# Patient Record
Sex: MALE | Race: WHITE | NOT HISPANIC OR LATINO | Employment: UNEMPLOYED | URBAN - METROPOLITAN AREA
[De-identification: names, ages, dates, MRNs, and addresses within clinical notes are randomized per-mention and may not be internally consistent; named-entity substitution may affect disease eponyms.]

---

## 2023-12-25 ENCOUNTER — HOSPITAL ENCOUNTER (INPATIENT)
Facility: HOSPITAL | Age: 63
LOS: 9 days | Discharge: HOME | DRG: 377 | End: 2024-01-03
Attending: EMERGENCY MEDICINE | Admitting: INTERNAL MEDICINE
Payer: MEDICAID

## 2023-12-25 ENCOUNTER — APPOINTMENT (OUTPATIENT)
Dept: CT IMAGING | Facility: HOSPITAL | Age: 63
DRG: 377 | End: 2023-12-25
Payer: MEDICAID

## 2023-12-25 DIAGNOSIS — D50.0 IRON DEFICIENCY ANEMIA DUE TO CHRONIC BLOOD LOSS: ICD-10-CM

## 2023-12-25 DIAGNOSIS — D64.9 SEVERE ANEMIA: ICD-10-CM

## 2023-12-25 DIAGNOSIS — K70.30 ALCOHOLIC CIRRHOSIS OF LIVER WITHOUT ASCITES: Primary | ICD-10-CM

## 2023-12-25 DIAGNOSIS — C15.9 SQUAMOUS CELL CARCINOMA OF ESOPHAGUS: ICD-10-CM

## 2023-12-25 DIAGNOSIS — R10.32 LLQ ABDOMINAL PAIN: ICD-10-CM

## 2023-12-25 DIAGNOSIS — K81.9 CHOLECYSTITIS: ICD-10-CM

## 2023-12-25 DIAGNOSIS — K26.9 DUODENAL ULCER: ICD-10-CM

## 2023-12-25 PROBLEM — F41.9 ANXIETY DISORDER, UNSPECIFIED: Status: ACTIVE | Noted: 2023-12-25

## 2023-12-25 PROBLEM — F17.210 NICOTINE DEPENDENCE, CIGARETTES, UNCOMPLICATED: Status: ACTIVE | Noted: 2021-05-09

## 2023-12-25 PROBLEM — E78.5 HYPERLIPIDEMIA, UNSPECIFIED: Status: ACTIVE | Noted: 2023-12-25

## 2023-12-25 PROBLEM — R74.01 ELEVATION OF LEVEL OF TRANSAMINASE AND LACTIC ACID DEHYDROGENASE (LDH): Status: ACTIVE | Noted: 2020-05-22

## 2023-12-25 PROBLEM — R74.02 ELEVATION OF LEVEL OF TRANSAMINASE AND LACTIC ACID DEHYDROGENASE (LDH): Status: ACTIVE | Noted: 2020-05-22

## 2023-12-25 PROBLEM — K92.1 MELENA: Status: ACTIVE | Noted: 2021-05-09

## 2023-12-25 PROBLEM — K25.0 ACUTE GASTRIC ULCER WITH HEMORRHAGE: Status: ACTIVE | Noted: 2021-05-09

## 2023-12-25 PROBLEM — K21.9 GASTRO-ESOPHAGEAL REFLUX DISEASE WITHOUT ESOPHAGITIS: Status: ACTIVE | Noted: 2021-05-09

## 2023-12-25 PROBLEM — N32.3 DIVERTICULUM OF BLADDER: Status: ACTIVE | Noted: 2020-05-22

## 2023-12-25 PROBLEM — R73.9 HYPERGLYCEMIA, UNSPECIFIED: Status: ACTIVE | Noted: 2023-12-25

## 2023-12-25 PROBLEM — Z72.0 TOBACCO USE: Status: ACTIVE | Noted: 2020-08-19

## 2023-12-25 PROBLEM — F10.10 ALCOHOL ABUSE, UNCOMPLICATED: Status: ACTIVE | Noted: 2021-05-09

## 2023-12-25 LAB
ABO GROUP BLD: NORMAL
ABO GROUP BLD: NORMAL
ALBUMIN SERPL-MCNC: 3.4 G/DL (ref 3.5–5.2)
ALBUMIN/GLOB SERPL: 1.1 G/DL
ALP SERPL-CCNC: 93 U/L (ref 39–117)
ALT SERPL W P-5'-P-CCNC: 9 U/L (ref 1–41)
AMPHET+METHAMPHET UR QL: NEGATIVE
AMPHETAMINES UR QL: NEGATIVE
ANION GAP SERPL CALCULATED.3IONS-SCNC: 9 MMOL/L (ref 5–15)
APTT PPP: 32.1 SECONDS (ref 60–90)
AST SERPL-CCNC: 15 U/L (ref 1–40)
B-OH-BUTYR SERPL-SCNC: 0.07 MMOL/L (ref 0.02–0.27)
BARBITURATES UR QL SCN: NEGATIVE
BASOPHILS # BLD MANUAL: 0.05 10*3/MM3 (ref 0–0.2)
BASOPHILS NFR BLD MANUAL: 1 % (ref 0–1.5)
BENZODIAZ UR QL SCN: NEGATIVE
BILIRUB SERPL-MCNC: <0.2 MG/DL (ref 0–1.2)
BILIRUB UR QL STRIP: NEGATIVE
BLD GP AB SCN SERPL QL: NEGATIVE
BUN SERPL-MCNC: 10 MG/DL (ref 8–23)
BUN/CREAT SERPL: 16.9 (ref 7–25)
BUPRENORPHINE SERPL-MCNC: NEGATIVE NG/ML
CALCIUM SPEC-SCNC: 8.9 MG/DL (ref 8.6–10.5)
CANNABINOIDS SERPL QL: NEGATIVE
CHLORIDE SERPL-SCNC: 111 MMOL/L (ref 98–107)
CK SERPL-CCNC: 17 U/L (ref 20–200)
CLARITY UR: CLEAR
CO2 SERPL-SCNC: 23 MMOL/L (ref 22–29)
COCAINE UR QL: NEGATIVE
COLOR UR: YELLOW
CREAT SERPL-MCNC: 0.59 MG/DL (ref 0.76–1.27)
D-LACTATE SERPL-SCNC: 1.2 MMOL/L (ref 0.5–2)
DEPRECATED RDW RBC AUTO: 87.4 FL (ref 37–54)
DEVELOPER EXPIRATION DATE: ABNORMAL
DEVELOPER LOT NUMBER: ABNORMAL
EGFRCR SERPLBLD CKD-EPI 2021: 108.3 ML/MIN/1.73
EOSINOPHIL # BLD MANUAL: 0.14 10*3/MM3 (ref 0–0.4)
EOSINOPHIL NFR BLD MANUAL: 3 % (ref 0.3–6.2)
ERYTHROCYTE [DISTWIDTH] IN BLOOD BY AUTOMATED COUNT: 24.4 % (ref 12.3–15.4)
ETHANOL BLD-MCNC: <10 MG/DL (ref 0–10)
EXPIRATION DATE: ABNORMAL
FECAL OCCULT BLOOD SCREEN, POC: POSITIVE
FENTANYL UR-MCNC: NEGATIVE NG/ML
FERRITIN SERPL-MCNC: 39.61 NG/ML (ref 30–400)
GLOBULIN UR ELPH-MCNC: 3 GM/DL
GLUCOSE SERPL-MCNC: 104 MG/DL (ref 65–99)
GLUCOSE UR STRIP-MCNC: NEGATIVE MG/DL
HCT VFR BLD AUTO: 19 % (ref 37.5–51)
HGB BLD-MCNC: 5.6 G/DL (ref 13–17.7)
HGB UR QL STRIP.AUTO: NEGATIVE
HOLD SPECIMEN: NORMAL
INR PPP: 1.05 (ref 0.89–1.12)
IRON 24H UR-MRATE: 12 MCG/DL (ref 59–158)
IRON SATN MFR SERPL: 2 % (ref 20–50)
KETONES UR QL STRIP: NEGATIVE
LEUKOCYTE ESTERASE UR QL STRIP.AUTO: NEGATIVE
LIPASE SERPL-CCNC: 75 U/L (ref 13–60)
LYMPHOCYTES # BLD MANUAL: 1.23 10*3/MM3 (ref 0.7–3.1)
LYMPHOCYTES NFR BLD MANUAL: 6 % (ref 5–12)
Lab: ABNORMAL
MAGNESIUM SERPL-MCNC: 1.6 MG/DL (ref 1.6–2.4)
MCH RBC QN AUTO: 28.9 PG (ref 26.6–33)
MCHC RBC AUTO-ENTMCNC: 29.5 G/DL (ref 31.5–35.7)
MCV RBC AUTO: 97.9 FL (ref 79–97)
METHADONE UR QL SCN: NEGATIVE
MONOCYTES # BLD: 0.27 10*3/MM3 (ref 0.1–0.9)
MYOGLOBIN SERPL-MCNC: 21 NG/ML (ref 28–72)
NEGATIVE CONTROL: NEGATIVE
NEUTROPHILS # BLD AUTO: 2.87 10*3/MM3 (ref 1.7–7)
NEUTROPHILS NFR BLD MANUAL: 63 % (ref 42.7–76)
NITRITE UR QL STRIP: NEGATIVE
OPIATES UR QL: NEGATIVE
OVALOCYTES BLD QL SMEAR: ABNORMAL
OXYCODONE UR QL SCN: NEGATIVE
PCP UR QL SCN: NEGATIVE
PH UR STRIP.AUTO: 5.5 [PH] (ref 5–8)
PLAT MORPH BLD: NORMAL
PLATELET # BLD AUTO: 222 10*3/MM3 (ref 140–450)
PMV BLD AUTO: 8.9 FL (ref 6–12)
POSITIVE CONTROL: POSITIVE
POTASSIUM SERPL-SCNC: 4.1 MMOL/L (ref 3.5–5.2)
PROCALCITONIN SERPL-MCNC: 0.19 NG/ML (ref 0–0.25)
PROT SERPL-MCNC: 6.4 G/DL (ref 6–8.5)
PROT UR QL STRIP: NEGATIVE
PROTHROMBIN TIME: 13.8 SECONDS (ref 12.2–14.5)
RBC # BLD AUTO: 1.94 10*6/MM3 (ref 4.14–5.8)
RH BLD: POSITIVE
RH BLD: POSITIVE
SODIUM SERPL-SCNC: 143 MMOL/L (ref 136–145)
SP GR UR STRIP: 1.02 (ref 1–1.03)
STOMATOCYTES BLD QL SMEAR: ABNORMAL
T&S EXPIRATION DATE: NORMAL
T4 FREE SERPL-MCNC: 0.85 NG/DL (ref 0.93–1.7)
TARGETS BLD QL SMEAR: ABNORMAL
TIBC SERPL-MCNC: 532 MCG/DL (ref 298–536)
TRANSFERRIN SERPL-MCNC: 357 MG/DL (ref 200–360)
TRICYCLICS UR QL SCN: NEGATIVE
TROPONIN T SERPL HS-MCNC: 17 NG/L
TSH SERPL DL<=0.05 MIU/L-ACNC: 1.07 UIU/ML (ref 0.27–4.2)
UROBILINOGEN UR QL STRIP: NORMAL
VARIANT LYMPHS NFR BLD MANUAL: 21 % (ref 19.6–45.3)
VARIANT LYMPHS NFR BLD MANUAL: 6 % (ref 0–5)
WBC MORPH BLD: NORMAL
WBC NRBC COR # BLD AUTO: 4.56 10*3/MM3 (ref 3.4–10.8)
WHOLE BLOOD HOLD COAG: NORMAL
WHOLE BLOOD HOLD SPECIMEN: NORMAL

## 2023-12-25 PROCEDURE — 85610 PROTHROMBIN TIME: CPT | Performed by: EMERGENCY MEDICINE

## 2023-12-25 PROCEDURE — 84145 PROCALCITONIN (PCT): CPT | Performed by: EMERGENCY MEDICINE

## 2023-12-25 PROCEDURE — 83690 ASSAY OF LIPASE: CPT

## 2023-12-25 PROCEDURE — 83036 HEMOGLOBIN GLYCOSYLATED A1C: CPT | Performed by: INTERNAL MEDICINE

## 2023-12-25 PROCEDURE — 83540 ASSAY OF IRON: CPT | Performed by: EMERGENCY MEDICINE

## 2023-12-25 PROCEDURE — 25010000002 CEFTRIAXONE PER 250 MG: Performed by: STUDENT IN AN ORGANIZED HEALTH CARE EDUCATION/TRAINING PROGRAM

## 2023-12-25 PROCEDURE — 82077 ASSAY SPEC XCP UR&BREATH IA: CPT | Performed by: EMERGENCY MEDICINE

## 2023-12-25 PROCEDURE — 80050 GENERAL HEALTH PANEL: CPT

## 2023-12-25 PROCEDURE — 86850 RBC ANTIBODY SCREEN: CPT | Performed by: EMERGENCY MEDICINE

## 2023-12-25 PROCEDURE — 82550 ASSAY OF CK (CPK): CPT | Performed by: EMERGENCY MEDICINE

## 2023-12-25 PROCEDURE — 85007 BL SMEAR W/DIFF WBC COUNT: CPT

## 2023-12-25 PROCEDURE — 93005 ELECTROCARDIOGRAM TRACING: CPT

## 2023-12-25 PROCEDURE — 84439 ASSAY OF FREE THYROXINE: CPT | Performed by: EMERGENCY MEDICINE

## 2023-12-25 PROCEDURE — 80307 DRUG TEST PRSMV CHEM ANLYZR: CPT | Performed by: EMERGENCY MEDICINE

## 2023-12-25 PROCEDURE — 86900 BLOOD TYPING SEROLOGIC ABO: CPT | Performed by: EMERGENCY MEDICINE

## 2023-12-25 PROCEDURE — 82270 OCCULT BLOOD FECES: CPT | Performed by: EMERGENCY MEDICINE

## 2023-12-25 PROCEDURE — 84466 ASSAY OF TRANSFERRIN: CPT | Performed by: EMERGENCY MEDICINE

## 2023-12-25 PROCEDURE — 74177 CT ABD & PELVIS W/CONTRAST: CPT

## 2023-12-25 PROCEDURE — 25010000002 OCTREOTIDE PER 25 MCG: Performed by: STUDENT IN AN ORGANIZED HEALTH CARE EDUCATION/TRAINING PROGRAM

## 2023-12-25 PROCEDURE — 86900 BLOOD TYPING SEROLOGIC ABO: CPT

## 2023-12-25 PROCEDURE — 86901 BLOOD TYPING SEROLOGIC RH(D): CPT

## 2023-12-25 PROCEDURE — 25510000001 IOPAMIDOL 61 % SOLUTION: Performed by: EMERGENCY MEDICINE

## 2023-12-25 PROCEDURE — 82010 KETONE BODYS QUAN: CPT | Performed by: EMERGENCY MEDICINE

## 2023-12-25 PROCEDURE — 86901 BLOOD TYPING SEROLOGIC RH(D): CPT | Performed by: EMERGENCY MEDICINE

## 2023-12-25 PROCEDURE — 83735 ASSAY OF MAGNESIUM: CPT | Performed by: EMERGENCY MEDICINE

## 2023-12-25 PROCEDURE — 86923 COMPATIBILITY TEST ELECTRIC: CPT

## 2023-12-25 PROCEDURE — 81003 URINALYSIS AUTO W/O SCOPE: CPT

## 2023-12-25 PROCEDURE — 99285 EMERGENCY DEPT VISIT HI MDM: CPT

## 2023-12-25 PROCEDURE — 82728 ASSAY OF FERRITIN: CPT | Performed by: EMERGENCY MEDICINE

## 2023-12-25 PROCEDURE — 25010000002 MORPHINE PER 10 MG: Performed by: EMERGENCY MEDICINE

## 2023-12-25 PROCEDURE — 84484 ASSAY OF TROPONIN QUANT: CPT

## 2023-12-25 PROCEDURE — 83874 ASSAY OF MYOGLOBIN: CPT | Performed by: EMERGENCY MEDICINE

## 2023-12-25 PROCEDURE — 83605 ASSAY OF LACTIC ACID: CPT | Performed by: EMERGENCY MEDICINE

## 2023-12-25 PROCEDURE — 85730 THROMBOPLASTIN TIME PARTIAL: CPT | Performed by: EMERGENCY MEDICINE

## 2023-12-25 RX ORDER — CARVEDILOL 3.12 MG/1
3.12 TABLET ORAL 2 TIMES DAILY WITH MEALS
COMMUNITY

## 2023-12-25 RX ORDER — PANTOPRAZOLE SODIUM 40 MG/10ML
40 INJECTION, POWDER, LYOPHILIZED, FOR SOLUTION INTRAVENOUS EVERY 12 HOURS SCHEDULED
Status: DISCONTINUED | OUTPATIENT
Start: 2023-12-25 | End: 2024-01-02

## 2023-12-25 RX ORDER — GABAPENTIN 300 MG/1
300 CAPSULE ORAL 3 TIMES DAILY
Status: ON HOLD | COMMUNITY
End: 2024-01-03 | Stop reason: SDUPTHER

## 2023-12-25 RX ORDER — LACTULOSE 10 G/15ML
20 SOLUTION ORAL 2 TIMES DAILY PRN
COMMUNITY

## 2023-12-25 RX ORDER — FINASTERIDE 5 MG/1
5 TABLET, FILM COATED ORAL DAILY
COMMUNITY

## 2023-12-25 RX ORDER — SPIRONOLACTONE 25 MG/1
25 TABLET ORAL DAILY
COMMUNITY
End: 2024-01-03 | Stop reason: HOSPADM

## 2023-12-25 RX ORDER — PANTOPRAZOLE SODIUM 40 MG/1
40 TABLET, DELAYED RELEASE ORAL 2 TIMES DAILY
COMMUNITY

## 2023-12-25 RX ORDER — FOLIC ACID 1 MG/1
1 TABLET ORAL DAILY
COMMUNITY

## 2023-12-25 RX ORDER — DIPHENHYDRAMINE HCL 25 MG
25 CAPSULE ORAL NIGHTLY
COMMUNITY
End: 2024-01-03 | Stop reason: HOSPADM

## 2023-12-25 RX ORDER — SODIUM CHLORIDE 0.9 % (FLUSH) 0.9 %
10 SYRINGE (ML) INJECTION AS NEEDED
Status: DISCONTINUED | OUTPATIENT
Start: 2023-12-25 | End: 2024-01-03 | Stop reason: HOSPADM

## 2023-12-25 RX ORDER — CIPROFLOXACIN 500 MG/1
500 TABLET, FILM COATED ORAL 2 TIMES DAILY
COMMUNITY
End: 2024-01-03 | Stop reason: HOSPADM

## 2023-12-25 RX ORDER — MORPHINE SULFATE 4 MG/ML
4 INJECTION, SOLUTION INTRAMUSCULAR; INTRAVENOUS ONCE
Status: COMPLETED | OUTPATIENT
Start: 2023-12-25 | End: 2023-12-25

## 2023-12-25 RX ORDER — LANOLIN ALCOHOL/MO/W.PET/CERES
1000 CREAM (GRAM) TOPICAL DAILY
COMMUNITY

## 2023-12-25 RX ORDER — SODIUM CHLORIDE, SODIUM LACTATE, POTASSIUM CHLORIDE, CALCIUM CHLORIDE 600; 310; 30; 20 MG/100ML; MG/100ML; MG/100ML; MG/100ML
75 INJECTION, SOLUTION INTRAVENOUS CONTINUOUS
Status: DISCONTINUED | OUTPATIENT
Start: 2023-12-26 | End: 2023-12-27

## 2023-12-25 RX ORDER — FAMOTIDINE 20 MG/1
20 TABLET, FILM COATED ORAL 2 TIMES DAILY
COMMUNITY
End: 2024-01-03 | Stop reason: HOSPADM

## 2023-12-25 RX ORDER — SUCRALFATE 1 G/1
1 TABLET ORAL 3 TIMES DAILY
COMMUNITY
End: 2024-01-03 | Stop reason: HOSPADM

## 2023-12-25 RX ORDER — CARVEDILOL 6.25 MG/1
6.25 TABLET ORAL 2 TIMES DAILY WITH MEALS
COMMUNITY
End: 2024-01-03 | Stop reason: HOSPADM

## 2023-12-25 RX ORDER — ERGOCALCIFEROL 1.25 MG/1
50000 CAPSULE ORAL WEEKLY
COMMUNITY

## 2023-12-25 RX ORDER — ESCITALOPRAM OXALATE 10 MG/1
10 TABLET ORAL NIGHTLY
COMMUNITY

## 2023-12-25 RX ORDER — ACETAMINOPHEN 500 MG
500 TABLET ORAL EVERY 6 HOURS PRN
Status: ON HOLD | COMMUNITY
End: 2023-12-29

## 2023-12-25 RX ORDER — TAMSULOSIN HYDROCHLORIDE 0.4 MG/1
1 CAPSULE ORAL DAILY
COMMUNITY

## 2023-12-25 RX ADMIN — PANTOPRAZOLE SODIUM 40 MG: 40 INJECTION, POWDER, LYOPHILIZED, FOR SOLUTION INTRAVENOUS at 22:30

## 2023-12-25 RX ADMIN — OCTREOTIDE ACETATE 50 MCG/HR: 500 INJECTION, SOLUTION INTRAVENOUS; SUBCUTANEOUS at 23:39

## 2023-12-25 RX ADMIN — IOPAMIDOL 85 ML: 612 INJECTION, SOLUTION INTRAVENOUS at 20:43

## 2023-12-25 RX ADMIN — SODIUM CHLORIDE 1000 MG: 900 INJECTION INTRAVENOUS at 22:30

## 2023-12-25 RX ADMIN — MORPHINE SULFATE 4 MG: 4 INJECTION, SOLUTION INTRAMUSCULAR; INTRAVENOUS at 22:30

## 2023-12-25 NOTE — Clinical Note
Level of Care: Telemetry [5]   Diagnosis: Anemia [560987]   Admitting Physician: NASIM GARCIA [[id number]]   Attending Physician: NASIM GARCIA [[id number]]

## 2023-12-26 ENCOUNTER — APPOINTMENT (OUTPATIENT)
Dept: GENERAL RADIOLOGY | Facility: HOSPITAL | Age: 63
DRG: 377 | End: 2023-12-26
Payer: MEDICAID

## 2023-12-26 ENCOUNTER — ANESTHESIA EVENT (OUTPATIENT)
Dept: GASTROENTEROLOGY | Facility: HOSPITAL | Age: 63
End: 2023-12-26
Payer: MEDICAID

## 2023-12-26 ENCOUNTER — ANESTHESIA (OUTPATIENT)
Dept: GASTROENTEROLOGY | Facility: HOSPITAL | Age: 63
End: 2023-12-26
Payer: MEDICAID

## 2023-12-26 LAB
ANION GAP SERPL CALCULATED.3IONS-SCNC: 9 MMOL/L (ref 5–15)
BUN SERPL-MCNC: 7 MG/DL (ref 8–23)
BUN/CREAT SERPL: 12.5 (ref 7–25)
CALCIUM SPEC-SCNC: 9 MG/DL (ref 8.6–10.5)
CHLORIDE SERPL-SCNC: 109 MMOL/L (ref 98–107)
CO2 SERPL-SCNC: 22 MMOL/L (ref 22–29)
CREAT SERPL-MCNC: 0.56 MG/DL (ref 0.76–1.27)
DEPRECATED RDW RBC AUTO: 69.6 FL (ref 37–54)
EGFRCR SERPLBLD CKD-EPI 2021: 110.1 ML/MIN/1.73
ERYTHROCYTE [DISTWIDTH] IN BLOOD BY AUTOMATED COUNT: 21.3 % (ref 12.3–15.4)
GLUCOSE SERPL-MCNC: 125 MG/DL (ref 65–99)
HCT VFR BLD AUTO: 23.1 % (ref 37.5–51)
HCT VFR BLD AUTO: 23.6 % (ref 37.5–51)
HCT VFR BLD AUTO: 24.4 % (ref 37.5–51)
HGB BLD-MCNC: 7.2 G/DL (ref 13–17.7)
HGB BLD-MCNC: 7.4 G/DL (ref 13–17.7)
HGB BLD-MCNC: 7.9 G/DL (ref 13–17.7)
MAGNESIUM SERPL-MCNC: 1.4 MG/DL (ref 1.6–2.4)
MCH RBC QN AUTO: 29.3 PG (ref 26.6–33)
MCHC RBC AUTO-ENTMCNC: 31.2 G/DL (ref 31.5–35.7)
MCV RBC AUTO: 93.9 FL (ref 79–97)
PLATELET # BLD AUTO: 196 10*3/MM3 (ref 140–450)
PMV BLD AUTO: 9.4 FL (ref 6–12)
POTASSIUM SERPL-SCNC: 3.9 MMOL/L (ref 3.5–5.2)
RBC # BLD AUTO: 2.46 10*6/MM3 (ref 4.14–5.8)
SODIUM SERPL-SCNC: 140 MMOL/L (ref 136–145)
WBC NRBC COR # BLD AUTO: 6.51 10*3/MM3 (ref 3.4–10.8)

## 2023-12-26 PROCEDURE — 88305 TISSUE EXAM BY PATHOLOGIST: CPT | Performed by: INTERNAL MEDICINE

## 2023-12-26 PROCEDURE — 25010000002 ONDANSETRON PER 1 MG: Performed by: NURSE PRACTITIONER

## 2023-12-26 PROCEDURE — 25010000002 OCTREOTIDE PER 25 MCG: Performed by: STUDENT IN AN ORGANIZED HEALTH CARE EDUCATION/TRAINING PROGRAM

## 2023-12-26 PROCEDURE — P9016 RBC LEUKOCYTES REDUCED: HCPCS

## 2023-12-26 PROCEDURE — 25010000002 NA FERRIC GLUC CPLX PER 12.5 MG: Performed by: INTERNAL MEDICINE

## 2023-12-26 PROCEDURE — 25010000002 CEFTRIAXONE PER 250 MG: Performed by: STUDENT IN AN ORGANIZED HEALTH CARE EDUCATION/TRAINING PROGRAM

## 2023-12-26 PROCEDURE — G0378 HOSPITAL OBSERVATION PER HR: HCPCS

## 2023-12-26 PROCEDURE — 85014 HEMATOCRIT: CPT | Performed by: STUDENT IN AN ORGANIZED HEALTH CARE EDUCATION/TRAINING PROGRAM

## 2023-12-26 PROCEDURE — 25810000003 LACTATED RINGERS PER 1000 ML: Performed by: STUDENT IN AN ORGANIZED HEALTH CARE EDUCATION/TRAINING PROGRAM

## 2023-12-26 PROCEDURE — 25010000002 METRONIDAZOLE 500 MG/100ML SOLUTION: Performed by: INTERNAL MEDICINE

## 2023-12-26 PROCEDURE — 97535 SELF CARE MNGMENT TRAINING: CPT

## 2023-12-26 PROCEDURE — 80048 BASIC METABOLIC PNL TOTAL CA: CPT | Performed by: STUDENT IN AN ORGANIZED HEALTH CARE EDUCATION/TRAINING PROGRAM

## 2023-12-26 PROCEDURE — 36430 TRANSFUSION BLD/BLD COMPNT: CPT

## 2023-12-26 PROCEDURE — 25010000002 FUROSEMIDE PER 20 MG: Performed by: NURSE PRACTITIONER

## 2023-12-26 PROCEDURE — 0DB98ZX EXCISION OF DUODENUM, VIA NATURAL OR ARTIFICIAL OPENING ENDOSCOPIC, DIAGNOSTIC: ICD-10-PCS | Performed by: INTERNAL MEDICINE

## 2023-12-26 PROCEDURE — 25010000002 METRONIDAZOLE 500 MG/100ML SOLUTION: Performed by: STUDENT IN AN ORGANIZED HEALTH CARE EDUCATION/TRAINING PROGRAM

## 2023-12-26 PROCEDURE — 25010000002 THIAMINE PER 100 MG: Performed by: INTERNAL MEDICINE

## 2023-12-26 PROCEDURE — 88342 IMHCHEM/IMCYTCHM 1ST ANTB: CPT | Performed by: INTERNAL MEDICINE

## 2023-12-26 PROCEDURE — 85014 HEMATOCRIT: CPT | Performed by: NURSE PRACTITIONER

## 2023-12-26 PROCEDURE — 88341 IMHCHEM/IMCYTCHM EA ADD ANTB: CPT | Performed by: INTERNAL MEDICINE

## 2023-12-26 PROCEDURE — 86900 BLOOD TYPING SEROLOGIC ABO: CPT

## 2023-12-26 PROCEDURE — 97166 OT EVAL MOD COMPLEX 45 MIN: CPT

## 2023-12-26 PROCEDURE — 25810000003 SODIUM CHLORIDE 0.9 % SOLUTION: Performed by: NURSE ANESTHETIST, CERTIFIED REGISTERED

## 2023-12-26 PROCEDURE — 25010000002 MAGNESIUM SULFATE 2 GM/50ML SOLUTION: Performed by: INTERNAL MEDICINE

## 2023-12-26 PROCEDURE — 71045 X-RAY EXAM CHEST 1 VIEW: CPT

## 2023-12-26 PROCEDURE — 83735 ASSAY OF MAGNESIUM: CPT | Performed by: STUDENT IN AN ORGANIZED HEALTH CARE EDUCATION/TRAINING PROGRAM

## 2023-12-26 PROCEDURE — 97162 PT EVAL MOD COMPLEX 30 MIN: CPT

## 2023-12-26 PROCEDURE — 99204 OFFICE O/P NEW MOD 45 MIN: CPT | Performed by: INTERNAL MEDICINE

## 2023-12-26 PROCEDURE — 85018 HEMOGLOBIN: CPT | Performed by: NURSE PRACTITIONER

## 2023-12-26 PROCEDURE — 85018 HEMOGLOBIN: CPT | Performed by: STUDENT IN AN ORGANIZED HEALTH CARE EDUCATION/TRAINING PROGRAM

## 2023-12-26 PROCEDURE — 85027 COMPLETE CBC AUTOMATED: CPT | Performed by: STUDENT IN AN ORGANIZED HEALTH CARE EDUCATION/TRAINING PROGRAM

## 2023-12-26 PROCEDURE — 25010000002 CEFTRIAXONE PER 250 MG: Performed by: INTERNAL MEDICINE

## 2023-12-26 PROCEDURE — 74018 RADEX ABDOMEN 1 VIEW: CPT

## 2023-12-26 PROCEDURE — 88313 SPECIAL STAINS GROUP 2: CPT | Performed by: INTERNAL MEDICINE

## 2023-12-26 PROCEDURE — 0DB58ZX EXCISION OF ESOPHAGUS, VIA NATURAL OR ARTIFICIAL OPENING ENDOSCOPIC, DIAGNOSTIC: ICD-10-PCS | Performed by: INTERNAL MEDICINE

## 2023-12-26 PROCEDURE — 0202U NFCT DS 22 TRGT SARS-COV-2: CPT | Performed by: NURSE PRACTITIONER

## 2023-12-26 PROCEDURE — 25010000002 PROPOFOL 10 MG/ML EMULSION: Performed by: NURSE ANESTHETIST, CERTIFIED REGISTERED

## 2023-12-26 PROCEDURE — 43239 EGD BIOPSY SINGLE/MULTIPLE: CPT | Performed by: INTERNAL MEDICINE

## 2023-12-26 PROCEDURE — 0DB68ZX EXCISION OF STOMACH, VIA NATURAL OR ARTIFICIAL OPENING ENDOSCOPIC, DIAGNOSTIC: ICD-10-PCS | Performed by: INTERNAL MEDICINE

## 2023-12-26 RX ORDER — CHOLECALCIFEROL (VITAMIN D3) 125 MCG
5 CAPSULE ORAL NIGHTLY PRN
Status: DISCONTINUED | OUTPATIENT
Start: 2023-12-26 | End: 2024-01-03 | Stop reason: HOSPADM

## 2023-12-26 RX ORDER — SODIUM CHLORIDE 0.9 % (FLUSH) 0.9 %
10 SYRINGE (ML) INJECTION AS NEEDED
Status: DISCONTINUED | OUTPATIENT
Start: 2023-12-26 | End: 2024-01-03 | Stop reason: HOSPADM

## 2023-12-26 RX ORDER — TAMSULOSIN HYDROCHLORIDE 0.4 MG/1
0.4 CAPSULE ORAL DAILY
Status: DISCONTINUED | OUTPATIENT
Start: 2023-12-26 | End: 2024-01-03 | Stop reason: HOSPADM

## 2023-12-26 RX ORDER — BISACODYL 5 MG/1
5 TABLET, DELAYED RELEASE ORAL DAILY PRN
Status: DISCONTINUED | OUTPATIENT
Start: 2023-12-26 | End: 2024-01-03 | Stop reason: HOSPADM

## 2023-12-26 RX ORDER — METRONIDAZOLE 500 MG/100ML
500 INJECTION, SOLUTION INTRAVENOUS EVERY 8 HOURS SCHEDULED
Qty: 1500 ML | Refills: 0 | Status: DISCONTINUED | OUTPATIENT
Start: 2023-12-26 | End: 2023-12-27

## 2023-12-26 RX ORDER — NALOXONE HCL 0.4 MG/ML
0.4 VIAL (ML) INJECTION
Status: DISCONTINUED | OUTPATIENT
Start: 2023-12-26 | End: 2024-01-03 | Stop reason: HOSPADM

## 2023-12-26 RX ORDER — ONDANSETRON 2 MG/ML
4 INJECTION INTRAMUSCULAR; INTRAVENOUS ONCE AS NEEDED
Status: DISCONTINUED | OUTPATIENT
Start: 2023-12-26 | End: 2023-12-26 | Stop reason: HOSPADM

## 2023-12-26 RX ORDER — SODIUM CHLORIDE 9 MG/ML
40 INJECTION, SOLUTION INTRAVENOUS AS NEEDED
Status: DISCONTINUED | OUTPATIENT
Start: 2023-12-26 | End: 2024-01-03 | Stop reason: HOSPADM

## 2023-12-26 RX ORDER — FUROSEMIDE 10 MG/ML
20 INJECTION INTRAMUSCULAR; INTRAVENOUS ONCE
Status: COMPLETED | OUTPATIENT
Start: 2023-12-26 | End: 2023-12-26

## 2023-12-26 RX ORDER — SODIUM CHLORIDE 0.9 % (FLUSH) 0.9 %
10 SYRINGE (ML) INJECTION EVERY 12 HOURS SCHEDULED
Status: DISCONTINUED | OUTPATIENT
Start: 2023-12-26 | End: 2024-01-03 | Stop reason: HOSPADM

## 2023-12-26 RX ORDER — BISACODYL 10 MG
10 SUPPOSITORY, RECTAL RECTAL DAILY PRN
Status: DISCONTINUED | OUTPATIENT
Start: 2023-12-26 | End: 2024-01-03 | Stop reason: HOSPADM

## 2023-12-26 RX ORDER — HYDROCODONE BITARTRATE AND ACETAMINOPHEN 5; 325 MG/1; MG/1
1 TABLET ORAL EVERY 4 HOURS PRN
Status: DISCONTINUED | OUTPATIENT
Start: 2023-12-26 | End: 2023-12-30

## 2023-12-26 RX ORDER — SPIRONOLACTONE 25 MG/1
25 TABLET ORAL DAILY
Status: DISCONTINUED | OUTPATIENT
Start: 2023-12-26 | End: 2024-01-03 | Stop reason: HOSPADM

## 2023-12-26 RX ORDER — IPRATROPIUM BROMIDE AND ALBUTEROL SULFATE 2.5; .5 MG/3ML; MG/3ML
3 SOLUTION RESPIRATORY (INHALATION) ONCE AS NEEDED
Status: DISCONTINUED | OUTPATIENT
Start: 2023-12-26 | End: 2023-12-26 | Stop reason: HOSPADM

## 2023-12-26 RX ORDER — SODIUM CHLORIDE 9 MG/ML
50 INJECTION, SOLUTION INTRAVENOUS CONTINUOUS
Status: DISCONTINUED | OUTPATIENT
Start: 2023-12-26 | End: 2023-12-27

## 2023-12-26 RX ORDER — AMOXICILLIN 250 MG
2 CAPSULE ORAL 2 TIMES DAILY
Status: DISCONTINUED | OUTPATIENT
Start: 2023-12-26 | End: 2024-01-03 | Stop reason: HOSPADM

## 2023-12-26 RX ORDER — CARVEDILOL 3.12 MG/1
3.12 TABLET ORAL 2 TIMES DAILY WITH MEALS
Status: DISCONTINUED | OUTPATIENT
Start: 2023-12-26 | End: 2024-01-03 | Stop reason: HOSPADM

## 2023-12-26 RX ORDER — ONDANSETRON 2 MG/ML
4 INJECTION INTRAMUSCULAR; INTRAVENOUS ONCE AS NEEDED
Status: DISCONTINUED | OUTPATIENT
Start: 2023-12-26 | End: 2023-12-26 | Stop reason: SDUPTHER

## 2023-12-26 RX ORDER — PROPOFOL 10 MG/ML
VIAL (ML) INTRAVENOUS AS NEEDED
Status: DISCONTINUED | OUTPATIENT
Start: 2023-12-26 | End: 2023-12-26 | Stop reason: SURG

## 2023-12-26 RX ORDER — THIAMINE HYDROCHLORIDE 100 MG/ML
200 INJECTION, SOLUTION INTRAMUSCULAR; INTRAVENOUS DAILY
Status: COMPLETED | OUTPATIENT
Start: 2023-12-26 | End: 2023-12-30

## 2023-12-26 RX ORDER — MAGNESIUM SULFATE HEPTAHYDRATE 40 MG/ML
2 INJECTION, SOLUTION INTRAVENOUS
Status: COMPLETED | OUTPATIENT
Start: 2023-12-26 | End: 2023-12-26

## 2023-12-26 RX ORDER — ACETAMINOPHEN 500 MG
500 TABLET ORAL EVERY 6 HOURS PRN
Status: DISCONTINUED | OUTPATIENT
Start: 2023-12-26 | End: 2024-01-03 | Stop reason: HOSPADM

## 2023-12-26 RX ORDER — FOLIC ACID 1 MG/1
1 TABLET ORAL DAILY
Status: DISCONTINUED | OUTPATIENT
Start: 2023-12-26 | End: 2024-01-03 | Stop reason: HOSPADM

## 2023-12-26 RX ORDER — SODIUM CHLORIDE 9 MG/ML
INJECTION, SOLUTION INTRAVENOUS CONTINUOUS PRN
Status: DISCONTINUED | OUTPATIENT
Start: 2023-12-26 | End: 2023-12-26 | Stop reason: SURG

## 2023-12-26 RX ORDER — LANOLIN ALCOHOL/MO/W.PET/CERES
1000 CREAM (GRAM) TOPICAL DAILY
Status: DISCONTINUED | OUTPATIENT
Start: 2023-12-26 | End: 2024-01-03 | Stop reason: HOSPADM

## 2023-12-26 RX ORDER — HYDROMORPHONE HYDROCHLORIDE 1 MG/ML
0.5 INJECTION, SOLUTION INTRAMUSCULAR; INTRAVENOUS; SUBCUTANEOUS
Status: DISPENSED | OUTPATIENT
Start: 2023-12-26 | End: 2024-01-02

## 2023-12-26 RX ORDER — ONDANSETRON 2 MG/ML
4 INJECTION INTRAMUSCULAR; INTRAVENOUS EVERY 6 HOURS PRN
Status: DISCONTINUED | OUTPATIENT
Start: 2023-12-26 | End: 2024-01-03 | Stop reason: HOSPADM

## 2023-12-26 RX ORDER — LACTULOSE 10 G/15ML
20 SOLUTION ORAL 2 TIMES DAILY PRN
Status: DISCONTINUED | OUTPATIENT
Start: 2023-12-26 | End: 2024-01-03 | Stop reason: HOSPADM

## 2023-12-26 RX ORDER — IPRATROPIUM BROMIDE AND ALBUTEROL SULFATE 2.5; .5 MG/3ML; MG/3ML
3 SOLUTION RESPIRATORY (INHALATION) ONCE AS NEEDED
Status: DISCONTINUED | OUTPATIENT
Start: 2023-12-26 | End: 2023-12-26 | Stop reason: SDUPTHER

## 2023-12-26 RX ORDER — POLYETHYLENE GLYCOL 3350 17 G/17G
17 POWDER, FOR SOLUTION ORAL DAILY PRN
Status: DISCONTINUED | OUTPATIENT
Start: 2023-12-26 | End: 2024-01-03 | Stop reason: HOSPADM

## 2023-12-26 RX ADMIN — SODIUM CHLORIDE 1000 MG: 900 INJECTION INTRAVENOUS at 01:08

## 2023-12-26 RX ADMIN — ONDANSETRON 4 MG: 2 INJECTION INTRAMUSCULAR; INTRAVENOUS at 20:58

## 2023-12-26 RX ADMIN — PROPOFOL 100 MG: 10 INJECTION, EMULSION INTRAVENOUS at 12:30

## 2023-12-26 RX ADMIN — METRONIDAZOLE 500 MG: 500 INJECTION, SOLUTION INTRAVENOUS at 08:37

## 2023-12-26 RX ADMIN — SODIUM CHLORIDE 125 MG: 9 INJECTION, SOLUTION INTRAVENOUS at 15:01

## 2023-12-26 RX ADMIN — CEFTRIAXONE 2000 MG: 2 INJECTION, POWDER, FOR SOLUTION INTRAMUSCULAR; INTRAVENOUS at 20:58

## 2023-12-26 RX ADMIN — HYDROCODONE BITARTRATE AND ACETAMINOPHEN 1 TABLET: 5; 325 TABLET ORAL at 13:49

## 2023-12-26 RX ADMIN — PROPOFOL 50 MG: 10 INJECTION, EMULSION INTRAVENOUS at 12:36

## 2023-12-26 RX ADMIN — MAGNESIUM SULFATE IN WATER FOR 2 G: 40 INJECTION INTRAVENOUS at 15:47

## 2023-12-26 RX ADMIN — Medication 10 ML: at 01:10

## 2023-12-26 RX ADMIN — CYANOCOBALAMIN TAB 1000 MCG 1000 MCG: 1000 TAB at 08:25

## 2023-12-26 RX ADMIN — METRONIDAZOLE 500 MG: 500 INJECTION, SOLUTION INTRAVENOUS at 15:05

## 2023-12-26 RX ADMIN — Medication 5 MG: at 02:06

## 2023-12-26 RX ADMIN — Medication 10 ML: at 21:05

## 2023-12-26 RX ADMIN — PROPOFOL 30 MG: 10 INJECTION, EMULSION INTRAVENOUS at 12:40

## 2023-12-26 RX ADMIN — PANTOPRAZOLE SODIUM 40 MG: 40 INJECTION, POWDER, LYOPHILIZED, FOR SOLUTION INTRAVENOUS at 21:05

## 2023-12-26 RX ADMIN — METRONIDAZOLE 500 MG: 500 INJECTION, SOLUTION INTRAVENOUS at 01:55

## 2023-12-26 RX ADMIN — PROPOFOL 30 MG: 10 INJECTION, EMULSION INTRAVENOUS at 12:42

## 2023-12-26 RX ADMIN — LIDOCAINE HYDROCHLORIDE 150 MG: 20 INJECTION INTRAVENOUS at 12:30

## 2023-12-26 RX ADMIN — MAGNESIUM SULFATE IN WATER FOR 2 G: 40 INJECTION INTRAVENOUS at 13:54

## 2023-12-26 RX ADMIN — PROPOFOL 20 MG: 10 INJECTION, EMULSION INTRAVENOUS at 12:33

## 2023-12-26 RX ADMIN — SENNOSIDES AND DOCUSATE SODIUM 2 TABLET: 8.6; 5 TABLET ORAL at 21:05

## 2023-12-26 RX ADMIN — SODIUM CHLORIDE, POTASSIUM CHLORIDE, SODIUM LACTATE AND CALCIUM CHLORIDE 75 ML/HR: 600; 310; 30; 20 INJECTION, SOLUTION INTRAVENOUS at 00:17

## 2023-12-26 RX ADMIN — THIAMINE HYDROCHLORIDE 200 MG: 100 INJECTION, SOLUTION INTRAMUSCULAR; INTRAVENOUS at 15:05

## 2023-12-26 RX ADMIN — HYDROCODONE BITARTRATE AND ACETAMINOPHEN 1 TABLET: 5; 325 TABLET ORAL at 04:42

## 2023-12-26 RX ADMIN — OCTREOTIDE ACETATE 50 MCG/HR: 500 INJECTION, SOLUTION INTRAVENOUS; SUBCUTANEOUS at 08:37

## 2023-12-26 RX ADMIN — FOLIC ACID 1 MG: 1 TABLET ORAL at 08:25

## 2023-12-26 RX ADMIN — SODIUM CHLORIDE, POTASSIUM CHLORIDE, SODIUM LACTATE AND CALCIUM CHLORIDE 75 ML/HR: 600; 310; 30; 20 INJECTION, SOLUTION INTRAVENOUS at 00:10

## 2023-12-26 RX ADMIN — FUROSEMIDE 20 MG: 20 INJECTION, SOLUTION INTRAMUSCULAR; INTRAVENOUS at 20:58

## 2023-12-26 RX ADMIN — MAGNESIUM SULFATE IN WATER FOR 2 G: 40 INJECTION INTRAVENOUS at 17:29

## 2023-12-26 RX ADMIN — PANTOPRAZOLE SODIUM 40 MG: 40 INJECTION, POWDER, LYOPHILIZED, FOR SOLUTION INTRAVENOUS at 08:25

## 2023-12-26 RX ADMIN — ACETAMINOPHEN 500 MG: 500 TABLET ORAL at 21:10

## 2023-12-26 RX ADMIN — SODIUM CHLORIDE: 9 INJECTION, SOLUTION INTRAVENOUS at 12:22

## 2023-12-26 RX ADMIN — Medication 10 ML: at 08:24

## 2023-12-26 NOTE — CONSULTS
"[facility] [place] [facility] Consult    Referring Provider: No ref. provider found    PCP: Provider, No Known    Reason for Consultation: Anemia    Chief complaint: Abdominal pain    History of present illness:    Val Nassar is a 63 y.o. male who recently moved from [place] reports recent diagnosis of cirrhosis, alcohol use, laryngeal cancer, peptic ulcer disease who presents with left lower back pain.  GI consulted for anemia and questionable dark stools.  Patient denies any dark stools or encounter this morning.  He reports lower back pain but denies any abdominal pain in any location.  He denies any emesis.  He does report some intermittent nausea.  He reports his last drink of alcohol was 6 to 7 weeks ago.  He reports he has had multiple EGDs in the past and thinks he had a colonoscopy but a year ago.  He reports on his last EGD they noted some bleeding that they treated with \"diet and medicine.\"  He reports taking Tylenol typically for pain although occasional take ibuprofen.  Iron studies no ferritin of 39 and TIBC of 532.  Hemoglobin is 5 6 with an MCV of 97.9.  Platelets are normal at 222.  INR 1.05.  LFTs and T. bili normal.  CT abdomen and pelvis with contrast notable for mild nodularity of the liver surface, normal-appearing pancreas, diffuse wall thickening of the gallbladder with enhancement and surrounding pericholecystic fluid concerning for cholecystitis, nondilated common bile duct, normal-appearing spleen, diverticulosis without evidence of diverticulitis, large bladder diverticulum.    Allergies:  Patient has no known allergies.    Scheduled Meds:  [Held by provider] carvedilol, 3.125 mg, Oral, BID With Meals  cefTRIAXone, 2,000 mg, Intravenous, Q24H  folic acid, 1 mg, Oral, Daily  metroNIDAZOLE, 500 mg, Intravenous, Q8H  pantoprazole, 40 mg, Intravenous, Q12H  pharmacy consult - [facility], , Does not apply, Daily  senna-docusate sodium, 2 tablet, Oral, BID  sodium chloride, 10 mL, Intravenous, " Q12H  [Held by provider] spironolactone, 25 mg, Oral, Daily  [Held by provider] tamsulosin, 0.4 mg, Oral, Daily  vitamin B-12, 1,000 mcg, Oral, Daily         Infusions:  lactated ringers, 75 mL/hr, Last Rate: 75 mL/hr (12/26/23 0017)  octreotide (SandoSTATIN) infusion, 50 mcg/hr, Last Rate: 50 mcg/hr (12/25/23 7167)        PRN Meds:    acetaminophen    senna-docusate sodium **AND** polyethylene glycol **AND** bisacodyl **AND** bisacodyl    Calcium Replacement - Follow Nurse / BPA Driven Protocol    HYDROcodone-acetaminophen    HYDROmorphone **AND** naloxone    lactulose    Magnesium Standard Dose Replacement - Follow Nurse / BPA Driven Protocol    melatonin    Phosphorus Replacement - Follow Nurse / BPA Driven Protocol    Potassium Replacement - Follow Nurse / BPA Driven Protocol    sodium chloride    sodium chloride    sodium chloride    Home Meds:  Medications Prior to Admission   Medication Sig Dispense Refill Last Dose    acetaminophen (TYLENOL) 500 MG tablet Take 1 tablet by mouth Every 6 (Six) Hours As Needed for Mild Pain.       carvedilol (COREG) 3.125 MG tablet Take 1 tablet by mouth 2 (Two) Times a Day With Meals.       carvedilol (COREG) 6.25 MG tablet Take 1 tablet by mouth 2 (Two) Times a Day With Meals.       ciprofloxacin (CIPRO) 500 MG tablet Take 1 tablet by mouth 2 (Two) Times a Day.       diphenhydrAMINE (BENADRYL) 25 mg capsule Take 1 capsule by mouth Every Night.       escitalopram (LEXAPRO) 10 MG tablet Take 1 tablet by mouth Every Night.       famotidine (PEPCID) 20 MG tablet Take 1 tablet by mouth 2 (Two) Times a Day.       finasteride (PROSCAR) 5 MG tablet Take 1 tablet by mouth Daily.       folic acid (FOLVITE) 1 MG tablet Take 1 tablet by mouth Daily.       gabapentin (NEURONTIN) 300 MG capsule Take 1 capsule by mouth 3 (Three) Times a Day.       lactulose (CHRONULAC) 10 GM/15ML solution Take 30 mL by mouth 2 (Two) Times a Day As Needed.       pantoprazole (PROTONIX) 40 MG EC tablet Take 1  "tablet by mouth 2 (Two) Times a Day.       spironolactone (ALDACTONE) 25 MG tablet Take 1 tablet by mouth Daily.       sucralfate (CARAFATE) 1 g tablet Take 1 tablet by mouth 3 (Three) Times a Day.       tamsulosin (FLOMAX) 0.4 MG capsule 24 hr capsule Take 1 capsule by mouth Daily.       vitamin B-12 (CYANOCOBALAMIN) 1000 MCG tablet Take 1 tablet by mouth Daily.       vitamin D (ERGOCALCIFEROL) 1.25 MG (24376 UT) capsule capsule Take 1 capsule by mouth 1 (One) Time Per Week.          ROS: Review of Systems   Constitutional:  Negative for chills and fever.   Respiratory:  Negative for cough and shortness of breath.    Gastrointestinal:  Positive for nausea. Negative for abdominal distention, abdominal pain and vomiting.   Musculoskeletal:  Positive for back pain.   Skin:  Negative for color change and rash.   All other systems reviewed and are negative.      PAST MED HX:  History reviewed. No pertinent past medical history.    PAST SURG HX:  History reviewed. No pertinent surgical history.    FAM HX:  History reviewed. No pertinent family history.    SOC HX:  Social History     Socioeconomic History    Marital status: Single   Tobacco Use    Smoking status: Every Day     Packs/day: 1.00     Years: 15.00     Additional pack years: 0.00     Total pack years: 15.00     Types: Cigarettes   Vaping Use    Vaping Use: Some days    Substances: Flavoring    Devices: Disposable   Substance and Sexual Activity    Alcohol use: Not Currently    Drug use: Never    Sexual activity: Defer       PHYSICAL EXAM  /71 (BP Location: Right arm, Patient Position: Lying)   Pulse 67   Temp 97.9 °F (36.6 °C) (Axillary)   Resp 20   Ht 175.3 cm (69\")   Wt 66.2 kg (146 lb)   SpO2 98%   BMI 21.56 kg/m²   Wt Readings from Last 3 Encounters:   12/26/23 66.2 kg (146 lb)   ,body mass index is 21.56 kg/m².  Physical Exam   General: Patient awake, alert and cooperative   Eyes: Normal lids and lashes, no scleral icterus   Neck: Supple, " normal ROM   Skin: Warm and dry, not jaundiced   Cardiovascular: Regular rate, well-perfused extremities   Pulm: Equal expansion bilaterally, no increased WOB   Abdomen: Soft, nontender, nondistended;    Extremities: No rash or edema              Neuro: A&O, No obvious sign of focal deficit   Psychiatric: Normal mood and behavior; memory intact    Results Review:   I reviewed the patient's new clinical results.    Lab Results   Component Value Date    WBC 4.56 12/25/2023    HGB 5.6 (C) 12/25/2023    HCT 19.0 (C) 12/25/2023    MCV 97.9 (H) 12/25/2023     12/25/2023       Lab Results   Component Value Date    INR 1.05 12/25/2023       Lab Results   Component Value Date    GLUCOSE 104 (H) 12/25/2023    BUN 10 12/25/2023    CREATININE 0.59 (L) 12/25/2023    BCR 16.9 12/25/2023     12/25/2023    K 4.1 12/25/2023    CO2 23.0 12/25/2023    CALCIUM 8.9 12/25/2023    ALBUMIN 3.4 (L) 12/25/2023    ALKPHOS 93 12/25/2023    BILITOT <0.2 12/25/2023    ALT 9 12/25/2023    AST 15 12/25/2023       ASSESSMENTS/PLANS  Impression:  Abdominal pain  Abnormal imaging the gallbladder  Cirrhotic appearing liver on imaging  History of peptic ulcer disease  Anemia  Reported recent diagnosis of laryngeal cancer    Plan:  -Hospital medicine started patient on IV PPI, octreotide as well as antibiotics and consulted general surgery  -Receiving his second unit of blood this morning, hemoglobin following transfusion 7.2  -No records available for review as he has received care recently in [place]  -There is mention of dark stools on admitting HPI, however patient denies any dark stools or bright red blood per rectum and he has had no emesis.  -N.p.o.  -Plan for EGD to further evaluate today    I discussed the patient's findings and my recommendations with patient    Wesly Mckeon MD  12/26/23  08:09 EST

## 2023-12-26 NOTE — ED NOTES
Cesario Neal    Nursing Report ED to Floor:  Mental status: a&oX4  Ambulatory status: up with 2   Oxygen Therapy:  RA  Cardiac Rhythm: NSR  Admitted from: ED  Safety Concerns:  Fall  Social Issues: Cancer pt   ED Room #:  11    ED Nurse Phone Extension - [phone] or may call [phone].      HPI:   Chief Complaint   Patient presents with    Abdominal Pain       Past Medical History:  No past medical history on file.     Past Surgical History:  No past surgical history on file.     Admitting Doctor:   Wesly Turner MD    Consulting Provider(s):  Consults       No orders found from 11/26/2023 to 12/26/2023.             Admitting Diagnosis:   The primary encounter diagnosis was Severe anemia. Diagnoses of Cholecystitis and LLQ abdominal pain were also pertinent to this visit.    Most Recent Vitals:   Vitals:    12/25/23 2100 12/25/23 2130 12/25/23 2200 12/25/23 2230   BP:  153/76 158/75 164/76   BP Location:       Patient Position:       Pulse: 75 69 73 79   Resp:       Temp:       TempSrc:       SpO2:  97% 99% 99%   Weight:       Height:           Active LDAs/IV Access:   Lines, Drains & Airways       Active LDAs       Name Placement date Placement time Site Days    Peripheral IV 12/25/23 1925 Left Antecubital 12/25/23 1925  Antecubital  less than 1                    Labs (abnormal labs have a star):   Labs Reviewed   COMPREHENSIVE METABOLIC PANEL - Abnormal; Notable for the following components:       Result Value    Glucose 104 (*)     Creatinine 0.59 (*)     Chloride 111 (*)     Albumin 3.4 (*)     All other components within normal limits    Narrative:     GFR Normal >60  Chronic Kidney Disease <60  Kidney Failure <15     LIPASE - Abnormal; Notable for the following components:    Lipase 75 (*)     All other components within normal limits   CBC WITH AUTO DIFFERENTIAL - Abnormal; Notable for the following components:    RBC 1.94 (*)     Hemoglobin 5.6 (*)     Hematocrit 19.0 (*)     MCV 97.9 (*)     MCHC 29.5  (*)     RDW 24.4 (*)     RDW-SD 87.4 (*)     All other components within normal limits    Narrative:     The previously reported component NRBC is no longer being reported. Previous result was 0.0 /100 WBC (Reference Range: 0.0-0.2 /100 WBC) on 12/25/2023 at 1943 EST.   T4, FREE - Abnormal; Notable for the following components:    Free T4 0.85 (*)     All other components within normal limits    Narrative:     Results may be falsely increased if patient taking Biotin.     CK - Abnormal; Notable for the following components:    Creatine Kinase 17 (*)     All other components within normal limits   MYOGLOBIN, SERUM - Abnormal; Notable for the following components:    Myoglobin 21.0 (*)     All other components within normal limits    Narrative:     Results may be falsely decreased if patient taking Biotin.     APTT - Abnormal; Notable for the following components:    PTT 32.1 (*)     All other components within normal limits    Narrative:     PTT = The equivalent PTT values for the therapeutic range of heparin levels at 0.3 to 0.5 U/ml are 60 to 70 seconds.   IRON PROFILE - Abnormal; Notable for the following components:    Iron 12 (*)     Iron Saturation (TSAT) 2 (*)     All other components within normal limits   MANUAL DIFFERENTIAL - Abnormal; Notable for the following components:    Atypical Lymphocyte % 6.0 (*)     All other components within normal limits   POCT OCCULT BLOOD STOOL - Abnormal; Notable for the following components:    Fecal Occult Blood Positive (*)     All other components within normal limits   SINGLE HSTROPONIN T - Normal    Narrative:     High Sensitive Troponin T Reference Range:  <14.0 ng/L- Negative Female for AMI  <22.0 ng/L- Negative Male for AMI  >=14 - Abnormal Female indicating possible myocardial injury.  >=22 - Abnormal Male indicating possible myocardial injury.   Clinicians would have to utilize clinical acumen, EKG, Troponin, and serial changes to determine if it is an Acute  Myocardial Infarction or myocardial injury due to an underlying chronic condition.        URINALYSIS W/ MICROSCOPIC IF INDICATED (NO CULTURE) - Normal    Narrative:     Urine microscopic not indicated.   MAGNESIUM - Normal   TSH - Normal   ETHANOL - Normal    Narrative:     Elevated lactic acid concentration and lactate dehydrogenase(LD) activity may falsely elevate enzymatically determined ethanol levels. Not for legal purposes.    URINE DRUG SCREEN - Normal    Narrative:     Cutoff For Drugs Screened:    Amphetamines               500 ng/ml  Barbiturates               200 ng/ml  Benzodiazepines            150 ng/ml  Cocaine                    150 ng/ml  Methadone                  200 ng/ml  Opiates                    100 ng/ml  Phencyclidine               25 ng/ml  THC                         50 ng/ml  Methamphetamine            500 ng/ml  Tricyclic Antidepressants  300 ng/ml  Oxycodone                  100 ng/ml  Buprenorphine               10 ng/ml    The normal value for all drugs tested is negative. This report includes unconfirmed screening results, with the cutoff values listed, to be used for medical treatment purposes only.  Unconfirmed results must not be used for non-medical purposes such as employment or legal testing.  Clinical consideration should be applied to any drug of abuse test, particularly when unconfirmed results are used.     LACTIC ACID, PLASMA - Normal   PROCALCITONIN - Normal    Narrative:     As a Marker for Sepsis (Non-Neonates):    1. <0.5 ng/mL represents a low risk of severe sepsis and/or septic shock.  2. >2 ng/mL represents a high risk of severe sepsis and/or septic shock.    As a Marker for Lower Respiratory Tract Infections that require antibiotic therapy:    PCT on Admission    Antibiotic Therapy       6-12 Hrs later    >0.5                Strongly Recommended  >0.25 - <0.5        Recommended   0.1 - 0.25          Discouraged              Remeasure/reassess PCT  <0.1              "   Strongly Discouraged     Remeasure/reassess PCT    As 28 day mortality risk marker: \"Change in Procalcitonin Result\" (>80% or <=80%) if Day 0 (or Day 1) and Day 4 values are available. Refer to http://www.[facility]-pct-calculator.com    Change in PCT <=80%  A decrease of PCT levels below or equal to 80% defines a positive change in PCT test result representing a higher risk for 28-day all-cause mortality of patients diagnosed with severe sepsis for septic shock.    Change in PCT >80%  A decrease of PCT levels of more than 80% defines a negative change in PCT result representing a lower risk for 28-day all-cause mortality of patients diagnosed with severe sepsis or septic shock.      PROTIME-INR - Normal   FERRITIN - Normal    Narrative:     Results may be falsely decreased if patient taking Biotin.     FENTANYL, URINE - Normal    Narrative:     Negative Threshold:      Fentanyl 5 ng/mL     The normal value for the drug tested is negative. This report includes final unconfirmed screening results to be used for medical treatment purposes only. Unconfirmed results must not be used for non-medical purposes such as employment or legal testing. Clinical consideration should be applied to any drug of abuse test, particularly when unconfirmed results are used.          BETA HYDROXYBUTYRATE QUANTITATIVE - Normal    Narrative:     In the assessment of possible diabetic ketoacidosis, the test should be interpreted along with other clinical and laboratory findings.  A level greater than 1 mmol/L should require further evaluation and levels of more than 3 mmol/L require immediate medical review.   RAINBOW DRAW    Narrative:     The following orders were created for panel order [place] Draw.  Procedure                               Abnormality         Status                     ---------                               -----------         ------                     Green Top (Gel)[[id number]]                                  Final " result               Lavender Top[[id number]]                                     Final result               Gold Top - SST[[id number]]                                   Final result               Gray Top[[id number]]                                         In process                 Light Blue Top[[id number]]                                   Final result                 Please view results for these tests on the individual orders.   TYPE AND SCREEN   [facility] 2ND SPECIMEN VERIFICATION   PREPARE RBC   CBC AND DIFFERENTIAL    Narrative:     The following orders were created for panel order CBC & Differential.  Procedure                               Abnormality         Status                     ---------                               -----------         ------                     CBC Auto Differential[[id number]]        Abnormal            Final result               Scan Slide[[id number]]                                                                    Please view results for these tests on the individual orders.   GREEN TOP   LAVENDER TOP   GOLD TOP - SST   LIGHT BLUE TOP   KETONE BODIES SERUM    Narrative:     The following orders were created for panel order Ketone Bodies, Serum (Not performed at [place]).  Procedure                               Abnormality         Status                     ---------                               -----------         ------                     Beta Hydroxybutyrate Eric...[[id number]]  Normal              Final result                 Please view results for these tests on the individual orders.   GRAY TOP       Meds Given in ED:   Medications   sodium chloride 0.9 % flush 10 mL (has no administration in time range)   cefTRIAXone (ROCEPHIN) 1,000 mg in sodium chloride 0.9 % 100 mL IVPB (1,000 mg Intravenous New Bag 12/25/23 2230)   octreotide (sandoSTATIN) 500 mcg in sodium chloride 0.9 % 100 mL (5 mcg/mL) infusion (has no administration in time range)   pantoprazole (PROTONIX) injection  40 mg (40 mg Intravenous Given 12/25/23 2230)   iopamidol (ISOVUE-300) 61 % injection 85 mL (85 mL Intravenous Given 12/25/23 2043)   Morphine sulfate (PF) injection 4 mg (4 mg Intravenous Given 12/25/23 2230)     octreotide (SandoSTATIN) infusion, 50 mcg/hr

## 2023-12-26 NOTE — ANESTHESIA POSTPROCEDURE EVALUATION
Patient: Val Nassar    Procedure Summary       Date: 12/26/23 Room / Location: [facility] ENDOSCOPY 2 /  [facility]    Anesthesia Start: 1222 Anesthesia Stop:     Procedure: ESOPHAGOGASTRODUODENOSCOPY Diagnosis:       Iron deficiency anemia due to chronic blood loss      (Iron deficiency anemia due to chronic blood loss [D50.0])    Surgeons: Wesly Mckeon MD Provider: Arnold Hankins MD    Anesthesia Type: general ASA Status: 3            Anesthesia Type: general    Vitals  No vitals data found for the desired time range.          Post Anesthesia Care and Evaluation    Patient location during evaluation: PACU  Patient participation: complete - patient participated  Level of consciousness: awake and alert  Pain management: adequate    Airway patency: patent  Anesthetic complications: No anesthetic complications  PONV Status: none  Cardiovascular status: hemodynamically stable and acceptable  Respiratory status: nonlabored ventilation, acceptable and nasal cannula  Hydration status: acceptable

## 2023-12-26 NOTE — SIGNIFICANT NOTE
12/26/23 0848   SLP Deferred Reason   SLP Deferred Reason Routine  (Consult received for dysphagia eval & chart reviewed. Spoke w/ RN. Pt currently NPO for GI & Surgery. Will f/u for eval when appropriate.)

## 2023-12-26 NOTE — PLAN OF CARE
Goal Outcome Evaluation:     Pt c/o back pain today and relieved w norco prn.   Hgb stable up to 7.4 after 2 units PRBC.     EGD today revealed mass.   Pt monique full  liq diet today.   Mag 1.4 and replaced per protocol.    Lg bm today w no blood noted.  Voiding per urinal.   Pt has hx urinary retention.

## 2023-12-26 NOTE — ED PROVIDER NOTES
Subjective   History of Present Illness    Pt presents with LLQ abd pain that began this morning.  It has radiated around to the lower back.  Associated with increased urinary frequency but not dysuria or hematuria. No fever, vomiting or diarrhea.  Reports his stool has been white.  He denies any black/bloody stool.    He reports a PMH chronic back pain, GERD/PUD, cirrhosis, throat cancer.  He says the cirrhosis was dx about 3 months ago and he quit alcohol at that time after a history of heavy use.  He says the throat cancer was just dx about 2 weeks ago.  He doesn't know much about it, says it was the impetus for moving from [place] to [place] but he doesn't have care established here.    History provided by:  Patient      Review of Systems   Constitutional:  Negative for fever.   Respiratory:  Negative for shortness of breath.    Cardiovascular:  Negative for chest pain.   Gastrointestinal:  Positive for abdominal pain. Negative for blood in stool, diarrhea and vomiting.   Genitourinary:  Positive for frequency. Negative for difficulty urinating, dysuria and hematuria.   Musculoskeletal:  Positive for back pain.   All other systems reviewed and are negative.      No past medical history on file.    No Known Allergies    No past surgical history on file.    No family history on file.    Social History     Socioeconomic History    Marital status: Single           Objective   Physical Exam  Vitals and nursing note reviewed.   Constitutional:       General: He is not in acute distress.     Appearance: Normal appearance. He is not ill-appearing.   HENT:      Head: Normocephalic and atraumatic.      Mouth/Throat:      Mouth: Mucous membranes are moist.   Eyes:      General: No scleral icterus.        Right eye: No discharge.         Left eye: No discharge.      Conjunctiva/sclera: Conjunctivae normal.   Cardiovascular:      Rate and Rhythm: Normal rate and regular rhythm.      Heart sounds: No murmur heard.  Pulmonary:       Effort: Pulmonary effort is normal. No respiratory distress.      Breath sounds: Normal breath sounds. No wheezing.   Abdominal:      General: Bowel sounds are normal. There is no distension.      Palpations: Abdomen is soft.      Tenderness: There is no abdominal tenderness. There is no guarding or rebound.   Musculoskeletal:         General: No swelling. Normal range of motion.      Cervical back: Normal range of motion and neck supple.   Skin:     General: Skin is warm and dry.      Coloration: Skin is pale.      Findings: No rash.   Neurological:      General: No focal deficit present.      Mental Status: He is alert and oriented to person, place, and time. Mental status is at baseline.   Psychiatric:         Mood and Affect: Mood normal.         Behavior: Behavior normal.         Thought Content: Thought content normal.         Procedures           ED Course    There are not records immediately available. I reviewed a summary from [facility] in [place] that lists gastric ulcer, duodenal ulcer, alcohol abuse, GERD, hypo-osmolality/hyponatremia, lumbar radiculopathy among other problems but the most recent records and labs are from 2021.    Labs are notable for a severe normocytic anemia.  No priors for comparison.  Otherwise essentially bland labs.  FOBT positive but again he hasn't noticed any kati blood or melena.  CT scan shows what does appear to be an acute cholecystitis.  He does not have RUQ pain, fever, vomiting or tenderness in the abdomen so the significance of this CT finding is unclear.    He will need blood.  It is unclear what is going on overall with him.  I suspect someone will need to obtain records from [facility] in [place] to try to sort some of it out.  9p on Nicole no records are available.    Patient stable on serial rechecks.  Discussed exam findings, test results so far and concerns in detail at the bedside.  Discussed need for admission for further evaluation and treatment.  I discussed  the patient's presentation, test results and need for admission with the hospitalist.                                           Medical Decision Making  Problems Addressed:  Cholecystitis: undiagnosed new problem with uncertain prognosis  LLQ abdominal pain: complicated acute illness or injury  Severe anemia: complicated acute illness or injury with systemic symptoms that poses a threat to life or bodily functions    Amount and/or Complexity of Data Reviewed  External Data Reviewed: notes.  Labs: ordered. Decision-making details documented in ED Course.  Radiology: ordered and independent interpretation performed. Decision-making details documented in ED Course.     Details: CT read by me: gallbladder inflammation  Discussion of management or test interpretation with external provider(s): hospitalist    Risk  Prescription drug management.  Decision regarding hospitalization.        Final diagnoses:   Severe anemia   Cholecystitis   LLQ abdominal pain       ED Disposition  ED Disposition       ED Disposition   Decision to Admit    Condition   --    Comment   Level of Care: Telemetry [5]   Diagnosis: Anemia [876486]   Admitting Physician: NASIM GARCIA [[id number]]   Attending Physician: NASIM GARCIA [[id number]]   Certification: I Certify That Inpatient Hospital Services Are Medically Necessary For Greater Than 2 Midnights                 No follow-up provider specified.       Medication List      No changes were made to your prescriptions during this visit.            Mehul Ferreira MD  12/26/23 0057

## 2023-12-26 NOTE — PLAN OF CARE
Goal Outcome Evaluation:  Plan of Care Reviewed With: patient        Progress: no change  Outcome Evaluation: New admit. Pt A/Ox4. Resps even and non-labored. SR on tele. Denies CP or SOA. 1st unit of PRBC transfused. 2nd unit ongoing. No transfusion reaction noted.  VSS. Kept NPO as ordered. Lortab given for back pain and verbalized relief. Discussed POC and verbalized understanding

## 2023-12-26 NOTE — PROGRESS NOTES
[facility] Medicine Services  PROGRESS NOTE    Patient Name: Val Nassar  : 1960  MRN: [id number]    Date of Admission: 2023  Primary Care Physician: Provider, No Known    Subjective   Subjective     CC:  Abdominal pain    HPI:  Abdominal pain intermittent.  No fevers.  Has not noted blood in stool today.  Says he stopped drinking 6 weeks ago.        Objective   Objective     Vital Signs:   Temp:  [97.3 °F (36.3 °C)-98.8 °F (37.1 °C)] 98 °F (36.7 °C)  Heart Rate:  [63-90] 73  Resp:  [18-22] 20  BP: (135-164)/() 158/100  Flow (L/min):  [2] 2     Physical Exam:  Appears fatigued but non toxic, in bed  MM dry  RRR  Lungs grossly clear bilaterally  Abd slightly distended but soft, non tender to light palpation  No edema  Alert, speech clear  Flat affect    Results Reviewed:  LAB RESULTS:      Lab 2348 23   WBC 6.51 4.56   HEMOGLOBIN 7.2* 5.6*   HEMATOCRIT 23.1* 19.0*   PLATELETS 196 222   NEUTROS ABS  --  2.87   EOS ABS  --  0.14   MCV 93.9 97.9*   PROCALCITONIN  --  0.19   LACTATE  --  1.2   PROTIME  --  13.8   APTT  --  32.1*         Lab 23   SODIUM 140 143   POTASSIUM 3.9 4.1   CHLORIDE 109* 111*   CO2 22.0 23.0   ANION GAP 9.0 9.0   BUN 7* 10   CREATININE 0.56* 0.59*   EGFR 110.1 108.3   GLUCOSE 125* 104*   CALCIUM 9.0 8.9   MAGNESIUM 1.4* 1.6   TSH  --  1.070         Lab 23   TOTAL PROTEIN 6.4   ALBUMIN 3.4*   GLOBULIN 3.0   ALT (SGPT) 9   AST (SGOT) 15   BILIRUBIN <0.2   ALK PHOS 93   LIPASE 75*         Lab 23   HSTROP T 17   PROTIME 13.8   INR 1.05             Lab 23   IRON  --   --   --  12*   IRON SATURATION (TSAT)  --   --   --  2*   TIBC  --   --   --  532   TRANSFERRIN  --   --   --  357   FERRITIN  --   --   --  39.61   ABO TYPING A   < > A  --    RH TYPING Positive   < > Positive  --    ANTIBODY SCREEN  --   --  Negative  --      < > = values in this interval not displayed.         Brief Urine Lab Results  (Last result in the past 365 days)        Color   Clarity   Blood   Leuk Est   Nitrite   Protein   CREAT   Urine HCG        12/25/23 1932 Yellow   Clear   Negative   Negative   Negative   Negative                   Microbiology Results Abnormal       None            CT Abdomen Pelvis With Contrast    Result Date: 12/25/2023  CT ABDOMEN PELVIS W CONTRAST Date of Exam: 12/25/2023 8:36 PM EST Indication: LLQ abd pain, anemia.  cirrhosis, possible unspecified cancer dx (data deficient). Comparison: None available. Technique: Axial CT images were obtained of the abdomen and pelvis following the uneventful intravenous administration of 85 mL Isovue-300. Reconstructed coronal and sagittal images were also obtained. Automated exposure control and iterative construction methods were used. Findings: The lung bases are clear. There is mild nodularity liver surface which may indicate early cirrhosis. The bilateral adrenal glands, bilateral kidneys, pancreas and spleen are normal. There is diffuse wall thickening of the gallbladder with abnormal increased enhancement and surrounding pericholecystic fluid. The findings are concerning for acute cholecystitis. The common bile duct is nondilated. The abdominal and pelvic portion of the GI tract are normal aside from diverticulosis without diverticulitis. There is a large bladder diverticulum projecting posterior and left lateral from the posterior margin of the bladder wall. There are prostate calcifications. The osseous structures are within normal limits for a patient of this age.     Impression: Impression: Abnormal appearance of the gallbladder with wall thickening, abnormal gallbladder wall enhancement and pericholecystic fluid consistent with acute cholecystitis. Electronically Signed: Marvel Vines MD  12/25/2023 8:59 PM EST  Workstation ID: [id number]         Current medications:  Scheduled  Meds:[Held by provider] carvedilol, 3.125 mg, Oral, BID With Meals  cefTRIAXone, 2,000 mg, Intravenous, Q24H  ferric gluconate, 125 mg, Intravenous, Daily  folic acid, 1 mg, Oral, Daily  magnesium sulfate, 2 g, Intravenous, Q2H  metroNIDAZOLE, 500 mg, Intravenous, Q8H  pantoprazole, 40 mg, Intravenous, Q12H  pharmacy consult - [place], , Does not apply, Daily  senna-docusate sodium, 2 tablet, Oral, BID  sodium chloride, 10 mL, Intravenous, Q12H  [Held by provider] spironolactone, 25 mg, Oral, Daily  [Held by provider] tamsulosin, 0.4 mg, Oral, Daily  vitamin B-12, 1,000 mcg, Oral, Daily      Continuous Infusions:lactated ringers, 75 mL/hr, Last Rate: 75 mL/hr (12/26/23 0017)  sodium chloride, 50 mL/hr      PRN Meds:.  acetaminophen    senna-docusate sodium **AND** polyethylene glycol **AND** bisacodyl **AND** bisacodyl    Calcium Replacement - Follow Nurse / BPA Driven Protocol    HYDROcodone-acetaminophen    HYDROmorphone **AND** naloxone    lactulose    Magnesium Standard Dose Replacement - Follow Nurse / BPA Driven Protocol    melatonin    Phosphorus Replacement - Follow Nurse / BPA Driven Protocol    Potassium Replacement - Follow Nurse / BPA Driven Protocol    sodium chloride    sodium chloride    sodium chloride    Assessment & Plan   Assessment & Plan     Active Hospital Problems    Diagnosis  POA    **Anemia [D64.9]  Yes      Resolved Hospital Problems   No resolved problems to display.        Brief Hospital Course to date:  Val Nassar is a 63 y.o. male with history of PUD, cirrhosis, newly diagnosed laryngeal cancer and prior alcohol abuse presents with abdominal pain, melena and anemia    Anemia, iron deficient  - s/p 2 units PRBCs  - IV iron x 3 days  - EGD showed esophagitis and one non-bleeding cratered duodenal ulcer (along with esophageal mass) and congestive gastropathy.  No varices noted.   - PPI BID  - advise no NSAIDs    Laryngeal cancer  Esophageal mass  - await OSH records  - patient says  he had not been referred for treatment while in [place], will need Oncology referral here    Abdominal pain  - abnormal appearance of GB on CT imaging  - general surgery follows  - rocephin/flagyl    Cirrhosis  Alcohol abuse  - patient says he has been abstinent for the past 6 weeks  - thiamine      Expected Discharge Location and Transportation:   Expected Discharge   Expected Discharge Date: 12/29/2023; Expected Discharge Time:      DVT prophylaxis:  Mechanical DVT prophylaxis orders are present.     AM-PAC 6 Clicks Score (PT): 22 (12/26/23 0013)    CODE STATUS:   Code Status and Medical Interventions:   Ordered at: 12/25/23 4278     Code Status (Patient has no pulse and is not breathing):    CPR (Attempt to Resuscitate)     Medical Interventions (Patient has pulse or is breathing):    Full Support       Garret Gibson MD  12/26/23

## 2023-12-26 NOTE — PROGRESS NOTES
Malnutrition Severity Assessment    Patient Name:  Val Nassar  YOB: 1960  MRN: [id number]  Admit Date:  12/25/2023    Patient meets criteria for : Severe Malnutrition    Comments:  Based on patient recent wt loss and poor PO intake, patient meets criteria for severe malnutrition r/t acute illness/injury.  Nutrition Focused Physical Exam completed to determine fat and muscle wasting.  MD please attest and include in pt diagnosis as you deem appropriate.        Malnutrition Severity Assessment  Malnutrition Type: Acute Disease or Injury - Related Malnutrition  Malnutrition Type (last 8 hours)       Malnutrition Severity Assessment       Row Name 12/26/23 1531       Malnutrition Severity Assessment    Malnutrition Type Acute Disease or Injury - Related Malnutrition      Row Name 12/26/23 1531       Insufficient Energy Intake     Insufficient Energy Intake Findings Severe    Insufficient Energy Intake  < or equal to 50% of est. energy requirement for > or equal to 5d)      Row Name 12/26/23 1531       Unintentional Weight Loss     Unintentional Weight Loss Findings Severe    Unintentional Weight Loss  Weight loss of 7.5% in three months      Row Name 12/26/23 1531       Muscle Loss    Loss of Muscle Mass Findings Moderate    [place] Region Moderate - slight depression    Clavicle Bone Region Moderate - some protrusion in females, visible in males    Acromion Bone Region Moderate - acromion may slightly protrude    Scapular Bone Region Moderate - mild depression, bones may show slightly    Dorsal Hand Region Moderate - slight depression    Patellar Region Moderate - patella more prominent, less muscle definition around patella    Posterior Calf Region Moderate - some roundness, slight firmness      Row Name 12/26/23 1531       Fat Loss    Subcutaneous Fat Loss Findings Moderate    Orbital Region  Moderate -  somewhat hollowness, slightly dark circles    Upper Arm Region Moderate - some fat tissue,  not ample    Thoracic & Lumbar Region Moderate - ribs visible with mild depressions, iliac crest somewhat prominent      Row Name 12/26/23 1531       Criteria Met (Must meet criteria for severity in at least 2 of these categories: M Wasting, Fat Loss, Fluid, Secondary Signs, Wt. Status, Intake)    Patient meets criteria for  Severe Malnutrition                    Electronically signed by:  Fern Peña RD  12/26/23 15:32 EST

## 2023-12-26 NOTE — PLAN OF CARE
Goal Outcome Evaluation:  Plan of Care Reviewed With: patient           Outcome Evaluation: Pt. presents below baseline function w/generalized weakness, balance deficits, acute pain and decreased functional endurance affecting his ability to safely participate in functional mobility. He performed bed mobility, transfers and ambulated 8' w/single touch cane and hand held assist, min assist. Activity limited by fatigue. Pt. would benefit from IPPT to address stated deficits.      Anticipated Discharge Disposition (PT): skilled nursing facility

## 2023-12-26 NOTE — THERAPY EVALUATION
Patient Name: Val Nassar  : 1960    MRN: [id number]                              Today's Date: 2023       Admit Date: 2023    Visit Dx:     ICD-10-CM ICD-9-CM   1. Iron deficiency anemia due to chronic blood loss  D50.0 280.0   2. Severe anemia  D64.9 285.9   3. Cholecystitis  K81.9 575.10   4. LLQ abdominal pain  R10.32 789.04     Patient Active Problem List   Diagnosis    Anemia    Acute gastric ulcer with hemorrhage    Alcohol abuse, uncomplicated    Anxiety disorder, unspecified    Diverticulum of bladder    Duodenal ulcer, unspecified as acute or chronic, without hemorrhage or perforation    Elevation of level of transaminase and lactic acid dehydrogenase (LDH)    Gastro-esophageal reflux disease without esophagitis    Hyperglycemia, unspecified    Hyperlipidemia, unspecified    Melena    Nicotine dependence, cigarettes, uncomplicated    Tobacco use     History reviewed. No pertinent past medical history.  History reviewed. No pertinent surgical history.   General Information       Row Name 23 1518          OT Time and Intention    Document Type evaluation  -     Mode of Treatment occupational therapy  -       Row Name 23 1518          General Information    Patient Profile Reviewed yes  -     Prior Level of Function independent:;bed mobility;transfer;all household mobility;community mobility;ADL's  Pt uses SPC, also has a RW & shower chair. Pt reports he just moved from [place]  -     Existing Precautions/Restrictions fall;oxygen therapy device and L/min;other (see comments)  [facility]     Barriers to Rehab medically complex;previous functional deficit  -       Row Name 23 1518          Living Environment    People in Home sibling(s)  sister  -       Row Name 23 1518          Home Main Entrance    Number of Stairs, Main Entrance one  -       Row Name 23 1518          Stairs Within Home, Primary    Number of Stairs, Within Home, Primary none   -       Row Name 12/26/23 1518          Cognition    Orientation Status (Cognition) oriented x 3  -       Row Name 12/26/23 1518          Safety Issues, Functional Mobility    Safety Issues Affecting Function (Mobility) awareness of need for assistance;insight into deficits/self-awareness;safety precaution awareness;safety precautions follow-through/compliance;sequencing abilities  -     Impairments Affecting Function (Mobility) balance;endurance/activity tolerance;pain;strength;shortness of breath  -               User Key  (r) = Recorded By, (t) = Taken By, (c) = Cosigned By      Initials Name Provider Type     Sandy Alarcon OT Occupational Therapist                     Mobility/ADL's       Row Name 12/26/23 1520          Bed Mobility    Bed Mobility supine-sit  -     Supine-Sit [place] (Bed Mobility) minimum assist (75% patient effort);verbal cues;nonverbal cues (demo/gesture)  -     Bed Mobility, Safety Issues decreased use of arms for pushing/pulling;decreased use of legs for bridging/pushing;impaired trunk control for bed mobility  -     Assistive Device (Bed Mobility) bed rails;head of bed elevated  -     Comment, (Bed Mobility) Pt instructed to perform log-rolling technique d/t chronic back/sciatica pain at baseline  -       Row Name 12/26/23 1520          Transfers    Transfers sit-stand transfer;stand-sit transfer  -       Row Name 12/26/23 1520          Sit-Stand Transfer    Sit-Stand [place] (Transfers) minimum assist (75% patient effort);verbal cues  -     Assistive Device (Sit-Stand Transfers) cane, straight  -       Row Name 12/26/23 1520          Stand-Sit Transfer    Stand-Sit [place] (Transfers) minimum assist (75% patient effort);verbal cues  -     Assistive Device (Stand-Sit Transfers) cane, straight  -       Row Name 12/26/23 1520          Functional Mobility    Functional Mobility- Comment Deferred to PT  -       Row Name 12/26/23 1520           Activities of Daily Living    BADL Assessment/Intervention lower body dressing  -       Row Name 12/26/23 1520          Lower Body Dressing Assessment/Training    [place] Level (Lower Body Dressing) don;shoes/slippers;minimum assist (75% patient effort);verbal cues  slip-on shoes  -     Assistive Devices (Lower Body Dressing) reacher;sock-aid;long-handled shoe horn  -     Position (Lower Body Dressing) edge of bed sitting  -     Comment, (Lower Body Dressing) Pt provided w/ and educated on AE for increased independence and safety w/ LB ADLs. Pt demo'd inability to reach feet d/t pain  -               User Key  (r) = Recorded By, (t) = Taken By, (c) = Cosigned By      Initials Name Provider Type     Sandy Alarcon OT Occupational Therapist                   Obj/Interventions       Row Name 12/26/23 1522          Sensory Assessment (Somatosensory)    Sensory Assessment (Somatosensory) UE sensation intact  -[facility] 12/26/23 1522          Vision Assessment/Intervention    Visual Impairment/Limitations WFL  -       Row Name 12/26/23 1522          Range of Motion Comprehensive    General Range of Motion bilateral upper extremity ROM WFL  -[facility] Name 12/26/23 1522          Strength Comprehensive (MMT)    General Manual Muscle Testing (MMT) Assessment upper extremity strength deficits identified  -     Comment, General Manual Muscle Testing (MMT) Assessment BUE grossly 4/5  -[facility] Name 12/26/23 1522          Balance    Balance Assessment sitting static balance;sitting dynamic balance;sit to stand dynamic balance;standing static balance  -     Static Sitting Balance standby assist  -     Dynamic Sitting Balance contact guard  -     Position, Sitting Balance unsupported;sitting edge of bed  -     Sit to Stand Dynamic Balance minimal assist;verbal cues  -     Static Standing Balance minimal assist;verbal cues  -     Position/Device Used, Standing Balance  supported;cane, straight  -     Balance Interventions sitting;sit to stand;occupation based/functional task  -               User Key  (r) = Recorded By, (t) = Taken By, (c) = Cosigned By      Initials Name Provider Type    Sandy Rodriguez OT Occupational Therapist                   Goals/Plan       Row Name 12/26/23 1524          Transfer Goal 1 (OT)    Activity/Assistive Device (Transfer Goal 1, OT) bed-to-chair/chair-to-bed;toilet;cane, straight  -     [place] Level/Cues Needed (Transfer Goal 1, OT) standby assist  -     Time Frame (Transfer Goal 1, OT) long term goal (LTG);10 days  -     Progress/Outcome (Transfer Goal 1, OT) goal ongoing  -       Row Name 12/26/23 1524          Dressing Goal 1 (OT)    Activity/Device (Dressing Goal 1, OT) lower body dressing;reacher;sock-aid;long-handled shoe horn  don/doff socks w/ AAD  -     [place]/Cues Needed (Dressing Goal 1, OT) set-up required;verbal cues required  -     Time Frame (Dressing Goal 1, OT) long term goal (LTG);10 days  -     Progress/Outcome (Dressing Goal 1, OT) goal ongoing  -       Row Name 12/26/23 1524          Toileting Goal 1 (OT)    Activity/Device (Toileting Goal 1, OT) adjust/manage clothing;perform perineal hygiene;commode;grab bar/safety frame  -     [place] Level/Cues Needed (Toileting Goal 1, OT) standby assist  -     Time Frame (Toileting Goal 1, OT) long term goal (LTG);10 days  -     Progress/Outcome (Toileting Goal 1, OT) goal ongoing  -       Row Name 12/26/23 1524          Therapy Assessment/Plan (OT)    Planned Therapy Interventions (OT) activity tolerance training;adaptive equipment training;BADL retraining;functional balance retraining;IADL retraining;occupation/activity based interventions;patient/caregiver education/training;ROM/therapeutic exercise;strengthening exercise;transfer/mobility retraining  -               User Key  (r) = Recorded By, (t) = Taken By, (c) = Cosigned By       Initials Name Provider Type     Sandy Alarcon, OT Occupational Therapist                   Clinical Impression       Row Name 12/26/23 1523          Pain Assessment    Pain Intervention(s) Repositioned;Ambulation/increased activity  -     Additional Documentation Pain Scale: FACES Pre/Post-Treatment (Group)  -       Row Name 12/26/23 1523          Pain Scale: FACES Pre/Post-Treatment    Pain: FACES Scale, Pretreatment 4-->hurts little more  -     Posttreatment Pain Rating 4-->hurts little more  -     Pain Location generalized  -     Pain Location - back  -       Row Name 12/26/23 1523          Plan of Care Review    Plan of Care Reviewed With patient  -     Outcome Evaluation Pt presents w/ increased pain, generalized weakness, decreased functional endurance, and balance deficits limiting his ADL independence. Pt would benefit from continued skilled IPOT services to address current functional deficits. Rec IRF at d/c.  -       Row Name 12/26/23 1523          Therapy Assessment/Plan (OT)    Rehab Potential (OT) good, to achieve stated therapy goals  -     Criteria for Skilled Therapeutic Interventions Met (OT) yes;skilled treatment is necessary  -     Therapy Frequency (OT) daily  -[facility] Name 12/26/23 1523          Therapy Plan Review/Discharge Plan (OT)    Anticipated Discharge Disposition (OT) inpatient rehabilitation facility  -[facility] Name 12/26/23 1523          Vital Signs    O2 Delivery Pre Treatment nasal cannula  -     O2 Delivery Intra Treatment nasal cannula  -     O2 Delivery Post Treatment nasal cannula  -     Pre Patient Position Supine  -     Intra Patient Position Standing  -     Post Patient Position Sitting  -[facility] Name 12/26/23 1523          Positioning and Restraints    Pre-Treatment Position in bed  -     Post Treatment Position bed  -     In Bed sitting EOB;with PT  -               User Key  (r) = Recorded By, (t) = Taken By, (c)  = Cosigned By      Initials Name Provider Type    Sandy Rodriguez OT Occupational Therapist                   Outcome Measures       Row Name 12/26/23 1530          How much help from another is currently needed...    Putting on and taking off regular lower body clothing? 2  -MC     Bathing (including washing, rinsing, and drying) 2  -MC     Toileting (which includes using toilet bed pan or urinal) 2  -MC     Putting on and taking off regular upper body clothing 3  -MC     Taking care of personal grooming (such as brushing teeth) 3  -MC     Eating meals 3  -     AM-PAC 6 Clicks Score (OT) 15  -       Row Name 12/26/23 1530          Functional Assessment    Outcome Measure Options AM-PAC 6 Clicks Daily Activity (OT)  -               User Key  (r) = Recorded By, (t) = Taken By, (c) = Cosigned By      Initials Name Provider Type    Sandy Rodriguez OT Occupational Therapist                    Occupational Therapy Education       Title: PT OT SLP Therapies (In Progress)       Topic: Occupational Therapy (In Progress)       Point: ADL training (In Progress)       Description:   Instruct learner(s) on proper safety adaptation and remediation techniques during self care or transfers.   Instruct in proper use of assistive devices.                  Learning Progress Summary             Patient Acceptance, E, NR by  at 12/26/2023 1531                         Point: Home exercise program (Not Started)       Description:   Instruct learner(s) on appropriate technique for monitoring, assisting and/or progressing therapeutic exercises/activities.                  Learner Progress:  Not documented in this visit.              Point: Precautions (In Progress)       Description:   Instruct learner(s) on prescribed precautions during self-care and functional transfers.                  Learning Progress Summary             Patient Acceptance, E, NR by  at 12/26/2023 1531                         Point: Body  mechanics (In Progress)       Description:   Instruct learner(s) on proper positioning and spine alignment during self-care, functional mobility activities and/or exercises.                  Learning Progress Summary             Patient Acceptance, E, NR by  at 12/26/2023 1531                                         User Key       Initials Effective Dates Name Provider Type Discipline     10/14/22 -  Sandy Alarcon OT Occupational Therapist OT                  OT Recommendation and Plan  Planned Therapy Interventions (OT): activity tolerance training, adaptive equipment training, BADL retraining, functional balance retraining, IADL retraining, occupation/activity based interventions, patient/caregiver education/training, ROM/therapeutic exercise, strengthening exercise, transfer/mobility retraining  Therapy Frequency (OT): daily  Plan of Care Review  Plan of Care Reviewed With: patient  Outcome Evaluation: Pt presents w/ increased pain, generalized weakness, decreased functional endurance, and balance deficits limiting his ADL independence. Pt would benefit from continued skilled IPOT services to address current functional deficits. Rec IRF at d/c.     Time Calculation:   Evaluation Complexity (OT)  Review Occupational Profile/Medical/Therapy History Complexity: expanded/moderate complexity  Assessment, Occupational Performance/Identification of Deficit Complexity: 3-5 performance deficits  Clinical Decision Making Complexity (OT): detailed assessment/moderate complexity  Overall Complexity of Evaluation (OT): moderate complexity     Time Calculation- OT       Row Name 12/26/23 1531             Time Calculation- OT    OT Start Time 1341  -      OT Received On 12/26/23  -      OT Goal Re-Cert Due Date 01/05/24  -         Timed Charges    96077 - OT Therapeutic Activity Minutes 3  -      92676 - OT Self Care/Mgmt Minutes 9  -         Untimed Charges    OT Eval/Re-eval Minutes 31  -         Total  Minutes    Timed Charges Total Minutes 12  -MC      Untimed Charges Total Minutes 31  -MC       Total Minutes 43  -MC                User Key  (r) = Recorded By, (t) = Taken By, (c) = Cosigned By      Initials Name Provider Type    Sandy Rodriguez OT Occupational Therapist                  Therapy Charges for Today       Code Description Service Date Service Provider Modifiers Qty    65104479459 HC OT SELF CARE/MGMT/TRAIN EA 15 MIN 12/26/2023 Sandy Alarcon OT GO 1    [id number]  OT EVAL MOD COMPLEXITY 3 12/26/2023 Sandy Alarcon OT GO 1                 Sandy Alarcon OT  12/26/2023

## 2023-12-26 NOTE — PROGRESS NOTES
Clinical Nutrition   Nutrition Support Assessment  Reason for Visit: Identified at risk by screening criteria, MST score 2+      Patient Name: Val Nassar  YOB: 1960  MRN: [id number]  Date of Encounter: 12/26/23 15:20 EST  Admission date: 12/25/2023    Comments:  Based on patient recent wt loss and poor PO intake, patient meets criteria for severe malnutrition r/t acute illness/injury.     Boost plus TID with meals     Continue with thiamine, folic acid and MVI.    Soft diet per patient request once diet advanced (edentulous)      Nutrition Assessment   Admission Diagnosis:  Anemia [D64.9]    Problem List:    Anemia    PMH:   He  has no past medical history on file.    PSH:  He  has no past surgical history on file.    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 Biopsied.  - LA Grade B reflux esophagitis with no bleeding.  - Congestive gastropathy.  - Erythematous mucosa in the antrum. Biopsied.  - Non-bleeding duodenal ulcer with a clean ulcer base (Donny Class III).  - Acquired duodenal stenosis. Biopsied.    Reported/Observed/Food/Nutrition Related History:   12/25  Sitting in bed at time of visit, very pleasant and able to answer questions.  Chetan typically gets his care in [place], however he has now moved to [place] to live with his sister and will be getting his [facility] care here.  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 Reports he was waiting to move to [place] to get his treatment started.  A couple of hospital admissions in the past couple of months, 10 days most recent.  Reports significant weight loss during the past couple of months due to ulcer pain and GI issues.  He is has missing teeth and can only eat a soft diet. Open to Boost during admission.  History of ETOH abuse, reports stopped drinking  "about 3 months ago after he was diagnosed with cirrhosis.     Anthropometrics     Flowsheet Rows      Flowsheet Row First Filed Value   Admission Height 175.3 cm (69\") Documented at 12/25/2023 1933   Admission Weight 62.1 kg (137 lb) Documented at 12/25/2023 1933     Height: Height: 175.3 cm (69\")  Last Filed Weight: Weight: 66.7 kg (147 lb) (12/26/23 1140)  Method: Weight Method: Stated  BMI: BMI (Calculated): 21.7  BMI classification: Normal: 18.5-24.9kg/m2  IBW:  154lb    UBW:  170lb ~ 2-3 months ago per patient. No weight in EMR; first [facility] admission,  Weight change:   Reported 23lb weight loss x ~ 3 months (13.6%)     Nutrition Focused Physical Exam     Date:     12/26    Patient meets criteria for malnutrition diagnosis, see MSA note.    Current Nutrition Prescription   PO: Diet: Liquid Diets; Full Liquid; Fluid Consistency: Thin (IDDSI 0)  Oral Nutrition Supplement:   Intake: Insufficient data      Nutrition Diagnosis   Date:  12/26            Updated:    Problem Malnutrition acuate/severe    Etiology Altered GI function    Signs/Symptoms Ulcers/ esophageal mass/ recent dx of cirrhosis/ history of ETOH abuse leading to poor intake and weight (reported per patient)    Status:       Goal:   General: Nutrition support treatment  PO: Establish PO  EN/PN: N/A    Nutrition Intervention      Follow treatment progress, Care plan reviewed, Interview for preferences, Supplement provided    Based on patient recent wt loss and poor PO intake, patient meets criteria for severe malnutrition r/t acute illness/injury.     Boost plus TID with meals     Continue with thiamine, folic acid and MVI.    Soft diet per patient request once diet advanced (edentulous)    .      Monitoring/Evaluation:   Per protocol, I&O, PO intake, Supplement intake, Pertinent labs      Fern Peña RD  Time Spent: 30min    "

## 2023-12-26 NOTE — ANESTHESIA PREPROCEDURE EVALUATION
Anesthesia Evaluation                  Airway   Mallampati: I  TM distance: >3 FB  Neck ROM: full  No difficulty expected  Dental      Pulmonary    (+) a smoker,  Cardiovascular     ECG reviewed    (+) hypertension      Neuro/Psych  (+) psychiatric history Depression and Anxiety  GI/Hepatic/Renal/Endo    (+) GERD, PUD, GI bleeding     Musculoskeletal     Abdominal    Substance History   (+) alcohol use     OB/GYN          Other                      Anesthesia Plan    ASA 3     general     intravenous induction     Anesthetic plan, risks, benefits, and alternatives have been provided, discussed and informed consent has been obtained with: patient.    Plan discussed with CRNA.    CODE STATUS:    Code Status (Patient has no pulse and is not breathing): CPR (Attempt to Resuscitate)  Medical Interventions (Patient has pulse or is breathing): Full Support

## 2023-12-26 NOTE — PLAN OF CARE
Goal Outcome Evaluation:  Plan of Care Reviewed With: patient           Outcome Evaluation: Pt presents w/ increased pain, generalized weakness, decreased functional endurance, and balance deficits limiting his ADL independence. Pt would benefit from continued skilled IPOT services to address current functional deficits. Rec IRF at d/c.      Anticipated Discharge Disposition (OT): inpatient rehabilitation facility

## 2023-12-26 NOTE — ANESTHESIA POSTPROCEDURE EVALUATION
Patient: Val Nassar    Procedure Summary       Date: 12/26/23 Room / Location:  [facility]    Anesthesia Start: 1222 Anesthesia Stop: 1258    Procedure: ESOPHAGOGASTRODUODENOSCOPY Diagnosis:       Iron deficiency anemia due to chronic blood loss      (Iron deficiency anemia due to chronic blood loss [D50.0])    Surgeons: Wesly Mckeon MD Provider: Arnold Hankins MD    Anesthesia Type: general ASA Status: 3            Anesthesia Type: general    Vitals  Vitals Value Taken Time   BP     Temp     Pulse 74 12/26/23 1257   Resp     SpO2 94 % 12/26/23 1257   Vitals shown include unfiled device data.        Post Anesthesia Care and Evaluation    Patient location during evaluation: PACU  Patient participation: complete - patient participated  Level of consciousness: awake and alert  Pain management: adequate    Airway patency: patent  Anesthetic complications: No anesthetic complications  PONV Status: none  Cardiovascular status: hemodynamically stable and acceptable  Respiratory status: nonlabored ventilation, acceptable and nasal cannula  Hydration status: acceptable

## 2023-12-26 NOTE — CASE MANAGEMENT/SOCIAL WORK
Discharge Planning Assessment  [facility]     Patient Name: Val Nassar  MRN: [id number]  Today's Date: 12/26/2023    Admit Date: 12/25/2023    Plan: IDP   Discharge Needs Assessment       Row Name 12/26/23 1153       Living Environment    People in Home sibling(s)    Current Living Arrangements home    Potentially Unsafe Housing Conditions none    Primary Care Provided by self       Transition Planning    Patient/Family Anticipates Transition to home with family    Patient/Family Anticipated Services at Transition        Discharge Needs Assessment    Equipment Currently Used at Home bath bench;walker, standard;cane, straight    Equipment Needed After Discharge none                   Discharge Plan       Row Name 12/26/23 1154       Plan    Plan IDP    Patient/Family in Agreement with Plan yes    Plan Comments Spoke with patient at bedside. Patient lives with sister in [facility]. He recently moved from [place] due to his health declining. He uses a cane and walker for mobility. He is not current with home health services. PCP is Dr. Marcela Horne in [place]. He doesn't have once established here yet. Insurance is [place] Medicaid. Patient discharge plan is ongoing, awaiting PT and OT recommendation. He is agreeable to rehab or home health services. Will likely need transportation help.CM will follow.    Final Discharge Disposition Code 01 - home or self-care                  Continued Care and Services - Admitted Since 12/25/2023    Coordination has not been started for this encounter.       Expected Discharge Date and Time       Expected Discharge Date Expected Discharge Time    Dec 29, 2023            Demographic Summary       Row Name 12/26/23 1153       General Information    Preferred Language English                   Functional Status       Row Name 12/26/23 1153       Functional Status    Usual Activity Tolerance moderate    Current Activity Tolerance fair       Functional Status, IADL     Medications independent    Meal Preparation assistive equipment    Housekeeping assistive equipment    Laundry assistive equipment    Shopping assistive equipment                   Psychosocial    No documentation.                  Abuse/Neglect    No documentation.                  Legal    No documentation.                  Substance Abuse    No documentation.                  Patient Forms    No documentation.                     Radha Kothari RN

## 2023-12-26 NOTE — THERAPY EVALUATION
Patient Name: Val Nassar  : 1960    MRN: [id number]                              Today's Date: 2023       Admit Date: 2023    Visit Dx:     ICD-10-CM ICD-9-CM   1. Iron deficiency anemia due to chronic blood loss  D50.0 280.0   2. Severe anemia  D64.9 285.9   3. Cholecystitis  K81.9 575.10   4. LLQ abdominal pain  R10.32 789.04     Patient Active Problem List   Diagnosis    Anemia    Acute gastric ulcer with hemorrhage    Alcohol abuse, uncomplicated    Anxiety disorder, unspecified    Diverticulum of bladder    Duodenal ulcer, unspecified as acute or chronic, without hemorrhage or perforation    Elevation of level of transaminase and lactic acid dehydrogenase (LDH)    Gastro-esophageal reflux disease without esophagitis    Hyperglycemia, unspecified    Hyperlipidemia, unspecified    Melena    Nicotine dependence, cigarettes, uncomplicated    Tobacco use     History reviewed. No pertinent past medical history.  History reviewed. No pertinent surgical history.   General Information       Row Name 23 1600          Physical Therapy Time and Intention    Document Type evaluation  -     Mode of Treatment physical therapy  -       Row Name 23 1600          General Information    Patient Profile Reviewed yes  -SS     Prior Level of Function independent:;all household mobility;gait;transfer;bed mobility;ADL's  limited community ambulator, use of large based single touch cane, declines any acute falls  -     Existing Precautions/Restrictions fall;oxygen therapy device and L/min  -     Barriers to Rehab medically complex;previous functional deficit  -       Row Name 23 1600          Living Environment    People in Home sibling(s)  -       Row Name 23 1600          Home Main Entrance    Number of Stairs, Main Entrance one  -SS     Stair Railings, Main Entrance none  -SS       Row Name 23 1600          Stairs Within Home, Primary    Number of Stairs, Within  Home, Primary none  -       Row Name 12/26/23 1600          Cognition    Orientation Status (Cognition) oriented x 3  -       Row Name 12/26/23 1600          Safety Issues, Functional Mobility    Safety Issues Affecting Function (Mobility) awareness of need for assistance;insight into deficits/self-awareness;judgment;positioning of assistive device;problem-solving;safety precaution awareness;safety precautions follow-through/compliance;sequencing abilities  -     Impairments Affecting Function (Mobility) balance;endurance/activity tolerance;pain;strength;shortness of breath;postural/trunk control;range of motion (ROM)  -               User Key  (r) = Recorded By, (t) = Taken By, (c) = Cosigned By      Initials Name Provider Type     Ninfa Rowe PT Physical Therapist                   Mobility       Row Name 12/26/23 1601          Bed Mobility    Bed Mobility scooting/bridging;sit-supine  -     Scooting/Bridging McKin[place] (Bed Mobility) verbal cues;standby assist  -     Sit-Supine [place] (Bed Mobility) minimum assist (75% patient effort);verbal cues  -     Assistive Device (Bed Mobility) bed rails;head of bed elevated  -     Comment, (Bed Mobility) VC for log roll technique  -       Row Name 12/26/23 1601          Sit-Stand Transfer    Sit-Stand [place] (Transfers) minimum assist (75% patient effort);verbal cues  -     Assistive Device (Sit-Stand Transfers) cane, straight  -     Comment, (Sit-Stand Transfer) VC for hand placement, appropriate alignment, lowering with eccentric control  -       Row Name 12/26/23 1601          Gait/Stairs (Locomotion)    [place] Level (Gait) minimum assist (75% patient effort);verbal cues  -     Assistive Device (Gait) cane, straight;other (see comments)  +HHA  -     Distance in Feet (Gait) 8  -SS     Deviations/Abnormal Patterns (Gait) bilateral deviations;mandy decreased;gait speed decreased;stride length decreased;weight  shifting decreased  -SS     Bilateral Gait Deviations forward flexed posture;heel strike decreased  -SS     Comment, (Gait/Stairs) Pt. ambulated with a guarded gait pattern at a decreased speed. VC for safety recommendations, sequencing. Activity limited by fatigue.  -               User Key  (r) = Recorded By, (t) = Taken By, (c) = Cosigned By      Initials Name Provider Type     Ninfa Rowe, APRIL Physical Therapist                   Obj/Interventions       Row Name 12/26/23 1603          Range of Motion Comprehensive    General Range of Motion bilateral lower extremity ROM WFL  -SS     Comment, General Range of Motion LLE mildly limited secondary to sciatica/low back pain  -SS       Row Name 12/26/23 1603          Strength Comprehensive (MMT)    Comment, General Manual Muscle Testing (MMT) Assessment BLE gross 4-/5  -SS       Row Name 12/26/23 1603          Balance    Balance Assessment sitting static balance;sit to stand dynamic balance;sitting dynamic balance;standing static balance;standing dynamic balance  -     Static Sitting Balance standby assist  -     Dynamic Sitting Balance contact guard  -SS     Position, Sitting Balance unsupported;sitting edge of bed  -     Sit to Stand Dynamic Balance minimal assist  -SS     Static Standing Balance minimal assist  -SS     Dynamic Standing Balance minimal assist  -SS     Position/Device Used, Standing Balance supported;cane, straight  -SS     Balance Interventions sitting;standing;sit to stand;supported;dynamic;static  -       Row Name 12/26/23 1603          Sensory Assessment (Somatosensory)    Sensory Assessment (Somatosensory) LE sensation intact  -               User Key  (r) = Recorded By, (t) = Taken By, (c) = Cosigned By      Initials Name Provider Type     Ninfa Rowe PT Physical Therapist                   Goals/Plan       Row Name 12/26/23 1607          Bed Mobility Goal 1 (PT)    Activity/Assistive Device (Bed Mobility Goal 1, PT) bed  mobility activities, all  -SS     Howard Level/Cues Needed (Bed Mobility Goal 1, PT) modified independence  -SS     Time Frame (Bed Mobility Goal 1, PT) long term goal (LTG);10 days  -SS     Strategies/Barriers (Bed Mobility Goal 1, PT) log roll technique  -       Row Name 12/26/23 1607          Transfer Goal 1 (PT)    Activity/Assistive Device (Transfer Goal 1, PT) sit-to-stand/stand-to-sit;bed-to-chair/chair-to-bed  -SS     Howard Level/Cues Needed (Transfer Goal 1, PT) modified independence  -SS     Time Frame (Transfer Goal 1, PT) long term goal (LTG);10 days  -SS       Row Name 12/26/23 1607          Gait Training Goal 1 (PT)    Activity/Assistive Device (Gait Training Goal 1, PT) gait (walking locomotion);assistive device use;walker, rolling  -SS     Howard Level (Gait Training Goal 1, PT) modified independence  -SS     Distance (Gait Training Goal 1, PT) 150  -SS     Time Frame (Gait Training Goal 1, PT) long term goal (LTG);10 days  -SS       Row Name 12/26/23 1607          Therapy Assessment/Plan (PT)    Planned Therapy Interventions (PT) balance training;bed mobility training;gait training;home exercise program;lumbar stabilization;neuromuscular re-education;patient/family education;postural re-education;ROM (range of motion);strengthening;stretching;transfer training  -               User Key  (r) = Recorded By, (t) = Taken By, (c) = Cosigned By      Initials Name Provider Type     Ninfa Rowe, PT Physical Therapist                   Clinical Impression       Row Name 12/26/23 160          Pain    Pain Intervention(s) Repositioned;Ambulation/increased activity;Elevated  -     Additional Documentation Pain Scale: FACES Pre/Post-Treatment (Group)  -       Row Name 12/26/23 160          Pain Scale: FACES Pre/Post-Treatment    Pain: FACES Scale, Pretreatment 4-->hurts little more  -SS     Posttreatment Pain Rating 4-->hurts little more  -SS     Pain Location - Side/Orientation  Bilateral  -SS     Pain Location lower  -     Pain Location - back;extremity  LLE  -SS       Row Name 12/26/23 1608          Plan of Care Review    Plan of Care Reviewed With patient  -     Outcome Evaluation Pt. presents below baseline function w/generalized weakness, balance deficits, acute pain and decreased functional endurance affecting his ability to safely participate in functional mobility. He performed bed mobility, transfers and ambulated 8' w/single touch cane and hand held assist, min assist. Activity limited by fatigue. Pt. would benefit from IPPT to address stated deficits.  -       Row Name 12/26/23 1604          Therapy Assessment/Plan (PT)    Rehab Potential (PT) good, to achieve stated therapy goals  -     Criteria for Skilled Interventions Met (PT) yes;meets criteria;skilled treatment is necessary  -     Therapy Frequency (PT) daily  -       Row Name 12/26/23 1604          Vital Signs    Pre Systolic BP Rehab 158  -SS     Pre Treatment Diastolic   -SS     Post Systolic BP Rehab 168  -SS     Post Treatment Diastolic BP 91  -SS     Pretreatment Heart Rate (beats/min) 72  -SS     Posttreatment Heart Rate (beats/min) 70  -SS     Pre SpO2 (%) 94  -SS     O2 Delivery Pre Treatment nasal cannula  -SS     Post SpO2 (%) 94  -SS     O2 Delivery Post Treatment nasal cannula  -SS     Pre Patient Position Supine  -SS     Post Patient Position Supine  -       Row Name 12/26/23 1601          Positioning and Restraints    Pre-Treatment Position in bed  -SS     Post Treatment Position bed  -SS     In Bed notified nsg;fowlers;call light within reach;encouraged to call for assist;exit alarm on;legs elevated;SCD pump applied  -               User Key  (r) = Recorded By, (t) = Taken By, (c) = Cosigned By      Initials Name Provider Type     Ninfa Rowe, PT Physical Therapist                   Outcome Measures       Row Name 12/26/23 1601          How much help from another person do you  currently need...    Turning from your back to your side while in flat bed without using bedrails? 3  -SS     Moving from lying on back to sitting on the side of a flat bed without bedrails? 3  -SS     Moving to and from a bed to a chair (including a wheelchair)? 3  -SS     Standing up from a chair using your arms (e.g., wheelchair, bedside chair)? 3  -SS     Climbing 3-5 steps with a railing? 2  -SS     To walk in hospital room? 3  -SS     AM-PAC 6 Clicks Score (PT) 17  -SS     Highest Level of Mobility Goal 5 --> Static standing  -       Row Name 12/26/23 1608 12/26/23 1530       Functional Assessment    Outcome Measure Options AM-PAC 6 Clicks Basic Mobility (PT)  - AM-PAC 6 Clicks Daily Activity (OT)  -              User Key  (r) = Recorded By, (t) = Taken By, (c) = Cosigned By      Initials Name Provider Type    Sandy Rodriguez, OT Occupational Therapist    Ninfa Foss, PT Physical Therapist                                 Physical Therapy Education       Title: PT OT SLP Therapies (In Progress)       Topic: Physical Therapy (In Progress)       Point: Mobility training (Done)       Learning Progress Summary             Patient DOMINGUEZ Sandhu VU, DU,NR by  at 12/26/2023 1608    Comment: Educated pt. safety/technique w/bed mobility (log roll), transfers, ambulation, PT POC                         Point: Home exercise program (Not Started)       Learner Progress:  Not documented in this visit.              Point: Body mechanics (Done)       Learning Progress Summary             Patient DOMINGUEZ Sandhu VU, DU,NR by  at 12/26/2023 1608    Comment: Educated pt. safety/technique w/bed mobility (log roll), transfers, ambulation, PT POC                         Point: Precautions (Done)       Learning Progress Summary             Patient DOMINGUEZ Sandhu, KATELYN,ROBBI,NR by  at 12/26/2023 1608    Comment: Educated pt. safety/technique w/bed mobility (log roll), transfers, ambulation, PT POC                                          User Key       Initials Effective Dates Name Provider Type Discipline     06/01/21 -  Ninfa Rowe PT Physical Therapist PT                  PT Recommendation and Plan  Planned Therapy Interventions (PT): balance training, bed mobility training, gait training, home exercise program, lumbar stabilization, neuromuscular re-education, patient/family education, postural re-education, ROM (range of motion), strengthening, stretching, transfer training  Plan of Care Reviewed With: patient  Outcome Evaluation: Pt. presents below baseline function w/generalized weakness, balance deficits, acute pain and decreased functional endurance affecting his ability to safely participate in functional mobility. He performed bed mobility, transfers and ambulated 8' w/single touch cane and hand held assist, min assist. Activity limited by fatigue. Pt. would benefit from IPPT to address stated deficits.     Time Calculation:   PT Evaluation Complexity  History, PT Evaluation Complexity: 3 or more personal factors and/or comorbidities  Examination of Body Systems (PT Eval Complexity): total of 4 or more elements  Clinical Presentation (PT Evaluation Complexity): evolving  Clinical Decision Making (PT Evaluation Complexity): moderate complexity  Overall Complexity (PT Evaluation Complexity): moderate complexity     PT Charges       Row Name 12/26/23 1609             Time Calculation    Start Time 1357  -SS      PT Received On 12/26/23  -SS      PT Goal Re-Cert Due Date 01/05/24  -SS         Untimed Charges    PT Eval/Re-eval Minutes 50  -SS         Total Minutes    Untimed Charges Total Minutes 50  -SS       Total Minutes 50  -SS                User Key  (r) = Recorded By, (t) = Taken By, (c) = Cosigned By      Initials Name Provider Type     Ninfa Rowe PT Physical Therapist                  Therapy Charges for Today       Code Description Service Date Service Provider Modifiers Qty    20424151332 HC PT EVAL MOD COMPLEXITY  4 12/26/2023 Ninfa Rowe, PT GP 1            PT G-Codes  Outcome Measure Options: AM-PAC 6 Clicks Basic Mobility (PT)  AM-PAC 6 Clicks Score (PT): 17  AM-PAC 6 Clicks Score (OT): 15  PT Discharge Summary  Anticipated Discharge Disposition (PT): skilled nursing facility    Ninfa Rowe, APRIL  12/26/2023

## 2023-12-26 NOTE — H&P
[facility]  HISTORY AND PHYSICAL    Patient Name: Cesario Neal  : 1960  MRN: [id number]  Primary Care Physician: Provider, No Known  Date of admission: 2023      Subjective   Subjective     Chief Complaint:  Abdominal pain    HPI:  Cesario Neal is a 63 y.o. male with past medical history of reported peptic ulcer disease, cirrhosis, laryngeal cancer.  He just recently moved from [place] and does not have any records with him, he was previously receiving care at [facility].  He states he was diagnosed with cirrhosis of the liver 3 months ago and was previously using alcohol heavily but stopped at that time.  He did have an upper endoscopy states around that time which showed stomach ulcers.  He states that he has had a problem with bleeding ulcers before.  He has never been told he had bleeding veins in his esophagus.  He was also recently recently diagnosed with laryngeal cancer but has not yet established care for this.  This is the primary reason he moved back home to [place].  He reports that over the past 2 days he has had worsening lower abdominal pain.  More severe on the right side.  Does not radiate.  Appetite very diminished, nauseous but not vomiting.  He has also had several dark appearing stools during this time.  He has a bag of medicines that he brought with him from [place].      Personal History     No past medical history on file.          No past surgical history on file.    Family History: family history is not on file.     Social History:    Social History     Social History Narrative    Not on file       Medications:  Available home medication information reviewed.  (Not in a hospital admission)      No Known Allergies    Objective   Objective     Vital Signs:   Temp:  [98.8 °F (37.1 °C)] 98.8 °F (37.1 °C)  Heart Rate:  [64-79] 73  Resp:  [18] 18  BP: (142-164)/(62-76) 143/62       Physical Exam   Awake  alert and oriented x 3  Resting comfortably in bed  No scleral icterus  Mucous membranes are moist  Heart regular rate and rhythm, grade 2 systolic murmur noted right upper sternal border radiates to mitral area  Lungs are clear to auscultation bilaterally  Abdomen is tender in the right upper quadrant, without rebound, guarding or rigidity  There is no lower extremity edema  Capillary refills less than 2 seconds    Result Review:  I have personally reviewed the results from the time of this admission to 12/26/2023 00:00 EST and agree with these findings:  [x]  Laboratory list / accordion  []  Microbiology  [x]  Radiology  [x]  EKG/Telemetry   []  Cardiology/Vascular   []  Pathology  []  Old records  []  Other:  Most notable findings include: See assessment and plan        LAB RESULTS:      Lab 12/25/23 1925   WBC 4.56   HEMOGLOBIN 5.6*   HEMATOCRIT 19.0*   PLATELETS 222   NEUTROS ABS 2.87   EOS ABS 0.14   MCV 97.9*   PROCALCITONIN 0.19   LACTATE 1.2   PROTIME 13.8   INR 1.05   APTT 32.1*         Lab 12/25/23 1925   SODIUM 143   POTASSIUM 4.1   CHLORIDE 111*   CO2 23.0   ANION GAP 9.0   BUN 10   CREATININE 0.59*   EGFR 108.3   GLUCOSE 104*   CALCIUM 8.9   MAGNESIUM 1.6   TSH 1.070         Lab 12/25/23 1925   TOTAL PROTEIN 6.4   ALBUMIN 3.4*   GLOBULIN 3.0   ALT (SGPT) 9   AST (SGOT) 15   BILIRUBIN <0.2   ALK PHOS 93   LIPASE 75*         Lab 12/25/23 1925   HSTROP T 17             Lab 12/25/23 2059 12/25/23 2023 12/25/23 2023 12/25/23 1925   IRON  --   --   --  12*   IRON SATURATION (TSAT)  --   --   --  2*   TIBC  --   --   --  532   TRANSFERRIN  --   --   --  357   FERRITIN  --   --   --  39.61   ABO TYPING A   < > A  --    RH TYPING Positive   < > Positive  --    ANTIBODY SCREEN  --   --  Negative  --     < > = values in this interval not displayed.         UA          12/25/2023    19:32   Urinalysis   Specific Gravity, UA 1.022    Ketones, UA Negative    Blood, UA Negative    Leukocytes, UA Negative     Nitrite, UA Negative        Microbiology Results (last 10 days)       ** No results found for the last 240 hours. **            CT Abdomen Pelvis With Contrast    Result Date: 12/25/2023  CT ABDOMEN PELVIS W CONTRAST Date of Exam: 12/25/2023 8:36 PM EST Indication: LLQ abd pain, anemia.  cirrhosis, possible unspecified cancer dx (data deficient). Comparison: None available. Technique: Axial CT images were obtained of the abdomen and pelvis following the uneventful intravenous administration of 85 mL Isovue-300. Reconstructed coronal and sagittal images were also obtained. Automated exposure control and iterative construction methods were used. Findings: The lung bases are clear. There is mild nodularity liver surface which may indicate early cirrhosis. The bilateral adrenal glands, bilateral kidneys, pancreas and spleen are normal. There is diffuse wall thickening of the gallbladder with abnormal increased enhancement and surrounding pericholecystic fluid. The findings are concerning for acute cholecystitis. The common bile duct is nondilated. The abdominal and pelvic portion of the GI tract are normal aside from diverticulosis without diverticulitis. There is a large bladder diverticulum projecting posterior and left lateral from the posterior margin of the bladder wall. There are prostate calcifications. The osseous structures are within normal limits for a patient of this age.     Impression: Impression: Abnormal appearance of the gallbladder with wall thickening, abnormal gallbladder wall enhancement and pericholecystic fluid consistent with acute cholecystitis. Electronically Signed: Marvel Vines MD  12/25/2023 8:59 PM EST  Workstation ID: [id number]         Assessment & Plan   Assessment & Plan     Active Hospital Problems    Diagnosis  POA    **Anemia [D64.9]  Yes     Acute anemia  History of hepatic cirrhosis  History of peptic ulcer disease  -Anemia 5.6 on admission, currently hemodynamically stable,  getting 2 units of blood.  Without records it is impossible to know what the findings were in the prior endoscopy.  -Given history of cirrhosis we will start PPI, octreotide, ceftriaxone  -Continue home lactulose  -Trend H&H, GI consult in the morning    Acute cholecystitis  -CT of the abdomen shows abnormal gallbladder with wall thickening and pericholecystic fluid consistent with acute cholecystitis.  Abdomen is currently soft, tender in right upper quadrant.  -Add Flagyl to ceftriaxone, pain control, maintenance fluids, n.p.o.  -Consulting general surgery in the morning however he will need to be stable from a GI bleed standpoint prior to intervention    Laryngeal cancer  -Was recently diagnosed in [place], will need to obtain records from that facility  -He has not yet established with oncology and will need to establish with them here, consider consult prior to discharge    Hypertension  -Hold antihypertensives in the setting of acute bleed      DVT prophylaxis: SCDs      CODE STATUS:    Code Status and Medical Interventions:   Ordered at: 12/25/23 6287     Code Status (Patient has no pulse and is not breathing):    CPR (Attempt to Resuscitate)     Medical Interventions (Patient has pulse or is breathing):    Full Support       Expected Discharge pending clinical course (click hyperlink to enter date then refresh the note)  Expected discharge date/ time has not been documented.     Wesly Turner MD  12/26/23

## 2023-12-27 ENCOUNTER — APPOINTMENT (OUTPATIENT)
Dept: NUCLEAR MEDICINE | Facility: HOSPITAL | Age: 63
DRG: 377 | End: 2023-12-27
Payer: MEDICAID

## 2023-12-27 PROBLEM — E43 SEVERE MALNUTRITION: Status: ACTIVE | Noted: 2023-12-27

## 2023-12-27 LAB
ALBUMIN SERPL-MCNC: 3.3 G/DL (ref 3.5–5.2)
ALBUMIN/GLOB SERPL: 1.1 G/DL
ALP SERPL-CCNC: 90 U/L (ref 39–117)
ALT SERPL W P-5'-P-CCNC: 8 U/L (ref 1–41)
ANION GAP SERPL CALCULATED.3IONS-SCNC: 11 MMOL/L (ref 5–15)
ANISOCYTOSIS BLD QL: NORMAL
AST SERPL-CCNC: 16 U/L (ref 1–40)
B PARAPERT DNA SPEC QL NAA+PROBE: NOT DETECTED
B PERT DNA SPEC QL NAA+PROBE: NOT DETECTED
BASOPHILS # BLD AUTO: 0.06 10*3/MM3 (ref 0–0.2)
BASOPHILS NFR BLD AUTO: 0.4 % (ref 0–1.5)
BH BB BLOOD EXPIRATION DATE: NORMAL
BH BB BLOOD EXPIRATION DATE: NORMAL
BH BB BLOOD TYPE BARCODE: 6200
BH BB BLOOD TYPE BARCODE: NORMAL
BH BB DISPENSE STATUS: NORMAL
BH BB DISPENSE STATUS: NORMAL
BH BB PRODUCT CODE: NORMAL
BH BB PRODUCT CODE: NORMAL
BH BB UNIT NUMBER: NORMAL
BH BB UNIT NUMBER: NORMAL
BILIRUB SERPL-MCNC: 0.3 MG/DL (ref 0–1.2)
BUN SERPL-MCNC: 4 MG/DL (ref 8–23)
BUN/CREAT SERPL: 7.5 (ref 7–25)
C PNEUM DNA NPH QL NAA+NON-PROBE: NOT DETECTED
CALCIUM SPEC-SCNC: 8.6 MG/DL (ref 8.6–10.5)
CHLORIDE SERPL-SCNC: 102 MMOL/L (ref 98–107)
CO2 SERPL-SCNC: 24 MMOL/L (ref 22–29)
CREAT SERPL-MCNC: 0.53 MG/DL (ref 0.76–1.27)
CROSSMATCH INTERPRETATION: NORMAL
CROSSMATCH INTERPRETATION: NORMAL
D-LACTATE SERPL-SCNC: 0.9 MMOL/L (ref 0.5–2)
DEPRECATED RDW RBC AUTO: 69.9 FL (ref 37–54)
EGFRCR SERPLBLD CKD-EPI 2021: 111.9 ML/MIN/1.73
EOSINOPHIL # BLD AUTO: 0.03 10*3/MM3 (ref 0–0.4)
EOSINOPHIL NFR BLD AUTO: 0.2 % (ref 0.3–6.2)
ERYTHROCYTE [DISTWIDTH] IN BLOOD BY AUTOMATED COUNT: 21.7 % (ref 12.3–15.4)
EST. AVERAGE GLUCOSE BLD GHB EST-MCNC: 85 MG/DL
FLUAV SUBTYP SPEC NAA+PROBE: NOT DETECTED
FLUBV RNA ISLT QL NAA+PROBE: NOT DETECTED
GLOBULIN UR ELPH-MCNC: 3 GM/DL
GLUCOSE SERPL-MCNC: 110 MG/DL (ref 65–99)
HADV DNA SPEC NAA+PROBE: NOT DETECTED
HBA1C MFR BLD: 4.6 % (ref 4.8–5.6)
HCOV 229E RNA SPEC QL NAA+PROBE: NOT DETECTED
HCOV HKU1 RNA SPEC QL NAA+PROBE: NOT DETECTED
HCOV NL63 RNA SPEC QL NAA+PROBE: NOT DETECTED
HCOV OC43 RNA SPEC QL NAA+PROBE: NOT DETECTED
HCT VFR BLD AUTO: 22.9 % (ref 37.5–51)
HCT VFR BLD AUTO: 23 % (ref 37.5–51)
HCT VFR BLD AUTO: 23.8 % (ref 37.5–51)
HCT VFR BLD AUTO: 24.2 % (ref 37.5–51)
HGB BLD-MCNC: 7.3 G/DL (ref 13–17.7)
HGB BLD-MCNC: 7.4 G/DL (ref 13–17.7)
HGB BLD-MCNC: 7.5 G/DL (ref 13–17.7)
HGB BLD-MCNC: 7.6 G/DL (ref 13–17.7)
HMPV RNA NPH QL NAA+NON-PROBE: NOT DETECTED
HPIV1 RNA ISLT QL NAA+PROBE: NOT DETECTED
HPIV2 RNA SPEC QL NAA+PROBE: NOT DETECTED
HPIV3 RNA NPH QL NAA+PROBE: NOT DETECTED
HPIV4 P GENE NPH QL NAA+PROBE: NOT DETECTED
IMM GRANULOCYTES # BLD AUTO: 0.11 10*3/MM3 (ref 0–0.05)
IMM GRANULOCYTES NFR BLD AUTO: 0.7 % (ref 0–0.5)
LYMPHOCYTES # BLD AUTO: 0.63 10*3/MM3 (ref 0.7–3.1)
LYMPHOCYTES NFR BLD AUTO: 4.1 % (ref 19.6–45.3)
M PNEUMO IGG SER IA-ACNC: NOT DETECTED
MAGNESIUM SERPL-MCNC: 1.7 MG/DL (ref 1.6–2.4)
MCH RBC QN AUTO: 28.7 PG (ref 26.6–33)
MCHC RBC AUTO-ENTMCNC: 31.5 G/DL (ref 31.5–35.7)
MCV RBC AUTO: 91.2 FL (ref 79–97)
MONOCYTES # BLD AUTO: 2.94 10*3/MM3 (ref 0.1–0.9)
MONOCYTES NFR BLD AUTO: 19.1 % (ref 5–12)
MRSA DNA SPEC QL NAA+PROBE: NEGATIVE
NEUTROPHILS NFR BLD AUTO: 11.6 10*3/MM3 (ref 1.7–7)
NEUTROPHILS NFR BLD AUTO: 75.5 % (ref 42.7–76)
NRBC BLD AUTO-RTO: 0.2 /100 WBC (ref 0–0.2)
NT-PROBNP SERPL-MCNC: 3092 PG/ML (ref 0–900)
PLAT MORPH BLD: NORMAL
PLATELET # BLD AUTO: 190 10*3/MM3 (ref 140–450)
PMV BLD AUTO: 9.4 FL (ref 6–12)
POTASSIUM SERPL-SCNC: 3.2 MMOL/L (ref 3.5–5.2)
POTASSIUM SERPL-SCNC: 3.6 MMOL/L (ref 3.5–5.2)
PROCALCITONIN SERPL-MCNC: 0.4 NG/ML (ref 0–0.25)
PROT SERPL-MCNC: 6.3 G/DL (ref 6–8.5)
RBC # BLD AUTO: 2.61 10*6/MM3 (ref 4.14–5.8)
RHINOVIRUS RNA SPEC NAA+PROBE: NOT DETECTED
RSV RNA NPH QL NAA+NON-PROBE: NOT DETECTED
SARS-COV-2 RNA NPH QL NAA+NON-PROBE: NOT DETECTED
SODIUM SERPL-SCNC: 137 MMOL/L (ref 136–145)
SPHEROCYTES BLD QL SMEAR: NORMAL
UNIT  ABO: NORMAL
UNIT  ABO: NORMAL
UNIT  RH: NORMAL
UNIT  RH: NORMAL
WBC MORPH BLD: NORMAL
WBC NRBC COR # BLD AUTO: 15.37 10*3/MM3 (ref 3.4–10.8)

## 2023-12-27 PROCEDURE — 25010000002 ONDANSETRON PER 1 MG: Performed by: NURSE PRACTITIONER

## 2023-12-27 PROCEDURE — 83735 ASSAY OF MAGNESIUM: CPT | Performed by: STUDENT IN AN ORGANIZED HEALTH CARE EDUCATION/TRAINING PROGRAM

## 2023-12-27 PROCEDURE — 85014 HEMATOCRIT: CPT | Performed by: STUDENT IN AN ORGANIZED HEALTH CARE EDUCATION/TRAINING PROGRAM

## 2023-12-27 PROCEDURE — 25010000002 VANCOMYCIN 10 G RECONSTITUTED SOLUTION

## 2023-12-27 PROCEDURE — A9537 TC99M MEBROFENIN: HCPCS | Performed by: INTERNAL MEDICINE

## 2023-12-27 PROCEDURE — 85018 HEMOGLOBIN: CPT | Performed by: INTERNAL MEDICINE

## 2023-12-27 PROCEDURE — 25010000002 THIAMINE PER 100 MG: Performed by: INTERNAL MEDICINE

## 2023-12-27 PROCEDURE — 25810000003 SODIUM CHLORIDE 0.9 % SOLUTION

## 2023-12-27 PROCEDURE — 0 TECHNETIUM TC 99M MEBROFENIN KIT: Performed by: INTERNAL MEDICINE

## 2023-12-27 PROCEDURE — 87641 MR-STAPH DNA AMP PROBE: CPT

## 2023-12-27 PROCEDURE — 25010000002 METRONIDAZOLE 500 MG/100ML SOLUTION: Performed by: STUDENT IN AN ORGANIZED HEALTH CARE EDUCATION/TRAINING PROGRAM

## 2023-12-27 PROCEDURE — 87040 BLOOD CULTURE FOR BACTERIA: CPT | Performed by: NURSE PRACTITIONER

## 2023-12-27 PROCEDURE — 85007 BL SMEAR W/DIFF WBC COUNT: CPT | Performed by: INTERNAL MEDICINE

## 2023-12-27 PROCEDURE — 83605 ASSAY OF LACTIC ACID: CPT | Performed by: NURSE PRACTITIONER

## 2023-12-27 PROCEDURE — 85014 HEMATOCRIT: CPT | Performed by: INTERNAL MEDICINE

## 2023-12-27 PROCEDURE — 84132 ASSAY OF SERUM POTASSIUM: CPT | Performed by: INTERNAL MEDICINE

## 2023-12-27 PROCEDURE — 25010000002 NA FERRIC GLUC CPLX PER 12.5 MG: Performed by: INTERNAL MEDICINE

## 2023-12-27 PROCEDURE — 99232 SBSQ HOSP IP/OBS MODERATE 35: CPT | Performed by: PHYSICIAN ASSISTANT

## 2023-12-27 PROCEDURE — 25010000002 POTASSIUM CHLORIDE 10 MEQ/100ML SOLUTION: Performed by: INTERNAL MEDICINE

## 2023-12-27 PROCEDURE — 84145 PROCALCITONIN (PCT): CPT | Performed by: NURSE PRACTITIONER

## 2023-12-27 PROCEDURE — 85018 HEMOGLOBIN: CPT | Performed by: STUDENT IN AN ORGANIZED HEALTH CARE EDUCATION/TRAINING PROGRAM

## 2023-12-27 PROCEDURE — 78226 HEPATOBILIARY SYSTEM IMAGING: CPT

## 2023-12-27 PROCEDURE — 83880 ASSAY OF NATRIURETIC PEPTIDE: CPT | Performed by: INTERNAL MEDICINE

## 2023-12-27 PROCEDURE — 25010000002 PIPERACILLIN SOD-TAZOBACTAM PER 1 G: Performed by: NURSE PRACTITIONER

## 2023-12-27 PROCEDURE — 80053 COMPREHEN METABOLIC PANEL: CPT | Performed by: SURGERY

## 2023-12-27 PROCEDURE — 25010000002 METRONIDAZOLE 500 MG/100ML SOLUTION: Performed by: INTERNAL MEDICINE

## 2023-12-27 PROCEDURE — 85025 COMPLETE CBC W/AUTO DIFF WBC: CPT | Performed by: INTERNAL MEDICINE

## 2023-12-27 PROCEDURE — 25010000002 VANCOMYCIN PER 500 MG

## 2023-12-27 RX ORDER — VANCOMYCIN HYDROCHLORIDE 1 G/200ML
15 INJECTION, SOLUTION INTRAVENOUS EVERY 12 HOURS
Status: DISCONTINUED | OUTPATIENT
Start: 2023-12-27 | End: 2023-12-28

## 2023-12-27 RX ORDER — KIT FOR THE PREPARATION OF TECHNETIUM TC 99M MEBROFENIN 45 MG/10ML
1 INJECTION, POWDER, LYOPHILIZED, FOR SOLUTION INTRAVENOUS
Status: COMPLETED | OUTPATIENT
Start: 2023-12-27 | End: 2023-12-27

## 2023-12-27 RX ORDER — POTASSIUM CHLORIDE 20 MEQ/1
40 TABLET, EXTENDED RELEASE ORAL EVERY 4 HOURS
Status: COMPLETED | OUTPATIENT
Start: 2023-12-27 | End: 2023-12-27

## 2023-12-27 RX ORDER — LIDOCAINE 4 G/G
1 PATCH TOPICAL
Status: DISCONTINUED | OUTPATIENT
Start: 2023-12-27 | End: 2024-01-03 | Stop reason: HOSPADM

## 2023-12-27 RX ORDER — POTASSIUM CHLORIDE 7.45 MG/ML
10 INJECTION INTRAVENOUS
Status: COMPLETED | OUTPATIENT
Start: 2023-12-27 | End: 2023-12-27

## 2023-12-27 RX ADMIN — ACETAMINOPHEN 500 MG: 500 TABLET ORAL at 16:51

## 2023-12-27 RX ADMIN — MEBROFENIN 1 DOSE: 45 INJECTION, POWDER, LYOPHILIZED, FOR SOLUTION INTRAVENOUS at 13:12

## 2023-12-27 RX ADMIN — SODIUM CHLORIDE 125 MG: 9 INJECTION, SOLUTION INTRAVENOUS at 09:46

## 2023-12-27 RX ADMIN — VANCOMYCIN HYDROCHLORIDE 1000 MG: 1 INJECTION, SOLUTION INTRAVENOUS at 20:33

## 2023-12-27 RX ADMIN — PIPERACILLIN SODIUM AND TAZOBACTAM SODIUM 3.38 G: 3; .375 INJECTION, SOLUTION INTRAVENOUS at 22:19

## 2023-12-27 RX ADMIN — THIAMINE HYDROCHLORIDE 200 MG: 100 INJECTION, SOLUTION INTRAMUSCULAR; INTRAVENOUS at 08:08

## 2023-12-27 RX ADMIN — ONDANSETRON 4 MG: 2 INJECTION INTRAMUSCULAR; INTRAVENOUS at 04:10

## 2023-12-27 RX ADMIN — Medication 10 ML: at 20:33

## 2023-12-27 RX ADMIN — PIPERACILLIN SODIUM AND TAZOBACTAM SODIUM 3.38 G: 3; .375 INJECTION, SOLUTION INTRAVENOUS at 06:37

## 2023-12-27 RX ADMIN — METRONIDAZOLE 500 MG: 500 INJECTION, SOLUTION INTRAVENOUS at 00:40

## 2023-12-27 RX ADMIN — PANTOPRAZOLE SODIUM 40 MG: 40 INJECTION, POWDER, LYOPHILIZED, FOR SOLUTION INTRAVENOUS at 08:08

## 2023-12-27 RX ADMIN — POTASSIUM CHLORIDE 10 MEQ: 7.46 INJECTION, SOLUTION INTRAVENOUS at 04:10

## 2023-12-27 RX ADMIN — PANTOPRAZOLE SODIUM 40 MG: 40 INJECTION, POWDER, LYOPHILIZED, FOR SOLUTION INTRAVENOUS at 20:33

## 2023-12-27 RX ADMIN — Medication 5 MG: at 20:33

## 2023-12-27 RX ADMIN — POTASSIUM CHLORIDE 40 MEQ: 1500 TABLET, EXTENDED RELEASE ORAL at 20:33

## 2023-12-27 RX ADMIN — VANCOMYCIN HYDROCHLORIDE 1250 MG: 10 INJECTION, POWDER, LYOPHILIZED, FOR SOLUTION INTRAVENOUS at 08:08

## 2023-12-27 RX ADMIN — LIDOCAINE 1 PATCH: 4 PATCH TOPICAL at 05:24

## 2023-12-27 RX ADMIN — POTASSIUM CHLORIDE 10 MEQ: 7.46 INJECTION, SOLUTION INTRAVENOUS at 08:08

## 2023-12-27 RX ADMIN — POTASSIUM CHLORIDE 10 MEQ: 7.46 INJECTION, SOLUTION INTRAVENOUS at 06:37

## 2023-12-27 RX ADMIN — PIPERACILLIN SODIUM AND TAZOBACTAM SODIUM 3.38 G: 3; .375 INJECTION, SOLUTION INTRAVENOUS at 15:02

## 2023-12-27 RX ADMIN — POTASSIUM CHLORIDE 10 MEQ: 7.46 INJECTION, SOLUTION INTRAVENOUS at 05:24

## 2023-12-27 RX ADMIN — POTASSIUM CHLORIDE 40 MEQ: 1500 TABLET, EXTENDED RELEASE ORAL at 18:02

## 2023-12-27 RX ADMIN — ACETAMINOPHEN 500 MG: 500 TABLET ORAL at 23:56

## 2023-12-27 RX ADMIN — SENNOSIDES AND DOCUSATE SODIUM 2 TABLET: 8.6; 5 TABLET ORAL at 20:33

## 2023-12-27 NOTE — PROGRESS NOTES
"GI Daily Progress Note  Subjective:    Chief Complaint:  Follow up abdominal pain     Complains of persistent abdominal pain, right upper quadrant.   Hungry and wants something to eat after his HIDA scan.       Objective:    /69 (BP Location: Right arm, Patient Position: Lying)   Pulse 75   Temp 99.3 °F (37.4 °C) (Oral)   Resp 18   Ht 175.3 cm (69\")   Wt 66.7 kg (147 lb)   SpO2 92%   BMI 21.71 kg/m²     Physical Exam  Constitutional:       General: He is not in acute distress.  Cardiovascular:      Rate and Rhythm: Normal rate and regular rhythm.   Pulmonary:      Effort: Pulmonary effort is normal. No respiratory distress.   Abdominal:      General: Bowel sounds are normal.      Palpations: Abdomen is soft.      Tenderness: There is abdominal tenderness. There is no guarding.   Neurological:      Mental Status: He is alert and oriented to person, place, and time.       Lab  Lab Results   Component Value Date    WBC 15.37 (H) 12/27/2023    HGB 7.3 (L) 12/27/2023    HGB 7.5 (L) 12/27/2023    HGB 7.4 (L) 12/27/2023    MCV 91.2 12/27/2023     12/27/2023    INR 1.05 12/25/2023     Lab Results   Component Value Date    GLUCOSE 110 (H) 12/27/2023    BUN 4 (L) 12/27/2023    CREATININE 0.53 (L) 12/27/2023    BCR 7.5 12/27/2023     12/27/2023    K 3.2 (L) 12/27/2023    CO2 24.0 12/27/2023    CALCIUM 8.6 12/27/2023    ALBUMIN 3.3 (L) 12/27/2023    ALKPHOS 90 12/27/2023    BILITOT 0.3 12/27/2023    ALT 8 12/27/2023    AST 16 12/27/2023     Assessment:     Esophageal mass, biopsied.  Pathology pending.     Duodenal ulcer, clean based  Duodenal stenosis, acquired.   Cirrhotic appearing liver on imaging   Iron deficiency anemia   Abnormal imaging of the gallbladder   Leukocytosis, new.        Plan:    >> Await pathology results  >> BID PPI    FERNANDA Banda  12/27/23  12:35 EST    "

## 2023-12-27 NOTE — PROGRESS NOTES
[facility] Medicine Services  PROGRESS NOTE    Patient Name: Val Nassar  : 1960  MRN: [id number]    Date of Admission: 2023  Primary Care Physician: Provider, No Known    Subjective   Subjective     CC:  Abdominal pain    HPI:  Abdomen sore.  Nv x 2 this am.  Denies coffee ground emesis      Objective   Objective     Vital Signs:   Temp:  [98.2 °F (36.8 °C)-100.9 °F (38.3 °C)] 99.3 °F (37.4 °C)  Heart Rate:  [71-95] 75  Resp:  [18-22] 18  BP: (124-168)/(59-91) 136/69  Flow (L/min):  [2-5] 2     Physical Exam:  Appears fatigued, in bed  RRR  Grossly clear breath sounds bilaterally  Abdomen soft, mild generalized tenderness, negative jennings's  Trace edema  Awake, speech clear  Slightly flat affect    Results Reviewed:  LAB RESULTS:      Lab 23  0657 23  0149 23  0010 23  2116 23  1559 23  0848 23  1925   WBC  --  15.37*  --   --   --  6.51 4.56   HEMOGLOBIN 7.3* 7.5* 7.4* 7.9* 7.4* 7.2* 5.6*   HEMATOCRIT 23.0* 23.8* 22.9* 24.4* 23.6* 23.1* 19.0*   PLATELETS  --  190  --   --   --  196 222   NEUTROS ABS  --  11.60*  --   --   --   --  2.87   IMMATURE GRANS (ABS)  --  0.11*  --   --   --   --   --    LYMPHS ABS  --  0.63*  --   --   --   --   --    MONOS ABS  --  2.94*  --   --   --   --   --    EOS ABS  --  0.03  --   --   --   --  0.14   MCV  --  91.2  --   --   --  93.9 97.9*   PROCALCITONIN  --  0.40*  --   --   --   --  0.19   LACTATE  --  0.9  --   --   --   --  1.2   PROTIME  --   --   --   --   --   --  13.8   APTT  --   --   --   --   --   --  32.1*         Lab 23  0149 23  0848 23  1925   SODIUM 137 140 143   POTASSIUM 3.2* 3.9 4.1   CHLORIDE 102 109* 111*   CO2 24.0 22.0 23.0   ANION GAP 11.0 9.0 9.0   BUN 4* 7* 10   CREATININE 0.53* 0.56* 0.59*   EGFR 111.9 110.1 108.3   GLUCOSE 110* 125* 104*   CALCIUM 8.6 9.0 8.9   MAGNESIUM 1.7 1.4* 1.6   HEMOGLOBIN A1C  --   --  4.6*   TSH  --   --  1.070          Lab 12/27/23 0149 12/25/23 1925   TOTAL PROTEIN 6.3 6.4   ALBUMIN 3.3* 3.4*   GLOBULIN 3.0 3.0   ALT (SGPT) 8 9   AST (SGOT) 16 15   BILIRUBIN 0.3 <0.2   ALK PHOS 90 93   LIPASE  --  75*         Lab 12/27/23 0149 12/25/23 1925   PROBNP 3,092.0*  --    HSTROP T  --  17   PROTIME  --  13.8   INR  --  1.05             Lab 12/25/23 2059 12/25/23 2023 12/25/23 2023 12/25/23 1925   IRON  --   --   --  12*   IRON SATURATION (TSAT)  --   --   --  2*   TIBC  --   --   --  532   TRANSFERRIN  --   --   --  357   FERRITIN  --   --   --  39.61   ABO TYPING A   < > A  --    RH TYPING Positive   < > Positive  --    ANTIBODY SCREEN  --   --  Negative  --     < > = values in this interval not displayed.         Brief Urine Lab Results  (Last result in the past 365 days)        Color   Clarity   Blood   Leuk Est   Nitrite   Protein   CREAT   Urine HCG        12/25/23 1932 Yellow   Clear   Negative   Negative   Negative   Negative                   Microbiology Results Abnormal       Procedure Component Value - Date/Time    MRSA Screen, PCR (Inpatient) - Swab, Nares [[id number]]  (Normal) Collected: 12/27/23 0950    Lab Status: Final result Specimen: Swab from Nares Updated: 12/27/23 1130     MRSA PCR Negative    Narrative:      The negative predictive value of this diagnostic test is high and should only be used to consider de-escalating anti-MRSA therapy. A positive result may indicate colonization with MRSA and must be correlated clinically.  MRSA Negative    Respiratory Panel PCR w/COVID-19(SARS-CoV-2) ABDIAZIZ/TAVARES/HANH/PAD/COR/OTIS In-House, NP Swab in UTM/VTM, 2 HR TAT - Swab, Nasopharynx [[id number]]  (Normal) Collected: 12/26/23 2329    Lab Status: Final result Specimen: Swab from Nasopharynx Updated: 12/27/23 0104     ADENOVIRUS, PCR Not Detected     Coronavirus 229E Not Detected     Coronavirus HKU1 Not Detected     Coronavirus NL63 Not Detected     Coronavirus OC43 Not Detected     COVID19 Not Detected     Human  Metapneumovirus Not Detected     Human Rhinovirus/Enterovirus Not Detected     Influenza A PCR Not Detected     Influenza B PCR Not Detected     Parainfluenza Virus 1 Not Detected     Parainfluenza Virus 2 Not Detected     Parainfluenza Virus 3 Not Detected     Parainfluenza Virus 4 Not Detected     RSV, PCR Not Detected     Bordetella pertussis pcr Not Detected     Bordetella parapertussis PCR Not Detected     Chlamydophila pneumoniae PCR Not Detected     Mycoplasma pneumo by PCR Not Detected    Narrative:      In the setting of a positive respiratory panel with a viral infection PLUS a negative procalcitonin without other underlying concern for bacterial infection, consider observing off antibiotics or discontinuation of antibiotics and continue supportive care. If the respiratory panel is positive for atypical bacterial infection (Bordetella pertussis, Chlamydophila pneumoniae, or Mycoplasma pneumoniae), consider antibiotic de-escalation to target atypical bacterial infection.            XR Abdomen KUB    Result Date: 12/26/2023  XR ABDOMEN KUB Date of Exam: 12/26/2023 8:46 PM EST Indication: abd distention vomiting Comparison: CT abdomen pelvis 12/25/2023 Findings: Gas-filled large and small bowel in a nonobstructive pattern, similar to yesterday's CT abdomen pelvis.     Impression: Impression: Gas-filled large and small bowel in a nonobstructive pattern, similar to yesterday's CT abdomen pelvis. Electronically Signed: Yoni Guadalupe MD  12/26/2023 9:24 PM EST  Workstation ID: [id number]    XR Chest 1 View    Result Date: 12/26/2023  XR CHEST 1 VW Date of Exam: 12/26/2023 8:38 PM EST Indication: increased oxygen demand Comparison: None available. Findings: Patient rotated slightly to the left. Heart size top normal. No pneumothorax. Mild interstitial thickening which may relate to a component of interstitial edema. No pneumothorax. No focal consolidation. Asymmetric density at the left upper lung likely related to  superimposition of the anterior left first rib. Central pulmonary vascular congestion.     Impression: Impression: Central pulmonary vascular congestion and mild interstitial thickening which may relate to mild interstitial edema. Electronically Signed: Richard Pinto MD  12/26/2023 9:22 PM EST  Workstation ID: [id number]    CT Abdomen Pelvis With Contrast    Result Date: 12/25/2023  CT ABDOMEN PELVIS W CONTRAST Date of Exam: 12/25/2023 8:36 PM EST Indication: LLQ abd pain, anemia.  cirrhosis, possible unspecified cancer dx (data deficient). Comparison: None available. Technique: Axial CT images were obtained of the abdomen and pelvis following the uneventful intravenous administration of 85 mL Isovue-300. Reconstructed coronal and sagittal images were also obtained. Automated exposure control and iterative construction methods were used. Findings: The lung bases are clear. There is mild nodularity liver surface which may indicate early cirrhosis. The bilateral adrenal glands, bilateral kidneys, pancreas and spleen are normal. There is diffuse wall thickening of the gallbladder with abnormal increased enhancement and surrounding pericholecystic fluid. The findings are concerning for acute cholecystitis. The common bile duct is nondilated. The abdominal and pelvic portion of the GI tract are normal aside from diverticulosis without diverticulitis. There is a large bladder diverticulum projecting posterior and left lateral from the posterior margin of the bladder wall. There are prostate calcifications. The osseous structures are within normal limits for a patient of this age.     Impression: Impression: Abnormal appearance of the gallbladder with wall thickening, abnormal gallbladder wall enhancement and pericholecystic fluid consistent with acute cholecystitis. Electronically Signed: Marvel Vines MD  12/25/2023 8:59 PM EST  Workstation ID: [id number]         Current medications:  Scheduled Meds:[Held by provider]  carvedilol, 3.125 mg, Oral, BID With Meals  ferric gluconate, 125 mg, Intravenous, Daily  folic acid, 1 mg, Oral, Daily  Lidocaine, 1 patch, Transdermal, Q24H  pantoprazole, 40 mg, Intravenous, Q12H  pharmacy consult - [place], , Does not apply, Daily  piperacillin-tazobactam, 3.375 g, Intravenous, Q8H  senna-docusate sodium, 2 tablet, Oral, BID  sodium chloride, 10 mL, Intravenous, Q12H  [Held by provider] spironolactone, 25 mg, Oral, Daily  [Held by provider] tamsulosin, 0.4 mg, Oral, Daily  thiamine (B-1) IV, 200 mg, Intravenous, Daily  vancomycin, 15 mg/kg, Intravenous, Q12H  vitamin B-12, 1,000 mcg, Oral, Daily      Continuous Infusions:Pharmacy to dose vancomycin,       PRN Meds:.  acetaminophen    senna-docusate sodium **AND** polyethylene glycol **AND** bisacodyl **AND** bisacodyl    Calcium Replacement - Follow Nurse / BPA Driven Protocol    HYDROcodone-acetaminophen    HYDROmorphone **AND** naloxone    lactulose    Magnesium Standard Dose Replacement - Follow Nurse / BPA Driven Protocol    melatonin    ondansetron    Pharmacy to dose vancomycin    Phosphorus Replacement - Follow Nurse / BPA Driven Protocol    Potassium Replacement - Follow Nurse / BPA Driven Protocol    sodium chloride    sodium chloride    sodium chloride    Assessment & Plan   Assessment & Plan     Active Hospital Problems    Diagnosis  POA    **Anemia [D64.9]  Yes    Severe malnutrition [E43]  Yes      Resolved Hospital Problems   No resolved problems to display.        Brief Hospital Course to date:  Val Nassar is a 63 y.o. male with history of PUD, cirrhosis, newly diagnosed laryngeal cancer and prior alcohol abuse presents with abdominal pain, melena and anemia    Anemia, iron deficient  - s/p 2 units PRBCs  - IV iron x 3 days  - EGD showed esophagitis and one non-bleeding cratered duodenal ulcer (along with esophageal mass) and congestive gastropathy.  No varices noted.   - PPI BID  - advise no NSAIDs    Esophageal mass  -  await OSH records ([facility])  - patient says he had not been referred for treatment while in [place], will need Oncology referral here, discussed with Dr Ga.    Abdominal pain  - abnormal appearance of GB on CT imaging  - HIDA today  - general surgery follows  - fever overnight, antibiotics broadened to zosyn/vanc -- will likely DC vanc if blood cultures no growth at 24 hours.    Cirrhosis  Alcohol abuse  - patient says he has been abstinent for the past 6 weeks  - thiamine      Expected Discharge Location and Transportation:   Expected Discharge   Expected Discharge Date: 12/29/2023; Expected Discharge Time:      DVT prophylaxis:  Mechanical DVT prophylaxis orders are present.     AM-PAC 6 Clicks Score (PT): 17 (12/26/23 2030)    CODE STATUS:   Code Status and Medical Interventions:   Ordered at: 12/25/23 5390     Code Status (Patient has no pulse and is not breathing):    CPR (Attempt to Resuscitate)     Medical Interventions (Patient has pulse or is breathing):    Full Support       Garret Gibson MD  12/27/23

## 2023-12-27 NOTE — PLAN OF CARE
Goal Outcome Evaluation:  Plan of Care Reviewed With: patient        Progress: declining  Outcome Evaluation: Received pt with SOB, tachypneic and febrile.+N/V;Titrated O2 upto 5L/NC. Blader scanned and I&O cath done. Paged and notified NUVIA Sharma, with orders noted and caried out. Tylenol given, cooling measures rendered. Pt verbalized relief. Kept NPO p MN for HIDA Scan in AM. Approx 4AM, pt c/o of severe abdominal pain with N//V. PRN med given. Paged and spoke to Dr. Turner. Lidocaine patch applied to pt's abdomen.  Cont on ABX therapy without adverse reaction noted. Able to weaned back to 2LNC Monitored closely.Cont current POC

## 2023-12-27 NOTE — PAYOR COMM NOTE
"Yannick Nassar (63 y.o. Male)     [id number]     Mayra Hernandez, SUE  Utilization Review  [phone]  [phone]    OBS to INPT      Inpatient Admission  Once     Completed     Level of Care: Telemetry  Diagnosis: Anemia [882901]  Admitting Physician: GUANAKITO GIBSON [[id number]]  Attending Physician: GUANAKITO GIBSON [[id number]]  Certification: I Certify That Inpatient Hospital Services Are Medically Necessary For Greater Than 2 Midnights    12/27/23 0926           Date of Birth   1960    Social Security Number       Address   [address] [place] [address]    Home Phone   [phone]    MRN   [id number]       Mandaeism   Nazarene    Marital Status   Single                            Admission Date   12/25/23    Admission Type   Emergency    Admitting Provider   Guanakito Gibson MD    Attending Provider   Guanakito Gibson MD    Department, Room/Bed   [address], [address]/1       Discharge Date       Discharge Disposition       Discharge Destination                                 Attending Provider: Guanakito Gibson MD    Allergies: No Known Allergies    Isolation: None   Infection: None   Code Status: CPR    Ht: 175.3 cm (69\")   Wt: 66.7 kg (147 lb)    Admission Cmt: None   Principal Problem: Anemia [D64.9]                   Active Insurance as of 12/25/2023       Primary Coverage       Payor Plan Insurance Group Employer/Plan Group    ANTHEM MEDICAID ANTHEM MEDICAID [id number]       Payor Plan Address Payor Plan Phone Number Payor Plan Fax Number Effective Dates    [address] [address] [phone]  12/19/2023 - None Entered    [facility] [id number]         Subscriber Name Subscriber Birth Date Member ID       YANNICK NASSAR 1960 [id number]                     Emergency Contacts        (Rel.) Home Phone Work Phone Mobile Phone    YANI TAM (Sister) [phone] -- [phone]                 History & Physical        Wesly Turner MD at " 23 2348              [facility] Medicine Services  HISTORY AND PHYSICAL    Patient Name: Cesario Neal  : 1960  MRN: [id number]  Primary Care Physician: Provider, No Known  Date of admission: 2023      Subjective  Subjective     Chief Complaint:  Abdominal pain    HPI:  Cesario Neal is a 63 y.o. male with past medical history of reported peptic ulcer disease, cirrhosis, laryngeal cancer.  He just recently moved from [place] and does not have any records with him, he was previously receiving care at [facility].  He states he was diagnosed with cirrhosis of the liver 3 months ago and was previously using alcohol heavily but stopped at that time.  He did have an upper endoscopy states around that time which showed stomach ulcers.  He states that he has had a problem with bleeding ulcers before.  He has never been told he had bleeding veins in his esophagus.  He was also recently recently diagnosed with laryngeal cancer but has not yet established care for this.  This is the primary reason he moved back home to [place].  He reports that over the past 2 days he has had worsening lower abdominal pain.  More severe on the right side.  Does not radiate.  Appetite very diminished, nauseous but not vomiting.  He has also had several dark appearing stools during this time.  He has a bag of medicines that he brought with him from [place].      Personal History     No past medical history on file.          No past surgical history on file.    Family History: family history is not on file.     Social History:    Social History     Social History Narrative    Not on file       Medications:  Available home medication information reviewed.  (Not in a hospital admission)      No Known Allergies    Objective  Objective     Vital Signs:   Temp:  [98.8 °F (37.1 °C)] 98.8 °F (37.1 °C)  Heart Rate:  [64-79] 73  Resp:  [18] 18  BP: (142-164)/(62-76) 143/62        Physical Exam   Awake alert and oriented x 3  Resting comfortably in bed  No scleral icterus  Mucous membranes are moist  Heart regular rate and rhythm, grade 2 systolic murmur noted right upper sternal border radiates to mitral area  Lungs are clear to auscultation bilaterally  Abdomen is tender in the right upper quadrant, without rebound, guarding or rigidity  There is no lower extremity edema  Capillary refills less than 2 seconds    Result Review:  I have personally reviewed the results from the time of this admission to 12/26/2023 00:00 EST and agree with these findings:  [x]  Laboratory list / accordion  []  Microbiology  [x]  Radiology  [x]  EKG/Telemetry   []  Cardiology/Vascular   []  Pathology  []  Old records  []  Other:  Most notable findings include: See assessment and plan        LAB RESULTS:      Lab 12/25/23 1925   WBC 4.56   HEMOGLOBIN 5.6*   HEMATOCRIT 19.0*   PLATELETS 222   NEUTROS ABS 2.87   EOS ABS 0.14   MCV 97.9*   PROCALCITONIN 0.19   LACTATE 1.2   PROTIME 13.8   INR 1.05   APTT 32.1*         Lab 12/25/23 1925   SODIUM 143   POTASSIUM 4.1   CHLORIDE 111*   CO2 23.0   ANION GAP 9.0   BUN 10   CREATININE 0.59*   EGFR 108.3   GLUCOSE 104*   CALCIUM 8.9   MAGNESIUM 1.6   TSH 1.070         Lab 12/25/23 1925   TOTAL PROTEIN 6.4   ALBUMIN 3.4*   GLOBULIN 3.0   ALT (SGPT) 9   AST (SGOT) 15   BILIRUBIN <0.2   ALK PHOS 93   LIPASE 75*         Lab 12/25/23 1925   HSTROP T 17             Lab 12/25/23 2059 12/25/23 2023 12/25/23 2023 12/25/23 1925   IRON  --   --   --  12*   IRON SATURATION (TSAT)  --   --   --  2*   TIBC  --   --   --  532   TRANSFERRIN  --   --   --  357   FERRITIN  --   --   --  39.61   ABO TYPING A   < > A  --    RH TYPING Positive   < > Positive  --    ANTIBODY SCREEN  --   --  Negative  --     < > = values in this interval not displayed.         UA          12/25/2023    19:32   Urinalysis   Specific Gravity, UA 1.022    Ketones, UA Negative    Blood, UA Negative     Leukocytes, UA Negative    Nitrite, UA Negative        Microbiology Results (last 10 days)       ** No results found for the last 240 hours. **            CT Abdomen Pelvis With Contrast    Result Date: 12/25/2023  CT ABDOMEN PELVIS W CONTRAST Date of Exam: 12/25/2023 8:36 PM EST Indication: LLQ abd pain, anemia.  cirrhosis, possible unspecified cancer dx (data deficient). Comparison: None available. Technique: Axial CT images were obtained of the abdomen and pelvis following the uneventful intravenous administration of 85 mL Isovue-300. Reconstructed coronal and sagittal images were also obtained. Automated exposure control and iterative construction methods were used. Findings: The lung bases are clear. There is mild nodularity liver surface which may indicate early cirrhosis. The bilateral adrenal glands, bilateral kidneys, pancreas and spleen are normal. There is diffuse wall thickening of the gallbladder with abnormal increased enhancement and surrounding pericholecystic fluid. The findings are concerning for acute cholecystitis. The common bile duct is nondilated. The abdominal and pelvic portion of the GI tract are normal aside from diverticulosis without diverticulitis. There is a large bladder diverticulum projecting posterior and left lateral from the posterior margin of the bladder wall. There are prostate calcifications. The osseous structures are within normal limits for a patient of this age.     Impression: Impression: Abnormal appearance of the gallbladder with wall thickening, abnormal gallbladder wall enhancement and pericholecystic fluid consistent with acute cholecystitis. Electronically Signed: Marvel Vines MD  12/25/2023 8:59 PM EST  Workstation ID: [id number]         Assessment & Plan  Assessment & Plan     Active Hospital Problems    Diagnosis  POA    **Anemia [D64.9]  Yes     Acute anemia  History of hepatic cirrhosis  History of peptic ulcer disease  -Anemia 5.6 on admission, currently  hemodynamically stable, getting 2 units of blood.  Without records it is impossible to know what the findings were in the prior endoscopy.  -Given history of cirrhosis we will start PPI, octreotide, ceftriaxone  -Continue home lactulose  -Trend H&H, GI consult in the morning    Acute cholecystitis  -CT of the abdomen shows abnormal gallbladder with wall thickening and pericholecystic fluid consistent with acute cholecystitis.  Abdomen is currently soft, tender in right upper quadrant.  -Add Flagyl to ceftriaxone, pain control, maintenance fluids, n.p.o.  -Consulting general surgery in the morning however he will need to be stable from a GI bleed standpoint prior to intervention    Laryngeal cancer  -Was recently diagnosed in [place], will need to obtain records from that facility  -He has not yet established with oncology and will need to establish with them here, consider consult prior to discharge    Hypertension  -Hold antihypertensives in the setting of acute bleed      DVT prophylaxis: SCDs      CODE STATUS:    Code Status and Medical Interventions:   Ordered at: 12/25/23 7769     Code Status (Patient has no pulse and is not breathing):    CPR (Attempt to Resuscitate)     Medical Interventions (Patient has pulse or is breathing):    Full Support       Expected Discharge pending clinical course (click hyperlink to enter date then refresh the note)  Expected discharge date/ time has not been documented.     Wesly Turner MD  12/26/23      Electronically signed by Wesly Turner MD at 12/26/23 0001          Emergency Department Notes        Tashi Menon RN at 12/25/23 2239           Cesario Neal    Nursing Report ED to Floor:  Mental status: a&oX4  Ambulatory status: up with 2   Oxygen Therapy:  RA  Cardiac Rhythm: NSR  Admitted from: ED  Safety Concerns:  Fall  Social Issues: Cancer pt   ED Room #:  11    ED Nurse Phone Extension - [phone] or may call [phone].      HPI:   Chief Complaint   Patient  presents with    Abdominal Pain       Past Medical History:  No past medical history on file.     Past Surgical History:  No past surgical history on file.     Admitting Doctor:   Wesly Turner MD    Consulting Provider(s):  Consults       No orders found from 11/26/2023 to 12/26/2023.             Admitting Diagnosis:   The primary encounter diagnosis was Severe anemia. Diagnoses of Cholecystitis and LLQ abdominal pain were also pertinent to this visit.    Most Recent Vitals:   Vitals:    12/25/23 2100 12/25/23 2130 12/25/23 2200 12/25/23 2230   BP:  153/76 158/75 164/76   BP Location:       Patient Position:       Pulse: 75 69 73 79   Resp:       Temp:       TempSrc:       SpO2:  97% 99% 99%   Weight:       Height:           Active LDAs/IV Access:   Lines, Drains & Airways       Active LDAs       Name Placement date Placement time Site Days    Peripheral IV 12/25/23 1925 Left Antecubital 12/25/23 1925  Antecubital  less than 1                    Labs (abnormal labs have a star):   Labs Reviewed   COMPREHENSIVE METABOLIC PANEL - Abnormal; Notable for the following components:       Result Value    Glucose 104 (*)     Creatinine 0.59 (*)     Chloride 111 (*)     Albumin 3.4 (*)     All other components within normal limits    Narrative:     GFR Normal >60  Chronic Kidney Disease <60  Kidney Failure <15     LIPASE - Abnormal; Notable for the following components:    Lipase 75 (*)     All other components within normal limits   CBC WITH AUTO DIFFERENTIAL - Abnormal; Notable for the following components:    RBC 1.94 (*)     Hemoglobin 5.6 (*)     Hematocrit 19.0 (*)     MCV 97.9 (*)     MCHC 29.5 (*)     RDW 24.4 (*)     RDW-SD 87.4 (*)     All other components within normal limits    Narrative:     The previously reported component NRBC is no longer being reported. Previous result was 0.0 /100 WBC (Reference Range: 0.0-0.2 /100 WBC) on 12/25/2023 at 1943 EST.   T4, FREE - Abnormal; Notable for the following  components:    Free T4 0.85 (*)     All other components within normal limits    Narrative:     Results may be falsely increased if patient taking Biotin.     CK - Abnormal; Notable for the following components:    Creatine Kinase 17 (*)     All other components within normal limits   MYOGLOBIN, SERUM - Abnormal; Notable for the following components:    Myoglobin 21.0 (*)     All other components within normal limits    Narrative:     Results may be falsely decreased if patient taking Biotin.     APTT - Abnormal; Notable for the following components:    PTT 32.1 (*)     All other components within normal limits    Narrative:     PTT = The equivalent PTT values for the therapeutic range of heparin levels at 0.3 to 0.5 U/ml are 60 to 70 seconds.   IRON PROFILE - Abnormal; Notable for the following components:    Iron 12 (*)     Iron Saturation (TSAT) 2 (*)     All other components within normal limits   MANUAL DIFFERENTIAL - Abnormal; Notable for the following components:    Atypical Lymphocyte % 6.0 (*)     All other components within normal limits   POCT OCCULT BLOOD STOOL - Abnormal; Notable for the following components:    Fecal Occult Blood Positive (*)     All other components within normal limits   SINGLE HSTROPONIN T - Normal    Narrative:     High Sensitive Troponin T Reference Range:  <14.0 ng/L- Negative Female for AMI  <22.0 ng/L- Negative Male for AMI  >=14 - Abnormal Female indicating possible myocardial injury.  >=22 - Abnormal Male indicating possible myocardial injury.   Clinicians would have to utilize clinical acumen, EKG, Troponin, and serial changes to determine if it is an Acute Myocardial Infarction or myocardial injury due to an underlying chronic condition.        URINALYSIS W/ MICROSCOPIC IF INDICATED (NO CULTURE) - Normal    Narrative:     Urine microscopic not indicated.   MAGNESIUM - Normal   TSH - Normal   ETHANOL - Normal    Narrative:     Elevated lactic acid concentration and lactate  "dehydrogenase(LD) activity may falsely elevate enzymatically determined ethanol levels. Not for legal purposes.    URINE DRUG SCREEN - Normal    Narrative:     Cutoff For Drugs Screened:    Amphetamines               500 ng/ml  Barbiturates               200 ng/ml  Benzodiazepines            150 ng/ml  Cocaine                    150 ng/ml  Methadone                  200 ng/ml  Opiates                    100 ng/ml  Phencyclidine               25 ng/ml  THC                         50 ng/ml  Methamphetamine            500 ng/ml  Tricyclic Antidepressants  300 ng/ml  Oxycodone                  100 ng/ml  Buprenorphine               10 ng/ml    The normal value for all drugs tested is negative. This report includes unconfirmed screening results, with the cutoff values listed, to be used for medical treatment purposes only.  Unconfirmed results must not be used for non-medical purposes such as employment or legal testing.  Clinical consideration should be applied to any drug of abuse test, particularly when unconfirmed results are used.     LACTIC ACID, PLASMA - Normal   PROCALCITONIN - Normal    Narrative:     As a Marker for Sepsis (Non-Neonates):    1. <0.5 ng/mL represents a low risk of severe sepsis and/or septic shock.  2. >2 ng/mL represents a high risk of severe sepsis and/or septic shock.    As a Marker for Lower Respiratory Tract Infections that require antibiotic therapy:    PCT on Admission    Antibiotic Therapy       6-12 Hrs later    >0.5                Strongly Recommended  >0.25 - <0.5        Recommended   0.1 - 0.25          Discouraged              Remeasure/reassess PCT  <0.1                Strongly Discouraged     Remeasure/reassess PCT    As 28 day mortality risk marker: \"Change in Procalcitonin Result\" (>80% or <=80%) if Day 0 (or Day 1) and Day 4 values are available. Refer to http://www.bras-pct-calculator.com    Change in PCT <=80%  A decrease of PCT levels below or equal to 80% defines a " positive change in PCT test result representing a higher risk for 28-day all-cause mortality of patients diagnosed with severe sepsis for septic shock.    Change in PCT >80%  A decrease of PCT levels of more than 80% defines a negative change in PCT result representing a lower risk for 28-day all-cause mortality of patients diagnosed with severe sepsis or septic shock.      PROTIME-INR - Normal   FERRITIN - Normal    Narrative:     Results may be falsely decreased if patient taking Biotin.     FENTANYL, URINE - Normal    Narrative:     Negative Threshold:      Fentanyl 5 ng/mL     The normal value for the drug tested is negative. This report includes final unconfirmed screening results to be used for medical treatment purposes only. Unconfirmed results must not be used for non-medical purposes such as employment or legal testing. Clinical consideration should be applied to any drug of abuse test, particularly when unconfirmed results are used.          BETA HYDROXYBUTYRATE QUANTITATIVE - Normal    Narrative:     In the assessment of possible diabetic ketoacidosis, the test should be interpreted along with other clinical and laboratory findings.  A level greater than 1 mmol/L should require further evaluation and levels of more than 3 mmol/L require immediate medical review.   RAINBOW DRAW    Narrative:     The following orders were created for panel order [place] Draw.  Procedure                               Abnormality         Status                     ---------                               -----------         ------                     Green Top (Gel)[[id number]]                                  Final result               Lavender Top[[id number]]                                     Final result               Gold Top - SST[[id number]]                                   Final result               Gray Top[[id number]]                                         In process                 Light Blue Top[[id number]]                                    Final result                 Please view results for these tests on the individual orders.   TYPE AND SCREEN   ABORH 2ND SPECIMEN VERIFICATION   PREPARE RBC   CBC AND DIFFERENTIAL    Narrative:     The following orders were created for panel order CBC & Differential.  Procedure                               Abnormality         Status                     ---------                               -----------         ------                     CBC Auto Differential[[id number]]        Abnormal            Final result               Scan Slide[[id number]]                                                                    Please view results for these tests on the individual orders.   GREEN TOP   LAVENDER TOP   GOLD TOP - SST   LIGHT BLUE TOP   KETONE BODIES SERUM    Narrative:     The following orders were created for panel order Ketone Bodies, Serum (Not performed at [facility]).  Procedure                               Abnormality         Status                     ---------                               -----------         ------                     Beta Hydroxybutyrate Eric...[[id number]]  Normal              Final result                 Please view results for these tests on the individual orders.   GRAY TOP       Meds Given in ED:   Medications   sodium chloride 0.9 % flush 10 mL (has no administration in time range)   cefTRIAXone (ROCEPHIN) 1,000 mg in sodium chloride 0.9 % 100 mL IVPB (1,000 mg Intravenous New Bag 12/25/23 2230)   octreotide (sandoSTATIN) 500 mcg in sodium chloride 0.9 % 100 mL (5 mcg/mL) infusion (has no administration in time range)   pantoprazole (PROTONIX) injection 40 mg (40 mg Intravenous Given 12/25/23 2230)   iopamidol (ISOVUE-300) 61 % injection 85 mL (85 mL Intravenous Given 12/25/23 2043)   Morphine sulfate (PF) injection 4 mg (4 mg Intravenous Given 12/25/23 2230)     octreotide (SandoSTATIN) infusion, 50 mcg/hr            Electronically signed by Tashi Menon RN at  12/25/23 2235       Mehul Ferreira MD at 12/1960          Subjective   History of Present Illness    Pt presents with LLQ abd pain that began this morning.  It has radiated around to the lower back.  Associated with increased urinary frequency but not dysuria or hematuria. No fever, vomiting or diarrhea.  Reports his stool has been white.  He denies any black/bloody stool.    He reports a PMH chronic back pain, GERD/PUD, cirrhosis, throat cancer.  He says the cirrhosis was dx about 3 months ago and he quit alcohol at that time after a history of heavy use.  He says the throat cancer was just dx about 2 weeks ago.  He doesn't know much about it, says it was the impetus for moving from [place] to [place] but he doesn't have care established here.    History provided by:  Patient      Review of Systems   Constitutional:  Negative for fever.   Respiratory:  Negative for shortness of breath.    Cardiovascular:  Negative for chest pain.   Gastrointestinal:  Positive for abdominal pain. Negative for blood in stool, diarrhea and vomiting.   Genitourinary:  Positive for frequency. Negative for difficulty urinating, dysuria and hematuria.   Musculoskeletal:  Positive for back pain.   All other systems reviewed and are negative.      No past medical history on file.    No Known Allergies    No past surgical history on file.    No family history on file.    Social History     Socioeconomic History    Marital status: Single           Objective   Physical Exam  Vitals and nursing note reviewed.   Constitutional:       General: He is not in acute distress.     Appearance: Normal appearance. He is not ill-appearing.   HENT:      Head: Normocephalic and atraumatic.      Mouth/Throat:      Mouth: Mucous membranes are moist.   Eyes:      General: No scleral icterus.        Right eye: No discharge.         Left eye: No discharge.      Conjunctiva/sclera: Conjunctivae normal.   Cardiovascular:      Rate and Rhythm: Normal rate and  regular rhythm.      Heart sounds: No murmur heard.  Pulmonary:      Effort: Pulmonary effort is normal. No respiratory distress.      Breath sounds: Normal breath sounds. No wheezing.   Abdominal:      General: Bowel sounds are normal. There is no distension.      Palpations: Abdomen is soft.      Tenderness: There is no abdominal tenderness. There is no guarding or rebound.   Musculoskeletal:         General: No swelling. Normal range of motion.      Cervical back: Normal range of motion and neck supple.   Skin:     General: Skin is warm and dry.      Coloration: Skin is pale.      Findings: No rash.   Neurological:      General: No focal deficit present.      Mental Status: He is alert and oriented to person, place, and time. Mental status is at baseline.   Psychiatric:         Mood and Affect: Mood normal.         Behavior: Behavior normal.         Thought Content: Thought content normal.         Procedures          ED Course    There are not records immediately available. I reviewed a summary from [facility] in [place] that lists gastric ulcer, duodenal ulcer, alcohol abuse, GERD, hypo-osmolality/hyponatremia, lumbar radiculopathy among other problems but the most recent records and labs are from 2021.    Labs are notable for a severe normocytic anemia.  No priors for comparison.  Otherwise essentially bland labs.  FOBT positive but again he hasn't noticed any kati blood or melena.  CT scan shows what does appear to be an acute cholecystitis.  He does not have RUQ pain, fever, vomiting or tenderness in the abdomen so the significance of this CT finding is unclear.    He will need blood.  It is unclear what is going on overall with him.  I suspect someone will need to obtain records from [facility] in [place] to try to sort some of it out.  9p on Nicole no records are available.    Patient stable on serial rechecks.  Discussed exam findings, test results so far and concerns in detail at the bedside.  Discussed  need for admission for further evaluation and treatment.  I discussed the patient's presentation, test results and need for admission with the hospitalist.                                           Medical Decision Making  Problems Addressed:  Cholecystitis: undiagnosed new problem with uncertain prognosis  LLQ abdominal pain: complicated acute illness or injury  Severe anemia: complicated acute illness or injury with systemic symptoms that poses a threat to life or bodily functions    Amount and/or Complexity of Data Reviewed  External Data Reviewed: notes.  Labs: ordered. Decision-making details documented in ED Course.  Radiology: ordered and independent interpretation performed. Decision-making details documented in ED Course.     Details: CT read by me: gallbladder inflammation  Discussion of management or test interpretation with external provider(s): hospitalist    Risk  Prescription drug management.  Decision regarding hospitalization.        Final diagnoses:   Severe anemia   Cholecystitis   LLQ abdominal pain       ED Disposition  ED Disposition       ED Disposition   Decision to Admit    Condition   --    Comment   Level of Care: Telemetry [5]   Diagnosis: Anemia [740024]   Admitting Physician: NASIM GARCIA [[id number]]   Attending Physician: NASIM GARCIA [[id number]]   Certification: I Certify That Inpatient Hospital Services Are Medically Necessary For Greater Than 2 Midnights                 No follow-up provider specified.       Medication List      No changes were made to your prescriptions during this visit.            Mehul Ferreira MD  12/26/23 0057      Electronically signed by Mehul Ferreira MD at 12/26/23 0057       Vital Signs (last day)       Date/Time Temp Temp src Pulse Resp BP Patient Position SpO2    12/27/23 1058 99.3 (37.4) Oral 75 18 136/69 Lying 92    12/27/23 0723 99.6 (37.6) Oral 75 20 135/69 Lying 96    12/27/23 0500 -- -- -- -- -- -- 96    12/27/23 0405 -- -- -- --  -- -- 97    12/27/23 0354 99.8 (37.7) Oral 77 20 134/73 Lying 94    12/27/23 0100 -- -- 71 -- -- -- 95    12/27/23 0016 99.7 (37.6) Oral 75 22 124/59 Lying 96    12/27/23 0000 -- -- 74 -- -- -- 95    12/26/23 2215 99.9 (37.7) Oral 84 -- -- -- 93 12/26/23 2121 -- -- -- -- -- -- 95 12/26/23 2120 -- -- -- -- -- -- 94 12/26/23 2119 -- -- -- -- -- -- 94 12/26/23 2118 -- -- -- -- -- -- 93 12/26/23 2117 -- -- 86 -- -- -- 95 12/26/23 2116 -- -- -- -- -- -- 93 12/26/23 2115 -- -- -- -- -- -- 94 12/26/23 2113 -- -- -- -- -- -- 95 12/26/23 2112 -- -- -- -- -- -- 95 12/26/23 2036 100.2 (37.9) Oral 86 -- -- -- 91 12/26/23 2035 -- -- -- -- -- -- 89 12/26/23 2034 -- -- -- -- -- -- 89 12/26/23 2033 -- -- -- -- -- -- 90 12/26/23 2031 -- -- -- -- -- -- 91 12/26/23 2017 -- -- -- -- -- -- 91 12/26/23 2016 -- -- -- -- -- -- 90 12/26/23 2014 -- -- -- -- -- -- 89 12/26/23 2013 -- -- -- -- -- -- 89 12/26/23 2012 -- -- -- -- -- -- 88 12/26/23 2011 -- -- -- -- -- -- 88    12/1959 -- -- -- -- -- -- 93    12/26/23 1958 -- -- -- -- -- -- 89    12/26/23 1956 -- -- -- -- -- -- 90    12/26/23 1955 -- -- -- -- -- -- 89    12/26/23 1954 -- -- -- -- -- -- 89    12/26/23 1953 -- -- -- -- -- -- 85    12/26/23 1952 -- -- -- -- -- -- 84    12/26/23 1859 100.9 (38.3) Oral 95 20 145/79 Lying 89    12/26/23 1612 -- -- 84 -- -- -- 93    12/26/23 1500 -- -- -- -- -- -- 95    12/26/23 1457 98.2 (36.8) Oral -- 20 148/76 Sitting --    12/26/23 1430 -- -- 73 -- 168/91 -- 92    12/26/23 1335 98 (36.7) -- 73 20 158/100 -- 92    12/26/23 1315 -- -- 71 -- 150/73 -- 92    12/26/23 1310 -- -- 71 -- 157/67 -- 95    12/26/23 1305 -- -- 71 -- 141/69 -- 95    12/26/23 1300 -- -- 83 -- 147/69 -- 95    12/26/23 1257 97.5 (36.4) Tympanic 63 22 135/81 Lying 96    12/26/23 1140 97.6 (36.4) Temporal 68 22 160/81 Lying 92    12/26/23 1130 -- -- 70 -- -- -- --    12/26/23 1111 -- -- 72 18 148/70 -- 92    12/26/23  1030 98.3 (36.8) Oral -- -- -- -- --    12/26/23 1028 97.3 (36.3) Axillary -- 22 157/82 Lying --    12/26/23 0923 -- -- 68 -- 155/83 -- 94    12/26/23 0903 98 (36.7) -- 69 20 148/70 -- 93    12/26/23 0840 -- -- 66 -- -- -- 94    12/26/23 0832 98 (36.7) -- 65 22 150/80 -- 95    12/26/23 0737 -- -- 67 -- -- -- 98    12/26/23 0709 97.9 (36.6) Axillary -- 20 154/71 Lying --    12/26/23 0654 98 (36.7) Oral 68 18 150/70 Lying 96    12/26/23 0639 98.4 (36.9) -- 67 18 142/69 -- 97    12/26/23 0545 97.8 (36.6) Oral 69 18 150/80 -- 96    12/26/23 0510 98.4 (36.9) Oral 75 18 155/75 Lying 98    12/26/23 0445 97.7 (36.5) Oral 71 18 153/80 -- 97    12/26/23 0345 -- -- -- -- -- -- 92    12/26/23 0342 -- -- -- -- -- -- 90    12/26/23 0341 -- -- -- -- -- -- 89    12/26/23 0340 -- -- -- -- -- -- 88    12/26/23 0339 -- -- -- -- -- -- 90    12/26/23 0338 -- -- -- -- -- -- 89    12/26/23 0300 -- -- 73 -- 138/72 -- 90    12/26/23 0230 -- -- 75 -- 136/62 -- 92    12/26/23 0215 -- -- 75 -- 151/76 -- 93    12/26/23 0200 97.9 (36.6) Oral 90 20 150/67 -- 95    12/26/23 0145 97.7 (36.5) -- 74 18 159/76 -- 96    12/26/23 0100 97.6 (36.4) Oral 70 18 147/67 Lying 93    12/26/23 0006 -- -- -- -- 152/72 Lying --    12/26/23 0005 97.9 (36.6) Oral 69 20 154/67 Lying 94          Oxygen Therapy (last day)       Date/Time SpO2 Device (Oxygen Therapy) Flow (L/min) Oxygen Concentration (%) ETCO2 (mmHg)    12/27/23 1058 92 -- -- -- --    12/27/23 0723 96 -- -- -- --    12/27/23 0639 -- nasal cannula 2 -- --    12/27/23 0500 96 nasal cannula 2 -- --    12/27/23 0405 97 nasal cannula 3 -- --    12/27/23 0354 94 nasal cannula -- -- --    12/27/23 0205 -- nasal cannula 3 -- --    12/27/23 0100 95 nasal cannula 3 -- --    12/27/23 0016 96 nasal cannula 3 -- --    12/27/23 0000 95 -- -- -- --    12/26/23 2215 93 nasal cannula 4 -- --    12/26/23 2121 95 -- -- -- --    12/26/23 2120 94 -- -- -- --    12/26/23 2119 94 -- -- -- --    12/26/23 2118 93 -- -- -- --     12/26/23 2117 95 -- 4 -- --    12/26/23 2116 93 -- -- -- --    12/26/23 2115 94 -- -- -- --    12/26/23 2113 95 -- -- -- --    12/26/23 2112 95 -- -- -- --    12/26/23 2036 91 -- 5 -- --    12/26/23 2035 89 -- -- -- --    12/26/23 2034 89 -- -- -- --    12/26/23 2033 90 -- -- -- --    12/26/23 2031 91 -- -- -- --    12/26/23 2030 -- nasal cannula 4 -- --    12/26/23 2017 91 -- -- -- --    12/26/23 2016 90 -- 4 -- --    12/26/23 2014 89 -- -- -- --    12/26/23 2013 89 -- -- -- --    12/26/23 2012 88 -- -- -- --    12/26/23 2011 88 -- -- -- --    12/1959 93 -- 3 -- --    12/26/23 1958 89 -- -- -- --    12/26/23 1956 90 -- -- -- --    12/26/23 1955 89 -- -- -- --    12/26/23 1954 89 -- -- -- --    12/26/23 1953 85 -- -- -- --    12/26/23 1952 84 -- -- -- --    12/26/23 1859 89 -- -- -- --    12/26/23 1800 -- nasal cannula 2 -- --    12/26/23 1612 93 nasal cannula 2 -- --    12/26/23 1500 95 -- -- -- --    12/26/23 1430 92 -- -- -- --    12/26/23 1335 92 -- -- -- --    12/26/23 1315 92 -- -- -- --    12/26/23 1310 95 -- -- -- --    12/26/23 1305 95 -- -- -- --    12/26/23 1300 95 -- -- -- --    12/26/23 1257 96 nasal cannula 2 -- --    12/26/23 1140 92 nasal cannula 2 -- --    12/26/23 1111 92 room air -- -- --    12/26/23 0923 94 room air -- -- --    12/26/23 0903 93 -- -- -- --    12/26/23 0840 94 room air -- -- --    12/26/23 0832 95 room air -- -- --    12/26/23 0737 98 nasal cannula 2 -- --    12/26/23 0654 96 -- -- -- --    12/26/23 0639 97 -- -- -- --    12/26/23 0620 -- nasal cannula 2 -- --    12/26/23 0545 96 -- -- -- --    12/26/23 0510 98 -- -- -- --    12/26/23 0445 97 -- -- -- --    12/26/23 0442 -- nasal cannula 2 -- --    12/26/23 0345 92 nasal cannula 2 -- --    12/26/23 0342 90 -- -- -- --    12/26/23 0341 89 -- -- -- --    12/26/23 0340 88 -- -- -- --    12/26/23 0339 90 -- -- -- --    12/26/23 0338 89 -- -- -- --    12/26/23 0300 90 -- -- -- --    12/26/23 0230 92 -- -- -- --    12/26/23 0215  93 -- -- -- --    12/26/23 0210 -- room air -- -- --    12/26/23 0200 95 -- -- -- --    12/26/23 0145 96 -- -- -- --    12/26/23 0100 93 -- -- -- --    12/26/23 0030 -- room air -- -- --    12/26/23 0005 94 room air -- -- --          Lines, Drains & Airways       Active LDAs       Name Placement date Placement time Site Days    Peripheral IV 12/25/23 1925 Left Antecubital 12/25/23 1925  Antecubital  1    Peripheral IV 12/26/23 0015 Left;Posterior Hand 12/26/23  0015  Hand  1    Peripheral IV 12/26/23 0108 Anterior;Right Forearm 12/26/23 0108  Forearm  1                  COWS (last 2 days)       None          Lab Results (last 48 hours)       Procedure Component Value Units Date/Time    MRSA Screen, PCR (Inpatient) - Swab, Nares [[id number]]  (Normal) Collected: 12/27/23 0950    Specimen: Swab from Nares Updated: 12/27/23 1130     MRSA PCR Negative    Narrative:      The negative predictive value of this diagnostic test is high and should only be used to consider de-escalating anti-MRSA therapy. A positive result may indicate colonization with MRSA and must be correlated clinically.  MRSA Negative    Hemoglobin A1C With EMG [[id number]]  (Abnormal) Collected: 12/25/23 1925    Specimen: Blood Updated: 12/27/23 1012     Hemoglobin A1C 4.6 %      Comment:          Prediabetes: 5.7 - 6.4           Diabetes: >6.4           Glycemic control for adults with diabetes: <7.0        Mean Bld Glu Estim. 85 mg/dL     Narrative:      Performed at:  [address] [place] [address]  [id number]  : Florian Gomez PhD, Phone:  [phone]    proBNP [[id number]]  (Abnormal) Collected: 12/27/23 0149    Specimen: Blood Updated: 12/27/23 0820     proBNP 3,092.0 pg/mL     Narrative:      This assay is used as an aid in the diagnosis of individuals suspected of having heart failure. It can be used as an aid in the diagnosis of acute decompensated heart failure (ADHF) in patients presenting with signs and  symptoms of ADHF to the emergency department (ED). In addition, NT-proBNP of <300 pg/mL indicates ADHF is not likely.    Age Range Result Interpretation  NT-proBNP Concentration (pg/mL:      <50             Positive            >450                   Gray                 300-450                    Negative             <300    50-75           Positive            >900                  Gray                300-900                  Negative            <300      >75             Positive            >1800                  Gray                300-1800                  Negative            <300    Hemoglobin & Hematocrit, Blood [[id number]]  (Abnormal) Collected: 12/27/23 0657    Specimen: Blood Updated: 12/27/23 0733     Hemoglobin 7.3 g/dL      Hematocrit 23.0 %     Blood Culture - Blood, Arm, Right [[id number]] Collected: 12/27/23 0149    Specimen: Blood from Arm, Right Updated: 12/27/23 0513    Blood Culture - Blood, Hand, Right [[id number]] Collected: 12/27/23 0149    Specimen: Blood from Hand, Right Updated: 12/27/23 0513    CBC & Differential [[id number]]  (Abnormal) Collected: 12/27/23 0149    Specimen: Blood Updated: 12/27/23 0450    Narrative:      The following orders were created for panel order CBC & Differential.  Procedure                               Abnormality         Status                     ---------                               -----------         ------                     CBC Auto Differential[[id number]]        Abnormal            Final result               Scan Slide[[id number]]                                       Final result                 Please view results for these tests on the individual orders.    CBC Auto Differential [[id number]]  (Abnormal) Collected: 12/27/23 0149    Specimen: Blood Updated: 12/27/23 0450     WBC 15.37 10*3/mm3      RBC 2.61 10*6/mm3      Hemoglobin 7.5 g/dL      Hematocrit 23.8 %      MCV 91.2 fL      MCH 28.7 pg      MCHC 31.5 g/dL      RDW 21.7 %      RDW-SD 69.9 fl       "MPV 9.4 fL      Platelets 190 10*3/mm3      Neutrophil % 75.5 %      Lymphocyte % 4.1 %      Monocyte % 19.1 %      Eosinophil % 0.2 %      Basophil % 0.4 %      Immature Grans % 0.7 %      Neutrophils, Absolute 11.60 10*3/mm3      Lymphocytes, Absolute 0.63 10*3/mm3      Monocytes, Absolute 2.94 10*3/mm3      Eosinophils, Absolute 0.03 10*3/mm3      Basophils, Absolute 0.06 10*3/mm3      Immature Grans, Absolute 0.11 10*3/mm3      nRBC 0.2 /100 WBC     Narrative:      Appended report. These results have been appended to a previously verified report.    Scan Slide [[id number]] Collected: 12/27/23 0149    Specimen: Blood Updated: 12/27/23 0450     Anisocytosis Mod/2+     Spherocytes Slight/1+     WBC Morphology Normal     Platelet Morphology Normal    Procalcitonin [[id number]]  (Abnormal) Collected: 12/27/23 0149    Specimen: Blood Updated: 12/27/23 0351     Procalcitonin 0.40 ng/mL     Narrative:      As a Marker for Sepsis (Non-Neonates):    1. <0.5 ng/mL represents a low risk of severe sepsis and/or septic shock.  2. >2 ng/mL represents a high risk of severe sepsis and/or septic shock.    As a Marker for Lower Respiratory Tract Infections that require antibiotic therapy:    PCT on Admission    Antibiotic Therapy       6-12 Hrs later    >0.5                Strongly Recommended  >0.25 - <0.5        Recommended   0.1 - 0.25          Discouraged              Remeasure/reassess PCT  <0.1                Strongly Discouraged     Remeasure/reassess PCT    As 28 day mortality risk marker: \"Change in Procalcitonin Result\" (>80% or <=80%) if Day 0 (or Day 1) and Day 4 values are available. Refer to http://www.[facility]-pct-calculator.com    Change in PCT <=80%  A decrease of PCT levels below or equal to 80% defines a positive change in PCT test result representing a higher risk for 28-day all-cause mortality of patients diagnosed with severe sepsis for septic shock.    Change in PCT >80%  A decrease of PCT levels of more than " 80% defines a negative change in PCT result representing a lower risk for 28-day all-cause mortality of patients diagnosed with severe sepsis or septic shock.       Magnesium [[id number]]  (Normal) Collected: 12/27/23 0149    Specimen: Blood Updated: 12/27/23 0346     Magnesium 1.7 mg/dL     Comprehensive Metabolic Panel [[id number]]  (Abnormal) Collected: 12/27/23 0149    Specimen: Blood Updated: 12/27/23 0346     Glucose 110 mg/dL      BUN 4 mg/dL      Creatinine 0.53 mg/dL      Sodium 137 mmol/L      Potassium 3.2 mmol/L      Chloride 102 mmol/L      CO2 24.0 mmol/L      Calcium 8.6 mg/dL      Total Protein 6.3 g/dL      Albumin 3.3 g/dL      ALT (SGPT) 8 U/L      AST (SGOT) 16 U/L      Alkaline Phosphatase 90 U/L      Total Bilirubin 0.3 mg/dL      Globulin 3.0 gm/dL      Comment: Calculated Result        A/G Ratio 1.1 g/dL      BUN/Creatinine Ratio 7.5     Anion Gap 11.0 mmol/L      eGFR 111.9 mL/min/1.73     Narrative:      GFR Normal >60  Chronic Kidney Disease <60  Kidney Failure <15      Lactic Acid, Plasma [[id number]]  (Normal) Collected: 12/27/23 0149    Specimen: Blood Updated: 12/27/23 0346     Lactate 0.9 mmol/L      Comment: Falsely depressed results may occur on samples drawn from patients receiving N-Acetylcysteine (NAC) or Metamizole.       Respiratory Panel PCR w/COVID-19(SARS-CoV-2) ABDIAZIZ/TAVARES/HANH/PAD/COR/OTIS In-House, NP Swab in UTM/VTM, 2 HR TAT - Swab, Nasopharynx [[id number]]  (Normal) Collected: 12/26/23 2329    Specimen: Swab from Nasopharynx Updated: 12/27/23 0104     ADENOVIRUS, PCR Not Detected     Coronavirus 229E Not Detected     Coronavirus HKU1 Not Detected     Coronavirus NL63 Not Detected     Coronavirus OC43 Not Detected     COVID19 Not Detected     Human Metapneumovirus Not Detected     Human Rhinovirus/Enterovirus Not Detected     Influenza A PCR Not Detected     Influenza B PCR Not Detected     Parainfluenza Virus 1 Not Detected     Parainfluenza Virus 2 Not Detected      Parainfluenza Virus 3 Not Detected     Parainfluenza Virus 4 Not Detected     RSV, PCR Not Detected     Bordetella pertussis pcr Not Detected     Bordetella parapertussis PCR Not Detected     Chlamydophila pneumoniae PCR Not Detected     Mycoplasma pneumo by PCR Not Detected    Narrative:      In the setting of a positive respiratory panel with a viral infection PLUS a negative procalcitonin without other underlying concern for bacterial infection, consider observing off antibiotics or discontinuation of antibiotics and continue supportive care. If the respiratory panel is positive for atypical bacterial infection (Bordetella pertussis, Chlamydophila pneumoniae, or Mycoplasma pneumoniae), consider antibiotic de-escalation to target atypical bacterial infection.    Hemoglobin & Hematocrit, Blood [[id number]]  (Abnormal) Collected: 12/27/23 0010    Specimen: Blood Updated: 12/27/23 0052     Hemoglobin 7.4 g/dL      Hematocrit 22.9 %     Hemoglobin & Hematocrit, Blood [[id number]]  (Abnormal) Collected: 12/26/23 2116    Specimen: Blood Updated: 12/26/23 2139     Hemoglobin 7.9 g/dL      Hematocrit 24.4 %     Hemoglobin & Hematocrit, Blood [[id number]]  (Abnormal) Collected: 12/26/23 1559    Specimen: Blood Updated: 12/26/23 1656     Hemoglobin 7.4 g/dL      Hematocrit 23.6 %     Tissue Pathology Exam [[id number]] Collected: 12/26/23 1239    Specimen: Tissue from Small Intestine, Duodenum; Tissue from Small Intestine, Duodenum; Tissue from Stomach; Tissue from Esophagus Updated: 12/26/23 1432    CBC (No Diff) [[id number]]  (Abnormal) Collected: 12/26/23 0848    Specimen: Blood Updated: 12/26/23 1007     WBC 6.51 10*3/mm3      RBC 2.46 10*6/mm3      Hemoglobin 7.2 g/dL      Hematocrit 23.1 %      MCV 93.9 fL      MCH 29.3 pg      MCHC 31.2 g/dL      RDW 21.3 %      RDW-SD 69.6 fl      MPV 9.4 fL      Platelets 196 10*3/mm3     Basic Metabolic Panel [[id number]]  (Abnormal) Collected: 12/26/23 0848    Specimen: Blood Updated:  12/26/23 0941     Glucose 125 mg/dL      BUN 7 mg/dL      Creatinine 0.56 mg/dL      Sodium 140 mmol/L      Potassium 3.9 mmol/L      Chloride 109 mmol/L      CO2 22.0 mmol/L      Calcium 9.0 mg/dL      BUN/Creatinine Ratio 12.5     Anion Gap 9.0 mmol/L      eGFR 110.1 mL/min/1.73     Narrative:      GFR Normal >60  Chronic Kidney Disease <60  Kidney Failure <15      Magnesium [[id number]]  (Abnormal) Collected: 12/26/23 0848    Specimen: Blood Updated: 12/26/23 0941     Magnesium 1.4 mg/dL     [place] Draw [[id number]] Collected: 12/25/23 1925    Specimen: Blood Updated: 12/25/23 2331    Narrative:      The following orders were created for panel order [place] Draw.  Procedure                               Abnormality         Status                     ---------                               -----------         ------                     Green Top (Gel)[[id number]]                                  Final result               Lavender Top[[id number]]                                     Final result               Gold Top - SST[[id number]]                                   Final result               Gray Top[[id number]]                                         Final result               Light Blue Top[[id number]]                                   Final result                 Please view results for these tests on the individual orders.    Laws Top [[id number]] Collected: 12/25/23 1925    Specimen: Blood Updated: 12/25/23 2331     Extra Tube Hold for add-ons.     Comment: Auto resulted.       POCT Occult Blood, Stool [[id number]]  (Abnormal) Resulted: 12/25/23 2110    Specimen: Stool from Per Rectum Updated: 12/25/23 2112     Fecal Occult Blood Positive     Lot Number [id number]     Expiration Date 10/26     DEVELOPER LOT NUMBER [id number]     DEVELOPER EXPIRATION DATE 20/27     Positive Control Positive     Negative Control Negative    T4, Free [[id number]]  (Abnormal) Collected: 12/25/23 1925    Specimen: Blood Updated: 12/25/23 2034  "    Free T4 0.85 ng/dL     Narrative:      Results may be falsely increased if patient taking Biotin.      Procalcitonin [[id number]]  (Normal) Collected: 12/25/23 1925    Specimen: Blood Updated: 12/25/23 2033     Procalcitonin 0.19 ng/mL     Narrative:      As a Marker for Sepsis (Non-Neonates):    1. <0.5 ng/mL represents a low risk of severe sepsis and/or septic shock.  2. >2 ng/mL represents a high risk of severe sepsis and/or septic shock.    As a Marker for Lower Respiratory Tract Infections that require antibiotic therapy:    PCT on Admission    Antibiotic Therapy       6-12 Hrs later    >0.5                Strongly Recommended  >0.25 - <0.5        Recommended   0.1 - 0.25          Discouraged              Remeasure/reassess PCT  <0.1                Strongly Discouraged     Remeasure/reassess PCT    As 28 day mortality risk marker: \"Change in Procalcitonin Result\" (>80% or <=80%) if Day 0 (or Day 1) and Day 4 values are available. Refer to http://www.[facility] [facility]-pct-calculator.com    Change in PCT <=80%  A decrease of PCT levels below or equal to 80% defines a positive change in PCT test result representing a higher risk for 28-day all-cause mortality of patients diagnosed with severe sepsis for septic shock.    Change in PCT >80%  A decrease of PCT levels of more than 80% defines a negative change in PCT result representing a lower risk for 28-day all-cause mortality of patients diagnosed with severe sepsis or septic shock.       Ferritin [[id number]]  (Normal) Collected: 12/25/23 1925    Specimen: Blood Updated: 12/25/23 2033     Ferritin 39.61 ng/mL     Narrative:      Results may be falsely decreased if patient taking Biotin.      TSH [[id number]]  (Normal) Collected: 12/25/23 1925    Specimen: Blood Updated: 12/25/23 2033     TSH 1.070 uIU/mL     Green Top (Gel) [[id number]] Collected: 12/25/23 1925    Specimen: Blood Updated: 12/25/23 2030     Extra Tube Hold for add-ons.     Comment: Auto resulted.       Gold " Top - SST [[id number]] Collected: 12/25/23 1925    Specimen: Blood Updated: 12/25/23 2030     Extra Tube Hold for add-ons.     Comment: Auto resulted.       Light Blue Top [[id number]] Collected: 12/25/23 1925    Specimen: Blood Updated: 12/25/23 2030     Extra Tube Hold for add-ons.     Comment: Auto resulted       Lavender Top [[id number]] Collected: 12/25/23 1925    Specimen: Blood Updated: 12/25/23 2030     Extra Tube hold for add-on     Comment: Auto resulted       Magnesium [[id number]]  (Normal) Collected: 12/25/23 1925    Specimen: Blood Updated: 12/25/23 2030     Magnesium 1.6 mg/dL     Ketone Bodies, Serum (Not performed at [facility]) [[id number]]  (Normal) Collected: 12/25/23 1925    Specimen: Blood Updated: 12/25/23 2030    Narrative:      The following orders were created for panel order Ketone Bodies, Serum (Not performed at [facility]).  Procedure                               Abnormality         Status                     ---------                               -----------         ------                     Beta Hydroxybutyrate Eric...[[id number]]  Normal              Final result                 Please view results for these tests on the individual orders.    CK [[id number]]  (Abnormal) Collected: 12/25/23 1925    Specimen: Blood Updated: 12/25/23 2030     Creatine Kinase 17 U/L     Ethanol [[id number]]  (Normal) Collected: 12/25/23 1925    Specimen: Blood Updated: 12/25/23 2030     Ethanol <10 mg/dL     Narrative:      Elevated lactic acid concentration and lactate dehydrogenase(LD) activity may falsely elevate enzymatically determined ethanol levels. Not for legal purposes.     Iron Profile [[id number]]  (Abnormal) Collected: 12/25/23 1925    Specimen: Blood Updated: 12/25/23 2030     Iron 12 mcg/dL      Iron Saturation (TSAT) 2 %      Transferrin 357 mg/dL      TIBC 532 mcg/dL     Beta Hydroxybutyrate Quantitative [[id number]]  (Normal) Collected: 12/25/23 1925    Specimen: Blood Updated: 12/25/23 2030      Beta-Hydroxybutyrate Quant 0.072 mmol/L     Narrative:      In the assessment of possible diabetic ketoacidosis, the test should be interpreted along with other clinical and laboratory findings.  A level greater than 1 mmol/L should require further evaluation and levels of more than 3 mmol/L require immediate medical review.    Myoglobin, Serum [[id number]]  (Abnormal) Collected: 12/25/23 1925    Specimen: Blood Updated: 12/25/23 2026     Myoglobin 21.0 ng/mL     Narrative:      Results may be falsely decreased if patient taking Biotin.      Fentanyl, Urine - Urine, Clean Catch [[id number]]  (Normal) Collected: 12/25/23 1932    Specimen: Urine, Clean Catch Updated: 12/25/23 2025     Fentanyl, Urine Negative    Narrative:      Negative Threshold:      Fentanyl 5 ng/mL     The normal value for the drug tested is negative. This report includes final unconfirmed screening results to be used for medical treatment purposes only. Unconfirmed results must not be used for non-medical purposes such as employment or legal testing. Clinical consideration should be applied to any drug of abuse test, particularly when unconfirmed results are used.           Lactic Acid, Plasma [[id number]]  (Normal) Collected: 12/25/23 1925    Specimen: Blood Updated: 12/25/23 2023     Lactate 1.2 mmol/L      Comment: Falsely depressed results may occur on samples drawn from patients receiving N-Acetylcysteine (NAC) or Metamizole.       Protime-INR [[id number]]  (Normal) Collected: 12/25/23 1925    Specimen: Blood Updated: 12/25/23 2019     Protime 13.8 Seconds      INR 1.05    aPTT [[id number]]  (Abnormal) Collected: 12/25/23 1925    Specimen: Blood Updated: 12/25/23 2019     PTT 32.1 seconds     Narrative:      PTT = The equivalent PTT values for the therapeutic range of heparin levels at 0.3 to 0.5 U/ml are 60 to 70 seconds.    Manual Differential [[id number]]  (Abnormal) Collected: 12/25/23 1925    Specimen: Blood Updated: 12/25/23 2018      Neutrophil % 63.0 %      Lymphocyte % 21.0 %      Monocyte % 6.0 %      Eosinophil % 3.0 %      Basophil % 1.0 %      Atypical Lymphocyte % 6.0 %      Neutrophils Absolute 2.87 10*3/mm3      Lymphocytes Absolute 1.23 10*3/mm3      Monocytes Absolute 0.27 10*3/mm3      Eosinophils Absolute 0.14 10*3/mm3      Basophils Absolute 0.05 10*3/mm3      Ovalocytes Slight/1+     Target Cells Slight/1+     Stomatocytes Slight/1+     WBC Morphology Normal     Platelet Morphology Normal    CBC & Differential [[id number]]  (Abnormal) Collected: 12/25/23 1925    Specimen: Blood Updated: 12/25/23 2018    Narrative:      The following orders were created for panel order CBC & Differential.  Procedure                               Abnormality         Status                     ---------                               -----------         ------                     CBC Auto Differential[[id number]]        Abnormal            Final result               Scan Slide[[id number]]                                                                    Please view results for these tests on the individual orders.    CBC Auto Differential [[id number]]  (Abnormal) Collected: 12/25/23 1925    Specimen: Blood Updated: 12/25/23 2018     WBC 4.56 10*3/mm3      RBC 1.94 10*6/mm3      Hemoglobin 5.6 g/dL      Hematocrit 19.0 %      MCV 97.9 fL      MCH 28.9 pg      MCHC 29.5 g/dL      RDW 24.4 %      RDW-SD 87.4 fl      MPV 8.9 fL      Platelets 222 10*3/mm3     Narrative:      The previously reported component NRBC is no longer being reported. Previous result was 0.0 /100 WBC (Reference Range: 0.0-0.2 /100 WBC) on 12/25/2023 at 1943 EST.    Urine Drug Screen - Urine, Clean Catch [[id number]]  (Normal) Collected: 12/25/23 1932    Specimen: Urine, Clean Catch Updated: 12/25/23 2017     THC, Screen, Urine Negative     Phencyclidine (PCP), Urine Negative     Cocaine Screen, Urine Negative     Methamphetamine, Ur Negative     Opiate Screen Negative     Amphetamine  Screen, Urine Negative     Benzodiazepine Screen, Urine Negative     Tricyclic Antidepressants Screen Negative     Methadone Screen, Urine Negative     Barbiturates Screen, Urine Negative     Oxycodone Screen, Urine Negative     Buprenorphine, Screen, Urine Negative    Narrative:      Cutoff For Drugs Screened:    Amphetamines               500 ng/ml  Barbiturates               200 ng/ml  Benzodiazepines            150 ng/ml  Cocaine                    150 ng/ml  Methadone                  200 ng/ml  Opiates                    100 ng/ml  Phencyclidine               25 ng/ml  THC                         50 ng/ml  Methamphetamine            500 ng/ml  Tricyclic Antidepressants  300 ng/ml  Oxycodone                  100 ng/ml  Buprenorphine               10 ng/ml    The normal value for all drugs tested is negative. This report includes unconfirmed screening results, with the cutoff values listed, to be used for medical treatment purposes only.  Unconfirmed results must not be used for non-medical purposes such as employment or legal testing.  Clinical consideration should be applied to any drug of abuse test, particularly when unconfirmed results are used.      Comprehensive Metabolic Panel [[id number]]  (Abnormal) Collected: 12/25/23 1925    Specimen: Blood Updated: 12/25/23 2002     Glucose 104 mg/dL      BUN 10 mg/dL      Creatinine 0.59 mg/dL      Sodium 143 mmol/L      Potassium 4.1 mmol/L      Chloride 111 mmol/L      CO2 23.0 mmol/L      Calcium 8.9 mg/dL      Total Protein 6.4 g/dL      Albumin 3.4 g/dL      ALT (SGPT) 9 U/L      AST (SGOT) 15 U/L      Alkaline Phosphatase 93 U/L      Total Bilirubin <0.2 mg/dL      Globulin 3.0 gm/dL      Comment: Calculated Result        A/G Ratio 1.1 g/dL      BUN/Creatinine Ratio 16.9     Anion Gap 9.0 mmol/L      eGFR 108.3 mL/min/1.73     Narrative:      GFR Normal >60  Chronic Kidney Disease <60  Kidney Failure <15      Lipase [[id number]]  (Abnormal) Collected: 12/25/23  1925    Specimen: Blood Updated: 12/25/23 2002     Lipase 75 U/L     Single High Sensitivity Troponin T [[id number]]  (Normal) Collected: 12/25/23 1925    Specimen: Blood Updated: 12/25/23 2000     HS Troponin T 17 ng/L     Narrative:      High Sensitive Troponin T Reference Range:  <14.0 ng/L- Negative Female for AMI  <22.0 ng/L- Negative Male for AMI  >=14 - Abnormal Female indicating possible myocardial injury.  >=22 - Abnormal Male indicating possible myocardial injury.   Clinicians would have to utilize clinical acumen, EKG, Troponin, and serial changes to determine if it is an Acute Myocardial Infarction or myocardial injury due to an underlying chronic condition.         Urinalysis With Microscopic If Indicated (No Culture) - Urine, Clean Catch [[id number]]  (Normal) Collected: 12/25/23 1932    Specimen: Urine, Clean Catch Updated: 12/25/23 1948     Color, UA Yellow     Appearance, UA Clear     pH, UA 5.5     Specific Gravity, UA 1.022     Glucose, UA Negative     Ketones, UA Negative     Bilirubin, UA Negative     Blood, UA Negative     Protein, UA Negative     Leuk Esterase, UA Negative     Nitrite, UA Negative     Urobilinogen, UA 0.2 E.U./dL    Narrative:      Urine microscopic not indicated.          Imaging Results (Last 48 Hours)       Procedure Component Value Units Date/Time    XR Abdomen KUB [[id number]] Collected: 12/26/23 2123     Updated: 12/26/23 2127    Narrative:      XR ABDOMEN KUB    Date of Exam: 12/26/2023 8:46 PM EST    Indication: abd distention vomiting    Comparison: CT abdomen pelvis 12/25/2023    Findings:  Gas-filled large and small bowel in a nonobstructive pattern, similar to yesterday's CT abdomen pelvis.      Impression:      Impression:  Gas-filled large and small bowel in a nonobstructive pattern, similar to yesterday's CT abdomen pelvis.        Electronically Signed: Yoni Guadalupe MD    12/26/2023 9:24 PM EST    Workstation ID: [id number]    XR Chest 1 View [[id number]] Collected:  12/26/23 2119     Updated: 12/26/23 2125    Narrative:      XR CHEST 1 VW    Date of Exam: 12/26/2023 8:38 PM EST    Indication: increased oxygen demand    Comparison: None available.    Findings:  Patient rotated slightly to the left. Heart size top normal. No pneumothorax. Mild interstitial thickening which may relate to a component of interstitial edema. No pneumothorax. No focal consolidation. Asymmetric density at the left upper lung likely   related to superimposition of the anterior left first rib. Central pulmonary vascular congestion.      Impression:      Impression:  Central pulmonary vascular congestion and mild interstitial thickening which may relate to mild interstitial edema.        Electronically Signed: Richard Pinto MD    12/26/2023 9:22 PM EST    Workstation ID: [id number]    CT Abdomen Pelvis With Contrast [[id number]] Collected: 12/25/23 2057     Updated: 12/25/23 2102    Narrative:      CT ABDOMEN PELVIS W CONTRAST    Date of Exam: 12/25/2023 8:36 PM EST    Indication: LLQ abd pain, anemia.  cirrhosis, possible unspecified cancer dx (data deficient).    Comparison: None available.    Technique: Axial CT images were obtained of the abdomen and pelvis following the uneventful intravenous administration of 85 mL Isovue-300. Reconstructed coronal and sagittal images were also obtained. Automated exposure control and iterative   construction methods were used.      Findings:  The lung bases are clear.    There is mild nodularity liver surface which may indicate early cirrhosis. The bilateral adrenal glands, bilateral kidneys, pancreas and spleen are normal.    There is diffuse wall thickening of the gallbladder with abnormal increased enhancement and surrounding pericholecystic fluid. The findings are concerning for acute cholecystitis. The common bile duct is nondilated.    The abdominal and pelvic portion of the GI tract are normal aside from diverticulosis without diverticulitis. There is a large  "bladder diverticulum projecting posterior and left lateral from the posterior margin of the bladder wall. There are prostate   calcifications. The osseous structures are within normal limits for a patient of this age.      Impression:      Impression:  Abnormal appearance of the gallbladder with wall thickening, abnormal gallbladder wall enhancement and pericholecystic fluid consistent with acute cholecystitis.        Electronically Signed: Marvel Vines MD    12/25/2023 8:59 PM EST    Workstation ID: [id number]          ECG/EMG Results (last 48 hours)       Procedure Component Value Units Date/Time    ECG 12 Lead ED Triage Standing Order; Abdominal Pain (Upper) [[id number]] Collected: 12/25/23 1942     Updated: 12/26/23 0712     QT Interval 406 ms      QTC Interval 406 ms     Narrative:      Test Reason : ED Triage Standing Order~  Blood Pressure :   */*   mmHG  Vent. Rate :  60 BPM     Atrial Rate :  60 BPM     P-R Int : 154 ms          QRS Dur :  88 ms      QT Int : 406 ms       P-R-T Axes :  64  21  59 degrees     QTc Int : 406 ms    Normal sinus rhythm  Normal ECG  No previous ECGs available    Referred By: EDMD           Confirmed By:     SCANNED - TELEMETRY   [[id number]] Resulted: 12/25/23     Updated: 12/26/23 2034             Physician Progress Notes (last 48 hours)        Pauly Webster PA at 12/27/23 1235          GI Daily Progress Note  Subjective:    Chief Complaint:  Follow up abdominal pain     Complains of persistent abdominal pain, right upper quadrant.   Hungry and wants something to eat after his HIDA scan.       Objective:    /69 (BP Location: Right arm, Patient Position: Lying)   Pulse 75   Temp 99.3 °F (37.4 °C) (Oral)   Resp 18   Ht 175.3 cm (69\")   Wt 66.7 kg (147 lb)   SpO2 92%   BMI 21.71 kg/m²     Physical Exam  Constitutional:       General: He is not in acute distress.  Cardiovascular:      Rate and Rhythm: Normal rate and regular rhythm.   Pulmonary:      Effort: Pulmonary " "effort is normal. No respiratory distress.   Abdominal:      General: Bowel sounds are normal.      Palpations: Abdomen is soft.      Tenderness: There is abdominal tenderness. There is no guarding.   Neurological:      Mental Status: He is alert and oriented to person, place, and time.       Lab  Lab Results   Component Value Date    WBC 15.37 (H) 12/27/2023    HGB 7.3 (L) 12/27/2023    HGB 7.5 (L) 12/27/2023    HGB 7.4 (L) 12/27/2023    MCV 91.2 12/27/2023     12/27/2023    INR 1.05 12/25/2023     Lab Results   Component Value Date    GLUCOSE 110 (H) 12/27/2023    BUN 4 (L) 12/27/2023    CREATININE 0.53 (L) 12/27/2023    BCR 7.5 12/27/2023     12/27/2023    K 3.2 (L) 12/27/2023    CO2 24.0 12/27/2023    CALCIUM 8.6 12/27/2023    ALBUMIN 3.3 (L) 12/27/2023    ALKPHOS 90 12/27/2023    BILITOT 0.3 12/27/2023    ALT 8 12/27/2023    AST 16 12/27/2023     Assessment:     Esophageal mass, biopsied.  Pathology pending.     Duodenal ulcer, clean based  Duodenal stenosis, acquired.   Cirrhotic appearing liver on imaging   Iron deficiency anemia   Abnormal imaging of the gallbladder   Leukocytosis, new.        Plan:    >> Await pathology results  >> BID PPI    FERNANDA Banda  12/27/23  12:35 EST      Electronically signed by Pauly Webster PA at 12/27/23 1242       Iván Mora MD at 12/27/23 0955          General Surgery Daily Progress Note    Subjective:  Complains of abdominal discomfort.    Objective:  /69 (BP Location: Right arm, Patient Position: Lying)   Pulse 75   Temp 99.6 °F (37.6 °C) (Oral)   Resp 20   Ht 175.3 cm (69\")   Wt 66.7 kg (147 lb)   SpO2 96%   BMI 21.71 kg/m²     General Appearance: Sleeping, easily aroused.  Eyes: Anicteric  Neck: Trachea midline   Cardiovascular:  RRR without murmur nor rub  Lungs:  Bilateral respirations unlabored   Abdomen:  Soft, no generalized peritonitis, suprapubic tenderness  Extremities:  No cyanosis or edema   Skin:  No obvious rashes "   Neurologic: awake and conversant       Imaging Results (Last 24 Hours)       Procedure Component Value Units Date/Time    XR Abdomen KUB [[id number]] Collected: 12/26/23 2123     Updated: 12/26/23 2127    Narrative:      XR ABDOMEN KUB    Date of Exam: 12/26/2023 8:46 PM EST    Indication: abd distention vomiting    Comparison: CT abdomen pelvis 12/25/2023    Findings:  Gas-filled large and small bowel in a nonobstructive pattern, similar to yesterday's CT abdomen pelvis.      Impression:      Impression:  Gas-filled large and small bowel in a nonobstructive pattern, similar to yesterday's CT abdomen pelvis.        Electronically Signed: Yoni Guadalupe MD    12/26/2023 9:24 PM EST    Workstation ID: [id number]    XR Chest 1 View [[id number]] Collected: 12/26/23 2119     Updated: 12/26/23 2125    Narrative:      XR CHEST 1 VW    Date of Exam: 12/26/2023 8:38 PM EST    Indication: increased oxygen demand    Comparison: None available.    Findings:  Patient rotated slightly to the left. Heart size top normal. No pneumothorax. Mild interstitial thickening which may relate to a component of interstitial edema. No pneumothorax. No focal consolidation. Asymmetric density at the left upper lung likely   related to superimposition of the anterior left first rib. Central pulmonary vascular congestion.      Impression:      Impression:  Central pulmonary vascular congestion and mild interstitial thickening which may relate to mild interstitial edema.        Electronically Signed: Richard Pinto MD    12/26/2023 9:22 PM EST    Workstation ID: [id number]            CBC:  Results from last 7 days   Lab Units 12/27/23  0657 12/27/23  0149   WBC 10*3/mm3  --  15.37*   HEMOGLOBIN g/dL 7.3* 7.5*   HEMATOCRIT % 23.0* 23.8*   PLATELETS 10*3/mm3  --  190       CMP:  Results from last 7 days   Lab Units 12/27/23  0149   SODIUM mmol/L 137   POTASSIUM mmol/L 3.2*   CHLORIDE mmol/L 102   CO2 mmol/L 24.0   BUN mg/dL 4*   CREATININE mg/dL 0.53*    CALCIUM mg/dL 8.6   BILIRUBIN mg/dL 0.3   ALK PHOS U/L 90   ALT (SGPT) U/L 8   AST (SGOT) U/L 16   GLUCOSE mg/dL 110*         Assessment:  Gastrointestinal bleeding w/ anemia  Cholecystitis  Upper esophageal mass, malignant appearing  Peptic ulcer disease, history and current healing ulcer  Duodenal stricture  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  Hemoglobin appears stable at 7.3.  Continue broad-spectrum antibiotic coverage.  Keep NPO.  Dr. Alvarenga to follow in my absence.    Iván Mora MD - 2023, 09:26 EST           Electronically signed by Iván Mora MD at 23 09       Garret Gibson MD at 23 1401              [facility] Medicine Services  PROGRESS NOTE    Patient Name: Val Nassar  : 1960  MRN: [id number]    Date of Admission: 2023  Primary Care Physician: Provider, No Known    Subjective   Subjective     CC:  Abdominal pain    HPI:  Abdominal pain intermittent.  No fevers.  Has not noted blood in stool today.  Says he stopped drinking 6 weeks ago.        Objective   Objective     Vital Signs:   Temp:  [97.3 °F (36.3 °C)-98.8 °F (37.1 °C)] 98 °F (36.7 °C)  Heart Rate:  [63-90] 73  Resp:  [18-22] 20  BP: (135-164)/() 158/100  Flow (L/min):  [2] 2     Physical Exam:  Appears fatigued but non toxic, in bed  MM dry  RRR  Lungs grossly clear bilaterally  Abd slightly distended but soft, non tender to light palpation  No edema  Alert, speech clear  Flat affect    Results Reviewed:  LAB RESULTS:      Lab 23  0848 23   WBC 6.51 4.56   HEMOGLOBIN 7.2* 5.6*   HEMATOCRIT 23.1* 19.0*   PLATELETS 196 222   NEUTROS ABS  --  2.87   EOS ABS  --  0.14   MCV 93.9 97.9*   PROCALCITONIN  --  0.19   LACTATE  --  1.2   PROTIME  --  13.8   APTT  --  32.1*         Lab 23  0848 23   SODIUM 140 143   POTASSIUM 3.9 4.1   CHLORIDE 109* 111*   CO2 22.0 23.0   ANION  GAP 9.0 9.0   BUN 7* 10   CREATININE 0.56* 0.59*   EGFR 110.1 108.3   GLUCOSE 125* 104*   CALCIUM 9.0 8.9   MAGNESIUM 1.4* 1.6   TSH  --  1.070         Lab 12/25/23 1925   TOTAL PROTEIN 6.4   ALBUMIN 3.4*   GLOBULIN 3.0   ALT (SGPT) 9   AST (SGOT) 15   BILIRUBIN <0.2   ALK PHOS 93   LIPASE 75*         Lab 12/25/23 1925   HSTROP T 17   PROTIME 13.8   INR 1.05             Lab 12/25/23 2059 12/25/23 2023 12/25/23 2023 12/25/23 1925   IRON  --   --   --  12*   IRON SATURATION (TSAT)  --   --   --  2*   TIBC  --   --   --  532   TRANSFERRIN  --   --   --  357   FERRITIN  --   --   --  39.61   ABO TYPING A   < > A  --    RH TYPING Positive   < > Positive  --    ANTIBODY SCREEN  --   --  Negative  --     < > = values in this interval not displayed.         Brief Urine Lab Results  (Last result in the past 365 days)        Color   Clarity   Blood   Leuk Est   Nitrite   Protein   CREAT   Urine HCG        12/25/23 1932 Yellow   Clear   Negative   Negative   Negative   Negative                   Microbiology Results Abnormal       None            CT Abdomen Pelvis With Contrast    Result Date: 12/25/2023  CT ABDOMEN PELVIS W CONTRAST Date of Exam: 12/25/2023 8:36 PM EST Indication: LLQ abd pain, anemia.  cirrhosis, possible unspecified cancer dx (data deficient). Comparison: None available. Technique: Axial CT images were obtained of the abdomen and pelvis following the uneventful intravenous administration of 85 mL Isovue-300. Reconstructed coronal and sagittal images were also obtained. Automated exposure control and iterative construction methods were used. Findings: The lung bases are clear. There is mild nodularity liver surface which may indicate early cirrhosis. The bilateral adrenal glands, bilateral kidneys, pancreas and spleen are normal. There is diffuse wall thickening of the gallbladder with abnormal increased enhancement and surrounding pericholecystic fluid. The findings are concerning for acute  cholecystitis. The common bile duct is nondilated. The abdominal and pelvic portion of the GI tract are normal aside from diverticulosis without diverticulitis. There is a large bladder diverticulum projecting posterior and left lateral from the posterior margin of the bladder wall. There are prostate calcifications. The osseous structures are within normal limits for a patient of this age.     Impression: Impression: Abnormal appearance of the gallbladder with wall thickening, abnormal gallbladder wall enhancement and pericholecystic fluid consistent with acute cholecystitis. Electronically Signed: Marvel Vines MD  12/25/2023 8:59 PM EST  Workstation ID: [id number]         Current medications:  Scheduled Meds:[Held by provider] carvedilol, 3.125 mg, Oral, BID With Meals  cefTRIAXone, 2,000 mg, Intravenous, Q24H  ferric gluconate, 125 mg, Intravenous, Daily  folic acid, 1 mg, Oral, Daily  magnesium sulfate, 2 g, Intravenous, Q2H  metroNIDAZOLE, 500 mg, Intravenous, Q8H  pantoprazole, 40 mg, Intravenous, Q12H  pharmacy consult - MTM, , Does not apply, Daily  senna-docusate sodium, 2 tablet, Oral, BID  sodium chloride, 10 mL, Intravenous, Q12H  [Held by provider] spironolactone, 25 mg, Oral, Daily  [Held by provider] tamsulosin, 0.4 mg, Oral, Daily  vitamin B-12, 1,000 mcg, Oral, Daily      Continuous Infusions:lactated ringers, 75 mL/hr, Last Rate: 75 mL/hr (12/26/23 0017)  sodium chloride, 50 mL/hr      PRN Meds:.  acetaminophen    senna-docusate sodium **AND** polyethylene glycol **AND** bisacodyl **AND** bisacodyl    Calcium Replacement - Follow Nurse / BPA Driven Protocol    HYDROcodone-acetaminophen    HYDROmorphone **AND** naloxone    lactulose    Magnesium Standard Dose Replacement - Follow Nurse / BPA Driven Protocol    melatonin    Phosphorus Replacement - Follow Nurse / BPA Driven Protocol    Potassium Replacement - Follow Nurse / BPA Driven Protocol    sodium chloride    sodium chloride    sodium  chloride    Assessment & Plan   Assessment & Plan     Active Hospital Problems    Diagnosis  POA    **Anemia [D64.9]  Yes      Resolved Hospital Problems   No resolved problems to display.        Brief Hospital Course to date:  Val Nassar is a 63 y.o. male with history of PUD, cirrhosis, newly diagnosed laryngeal cancer and prior alcohol abuse presents with abdominal pain, melena and anemia    Anemia, iron deficient  - s/p 2 units PRBCs  - IV iron x 3 days  - EGD showed esophagitis and one non-bleeding cratered duodenal ulcer (along with esophageal mass) and congestive gastropathy.  No varices noted.   - PPI BID  - advise no NSAIDs    Laryngeal cancer  Esophageal mass  - await OSH records  - patient says he had not been referred for treatment while in [place], will need Oncology referral here    Abdominal pain  - abnormal appearance of GB on CT imaging  - general surgery follows  - rocephin/flagyl    Cirrhosis  Alcohol abuse  - patient says he has been abstinent for the past 6 weeks  - thiamine      Expected Discharge Location and Transportation:   Expected Discharge   Expected Discharge Date: 12/29/2023; Expected Discharge Time:      DVT prophylaxis:  Mechanical DVT prophylaxis orders are present.     AM-PAC 6 Clicks Score (PT): 22 (12/26/23 0013)    CODE STATUS:   Code Status and Medical Interventions:   Ordered at: 12/25/23 3356     Code Status (Patient has no pulse and is not breathing):    CPR (Attempt to Resuscitate)     Medical Interventions (Patient has pulse or is breathing):    Full Support       Garret Gibson MD  12/26/23        Electronically signed by Garret Gibson MD at 12/26/23 2407       Iván Mora MD at 12/26/23 1143          General surgery    Labs and images reviewed.  Patient currently in endoscopy for his GI bleed.    Electronically signed by Iván Mora MD at 12/26/23 114          Consult Notes (last 48 hours)        Iván Mora MD at  12/26/23 1947        Consult Orders    1. Inpatient General Surgery Consult [[id number]] ordered by Wesly Turner MD at 12/25/23 8967                 General Surgery Consultation Note    Date of Service: 12/26/2023  Val Nassar  [id number]  1960      Referring Provider: Garret Gibson MD    Location of Consult: Inpatient     Reason for Consultation: Cholecystitis       History of Present Illness:  I am seeing, Val Nassar, in consultation for Garret Gibson MD regarding cholecystitis.  63-year-old gentleman with past history significant for cirrhosis of the liver secondary to alcohol & peptic ulcer disease, presented to the emergency room feeling poorly with some abdominal discomfort..  Over the last 2 days he has had worsening fatigue, suprapubic abdominal pain, nausea though denies vomiting, and dark stools.  He has had a decrease in appetite and some associated weight loss.  He recently moved back to [place] from [facility] secondary to a new diagnosis of cancer.  He underwent upper endoscopy today with multiple findings.  I discussed this directly with Dr. Mckeon, gastroenterology.      Problems Addressed this Visit          Hematology and Neoplasia    * (Principal) Anemia - Primary    Relevant Medications    folic acid (FOLVITE) 1 MG tablet    vitamin B-12 (CYANOCOBALAMIN) 1000 MCG tablet    vitamin B-12 (CYANOCOBALAMIN) tablet 1,000 mcg    folic acid (FOLVITE) tablet 1 mg    ferric gluconate (FERRLECIT)125 MG in sodium chloride 0.9 % 100 mL IVPB    Other Relevant Orders    Case Request (Completed)    Tissue Pathology Exam     Other Visit Diagnoses       Severe anemia        Relevant Medications    folic acid (FOLVITE) 1 MG tablet    vitamin B-12 (CYANOCOBALAMIN) 1000 MCG tablet    vitamin B-12 (CYANOCOBALAMIN) tablet 1,000 mcg    folic acid (FOLVITE) tablet 1 mg    ferric gluconate (FERRLECIT)125 MG in sodium chloride 0.9 % 100 mL IVPB    Cholecystitis        LLQ abdominal  pain              Diagnoses         Codes Comments    Iron deficiency anemia due to chronic blood loss    -  Primary ICD-10-CM: D50.0  ICD-9-CM: 280.0     Severe anemia     ICD-10-CM: D64.9  ICD-9-CM: 285.9     Cholecystitis     ICD-10-CM: K81.9  ICD-9-CM: 575.10     LLQ abdominal pain     ICD-10-CM: R10.32  ICD-9-CM: 789.04             History reviewed. No pertinent past medical history.    No past surgical history on file.    No Known Allergies    No current facility-administered medications on file prior to encounter.     Current Outpatient Medications on File Prior to Encounter   Medication Sig Dispense Refill    acetaminophen (TYLENOL) 500 MG tablet Take 1 tablet by mouth Every 6 (Six) Hours As Needed for Mild Pain.      carvedilol (COREG) 3.125 MG tablet Take 1 tablet by mouth 2 (Two) Times a Day With Meals.      carvedilol (COREG) 6.25 MG tablet Take 1 tablet by mouth 2 (Two) Times a Day With Meals.      ciprofloxacin (CIPRO) 500 MG tablet Take 1 tablet by mouth 2 (Two) Times a Day.      diphenhydrAMINE (BENADRYL) 25 mg capsule Take 1 capsule by mouth Every Night.      escitalopram (LEXAPRO) 10 MG tablet Take 1 tablet by mouth Every Night.      famotidine (PEPCID) 20 MG tablet Take 1 tablet by mouth 2 (Two) Times a Day.      finasteride (PROSCAR) 5 MG tablet Take 1 tablet by mouth Daily.      folic acid (FOLVITE) 1 MG tablet Take 1 tablet by mouth Daily.      gabapentin (NEURONTIN) 300 MG capsule Take 1 capsule by mouth 3 (Three) Times a Day.      lactulose (CHRONULAC) 10 GM/15ML solution Take 30 mL by mouth 2 (Two) Times a Day As Needed.      pantoprazole (PROTONIX) 40 MG EC tablet Take 1 tablet by mouth 2 (Two) Times a Day.      spironolactone (ALDACTONE) 25 MG tablet Take 1 tablet by mouth Daily.      sucralfate (CARAFATE) 1 g tablet Take 1 tablet by mouth 3 (Three) Times a Day.      tamsulosin (FLOMAX) 0.4 MG capsule 24 hr capsule Take 1 capsule by mouth Daily.      vitamin B-12 (CYANOCOBALAMIN) 1000 MCG  tablet Take 1 tablet by mouth Daily.      vitamin D (ERGOCALCIFEROL) 1.25 MG (51237 UT) capsule capsule Take 1 capsule by mouth 1 (One) Time Per Week.           Current Facility-Administered Medications:     acetaminophen (TYLENOL) tablet 500 mg, 500 mg, Oral, Q6H PRN, Wesly Turner MD    sennosides-docusate (PERICOLACE) 8.6-50 MG per tablet 2 tablet, 2 tablet, Oral, BID **AND** polyethylene glycol (MIRALAX) packet 17 g, 17 g, Oral, Daily PRN **AND** bisacodyl (DULCOLAX) EC tablet 5 mg, 5 mg, Oral, Daily PRN **AND** bisacodyl (DULCOLAX) suppository 10 mg, 10 mg, Rectal, Daily PRN, Wesly Turner MD    Calcium Replacement - Follow Nurse / BPA Driven Protocol, , Does not apply, PRN, Wesly Turner MD    [Held by provider] carvedilol (COREG) tablet 3.125 mg, 3.125 mg, Oral, BID With Meals, Wesly Turner MD    cefTRIAXone (ROCEPHIN) 2,000 mg in sodium chloride 0.9 % 100 mL IVPB, 2,000 mg, Intravenous, Q24H, Wesly Turner MD    ferric gluconate (FERRLECIT)125 MG in sodium chloride 0.9 % 100 mL IVPB, 125 mg, Intravenous, Daily, Garret Gibson MD, Last Rate: 100 mL/hr at 12/26/23 1501, 125 mg at 12/26/23 1501    folic acid (FOLVITE) tablet 1 mg, 1 mg, Oral, Daily, Wesly Turner MD, 1 mg at 12/26/23 0825    HYDROcodone-acetaminophen (NORCO) 5-325 MG per tablet 1 tablet, 1 tablet, Oral, Q4H PRN, Wesly Turner MD, 1 tablet at 12/26/23 1349    HYDROmorphone (DILAUDID) injection 0.5 mg, 0.5 mg, Intravenous, Q2H PRN **AND** naloxone (NARCAN) injection 0.4 mg, 0.4 mg, Intravenous, Q5 Min PRN, Wesly Turner MD    lactated ringers infusion, 75 mL/hr, Intravenous, Continuous, Wesly Turner MD, Last Rate: 75 mL/hr at 12/26/23 0017, 75 mL/hr at 12/26/23 0017    lactulose (CHRONULAC) 10 GM/15ML solution 20 g, 20 g, Oral, BID PRNYue John L, MD    Magnesium Standard Dose Replacement - Follow Nurse / BPA Driven Protocol, , Does not apply, Yue MIRANDA John L, MD    melatonin tablet 5 mg, 5 mg,  Oral, Nightly PRN, Ching Hutton PA-C, 5 mg at 12/26/23 0206    metroNIDAZOLE (FLAGYL) IVPB 500 mg, 500 mg, Intravenous, Q8H, Wesly Turner MD, Last Rate: 100 mL/hr at 12/26/23 1505, 500 mg at 12/26/23 1505    pantoprazole (PROTONIX) injection 40 mg, 40 mg, Intravenous, Q12H, Wesly Turner MD, 40 mg at 12/26/23 0825    Pharmacy Consult - medication reconciliation request, , Does not apply, Daily, Wesly Turner MD    Phosphorus Replacement - Follow Nurse / BPA Driven Protocol, , Does not apply, PRN, Wesly Turner MD    Potassium Replacement - Follow Nurse / BPA Driven Protocol, , Does not apply, RUBEN, Wesly Turner MD    sodium chloride 0.9 % flush 10 mL, 10 mL, Intravenous, PRN, Emergency, Triage Protocol, MD    sodium chloride 0.9 % flush 10 mL, 10 mL, Intravenous, Q12H, Wesly Turner MD, 10 mL at 12/26/23 0824    sodium chloride 0.9 % flush 10 mL, 10 mL, Intravenous, PRN, Wesly Turner MD    sodium chloride 0.9 % infusion 40 mL, 40 mL, Intravenous, PRN, Wesly Turner MD    sodium chloride 0.9 % infusion, 50 mL/hr, Intravenous, Continuous, Arnold Hankins MD    [Held by provider] spironolactone (ALDACTONE) tablet 25 mg, 25 mg, Oral, Daily, Wesly Turner MD    [Held by provider] tamsulosin (FLOMAX) 24 hr capsule 0.4 mg, 0.4 mg, Oral, Daily, Wesly Turner MD    thiamine (B-1) injection 200 mg, 200 mg, Intravenous, Daily, Garret Gibson MD, 200 mg at 12/26/23 1505    vitamin B-12 (CYANOCOBALAMIN) tablet 1,000 mcg, 1,000 mcg, Oral, Daily, Wesly Turner MD, 1,000 mcg at 12/26/23 0825    History reviewed. No pertinent family history.  Social History     Socioeconomic History    Marital status: Single   Tobacco Use    Smoking status: Every Day     Packs/day: 1.00     Years: 15.00     Additional pack years: 0.00     Total pack years: 15.00     Types: Cigarettes   Vaping Use    Vaping Use: Some days    Substances: Flavoring    Devices: Disposable   Substance and  "Sexual Activity    Alcohol use: Not Currently    Drug use: Never    Sexual activity: Defer       Review of Systems:  Review of Systems   Constitutional:  Positive for appetite change and fatigue.   HENT:  Negative for dental problem, facial swelling and postnasal drip.    Eyes:  Negative for photophobia and visual disturbance.   Respiratory:  Negative for chest tightness, shortness of breath and wheezing.    Cardiovascular:  Negative for chest pain and leg swelling.   Gastrointestinal:  Positive for abdominal pain, blood in stool and nausea. Negative for vomiting.   Endocrine: Positive for polyphagia. Negative for polyuria.   Genitourinary:  Negative for dysuria and urgency.   Musculoskeletal:  Positive for back pain. Negative for neck pain.   Skin:  Negative for pallor and rash.   Allergic/Immunologic: Negative for immunocompromised state.   Neurological:  Negative for dizziness, tremors and numbness.   Hematological:  Negative for adenopathy.   Psychiatric/Behavioral:  Negative for behavioral problems, hallucinations and self-injury.      Otherwise the 12 point review of systems is negative.    /79 (BP Location: Right arm, Patient Position: Lying)   Pulse 95   Temp (!) 100.9 °F (38.3 °C) (Oral)   Resp 20   Ht 175.3 cm (69\")   Wt 66.7 kg (147 lb)   SpO2 (!) 89%   BMI 21.71 kg/m²   Body mass index is 21.71 kg/m².    General: Resting comfortably, easily awakened  HEENT: PER, no icterus, normal sclerae  Cardiac: regular rhythm,  no audible rubs  Pulmonary: bilateral breath sounds, nonlabored  Abdominal: Protuberant, soft, tender in the suprapubic and right upper quadrant, no generalized peritonitis  Neurologic: awake, alert, no obvious focal deficits  Extremities: warm, no edema  Skin: no obvious rashes nor worrisome lesions seen     CBC  Results from last 7 days   Lab Units 12/26/23  1559 12/26/23  0848   WBC 10*3/mm3  --  6.51   HEMOGLOBIN g/dL 7.4* 7.2*   HEMATOCRIT % 23.6* 23.1*   PLATELETS 10*3/mm3  " --  196       CMP  Results from last 7 days   Lab Units 12/26/23  0848 12/25/23  1925   SODIUM mmol/L 140 143   POTASSIUM mmol/L 3.9 4.1   CHLORIDE mmol/L 109* 111*   CO2 mmol/L 22.0 23.0   BUN mg/dL 7* 10   CREATININE mg/dL 0.56* 0.59*   CALCIUM mg/dL 9.0 8.9   BILIRUBIN mg/dL  --  <0.2   ALK PHOS U/L  --  93   ALT (SGPT) U/L  --  9   AST (SGOT) U/L  --  15   GLUCOSE mg/dL 125* 104*       Radiology  Imaging Results (Last 72 Hours)       Procedure Component Value Units Date/Time    CT Abdomen Pelvis With Contrast [[id number]] Collected: 12/25/23 2057     Updated: 12/25/23 2102    Narrative:      CT ABDOMEN PELVIS W CONTRAST    Date of Exam: 12/25/2023 8:36 PM EST    Indication: LLQ abd pain, anemia.  cirrhosis, possible unspecified cancer dx (data deficient).    Comparison: None available.    Technique: Axial CT images were obtained of the abdomen and pelvis following the uneventful intravenous administration of 85 mL Isovue-300. Reconstructed coronal and sagittal images were also obtained. Automated exposure control and iterative   construction methods were used.      Findings:  The lung bases are clear.    There is mild nodularity liver surface which may indicate early cirrhosis. The bilateral adrenal glands, bilateral kidneys, pancreas and spleen are normal.    There is diffuse wall thickening of the gallbladder with abnormal increased enhancement and surrounding pericholecystic fluid. The findings are concerning for acute cholecystitis. The common bile duct is nondilated.    The abdominal and pelvic portion of the GI tract are normal aside from diverticulosis without diverticulitis. There is a large bladder diverticulum projecting posterior and left lateral from the posterior margin of the bladder wall. There are prostate   calcifications. The osseous structures are within normal limits for a patient of this age.      Impression:      Impression:  Abnormal appearance of the gallbladder with wall thickening,  abnormal gallbladder wall enhancement and pericholecystic fluid consistent with acute cholecystitis.        Electronically Signed: Marvel Vines MD    12/25/2023 8:59 PM EST    Workstation ID: [id number]              Assessment:  Gastrointestinal bleeding w/ anemia  Cholecystitis  Upper esophageal mass, malignant appearing  Peptic ulcer disease, history and current healing ulcer  Duodenal stricture  Cirrhosis secondary to alcohol abuse  Tobacco abuse    Plan:  Recent diagnosis of cirrhosis and esophageal malignancy.  Unclear histology of the esophageal mass.  Upper endoscopy also demonstrated peptic ulcer disease and duodenal stricturing.  Regarding his gallbladder his symptoms are somewhat atypical. I have discussed this gentleman's constellation of findings with Dr. Mckeon, gastroenterology.  At this point we need to stabilize him from a GI bleeding standpoint.  I reviewed his labs and imaging.  I am going to obtain a HIDA scan to evaluate the patency of the cystic duct, no cholecystokinin needed.  N.p.o. after midnight.  He is on Rocephin IV.  Volume resuscitation.    Iván Mora MD  12/26/23  19:47 EST       Electronically signed by Iván Mora MD at 12/26/23 2000       Wesly Mckeon MD at 12/26/23 0809        Consult Orders    1. Inpatient Gastroenterology Consult [[id number]] ordered by Wesly Turner MD at 12/25/23 9557                 [facility] [place] Gastroenterology Consult    Referring Provider: No ref. provider found    PCP: Provider, No Known    Reason for Consultation: Anemia    Chief complaint: Abdominal pain    History of present illness:    Val Nassar is a 63 y.o. male who recently moved from [place] reports recent diagnosis of cirrhosis, alcohol use, laryngeal cancer, peptic ulcer disease who presents with left lower back pain.  GI consulted for anemia and questionable dark stools.  Patient denies any dark stools or encounter this morning.  He reports lower back pain but denies  "any abdominal pain in any location.  He denies any emesis.  He does report some intermittent nausea.  He reports his last drink of alcohol was 6 to 7 weeks ago.  He reports he has had multiple EGDs in the past and thinks he had a colonoscopy but a year ago.  He reports on his last EGD they noted some bleeding that they treated with \"diet and medicine.\"  He reports taking Tylenol typically for pain although occasional take ibuprofen.  Iron studies no ferritin of 39 and TIBC of 532.  Hemoglobin is 5 6 with an MCV of 97.9.  Platelets are normal at 222.  INR 1.05.  LFTs and T. bili normal.  CT abdomen and pelvis with contrast notable for mild nodularity of the liver surface, normal-appearing pancreas, diffuse wall thickening of the gallbladder with enhancement and surrounding pericholecystic fluid concerning for cholecystitis, nondilated common bile duct, normal-appearing spleen, diverticulosis without evidence of diverticulitis, large bladder diverticulum.    Allergies:  Patient has no known allergies.    Scheduled Meds:  [Held by provider] carvedilol, 3.125 mg, Oral, BID With Meals  cefTRIAXone, 2,000 mg, Intravenous, Q24H  folic acid, 1 mg, Oral, Daily  metroNIDAZOLE, 500 mg, Intravenous, Q8H  pantoprazole, 40 mg, Intravenous, Q12H  pharmacy consult - MTM, , Does not apply, Daily  senna-docusate sodium, 2 tablet, Oral, BID  sodium chloride, 10 mL, Intravenous, Q12H  [Held by provider] spironolactone, 25 mg, Oral, Daily  [Held by provider] tamsulosin, 0.4 mg, Oral, Daily  vitamin B-12, 1,000 mcg, Oral, Daily         Infusions:  lactated ringers, 75 mL/hr, Last Rate: 75 mL/hr (12/26/23 0017)  octreotide (SandoSTATIN) infusion, 50 mcg/hr, Last Rate: 50 mcg/hr (12/25/23 1267)        PRN Meds:    acetaminophen    senna-docusate sodium **AND** polyethylene glycol **AND** bisacodyl **AND** bisacodyl    Calcium Replacement - Follow Nurse / BPA Driven Protocol    HYDROcodone-acetaminophen    HYDROmorphone **AND** naloxone    " lactulose    Magnesium Standard Dose Replacement - Follow Nurse / BPA Driven Protocol    melatonin    Phosphorus Replacement - Follow Nurse / BPA Driven Protocol    Potassium Replacement - Follow Nurse / BPA Driven Protocol    sodium chloride    sodium chloride    sodium chloride    Home Meds:  Medications Prior to Admission   Medication Sig Dispense Refill Last Dose    acetaminophen (TYLENOL) 500 MG tablet Take 1 tablet by mouth Every 6 (Six) Hours As Needed for Mild Pain.       carvedilol (COREG) 3.125 MG tablet Take 1 tablet by mouth 2 (Two) Times a Day With Meals.       carvedilol (COREG) 6.25 MG tablet Take 1 tablet by mouth 2 (Two) Times a Day With Meals.       ciprofloxacin (CIPRO) 500 MG tablet Take 1 tablet by mouth 2 (Two) Times a Day.       diphenhydrAMINE (BENADRYL) 25 mg capsule Take 1 capsule by mouth Every Night.       escitalopram (LEXAPRO) 10 MG tablet Take 1 tablet by mouth Every Night.       famotidine (PEPCID) 20 MG tablet Take 1 tablet by mouth 2 (Two) Times a Day.       finasteride (PROSCAR) 5 MG tablet Take 1 tablet by mouth Daily.       folic acid (FOLVITE) 1 MG tablet Take 1 tablet by mouth Daily.       gabapentin (NEURONTIN) 300 MG capsule Take 1 capsule by mouth 3 (Three) Times a Day.       lactulose (CHRONULAC) 10 GM/15ML solution Take 30 mL by mouth 2 (Two) Times a Day As Needed.       pantoprazole (PROTONIX) 40 MG EC tablet Take 1 tablet by mouth 2 (Two) Times a Day.       spironolactone (ALDACTONE) 25 MG tablet Take 1 tablet by mouth Daily.       sucralfate (CARAFATE) 1 g tablet Take 1 tablet by mouth 3 (Three) Times a Day.       tamsulosin (FLOMAX) 0.4 MG capsule 24 hr capsule Take 1 capsule by mouth Daily.       vitamin B-12 (CYANOCOBALAMIN) 1000 MCG tablet Take 1 tablet by mouth Daily.       vitamin D (ERGOCALCIFEROL) 1.25 MG (03163 UT) capsule capsule Take 1 capsule by mouth 1 (One) Time Per Week.          ROS: Review of Systems   Constitutional:  Negative for chills and fever.  "  Respiratory:  Negative for cough and shortness of breath.    Gastrointestinal:  Positive for nausea. Negative for abdominal distention, abdominal pain and vomiting.   Musculoskeletal:  Positive for back pain.   Skin:  Negative for color change and rash.   All other systems reviewed and are negative.      PAST MED HX:  History reviewed. No pertinent past medical history.    PAST SURG HX:  History reviewed. No pertinent surgical history.    FAM HX:  History reviewed. No pertinent family history.    SOC HX:  Social History     Socioeconomic History    Marital status: Single   Tobacco Use    Smoking status: Every Day     Packs/day: 1.00     Years: 15.00     Additional pack years: 0.00     Total pack years: 15.00     Types: Cigarettes   Vaping Use    Vaping Use: Some days    Substances: Flavoring    Devices: Disposable   Substance and Sexual Activity    Alcohol use: Not Currently    Drug use: Never    Sexual activity: Defer       PHYSICAL EXAM  /71 (BP Location: Right arm, Patient Position: Lying)   Pulse 67   Temp 97.9 °F (36.6 °C) (Axillary)   Resp 20   Ht 175.3 cm (69\")   Wt 66.2 kg (146 lb)   SpO2 98%   BMI 21.56 kg/m²   Wt Readings from Last 3 Encounters:   12/26/23 66.2 kg (146 lb)   ,body mass index is 21.56 kg/m².  Physical Exam   General: Patient awake, alert and cooperative   Eyes: Normal lids and lashes, no scleral icterus   Neck: Supple, normal ROM   Skin: Warm and dry, not jaundiced   Cardiovascular: Regular rate, well-perfused extremities   Pulm: Equal expansion bilaterally, no increased WOB   Abdomen: Soft, nontender, nondistended;    Extremities: No rash or edema              Neuro: A&O, No obvious sign of focal deficit   Psychiatric: Normal mood and behavior; memory intact    Results Review:   I reviewed the patient's new clinical results.    Lab Results   Component Value Date    WBC 4.56 12/25/2023    HGB 5.6 (C) 12/25/2023    HCT 19.0 (C) 12/25/2023    MCV 97.9 (H) 12/25/2023     " 12/25/2023       Lab Results   Component Value Date    INR 1.05 12/25/2023       Lab Results   Component Value Date    GLUCOSE 104 (H) 12/25/2023    BUN 10 12/25/2023    CREATININE 0.59 (L) 12/25/2023    BCR 16.9 12/25/2023     12/25/2023    K 4.1 12/25/2023    CO2 23.0 12/25/2023    CALCIUM 8.9 12/25/2023    ALBUMIN 3.4 (L) 12/25/2023    ALKPHOS 93 12/25/2023    BILITOT <0.2 12/25/2023    ALT 9 12/25/2023    AST 15 12/25/2023       ASSESSMENTS/PLANS  Impression:  Abdominal pain  Abnormal imaging the gallbladder  Cirrhotic appearing liver on imaging  History of peptic ulcer disease  Anemia  Reported recent diagnosis of laryngeal cancer    Plan:  -Hospital medicine started patient on IV PPI, octreotide as well as antibiotics and consulted general surgery  -Receiving his second unit of blood this morning, hemoglobin following transfusion 7.2  -No records available for review as he has received care recently in [place]  -There is mention of dark stools on admitting HPI, however patient denies any dark stools or bright red blood per rectum and he has had no emesis.  -N.p.o.  -Plan for EGD to further evaluate today    I discussed the patient's findings and my recommendations with patient    Wesly Mckeon MD  12/26/23  08:09 EST       Electronically signed by Wesly Mckeon MD at 12/26/23 6105

## 2023-12-27 NOTE — PLAN OF CARE
Goal Outcome Evaluation:   VSS throughout shift. NPO most of the day for HIDA scan. Clear liquid diet afterwards and tolerated well. Remains on 2L NC with no resp distress. Will continue POC.

## 2023-12-27 NOTE — SIGNIFICANT NOTE
12/27/23 1614   SLP Deferred Reason   SLP Deferred Reason Patient unavailable for evaluation  (Pt NPO for HIDA scan this date, d/w hospitalist and would like to keep NPO this date. Will defer and f/u tomorrow)

## 2023-12-27 NOTE — SIGNIFICANT NOTE
Notified earlier in the shift of increased oxygen demand, pt on 5 liters NC; temp 100.2; c/o shortness of breath and abdominal pain/distention. Chest xray, H/H and lasix 20 mg IV ordered. Pt s/p 2 units PRBC's; IVF held. Pt vomited x1 - KUB ordered and zofran. Following lasix, respiratory status improved. Continued w/ low grade fever. Chest xray w/ vascular congestion and likely interstitial edema; KUB w/ non obstructive gas pattern. Added respiratory PCR, was negative. No c/o dysuria, UA 12/25/23 normal. Added blood cultures, lactic acid, procal. Lactic normal 0.9; procal elevated 0.40; WBC went up to 15.37 from 6.51. Pt currently on rocephin and flagyl. Will broaden abx coverage to IV zosyn and IV vancomycin pending culture results.

## 2023-12-27 NOTE — CONSULTS
General Surgery Consultation Note    Date of Service: 12/26/2023  Val Nassar  [id number]  1960      Referring Provider: Garret Gibson MD    Location of Consult: Inpatient     Reason for Consultation: Cholecystitis       History of Present Illness:  I am seeing, Val Nassar, in consultation for Garret Gibson MD regarding cholecystitis.  63-year-old gentleman with past history significant for cirrhosis of the liver secondary to alcohol & peptic ulcer disease, presented to the emergency room feeling poorly with some abdominal discomfort..  Over the last 2 days he has had worsening fatigue, suprapubic abdominal pain, nausea though denies vomiting, and dark stools.  He has had a decrease in appetite and some associated weight loss.  He recently moved back to [place] from [facility] secondary to a new diagnosis of cancer.  He underwent upper endoscopy today with multiple findings.  I discussed this directly with Dr. Mckeon, gastroenterology.      Problems Addressed this Visit          Hematology and Neoplasia    * (Principal) Anemia - Primary    Relevant Medications    folic acid (FOLVITE) 1 MG tablet    vitamin B-12 (CYANOCOBALAMIN) 1000 MCG tablet    vitamin B-12 (CYANOCOBALAMIN) tablet 1,000 mcg    folic acid (FOLVITE) tablet 1 mg    ferric gluconate (FERRLECIT)125 MG in sodium chloride 0.9 % 100 mL IVPB    Other Relevant Orders    Case Request (Completed)    Tissue Pathology Exam     Other Visit Diagnoses       Severe anemia        Relevant Medications    folic acid (FOLVITE) 1 MG tablet    vitamin B-12 (CYANOCOBALAMIN) 1000 MCG tablet    vitamin B-12 (CYANOCOBALAMIN) tablet 1,000 mcg    folic acid (FOLVITE) tablet 1 mg    ferric gluconate (FERRLECIT)125 MG in sodium chloride 0.9 % 100 mL IVPB    Cholecystitis        LLQ abdominal pain              Diagnoses         Codes Comments    Iron deficiency anemia due to chronic blood loss    -  Primary ICD-10-CM: D50.0  ICD-9-CM: 280.0      Severe anemia     ICD-10-CM: D64.9  ICD-9-CM: 285.9     Cholecystitis     ICD-10-CM: K81.9  ICD-9-CM: 575.10     LLQ abdominal pain     ICD-10-CM: R10.32  ICD-9-CM: 789.04             History reviewed. No pertinent past medical history.    No past surgical history on file.    No Known Allergies    No current facility-administered medications on file prior to encounter.     Current Outpatient Medications on File Prior to Encounter   Medication Sig Dispense Refill    acetaminophen (TYLENOL) 500 MG tablet Take 1 tablet by mouth Every 6 (Six) Hours As Needed for Mild Pain.      carvedilol (COREG) 3.125 MG tablet Take 1 tablet by mouth 2 (Two) Times a Day With Meals.      carvedilol (COREG) 6.25 MG tablet Take 1 tablet by mouth 2 (Two) Times a Day With Meals.      ciprofloxacin (CIPRO) 500 MG tablet Take 1 tablet by mouth 2 (Two) Times a Day.      diphenhydrAMINE (BENADRYL) 25 mg capsule Take 1 capsule by mouth Every Night.      escitalopram (LEXAPRO) 10 MG tablet Take 1 tablet by mouth Every Night.      famotidine (PEPCID) 20 MG tablet Take 1 tablet by mouth 2 (Two) Times a Day.      finasteride (PROSCAR) 5 MG tablet Take 1 tablet by mouth Daily.      folic acid (FOLVITE) 1 MG tablet Take 1 tablet by mouth Daily.      gabapentin (NEURONTIN) 300 MG capsule Take 1 capsule by mouth 3 (Three) Times a Day.      lactulose (CHRONULAC) 10 GM/15ML solution Take 30 mL by mouth 2 (Two) Times a Day As Needed.      pantoprazole (PROTONIX) 40 MG EC tablet Take 1 tablet by mouth 2 (Two) Times a Day.      spironolactone (ALDACTONE) 25 MG tablet Take 1 tablet by mouth Daily.      sucralfate (CARAFATE) 1 g tablet Take 1 tablet by mouth 3 (Three) Times a Day.      tamsulosin (FLOMAX) 0.4 MG capsule 24 hr capsule Take 1 capsule by mouth Daily.      vitamin B-12 (CYANOCOBALAMIN) 1000 MCG tablet Take 1 tablet by mouth Daily.      vitamin D (ERGOCALCIFEROL) 1.25 MG (27971 UT) capsule capsule Take 1 capsule by mouth 1 (One) Time Per Week.            Current Facility-Administered Medications:     acetaminophen (TYLENOL) tablet 500 mg, 500 mg, Oral, Q6H PRN, Wesly Turner MD    sennosides-docusate (PERICOLACE) 8.6-50 MG per tablet 2 tablet, 2 tablet, Oral, BID **AND** polyethylene glycol (MIRALAX) packet 17 g, 17 g, Oral, Daily PRN **AND** bisacodyl (DULCOLAX) EC tablet 5 mg, 5 mg, Oral, Daily PRN **AND** bisacodyl (DULCOLAX) suppository 10 mg, 10 mg, Rectal, Daily PRN, Wesly Turner MD    Calcium Replacement - Follow Nurse / BPA Driven Protocol, , Does not apply, PRN, Wesly Turner MD    [Held by provider] carvedilol (COREG) tablet 3.125 mg, 3.125 mg, Oral, BID With Meals, Wesly Turner MD    cefTRIAXone (ROCEPHIN) 2,000 mg in sodium chloride 0.9 % 100 mL IVPB, 2,000 mg, Intravenous, Q24H, Wesly Turner MD    ferric gluconate (FERRLECIT)125 MG in sodium chloride 0.9 % 100 mL IVPB, 125 mg, Intravenous, Daily, Garret Gibson MD, Last Rate: 100 mL/hr at 12/26/23 1501, 125 mg at 12/26/23 1501    folic acid (FOLVITE) tablet 1 mg, 1 mg, Oral, Daily, Wesly Turner MD, 1 mg at 12/26/23 0825    HYDROcodone-acetaminophen (NORCO) 5-325 MG per tablet 1 tablet, 1 tablet, Oral, Q4H PRN, Wesly Turner MD, 1 tablet at 12/26/23 1349    HYDROmorphone (DILAUDID) injection 0.5 mg, 0.5 mg, Intravenous, Q2H PRN **AND** naloxone (NARCAN) injection 0.4 mg, 0.4 mg, Intravenous, Q5 Min PRN, Wesly Turner MD    lactated ringers infusion, 75 mL/hr, Intravenous, Continuous, Wesly Turner MD, Last Rate: 75 mL/hr at 12/26/23 0017, 75 mL/hr at 12/26/23 0017    lactulose (CHRONULAC) 10 GM/15ML solution 20 g, 20 g, Oral, BID PRN, Wesly Turner MD    Magnesium Standard Dose Replacement - Follow Nurse / BPA Driven Protocol, , Does not apply, PRN, Wesly Turner MD    melatonin tablet 5 mg, 5 mg, Oral, Nightly PRN, Ching Hutton PA-C, 5 mg at 12/26/23 0206    metroNIDAZOLE (FLAGYL) IVPB 500 mg, 500 mg, Intravenous, Q8H, Wesly Turner  MD MAREK, Last Rate: 100 mL/hr at 12/26/23 1505, 500 mg at 12/26/23 1505    pantoprazole (PROTONIX) injection 40 mg, 40 mg, Intravenous, Q12H, Wesly Turner MD, 40 mg at 12/26/23 0825    Pharmacy Consult - medication reconciliation request, , Does not apply, Daily, Wesly Turner MD    Phosphorus Replacement - Follow Nurse / BPA Driven Protocol, , Does not apply, PRN, Wesly Turner MD    Potassium Replacement - Follow Nurse / BPA Driven Protocol, , Does not apply, PRNYue John L, MD    sodium chloride 0.9 % flush 10 mL, 10 mL, Intravenous, PRN, Emergency, Triage Protocol, MD    sodium chloride 0.9 % flush 10 mL, 10 mL, Intravenous, Q12H, Wesly Turner MD, 10 mL at 12/26/23 0824    sodium chloride 0.9 % flush 10 mL, 10 mL, Intravenous, PRN, Wesly Turner MD    sodium chloride 0.9 % infusion 40 mL, 40 mL, Intravenous, PRN, Wesly Turner MD    sodium chloride 0.9 % infusion, 50 mL/hr, Intravenous, Continuous, [place] Hankins MD    [Held by provider] spironolactone (ALDACTONE) tablet 25 mg, 25 mg, Oral, Daily, Wesly Turner MD    [Held by provider] tamsulosin (FLOMAX) 24 hr capsule 0.4 mg, 0.4 mg, Oral, Daily, Wesly Turner MD    thiamine (B-1) injection 200 mg, 200 mg, Intravenous, Daily, Garret Gibson MD, 200 mg at 12/26/23 1505    vitamin B-12 (CYANOCOBALAMIN) tablet 1,000 mcg, 1,000 mcg, Oral, Daily, Wesly Turner MD, 1,000 mcg at 12/26/23 0825    History reviewed. No pertinent family history.  Social History     Socioeconomic History    Marital status: Single   Tobacco Use    Smoking status: Every Day     Packs/day: 1.00     Years: 15.00     Additional pack years: 0.00     Total pack years: 15.00     Types: Cigarettes   Vaping Use    Vaping Use: Some days    Substances: Flavoring    Devices: Disposable   Substance and Sexual Activity    Alcohol use: Not Currently    Drug use: Never    Sexual activity: Defer       Review of Systems:  Review of Systems   Constitutional:   "Positive for appetite change and fatigue.   HENT:  Negative for dental problem, facial swelling and postnasal drip.    Eyes:  Negative for photophobia and visual disturbance.   Respiratory:  Negative for chest tightness, shortness of breath and wheezing.    Cardiovascular:  Negative for chest pain and leg swelling.   Gastrointestinal:  Positive for abdominal pain, blood in stool and nausea. Negative for vomiting.   Endocrine: Positive for polyphagia. Negative for polyuria.   Genitourinary:  Negative for dysuria and urgency.   Musculoskeletal:  Positive for back pain. Negative for neck pain.   Skin:  Negative for pallor and rash.   Allergic/Immunologic: Negative for immunocompromised state.   Neurological:  Negative for dizziness, tremors and numbness.   Hematological:  Negative for adenopathy.   Psychiatric/Behavioral:  Negative for behavioral problems, hallucinations and self-injury.      Otherwise the 12 point review of systems is negative.    /79 (BP Location: Right arm, Patient Position: Lying)   Pulse 95   Temp (!) 100.9 °F (38.3 °C) (Oral)   Resp 20   Ht 175.3 cm (69\")   Wt 66.7 kg (147 lb)   SpO2 (!) 89%   BMI 21.71 kg/m²   Body mass index is 21.71 kg/m².    General: Resting comfortably, easily awakened  HEENT: PER, no icterus, normal sclerae  Cardiac: regular rhythm,  no audible rubs  Pulmonary: bilateral breath sounds, nonlabored  Abdominal: Protuberant, soft, tender in the suprapubic and right upper quadrant, no generalized peritonitis  Neurologic: awake, alert, no obvious focal deficits  Extremities: warm, no edema  Skin: no obvious rashes nor worrisome lesions seen     CBC  Results from last 7 days   Lab Units 12/26/23  1559 12/26/23  0848   WBC 10*3/mm3  --  6.51   HEMOGLOBIN g/dL 7.4* 7.2*   HEMATOCRIT % 23.6* 23.1*   PLATELETS 10*3/mm3  --  196       CMP  Results from last 7 days   Lab Units 12/26/23  0848 12/25/23  1925   SODIUM mmol/L 140 143   POTASSIUM mmol/L 3.9 4.1   CHLORIDE mmol/L " 109* 111*   CO2 mmol/L 22.0 23.0   BUN mg/dL 7* 10   CREATININE mg/dL 0.56* 0.59*   CALCIUM mg/dL 9.0 8.9   BILIRUBIN mg/dL  --  <0.2   ALK PHOS U/L  --  93   ALT (SGPT) U/L  --  9   AST (SGOT) U/L  --  15   GLUCOSE mg/dL 125* 104*       Radiology  Imaging Results (Last 72 Hours)       Procedure Component Value Units Date/Time    CT Abdomen Pelvis With Contrast [[id number]] Collected: 12/25/23 2057     Updated: 12/25/23 2102    Narrative:      CT ABDOMEN PELVIS W CONTRAST    Date of Exam: 12/25/2023 8:36 PM EST    Indication: LLQ abd pain, anemia.  cirrhosis, possible unspecified cancer dx (data deficient).    Comparison: None available.    Technique: Axial CT images were obtained of the abdomen and pelvis following the uneventful intravenous administration of 85 mL Isovue-300. Reconstructed coronal and sagittal images were also obtained. Automated exposure control and iterative   construction methods were used.      Findings:  The lung bases are clear.    There is mild nodularity liver surface which may indicate early cirrhosis. The bilateral adrenal glands, bilateral kidneys, pancreas and spleen are normal.    There is diffuse wall thickening of the gallbladder with abnormal increased enhancement and surrounding pericholecystic fluid. The findings are concerning for acute cholecystitis. The common bile duct is nondilated.    The abdominal and pelvic portion of the GI tract are normal aside from diverticulosis without diverticulitis. There is a large bladder diverticulum projecting posterior and left lateral from the posterior margin of the bladder wall. There are prostate   calcifications. The osseous structures are within normal limits for a patient of this age.      Impression:      Impression:  Abnormal appearance of the gallbladder with wall thickening, abnormal gallbladder wall enhancement and pericholecystic fluid consistent with acute cholecystitis.        Electronically Signed: Marvel Vines MD    12/25/2023  8:59 PM EST    Workstation ID: [id number]              Assessment:  Gastrointestinal bleeding w/ anemia  Cholecystitis  Upper esophageal mass, malignant appearing  Peptic ulcer disease, history and current healing ulcer  Duodenal stricture  Cirrhosis secondary to alcohol abuse  Tobacco abuse    Plan:  Recent diagnosis of cirrhosis and esophageal malignancy.  Unclear histology of the esophageal mass.  Upper endoscopy also demonstrated peptic ulcer disease and duodenal stricturing.  Regarding his gallbladder his symptoms are somewhat atypical. I have discussed this gentleman's constellation of findings with Dr. Mckeon, gastroenterology.  At this point we need to stabilize him from a GI bleeding standpoint.  I reviewed his labs and imaging.  I am going to obtain a HIDA scan to evaluate the patency of the cystic duct, no cholecystokinin needed.  N.p.o. after midnight.  He is on Rocephin IV.  Volume resuscitation.    Iván Mora MD  12/26/23  19:47 EST

## 2023-12-27 NOTE — PROGRESS NOTES
"General Surgery Daily Progress Note    Subjective:  Complains of abdominal discomfort.    Objective:  /69 (BP Location: Right arm, Patient Position: Lying)   Pulse 75   Temp 99.6 °F (37.6 °C) (Oral)   Resp 20   Ht 175.3 cm (69\")   Wt 66.7 kg (147 lb)   SpO2 96%   BMI 21.71 kg/m²     General Appearance: Sleeping, easily aroused.  Eyes: Anicteric  Neck: Trachea midline   Cardiovascular:  RRR without murmur nor rub  Lungs:  Bilateral respirations unlabored   Abdomen:  Soft, no generalized peritonitis, suprapubic tenderness  Extremities:  No cyanosis or edema   Skin:  No obvious rashes   Neurologic: awake and conversant       Imaging Results (Last 24 Hours)       Procedure Component Value Units Date/Time    XR Abdomen KUB [[id number]] Collected: 12/26/23 2123     Updated: 12/26/23 2127    Narrative:      XR ABDOMEN KUB    Date of Exam: 12/26/2023 8:46 PM EST    Indication: abd distention vomiting    Comparison: CT abdomen pelvis 12/25/2023    Findings:  Gas-filled large and small bowel in a nonobstructive pattern, similar to yesterday's CT abdomen pelvis.      Impression:      Impression:  Gas-filled large and small bowel in a nonobstructive pattern, similar to yesterday's CT abdomen pelvis.        Electronically Signed: Yoni Guadalupe MD    12/26/2023 9:24 PM EST    Workstation ID: [id number]    XR Chest 1 View [[id number]] Collected: 12/26/23 2119     Updated: 12/26/23 2125    Narrative:      XR CHEST 1 VW    Date of Exam: 12/26/2023 8:38 PM EST    Indication: increased oxygen demand    Comparison: None available.    Findings:  Patient rotated slightly to the left. Heart size top normal. No pneumothorax. Mild interstitial thickening which may relate to a component of interstitial edema. No pneumothorax. No focal consolidation. Asymmetric density at the left upper lung likely   related to superimposition of the anterior left first rib. Central pulmonary vascular congestion.      Impression:      " Impression:  Central pulmonary vascular congestion and mild interstitial thickening which may relate to mild interstitial edema.        Electronically Signed: Richard Pinto MD    12/26/2023 9:22 PM EST    Workstation ID: [id number]            CBC:  Results from last 7 days   Lab Units 12/27/23  0657 12/27/23  0149   WBC 10*3/mm3  --  15.37*   HEMOGLOBIN g/dL 7.3* 7.5*   HEMATOCRIT % 23.0* 23.8*   PLATELETS 10*3/mm3  --  190       CMP:  Results from last 7 days   Lab Units 12/27/23  0149   SODIUM mmol/L 137   POTASSIUM mmol/L 3.2*   CHLORIDE mmol/L 102   CO2 mmol/L 24.0   BUN mg/dL 4*   CREATININE mg/dL 0.53*   CALCIUM mg/dL 8.6   BILIRUBIN mg/dL 0.3   ALK PHOS U/L 90   ALT (SGPT) U/L 8   AST (SGOT) U/L 16   GLUCOSE mg/dL 110*         Assessment:  Gastrointestinal bleeding w/ anemia  Cholecystitis  Upper esophageal mass, malignant appearing  Peptic ulcer disease, history and current healing ulcer  Duodenal stricture  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  Hemoglobin appears stable at 7.3.  Continue broad-spectrum antibiotic coverage.  Keep NPO.  Dr. Alvarenga to follow in my absence.    Iván Mora MD - 12/27/2023, 09:26 EST

## 2023-12-27 NOTE — PROGRESS NOTES
Pharmacy Consult-Vancomycin Dosing  Val Nassar is a  63 y.o. male receiving vancomycin therapy.     Indication: PNA  Consulting Provider: hospitalist   ID Consult:     Goal AUC: 400 - 600 mg/L*hr    Current Antimicrobial Therapy  Anti-Infectives (From admission, onward)      Ordered     Dose/Rate Route Frequency Start Stop    12/27/23 0621  vancomycin (VANCOCIN) 1000 mg/200 mL dextrose 5% IVPB        Ordering Provider: Camilo Fernandes, Janie    15 mg/kg × 66.7 kg  over 60 Minutes Intravenous Every 12 Hours 12/27/23 2100 01/01/24 2059    12/27/23 0605  piperacillin-tazobactam (ZOSYN) 3.375 g in iso-osmotic dextrose 50 ml (premix)        Ordering Provider: Jaymie Nash APRN    3.375 g  over 4 Hours Intravenous Every 8 Hours 12/27/23 1400 01/01/24 1359    12/27/23 0605  piperacillin-tazobactam (ZOSYN) 3.375 g in iso-osmotic dextrose 50 ml (premix)        Ordering Provider: Jaymie Nash APRN    3.375 g  over 30 Minutes Intravenous Once 12/27/23 0700      12/27/23 0607  vancomycin 1250 mg/250 mL 0.9% NS IVPB ([facility])        Ordering Provider: Camilo Fernandes PharmD    20 mg/kg × 66.7 kg  over 75 Minutes Intravenous Once 12/27/23 0700      12/27/23 0605  Pharmacy to dose vancomycin        Ordering Provider: Jaymie Nash APRN     Does not apply Continuous PRN 12/27/23 0605 01/01/24 0604    12/26/23 0043  cefTRIAXone (ROCEPHIN) 1,000 mg in sodium chloride 0.9 % 100 mL IVPB        Ordering Provider: Wesly Turner MD    1,000 mg  200 mL/hr over 30 Minutes Intravenous Once 12/26/23 0130 12/26/23 0138            Allergies  Allergies as of 12/25/2023    (No Known Allergies)       Labs    Results from last 7 days   Lab Units 12/27/23  0149 12/26/23  0848 12/25/23  1925   BUN mg/dL 4* 7* 10   CREATININE mg/dL 0.53* 0.56* 0.59*       Results from last 7 days   Lab Units 12/27/23  0149 12/26/23  0848 12/25/23  1925   WBC 10*3/mm3 15.37* 6.51 4.56       Evaluation of Dosing     Last Dose Received in the  "ED/Outside Facility: none  Is Patient on Dialysis or Renal Replacement: no    Ht - 175.3 cm (69\")  Wt - 66.7 kg (147 lb)    Estimated Creatinine Clearance: 132.8 mL/min (A) (by C-G formula based on SCr of 0.53 mg/dL (L)).    Intake & Output (last 3 days)         12/24 0701 12/25 0700 12/25 0701 12/26 0700 12/26 0701 12/27 0700    P.O.   547    Blood  350 180    IV Piggyback  100 200    Total Intake(mL/kg)  450 (6.8) 927 (13.9)    Urine (mL/kg/hr)  700 2500 (1.6)    Emesis/NG output   0    Stool   0    Total Output  700 2500    Net  -250 -1573           Stool Unmeasured Occurrence   1 x    Emesis Unmeasured Occurrence   1 x            Microbiology and Radiology  Microbiology Results (last 10 days)       Procedure Component Value - Date/Time    Respiratory Panel PCR w/COVID-19(SARS-CoV-2) ABDIAZIZ/TAVARES/HANH/PAD/COR/OTIS In-House, NP Swab in UTM/VTM, 2 HR TAT - Swab, Nasopharynx [[id number]]  (Normal) Collected: 12/26/23 2329    Lab Status: Final result Specimen: Swab from Nasopharynx Updated: 12/27/23 0104     ADENOVIRUS, PCR Not Detected     Coronavirus 229E Not Detected     Coronavirus HKU1 Not Detected     Coronavirus NL63 Not Detected     Coronavirus OC43 Not Detected     COVID19 Not Detected     Human Metapneumovirus Not Detected     Human Rhinovirus/Enterovirus Not Detected     Influenza A PCR Not Detected     Influenza B PCR Not Detected     Parainfluenza Virus 1 Not Detected     Parainfluenza Virus 2 Not Detected     Parainfluenza Virus 3 Not Detected     Parainfluenza Virus 4 Not Detected     RSV, PCR Not Detected     Bordetella pertussis pcr Not Detected     Bordetella parapertussis PCR Not Detected     Chlamydophila pneumoniae PCR Not Detected     Mycoplasma pneumo by PCR Not Detected    Narrative:      In the setting of a positive respiratory panel with a viral infection PLUS a negative procalcitonin without other underlying concern for bacterial infection, consider observing off antibiotics or discontinuation " of antibiotics and continue supportive care. If the respiratory panel is positive for atypical bacterial infection (Bordetella pertussis, Chlamydophila pneumoniae, or Mycoplasma pneumoniae), consider antibiotic de-escalation to target atypical bacterial infection.            Reported Vancomycin Levels                         Insight[facility] AUC Calculation:    Current AUC:   0 mg/L*hr    Predicted Steady State AUC :   407 mg/L*hr    Assessment/Plan: The patient will be started on a bolus of 20mg/kg for a dose of 1250mg . Will initiate maintenance dose at 1000 mg IV every 12 hours.  Plan for trough as patient approaches steady state, prior to the 5th dose.  Due to infection severity, will target an AUC of 400-600.      Pharmacy will continue to follow the patient’s culture results and clinical progress daily.    Camilo Fernandes, ClaudyD

## 2023-12-28 ENCOUNTER — APPOINTMENT (OUTPATIENT)
Dept: GENERAL RADIOLOGY | Facility: HOSPITAL | Age: 63
DRG: 377 | End: 2023-12-28
Payer: MEDICAID

## 2023-12-28 DIAGNOSIS — C15.3 MALIGNANT NEOPLASM OF UPPER THIRD OF ESOPHAGUS: Primary | ICD-10-CM

## 2023-12-28 LAB
ALBUMIN SERPL-MCNC: 3.5 G/DL (ref 3.5–5.2)
ALBUMIN/GLOB SERPL: 1.3 G/DL
ALP SERPL-CCNC: 113 U/L (ref 39–117)
ALT SERPL W P-5'-P-CCNC: 7 U/L (ref 1–41)
ANION GAP SERPL CALCULATED.3IONS-SCNC: 9 MMOL/L (ref 5–15)
ANISOCYTOSIS BLD QL: NORMAL
AST SERPL-CCNC: 14 U/L (ref 1–40)
BASOPHILS # BLD AUTO: 0.03 10*3/MM3 (ref 0–0.2)
BASOPHILS NFR BLD AUTO: 0.3 % (ref 0–1.5)
BILIRUB SERPL-MCNC: 0.8 MG/DL (ref 0–1.2)
BUN SERPL-MCNC: 7 MG/DL (ref 8–23)
BUN/CREAT SERPL: 14.6 (ref 7–25)
CALCIUM SPEC-SCNC: 9 MG/DL (ref 8.6–10.5)
CHLORIDE SERPL-SCNC: 102 MMOL/L (ref 98–107)
CO2 SERPL-SCNC: 25 MMOL/L (ref 22–29)
CREAT SERPL-MCNC: 0.48 MG/DL (ref 0.76–1.27)
DEPRECATED RDW RBC AUTO: 65.2 FL (ref 37–54)
EGFRCR SERPLBLD CKD-EPI 2021: 115.3 ML/MIN/1.73
EOSINOPHIL # BLD AUTO: 0.09 10*3/MM3 (ref 0–0.4)
EOSINOPHIL NFR BLD AUTO: 0.9 % (ref 0.3–6.2)
ERYTHROCYTE [DISTWIDTH] IN BLOOD BY AUTOMATED COUNT: 20.2 % (ref 12.3–15.4)
GLOBULIN UR ELPH-MCNC: 2.6 GM/DL
GLUCOSE SERPL-MCNC: 87 MG/DL (ref 65–99)
HAV IGM SERPL QL IA: NORMAL
HBV CORE IGM SERPL QL IA: NORMAL
HBV SURFACE AB SER RIA-ACNC: ABNORMAL
HBV SURFACE AG SERPL QL IA: NORMAL
HCT VFR BLD AUTO: 21.6 % (ref 37.5–51)
HCT VFR BLD AUTO: 26.1 % (ref 37.5–51)
HCT VFR BLD AUTO: 28.5 % (ref 37.5–51)
HCV AB SER DONR QL: NORMAL
HGB BLD-MCNC: 6.9 G/DL (ref 13–17.7)
HGB BLD-MCNC: 8.3 G/DL (ref 13–17.7)
HGB BLD-MCNC: 9.1 G/DL (ref 13–17.7)
IMM GRANULOCYTES # BLD AUTO: 0.03 10*3/MM3 (ref 0–0.05)
IMM GRANULOCYTES NFR BLD AUTO: 0.3 % (ref 0–0.5)
LYMPHOCYTES # BLD AUTO: 0.85 10*3/MM3 (ref 0.7–3.1)
LYMPHOCYTES NFR BLD AUTO: 8.7 % (ref 19.6–45.3)
MAGNESIUM SERPL-MCNC: 1.7 MG/DL (ref 1.6–2.4)
MCH RBC QN AUTO: 29.1 PG (ref 26.6–33)
MCHC RBC AUTO-ENTMCNC: 31.8 G/DL (ref 31.5–35.7)
MCV RBC AUTO: 91.6 FL (ref 79–97)
MONOCYTES # BLD AUTO: 2.53 10*3/MM3 (ref 0.1–0.9)
MONOCYTES NFR BLD AUTO: 25.9 % (ref 5–12)
NEUTROPHILS NFR BLD AUTO: 6.24 10*3/MM3 (ref 1.7–7)
NEUTROPHILS NFR BLD AUTO: 63.9 % (ref 42.7–76)
NRBC BLD AUTO-RTO: 0 /100 WBC (ref 0–0.2)
PLAT MORPH BLD: NORMAL
PLATELET # BLD AUTO: 148 10*3/MM3 (ref 140–450)
PMV BLD AUTO: 10.3 FL (ref 6–12)
POTASSIUM SERPL-SCNC: 4.7 MMOL/L (ref 3.5–5.2)
POTASSIUM SERPL-SCNC: 4.7 MMOL/L (ref 3.5–5.2)
PROCALCITONIN SERPL-MCNC: 1.03 NG/ML (ref 0–0.25)
PROT SERPL-MCNC: 6.1 G/DL (ref 6–8.5)
RBC # BLD AUTO: 2.85 10*6/MM3 (ref 4.14–5.8)
SODIUM SERPL-SCNC: 136 MMOL/L (ref 136–145)
WBC MORPH BLD: NORMAL
WBC NRBC COR # BLD AUTO: 9.77 10*3/MM3 (ref 3.4–10.8)

## 2023-12-28 PROCEDURE — 25010000002 PIPERACILLIN SOD-TAZOBACTAM PER 1 G: Performed by: NURSE PRACTITIONER

## 2023-12-28 PROCEDURE — 84132 ASSAY OF SERUM POTASSIUM: CPT | Performed by: INTERNAL MEDICINE

## 2023-12-28 PROCEDURE — 86706 HEP B SURFACE ANTIBODY: CPT | Performed by: INTERNAL MEDICINE

## 2023-12-28 PROCEDURE — 83735 ASSAY OF MAGNESIUM: CPT | Performed by: INTERNAL MEDICINE

## 2023-12-28 PROCEDURE — 85014 HEMATOCRIT: CPT | Performed by: INTERNAL MEDICINE

## 2023-12-28 PROCEDURE — 86900 BLOOD TYPING SEROLOGIC ABO: CPT

## 2023-12-28 PROCEDURE — 99222 1ST HOSP IP/OBS MODERATE 55: CPT | Performed by: INTERNAL MEDICINE

## 2023-12-28 PROCEDURE — 85018 HEMOGLOBIN: CPT | Performed by: INTERNAL MEDICINE

## 2023-12-28 PROCEDURE — 25010000002 THIAMINE PER 100 MG: Performed by: INTERNAL MEDICINE

## 2023-12-28 PROCEDURE — P9016 RBC LEUKOCYTES REDUCED: HCPCS

## 2023-12-28 PROCEDURE — 92611 MOTION FLUOROSCOPY/SWALLOW: CPT

## 2023-12-28 PROCEDURE — 86708 HEPATITIS A ANTIBODY: CPT | Performed by: INTERNAL MEDICINE

## 2023-12-28 PROCEDURE — 85025 COMPLETE CBC W/AUTO DIFF WBC: CPT | Performed by: INTERNAL MEDICINE

## 2023-12-28 PROCEDURE — 80074 ACUTE HEPATITIS PANEL: CPT | Performed by: INTERNAL MEDICINE

## 2023-12-28 PROCEDURE — 74230 X-RAY XM SWLNG FUNCJ C+: CPT

## 2023-12-28 PROCEDURE — 74240 X-RAY XM UPR GI TRC 1CNTRST: CPT

## 2023-12-28 PROCEDURE — 84145 PROCALCITONIN (PCT): CPT | Performed by: INTERNAL MEDICINE

## 2023-12-28 PROCEDURE — 85007 BL SMEAR W/DIFF WBC COUNT: CPT | Performed by: INTERNAL MEDICINE

## 2023-12-28 PROCEDURE — 25010000002 NA FERRIC GLUC CPLX PER 12.5 MG: Performed by: INTERNAL MEDICINE

## 2023-12-28 PROCEDURE — 92610 EVALUATE SWALLOWING FUNCTION: CPT

## 2023-12-28 PROCEDURE — 80053 COMPREHEN METABOLIC PANEL: CPT | Performed by: INTERNAL MEDICINE

## 2023-12-28 PROCEDURE — 36430 TRANSFUSION BLD/BLD COMPNT: CPT

## 2023-12-28 RX ORDER — DOXYCYCLINE 100 MG/1
100 CAPSULE ORAL EVERY 12 HOURS SCHEDULED
Status: COMPLETED | OUTPATIENT
Start: 2023-12-28 | End: 2024-01-01

## 2023-12-28 RX ADMIN — PIPERACILLIN SODIUM AND TAZOBACTAM SODIUM 3.38 G: 3; .375 INJECTION, SOLUTION INTRAVENOUS at 15:12

## 2023-12-28 RX ADMIN — DOXYCYCLINE 100 MG: 100 CAPSULE ORAL at 20:35

## 2023-12-28 RX ADMIN — Medication 10 ML: at 20:37

## 2023-12-28 RX ADMIN — PIPERACILLIN SODIUM AND TAZOBACTAM SODIUM 3.38 G: 3; .375 INJECTION, SOLUTION INTRAVENOUS at 05:34

## 2023-12-28 RX ADMIN — BARIUM SULFATE 20 ML: 400 PASTE ORAL at 16:15

## 2023-12-28 RX ADMIN — PANTOPRAZOLE SODIUM 40 MG: 40 INJECTION, POWDER, LYOPHILIZED, FOR SOLUTION INTRAVENOUS at 20:35

## 2023-12-28 RX ADMIN — ACETAMINOPHEN 500 MG: 500 TABLET ORAL at 08:17

## 2023-12-28 RX ADMIN — PANTOPRAZOLE SODIUM 40 MG: 40 INJECTION, POWDER, LYOPHILIZED, FOR SOLUTION INTRAVENOUS at 08:16

## 2023-12-28 RX ADMIN — THIAMINE HYDROCHLORIDE 200 MG: 100 INJECTION, SOLUTION INTRAMUSCULAR; INTRAVENOUS at 08:17

## 2023-12-28 RX ADMIN — Medication 5 MG: at 21:31

## 2023-12-28 RX ADMIN — CYANOCOBALAMIN TAB 1000 MCG 1000 MCG: 1000 TAB at 08:17

## 2023-12-28 RX ADMIN — LIDOCAINE 1 PATCH: 4 PATCH TOPICAL at 08:18

## 2023-12-28 RX ADMIN — ACETAMINOPHEN 500 MG: 500 TABLET ORAL at 20:35

## 2023-12-28 RX ADMIN — FOLIC ACID 1 MG: 1 TABLET ORAL at 08:17

## 2023-12-28 RX ADMIN — SODIUM CHLORIDE 125 MG: 9 INJECTION, SOLUTION INTRAVENOUS at 08:14

## 2023-12-28 RX ADMIN — SENNOSIDES AND DOCUSATE SODIUM 2 TABLET: 8.6; 5 TABLET ORAL at 08:16

## 2023-12-28 RX ADMIN — PIPERACILLIN SODIUM AND TAZOBACTAM SODIUM 3.38 G: 3; .375 INJECTION, SOLUTION INTRAVENOUS at 20:36

## 2023-12-28 RX ADMIN — BARIUM SULFATE 100 ML: 0.81 POWDER, FOR SUSPENSION ORAL at 16:15

## 2023-12-28 NOTE — PLAN OF CARE
Goal Outcome Evaluation:  Plan of Care Reviewed With: patient        Progress: no change  Outcome Evaluation: Cont on 2LNC. SR on tele. Denies CP or SOA. No N/V. Tylenol given for headace and verbalized relief. H&H Q8hrs. Hgb=6.9. Notified NUVIA Sharma with orders and carried out. 1 unit PRBC given. No transfusion reaction noted. VSS. Cont POC

## 2023-12-28 NOTE — PROGRESS NOTES
"Patient Name:  Val Nassar  YOB: 1960  [id number]    Surgery Progress Note    Date of visit: 12/28/2023    Subjective   Subjective: Tolerating PO, having BM's.         Objective     Objective:     /67 (BP Location: Left arm, Patient Position: Lying)   Pulse 56   Temp 98.2 °F (36.8 °C) (Oral)   Resp 19   Ht 175.3 cm (69\")   Wt 66.7 kg (147 lb)   SpO2 92%   BMI 21.71 kg/m²     Intake/Output Summary (Last 24 hours) at 12/28/2023 0724  Last data filed at 12/28/2023 0550  Gross per 24 hour   Intake 350 ml   Output 1425 ml   Net -1075 ml       CV:  Rhythm  regular and rate regular   L:  Clear  to auscultation bilaterally   Abd:  Bowel sounds positive , soft, nontender  Ext:  No cyanosis, clubbing, edema    Recent labs that are back at this time have been reviewed. HIDA shows visualization of the GB, ruling out cholecystitis           Assessment/ Plan:    Problem List Items Addressed This Visit          Hematology and Neoplasia    * (Principal) Anemia - Primary    Relevant Medications    folic acid (FOLVITE) 1 MG tablet    vitamin B-12 (CYANOCOBALAMIN) 1000 MCG tablet    vitamin B-12 (CYANOCOBALAMIN) tablet 1,000 mcg    folic acid (FOLVITE) tablet 1 mg    ferric gluconate (FERRLECIT)125 MG in sodium chloride 0.9 % 100 mL IVPB    Other Relevant Orders    Case Request (Completed)    Tissue Pathology Exam     Other Visit Diagnoses       Severe anemia        Relevant Medications    folic acid (FOLVITE) 1 MG tablet    vitamin B-12 (CYANOCOBALAMIN) 1000 MCG tablet    vitamin B-12 (CYANOCOBALAMIN) tablet 1,000 mcg    folic acid (FOLVITE) tablet 1 mg    ferric gluconate (FERRLECIT)125 MG in sodium chloride 0.9 % 100 mL IVPB    Cholecystitis    - He does NOT have cholecystitis, as evidence by a normal HIDA. OK to advance diet as able. He has multiple medical issues including possible laryngeal/esophageal CA which need to be addressed at a high volume center. Will follow, but no plans for " operation at this time.      LLQ abdominal pain                 Active Hospital Problems    Diagnosis  POA    **Anemia [D64.9]  Yes    Severe malnutrition [E43]  Yes      Resolved Hospital Problems   No resolved problems to display.              Carlos Enrique Alvarenga MD  12/28/2023  07:24 EST

## 2023-12-28 NOTE — THERAPY EVALUATION
Acute Care - Speech Language Pathology   Swallow Initial Evaluation Psychiatric  Clinical Swallow Evaluation      Patient Name: Val Nassar  : 1960  MRN: [id number]  Today's Date: 2023               Admit Date: 2023    Visit Dx:     ICD-10-CM ICD-9-CM   1. Iron deficiency anemia due to chronic blood loss  D50.0 280.0   2. Severe anemia  D64.9 285.9   3. Cholecystitis  K81.9 575.10   4. LLQ abdominal pain  R10.32 789.04     Patient Active Problem List   Diagnosis    Anemia    Acute gastric ulcer with hemorrhage    Alcohol abuse, uncomplicated    Anxiety disorder, unspecified    Diverticulum of bladder    Duodenal ulcer, unspecified as acute or chronic, without hemorrhage or perforation    Elevation of level of transaminase and lactic acid dehydrogenase (LDH)    Gastro-esophageal reflux disease without esophagitis    Hyperglycemia, unspecified    Hyperlipidemia, unspecified    Melena    Nicotine dependence, cigarettes, uncomplicated    Tobacco use    Severe malnutrition     History reviewed. No pertinent past medical history.  Past Surgical History:   Procedure Laterality Date    ENDOSCOPY N/A 2023    Procedure: ESOPHAGOGASTRODUODENOSCOPY;  Surgeon: Wesly Mckeon MD;  Location: [facility] ENDOSCOPY;  Service: Gastroenterology;  Laterality: N/A;       SLP Recommendation and Plan  SLP Swallowing Diagnosis: suspected pharyngeal dysphagia, suspected esophageal dysphagia (23)  SLP Diet Recommendation: other (see comments) (continue current MD-ordered diet until MBS) (23)  Recommended Precautions and Strategies: general aspiration precautions (23)  SLP Rec. for Method of Medication Administration: as tolerated (23)     Monitor for Signs of Aspiration: notify SLP if any concerns (23)  Recommended Diagnostics: VFSS (MBS), with esophageal screen (23)  Swallow Criteria for Skilled Therapeutic Interventions Met: demonstrates skilled  criteria (12/28/23 0940)  Anticipated Discharge Disposition (SLP): home with OP services (12/28/23 0940)  Rehab Potential/Prognosis, Swallowing: good, to achieve stated therapy goals (12/28/23 0940)     Predicted Duration Therapy Intervention (Days): until discharge (12/28/23 0940)  Oral Care Recommendations: Oral Care BID/PRN, Toothbrush (12/28/23 0940)                                      Oral Care Recommendations: Oral Care BID/PRN, Toothbrush (12/28/23 0940)    Plan of Care Reviewed With: patient      SWALLOW EVALUATION (last 72 hours)       SLP Adult Swallow Evaluation       Row Name 12/28/23 0940                   Rehab Evaluation    Document Type evaluation  -MP        Subjective Information no complaints  -MP        Patient Observations alert;cooperative  -MP        Patient/Family/Caregiver Comments/Observations No family present  -MP        Patient Effort good  -MP           General Information    Patient Profile Reviewed yes  -MP        Pertinent History Of Current Problem Adm 12/25 w/ acute anemia. ? recent dx laryngeal ca but not yet established w/ oncology as recent moved to [place] from [place]. s/p EGD 12/26 w/ esophageal mass. Hx hepatic cirrhosis, peptic ulcer dz.  -MP        Current Method of Nutrition regular textures;thin liquids  -MP        Precautions/Limitations, Vision WFL  -MP        Precautions/Limitations, Hearing WFL  -MP        Prior Level of Function-Communication WFL  -MP        Prior Level of Function-Swallowing no diet consistency restrictions  -MP        Plans/Goals Discussed with patient;agreed upon  -MP        Barriers to Rehab none identified  -MP        Patient's Goals for Discharge patient did not state  -MP           Pain    Additional Documentation Pain Scale: FACES Pre/Post-Treatment (Group)  -MP           Pain Scale: FACES Pre/Post-Treatment    Pain: FACES Scale, Pretreatment 0-->no hurt  -MP        Posttreatment Pain Rating 0-->no hurt  -MP           Oral Motor Structure and  Function    Dentition Assessment missing teeth;edentulous, dentures not available  -MP        Secretion Management WNL/WFL  -MP        Mucosal Quality moist, healthy  -MP           Oral Musculature and Cranial Nerve Assessment    Oral Motor General Assessment WFL  -MP           General Eating/Swallowing Observations    Respiratory Support Currently in Use nasal cannula  -MP        Eating/Swallowing Skills self-fed  -MP        Positioning During Eating upright in bed  -MP        Utensils Used spoon;cup;straw  -MP        Consistencies Trialed thin liquids;pureed;regular textures  -MP           Clinical Swallow Eval    Pharyngeal Phase suspected pharyngeal impairment  -MP        Esophageal Phase suspected esophageal impairment  -        Clinical Swallow Evaluation Summary Pt c/o material sticking in throat. Cough noted w/ thin liquid wash following solid. Pt agreeable to MBS + limited upper GI to further assess swallow function, in setting of known esophageal mass.  -MP           Pharyngeal Phase Concerns    Pharyngeal Phase Concerns cough  -MP        Cough thin;other (see comments)  liquid wash following solid  -MP           Esophageal Phase Concerns    Esophageal Phase Concerns sensation of material sticking  -MP        Sensation of Material Sticking all consistencies  -MP           SLP Evaluation Clinical Impression    SLP Swallowing Diagnosis suspected pharyngeal dysphagia;suspected esophageal dysphagia  -MP        Functional Impact risk of aspiration/pneumonia  -        Rehab Potential/Prognosis, Swallowing good, to achieve stated therapy goals  -        Swallow Criteria for Skilled Therapeutic Interventions Met demonstrates skilled criteria  -MP           Recommendations    Predicted Duration Therapy Intervention (Days) until discharge  -MP        SLP Diet Recommendation other (see comments)  continue current MD-ordered diet until [facility]  -MP        Recommended Diagnostics VFSS ([facility]);with esophageal screen   -MP        Recommended Precautions and Strategies general aspiration precautions  -MP        Oral Care Recommendations Oral Care BID/PRN;Toothbrush  -MP        SLP Rec. for Method of Medication Administration as tolerated  -MP        Monitor for Signs of Aspiration notify SLP if any concerns  -MP        Anticipated Discharge Disposition (SLP) home with OP services  -MP                  User Key  (r) = Recorded By, (t) = Taken By, (c) = Cosigned By      Initials Name Effective Dates     Ivan Verde MS CCC-SLP 12/28/21 -                     EDUCATION  The patient has been educated in the following areas:   Dysphagia (Swallowing Impairment).              Time Calculation:    Time Calculation- SLP       Row Name 12/28/23 1024             Time Calculation- SLP    SLP Start Time 0940  -MP      SLP Received On 12/28/23  -MP         Untimed Charges    83787-FL Eval Oral Pharyng Swallow Minutes 40  -MP         Total Minutes    Untimed Charges Total Minutes 40  -MP       Total Minutes 40  -MP                User Key  (r) = Recorded By, (t) = Taken By, (c) = Cosigned By      Initials Name Provider Type    Ivan Jules MS CCC-SLP Speech and Language Pathologist                    Therapy Charges for Today       Code Description Service Date Service Provider Modifiers Qty    67299035201 HC ST EVAL ORAL PHARYNG SWALLOW 3 12/28/2023 Ivan Verde MS CCC-SLP GN 1                 Ivan Baker MS CCC-ESPERANZA  12/28/2023

## 2023-12-28 NOTE — MBS/VFSS/FEES
Acute Care - Speech Language Pathology   Swallow Initial Evaluation [facility]  Modified Barium Swallow Study (MBS)      Patient Name: Val Nassar  : 1960  MRN: [id number]  Today's Date: 2023               Admit Date: 2023    Visit Dx:     ICD-10-CM ICD-9-CM   1. Iron deficiency anemia due to chronic blood loss  D50.0 280.0   2. Severe anemia  D64.9 285.9   3. Cholecystitis  K81.9 575.10   4. LLQ abdominal pain  R10.32 789.04     Patient Active Problem List   Diagnosis    Anemia    Acute gastric ulcer with hemorrhage    Alcohol abuse, uncomplicated    Anxiety disorder, unspecified    Diverticulum of bladder    Duodenal ulcer, unspecified as acute or chronic, without hemorrhage or perforation    Elevation of level of transaminase and lactic acid dehydrogenase (LDH)    Gastro-esophageal reflux disease without esophagitis    Hyperglycemia, unspecified    Hyperlipidemia, unspecified    Melena    Nicotine dependence, cigarettes, uncomplicated    Tobacco use    Severe malnutrition     History reviewed. No pertinent past medical history.  Past Surgical History:   Procedure Laterality Date    ENDOSCOPY N/A 2023    Procedure: ESOPHAGOGASTRODUODENOSCOPY;  Surgeon: Wesly Mckeon MD;  Location: [facility] ENDOSCOPY;  Service: Gastroenterology;  Laterality: N/A;       SLP Recommendation and Plan  SLP Swallowing Diagnosis: functional oral phase, functional pharyngeal phase (23 1550)  SLP Diet Recommendation: regular textures, thin liquids, other (see comments) (if pt c/o any difficulty or discomfort w/ solids, may need to modify to soft/chopped or add extra sauces/gravies to trays) (23 1550)  Recommended Precautions and Strategies: general aspiration precautions (23 1550)  SLP Rec. for Method of Medication Administration: as tolerated (23 1550)     Monitor for Signs of Aspiration: notify SLP if any concerns (23 1550)  Recommended Diagnostics: VFSS (MBS), with  esophageal screen (12/28/23 0940)  Swallow Criteria for Skilled Therapeutic Interventions Met: no problems identified which require skilled intervention (12/28/23 1550)  Anticipated Discharge Disposition (SLP): No further SLP services warranted (12/28/23 1550)  Rehab Potential/Prognosis, Swallowing: good, to achieve stated therapy goals (12/28/23 0940)  Therapy Frequency (Swallow): evaluation only (12/28/23 1550)  Predicted Duration Therapy Intervention (Days): until discharge (12/28/23 0940)  Oral Care Recommendations: Oral Care BID/PRN, Toothbrush (12/28/23 1550)                                      Oral Care Recommendations: Oral Care BID/PRN, Toothbrush (12/28/23 1550)    Plan of Care Reviewed With: patient      SWALLOW EVALUATION (last 72 hours)       SLP Adult Swallow Evaluation       Row Name 12/28/23 1550 12/28/23 0940                Rehab Evaluation    Document Type evaluation  -MP evaluation  -MP       Subjective Information no complaints  -MP no complaints  -MP       Patient Observations alert;cooperative  -MP alert;cooperative  -MP       Patient/Family/Caregiver Comments/Observations No family present  -MP No family present  -MP       Patient Effort good  -MP good  -MP          General Information    Patient Profile Reviewed yes  -MP yes  -MP       Pertinent History Of Current Problem See AM eval  -MP Adm 12/25 w/ acute anemia. ? recent dx laryngeal ca but not yet established w/ oncology as recent moved to [place] from [place]. s/p EGD 12/26 w/ esophageal mass. Hx hepatic cirrhosis, peptic ulcer dz.  -MP       Current Method of Nutrition regular textures;thin liquids  -MP regular textures;thin liquids  -MP       Precautions/Limitations, Vision -- WFL  -MP       Precautions/Limitations, Hearing -- WFL  -MP       Prior Level of Function-Communication -- WFL  -MP       Prior Level of Function-Swallowing -- no diet consistency restrictions  -MP       Plans/Goals Discussed with -- patient;agreed upon  -MP        Barriers to Rehab -- none identified  -MP       Patient's Goals for Discharge -- patient did not state  -MP          Pain    Additional Documentation Pain Scale: FACES Pre/Post-Treatment (Group)  -MP Pain Scale: FACES Pre/Post-Treatment (Group)  -MP          Pain Scale: FACES Pre/Post-Treatment    Pain: FACES Scale, Pretreatment 0-->no hurt  -MP 0-->no hurt  -MP       Posttreatment Pain Rating 0-->no hurt  -MP 0-->no hurt  -MP          Oral Motor Structure and Function    Dentition Assessment -- missing teeth;edentulous, dentures not available  -       Secretion Management -- WNL/WFL  -MP       Mucosal Quality -- moist, healthy  -MP          Oral Musculature and Cranial Nerve Assessment    Oral Motor General Assessment -- WFL  -MP          General Eating/Swallowing Observations    Respiratory Support Currently in Use -- nasal cannula  -       Eating/Swallowing Skills -- self-fed  -MP       Positioning During Eating -- upright in bed  -MP       Utensils Used -- spoon;cup;straw  -MP       Consistencies Trialed -- thin liquids;pureed;regular textures  -MP          Clinical Swallow Eval    Pharyngeal Phase -- suspected pharyngeal impairment  -MP       Esophageal Phase -- suspected esophageal impairment  -MP       Clinical Swallow Evaluation Summary -- Pt c/o material sticking in throat. Cough noted w/ thin liquid wash following solid. Pt agreeable to MBS + limited upper GI to further assess swallow function, in setting of known esophageal mass.  -MP          Pharyngeal Phase Concerns    Pharyngeal Phase Concerns -- cough  -MP       Cough -- thin;other (see comments)  liquid wash following solid  -MP          Esophageal Phase Concerns    Esophageal Phase Concerns -- sensation of material sticking  -MP       Sensation of Material Sticking -- all consistencies  -MP          MBS/VFSS    Utensils Used spoon;cup;straw  -MP --       Consistencies Trialed thin liquids;pudding thick;regular textures  -MP --          MBS/VFSS  Interpretation    Oral Prep Phase WFL  -MP --       Oral Transit Phase WFL  -MP --       Oral Residue WFL  -MP --       VFSS Summary Grossly functional oropharyngeal swallow. No penetration/aspiration across consistencies. Noted prominent cricopharygeus muscle that intermittently contributed to retention & retrograde flow to the pyriforms, but all material cleared pharynx by end of study. Per Radiology PA, limited upper GI unremarkable. Safe for regular diet, thin liquids, standard aspiration prctns.  -MP --          Initiation of Pharyngeal Swallow    Initiation of Pharyngeal Swallow bolus in valleculae  -MP --       Pharyngeal Phase functional pharyngeal phase of swallowing  -MP --          Esophageal Phase    Esophageal Phase see radiology report for further details  -MP --          SLP Evaluation Clinical Impression    SLP Swallowing Diagnosis functional oral phase;functional pharyngeal phase  -MP suspected pharyngeal dysphagia;suspected esophageal dysphagia  -MP       Functional Impact -- risk of aspiration/pneumonia  -MP       Rehab Potential/Prognosis, Swallowing -- good, to achieve stated therapy goals  -MP       Swallow Criteria for Skilled Therapeutic Interventions Met no problems identified which require skilled intervention  -MP demonstrates skilled criteria  -          Recommendations    Therapy Frequency (Swallow) evaluation only  - --       Predicted Duration Therapy Intervention (Days) -- until discharge  -MP       SLP Diet Recommendation regular textures;thin liquids;other (see comments)  if pt c/o any difficulty or discomfort w/ solids, may need to modify to soft/chopped or add extra sauces/gravies to trays  -MP other (see comments)  continue current MD-ordered diet until [facility]  -MP       Recommended Diagnostics -- VFSS ([facility]);with esophageal screen  -MP       Recommended Precautions and Strategies general aspiration precautions  -MP general aspiration precautions  -       Oral Care  Recommendations Oral Care BID/PRN;Toothbrush  -MP Oral Care BID/PRN;Toothbrush  -MP       SLP Rec. for Method of Medication Administration as tolerated  -MP as tolerated  -MP       Monitor for Signs of Aspiration notify SLP if any concerns  -MP notify SLP if any concerns  -MP       Anticipated Discharge Disposition (SLP) No further SLP services warranted  -MP home with OP services  -MP                 User Key  (r) = Recorded By, (t) = Taken By, (c) = Cosigned By      Initials Name Effective Dates    Ivan Jules MS CCC-SLP 12/28/21 -                     EDUCATION  The patient has been educated in the following areas:   Dysphagia (Swallowing Impairment).              Time Calculation:    Time Calculation- SLP       Row Name 12/28/23 1623 12/28/23 1024          Time Calculation- SLP    SLP Start Time 1550  -MP 0940  -MP     SLP Received On 12/28/23  -MP 12/28/23  -MP        Untimed Charges    04706-AI Eval Oral Pharyng Swallow Minutes -- 40  -MP     08268-AO Motion Fluoro Eval Swallow Minutes 40  -MP --        Total Minutes    Untimed Charges Total Minutes 40  -MP 40  -MP      Total Minutes 40  -MP 40  -MP               User Key  (r) = Recorded By, (t) = Taken By, (c) = Cosigned By      Initials Name Provider Type    Ivan Jules MS CCC-SLP Speech and Language Pathologist                    Therapy Charges for Today       Code Description Service Date Service Provider Modifiers Qty    27834998942 HC ST EVAL ORAL PHARYNG SWALLOW 3 12/28/2023 Ivan Verde MS CCC-SLP GN 1    [id number] HC ST MOTION FLUORO EVAL SWALLOW 3 12/28/2023 Ivan Verde MS CCC-SLP GN 1                 MS EFREN Reyes  12/28/2023

## 2023-12-28 NOTE — PLAN OF CARE
Goal Outcome Evaluation:   VSS throughout shift. Bed side swallow completed. Barium swallow done. Remains on 2L NC. Remains on IV antibiotics. Plan is to go to rehab when medically stable. Will continue POC.

## 2023-12-28 NOTE — PLAN OF CARE
Goal Outcome Evaluation:  Plan of Care Reviewed With: patient         SLP evaluation completed. Will proceed w/ MBS + ltd upper GI today. Please see note for further details and recommendations.          Anticipated Discharge Disposition (SLP): home with OP services

## 2023-12-29 ENCOUNTER — APPOINTMENT (OUTPATIENT)
Dept: GENERAL RADIOLOGY | Facility: HOSPITAL | Age: 63
DRG: 377 | End: 2023-12-29
Payer: MEDICAID

## 2023-12-29 ENCOUNTER — APPOINTMENT (OUTPATIENT)
Dept: CT IMAGING | Facility: HOSPITAL | Age: 63
DRG: 377 | End: 2023-12-29
Payer: MEDICAID

## 2023-12-29 DIAGNOSIS — C15.3 MALIGNANT NEOPLASM OF UPPER THIRD OF ESOPHAGUS: Primary | ICD-10-CM

## 2023-12-29 LAB
ALBUMIN SERPL-MCNC: 3.2 G/DL (ref 3.5–5.2)
ALBUMIN/GLOB SERPL: 0.9 G/DL
ALP SERPL-CCNC: 112 U/L (ref 39–117)
ALT SERPL W P-5'-P-CCNC: 8 U/L (ref 1–41)
ANION GAP SERPL CALCULATED.3IONS-SCNC: 10 MMOL/L (ref 5–15)
ANISOCYTOSIS BLD QL: NORMAL
AST SERPL-CCNC: 15 U/L (ref 1–40)
BASOPHILS # BLD AUTO: 0.03 10*3/MM3 (ref 0–0.2)
BASOPHILS NFR BLD AUTO: 0.5 % (ref 0–1.5)
BH BB BLOOD EXPIRATION DATE: NORMAL
BH BB BLOOD TYPE BARCODE: 6200
BH BB DISPENSE STATUS: NORMAL
BH BB PRODUCT CODE: NORMAL
BH BB UNIT NUMBER: NORMAL
BILIRUB SERPL-MCNC: 0.3 MG/DL (ref 0–1.2)
BUN SERPL-MCNC: 9 MG/DL (ref 8–23)
BUN/CREAT SERPL: 13.6 (ref 7–25)
CALCIUM SPEC-SCNC: 9.2 MG/DL (ref 8.6–10.5)
CHLORIDE SERPL-SCNC: 101 MMOL/L (ref 98–107)
CO2 SERPL-SCNC: 23 MMOL/L (ref 22–29)
CREAT SERPL-MCNC: 0.66 MG/DL (ref 0.76–1.27)
CROSSMATCH INTERPRETATION: NORMAL
CYTO UR: NORMAL
DEPRECATED RDW RBC AUTO: 66.1 FL (ref 37–54)
EGFRCR SERPLBLD CKD-EPI 2021: 104.7 ML/MIN/1.73
EOSINOPHIL # BLD AUTO: 0.2 10*3/MM3 (ref 0–0.4)
EOSINOPHIL NFR BLD AUTO: 3.4 % (ref 0.3–6.2)
ERYTHROCYTE [DISTWIDTH] IN BLOOD BY AUTOMATED COUNT: 20.5 % (ref 12.3–15.4)
FOLATE SERPL-MCNC: >20 NG/ML (ref 4.78–24.2)
GLOBULIN UR ELPH-MCNC: 3.4 GM/DL
GLUCOSE SERPL-MCNC: 113 MG/DL (ref 65–99)
HAV AB SER QL IA: POSITIVE
HCT VFR BLD AUTO: 29.3 % (ref 37.5–51)
HGB BLD-MCNC: 9.1 G/DL (ref 13–17.7)
IMM GRANULOCYTES # BLD AUTO: 0.03 10*3/MM3 (ref 0–0.05)
IMM GRANULOCYTES NFR BLD AUTO: 0.5 % (ref 0–0.5)
LAB AP CASE REPORT: NORMAL
LAB AP CLINICAL INFORMATION: NORMAL
LAB AP DIAGNOSIS COMMENT: NORMAL
LAB AP SPECIAL STAINS: NORMAL
LYMPHOCYTES # BLD AUTO: 0.89 10*3/MM3 (ref 0.7–3.1)
LYMPHOCYTES NFR BLD AUTO: 15 % (ref 19.6–45.3)
MCH RBC QN AUTO: 28.5 PG (ref 26.6–33)
MCHC RBC AUTO-ENTMCNC: 31.1 G/DL (ref 31.5–35.7)
MCV RBC AUTO: 91.8 FL (ref 79–97)
MONOCYTES # BLD AUTO: 1.47 10*3/MM3 (ref 0.1–0.9)
MONOCYTES NFR BLD AUTO: 24.8 % (ref 5–12)
NEUTROPHILS NFR BLD AUTO: 3.3 10*3/MM3 (ref 1.7–7)
NEUTROPHILS NFR BLD AUTO: 55.8 % (ref 42.7–76)
NRBC BLD AUTO-RTO: 0.3 /100 WBC (ref 0–0.2)
PATH REPORT.FINAL DX SPEC: NORMAL
PATH REPORT.GROSS SPEC: NORMAL
PLAT MORPH BLD: NORMAL
PLATELET # BLD AUTO: 147 10*3/MM3 (ref 140–450)
PMV BLD AUTO: 9.7 FL (ref 6–12)
POTASSIUM SERPL-SCNC: 4.2 MMOL/L (ref 3.5–5.2)
PROCALCITONIN SERPL-MCNC: 0.71 NG/ML (ref 0–0.25)
PROT SERPL-MCNC: 6.6 G/DL (ref 6–8.5)
RBC # BLD AUTO: 3.19 10*6/MM3 (ref 4.14–5.8)
SODIUM SERPL-SCNC: 134 MMOL/L (ref 136–145)
UNIT  ABO: NORMAL
UNIT  RH: NORMAL
VIT B12 BLD-MCNC: 808 PG/ML (ref 211–946)
WBC MORPH BLD: NORMAL
WBC NRBC COR # BLD AUTO: 5.92 10*3/MM3 (ref 3.4–10.8)

## 2023-12-29 PROCEDURE — 25510000001 IOPAMIDOL 61 % SOLUTION: Performed by: INTERNAL MEDICINE

## 2023-12-29 PROCEDURE — 25010000002 HYDROMORPHONE PER 4 MG: Performed by: INTERNAL MEDICINE

## 2023-12-29 PROCEDURE — 97535 SELF CARE MNGMENT TRAINING: CPT

## 2023-12-29 PROCEDURE — 99232 SBSQ HOSP IP/OBS MODERATE 35: CPT | Performed by: INTERNAL MEDICINE

## 2023-12-29 PROCEDURE — 80053 COMPREHEN METABOLIC PANEL: CPT | Performed by: INTERNAL MEDICINE

## 2023-12-29 PROCEDURE — 82607 VITAMIN B-12: CPT | Performed by: INTERNAL MEDICINE

## 2023-12-29 PROCEDURE — 97116 GAIT TRAINING THERAPY: CPT

## 2023-12-29 PROCEDURE — 85025 COMPLETE CBC W/AUTO DIFF WBC: CPT | Performed by: INTERNAL MEDICINE

## 2023-12-29 PROCEDURE — 82746 ASSAY OF FOLIC ACID SERUM: CPT | Performed by: INTERNAL MEDICINE

## 2023-12-29 PROCEDURE — 25010000002 PIPERACILLIN SOD-TAZOBACTAM PER 1 G: Performed by: NURSE PRACTITIONER

## 2023-12-29 PROCEDURE — 99232 SBSQ HOSP IP/OBS MODERATE 35: CPT | Performed by: PHYSICIAN ASSISTANT

## 2023-12-29 PROCEDURE — 84145 PROCALCITONIN (PCT): CPT | Performed by: INTERNAL MEDICINE

## 2023-12-29 PROCEDURE — 71260 CT THORAX DX C+: CPT

## 2023-12-29 PROCEDURE — 97530 THERAPEUTIC ACTIVITIES: CPT

## 2023-12-29 PROCEDURE — 85007 BL SMEAR W/DIFF WBC COUNT: CPT | Performed by: INTERNAL MEDICINE

## 2023-12-29 PROCEDURE — 71045 X-RAY EXAM CHEST 1 VIEW: CPT

## 2023-12-29 PROCEDURE — 25010000002 THIAMINE PER 100 MG: Performed by: INTERNAL MEDICINE

## 2023-12-29 RX ORDER — SIMETHICONE 80 MG
80 TABLET,CHEWABLE ORAL 4 TIMES DAILY PRN
Status: DISCONTINUED | OUTPATIENT
Start: 2023-12-29 | End: 2024-01-03 | Stop reason: HOSPADM

## 2023-12-29 RX ADMIN — Medication 10 ML: at 22:33

## 2023-12-29 RX ADMIN — LIDOCAINE 1 PATCH: 4 PATCH TOPICAL at 11:44

## 2023-12-29 RX ADMIN — PIPERACILLIN SODIUM AND TAZOBACTAM SODIUM 3.38 G: 3; .375 INJECTION, SOLUTION INTRAVENOUS at 14:54

## 2023-12-29 RX ADMIN — ACETAMINOPHEN 500 MG: 500 TABLET ORAL at 09:25

## 2023-12-29 RX ADMIN — PANTOPRAZOLE SODIUM 40 MG: 40 INJECTION, POWDER, LYOPHILIZED, FOR SOLUTION INTRAVENOUS at 20:26

## 2023-12-29 RX ADMIN — DOXYCYCLINE 100 MG: 100 CAPSULE ORAL at 09:42

## 2023-12-29 RX ADMIN — IOPAMIDOL 85 ML: 612 INJECTION, SOLUTION INTRAVENOUS at 15:18

## 2023-12-29 RX ADMIN — THIAMINE HYDROCHLORIDE 200 MG: 100 INJECTION, SOLUTION INTRAMUSCULAR; INTRAVENOUS at 09:42

## 2023-12-29 RX ADMIN — PIPERACILLIN SODIUM AND TAZOBACTAM SODIUM 3.38 G: 3; .375 INJECTION, SOLUTION INTRAVENOUS at 05:08

## 2023-12-29 RX ADMIN — HYDROMORPHONE HYDROCHLORIDE 0.5 MG: 1 INJECTION, SOLUTION INTRAMUSCULAR; INTRAVENOUS; SUBCUTANEOUS at 22:32

## 2023-12-29 RX ADMIN — Medication 10 ML: at 11:46

## 2023-12-29 RX ADMIN — DOXYCYCLINE 100 MG: 100 CAPSULE ORAL at 20:26

## 2023-12-29 RX ADMIN — PANTOPRAZOLE SODIUM 40 MG: 40 INJECTION, POWDER, LYOPHILIZED, FOR SOLUTION INTRAVENOUS at 09:42

## 2023-12-29 RX ADMIN — FOLIC ACID 1 MG: 1 TABLET ORAL at 09:42

## 2023-12-29 RX ADMIN — HYDROMORPHONE HYDROCHLORIDE 0.5 MG: 1 INJECTION, SOLUTION INTRAMUSCULAR; INTRAVENOUS; SUBCUTANEOUS at 20:31

## 2023-12-29 RX ADMIN — SIMETHICONE 80 MG: 80 TABLET, CHEWABLE ORAL at 11:45

## 2023-12-29 RX ADMIN — Medication 5 MG: at 21:45

## 2023-12-29 RX ADMIN — PIPERACILLIN SODIUM AND TAZOBACTAM SODIUM 3.38 G: 3; .375 INJECTION, SOLUTION INTRAVENOUS at 21:45

## 2023-12-29 RX ADMIN — CYANOCOBALAMIN TAB 1000 MCG 1000 MCG: 1000 TAB at 09:42

## 2023-12-29 NOTE — PLAN OF CARE
Goal Outcome Evaluation:  Plan of Care Reviewed With: patient        Progress: improving  Outcome Evaluation: Pt able to increase gait distance this date however remains limited by weakness, pain, and decreased activity monique. Pt continues to be below baseline level of function for mobility and IPPT services remain warranted.      Anticipated Discharge Disposition (PT): inpatient rehabilitation facility

## 2023-12-29 NOTE — PLAN OF CARE
Goal Outcome Evaluation:   VSS throughout shift. On RA with no resp distress. Plan is to go to rehab when medically stable and then start chemo afterwards. Will continue POC.

## 2023-12-29 NOTE — PROGRESS NOTES
[facility] Medicine Services  PROGRESS NOTE    Patient Name: Val Nassar  : 1960  MRN: [id number]    Date of Admission: 2023  Primary Care Physician: Provider, No Known    Subjective   Subjective     CC:  Abdominal pain    HPI: new cough today, productive with yellow/green sputum.  Abdomen still sore.  No nausea. No BM today.  Objective   Objective     Vital Signs:   Temp:  [97.2 °F (36.2 °C)-99.9 °F (37.7 °C)] 97.6 °F (36.4 °C)  Heart Rate:  [56-92] 92  Resp:  [18-20] 19  BP: (119-142)/(51-81) 122/61  Flow (L/min):  [2] 2     Physical Exam:  Non toxic, in bed  MM moist  RRR  Diminished breath sounds bilaterally  Abd soft, mild generalized tenderness  No edema  Normal affect  Alert, speech clear      Results Reviewed:  LAB RESULTS:      Lab 23  1812 23  1144 23  1143 23  0013 23  1458 23  0657 23  0149 23  1559 23  0848 23  1925   WBC  --  9.77  --   --   --   --  15.37*  --  6.51 4.56   HEMOGLOBIN 9.1* 8.3*  --  6.9* 7.6* 7.3* 7.5*   < > 7.2* 5.6*   HEMATOCRIT 28.5* 26.1*  --  21.6* 24.2* 23.0* 23.8*   < > 23.1* 19.0*   PLATELETS  --  148  --   --   --   --  190  --  196 222   NEUTROS ABS  --  6.24  --   --   --   --  11.60*  --   --  2.87   IMMATURE GRANS (ABS)  --  0.03  --   --   --   --  0.11*  --   --   --    LYMPHS ABS  --  0.85  --   --   --   --  0.63*  --   --   --    MONOS ABS  --  2.53*  --   --   --   --  2.94*  --   --   --    EOS ABS  --  0.09  --   --   --   --  0.03  --   --  0.14   MCV  --  91.6  --   --   --   --  91.2  --  93.9 97.9*   PROCALCITONIN  --   --  1.03*  --   --   --  0.40*  --   --  0.19   LACTATE  --   --   --   --   --   --  0.9  --   --  1.2   PROTIME  --   --   --   --   --   --   --   --   --  13.8   APTT  --   --   --   --   --   --   --   --   --  32.1*    < > = values in this interval not displayed.         Lab 23  1143 23  1458 23  0149  12/26/23  0848 12/25/23 1925   SODIUM 136  --  137 140 143   POTASSIUM 4.7  4.7 3.6 3.2* 3.9 4.1   CHLORIDE 102  --  102 109* 111*   CO2 25.0  --  24.0 22.0 23.0   ANION GAP 9.0  --  11.0 9.0 9.0   BUN 7*  --  4* 7* 10   CREATININE 0.48*  --  0.53* 0.56* 0.59*   EGFR 115.3  --  111.9 110.1 108.3   GLUCOSE 87  --  110* 125* 104*   CALCIUM 9.0  --  8.6 9.0 8.9   MAGNESIUM 1.7  --  1.7 1.4* 1.6   HEMOGLOBIN A1C  --   --   --   --  4.6*   TSH  --   --   --   --  1.070         Lab 12/28/23  1143 12/27/23 0149 12/25/23 1925   TOTAL PROTEIN 6.1 6.3 6.4   ALBUMIN 3.5 3.3* 3.4*   GLOBULIN 2.6 3.0 3.0   ALT (SGPT) 7 8 9   AST (SGOT) 14 16 15   BILIRUBIN 0.8 0.3 <0.2   ALK PHOS 113 90 93   LIPASE  --   --  75*         Lab 12/27/23 0149 12/25/23 1925   PROBNP 3,092.0*  --    HSTROP T  --  17   PROTIME  --  13.8   INR  --  1.05             Lab 12/25/23 2059 12/25/23 2023 12/25/23 2023 12/25/23 1925   IRON  --   --   --  12*   IRON SATURATION (TSAT)  --   --   --  2*   TIBC  --   --   --  532   TRANSFERRIN  --   --   --  357   FERRITIN  --   --   --  39.61   ABO TYPING A   < > A  --    RH TYPING Positive   < > Positive  --    ANTIBODY SCREEN  --   --  Negative  --     < > = values in this interval not displayed.         Brief Urine Lab Results  (Last result in the past 365 days)        Color   Clarity   Blood   Leuk Est   Nitrite   Protein   CREAT   Urine HCG        12/25/23 1932 Yellow   Clear   Negative   Negative   Negative   Negative                   Microbiology Results Abnormal       Procedure Component Value - Date/Time    Blood Culture - Blood, Arm, Right [[id number]]  (Normal) Collected: 12/27/23 0149    Lab Status: Preliminary result Specimen: Blood from Arm, Right Updated: 12/28/23 0515     Blood Culture No growth at 24 hours    Blood Culture - Blood, Hand, Right [[id number]]  (Normal) Collected: 12/27/23 0149    Lab Status: Preliminary result Specimen: Blood from Hand, Right Updated: 12/28/23 0515     Blood  Culture No growth at 24 hours    MRSA Screen, PCR (Inpatient) - Swab, Nares [[id number]]  (Normal) Collected: 12/27/23 0950    Lab Status: Final result Specimen: Swab from Nares Updated: 12/27/23 1130     MRSA PCR Negative    Narrative:      The negative predictive value of this diagnostic test is high and should only be used to consider de-escalating anti-MRSA therapy. A positive result may indicate colonization with MRSA and must be correlated clinically.  MRSA Negative    Respiratory Panel PCR w/COVID-19(SARS-CoV-2) ABDIAZIZ/TAVARES/HANH/PAD/COR/OTIS In-House, NP Swab in UTM/VTM, 2 HR TAT - Swab, Nasopharynx [[id number]]  (Normal) Collected: 12/26/23 2329    Lab Status: Final result Specimen: Swab from Nasopharynx Updated: 12/27/23 0104     ADENOVIRUS, PCR Not Detected     Coronavirus 229E Not Detected     Coronavirus HKU1 Not Detected     Coronavirus NL63 Not Detected     Coronavirus OC43 Not Detected     COVID19 Not Detected     Human Metapneumovirus Not Detected     Human Rhinovirus/Enterovirus Not Detected     Influenza A PCR Not Detected     Influenza B PCR Not Detected     Parainfluenza Virus 1 Not Detected     Parainfluenza Virus 2 Not Detected     Parainfluenza Virus 3 Not Detected     Parainfluenza Virus 4 Not Detected     RSV, PCR Not Detected     Bordetella pertussis pcr Not Detected     Bordetella parapertussis PCR Not Detected     Chlamydophila pneumoniae PCR Not Detected     Mycoplasma pneumo by PCR Not Detected    Narrative:      In the setting of a positive respiratory panel with a viral infection PLUS a negative procalcitonin without other underlying concern for bacterial infection, consider observing off antibiotics or discontinuation of antibiotics and continue supportive care. If the respiratory panel is positive for atypical bacterial infection (Bordetella pertussis, Chlamydophila pneumoniae, or Mycoplasma pneumoniae), consider antibiotic de-escalation to target atypical bacterial infection.            FL  Video Swallow With Speech Single Contrast    Result Date: 12/28/2023  FL VIDEO SWALLOW W SPEECH SINGLE-CONTRAST, FL LIMITED UGI FOR MBS REFLUX SINGLE-CONTRAST Date of Exam: 12/28/2023 4:10 PM EST Indication: dysphagia Comparison: None available. Technique:   The speech pathologist administered food and/or liquid mixed with barium to the patient with cine/video imaging.  Imaging assistance was provided to the speech pathologist and an image was saved. Fluoroscopic Time: 48 seconds Number of Images: 6 associated fluoroscopic loops were saved Findings: No aspiration was seen during fluoroscopic guided modified barium swallow series. A limited view of the esophagus with the patient in the seated lateral position demonstrated no signs of a high-grade focal esophageal stricture, or significant gastroesophageal reflux.     Impression: Impression: Fluoroscopy provided for modified barium swallow. No aspiration was seen during swallowing evaluation. Limited upper GI series appeared grossly normal. Please see speech therapy report for full details and recommendations. Electronically Signed: Ibrahima Jett MD  12/28/2023 5:31 PM EST  Workstation ID: [id number]    FL Limited Ugi For Mbs Reflux Single-Contrast    Result Date: 12/28/2023  FL VIDEO SWALLOW W SPEECH SINGLE-CONTRAST, FL LIMITED UGI FOR MBS REFLUX SINGLE-CONTRAST Date of Exam: 12/28/2023 4:10 PM EST Indication: dysphagia Comparison: None available. Technique:   The speech pathologist administered food and/or liquid mixed with barium to the patient with cine/video imaging.  Imaging assistance was provided to the speech pathologist and an image was saved. Fluoroscopic Time: 48 seconds Number of Images: 6 associated fluoroscopic loops were saved Findings: No aspiration was seen during fluoroscopic guided modified barium swallow series. A limited view of the esophagus with the patient in the seated lateral position demonstrated no signs of a high-grade focal esophageal  stricture, or significant gastroesophageal reflux.     Impression: Impression: Fluoroscopy provided for modified barium swallow. No aspiration was seen during swallowing evaluation. Limited upper GI series appeared grossly normal. Please see speech therapy report for full details and recommendations. Electronically Signed: Ibrahima Jett MD  12/28/2023 5:31 PM EST  Workstation ID: [id number]    NM HIDA SCAN WITHOUT PHARMACOLOGICAL INTERVENTION    Result Date: 12/27/2023  DATE OF EXAM: 12/27/2023 1:12 PM EST PROCEDURE: NM HIDA SCAN WITHOUT PHARMACOLOGICAL INTERVENTION INDICATIONS: Abdominal pain, upper, chronic, assess gallbladder motility Evaluate the patency of the biliary tree.  No cholecystokinin needed. COMPARISON: No comparisons available. TECHNIQUE: The patient received approximately 5 mCi of technetium 99m Choletec intravenously and images were obtained of the abdomen in the anterior projection through 60 minutes. FINDINGS: Normal uptake is noted within the hepatocytes with clearance of the blood pool by 5 minutes. Uptake is subsequently noted in the gallbladder by 10 minutes with uptake additionally noted into the common duct and small bowel.     Impression: 1. Visualization of the gallbladder. No evidence of acute cholecystitis or biliary obstruction. Electronically Signed: Desmond Aviles MD  12/27/2023 2:26 PM EST  Workstation ID: [id number]    XR Abdomen KUB    Result Date: 12/26/2023  XR ABDOMEN KUB Date of Exam: 12/26/2023 8:46 PM EST Indication: abd distention vomiting Comparison: CT abdomen pelvis 12/25/2023 Findings: Gas-filled large and small bowel in a nonobstructive pattern, similar to yesterday's CT abdomen pelvis.     Impression: Impression: Gas-filled large and small bowel in a nonobstructive pattern, similar to yesterday's CT abdomen pelvis. Electronically Signed: Yoni Guadalupe MD  12/26/2023 9:24 PM EST  Workstation ID: [id number]    XR Chest 1 View    Result Date: 12/26/2023  XR CHEST 1 VW Date of  Exam: 12/26/2023 8:38 PM EST Indication: increased oxygen demand Comparison: None available. Findings: Patient rotated slightly to the left. Heart size top normal. No pneumothorax. Mild interstitial thickening which may relate to a component of interstitial edema. No pneumothorax. No focal consolidation. Asymmetric density at the left upper lung likely related to superimposition of the anterior left first rib. Central pulmonary vascular congestion.     Impression: Impression: Central pulmonary vascular congestion and mild interstitial thickening which may relate to mild interstitial edema. Electronically Signed: Richard Pinto MD  12/26/2023 9:22 PM EST  Workstation ID: [id number]         Current medications:  Scheduled Meds:[Held by provider] carvedilol, 3.125 mg, Oral, BID With Meals  doxycycline, 100 mg, Oral, Q12H  folic acid, 1 mg, Oral, Daily  Lidocaine, 1 patch, Transdermal, Q24H  pantoprazole, 40 mg, Intravenous, Q12H  pharmacy consult - [place], , Does not apply, Daily  piperacillin-tazobactam, 3.375 g, Intravenous, Q8H  senna-docusate sodium, 2 tablet, Oral, BID  sodium chloride, 10 mL, Intravenous, Q12H  [Held by provider] spironolactone, 25 mg, Oral, Daily  [Held by provider] tamsulosin, 0.4 mg, Oral, Daily  thiamine (B-1) IV, 200 mg, Intravenous, Daily  vitamin B-12, 1,000 mcg, Oral, Daily      Continuous Infusions:     PRN Meds:.  acetaminophen    senna-docusate sodium **AND** polyethylene glycol **AND** bisacodyl **AND** bisacodyl    Calcium Replacement - Follow Nurse / BPA Driven Protocol    HYDROcodone-acetaminophen    HYDROmorphone **AND** naloxone    lactulose    Magnesium Standard Dose Replacement - Follow Nurse / BPA Driven Protocol    melatonin    ondansetron    Phosphorus Replacement - Follow Nurse / BPA Driven Protocol    Potassium Replacement - Follow Nurse / BPA Driven Protocol    sodium chloride    sodium chloride    sodium chloride    Assessment & Plan   Assessment & Plan     Active Hospital  Problems    Diagnosis  POA    **Anemia [D64.9]  Yes    Severe malnutrition [E43]  Yes      Resolved Hospital Problems   No resolved problems to display.        Brief Hospital Course to date:  Val Nassar is a 63 y.o. male with history of PUD, cirrhosis, newly diagnosed laryngeal cancer and prior alcohol abuse presents with abdominal pain, melena and anemia    Fever  - Tmax 99.9 / 24h  - leukocytosis resolved  - HIDA negative,   - new productive cough, procalcitonin elevated  - negative respiratory PCR panel, UA bland  - suspect bronchitis - continue zosyn for now, add doxycycline, repeat CXR am    Anemia, iron deficient  - hemoglobin 6.9 this morning  - iron sat 2 %  - s/p 3 units PRBCs (including one unit today)  - s/p IV iron x 3 days  - EGD showed esophagitis and one non-bleeding cratered duodenal ulcer (along with esophageal mass) and congestive gastropathy.  No varices noted.   - will need to ensure colonoscopy up to date  - PPI BID  - advised no NSAIDs    Esophageal mass  - await OSH records ([facility])  - Pathology pending from EGD this admit  - patient says he had not been referred for treatment while in [place], will need Oncology referral here, discussed with Dr Ga.    Abdominal pain  - abnormal appearance of GB on CT imaging  - HIDA normal  - general surgery states this does not represent cholecystitis    Cirrhosis  Alcohol abuse  - patient says he has been abstinent for the past 6 weeks  - thiamine    Tobacco abuse  -  cessation      Expected Discharge Location and Transportation:   Expected Discharge   Expected Discharge Date: 12/29/2023; Expected Discharge Time:      DVT prophylaxis:  Mechanical DVT prophylaxis orders are present.     AM-PAC 6 Clicks Score (PT): 17 (12/27/23 2033)    CODE STATUS:   Code Status and Medical Interventions:   Ordered at: 12/25/23 6669     Code Status (Patient has no pulse and is not breathing):    CPR (Attempt to Resuscitate)     Medical  Interventions (Patient has pulse or is breathing):    Full Support       Garret Gibson MD  12/28/23

## 2023-12-29 NOTE — PROGRESS NOTES
"Patient Name:  Val Nassar  YOB: 1960  [id number]    Surgery Progress Note    Date of visit: 12/29/2023    Subjective   Subjective: Feels better. Tolerating diet, had a couple of BM's.         Objective     Objective:     /79 (BP Location: Right arm, Patient Position: Lying)   Pulse 93   Temp 97.3 °F (36.3 °C) (Oral)   Resp 18   Ht 175.3 cm (69\")   Wt 66.7 kg (147 lb)   SpO2 98%   BMI 21.71 kg/m²     Intake/Output Summary (Last 24 hours) at 12/29/2023 0755  Last data filed at 12/29/2023 0352  Gross per 24 hour   Intake --   Output 1450 ml   Net -1450 ml       CV:  Rhythm  regular and rate regular   L:  Clear  to auscultation bilaterally   Abd:  Bowel sounds positive , soft, mildly tender to deep palpation.  Ext:  No cyanosis, clubbing, edema    Recent labs that are back at this time have been reviewed. Pathology pending.           Assessment/ Plan:    Problem List Items Addressed This Visit          Hematology and Neoplasia    * (Principal) Anemia - Primary- HGB has been stable since transfusion. Awaiting final pathology for his laryngeal/esophageal mass. From my standpoint, can follow up as an outpatient with Dr. Mora in 2 weeks.    Relevant Medications    folic acid (FOLVITE) 1 MG tablet    vitamin B-12 (CYANOCOBALAMIN) 1000 MCG tablet    vitamin B-12 (CYANOCOBALAMIN) tablet 1,000 mcg    folic acid (FOLVITE) tablet 1 mg    ferric gluconate (FERRLECIT)125 MG in sodium chloride 0.9 % 100 mL IVPB (Completed)    Other Relevant Orders    Case Request (Completed)    Tissue Pathology Exam     Other Visit Diagnoses       Severe anemia        Relevant Medications    folic acid (FOLVITE) 1 MG tablet    vitamin B-12 (CYANOCOBALAMIN) 1000 MCG tablet    vitamin B-12 (CYANOCOBALAMIN) tablet 1,000 mcg    folic acid (FOLVITE) tablet 1 mg    ferric gluconate (FERRLECIT)125 MG in sodium chloride 0.9 % 100 mL IVPB (Completed)    Cholecystitis        LLQ abdominal pain                 Active " Hospital Problems    Diagnosis  POA    **Anemia [D64.9]  Yes    Severe malnutrition [E43]  Yes      Resolved Hospital Problems   No resolved problems to display.              Carlos Enrique Alvarenga MD  12/29/2023  07:59 EST

## 2023-12-29 NOTE — CASE MANAGEMENT/SOCIAL WORK
Continued Stay Note  [facility]     Patient Name: Val Nassar  MRN: [id number]  Today's Date: 12/29/2023    Admit Date: 12/25/2023    Plan: SNF   Discharge Plan       Row Name 12/29/23 1513       Plan    Plan SNF    Patient/Family in Agreement with Plan yes    Plan Comments Spoke Patient at bedside. Mr. Nassar wants to go to rehab. SNF map given. He wants to go to rehab close to [facility] which is [place]. Would like referrals to [facility]. CM will follow for any discharge needs.    Final Discharge Disposition Code 03 - skilled nursing facility (SNF)                   Discharge Codes    No documentation.                 Expected Discharge Date and Time       Expected Discharge Date Expected Discharge Time    Jan 1, 2024               Radha Kothari RN

## 2023-12-29 NOTE — THERAPY TREATMENT NOTE
Patient Name: Val Nassar  : 1960    MRN: [id number]                              Today's Date: 2023       Admit Date: 2023    Visit Dx:     ICD-10-CM ICD-9-CM   1. Alcoholic cirrhosis of liver without ascites  K70.30 571.2   2. Severe anemia  D64.9 285.9   3. Cholecystitis  K81.9 575.10   4. LLQ abdominal pain  R10.32 789.04   5. Iron deficiency anemia due to chronic blood loss  D50.0 280.0   6. Duodenal ulcer  K26.9 532.90     Patient Active Problem List   Diagnosis    Anemia    Acute gastric ulcer with hemorrhage    Alcohol abuse, uncomplicated    Anxiety disorder, unspecified    Diverticulum of bladder    Duodenal ulcer, unspecified as acute or chronic, without hemorrhage or perforation    Elevation of level of transaminase and lactic acid dehydrogenase (LDH)    Gastro-esophageal reflux disease without esophagitis    Hyperglycemia, unspecified    Hyperlipidemia, unspecified    Melena    Nicotine dependence, cigarettes, uncomplicated    Tobacco use    Severe malnutrition     History reviewed. No pertinent past medical history.  Past Surgical History:   Procedure Laterality Date    ENDOSCOPY N/A 2023    Procedure: ESOPHAGOGASTRODUODENOSCOPY;  Surgeon: Wesly Mckeon MD;  [facility];  Service: Gastroenterology;  Laterality: N/A;      General Information       Row Name 23 1501          OT Time and Intention    Document Type therapy note (daily note)  -KF     Mode of Treatment occupational therapy  -KF       Row Name 23 1501          General Information    Patient Profile Reviewed yes  -KF     Existing Precautions/Restrictions fall;oxygen therapy device and L/min  -KF     Barriers to Rehab medically complex;previous functional deficit  -KF       Row Name 23 1501          Cognition    Orientation Status (Cognition) oriented x 3  -KF       Row Name 23 1501          Safety Issues, Functional Mobility    Safety Issues Affecting Function (Mobility)  awareness of need for assistance;insight into deficits/self-awareness;judgment;positioning of assistive device;problem-solving;safety precaution awareness;safety precautions follow-through/compliance;sequencing abilities  -     Impairments Affecting Function (Mobility) balance;endurance/activity tolerance;pain;strength;shortness of breath;postural/trunk control;range of motion (ROM)  -               User Key  (r) = Recorded By, (t) = Taken By, (c) = Cosigned By      Initials Name Provider Type    KF Chioma Nava OT Occupational Therapist                     Mobility/ADL's       Row Name 12/29/23 1501          Bed Mobility    Bed Mobility supine-sit  -     Supine-Sit [place] (Bed Mobility) minimum assist (75% patient effort);1 person assist;verbal cues;nonverbal cues (demo/gesture)  -     Bed Mobility, Safety Issues decreased use of arms for pushing/pulling;decreased use of legs for bridging/pushing;impaired trunk control for bed mobility  -     Assistive Device (Bed Mobility) bed rails;head of bed elevated  -     Comment, (Bed Mobility) Increased time and effort. Verbal/tactile cues for log roll technique to reduce back pain.  -       Row Name 12/29/23 1501          Transfers    Transfers sit-stand transfer;stand-sit transfer;toilet transfer  -     Comment, (Transfers) STS x1 from the EOB with Fatoumata and use of SPC. STS x1 from the commode and additionally x1 from the EOB using a RWx with CGA. Verbal cues for hand placement.  -       Row Name 12/29/23 1501          Sit-Stand Transfer    Sit-Stand [place] (Transfers) minimum assist (75% patient effort);contact guard;1 person assist;verbal cues  -     Assistive Device (Sit-Stand Transfers) cane, straight;walker, front-wheeled  -       Row Name 12/29/23 1501          Stand-Sit Transfer    Stand-Sit [place] (Transfers) contact guard;1 person assist;verbal cues  -     Assistive Device (Stand-Sit Transfers) cane, straight;walker,  front-wheeled  -       Row Name 12/29/23 1501          Toilet Transfer    Type (Toilet Transfer) sit-stand;stand-sit  -KF     [place] Level (Toilet Transfer) contact guard;1 person assist;verbal cues  -KF     Assistive Device (Toilet Transfer) walker, front-wheeled;grab bars/safety frame;commode  -       Row Name 12/29/23 1501          Functional Mobility    Functional Mobility- Ind. Level contact guard assist;1 person;verbal cues required  -KF     Functional Mobility- Device walker, front-wheeled  -KF     Functional Mobility- Comment Pt ambulated to the bathroom with CGA and use of a SPC, however pt observed reaching for furniture with off hand. While pt seated at commode, SPC switched for RWx. Pt with improved steadiness using RWx.  -       Row Name 12/29/23 1501          Activities of Daily Living    BADL Assessment/Intervention lower body dressing;grooming;toileting  -       Row Name 12/29/23 1501          Lower Body Dressing Assessment/Training    [place] Level (Lower Body Dressing) don;pants/bottoms;minimum assist (75% patient effort);shoes/slippers;set up  -KF     Position (Lower Body Dressing) edge of bed sitting;supported standing  -KF     Comment, (Lower Body Dressing) Pt with complaints of dizziness following donning of underwear and standing to completely don. Pt's vitals taken, stable and noted below. The pt reported quick resolution of sypmtoms with a seated rest break.  -       Row Name 12/29/23 1501          Grooming Assessment/Training    [place] Level (Grooming) oral care regimen;wash face, hands;contact guard assist  -KF     Position (Grooming) sink side  -       Row Name 12/29/23 1501          Toileting Assessment/Training    [place] Level (Toileting) adjust/manage clothing;perform perineal hygiene;contact guard assist  -KF     Assistive Devices (Toileting) commode;grab bar/safety frame  -KF     Position (Toileting) unsupported sitting;supported standing  -KF                User Key  (r) = Recorded By, (t) = Taken By, (c) = Cosigned By      Initials Name Provider Type    KF Chioma Nava OT Occupational Therapist                   Obj/Interventions       Row Name 12/29/23 1504          Balance    Balance Assessment sitting static balance;sitting dynamic balance;sit to stand dynamic balance;standing static balance;standing dynamic balance  -KF     Static Sitting Balance supervision  -KF     Dynamic Sitting Balance standby assist  -KF     Position, Sitting Balance unsupported;sitting edge of bed;other (see comments)  commode  -KF     Sit to Stand Dynamic Balance contact guard;minimal assist  -KF     Static Standing Balance contact guard  -KF     Dynamic Standing Balance contact guard;minimal assist  -KF     Position/Device Used, Standing Balance supported;cane, straight;walker, front-wheeled  -KF     Balance Interventions sitting;standing;sit to stand;supported;static;dynamic;occupation based/functional task  -KF     Comment, Balance No overt LOB  -KF               User Key  (r) = Recorded By, (t) = Taken By, (c) = Cosigned By      Initials Name Provider Type    KF Chioma Nava OT Occupational Therapist                   Goals/Plan    No documentation.                  Clinical Impression       Row Name 12/29/23 1505          Pain Assessment    Pretreatment Pain Rating 7/10  -KF     Posttreatment Pain Rating 7/10  -KF     Pain Location generalized  -KF     Pain Location - back  -KF     Pain Intervention(s) Repositioned;Ambulation/increased activity  -KF       Row Name 12/29/23 1506          Plan of Care Review    Plan of Care Reviewed With patient  -KF     Progress improving  -KF     Outcome Evaluation Pt with good participation and progress toward goals during OT session. The pt performed in room ambulation to the bathroom with Fatoumata using SPC, returning to the EOB using a RWx with CGA. The pt completed toileting and hand hygiene with CGA, though did require Fatoumata to  complete LBD. The pt continues to present below his functional baseline with acute pain, generalized weakness, decreased activity tolerance, and decreased safety awareness. The pt will benefit from continued IP OT services to address deficits listed. If deemed medically appropriate, recommend a d/c to IRF for best functional outcome.  -KF       Row Name 12/29/23 1507          Therapy Assessment/Plan (OT)    Rehab Potential (OT) good, to achieve stated therapy goals  -KF     Criteria for Skilled Therapeutic Interventions Met (OT) yes;skilled treatment is necessary  -KF     Therapy Frequency (OT) daily  -KF       Row Name 12/29/23 1503          Therapy Plan Review/Discharge Plan (OT)    Anticipated Discharge Disposition (OT) inpatient rehabilitation facility  -KF       Row Name 12/29/23 1501          Vital Signs    Intra Systolic BP Rehab 133  -KF     Intra Treatment Diastolic BP 61  -KF     Intratreatment Heart Rate (beats/min) 112  -KF     Intra SpO2 (%) 97  -KF     O2 Delivery Intra Treatment nasal cannula  -KF     Pre Patient Position Supine  -KF     Intra Patient Position Standing  -KF     Post Patient Position Sitting  -KF       Row Name 12/29/23 1502          Positioning and Restraints    Pre-Treatment Position in bed  -KF     Post Treatment Position bed  -KF     In Bed sitting EOB;with PT  -KF               User Key  (r) = Recorded By, (t) = Taken By, (c) = Cosigned By      Initials Name Provider Type    Chioma Zuniga, ANUSHA Occupational Therapist                   Outcome Measures       Row Name 12/29/23 4535          How much help from another is currently needed...    Putting on and taking off regular lower body clothing? 3  -KF     Bathing (including washing, rinsing, and drying) 3  -KF     Toileting (which includes using toilet bed pan or urinal) 3  -KF     Putting on and taking off regular upper body clothing 3  -KF     Taking care of personal grooming (such as brushing teeth) 3  -KF     Eating meals 4   -     AM-PAC 6 Clicks Score (OT) 19  -       Row Name 12/29/23 1509          Functional Assessment    Outcome Measure Options AM-PAC 6 Clicks Daily Activity (OT)  -               User Key  (r) = Recorded By, (t) = Taken By, (c) = Cosigned By      Initials Name Provider Type     Chioma Nava OT Occupational Therapist                    Occupational Therapy Education       Title: PT OT SLP Therapies (In Progress)       Topic: Occupational Therapy (In Progress)       Point: ADL training (Done)       Description:   Instruct learner(s) on proper safety adaptation and remediation techniques during self care or transfers.   Instruct in proper use of assistive devices.                  Learning Progress Summary             Patient Acceptance, E,TB, VU,DU by  at 12/29/2023 1353    Acceptance, E, NR by  at 12/26/2023 1531                         Point: Home exercise program (Not Started)       Description:   Instruct learner(s) on appropriate technique for monitoring, assisting and/or progressing therapeutic exercises/activities.                  Learner Progress:  Not documented in this visit.              Point: Precautions (Done)       Description:   Instruct learner(s) on prescribed precautions during self-care and functional transfers.                  Learning Progress Summary             Patient Acceptance, E,TB, VU,DU by  at 12/29/2023 1353    Acceptance, E, NR by  at 12/26/2023 1531                         Point: Body mechanics (Done)       Description:   Instruct learner(s) on proper positioning and spine alignment during self-care, functional mobility activities and/or exercises.                  Learning Progress Summary             Patient Acceptance, E,TB, VU,DU by  at 12/29/2023 1353    Acceptance, E, NR by  at 12/26/2023 1531                                         User Key       Initials Effective Dates Name Provider Type [facility] 10/14/22 -  Sandy Alarcon OT  Occupational Therapist OT    [facility] 08/09/23 -  Chioma Nava OT Occupational Therapist OT                  OT Recommendation and Plan  Therapy Frequency (OT): daily  Plan of Care Review  Plan of Care Reviewed With: patient  Progress: improving  Outcome Evaluation: Pt with good participation and progress toward goals during OT session. The pt performed in room ambulation to the bathroom with Fatoumata using SPC, returning to the EOB using a RWx with CGA. The pt completed toileting and hand hygiene with CGA, though did require Fatoumata to complete LBD. The pt continues to present below his functional baseline with acute pain, generalized weakness, decreased activity tolerance, and decreased safety awareness. The pt will benefit from continued IP OT services to address deficits listed. If deemed medically appropriate, recommend a d/c to IRF for best functional outcome.     Time Calculation:         Time Calculation- OT       Row Name 12/29/23 1509             Time Calculation- OT    OT Start Time 1353  -KF      OT Received On 12/29/23  -KF      OT Goal Re-Cert Due Date 01/05/23  -KF         Timed Charges    41301 - OT Therapeutic Activity Minutes 5  -KF      60405 - OT Self Care/Mgmt Minutes 18  -KF         Total Minutes    Timed Charges Total Minutes 23  -KF       Total Minutes 23  -KF                User Key  (r) = Recorded By, (t) = Taken By, (c) = Cosigned By      Initials Name Provider Type     Chioma Nava OT Occupational Therapist                  Therapy Charges for Today       Code Description Service Date Service Provider Modifiers Qty    52943316374 HC OT THERAPEUTIC ACT EA 15 MIN 12/29/2023 Chioma Nava OT GO 1    82566021561 HC OT SELF CARE/MGMT/TRAIN EA 15 MIN 12/29/2023 Chioma Nava OT GO 1                 Chioma Nava OT  12/29/2023

## 2023-12-29 NOTE — THERAPY TREATMENT NOTE
Patient Name: Val Nassar  : 1960    MRN: [id number]                              Today's Date: 2023       Admit Date: 2023    Visit Dx:     ICD-10-CM ICD-9-CM   1. Alcoholic cirrhosis of liver without ascites  K70.30 571.2   2. Severe anemia  D64.9 285.9   3. Cholecystitis  K81.9 575.10   4. LLQ abdominal pain  R10.32 789.04   5. Iron deficiency anemia due to chronic blood loss  D50.0 280.0   6. Duodenal ulcer  K26.9 532.90     Patient Active Problem List   Diagnosis    Anemia    Acute gastric ulcer with hemorrhage    Alcohol abuse, uncomplicated    Anxiety disorder, unspecified    Diverticulum of bladder    Duodenal ulcer, unspecified as acute or chronic, without hemorrhage or perforation    Elevation of level of transaminase and lactic acid dehydrogenase (LDH)    Gastro-esophageal reflux disease without esophagitis    Hyperglycemia, unspecified    Hyperlipidemia, unspecified    Melena    Nicotine dependence, cigarettes, uncomplicated    Tobacco use    Severe malnutrition     History reviewed. No pertinent past medical history.  Past Surgical History:   Procedure Laterality Date    ENDOSCOPY N/A 2023    Procedure: ESOPHAGOGASTRODUODENOSCOPY;  Surgeon: Wesly Mckeon MD;  Location: [facility] ENDOSCOPY;  Service: Gastroenterology;  Laterality: N/A;      General Information       Row Name 23 1357          Physical Therapy Time and Intention    Document Type therapy note (daily note)  -SD     Mode of Treatment physical therapy  -SD       Row Name 23 1357          General Information    Existing Precautions/Restrictions fall;oxygen therapy device and L/min  -SD       Row Name 23 1357          Cognition    Orientation Status (Cognition) oriented x 3  -SD       Row Name 23 1357          Safety Issues, Functional Mobility    Safety Issues Affecting Function (Mobility) insight into deficits/self-awareness;sequencing abilities;safety precaution awareness  -SD      Impairments Affecting Function (Mobility) balance;endurance/activity tolerance;pain;strength;shortness of breath;postural/trunk control;range of motion (ROM)  -SD               User Key  (r) = Recorded By, (t) = Taken By, (c) = Cosigned By      Initials Name Provider Type    Sakina Regan PT Physical Therapist                   Mobility       Row Name 12/29/23 1515          Bed Mobility    Comment, (Bed Mobility) seated EOB with OT  -SD       Row Name 12/29/23 1515          Transfers    Comment, (Transfers) Pt needs cues for safe hand placement  -SD       Row Name 12/29/23 1515          Sit-Stand Transfer    Sit-Stand [place] (Transfers) minimum assist (75% patient effort);contact guard;1 person assist;verbal cues  -SD     Assistive Device (Sit-Stand Transfers) walker, front-wheeled  -SD       Row Name 12/29/23 1515          Gait/Stairs (Locomotion)    [place] Level (Gait) contact guard;1 person assist  -SD     Assistive Device (Gait) walker, front-wheeled  -SD     Distance in Feet (Gait) 90  -SD     Deviations/Abnormal Patterns (Gait) bilateral deviations;mandy decreased;gait speed decreased;stride length decreased;weight shifting decreased;base of support, narrow  -SD     Bilateral Gait Deviations forward flexed posture;heel strike decreased  -SD     Comment, (Gait/Stairs) Pt amb with guarded gait pattern and very slow speed. Pt had no LOB, however distance limited by back pain and fatigue.  -SD               User Key  (r) = Recorded By, (t) = Taken By, (c) = Cosigned By      Initials Name Provider Type    Sakina Regan PT Physical Therapist                   Obj/Interventions    No documentation.                  Goals/Plan    No documentation.                  Clinical Impression       Row Name 12/29/23 1517          Pain    Pretreatment Pain Rating 7/10  -SD     Posttreatment Pain Rating 5/10  -SD     Pain Location - back  -SD     Pain Intervention(s) Ambulation/increased  activity;Environmental changes;Repositioned  -SD       Row Name 12/29/23 1517          Plan of Care Review    Plan of Care Reviewed With patient  -SD     Progress improving  -SD     Outcome Evaluation Pt able to increase gait distance this date however remains limited by weakness, pain, and decreased activity monique. Pt continues to be below baseline level of function for mobility and IPPT services remain warranted.  -SD       Row Name 12/29/23 1517          Positioning and Restraints    Pre-Treatment Position in bed  -SD     Post Treatment Position chair  -SD     In Chair notified nsg;reclined;call light within reach;encouraged to call for assist;exit alarm on;waffle cushion;heels elevated  -SD               User Key  (r) = Recorded By, (t) = Taken By, (c) = Cosigned By      Initials Name Provider Type    Sakina Regan PT Physical Therapist                   Outcome Measures       Row Name 12/29/23 1519          How much help from another person do you currently need...    Turning from your back to your side while in flat bed without using bedrails? 4  -SD     Moving from lying on back to sitting on the side of a flat bed without bedrails? 3  -SD     Moving to and from a bed to a chair (including a wheelchair)? 3  -SD     Standing up from a chair using your arms (e.g., wheelchair, bedside chair)? 3  -SD     Climbing 3-5 steps with a railing? 3  -SD     To walk in hospital room? 3  -SD     AM-PAC 6 Clicks Score (PT) 19  -SD     Highest Level of Mobility Goal 6 --> Walk 10 steps or more  -SD       Row Name 12/29/23 1519 12/29/23 1509       Functional Assessment    Outcome Measure Options AM-PAC 6 Clicks Basic Mobility (PT)  -SD AM-PAC 6 Clicks Daily Activity (OT)  -KF              User Key  (r) = Recorded By, (t) = Taken By, (c) = Cosigned By      Initials Name Provider Type    Sakina Regan PT Physical Therapist    Chioma Zuniga OT Occupational Therapist                                  Physical Therapy Education       Title: PT OT SLP Therapies (In Progress)       Topic: Physical Therapy (Done)       Point: Mobility training (Done)       Learning Progress Summary             Patient Acceptance, E, VU by SD at 12/29/2023 1519    Eager, E, VU,DU,NR by  at 12/26/2023 1608    Comment: Educated pt. safety/technique w/bed mobility (log roll), transfers, ambulation, PT POC                         Point: Home exercise program (Done)       Learning Progress Summary             Patient Acceptance, E, VU by SD at 12/29/2023 1519                         Point: Body mechanics (Done)       Learning Progress Summary             Patient Acceptance, E, VU by SD at 12/29/2023 1519    Eager, E, VU,DU,NR by  at 12/26/2023 1608    Comment: Educated pt. safety/technique w/bed mobility (log roll), transfers, ambulation, PT POC                         Point: Precautions (Done)       Learning Progress Summary             Patient Acceptance, E, VU by SD at 12/29/2023 1519    Eager, E, VU,DU,NR by  at 12/26/2023 1608    Comment: Educated pt. safety/technique w/bed mobility (log roll), transfers, ambulation, PT POC                                         User Key       Initials Effective Dates Name Provider Type Discipline    SD 03/13/23 -  Sakina Gibson, PT Physical Therapist PT     06/01/21 -  Ninfa Rowe PT Physical Therapist PT                  PT Recommendation and Plan     Plan of Care Reviewed With: patient  Progress: improving  Outcome Evaluation: Pt able to increase gait distance this date however remains limited by weakness, pain, and decreased activity monique. Pt continues to be below baseline level of function for mobility and IPPT services remain warranted.     Time Calculation:         PT Charges       Row Name 12/29/23 1519             Time Calculation    Start Time 1357  -SD      PT Received On 12/29/23  -SD      PT Goal Re-Cert Due Date 01/05/24  -SD         Time Calculation- PT    Total  Timed Code Minutes- PT 23 minute(s)  -SD         Timed Charges    85323 - Gait Training Minutes  23  -SD         Total Minutes    Timed Charges Total Minutes 23  -SD       Total Minutes 23  -SD                User Key  (r) = Recorded By, (t) = Taken By, (c) = Cosigned By      Initials Name Provider Type    Sakina Regan, PT Physical Therapist                  Therapy Charges for Today       Code Description Service Date Service Provider Modifiers Qty    26681796292 HC GAIT TRAINING EA 15 MIN 12/29/2023 Sakina Gibson, PT GP 2            PT G-Codes  Outcome Measure Options: AM-PAC 6 Clicks Basic Mobility (PT)  AM-PAC 6 Clicks Score (PT): 19  AM-PAC 6 Clicks Score (OT): 19  PT Discharge Summary  Anticipated Discharge Disposition (PT): inpatient rehabilitation facility    Sakina Gibson, APRIL  12/29/2023

## 2023-12-29 NOTE — CONSULTS
HEMATOLOGY/ONCOLOGY INPATIENT CONSULTATION      REFERRING PHYSICIAN: Garret Gibson MD    PRIMARY CARE PROVIDER: Provider, No Known    REASON FOR CONSULTATION: Newly diagnosed esophageal carcinoma    HISTORY OF PRESENT ILLNESS: 63-year-old male with a longstanding history of cirrhosis and recent diagnosis of esophageal cancer who is admitted with abdominal pain and found to have cholecystitis.    The patient recently relocated from [place], [place], where he was followed at [facility] by Dr. Raya Horne, to live with his sister in [facility] due to a recent diagnosis of esophageal cancer.  He reports undergoing an endoscopy with biopsy at [facility] confirming esophageal cancer approximately 3 months ago.  He has some physical limitations secondary to chronic sciatic pain and cirrhosis and wanted to relocate to be closer to family prior to pursuing any cancer directed therapy.  He does not recall meeting with medical oncologist or other specialists prior to his relocation.    He then developed abdominal pain prompting ER evaluation and is currently being treated for possible cholecystitis (not confirmed on HIDA scan) and upper GI bleed secondary to peptic ulcer disease with a hemoglobin of 6.9.    EGD performed 12/26/2023 showed an ulcerated mass in the upper third of the esophagus which was nonobstructing and partially circumferential spanning 2 cm in length.  Biopsy results are pending.    Additional findings include gastritis, portal hypertension gastropathy and nonbleeding duodenal ulcer.  Duodenal stenosis.  He reports that he has been tolerating regular diet with no obstructive symptoms prior to presentation.    He continues to feel weak but is clinically improving.  He tells me he is planning inpatient rehab on discharge.  His hemoglobin continues to improve and is 9.1 today.  CT of the abdomen pelvis in addition to the gallbladder findings showed no metastatic disease.  A chest x-ray this admission  showed mild interstitial edema.      No Known Allergies    History reviewed. No pertinent past medical history.      Current Facility-Administered Medications:     acetaminophen (TYLENOL) tablet 500 mg, 500 mg, Oral, Q6H PRN, Wesly Mckeon MD, 500 mg at 12/28/23 2035    sennosides-docusate (PERICOLACE) 8.6-50 MG per tablet 2 tablet, 2 tablet, Oral, BID, 2 tablet at 12/28/23 0816 **AND** polyethylene glycol (MIRALAX) packet 17 g, 17 g, Oral, Daily PRN **AND** bisacodyl (DULCOLAX) EC tablet 5 mg, 5 mg, Oral, Daily PRN **AND** bisacodyl (DULCOLAX) suppository 10 mg, 10 mg, Rectal, Daily PRN, Wesly Mckeon MD    Calcium Replacement - Follow Nurse / BPA Driven Protocol, , Does not apply, PRN, Wesly Mckeon MD    [Held by provider] carvedilol (COREG) tablet 3.125 mg, 3.125 mg, Oral, BID With Meals, Wesly Turner MD    doxycycline (MONODOX) capsule 100 mg, 100 mg, Oral, Q12H, Garret Gibson MD, 100 mg at 12/28/23 2035    folic acid (FOLVITE) tablet 1 mg, 1 mg, Oral, Daily, Wesly Mckeon MD, 1 mg at 12/28/23 0817    HYDROcodone-acetaminophen (NORCO) 5-325 MG per tablet 1 tablet, 1 tablet, Oral, Q4H PRN, Wesly Mckeon MD, 1 tablet at 12/26/23 1349    HYDROmorphone (DILAUDID) injection 0.5 mg, 0.5 mg, Intravenous, Q2H PRN **AND** naloxone (NARCAN) injection 0.4 mg, 0.4 mg, Intravenous, Q5 Min PRN, Wesly Mckeon MD    lactulose (CHRONULAC) 10 GM/15ML solution 20 g, 20 g, Oral, BID PRN, Wesly Mckeon MD    Lidocaine 4 % 1 patch, 1 patch, Transdermal, Q24H, Wesly Turner MD, 1 patch at 12/28/23 0818    Magnesium Standard Dose Replacement - Follow Nurse / BPA Driven Protocol, , Does not apply, PRN, Wesly Mckeon MD    melatonin tablet 5 mg, 5 mg, Oral, Nightly PRN, Wesly Mckeon MD, 5 mg at 12/27/23 2033    ondansetron (ZOFRAN) injection 4 mg, 4 mg, Intravenous, Q6H PRN, Jaymie Nash, APRN, 4 mg at 12/27/23 0410    pantoprazole (PROTONIX) injection 40 mg, 40 mg, Intravenous, Q12H, Wesly Mckeon MD, 40 mg at  12/28/23 2035    Pharmacy Consult - medication reconciliation request, , Does not apply, Daily, Wesly Mckeon MD    Phosphorus Replacement - Follow Nurse / BPA Driven Protocol, , Does not apply, Mike MIRANDA John A, MD    piperacillin-tazobactam (ZOSYN) 3.375 g in iso-osmotic dextrose 50 ml (premix), 3.375 g, Intravenous, Q8H, Jaymie Nash APRN, 3.375 g at 12/28/23 2036    Potassium Replacement - Follow Nurse / BPA Driven Protocol, , Does not apply, Mike MIRANDA John A, MD    sodium chloride 0.9 % flush 10 mL, 10 mL, Intravenous, PRN, Wesly Mckeon MD    sodium chloride 0.9 % flush 10 mL, 10 mL, Intravenous, Q12H, Wesly Mckeon MD, 10 mL at 12/28/23 2037    sodium chloride 0.9 % flush 10 mL, 10 mL, Intravenous, PRN, Wesly Mckeon MD    sodium chloride 0.9 % infusion 40 mL, 40 mL, Intravenous, PRN, Wesly Mckeon MD    [Held by provider] spironolactone (ALDACTONE) tablet 25 mg, 25 mg, Oral, Daily, Wesly Turner MD    [Held by provider] tamsulosin (FLOMAX) 24 hr capsule 0.4 mg, 0.4 mg, Oral, Daily, Wesly Turner MD    thiamine (B-1) injection 200 mg, 200 mg, Intravenous, Daily, Garret Gibson MD, 200 mg at 12/28/23 0817    vitamin B-12 (CYANOCOBALAMIN) tablet 1,000 mcg, 1,000 mcg, Oral, Daily, Wesly Mckeon MD, 1,000 mcg at 12/28/23 0817    Past Surgical History:   Procedure Laterality Date    ENDOSCOPY N/A 12/26/2023    Procedure: ESOPHAGOGASTRODUODENOSCOPY;  Surgeon: Wesly Mckeon MD;  Location: [facility] ENDOSCOPY;  Service: Gastroenterology;  Laterality: N/A;       Social History     Socioeconomic History    Marital status: Single   Tobacco Use    Smoking status: Every Day     Packs/day: 1.00     Years: 15.00     Additional pack years: 0.00     Total pack years: 15.00     Types: Cigarettes   Vaping Use    Vaping Use: Some days    Substances: Flavoring    Devices: Disposable   Substance and Sexual Activity    Alcohol use: Not Currently    Drug use: Never    Sexual activity: Defer       History  reviewed. No pertinent family history.    Oncology/Hematology History    No history exists.           Objective     Vitals:    12/28/23 1123 12/28/23 1457 12/28/23 1906 12/28/23 1933   BP: 129/56 122/61  137/67   BP Location: Left arm Left arm  Left arm   Patient Position: Lying Lying  Lying   Pulse: 92  70 62   Resp: 20 19 19   Temp: 98 °F (36.7 °C) 97.6 °F (36.4 °C)  98.8 °F (37.1 °C)   TempSrc: Oral Oral  Oral   SpO2:   97% 98%   Weight:       Height:                       Temp:  [97.2 °F (36.2 °C)-99.9 °F (37.7 °C)] 98.8 °F (37.1 °C)     Performance Status: 2    Physical Exam    General: well appearing male in no acute distress  HEENT: sclerae anicteric, oropharynx clear  Lymphatics: no cervical, supraclavicular, or axillary adenopathy  Cardiovascular: regular rate and rhythm, no murmurs  Lungs: clear to auscultation bilaterally  Abdomen: soft, nontender, nondistended.  No palpable organomegaly  Extremities: no lower extremity edema  Skin: no rashes, lesions, bruising, or petechiae      LABS:    Lab Results   Component Value Date    HGB 9.1 (L) 12/28/2023    HCT 28.5 (L) 12/28/2023    MCV 91.6 12/28/2023     12/28/2023    WBC 9.77 12/28/2023    NEUTROABS 6.24 12/28/2023    LYMPHSABS 0.85 12/28/2023    MONOSABS 2.53 (H) 12/28/2023    EOSABS 0.09 12/28/2023    BASOSABS 0.03 12/28/2023     Lab Results   Component Value Date    GLUCOSE 87 12/28/2023    BUN 7 (L) 12/28/2023    CREATININE 0.48 (L) 12/28/2023     12/28/2023    K 4.7 12/28/2023    K 4.7 12/28/2023     12/28/2023    CO2 25.0 12/28/2023    CALCIUM 9.0 12/28/2023    PROTEINTOT 6.1 12/28/2023    ALBUMIN 3.5 12/28/2023    BILITOT 0.8 12/28/2023    ALKPHOS 113 12/28/2023    AST 14 12/28/2023    ALT 7 12/28/2023         IMAGING    FL Video Swallow With Speech Single Contrast    Result Date: 12/28/2023  FL VIDEO SWALLOW W SPEECH SINGLE-CONTRAST, FL LIMITED UGI FOR MBS REFLUX SINGLE-CONTRAST Date of Exam: 12/28/2023 4:10 PM EST Indication:  dysphagia Comparison: None available. Technique:   The speech pathologist administered food and/or liquid mixed with barium to the patient with cine/video imaging.  Imaging assistance was provided to the speech pathologist and an image was saved. Fluoroscopic Time: 48 seconds Number of Images: 6 associated fluoroscopic loops were saved Findings: No aspiration was seen during fluoroscopic guided modified barium swallow series. A limited view of the esophagus with the patient in the seated lateral position demonstrated no signs of a high-grade focal esophageal stricture, or significant gastroesophageal reflux.     Impression: Fluoroscopy provided for modified barium swallow. No aspiration was seen during swallowing evaluation. Limited upper GI series appeared grossly normal. Please see speech therapy report for full details and recommendations. Electronically Signed: Ibrahima Jett MD  12/28/2023 5:31 PM EST  Workstation ID: [id number]    FL Limited Ugi For Mbs Reflux Single-Contrast    Result Date: 12/28/2023  FL VIDEO SWALLOW W SPEECH SINGLE-CONTRAST, FL LIMITED UGI FOR MBS REFLUX SINGLE-CONTRAST Date of Exam: 12/28/2023 4:10 PM EST Indication: dysphagia Comparison: None available. Technique:   The speech pathologist administered food and/or liquid mixed with barium to the patient with cine/video imaging.  Imaging assistance was provided to the speech pathologist and an image was saved. Fluoroscopic Time: 48 seconds Number of Images: 6 associated fluoroscopic loops were saved Findings: No aspiration was seen during fluoroscopic guided modified barium swallow series. A limited view of the esophagus with the patient in the seated lateral position demonstrated no signs of a high-grade focal esophageal stricture, or significant gastroesophageal reflux.     Impression: Fluoroscopy provided for modified barium swallow. No aspiration was seen during swallowing evaluation. Limited upper GI series appeared grossly normal. Please  see speech therapy report for full details and recommendations. Electronically Signed: Ibrahima Jett MD  12/28/2023 5:31 PM EST  Workstation ID: [id number]    NM HIDA SCAN WITHOUT PHARMACOLOGICAL INTERVENTION    Result Date: 12/27/2023  DATE OF EXAM: 12/27/2023 1:12 PM EST PROCEDURE: NM HIDA SCAN WITHOUT PHARMACOLOGICAL INTERVENTION INDICATIONS: Abdominal pain, upper, chronic, assess gallbladder motility Evaluate the patency of the biliary tree.  No cholecystokinin needed. COMPARISON: No comparisons available. TECHNIQUE: The patient received approximately 5 mCi of technetium 99m Choletec intravenously and images were obtained of the abdomen in the anterior projection through 60 minutes. FINDINGS: Normal uptake is noted within the hepatocytes with clearance of the blood pool by 5 minutes. Uptake is subsequently noted in the gallbladder by 10 minutes with uptake additionally noted into the common duct and small bowel.     1. Visualization of the gallbladder. No evidence of acute cholecystitis or biliary obstruction. Electronically Signed: Desmond Aviles MD  12/27/2023 2:26 PM EST  Workstation ID: [id number]    XR Abdomen KUB    Result Date: 12/26/2023  XR ABDOMEN KUB Date of Exam: 12/26/2023 8:46 PM EST Indication: abd distention vomiting Comparison: CT abdomen pelvis 12/25/2023 Findings: Gas-filled large and small bowel in a nonobstructive pattern, similar to yesterday's CT abdomen pelvis.     Impression: Gas-filled large and small bowel in a nonobstructive pattern, similar to yesterday's CT abdomen pelvis. Electronically Signed: Yoni Guadalupe MD  12/26/2023 9:24 PM EST  Workstation ID: [id number]    XR Chest 1 View    Result Date: 12/26/2023  XR CHEST 1 VW Date of Exam: 12/26/2023 8:38 PM EST Indication: increased oxygen demand Comparison: None available. Findings: Patient rotated slightly to the left. Heart size top normal. No pneumothorax. Mild interstitial thickening which may relate to a component of interstitial  edema. No pneumothorax. No focal consolidation. Asymmetric density at the left upper lung likely related to superimposition of the anterior left first rib. Central pulmonary vascular congestion.     Impression: Central pulmonary vascular congestion and mild interstitial thickening which may relate to mild interstitial edema. Electronically Signed: Richard Pinto MD  12/26/2023 9:22 PM EST  Workstation ID: [id number]    CT Abdomen Pelvis With Contrast    Result Date: 12/25/2023  CT ABDOMEN PELVIS W CONTRAST Date of Exam: 12/25/2023 8:36 PM EST Indication: LLQ abd pain, anemia.  cirrhosis, possible unspecified cancer dx (data deficient). Comparison: None available. Technique: Axial CT images were obtained of the abdomen and pelvis following the uneventful intravenous administration of 85 mL Isovue-300. Reconstructed coronal and sagittal images were also obtained. Automated exposure control and iterative construction methods were used. Findings: The lung bases are clear. There is mild nodularity liver surface which may indicate early cirrhosis. The bilateral adrenal glands, bilateral kidneys, pancreas and spleen are normal. There is diffuse wall thickening of the gallbladder with abnormal increased enhancement and surrounding pericholecystic fluid. The findings are concerning for acute cholecystitis. The common bile duct is nondilated. The abdominal and pelvic portion of the GI tract are normal aside from diverticulosis without diverticulitis. There is a large bladder diverticulum projecting posterior and left lateral from the posterior margin of the bladder wall. There are prostate calcifications. The osseous structures are within normal limits for a patient of this age.     Impression: Abnormal appearance of the gallbladder with wall thickening, abnormal gallbladder wall enhancement and pericholecystic fluid consistent with acute cholecystitis. Electronically Signed: Marvel Vines MD  12/25/2023 8:59 PM EST  Workstation  ID: [id number]      ASSESSMENT/PLAN:  Malignant neoplasm of the upper third of the esophagus  Acute GI blood loss anemia  Cirrhosis  Debility/weakness  -I reviewed the patient's hospital records, limited available outside records, endoscopy reports, surgery notes, and imaging reports.  -The patient has a new diagnosis of esophageal cancer with biopsies pending.  There is also notable finding of duodenal stricture pending biopsy results.  At this point he has been incompletely staged.  He is debilitated due to his acute infectious issues and recent GI bleeding.  Clinically he is improving.  He is anticipating rehab on hospital discharge.  -We discussed the presumptive diagnosis and rationale for further staging evaluation to determine treatment options.  He is currently debilitated and will need to functionally improve prior to therapy as well.  -Await results from pending pathology  -Will obtain CT of the chest to screen for obvious adenopathy or metastatic disease.  If negative would benefit from EUS (inpatient if can be coordinated during his stay, or can be referred for this as an outpatient) and outpatient PET after rehab. Based on those results will also consider additional consultation with thoracic surgery/radiation oncology as indicated.  -Will request records from outside facility.  He confirms that his outside diagnosis was an esophageal cancer and not laryngeal cancer as initially reported in the notes.        Katerina Ga MD    12/28/2023

## 2023-12-29 NOTE — PLAN OF CARE
Goal Outcome Evaluation:  Plan of Care Reviewed With: patient        Progress: improving  Outcome Evaluation: Remians on 2LNC. Melatonin given and slept most of the night. No acute distress. Cont on ABX therapy without adverse reaction noted. VSS. Cont current POC

## 2023-12-29 NOTE — PROGRESS NOTES
[facility] Medicine Services  PROGRESS NOTE    Patient Name: Val Nassar  : 1960  MRN: [id number]    Date of Admission: 2023  Primary Care Physician: Provider, No Known    Subjective   Subjective     CC:  Esophageal mass    HPI:  Patient is doing well this morning. Having some bloating and gas but otherwise no complaints.       Objective   Objective     Vital Signs:   Temp:  [97.3 °F (36.3 °C)-98.8 °F (37.1 °C)] 97.3 °F (36.3 °C)  Heart Rate:  [50-93] 93  Resp:  [18-20] 18  BP: (119-166)/(55-79) 166/79  Flow (L/min):  [2] 2     Physical Exam:  Constitutional: No acute distress, awake, alert  Respiratory: Clear to auscultation bilaterally, respiratory effort normal   Cardiovascular: RRR, no murmurs, rubs, or gallops  Gastrointestinal: Positive bowel sounds, soft, nontender, nondistended  Musculoskeletal: No bilateral ankle edema  Psychiatric: Appropriate affect, cooperative  Neurologic: Oriented x 3, no focal deficits        Results Reviewed:  LAB RESULTS:      Lab 23  0441 23  1812 23  1144 23  1143 23  0013 23  1458 23  0657 23  0149 23  1559 23  0848 23  1925   WBC 5.92  --  9.77  --   --   --   --  15.37*  --  6.51 4.56   HEMOGLOBIN 9.1* 9.1* 8.3*  --  6.9* 7.6*   < > 7.5*   < > 7.2* 5.6*   HEMATOCRIT 29.3* 28.5* 26.1*  --  21.6* 24.2*   < > 23.8*   < > 23.1* 19.0*   PLATELETS 147  --  148  --   --   --   --  190  --  196 222   NEUTROS ABS 3.30  --  6.24  --   --   --   --  11.60*  --   --  2.87   IMMATURE GRANS (ABS) 0.03  --  0.03  --   --   --   --  0.11*  --   --   --    LYMPHS ABS 0.89  --  0.85  --   --   --   --  0.63*  --   --   --    MONOS ABS 1.47*  --  2.53*  --   --   --   --  2.94*  --   --   --    EOS ABS 0.20  --  0.09  --   --   --   --  0.03  --   --  0.14   MCV 91.8  --  91.6  --   --   --   --  91.2  --  93.9 97.9*   PROCALCITONIN 0.71*  --   --  1.03*  --   --   --  0.40*  --   --   0.19   LACTATE  --   --   --   --   --   --   --  0.9  --   --  1.2   PROTIME  --   --   --   --   --   --   --   --   --   --  13.8   APTT  --   --   --   --   --   --   --   --   --   --  32.1*    < > = values in this interval not displayed.         Lab 12/29/23  0441 12/28/23  1143 12/27/23  1458 12/27/23  0149 12/26/23  0848 12/25/23 1925   SODIUM 134* 136  --  137 140 143   POTASSIUM 4.2 4.7  4.7 3.6 3.2* 3.9 4.1   CHLORIDE 101 102  --  102 109* 111*   CO2 23.0 25.0  --  24.0 22.0 23.0   ANION GAP 10.0 9.0  --  11.0 9.0 9.0   BUN 9 7*  --  4* 7* 10   CREATININE 0.66* 0.48*  --  0.53* 0.56* 0.59*   EGFR 104.7 115.3  --  111.9 110.1 108.3   GLUCOSE 113* 87  --  110* 125* 104*   CALCIUM 9.2 9.0  --  8.6 9.0 8.9   MAGNESIUM  --  1.7  --  1.7 1.4* 1.6   HEMOGLOBIN A1C  --   --   --   --   --  4.6*   TSH  --   --   --   --   --  1.070         Lab 12/29/23  0441 12/28/23  1143 12/27/23 0149 12/25/23 1925   TOTAL PROTEIN 6.6 6.1 6.3 6.4   ALBUMIN 3.2* 3.5 3.3* 3.4*   GLOBULIN 3.4 2.6 3.0 3.0   ALT (SGPT) 8 7 8 9   AST (SGOT) 15 14 16 15   BILIRUBIN 0.3 0.8 0.3 <0.2   ALK PHOS 112 113 90 93   LIPASE  --   --   --  75*         Lab 12/27/23 0149 12/25/23 1925   PROBNP 3,092.0*  --    HSTROP T  --  17   PROTIME  --  13.8   INR  --  1.05             Lab 12/25/23 2059 12/25/23 2023 12/25/23 2023 12/25/23 1925   IRON  --   --   --  12*   IRON SATURATION (TSAT)  --   --   --  2*   TIBC  --   --   --  532   TRANSFERRIN  --   --   --  357   FERRITIN  --   --   --  39.61   ABO TYPING A   < > A  --    RH TYPING Positive   < > Positive  --    ANTIBODY SCREEN  --   --  Negative  --     < > = values in this interval not displayed.         Brief Urine Lab Results  (Last result in the past 365 days)        Color   Clarity   Blood   Leuk Est   Nitrite   Protein   CREAT   Urine HCG        12/25/23 1932 Yellow   Clear   Negative   Negative   Negative   Negative                   Microbiology Results Abnormal       Procedure  Component Value - Date/Time    Blood Culture - Blood, Arm, Right [[id number]]  (Normal) Collected: 12/27/23 0149    Lab Status: Preliminary result Specimen: Blood from Arm, Right Updated: 12/29/23 0515     Blood Culture No growth at 2 days    Blood Culture - Blood, Hand, Right [[id number]]  (Normal) Collected: 12/27/23 0149    Lab Status: Preliminary result Specimen: Blood from Hand, Right Updated: 12/29/23 0515     Blood Culture No growth at 2 days    MRSA Screen, PCR (Inpatient) - Swab, Nares [[id number]]  (Normal) Collected: 12/27/23 0950    Lab Status: Final result Specimen: Swab from Nares Updated: 12/27/23 1130     MRSA PCR Negative    Narrative:      The negative predictive value of this diagnostic test is high and should only be used to consider de-escalating anti-MRSA therapy. A positive result may indicate colonization with MRSA and must be correlated clinically.  MRSA Negative    Respiratory Panel PCR w/COVID-19(SARS-CoV-2) ABDIAZIZ/TAVARES/HANH/PAD/COR/OTIS In-House, NP Swab in UTM/VTM, 2 HR TAT - Swab, Nasopharynx [[id number]]  (Normal) Collected: 12/26/23 2329    Lab Status: Final result Specimen: Swab from Nasopharynx Updated: 12/27/23 0104     ADENOVIRUS, PCR Not Detected     Coronavirus 229E Not Detected     Coronavirus HKU1 Not Detected     Coronavirus NL63 Not Detected     Coronavirus OC43 Not Detected     COVID19 Not Detected     Human Metapneumovirus Not Detected     Human Rhinovirus/Enterovirus Not Detected     Influenza A PCR Not Detected     Influenza B PCR Not Detected     Parainfluenza Virus 1 Not Detected     Parainfluenza Virus 2 Not Detected     Parainfluenza Virus 3 Not Detected     Parainfluenza Virus 4 Not Detected     RSV, PCR Not Detected     Bordetella pertussis pcr Not Detected     Bordetella parapertussis PCR Not Detected     Chlamydophila pneumoniae PCR Not Detected     Mycoplasma pneumo by PCR Not Detected    Narrative:      In the setting of a positive respiratory panel with a viral  infection PLUS a negative procalcitonin without other underlying concern for bacterial infection, consider observing off antibiotics or discontinuation of antibiotics and continue supportive care. If the respiratory panel is positive for atypical bacterial infection (Bordetella pertussis, Chlamydophila pneumoniae, or Mycoplasma pneumoniae), consider antibiotic de-escalation to target atypical bacterial infection.            XR Chest 1 View    Result Date: 12/29/2023  XR CHEST 1 VW Date of Exam: 12/29/2023 12:47 AM EST Indication: productive cough, leukocytosis Comparison: December 26, 2023 Findings: Heart size appears unchanged. There is atherosclerosis of the aortic arch. No definite pneumothorax or effusion. There is prominence of pulmonary vasculature. There is mild prominence of interstitial markings, mildly decreased compared to the prior exam.  No significant new focal infiltrate.     Impression: Impression: Mild prominence of interstitial markings, mildly decreased compared to the prior exam. Findings could be related to mild edema or atypical infection. Electronically Signed: Carlos Enrique King  12/29/2023 7:39 AM EST  Workstation ID: [id number]    FL Video Swallow With Speech Single Contrast    Result Date: 12/28/2023  FL VIDEO SWALLOW W SPEECH SINGLE-CONTRAST, FL LIMITED UGI FOR MBS REFLUX SINGLE-CONTRAST Date of Exam: 12/28/2023 4:10 PM EST Indication: dysphagia Comparison: None available. Technique:   The speech pathologist administered food and/or liquid mixed with barium to the patient with cine/video imaging.  Imaging assistance was provided to the speech pathologist and an image was saved. Fluoroscopic Time: 48 seconds Number of Images: 6 associated fluoroscopic loops were saved Findings: No aspiration was seen during fluoroscopic guided modified barium swallow series. A limited view of the esophagus with the patient in the seated lateral position demonstrated no signs of a high-grade focal esophageal  stricture, or significant gastroesophageal reflux.     Impression: Impression: Fluoroscopy provided for modified barium swallow. No aspiration was seen during swallowing evaluation. Limited upper GI series appeared grossly normal. Please see speech therapy report for full details and recommendations. Electronically Signed: Ibrahima Jett MD  12/28/2023 5:31 PM EST  Workstation ID: [id number]    FL Limited Ugi For Mbs Reflux Single-Contrast    Result Date: 12/28/2023  FL VIDEO SWALLOW W SPEECH SINGLE-CONTRAST, FL LIMITED UGI FOR MBS REFLUX SINGLE-CONTRAST Date of Exam: 12/28/2023 4:10 PM EST Indication: dysphagia Comparison: None available. Technique:   The speech pathologist administered food and/or liquid mixed with barium to the patient with cine/video imaging.  Imaging assistance was provided to the speech pathologist and an image was saved. Fluoroscopic Time: 48 seconds Number of Images: 6 associated fluoroscopic loops were saved Findings: No aspiration was seen during fluoroscopic guided modified barium swallow series. A limited view of the esophagus with the patient in the seated lateral position demonstrated no signs of a high-grade focal esophageal stricture, or significant gastroesophageal reflux.     Impression: Impression: Fluoroscopy provided for modified barium swallow. No aspiration was seen during swallowing evaluation. Limited upper GI series appeared grossly normal. Please see speech therapy report for full details and recommendations. Electronically Signed: Ibrahima Jett MD  12/28/2023 5:31 PM EST  Workstation ID: [id number]    NM HIDA SCAN WITHOUT PHARMACOLOGICAL INTERVENTION    Result Date: 12/27/2023  DATE OF EXAM: 12/27/2023 1:12 PM EST PROCEDURE: NM HIDA SCAN WITHOUT PHARMACOLOGICAL INTERVENTION INDICATIONS: Abdominal pain, upper, chronic, assess gallbladder motility Evaluate the patency of the biliary tree.  No cholecystokinin needed. COMPARISON: No comparisons available. TECHNIQUE: The patient received  approximately 5 mCi of technetium 99m Choletec intravenously and images were obtained of the abdomen in the anterior projection through 60 minutes. FINDINGS: Normal uptake is noted within the hepatocytes with clearance of the blood pool by 5 minutes. Uptake is subsequently noted in the gallbladder by 10 minutes with uptake additionally noted into the common duct and small bowel.     Impression: 1. Visualization of the gallbladder. No evidence of acute cholecystitis or biliary obstruction. Electronically Signed: Desmond Aviles MD  12/27/2023 2:26 PM EST  Workstation ID: [id number]         Current medications:  Scheduled Meds:[Held by provider] carvedilol, 3.125 mg, Oral, BID With Meals  doxycycline, 100 mg, Oral, Q12H  folic acid, 1 mg, Oral, Daily  Lidocaine, 1 patch, Transdermal, Q24H  pantoprazole, 40 mg, Intravenous, Q12H  pharmacy consult - MTM, , Does not apply, Daily  piperacillin-tazobactam, 3.375 g, Intravenous, Q8H  senna-docusate sodium, 2 tablet, Oral, BID  sodium chloride, 10 mL, Intravenous, Q12H  [Held by provider] spironolactone, 25 mg, Oral, Daily  [Held by provider] tamsulosin, 0.4 mg, Oral, Daily  thiamine (B-1) IV, 200 mg, Intravenous, Daily  vitamin B-12, 1,000 mcg, Oral, Daily      Continuous Infusions:   PRN Meds:.  acetaminophen    senna-docusate sodium **AND** polyethylene glycol **AND** bisacodyl **AND** bisacodyl    Calcium Replacement - Follow Nurse / BPA Driven Protocol    HYDROcodone-acetaminophen    HYDROmorphone **AND** naloxone    lactulose    Magnesium Standard Dose Replacement - Follow Nurse / BPA Driven Protocol    melatonin    ondansetron    Phosphorus Replacement - Follow Nurse / BPA Driven Protocol    Potassium Replacement - Follow Nurse / BPA Driven Protocol    sodium chloride    sodium chloride    sodium chloride    Assessment & Plan   Assessment & Plan     Active Hospital Problems    Diagnosis  POA    **Anemia [D64.9]  Yes    Severe malnutrition [E43]  Yes      Resolved  Hospital Problems   No resolved problems to display.        Brief Hospital Course to date:  Val Nassar is a 63 y.o. male with history of PUD, cirrhosis, newly diagnosed laryngeal cancer and prior alcohol abuse presented with abdominal pain, melena and anemia.     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  - Oncology following. Will need outpatient PET scan for further staging and follow-up with medical oncology, radiation and thoracic surgery pending those results. May also require EUS.   - PET arranged tentatively in 3 weeks due to inpatient rehab stay.     Atypical pneumonia  Fever, resolved  - HIDA negative  - new productive cough, procalcitonin elevated  - negative respiratory PCR panel, UA bland  - Reviewed CXR, mild prominence of interstitial markings compared to prior, possibly atypical infection.   - Will dc zosyn and continue doxycycline. Completed 2 days of zosyn.   - Currently on day 2 of zosyn.      Duodenal ulcer  Duodenal stenosis   Anemia, iron deficient  - Hemoglobin with appropriate response to transfusion yesterday and remains stable this morning.   - s/p 3 units PRBCs   - s/p IV iron x 3 days  - EGD showed esophagitis and one non-bleeding cratered duodenal ulcer (along with esophageal mass) and congestive gastropathy.  No varices noted.   - will need to ensure colonoscopy up to date  - Continue PPI BID  - no NSAIDs     Abdominal pain  - abnormal appearance of GB on CT imaging  - HIDA normal  - general surgery states this does not represent cholecystitis  - General surgery followed, can follow-up in 2 weeks with Dr. Aguilera      Cirrhosis  Alcohol abuse  - patient says he has been abstinent for the past 6 weeks  - thiamine     Tobacco abuse  -  cessation       Expected Discharge Location and Transportation: rehab  Expected Discharge   Expected Discharge Date: 12/29/2023; Expected Discharge Time:      DVT  prophylaxis:  Mechanical DVT prophylaxis orders are present.     AM-PAC 6 Clicks Score (PT): 17 (12/28/23 2020)    CODE STATUS:   Code Status and Medical Interventions:   Ordered at: 12/25/23 0638     Code Status (Patient has no pulse and is not breathing):    CPR (Attempt to Resuscitate)     Medical Interventions (Patient has pulse or is breathing):    Full Support     This patient's problems and plans were partially entered by my partner and updated as appropriate by me 12/29/23. Today is my first day evaluating this patient's active medical problems. I Personally reviewed chart and adjusted note to reflect daily changes in management/clinical condition. Copied text in this note has been reviewed and is accurate as of  12/29/23      Pauly Mallory MD  12/29/23

## 2023-12-29 NOTE — PROGRESS NOTES
"HEMATOLOGY/ONCOLOGY PROGRESS NOTE    S: Doing well.  Tolerating diet advancement.  He has questions regarding rehabilitation options.  He is hoping to get stronger before returning home to live with his sister.  He has questions regarding  cancer directed therapy options.    Past medical history, social history and family history was reviewed and unchanged from prior visit.    Medications:  The current medication list was reviewed in the EMR    ALLERGIES:  No Known Allergies      Physical Exam    VITAL SIGNS:  /64 (BP Location: Right arm, Patient Position: Lying)   Pulse 67   Temp 97.2 °F (36.2 °C) (Oral)   Resp 18   Ht 175.3 cm (69\")   Wt 66.7 kg (147 lb)   SpO2 98%   BMI 21.71 kg/m²   Temp:  [97.2 °F (36.2 °C)-98.8 °F (37.1 °C)] 97.2 °F (36.2 °C)      Performance Status: 2                Physical Exam    General: well appearing, in no acute distress  HEENT: sclerae anicteric, oropharynx clear, neck is supple  Lymphatics: no cervical, supraclavicular, or axillary adenopathy  Cardiovascular: regular rate and rhythm, no murmurs, rubs or gallops  Lungs: clear to auscultation bilaterally  Abdomen: soft, nontender, nondistended.  No palpable organomegaly  Extremities: no lower extremity edema  Skin: no rashes, lesions, bruising, or petechiae  Psych: Mood is stable        RECENT LABS:    Lab Results   Component Value Date    HGB 9.1 (L) 12/29/2023    HCT 29.3 (L) 12/29/2023    MCV 91.8 12/29/2023     12/29/2023    WBC 5.92 12/29/2023    NEUTROABS 3.30 12/29/2023    LYMPHSABS 0.89 12/29/2023    MONOSABS 1.47 (H) 12/29/2023    EOSABS 0.20 12/29/2023    BASOSABS 0.03 12/29/2023       Lab Results   Component Value Date    GLUCOSE 113 (H) 12/29/2023    BUN 9 12/29/2023    CREATININE 0.66 (L) 12/29/2023     (L) 12/29/2023    K 4.2 12/29/2023     12/29/2023    CO2 23.0 12/29/2023    CALCIUM 9.2 12/29/2023    PROTEINTOT 6.6 12/29/2023    ALBUMIN 3.2 (L) 12/29/2023    BILITOT 0.3 12/29/2023    " ALKPHOS 112 12/29/2023    AST 15 12/29/2023    ALT 8 12/29/2023     Esophageal mass biopsy at 20 cm confirmed invasive squamous cell carcinoma.    Assessment/Plan  Malignant neoplasm of the upper third of the esophagus  Acute GI blood loss anemia  Cirrhosis  Debility/weakness  -I reviewed the patient's hospital records, limited available outside records, endoscopy reports, surgery notes, and imaging reports.  I have requested his outside records.  -The patient has a new diagnosis of esophageal cancer with biopsy reviewed today with the patient and Dr. Mckeon and confirming esophageal squamous cell [place]. At this point he has been incompletely staged.  A chest CT is pending.  He has no obvious intra-abdominal metastatic disease.  He is debilitated due to his acute infectious issues and recent GI bleeding.  Clinically he is improving.  He is anticipating rehab on hospital discharge.   -We discussed the diagnosis and rationale for further staging evaluation to determine treatment options.  He is currently debilitated and will need to functionally improve prior to therapy as well.  -Will obtain CT of the chest to screen for obvious adenopathy or metastatic disease.  This is pending.    -Plan for outpatient PET and follow-up with medical oncology, radiation oncology, and thoracic surgery pending those results.  May require EUS for completion of staging pending those results as well.  I am going to tentatively arrange a PET/CT in 3 weeks given his planned inpatient rehab stay and will follow-up with myself, radiation oncology, and thoracic surgery afterwards to determine potential treatment plan.  Given high esophageal location, we discussed potential role of definitive chemoradiation if no metastatic disease and pending thoracic surgery input.      Katerina Ga MD  [facility]    12/29/2023

## 2023-12-29 NOTE — PLAN OF CARE
Goal Outcome Evaluation:  Plan of Care Reviewed With: patient        Progress: improving  Outcome Evaluation: Pt with good participation and progress toward goals during OT session. The pt performed in room ambulation to the bathroom with Fatoumata using SPC, returning to the EOB using a RWx with CGA. The pt completed toileting and hand hygiene with CGA, though did require Fatoumata to complete LBD. The pt continues to present below his functional baseline with acute pain, generalized weakness, decreased activity tolerance, and decreased safety awareness. The pt will benefit from continued IP OT services to address deficits listed. If deemed medically appropriate, recommend a d/c to IRF for best functional outcome.      Anticipated Discharge Disposition (OT): inpatient rehabilitation facility

## 2023-12-29 NOTE — PROGRESS NOTES
"GI Daily Progress Note  Subjective:    Chief Complaint:  Follow up esophageal mass     Denies any complaints.  Contemplating options for physical rehab     Objective:    /64 (BP Location: Right arm, Patient Position: Lying)   Pulse 67   Temp 97.2 °F (36.2 °C) (Oral)   Resp 18   Ht 175.3 cm (69\")   Wt 66.7 kg (147 lb)   SpO2 98%   BMI 21.71 kg/m²     Physical Exam  Constitutional:       General: He is not in acute distress.  Cardiovascular:      Rate and Rhythm: Normal rate and regular rhythm.   Pulmonary:      Effort: Pulmonary effort is normal. No respiratory distress.   Abdominal:      General: Bowel sounds are normal. There is no distension.      Palpations: Abdomen is soft.      Tenderness: There is no abdominal tenderness.   Neurological:      Mental Status: He is alert and oriented to person, place, and time.   Psychiatric:         Behavior: Behavior normal.         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     Duodenal ulcer, clean based  Duodenal stenosis, acquired.   Liver cirrhosis without ascites, appears compensated.   MELD-Na is 7.   Child Sweeney Class A (6 points)  Iron deficiency anemia     Plan:    Discussed pathology results with patient.   Oncology is now following.        >> Liver disease appears compensated at this time.   He voices he has maintained sobriety from all alcohol for 2 months.  He " is immune to hepatitis A and hepatitis B.    Continue high protein diet with emphasis on a protein bedtime snack.     >> BID PPI   >> Avoid all NSAIDs    Management of esophageal cancer per oncology.  Appreciate Dr. Ga.     Patient to follow up outpatient with [facility] in 6-8 weeks.       Please call for any concerns.      FERNANDA Banda  12/29/23  12:41 EST

## 2023-12-30 PROCEDURE — 25010000002 PIPERACILLIN SOD-TAZOBACTAM PER 1 G: Performed by: NURSE PRACTITIONER

## 2023-12-30 PROCEDURE — 25010000002 THIAMINE PER 100 MG: Performed by: INTERNAL MEDICINE

## 2023-12-30 PROCEDURE — 25010000002 HYDROMORPHONE PER 4 MG: Performed by: INTERNAL MEDICINE

## 2023-12-30 RX ORDER — OXYCODONE HYDROCHLORIDE 5 MG/1
5 TABLET ORAL EVERY 4 HOURS PRN
Status: DISCONTINUED | OUTPATIENT
Start: 2023-12-30 | End: 2024-01-03 | Stop reason: HOSPADM

## 2023-12-30 RX ORDER — ACETAMINOPHEN 500 MG
1000 TABLET ORAL 3 TIMES DAILY
Status: DISPENSED | OUTPATIENT
Start: 2023-12-30 | End: 2024-01-01

## 2023-12-30 RX ADMIN — Medication 10 ML: at 21:00

## 2023-12-30 RX ADMIN — PIPERACILLIN SODIUM AND TAZOBACTAM SODIUM 3.38 G: 3; .375 INJECTION, SOLUTION INTRAVENOUS at 15:08

## 2023-12-30 RX ADMIN — OXYCODONE HYDROCHLORIDE 5 MG: 5 TABLET ORAL at 19:30

## 2023-12-30 RX ADMIN — CYANOCOBALAMIN TAB 1000 MCG 1000 MCG: 1000 TAB at 08:43

## 2023-12-30 RX ADMIN — THIAMINE HYDROCHLORIDE 200 MG: 100 INJECTION, SOLUTION INTRAMUSCULAR; INTRAVENOUS at 08:42

## 2023-12-30 RX ADMIN — ACETAMINOPHEN 1000 MG: 500 TABLET ORAL at 08:43

## 2023-12-30 RX ADMIN — OXYCODONE HYDROCHLORIDE 5 MG: 5 TABLET ORAL at 08:47

## 2023-12-30 RX ADMIN — Medication 5 MG: at 21:21

## 2023-12-30 RX ADMIN — FOLIC ACID 1 MG: 1 TABLET ORAL at 08:43

## 2023-12-30 RX ADMIN — ACETAMINOPHEN 1000 MG: 500 TABLET ORAL at 19:33

## 2023-12-30 RX ADMIN — HYDROMORPHONE HYDROCHLORIDE 0.5 MG: 1 INJECTION, SOLUTION INTRAMUSCULAR; INTRAVENOUS; SUBCUTANEOUS at 05:03

## 2023-12-30 RX ADMIN — PANTOPRAZOLE SODIUM 40 MG: 40 INJECTION, POWDER, LYOPHILIZED, FOR SOLUTION INTRAVENOUS at 08:50

## 2023-12-30 RX ADMIN — PIPERACILLIN SODIUM AND TAZOBACTAM SODIUM 3.38 G: 3; .375 INJECTION, SOLUTION INTRAVENOUS at 05:35

## 2023-12-30 RX ADMIN — DOXYCYCLINE 100 MG: 100 CAPSULE ORAL at 08:43

## 2023-12-30 RX ADMIN — PANTOPRAZOLE SODIUM 40 MG: 40 INJECTION, POWDER, LYOPHILIZED, FOR SOLUTION INTRAVENOUS at 21:21

## 2023-12-30 RX ADMIN — HYDROMORPHONE HYDROCHLORIDE 0.5 MG: 1 INJECTION, SOLUTION INTRAMUSCULAR; INTRAVENOUS; SUBCUTANEOUS at 02:11

## 2023-12-30 RX ADMIN — OXYCODONE HYDROCHLORIDE 5 MG: 5 TABLET ORAL at 15:08

## 2023-12-30 RX ADMIN — DOXYCYCLINE 100 MG: 100 CAPSULE ORAL at 21:21

## 2023-12-30 RX ADMIN — LIDOCAINE 1 PATCH: 4 PATCH TOPICAL at 08:44

## 2023-12-30 RX ADMIN — Medication 10 ML: at 08:44

## 2023-12-30 RX ADMIN — ACETAMINOPHEN 1000 MG: 500 TABLET ORAL at 15:08

## 2023-12-30 NOTE — PROGRESS NOTES
"Patient Name:  Val Nassar  YOB: 1960  [id number]    Surgery Progress Note    Date of visit: 12/30/2023    Subjective   Subjective: Feels OK, tolertaing PO, had 2 BM's yesterday.         Objective     Objective:     /57 (BP Location: Right arm, Patient Position: Lying)   Pulse 60   Temp 98 °F (36.7 °C) (Oral)   Resp 16   Ht 175.3 cm (69\")   Wt 66.7 kg (147 lb)   SpO2 97%   BMI 21.71 kg/m²     Intake/Output Summary (Last 24 hours) at 12/30/2023 0711  Last data filed at 12/30/2023 0400  Gross per 24 hour   Intake 1290 ml   Output 1300 ml   Net -10 ml       CV:  Rhythm  regular and rate regular   L:  Clear  to auscultation bilaterally   Abd:  Bowel sounds positive , soft, mildly tender in RUQ  Ext:  No cyanosis, clubbing, edema    Recent labs that are back at this time have been reviewed.            Assessment/ Plan:    Problem List Items Addressed This Visit          Hematology and Neoplasia    * (Principal) Anemia- Likely related to his known esophageal CA. He will need outpatient referral to [facility], as no surgeons here treat esophageal CA. Continue current management. HGB has been stable since transfusion.      Relevant Medications    folic acid (FOLVITE) 1 MG tablet    vitamin B-12 (CYANOCOBALAMIN) 1000 MCG tablet    vitamin B-12 (CYANOCOBALAMIN) tablet 1,000 mcg    folic acid (FOLVITE) tablet 1 mg    Other Relevant Orders    Case Request (Completed)    Tissue Pathology Exam (Completed)     Other Visit Diagnoses       Alcoholic cirrhosis of liver without ascites    -  Primary    Relevant Orders    Ambulatory Referral to Gastroenterology    Severe anemia        Relevant Medications    folic acid (FOLVITE) 1 MG tablet    vitamin B-12 (CYANOCOBALAMIN) 1000 MCG tablet    vitamin B-12 (CYANOCOBALAMIN) tablet 1,000 mcg    folic acid (FOLVITE) tablet 1 mg    ferric gluconate (FERRLECIT)125 MG in sodium chloride 0.9 % 100 mL IVPB (Completed)    Cholecystitis        LLQ abdominal pain     "    Duodenal ulcer        Relevant Medications    sucralfate (CARAFATE) 1 g tablet    pantoprazole (PROTONIX) 40 MG EC tablet    famotidine (PEPCID) 20 MG tablet    pantoprazole (PROTONIX) injection 40 mg    Other Relevant Orders    Ambulatory Referral to Gastroenterology             Active Hospital Problems    Diagnosis  POA    **Anemia [D64.9]  Yes    Severe malnutrition [E43]  Yes      Resolved Hospital Problems   No resolved problems to display.              Carlos Enrique Alvarenga MD  12/30/2023  07:11 EST

## 2023-12-30 NOTE — PLAN OF CARE
Goal Outcome Evaluation:pt called out frequently for dilaudid with pain rated at 7 or 8. Vss,remained on room air throughout the night. Continue with plan of care

## 2023-12-30 NOTE — PROGRESS NOTES
[facility] Medicine Services  PROGRESS NOTE    Patient Name: Val Nassar  : 1960  MRN: [id number]    Date of Admission: 2023  Primary Care Physician: Provider, No Known    Subjective   Subjective     CC:   Esophageal cancer    HPI:  No complaints this morning. Did request pain medications overnight. Pain controlled this morning.      Objective   Objective     Vital Signs:   Temp:  [97.2 °F (36.2 °C)-98.4 °F (36.9 °C)] 98.4 °F (36.9 °C)  Heart Rate:  [] 50  Resp:  [16-18] 18  BP: (107-145)/(53-64) 121/62     Physical Exam:  Constitutional: No acute distress, awake, alert, sitting in bedside chair  Respiratory: Clear to auscultation bilaterally, respiratory effort normal on room air  Cardiovascular: RRR  Gastrointestinal: Positive bowel sounds, soft, nontender, nondistended  Musculoskeletal: No bilateral ankle edema  Psychiatric: Appropriate affect, cooperative  Neurologic: Oriented x 3, no focal deficits        Results Reviewed:  LAB RESULTS:      Lab 23  0441 23  1812 23  1144 23  1143 23  0013 23  1458 23  0657 23  0149 23  1559 23  0848 23  1925   WBC 5.92  --  9.77  --   --   --   --  15.37*  --  6.51 4.56   HEMOGLOBIN 9.1* 9.1* 8.3*  --  6.9* 7.6*   < > 7.5*   < > 7.2* 5.6*   HEMATOCRIT 29.3* 28.5* 26.1*  --  21.6* 24.2*   < > 23.8*   < > 23.1* 19.0*   PLATELETS 147  --  148  --   --   --   --  190  --  196 222   NEUTROS ABS 3.30  --  6.24  --   --   --   --  11.60*  --   --  2.87   IMMATURE GRANS (ABS) 0.03  --  0.03  --   --   --   --  0.11*  --   --   --    LYMPHS ABS 0.89  --  0.85  --   --   --   --  0.63*  --   --   --    MONOS ABS 1.47*  --  2.53*  --   --   --   --  2.94*  --   --   --    EOS ABS 0.20  --  0.09  --   --   --   --  0.03  --   --  0.14   MCV 91.8  --  91.6  --   --   --   --  91.2  --  93.9 97.9*   PROCALCITONIN 0.71*  --   --  1.03*  --   --   --  0.40*  --   --  0.19    LACTATE  --   --   --   --   --   --   --  0.9  --   --  1.2   PROTIME  --   --   --   --   --   --   --   --   --   --  13.8   APTT  --   --   --   --   --   --   --   --   --   --  32.1*    < > = values in this interval not displayed.         Lab 12/29/23  0441 12/28/23  1143 12/27/23  1458 12/27/23  0149 12/26/23  0848 12/25/23 1925   SODIUM 134* 136  --  137 140 143   POTASSIUM 4.2 4.7  4.7 3.6 3.2* 3.9 4.1   CHLORIDE 101 102  --  102 109* 111*   CO2 23.0 25.0  --  24.0 22.0 23.0   ANION GAP 10.0 9.0  --  11.0 9.0 9.0   BUN 9 7*  --  4* 7* 10   CREATININE 0.66* 0.48*  --  0.53* 0.56* 0.59*   EGFR 104.7 115.3  --  111.9 110.1 108.3   GLUCOSE 113* 87  --  110* 125* 104*   CALCIUM 9.2 9.0  --  8.6 9.0 8.9   MAGNESIUM  --  1.7  --  1.7 1.4* 1.6   HEMOGLOBIN A1C  --   --   --   --   --  4.6*   TSH  --   --   --   --   --  1.070         Lab 12/29/23  0441 12/28/23  1143 12/27/23  0149 12/25/23 1925   TOTAL PROTEIN 6.6 6.1 6.3 6.4   ALBUMIN 3.2* 3.5 3.3* 3.4*   GLOBULIN 3.4 2.6 3.0 3.0   ALT (SGPT) 8 7 8 9   AST (SGOT) 15 14 16 15   BILIRUBIN 0.3 0.8 0.3 <0.2   ALK PHOS 112 113 90 93   LIPASE  --   --   --  75*         Lab 12/27/23  0149 12/25/23 1925   PROBNP 3,092.0*  --    HSTROP T  --  17   PROTIME  --  13.8   INR  --  1.05             Lab 12/29/23  0441 12/25/23 2059 12/25/23 2023 12/25/23 2023 12/25/23 1925   IRON  --   --   --   --  12*   IRON SATURATION (TSAT)  --   --   --   --  2*   TIBC  --   --   --   --  532   TRANSFERRIN  --   --   --   --  357   FERRITIN  --   --   --   --  39.61   FOLATE >20.00  --   --   --   --    VITAMIN B 12 808  --   --   --   --    ABO TYPING  --  A   < > A  --    RH TYPING  --  Positive   < > Positive  --    ANTIBODY SCREEN  --   --   --  Negative  --     < > = values in this interval not displayed.         Brief Urine Lab Results  (Last result in the past 365 days)        Color   Clarity   Blood   Leuk Est   Nitrite   Protein   CREAT   Urine HCG        12/25/23 1932  Yellow   Clear   Negative   Negative   Negative   Negative                   Microbiology Results Abnormal       Procedure Component Value - Date/Time    Blood Culture - Blood, Arm, Right [[id number]]  (Normal) Collected: 12/27/23 0149    Lab Status: Preliminary result Specimen: Blood from Arm, Right Updated: 12/30/23 0515     Blood Culture No growth at 3 days    Blood Culture - Blood, Hand, Right [[id number]]  (Normal) Collected: 12/27/23 0149    Lab Status: Preliminary result Specimen: Blood from Hand, Right Updated: 12/30/23 0515     Blood Culture No growth at 3 days    MRSA Screen, PCR (Inpatient) - Swab, Nares [[id number]]  (Normal) Collected: 12/27/23 0950    Lab Status: Final result Specimen: Swab from Nares Updated: 12/27/23 1130     MRSA PCR Negative    Narrative:      The negative predictive value of this diagnostic test is high and should only be used to consider de-escalating anti-MRSA therapy. A positive result may indicate colonization with MRSA and must be correlated clinically.  MRSA Negative    Respiratory Panel PCR w/COVID-19(SARS-CoV-2) ABDIAZIZ/TAVARES/HANH/PAD/COR/OTIS In-House, NP Swab in UTM/VTM, 2 HR TAT - Swab, Nasopharynx [[id number]]  (Normal) Collected: 12/26/23 2329    Lab Status: Final result Specimen: Swab from Nasopharynx Updated: 12/27/23 0104     ADENOVIRUS, PCR Not Detected     Coronavirus 229E Not Detected     Coronavirus HKU1 Not Detected     Coronavirus NL63 Not Detected     Coronavirus OC43 Not Detected     COVID19 Not Detected     Human Metapneumovirus Not Detected     Human Rhinovirus/Enterovirus Not Detected     Influenza A PCR Not Detected     Influenza B PCR Not Detected     Parainfluenza Virus 1 Not Detected     Parainfluenza Virus 2 Not Detected     Parainfluenza Virus 3 Not Detected     Parainfluenza Virus 4 Not Detected     RSV, PCR Not Detected     Bordetella pertussis pcr Not Detected     Bordetella parapertussis PCR Not Detected     Chlamydophila pneumoniae PCR Not Detected      Mycoplasma pneumo by PCR Not Detected    Narrative:      In the setting of a positive respiratory panel with a viral infection PLUS a negative procalcitonin without other underlying concern for bacterial infection, consider observing off antibiotics or discontinuation of antibiotics and continue supportive care. If the respiratory panel is positive for atypical bacterial infection (Bordetella pertussis, Chlamydophila pneumoniae, or Mycoplasma pneumoniae), consider antibiotic de-escalation to target atypical bacterial infection.            CT Chest With Contrast Diagnostic    Result Date: 12/29/2023  CT CHEST W CONTRAST DIAGNOSTIC Date of Exam: 12/29/2023 3:10 PM EST Indication: esophageal cancer. Comparison: None available. Technique: Axial CT images were obtained of the chest after the uneventful intravenous administration of 85 mL Isovue-300.  Reconstructed coronal and sagittal images were also obtained. Automated exposure control and iterative construction methods were used. Findings: There are no enlarged mediastinal nodes. There are coronary calcifications. There are no noncalcified hilar masses. There are calcified right hilar nodes. There is moderate wall thickening of the distal esophagus at the GE junction. There are minimal pleural effusions. There are small scattered blebs of the upper lobes. There is scar in the lung apices. There are no noncalcified pulmonary nodules or masses. There is mild atelectasis in the right lung base     Impression: Impression: Wall thickening of the distal esophagus presumably represents site of known neoplasm. Minimal pleural effusions. Mild right basilar atelectasis. No findings suspicious for metastatic disease in the chest. Electronically Signed: Estrellita Boyd MD  12/29/2023 3:43 PM EST  Workstation ID: [id number]    XR Chest 1 View    Result Date: 12/29/2023  XR CHEST 1 VW Date of Exam: 12/29/2023 12:47 AM EST Indication: productive cough, leukocytosis Comparison:  December 26, 2023 Findings: Heart size appears unchanged. There is atherosclerosis of the aortic arch. No definite pneumothorax or effusion. There is prominence of pulmonary vasculature. There is mild prominence of interstitial markings, mildly decreased compared to the prior exam.  No significant new focal infiltrate.     Impression: Impression: Mild prominence of interstitial markings, mildly decreased compared to the prior exam. Findings could be related to mild edema or atypical infection. Electronically Signed: Carlos Enrique King  12/29/2023 7:39 AM EST  Workstation ID: [id number]    FL Video Swallow With Speech Single Contrast    Result Date: 12/28/2023  FL VIDEO SWALLOW W SPEECH SINGLE-CONTRAST, FL LIMITED UGI FOR MBS REFLUX SINGLE-CONTRAST Date of Exam: 12/28/2023 4:10 PM EST Indication: dysphagia Comparison: None available. Technique:   The speech pathologist administered food and/or liquid mixed with barium to the patient with cine/video imaging.  Imaging assistance was provided to the speech pathologist and an image was saved. Fluoroscopic Time: 48 seconds Number of Images: 6 associated fluoroscopic loops were saved Findings: No aspiration was seen during fluoroscopic guided modified barium swallow series. A limited view of the esophagus with the patient in the seated lateral position demonstrated no signs of a high-grade focal esophageal stricture, or significant gastroesophageal reflux.     Impression: Impression: Fluoroscopy provided for modified barium swallow. No aspiration was seen during swallowing evaluation. Limited upper GI series appeared grossly normal. Please see speech therapy report for full details and recommendations. Electronically Signed: Ibrahima Jett MD  12/28/2023 5:31 PM EST  Workstation ID: [id number]    FL Limited Ugi For Mbs Reflux Single-Contrast    Result Date: 12/28/2023  FL VIDEO SWALLOW W SPEECH SINGLE-CONTRAST, FL LIMITED UGI FOR MBS REFLUX SINGLE-CONTRAST Date of Exam: 12/28/2023 4:10  PM EST Indication: dysphagia Comparison: None available. Technique:   The speech pathologist administered food and/or liquid mixed with barium to the patient with cine/video imaging.  Imaging assistance was provided to the speech pathologist and an image was saved. Fluoroscopic Time: 48 seconds Number of Images: 6 associated fluoroscopic loops were saved Findings: No aspiration was seen during fluoroscopic guided modified barium swallow series. A limited view of the esophagus with the patient in the seated lateral position demonstrated no signs of a high-grade focal esophageal stricture, or significant gastroesophageal reflux.     Impression: Impression: Fluoroscopy provided for modified barium swallow. No aspiration was seen during swallowing evaluation. Limited upper GI series appeared grossly normal. Please see speech therapy report for full details and recommendations. Electronically Signed: Ibrahima Jett MD  12/28/2023 5:31 PM EST  Workstation ID: [id number]         Current medications:  Scheduled Meds:acetaminophen, 1,000 mg, Oral, TID  [Held by provider] carvedilol, 3.125 mg, Oral, BID With Meals  doxycycline, 100 mg, Oral, Q12H  folic acid, 1 mg, Oral, Daily  Lidocaine, 1 patch, Transdermal, Q24H  pantoprazole, 40 mg, Intravenous, Q12H  pharmacy consult - MTM, , Does not apply, Daily  piperacillin-tazobactam, 3.375 g, Intravenous, Q8H  senna-docusate sodium, 2 tablet, Oral, BID  sodium chloride, 10 mL, Intravenous, Q12H  [Held by provider] spironolactone, 25 mg, Oral, Daily  [Held by provider] tamsulosin, 0.4 mg, Oral, Daily  thiamine (B-1) IV, 200 mg, Intravenous, Daily  vitamin B-12, 1,000 mcg, Oral, Daily      Continuous Infusions:   PRN Meds:.  acetaminophen    senna-docusate sodium **AND** polyethylene glycol **AND** bisacodyl **AND** bisacodyl    Calcium Replacement - Follow Nurse / BPA Driven Protocol    HYDROmorphone **AND** naloxone    lactulose    Magnesium Standard Dose Replacement - Follow Nurse / BPA  Driven Protocol    melatonin    ondansetron    oxyCODONE    Phosphorus Replacement - Follow Nurse / BPA Driven Protocol    Potassium Replacement - Follow Nurse / BPA Driven Protocol    simethicone    sodium chloride    sodium chloride    sodium chloride    Assessment & Plan   Assessment & Plan     Active Hospital Problems    Diagnosis  POA    **Anemia [D64.9]  Yes    Severe malnutrition [E43]  Yes      Resolved Hospital Problems   No resolved problems to display.        Brief Hospital Course to date:  Val Nassar is a 63 y.o. male with history of PUD, cirrhosis, newly diagnosed laryngeal cancer and prior alcohol abuse presented with abdominal pain, melena and anemia.     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 Will need outpatient PET scan for further staging and follow-up with medical oncology, radiation and thoracic surgery pending those results. May also require EUS.   - PET arranged tentatively in 3 weeks due to inpatient rehab stay.      Atypical pneumonia  Fever, resolved  - HIDA negative  - new productive cough, procalcitonin elevated  - negative respiratory PCR panel, UA bland  - Reviewed CXR, mild prominence of interstitial markings compared to prior, possibly atypical infection.   - Initally started on zosyn and doxycycline on presentation. Zosyn now discontinued after 2 days. Continue doxycycline for 5 day course for atypical pneumonia.      Duodenal ulcer  Duodenal stenosis   Anemia, iron deficient  - Hemoglobin with appropriate response to transfusion yesterday and remains stable this morning.   - s/p 3 units PRBCs   - s/p IV iron x 3 days  - EGD showed esophagitis and one non-bleeding cratered duodenal ulcer (along with esophageal mass) and congestive gastropathy.  No varices noted.   - will need to ensure colonoscopy up to date  - Continue PPI BID  - no  NSAIDs  - Follow-up with [facility] on 6-8weeks.     Abdominal pain  - abnormal appearance of GB on CT imaging  - HIDA normal  - general surgery states this does not represent cholecystitis  - General surgery followed, can follow-up in 2 weeks with Dr. Aguilera      Cirrhosis  Alcohol abuse  - patient says he has been abstinent for the past 6 weeks  - thiamine     Tobacco abuse  -  cessation        Expected Discharge Location and Transportation: rehab  Expected Discharge   Expected Discharge Date: 1/1/2024; Expected Discharge Time:      DVT prophylaxis:  Mechanical DVT prophylaxis orders are present.     AM-PAC 6 Clicks Score (PT): 19 (12/29/23 7248)    CODE STATUS:   Code Status and Medical Interventions:   Ordered at: 12/25/23 9931     Code Status (Patient has no pulse and is not breathing):    CPR (Attempt to Resuscitate)     Medical Interventions (Patient has pulse or is breathing):    Full Support     I have prepared this progress note with copied portions of the prior day's progress note of my own authorship to preserve accuracy and maintain consistency of documentation. I have reviewed these portions and edited them for correctness. I verify that the above documentation accurately and truly represents the evaluation and management performed on today's date.       Pauly Mallory MD  12/30/23

## 2023-12-31 LAB
QT INTERVAL: 406 MS
QTC INTERVAL: 406 MS

## 2023-12-31 PROCEDURE — 25010000002 PIPERACILLIN SOD-TAZOBACTAM PER 1 G: Performed by: NURSE PRACTITIONER

## 2023-12-31 RX ADMIN — CYANOCOBALAMIN TAB 1000 MCG 1000 MCG: 1000 TAB at 10:12

## 2023-12-31 RX ADMIN — Medication 10 ML: at 10:18

## 2023-12-31 RX ADMIN — PANTOPRAZOLE SODIUM 40 MG: 40 INJECTION, POWDER, LYOPHILIZED, FOR SOLUTION INTRAVENOUS at 19:30

## 2023-12-31 RX ADMIN — PIPERACILLIN SODIUM AND TAZOBACTAM SODIUM 3.38 G: 3; .375 INJECTION, SOLUTION INTRAVENOUS at 05:13

## 2023-12-31 RX ADMIN — Medication 10 ML: at 21:59

## 2023-12-31 RX ADMIN — PANTOPRAZOLE SODIUM 40 MG: 40 INJECTION, POWDER, LYOPHILIZED, FOR SOLUTION INTRAVENOUS at 10:11

## 2023-12-31 RX ADMIN — PIPERACILLIN SODIUM AND TAZOBACTAM SODIUM 3.38 G: 3; .375 INJECTION, SOLUTION INTRAVENOUS at 14:12

## 2023-12-31 RX ADMIN — OXYCODONE HYDROCHLORIDE 5 MG: 5 TABLET ORAL at 02:50

## 2023-12-31 RX ADMIN — ACETAMINOPHEN 1000 MG: 500 TABLET ORAL at 16:02

## 2023-12-31 RX ADMIN — DOXYCYCLINE 100 MG: 100 CAPSULE ORAL at 19:30

## 2023-12-31 RX ADMIN — FOLIC ACID 1 MG: 1 TABLET ORAL at 10:12

## 2023-12-31 RX ADMIN — DOXYCYCLINE 100 MG: 100 CAPSULE ORAL at 19:38

## 2023-12-31 RX ADMIN — OXYCODONE HYDROCHLORIDE 5 MG: 5 TABLET ORAL at 14:21

## 2023-12-31 RX ADMIN — ACETAMINOPHEN 1000 MG: 500 TABLET ORAL at 10:09

## 2023-12-31 RX ADMIN — OXYCODONE HYDROCHLORIDE 5 MG: 5 TABLET ORAL at 10:30

## 2023-12-31 RX ADMIN — OXYCODONE HYDROCHLORIDE 5 MG: 5 TABLET ORAL at 19:38

## 2023-12-31 RX ADMIN — DOXYCYCLINE 100 MG: 100 CAPSULE ORAL at 10:11

## 2023-12-31 RX ADMIN — PIPERACILLIN SODIUM AND TAZOBACTAM SODIUM 3.38 G: 3; .375 INJECTION, SOLUTION INTRAVENOUS at 22:00

## 2023-12-31 RX ADMIN — PANTOPRAZOLE SODIUM 40 MG: 40 INJECTION, POWDER, LYOPHILIZED, FOR SOLUTION INTRAVENOUS at 19:38

## 2023-12-31 RX ADMIN — ACETAMINOPHEN 500 MG: 500 TABLET ORAL at 05:18

## 2023-12-31 RX ADMIN — LIDOCAINE 1 PATCH: 4 PATCH TOPICAL at 14:12

## 2023-12-31 NOTE — PROGRESS NOTES
[facility] Medicine Services  PROGRESS NOTE    Patient Name: Val Nassar  : 1960  MRN: [id number]    Date of Admission: 2023  Primary Care Physician: Provider, No Known    Subjective   Subjective     CC:  Esophageal cancer    HPI:  Patient is doing well this morning. Has no complaints.       Objective   Objective     Vital Signs:   Temp:  [97 °F (36.1 °C)-98.4 °F (36.9 °C)] 97.9 °F (36.6 °C)  Heart Rate:  [53-55] 53  Resp:  [18] 18  BP: (116-153)/(57-71) 133/66     Physical Exam:  Constitutional: No acute distress, awake, alert, sitting in chair  Respiratory: Clear to auscultation bilaterally, respiratory effort normal   Cardiovascular: RRR, no murmurs, rubs, or gallops  Gastrointestinal: Positive bowel sounds, soft, nontender, nondistended  Musculoskeletal: No bilateral ankle edema  Psychiatric: Appropriate affect, cooperative  Neurologic: Oriented x 3, no focal deficits      Results Reviewed:  LAB RESULTS:      Lab 23  0441 23  1812 23  1144 23  1143 23  0013 23  1458 23  0657 23  0149 23  1559 23  0848 23  1925   WBC 5.92  --  9.77  --   --   --   --  15.37*  --  6.51 4.56   HEMOGLOBIN 9.1* 9.1* 8.3*  --  6.9* 7.6*   < > 7.5*   < > 7.2* 5.6*   HEMATOCRIT 29.3* 28.5* 26.1*  --  21.6* 24.2*   < > 23.8*   < > 23.1* 19.0*   PLATELETS 147  --  148  --   --   --   --  190  --  196 222   NEUTROS ABS 3.30  --  6.24  --   --   --   --  11.60*  --   --  2.87   IMMATURE GRANS (ABS) 0.03  --  0.03  --   --   --   --  0.11*  --   --   --    LYMPHS ABS 0.89  --  0.85  --   --   --   --  0.63*  --   --   --    MONOS ABS 1.47*  --  2.53*  --   --   --   --  2.94*  --   --   --    EOS ABS 0.20  --  0.09  --   --   --   --  0.03  --   --  0.14   MCV 91.8  --  91.6  --   --   --   --  91.2  --  93.9 97.9*   PROCALCITONIN 0.71*  --   --  1.03*  --   --   --  0.40*  --   --  0.19   LACTATE  --   --   --   --   --   --    --  0.9  --   --  1.2   PROTIME  --   --   --   --   --   --   --   --   --   --  13.8   APTT  --   --   --   --   --   --   --   --   --   --  32.1*    < > = values in this interval not displayed.         Lab 12/29/23 0441 12/28/23  1143 12/27/23  1458 12/27/23  0149 12/26/23  0848 12/25/23 1925   SODIUM 134* 136  --  137 140 143   POTASSIUM 4.2 4.7  4.7 3.6 3.2* 3.9 4.1   CHLORIDE 101 102  --  102 109* 111*   CO2 23.0 25.0  --  24.0 22.0 23.0   ANION GAP 10.0 9.0  --  11.0 9.0 9.0   BUN 9 7*  --  4* 7* 10   CREATININE 0.66* 0.48*  --  0.53* 0.56* 0.59*   EGFR 104.7 115.3  --  111.9 110.1 108.3   GLUCOSE 113* 87  --  110* 125* 104*   CALCIUM 9.2 9.0  --  8.6 9.0 8.9   MAGNESIUM  --  1.7  --  1.7 1.4* 1.6   HEMOGLOBIN A1C  --   --   --   --   --  4.6*   TSH  --   --   --   --   --  1.070         Lab 12/29/23 0441 12/28/23  1143 12/27/23 0149 12/25/23 1925   TOTAL PROTEIN 6.6 6.1 6.3 6.4   ALBUMIN 3.2* 3.5 3.3* 3.4*   GLOBULIN 3.4 2.6 3.0 3.0   ALT (SGPT) 8 7 8 9   AST (SGOT) 15 14 16 15   BILIRUBIN 0.3 0.8 0.3 <0.2   ALK PHOS 112 113 90 93   LIPASE  --   --   --  75*         Lab 12/27/23 0149 12/25/23 1925   PROBNP 3,092.0*  --    HSTROP T  --  17   PROTIME  --  13.8   INR  --  1.05             Lab 12/29/23  0441 12/25/23 2059 12/25/23 2023 12/25/23 2023 12/25/23 1925   IRON  --   --   --   --  12*   IRON SATURATION (TSAT)  --   --   --   --  2*   TIBC  --   --   --   --  532   TRANSFERRIN  --   --   --   --  357   FERRITIN  --   --   --   --  39.61   FOLATE >20.00  --   --   --   --    VITAMIN B 12 808  --   --   --   --    ABO TYPING  --  A   < > A  --    RH TYPING  --  Positive   < > Positive  --    ANTIBODY SCREEN  --   --   --  Negative  --     < > = values in this interval not displayed.         Brief Urine Lab Results  (Last result in the past 365 days)        Color   Clarity   Blood   Leuk Est   Nitrite   Protein   CREAT   Urine HCG        12/25/23 1932 Yellow   Clear   Negative   Negative    Negative   Negative                   Microbiology Results Abnormal       Procedure Component Value - Date/Time    Blood Culture - Blood, Arm, Right [[id number]]  (Normal) Collected: 12/27/23 0149    Lab Status: Preliminary result Specimen: Blood from Arm, Right Updated: 12/31/23 0515     Blood Culture No growth at 4 days    Blood Culture - Blood, Hand, Right [[id number]]  (Normal) Collected: 12/27/23 0149    Lab Status: Preliminary result Specimen: Blood from Hand, Right Updated: 12/31/23 0515     Blood Culture No growth at 4 days    MRSA Screen, PCR (Inpatient) - Swab, Nares [[id number]]  (Normal) Collected: 12/27/23 0950    Lab Status: Final result Specimen: Swab from Nares Updated: 12/27/23 1130     MRSA PCR Negative    Narrative:      The negative predictive value of this diagnostic test is high and should only be used to consider de-escalating anti-MRSA therapy. A positive result may indicate colonization with MRSA and must be correlated clinically.  MRSA Negative    Respiratory Panel PCR w/COVID-19(SARS-CoV-2) ABDIAZIZ/TAVARES/HANH/PAD/COR/OTIS In-House, NP Swab in UTM/VTM, 2 HR TAT - Swab, Nasopharynx [[id number]]  (Normal) Collected: 12/26/23 2329    Lab Status: Final result Specimen: Swab from Nasopharynx Updated: 12/27/23 0104     ADENOVIRUS, PCR Not Detected     Coronavirus 229E Not Detected     Coronavirus HKU1 Not Detected     Coronavirus NL63 Not Detected     Coronavirus OC43 Not Detected     COVID19 Not Detected     Human Metapneumovirus Not Detected     Human Rhinovirus/Enterovirus Not Detected     Influenza A PCR Not Detected     Influenza B PCR Not Detected     Parainfluenza Virus 1 Not Detected     Parainfluenza Virus 2 Not Detected     Parainfluenza Virus 3 Not Detected     Parainfluenza Virus 4 Not Detected     RSV, PCR Not Detected     Bordetella pertussis pcr Not Detected     Bordetella parapertussis PCR Not Detected     Chlamydophila pneumoniae PCR Not Detected     Mycoplasma pneumo by PCR Not Detected     Narrative:      In the setting of a positive respiratory panel with a viral infection PLUS a negative procalcitonin without other underlying concern for bacterial infection, consider observing off antibiotics or discontinuation of antibiotics and continue supportive care. If the respiratory panel is positive for atypical bacterial infection (Bordetella pertussis, Chlamydophila pneumoniae, or Mycoplasma pneumoniae), consider antibiotic de-escalation to target atypical bacterial infection.            CT Chest With Contrast Diagnostic    Result Date: 12/29/2023  CT CHEST W CONTRAST DIAGNOSTIC Date of Exam: 12/29/2023 3:10 PM EST Indication: esophageal cancer. Comparison: None available. Technique: Axial CT images were obtained of the chest after the uneventful intravenous administration of 85 mL Isovue-300.  Reconstructed coronal and sagittal images were also obtained. Automated exposure control and iterative construction methods were used. Findings: There are no enlarged mediastinal nodes. There are coronary calcifications. There are no noncalcified hilar masses. There are calcified right hilar nodes. There is moderate wall thickening of the distal esophagus at the GE junction. There are minimal pleural effusions. There are small scattered blebs of the upper lobes. There is scar in the lung apices. There are no noncalcified pulmonary nodules or masses. There is mild atelectasis in the right lung base     Impression: Impression: Wall thickening of the distal esophagus presumably represents site of known neoplasm. Minimal pleural effusions. Mild right basilar atelectasis. No findings suspicious for metastatic disease in the chest. Electronically Signed: Estrellita Boyd MD  12/29/2023 3:43 PM EST  Workstation ID: [id number]         Current medications:  Scheduled Meds:acetaminophen, 1,000 mg, Oral, TID  [Held by provider] carvedilol, 3.125 mg, Oral, BID With Meals  doxycycline, 100 mg, Oral, Q12H  folic acid, 1 mg, Oral,  Daily  Lidocaine, 1 patch, Transdermal, Q24H  pantoprazole, 40 mg, Intravenous, Q12H  pharmacy consult - MTM, , Does not apply, Daily  piperacillin-tazobactam, 3.375 g, Intravenous, Q8H  senna-docusate sodium, 2 tablet, Oral, BID  sodium chloride, 10 mL, Intravenous, Q12H  [Held by provider] spironolactone, 25 mg, Oral, Daily  [Held by provider] tamsulosin, 0.4 mg, Oral, Daily  vitamin B-12, 1,000 mcg, Oral, Daily      Continuous Infusions:   PRN Meds:.  acetaminophen    senna-docusate sodium **AND** polyethylene glycol **AND** bisacodyl **AND** bisacodyl    Calcium Replacement - Follow Nurse / BPA Driven Protocol    HYDROmorphone **AND** naloxone    lactulose    Magnesium Standard Dose Replacement - Follow Nurse / BPA Driven Protocol    melatonin    ondansetron    oxyCODONE    Phosphorus Replacement - Follow Nurse / BPA Driven Protocol    Potassium Replacement - Follow Nurse / BPA Driven Protocol    simethicone    sodium chloride    sodium chloride    sodium chloride    Assessment & Plan   Assessment & Plan     Active Hospital Problems    Diagnosis  POA    **Anemia [D64.9]  Yes    Severe malnutrition [E43]  Yes      Resolved Hospital Problems   No resolved problems to display.        Brief Hospital Course to date:  Val Nassar is a 63 y.o. male with history of PUD, cirrhosis, newly diagnosed laryngeal cancer and prior alcohol abuse presented with abdominal pain, melena and anemia.     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 Will need outpatient PET scan for further staging and follow-up with medical oncology, radiation and thoracic surgery pending those results. May also require EUS.   - PET arranged tentatively in 3 weeks due to inpatient rehab stay.      Atypical pneumonia  Fever, resolved  - new productive cough, procalcitonin elevated  - negative respiratory PCR panel, UA bland  - CXR with mild prominence of  interstitial markings compared to prior, possibly atypical infection.   - Initally started on zosyn and doxycycline on presentation. Zosyn now discontinued after 2 days. Continue doxycycline for 5 day course for atypical pneumonia.      Duodenal ulcer  Duodenal stenosis   Anemia, iron deficient  - s/p 3 units PRBCs   - s/p IV iron x 3 days  - EGD showed esophagitis and one non-bleeding cratered duodenal ulcer (along with esophageal mass) and congestive gastropathy.  No varices noted.   - Continue PPI BID  - no NSAIDs  - Follow-up with [facility] on 6-8weeks.     Abdominal pain  - abnormal appearance of GB on CT imaging  - HIDA normal  - general surgery states this does not represent cholecystitis  - General surgery followed, can follow-up in 2 weeks with Dr. Aguilera      Cirrhosis  Alcohol abuse  - patient says he has been abstinent for the past 6 weeks  - thiamine     Tobacco abuse  -  cessation        Expected Discharge Location and Transportation: rehab  Expected Discharge   Expected Discharge Date: 1/1/2024; Expected Discharge Time:      DVT prophylaxis:  Mechanical DVT prophylaxis orders are present.     AM-PAC 6 Clicks Score (PT): 19 (12/29/23 5579)    CODE STATUS:   Code Status and Medical Interventions:   Ordered at: 12/25/23 5461     Code Status (Patient has no pulse and is not breathing):    CPR (Attempt to Resuscitate)     Medical Interventions (Patient has pulse or is breathing):    Full Support     This patient's problems and plans were partially entered by my partner and updated as appropriate by me 12/31/23. Today is my first day evaluating this patient's active medical problems. I Personally reviewed chart and adjusted note to reflect daily changes in management/clinical condition. Copied text in this note has been reviewed and is accurate as of  12/31/23      Pauly Mallory MD  12/31/23

## 2023-12-31 NOTE — PLAN OF CARE
Goal Outcome Evaluation:            Pt has sat up in chair, ambulated independently in room, but usually with rn/pct present in room.  Pt received abx as ordered.  Pt no on tele per md, received scheduled tylenol as ordered and prn oxycodone q 4 as ordered.  Pt has had loose stools today at least x 2.  Pt up in chair most of day.  Can reach 2500 on IS.

## 2023-12-31 NOTE — PROGRESS NOTES
"Patient Name:  Val Nassar  YOB: 1960  [id number]    Surgery Progress Note    Date of visit: 12/31/2023    Subjective   Subjective: Feeling much better. Tolerating PO, bowels working. Minimal pain. HGB stable.         Objective     Objective:     /66 (BP Location: Right arm, Patient Position: Lying)   Pulse 53   Temp 97.9 °F (36.6 °C) (Oral)   Resp 18   Ht 175.3 cm (69\")   Wt 66.7 kg (147 lb)   SpO2 98%   BMI 21.71 kg/m²     Intake/Output Summary (Last 24 hours) at 12/31/2023 0923  Last data filed at 12/31/2023 0200  Gross per 24 hour   Intake --   Output 1800 ml   Net -1800 ml       CV:  Rhythm  regular and rate regular   L:  Clear  to auscultation bilaterally   Abd:  Bowel sounds positive , soft, nontender  Ext:  No cyanosis, clubbing, edema    Recent labs that are back at this time have been reviewed. HGB stable.           Assessment/ Plan:    Problem List Items Addressed This Visit          Hematology and Neoplasia    * (Principal) Anemia- Stable. OK to go to rehab once arranged. RTC in 2 weeks with Dr. Mora, but will need referral to  for esophageal CA.      Relevant Medications    folic acid (FOLVITE) 1 MG tablet    vitamin B-12 (CYANOCOBALAMIN) 1000 MCG tablet    vitamin B-12 (CYANOCOBALAMIN) tablet 1,000 mcg    folic acid (FOLVITE) tablet 1 mg    Other Relevant Orders    Case Request (Completed)    Tissue Pathology Exam (Completed)     Other Visit Diagnoses       Alcoholic cirrhosis of liver without ascites    -  Primary    Relevant Orders    Ambulatory Referral to Gastroenterology    Severe anemia        Relevant Medications    folic acid (FOLVITE) 1 MG tablet    vitamin B-12 (CYANOCOBALAMIN) 1000 MCG tablet    vitamin B-12 (CYANOCOBALAMIN) tablet 1,000 mcg    folic acid (FOLVITE) tablet 1 mg    ferric gluconate (FERRLECIT)125 MG in sodium chloride 0.9 % 100 mL IVPB (Completed)    Cholecystitis        LLQ abdominal pain        Duodenal ulcer        Relevant " Medications    sucralfate (CARAFATE) 1 g tablet    pantoprazole (PROTONIX) 40 MG EC tablet    famotidine (PEPCID) 20 MG tablet    pantoprazole (PROTONIX) injection 40 mg    Other Relevant Orders    Ambulatory Referral to Gastroenterology             Active Hospital Problems    Diagnosis  POA    **Anemia [D64.9]  Yes    Severe malnutrition [E43]  Yes      Resolved Hospital Problems   No resolved problems to display.              Carlos Enrique Alvarenga MD  12/31/2023  09:23 EST

## 2024-01-01 LAB
BACTERIA SPEC AEROBE CULT: NORMAL
BACTERIA SPEC AEROBE CULT: NORMAL

## 2024-01-01 PROCEDURE — 25010000002 PIPERACILLIN SOD-TAZOBACTAM PER 1 G: Performed by: NURSE PRACTITIONER

## 2024-01-01 RX ADMIN — Medication 10 ML: at 20:52

## 2024-01-01 RX ADMIN — OXYCODONE HYDROCHLORIDE 5 MG: 5 TABLET ORAL at 08:06

## 2024-01-01 RX ADMIN — PANTOPRAZOLE SODIUM 40 MG: 40 INJECTION, POWDER, LYOPHILIZED, FOR SOLUTION INTRAVENOUS at 20:51

## 2024-01-01 RX ADMIN — OXYCODONE HYDROCHLORIDE 5 MG: 5 TABLET ORAL at 20:56

## 2024-01-01 RX ADMIN — DOXYCYCLINE 100 MG: 100 CAPSULE ORAL at 08:07

## 2024-01-01 RX ADMIN — FOLIC ACID 1 MG: 1 TABLET ORAL at 08:07

## 2024-01-01 RX ADMIN — Medication 5 MG: at 20:56

## 2024-01-01 RX ADMIN — Medication 10 ML: at 08:05

## 2024-01-01 RX ADMIN — PIPERACILLIN SODIUM AND TAZOBACTAM SODIUM 3.38 G: 3; .375 INJECTION, SOLUTION INTRAVENOUS at 05:54

## 2024-01-01 RX ADMIN — PANTOPRAZOLE SODIUM 40 MG: 40 INJECTION, POWDER, LYOPHILIZED, FOR SOLUTION INTRAVENOUS at 08:05

## 2024-01-01 RX ADMIN — OXYCODONE HYDROCHLORIDE 5 MG: 5 TABLET ORAL at 15:47

## 2024-01-01 RX ADMIN — DOXYCYCLINE 100 MG: 100 CAPSULE ORAL at 20:51

## 2024-01-01 RX ADMIN — CYANOCOBALAMIN TAB 1000 MCG 1000 MCG: 1000 TAB at 08:07

## 2024-01-01 RX ADMIN — OXYCODONE HYDROCHLORIDE 5 MG: 5 TABLET ORAL at 03:40

## 2024-01-01 NOTE — PROGRESS NOTES
"Patient Name:  Val Nassar  YOB: 1960  [id number]    Surgery Progress Note    Date of visit: 1/1/2024    Subjective   Subjective: Feels fine, no complaints.         Objective     Objective:     /67 (BP Location: Right arm, Patient Position: Lying)   Pulse 77   Temp 98 °F (36.7 °C) (Oral)   Resp 17   Ht 175.3 cm (69\")   Wt 66.7 kg (147 lb)   SpO2 98%   BMI 21.71 kg/m²     Intake/Output Summary (Last 24 hours) at 1/1/2024 0951  Last data filed at 1/1/2024 0809  Gross per 24 hour   Intake --   Output 2675 ml   Net -2675 ml       CV:  Rhythm  regular and rate regular   L:  Clear  to auscultation bilaterally   Abd:  Bowel sounds positive , soft, nontender  Ext:  No cyanosis, clubbing, edema    Recent labs that are back at this time have been reviewed.            Assessment/ Plan:    Problem List Items Addressed This Visit          Hematology and Neoplasia    * (Principal) Anemia- Stable after transfusion. Awaiting placement.    Relevant Medications    folic acid (FOLVITE) 1 MG tablet    vitamin B-12 (CYANOCOBALAMIN) 1000 MCG tablet    vitamin B-12 (CYANOCOBALAMIN) tablet 1,000 mcg    folic acid (FOLVITE) tablet 1 mg    Other Relevant Orders    Case Request (Completed)    Tissue Pathology Exam (Completed)     Other Visit Diagnoses       Alcoholic cirrhosis of liver without ascites    -  Primary    Relevant Orders    Ambulatory Referral to Gastroenterology    Severe anemia        Relevant Medications    folic acid (FOLVITE) 1 MG tablet    vitamin B-12 (CYANOCOBALAMIN) 1000 MCG tablet    vitamin B-12 (CYANOCOBALAMIN) tablet 1,000 mcg    folic acid (FOLVITE) tablet 1 mg    ferric gluconate (FERRLECIT)125 MG in sodium chloride 0.9 % 100 mL IVPB (Completed)    Cholecystitis        LLQ abdominal pain        Duodenal ulcer        Relevant Medications    sucralfate (CARAFATE) 1 g tablet    pantoprazole (PROTONIX) 40 MG EC tablet    famotidine (PEPCID) 20 MG tablet    pantoprazole (PROTONIX) " injection 40 mg    Other Relevant Orders    Ambulatory Referral to Gastroenterology             Active Hospital Problems    Diagnosis  POA    **Anemia [D64.9]  Yes    Severe malnutrition [E43]  Yes      Resolved Hospital Problems   No resolved problems to display.              Carlos Enrique Alvarenga MD  1/1/2024  09:51 EST

## 2024-01-01 NOTE — PLAN OF CARE
Goal Outcome Evaluation:  No changes today.  VSS on RA,.    Non tele.  Hgb stable at 9.1.    Referrals made for rehab upon dc.

## 2024-01-01 NOTE — PROGRESS NOTES
[facility] Medicine Services  PROGRESS NOTE    Patient Name: Val Nassar  : 1960  MRN: [id number]    Date of Admission: 2023  Primary Care Physician: Provider, No Known    Subjective   Subjective     CC:  Esophageal mass    HPI:  No complaints this morning other then pain. Requesting pain medication.       Objective   Objective     Vital Signs:   Temp:  [97.2 °F (36.2 °C)-98.6 °F (37 °C)] 98.2 °F (36.8 °C)  Heart Rate:  [76-77] 77  Resp:  [16-18] 18  BP: (112-131)/(59-67) 120/64     Physical Exam:  Constitutional: No acute distress, awake, alert, sitting in bedside chair  Respiratory: Clear to auscultation bilaterally, respiratory effort normal   Cardiovascular: RRR, no murmurs, rubs, or gallops  Gastrointestinal: Positive bowel sounds, soft, nontender, nondistended  Musculoskeletal: No bilateral ankle edema  Psychiatric: Appropriate affect, cooperative  Neurologic: Oriented x 3, no focal deficits  Skin: No rashes      Results Reviewed:  LAB RESULTS:      Lab 23  0441 23  1812 23  1144 23  1143 23  0013 23  1458 23  0657 23  0149 23  1559 23  0848 23  1925   WBC 5.92  --  9.77  --   --   --   --  15.37*  --  6.51 4.56   HEMOGLOBIN 9.1* 9.1* 8.3*  --  6.9* 7.6*   < > 7.5*   < > 7.2* 5.6*   HEMATOCRIT 29.3* 28.5* 26.1*  --  21.6* 24.2*   < > 23.8*   < > 23.1* 19.0*   PLATELETS 147  --  148  --   --   --   --  190  --  196 222   NEUTROS ABS 3.30  --  6.24  --   --   --   --  11.60*  --   --  2.87   IMMATURE GRANS (ABS) 0.03  --  0.03  --   --   --   --  0.11*  --   --   --    LYMPHS ABS 0.89  --  0.85  --   --   --   --  0.63*  --   --   --    MONOS ABS 1.47*  --  2.53*  --   --   --   --  2.94*  --   --   --    EOS ABS 0.20  --  0.09  --   --   --   --  0.03  --   --  0.14   MCV 91.8  --  91.6  --   --   --   --  91.2  --  93.9 97.9*   PROCALCITONIN 0.71*  --   --  1.03*  --   --   --  0.40*  --   --   0.19   LACTATE  --   --   --   --   --   --   --  0.9  --   --  1.2   PROTIME  --   --   --   --   --   --   --   --   --   --  13.8   APTT  --   --   --   --   --   --   --   --   --   --  32.1*    < > = values in this interval not displayed.         Lab 12/29/23  0441 12/28/23  1143 12/27/23  1458 12/27/23  0149 12/26/23  0848 12/25/23 1925   SODIUM 134* 136  --  137 140 143   POTASSIUM 4.2 4.7  4.7 3.6 3.2* 3.9 4.1   CHLORIDE 101 102  --  102 109* 111*   CO2 23.0 25.0  --  24.0 22.0 23.0   ANION GAP 10.0 9.0  --  11.0 9.0 9.0   BUN 9 7*  --  4* 7* 10   CREATININE 0.66* 0.48*  --  0.53* 0.56* 0.59*   EGFR 104.7 115.3  --  111.9 110.1 108.3   GLUCOSE 113* 87  --  110* 125* 104*   CALCIUM 9.2 9.0  --  8.6 9.0 8.9   MAGNESIUM  --  1.7  --  1.7 1.4* 1.6   HEMOGLOBIN A1C  --   --   --   --   --  4.6*   TSH  --   --   --   --   --  1.070         Lab 12/29/23  0441 12/28/23  1143 12/27/23 0149 12/25/23 1925   TOTAL PROTEIN 6.6 6.1 6.3 6.4   ALBUMIN 3.2* 3.5 3.3* 3.4*   GLOBULIN 3.4 2.6 3.0 3.0   ALT (SGPT) 8 7 8 9   AST (SGOT) 15 14 16 15   BILIRUBIN 0.3 0.8 0.3 <0.2   ALK PHOS 112 113 90 93   LIPASE  --   --   --  75*         Lab 12/27/23 0149 12/25/23 1925   PROBNP 3,092.0*  --    HSTROP T  --  17   PROTIME  --  13.8   INR  --  1.05             Lab 12/29/23  0441 12/25/23 2059 12/25/23 2023 12/25/23 2023 12/25/23 1925   IRON  --   --   --   --  12*   IRON SATURATION (TSAT)  --   --   --   --  2*   TIBC  --   --   --   --  532   TRANSFERRIN  --   --   --   --  357   FERRITIN  --   --   --   --  39.61   FOLATE >20.00  --   --   --   --    VITAMIN B 12 808  --   --   --   --    ABO TYPING  --  A   < > A  --    RH TYPING  --  Positive   < > Positive  --    ANTIBODY SCREEN  --   --   --  Negative  --     < > = values in this interval not displayed.         Brief Urine Lab Results  (Last result in the past 365 days)        Color   Clarity   Blood   Leuk Est   Nitrite   Protein   CREAT   Urine HCG        12/25/23  1932 Yellow   Clear   Negative   Negative   Negative   Negative                   Microbiology Results Abnormal       Procedure Component Value - Date/Time    Blood Culture - Blood, Arm, Right [[id number]]  (Normal) Collected: 12/27/23 0149    Lab Status: Final result Specimen: Blood from Arm, Right Updated: 01/01/24 0515     Blood Culture No growth at 5 days    Blood Culture - Blood, Hand, Right [[id number]]  (Normal) Collected: 12/27/23 0149    Lab Status: Final result Specimen: Blood from Hand, Right Updated: 01/01/24 0515     Blood Culture No growth at 5 days    MRSA Screen, PCR (Inpatient) - Swab, Nares [[id number]]  (Normal) Collected: 12/27/23 0950    Lab Status: Final result Specimen: Swab from Nares Updated: 12/27/23 1130     MRSA PCR Negative    Narrative:      The negative predictive value of this diagnostic test is high and should only be used to consider de-escalating anti-MRSA therapy. A positive result may indicate colonization with MRSA and must be correlated clinically.  MRSA Negative    Respiratory Panel PCR w/COVID-19(SARS-CoV-2) ABDIAZIZ/TAVARES/HANH/PAD/COR/OTIS In-House, NP Swab in UTM/VTM, 2 HR TAT - Swab, Nasopharynx [[id number]]  (Normal) Collected: 12/26/23 2329    Lab Status: Final result Specimen: Swab from Nasopharynx Updated: 12/27/23 0104     ADENOVIRUS, PCR Not Detected     Coronavirus 229E Not Detected     Coronavirus HKU1 Not Detected     Coronavirus NL63 Not Detected     Coronavirus OC43 Not Detected     COVID19 Not Detected     Human Metapneumovirus Not Detected     Human Rhinovirus/Enterovirus Not Detected     Influenza A PCR Not Detected     Influenza B PCR Not Detected     Parainfluenza Virus 1 Not Detected     Parainfluenza Virus 2 Not Detected     Parainfluenza Virus 3 Not Detected     Parainfluenza Virus 4 Not Detected     RSV, PCR Not Detected     Bordetella pertussis pcr Not Detected     Bordetella parapertussis PCR Not Detected     Chlamydophila pneumoniae PCR Not Detected      Mycoplasma pneumo by PCR Not Detected    Narrative:      In the setting of a positive respiratory panel with a viral infection PLUS a negative procalcitonin without other underlying concern for bacterial infection, consider observing off antibiotics or discontinuation of antibiotics and continue supportive care. If the respiratory panel is positive for atypical bacterial infection (Bordetella pertussis, Chlamydophila pneumoniae, or Mycoplasma pneumoniae), consider antibiotic de-escalation to target atypical bacterial infection.            No radiology results from the last 24 hrs        Current medications:  Scheduled Meds:[Held by provider] carvedilol, 3.125 mg, Oral, BID With Meals  doxycycline, 100 mg, Oral, Q12H  folic acid, 1 mg, Oral, Daily  Lidocaine, 1 patch, Transdermal, Q24H  pantoprazole, 40 mg, Intravenous, Q12H  pharmacy consult - MTM, , Does not apply, Daily  piperacillin-tazobactam, 3.375 g, Intravenous, Q8H  senna-docusate sodium, 2 tablet, Oral, BID  sodium chloride, 10 mL, Intravenous, Q12H  [Held by provider] spironolactone, 25 mg, Oral, Daily  [Held by provider] tamsulosin, 0.4 mg, Oral, Daily  vitamin B-12, 1,000 mcg, Oral, Daily      Continuous Infusions:   PRN Meds:.  acetaminophen    senna-docusate sodium **AND** polyethylene glycol **AND** bisacodyl **AND** bisacodyl    Calcium Replacement - Follow Nurse / BPA Driven Protocol    HYDROmorphone **AND** naloxone    lactulose    Magnesium Standard Dose Replacement - Follow Nurse / BPA Driven Protocol    melatonin    ondansetron    oxyCODONE    Phosphorus Replacement - Follow Nurse / BPA Driven Protocol    Potassium Replacement - Follow Nurse / BPA Driven Protocol    simethicone    sodium chloride    sodium chloride    sodium chloride    Assessment & Plan   Assessment & Plan     Active Hospital Problems    Diagnosis  POA    **Anemia [D64.9]  Yes    Severe malnutrition [E43]  Yes      Resolved Hospital Problems   No resolved problems to display.         Brief Hospital Course to date:  Val Nassar is a 63 y.o. male with history of PUD, cirrhosis, newly diagnosed laryngeal cancer and prior alcohol abuse presented with abdominal pain, melena and anemia.     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 Will need outpatient PET scan for further staging and follow-up with medical oncology, radiation and thoracic surgery pending those results. May also require EUS.   - PET arranged tentatively in 3 weeks due to inpatient rehab stay.      Atypical pneumonia  Fever, resolved  - new productive cough, procalcitonin elevated  - negative respiratory PCR panel, UA bland  - CXR with mild prominence of interstitial markings compared to prior, possibly atypical infection.   - Initally started on zosyn and doxycycline on presentation. Zosyn now discontinued after 2 days. Continue doxycycline for 5 day course for atypical pneumonia.      Duodenal ulcer  Duodenal stenosis   Anemia, iron deficient  - s/p 3 units PRBCs   - s/p IV iron x 3 days  - EGD showed esophagitis and one non-bleeding cratered duodenal ulcer (along with esophageal mass) and congestive gastropathy.  No varices noted.   - Continue PPI BID  - no NSAIDs  - Follow-up with [facility] on 6-8weeks.     Abdominal pain  - abnormal appearance of GB on CT imaging  - HIDA normal  - general surgery states this does not represent cholecystitis  - General surgery followed, can follow-up in 2 weeks with Dr. Aguilera      Cirrhosis  Alcohol abuse  - patient says he has been abstinent for the past 6 weeks  - thiamine     Tobacco abuse  -  cessation        Expected Discharge Location and Transportation: rehab  Expected Discharge   Expected Discharge Date: 1/2/2024; Expected Discharge Time:      DVT prophylaxis:  Mechanical DVT prophylaxis orders are present.     AM-PAC 6 Clicks Score (PT): 19 (12/29/23 2681)    CODE STATUS:    Code Status and Medical Interventions:   Ordered at: 12/25/23 9629     Code Status (Patient has no pulse and is not breathing):    CPR (Attempt to Resuscitate)     Medical Interventions (Patient has pulse or is breathing):    Full Support     This patient's problems and plans were partially entered by my partner and updated as appropriate by me 01/01/24. Today is my first day evaluating this patient's active medical problems. I Personally reviewed chart and adjusted note to reflect daily changes in management/clinical condition. Copied text in this note has been reviewed and is accurate as of  01/01/24      Pauly Mallory MD  01/01/24

## 2024-01-01 NOTE — PLAN OF CARE
Goal Outcome Evaluation:pt slept well,vss,remained on room air.continue with poc

## 2024-01-02 PROCEDURE — 97110 THERAPEUTIC EXERCISES: CPT

## 2024-01-02 PROCEDURE — 97535 SELF CARE MNGMENT TRAINING: CPT

## 2024-01-02 PROCEDURE — 97116 GAIT TRAINING THERAPY: CPT

## 2024-01-02 RX ORDER — SUCRALFATE 1 G/1
1 TABLET ORAL
Status: DISCONTINUED | OUTPATIENT
Start: 2024-01-02 | End: 2024-01-03 | Stop reason: HOSPADM

## 2024-01-02 RX ORDER — PANTOPRAZOLE SODIUM 40 MG/1
40 TABLET, DELAYED RELEASE ORAL 2 TIMES DAILY
Status: DISCONTINUED | OUTPATIENT
Start: 2024-01-02 | End: 2024-01-03 | Stop reason: HOSPADM

## 2024-01-02 RX ADMIN — FOLIC ACID 1 MG: 1 TABLET ORAL at 08:08

## 2024-01-02 RX ADMIN — SUCRALFATE 1 G: 1 TABLET ORAL at 16:30

## 2024-01-02 RX ADMIN — SUCRALFATE 1 G: 1 TABLET ORAL at 12:25

## 2024-01-02 RX ADMIN — Medication 5 MG: at 20:08

## 2024-01-02 RX ADMIN — SUCRALFATE 1 G: 1 TABLET ORAL at 08:09

## 2024-01-02 RX ADMIN — SUCRALFATE 1 G: 1 TABLET ORAL at 20:12

## 2024-01-02 RX ADMIN — PANTOPRAZOLE SODIUM 40 MG: 40 TABLET, DELAYED RELEASE ORAL at 08:07

## 2024-01-02 RX ADMIN — Medication 10 ML: at 08:08

## 2024-01-02 RX ADMIN — PANTOPRAZOLE SODIUM 40 MG: 40 TABLET, DELAYED RELEASE ORAL at 20:08

## 2024-01-02 RX ADMIN — OXYCODONE HYDROCHLORIDE 5 MG: 5 TABLET ORAL at 20:08

## 2024-01-02 RX ADMIN — Medication 10 ML: at 20:09

## 2024-01-02 RX ADMIN — OXYCODONE HYDROCHLORIDE 5 MG: 5 TABLET ORAL at 13:29

## 2024-01-02 RX ADMIN — CYANOCOBALAMIN TAB 1000 MCG 1000 MCG: 1000 TAB at 08:08

## 2024-01-02 NOTE — PLAN OF CARE
Goal Outcome Evaluation:  Plan of Care Reviewed With: patient        Progress: improving  Outcome Evaluation: Pt with good participation and progress toward goals during OT session today. The pt performed >household distance ambulation using a RWx with CGA, near SBA with cueing for RWx management prior to sitting. The pt completed grooming ADLs sink side with set up and SBA. The pt remains below his functional baseline with generalized weakness, decreased activity tolerance, and mild balance deficits. The pt will benefit from continued IP OT services to increase the pt's safety and independence during ADLs and functional mobility. Continue to recommend IRF at discharge for best outcome.      Anticipated Discharge Disposition (OT): inpatient rehabilitation facility

## 2024-01-02 NOTE — PAYOR COMM NOTE
"Yannick Nassar (63 y.o. Male)     [id number]   Mayra Hernandez, SUE  Utilization Review  [phone]  [phone]        Date of Birth   1960    Social Security Number       Address   [address] [id number]    Home Phone   [phone]    MRN   [id number]       Orthodox   Nazarene    Marital Status   Single                            Admission Date   12/25/23    Admission Type   Emergency    Admitting Provider   Pauly Mallory MD    Attending Provider   Pauly Mallory MD    Department, Room/Bed   [facility] 6B, N634/1       Discharge Date       Discharge Disposition       Discharge Destination                                 Attending Provider: Pauly Mallory MD    Allergies: No Known Allergies    Isolation: None   Infection: None   Code Status: CPR    Ht: 175.3 cm (69\")   Wt: 66.7 kg (147 lb)    Admission Cmt: None   Principal Problem: Anemia [D64.9]                   Active Insurance as of 12/25/2023       Primary Coverage       Payor Plan Insurance Group Employer/Plan Group    ANTH MEDICAID [facility] MEDICAID [id number]       Payor Plan Address Payor Plan Phone Number Payor Plan Fax Number Effective Dates    [address] [address] [phone]  12/19/2023 - None Entered    [facility] [address]         Subscriber Name Subscriber Birth Date Member ID       YANNICK NASSAR 1960 [id number]                     Emergency Contacts        (Rel.) Home Phone Work Phone Mobile Phone    YANI TAM (Sister) [phone] -- [phone]              Vital Signs (last day)       Date/Time Temp Temp src Pulse Resp BP Patient Position SpO2    01/02/24 1410 98.3 (36.8) -- 88 16 131/69 -- 99    01/02/24 1103 96.4 (35.8) Axillary -- 18 118/57 Lying --    01/02/24 0805 96.4 (35.8) Axillary -- 18 121/86 Sitting --    01/02/24 0713 -- -- 77 -- -- -- 95    01/02/24 0351 97.3 (36.3) Oral 68 18 124/65 Lying 92    01/01/24 2349 98.2 (36.8) Oral " 67 18 114/55 Lying 94    01/01/24 1949 98.6 (37) Oral 57 18 127/57 Lying 97    01/01/24 1550 -- -- 64 -- -- -- 98    01/01/24 1543 98.5 (36.9) Oral 60 18 138/73 Lying 96    01/01/24 1138 98.2 (36.8) Oral -- 18 120/64 Lying 97    01/01/24 0820 98 (36.7) Oral -- 17 126/67 Lying --    01/01/24 0719 -- -- 77 -- -- -- 98    01/01/24 0252 97.9 (36.6) Oral -- 16 122/66 Lying --          Oxygen Therapy (last day)       Date/Time SpO2 Device (Oxygen Therapy) Flow (L/min) Oxygen Concentration (%) ETCO2 (mmHg)    01/02/24 1410 99 room air -- -- --    01/02/24 0713 95 room air -- -- --    01/02/24 0600 -- room air -- -- --    01/02/24 0400 -- room air -- -- --    01/02/24 0351 92 -- -- -- --    01/02/24 0200 -- room air -- -- --    01/02/24 0000 -- room air -- -- --    01/01/24 2349 94 -- -- -- --    01/01/24 2200 -- room air -- -- --    01/01/24 2000 -- room air -- -- --    01/01/24 1949 97 -- -- -- --    01/01/24 1800 -- room air -- -- --    01/01/24 1550 98 room air -- -- --    01/01/24 1543 96 room air -- -- --    01/01/24 1138 97 -- -- -- --    01/01/24 0719 98 room air -- -- --    01/01/24 0600 -- room air -- -- --          Lines, Drains & Airways       Active LDAs       Name Placement date Placement time Site Days    Peripheral IV 12/30/23 0515 Anterior;Right Forearm 12/30/23  0515  Forearm  3                  Current Facility-Administered Medications   Medication Dose Route Frequency Provider Last Rate Last Admin    acetaminophen (TYLENOL) tablet 500 mg  500 mg Oral Q6H PRN Wesly Mckeon MD   500 mg at 12/31/23 0518    sennosides-docusate (PERICOLACE) 8.6-50 MG per tablet 2 tablet  2 tablet Oral BID Wesly Mckeon MD   2 tablet at 12/28/23 0816    And    polyethylene glycol (MIRALAX) packet 17 g  17 g Oral Daily PRN Wesyl Mckeon MD        And    bisacodyl (DULCOLAX) EC tablet 5 mg  5 mg Oral Daily PRN Wesly Mckeon MD        And    bisacodyl (DULCOLAX) suppository 10 mg  10 mg Rectal Daily PRN Wesly Mckeon MD         Calcium Replacement - Follow Nurse / BPA Driven Protocol   Does not apply PRN Wesly Mckeon MD        [Held by provider] carvedilol (COREG) tablet 3.125 mg  3.125 mg Oral BID With Meals Wesly Turner MD        folic acid (FOLVITE) tablet 1 mg  1 mg Oral Daily Wesly Mckeon MD   1 mg at 01/02/24 0808    lactulose (CHRONULAC) 10 GM/15ML solution 20 g  20 g Oral BID PRN Wesly Mckeon MD        Lidocaine 4 % 1 patch  1 patch Transdermal Q24H Wesly Turner MD   1 patch at 12/31/23 1412    Magnesium Standard Dose Replacement - Follow Nurse / BPA Driven Protocol   Does not apply PRN Wesly Mckeon MD        melatonin tablet 5 mg  5 mg Oral Nightly PRN Wesly Mckeon MD   5 mg at 01/01/24 2056    naloxone (NARCAN) injection 0.4 mg  0.4 mg Intravenous Q5 Min PRN Wesly Mckeon MD        ondansetron (ZOFRAN) injection 4 mg  4 mg Intravenous Q6H PRN Jaymie Nash APRN   4 mg at 12/27/23 0410    oxyCODONE (ROXICODONE) immediate release tablet 5 mg  5 mg Oral Q4H PRN Pauly Mallory MD   5 mg at 01/02/24 1329    pantoprazole (PROTONIX) EC tablet 40 mg  40 mg Oral BID Pauly Webster PA   40 mg at 01/02/24 0807    Phosphorus Replacement - Follow Nurse / BPA Driven Protocol   Does not apply PRWesly Goldman MD        Potassium Replacement - Follow Nurse / BPA Driven Protocol   Does not apply PRWesly Goldman MD        simethicone (MYLICON) chewable tablet 80 mg  80 mg Oral 4x Daily PRN Pauly Mallory MD   80 mg at 12/29/23 1145    sodium chloride 0.9 % flush 10 mL  10 mL Intravenous PRN Wesly Mckeon MD        sodium chloride 0.9 % flush 10 mL  10 mL Intravenous Q12H Wesly Mckeon MD   10 mL at 01/02/24 0808    sodium chloride 0.9 % flush 10 mL  10 mL Intravenous PRN Wesly Mckeon MD        sodium chloride 0.9 % infusion 40 mL  40 mL Intravenous PRN Wesly Mckeon MD        [Held by provider] spironolactone (ALDACTONE) tablet 25 mg  25 mg Oral Daily Wesly Turner MD        sucralfate (CARAFATE)  "tablet 1 g  1 g Oral 4x Daily AC & at Bedtime Carlos Enrique Alvarenga MD   1 g at 01/02/24 1225    [Held by provider] tamsulosin (FLOMAX) 24 hr capsule 0.4 mg  0.4 mg Oral Daily Wesly Turner MD        vitamin B-12 (CYANOCOBALAMIN) tablet 1,000 mcg  1,000 mcg Oral Daily Wesly Mckeon MD   1,000 mcg at 01/02/24 0808     Lab Results (last 24 hours)       ** No results found for the last 24 hours. **          Imaging Results (Last 24 Hours)       ** No results found for the last 24 hours. **          ECG/EMG Results (last 24 hours)       ** No results found for the last 24 hours. **             Physician Progress Notes (last 48 hours)        Carlos Enrique Alvarenga MD at 01/02/24 0718          Patient Name:  Val Nassar  YOB: 1960  [id number]    Surgery Progress Note    Date of visit: 1/2/2024    Subjective   Subjective: Complains of occasional RUQ pain, but otherwise stable.        Objective     Objective:     /65 (BP Location: Right arm, Patient Position: Lying)   Pulse 77   Temp 97.3 °F (36.3 °C) (Oral)   Resp 18   Ht 175.3 cm (69\")   Wt 66.7 kg (147 lb)   SpO2 95%   BMI 21.71 kg/m²     Intake/Output Summary (Last 24 hours) at 1/2/2024 0718  Last data filed at 1/1/2024 2349  Gross per 24 hour   Intake --   Output 900 ml   Net -900 ml       CV:  Rhythm  regular and rate regular   L:  Clear  to auscultation bilaterally   Abd:  Bowel sounds positive , soft, minimally tender, except to deep palpation RUQ  Ext:  No cyanosis, clubbing, edema    Recent labs that are back at this time have been reviewed. Pathology confirms esophageal SCCA          Assessment/ Plan:    Problem List Items Addressed This Visit          Hematology and Neoplasia    * (Principal) Anemia- Stable. Will need outpatient follow up at [facility] for esophageal CA. As for his RUQ pain, may be related more to his known ulcer than his GB, given negative HIDA. Continue PPI and carafate. Hopefully ready for rehab/ discharge. RTC " with Dr. Mora in 2 weeks.      Relevant Medications    folic acid (FOLVITE) 1 MG tablet    vitamin B-12 (CYANOCOBALAMIN) 1000 MCG tablet    vitamin B-12 (CYANOCOBALAMIN) tablet 1,000 mcg    folic acid (FOLVITE) tablet 1 mg    Other Relevant Orders    Case Request (Completed)    Tissue Pathology Exam (Completed)     Other Visit Diagnoses       Alcoholic cirrhosis of liver without ascites    -  Primary    Relevant Orders    Ambulatory Referral to Gastroenterology    Severe anemia        Relevant Medications    folic acid (FOLVITE) 1 MG tablet    vitamin B-12 (CYANOCOBALAMIN) 1000 MCG tablet    vitamin B-12 (CYANOCOBALAMIN) tablet 1,000 mcg    folic acid (FOLVITE) tablet 1 mg    ferric gluconate (FERRLECIT)125 MG in sodium chloride 0.9 % 100 mL IVPB (Completed)    Cholecystitis        LLQ abdominal pain        Duodenal ulcer        Relevant Medications    sucralfate (CARAFATE) 1 g tablet    pantoprazole (PROTONIX) 40 MG EC tablet    famotidine (PEPCID) 20 MG tablet    pantoprazole (PROTONIX) injection 40 mg    Other Relevant Orders    Ambulatory Referral to Gastroenterology             Active Hospital Problems    Diagnosis  POA    **Anemia [D64.9]  Yes    Severe malnutrition [E43]  Yes      Resolved Hospital Problems   No resolved problems to display.              Carlos Enrique Alvarenga MD  2024  07:18 EST        Electronically signed by Carlos Enrique Alvarenga MD at 24 0721       Pauly Mallory MD at 24 [facility]7              [facility] Medicine Services  PROGRESS NOTE    Patient Name: Val Nassar  : 1960  MRN: [id number]    Date of Admission: 2023  Primary Care Physician: Provider, No Known    Subjective   Subjective     CC:  Esophageal mass    HPI:  No complaints this morning other then pain. Requesting pain medication.       Objective   Objective     Vital Signs:   Temp:  [97.2 °F (36.2 °C)-98.6 °F (37 °C)] 98.2 °F (36.8 °C)  Heart Rate:  [76-77] 77  Resp:   [16-18] 18  BP: (112-131)/(59-67) 120/64     Physical Exam:  Constitutional: No acute distress, awake, alert, sitting in bedside chair  Respiratory: Clear to auscultation bilaterally, respiratory effort normal   Cardiovascular: RRR, no murmurs, rubs, or gallops  Gastrointestinal: Positive bowel sounds, soft, nontender, nondistended  Musculoskeletal: No bilateral ankle edema  Psychiatric: Appropriate affect, cooperative  Neurologic: Oriented x 3, no focal deficits  Skin: No rashes      Results Reviewed:  LAB RESULTS:      Lab 12/29/23  0441 12/28/23  1812 12/28/23  1144 12/28/23  1143 12/28/23  0013 12/27/23  1458 12/27/23  0657 12/27/23  0149 12/26/23  1559 12/26/23  0848 12/25/23  1925   WBC 5.92  --  9.77  --   --   --   --  15.37*  --  6.51 4.56   HEMOGLOBIN 9.1* 9.1* 8.3*  --  6.9* 7.6*   < > 7.5*   < > 7.2* 5.6*   HEMATOCRIT 29.3* 28.5* 26.1*  --  21.6* 24.2*   < > 23.8*   < > 23.1* 19.0*   PLATELETS 147  --  148  --   --   --   --  190  --  196 222   NEUTROS ABS 3.30  --  6.24  --   --   --   --  11.60*  --   --  2.87   IMMATURE GRANS (ABS) 0.03  --  0.03  --   --   --   --  0.11*  --   --   --    LYMPHS ABS 0.89  --  0.85  --   --   --   --  0.63*  --   --   --    MONOS ABS 1.47*  --  2.53*  --   --   --   --  2.94*  --   --   --    EOS ABS 0.20  --  0.09  --   --   --   --  0.03  --   --  0.14   MCV 91.8  --  91.6  --   --   --   --  91.2  --  93.9 97.9*   PROCALCITONIN 0.71*  --   --  1.03*  --   --   --  0.40*  --   --  0.19   LACTATE  --   --   --   --   --   --   --  0.9  --   --  1.2   PROTIME  --   --   --   --   --   --   --   --   --   --  13.8   APTT  --   --   --   --   --   --   --   --   --   --  32.1*    < > = values in this interval not displayed.         Lab 12/29/23  0441 12/28/23  1143 12/27/23  1458 12/27/23  0149 12/26/23  0848 12/25/23  1925   SODIUM 134* 136  --  137 140 143   POTASSIUM 4.2 4.7  4.7 3.6 3.2* 3.9 4.1   CHLORIDE 101 102  --  102 109* 111*   CO2 23.0 25.0  --  24.0 22.0  23.0   ANION GAP 10.0 9.0  --  11.0 9.0 9.0   BUN 9 7*  --  4* 7* 10   CREATININE 0.66* 0.48*  --  0.53* 0.56* 0.59*   EGFR 104.7 115.3  --  111.9 110.1 108.3   GLUCOSE 113* 87  --  110* 125* 104*   CALCIUM 9.2 9.0  --  8.6 9.0 8.9   MAGNESIUM  --  1.7  --  1.7 1.4* 1.6   HEMOGLOBIN A1C  --   --   --   --   --  4.6*   TSH  --   --   --   --   --  1.070         Lab 12/29/23  0441 12/28/23  1143 12/27/23  0149 12/25/23  1925   TOTAL PROTEIN 6.6 6.1 6.3 6.4   ALBUMIN 3.2* 3.5 3.3* 3.4*   GLOBULIN 3.4 2.6 3.0 3.0   ALT (SGPT) 8 7 8 9   AST (SGOT) 15 14 16 15   BILIRUBIN 0.3 0.8 0.3 <0.2   ALK PHOS 112 113 90 93   LIPASE  --   --   --  75*         Lab 12/27/23 0149 12/25/23 1925   PROBNP 3,092.0*  --    HSTROP T  --  17   PROTIME  --  13.8   INR  --  1.05             Lab 12/29/23 0441 12/25/23 2059 12/25/23 2023 12/25/23 2023 12/25/23 1925   IRON  --   --   --   --  12*   IRON SATURATION (TSAT)  --   --   --   --  2*   TIBC  --   --   --   --  532   TRANSFERRIN  --   --   --   --  357   FERRITIN  --   --   --   --  39.61   FOLATE >20.00  --   --   --   --    VITAMIN B 12 808  --   --   --   --    ABO TYPING  --  A   < > A  --    RH TYPING  --  Positive   < > Positive  --    ANTIBODY SCREEN  --   --   --  Negative  --     < > = values in this interval not displayed.         Brief Urine Lab Results  (Last result in the past 365 days)        Color   Clarity   Blood   Leuk Est   Nitrite   Protein   CREAT   Urine HCG        12/25/23 1932 Yellow   Clear   Negative   Negative   Negative   Negative                   Microbiology Results Abnormal       Procedure Component Value - Date/Time    Blood Culture - Blood, Arm, Right [[id number]]  (Normal) Collected: 12/27/23 0149    Lab Status: Final result Specimen: Blood from Arm, Right Updated: 01/01/24 0515     Blood Culture No growth at 5 days    Blood Culture - Blood, Hand, Right [[id number]]  (Normal) Collected: 12/27/23 0149    Lab Status: Final result Specimen: Blood from  Hand, Right Updated: 01/01/24 0515     Blood Culture No growth at 5 days    MRSA Screen, PCR (Inpatient) - Swab, Nares [[id number]]  (Normal) Collected: 12/27/23 0950    Lab Status: Final result Specimen: Swab from Nares Updated: 12/27/23 1130     MRSA PCR Negative    Narrative:      The negative predictive value of this diagnostic test is high and should only be used to consider de-escalating anti-MRSA therapy. A positive result may indicate colonization with MRSA and must be correlated clinically.  MRSA Negative    Respiratory Panel PCR w/COVID-19(SARS-CoV-2) ABDIAZIZ/TAVARES/HANH/PAD/COR/OTIS In-House, NP Swab in UTM/VTM, 2 HR TAT - Swab, Nasopharynx [[id number]]  (Normal) Collected: 12/26/23 2329    Lab Status: Final result Specimen: Swab from Nasopharynx Updated: 12/27/23 0104     ADENOVIRUS, PCR Not Detected     Coronavirus 229E Not Detected     Coronavirus HKU1 Not Detected     Coronavirus NL63 Not Detected     Coronavirus OC43 Not Detected     COVID19 Not Detected     Human Metapneumovirus Not Detected     Human Rhinovirus/Enterovirus Not Detected     Influenza A PCR Not Detected     Influenza B PCR Not Detected     Parainfluenza Virus 1 Not Detected     Parainfluenza Virus 2 Not Detected     Parainfluenza Virus 3 Not Detected     Parainfluenza Virus 4 Not Detected     RSV, PCR Not Detected     Bordetella pertussis pcr Not Detected     Bordetella parapertussis PCR Not Detected     Chlamydophila pneumoniae PCR Not Detected     Mycoplasma pneumo by PCR Not Detected    Narrative:      In the setting of a positive respiratory panel with a viral infection PLUS a negative procalcitonin without other underlying concern for bacterial infection, consider observing off antibiotics or discontinuation of antibiotics and continue supportive care. If the respiratory panel is positive for atypical bacterial infection (Bordetella pertussis, Chlamydophila pneumoniae, or Mycoplasma pneumoniae), consider antibiotic de-escalation to target  atypical bacterial infection.            No radiology results from the last 24 hrs        Current medications:  Scheduled Meds:[Held by provider] carvedilol, 3.125 mg, Oral, BID With Meals  doxycycline, 100 mg, Oral, Q12H  folic acid, 1 mg, Oral, Daily  Lidocaine, 1 patch, Transdermal, Q24H  pantoprazole, 40 mg, Intravenous, Q12H  pharmacy consult - MTM, , Does not apply, Daily  piperacillin-tazobactam, 3.375 g, Intravenous, Q8H  senna-docusate sodium, 2 tablet, Oral, BID  sodium chloride, 10 mL, Intravenous, Q12H  [Held by provider] spironolactone, 25 mg, Oral, Daily  [Held by provider] tamsulosin, 0.4 mg, Oral, Daily  vitamin B-12, 1,000 mcg, Oral, Daily      Continuous Infusions:   PRN Meds:.  acetaminophen    senna-docusate sodium **AND** polyethylene glycol **AND** bisacodyl **AND** bisacodyl    Calcium Replacement - Follow Nurse / BPA Driven Protocol    HYDROmorphone **AND** naloxone    lactulose    Magnesium Standard Dose Replacement - Follow Nurse / BPA Driven Protocol    melatonin    ondansetron    oxyCODONE    Phosphorus Replacement - Follow Nurse / BPA Driven Protocol    Potassium Replacement - Follow Nurse / BPA Driven Protocol    simethicone    sodium chloride    sodium chloride    sodium chloride    Assessment & Plan   Assessment & Plan     Active Hospital Problems    Diagnosis  POA    **Anemia [D64.9]  Yes    Severe malnutrition [E43]  Yes      Resolved Hospital Problems   No resolved problems to display.        Brief Hospital Course to date:  Val Nassar is a 63 y.o. male with history of PUD, cirrhosis, newly diagnosed laryngeal cancer and prior alcohol abuse presented with abdominal pain, melena and anemia.     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 Will need outpatient PET scan for further staging and follow-up with medical oncology, radiation and thoracic surgery pending those results. May also  require EUS.   - PET arranged tentatively in 3 weeks due to inpatient rehab stay.      Atypical pneumonia  Fever, resolved  - new productive cough, procalcitonin elevated  - negative respiratory PCR panel, UA bland  - CXR with mild prominence of interstitial markings compared to prior, possibly atypical infection.   - Initally started on zosyn and doxycycline on presentation. Zosyn now discontinued after 2 days. Continue doxycycline for 5 day course for atypical pneumonia.      Duodenal ulcer  Duodenal stenosis   Anemia, iron deficient  - s/p 3 units PRBCs   - s/p IV iron x 3 days  - EGD showed esophagitis and one non-bleeding cratered duodenal ulcer (along with esophageal mass) and congestive gastropathy.  No varices noted.   - Continue PPI BID  - no NSAIDs  - Follow-up with [facility] on 6-8weeks.     Abdominal pain  - abnormal appearance of GB on CT imaging  - HIDA normal  - general surgery states this does not represent cholecystitis  - General surgery followed, can follow-up in 2 weeks with Dr. Aguilera      Cirrhosis  Alcohol abuse  - patient says he has been abstinent for the past 6 weeks  - thiamine     Tobacco abuse  -  cessation        Expected Discharge Location and Transportation: rehab  Expected Discharge   Expected Discharge Date: 1/2/2024; Expected Discharge Time:      DVT prophylaxis:  Mechanical DVT prophylaxis orders are present.     AM-PAC 6 Clicks Score (PT): 19 (12/29/23 5593)    CODE STATUS:   Code Status and Medical Interventions:   Ordered at: 12/25/23 4190     Code Status (Patient has no pulse and is not breathing):    CPR (Attempt to Resuscitate)     Medical Interventions (Patient has pulse or is breathing):    Full Support     This patient's problems and plans were partially entered by my partner and updated as appropriate by me 01/01/24. Today is my first day evaluating this patient's active medical problems. I Personally reviewed chart and adjusted note to reflect daily changes in  "management/clinical condition. Copied text in this note has been reviewed and is accurate as of  01/01/24      Pauly Mallory MD  01/01/24        Electronically signed by Pauly Mallory MD at 01/01/24 1228       Carlos Enrique Alvarenga MD at 01/01/24 0951          Patient Name:  Val Nassar  YOB: 1960  [id number]    Surgery Progress Note    Date of visit: 1/1/2024    Subjective   Subjective: Feels fine, no complaints.        Objective     Objective:     /67 (BP Location: Right arm, Patient Position: Lying)   Pulse 77   Temp 98 °F (36.7 °C) (Oral)   Resp 17   Ht 175.3 cm (69\")   Wt 66.7 kg (147 lb)   SpO2 98%   BMI 21.71 kg/m²     Intake/Output Summary (Last 24 hours) at 1/1/2024 0951  Last data filed at 1/1/2024 0809  Gross per 24 hour   Intake --   Output 2675 ml   Net -2675 ml       CV:  Rhythm  regular and rate regular   L:  Clear  to auscultation bilaterally   Abd:  Bowel sounds positive , soft, nontender  Ext:  No cyanosis, clubbing, edema    Recent labs that are back at this time have been reviewed.           Assessment/ Plan:    Problem List Items Addressed This Visit          Hematology and Neoplasia    * (Principal) Anemia- Stable after transfusion. Awaiting placement.    Relevant Medications    folic acid (FOLVITE) 1 MG tablet    vitamin B-12 (CYANOCOBALAMIN) 1000 MCG tablet    vitamin B-12 (CYANOCOBALAMIN) tablet 1,000 mcg    folic acid (FOLVITE) tablet 1 mg    Other Relevant Orders    Case Request (Completed)    Tissue Pathology Exam (Completed)     Other Visit Diagnoses       Alcoholic cirrhosis of liver without ascites    -  Primary    Relevant Orders    Ambulatory Referral to Gastroenterology    Severe anemia        Relevant Medications    folic acid (FOLVITE) 1 MG tablet    vitamin B-12 (CYANOCOBALAMIN) 1000 MCG tablet    vitamin B-12 (CYANOCOBALAMIN) tablet 1,000 mcg    folic acid (FOLVITE) tablet 1 mg    ferric gluconate (FERRLECIT)125 MG in sodium chloride " 0.9 % 100 mL IVPB (Completed)    Cholecystitis        LLQ abdominal pain        Duodenal ulcer        Relevant Medications    sucralfate (CARAFATE) 1 g tablet    pantoprazole (PROTONIX) 40 MG EC tablet    famotidine (PEPCID) 20 MG tablet    pantoprazole (PROTONIX) injection 40 mg    Other Relevant Orders    Ambulatory Referral to Gastroenterology             Active Hospital Problems    Diagnosis  POA    **Anemia [D64.9]  Yes    Severe malnutrition [E43]  Yes      Resolved Hospital Problems   No resolved problems to display.              Carlos Enrique Alvarenga MD  1/1/2024  09:51 EST        Electronically signed by Carlos Enrique Alvarenga MD at 01/01/24 0951       Consult Notes (last 48 hours)  Notes from 12/31/23 1418 through 01/02/24 1418   No notes of this type exist for this encounter.

## 2024-01-02 NOTE — CASE MANAGEMENT/SOCIAL WORK
Continued Stay Note  [facility]     Patient Name: Val Nassar  MRN: [id number]  Today's Date: 1/2/2024    Admit Date: 12/25/2023    Plan: SNF   Discharge Plan       Row Name 01/02/24 1127       Plan    Plan SNF    Patient/Family in Agreement with Plan yes    Plan Comments Spoke with patient at bedside. We discussed going to [place] or [place]. He is agreeable to go to SNF. [facility] has a bed offer for Mr. Nassar. Discharge plan is [facility] tomorrow. CM will work on setting up transport. CM will follow.  Updated transport tomorrow at 3:30 pm with caliber wheelchair.    Final Discharge Disposition Code 03 - skilled nursing facility (SNF)                   Discharge Codes    No documentation.                 Expected Discharge Date and Time       Expected Discharge Date Expected Discharge Time    Jan 2, 2024               Radha Kothari RN

## 2024-01-02 NOTE — PROGRESS NOTES
"Patient Name:  Val Nassar  YOB: 1960  [id number]    Surgery Progress Note    Date of visit: 1/2/2024    Subjective   Subjective: Complains of occasional RUQ pain, but otherwise stable.         Objective     Objective:     /65 (BP Location: Right arm, Patient Position: Lying)   Pulse 77   Temp 97.3 °F (36.3 °C) (Oral)   Resp 18   Ht 175.3 cm (69\")   Wt 66.7 kg (147 lb)   SpO2 95%   BMI 21.71 kg/m²     Intake/Output Summary (Last 24 hours) at 1/2/2024 0718  Last data filed at 1/1/2024 2349  Gross per 24 hour   Intake --   Output 900 ml   Net -900 ml       CV:  Rhythm  regular and rate regular   L:  Clear  to auscultation bilaterally   Abd:  Bowel sounds positive , soft, minimally tender, except to deep palpation RUQ  Ext:  No cyanosis, clubbing, edema    Recent labs that are back at this time have been reviewed. Pathology confirms esophageal SCCA           Assessment/ Plan:    Problem List Items Addressed This Visit          Hematology and Neoplasia    * (Principal) Anemia- Stable. Will need outpatient follow up at [facility] for esophageal CA. As for his RUQ pain, may be related more to his known ulcer than his GB, given negative HIDA. Continue PPI and carafate. Hopefully ready for rehab/ discharge. RTC with Dr. Mora in 2 weeks.      Relevant Medications    folic acid (FOLVITE) 1 MG tablet    vitamin B-12 (CYANOCOBALAMIN) 1000 MCG tablet    vitamin B-12 (CYANOCOBALAMIN) tablet 1,000 mcg    folic acid (FOLVITE) tablet 1 mg    Other Relevant Orders    Case Request (Completed)    Tissue Pathology Exam (Completed)     Other Visit Diagnoses       Alcoholic cirrhosis of liver without ascites    -  Primary    Relevant Orders    Ambulatory Referral to Gastroenterology    Severe anemia        Relevant Medications    folic acid (FOLVITE) 1 MG tablet    vitamin B-12 (CYANOCOBALAMIN) 1000 MCG tablet    vitamin B-12 (CYANOCOBALAMIN) tablet 1,000 mcg    folic acid (FOLVITE) tablet 1 mg    ferric " gluconate (FERRLECIT)125 MG in sodium chloride 0.9 % 100 mL IVPB (Completed)    Cholecystitis        LLQ abdominal pain        Duodenal ulcer        Relevant Medications    sucralfate (CARAFATE) 1 g tablet    pantoprazole (PROTONIX) 40 MG EC tablet    famotidine (PEPCID) 20 MG tablet    pantoprazole (PROTONIX) injection 40 mg    Other Relevant Orders    Ambulatory Referral to Gastroenterology             Active Hospital Problems    Diagnosis  POA    **Anemia [D64.9]  Yes    Severe malnutrition [E43]  Yes      Resolved Hospital Problems   No resolved problems to display.              Carlos Enrique Alvarenga MD  1/2/2024  07:18 EST

## 2024-01-02 NOTE — THERAPY TREATMENT NOTE
Patient Name: aVl Nassar  : 1960    MRN: [id number]                              Today's Date: 2024       Admit Date: 2023    Visit Dx:     ICD-10-CM ICD-9-CM   1. Alcoholic cirrhosis of liver without ascites  K70.30 571.2   2. Severe anemia  D64.9 285.9   3. Cholecystitis  K81.9 575.10   4. LLQ abdominal pain  R10.32 789.04   5. Iron deficiency anemia due to chronic blood loss  D50.0 280.0   6. Duodenal ulcer  K26.9 532.90     Patient Active Problem List   Diagnosis    Anemia    Acute gastric ulcer with hemorrhage    Alcohol abuse, uncomplicated    Anxiety disorder, unspecified    Diverticulum of bladder    Duodenal ulcer, unspecified as acute or chronic, without hemorrhage or perforation    Elevation of level of transaminase and lactic acid dehydrogenase (LDH)    Gastro-esophageal reflux disease without esophagitis    Hyperglycemia, unspecified    Hyperlipidemia, unspecified    Melena    Nicotine dependence, cigarettes, uncomplicated    Tobacco use    Severe malnutrition     History reviewed. No pertinent past medical history.  Past Surgical History:   Procedure Laterality Date    ENDOSCOPY N/A 2023    Procedure: ESOPHAGOGASTRODUODENOSCOPY;  Surgeon: Wesly Mckeon MD;  Location: [facility] ENDOSCOPY;  Service: Gastroenterology;  Laterality: N/A;      General Information       Row Name 24 0855          Physical Therapy Time and Intention    Document Type therapy note (daily note)  -NS     Mode of Treatment physical therapy  -NS       Row Name 24 0855          General Information    Patient Profile Reviewed yes  -NS     Existing Precautions/Restrictions fall  -NS       Row Name 24 0855          Cognition    Orientation Status (Cognition) oriented x 3  -NS       Row Name 24 0855          Safety Issues, Functional Mobility    Safety Issues Affecting Function (Mobility) safety precaution awareness;safety precautions follow-through/compliance;sequencing abilities   -NS     Impairments Affecting Function (Mobility) balance;endurance/activity tolerance;strength;pain;motor planning;range of motion (ROM)  -NS               User Key  (r) = Recorded By, (t) = Taken By, (c) = Cosigned By      Initials Name Provider Type    Nava Ramires PT Physical Therapist                   Mobility       Row Name 01/02/24 0855          Bed Mobility    Bed Mobility supine-sit  -NS     Supine-Sit [place] (Bed Mobility) standby assist  -NS     Assistive Device (Bed Mobility) bed rails;head of bed elevated  -NS       Row Name 01/02/24 0855          Transfers    Comment, (Transfers) Cues for hand placement  -NS       Row Name 01/02/24 0855          Sit-Stand Transfer    Sit-Stand [place] (Transfers) contact guard;verbal cues  -NS     Assistive Device (Sit-Stand Transfers) walker, front-wheeled  -NS       Row Name 01/02/24 0855          Gait/Stairs (Locomotion)    [place] Level (Gait) contact guard  -NS     Assistive Device (Gait) walker, front-wheeled  -NS     Distance in Feet (Gait) 30+50  -NS     Deviations/Abnormal Patterns (Gait) mandy decreased;gait speed decreased;stride length decreased;antalgic  -NS     Bilateral Gait Deviations forward flexed posture;heel strike decreased  -NS     Comment, (Gait/Stairs) Patient ambulatedat slow pace, requiring a standing rest break following 30ft due to L sciatic pain. Slightly decreased weight shifting noted onto LLE due pain.  -NS               User Key  (r) = Recorded By, (t) = Taken By, (c) = Cosigned By      Initials Name Provider Type    Nava Ramires PT Physical Therapist                   Obj/Interventions       Row Name 01/02/24 0855          Motor Skills    Therapeutic Exercise hip;knee;ankle  -NS       Row Name 01/02/24 0855          Hip (Therapeutic Exercise)    Hip (Therapeutic Exercise) strengthening exercise  -NS     Hip Strengthening (Therapeutic Exercise) bilateral;external rotation;internal rotation;marching while  seated;marching while standing;10 repetitions  -NS       Row Name 01/02/24 0855          Knee (Therapeutic Exercise)    Knee (Therapeutic Exercise) strengthening exercise  -NS     Knee Strengthening (Therapeutic Exercise) bilateral;LAQ (long arc quad);SLR (straight leg raise);10 repetitions  -NS       Row Name 01/02/24 0855          Ankle (Therapeutic Exercise)    Ankle (Therapeutic Exercise) AROM (active range of motion);strengthening exercise  -NS     Ankle AROM (Therapeutic Exercise) bilateral;dorsiflexion;plantarflexion;10 repetitions  -NS     Ankle Strengthening (Therapeutic Exercise) bilateral;plantarflexion;5 repetitions  -NS       Row Name 01/02/24 0855          Balance    Balance Assessment sitting static balance;sitting dynamic balance;standing static balance;standing dynamic balance  -NS     Static Sitting Balance standby assist  -NS     Dynamic Sitting Balance standby assist  -NS     Position, Sitting Balance unsupported;sitting edge of bed  -NS     Static Standing Balance contact guard  -NS     Dynamic Standing Balance contact guard  -NS     Position/Device Used, Standing Balance supported;walker, front-wheeled  -NS               User Key  (r) = Recorded By, (t) = Taken By, (c) = Cosigned By      Initials Name Provider Type    Nava Ramires, PT Physical Therapist                   Goals/Plan    No documentation.                  Clinical Impression       Row Name 01/02/24 0855          Pain    Pretreatment Pain Rating 8/10  -NS     Posttreatment Pain Rating 8/10  -NS     Pain Location - Side/Orientation Left  -NS     Pain Location lower  -NS     Pain Location - extremity  -NS     Pre/Posttreatment Pain Comment RN premedicated prior to session  -NS       Row Name 01/02/24 0855          Plan of Care Review    Plan of Care Reviewed With patient  -NS     Progress improving  -NS     Outcome Evaluation Patient continues to participate well with PT, ambulating with RW and CGA. He is limited by weakness,  pain, and mild balance deficits. Continue per PT POC. Recommend IRF at discharge to support return to baseline independence.  -NS       Row Name 01/02/24 0855          Vital Signs    Pre Patient Position Supine  -NS     Intra Patient Position Standing  -NS     Post Patient Position Sitting  -NS       Row Name 01/02/24 0855          Positioning and Restraints    Pre-Treatment Position in bed  -NS     Post Treatment Position chair  -NS     In Chair notified nsg;reclined;call light within reach;encouraged to call for assist;legs elevated;waffle cushion  RN deferred alarm  -NS               User Key  (r) = Recorded By, (t) = Taken By, (c) = Cosigned By      Initials Name Provider Type    Nava Ramires PT Physical Therapist                   Outcome Measures       Row Name 01/02/24 0855          How much help from another person do you currently need...    Turning from your back to your side while in flat bed without using bedrails? 4  -NS     Moving from lying on back to sitting on the side of a flat bed without bedrails? 3  -NS     Moving to and from a bed to a chair (including a wheelchair)? 3  -NS     Standing up from a chair using your arms (e.g., wheelchair, bedside chair)? 3  -NS     Climbing 3-5 steps with a railing? 3  -NS     To walk in hospital room? 3  -NS     AM-PAC 6 Clicks Score (PT) 19  -NS     Highest Level of Mobility Goal 6 --> Walk 10 steps or more  -NS       Row Name 01/02/24 0855          Functional Assessment    Outcome Measure Options AM-PAC 6 Clicks Basic Mobility (PT)  -NS               User Key  (r) = Recorded By, (t) = Taken By, (c) = Cosigned By      Initials Name Provider Type    Nava Ramires, APRIL Physical Therapist                                 Physical Therapy Education       Title: PT OT SLP Therapies (In Progress)       Topic: Physical Therapy (Done)       Point: Mobility training (Done)       Learning Progress Summary             Patient Acceptance, E, VU by NS at 1/2/2024 0903     Comment: safety with mobility    Acceptance, E, VU by SD at 12/29/2023 1519    Eager, E, VU,DU,NR by SS at 12/26/2023 1608    Comment: Educated pt. safety/technique w/bed mobility (log roll), transfers, ambulation, PT POC                         Point: Home exercise program (Done)       Learning Progress Summary             Patient Acceptance, E, VU by NS at 1/2/2024 0954    Comment: safety with mobility    Acceptance, E, VU by SD at 12/29/2023 1519                         Point: Body mechanics (Done)       Learning Progress Summary             Patient Acceptance, E, VU by NS at 1/2/2024 0954    Comment: safety with mobility    Acceptance, E, VU by SD at 12/29/2023 1519    Eager, E, VU,DU,NR by  at 12/26/2023 1608    Comment: Educated pt. safety/technique w/bed mobility (log roll), transfers, ambulation, PT POC                         Point: Precautions (Done)       Learning Progress Summary             Patient Acceptance, E, VU by NS at 1/2/2024 0954    Comment: safety with mobility    Acceptance, E, VU by SD at 12/29/2023 1519    Eager, E, VU,DU,NR by SS at 12/26/2023 1608    Comment: Educated pt. safety/technique w/bed mobility (log roll), transfers, ambulation, PT POC                                         User Key       Initials Effective Dates Name Provider Type Discipline    SD 03/13/23 -  Sakina Gibson, PT Physical Therapist PT    NS 06/16/21 -  Nava Elizabeth, PT Physical Therapist PT     06/01/21 -  Ninfa Rowe, PT Physical Therapist PT                  PT Recommendation and Plan     Plan of Care Reviewed With: patient  Progress: improving  Outcome Evaluation: Patient continues to participate well with PT, ambulating with RW and CGA. He is limited by weakness, pain, and mild balance deficits. Continue per PT POC. Recommend IRF at discharge to support return to baseline independence.     Time Calculation:         PT Charges       Row Name 01/02/24 0855             Time Calculation    Start  Time 0855  -NS      PT Received On 01/02/24  -NS      PT Goal Re-Cert Due Date 01/05/24  -NS         Timed Charges    56649 - PT Therapeutic Exercise Minutes 18  -NS      49152 - Gait Training Minutes  12  -NS         Total Minutes    Timed Charges Total Minutes 30  -NS       Total Minutes 30  -NS                User Key  (r) = Recorded By, (t) = Taken By, (c) = Cosigned By      Initials Name Provider Type    NS Nvaa Elizabeth, APRIL Physical Therapist                  Therapy Charges for Today       Code Description Service Date Service Provider Modifiers Qty    40632492302 HC PT THER PROC EA 15 MIN 1/2/2024 Nava Elizabeth, PT GP 1    68070524121 HC GAIT TRAINING EA 15 MIN 1/2/2024 Nava Elizabeth, PT GP 1            PT G-Codes  Outcome Measure Options: AM-PAC 6 Clicks Basic Mobility (PT)  AM-PAC 6 Clicks Score (PT): 19  AM-PAC 6 Clicks Score (OT): 19  PT Discharge Summary  Anticipated Discharge Disposition (PT): inpatient rehabilitation facility    Nava Elizabeth PT  1/2/2024

## 2024-01-02 NOTE — PLAN OF CARE
Goal Outcome Evaluation:  Plan of Care Reviewed With: patient        Progress: improving  Outcome Evaluation: Patient continues to participate well with PT, ambulating with RW and CGA. He is limited by weakness, pain, and mild balance deficits. Continue per PT POC. Recommend IRF at discharge to support return to baseline independence.      Anticipated Discharge Disposition (PT): inpatient rehabilitation facility

## 2024-01-02 NOTE — PLAN OF CARE
Goal Outcome Evaluation:     No changes today.   Pt up in halls with therapy.  VSS on RA.  No tele.   Pt will dc to [facility] tomorrow..

## 2024-01-02 NOTE — PLAN OF CARE
Goal Outcome Evaluation:  Plan of Care Reviewed With: patient        Progress: improving  Outcome Evaluation: Patient resting in bed with no signs and symptoms of distress noted. VSS. RA. Pain meds given per patient's request. No acute changes and needs met all throughout the shift.

## 2024-01-02 NOTE — PROGRESS NOTES
[facility]  PROGRESS NOTE    Patient Name: Val Nassar  : 1960  MRN: [id number]    Date of Admission: 2023  Primary Care Physician: Provider, No Known    Subjective   Subjective     CC:  Esophageal mass    HPI:  No acute events overnight      Objective   Objective     Vital Signs:   Temp:  [96.4 °F (35.8 °C)-98.6 °F (37 °C)] 98.3 °F (36.8 °C)  Heart Rate:  [57-88] 88  Resp:  [16-18] 16  BP: (114-131)/(55-86) 131/69     Physical Exam:  Constitutional: sleeping  Respiratory: Clear to auscultation bilaterally, respiratory effort normal on room air  Cardiovascular: RRR  Gastrointestinal: Positive bowel sounds, soft, nontender, nondistended  Musculoskeletal: No bilateral ankle edema  Skin: No rashes      Results Reviewed:  LAB RESULTS:      Lab 23  0441 23  1812 23  1144 23  1143 23  0013 23  1458 23  0657 23  0149   WBC 5.92  --  9.77  --   --   --   --  15.37*   HEMOGLOBIN 9.1* 9.1* 8.3*  --  6.9* 7.6*   < > 7.5*   HEMATOCRIT 29.3* 28.5* 26.1*  --  21.6* 24.2*   < > 23.8*   PLATELETS 147  --  148  --   --   --   --  190   NEUTROS ABS 3.30  --  6.24  --   --   --   --  11.60*   IMMATURE GRANS (ABS) 0.03  --  0.03  --   --   --   --  0.11*   LYMPHS ABS 0.89  --  0.85  --   --   --   --  0.63*   MONOS ABS 1.47*  --  2.53*  --   --   --   --  2.94*   EOS ABS 0.20  --  0.09  --   --   --   --  0.03   MCV 91.8  --  91.6  --   --   --   --  91.2   PROCALCITONIN 0.71*  --   --  1.03*  --   --   --  0.40*   LACTATE  --   --   --   --   --   --   --  0.9    < > = values in this interval not displayed.         Lab 23  0441 23  1143 23  1458 23  0149   SODIUM 134* 136  --  137   POTASSIUM 4.2 4.7  4.7 3.6 3.2*   CHLORIDE 101 102  --  102   CO2 23.0 25.0  --  24.0   ANION GAP 10.0 9.0  --  11.0   BUN 9 7*  --  4*   CREATININE 0.66* 0.48*  --  0.53*   EGFR 104.7 115.3  --  111.9   GLUCOSE 113*  87  --  110*   CALCIUM 9.2 9.0  --  8.6   MAGNESIUM  --  1.7  --  1.7         Lab 12/29/23  0441 12/28/23  1143 12/27/23 0149   TOTAL PROTEIN 6.6 6.1 6.3   ALBUMIN 3.2* 3.5 3.3*   GLOBULIN 3.4 2.6 3.0   ALT (SGPT) 8 7 8   AST (SGOT) 15 14 16   BILIRUBIN 0.3 0.8 0.3   ALK PHOS 112 113 90         Lab 12/27/23 0149   PROBNP 3,092.0*             Lab 12/29/23 0441   FOLATE >20.00   VITAMIN B 12 808         Brief Urine Lab Results  (Last result in the past 365 days)        Color   Clarity   Blood   Leuk Est   Nitrite   Protein   CREAT   Urine HCG        12/25/23 1932 Yellow   Clear   Negative   Negative   Negative   Negative                   Microbiology Results Abnormal       Procedure Component Value - Date/Time    Blood Culture - Blood, Arm, Right [[id number]]  (Normal) Collected: 12/27/23 0149    Lab Status: Final result Specimen: Blood from Arm, Right Updated: 01/01/24 0515     Blood Culture No growth at 5 days    Blood Culture - Blood, Hand, Right [[id number]]  (Normal) Collected: 12/27/23 0149    Lab Status: Final result Specimen: Blood from Hand, Right Updated: 01/01/24 0515     Blood Culture No growth at 5 days    MRSA Screen, PCR (Inpatient) - Swab, Nares [[id number]]  (Normal) Collected: 12/27/23 0950    Lab Status: Final result Specimen: Swab from Nares Updated: 12/27/23 1130     MRSA PCR Negative    Narrative:      The negative predictive value of this diagnostic test is high and should only be used to consider de-escalating anti-MRSA therapy. A positive result may indicate colonization with MRSA and must be correlated clinically.  MRSA Negative    Respiratory Panel PCR w/COVID-19(SARS-CoV-2) ABDIAZIZ/TAVARES/HANH/PAD/COR/OTIS In-House, NP Swab in UTM/VTM, 2 HR TAT - Swab, Nasopharynx [[id number]]  (Normal) Collected: 12/26/23 5549    Lab Status: Final result Specimen: Swab from Nasopharynx Updated: 12/27/23 0104     ADENOVIRUS, PCR Not Detected     Coronavirus 229E Not Detected     Coronavirus HKU1 Not Detected      Coronavirus NL63 Not Detected     Coronavirus OC43 Not Detected     COVID19 Not Detected     Human Metapneumovirus Not Detected     Human Rhinovirus/Enterovirus Not Detected     Influenza A PCR Not Detected     Influenza B PCR Not Detected     Parainfluenza Virus 1 Not Detected     Parainfluenza Virus 2 Not Detected     Parainfluenza Virus 3 Not Detected     Parainfluenza Virus 4 Not Detected     RSV, PCR Not Detected     Bordetella pertussis pcr Not Detected     Bordetella parapertussis PCR Not Detected     Chlamydophila pneumoniae PCR Not Detected     Mycoplasma pneumo by PCR Not Detected    Narrative:      In the setting of a positive respiratory panel with a viral infection PLUS a negative procalcitonin without other underlying concern for bacterial infection, consider observing off antibiotics or discontinuation of antibiotics and continue supportive care. If the respiratory panel is positive for atypical bacterial infection (Bordetella pertussis, Chlamydophila pneumoniae, or Mycoplasma pneumoniae), consider antibiotic de-escalation to target atypical bacterial infection.            No radiology results from the last 24 hrs        Current medications:  Scheduled Meds:[Held by provider] carvedilol, 3.125 mg, Oral, BID With Meals  folic acid, 1 mg, Oral, Daily  Lidocaine, 1 patch, Transdermal, Q24H  pantoprazole, 40 mg, Oral, BID  senna-docusate sodium, 2 tablet, Oral, BID  sodium chloride, 10 mL, Intravenous, Q12H  [Held by provider] spironolactone, 25 mg, Oral, Daily  sucralfate, 1 g, Oral, 4x Daily AC & at Bedtime  [Held by provider] tamsulosin, 0.4 mg, Oral, Daily  vitamin B-12, 1,000 mcg, Oral, Daily      Continuous Infusions:   PRN Meds:.  acetaminophen    senna-docusate sodium **AND** polyethylene glycol **AND** bisacodyl **AND** bisacodyl    Calcium Replacement - Follow Nurse / BPA Driven Protocol    lactulose    Magnesium Standard Dose Replacement - Follow Nurse / BPA Driven Protocol    melatonin     [] HYDROmorphone **AND** naloxone    ondansetron    oxyCODONE    Phosphorus Replacement - Follow Nurse / BPA Driven Protocol    Potassium Replacement - Follow Nurse / BPA Driven Protocol    simethicone    sodium chloride    sodium chloride    sodium chloride    Assessment & Plan   Assessment & Plan     Active Hospital Problems    Diagnosis  POA    **Anemia [D64.9]  Yes    Severe malnutrition [E43]  Yes      Resolved Hospital Problems   No resolved problems to display.        Brief Hospital Course to date:  Val Nassar is a 63 y.o. male with history of PUD, cirrhosis, newly diagnosed laryngeal cancer and prior alcohol abuse presented with abdominal pain, melena and anemia.     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 Will need outpatient PET scan for further staging and follow-up with medical oncology, radiation and thoracic surgery pending those results. May also require EUS.   - PET arranged tentatively in 3 weeks due to inpatient rehab stay.      Atypical pneumonia  Fever, resolved  - new productive cough, procalcitonin elevated  - negative respiratory PCR panel, UA bland  - CXR with mild prominence of interstitial markings compared to prior, possibly atypical infection.   - Initally started on zosyn and doxycycline on presentation. Zosyn now discontinued after 2 days. Continue doxycycline for 5 day course for atypical pneumonia.      Duodenal ulcer  Duodenal stenosis   Anemia, iron deficient  - s/p 3 units PRBCs   - s/p IV iron x 3 days  - EGD showed esophagitis and one non-bleeding cratered duodenal ulcer (along with esophageal mass) and congestive gastropathy.  No varices noted.   - Continue PPI BID  - no NSAIDs  - Follow[place]up with [facility] [place].     Abdominal pain  - abnormal appearance of GB on CT imaging  - HIDA normal  - general surgery states this does not represent cholecystitis  - General  surgery followed, can follow-up in 2 weeks with Dr. Aguilera      Cirrhosis  Alcohol abuse  - patient says he has been abstinent for the past 6 weeks  - thiamine     Tobacco abuse  -  cessation        Expected Discharge Location and Transportation: rehab    Expected Discharge   Expected Discharge Date: 1/2/2024; Expected Discharge Time:      DVT prophylaxis:  Mechanical DVT prophylaxis orders are present.     AM-PAC 6 Clicks Score (PT): 19 (01/02/24 0865)    CODE STATUS:   Code Status and Medical Interventions:   Ordered at: 12/25/23 5007     Code Status (Patient has no pulse and is not breathing):    CPR (Attempt to Resuscitate)     Medical Interventions (Patient has pulse or is breathing):    Full Support     I have prepared this progress note with copied portions of the prior day's progress note of my own authorship to preserve accuracy and maintain consistency of documentation. I have reviewed these portions and edited them for correctness. I verify that the above documentation accurately and truly represents the evaluation and management performed on today's date.       Pauly Mallory MD  01/02/24

## 2024-01-03 ENCOUNTER — READMISSION MANAGEMENT (OUTPATIENT)
Dept: CALL CENTER | Facility: HOSPITAL | Age: 64
End: 2024-01-03
Payer: MEDICAID

## 2024-01-03 VITALS
RESPIRATION RATE: 18 BRPM | SYSTOLIC BLOOD PRESSURE: 143 MMHG | OXYGEN SATURATION: 94 % | TEMPERATURE: 97.8 F | HEIGHT: 69 IN | BODY MASS INDEX: 21.77 KG/M2 | WEIGHT: 147 LBS | HEART RATE: 80 BPM | DIASTOLIC BLOOD PRESSURE: 84 MMHG

## 2024-01-03 PROBLEM — D50.9 IRON DEFICIENCY ANEMIA: Status: ACTIVE | Noted: 2024-01-03

## 2024-01-03 PROBLEM — K74.60 CIRRHOSIS OF LIVER: Status: ACTIVE | Noted: 2024-01-03

## 2024-01-03 PROBLEM — K31.5 DUODENAL STENOSIS: Status: ACTIVE | Noted: 2024-01-03

## 2024-01-03 PROBLEM — D62 ACUTE BLOOD LOSS ANEMIA: Status: ACTIVE | Noted: 2024-01-03

## 2024-01-03 PROBLEM — K92.2 GI BLEED: Status: RESOLVED | Noted: 2024-01-03 | Resolved: 2024-01-03

## 2024-01-03 PROBLEM — K92.2 GI BLEED: Status: ACTIVE | Noted: 2024-01-03

## 2024-01-03 PROBLEM — C15.9 SQUAMOUS CELL CARCINOMA OF ESOPHAGUS: Status: ACTIVE | Noted: 2024-01-03

## 2024-01-03 PROBLEM — J18.9 ATYPICAL PNEUMONIA: Status: ACTIVE | Noted: 2024-01-03

## 2024-01-03 PROBLEM — D64.9 ANEMIA: Status: RESOLVED | Noted: 2023-12-25 | Resolved: 2024-01-03

## 2024-01-03 PROBLEM — J18.9 ATYPICAL PNEUMONIA: Status: RESOLVED | Noted: 2024-01-03 | Resolved: 2024-01-03

## 2024-01-03 PROBLEM — K26.9 DUODENAL ULCER: Status: ACTIVE | Noted: 2024-01-03

## 2024-01-03 RX ORDER — POLYETHYLENE GLYCOL 3350 17 G/17G
17 POWDER, FOR SOLUTION ORAL DAILY PRN
Start: 2024-01-03

## 2024-01-03 RX ORDER — LIDOCAINE 4 G/G
1 PATCH TOPICAL
Start: 2024-01-03

## 2024-01-03 RX ORDER — OXYCODONE HYDROCHLORIDE AND ACETAMINOPHEN 5; 325 MG/1; MG/1
1 TABLET ORAL EVERY 6 HOURS PRN
Qty: 8 TABLET | Refills: 0 | Status: SHIPPED | OUTPATIENT
Start: 2024-01-03 | End: 2024-01-06

## 2024-01-03 RX ORDER — GABAPENTIN 300 MG/1
300 CAPSULE ORAL 3 TIMES DAILY
Qty: 9 CAPSULE | Refills: 0 | Status: SHIPPED | OUTPATIENT
Start: 2024-01-03

## 2024-01-03 RX ADMIN — CARVEDILOL 3.12 MG: 3.12 TABLET, FILM COATED ORAL at 09:35

## 2024-01-03 RX ADMIN — SENNOSIDES AND DOCUSATE SODIUM 2 TABLET: 8.6; 5 TABLET ORAL at 09:35

## 2024-01-03 RX ADMIN — FOLIC ACID 1 MG: 1 TABLET ORAL at 09:35

## 2024-01-03 RX ADMIN — CYANOCOBALAMIN TAB 1000 MCG 1000 MCG: 1000 TAB at 09:34

## 2024-01-03 RX ADMIN — SUCRALFATE 1 G: 1 TABLET ORAL at 13:19

## 2024-01-03 RX ADMIN — LIDOCAINE 1 PATCH: 4 PATCH TOPICAL at 09:35

## 2024-01-03 RX ADMIN — SUCRALFATE 1 G: 1 TABLET ORAL at 06:30

## 2024-01-03 RX ADMIN — PANTOPRAZOLE SODIUM 40 MG: 40 TABLET, DELAYED RELEASE ORAL at 09:35

## 2024-01-03 RX ADMIN — Medication 10 ML: at 09:36

## 2024-01-03 NOTE — DISCHARGE SUMMARY
[facility] Medicine Services  DISCHARGE SUMMARY    Patient Name: Val Nassar  : 1960  MRN: [id number]    Date of Admission: 2023  7:02 PM  Date of Discharge:  1/3/24  Primary Care Physician: Provider, No Known    Consults       Date and Time Order Name Status Description    2023 10:47 AM Inpatient Hematology & Oncology Consult Completed     2023 11:47 PM Inpatient General Surgery Consult Completed     2023 11:47 PM Inpatient Gastroenterology Consult Completed             Hospital Course     Presenting Problem: pneumonia    Active Hospital Problems    Diagnosis  POA    Duodenal ulcer [K26.9]  Yes    Iron deficiency anemia [D50.9]  Yes    Cirrhosis of liver [K74.60]  Yes    Acute blood loss anemia [D62]  Yes    Squamous cell carcinoma of esophagus [C15.9]  Yes    Duodenal stenosis [K31.5]  Yes    Severe malnutrition [E43]  Yes    Alcohol abuse, uncomplicated [F10.10]  Yes      Resolved Hospital Problems    Diagnosis Date Resolved POA    **Anemia [D64.9] 2024 Yes    Atypical pneumonia [J18.9] 2024 Yes    GI bleed [K92.2] 2024 Yes          Hospital Course:  Val Nassar is a 63 y.o. male with history of PUD, cirrhosis, newly diagnosed laryngeal cancer and prior alcohol abuse presented with abdominal pain, melena and anemia.     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 EDG was performed with biopsies on  which were positive for invasive squamous cell carcinoma of the esophagus. Oncology consulted during admission. Will need outpatient PET scan for further staging and follow-up with medical oncology, radiation and thoracic surgery pending those results. May also require EUS. PET arranged tentatively in 3 weeks due to inpatient rehab stay. An appointment with Dr. Ga has been arranged on 23 and an appointment with Dr. Aguila with radiation  oncology has been arranged for 1/19/23.      Atypical pneumonia  Fever, resolved  New productive cough, procalcitonin elevated on presentation. CXR with mild prominence of interstitial markings compared to prior, possibly atypical infection. Initally started on zosyn and doxycycline on presentation. Zosyn discontinued after 2 days and transitioned to oral doxycycline. He has completed 5 days of oral doxycycline and symptoms have resolved. No further fevers.      Duodenal ulcer  Duodenal stenosis   Anemia, iron deficient  Received 3 units PRBCs during admission. GI was consulted and EGD showed esophagitis and one non-bleeding cratered duodenal ulcer (along with esophageal mass) and congestive gastropathy. No varicies were noted. He also received 3 dose of IV iron.  He was started on a PPI and will continue Protonix 40mg BID and follow-up with GI in 6-8 weeks. He needs to avoid NSAIDs.      Abdominal pain  - abnormal appearance of GB on CT imaging. HIDA normal.  General surgery was consulted and didn't feel imaging results represented cholecystitis. Patient to follow-up in 2 weeks with Dr. Aguilera.      Cirrhosis  Alcohol abuse  Compensated at this time. Follow-up with GI in 6-8 weeks. He had low normal blood pressures during admission.  His spironalactone was held. This can be resumed in the future if the patient's blood pressures trend up.     Discharge Follow Up Recommendations for outpatient labs/diagnostics:  Follow-up with Dr. Ga with oncology as scheduled on 1/18.   Follow up with Dr. Aguila with radiation oncology as scheduled on 1/29  PET as scheduled per Dr. Ga in the next 3 weeks  Follow-up with GI in 6-8 weeks.   Dr. Aguilera in 2 weeks.    Day of Discharge     HPI:   Patient is doing well. Ready to go to rehab. Has no complaints other then back pain. Lidocaine patches have helped    Vital Signs:   Temp:  [96.3 °F (35.7 °C)-98.3 °F (36.8 °C)] 97.8 °F (36.6 °C)  Heart Rate:  [80-89] 80  Resp:   [16-18] 18  BP: (106-143)/(55-84) 143/84      Physical Exam:  Constitutional: No acute distress, awake, alert  Respiratory: Clear to auscultation bilaterally, respiratory effort normal on room air  Cardiovascular: RRR, no murmurs, rubs, or gallops  Gastrointestinal: Positive bowel sounds, soft, nontender, nondistended  Musculoskeletal: No bilateral ankle edema  Psychiatric: Appropriate affect, cooperative  Neurologic: no focal deficits        Pertinent  and/or Most Recent Results     LAB RESULTS:      Lab 12/29/23  0441 12/28/23  1812 12/28/23  1144 12/28/23  1143 12/28/23  0013 12/27/23  1458   WBC 5.92  --  9.77  --   --   --    HEMOGLOBIN 9.1* 9.1* 8.3*  --  6.9* 7.6*   HEMATOCRIT 29.3* 28.5* 26.1*  --  21.6* 24.2*   PLATELETS 147  --  148  --   --   --    NEUTROS ABS 3.30  --  6.24  --   --   --    IMMATURE GRANS (ABS) 0.03  --  0.03  --   --   --    LYMPHS ABS 0.89  --  0.85  --   --   --    MONOS ABS 1.47*  --  2.53*  --   --   --    EOS ABS 0.20  --  0.09  --   --   --    MCV 91.8  --  91.6  --   --   --    PROCALCITONIN 0.71*  --   --  1.03*  --   --          Lab 12/29/23  0441 12/28/23  1143 12/27/23  1458   SODIUM 134* 136  --    POTASSIUM 4.2 4.7  4.7 3.6   CHLORIDE 101 102  --    CO2 23.0 25.0  --    ANION GAP 10.0 9.0  --    BUN 9 7*  --    CREATININE 0.66* 0.48*  --    EGFR 104.7 115.3  --    GLUCOSE 113* 87  --    CALCIUM 9.2 9.0  --    MAGNESIUM  --  1.7  --          Lab 12/29/23  0441 12/28/23  1143   TOTAL PROTEIN 6.6 6.1   ALBUMIN 3.2* 3.5   GLOBULIN 3.4 2.6   ALT (SGPT) 8 7   AST (SGOT) 15 14   BILIRUBIN 0.3 0.8   ALK PHOS 112 113                 Lab 12/29/23  0441   FOLATE >20.00   VITAMIN B 12 808         Brief Urine Lab Results  (Last result in the past 365 days)        Color   Clarity   Blood   Leuk Est   Nitrite   Protein   CREAT   Urine HCG        12/25/23 1932 Yellow   Clear   Negative   Negative   Negative   Negative                 Microbiology Results (last 10 days)       Procedure  Component Value - Date/Time    MRSA Screen, PCR (Inpatient) - Swab, Nares [[id number]]  (Normal) Collected: 12/27/23 0950    Lab Status: Final result Specimen: Swab from Nares Updated: 12/27/23 1130     MRSA PCR Negative    Narrative:      The negative predictive value of this diagnostic test is high and should only be used to consider de-escalating anti-MRSA therapy. A positive result may indicate colonization with MRSA and must be correlated clinically.  MRSA Negative    Blood Culture - Blood, Arm, Right [[id number]]  (Normal) Collected: 12/27/23 0149    Lab Status: Final result Specimen: Blood from Arm, Right Updated: 01/01/24 0515     Blood Culture No growth at 5 days    Blood Culture - Blood, Hand, Right [[id number]]  (Normal) Collected: 12/27/23 0149    Lab Status: Final result Specimen: Blood from Hand, Right Updated: 01/01/24 0515     Blood Culture No growth at 5 days    Respiratory Panel PCR w/COVID-19(SARS-CoV-2) A[facility]ZIZ/[facility]/HANH/PAD/COR/OTIS In-House, NP Swab in UTM/VTM, 2 HR TAT - Swab, Nasopharynx [[id number]]  (Normal) Collected: 12/26/23 2329    Lab Status: Final result Specimen: Swab from Nasopharynx Updated: 12/27/23 0104     ADENOVIRUS, PCR Not Detected     Coronavirus 229E Not Detected     Coronavirus HKU1 Not Detected     Coronavirus NL63 Not Detected     Coronavirus OC43 Not Detected     COVID19 Not Detected     Human Metapneumovirus Not Detected     Human Rhinovirus/Enterovirus Not Detected     Influenza A PCR Not Detected     Influenza B PCR Not Detected     Parainfluenza Virus 1 Not Detected     Parainfluenza Virus 2 Not Detected     Parainfluenza Virus 3 Not Detected     Parainfluenza Virus 4 Not Detected     RSV, PCR Not Detected     Bordetella pertussis pcr Not Detected     Bordetella parapertussis PCR Not Detected     Chlamydophila pneumoniae PCR Not Detected     Mycoplasma pneumo by PCR Not Detected    Narrative:      In the setting of a positive respiratory panel with a viral infection PLUS a  negative procalcitonin without other underlying concern for bacterial infection, consider observing off antibiotics or discontinuation of antibiotics and continue supportive care. If the respiratory panel is positive for atypical bacterial infection (Bordetella pertussis, Chlamydophila pneumoniae, or Mycoplasma pneumoniae), consider antibiotic de-escalation to target atypical bacterial infection.            CT Chest With Contrast Diagnostic    Result Date: 12/29/2023  CT CHEST W CONTRAST DIAGNOSTIC Date of Exam: 12/29/2023 3:10 PM EST Indication: esophageal cancer. Comparison: None available. Technique: Axial CT images were obtained of the chest after the uneventful intravenous administration of 85 mL Isovue-300.  Reconstructed coronal and sagittal images were also obtained. Automated exposure control and iterative construction methods were used. Findings: There are no enlarged mediastinal nodes. There are coronary calcifications. There are no noncalcified hilar masses. There are calcified right hilar nodes. There is moderate wall thickening of the distal esophagus at the GE junction. There are minimal pleural effusions. There are small scattered blebs of the upper lobes. There is scar in the lung apices. There are no noncalcified pulmonary nodules or masses. There is mild atelectasis in the right lung base     Impression: Wall thickening of the distal esophagus presumably represents site of known neoplasm. Minimal pleural effusions. Mild right basilar atelectasis. No findings suspicious for metastatic disease in the chest. Electronically Signed: Estrellita Boyd MD  12/29/2023 3:43 PM EST  Workstation ID: [id number]    XR Chest 1 View    Result Date: 12/29/2023  XR CHEST 1 VW Date of Exam: 12/29/2023 12:47 AM EST Indication: productive cough, leukocytosis Comparison: December 26, 2023 Findings: Heart size appears unchanged. There is atherosclerosis of the aortic arch. No definite pneumothorax or effusion. There is  prominence of pulmonary vasculature. There is mild prominence of interstitial markings, mildly decreased compared to the prior exam.  No significant new focal infiltrate.     Impression: Mild prominence of interstitial markings, mildly decreased compared to the prior exam. Findings could be related to mild edema or atypical infection. Electronically Signed: Carlos Enrique King  12/29/2023 7:39 AM EST  Workstation ID: [id number]    FL Video Swallow With Speech Single Contrast    Result Date: 12/28/2023  FL VIDEO SWALLOW W SPEECH SINGLE-CONTRAST, FL LIMITED UGI FOR MBS REFLUX SINGLE-CONTRAST Date of Exam: 12/28/2023 4:10 PM EST Indication: dysphagia Comparison: None available. Technique:   The speech pathologist administered food and/or liquid mixed with barium to the patient with cine/video imaging.  Imaging assistance was provided to the speech pathologist and an image was saved. Fluoroscopic Time: 48 seconds Number of Images: 6 associated fluoroscopic loops were saved Findings: No aspiration was seen during fluoroscopic guided modified barium swallow series. A limited view of the esophagus with the patient in the seated lateral position demonstrated no signs of a high-grade focal esophageal stricture, or significant gastroesophageal reflux.     Impression: Fluoroscopy provided for modified barium swallow. No aspiration was seen during swallowing evaluation. Limited upper GI series appeared grossly normal. Please see speech therapy report for full details and recommendations. Electronically Signed: Ibrahima Jett MD  12/28/2023 5:31 PM EST  Workstation ID: [id number]    FL Limited Ugi For Mbs Reflux Single-Contrast    Result Date: 12/28/2023  FL VIDEO SWALLOW W SPEECH SINGLE-CONTRAST, FL LIMITED UGI FOR MBS REFLUX SINGLE-CONTRAST Date of Exam: 12/28/2023 4:10 PM EST Indication: dysphagia Comparison: None available. Technique:   The speech pathologist administered food and/or liquid mixed with barium to the patient with cine/video  imaging.  Imaging assistance was provided to the speech pathologist and an image was saved. Fluoroscopic Time: 48 seconds Number of Images: 6 associated fluoroscopic loops were saved Findings: No aspiration was seen during fluoroscopic guided modified barium swallow series. A limited view of the esophagus with the patient in the seated lateral position demonstrated no signs of a high-grade focal esophageal stricture, or significant gastroesophageal reflux.     Impression: Fluoroscopy provided for modified barium swallow. No aspiration was seen during swallowing evaluation. Limited upper GI series appeared grossly normal. Please see speech therapy report for full details and recommendations. Electronically Signed: Ibrahima Jett MD  12/28/2023 5:31 PM EST  Workstation ID: [id number]    NM HIDA SCAN WITHOUT PHARMACOLOGICAL INTERVENTION    Result Date: 12/27/2023  DATE OF EXAM: 12/27/2023 1:12 PM EST PROCEDURE: NM HIDA SCAN WITHOUT PHARMACOLOGICAL INTERVENTION INDICATIONS: Abdominal pain, upper, chronic, assess gallbladder motility Evaluate the patency of the biliary tree.  No cholecystokinin needed. COMPARISON: No comparisons available. TECHNIQUE: The patient received approximately 5 mCi of technetium 99m Choletec intravenously and images were obtained of the abdomen in the anterior projection through 60 minutes. FINDINGS: Normal uptake is noted within the hepatocytes with clearance of the blood pool by 5 minutes. Uptake is subsequently noted in the gallbladder by 10 minutes with uptake additionally noted into the common duct and small bowel.     1. Visualization of the gallbladder. No evidence of acute cholecystitis or biliary obstruction. Electronically Signed: Desmond Aviles MD  12/27/2023 2:26 PM EST  Workstation ID: [id number]    XR Abdomen KUB    Result Date: 12/26/2023  XR ABDOMEN KUB Date of Exam: 12/26/2023 8:46 PM EST Indication: abd distention vomiting Comparison: CT abdomen pelvis 12/25/2023 Findings: Gas-filled  large and small bowel in a nonobstructive pattern, similar to yesterday's CT abdomen pelvis.     Impression: Gas-filled large and small bowel in a nonobstructive pattern, similar to yesterday's CT abdomen pelvis. Electronically Signed: Yoni Guadalupe MD  12/26/2023 9:24 PM EST  Workstation ID: [id number]    XR Chest 1 View    Result Date: 12/26/2023  XR CHEST 1 VW Date of Exam: 12/26/2023 8:38 PM EST Indication: increased oxygen demand Comparison: None available. Findings: Patient rotated slightly to the left. Heart size top normal. No pneumothorax. Mild interstitial thickening which may relate to a component of interstitial edema. No pneumothorax. No focal consolidation. Asymmetric density at the left upper lung likely related to superimposition of the anterior left first rib. Central pulmonary vascular congestion.     Impression: Central pulmonary vascular congestion and mild interstitial thickening which may relate to mild interstitial edema. Electronically Signed: Richard Pinto MD  12/26/2023 9:22 PM EST  Workstation ID: [id number]    CT Abdomen Pelvis With Contrast    Result Date: 12/25/2023  CT ABDOMEN PELVIS W CONTRAST Date of Exam: 12/25/2023 8:36 PM EST Indication: LLQ abd pain, anemia.  cirrhosis, possible unspecified cancer dx (data deficient). Comparison: None available. Technique: Axial CT images were obtained of the abdomen and pelvis following the uneventful intravenous administration of 85 mL Isovue-300. Reconstructed coronal and sagittal images were also obtained. Automated exposure control and iterative construction methods were used. Findings: The lung bases are clear. There is mild nodularity liver surface which may indicate early cirrhosis. The bilateral adrenal glands, bilateral kidneys, pancreas and spleen are normal. There is diffuse wall thickening of the gallbladder with abnormal increased enhancement and surrounding pericholecystic fluid. The findings are concerning for acute cholecystitis. The  common bile duct is nondilated. The abdominal and pelvic portion of the GI tract are normal aside from diverticulosis without diverticulitis. There is a large bladder diverticulum projecting posterior and left lateral from the posterior margin of the bladder wall. There are prostate calcifications. The osseous structures are within normal limits for a patient of this age.     Impression: Abnormal appearance of the gallbladder with wall thickening, abnormal gallbladder wall enhancement and pericholecystic fluid consistent with acute cholecystitis. Electronically Signed: Marvel Vines MD  12/25/2023 8:59 PM EST  Workstation ID: [id number]                 Plan for Follow-up of Pending Labs/Results:     Discharge Details        Discharge Medications        New Medications        Instructions Start Date   Lidocaine 4 %   1 patch, Transdermal, Every 24 Hours Scheduled, Remove & Discard patch within 12 hours or as directed by MD      oxyCODONE-acetaminophen 5-325 MG per tablet  Commonly known as: Percocet   1 tablet, Oral, Every 6 Hours PRN      polyethylene glycol 17 g packet  Commonly known as: MIRALAX   17 g, Oral, Daily PRN             Changes to Medications        Instructions Start Date   carvedilol 3.125 MG tablet  Commonly known as: COREG  What changed: Another medication with the same name was removed. Continue taking this medication, and follow the directions you see here.   3.125 mg, Oral, 2 Times Daily With Meals             Continue These Medications        Instructions Start Date   escitalopram 10 MG tablet  Commonly known as: LEXAPRO   10 mg, Oral, Nightly      finasteride 5 MG tablet  Commonly known as: PROSCAR   5 mg, Oral, Daily      folic acid 1 MG tablet  Commonly known as: FOLVITE   1 mg, Oral, Daily      gabapentin 300 MG capsule  Commonly known as: NEURONTIN   300 mg, Oral, 3 Times Daily      lactulose 10 GM/15ML solution  Commonly known as: CHRONULAC   20 g, Oral, 2 Times Daily PRN      pantoprazole 40  MG EC tablet  Commonly known as: PROTONIX   40 mg, Oral, 2 Times Daily      tamsulosin 0.4 MG capsule 24 hr capsule  Commonly known as: FLOMAX   1 capsule, Oral, Daily      vitamin B-12 1000 MCG tablet  Commonly known as: CYANOCOBALAMIN   1,000 mcg, Oral, Daily      vitamin D 1.25 MG (24923 UT) capsule capsule  Commonly known as: ERGOCALCIFEROL   50,000 Units, Oral, Weekly             Stop These Medications      ciprofloxacin 500 MG tablet  Commonly known as: CIPRO     diphenhydrAMINE 25 mg capsule  Commonly known as: BENADRYL     famotidine 20 MG tablet  Commonly known as: PEPCID     spironolactone 25 MG tablet  Commonly known as: ALDACTONE     sucralfate 1 g tablet  Commonly known as: CARAFATE              No Known Allergies      Discharge Disposition:  Home or Self Care    Diet:  Hospital:  Diet Order   Procedures    Diet: Cardiac Diets; Healthy Heart (2-3 Na+); Texture: Regular Texture (IDDSI 7); Fluid Consistency: Thin (IDDSI 0)       Diet Instructions       Diet: Regular/House Diet, Cardiac Diets; Healthy Heart (2-3 Na+); Regular Texture (IDDSI 7); Thin (IDDSI 0)      Discharge Diet:  Regular/House Diet  Cardiac Diets       Cardiac Diet: Healthy Heart (2-3 Na+)    Texture: Regular Texture (IDDSI 7)    Fluid Consistency: Thin (IDDSI 0)             Activity:  Activity Instructions       Activity as Tolerated              Restrictions or Other Recommendations:         CODE STATUS:    Code Status and Medical Interventions:   Ordered at: 12/25/23 0357     Code Status (Patient has no pulse and is not breathing):    CPR (Attempt to Resuscitate)     Medical Interventions (Patient has pulse or is breathing):    Full Support       Future Appointments   Date Time Provider Department Center   1/16/2024  2:15 PM [facility] PET ADMIN RM1  [facility]   1/16/2024  3:15 PM [facility] PETCT 1  [facility]   1/18/2024 11:00 AM Katerina Ga MD [facility]   1/19/2024 11:00 AM Jeremy Aguila MD [facility]  [facility] None   2/12/2024 11:00 AM Montana Bhatti APRN [facility]       Additional Instructions for the Follow-ups that You Need to Schedule       Discharge Follow-up with Specialty: Dr. Mora; 2 Weeks   As directed      Specialty: Dr. Mora   Follow Up: 2 Weeks        Discharge Follow-up with Specified Provider: GI; 6 Weeks   As directed      To: GI   Follow Up: 6 Weeks                      Pauly Mallory MD  01/03/24      Time Spent on Discharge:  I spent  40  minutes on this discharge activity which included: face-to-face encounter with the patient, reviewing the data in the system, coordination of the care with the nursing staff as well as consultants, documentation, and entering orders.

## 2024-01-03 NOTE — PLAN OF CARE
Goal Outcome Evaluation:  Plan of Care Reviewed With: patient        Progress: improving  Outcome Evaluation: Patient resting in bed with no signs and symptoms of distress noted. VSS. Respirations even and unlabored. RA. Pain meds given per patient's request. No acute changes and needs met all throughout the shift.

## 2024-01-03 NOTE — DISCHARGE INSTR - APPOINTMENTS
***Appointment: Dr Mora on Thursday, Jan. 18th@ 10:00AM.  [phone]  [address] [place] [address]

## 2024-01-03 NOTE — OUTREACH NOTE
Prep Survey      Flowsheet Row Responses   [facility] facility patient discharged from? [facility]   Is LACE score < 7 ? No   Eligibility Not Eligible   What are the reasons patient is not eligible? [facility]   Does the patient have one of the following disease processes/diagnoses(primary or secondary)? Other   Prep survey completed? Yes            Valencia WAGNER - Registered Nurse

## 2024-01-03 NOTE — PAYOR COMM NOTE
"Yannick Nassar (63 y.o. Male)     [id number]     Mayra Hernandez RN  Utilization Review  [phone]  [phone]        Date of Birth   1960    Social Security Number       Address   [address] [place], [place] [address]    Home Phone   [phone]    MRN   [id number]       Jainism   Nazarene    Marital Status   Single                            Admission Date   23    Admission Type   Emergency    Admitting Provider   Pauly Mallory MD    Attending Provider   Pauly Mallory MD    Department, Room/Bed   [facility] 6B, N634/1       Discharge Date       Discharge Disposition   Home or Self Care    Discharge Destination                                 Attending Provider: Pauly Mallory MD    Allergies: No Known Allergies    Isolation: None   Infection: None   Code Status: CPR    Ht: 175.3 cm (69\")   Wt: 66.7 kg (147 lb)    Admission Cmt: None   Principal Problem: Anemia [D64.9]                   Active Insurance as of 2023       Primary Coverage       Payor Plan Insurance Group Employer/Plan Group    ANTHEM MEDICAID Count includes the [facility] MEDICAID [id number]       Payor Plan Address Payor Plan Phone Number Payor Plan Fax Number Effective Dates    [address] [address] [phone]  2023 - None Entered    [facility] [id number]         Subscriber Name Subscriber Birth Date Member ID       YANNICK NASSAR 1960 [id number]                     Emergency Contacts        (Rel.) Home Phone Work Phone Mobile Phone    YANI TAM (Sister) [phone] -- [phone]                 Discharge Summary        Pauly Mallory MD at 24 37              [facility] Medicine Services  DISCHARGE SUMMARY    Patient Name: Yannick Nassar  : 1960  MRN: [id number]    Date of Admission: 2023  7:02 PM  Date of Discharge:  1/3/24  Primary Care Physician: Provider, No Known    Consults       Date and Time Order " Name Status Description    12/27/2023 10:47 AM Inpatient Hematology & Oncology Consult Completed     12/25/2023 11:47 PM Inpatient General Surgery Consult Completed     12/25/2023 11:47 PM Inpatient Gastroenterology Consult Completed             Hospital Course     Presenting Problem: pneumonia    Active Hospital Problems    Diagnosis  POA    Duodenal ulcer [K26.9]  Yes    Iron deficiency anemia [D50.9]  Yes    Cirrhosis of liver [K74.60]  Yes    Acute blood loss anemia [D62]  Yes    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 male with history of PUD, cirrhosis, newly diagnosed laryngeal cancer and prior alcohol abuse presented with abdominal pain, melena and anemia.     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 EDG was performed with biopsies on 12/26 which were positive for invasive squamous cell carcinoma of the esophagus. Oncology consulted during admission. Will need outpatient PET scan for further staging and follow-up with medical oncology, radiation and thoracic surgery pending those results. May also require EUS. PET arranged tentatively in 3 weeks due to inpatient rehab stay. An appointment with Dr. Ga has been arranged on 1/18/23 and an appointment with Dr. Aguila with radiation oncology has been arranged for 1/19/23.      Atypical pneumonia  Fever, resolved  New productive cough, procalcitonin elevated on presentation. CXR with mild prominence of interstitial markings compared to prior, possibly atypical infection. Initally started on zosyn and doxycycline on presentation.  Zosyn discontinued after 2 days and transitioned to oral doxycycline. He has completed 5 days of oral doxycycline and symptoms have resolved. No further fevers.      Duodenal ulcer  Duodenal stenosis   Anemia, iron deficient  Received 3 units PRBCs during admission. GI was consulted and EGD showed esophagitis and one non-bleeding cratered duodenal ulcer (along with esophageal mass) and congestive gastropathy. No varicies were noted. He also received 3 dose of IV iron.  He was started on a PPI and will continue Protonix 40mg BID and follow-up with GI in 6-8 weeks. He needs to avoid NSAIDs.      Abdominal pain  - abnormal appearance of GB on CT imaging. HIDA normal.  General surgery was consulted and didn't feel imaging results represented cholecystitis. Patient to follow-up in 2 weeks with Dr. Aguilera.      Cirrhosis  Alcohol abuse  Compensated at this time. Follow-up with GI in 6-8 weeks. He had low normal blood pressures during admission.  His spironalactone was held. This can be resumed in the future if the patient's blood pressures trend up.     Discharge Follow Up Recommendations for outpatient labs/diagnostics:  Follow-up with Dr. Ga with oncology as scheduled on 1/18.   Follow up with Dr. Aguila with radiation oncology as scheduled on 1/29  PET as scheduled per Dr. Ga in the next 3 weeks  Follow-up with GI in 6-8 weeks.   Dr. Aguilera in 2 weeks.    Day of Discharge     HPI:   Patient is doing well. Ready to go to rehab. Has no complaints other then back pain. Lidocaine patches have helped    Vital Signs:   Temp:  [96.3 °F (35.7 °C)-98.3 °F (36.8 °C)] 97.8 °F (36.6 °C)  Heart Rate:  [80-89] 80  Resp:  [16-18] 18  BP: (106-143)/(55-84) 143/84      Physical Exam:  Constitutional: No acute distress, awake, alert  Respiratory: Clear to auscultation bilaterally, respiratory effort normal on room air  Cardiovascular: RRR, no murmurs, rubs, or gallops  Gastrointestinal: Positive bowel sounds, soft,  nontender, nondistended  Musculoskeletal: No bilateral ankle edema  Psychiatric: Appropriate affect, cooperative  Neurologic: no focal deficits        Pertinent  and/or Most Recent Results     LAB RESULTS:      Lab 12/29/23  0441 12/28/23  1812 12/28/23  1144 12/28/23  1143 12/28/23  0013 12/27/23  1458   WBC 5.92  --  9.77  --   --   --    HEMOGLOBIN 9.1* 9.1* 8.3*  --  6.9* 7.6*   HEMATOCRIT 29.3* 28.5* 26.1*  --  21.6* 24.2*   PLATELETS 147  --  148  --   --   --    NEUTROS ABS 3.30  --  6.24  --   --   --    IMMATURE GRANS (ABS) 0.03  --  0.03  --   --   --    LYMPHS ABS 0.89  --  0.85  --   --   --    MONOS ABS 1.47*  --  2.53*  --   --   --    EOS ABS 0.20  --  0.09  --   --   --    MCV 91.8  --  91.6  --   --   --    PROCALCITONIN 0.71*  --   --  1.03*  --   --          Lab 12/29/23  0441 12/28/23  1143 12/27/23  1458   SODIUM 134* 136  --    POTASSIUM 4.2 4.7  4.7 3.6   CHLORIDE 101 102  --    CO2 23.0 25.0  --    ANION GAP 10.0 9.0  --    BUN 9 7*  --    CREATININE 0.66* 0.48*  --    EGFR 104.7 115.3  --    GLUCOSE 113* 87  --    CALCIUM 9.2 9.0  --    MAGNESIUM  --  1.7  --          Lab 12/29/23  0441 12/28/23  1143   TOTAL PROTEIN 6.6 6.1   ALBUMIN 3.2* 3.5   GLOBULIN 3.4 2.6   ALT (SGPT) 8 7   AST (SGOT) 15 14   BILIRUBIN 0.3 0.8   ALK PHOS 112 113                 Lab 12/29/23 0441   FOLATE >20.00   VITAMIN B 12 808         Brief Urine Lab Results  (Last result in the past 365 days)        Color   Clarity   Blood   Leuk Est   Nitrite   Protein   CREAT   Urine HCG        12/25/23 1932 Yellow   Clear   Negative   Negative   Negative   Negative                 Microbiology Results (last 10 days)       Procedure Component Value - Date/Time    MRSA Screen, PCR (Inpatient) - Swab, Nares [[id number]]  (Normal) Collected: 12/27/23 0950    Lab Status: Final result Specimen: Swab from Nares Updated: 12/27/23 1130     MRSA PCR Negative    Narrative:      The negative predictive value of this diagnostic test is  high and should only be used to consider de-escalating anti-MRSA therapy. A positive result may indicate colonization with MRSA and must be correlated clinically.  MRSA Negative    Blood Culture - Blood, Arm, Right [[id number]]  (Normal) Collected: 12/27/23 0149    Lab Status: Final result Specimen: Blood from Arm, Right Updated: 01/01/24 0515     Blood Culture No growth at 5 days    Blood Culture - Blood, Hand, Right [[id number]]  (Normal) Collected: 12/27/23 0149    Lab Status: Final result Specimen: Blood from Hand, Right Updated: 01/01/24 0515     Blood Culture No growth at 5 days    Respiratory Panel PCR w/COVID-19(SARS-CoV-2) ABD[facility]ZIZ/T[facility]ES/HANH/PAD/COR/OTIS In-House, NP Swab in UTM/VTM, 2 HR TAT - Swab, Nasopharynx [[id number]]  (Normal) Collected: 12/26/23 2329    Lab Status: Final result Specimen: Swab from Nasopharynx Updated: 12/27/23 0104     ADENOVIRUS, PCR Not Detected     Coronavirus 229E Not Detected     Coronavirus HKU1 Not Detected     Coronavirus NL63 Not Detected     Coronavirus OC43 Not Detected     COVID19 Not Detected     Human Metapneumovirus Not Detected     Human Rhinovirus/Enterovirus Not Detected     Influenza A PCR Not Detected     Influenza B PCR Not Detected     Parainfluenza Virus 1 Not Detected     Parainfluenza Virus 2 Not Detected     Parainfluenza Virus 3 Not Detected     Parainfluenza Virus 4 Not Detected     RSV, PCR Not Detected     Bordetella pertussis pcr Not Detected     Bordetella parapertussis PCR Not Detected     Chlamydophila pneumoniae PCR Not Detected     Mycoplasma pneumo by PCR Not Detected    Narrative:      In the setting of a positive respiratory panel with a viral infection PLUS a negative procalcitonin without other underlying concern for bacterial infection, consider observing off antibiotics or discontinuation of antibiotics and continue supportive care. If the respiratory panel is positive for atypical bacterial infection (Bordetella pertussis, Chlamydophila pneumoniae,  or Mycoplasma pneumoniae), consider antibiotic de-escalation to target atypical bacterial infection.            CT Chest With Contrast Diagnostic    Result Date: 12/29/2023  CT CHEST W CONTRAST DIAGNOSTIC Date of Exam: 12/29/2023 3:10 PM EST Indication: esophageal cancer. Comparison: None available. Technique: Axial CT images were obtained of the chest after the uneventful intravenous administration of 85 mL Isovue-300.  Reconstructed coronal and sagittal images were also obtained. Automated exposure control and iterative construction methods were used. Findings: There are no enlarged mediastinal nodes. There are coronary calcifications. There are no noncalcified hilar masses. There are calcified right hilar nodes. There is moderate wall thickening of the distal esophagus at the GE junction. There are minimal pleural effusions. There are small scattered blebs of the upper lobes. There is scar in the lung apices. There are no noncalcified pulmonary nodules or masses. There is mild atelectasis in the right lung base     Impression: Wall thickening of the distal esophagus presumably represents site of known neoplasm. Minimal pleural effusions. Mild right basilar atelectasis. No findings suspicious for metastatic disease in the chest. Electronically Signed: Estrellita Boyd MD  12/29/2023 3:43 PM EST  Workstation ID: [id number]    XR Chest 1 View    Result Date: 12/29/2023  XR CHEST 1 VW Date of Exam: 12/29/2023 12:47 AM EST Indication: productive cough, leukocytosis Comparison: December 26, 2023 Findings: Heart size appears unchanged. There is atherosclerosis of the aortic arch. No definite pneumothorax or effusion. There is prominence of pulmonary vasculature. There is mild prominence of interstitial markings, mildly decreased compared to the prior exam.  No significant new focal infiltrate.     Impression: Mild prominence of interstitial markings, mildly decreased compared to the prior exam. Findings could be related to  mild edema or atypical infection. Electronically Signed: Carlos Enrique King  12/29/2023 7:39 AM EST  Workstation ID: [id number]    FL Video Swallow With Speech Single Contrast    Result Date: 12/28/2023  FL VIDEO SWALLOW W SPEECH SINGLE-CONTRAST, FL LIMITED UGI FOR MBS REFLUX SINGLE-CONTRAST Date of Exam: 12/28/2023 4:10 PM EST Indication: dysphagia Comparison: None available. Technique:   The speech pathologist administered food and/or liquid mixed with barium to the patient with cine/video imaging.  Imaging assistance was provided to the speech pathologist and an image was saved. Fluoroscopic Time: 48 seconds Number of Images: 6 associated fluoroscopic loops were saved Findings: No aspiration was seen during fluoroscopic guided modified barium swallow series. A limited view of the esophagus with the patient in the seated lateral position demonstrated no signs of a high-grade focal esophageal stricture, or significant gastroesophageal reflux.     Impression: Fluoroscopy provided for modified barium swallow. No aspiration was seen during swallowing evaluation. Limited upper GI series appeared grossly normal. Please see speech therapy report for full details and recommendations. Electronically Signed: Ibrahima Jett MD  12/28/2023 5:31 PM EST  Workstation ID: [id number]    FL Limited Ugi For Mbs Reflux Single-Contrast    Result Date: 12/28/2023  FL VIDEO SWALLOW W SPEECH SINGLE-CONTRAST, FL LIMITED UGI FOR MBS REFLUX SINGLE-CONTRAST Date of Exam: 12/28/2023 4:10 PM EST Indication: dysphagia Comparison: None available. Technique:   The speech pathologist administered food and/or liquid mixed with barium to the patient with cine/video imaging.  Imaging assistance was provided to the speech pathologist and an image was saved. Fluoroscopic Time: 48 seconds Number of Images: 6 associated fluoroscopic loops were saved Findings: No aspiration was seen during fluoroscopic guided modified barium swallow series. A limited view of the  esophagus with the patient in the seated lateral position demonstrated no signs of a high-grade focal esophageal stricture, or significant gastroesophageal reflux.     Impression: Fluoroscopy provided for modified barium swallow. No aspiration was seen during swallowing evaluation. Limited upper GI series appeared grossly normal. Please see speech therapy report for full details and recommendations. Electronically Signed: Ibrahima Jett MD  12/28/2023 5:31 PM EST  Workstation ID: [id number]    NM HIDA SCAN WITHOUT PHARMACOLOGICAL INTERVENTION    Result Date: 12/27/2023  DATE OF EXAM: 12/27/2023 1:12 PM EST PROCEDURE: NM HIDA SCAN WITHOUT PHARMACOLOGICAL INTERVENTION INDICATIONS: Abdominal pain, upper, chronic, assess gallbladder motility Evaluate the patency of the biliary tree.  No cholecystokinin needed. COMPARISON: No comparisons available. TECHNIQUE: The patient received approximately 5 mCi of technetium 99m Choletec intravenously and images were obtained of the abdomen in the anterior projection through 60 minutes. FINDINGS: Normal uptake is noted within the hepatocytes with clearance of the blood pool by 5 minutes. Uptake is subsequently noted in the gallbladder by 10 minutes with uptake additionally noted into the common duct and small bowel.     1. Visualization of the gallbladder. No evidence of acute cholecystitis or biliary obstruction. Electronically Signed: Desmond Aviles MD  12/27/2023 2:26 PM EST  Workstation ID: [id number]    XR Abdomen KUB    Result Date: 12/26/2023  XR ABDOMEN KUB Date of Exam: 12/26/2023 8:46 PM EST Indication: abd distention vomiting Comparison: CT abdomen pelvis 12/25/2023 Findings: Gas-filled large and small bowel in a nonobstructive pattern, similar to yesterday's CT abdomen pelvis.     Impression: Gas-filled large and small bowel in a nonobstructive pattern, similar to yesterday's CT abdomen pelvis. Electronically Signed: Yoni Guadalupe MD  12/26/2023 9:24 PM EST  Workstation ID:  [id number]    XR Chest 1 View    Result Date: 12/26/2023  XR CHEST 1 VW Date of Exam: 12/26/2023 8:38 PM EST Indication: increased oxygen demand Comparison: None available. Findings: Patient rotated slightly to the left. Heart size top normal. No pneumothorax. Mild interstitial thickening which may relate to a component of interstitial edema. No pneumothorax. No focal consolidation. Asymmetric density at the left upper lung likely related to superimposition of the anterior left first rib. Central pulmonary vascular congestion.     Impression: Central pulmonary vascular congestion and mild interstitial thickening which may relate to mild interstitial edema. Electronically Signed: Richard Pinto MD  12/26/2023 9:22 PM EST  Workstation ID: [id number]    CT Abdomen Pelvis With Contrast    Result Date: 12/25/2023  CT ABDOMEN PELVIS W CONTRAST Date of Exam: 12/25/2023 8:36 PM EST Indication: LLQ abd pain, anemia.  cirrhosis, possible unspecified cancer dx (data deficient). Comparison: None available. Technique: Axial CT images were obtained of the abdomen and pelvis following the uneventful intravenous administration of 85 mL Isovue-300. Reconstructed coronal and sagittal images were also obtained. Automated exposure control and iterative construction methods were used. Findings: The lung bases are clear. There is mild nodularity liver surface which may indicate early cirrhosis. The bilateral adrenal glands, bilateral kidneys, pancreas and spleen are normal. There is diffuse wall thickening of the gallbladder with abnormal increased enhancement and surrounding pericholecystic fluid. The findings are concerning for acute cholecystitis. The common bile duct is nondilated. The abdominal and pelvic portion of the GI tract are normal aside from diverticulosis without diverticulitis. There is a large bladder diverticulum projecting posterior and left lateral from the posterior margin of the bladder wall. There are prostate  calcifications. The osseous structures are within normal limits for a patient of this age.     Impression: Abnormal appearance of the gallbladder with wall thickening, abnormal gallbladder wall enhancement and pericholecystic fluid consistent with acute cholecystitis. Electronically Signed: Marvel Vines MD  12/25/2023 8:59 PM EST  Workstation ID: [id number]                 Plan for Follow-up of Pending Labs/Results:     Discharge Details        Discharge Medications        New Medications        Instructions Start Date   Lidocaine 4 %   1 patch, Transdermal, Every 24 Hours Scheduled, Remove & Discard patch within 12 hours or as directed by MD      oxyCODONE-acetaminophen 5-325 MG per tablet  Commonly known as: Percocet   1 tablet, Oral, Every 6 Hours PRN      polyethylene glycol 17 g packet  Commonly known as: MIRALAX   17 g, Oral, Daily PRN             Changes to Medications        Instructions Start Date   carvedilol 3.125 MG tablet  Commonly known as: COREG  What changed: Another medication with the same name was removed. Continue taking this medication, and follow the directions you see here.   3.125 mg, Oral, 2 Times Daily With Meals             Continue These Medications        Instructions Start Date   escitalopram 10 MG tablet  Commonly known as: LEXAPRO   10 mg, Oral, Nightly      finasteride 5 MG tablet  Commonly known as: PROSCAR   5 mg, Oral, Daily      folic acid 1 MG tablet  Commonly known as: FOLVITE   1 mg, Oral, Daily      gabapentin 300 MG capsule  Commonly known as: NEURONTIN   300 mg, Oral, 3 Times Daily      lactulose 10 GM/15ML solution  Commonly known as: CHRONULAC   20 g, Oral, 2 Times Daily PRN      pantoprazole 40 MG EC tablet  Commonly known as: PROTONIX   40 mg, Oral, 2 Times Daily      tamsulosin 0.4 MG capsule 24 hr capsule  Commonly known as: FLOMAX   1 capsule, Oral, Daily      vitamin B-12 1000 MCG tablet  Commonly known as: CYANOCOBALAMIN   1,000 mcg, Oral, Daily      vitamin D 1.25  MG (88290 UT) capsule capsule  Commonly known as: ERGOCALCIFEROL   50,000 Units, Oral, Weekly             Stop These Medications      ciprofloxacin 500 MG tablet  Commonly known as: CIPRO     diphenhydrAMINE 25 mg capsule  Commonly known as: BENADRYL     famotidine 20 MG tablet  Commonly known as: PEPCID     spironolactone 25 MG tablet  Commonly known as: ALDACTONE     sucralfate 1 g tablet  Commonly known as: CARAFATE              No Known Allergies      Discharge Disposition:  Home or Self Care    Diet:  Hospital:  Diet Order   Procedures    Diet: Cardiac Diets; Healthy Heart (2-3 Na+); Texture: Regular Texture (IDDSI 7); Fluid Consistency: Thin (IDDSI 0)       Diet Instructions       Diet: Regular/House Diet, Cardiac Diets; Healthy Heart (2-3 Na+); Regular Texture (IDDSI 7); Thin (IDDSI 0)      Discharge Diet:  Regular/House Diet  Cardiac Diets       Cardiac Diet: Healthy Heart (2-3 Na+)    Texture: Regular Texture (IDDSI 7)    Fluid Consistency: Thin (IDDSI 0)             Activity:  Activity Instructions       Activity as Tolerated              Restrictions or Other Recommendations:         CODE STATUS:    Code Status and Medical Interventions:   Ordered at: 12/25/23 9820     Code Status (Patient has no pulse and is not breathing):    CPR (Attempt to Resuscitate)     Medical Interventions (Patient has pulse or is breathing):    Full Support       Future Appointments   Date Time Provider Department Center   1/16/2024  2:15 PM [facility] PET ADMIN RM1  [facility] PETCT [facility]   1/16/2024  3:15 PM [facility] PETCT 1  [facility] PETCT [facility]   1/18/2024 11:00 AM Katerina Ga MD [facility] ONC [facility]   1/19/2024 11:00 AM Jeremy Aguila MD NEE RAON [facility] None   2/12/2024 11:00 AM Montana Bhatti APRN [facility] GE 1780 [facility]       Additional Instructions for the Follow-ups that You Need to Schedule       Discharge Follow-up with Specialty: Dr. Mora; 2 Weeks   As directed      Specialty: Dr. Mora   Follow Up: 2 Weeks         Discharge Follow-up with Specified Provider: GI; 6 Weeks   As directed      To: GI   Follow Up: 6 Weeks                      Pauly Mallory MD  01/03/24      Time Spent on Discharge:  I spent  40  minutes on this discharge activity which included: face-to-face encounter with the patient, reviewing the data in the system, coordination of the care with the nursing staff as well as consultants, documentation, and entering orders.            Electronically signed by Pauly Mallory MD at 01/03/24 9707

## 2024-01-03 NOTE — CASE MANAGEMENT/SOCIAL WORK
Case Management Discharge Note      Final Note: Patient discharge plan is [facility] today. Nurse to call report to [phone]. CM will fax the discharge summary to [phone]. CM change the Pharmacy to [facility]. [facility] van to transport to [facility]. Please have patient at the Maternity entrance, [address] build, at 3:20 pm. There is no other discharge needs identified at this time         Selected Continued Care - Admitted Since 12/25/2023       Destination Coordination complete.      Service Provider Selected Services Address Phone Fax Patient Preferred    [facility] [address] [facility] , LTAC, located within [facility] [address] [phone] --              Durable Medical Equipment    No services have been selected for the patient.                Dialysis/Infusion    No services have been selected for the patient.                Home Medical Care    No services have been selected for the patient.                Therapy    No services have been selected for the patient.                Community Resources    No services have been selected for the patient.                Community & DME    No services have been selected for the patient.                         Final Discharge Disposition Code: 03 - skilled nursing facility (SNF)

## 2024-01-03 NOTE — PROGRESS NOTES
"Patient Name:  Val Nassar  YOB: 1960  [id number]    Surgery Progress Note    Date of visit: 1/3/2024    Subjective   Subjective: Feels well. Planning to go to rehab this afternoon.         Objective     Objective:     /58 (BP Location: Left arm, Patient Position: Lying)   Pulse 85   Temp 97.4 °F (36.3 °C) (Axillary)   Resp 18   Ht 175.3 cm (69\")   Wt 66.7 kg (147 lb)   SpO2 94%   BMI 21.71 kg/m²   No intake or output data in the 24 hours ending 01/03/24 0726    CV:  Rhythm  regular and rate regular   L:  Clear  to auscultation bilaterally   Abd:  Bowel sounds positive , soft, nontender  Ext:  No cyanosis, clubbing, edema    Recent labs that are back at this time have been reviewed.            Assessment/ Plan:    Problem List Items Addressed This Visit          Hematology and Neoplasia    * (Principal) Anemia- Stable. To rehab today. RTC in 2 weeks with Dr. Mora.      Relevant Medications    folic acid (FOLVITE) 1 MG tablet    vitamin B-12 (CYANOCOBALAMIN) 1000 MCG tablet    vitamin B-12 (CYANOCOBALAMIN) tablet 1,000 mcg    folic acid (FOLVITE) tablet 1 mg    Other Relevant Orders    Case Request (Completed)    Tissue Pathology Exam (Completed)     Other Visit Diagnoses       Alcoholic cirrhosis of liver without ascites    -  Primary    Relevant Orders    Ambulatory Referral to Gastroenterology    Severe anemia        Relevant Medications    folic acid (FOLVITE) 1 MG tablet    vitamin B-12 (CYANOCOBALAMIN) 1000 MCG tablet    vitamin B-12 (CYANOCOBALAMIN) tablet 1,000 mcg    folic acid (FOLVITE) tablet 1 mg    ferric gluconate (FERRLECIT)125 MG in sodium chloride 0.9 % 100 mL IVPB (Completed)    Cholecystitis        LLQ abdominal pain        Duodenal ulcer        Relevant Medications    sucralfate (CARAFATE) 1 g tablet    pantoprazole (PROTONIX) 40 MG EC tablet    famotidine (PEPCID) 20 MG tablet    sucralfate (CARAFATE) tablet 1 g    pantoprazole (PROTONIX) EC tablet 40 mg    " Other Relevant Orders    Ambulatory Referral to Gastroenterology             Active Hospital Problems    Diagnosis  POA    **Anemia [D64.9]  Yes    Severe malnutrition [E43]  Yes      Resolved Hospital Problems   No resolved problems to display.              Carlos Enrique Alvarenga MD  1/3/2024  07:26 EST

## 2024-01-11 LAB
CYTO UR: NORMAL
LAB AP CASE REPORT: NORMAL
LAB AP CLINICAL INFORMATION: NORMAL
LAB AP DIAGNOSIS COMMENT: NORMAL
LAB AP OUTSIDE REPORT, ADDENDUM: NORMAL
LAB AP SPECIAL STAINS: NORMAL
PATH REPORT.FINAL DX SPEC: NORMAL
PATH REPORT.GROSS SPEC: NORMAL

## 2024-01-18 ENCOUNTER — TELEPHONE (OUTPATIENT)
Dept: RADIATION ONCOLOGY | Facility: HOSPITAL | Age: 64
End: 2024-01-18
Payer: MEDICAID

## 2024-01-18 NOTE — TELEPHONE ENCOUNTER
Phone number listed for patient is not in service, called the number listed for sister no answer and unable to leave VM, pt did not have pet scan on 1/17,

## 2024-01-19 ENCOUNTER — HOSPITAL ENCOUNTER (OUTPATIENT)
Dept: RADIATION ONCOLOGY | Facility: HOSPITAL | Age: 64
Setting detail: RADIATION/ONCOLOGY SERIES
Discharge: HOME | End: 2024-01-19
Payer: MEDICAID

## 2024-02-04 ENCOUNTER — HOSPITAL ENCOUNTER (OUTPATIENT)
Facility: HOSPITAL | Age: 64
Discharge: HOME | End: 2024-02-05
Attending: EMERGENCY MEDICINE | Admitting: INTERNAL MEDICINE
Payer: MEDICAID

## 2024-02-04 ENCOUNTER — APPOINTMENT (OUTPATIENT)
Dept: CT IMAGING | Facility: HOSPITAL | Age: 64
End: 2024-02-04
Payer: MEDICAID

## 2024-02-04 DIAGNOSIS — R42 DIZZINESS: Primary | ICD-10-CM

## 2024-02-04 DIAGNOSIS — R53.1 GENERALIZED WEAKNESS: ICD-10-CM

## 2024-02-04 LAB
ALBUMIN SERPL-MCNC: 3.6 G/DL (ref 3.5–5.2)
ALBUMIN/GLOB SERPL: 0.9 G/DL
ALP SERPL-CCNC: 154 U/L (ref 39–117)
ALT SERPL W P-5'-P-CCNC: 16 U/L (ref 1–41)
ANION GAP SERPL CALCULATED.3IONS-SCNC: 9 MMOL/L (ref 5–15)
AST SERPL-CCNC: 26 U/L (ref 1–40)
BASOPHILS # BLD AUTO: 0.04 10*3/MM3 (ref 0–0.2)
BASOPHILS NFR BLD AUTO: 0.4 % (ref 0–1.5)
BILIRUB SERPL-MCNC: 0.2 MG/DL (ref 0–1.2)
BILIRUB UR QL STRIP: NEGATIVE
BUN SERPL-MCNC: 11 MG/DL (ref 8–23)
BUN/CREAT SERPL: 16.2 (ref 7–25)
CALCIUM SPEC-SCNC: 10.1 MG/DL (ref 8.6–10.5)
CHLORIDE SERPL-SCNC: 99 MMOL/L (ref 98–107)
CLARITY UR: CLEAR
CO2 SERPL-SCNC: 25 MMOL/L (ref 22–29)
COLOR UR: YELLOW
CREAT SERPL-MCNC: 0.68 MG/DL (ref 0.76–1.27)
DEPRECATED RDW RBC AUTO: 55.6 FL (ref 37–54)
EGFRCR SERPLBLD CKD-EPI 2021: 103.8 ML/MIN/1.73
EOSINOPHIL # BLD AUTO: 0.22 10*3/MM3 (ref 0–0.4)
EOSINOPHIL NFR BLD AUTO: 2.1 % (ref 0.3–6.2)
ERYTHROCYTE [DISTWIDTH] IN BLOOD BY AUTOMATED COUNT: 17.3 % (ref 12.3–15.4)
GLOBULIN UR ELPH-MCNC: 4.1 GM/DL
GLUCOSE SERPL-MCNC: 119 MG/DL (ref 65–99)
GLUCOSE UR STRIP-MCNC: NEGATIVE MG/DL
HCT VFR BLD AUTO: 31.3 % (ref 37.5–51)
HGB BLD-MCNC: 9.7 G/DL (ref 13–17.7)
HGB UR QL STRIP.AUTO: NEGATIVE
IMM GRANULOCYTES # BLD AUTO: 0.05 10*3/MM3 (ref 0–0.05)
IMM GRANULOCYTES NFR BLD AUTO: 0.5 % (ref 0–0.5)
INR PPP: 1.02 (ref 0.89–1.12)
KETONES UR QL STRIP: NEGATIVE
LEUKOCYTE ESTERASE UR QL STRIP.AUTO: NEGATIVE
LIPASE SERPL-CCNC: 35 U/L (ref 13–60)
LYMPHOCYTES # BLD AUTO: 1.27 10*3/MM3 (ref 0.7–3.1)
LYMPHOCYTES NFR BLD AUTO: 12.3 % (ref 19.6–45.3)
MAGNESIUM SERPL-MCNC: 1.7 MG/DL (ref 1.6–2.4)
MCH RBC QN AUTO: 27.6 PG (ref 26.6–33)
MCHC RBC AUTO-ENTMCNC: 31 G/DL (ref 31.5–35.7)
MCV RBC AUTO: 88.9 FL (ref 79–97)
MONOCYTES # BLD AUTO: 1.57 10*3/MM3 (ref 0.1–0.9)
MONOCYTES NFR BLD AUTO: 15.2 % (ref 5–12)
NEUTROPHILS NFR BLD AUTO: 69.5 % (ref 42.7–76)
NEUTROPHILS NFR BLD AUTO: 7.17 10*3/MM3 (ref 1.7–7)
NITRITE UR QL STRIP: NEGATIVE
NRBC BLD AUTO-RTO: 0 /100 WBC (ref 0–0.2)
PH UR STRIP.AUTO: 7 [PH] (ref 5–8)
PLATELET # BLD AUTO: 360 10*3/MM3 (ref 140–450)
PMV BLD AUTO: 8.6 FL (ref 6–12)
POTASSIUM SERPL-SCNC: 3.7 MMOL/L (ref 3.5–5.2)
PROT SERPL-MCNC: 7.7 G/DL (ref 6–8.5)
PROT UR QL STRIP: NEGATIVE
PROTHROMBIN TIME: 13.5 SECONDS (ref 12.2–14.5)
RBC # BLD AUTO: 3.52 10*6/MM3 (ref 4.14–5.8)
SODIUM SERPL-SCNC: 133 MMOL/L (ref 136–145)
SP GR UR STRIP: 1.01 (ref 1–1.03)
TROPONIN T SERPL HS-MCNC: 12 NG/L
UROBILINOGEN UR QL STRIP: NORMAL
WBC NRBC COR # BLD AUTO: 10.32 10*3/MM3 (ref 3.4–10.8)

## 2024-02-04 PROCEDURE — 80053 COMPREHEN METABOLIC PANEL: CPT | Performed by: EMERGENCY MEDICINE

## 2024-02-04 PROCEDURE — 25510000001 IOPAMIDOL 61 % SOLUTION: Performed by: EMERGENCY MEDICINE

## 2024-02-04 PROCEDURE — 74177 CT ABD & PELVIS W/CONTRAST: CPT

## 2024-02-04 PROCEDURE — 85025 COMPLETE CBC W/AUTO DIFF WBC: CPT | Performed by: EMERGENCY MEDICINE

## 2024-02-04 PROCEDURE — 96375 TX/PRO/DX INJ NEW DRUG ADDON: CPT

## 2024-02-04 PROCEDURE — 25010000002 MORPHINE PER 10 MG: Performed by: EMERGENCY MEDICINE

## 2024-02-04 PROCEDURE — 93005 ELECTROCARDIOGRAM TRACING: CPT | Performed by: EMERGENCY MEDICINE

## 2024-02-04 PROCEDURE — 84484 ASSAY OF TROPONIN QUANT: CPT | Performed by: EMERGENCY MEDICINE

## 2024-02-04 PROCEDURE — 81003 URINALYSIS AUTO W/O SCOPE: CPT | Performed by: EMERGENCY MEDICINE

## 2024-02-04 PROCEDURE — 83690 ASSAY OF LIPASE: CPT | Performed by: EMERGENCY MEDICINE

## 2024-02-04 PROCEDURE — 96374 THER/PROPH/DIAG INJ IV PUSH: CPT

## 2024-02-04 PROCEDURE — 70450 CT HEAD/BRAIN W/O DYE: CPT

## 2024-02-04 PROCEDURE — 83735 ASSAY OF MAGNESIUM: CPT | Performed by: EMERGENCY MEDICINE

## 2024-02-04 PROCEDURE — 99285 EMERGENCY DEPT VISIT HI MDM: CPT

## 2024-02-04 PROCEDURE — 25010000002 ONDANSETRON PER 1 MG: Performed by: EMERGENCY MEDICINE

## 2024-02-04 PROCEDURE — 85610 PROTHROMBIN TIME: CPT | Performed by: PHYSICIAN ASSISTANT

## 2024-02-04 RX ORDER — MECLIZINE HYDROCHLORIDE 25 MG/1
25 TABLET ORAL ONCE
Status: COMPLETED | OUTPATIENT
Start: 2024-02-04 | End: 2024-02-04

## 2024-02-04 RX ORDER — ONDANSETRON 2 MG/ML
4 INJECTION INTRAMUSCULAR; INTRAVENOUS ONCE
Status: COMPLETED | OUTPATIENT
Start: 2024-02-04 | End: 2024-02-04

## 2024-02-04 RX ORDER — MORPHINE SULFATE 4 MG/ML
4 INJECTION, SOLUTION INTRAMUSCULAR; INTRAVENOUS ONCE
Status: COMPLETED | OUTPATIENT
Start: 2024-02-04 | End: 2024-02-04

## 2024-02-04 RX ADMIN — IOPAMIDOL 85 ML: 612 INJECTION, SOLUTION INTRAVENOUS at 20:09

## 2024-02-04 RX ADMIN — ONDANSETRON 4 MG: 2 INJECTION INTRAMUSCULAR; INTRAVENOUS at 19:17

## 2024-02-04 RX ADMIN — MECLIZINE HYDROCHLORIDE 25 MG: 25 TABLET ORAL at 22:10

## 2024-02-04 RX ADMIN — MORPHINE SULFATE 4 MG: 4 INJECTION, SOLUTION INTRAMUSCULAR; INTRAVENOUS at 19:17

## 2024-02-05 VITALS
BODY MASS INDEX: 21.89 KG/M2 | DIASTOLIC BLOOD PRESSURE: 76 MMHG | HEART RATE: 69 BPM | HEIGHT: 69 IN | RESPIRATION RATE: 18 BRPM | SYSTOLIC BLOOD PRESSURE: 153 MMHG | TEMPERATURE: 98 F | OXYGEN SATURATION: 99 % | WEIGHT: 147.8 LBS

## 2024-02-05 PROBLEM — R42 DIZZINESS: Status: RESOLVED | Noted: 2024-02-05 | Resolved: 2024-02-05

## 2024-02-05 PROBLEM — I95.1 ORTHOSTATIC HYPOTENSION: Status: ACTIVE | Noted: 2024-02-05

## 2024-02-05 PROBLEM — R42 DIZZINESS: Status: ACTIVE | Noted: 2024-02-05

## 2024-02-05 PROBLEM — R53.1 GENERALIZED WEAKNESS: Status: ACTIVE | Noted: 2024-02-05

## 2024-02-05 LAB
AMMONIA BLD-SCNC: 29 UMOL/L (ref 16–60)
ANION GAP SERPL CALCULATED.3IONS-SCNC: 12 MMOL/L (ref 5–15)
BASOPHILS # BLD AUTO: 0.03 10*3/MM3 (ref 0–0.2)
BASOPHILS NFR BLD AUTO: 0.4 % (ref 0–1.5)
BUN SERPL-MCNC: 9 MG/DL (ref 8–23)
BUN/CREAT SERPL: 19.6 (ref 7–25)
CALCIUM SPEC-SCNC: 9.7 MG/DL (ref 8.6–10.5)
CHLORIDE SERPL-SCNC: 105 MMOL/L (ref 98–107)
CO2 SERPL-SCNC: 24 MMOL/L (ref 22–29)
CREAT SERPL-MCNC: 0.46 MG/DL (ref 0.76–1.27)
DEPRECATED RDW RBC AUTO: 52.4 FL (ref 37–54)
EGFRCR SERPLBLD CKD-EPI 2021: 116.8 ML/MIN/1.73
EOSINOPHIL # BLD AUTO: 0.24 10*3/MM3 (ref 0–0.4)
EOSINOPHIL NFR BLD AUTO: 3.1 % (ref 0.3–6.2)
ERYTHROCYTE [DISTWIDTH] IN BLOOD BY AUTOMATED COUNT: 16.9 % (ref 12.3–15.4)
GLUCOSE SERPL-MCNC: 91 MG/DL (ref 65–99)
HCT VFR BLD AUTO: 27.8 % (ref 37.5–51)
HGB BLD-MCNC: 8.8 G/DL (ref 13–17.7)
IMM GRANULOCYTES # BLD AUTO: 0.02 10*3/MM3 (ref 0–0.05)
IMM GRANULOCYTES NFR BLD AUTO: 0.3 % (ref 0–0.5)
LYMPHOCYTES # BLD AUTO: 1.43 10*3/MM3 (ref 0.7–3.1)
LYMPHOCYTES NFR BLD AUTO: 18.7 % (ref 19.6–45.3)
MCH RBC QN AUTO: 27.2 PG (ref 26.6–33)
MCHC RBC AUTO-ENTMCNC: 31.7 G/DL (ref 31.5–35.7)
MCV RBC AUTO: 85.8 FL (ref 79–97)
MONOCYTES # BLD AUTO: 1.3 10*3/MM3 (ref 0.1–0.9)
MONOCYTES NFR BLD AUTO: 17 % (ref 5–12)
NEUTROPHILS NFR BLD AUTO: 4.63 10*3/MM3 (ref 1.7–7)
NEUTROPHILS NFR BLD AUTO: 60.5 % (ref 42.7–76)
NRBC BLD AUTO-RTO: 0 /100 WBC (ref 0–0.2)
PLATELET # BLD AUTO: 336 10*3/MM3 (ref 140–450)
PMV BLD AUTO: 8.7 FL (ref 6–12)
POTASSIUM SERPL-SCNC: 3.9 MMOL/L (ref 3.5–5.2)
QT INTERVAL: 384 MS
QTC INTERVAL: 405 MS
RBC # BLD AUTO: 3.24 10*6/MM3 (ref 4.14–5.8)
SODIUM SERPL-SCNC: 141 MMOL/L (ref 136–145)
WBC NRBC COR # BLD AUTO: 7.65 10*3/MM3 (ref 3.4–10.8)

## 2024-02-05 PROCEDURE — 97161 PT EVAL LOW COMPLEX 20 MIN: CPT

## 2024-02-05 PROCEDURE — 96361 HYDRATE IV INFUSION ADD-ON: CPT

## 2024-02-05 PROCEDURE — 97535 SELF CARE MNGMENT TRAINING: CPT

## 2024-02-05 PROCEDURE — G0378 HOSPITAL OBSERVATION PER HR: HCPCS

## 2024-02-05 PROCEDURE — 80048 BASIC METABOLIC PNL TOTAL CA: CPT | Performed by: NURSE PRACTITIONER

## 2024-02-05 PROCEDURE — 25810000003 SODIUM CHLORIDE 0.9 % SOLUTION: Performed by: PHYSICIAN ASSISTANT

## 2024-02-05 PROCEDURE — 82140 ASSAY OF AMMONIA: CPT | Performed by: NURSE PRACTITIONER

## 2024-02-05 PROCEDURE — 97165 OT EVAL LOW COMPLEX 30 MIN: CPT

## 2024-02-05 PROCEDURE — 99236 HOSP IP/OBS SAME DATE HI 85: CPT | Performed by: INTERNAL MEDICINE

## 2024-02-05 PROCEDURE — 85025 COMPLETE CBC W/AUTO DIFF WBC: CPT | Performed by: NURSE PRACTITIONER

## 2024-02-05 PROCEDURE — 25010000002 ALBUMIN HUMAN 25% PER 50 ML: Performed by: NURSE PRACTITIONER

## 2024-02-05 PROCEDURE — P9047 ALBUMIN (HUMAN), 25%, 50ML: HCPCS | Performed by: NURSE PRACTITIONER

## 2024-02-05 RX ORDER — POLYETHYLENE GLYCOL 3350 17 G/17G
17 POWDER, FOR SOLUTION ORAL DAILY PRN
Status: DISCONTINUED | OUTPATIENT
Start: 2024-02-05 | End: 2024-02-05 | Stop reason: HOSPADM

## 2024-02-05 RX ORDER — ESCITALOPRAM OXALATE 10 MG/1
10 TABLET ORAL NIGHTLY
Status: DISCONTINUED | OUTPATIENT
Start: 2024-02-05 | End: 2024-02-05 | Stop reason: HOSPADM

## 2024-02-05 RX ORDER — OXYCODONE HYDROCHLORIDE AND ACETAMINOPHEN 5; 325 MG/1; MG/1
1 TABLET ORAL EVERY 6 HOURS PRN
COMMUNITY
Start: 2024-01-24 | End: 2024-02-05 | Stop reason: HOSPADM

## 2024-02-05 RX ORDER — ALBUMIN (HUMAN) 12.5 G/50ML
12.5 SOLUTION INTRAVENOUS ONCE
Status: COMPLETED | OUTPATIENT
Start: 2024-02-05 | End: 2024-02-05

## 2024-02-05 RX ORDER — SODIUM CHLORIDE 0.9 % (FLUSH) 0.9 %
10 SYRINGE (ML) INJECTION AS NEEDED
Status: DISCONTINUED | OUTPATIENT
Start: 2024-02-05 | End: 2024-02-05 | Stop reason: HOSPADM

## 2024-02-05 RX ORDER — LANOLIN ALCOHOL/MO/W.PET/CERES
1000 CREAM (GRAM) TOPICAL DAILY
Status: DISCONTINUED | OUTPATIENT
Start: 2024-02-05 | End: 2024-02-05 | Stop reason: HOSPADM

## 2024-02-05 RX ORDER — OXYCODONE HYDROCHLORIDE AND ACETAMINOPHEN 5; 325 MG/1; MG/1
1 TABLET ORAL EVERY 6 HOURS PRN
Qty: 12 TABLET | Refills: 0 | Status: SHIPPED | OUTPATIENT
Start: 2024-02-05 | End: 2024-02-08

## 2024-02-05 RX ORDER — SODIUM CHLORIDE 0.9 % (FLUSH) 0.9 %
10 SYRINGE (ML) INJECTION EVERY 12 HOURS SCHEDULED
Status: DISCONTINUED | OUTPATIENT
Start: 2024-02-05 | End: 2024-02-05 | Stop reason: HOSPADM

## 2024-02-05 RX ORDER — GABAPENTIN 300 MG/1
300 CAPSULE ORAL EVERY 8 HOURS SCHEDULED
Status: DISCONTINUED | OUTPATIENT
Start: 2024-02-05 | End: 2024-02-05 | Stop reason: HOSPADM

## 2024-02-05 RX ORDER — OXYCODONE HYDROCHLORIDE AND ACETAMINOPHEN 5; 325 MG/1; MG/1
1 TABLET ORAL EVERY 8 HOURS PRN
Status: DISCONTINUED | OUTPATIENT
Start: 2024-02-05 | End: 2024-02-05 | Stop reason: HOSPADM

## 2024-02-05 RX ORDER — CHOLECALCIFEROL (VITAMIN D3) 1250 MCG
50000 CAPSULE ORAL
Status: ON HOLD | COMMUNITY
Start: 2024-01-06

## 2024-02-05 RX ORDER — BISACODYL 10 MG
10 SUPPOSITORY, RECTAL RECTAL DAILY PRN
Status: DISCONTINUED | OUTPATIENT
Start: 2024-02-05 | End: 2024-02-05 | Stop reason: HOSPADM

## 2024-02-05 RX ORDER — FOLIC ACID 1 MG/1
1 TABLET ORAL DAILY
Status: DISCONTINUED | OUTPATIENT
Start: 2024-02-05 | End: 2024-02-05 | Stop reason: HOSPADM

## 2024-02-05 RX ORDER — FINASTERIDE 5 MG/1
5 TABLET, FILM COATED ORAL DAILY
Status: DISCONTINUED | OUTPATIENT
Start: 2024-02-05 | End: 2024-02-05 | Stop reason: HOSPADM

## 2024-02-05 RX ORDER — BISACODYL 5 MG/1
5 TABLET, DELAYED RELEASE ORAL DAILY PRN
Status: DISCONTINUED | OUTPATIENT
Start: 2024-02-05 | End: 2024-02-05 | Stop reason: HOSPADM

## 2024-02-05 RX ORDER — IPRATROPIUM BROMIDE AND ALBUTEROL SULFATE 2.5; .5 MG/3ML; MG/3ML
3 SOLUTION RESPIRATORY (INHALATION) EVERY 4 HOURS PRN
Status: DISCONTINUED | OUTPATIENT
Start: 2024-02-05 | End: 2024-02-05 | Stop reason: HOSPADM

## 2024-02-05 RX ORDER — PANTOPRAZOLE SODIUM 40 MG/1
40 TABLET, DELAYED RELEASE ORAL 2 TIMES DAILY
Status: DISCONTINUED | OUTPATIENT
Start: 2024-02-05 | End: 2024-02-05 | Stop reason: HOSPADM

## 2024-02-05 RX ORDER — NICOTINE 21 MG/24HR
1 PATCH, TRANSDERMAL 24 HOURS TRANSDERMAL
Status: DISCONTINUED | OUTPATIENT
Start: 2024-02-05 | End: 2024-02-05 | Stop reason: HOSPADM

## 2024-02-05 RX ORDER — CARVEDILOL 3.12 MG/1
3.12 TABLET ORAL 2 TIMES DAILY WITH MEALS
Status: DISCONTINUED | OUTPATIENT
Start: 2024-02-05 | End: 2024-02-05 | Stop reason: HOSPADM

## 2024-02-05 RX ORDER — TAMSULOSIN HYDROCHLORIDE 0.4 MG/1
0.4 CAPSULE ORAL DAILY
Status: DISCONTINUED | OUTPATIENT
Start: 2024-02-05 | End: 2024-02-05 | Stop reason: HOSPADM

## 2024-02-05 RX ORDER — NALTREXONE 380 MG
380 KIT INTRAMUSCULAR
Status: ON HOLD | COMMUNITY
Start: 2024-01-30

## 2024-02-05 RX ORDER — SODIUM CHLORIDE 9 MG/ML
40 INJECTION, SOLUTION INTRAVENOUS AS NEEDED
Status: DISCONTINUED | OUTPATIENT
Start: 2024-02-05 | End: 2024-02-05 | Stop reason: HOSPADM

## 2024-02-05 RX ORDER — AMOXICILLIN 250 MG
2 CAPSULE ORAL 2 TIMES DAILY
Status: DISCONTINUED | OUTPATIENT
Start: 2024-02-05 | End: 2024-02-05 | Stop reason: HOSPADM

## 2024-02-05 RX ADMIN — ALBUMIN (HUMAN) 12.5 G: 0.25 INJECTION, SOLUTION INTRAVENOUS at 03:02

## 2024-02-05 RX ADMIN — FOLIC ACID 1 MG: 1 TABLET ORAL at 09:21

## 2024-02-05 RX ADMIN — GABAPENTIN 300 MG: 300 CAPSULE ORAL at 03:30

## 2024-02-05 RX ADMIN — SODIUM CHLORIDE 1000 ML: 9 INJECTION, SOLUTION INTRAVENOUS at 00:16

## 2024-02-05 RX ADMIN — CARVEDILOL 3.12 MG: 3.12 TABLET, FILM COATED ORAL at 09:22

## 2024-02-05 RX ADMIN — Medication 10 ML: at 09:22

## 2024-02-05 RX ADMIN — FINASTERIDE 5 MG: 5 TABLET, FILM COATED ORAL at 09:21

## 2024-02-05 RX ADMIN — Medication 1000 MCG: at 09:21

## 2024-02-05 RX ADMIN — TAMSULOSIN HYDROCHLORIDE 0.4 MG: 0.4 CAPSULE ORAL at 09:21

## 2024-02-05 RX ADMIN — Medication 1 PATCH: at 09:22

## 2024-02-05 RX ADMIN — PANTOPRAZOLE SODIUM 40 MG: 40 TABLET, DELAYED RELEASE ORAL at 09:21

## 2024-02-05 NOTE — PLAN OF CARE
Goal Outcome Evaluation:  Plan of Care Reviewed With: patient        Progress: no change  Outcome Evaluation: Patient presents with weakness, decreased endurance, and mild balance impairments affecting independence with mobility. BP stable throughout position changes, with mild dizziness reported with standing but pt denied during ambulation. Skilled IP PT services warranted to support return to baseline. Recommend home with assist and HHPT at d/c.      Anticipated Discharge Disposition (PT): home with assist, home with home health

## 2024-02-05 NOTE — ED PROVIDER NOTES
Subjective  History of Present Illness:    Chief Complaint: Weakness, left-sided abdominal pain  History of Present Illness: 63-year-old male presents with weakness and left-sided lower abdominal pain, significant past medical history for alcohol cirrhosis of the liver, squamous cell carcinoma of esophagus, duodenal stenosis, malnutrition, iron deficiency anemia, and duodenal ulcers.  He states that he has felt weakness and fatigue, and slept longer than normal last night into today.  He also states he developed a left-sided lower abdominal pain today, that is different from his sciatica.  Onset: Gradual onset  Duration: Symptoms developed over the last 1 to 2 days  Exacerbating / Alleviating factors: Pain in his left side worse to touch with movement  Associated symptoms: Weakness fatigue      Nurses Notes reviewed and agree, including vitals, allergies, social history and prior medical history.     REVIEW OF SYSTEMS: All systems reviewed and not pertinent unless noted.    Review of Systems   Constitutional:  Positive for fatigue.   Gastrointestinal:  Positive for abdominal pain.   Neurological:  Positive for weakness.   All other systems reviewed and are negative.      Past Medical History:   Diagnosis Date    Anemia     Cancer     Cirrhosis     Duodenal ulcer     Gastric ulcer     GERD (gastroesophageal reflux disease)     History of alcohol abuse     HLD (hyperlipidemia)     Mood disorder        Allergies:    Patient has no known allergies.      Past Surgical History:   Procedure Laterality Date    ENDOSCOPY N/A 12/26/2023    Procedure: ESOPHAGOGASTRODUODENOSCOPY;  Surgeon: Wesly Mckeon MD;  Location: [facility] ENDOSCOPY;  Service: Gastroenterology;  Laterality: N/A;         Social History     Socioeconomic History    Marital status: Single   Tobacco Use    Smoking status: Every Day     Packs/day: 1.00     Years: 15.00     Additional pack years: 0.00     Total pack years: 15.00     Types: Cigarettes   Vaping Use  "   Vaping Use: Some days    Substances: Flavoring    Devices: Disposable   Substance and Sexual Activity    Alcohol use: Not Currently     Comment: sober for ~ 3 months    Drug use: Never    Sexual activity: Defer         Family History   Problem Relation Age of Onset    Cancer Mother     Heart attack Father        Objective  Physical Exam:  /80 (BP Location: Right arm, Patient Position: Lying)   Pulse 58   Temp 98.3 °F (36.8 °C) (Oral)   Resp 16   Ht 175.3 cm (69\")   Wt 68 kg (150 lb)   SpO2 97%   BMI 22.15 kg/m²      Physical Exam  Vitals and nursing note reviewed.   Constitutional:       Appearance: He is well-developed.   HENT:      Head: Normocephalic and atraumatic.      Mouth/Throat:      Mouth: Mucous membranes are moist.   Eyes:      Extraocular Movements: Extraocular movements intact.   Cardiovascular:      Rate and Rhythm: Normal rate and regular rhythm.   Pulmonary:      Effort: Pulmonary effort is normal.      Breath sounds: Normal breath sounds.   Abdominal:      Palpations: Abdomen is soft.      Tenderness: There is abdominal tenderness.   Musculoskeletal:         General: Normal range of motion.      Cervical back: Normal range of motion.   Skin:     General: Skin is warm and dry.   Neurological:      Mental Status: He is alert and oriented to person, place, and time.   Psychiatric:         Behavior: Behavior normal.         Thought Content: Thought content normal.         Judgment: Judgment normal.           Procedures    ED Course:    ED Course as of 02/05/24 0247   Sun Feb 04, 2024 1941 Review of previous  non ED visits, prior labs, prior imaging, available notes from prior evaluations or visits with specialists, medication list, allergies, past medical history, past surgical history     [CS]   1941 I Personally reviewed all laboratory studies performed in the emergency department  [CS]   2053 CT interpretation by me: No acute bowel obstruction or free air [CS]   2103 Concerned " about dizziness, he states he has dizziness when he stands.  He is really concerned about this. [CS]   2357 discussed the case with  recommended given a liter of fluid and rechecking. [CS]   Mon Feb 05, 2024 0138 On reevaluation, patient remains symptomatic [CS]   0202 CT head interpretation by me: No acute bleed or mass seen [CS]      ED Course User Index  [CS] Kirby Salinas Jr., ZRAIA       Lab Results (last 24 hours)       Procedure Component Value Units Date/Time    CBC Auto Differential [[id number]]  (Abnormal) Collected: 02/04/24 1915    Specimen: Blood Updated: 02/04/24 1926     WBC 10.32 10*3/mm3      RBC 3.52 10*6/mm3      Hemoglobin 9.7 g/dL      Hematocrit 31.3 %      MCV 88.9 fL      MCH 27.6 pg      MCHC 31.0 g/dL      RDW 17.3 %      RDW-SD 55.6 fl      MPV 8.6 fL      Platelets 360 10*3/mm3      Neutrophil % 69.5 %      Lymphocyte % 12.3 %      Monocyte % 15.2 %      Eosinophil % 2.1 %      Basophil % 0.4 %      Immature Grans % 0.5 %      Neutrophils, Absolute 7.17 10*3/mm3      Lymphocytes, Absolute 1.27 10*3/mm3      Monocytes, Absolute 1.57 10*3/mm3      Eosinophils, Absolute 0.22 10*3/mm3      Basophils, Absolute 0.04 10*3/mm3      Immature Grans, Absolute 0.05 10*3/mm3      nRBC 0.0 /100 WBC     Comprehensive Metabolic Panel [[id number]]  (Abnormal) Collected: 02/04/24 1915    Specimen: Blood Updated: 02/04/24 1949     Glucose 119 mg/dL      BUN 11 mg/dL      Creatinine 0.68 mg/dL      Sodium 133 mmol/L      Potassium 3.7 mmol/L      Comment: Slight hemolysis detected by analyzer. Result may be falsely elevated.        Chloride 99 mmol/L      CO2 25.0 mmol/L      Calcium 10.1 mg/dL      Total Protein 7.7 g/dL      Albumin 3.6 g/dL      ALT (SGPT) 16 U/L      AST (SGOT) 26 U/L      Alkaline Phosphatase 154 U/L      Total Bilirubin 0.2 mg/dL      Globulin 4.1 gm/dL      Comment: Calculated Result        A/G Ratio 0.9 g/dL      BUN/Creatinine Ratio 16.2     Anion Gap 9.0 mmol/L       eGFR 103.8 mL/min/1.73     Narrative:      GFR Normal >60  Chronic Kidney Disease <60  Kidney Failure <15      Single High Sensitivity Troponin T [[id number]]  (Normal) Collected: 02/04/24 1915    Specimen: Blood Updated: 02/04/24 1949     HS Troponin T 12 ng/L     Narrative:      High Sensitive Troponin T Reference Range:  <14.0 ng/L- Negative Female for AMI  <22.0 ng/L- Negative Male for AMI  >=14 - Abnormal Female indicating possible myocardial injury.  >=22 - Abnormal Male indicating possible myocardial injury.   Clinicians would have to utilize clinical acumen, EKG, Troponin, and serial changes to determine if it is an Acute Myocardial Infarction or myocardial injury due to an underlying chronic condition.         Magnesium [[id number]]  (Normal) Collected: 02/04/24 1915    Specimen: Blood Updated: 02/04/24 1949     Magnesium 1.7 mg/dL     Lipase [[id number]]  (Normal) Collected: 02/04/24 1915    Specimen: Blood Updated: 02/04/24 1949     Lipase 35 U/L     Protime-INR [[id number]]  (Normal) Collected: 02/04/24 1915    Specimen: Blood Updated: 02/04/24 1950     Protime 13.5 Seconds      INR 1.02    Urinalysis With Culture If Indicated - Urine, Clean Catch [[id number]]  (Normal) Collected: 02/04/24 1924    Specimen: Urine, Clean Catch Updated: 02/04/24 1946     Color, UA Yellow     Appearance, UA Clear     pH, UA 7.0     Specific Gravity, UA 1.013     Glucose, UA Negative     Ketones, UA Negative     Bilirubin, UA Negative     Blood, UA Negative     Protein, UA Negative     Leuk Esterase, UA Negative     Nitrite, UA Negative     Urobilinogen, UA 0.2 E.U./dL    Narrative:      In absence of clinical symptoms, the presence of pyuria, bacteria, and/or nitrites on the urinalysis result does not correlate with infection.  Urine microscopic not indicated.             CT Head Without Contrast    Result Date: 2/4/2024  CT HEAD WO CONTRAST Date of Exam: 2/4/2024 9:05 PM EST Indication: dizziness. Comparison: None available.  Technique: Axial CT images were obtained of the head without contrast administration.  Automated exposure control and iterative construction methods were used. Findings: There is mild diffuse generalized atrophy. There are low-attenuation areas in the periventricular white matter consistent with chronic microvascular ischemic change. There is no mass, mass effect or midline shift. There are no abnormal extra-axial fluid collections or areas of acute hemorrhage. There are bilateral maxillary sinus air-fluid levels. There is bilateral ethmoid sinus disease. There are bilateral mastoid effusions.     Impression: Impression: Mild atrophy and chronic microvascular ischemia. No acute intracranial process. Acute bilateral maxillary sinusitis. Bilateral mastoid effusions. Electronically Signed: Marvel Vines MD  2/4/2024 9:49 PM EST  Workstation ID: [id number]    CT Abdomen Pelvis With Contrast    Result Date: 2/4/2024  CT ABDOMEN PELVIS W CONTRAST Date of Exam: 2/4/2024 8:06 PM EST Indication: llq pain. Comparison: CT abdomen pelvis dated December 25, 2023 Technique: Axial CT images were obtained of the abdomen and pelvis following the uneventful intravenous administration of 85 mL Isovue-300. Reconstructed coronal and sagittal images were also obtained. Automated exposure control and iterative construction methods were used. Findings: The lung bases are clear. There is mild nodularity of the liver surface. There are stones layering in the gallbladder. The bilateral kidneys, bilateral adrenal glands, pancreas and spleen are normal. There is a large amount of stool throughout the colon with stool to the cecum suggestive of constipation. There is no large or small bowel inflammatory change. There is calcific atherosclerosis of the abdominal aorta and branch vascular structures. There is degenerative disease of the lumbar spine and hips. There is a left posterior urinary bladder diverticulum. The urinary bladder is significantly  distended. Bladder outlet obstruction would be in the differential.     Impression: Impression: No acute abdominal or pelvic abnormality. Electronically Signed: Marvel Vines MD  2/4/2024 8:44 PM EST  Workstation ID: [id number]        Medical Decision Making  Patient Presentation 63-year-old male presented with dizziness, symptomatic with movement, weakness and fatigue, initial assessment, he seems stable, in no acute distress    DDX dizziness, vertigo, dehydration, acute kidney insufficiency, electrolyte abnormality, UTI    Data Review/ Non ED Records /Analysis/Ordering unique tests.Review of previous  non ED visits, prior labs, prior imaging, available notes from prior evaluations or visits with specialists, medication list, allergies, past medical history, past surgical history        Independent Review Studies.I Personally reviewed all laboratory studies performed in the emergency department     Intervention/Re-evaluation IV fluids, on reassessment patient remains symptomatic    Independent Clinician discussed with the on-call hospitalist  accepted for admission    Risk Stratification tools/clinical decision rules patient presented with dizziness, known history of esophageal cancer, and liver cirrhosis, significant concern for risk for dehydration, electrolyte abnormalities, infection,, his assessment was stable, but he continued to be symptomatic with dizziness, despite receiving IV fluids, based on the patient's pre-existing conditions, and concern for fall at home, he was admitted for further care    Shared Decision Making discussed this plan with the patient he agreed    Disposition patient stable for admission     Problems Addressed:  Dizziness: complicated acute illness or injury    Amount and/or Complexity of Data Reviewed  External Data Reviewed: labs, radiology, ECG and notes.  Labs: ordered. Decision-making details documented in ED Course.  Radiology: ordered and independent interpretation  performed. Decision-making details documented in ED Course.  ECG/medicine tests: ordered. Decision-making details documented in ED Course.    Risk  Prescription drug management.  Decision regarding hospitalization.          Final diagnoses:   Dizziness

## 2024-02-05 NOTE — NURSING NOTE
PT A/O x 4 on RA. He was d/c to the main entrance as his transportation was picking him up there. IV removed, telebox disinfected and placed in med room. Discharge instructions/teaching done and PT is aware of follow up appointments.

## 2024-02-05 NOTE — ED NOTES
Val Nassar    Nursing Report ED to Floor:  Mental status: A/O X 4  Ambulatory status: WITH ASSIST X 1 AND CANE  Oxygen Therapy:  ROOM AIR  Cardiac Rhythm: NORMAL SINUS  Admitted from: ED  Safety Concerns:  FALL RISK  Social Issues: NONE  ED Room #:  04    ED Nurse Phone Extension - [phone] or may call [phone].      HPI:   Chief Complaint   Patient presents with    Weakness - Generalized       Past Medical History:  Past Medical History:   Diagnosis Date    Anemia     Cancer     Cirrhosis     Duodenal ulcer     Gastric ulcer     GERD (gastroesophageal reflux disease)     History of alcohol abuse     HLD (hyperlipidemia)     Mood disorder         Past Surgical History:  Past Surgical History:   Procedure Laterality Date    ENDOSCOPY N/A 12/26/2023    Procedure: ESOPHAGOGASTRODUODENOSCOPY;  Surgeon: Wesly Mckeon MD;  Location: [facility] ENDOSCOPY;  Service: Gastroenterology;  Laterality: N/A;        Admitting Doctor:   No admitting provider for patient encounter.    Consulting Provider(s):  Consults       No orders found for last 30 day(s).             Admitting Diagnosis:   The encounter diagnosis was Dizziness.    Most Recent Vitals:   Vitals:    02/04/24 2300 02/04/24 2301 02/04/24 2302 02/05/24 0100   BP: 108/60 113/61 94/71 121/64   BP Location:       Patient Position:       Pulse: 65 59 71 65   Resp:       Temp:       TempSrc:       SpO2: 95% 95% 93% 95%   Weight:       Height:           Active LDAs/IV Access:   Lines, Drains & Airways       Active LDAs       Name Placement date Placement time Site Days    Peripheral IV 02/04/24 1849 Posterior;Right Hand 02/04/24 1849  Hand  less than 1    Peripheral IV 02/04/24 1915 Anterior;Right Forearm 02/04/24 1915  Forearm  less than 1                    Labs (abnormal labs have a star):   Labs Reviewed   CBC WITH AUTO DIFFERENTIAL - Abnormal; Notable for the following components:       Result Value    RBC 3.52 (*)     Hemoglobin 9.7 (*)     Hematocrit 31.3 (*)      MCHC 31.0 (*)     RDW 17.3 (*)     RDW-SD 55.6 (*)     Lymphocyte % 12.3 (*)     Monocyte % 15.2 (*)     Neutrophils, Absolute 7.17 (*)     Monocytes, Absolute 1.57 (*)     All other components within normal limits   COMPREHENSIVE METABOLIC PANEL - Abnormal; Notable for the following components:    Glucose 119 (*)     Creatinine 0.68 (*)     Sodium 133 (*)     Alkaline Phosphatase 154 (*)     All other components within normal limits    Narrative:     GFR Normal >60  Chronic Kidney Disease <60  Kidney Failure <15     SINGLE HSTROPONIN T - Normal    Narrative:     High Sensitive Troponin T Reference Range:  <14.0 ng/L- Negative Female for AMI  <22.0 ng/L- Negative Male for AMI  >=14 - Abnormal Female indicating possible myocardial injury.  >=22 - Abnormal Male indicating possible myocardial injury.   Clinicians would have to utilize clinical acumen, EKG, Troponin, and serial changes to determine if it is an Acute Myocardial Infarction or myocardial injury due to an underlying chronic condition.        MAGNESIUM - Normal   URINALYSIS W/ CULTURE IF INDICATED - Normal    Narrative:     In absence of clinical symptoms, the presence of pyuria, bacteria, and/or nitrites on the urinalysis result does not correlate with infection.  Urine microscopic not indicated.   LIPASE - Normal   PROTIME-INR - Normal       Meds Given in ED:   Medications   ondansetron (ZOFRAN) injection 4 mg (4 mg Intravenous Given 2/4/24 1917)   Morphine sulfate (PF) injection 4 mg (4 mg Intravenous Given 2/4/24 1917)   iopamidol (ISOVUE-300) 61 % injection 100 mL (85 mL Intravenous Given 2/4/24 2009)   meclizine (ANTIVERT) tablet 25 mg (25 mg Oral Given 2/4/24 2210)   sodium chloride 0.9 % bolus 1,000 mL (0 mL Intravenous Stopped 2/5/24 0158)     No current facility-administered medications for this encounter.

## 2024-02-05 NOTE — PLAN OF CARE
Goal Outcome Evaluation:  Plan of Care Reviewed With: patient        Progress: no change  Outcome Evaluation: OT evaluation completed. The pt presents near his functional baseline, however is limited by decreased activity tolerance and generalized weakness. The pt performed household mobility using a SPC with CGA. Pt completed LBD, grooming, and feeding ADLs with set up/near independence. The pt had no reports of dizziness during evaluation. Pt will benefit from continued IP OT services to increase the pt's safety and independence during ADLs and functional mobility. Recommend a d/c home with assist when medically ready.      Anticipated Discharge Disposition (OT): home with assist

## 2024-02-05 NOTE — H&P
[facility] Medicine Services  HISTORY AND PHYSICAL    Patient Name: Val Nassar  : 1960  MRN: [id number]  Primary Care Physician: Provider, No Known  Date of admission: 2024    Subjective   Subjective     Chief Complaint:  Dizziness, generalized weakness     HPI:  Val Nassar is a 63 y.o. 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   Pt admitted to [facility] 23-1/3/2024. Pt initially presented w/ abdominal pain, melena and anemia. CT chest without findings suspicious for metastatic disease. 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 Oncology consulted during admission. Outpatient PET arranged tentatively in 3 weeks time due to inpatient rehab stay. An appointment with Dr. Ga arranged for 23 and an appointment with Dr. Aguila with radiation oncology arranged for 23. Pt received 3 units PRBC and 3 doses of IV iron during his admission and was also seen in consult by GI. Pt to f/u w/ GI in 6-8 wks. Additionally, gallbladder appeared abnormal on CT imagine but HIDA was normal. General Surgery consulted and didn't feel imaging represented acute cholecystitis. Pt tp c/u w/ Dr. Aguilera in 2 weeks. Pt also treated for atypical pneumonia (Zosyn x 2 days, Doxycyline x 5 days).   Pt reports that he was d/c to [facility] for rehabilitation and d/c back to his home on . Pt has seen his PCP twice since d/c, but has not had any other f/u. Pt reports that  morning he felt dizzy and lightheaded w/ standing or any type of movement. He describes that he fell into the wall twice  morning  "because he felt \"unsteady and disoriented\". His symptoms continued throughout the day prompting his ED visit. Pt also c/o generalized weakness and pain on his left side/mid back (pain w/ movement only). Pt denies syncope or head trauma.   Pt currently receiving Vivitrol injections (PCP) for alcohol abuse. Reports he has not consumed alcohol in ~ 3 months.   Pt evaluated in the ED. CT Head without acute findings. CT abd/pel without acute findings. BP lying in bed stable @ 121/64, BP standing 94/71. Pt admitted to the hospital medicine service for further evaluation.     Review of Systems   Constitutional:  Positive for chills and fatigue. Negative for diaphoresis, fever and unexpected weight change.   HENT: Negative.  Negative for congestion, postnasal drip, rhinorrhea, sinus pressure, sinus pain, sneezing, sore throat and trouble swallowing.    Eyes: Negative.  Negative for visual disturbance.   Respiratory: Negative.  Negative for cough, chest tightness, shortness of breath and wheezing.    Cardiovascular: Negative.  Negative for chest pain, palpitations and leg swelling.   Gastrointestinal:  Negative for abdominal distention, blood in stool, constipation, diarrhea, nausea and vomiting.        Left sided abd/back pain.    Endocrine: Negative.    Genitourinary: Negative.  Negative for decreased urine volume, difficulty urinating, dysuria, flank pain, frequency, hematuria and urgency.   Musculoskeletal:  Negative for arthralgias, myalgias, neck pain and neck stiffness.        Generalized weakness.    Skin: Negative.  Negative for wound.   Neurological:  Positive for dizziness and light-headedness. Negative for tremors, seizures, syncope, facial asymmetry, speech difficulty, weakness, numbness and headaches.   Hematological: Negative.  Does not bruise/bleed easily.   Psychiatric/Behavioral: Negative.  Negative for confusion.    All other systems reviewed and are negative.     Personal History     Past Medical History: "   Diagnosis Date    Anemia     Cancer     Cirrhosis     Duodenal ulcer     Gastric ulcer     GERD (gastroesophageal reflux disease)     History of alcohol abuse     HLD (hyperlipidemia)     Mood disorder        Oncology Problem List:  Squamous cell carcinoma of esophagus (01/03/2024; Status: Active)    Oncology/Hematology History     Past Surgical History:   Procedure Laterality Date    ENDOSCOPY N/A 12/26/2023    Procedure: ESOPHAGOGASTRODUODENOSCOPY;  Surgeon: Wesly Mckeon MD;  Location: [facility] ENDOSCOPY;  Service: Gastroenterology;  Laterality: N/A;     Family History: family history includes Cancer in his mother; Heart attack in his father.     Social History:  reports that he has been smoking cigarettes. He has a 15.00 pack-year smoking history. He does not have any smokeless tobacco history on file. He reports that he does not currently use alcohol. He reports that he does not use drugs.  Social History     Social History Narrative    Not on file     Medications:  Lidocaine, Naltrexone, Vitamin D3, carvedilol, escitalopram, finasteride, folic acid, gabapentin, lactulose, oxyCODONE-acetaminophen, pantoprazole, polyethylene glycol, tamsulosin, vitamin B-12, and vitamin D    No Known Allergies    Objective   Objective     Vital Signs:   Temp:  [98.3 °F (36.8 °C)-98.7 °F (37.1 °C)] 98.3 °F (36.8 °C)  Heart Rate:  [58-75] 58  Resp:  [16-18] 16  BP: ()/(59-80) 156/80    Physical Exam     Constitutional: Awake, alert; chronically ill appearing   Eyes: PERRLA, sclerae anicteric, no conjunctival injection  HENT: NCAT, mucous membranes moist  Neck: Supple, no thyromegaly, no lymphadenopathy, trachea midline  Respiratory: Clear to auscultation bilaterally, nonlabored respirations   Cardiovascular: RRR, no murmurs, rubs, or gallops, no peripheral edema   Gastrointestinal: Positive bowel sounds, soft, nontender, non-distended; left side/mid back- TTP   Musculoskeletal: Normal ROM bilaterally   Psychiatric:  Appropriate affect, cooperative  Neurologic: Oriented x 3, strength symmetric in all extremities, Cranial Nerves grossly intact to confrontation, speech clear  Skin: No rashes, lesions or wounds     Result Review:  I have personally reviewed the results from the time of this admission to 2/5/2024 02:52 EST and agree with these findings:  [x]  Laboratory list / accordion  []  Microbiology  [x]  Radiology  [x]  EKG/Telemetry   []  Cardiology/Vascular   []  Pathology  [x]  Old records    LAB RESULTS:      Lab 02/04/24 1915   WBC 10.32   HEMOGLOBIN 9.7*   HEMATOCRIT 31.3*   PLATELETS 360   NEUTROS ABS 7.17*   IMMATURE GRANS (ABS) 0.05   LYMPHS ABS 1.27   MONOS ABS 1.57*   EOS ABS 0.22   MCV 88.9   PROTIME 13.5         Lab 02/04/24 1915   SODIUM 133*   POTASSIUM 3.7   CHLORIDE 99   CO2 25.0   ANION GAP 9.0   BUN 11   CREATININE 0.68*   EGFR 103.8   GLUCOSE 119*   CALCIUM 10.1   MAGNESIUM 1.7         Lab 02/04/24 1915   TOTAL PROTEIN 7.7   ALBUMIN 3.6   GLOBULIN 4.1   ALT (SGPT) 16   AST (SGOT) 26   BILIRUBIN 0.2   ALK PHOS 154*   LIPASE 35         Lab 02/04/24 1915   HSTROP T 12   PROTIME 13.5   INR 1.02                 Brief Urine Lab Results  (Last result in the past 365 days)        Color   Clarity   Blood   Leuk Est   Nitrite   Protein   CREAT   Urine HCG        02/04/24 1924 Yellow   Clear   Negative   Negative   Negative   Negative                 Microbiology Results (last 10 days)       ** No results found for the last 240 hours. **          CT Head Without Contrast    Result Date: 2/4/2024  CT HEAD WO CONTRAST Date of Exam: 2/4/2024 9:05 PM EST Indication: dizziness. Comparison: None available. Technique: Axial CT images were obtained of the head without contrast administration.  Automated exposure control and iterative construction methods were used. Findings: There is mild diffuse generalized atrophy. There are low-attenuation areas in the periventricular white matter consistent with chronic microvascular  ischemic change. There is no mass, mass effect or midline shift. There are no abnormal extra-axial fluid collections or areas of acute hemorrhage. There are bilateral maxillary sinus air-fluid levels. There is bilateral ethmoid sinus disease. There are bilateral mastoid effusions.     Impression: Impression: Mild atrophy and chronic microvascular ischemia. No acute intracranial process. Acute bilateral maxillary sinusitis. Bilateral mastoid effusions. Electronically Signed: Marvel Vines MD  2/4/2024 9:49 PM EST  Workstation ID: [id number]    CT Abdomen Pelvis With Contrast    Result Date: 2/4/2024  CT ABDOMEN PELVIS W CONTRAST Date of Exam: 2/4/2024 8:06 PM EST Indication: llq pain. Comparison: CT abdomen pelvis dated December 25, 2023 Technique: Axial CT images were obtained of the abdomen and pelvis following the uneventful intravenous administration of 85 mL Isovue-300. Reconstructed coronal and sagittal images were also obtained. Automated exposure control and iterative construction methods were used. Findings: The lung bases are clear. There is mild nodularity of the liver surface. There are stones layering in the gallbladder. The bilateral kidneys, bilateral adrenal glands, pancreas and spleen are normal. There is a large amount of stool throughout the colon with stool to the cecum suggestive of constipation. There is no large or small bowel inflammatory change. There is calcific atherosclerosis of the abdominal aorta and branch vascular structures. There is degenerative disease of the lumbar spine and hips. There is a left posterior urinary bladder diverticulum. The urinary bladder is significantly distended. Bladder outlet obstruction would be in the differential.     Impression: Impression: No acute abdominal or pelvic abnormality. Electronically Signed: Marvel Vines MD  2/4/2024 8:44 PM EST  Workstation ID: [id number]       Assessment & Plan   Assessment & Plan       Dizziness    Anxiety disorder,  unspecified    Gastro-esophageal reflux disease without esophagitis    Hyperlipidemia, unspecified    Tobacco use    Iron deficiency anemia    Cirrhosis of liver    Squamous cell carcinoma of esophagus    Generalized weakness    Orthostatic hypotension    Val Nassar is a 63 y.o. 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     **Orthostatic hypotension   **Dizziness, lightheadedness  **Generalized weakness  -standing BP in ED 94.71; lying /64  -CT Head unremarkable   -CT abd/pel unremarkable   -UA negative   -labs stable   -s/p 1 liter NS bolus given in ED  -Albumin 12.5g now   -continue to check orthostatic Bps   -pt on Coreg (3.125 BID)- continued w/ hold parameters; consider Midodrine if orthostatic hypotension persist   -fall precautions   -pt/ot and case mgt consult     **Cirrhosis, compensated   **Hx of alcohol abuse   -labs stable   -receiving Vivitrol injections (PCP) outpt; reports sobriety x 3 months  -symptom mgt    **Invasive squamous cell carcinoma of the esophagus   -s/p EGD 12/26: biopsies + 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 PET arranged tentatively for 3 wks post d/c. Scheduled appointments w/ Dr. Ga on 1/18 and Dr. Aguila on 1/19  -pt reports that he has only had f/u w/ his PCP 2/2 being in rehab (reports being d/c on 1/18)  -will need to assist pt w/ rescheduling above appointments prior to d/c    **Hx recent duodenal ulcer, duodenal stenosis, iron deficiency anemia   -EGD 12/26 also showed esophagitis and one non-bleeding cratered duodenal ulcer (along with esophageal mass) and congestive gastropathy. No varicies were  noted.   -evaluated by GI during recent admit; pt received 3 doses IV iron and 3 units PRBC; started on PPI  -pt instructed to f/u w/ GI in 6-8 weeks after d/c   -d/c H/H 9.1/29.3; current H/H 9.7/31.3  -monitor  -continue PPI    **BPH  -continue Flomax, Proscar     **Anxiety   -continue Lexapro     **Tobacco use   -cessation education  -nicotine patch     DVT prophylaxis:  Mechanical     CODE STATUS:    Code Status (Patient has no pulse and is not breathing): CPR (Attempt to Resuscitate)  Medical Interventions (Patient has pulse or is breathing): Full Support    Expected Discharge  Expected Discharge Date: 2/7/2024; Expected Discharge Time:     Signature: Electronically signed by NUVIA Cruz, 02/05/24, 2:52 AM EST.    Time spent: 55 minutes

## 2024-02-05 NOTE — THERAPY EVALUATION
Patient Name: Val Nassar  : 1960    MRN: [id number]                              Today's Date: 2024       Admit Date: 2024    Visit Dx:     ICD-10-CM ICD-9-CM   1. Dizziness  R42 780.4   2. Generalized weakness  R53.1 780.79     Patient Active Problem List   Diagnosis    Acute gastric ulcer with hemorrhage    Alcohol abuse, uncomplicated    Anxiety disorder, unspecified    Diverticulum of bladder    Duodenal ulcer, unspecified as acute or chronic, without hemorrhage or perforation    Elevation of level of transaminase and lactic acid dehydrogenase (LDH)    Gastro-esophageal reflux disease without esophagitis    Hyperglycemia, unspecified    Hyperlipidemia, unspecified    Melena    Nicotine dependence, cigarettes, uncomplicated    Tobacco use    Severe malnutrition    Duodenal ulcer    Iron deficiency anemia    Cirrhosis of liver    Acute blood loss anemia    Squamous cell carcinoma of esophagus    Duodenal stenosis    Generalized weakness    Orthostatic hypotension     Past Medical History:   Diagnosis Date    Anemia     Cancer     Cirrhosis     Duodenal ulcer     Gastric ulcer     GERD (gastroesophageal reflux disease)     History of alcohol abuse     HLD (hyperlipidemia)     Mood disorder      Past Surgical History:   Procedure Laterality Date    ENDOSCOPY N/A 2023    Procedure: ESOPHAGOGASTRODUODENOSCOPY;  Surgeon: Wesly Mckeon MD;  Location: [facility] ENDOSCOPY;  Service: Gastroenterology;  Laterality: N/A;      General Information       Row Name 24 1133          OT Time and Intention    Document Type evaluation  -KF     Mode of Treatment occupational therapy  -KF       Row Name 24 1133          General Information    Patient Profile Reviewed yes  -KF     Prior Level of Function independent:;all household mobility;community mobility;bed mobility;ADL's  [place] with SPC in household distances; [place] with rollator in community  -KF     Existing Precautions/Restrictions fall   -KF     Barriers to Rehab medically complex  -       Row Name 02/05/24 1133          Living Environment    People in Home sibling(s)  -       Row Name 02/05/24 1133          Home Main Entrance    Number of Stairs, Main Entrance one  -KF     Stair Railings, Main Entrance none  -KF       Row Name 02/05/24 1133          Stairs Within Home, Primary    Number of Stairs, Within Home, Primary none  -KF     Stairs Comment, Within Home, Primary Tub shower, standard height commode  -       Row Name 02/05/24 1133          Cognition    Orientation Status (Cognition) oriented x 4  -       Row Name 02/05/24 1133          Safety Issues, Functional Mobility    Safety Issues Affecting Function (Mobility) safety precaution awareness;safety precautions follow-through/compliance;sequencing abilities  -     Impairments Affecting Function (Mobility) balance;endurance/activity tolerance  -               User Key  (r) = Recorded By, (t) = Taken By, (c) = Cosigned By      Initials Name Provider Type    KF Chioma Nava OT Occupational Therapist                     Mobility/ADL's       Row Name 02/05/24 1135          Bed Mobility    Bed Mobility supine-sit  -KF     Supine-Sit [place] (Bed Mobility) standby assist  -     Assistive Device (Bed Mobility) bed rails;head of bed elevated  -     Comment, (Bed Mobility) Increased time and effort needed to complete  -       Row Name 02/05/24 1135          Transfers    Transfers bed-chair transfer;sit-stand transfer;stand-sit transfer  -     Comment, (Transfers) Pt with no reports of dizziness throughout session. Orthostatic vitals taken and recorded below.  -       Row Name 02/05/24 1135          Bed-Chair Transfer    Bed-Chair [place] (Transfers) contact guard  -     Assistive Device (Bed-Chair Transfers) cane, straight  -       Row Name 02/05/24 1135          Sit-Stand Transfer    Sit-Stand [place] (Transfers) contact guard  -KF     Assistive Device  (Sit-Stand Transfers) cane, straight  -KF     Comment, (Sit-Stand Transfer) STS x2 from the EOB and x2 from the chair  -KF       [facility] Name 02/05/24 1135          Stand-Sit Transfer    Stand-Sit [place] (Transfers) contact guard  -KF     Assistive Device (Stand-Sit Transfers) cane, straight  -KF       Row Name 02/05/24 1135          Functional Mobility    Functional Mobility- Ind. Level contact guard assist  -KF     Functional Mobility- Device cane, straight  -KF     Functional Mobility-Distance (Feet) --  household distance  -       Row Name 02/05/24 1135          Activities of Daily Living    BADL Assessment/Intervention lower body dressing;grooming;feeding  -[facility] Name 02/05/24 1135          Lower Body Dressing Assessment/Training    [place] Level (Lower Body Dressing) doff;don;socks;standby assist  -KF     Position (Lower Body Dressing) supported sitting  -KF       [facility] Name 02/05/24 1135          Grooming Assessment/Training    [place] Level (Grooming) wash face, hands;set up  -KF     Position (Grooming) supported sitting  -KF       Row Name 02/05/24 1135          Self-Feeding Assessment/Training    [place] Level (Feeding) finger foods;independent  -KF     Position (Self-Feeding) supported sitting  -KF               User Key  (r) = Recorded By, (t) = Taken By, (c) = Cosigned By      Initials Name Provider Type    Chioma Zuniga OT Occupational Therapist                   Obj/Interventions       Row Name 02/05/24 1137          Sensory Assessment (Somatosensory)    Sensory Assessment (Somatosensory) UE sensation intact  -[facility] Name 02/05/24 1137          Vision Assessment/Intervention    Visual Impairment/Limitations WFL  -KF       [facility] Name 02/05/24 1137          Range of Motion Comprehensive    General Range of Motion bilateral upper extremity ROM WFL  -KF       [facility] Name 02/05/24 1137          Strength Comprehensive (MMT)    General Manual Muscle Testing (MMT) Assessment  upper extremity strength deficits identified  -KF     Comment, General Manual Muscle Testing (MMT) Assessment BUE grossly 4+/5  -KF       Row Name 02/05/24 1137          Balance    Balance Assessment sitting static balance;sitting dynamic balance;sit to stand dynamic balance;standing static balance;standing dynamic balance  -KF     Static Sitting Balance supervision  -KF     Dynamic Sitting Balance standby assist  -KF     Position, Sitting Balance unsupported;sitting edge of bed  -KF     Sit to Stand Dynamic Balance contact guard  -KF     Static Standing Balance standby assist  -KF     Dynamic Standing Balance contact guard  -KF     Position/Device Used, Standing Balance supported;cane, straight  -KF     Balance Interventions sitting;standing;sit to stand;supported;static;dynamic  -KF               User Key  (r) = Recorded By, (t) = Taken By, (c) = Cosigned By      Initials Name Provider Type    KF Chioma Nava, OT Occupational Therapist                   Goals/Plan       Row Name 02/05/24 1140          Transfer Goal 1 (OT)    Activity/Assistive Device (Transfer Goal 1, OT) commode;bed-to-chair/chair-to-bed  -KF     [place] Level/Cues Needed (Transfer Goal 1, OT) modified independence  -KF     Time Frame (Transfer Goal 1, OT) long term goal (LTG);10 days  -KF     Progress/Outcome (Transfer Goal 1, OT) goal ongoing  -KF       Row Name 02/05/24 1140          Dressing Goal 1 (OT)    Activity/Device (Dressing Goal 1, OT) upper body dressing;lower body dressing  -KF     [place]/Cues Needed (Dressing Goal 1, OT) modified independence  -KF     Time Frame (Dressing Goal 1, OT) long term goal (LTG);10 days  -KF     Progress/Outcome (Dressing Goal 1, OT) goal ongoing  -KF       Row Name 02/05/24 1140          Grooming Goal 1 (OT)    Activity/Device (Grooming Goal 1, OT) grooming skills, all  -KF     [place] (Grooming Goal 1, OT) modified independence  -KF     Time Frame (Grooming Goal 1, OT) long term goal  (LTG);10 days  -     Progress/Outcome (Grooming Goal 1, OT) goal ongoing  -       Row Name 02/05/24 1140          Therapy Assessment/Plan (OT)    Planned Therapy Interventions (OT) activity tolerance training;adaptive equipment training;BADL retraining;functional balance retraining;IADL retraining;occupation/activity based interventions;patient/caregiver education/training;ROM/therapeutic exercise;strengthening exercise;transfer/mobility retraining  -KF               User Key  (r) = Recorded By, (t) = Taken By, (c) = Cosigned By      Initials Name Provider Type    KF Chioma Nava OT Occupational Therapist                   Clinical Impression       Row Name 02/05/24 1137          Pain Assessment    Pretreatment Pain Rating 10/10  -KF     Posttreatment Pain Rating 10/10  -KF     Pain Location - Side/Orientation Left  -KF     Pain Location lower  -KF     Pain Location - extremity  -KF     Pain Intervention(s) Repositioned;Ambulation/increased activity  -       Row Name 02/05/24 1137          Plan of Care Review    Plan of Care Reviewed With patient  -     Progress no change  -KF     Outcome Evaluation OT evaluation completed. The pt presents near his functional baseline, however is limited by decreased activity tolerance and generalized weakness. The pt performed household mobility using a SPC with CGA. Pt completed LBD, grooming, and feeding ADLs with set up/near independence. The pt had no reports of dizziness during evaluation. Pt will benefit from continued IP OT services to increase the pt's safety and independence during ADLs and functional mobility. Recommend a d/c home with assist when medically ready.  -       Row Name 02/05/24 1137          Therapy Assessment/Plan (OT)    Rehab Potential (OT) good, to achieve stated therapy goals  -     Criteria for Skilled Therapeutic Interventions Met (OT) yes;skilled treatment is necessary  -KF     Therapy Frequency (OT) daily  -       Row Name 02/05/24  1137          Therapy Plan Review/Discharge Plan (OT)    Anticipated Discharge Disposition (OT) home with assist  -KF       Row Name 02/05/24 1137          Vital Signs    Pre Systolic BP Rehab 144  Supine  -KF     Pre Treatment Diastolic BP 78  -KF     Intra Systolic BP Rehab 149  EOB  -KF     Intra Treatment Diastolic BP 78  -KF     Post Systolic BP Rehab 153  Standing  -KF     Post Treatment Diastolic BP 76  -KF     Pretreatment Heart Rate (beats/min) 60  -KF     Pre SpO2 (%) 92  -KF     O2 Delivery Pre Treatment room air  -KF     Pre Patient Position Supine  -KF     Intra Patient Position Standing  -KF     Post Patient Position Sitting  -KF       Row Name 02/05/24 1137          Positioning and Restraints    Pre-Treatment Position in bed  -KF     Post Treatment Position chair  -KF     In Chair notified nsg;reclined;call light within reach;encouraged to call for assist;exit alarm on;waffle cushion;legs elevated  -KF               User Key  (r) = Recorded By, (t) = Taken By, (c) = Cosigned By      Initials Name Provider Type    KF Chioma Nava, ANUSHA Occupational Therapist                   Outcome Measures       Row Name 02/05/24 1140          How much help from another is currently needed...    Putting on and taking off regular lower body clothing? 3  -KF     Bathing (including washing, rinsing, and drying) 3  -KF     Toileting (which includes using toilet bed pan or urinal) 3  -KF     Putting on and taking off regular upper body clothing 4  -KF     Taking care of personal grooming (such as brushing teeth) 3  -KF     Eating meals 4  -KF     AM-PAC 6 Clicks Score (OT) 20  -KF       Row Name 02/05/24 0800          How much help from another person do you currently need...    Turning from your back to your side while in flat bed without using bedrails? 4  -BW     Moving from lying on back to sitting on the side of a flat bed without bedrails? 4  -BW     Moving to and from a bed to a chair (including a wheelchair)? 3   -BW     Standing up from a chair using your arms (e.g., wheelchair, bedside chair)? 4  -BW     Climbing 3-5 steps with a railing? 3  -BW     To walk in hospital room? 4  -BW     AM-PAC 6 Clicks Score (PT) 22  -BW     Highest Level of Mobility Goal 7 --> Walk 25 feet or more  -BW       Row Name 02/05/24 1140          Functional Assessment    Outcome Measure Options AM-PAC 6 Clicks Daily Activity (OT)  -KF               User Key  (r) = Recorded By, (t) = Taken By, (c) = Cosigned By      Initials Name Provider Type    Surinder Manning, RN Registered Nurse    Chioma Zuniga OT Occupational Therapist                    Occupational Therapy Education       Title: PT OT SLP Therapies (In Progress)       Topic: Occupational Therapy (In Progress)       Point: ADL training (Done)       Description:   Instruct learner(s) on proper safety adaptation and remediation techniques during self care or transfers.   Instruct in proper use of assistive devices.                  Learning Progress Summary             Patient Acceptance, E,TB, VU,DU by KF at 2/5/2024 1013                         Point: Home exercise program (Not Started)       Description:   Instruct learner(s) on appropriate technique for monitoring, assisting and/or progressing therapeutic exercises/activities.                  Learner Progress:  Not documented in this visit.              Point: Precautions (Done)       Description:   Instruct learner(s) on prescribed precautions during self-care and functional transfers.                  Learning Progress Summary             Patient Acceptance, E,TB, VU,DU by KF at 2/5/2024 1013                         Point: Body mechanics (Done)       Description:   Instruct learner(s) on proper positioning and spine alignment during self-care, functional mobility activities and/or exercises.                  Learning Progress Summary             Patient Acceptance, E,TB, VU,DU by KF at 2/5/2024 1013                                          User Key       Initials Effective Dates Name Provider Type Discipline     08/09/23 -  Chioma Nava, ANUSHA Occupational Therapist OT                  OT Recommendation and Plan  Planned Therapy Interventions (OT): activity tolerance training, adaptive equipment training, BADL retraining, functional balance retraining, IADL retraining, occupation/activity based interventions, patient/caregiver education/training, ROM/therapeutic exercise, strengthening exercise, transfer/mobility retraining  Therapy Frequency (OT): daily  Plan of Care Review  Plan of Care Reviewed With: patient  Progress: no change  Outcome Evaluation: OT evaluation completed. The pt presents near his functional baseline, however is limited by decreased activity tolerance and generalized weakness. The pt performed household mobility using a SPC with CGA. Pt completed LBD, grooming, and feeding ADLs with set up/near independence. The pt had no reports of dizziness during evaluation. Pt will benefit from continued IP OT services to increase the pt's safety and independence during ADLs and functional mobility. Recommend a d/c home with assist when medically ready.     Time Calculation:   Evaluation Complexity (OT)  Review Occupational Profile/Medical/Therapy History Complexity: brief/low complexity  Assessment, Occupational Performance/Identification of Deficit Complexity: 1-3 performance deficits  Clinical Decision Making Complexity (OT): problem focused assessment/low complexity  Overall Complexity of Evaluation (OT): low complexity     Time Calculation- OT       Row Name 02/05/24 1141             Time Calculation- OT    OT Start Time 1013  -KF      OT Received On 02/05/24  -KF      OT Goal Re-Cert Due Date 02/15/24  -KF         Timed Charges    08334 - OT Therapeutic Activity Minutes 3  -KF      33253 - OT Self Care/Mgmt Minutes 5  -KF         Untimed Charges    OT Eval/Re-eval Minutes 46  -KF         Total Minutes    Timed Charges  Total Minutes 8  -KF      Untimed Charges Total Minutes 46  -KF       Total Minutes 54  -KF                User Key  (r) = Recorded By, (t) = Taken By, (c) = Cosigned By      Initials Name Provider Type    KF Chioma Nava OT Occupational Therapist                  Therapy Charges for Today       Code Description Service Date Service Provider Modifiers Qty    [id number]  OT EVAL LOW COMPLEXITY 4 2/5/2024 Chioma Nava OT GO 1    [id number]  OT SELF CARE/MGMT/TRAIN EA 15 MIN 2/5/2024 Chioma Nava OT GO 1                 Chioma Nava OT  2/5/2024

## 2024-02-05 NOTE — THERAPY EVALUATION
Patient Name: Val Nassar  : 1960    MRN: [id number]                              Today's Date: 2024       Admit Date: 2024    Visit Dx:     ICD-10-CM ICD-9-CM   1. Dizziness  R42 780.4   2. Generalized weakness  R53.1 780.79     Patient Active Problem List   Diagnosis    Acute gastric ulcer with hemorrhage    Alcohol abuse, uncomplicated    Anxiety disorder, unspecified    Diverticulum of bladder    Duodenal ulcer, unspecified as acute or chronic, without hemorrhage or perforation    Elevation of level of transaminase and lactic acid dehydrogenase (LDH)    Gastro-esophageal reflux disease without esophagitis    Hyperglycemia, unspecified    Hyperlipidemia, unspecified    Melena    Nicotine dependence, cigarettes, uncomplicated    Tobacco use    Severe malnutrition    Duodenal ulcer    Iron deficiency anemia    Cirrhosis of liver    Acute blood loss anemia    Squamous cell carcinoma of esophagus    Duodenal stenosis    Generalized weakness    Orthostatic hypotension     Past Medical History:   Diagnosis Date    Anemia     Cancer     Cirrhosis     Duodenal ulcer     Gastric ulcer     GERD (gastroesophageal reflux disease)     History of alcohol abuse     HLD (hyperlipidemia)     Mood disorder      Past Surgical History:   Procedure Laterality Date    ENDOSCOPY N/A 2023    Procedure: ESOPHAGOGASTRODUODENOSCOPY;  Surgeon: Wesly Mckeon MD;  Location: [facility] ENDOSCOPY;  Service: Gastroenterology;  Laterality: N/A;      General Information       Row Name 24 1015          Physical Therapy Time and Intention    Document Type evaluation  -NS     Mode of Treatment physical therapy  -NS       Row Name 24 1015          General Information    Patient Profile Reviewed yes  -NS     Prior Level of Function independent:;all household mobility;gait;transfer;bed mobility;ADL's;community mobility  pt uses a SPC for home mobility and rollator for community mobility  -NS     Existing  Precautions/Restrictions fall  hx of orthostatic hypotension  -NS     Barriers to Rehab medically complex  -NS       [facility] Name 02/05/24 1015          Living Environment    People in Home sibling(s)  -NS       [facility] Name 02/05/24 1015          Home Main Entrance    Number of Stairs, Main Entrance one  -NS     Stair Railings, Main Entrance none  -NS       [facility] Name 02/05/24 1015          Stairs Within Home, Primary    Number of Stairs, Within Home, Primary none  -NS       Row Name 02/05/24 1015          Cognition    Orientation Status (Cognition) oriented x 4  -NS       [facility] Name 02/05/24 1015          Safety Issues, Functional Mobility    Safety Issues Affecting Function (Mobility) safety precaution awareness;safety precautions follow-through/compliance  -NS     Impairments Affecting Function (Mobility) balance;coordination;endurance/activity tolerance;strength;pain;sensation/sensory awareness  -NS               User Key  (r) = Recorded By, (t) = Taken By, (c) = Cosigned By      Initials Name Provider Type    Nava Ramires PT Physical Therapist                   Mobility       [facility] Name 02/05/24 1150          Bed Mobility    Bed Mobility supine-sit  -NS     Supine-Sit [place] (Bed Mobility) standby assist  -NS     Assistive Device (Bed Mobility) bed rails  -NS       Row Name 02/05/24 1150          Transfers    Comment, (Transfers) Pt reported very mild dizziness upon standing that improved with time. BP stable.  -NS       [facility] Name 02/05/24 1150          Sit-Stand Transfer    Sit-Stand [place] (Transfers) contact guard  -NS     Assistive Device (Sit-Stand Transfers) cane, straight  -NS       [facility] Name 02/05/24 1150          Gait/Stairs (Locomotion)    [place] Level (Gait) contact guard  -NS     Assistive Device (Gait) cane, straight  -NS     Distance in Feet (Gait) 30+30  -NS     Deviations/Abnormal Patterns (Gait) mandy decreased;gait speed decreased;stride length decreased;base of support, narrow  -NS      Bilateral Gait Deviations heel strike decreased;forward flexed posture  -NS     Comment, (Gait/Stairs) Patient ambulated at slow pace, demonstrating narrow [place] and flexed posture. Seated rest break taken between bouts of gait, no reports of dizziness or light headedness. Distance limited by weakness.  -NS               User Key  (r) = Recorded By, (t) = Taken By, (c) = Cosigned By      Initials Name Provider Type    NS Nava Elizabeth PT Physical Therapist                   Obj/Interventions       [facility] Name 02/05/24 1015          Range of Motion Comprehensive    General Range of Motion bilateral lower extremity ROM WFL  -NS       Row Name 02/05/24 1015          Strength Comprehensive (MMT)    General Manual Muscle Testing (MMT) Assessment lower extremity strength deficits identified  -NS     Comment, General Manual Muscle Testing (MMT) Assessment LLE: grossly 4/5 limited by pain, RLE: 4+/5  -NS       Row Name 02/05/24 1015          Balance    Balance Assessment sitting static balance;sitting dynamic balance;standing static balance;standing dynamic balance  -NS     Static Sitting Balance standby assist  -NS     Dynamic Sitting Balance standby assist  -NS     Position, Sitting Balance unsupported;sitting in chair  -NS     Static Standing Balance contact guard  -NS     Dynamic Standing Balance contact guard  -NS     Position/Device Used, Standing Balance supported;cane, straight  -NS       Row Name 02/05/24 1015          Sensory Assessment (Somatosensory)    Sensory Assessment (Somatosensory) left LE  -NS     Left LE Sensory Assessment general sensation;impaired  pt reports intermittent tingling LLE due to sciatica  -NS               User Key  (r) = Recorded By, (t) = Taken By, (c) = Cosigned By      Initials Name Provider Type    Nava Ramires PT Physical Therapist                   Goals/Plan       Row Name 02/05/24 1015          Bed Mobility Goal 1 (PT)    Activity/Assistive Device (Bed Mobility Goal 1, PT)  supine to sit  -NS     [place] Level/Cues Needed (Bed Mobility Goal 1, PT) independent  -NS     Time Frame (Bed Mobility Goal 1, PT) long term goal (LTG);2 weeks  -NS       Row Name 02/05/24 1015          Transfer Goal 1 (PT)    Activity/Assistive Device (Transfer Goal 1, PT) sit-to-stand/stand-to-sit;bed-to-chair/chair-to-bed  -NS     [place] Level/Cues Needed (Transfer Goal 1, PT) modified independence  -NS     Time Frame (Transfer Goal 1, PT) long term goal (LTG);2 weeks  -NS       Row Name 02/05/24 1015          Gait Training Goal 1 (PT)    Activity/Assistive Device (Gait Training Goal 1, PT) gait (walking locomotion);assistive device use  -NS     [place] Level (Gait Training Goal 1, PT) modified independence  -NS     Distance (Gait Training Goal 1, PT) 150  -NS     Time Frame (Gait Training Goal 1, PT) long term goal (LTG);2 weeks  -NS       Row Name 02/05/24 1015          Stairs Goal 1 (PT)    Activity/Assistive Device (Stairs Goal 1, PT) ascending stairs;descending stairs  -NS     [place] Level/Cues Needed (Stairs Goal 1, PT) standby assist  -NS     Number of Stairs (Stairs Goal 1, PT) 1  -NS     Time Frame (Stairs Goal 1, PT) long term goal (LTG);2 weeks  -NS       Row Name 02/05/24 1015          Therapy Assessment/Plan (PT)    Planned Therapy Interventions (PT) balance training;bed mobility training;gait training;home exercise program;neuromuscular re-education;stair training;strengthening;patient/family education;transfer training;stretching  -NS               User Key  (r) = Recorded By, (t) = Taken By, (c) = Cosigned By      Initials Name Provider Type    Nava Ramires, PT Physical Therapist                   Clinical Impression       Row Name 02/05/24 1015          Pain    Pretreatment Pain Rating 10/10  -NS     Posttreatment Pain Rating 10/10  -NS     Pain Location - Side/Orientation Left  -NS     Pain Location lower  -NS     Pain Location - extremity  -NS     Pre/Posttreatment  Pain Comment RN notified and managing  -NS     Pain Intervention(s) Repositioned;Ambulation/increased activity  -NS       Row Name 02/05/24 1015          Plan of Care Review    Plan of Care Reviewed With patient  -NS     Progress no change  -NS     Outcome Evaluation Patient presents with weakness, decreased endurance, and mild balance impairments affecting independence with mobility. BP stable throughout position changes, with mild dizziness reported with standing but pt denied during ambulation. Skilled IP PT services warranted to support return to baseline. Recommend home with assist and HHPT at d/c.  -NS       Row Name 02/05/24 1015          Therapy Assessment/Plan (PT)    Patient/Family Therapy Goals Statement (PT) return to PLOF  -NS     Rehab Potential (PT) good, to achieve stated therapy goals  -NS     Criteria for Skilled Interventions Met (PT) yes;meets criteria;skilled treatment is necessary  -NS     Therapy Frequency (PT) daily  -NS       Row Name 02/05/24 1015          Vital Signs    Pre Systolic BP Rehab 144  supine  -NS     Pre Treatment Diastolic BP 78  -NS     Intra Systolic BP Rehab 149  sitting  -NS     Intra Treatment Diastolic BP 78  -NS     Post Systolic BP Rehab 153  standing  -NS     Post Treatment Diastolic BP 76  -NS     Pretreatment Heart Rate (beats/min) 61  -NS     Posttreatment Heart Rate (beats/min) 74  -NS     Pre SpO2 (%) 100  -NS     O2 Delivery Pre Treatment room air  -NS     Post SpO2 (%) 97  -NS     O2 Delivery Post Treatment room air  -NS     Pre Patient Position Supine  -NS     Intra Patient Position Standing  -NS     Post Patient Position Sitting  -NS       Row Name 02/05/24 1015          Positioning and Restraints    Pre-Treatment Position in bed  -NS     Post Treatment Position chair  -NS     In Chair notified nsg;reclined;call light within reach;encouraged to call for assist;exit alarm on;waffle cushion;legs elevated;heels elevated  -NS               User Key  (r) =  Recorded By, (t) = Taken By, (c) = Cosigned By      Initials Name Provider Type    Nava Ramires, PT Physical Therapist                   Outcome Measures       Row Name 02/05/24 1015 02/05/24 0800       How much help from another person do you currently need...    Turning from your back to your side while in flat bed without using bedrails? 4  -NS 4  -BW    Moving from lying on back to sitting on the side of a flat bed without bedrails? 3  -NS 4  -BW    Moving to and from a bed to a chair (including a wheelchair)? 3  -NS 3  -BW    Standing up from a chair using your arms (e.g., wheelchair, bedside chair)? 3  -NS 4  -BW    Climbing 3-5 steps with a railing? 3  -NS 3  -BW    To walk in hospital room? 3  -NS 4  -BW    AM-PAC 6 Clicks Score (PT) 19  -NS 22  -BW    Highest Level of Mobility Goal 6 --> Walk 10 steps or more  -NS 7 --> Walk 25 feet or more  -BW      Row Name 02/05/24 1140 02/05/24 1015       Functional Assessment    Outcome Measure Options AM-PAC 6 Clicks Daily Activity (OT)  -KF AM-PAC 6 Clicks Basic Mobility (PT)  -NS              User Key  (r) = Recorded By, (t) = Taken By, (c) = Cosigned By      Initials Name Provider Type    Nava Rmaires, APRIL Physical Therapist    Surinder Manning, RN Registered Nurse    KF Chioma Nava, OT Occupational Therapist                                 Physical Therapy Education       Title: PT OT SLP Therapies (In Progress)       Topic: Physical Therapy (In Progress)       Point: Mobility training (Done)       Learning Progress Summary             Patient Acceptance, E, VU by NS at 2/5/2024 1201                         Point: Home exercise program (Not Started)       Learner Progress:  Not documented in this visit.              Point: Body mechanics (Done)       Learning Progress Summary             Patient Acceptance, E, VU by NS at 2/5/2024 1201                         Point: Precautions (Done)       Learning Progress Summary             Patient Acceptance,  E, VU by NS at 2/5/2024 1201                                         User Key       Initials Effective Dates Name Provider Type Discipline    NS 06/16/21 -  Nava Elizabeth, PT Physical Therapist PT                  PT Recommendation and Plan  Planned Therapy Interventions (PT): balance training, bed mobility training, gait training, home exercise program, neuromuscular re-education, stair training, strengthening, patient/family education, transfer training, stretching  Plan of Care Reviewed With: patient  Progress: no change  Outcome Evaluation: Patient presents with weakness, decreased endurance, and mild balance impairments affecting independence with mobility. BP stable throughout position changes, with mild dizziness reported with standing but pt denied during ambulation. Skilled IP PT services warranted to support return to baseline. Recommend home with assist and HHPT at d/c.     Time Calculation:   PT Evaluation Complexity  History, PT Evaluation Complexity: 3 or more personal factors and/or comorbidities  Examination of Body Systems (PT Eval Complexity): total of 4 or more elements  Clinical Presentation (PT Evaluation Complexity): stable  Clinical Decision Making (PT Evaluation Complexity): low complexity  Overall Complexity (PT Evaluation Complexity): low complexity     PT Charges       Row Name 02/05/24 1015             Time Calculation    Start Time 1015  -NS      PT Received On 02/05/24  -NS      PT Goal Re-Cert Due Date 02/15/24  -NS         Untimed Charges    PT Eval/Re-eval Minutes 48  -NS         Total Minutes    Untimed Charges Total Minutes 48  -NS       Total Minutes 48  -NS                User Key  (r) = Recorded By, (t) = Taken By, (c) = Cosigned By      Initials Name Provider Type    Nava Ramires, PT Physical Therapist                  Therapy Charges for Today       Code Description Service Date Service Provider Modifiers Qty    [id number]  PT EVAL LOW COMPLEXITY 4 2/5/2024 Glenn  Nava, PT GP 1            PT G-Codes  Outcome Measure Options: AM-PAC 6 Clicks Daily Activity (OT)  AM-PAC 6 Clicks Score (PT): 19  AM-PAC 6 Clicks Score (OT): 20  PT Discharge Summary  Anticipated Discharge Disposition (PT): home with assist, home with home health    Nava Elizabeth, PT  2/5/2024

## 2024-02-05 NOTE — DISCHARGE SUMMARY
[facility] Medicine Services  SAME DAY ADMISSION & DISCHARGE    Patient Name: Val Nassar  : 1960  MRN: [id number]    Date of Admission and Discharge: 2024    Primary Care Physician: Provider, No Known  Consults       No orders found for last 30 day(s).            Subjective   Presentation and Brief Hospital Summary     Chief Complaint:  Dizziness [R42]    Active Hospital Problems    Diagnosis  POA    Generalized weakness [R53.1]  Yes    Orthostatic hypotension [I95.1]  Yes    Iron deficiency anemia [D50.9]  Yes    Cirrhosis of liver [K74.60]  Yes    Squamous cell carcinoma of esophagus [C15.9]  Yes    Anxiety disorder, unspecified [F41.9]  Yes    Hyperlipidemia, unspecified [E78.5]  Yes    Gastro-esophageal reflux disease without esophagitis [K21.9]  Yes    Tobacco use [Z72.0]  Yes      Resolved Hospital Problems    Diagnosis Date Resolved POA    **Dizziness [R42] 2024 Yes         HPI and Brief Hospital Course:  Val Nassar is a 63 y.o. 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      **Orthostatic hypotension   -Patient's lab/imaging evaluation unremarkable. Suspect hypotension in setting of dehydration and coreg use. He rec'd IVF and albumin w/ resolution of his orthostasis. D/W nursing tech and she was able to get him up without difficulty. Will stop coreg as he has no evidence of varices on EGD and no other indication for it.     He was unable to make his f/u appointments with Dr. Aguila and Dr. Ga due to his being in rehab. Will ask for f/u appointments with them in 1-2 weeks.    Review of Systems   Gen- No fevers, chills  CV- No chest pain, palpitations  Resp- No cough, dyspnea  GI- No N/V/D, abd pain    All other systems reviewed and are negative.    Personal History     Past  Medical History:   Diagnosis Date    Anemia     Cancer     Cirrhosis     Duodenal ulcer     Gastric ulcer     GERD (gastroesophageal reflux disease)     History of alcohol abuse     HLD (hyperlipidemia)     Mood disorder        Past Surgical History:   Procedure Laterality Date    ENDOSCOPY N/A 12/26/2023    Procedure: ESOPHAGOGASTRODUODENOSCOPY;  Surgeon: Wesly Mckeon MD;  Location: [facility] ENDOSCOPY;  Service: Gastroenterology;  Laterality: N/A;       Family History: family history includes Cancer in his mother; Heart attack in his father.     Social History:  reports that he has been smoking cigarettes. He has a 15.00 pack-year smoking history. He does not have any smokeless tobacco history on file. He reports that he does not currently use alcohol. He reports that he does not use drugs.    Allergies:  No Known Allergies    Objective   Objective Findings     Vital Signs:   Temp:  [97.9 °F (36.6 °C)-98.7 °F (37.1 °C)] 97.9 °F (36.6 °C)  Heart Rate:  [58-86] 68  Resp:  [16-20] 20  BP: ()/(59-80) 125/73        Physical Exam (if not performed and documented at time of presentation on same date):   Constitutional: No acute distress, awake, alert  HENT: NCAT, mucous membranes moist  Respiratory: Clear to auscultation bilaterally, respiratory effort normal   Cardiovascular: RRR, no murmurs, rubs, or gallops  Gastrointestinal: Positive bowel sounds, soft, nontender, nondistended  Musculoskeletal: No bilateral ankle edema  Psychiatric: Appropriate affect, cooperative  Neurologic: Oriented x 3, strength symmetric in all extremities, Cranial Nerves grossly intact to confrontation, speech clear  Skin: No rashes     Results Reviewed:  I have personally reviewed current lab, radiology, and data and agree.    Results from last 7 days   Lab Units 02/05/24 0427 02/04/24  1915   WBC 10*3/mm3 7.65 10.32   HEMOGLOBIN g/dL 8.8* 9.7*   HEMATOCRIT % 27.8* 31.3*   PLATELETS 10*3/mm3 336 360   INR   --  1.02     Results from last  "7 days   Lab Units 02/05/24  0427 02/04/24  1915   SODIUM mmol/L 141 133*   POTASSIUM mmol/L 3.9 3.7   CHLORIDE mmol/L 105 99   CO2 mmol/L 24.0 25.0   BUN mg/dL 9 11   CREATININE mg/dL 0.46* 0.68*   GLUCOSE mg/dL 91 119*   CALCIUM mg/dL 9.7 10.1   ALK PHOS U/L  --  154*   ALT (SGPT) U/L  --  16   AST (SGOT) U/L  --  26     No results found for: \"PHART\", \"PCO2\"  Imaging Results (Last 24 Hours)       Procedure Component Value Units Date/Time    CT Head Without Contrast [[id number]] Collected: 02/04/24 2147     Updated: 02/04/24 2152    Narrative:      CT HEAD WO CONTRAST    Date of Exam: 2/4/2024 9:05 PM EST    Indication: dizziness.    Comparison: None available.    Technique: Axial CT images were obtained of the head without contrast administration.  Automated exposure control and iterative construction methods were used.    Findings:  There is mild diffuse generalized atrophy. There are low-attenuation areas in the periventricular white matter consistent with chronic microvascular ischemic change. There is no mass, mass effect or midline shift. There are no abnormal extra-axial fluid   collections or areas of acute hemorrhage. There are bilateral maxillary sinus air-fluid levels. There is bilateral ethmoid sinus disease. There are bilateral mastoid effusions.       Impression:      Impression:  Mild atrophy and chronic microvascular ischemia. No acute intracranial process. Acute bilateral maxillary sinusitis. Bilateral mastoid effusions.      Electronically Signed: Marvel Vines MD    2/4/2024 9:49 PM EST    Workstation ID: [id number]    CT Abdomen Pelvis With Contrast [[id number]] Collected: 02/04/24 2039     Updated: 02/04/24 2047    Narrative:      CT ABDOMEN PELVIS W CONTRAST    Date of Exam: 2/4/2024 8:06 PM EST    Indication: llq pain.    Comparison: CT abdomen pelvis dated December 25, 2023    Technique: Axial CT images were obtained of the abdomen and pelvis following the uneventful intravenous administration of " 85 mL Isovue-300. Reconstructed coronal and sagittal images were also obtained. Automated exposure control and iterative   construction methods were used.    Findings:  The lung bases are clear. There is mild nodularity of the liver surface. There are stones layering in the gallbladder. The bilateral kidneys, bilateral adrenal glands, pancreas and spleen are normal.    There is a large amount of stool throughout the colon with stool to the cecum suggestive of constipation. There is no large or small bowel inflammatory change.    There is calcific atherosclerosis of the abdominal aorta and branch vascular structures. There is degenerative disease of the lumbar spine and hips. There is a left posterior urinary bladder diverticulum. The urinary bladder is significantly distended.   Bladder outlet obstruction would be in the differential.      Impression:      Impression:  No acute abdominal or pelvic abnormality.        Electronically Signed: Marvel Vines MD    2/4/2024 8:44 PM EST    Workstation ID: [id number]                Discharge Details        Discharge Medications        Continue These Medications        Instructions Start Date   escitalopram 10 MG tablet  Commonly known as: LEXAPRO   10 mg, Oral, Nightly      finasteride 5 MG tablet  Commonly known as: PROSCAR   5 mg, Oral, Daily      folic acid 1 MG tablet  Commonly known as: FOLVITE   1 mg, Oral, Daily      gabapentin 300 MG capsule  Commonly known as: NEURONTIN   300 mg, Oral, 3 Times Daily      lactulose 10 GM/15ML solution  Commonly known as: CHRONULAC   20 g, Oral, 2 Times Daily PRN      Lidocaine 4 %   1 patch, Transdermal, Every 24 Hours Scheduled, Remove & Discard patch within 12 hours or as directed by MD      oxyCODONE-acetaminophen 5-325 MG per tablet  Commonly known as: PERCOCET   1 tablet, Oral, Every 6 Hours PRN      pantoprazole 40 MG EC tablet  Commonly known as: PROTONIX   40 mg, Oral, 2 Times Daily      polyethylene glycol 17 g  packet  Commonly known as: MIRALAX   17 g, Oral, Daily PRN      tamsulosin 0.4 MG capsule 24 hr capsule  Commonly known as: FLOMAX   1 capsule, Oral, Daily      vitamin B-12 1000 MCG tablet  Commonly known as: CYANOCOBALAMIN   1,000 mcg, Oral, Daily      vitamin D 1.25 MG (92561 UT) capsule capsule  Commonly known as: ERGOCALCIFEROL   50,000 Units, Oral, Weekly      Vitamin D3 1.25 MG (88035 UT) capsule   50,000 Units, Oral, Every 7 Days      Vivitrol 380 MG reconstituted suspension IM suspension  Generic drug: Naltrexone   380 mg, Intramuscular, Every 28 Days, Appointment to take 2/13             Stop These Medications      carvedilol 3.125 MG tablet  Commonly known as: COREG                Discharge Disposition:  Home or Self Care    Discharge Diet:      Discharge Activity:      CODE STATUS:   Code Status and Medical Interventions:   Ordered at: 02/05/24 0238     Code Status (Patient has no pulse and is not breathing):    CPR (Attempt to Resuscitate)     Medical Interventions (Patient has pulse or is breathing):    Full Support         No future appointments.    Additional Instructions for the Follow-ups that You Need to Schedule       Discharge Follow-up with PCP   As directed       Currently Documented PCP:    Provider, No Known    PCP Phone Number:    None     Follow Up Details: 1 week hospital follow up        Discharge Follow-up with Specified Provider: Dr. Katerina Ga; 2 Weeks   As directed      To: Dr. Katerina Ga   Follow Up: 2 Weeks        Discharge Follow-up with Specified Provider: Dr. Aguila; 2 Weeks   As directed      To: Dr. Aguila   Follow Up: 2 Weeks                      Time Spent on Discharge: I spent  90  minutes on this discharge activity which included: face-to-face encounter with the patient, reviewing the data in the system, coordination of the care with the nursing staff as well as consultants, documentation, and entering orders.      Josefina Cottrell II, DO  02/05/24

## 2024-02-06 ENCOUNTER — READMISSION MANAGEMENT (OUTPATIENT)
Dept: CALL CENTER | Facility: HOSPITAL | Age: 64
End: 2024-02-06
Payer: MEDICAID

## 2024-02-06 NOTE — OUTREACH NOTE
Prep Survey      Flowsheet Row Responses   [facility] facility patient discharged from? [place]   Is LACE score < 7 ? No   Eligibility Readm Mgmt   Discharge diagnosis Dizziness   Does the patient have one of the following disease processes/diagnoses(primary or secondary)? Other   Does the patient have Home health ordered? No   Is there a DME ordered? No   Medication alerts for this patient see avs   Prep survey completed? Yes            Vanda SCOTT - Registered Nurse

## 2024-02-08 ENCOUNTER — READMISSION MANAGEMENT (OUTPATIENT)
Dept: CALL CENTER | Facility: HOSPITAL | Age: 64
End: 2024-02-08
Payer: MEDICAID

## 2024-02-08 NOTE — OUTREACH NOTE
Medical Week 1 Survey      Flowsheet Row Responses   [facility] patient discharged from? [place]   Does the patient have one of the following disease processes/diagnoses(primary or secondary)? Other   Week 1 attempt successful? No   Unsuccessful attempts Attempt 1            Meri CAMPOS - Registered Nurse

## 2024-02-13 ENCOUNTER — READMISSION MANAGEMENT (OUTPATIENT)
Dept: CALL CENTER | Facility: HOSPITAL | Age: 64
End: 2024-02-13
Payer: MEDICAID

## 2024-02-18 ENCOUNTER — READMISSION MANAGEMENT (OUTPATIENT)
Dept: CALL CENTER | Facility: HOSPITAL | Age: 64
End: 2024-02-18
Payer: MEDICAID

## 2024-02-18 ENCOUNTER — HOSPITAL ENCOUNTER (OUTPATIENT)
Facility: HOSPITAL | Age: 64
Setting detail: OBSERVATION
Discharge: NURSING, REHAB OR LONG-TERM CARE | End: 2024-02-23
Attending: EMERGENCY MEDICINE | Admitting: INTERNAL MEDICINE
Payer: MEDICAID

## 2024-02-18 ENCOUNTER — APPOINTMENT (OUTPATIENT)
Dept: GENERAL RADIOLOGY | Facility: HOSPITAL | Age: 64
End: 2024-02-18
Payer: MEDICAID

## 2024-02-18 ENCOUNTER — APPOINTMENT (OUTPATIENT)
Dept: MRI IMAGING | Facility: HOSPITAL | Age: 64
End: 2024-02-18
Payer: MEDICAID

## 2024-02-18 DIAGNOSIS — C15.9 SQUAMOUS CELL CARCINOMA OF ESOPHAGUS: ICD-10-CM

## 2024-02-18 DIAGNOSIS — R53.1 GENERAL WEAKNESS: ICD-10-CM

## 2024-02-18 DIAGNOSIS — R29.6 MULTIPLE FALLS: ICD-10-CM

## 2024-02-18 DIAGNOSIS — R26.2 UNABLE TO AMBULATE: ICD-10-CM

## 2024-02-18 DIAGNOSIS — N39.0 URINARY TRACT INFECTION WITHOUT HEMATURIA, SITE UNSPECIFIED: Primary | ICD-10-CM

## 2024-02-18 LAB
ALBUMIN SERPL-MCNC: 3.8 G/DL (ref 3.5–5.2)
ALBUMIN/GLOB SERPL: 1 G/DL
ALP SERPL-CCNC: 162 U/L (ref 39–117)
ALT SERPL W P-5'-P-CCNC: 25 U/L (ref 1–41)
ANION GAP SERPL CALCULATED.3IONS-SCNC: 8 MMOL/L (ref 5–15)
AST SERPL-CCNC: 32 U/L (ref 1–40)
BACTERIA UR QL AUTO: ABNORMAL /HPF
BASOPHILS # BLD MANUAL: 0 10*3/MM3 (ref 0–0.2)
BASOPHILS NFR BLD MANUAL: 0 % (ref 0–1.5)
BILIRUB SERPL-MCNC: 0.2 MG/DL (ref 0–1.2)
BILIRUB UR QL STRIP: NEGATIVE
BUN SERPL-MCNC: 13 MG/DL (ref 8–23)
BUN/CREAT SERPL: 20.6 (ref 7–25)
CALCIUM SPEC-SCNC: 9.8 MG/DL (ref 8.6–10.5)
CHLORIDE SERPL-SCNC: 101 MMOL/L (ref 98–107)
CLARITY UR: CLEAR
CO2 SERPL-SCNC: 27 MMOL/L (ref 22–29)
COLOR UR: YELLOW
CREAT SERPL-MCNC: 0.63 MG/DL (ref 0.76–1.27)
D-LACTATE SERPL-SCNC: 1.1 MMOL/L (ref 0.5–2)
DEPRECATED RDW RBC AUTO: 50 FL (ref 37–54)
EGFRCR SERPLBLD CKD-EPI 2021: 106.2 ML/MIN/1.73
EOSINOPHIL # BLD MANUAL: 0 10*3/MM3 (ref 0–0.4)
EOSINOPHIL NFR BLD MANUAL: 0 % (ref 0.3–6.2)
ERYTHROCYTE [DISTWIDTH] IN BLOOD BY AUTOMATED COUNT: 16.3 % (ref 12.3–15.4)
GLOBULIN UR ELPH-MCNC: 3.8 GM/DL
GLUCOSE SERPL-MCNC: 103 MG/DL (ref 65–99)
GLUCOSE UR STRIP-MCNC: NEGATIVE MG/DL
HCT VFR BLD AUTO: 30.1 % (ref 37.5–51)
HGB BLD-MCNC: 9.2 G/DL (ref 13–17.7)
HGB UR QL STRIP.AUTO: NEGATIVE
HOLD SPECIMEN: NORMAL
HYALINE CASTS UR QL AUTO: ABNORMAL /LPF
KETONES UR QL STRIP: NEGATIVE
LEUKOCYTE ESTERASE UR QL STRIP.AUTO: ABNORMAL
LYMPHOCYTES # BLD MANUAL: 1.53 10*3/MM3 (ref 0.7–3.1)
LYMPHOCYTES NFR BLD MANUAL: 14 % (ref 5–12)
MAGNESIUM SERPL-MCNC: 1.6 MG/DL (ref 1.6–2.4)
MCH RBC QN AUTO: 26 PG (ref 26.6–33)
MCHC RBC AUTO-ENTMCNC: 30.6 G/DL (ref 31.5–35.7)
MCV RBC AUTO: 85 FL (ref 79–97)
MONOCYTES # BLD: 0.89 10*3/MM3 (ref 0.1–0.9)
NEUTROPHILS # BLD AUTO: 3.96 10*3/MM3 (ref 1.7–7)
NEUTROPHILS NFR BLD MANUAL: 62 % (ref 42.7–76)
NITRITE UR QL STRIP: NEGATIVE
NRBC SPEC MANUAL: 0 /100 WBC (ref 0–0.2)
PH UR STRIP.AUTO: 6.5 [PH] (ref 5–8)
PLAT MORPH BLD: NORMAL
PLATELET # BLD AUTO: 262 10*3/MM3 (ref 140–450)
PMV BLD AUTO: 9.5 FL (ref 6–12)
POTASSIUM SERPL-SCNC: 4.1 MMOL/L (ref 3.5–5.2)
PROCALCITONIN SERPL-MCNC: 0.1 NG/ML (ref 0–0.25)
PROT SERPL-MCNC: 7.6 G/DL (ref 6–8.5)
PROT UR QL STRIP: NEGATIVE
RBC # BLD AUTO: 3.54 10*6/MM3 (ref 4.14–5.8)
RBC # UR STRIP: ABNORMAL /HPF
RBC MORPH BLD: NORMAL
REF LAB TEST METHOD: ABNORMAL
SODIUM SERPL-SCNC: 136 MMOL/L (ref 136–145)
SP GR UR STRIP: 1.01 (ref 1–1.03)
SQUAMOUS #/AREA URNS HPF: ABNORMAL /HPF
TROPONIN T SERPL HS-MCNC: 11 NG/L
UROBILINOGEN UR QL STRIP: ABNORMAL
VARIANT LYMPHS NFR BLD MANUAL: 15 % (ref 19.6–45.3)
VARIANT LYMPHS NFR BLD MANUAL: 9 % (ref 0–5)
WBC # UR STRIP: ABNORMAL /HPF
WBC MORPH BLD: NORMAL
WBC NRBC COR # BLD AUTO: 6.39 10*3/MM3 (ref 3.4–10.8)
WHOLE BLOOD HOLD COAG: NORMAL
WHOLE BLOOD HOLD SPECIMEN: NORMAL

## 2024-02-18 PROCEDURE — 85007 BL SMEAR W/DIFF WBC COUNT: CPT | Performed by: EMERGENCY MEDICINE

## 2024-02-18 PROCEDURE — 84484 ASSAY OF TROPONIN QUANT: CPT | Performed by: EMERGENCY MEDICINE

## 2024-02-18 PROCEDURE — 83735 ASSAY OF MAGNESIUM: CPT | Performed by: EMERGENCY MEDICINE

## 2024-02-18 PROCEDURE — G0378 HOSPITAL OBSERVATION PER HR: HCPCS

## 2024-02-18 PROCEDURE — 71045 X-RAY EXAM CHEST 1 VIEW: CPT

## 2024-02-18 PROCEDURE — 87186 SC STD MICRODIL/AGAR DIL: CPT | Performed by: NURSE PRACTITIONER

## 2024-02-18 PROCEDURE — 25010000002 DIAZEPAM PER 5 MG: Performed by: EMERGENCY MEDICINE

## 2024-02-18 PROCEDURE — 99222 1ST HOSP IP/OBS MODERATE 55: CPT | Performed by: NURSE PRACTITIONER

## 2024-02-18 PROCEDURE — 93005 ELECTROCARDIOGRAM TRACING: CPT | Performed by: NURSE PRACTITIONER

## 2024-02-18 PROCEDURE — 96372 THER/PROPH/DIAG INJ SC/IM: CPT

## 2024-02-18 PROCEDURE — 83605 ASSAY OF LACTIC ACID: CPT | Performed by: NURSE PRACTITIONER

## 2024-02-18 PROCEDURE — 84145 PROCALCITONIN (PCT): CPT | Performed by: NURSE PRACTITIONER

## 2024-02-18 PROCEDURE — 81001 URINALYSIS AUTO W/SCOPE: CPT | Performed by: EMERGENCY MEDICINE

## 2024-02-18 PROCEDURE — 25010000002 CEFTRIAXONE PER 250 MG: Performed by: NURSE PRACTITIONER

## 2024-02-18 PROCEDURE — 93005 ELECTROCARDIOGRAM TRACING: CPT | Performed by: EMERGENCY MEDICINE

## 2024-02-18 PROCEDURE — 93010 ELECTROCARDIOGRAM REPORT: CPT | Performed by: INTERNAL MEDICINE

## 2024-02-18 PROCEDURE — 87086 URINE CULTURE/COLONY COUNT: CPT | Performed by: NURSE PRACTITIONER

## 2024-02-18 PROCEDURE — 85025 COMPLETE CBC W/AUTO DIFF WBC: CPT | Performed by: EMERGENCY MEDICINE

## 2024-02-18 PROCEDURE — 87077 CULTURE AEROBIC IDENTIFY: CPT | Performed by: NURSE PRACTITIONER

## 2024-02-18 PROCEDURE — 70551 MRI BRAIN STEM W/O DYE: CPT

## 2024-02-18 PROCEDURE — 25010000002 ENOXAPARIN PER 10 MG: Performed by: NURSE PRACTITIONER

## 2024-02-18 PROCEDURE — 36415 COLL VENOUS BLD VENIPUNCTURE: CPT

## 2024-02-18 PROCEDURE — 80053 COMPREHEN METABOLIC PANEL: CPT | Performed by: EMERGENCY MEDICINE

## 2024-02-18 PROCEDURE — 99285 EMERGENCY DEPT VISIT HI MDM: CPT

## 2024-02-18 RX ORDER — SODIUM CHLORIDE 0.9 % (FLUSH) 0.9 %
10 SYRINGE (ML) INJECTION AS NEEDED
Status: DISCONTINUED | OUTPATIENT
Start: 2024-02-18 | End: 2024-02-23 | Stop reason: HOSPADM

## 2024-02-18 RX ORDER — GABAPENTIN 300 MG/1
300 CAPSULE ORAL 3 TIMES DAILY
Status: DISCONTINUED | OUTPATIENT
Start: 2024-02-18 | End: 2024-02-23 | Stop reason: HOSPADM

## 2024-02-18 RX ORDER — DIAZEPAM 5 MG/ML
5 INJECTION, SOLUTION INTRAMUSCULAR; INTRAVENOUS ONCE
Status: COMPLETED | OUTPATIENT
Start: 2024-02-18 | End: 2024-02-18

## 2024-02-18 RX ORDER — BISACODYL 10 MG
10 SUPPOSITORY, RECTAL RECTAL DAILY PRN
Status: DISCONTINUED | OUTPATIENT
Start: 2024-02-18 | End: 2024-02-23 | Stop reason: HOSPADM

## 2024-02-18 RX ORDER — TAMSULOSIN HYDROCHLORIDE 0.4 MG/1
0.4 CAPSULE ORAL DAILY
Status: DISCONTINUED | OUTPATIENT
Start: 2024-02-19 | End: 2024-02-23 | Stop reason: HOSPADM

## 2024-02-18 RX ORDER — BISACODYL 5 MG/1
5 TABLET, DELAYED RELEASE ORAL DAILY PRN
Status: DISCONTINUED | OUTPATIENT
Start: 2024-02-18 | End: 2024-02-23 | Stop reason: HOSPADM

## 2024-02-18 RX ORDER — LANOLIN ALCOHOL/MO/W.PET/CERES
1000 CREAM (GRAM) TOPICAL DAILY
Status: DISCONTINUED | OUTPATIENT
Start: 2024-02-19 | End: 2024-02-23 | Stop reason: HOSPADM

## 2024-02-18 RX ORDER — FOLIC ACID 1 MG/1
1 TABLET ORAL DAILY
Status: DISCONTINUED | OUTPATIENT
Start: 2024-02-19 | End: 2024-02-23 | Stop reason: HOSPADM

## 2024-02-18 RX ORDER — SODIUM CHLORIDE 0.9 % (FLUSH) 0.9 %
10 SYRINGE (ML) INJECTION EVERY 12 HOURS SCHEDULED
Status: DISCONTINUED | OUTPATIENT
Start: 2024-02-18 | End: 2024-02-23 | Stop reason: HOSPADM

## 2024-02-18 RX ORDER — ACETAMINOPHEN 160 MG/5ML
650 SOLUTION ORAL EVERY 4 HOURS PRN
Status: DISCONTINUED | OUTPATIENT
Start: 2024-02-18 | End: 2024-02-23 | Stop reason: HOSPADM

## 2024-02-18 RX ORDER — ESCITALOPRAM OXALATE 10 MG/1
10 TABLET ORAL NIGHTLY
Status: DISCONTINUED | OUTPATIENT
Start: 2024-02-18 | End: 2024-02-23 | Stop reason: HOSPADM

## 2024-02-18 RX ORDER — ACETAMINOPHEN 325 MG/1
650 TABLET ORAL EVERY 4 HOURS PRN
Status: DISCONTINUED | OUTPATIENT
Start: 2024-02-18 | End: 2024-02-23 | Stop reason: HOSPADM

## 2024-02-18 RX ORDER — POLYETHYLENE GLYCOL 3350 17 G/17G
17 POWDER, FOR SOLUTION ORAL DAILY PRN
Status: DISCONTINUED | OUTPATIENT
Start: 2024-02-18 | End: 2024-02-23 | Stop reason: HOSPADM

## 2024-02-18 RX ORDER — ENOXAPARIN SODIUM 100 MG/ML
40 INJECTION SUBCUTANEOUS DAILY
Status: DISCONTINUED | OUTPATIENT
Start: 2024-02-18 | End: 2024-02-23 | Stop reason: HOSPADM

## 2024-02-18 RX ORDER — SODIUM CHLORIDE 9 MG/ML
75 INJECTION, SOLUTION INTRAVENOUS CONTINUOUS
Status: ACTIVE | OUTPATIENT
Start: 2024-02-18 | End: 2024-02-19

## 2024-02-18 RX ORDER — AMOXICILLIN 250 MG
2 CAPSULE ORAL 2 TIMES DAILY PRN
Status: DISCONTINUED | OUTPATIENT
Start: 2024-02-18 | End: 2024-02-23 | Stop reason: HOSPADM

## 2024-02-18 RX ORDER — FINASTERIDE 5 MG/1
5 TABLET, FILM COATED ORAL DAILY
Status: DISCONTINUED | OUTPATIENT
Start: 2024-02-19 | End: 2024-02-23 | Stop reason: HOSPADM

## 2024-02-18 RX ORDER — PANTOPRAZOLE SODIUM 40 MG/1
40 TABLET, DELAYED RELEASE ORAL 2 TIMES DAILY
Status: DISCONTINUED | OUTPATIENT
Start: 2024-02-18 | End: 2024-02-23 | Stop reason: HOSPADM

## 2024-02-18 RX ORDER — ACETAMINOPHEN 650 MG/1
650 SUPPOSITORY RECTAL EVERY 4 HOURS PRN
Status: DISCONTINUED | OUTPATIENT
Start: 2024-02-18 | End: 2024-02-23 | Stop reason: HOSPADM

## 2024-02-18 RX ORDER — DIAZEPAM 5 MG/ML
5 INJECTION, SOLUTION INTRAMUSCULAR; INTRAVENOUS ONCE
Status: DISCONTINUED | OUTPATIENT
Start: 2024-02-18 | End: 2024-02-18

## 2024-02-18 RX ORDER — SODIUM CHLORIDE 9 MG/ML
40 INJECTION, SOLUTION INTRAVENOUS AS NEEDED
Status: DISCONTINUED | OUTPATIENT
Start: 2024-02-18 | End: 2024-02-23 | Stop reason: HOSPADM

## 2024-02-18 RX ADMIN — GABAPENTIN 300 MG: 300 CAPSULE ORAL at 22:48

## 2024-02-18 RX ADMIN — ACETAMINOPHEN 650 MG: 325 TABLET ORAL at 22:48

## 2024-02-18 RX ADMIN — ENOXAPARIN SODIUM 40 MG: 100 INJECTION SUBCUTANEOUS at 22:49

## 2024-02-18 RX ADMIN — ESCITALOPRAM OXALATE 10 MG: 10 TABLET ORAL at 22:48

## 2024-02-18 RX ADMIN — PANTOPRAZOLE SODIUM 40 MG: 40 TABLET, DELAYED RELEASE ORAL at 22:49

## 2024-02-18 RX ADMIN — DIAZEPAM 5 MG: 10 INJECTION, SOLUTION INTRAMUSCULAR; INTRAVENOUS at 17:11

## 2024-02-18 RX ADMIN — Medication 10 ML: at 22:49

## 2024-02-18 RX ADMIN — SODIUM CHLORIDE 1000 MG: 900 INJECTION INTRAVENOUS at 22:43

## 2024-02-18 NOTE — Clinical Note
Level of Care: Telemetry [5]   Diagnosis: UTI (urinary tract infection) [132488]   Admitting Physician: HIMA TURNER [[id number]]

## 2024-02-18 NOTE — ED PROVIDER NOTES
[place]    EMERGENCY DEPARTMENT ENCOUNTER      Pt Name: Val Nassar  MRN: [id number]  YOB: 1960  Date of evaluation: 2/18/2024  Provider: Chris Montenegro DO    CHIEF COMPLAINT       Chief Complaint   Patient presents with    Dizziness       HPI  Stated Reason for Visit: PT comes in with dizziness, lightheadedness, and bilateral arm pain. HX of esophageal cancer. Doesn't take any blood thinners. History Obtained From: EMS       HISTORY OF PRESENT ILLNESS  (Location/Symptom, Timing/Onset, Context/Setting, Quality, Duration, Modifying Factors, Severity.)   Val Nassar is a 63 y.o. male who presents to the emergency department for evaluation of a few different issues.  He notes intermittent dizziness lightheadedness which been present for the last few weeks, has had some intermittent tingling in his bilateral upper extremities over the last couple days as well.  Has a history of chronic liver disease, alcoholic cirrhosis, acid reflux and esophageal cancer.  The patient states he has not had any recent illness, no fevers or chills.  Is not on any blood thinning medications.  He has not drank alcohol in over 100 days, not smoked for the last 10 days.  He has been compliant with his remaining medications.  History of anemia requiring transfusions couple years ago.  Denies any other acute systemic complaints at this time.      Nursing notes were reviewed.      PAST MEDICAL HISTORY     Past Medical History:   Diagnosis Date    Anemia     Cancer     Cirrhosis     Duodenal ulcer     Gastric ulcer     GERD (gastroesophageal reflux disease)     History of alcohol abuse     HLD (hyperlipidemia)     Mood disorder          SURGICAL HISTORY       Past Surgical History:   Procedure Laterality Date    ENDOSCOPY N/A 12/26/2023    Procedure: ESOPHAGOGASTRODUODENOSCOPY;  Surgeon: Wesly Mckeon MD;  Location: [facility] ENDOSCOPY;  Service: Gastroenterology;  Laterality: N/A;         CURRENT  MEDICATIONS       Current Facility-Administered Medications:     acetaminophen (TYLENOL) tablet 650 mg, 650 mg, Oral, Q4H PRN, 650 mg at 02/18/24 2248 **OR** acetaminophen (TYLENOL) 160 MG/5ML oral solution 650 mg, 650 mg, Oral, Q4H PRN **OR** acetaminophen (TYLENOL) suppository 650 mg, 650 mg, Rectal, Q4H PRN, Pinky Matias APRN    sennosides-docusate (PERICOLACE) 8.6-50 MG per tablet 2 tablet, 2 tablet, Oral, BID PRN **AND** polyethylene glycol (MIRALAX) packet 17 g, 17 g, Oral, Daily PRN **AND** bisacodyl (DULCOLAX) EC tablet 5 mg, 5 mg, Oral, Daily PRN **AND** bisacodyl (DULCOLAX) suppository 10 mg, 10 mg, Rectal, Daily PRN, Pinky Matias APRN    Calcium Replacement - Follow Nurse / BPA Driven Protocol, , Does not apply, PRN, Pinky Matias APRN    cefTRIAXone (ROCEPHIN) 1,000 mg in sodium chloride 0.9 % 100 mL IVPB, 1,000 mg, Intravenous, Q24H, Pinky Matias APRN, Last Rate: 200 mL/hr at 02/18/24 2243, 1,000 mg at 02/18/24 2243    Enoxaparin Sodium (LOVENOX) syringe 40 mg, 40 mg, Subcutaneous, Daily, Pinky Matias APRN, 40 mg at 02/18/24 2249    escitalopram (LEXAPRO) tablet 10 mg, 10 mg, Oral, Nightly, Pinky Matias APRN, 10 mg at 02/18/24 2248    finasteride (PROSCAR) tablet 5 mg, 5 mg, Oral, Daily, Pinky Matias APRN    folic acid (FOLVITE) tablet 1 mg, 1 mg, Oral, Daily, Pinky Matias APRN    gabapentin (NEURONTIN) capsule 300 mg, 300 mg, Oral, TID, Pinky Matias APRN, 300 mg at 02/18/24 2248    Magnesium Standard Dose Replacement - Follow Nurse / BPA Driven Protocol, , Does not apply, PRN, Shakira Matiasy R, NUVIA    pantoprazole (PROTONIX) EC tablet 40 mg, 40 mg, Oral, BID, Pinky Matias APRN, 40 mg at 02/18/24 2249    Phosphorus Replacement - Follow Nurse / BPA Driven Protocol, , Does not apply, Florencio MIRANDA Shelly R, NUVIA    Potassium Replacement - Follow Nurse / BPA Driven Protocol, , Does not apply, PRFlorencio MORENO Shelly R, NUVIA    sodium chloride 0.9 % flush 10 mL,  10 mL, Intravenous, PRN, Chris Montenegro, DO    sodium chloride 0.9 % flush 10 mL, 10 mL, Intravenous, Q12H, Pinky Matias APRN, 10 mL at 02/18/24 2249    sodium chloride 0.9 % flush 10 mL, 10 mL, Intravenous, PRN, Pinky Matias APRN    sodium chloride 0.9 % infusion 40 mL, 40 mL, Intravenous, PRN, Pinky Matias APRN    sodium chloride 0.9 % infusion, 75 mL/hr, Intravenous, Continuous, Pinky Matias APRN    tamsulosin (FLOMAX) 24 hr capsule 0.4 mg, 0.4 mg, Oral, Daily, Pinky Matias APRN    vitamin B-12 (CYANOCOBALAMIN) tablet 1,000 mcg, 1,000 mcg, Oral, Daily, Pinky Matias APRN    ALLERGIES     Patient has no known allergies.    FAMILY HISTORY       Family History   Problem Relation Age of Onset    Cancer Mother     Heart attack Father           SOCIAL HISTORY       Social History     Socioeconomic History    Marital status: Single   Tobacco Use    Smoking status: Every Day     Packs/day: 1.00     Years: 15.00     Additional pack years: 0.00     Total pack years: 15.00     Types: Cigarettes    Smokeless tobacco: Never   Vaping Use    Vaping Use: Some days    Substances: Flavoring    Devices: Disposable   Substance and Sexual Activity    Alcohol use: Not Currently     Comment: sober for ~ 3 months    Drug use: Yes     Types: Hydrocodone    Sexual activity: Defer         PHYSICAL EXAM    (up to 7 for level 4, 8 or more for level 5)     Vitals:    02/18/24 1801 02/18/24 1830 02/18/24 1901 02/18/24 1930   BP: 164/73 145/73 150/71 163/72   BP Location:    Left arm   Patient Position:    Lying   Pulse: 52 51 51 50   Resp:    18   Temp:    98 °F (36.7 °C)   TempSrc:    Oral   SpO2: 98% 98% 97% 97%   Weight:       Height:           Physical Exam  General : Patient is awake, alert, oriented, in no acute distress, nontoxic appearing  HEENT: Pupils are equally round, EOMI, conjunctivae clear  Neck: Neck is supple, full range of motion, trachea midline  Cardiac: Heart regular rate, rhythm, no  murmurs, rubs, or gallops  Lungs: Lungs are clear to auscultation, there is no wheezing, rhonchi, or rales. There is no use of accessory muscles  Chest wall: There is no tenderness to palpation over the chest wall or over ribs  Abdomen: Abdomen is soft, nontender, nondistended.  No peritoneal signs on exam.  There are no firm or pulsatile masses, no rebound rigidity or guarding  Musculoskeletal: No peripheral edema, 5 out of 5 strength in all 4 extremities.  No focal muscle deficits are appreciated  Neuro: Motor intact, sensory intact, level of consciousness is normal, no focal neurological deficit on examination, 5-5 strength bilateral upper and lower extremities.  Subjective paresthesia tingling to the bilateral hands.  Dermatology: Skin is warm and dry  Psych: Mentation is grossly normal, cognition is grossly normal. Affect is appropriate      DIAGNOSTIC RESULTS     EKG:  All EKGs are interpreted by the Emergency Department Physician who either signs or Co-signs this chart in the absence of a cardiologist.    ECG 12 Lead Bradycardia   Preliminary Result   Test Reason : Bradycardia   Blood Pressure :   */*   mmHG   Vent. Rate :  53 BPM     Atrial Rate :  53 BPM      P-R Int : 170 ms          QRS Dur : 100 ms       QT Int : 462 ms       P-R-T Axes :  63  -5  52 degrees      QTc Int : 433 ms      Sinus bradycardia with sinus arrhythmia   Otherwise normal ECG   When compared with ECG of 18-FEB-2024 16:12, (Unconfirmed)   No significant change was found      Referred By:            Confirmed By:       ECG 12 Lead ED Triage Standing Order; Weak / Dizzy / AMS   Preliminary Result   Test Reason : ED Triage Standing Order~   Blood Pressure :   */*   mmHG   Vent. Rate :  56 BPM     Atrial Rate :  56 BPM      P-R Int : 158 ms          QRS Dur :  86 ms       QT Int : 424 ms       P-R-T Axes :  57  -5  50 degrees      QTc Int : 409 ms      Sinus bradycardia   Minimal voltage criteria for LVH, may be normal variant    Borderline ECG   When compared with ECG of 04-FEB-2024 19:17,   No significant change was found      Referred By: EDMD           Confirmed By:           RADIOLOGY:   2/4/24    CT Head Without Contrast [[id number]] (Order [id number])  Order  Status: Final result     Patient Location    Patient Class Location   Emergency [facility] EMERGENCY DEPT, [address]     [phone]     Study Notes     Licha Bueno on 2/4/2024  9:11 PM EST   Dizziness, patient just had CT contrast about an hour ago.     Appointment Information    PACS Images     Radiology Images  Study Result    Narrative & Impression   CT HEAD WO CONTRAST     Date of Exam: 2/4/2024 9:05 PM EST     Indication: dizziness.     Comparison: None available.     Technique: Axial CT images were obtained of the head without contrast administration.  Automated exposure control and iterative construction methods were used.     Findings:  There is mild diffuse generalized atrophy. There are low-attenuation areas in the periventricular white matter consistent with chronic microvascular ischemic change. There is no mass, mass effect or midline shift. There are no abnormal extra-axial fluid   collections or areas of acute hemorrhage. There are bilateral maxillary sinus air-fluid levels. There is bilateral ethmoid sinus disease. There are bilateral mastoid effusions.      IMPRESSION:  Impression:  Mild atrophy and chronic microvascular ischemia. No acute intracranial process. Acute bilateral maxillary sinusitis. Bilateral mastoid effusions.        Electronically Signed: Marvel Vines MD    2/4/2024 9:49 PM EST    Workstation ID: [id number]     [x] Radiologist's Report Reviewed:  MRI Brain Without Contrast   Final Result   Impression:   No acute intracranial abnormality. No suspicious mass on this noncontrast MRI.      Paranasal sinusitis with gas/fluid level in the right maxillary sinus.            Electronically Signed: Tashi Alfaro MD     2/18/2024 5:56 PM EST     Workstation ID:  [id number]      XR Chest 1 View   Final Result   Impression:   No acute cardiopulmonary findings.         Electronically Signed: Nikita Garrison MD     2/18/2024 4:13 PM EST     Workstation ID: [id number]          I ordered and independently reviewed the above noted radiographic studies.      I viewed images of chest x-ray which showed no acute cardiopulmonary process per my independent interpretation.    See radiologist's dictation for official interpretation.      ED BEDSIDE ULTRASOUND:   Performed by ED Physician - none    LABS:    I have reviewed and interpreted all of the currently available lab results from this visit (if applicable):  Results for orders placed or performed during the hospital encounter of 02/18/24   Comprehensive Metabolic Panel    Specimen: Blood   Result Value Ref Range    Glucose 103 (H) 65 - 99 mg/dL    BUN 13 8 - 23 mg/dL    Creatinine 0.63 (L) 0.76 - 1.27 mg/dL    Sodium 136 136 - 145 mmol/L    Potassium 4.1 3.5 - 5.2 mmol/L    Chloride 101 98 - 107 mmol/L    CO2 27.0 22.0 - 29.0 mmol/L    Calcium 9.8 8.6 - 10.5 mg/dL    Total Protein 7.6 6.0 - 8.5 g/dL    Albumin 3.8 3.5 - 5.2 g/dL    ALT (SGPT) 25 1 - 41 U/L    AST (SGOT) 32 1 - 40 U/L    Alkaline Phosphatase 162 (H) 39 - 117 U/L    Total Bilirubin 0.2 0.0 - 1.2 mg/dL    Globulin 3.8 gm/dL    A/G Ratio 1.0 g/dL    BUN/Creatinine Ratio 20.6 7.0 - 25.0    Anion Gap 8.0 5.0 - 15.0 mmol/L    eGFR 106.2 >60.0 mL/min/1.73   Single High Sensitivity Troponin T    Specimen: Blood   Result Value Ref Range    HS Troponin T 11 <22 ng/L   Magnesium    Specimen: Blood   Result Value Ref Range    Magnesium 1.6 1.6 - 2.4 mg/dL   Urinalysis With Microscopic If Indicated (No Culture) - Urine, Clean Catch    Specimen: Urine, Clean Catch   Result Value Ref Range    Color, UA Yellow Yellow, Straw    Appearance, UA Clear Clear    pH, UA 6.5 5.0 - 8.0    Specific Gravity, UA 1.015 1.001 - 1.030    Glucose, UA Negative Negative    Ketones, UA Negative Negative     Bilirubin, UA Negative Negative    Blood, UA Negative Negative    Protein, UA Negative Negative    Leuk Esterase, UA Small (1+) (A) Negative    Nitrite, UA Negative Negative    Urobilinogen, UA 0.2 E.U./dL 0.2 - 1.0 E.U./dL   CBC Auto Differential    Specimen: Blood   Result Value Ref Range    WBC 6.39 3.40 - 10.80 10*3/mm3    RBC 3.54 (L) 4.14 - 5.80 10*6/mm3    Hemoglobin 9.2 (L) 13.0 - 17.7 g/dL    Hematocrit 30.1 (L) 37.5 - 51.0 %    MCV 85.0 79.0 - 97.0 fL    MCH 26.0 (L) 26.6 - 33.0 pg    MCHC 30.6 (L) 31.5 - 35.7 g/dL    RDW 16.3 (H) 12.3 - 15.4 %    RDW-SD 50.0 37.0 - 54.0 fl    MPV 9.5 6.0 - 12.0 fL    Platelets 262 140 - 450 10*3/mm3   Manual Differential    Specimen: Blood   Result Value Ref Range    Neutrophil % 62.0 42.7 - 76.0 %    Lymphocyte % 15.0 (L) 19.6 - 45.3 %    Monocyte % 14.0 (H) 5.0 - 12.0 %    Eosinophil % 0.0 (L) 0.3 - 6.2 %    Basophil % 0.0 0.0 - 1.5 %    Atypical Lymphocyte % 9.0 (H) 0.0 - 5.0 %    Neutrophils Absolute 3.96 1.70 - 7.00 10*3/mm3    Lymphocytes Absolute 1.53 0.70 - 3.10 10*3/mm3    Monocytes Absolute 0.89 0.10 - 0.90 10*3/mm3    Eosinophils Absolute 0.00 0.00 - 0.40 10*3/mm3    Basophils Absolute 0.00 0.00 - 0.20 10*3/mm3    nRBC 0.0 0.0 - 0.2 /100 WBC    RBC Morphology Normal Normal    WBC Morphology Normal Normal    Platelet Morphology Normal Normal   Urinalysis, Microscopic Only - Urine, Clean Catch    Specimen: Urine, Clean Catch   Result Value Ref Range    RBC, UA 0-2 None Seen, 0-2 /HPF    WBC, UA 21-50 (A) None Seen, 0-2 /HPF    Bacteria, UA 3+ (A) None Seen, Trace /HPF    Squamous Epithelial Cells, UA 3-6 (A) None Seen, 0-2 /HPF    Hyaline Casts, UA 13-20 0 - 6 /LPF    Methodology Manual Light Microscopy    Lactic Acid, Plasma    Specimen: Blood   Result Value Ref Range    Lactate 1.1 0.5 - 2.0 mmol/L   Procalcitonin    Specimen: Blood   Result Value Ref Range    Procalcitonin 0.10 0.00 - 0.25 ng/mL   ECG 12 Lead ED Triage Standing Order; Weak / Dizzy / AMS    Result Value Ref Range    QT Interval 424 ms    QTC Interval 409 ms   ECG 12 Lead Bradycardia   Result Value Ref Range    QT Interval 462 ms    QTC Interval 433 ms   Green Top (Gel)   Result Value Ref Range    Extra Tube Hold for add-ons.    Lavender Top   Result Value Ref Range    Extra Tube hold for add-on    Gold Top - SST   Result Value Ref Range    Extra Tube Hold for add-ons.    Gray Top   Result Value Ref Range    Extra Tube Hold for add-ons.    Light Blue Top   Result Value Ref Range    Extra Tube Hold for add-ons.         If labs were ordered, I independently reviewed the results and considered them in treating the patient.      EMERGENCY DEPARTMENT COURSE and DIFFERENTIAL DIAGNOSIS/MDM:   Vitals:  AS OF 00:08 EST    BP - 163/72  HR - 50  TEMP - 98 °F (36.7 °C) (Oral)  O2 SATS - 97%      Orders placed during this visit:  Orders Placed This Encounter   Procedures    Urine Culture - Urine,    XR Chest 1 View    MRI Brain Without Contrast    [place] Draw    Comprehensive Metabolic Panel    Single High Sensitivity Troponin T    Magnesium    Urinalysis With Microscopic If Indicated (No Culture) - Urine, Clean Catch    CBC Auto Differential    Manual Differential    Urinalysis, Microscopic Only - Urine, Clean Catch    Lactic Acid, Plasma    Procalcitonin    Basic Metabolic Panel    CBC Auto Differential    Magnesium    Diet: Cardiac Diets; Healthy Heart (2-3 Na+); Texture: Regular Texture (IDDSI 7); Fluid Consistency: Thin (IDDSI 0)    Undress & Gown    Vital Signs    Orthostatic Blood Pressure    Vital Signs    Intake & Output    Weigh Patient    Oral Care    Orthostatic Blood Pressure    Telemetry - Maintain IV Access    Telemetry - Place Orders & Notify Provider of Results When Patient Experiences Acute Chest Pain, Dysrhythmia or Respiratory Distress    May Be Off Telemetry for Tests    Pulse Oximetry, Continuous    Up With Assistance    Strict Intake & Output    Code Status and Medical Interventions:     Inpatient Consult to Advance Care Planning    Inpatient Case Management  Consult    Oxygen Therapy- Nasal Cannula; Titrate 1-6 LPM Per SpO2; 90 - 95%    POC Glucose Once    ECG 12 Lead ED Triage Standing Order; Weak / Dizzy / AMS    ECG 12 Lead Bradycardia    Insert Peripheral IV    Insert Peripheral IV    Initiate Observation Status    ED Bed Request    Fall Precautions    CBC & Differential    Green Top (Gel)    Lavender Top    Gold Top - SST    Gray Top    Light Blue Top       All labs have been independently reviewed by me.  All radiology studies have been reviewed by me and the radiologist dictating the report.  All EKG's have been independently viewed and interpreted by me.      Discussion below represents my analysis of pertinent findings related to patient's condition, differential diagnosis, treatment plan and final disposition.    Differential diagnosis:  The differential diagnosis associated with the patient's presentation includes: Dizziness, electrolyte abnormalities, TIA, CVA, dehydration, liver failure, anemia    Additional sources  Discussed/ obtained information from independent historians:   [] Spouse  [] Parent  [] Family member  [] Friend  [] EMS   [] Other:    External (non-ED) record review:   [] Inpatient record:   [] Office record:   [] Outpatient record:   [] Prior Outpatient labs:   [] Prior Outpatient radiology:   [] Primary Care record:   [] Outside ED record:   [] Other:     Patient's care impacted by:   [] Diabetes  [] Hypertension  [] CHF  [] Hyperlipidemia  [] Coronary Artery Disease   [] COPD   [] Cancer   [] Tobacco Abuse   [x] Substance Abuse    [] Other:     Care significantly affected by Social Determinants of Health (housing and economic circumstances, unemployment)    [] Yes     [x] No   If yes, Patient's care significantly limited by Social Determinants of Health including:   [] Inadequate housing   [] Low income   [] Alcoholism and drug addiction in family   []  Problems related to primary support group   [] Unemployment   [] Problems related to employment   [] Other Social Determinants of Health:       MEDICATIONS ADMINISTERED IN ED:  Medications   sodium chloride 0.9 % flush 10 mL (has no administration in time range)   escitalopram (LEXAPRO) tablet 10 mg (10 mg Oral Given 2/18/24 2248)   finasteride (PROSCAR) tablet 5 mg (has no administration in time range)   folic acid (FOLVITE) tablet 1 mg (has no administration in time range)   gabapentin (NEURONTIN) capsule 300 mg (300 mg Oral Given 2/18/24 2248)   pantoprazole (PROTONIX) EC tablet 40 mg (40 mg Oral Given 2/18/24 2249)   tamsulosin (FLOMAX) 24 hr capsule 0.4 mg (has no administration in time range)   vitamin B-12 (CYANOCOBALAMIN) tablet 1,000 mcg (has no administration in time range)   sodium chloride 0.9 % flush 10 mL (10 mL Intravenous Given 2/18/24 2249)   sodium chloride 0.9 % flush 10 mL (has no administration in time range)   sodium chloride 0.9 % infusion 40 mL (has no administration in time range)   Enoxaparin Sodium (LOVENOX) syringe 40 mg (40 mg Subcutaneous Given 2/18/24 2249)   sodium chloride 0.9 % infusion (75 mL/hr Intravenous Not Given 2/18/24 2251)   Potassium Replacement - Follow Nurse / BPA Driven Protocol (has no administration in time range)   Magnesium Standard Dose Replacement - Follow Nurse / BPA Driven Protocol (has no administration in time range)   Phosphorus Replacement - Follow Nurse / BPA Driven Protocol (has no administration in time range)   Calcium Replacement - Follow Nurse / BPA Driven Protocol (has no administration in time range)   acetaminophen (TYLENOL) tablet 650 mg (650 mg Oral Given 2/18/24 2248)     Or   acetaminophen (TYLENOL) 160 MG/5ML oral solution 650 mg ( Oral Not Given:  See Alt 2/18/24 2248)     Or   acetaminophen (TYLENOL) suppository 650 mg ( Rectal Not Given:  See Alt 2/18/24 2248)   sennosides-docusate (PERICOLACE) 8.6-50 MG per tablet 2 tablet (has no  administration in time range)     And   polyethylene glycol (MIRALAX) packet 17 g (has no administration in time range)     And   bisacodyl (DULCOLAX) EC tablet 5 mg (has no administration in time range)     And   bisacodyl (DULCOLAX) suppository 10 mg (has no administration in time range)   cefTRIAXone (ROCEPHIN) 1,000 mg in sodium chloride 0.9 % 100 mL IVPB (1,000 mg Intravenous New Bag 2/18/24 2707)   diazePAM (VALIUM) injection 5 mg (5 mg Intramuscular Given 2/18/24 1711)              63-year-old male with intermittent dizziness over the last few weeks.  He has not had any unilateral weakness, has had bilateral tingling and numbness of his hands, not have any focal neurological deficit on my examination.  Does have history of liver cirrhosis, esophageal cancer, also history of anemia requiring blood transfusion.  Nontoxic on my examination, vital signs are stable.  IV, labs and imaging obtained.  CT scan from similar assessments couple weeks prior was unremarkable with some age-related microvascular changes.  Patient does have underlying urinary tract infection, white count H&H are stable as well as her kidney function.  Unfortunately patient has continued to decompensate with multiple falls, weakness, unable to ambulate from home, does not feel able to take care of themselves at home in his current state.  Will plan for admission for current therapies, continue treatment and assessment.  Case discussed with hospitalist, tasia Alarcon, for admission.    PROCEDURES:  Procedures    CRITICAL CARE TIME    Total Critical Care time was 0 minutes, excluding separately reportable procedures.   There was a high probability of clinically significant/life threatening deterioration in the patient's condition which required my urgent intervention.      FINAL IMPRESSION      1. Urinary tract infection without hematuria, site unspecified    2. General weakness    3. Multiple falls    4. Unable to ambulate          DISPOSITION/PLAN      ED Disposition       ED Disposition   Decision to Admit    Condition   --    Comment   Level of Care: Telemetry [5]   Diagnosis: UTI (urinary tract infection) [501629]   Admitting Physician: HIMA TURNER [[id number]]                 PATIENT REFERRED TO:  No follow-up provider specified.    DISCHARGE MEDICATIONS:     Medication List        ASK your doctor about these medications      escitalopram 10 MG tablet  Commonly known as: LEXAPRO     finasteride 5 MG tablet  Commonly known as: PROSCAR     folic acid 1 MG tablet  Commonly known as: FOLVITE     gabapentin 300 MG capsule  Commonly known as: NEURONTIN  Take 1 capsule by mouth 3 (Three) Times a Day.     lactulose 10 GM/15ML solution  Commonly known as: CHRONULAC     Lidocaine 4 %  Place 1 patch on the skin as directed by provider Daily. Remove & Discard patch within 12 hours or as directed by MD     pantoprazole 40 MG EC tablet  Commonly known as: PROTONIX     polyethylene glycol 17 g packet  Commonly known as: MIRALAX  Take 17 g by mouth Daily As Needed (Use if senna-docusate is ineffective).     tamsulosin 0.4 MG capsule 24 hr capsule  Commonly known as: FLOMAX     vitamin B-12 1000 MCG tablet  Commonly known as: CYANOCOBALAMIN     vitamin D 1.25 MG (57526 UT) capsule capsule  Commonly known as: ERGOCALCIFEROL     Vitamin D3 1.25 MG (31639 UT) capsule     Vivitrol 380 MG reconstituted suspension IM suspension  Generic drug: Naltrexone                  Comment: Please note this report has been produced using speech recognition software.      Chris Montenegro DO  Attending Emergency Physician         Chris Montenegro DO  02/19/24 0009

## 2024-02-18 NOTE — LETTER
EMS Transport Request  For use at [facility], [place], [place], [place], and [place] only   Patient Name: Val Nassar : 1960   Weight:66.7 kg (147 lb) Pick-up Location: [facility] BLS/ALS: BLS/ALS: BLS   Insurance: [employer] Auth End Date:    Pre-Cert #: D/C Summary complete:    Destination: Other [facility]   Contact Precautions: None   Equipment (O2, Fluids, etc.): None   Arrive By Date/Time: Friday, 24, afternoon, pending insurance auth Stretcher/WC: Stretcher   CM Requesting: Karen Pollard RN Ext: [phone]   Notes/Medical Necessity:      ______________________________________________________________________    *Only 2 patient bags OR 1 carry-on size bag are permitted.  Wheelchairs and walkers CANNOT transported with the patient. Acknowledge: Yes

## 2024-02-19 ENCOUNTER — HOSPITAL ENCOUNTER (OUTPATIENT)
Dept: RADIATION ONCOLOGY | Facility: HOSPITAL | Age: 64
Setting detail: RADIATION/ONCOLOGY SERIES
Discharge: HOME | End: 2024-02-19
Payer: MEDICAID

## 2024-02-19 LAB
ANION GAP SERPL CALCULATED.3IONS-SCNC: 7 MMOL/L (ref 5–15)
BASOPHILS # BLD MANUAL: 0.06 10*3/MM3 (ref 0–0.2)
BASOPHILS NFR BLD MANUAL: 1 % (ref 0–1.5)
BUN SERPL-MCNC: 12 MG/DL (ref 8–23)
BUN/CREAT SERPL: 21.4 (ref 7–25)
CALCIUM SPEC-SCNC: 9.5 MG/DL (ref 8.6–10.5)
CHLORIDE SERPL-SCNC: 104 MMOL/L (ref 98–107)
CO2 SERPL-SCNC: 27 MMOL/L (ref 22–29)
CREAT SERPL-MCNC: 0.56 MG/DL (ref 0.76–1.27)
DEPRECATED RDW RBC AUTO: 47.8 FL (ref 37–54)
EGFRCR SERPLBLD CKD-EPI 2021: 110.1 ML/MIN/1.73
EOSINOPHIL # BLD MANUAL: 0.28 10*3/MM3 (ref 0–0.4)
EOSINOPHIL NFR BLD MANUAL: 5 % (ref 0.3–6.2)
ERYTHROCYTE [DISTWIDTH] IN BLOOD BY AUTOMATED COUNT: 16.1 % (ref 12.3–15.4)
GLUCOSE SERPL-MCNC: 103 MG/DL (ref 65–99)
HCT VFR BLD AUTO: 27.7 % (ref 37.5–51)
HGB BLD-MCNC: 8.3 G/DL (ref 13–17.7)
LYMPHOCYTES # BLD MANUAL: 1.46 10*3/MM3 (ref 0.7–3.1)
LYMPHOCYTES NFR BLD MANUAL: 18 % (ref 5–12)
MAGNESIUM SERPL-MCNC: 1.4 MG/DL (ref 1.6–2.4)
MCH RBC QN AUTO: 24.8 PG (ref 26.6–33)
MCHC RBC AUTO-ENTMCNC: 30 G/DL (ref 31.5–35.7)
MCV RBC AUTO: 82.7 FL (ref 79–97)
MONOCYTES # BLD: 1.01 10*3/MM3 (ref 0.1–0.9)
NEUTROPHILS # BLD AUTO: 2.81 10*3/MM3 (ref 1.7–7)
NEUTROPHILS NFR BLD MANUAL: 50 % (ref 42.7–76)
PLAT MORPH BLD: NORMAL
PLATELET # BLD AUTO: 229 10*3/MM3 (ref 140–450)
PMV BLD AUTO: 8.7 FL (ref 6–12)
POTASSIUM SERPL-SCNC: 3.9 MMOL/L (ref 3.5–5.2)
QT INTERVAL: 424 MS
QT INTERVAL: 462 MS
QTC INTERVAL: 409 MS
QTC INTERVAL: 433 MS
RBC # BLD AUTO: 3.35 10*6/MM3 (ref 4.14–5.8)
RBC MORPH BLD: NORMAL
SCAN SLIDE: NORMAL
SODIUM SERPL-SCNC: 138 MMOL/L (ref 136–145)
VARIANT LYMPHS NFR BLD MANUAL: 26 % (ref 19.6–45.3)
WBC MORPH BLD: NORMAL
WBC NRBC COR # BLD AUTO: 5.61 10*3/MM3 (ref 3.4–10.8)

## 2024-02-19 PROCEDURE — 96367 TX/PROPH/DG ADDL SEQ IV INF: CPT

## 2024-02-19 PROCEDURE — 85007 BL SMEAR W/DIFF WBC COUNT: CPT | Performed by: NURSE PRACTITIONER

## 2024-02-19 PROCEDURE — 99232 SBSQ HOSP IP/OBS MODERATE 35: CPT | Performed by: HOSPITALIST

## 2024-02-19 PROCEDURE — 85025 COMPLETE CBC W/AUTO DIFF WBC: CPT | Performed by: NURSE PRACTITIONER

## 2024-02-19 PROCEDURE — 80048 BASIC METABOLIC PNL TOTAL CA: CPT | Performed by: NURSE PRACTITIONER

## 2024-02-19 PROCEDURE — 25010000002 CEFTRIAXONE PER 250 MG: Performed by: NURSE PRACTITIONER

## 2024-02-19 PROCEDURE — G0378 HOSPITAL OBSERVATION PER HR: HCPCS

## 2024-02-19 PROCEDURE — 83735 ASSAY OF MAGNESIUM: CPT | Performed by: NURSE PRACTITIONER

## 2024-02-19 PROCEDURE — 25810000003 SODIUM CHLORIDE 0.9 % SOLUTION: Performed by: NURSE PRACTITIONER

## 2024-02-19 PROCEDURE — 96366 THER/PROPH/DIAG IV INF ADDON: CPT

## 2024-02-19 PROCEDURE — 96365 THER/PROPH/DIAG IV INF INIT: CPT

## 2024-02-19 PROCEDURE — 25010000002 MAGNESIUM SULFATE 2 GM/50ML SOLUTION: Performed by: HOSPITALIST

## 2024-02-19 RX ORDER — MAGNESIUM SULFATE HEPTAHYDRATE 40 MG/ML
2 INJECTION, SOLUTION INTRAVENOUS
Status: COMPLETED | OUTPATIENT
Start: 2024-02-19 | End: 2024-02-19

## 2024-02-19 RX ORDER — MAGNESIUM SULFATE HEPTAHYDRATE 40 MG/ML
2 INJECTION, SOLUTION INTRAVENOUS
Status: CANCELLED | OUTPATIENT
Start: 2024-02-19 | End: 2024-02-19

## 2024-02-19 RX ORDER — OXYCODONE HYDROCHLORIDE AND ACETAMINOPHEN 5; 325 MG/1; MG/1
1 TABLET ORAL EVERY 6 HOURS PRN
Status: DISCONTINUED | OUTPATIENT
Start: 2024-02-19 | End: 2024-02-20

## 2024-02-19 RX ORDER — OXYCODONE HYDROCHLORIDE AND ACETAMINOPHEN 5; 325 MG/1; MG/1
1 TABLET ORAL EVERY 12 HOURS PRN
COMMUNITY
End: 2024-02-23 | Stop reason: HOSPADM

## 2024-02-19 RX ADMIN — SODIUM CHLORIDE 75 ML/HR: 9 INJECTION, SOLUTION INTRAVENOUS at 00:57

## 2024-02-19 RX ADMIN — PANTOPRAZOLE SODIUM 40 MG: 40 TABLET, DELAYED RELEASE ORAL at 20:49

## 2024-02-19 RX ADMIN — PANTOPRAZOLE SODIUM 40 MG: 40 TABLET, DELAYED RELEASE ORAL at 09:21

## 2024-02-19 RX ADMIN — MAGNESIUM SULFATE HEPTAHYDRATE 2 G: 2 INJECTION, SOLUTION INTRAVENOUS at 09:20

## 2024-02-19 RX ADMIN — MAGNESIUM SULFATE HEPTAHYDRATE 2 G: 2 INJECTION, SOLUTION INTRAVENOUS at 07:53

## 2024-02-19 RX ADMIN — GABAPENTIN 300 MG: 300 CAPSULE ORAL at 16:03

## 2024-02-19 RX ADMIN — ESCITALOPRAM OXALATE 10 MG: 10 TABLET ORAL at 20:49

## 2024-02-19 RX ADMIN — SODIUM CHLORIDE 1000 MG: 900 INJECTION INTRAVENOUS at 20:48

## 2024-02-19 RX ADMIN — TAMSULOSIN HYDROCHLORIDE 0.4 MG: 0.4 CAPSULE ORAL at 09:21

## 2024-02-19 RX ADMIN — OXYCODONE HYDROCHLORIDE AND ACETAMINOPHEN 1 TABLET: 5; 325 TABLET ORAL at 16:03

## 2024-02-19 RX ADMIN — Medication 1000 MCG: at 09:21

## 2024-02-19 RX ADMIN — FOLIC ACID 1 MG: 1 TABLET ORAL at 09:21

## 2024-02-19 RX ADMIN — Medication 10 ML: at 20:48

## 2024-02-19 RX ADMIN — GABAPENTIN 300 MG: 300 CAPSULE ORAL at 09:21

## 2024-02-19 RX ADMIN — FINASTERIDE 5 MG: 5 TABLET, FILM COATED ORAL at 09:21

## 2024-02-19 RX ADMIN — OXYCODONE HYDROCHLORIDE AND ACETAMINOPHEN 1 TABLET: 5; 325 TABLET ORAL at 10:21

## 2024-02-19 RX ADMIN — ACETAMINOPHEN 650 MG: 325 TABLET ORAL at 05:59

## 2024-02-19 RX ADMIN — Medication 10 ML: at 09:22

## 2024-02-19 RX ADMIN — GABAPENTIN 300 MG: 300 CAPSULE ORAL at 20:49

## 2024-02-19 RX ADMIN — MAGNESIUM SULFATE HEPTAHYDRATE 2 G: 2 INJECTION, SOLUTION INTRAVENOUS at 12:22

## 2024-02-19 NOTE — PROGRESS NOTES
[facility] Medicine Services  PROGRESS NOTE    Patient Name: Val Nassar  : 1960  MRN: [id number]    Date of Admission: 2024  Primary Care Physician: Provider, No Known    Subjective   Subjective     CC:  F/U weakness, dizziness    HPI:  Seen this morning. Feeling better overall but his main complaint is weakness and dizziness when he stands up despite normal orthostatic vitals documented. He says he cannot go home like this and that his PCP was in the process of getting him into [facility] for long term care.       Objective   Objective     Vital Signs:   Temp:  [97.9 °F (36.6 °C)-98.3 °F (36.8 °C)] 97.9 °F (36.6 °C)  Heart Rate:  [49-64] 64  Resp:  [17-20] 17  BP: (115-167)/(62-79) 130/64     Physical Exam:  Gen-no acute distress  HENT-NCAT, mucous membranes moist  CV-RRR, S1 S2 normal, no m/r/g  Resp-CTAB, no wheezes or rales  Abd-soft, NT, ND, +BS  Ext-no edema  Neuro-A&Ox3, no focal deficits  Skin-no rashes  Psych-appropriate mood      Results Reviewed:  LAB RESULTS:      Lab 24  0433 24  1625   WBC 5.61 6.39   HEMOGLOBIN 8.3* 9.2*   HEMATOCRIT 27.7* 30.1*   PLATELETS 229 262   NEUTROS ABS 2.81 3.96   EOS ABS 0.28 0.00   MCV 82.7 85.0   PROCALCITONIN  --  0.10   LACTATE  --  1.1         Lab 24  0433 24  1625   SODIUM 138 136   POTASSIUM 3.9 4.1   CHLORIDE 104 101   CO2 27.0 27.0   ANION GAP 7.0 8.0   BUN 12 13   CREATININE 0.56* 0.63*   EGFR 110.1 106.2   GLUCOSE 103* 103*   CALCIUM 9.5 9.8   MAGNESIUM 1.4* 1.6         Lab 24  1625   TOTAL PROTEIN 7.6   ALBUMIN 3.8   GLOBULIN 3.8   ALT (SGPT) 25   AST (SGOT) 32   BILIRUBIN 0.2   ALK PHOS 162*         Lab 24  1625   HSTROP T 11                 Brief Urine Lab Results  (Last result in the past 365 days)        Color   Clarity   Blood   Leuk Est   Nitrite   Protein   CREAT   Urine HCG        24 1657 Yellow   Clear   Negative   Small (1+)   Negative   Negative                    Microbiology Results Abnormal       Procedure Component Value - Date/Time    Urine Culture - Urine, Urine, Clean Catch [[id number]]  (Normal) Collected: 02/18/24 1657    Lab Status: Preliminary result Specimen: Urine, Clean Catch Updated: 02/19/24 1050     Urine Culture Culture in progress            MRI Brain Without Contrast    Result Date: 2/18/2024  MRI BRAIN WO CONTRAST Date of Exam: 2/18/2024 5:05 PM EST Indication: Dizziness, off balance, present x 2 weeks.  Comparison: 2/4/2024 CT Technique:  Routine multiplanar/multisequence sequence images of the brain were obtained without contrast administration. Findings: Diffusion imaging shows no evidence of acute infarct. There are scattered subcortical and periventricular T2 hyperintensities which are nonspecific but likely sequela of mild to moderate small vessel ischemic disease. No evidence of intracranial hemorrhage. There is normal ventricular volume. The basal cisterns are patent. There is no evidence of extra-axial fluid collection. There is normal flow voids within the intracranial vessels. No evidence of fracture or suspicious bony lesion. Paranasal sinusitis with gas/fluid level in the right maxillary sinus. Trace bilateral mastoid air cell effusions. Visualized orbits are unremarkable.     Impression: Impression: No acute intracranial abnormality. No suspicious mass on this noncontrast MRI. Paranasal sinusitis with gas/fluid level in the right maxillary sinus. Electronically Signed: Tashi Alfaro MD  2/18/2024 5:56 PM EST  Workstation ID: [id number]    XR Chest 1 View    Result Date: 2/18/2024  XR CHEST 1 VW Date of Exam: 2/18/2024 3:56 PM EST Indication: Weak/Dizzy/AMS triage protocol Comparison: Chest x-ray 12/29/2023 Findings: Normal cardiomediastinal silhouette. Mild upper lobe emphysema. No focal consolidation. No pleural effusion or pneumothorax. No acute osseous findings.     Impression: Impression: No acute cardiopulmonary findings.  Electronically Signed: Nikita Garrison MD  2/18/2024 4:13 PM EST  Workstation ID: [id number]         Current medications:  Scheduled Meds:cefTRIAXone, 1,000 mg, Intravenous, Q24H  enoxaparin, 40 mg, Subcutaneous, Daily  escitalopram, 10 mg, Oral, Nightly  finasteride, 5 mg, Oral, Daily  folic acid, 1 mg, Oral, Daily  gabapentin, 300 mg, Oral, TID  magnesium sulfate, 2 g, Intravenous, Q2H  pantoprazole, 40 mg, Oral, BID  sodium chloride, 10 mL, Intravenous, Q12H  tamsulosin, 0.4 mg, Oral, Daily  vitamin B-12, 1,000 mcg, Oral, Daily      Continuous Infusions:   PRN Meds:.  acetaminophen **OR** acetaminophen **OR** acetaminophen    senna-docusate sodium **AND** polyethylene glycol **AND** bisacodyl **AND** bisacodyl    Calcium Replacement - Follow Nurse / BPA Driven Protocol    Magnesium Standard Dose Replacement - Follow Nurse / BPA Driven Protocol    oxyCODONE-acetaminophen    Phosphorus Replacement - Follow Nurse / BPA Driven Protocol    Potassium Replacement - Follow Nurse / BPA Driven Protocol    sodium chloride    sodium chloride    sodium chloride    Assessment & Plan   Assessment & Plan     Active Hospital Problems    Diagnosis  POA    **UTI (urinary tract infection) [N39.0]  Yes    Generalized weakness [R53.1]  Yes    Cirrhosis of liver [K74.60]  Yes    Iron deficiency anemia [D50.9]  Yes    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        Brief Hospital Course to date:  Val Nassar is a 63 y.o. male  with PMH significant for PAD, cirrhosis, alcohol abuse, recently diagnosed squamous cell carcinoma of the esophagus, anemia, and HLD, who presents to the ED with complaints of weakness, bilateral arm tingling, and dizziness.    Two recent  admissions to [facility]:  *12/25/23-1/3/24: Admitted with GI bleed and atypical pneumonia. 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 No varices noted. Was discharged to [facility] with outpatient Oncology and Radiation Oncology follow ups.  *2/4/24-2/4/24: Same day admit and discharge for dizziness and weakness, found to have orthostatic hypotension which improved with IV fluids and albumin. Coreg discontinued at that time as no evidence of varices on previous EGD and no other indication to be on it. He was discharged home and his Oncology and Rad/Onc appointments were rescheduled as he had been in rehab during that time.     This patient's problems and plans were partially entered by my partner and updated as appropriate by me 02/19/24. Copied text in this note has been reviewed and is accurate as of today's date.      UTI, POA  --UA with 21-50 WBC, 3+ bacteria  --Continue IV Rocephin  --F/U urine culture     Dizziness/lightheadedness  Generalized weakness  --MRI brain negative  --Fall precautions  --Previously with orthostatic hypotension during recent admission, Coreg had been discontinued at that time; orthostatic vitals are negative here  --Gentle IV fluids  --PT/OT evaluation, patient is interested in long term care placement     Iron deficiency anemia  Recent GI bleed secondary to PUD + esophageal mass  --Continue BID Protonix  --Was to follow up with GI 6-8 weeks after his admission end of December 2023/early January 2024 -- will need to be rescheduled (missed appointment on 2/12/24)  --H&H stable, monitor    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 Ga) and Radiation Oncology (Dr. Aguila)  --Will place courtesy consults here so they are aware of admission and to see if indicated while in hospital; he needs outpatient PET scan at some  point, cannot be done while inpatient; likely will need outpatient follow up appointments rescheduled for the near future     Anxiety  --Continue Lexapro    Chronic pain  --PCP recently prescribed Percocet, will continue here     BPH  --Continue Flomax, Proscar     Cirrhosis  History of alcohol abuse  --Sober > 100 days  --Vivitrol per PCP - IM injection every 28 days  --Continue folic acid     Tobacco abuse  --Reports recent cessation        Expected Discharge Location and Transportation: SNF/LTC  Expected Discharge patient reports not being able to take care of himself at home, his PCP had been looking into LTC at [facility]; case management aware and will look into this  Expected Discharge Date: 2/20/2024; Expected Discharge Time:      DVT prophylaxis:  Medical DVT prophylaxis orders are present.         AM-PAC 6 Clicks Score (PT): 16 (02/18/24 2058)    CODE STATUS:   Code Status and Medical Interventions:   Ordered at: 02/18/24 2129     Code Status (Patient has no pulse and is not breathing):    CPR (Attempt to Resuscitate)     Medical Interventions (Patient has pulse or is breathing):    Full Support       Paulina Curtis MD  02/19/24

## 2024-02-19 NOTE — CASE MANAGEMENT/SOCIAL WORK
Discharge Planning Assessment  [facility]     Patient Name: Val Nassar  MRN: [id number]  Today's Date: 2/19/2024    Admit Date: 2/18/2024    Plan: [facility], pending acceptance                   Discharge Plan       Row Name 02/19/24 1212       Plan    Plan [facility], pending acceptance    Patient/Family in Agreement with Plan yes    Plan Comments Met with Mr. Nassar, at the bedside, for discharge planning.    Mr. Nassar lives with his sister, Douglas, in [place].    He states that was a recent skilled to Medicaid intermediate care bed at [facility].  He was dicharged to home when he completed rehab, however, he states that his sister is unable to care for him, and he would like to pursue the long term care bed at [facility] again.  Referral given to Monet with [facility].  Monet will review Mr. Nassar and let CM know if they can accept.  PCP is Wesly Minor.  Insurance is [place] [employer] with no interruption in coverage.  No ACP documents.    CM will follow up.    Final Discharge Disposition Code 03 - skilled nursing facility (SNF)                  Continued Care and Services - Admitted Since 2/18/2024       Destination       Service Provider Request Status Selected Services Address Phone Fax Patient Preferred    [facility] Pending - No Request Sent N/A [address] [facility] [address] [phone] --                  Selected Continued Care - Prior Encounters Includes continued care and service providers with selected services from prior encounters from 11/20/2023 to 2/19/2024      Discharged on 1/3/2024 Admission date: 12/25/2023 - Discharge disposition: Skilled Nursing Facility (DC - External)      Destination       Service Provider Selected Services Address Phone Fax Patient Preferred    [facility] [address] [facility] [address] [phone] --                          Expected Discharge  Date and Time       Expected Discharge Date Expected Discharge Time    Feb 21, 2024               Karen Pollard, RN

## 2024-02-19 NOTE — H&P
[facility] Medicine Services  HISTORY AND PHYSICAL    Patient Name: Val Nassar  : 1960  MRN: [id number]  Primary Care Physician: Provider, No Known  Date of admission: 2024    Subjective   Subjective     Chief Complaint:  Weakness, bilateral arm tingling, dizziness     HPI:  Val Nassar is a 63 y.o. male with PMH significant for PAD, cirrhosis, alcohol abuse, squamous cell carcinoma of the esophagus, anemia, HLD, who presents to the ED with complaint of weakness, bilateral arm tingling, and dizziness.  He recently had admission 2024, for dizziness and lightheadedness. He was found to be orthostatic and had his medications adjusted and IV fluids. He states that he was doing ok since discharge until today he sat up to get out of bed and became dizzy and lightheaded. He reports that he had to sit back down. He also notes bilateral tingling in his hands for the past two days.   He denies headache, chest pain, fever.  He does admit to having generalized weakness.  He reports no alcohol use in over 100 days and has not has smoked for the past 10 days.  Upon arrival to the ED, labs are stable.  UA is concerning for UTI.  MRI brain is negative for acute findings.  CXR is negative.  He will be admitted to hospital medicine for further evaluation.    Review of Systems   Constitutional:  Positive for activity change, appetite change and fatigue. Negative for chills, diaphoresis, fever and unexpected weight change.   HENT: Negative.  Negative for congestion, sore throat and trouble swallowing.    Eyes: Negative.  Negative for visual disturbance.   Respiratory: Negative.  Negative for cough, chest tightness, shortness of breath and wheezing.    Cardiovascular: Negative.  Negative for chest pain, palpitations and leg swelling.   Gastrointestinal:  Positive for blood in stool. Negative for abdominal distention, abdominal pain, constipation, diarrhea, nausea and  vomiting.   Endocrine: Negative.  Negative for polydipsia and polyphagia.   Genitourinary: Negative.  Negative for difficulty urinating, dysuria, frequency and urgency.   Musculoskeletal:  Positive for gait problem. Negative for arthralgias, back pain, myalgias and neck pain.   Skin: Negative.  Negative for color change, pallor, rash and wound.   Allergic/Immunologic: Negative.  Negative for immunocompromised state.   Neurological:  Positive for dizziness, weakness, light-headedness and numbness. Negative for seizures, syncope, facial asymmetry, speech difficulty and headaches.   Hematological: Negative.  Does not bruise/bleed easily.   Psychiatric/Behavioral: Negative.  Negative for confusion. The patient is not nervous/anxious.           Personal History     Past Medical History:   Diagnosis Date    Anemia     Cancer     Cirrhosis     Duodenal ulcer     Gastric ulcer     GERD (gastroesophageal reflux disease)     History of alcohol abuse     HLD (hyperlipidemia)     Mood disorder          Oncology Problem List:  Squamous cell carcinoma of esophagus (01/03/2024; Status: Active)    Oncology/Hematology History       Past Surgical History:   Procedure Laterality Date    ENDOSCOPY N/A 12/26/2023    Procedure: ESOPHAGOGASTRODUODENOSCOPY;  Surgeon: Wesly Mckeon MD;  Location: [facility] ENDOSCOPY;  Service: Gastroenterology;  Laterality: N/A;       Family History:  family history includes Cancer in his mother; Heart attack in his father.     Social History:  reports that he has been smoking cigarettes. He has a 15.00 pack-year smoking history. He has never used smokeless tobacco. He reports that he does not currently use alcohol. He reports current drug use. Drug: Hydrocodone.  Social History     Social History Narrative    Not on file       Medications:  Lidocaine, Naltrexone, Vitamin D3, escitalopram, finasteride, folic acid, gabapentin, lactulose, pantoprazole, polyethylene glycol, tamsulosin, vitamin B-12, and vitamin  D    No Known Allergies    Objective   Objective     Vital Signs:   Temp:  [98 °F (36.7 °C)-98.3 °F (36.8 °C)] 98 °F (36.7 °C)  Heart Rate:  [50-57] 50  Resp:  [18-20] 18  BP: (143-164)/(71-74) 163/72    Physical Exam   Constitutional: Awake, alert  Eyes: PERRLA, sclerae anicteric, no conjunctival injection  HENT: NCAT, mucous membranes moist  Neck: Supple, no thyromegaly, no lymphadenopathy, trachea midline  Respiratory: Clear to auscultation bilaterally, nonlabored respirations   Cardiovascular: RRR, no murmurs, rubs, or gallops, palpable pedal pulses bilaterally  Gastrointestinal: Positive bowel sounds, soft, nontender, nondistended  Musculoskeletal: No bilateral ankle edema, no clubbing or cyanosis to extremities  Psychiatric: Appropriate affect, cooperative  Neurologic: Oriented x 3, strength symmetric in all extremities, Cranial Nerves grossly intact to confrontation, speech clear  Skin: No rashes      Result Review:  I have personally reviewed the results from the time of this admission to 2/18/2024 21:29 EST and agree with these findings:  [x]  Laboratory list / accordion  [x]  Microbiology  [x]  Radiology  [x]  EKG/Telemetry   []  Cardiology/Vascular   []  Pathology  [x]  Old records  []  Other:  Most notable findings include:     LAB RESULTS:      Lab 02/18/24  1625   WBC 6.39   HEMOGLOBIN 9.2*   HEMATOCRIT 30.1*   PLATELETS 262   NEUTROS ABS 3.96   EOS ABS 0.00   MCV 85.0   PROCALCITONIN 0.10   LACTATE 1.1         Lab 02/18/24  1625   SODIUM 136   POTASSIUM 4.1   CHLORIDE 101   CO2 27.0   ANION GAP 8.0   BUN 13   CREATININE 0.63*   EGFR 106.2   GLUCOSE 103*   CALCIUM 9.8   MAGNESIUM 1.6         Lab 02/18/24  1625   TOTAL PROTEIN 7.6   ALBUMIN 3.8   GLOBULIN 3.8   ALT (SGPT) 25   AST (SGOT) 32   BILIRUBIN 0.2   ALK PHOS 162*         Lab 02/18/24  1625   HSTROP T 11                 Brief Urine Lab Results  (Last result in the past 365 days)        Color   Clarity   Blood   Leuk Est   Nitrite   Protein    CREAT   Urine HCG        02/18/24 1657 Yellow   Clear   Negative   Small (1+)   Negative   Negative                 Microbiology Results (last 10 days)       ** No results found for the last 240 hours. **            MRI Brain Without Contrast    Result Date: 2/18/2024  MRI BRAIN WO CONTRAST Date of Exam: 2/18/2024 5:05 PM EST Indication: Dizziness, off balance, present x 2 weeks.  Comparison: 2/4/2024 CT Technique:  Routine multiplanar/multisequence sequence images of the brain were obtained without contrast administration. Findings: Diffusion imaging shows no evidence of acute infarct. There are scattered subcortical and periventricular T2 hyperintensities which are nonspecific but likely sequela of mild to moderate small vessel ischemic disease. No evidence of intracranial hemorrhage. There is normal ventricular volume. The basal cisterns are patent. There is no evidence of extra-axial fluid collection. There is normal flow voids within the intracranial vessels. No evidence of fracture or suspicious bony lesion. Paranasal sinusitis with gas/fluid level in the right maxillary sinus. Trace bilateral mastoid air cell effusions. Visualized orbits are unremarkable.     Impression: Impression: No acute intracranial abnormality. No suspicious mass on this noncontrast MRI. Paranasal sinusitis with gas/fluid level in the right maxillary sinus. Electronically Signed: Tashi Alfaro MD  2/18/2024 5:56 PM EST  Workstation ID: [id number]    XR Chest 1 View    Result Date: 2/18/2024  XR CHEST 1 VW Date of Exam: 2/18/2024 3:56 PM EST Indication: Weak/Dizzy/AMS triage protocol Comparison: Chest x-ray 12/29/2023 Findings: Normal cardiomediastinal silhouette. Mild upper lobe emphysema. No focal consolidation. No pleural effusion or pneumothorax. No acute osseous findings.     Impression: Impression: No acute cardiopulmonary findings. Electronically Signed: Nikita Garrison MD  2/18/2024 4:13 PM EST  Workstation ID: [id number]          Assessment & Plan   Assessment & Plan       UTI (urinary tract infection)    Anxiety disorder, unspecified    Gastro-esophageal reflux disease without esophagitis    Hyperlipidemia, unspecified    Tobacco use    Iron deficiency anemia    Cirrhosis of liver    Squamous cell carcinoma of esophagus    Generalized weakness    63 y.o. male with PMH significant for PAD, cirrhosis, alcohol abuse, squamous cell carcinoma of the esophagus, anemia, HLD, who presents to the ED with complaint of weakness, bilateral arm tingling, and dizziness.    UTI  - Rocephin  - Urine culture pending  - CBC, BMP in the a.m.  - Gentle IV fluid    Dizziness/lightheadedness  - MRI negative  - Fall precautions  - Previously with known orthostatic hypotension  - Orthostatics every shift  - Gentle IV fluid overnight  - Vitamin replacement    Iron deficiency anemia  - Stable  - CBC in the a.m.    Anxiety  - Continue Lexapro    BPH  - Continue Flomax, Proscar    Peptic ulcer disease  - Continue PPI    Cirrhosis  History of alcohol abuse  - Sober > 100 days  - Vivitrol per PCP  - Continue folic acid    Tobacco abuse  - Reports recent cessation      DVT prophylaxis: Lovenox    CODE STATUS:    Code Status (Patient has no pulse and is not breathing): CPR (Attempt to Resuscitate)  Medical Interventions (Patient has pulse or is breathing): Full Support      Expected Discharge  Expected Discharge Date: 2/20/2024; Expected Discharge Time:     Signature: Electronically signed by NUVIA Duval, 02/18/24, 9:29 PM EST.  Total time spent: 64 minutes  Time spent includes time reviewing chart, face-to-face time, counseling patient/family/caregiver, ordering medications/tests/procedures, communicating with other health care professionals, documenting clinical information in the electronic health record, and coordination of care.

## 2024-02-19 NOTE — PLAN OF CARE
"  Pt arrived on unit from ED A/O, on RA, Complaints of lower back and leg pain. placed on telemetry monitoring, orthostatic b/p completed. There was an increase in b/p while sitting. Pt placed on IV fluids and ABX. PT order placed because pt states an inability to walk.  During the night HR dropped to 47. MD notified, EKG ordered, resulted sinus alan and arrhythmia. b/p remained stable.     Pt's pain was not relieved by PRN medication but was still able to sleep.  Left pt I bed al lowest position with bed alarm on and call light within reach.    /62 (BP Location: Left arm, Patient Position: Lying)   Pulse 63   Temp 98.1 °F (36.7 °C) (Oral)   Resp 17   Ht 175.3 cm (69\")   Wt 66.7 kg (147 lb)   SpO2 97%   BMI 21.71 kg/m²       Problem: Adult Inpatient Plan of Care  Goal: Plan of Care Review  Outcome: Ongoing, Progressing  Flowsheets (Taken 2/19/2024 0622)  Progress: no change  Plan of Care Reviewed With: patient  Outcome Evaluation: Pt arrived on unit from ED A/O, on RA, Complaints of lower back and leg pain. placed on telemetry monitoring, orthostatic b/p completed.  There was an increase in b/p while sitting.  Pt placed on IV fluids and ABX.  During the night HR dropped to 47.  MD notified, EKG ordered, resulted sinus alan and arrhythmia. b/p remained stable.  Goal: Patient-Specific Goal (Individualized)  Outcome: Ongoing, Progressing  Goal: Absence of Hospital-Acquired Illness or Injury  Outcome: Ongoing, Progressing  Intervention: Identify and Manage Fall Risk  Recent Flowsheet Documentation  Taken 2/19/2024 0000 by Terra Cook, RN  Safety Promotion/Fall Prevention:   activity supervised   assistive device/personal items within reach   safety round/check completed  Taken 2/18/2024 2243 by Terra Cook RN  Safety Promotion/Fall Prevention:   activity supervised   assistive device/personal items within reach   safety round/check completed  Taken 2/18/2024 2058 by Terra Cook, " RN  Safety Promotion/Fall Prevention:   activity supervised   assistive device/personal items within reach   safety round/check completed  Intervention: Prevent Skin Injury  Recent Flowsheet Documentation  Taken 2/19/2024 0000 by Terra Cook RN  Body Position: position changed independently  Taken 2/18/2024 2243 by Terra Coko RN  Body Position: position changed independently  Taken 2/18/2024 2058 by Terra Cook RN  Body Position: position changed independently  Intervention: Prevent and Manage VTE (Venous Thromboembolism) Risk  Recent Flowsheet Documentation  Taken 2/18/2024 2243 by Terra Cook RN  Activity Management: activity encouraged  Taken 2/18/2024 2058 by Terra Cook RN  Activity Management:   sitting, edge of bed   standing at bedside  Intervention: Prevent Infection  Recent Flowsheet Documentation  Taken 2/19/2024 0000 by Terra Cook RN  Infection Prevention:   environmental surveillance performed   rest/sleep promoted  Taken 2/18/2024 2243 by Terra Cook RN  Infection Prevention:   environmental surveillance performed   rest/sleep promoted  Taken 2/18/2024 2058 by Terra Cook RN  Infection Prevention:   environmental surveillance performed   rest/sleep promoted  Goal: Optimal Comfort and Wellbeing  Outcome: Ongoing, Progressing  Intervention: Monitor Pain and Promote Comfort  Recent Flowsheet Documentation  Taken 2/19/2024 0559 by Terra Cook RN  Pain Management Interventions: see MAR  Taken 2/18/2024 2243 by Terra Cook RN  Pain Management Interventions: see MAR  Taken 2/18/2024 2058 by Terra Cook RN  Pain Management Interventions: position adjusted  Intervention: Provide Person-Centered Care  Recent Flowsheet Documentation  Taken 2/19/2024 0000 by Terra Cook RN  Trust Relationship/Rapport:   care explained   choices provided   empathic listening provided   questions answered   thoughts/feelings acknowledged  Taken  2/18/2024 2243 by Terra Cook RN  Trust Relationship/Rapport:   care explained   choices provided   empathic listening provided   questions answered   thoughts/feelings acknowledged  Goal: Readiness for Transition of Care  Outcome: Ongoing, Progressing  Intervention: Mutually Develop Transition Plan  Recent Flowsheet Documentation  Taken 2/18/2024 2113 by Terra Cook RN  Transportation Anticipated: (unknown) other (see comments)  Patient/Family Anticipates Transition to: (pt does not want to return home.) other (see comments)  Taken 2/18/2024 2058 by Terra Cook RN  Equipment Currently Used at Home: none  Transportation Anticipated: other (see comments)  Transportation Concerns: no car  Patient/Family Anticipated Services at Transition:   Patient/Family Anticipates Transition to: (needs assistance. does not want to return home with sister) other (see comments)   Goal Outcome Evaluation:  Plan of Care Reviewed With: patient        Progress: no change  Outcome Evaluation: Pt arrived on unit from ED A/O, on RA, Complaints of lower back and leg pain. placed on telemetry monitoring, orthostatic b/p completed.  There was an increase in b/p while sitting.  Pt placed on IV fluids and ABX.  During the night HR dropped to 47.  MD notified, EKG ordered, resulted sinus alan and arrhythmia. b/p remained stable.

## 2024-02-19 NOTE — ED NOTES
[facility]    Nursing Report ED to Floor:  Mental status: A/Ox4  Ambulatory status: Assist x1   Oxygen Therapy:  RA  Cardiac Rhythm: NSR to sinus alan   Admitted from: ED - Home   Safety Concerns:  Fall risk   Social Issues: None  ED Room #:  33    ED Nurse Phone Extension - [phone] or may call [phone].      HPI:   Chief Complaint   Patient presents with    Dizziness       Past Medical History:  Past Medical History:   Diagnosis Date    Anemia     Cancer     Cirrhosis     Duodenal ulcer     Gastric ulcer     GERD (gastroesophageal reflux disease)     History of alcohol abuse     HLD (hyperlipidemia)     Mood disorder         Past Surgical History:  Past Surgical History:   Procedure Laterality Date    ENDOSCOPY N/A 12/26/2023    Procedure: ESOPHAGOGASTRODUODENOSCOPY;  Surgeon: Wesly Mckeon MD;  Location: [facility] ENDOSCOPY;  Service: Gastroenterology;  Laterality: N/A;        Admitting Doctor:   Mayi Maynard MD    Consulting Provider(s):  Consults       No orders found from 1/20/2024 to 2/19/2024.             Admitting Diagnosis:   The primary encounter diagnosis was Urinary tract infection without hematuria, site unspecified. Diagnoses of General weakness, Multiple falls, and Unable to ambulate were also pertinent to this visit.    Most Recent Vitals:   Vitals:    02/18/24 1557 02/18/24 1801 02/18/24 1830 02/18/24 1901   BP: 143/74 164/73 145/73 150/71   BP Location:       Patient Position:       Pulse: 56 52 51 51   Resp:       Temp:       TempSrc:       SpO2: 100% 98% 98% 97%   Weight:       Height:           Active LDAs/IV Access:   Lines, Drains & Airways       Active LDAs       None                    Labs (abnormal labs have a star):   Labs Reviewed   COMPREHENSIVE METABOLIC PANEL - Abnormal; Notable for the following components:       Result Value    Glucose 103 (*)     Creatinine 0.63 (*)     Alkaline Phosphatase 162 (*)     All other components within normal limits    Narrative:     GFR Normal  >60  Chronic Kidney Disease <60  Kidney Failure <15     URINALYSIS W/ MICROSCOPIC IF INDICATED (NO CULTURE) - Abnormal; Notable for the following components:    Leuk Esterase, UA Small (1+) (*)     All other components within normal limits   CBC WITH AUTO DIFFERENTIAL - Abnormal; Notable for the following components:    RBC 3.54 (*)     Hemoglobin 9.2 (*)     Hematocrit 30.1 (*)     MCH 26.0 (*)     MCHC 30.6 (*)     RDW 16.3 (*)     All other components within normal limits    Narrative:     The previously reported component NRBC is no longer being reported. Previous result was 0.0 /100 WBC (Reference Range: 0.0-0.2 /100 WBC) on 2/18/2024 at 1639 EST.   MANUAL DIFFERENTIAL - Abnormal; Notable for the following components:    Lymphocyte % 15.0 (*)     Monocyte % 14.0 (*)     Eosinophil % 0.0 (*)     Atypical Lymphocyte % 9.0 (*)     All other components within normal limits   URINALYSIS, MICROSCOPIC ONLY - Abnormal; Notable for the following components:    WBC, UA 21-50 (*)     Bacteria, UA 3+ (*)     Squamous Epithelial Cells, UA 3-6 (*)     All other components within normal limits   SINGLE HSTROPONIN T - Normal    Narrative:     High Sensitive Troponin T Reference Range:  <14.0 ng/L- Negative Female for AMI  <22.0 ng/L- Negative Male for AMI  >=14 - Abnormal Female indicating possible myocardial injury.  >=22 - Abnormal Male indicating possible myocardial injury.   Clinicians would have to utilize clinical acumen, EKG, Troponin, and serial changes to determine if it is an Acute Myocardial Infarction or myocardial injury due to an underlying chronic condition.        MAGNESIUM - Normal   RAINBOW DRAW    Narrative:     The following orders were created for panel order [place] Draw.  Procedure                               Abnormality         Status                     ---------                               -----------         ------                     Green Top (Gel)[[id number]]                                   Final result               Lavender Top[[id number]]                                     Final result               Gold Top - SST[[id number]]                                   Final result               Gray Top[[id number]]                                         In process                 Light Blue Top[[id number]]                                   Final result                 Please view results for these tests on the individual orders.   POCT GLUCOSE FINGERSTICK   CBC AND DIFFERENTIAL    Narrative:     The following orders were created for panel order CBC & Differential.  Procedure                               Abnormality         Status                     ---------                               -----------         ------                     CBC Auto Differential[[id number]]        Abnormal            Final result               Scan Slide[[id number]]                                                                    Please view results for these tests on the individual orders.   GREEN TOP   LAVENDER TOP   GOLD TOP - SST   LIGHT BLUE TOP   GRAY TOP       Meds Given in ED:   Medications   sodium chloride 0.9 % flush 10 mL (has no administration in time range)   diazePAM (VALIUM) injection 5 mg (5 mg Intramuscular Given 2/18/24 9050)

## 2024-02-20 ENCOUNTER — APPOINTMENT (OUTPATIENT)
Dept: CT IMAGING | Facility: HOSPITAL | Age: 64
End: 2024-02-20
Payer: MEDICAID

## 2024-02-20 LAB
ANION GAP SERPL CALCULATED.3IONS-SCNC: 10 MMOL/L (ref 5–15)
BUN SERPL-MCNC: 12 MG/DL (ref 8–23)
BUN/CREAT SERPL: 21.1 (ref 7–25)
CALCIUM SPEC-SCNC: 9.9 MG/DL (ref 8.6–10.5)
CHLORIDE SERPL-SCNC: 105 MMOL/L (ref 98–107)
CO2 SERPL-SCNC: 26 MMOL/L (ref 22–29)
CREAT SERPL-MCNC: 0.57 MG/DL (ref 0.76–1.27)
DEPRECATED RDW RBC AUTO: 47.3 FL (ref 37–54)
EGFRCR SERPLBLD CKD-EPI 2021: 109.5 ML/MIN/1.73
ERYTHROCYTE [DISTWIDTH] IN BLOOD BY AUTOMATED COUNT: 16.3 % (ref 12.3–15.4)
GLUCOSE SERPL-MCNC: 94 MG/DL (ref 65–99)
HCT VFR BLD AUTO: 28.2 % (ref 37.5–51)
HGB BLD-MCNC: 8.6 G/DL (ref 13–17.7)
MAGNESIUM SERPL-MCNC: 1.7 MG/DL (ref 1.6–2.4)
MCH RBC QN AUTO: 24.8 PG (ref 26.6–33)
MCHC RBC AUTO-ENTMCNC: 30.5 G/DL (ref 31.5–35.7)
MCV RBC AUTO: 81.3 FL (ref 79–97)
PLATELET # BLD AUTO: 227 10*3/MM3 (ref 140–450)
PMV BLD AUTO: 9.1 FL (ref 6–12)
POTASSIUM SERPL-SCNC: 4.2 MMOL/L (ref 3.5–5.2)
RBC # BLD AUTO: 3.47 10*6/MM3 (ref 4.14–5.8)
SODIUM SERPL-SCNC: 141 MMOL/L (ref 136–145)
WBC NRBC COR # BLD AUTO: 6.55 10*3/MM3 (ref 3.4–10.8)

## 2024-02-20 PROCEDURE — 25010000002 CEFTRIAXONE PER 250 MG: Performed by: NURSE PRACTITIONER

## 2024-02-20 PROCEDURE — 99214 OFFICE O/P EST MOD 30 MIN: CPT | Performed by: INTERNAL MEDICINE

## 2024-02-20 PROCEDURE — 85027 COMPLETE CBC AUTOMATED: CPT | Performed by: HOSPITALIST

## 2024-02-20 PROCEDURE — 71260 CT THORAX DX C+: CPT

## 2024-02-20 PROCEDURE — 80048 BASIC METABOLIC PNL TOTAL CA: CPT | Performed by: HOSPITALIST

## 2024-02-20 PROCEDURE — 25010000002 ENOXAPARIN PER 10 MG: Performed by: NURSE PRACTITIONER

## 2024-02-20 PROCEDURE — 97535 SELF CARE MNGMENT TRAINING: CPT

## 2024-02-20 PROCEDURE — 97166 OT EVAL MOD COMPLEX 45 MIN: CPT

## 2024-02-20 PROCEDURE — 99232 SBSQ HOSP IP/OBS MODERATE 35: CPT | Performed by: HOSPITALIST

## 2024-02-20 PROCEDURE — 25510000001 IOPAMIDOL 61 % SOLUTION: Performed by: HOSPITALIST

## 2024-02-20 PROCEDURE — G0378 HOSPITAL OBSERVATION PER HR: HCPCS

## 2024-02-20 PROCEDURE — 83735 ASSAY OF MAGNESIUM: CPT | Performed by: HOSPITALIST

## 2024-02-20 PROCEDURE — 96372 THER/PROPH/DIAG INJ SC/IM: CPT

## 2024-02-20 PROCEDURE — 96366 THER/PROPH/DIAG IV INF ADDON: CPT

## 2024-02-20 RX ORDER — OXYCODONE HYDROCHLORIDE 5 MG/1
5 TABLET ORAL EVERY 6 HOURS PRN
Status: DISCONTINUED | OUTPATIENT
Start: 2024-02-20 | End: 2024-02-23 | Stop reason: HOSPADM

## 2024-02-20 RX ADMIN — SODIUM CHLORIDE 1000 MG: 900 INJECTION INTRAVENOUS at 20:32

## 2024-02-20 RX ADMIN — GABAPENTIN 300 MG: 300 CAPSULE ORAL at 08:27

## 2024-02-20 RX ADMIN — Medication 10 ML: at 20:34

## 2024-02-20 RX ADMIN — IOPAMIDOL 75 ML: 612 INJECTION, SOLUTION INTRAVENOUS at 16:47

## 2024-02-20 RX ADMIN — Medication 1000 MCG: at 08:27

## 2024-02-20 RX ADMIN — ACETAMINOPHEN 650 MG: 325 TABLET ORAL at 16:31

## 2024-02-20 RX ADMIN — GABAPENTIN 300 MG: 300 CAPSULE ORAL at 20:34

## 2024-02-20 RX ADMIN — OXYCODONE HYDROCHLORIDE 5 MG: 5 TABLET ORAL at 23:07

## 2024-02-20 RX ADMIN — PANTOPRAZOLE SODIUM 40 MG: 40 TABLET, DELAYED RELEASE ORAL at 08:27

## 2024-02-20 RX ADMIN — PANTOPRAZOLE SODIUM 40 MG: 40 TABLET, DELAYED RELEASE ORAL at 20:34

## 2024-02-20 RX ADMIN — GABAPENTIN 300 MG: 300 CAPSULE ORAL at 16:31

## 2024-02-20 RX ADMIN — ESCITALOPRAM OXALATE 10 MG: 10 TABLET ORAL at 20:34

## 2024-02-20 RX ADMIN — FINASTERIDE 5 MG: 5 TABLET, FILM COATED ORAL at 08:27

## 2024-02-20 RX ADMIN — TAMSULOSIN HYDROCHLORIDE 0.4 MG: 0.4 CAPSULE ORAL at 08:27

## 2024-02-20 RX ADMIN — OXYCODONE HYDROCHLORIDE AND ACETAMINOPHEN 1 TABLET: 5; 325 TABLET ORAL at 08:27

## 2024-02-20 RX ADMIN — Medication 10 ML: at 08:27

## 2024-02-20 RX ADMIN — ENOXAPARIN SODIUM 40 MG: 100 INJECTION SUBCUTANEOUS at 08:27

## 2024-02-20 RX ADMIN — OXYCODONE HYDROCHLORIDE AND ACETAMINOPHEN 1 TABLET: 5; 325 TABLET ORAL at 02:39

## 2024-02-20 RX ADMIN — OXYCODONE HYDROCHLORIDE 5 MG: 5 TABLET ORAL at 16:31

## 2024-02-20 RX ADMIN — FOLIC ACID 1 MG: 1 TABLET ORAL at 08:27

## 2024-02-20 NOTE — PLAN OF CARE
Problem: Adult Inpatient Plan of Care  Goal: Plan of Care Review  Recent Flowsheet Documentation  Taken 2/20/2024 0943 by Ivis Myers OT  Progress: (IE) --  Plan of Care Reviewed With: patient  Outcome Evaluation: OT IE completed. Pt is A/Ox4 and motivated to work with therapy. Up in room/bathroom with RW support and CGAx1. c/o dizziness with activity, orthostatic 104/58 down from 129/70. BUE AROM and sensation are WFL for self-care. Pt able to complete sponge bath, UB/LB dressing and grooming with Set-up/SBA. OT will follow IP to address generalized weakness and functional endurance deficits. Recommend SNF at d/c for best outcome.

## 2024-02-20 NOTE — CONSULTS
HEMATOLOGY/ONCOLOGY INPATIENT CONSULTATION      REFERRING PHYSICIAN: Paulina TANNER MD    PRIMARY CARE PROVIDER: Wesly Minor MD    REASON FOR CONSULTATION: Esophageal squamous cell carcinoma      HISTORY OF PRESENT ILLNESS:   63-year-old male with a longstanding history of cirrhosis and recent diagnosis of esophageal cancer diagnosed in December 2023 who was readmitted for UTI and confusion.     The patient recently relocated from [place], [place], where he was followed at [facility] by Dr. Raya Horne, to live with his sister in [facility] due to a recent diagnosis of esophageal cancer.  He reports undergoing an endoscopy with biopsy at [facility] confirming esophageal cancer approximately 3 months ago.  He has some physical limitations secondary to chronic sciatic pain and cirrhosis and wanted to relocate to be closer to family prior to pursuing any cancer directed therapy.  He does not recall meeting with medical oncologist or other specialists prior to his relocation.     He then developed abdominal pain prompting ER evaluation and was treated for possible cholecystitis (not confirmed on HIDA scan) and upper GI bleed secondary to peptic ulcer disease with a hemoglobin of 6.9.     EGD performed 12/26/2023 showed an ulcerated mass in the upper third of the esophagus which was nonobstructing and partially circumferential spanning 2 cm in length.  Biopsy results showed Invasive squamous cell carcinoma. Additional findings include gastritis, portal hypertension gastropathy and nonbleeding duodenal ulcer.  Duodenal stenosis.    Plan at that time was for outpatient PET and follow-up with medical oncology, radiation oncology, and thoracic surgery pending those results.  May require EUS for completion of staging pending those results as well. He was referred to cardiac surgery as well as for a PET scan.  He did not complete his PET and then canceled his radiation oncology follow-up as well as medical  oncology appointments.  He represented to the ER with weakness, arm tingling, and dizziness.  He was found to have a UTI.  He underwent MRI brain which was negative.    He reports that he has been doing progressively poorly since he was discharged from a skilled nursing facility.  He is staying with his sister who works full-time.  He reports he is spending most of his day in a chair.  He ambulates with a walker or cane.  He is able to ambulate to the restroom.  His sister prepares meals for him and he is able to walk to the kitchen to repeat these.  However he is feeling progressively more unsafe at home.  He has dizzy episodes and is fearful that he will fall and not be able to contact anyone.  He does not have a working phone.  He reports that he and his sister have discussed things and he is planning to go to a long-term care facility indefinitely.    He is partially edentulous and eats soft foods at baseline, but reports no difficulty with swallowing.  He does have continuous epigastric pain that he reports is heartburn-like.    No Known Allergies    Past Medical History:   Diagnosis Date    Anemia     Cancer     Cirrhosis     Duodenal ulcer     Gastric ulcer     GERD (gastroesophageal reflux disease)     History of alcohol abuse     HLD (hyperlipidemia)     Mood disorder          Current Facility-Administered Medications:     acetaminophen (TYLENOL) tablet 650 mg, 650 mg, Oral, Q4H PRN, 650 mg at 02/19/24 0559 **OR** acetaminophen (TYLENOL) 160 MG/5ML oral solution 650 mg, 650 mg, Oral, Q4H PRN **OR** acetaminophen (TYLENOL) suppository 650 mg, 650 mg, Rectal, Q4H PRN, Pinky Matias R, APRN    sennosides-docusate (PERICOLACE) 8.6-50 MG per tablet 2 tablet, 2 tablet, Oral, BID PRN **AND** polyethylene glycol (MIRALAX) packet 17 g, 17 g, Oral, Daily PRN **AND** bisacodyl (DULCOLAX) EC tablet 5 mg, 5 mg, Oral, Daily PRN **AND** bisacodyl (DULCOLAX) suppository 10 mg, 10 mg, Rectal, Daily PRN, Pinky Matias,  NUVIA    Calcium Replacement - Follow Nurse / BPA Driven Protocol, , Does not apply, PRN, Pinky Matias APRN    cefTRIAXone (ROCEPHIN) 1,000 mg in sodium chloride 0.9 % 100 mL IVPB, 1,000 mg, Intravenous, Q24H, Pinky Matias APRN, Stopped at 02/19/24 2130    Enoxaparin Sodium (LOVENOX) syringe 40 mg, 40 mg, Subcutaneous, Daily, Pinky Matias APRN, 40 mg at 02/18/24 2249    escitalopram (LEXAPRO) tablet 10 mg, 10 mg, Oral, Nightly, Pinky Matias APRN, 10 mg at 02/19/24 2049    finasteride (PROSCAR) tablet 5 mg, 5 mg, Oral, Daily, Pinky Matias APRN, 5 mg at 02/19/24 0921    folic acid (FOLVITE) tablet 1 mg, 1 mg, Oral, Daily, Pinky Matias APRN, 1 mg at 02/19/24 0921    gabapentin (NEURONTIN) capsule 300 mg, 300 mg, Oral, TID, Pinky Matias APRN, 300 mg at 02/19/24 2049    Magnesium Standard Dose Replacement - Follow Nurse / BPA Driven Protocol, , Does not apply, PRN, Pinky Matias APRN    oxyCODONE-acetaminophen (PERCOCET) 5-325 MG per tablet 1 tablet, 1 tablet, Oral, Q6H PRN, Paulina Curtis MD, 1 tablet at 02/20/24 0239    pantoprazole (PROTONIX) EC tablet 40 mg, 40 mg, Oral, BID, Pinky Matias APRN, 40 mg at 02/19/24 2049    Phosphorus Replacement - Follow Nurse / BPA Driven Protocol, , Does not apply, PRN, Pinky Matias APRN    Potassium Replacement - Follow Nurse / BPA Driven Protocol, , Does not apply, PRN, Pinky Matias APRN    sodium chloride 0.9 % flush 10 mL, 10 mL, Intravenous, PRN, Chris Montenegro, DO    sodium chloride 0.9 % flush 10 mL, 10 mL, Intravenous, Q12H, Pinky Matias APRN, 10 mL at 02/19/24 2048    sodium chloride 0.9 % flush 10 mL, 10 mL, Intravenous, PRN, Pinky Matias, APRN    sodium chloride 0.9 % infusion 40 mL, 40 mL, Intravenous, PRN, Pinky Matias, NUVIA    tamsulosin (FLOMAX) 24 hr capsule 0.4 mg, 0.4 mg, Oral, Daily, Pinky Matias APRN, 0.4 mg at 02/19/24 0921    vitamin B-12 (CYANOCOBALAMIN) tablet 1,000 mcg, 1,000 mcg,  Oral, Daily, Pinky Matias, NUVIA, 1,000 mcg at 02/19/24 0921    Past Surgical History:   Procedure Laterality Date    ENDOSCOPY N/A 12/26/2023    Procedure: ESOPHAGOGASTRODUODENOSCOPY;  Surgeon: Wesly Mckeon MD;  Location: [facility] ENDOSCOPY;  Service: Gastroenterology;  Laterality: N/A;       Social History     Socioeconomic History    Marital status: Single   Tobacco Use    Smoking status: Every Day     Packs/day: 1.00     Years: 15.00     Additional pack years: 0.00     Total pack years: 15.00     Types: Cigarettes    Smokeless tobacco: Never   Vaping Use    Vaping Use: Some days    Substances: Flavoring    Devices: Disposable   Substance and Sexual Activity    Alcohol use: Not Currently     Comment: sober for ~ 3 months    Drug use: Yes     Types: Hydrocodone    Sexual activity: Defer       Family History   Problem Relation Age of Onset    Cancer Mother     Heart attack Father        Oncology/Hematology History    No history exists.           Objective     Vitals:    02/19/24 2005 02/19/24 2355 02/20/24 0310 02/20/24 0733   BP: 111/57 124/59 125/66 139/66   BP Location: Right arm Right arm Right arm Right arm   Patient Position: Lying Lying Lying Lying   Pulse: 65 64 63 73   Resp: 16 16 15 16   Temp: 98.4 °F (36.9 °C) 98.3 °F (36.8 °C) 98.1 °F (36.7 °C) 98 °F (36.7 °C)   TempSrc: Oral Oral Oral Oral   SpO2: 96%  96% 97%   Weight:       Height:                       Temp:  [97.9 °F (36.6 °C)-98.4 °F (36.9 °C)] 98 °F (36.7 °C)     Performance Status: 2-3    Physical Exam    General: well appearing male in no acute distress  HEENT: sclerae anicteric, oropharynx clear partially edentulous.  Lymphatics: no cervical, supraclavicular, or axillary adenopathy  Cardiovascular: regular rate and rhythm, no murmurs  Lungs: clear to auscultation bilaterally  Abdomen: soft, nontender, nondistended.  No palpable organomegaly  Extremities: no lower extremity edema  Skin: no rashes, lesions, bruising, or  petechiae      LABS:    Lab Results   Component Value Date    HGB 8.6 (L) 02/20/2024    HCT 28.2 (L) 02/20/2024    MCV 81.3 02/20/2024     02/20/2024    WBC 6.55 02/20/2024    NEUTROABS 2.81 02/19/2024    LYMPHSABS 1.43 02/05/2024    MONOSABS 1.30 (H) 02/05/2024    EOSABS 0.28 02/19/2024    BASOSABS 0.06 02/19/2024     Lab Results   Component Value Date    GLUCOSE 94 02/20/2024    BUN 12 02/20/2024    CREATININE 0.57 (L) 02/20/2024     02/20/2024    K 4.2 02/20/2024     02/20/2024    CO2 26.0 02/20/2024    CALCIUM 9.9 02/20/2024    PROTEINTOT 7.6 02/18/2024    ALBUMIN 3.8 02/18/2024    BILITOT 0.2 02/18/2024    ALKPHOS 162 (H) 02/18/2024    AST 32 02/18/2024    ALT 25 02/18/2024         IMAGING    MRI Brain Without Contrast    Result Date: 2/18/2024  MRI BRAIN WO CONTRAST Date of Exam: 2/18/2024 5:05 PM EST Indication: Dizziness, off balance, present x 2 weeks.  Comparison: 2/4/2024 CT Technique:  Routine multiplanar/multisequence sequence images of the brain were obtained without contrast administration. Findings: Diffusion imaging shows no evidence of acute infarct. There are scattered subcortical and periventricular T2 hyperintensities which are nonspecific but likely sequela of mild to moderate small vessel ischemic disease. No evidence of intracranial hemorrhage. There is normal ventricular volume. The basal cisterns are patent. There is no evidence of extra-axial fluid collection. There is normal flow voids within the intracranial vessels. No evidence of fracture or suspicious bony lesion. Paranasal sinusitis with gas/fluid level in the right maxillary sinus. Trace bilateral mastoid air cell effusions. Visualized orbits are unremarkable.     Impression: No acute intracranial abnormality. No suspicious mass on this noncontrast MRI. Paranasal sinusitis with gas/fluid level in the right maxillary sinus. Electronically Signed: Tashi Alfaro MD  2/18/2024 5:56 PM EST  Workstation ID:  [id number]    XR Chest 1 View    Result Date: 2/18/2024  XR CHEST 1 VW Date of Exam: 2/18/2024 3:56 PM EST Indication: Weak/Dizzy/AMS triage protocol Comparison: Chest x-ray 12/29/2023 Findings: Normal cardiomediastinal silhouette. Mild upper lobe emphysema. No focal consolidation. No pleural effusion or pneumothorax. No acute osseous findings.     Impression: No acute cardiopulmonary findings. Electronically Signed: Nikita Garrison MD  2/18/2024 4:13 PM EST  Workstation ID: [id number]    CT Head Without Contrast    Result Date: 2/4/2024  CT HEAD WO CONTRAST Date of Exam: 2/4/2024 9:05 PM EST Indication: dizziness. Comparison: None available. Technique: Axial CT images were obtained of the head without contrast administration.  Automated exposure control and iterative construction methods were used. Findings: There is mild diffuse generalized atrophy. There are low-attenuation areas in the periventricular white matter consistent with chronic microvascular ischemic change. There is no mass, mass effect or midline shift. There are no abnormal extra-axial fluid collections or areas of acute hemorrhage. There are bilateral maxillary sinus air-fluid levels. There is bilateral ethmoid sinus disease. There are bilateral mastoid effusions.     Impression: Mild atrophy and chronic microvascular ischemia. No acute intracranial process. Acute bilateral maxillary sinusitis. Bilateral mastoid effusions. Electronically Signed: Marvel Vines MD  2/4/2024 9:49 PM EST  Workstation ID: [id number]    CT Abdomen Pelvis With Contrast    Result Date: 2/4/2024  CT ABDOMEN PELVIS W CONTRAST Date of Exam: 2/4/2024 8:06 PM EST Indication: llq pain. Comparison: CT abdomen pelvis dated December 25, 2023 Technique: Axial CT images were obtained of the abdomen and pelvis following the uneventful intravenous administration of 85 mL Isovue-300. Reconstructed coronal and sagittal images were also obtained. Automated exposure control and iterative  construction methods were used. Findings: The lung bases are clear. There is mild nodularity of the liver surface. There are stones layering in the gallbladder. The bilateral kidneys, bilateral adrenal glands, pancreas and spleen are normal. There is a large amount of stool throughout the colon with stool to the cecum suggestive of constipation. There is no large or small bowel inflammatory change. There is calcific atherosclerosis of the abdominal aorta and branch vascular structures. There is degenerative disease of the lumbar spine and hips. There is a left posterior urinary bladder diverticulum. The urinary bladder is significantly distended. Bladder outlet obstruction would be in the differential.     Impression: No acute abdominal or pelvic abnormality. Electronically Signed: Marvel Vines MD  2/4/2024 8:44 PM EST  Workstation ID: [id number]      ASSESSMENT/PLAN:  This is a complex case of a patient with an apparently localized esophageal cancer who has delayed workup and now therapy decision for at least 5 months.  Interval notes reviewed.  Recent abdominal CT and current hospital notes reviewed.  He is becoming progressively more debilitated and his sister is having a hard time caring for him at home.  We discussed restaging CT of the chest.  There is no obvious metastatic disease on his abdominal CT.  The patient reports that he has not made a decision with respect to whether to proceed with cancer directed therapy.  However, placing a long-term care facility may limit his access to care in his performance status is currently marginal for systemic therapy.  I would recommend repeat chest CT to look for obvious cancer progression.  Recommend consultation with radiation oncology and palliative care to continue to address goals of care.  We discussed anticipated side effects of chemoradiation which would likely cause worsening weakness in addition to other treatment related side effects in the short-term.   Conversely we discussed prognosis of untreated localized esophageal squamous cell carcinoma.    I will continue to follow along and the patient has my card and was asked to contact me if he has additional questions.    Katerina Ga MD    2/20/2024

## 2024-02-20 NOTE — PLAN OF CARE
Goal Outcome Evaluation:  Plan of Care Reviewed With: patient        Progress: no change            Pt is alert and oriented X 4. VSS. RA. Pain controlled with prn percocet. Voiding spontaneously.

## 2024-02-20 NOTE — PROGRESS NOTES
[facility] Medicine Services  PROGRESS NOTE    Patient Name: Val Nassar  : 1960  MRN: [id number]    Date of Admission: 2024  Primary Care Physician: Wesly Minor MD    Subjective   Subjective     CC:  F/U weakness, dizziness    HPI:  Seen this morning. Still got a little dizzy with standing this morning.       Objective   Objective     Vital Signs:   Temp:  [98 °F (36.7 °C)-98.4 °F (36.9 °C)] 98 °F (36.7 °C)  Heart Rate:  [57-73] 57  Resp:  [15-18] 18  BP: (111-139)/(57-71) 125/62     Physical Exam:  Gen-no acute distress  HENT-NCAT, mucous membranes moist  CV-RRR, S1 S2 normal, no m/r/g  Resp-CTAB, no wheezes or rales  Abd-soft, NT, ND, +BS  Ext-no edema  Neuro-A&Ox3, no focal deficits  Skin-no rashes  Psych-appropriate mood  No change from 24      Results Reviewed:  LAB RESULTS:      Lab 24  0515 24  0433 24  1625   WBC 6.55 5.61 6.39   HEMOGLOBIN 8.6* 8.3* 9.2*   HEMATOCRIT 28.2* 27.7* 30.1*   PLATELETS 227 229 262   NEUTROS ABS  --  2.81 3.96   EOS ABS  --  0.28 0.00   MCV 81.3 82.7 85.0   PROCALCITONIN  --   --  0.10   LACTATE  --   --  1.1         Lab 24  0515 24  0433 24  1625   SODIUM 141 138 136   POTASSIUM 4.2 3.9 4.1   CHLORIDE 105 104 101   CO2 26.0 27.0 27.0   ANION GAP 10.0 7.0 8.0   BUN 12 12 13   CREATININE 0.57* 0.56* 0.63*   EGFR 109.5 110.1 106.2   GLUCOSE 94 103* 103*   CALCIUM 9.9 9.5 9.8   MAGNESIUM 1.7 1.4* 1.6         Lab 24  1625   TOTAL PROTEIN 7.6   ALBUMIN 3.8   GLOBULIN 3.8   ALT (SGPT) 25   AST (SGOT) 32   BILIRUBIN 0.2   ALK PHOS 162*         Lab 24  1625   HSTROP T 11                 Brief Urine Lab Results  (Last result in the past 365 days)        Color   Clarity   Blood   Leuk Est   Nitrite   Protein   CREAT   Urine HCG        24 1657 Yellow   Clear   Negative   Small (1+)   Negative   Negative                   Microbiology Results Abnormal       None            MRI  Brain Without Contrast    Result Date: 2/18/2024  MRI BRAIN WO CONTRAST Date of Exam: 2/18/2024 5:05 PM EST Indication: Dizziness, off balance, present x 2 weeks.  Comparison: 2/4/2024 CT Technique:  Routine multiplanar/multisequence sequence images of the brain were obtained without contrast administration. Findings: Diffusion imaging shows no evidence of acute infarct. There are scattered subcortical and periventricular T2 hyperintensities which are nonspecific but likely sequela of mild to moderate small vessel ischemic disease. No evidence of intracranial hemorrhage. There is normal ventricular volume. The basal cisterns are patent. There is no evidence of extra-axial fluid collection. There is normal flow voids within the intracranial vessels. No evidence of fracture or suspicious bony lesion. Paranasal sinusitis with gas/fluid level in the right maxillary sinus. Trace bilateral mastoid air cell effusions. Visualized orbits are unremarkable.     Impression: Impression: No acute intracranial abnormality. No suspicious mass on this noncontrast MRI. Paranasal sinusitis with gas/fluid level in the right maxillary sinus. Electronically Signed: Tashi Alfaro MD  2/18/2024 5:56 PM EST  Workstation ID: [id number]    XR Chest 1 View    Result Date: 2/18/2024  XR CHEST 1 VW Date of Exam: 2/18/2024 3:56 PM EST Indication: Weak/Dizzy/AMS triage protocol Comparison: Chest x-ray 12/29/2023 Findings: Normal cardiomediastinal silhouette. Mild upper lobe emphysema. No focal consolidation. No pleural effusion or pneumothorax. No acute osseous findings.     Impression: Impression: No acute cardiopulmonary findings. Electronically Signed: Nikita Garrison MD  2/18/2024 4:13 PM EST  Workstation ID: [id number]         Current medications:  Scheduled Meds:cefTRIAXone, 1,000 mg, Intravenous, Q24H  enoxaparin, 40 mg, Subcutaneous, Daily  escitalopram, 10 mg, Oral, Nightly  finasteride, 5 mg, Oral, Daily  folic acid, 1 mg, Oral,  Daily  gabapentin, 300 mg, Oral, TID  pantoprazole, 40 mg, Oral, BID  sodium chloride, 10 mL, Intravenous, Q12H  tamsulosin, 0.4 mg, Oral, Daily  vitamin B-12, 1,000 mcg, Oral, Daily      Continuous Infusions:   PRN Meds:.  acetaminophen **OR** acetaminophen **OR** acetaminophen    senna-docusate sodium **AND** polyethylene glycol **AND** bisacodyl **AND** bisacodyl    Calcium Replacement - Follow Nurse / BPA Driven Protocol    Magnesium Standard Dose Replacement - Follow Nurse / BPA Driven Protocol    oxyCODONE-acetaminophen    Phosphorus Replacement - Follow Nurse / BPA Driven Protocol    Potassium Replacement - Follow Nurse / BPA Driven Protocol    sodium chloride    sodium chloride    sodium chloride    Assessment & Plan   Assessment & Plan     Active Hospital Problems    Diagnosis  POA    **UTI (urinary tract infection) [N39.0]  Yes    Generalized weakness [R53.1]  Yes    Cirrhosis of liver [K74.60]  Yes    Iron deficiency anemia [D50.9]  Yes    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        Brief Hospital Course to date:  Val Nassar is a 63 y.o. male  with PMH significant for PAD, cirrhosis, alcohol abuse, recently diagnosed squamous cell carcinoma of the esophagus, anemia, and HLD, who presents to the ED with complaints of weakness, bilateral arm tingling, and dizziness.    Two recent admissions to [facility]:  *12/25/23-1/3/24: Admitted with GI bleed and atypical pneumonia. 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 No varices noted. Was discharged to [facility] with outpatient Oncology and Radiation Oncology follow ups.  *2/4/24-2/4/24: Same  day admit and discharge for dizziness and weakness, found to have orthostatic hypotension which improved with IV fluids and albumin. Coreg discontinued at that time as no evidence of varices on previous EGD and no other indication to be on it. He was discharged home and his Oncology and Rad/Onc appointments were rescheduled as he had been in rehab during that time.     This patient's problems and plans were partially entered by my partner and updated as appropriate by me 02/20/24. Copied text in this note has been reviewed and is accurate as of today's date.      UTI, POA  --UA with 21-50 WBC, 3+ bacteria  --Continue IV Rocephin  --F/U urine culture - growing gram positive cocci so far, likely will need to adjust ATBx     Dizziness/lightheadedness  Generalized weakness  --MRI brain negative  --Fall precautions  --Previously with orthostatic hypotension during recent admission, Coreg had been discontinued at that time; orthostatic vitals are negative here  --s/p gentle IV fluids  --PT/OT evaluation, patient is interested in long term care placement     Iron deficiency anemia  Recent GI bleed secondary to PUD + esophageal mass  --Continue BID Protonix  --Was to follow up with GI 6-8 weeks after his admission end of December 2023/early January 2024 -- will need to be rescheduled if still within goals of care (missed appointment on 2/12/24)  --H&H stable, monitor    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 Ga) and Radiation Oncology (Dr. Aguial)  --Consulted Oncology and Rad/Onc here  --Dr. Ga has ordered CT chest for restaging, however due to poor performance status he is likely not going to be able to tolerate systemic chemotherapy - patient at this point is not interested in chemotherapy, plans to talk with Rad/Onc as well to see if he has any palliative options there  --Palliative Care consulted     Anxiety  --Continue Lexapro    Chronic  pain  --PCP recently prescribed Percocet, will continue here     BPH  --Continue Flomax, Proscar     Cirrhosis  History of alcohol abuse  --Sober > 100 days  --Vivitrol per PCP - has been on IM injection every 28 days  --Continue folic acid     Tobacco abuse  --Reports recent cessation        Expected Discharge Location and Transportation: SNF/LTC  Expected Discharge plan is LTC at [facility] if accepted, Palliative can follow there and he may be able to transition to hospice when ready  Expected Discharge Date: 2/22/2024; Expected Discharge Time:      DVT prophylaxis:  Medical DVT prophylaxis orders are present.         AM-PAC 6 Clicks Score (PT): 20 (02/19/24 2052)    CODE STATUS:   Code Status and Medical Interventions:   Ordered at: 02/18/24 2129     Code Status (Patient has no pulse and is not breathing):    CPR (Attempt to Resuscitate)     Medical Interventions (Patient has pulse or is breathing):    Full Support       Paulina Curtis MD  02/20/24

## 2024-02-20 NOTE — CONSULTS
visited with patient at length. Mr. Nassar shared his decision to forgo aggressive treatment for his cancer. He reports being at peace with this decision. Having watched his mother die of cancer, he feels that the treatment would destroy his quality of life.     He is a [employer], although not active in a [employer], but reports praying daily. Mr. Nassar shared his view of the afterlife. Writer affirmed his choices, encouraged him to be present in the time he has left, and continue to grow in his leon which is a comfort to him.

## 2024-02-20 NOTE — THERAPY EVALUATION
Patient Name: Val Nassar  : 1960    MRN: [id number]                              Today's Date: 2024       Admit Date: 2024    Visit Dx:     ICD-10-CM ICD-9-CM   1. Urinary tract infection without hematuria, site unspecified  N39.0 599.0   2. General weakness  R53.1 780.79   3. Multiple falls  R29.6 V15.88   4. Unable to ambulate  R26.2 719.7     Patient Active Problem List   Diagnosis    Acute gastric ulcer with hemorrhage    Alcohol abuse, uncomplicated    Anxiety disorder, unspecified    Diverticulum of bladder    Duodenal ulcer, unspecified as acute or chronic, without hemorrhage or perforation    Elevation of level of transaminase and lactic acid dehydrogenase (LDH)    Gastro-esophageal reflux disease without esophagitis    Hyperglycemia, unspecified    Hyperlipidemia, unspecified    Melena    Nicotine dependence, cigarettes, uncomplicated    Tobacco use    Severe malnutrition    Duodenal ulcer    Iron deficiency anemia    Cirrhosis of liver    Acute blood loss anemia    Squamous cell carcinoma of esophagus    Duodenal stenosis    Generalized weakness    Orthostatic hypotension    UTI (urinary tract infection)     Past Medical History:   Diagnosis Date    Anemia     Cancer     Cirrhosis     Duodenal ulcer     Gastric ulcer     GERD (gastroesophageal reflux disease)     History of alcohol abuse     HLD (hyperlipidemia)     Mood disorder      Past Surgical History:   Procedure Laterality Date    ENDOSCOPY N/A 2023    Procedure: ESOPHAGOGASTRODUODENOSCOPY;  Surgeon: Wesly Mckeon MD;  Location: [facility] ENDOSCOPY;  Service: Gastroenterology;  Laterality: N/A;      General Information       Row Name 24 0937          OT Time and Intention    Document Type evaluation;therapy note (daily note)  -TB     Mode of Treatment occupational therapy;individual therapy  -TB       Row Name 24 0937          General Information    Patient Profile Reviewed yes  -TB     Prior Level of  Function independent:;all household mobility;transfer;bed mobility;ADL's  Pt living with his sister following a rehab stay  -TB     Existing Precautions/Restrictions fall;orthostatic hypotension  -TB     Barriers to Rehab medically complex  -TB       Row Name 02/20/24 0937          Occupational Profile    Reason for Services/Referral (Occupational Profile) Occupational decline  -TB       Row Name 02/20/24 0937          Living Environment    People in Home sibling(s)  -TB       Row Name 02/20/24 0937          Cognition    Orientation Status (Cognition) oriented x 4  -TB       Row Name 02/20/24 0937          Safety Issues, Functional Mobility    Safety Issues Affecting Function (Mobility) insight into deficits/self-awareness;awareness of need for assistance;safety precaution awareness;safety precautions follow-through/compliance  -TB     Impairments Affecting Function (Mobility) balance;endurance/activity tolerance;strength  -TB     Comment, Safety Issues/Impairments (Mobility) Pt up in room/bathroom with RW support and CGAx1  -TB               User Key  (r) = Recorded By, (t) = Taken By, (c) = Cosigned By      Initials Name Provider Type    TB Ivis Myers OT Occupational Therapist                     Mobility/ADL's       Row Name 02/20/24 0938          Bed Mobility    Bed Mobility supine-sit;scooting/bridging  -TB     Scooting/Bridging [place] (Bed Mobility) standby assist;verbal cues  -TB     Supine-Sit [place] (Bed Mobility) standby assist;verbal cues  -TB     Assistive Device (Bed Mobility) head of bed elevated;bed rails  -TB     Comment, (Bed Mobility) Pt transitions to EOB with time  -TB       Row Name 02/20/24 0938          Transfers    Transfers sit-stand transfer;stand-sit transfer;bed-chair transfer  -TB     Comment, (Transfers) Education and cues for hand placement and safety  -TB       Row Name 02/20/24 0938          Bed-Chair Transfer    Bed-Chair [place] (Transfers) contact  guard;1 person assist;verbal cues  -TB     Assistive Device (Bed-Chair Transfers) walker, front-wheeled  -TB       Row Name 02/20/24 0938          Sit-Stand Transfer    Sit-Stand [place] (Transfers) contact guard;1 person assist;verbal cues  -TB     Assistive Device (Sit-Stand Transfers) walker, front-wheeled  -TB       Row Name 02/20/24 0938          Stand-Sit Transfer    Stand-Sit [place] (Transfers) contact guard;1 person assist;verbal cues  -TB     Assistive Device (Stand-Sit Transfers) walker, front-wheeled  -TB       Row Name 02/20/24 0938          Functional Mobility    Functional Mobility- Ind. Level contact guard assist;1 person;verbal cues required  -TB     Functional Mobility- Device walker, front-wheeled  -TB     Functional Mobility-Distance (Feet) 40  -TB     Functional Mobility- Safety Issues balance decreased during turns;sequencing ability decreased  -     Functional Mobility- Comment Pt up in room/bathroom with RW support and assist x1  -TB     Patient was able to Ambulate yes  -       Row Name 02/20/24 0938          Activities of Daily Living    BADL Assessment/Intervention bathing;upper body dressing;lower body dressing;grooming;feeding;toileting  -TB       Row Name 02/20/24 0938          Bathing Assessment/Intervention    [place] Level (Bathing) lower body;upper body;bathing skills;set up;standby assist  -TB     Position (Bathing) unsupported sitting;supported standing  -TB     Comment, (Bathing) Pt completed full sink side sponge bath with Set-up/SBA and time  -       Row Name 02/20/24 0938          Upper Body Dressing Assessment/Training    [place] Level (Upper Body Dressing) doff;don;pajama/robe;set up  -TB     Position (Upper Body Dressing) unsupported sitting  -TB       Row Name 02/20/24 0938          Lower Body Dressing Assessment/Training    [place] Level (Lower Body Dressing) doff;don;socks;pants/bottoms;set up;standby assist  -TB     Position (Lower Body  Dressing) unsupported sitting;supported standing  -TB     Comment, (Lower Body Dressing) Pt completes LB dressing with time  -       Row Name 02/20/24 0938          Grooming Assessment/Training    [place] Level (Grooming) set up;wash face, hands;hair care, combing/brushing  -TB     Position (Grooming) sink side  -TB       Row Name 02/20/24 0938          Self-Feeding Assessment/Training    [place] Level (Feeding) independent;scoop food and bring to mouth;liquids to mouth  -     Position (Self-Feeding) sitting up in bed  -TB       Row Name 02/20/24 0938          Toileting Assessment/Training    [place] Level (Toileting) independent  -TB     Assistive Devices (Toileting) urinal  -               User Key  (r) = Recorded By, (t) = Taken By, (c) = Cosigned By      Initials Name Provider Type    TB Ivis Myers OT Occupational Therapist                   Obj/Interventions       Row Name 02/20/24 0942          Sensory Assessment (Somatosensory)    Sensory Assessment (Somatosensory) UE sensation intact  -[facility] Name 02/20/24 0942          Vision Assessment/Intervention    Visual Impairment/Limitations corrective lenses full-time  -       Row Name 02/20/24 0942          Range of Motion Comprehensive    General Range of Motion bilateral upper extremity ROM WFL  -TB     Comment, General Range of Motion BUE AROM intact for self-care  -       Row Name 02/20/24 0942          Strength Comprehensive (MMT)    Comment, General Manual Muscle Testing (MMT) Assessment Generalized weakness without focal deficits  -       Row Name 02/20/24 0942          Balance    Balance Assessment sitting dynamic balance;sit to stand dynamic balance;standing dynamic balance  -TB     Dynamic Sitting Balance supervision  -TB     Position, Sitting Balance unsupported;sitting in chair;sitting edge of bed  BSC  -TB     Sit to Stand Dynamic Balance contact guard;1-person assist;verbal cues  -TB     Dynamic Standing  Balance contact guard;1-person assist;verbal cues  -TB     Position/Device Used, Standing Balance supported;walker, front-wheeled  -TB     Balance Interventions sitting;standing;sit to stand;supported;dynamic;dynamic reaching;occupation based/functional task;UE activity with balance activity  -TB     Comment, Balance No LOB  -TB               User Key  (r) = Recorded By, (t) = Taken By, (c) = Cosigned By      Initials Name Provider Type    TB Ivis Myers OT Occupational Therapist                   Goals/Plan       Row Name 02/20/24 0950          Transfer Goal 1 (OT)    Activity/Assistive Device (Transfer Goal 1, OT) toilet  -TB     [place] Level/Cues Needed (Transfer Goal 1, OT) standby assist  -TB     Time Frame (Transfer Goal 1, OT) by discharge  -TB     Progress/Outcome (Transfer Goal 1, OT) goal ongoing  -TB       Row Name 02/20/24 0950          Toileting Goal 1 (OT)    Activity/Device (Toileting Goal 1, OT) toileting skills, all  -TB     [place] Level/Cues Needed (Toileting Goal 1, OT) supervision required  -TB     Time Frame (Toileting Goal 1, OT) by discharge  -TB     Progress/Outcome (Toileting Goal 1, OT) goal ongoing  -TB       Row Name 02/20/24 0950          Strength Goal 1 (OT)    Strength Goal 1 (OT) Pt demonstrates independence with daily BUE HEP to support self-care and mobility: 3x5 reps STS with SBA. BUE Yellow Resistance Band HEP x10 reps  -TB     Time Frame (Strength Goal 1, OT) by discharge  -TB     Progress/Outcome (Strength Goal 1, OT) goal ongoing  -TB               User Key  (r) = Recorded By, (t) = Taken By, (c) = Cosigned By      Initials Name Provider Type    TB Ivis Myers OT Occupational Therapist                   Clinical Impression       Row Name 02/20/24 0943          Pain Assessment    Pain Intervention(s) Ambulation/increased activity;Repositioned;Elevated  -TB     Additional Documentation Pain Scale: FACES Pre/Post-Treatment (Group)  -TB       Row  Name 02/20/24 0943          Pain Scale: FACES Pre/Post-Treatment    Pain: FACES Scale, Pretreatment 2-->hurts little bit  -TB     Posttreatment Pain Rating 2-->hurts little bit  -TB     Pain Location lower  -TB     Pain Location - back  -TB     Pre/Posttreatment Pain Comment Pt tolerates dynamic EOB/OOB activity well  -TB       Row Name 02/20/24 0943          Plan of Care Review    Plan of Care Reviewed With patient  -TB     Progress --  IE  -TB     Outcome Evaluation OT IE completed. Pt is A/Ox4 and motivated to work with therapy. Up in room/bathroom with RW support and CGAx1. c/o dizziness with activity, orthostatic 104/58 down from 129/70. BUE AROM and sensation are WFL for self-care. Pt able to complete sponge bath, UB/LB dressing and grooming with Set-up/SBA. OT will follow IP to address generalized weakness and functional endurance deficits. Recommend SNF at d/c for best outcome.  -TB       Row Name 02/20/24 0943          Therapy Assessment/Plan (OT)    Rehab Potential (OT) good, to achieve stated therapy goals  -TB     Criteria for Skilled Therapeutic Interventions Met (OT) yes;meets criteria;skilled treatment is necessary  -TB     Therapy Frequency (OT) daily  -TB       Row Name 02/20/24 0943          Therapy Plan Review/Discharge Plan (OT)    Anticipated Discharge Disposition (OT) skilled nursing facility  -TB       Row Name 02/20/24 0943          Vital Signs    Pre Systolic BP Rehab 129  RN cleared OT  -TB     Pre Treatment Diastolic BP 70  -TB     Intra Systolic BP Rehab 104  -TB     Intra Treatment Diastolic BP 58  -TB     Post Systolic BP Rehab 122  -TB     Post Treatment Diastolic BP 65  -TB     Pre SpO2 (%) 95  -TB     O2 Delivery Pre Treatment room air  -TB     Pre Patient Position Supine  -TB     Intra Patient Position Standing  -TB     Post Patient Position Sitting  -TB       Row Name 02/20/24 0943          Positioning and Restraints    Pre-Treatment Position in bed  -TB     Post Treatment Position  chair  -TB     In Chair notified nsg;reclined;call light within reach;encouraged to call for assist;exit alarm on;waffle cushion;legs elevated  -TB               User Key  (r) = Recorded By, (t) = Taken By, (c) = Cosigned By      Initials Name Provider Type    Ivis Mccarthy OT Occupational Therapist                   Outcome Measures       Row Name 02/20/24 0952          How much help from another is currently needed...    Putting on and taking off regular lower body clothing? 3  -TB     Bathing (including washing, rinsing, and drying) 3  -TB     Toileting (which includes using toilet bed pan or urinal) 3  -TB     Putting on and taking off regular upper body clothing 3  -TB     Taking care of personal grooming (such as brushing teeth) 3  -TB     Eating meals 4  -TB     AM-PAC 6 Clicks Score (OT) 19  -TB       Row Name 02/20/24 0952          Functional Assessment    Outcome Measure Options AM-PAC 6 Clicks Daily Activity (OT)  -TB               User Key  (r) = Recorded By, (t) = Taken By, (c) = Cosigned By      Initials Name Provider Type    Ivis Mccarthy OT Occupational Therapist                    Occupational Therapy Education       Title: PT OT SLP Therapies (In Progress)       Topic: Occupational Therapy (In Progress)       Point: ADL training (Done)       Description:   Instruct learner(s) on proper safety adaptation and remediation techniques during self care or transfers.   Instruct in proper use of assistive devices.                  Learning Progress Summary             Patient Acceptance, E,D,TB, VU,DU,NR by TB at 2/20/2024 0953                         Point: Home exercise program (Not Started)       Description:   Instruct learner(s) on appropriate technique for monitoring, assisting and/or progressing therapeutic exercises/activities.                  Learner Progress:  Not documented in this visit.              Point: Precautions (Not Started)       Description:   Instruct  learner(s) on prescribed precautions during self-care and functional transfers.                  Learner Progress:  Not documented in this visit.              Point: Body mechanics (Not Started)       Description:   Instruct learner(s) on proper positioning and spine alignment during self-care, functional mobility activities and/or exercises.                  Learner Progress:  Not documented in this visit.                              User Key       Initials Effective Dates Name Provider Type Discipline     07/11/23 -  Ivis Myers OT Occupational Therapist OT                  OT Recommendation and Plan  Therapy Frequency (OT): daily  Plan of Care Review  Plan of Care Reviewed With: patient  Progress:  (IE)  Outcome Evaluation: OT IE completed. Pt is A/Ox4 and motivated to work with therapy. Up in room/bathroom with RW support and CGAx1. c/o dizziness with activity, orthostatic 104/58 down from 129/70. BUE AROM and sensation are WFL for self-care. Pt able to complete sponge bath, UB/LB dressing and grooming with Set-up/SBA. OT will follow IP to address generalized weakness and functional endurance deficits. Recommend SNF at d/c for best outcome.     Time Calculation:   Evaluation Complexity (OT)  Review Occupational Profile/Medical/Therapy History Complexity: expanded/moderate complexity  Assessment, Occupational Performance/Identification of Deficit Complexity: 3-5 performance deficits  Clinical Decision Making Complexity (OT): detailed assessment/moderate complexity  Overall Complexity of Evaluation (OT): moderate complexity     Time Calculation- OT       Row Name 02/20/24 0826             Time Calculation- OT    OT Start Time 0826  -TB      OT Received On 02/20/24  -TB      OT Goal Re-Cert Due Date 03/01/24  -TB         Timed Charges    24662 - OT Self Care/Mgmt Minutes 30  -TB         Untimed Charges    OT Eval/Re-eval Minutes 45  -TB         Total Minutes    Timed Charges Total Minutes 30  -TB       Untimed Charges Total Minutes 45  -TB       Total Minutes 75  -TB                User Key  (r) = Recorded By, (t) = Taken By, (c) = Cosigned By      Initials Name Provider Type    TB Ivis Myers, OT Occupational Therapist                  Therapy Charges for Today       Code Description Service Date Service Provider Modifiers Qty    [id number]  OT SELF CARE/MGMT/TRAIN EA 15 MIN 2/20/2024 Ivis Myers OT GO 2    [id number]  OT EVAL MOD COMPLEXITY 3 2/20/2024 Ivis Myers OT GO 1                 Ivis Myers OT  2/20/2024

## 2024-02-20 NOTE — PLAN OF CARE
Goal Outcome Evaluation:  Plan of Care Reviewed With: patient        Progress: no change  Outcome Evaluation: Palliative consult for GOC/ACP. ACP doc on file, designates nephmargo Nassar as primary HCS, sister Douglas Patterson as secondary. Pt currently resides with sister Douglas in [place], but wishes to return to [facility] for LTC and hospice care, EOL care. Awaiting Rad/Onc input for possible palliation, but pt states he does not wish to pursue further cancer treatment following hospital discharge. Meds adjusted by Dr. JOSE G Regalado for pain, dyspnea. Palliative following for continued support in ongoing GOC/POC discussion.    1300 Palliative IDT meeting:  MD, APRN, RN, SW,   After hours, weekends and holidays, contact Palliative Provider by calling [phone]     Problem: Palliative Care  Goal: Enhanced Quality of Life  Outcome: Ongoing, Progressing  Intervention: Promote Advance Care Planning  Flowsheets (Taken 2/20/2024 1426)  Life Transition/Adjustment:   palliative care discussed   palliative care initiated  Intervention: Maximize Comfort  Flowsheets (Taken 2/20/2024 1426)  Pain Management Interventions: pain management plan reviewed with patient/caregiver  Intervention: Optimize Function  Flowsheets (Taken 2/20/2024 1426)  Fatigue Management: paced activity encouraged  Sleep/Rest Enhancement: consistent schedule promoted  Intervention: Optimize Psychosocial Wellbeing  Flowsheets (Taken 2/20/2024 1426)  Supportive Measures:   active listening utilized   decision-making supported   positive reinforcement provided   relaxation techniques promoted   self-care encouraged   self-reflection promoted   self-responsibility promoted   verbalization of feelings encouraged  Spiritual Activities Assistance: (Spiritual Care consult) other (see comments)  Family/Support System Care: (Palliative information and meal discount card provided) support provided

## 2024-02-21 LAB — BACTERIA SPEC AEROBE CULT: ABNORMAL

## 2024-02-21 PROCEDURE — G0378 HOSPITAL OBSERVATION PER HR: HCPCS

## 2024-02-21 PROCEDURE — 97535 SELF CARE MNGMENT TRAINING: CPT

## 2024-02-21 PROCEDURE — 99232 SBSQ HOSP IP/OBS MODERATE 35: CPT | Performed by: HOSPITALIST

## 2024-02-21 PROCEDURE — 96372 THER/PROPH/DIAG INJ SC/IM: CPT

## 2024-02-21 PROCEDURE — 25010000002 ENOXAPARIN PER 10 MG: Performed by: NURSE PRACTITIONER

## 2024-02-21 PROCEDURE — 99214 OFFICE O/P EST MOD 30 MIN: CPT | Performed by: INTERNAL MEDICINE

## 2024-02-21 RX ORDER — LIDOCAINE 4 G/G
2 PATCH TOPICAL
Status: DISCONTINUED | OUTPATIENT
Start: 2024-02-21 | End: 2024-02-23 | Stop reason: HOSPADM

## 2024-02-21 RX ORDER — NITROFURANTOIN 25; 75 MG/1; MG/1
100 CAPSULE ORAL EVERY 12 HOURS SCHEDULED
Status: DISCONTINUED | OUTPATIENT
Start: 2024-02-21 | End: 2024-02-23 | Stop reason: HOSPADM

## 2024-02-21 RX ADMIN — GABAPENTIN 300 MG: 300 CAPSULE ORAL at 08:25

## 2024-02-21 RX ADMIN — NITROFURANTOIN MONOHYDRATE/MACROCRYSTALS 100 MG: 75; 25 CAPSULE ORAL at 12:04

## 2024-02-21 RX ADMIN — FINASTERIDE 5 MG: 5 TABLET, FILM COATED ORAL at 08:25

## 2024-02-21 RX ADMIN — ACETAMINOPHEN 650 MG: 325 TABLET ORAL at 15:39

## 2024-02-21 RX ADMIN — ESCITALOPRAM OXALATE 10 MG: 10 TABLET ORAL at 20:25

## 2024-02-21 RX ADMIN — Medication 1000 MCG: at 08:25

## 2024-02-21 RX ADMIN — FOLIC ACID 1 MG: 1 TABLET ORAL at 08:25

## 2024-02-21 RX ADMIN — Medication 10 ML: at 08:25

## 2024-02-21 RX ADMIN — ENOXAPARIN SODIUM 40 MG: 100 INJECTION SUBCUTANEOUS at 08:26

## 2024-02-21 RX ADMIN — ACETAMINOPHEN 650 MG: 325 TABLET ORAL at 08:25

## 2024-02-21 RX ADMIN — TAMSULOSIN HYDROCHLORIDE 0.4 MG: 0.4 CAPSULE ORAL at 08:25

## 2024-02-21 RX ADMIN — PANTOPRAZOLE SODIUM 40 MG: 40 TABLET, DELAYED RELEASE ORAL at 20:25

## 2024-02-21 RX ADMIN — Medication 10 ML: at 20:25

## 2024-02-21 RX ADMIN — ACETAMINOPHEN 650 MG: 325 TABLET ORAL at 03:35

## 2024-02-21 RX ADMIN — OXYCODONE HYDROCHLORIDE 5 MG: 5 TABLET ORAL at 08:25

## 2024-02-21 RX ADMIN — OXYCODONE HYDROCHLORIDE 5 MG: 5 TABLET ORAL at 15:39

## 2024-02-21 RX ADMIN — LIDOCAINE 2 PATCH: 4 PATCH TOPICAL at 20:57

## 2024-02-21 RX ADMIN — PANTOPRAZOLE SODIUM 40 MG: 40 TABLET, DELAYED RELEASE ORAL at 08:25

## 2024-02-21 RX ADMIN — NITROFURANTOIN MONOHYDRATE/MACROCRYSTALS 100 MG: 75; 25 CAPSULE ORAL at 20:25

## 2024-02-21 RX ADMIN — GABAPENTIN 300 MG: 300 CAPSULE ORAL at 20:25

## 2024-02-21 RX ADMIN — OXYCODONE HYDROCHLORIDE 5 MG: 5 TABLET ORAL at 22:45

## 2024-02-21 RX ADMIN — GABAPENTIN 300 MG: 300 CAPSULE ORAL at 15:39

## 2024-02-21 NOTE — CASE MANAGEMENT/SOCIAL WORK
Continued Stay Note  [facility]     Patient Name: Val Nassar  MRN: [id number]  Today's Date: 2/21/2024    Admit Date: 2/18/2024    Plan: [facility], pending acceptance   Discharge Plan       Row Name 02/21/24 1514       Plan    Plan [facility], pending acceptance    Patient/Family in Agreement with Plan yes    Plan Comments CM spoke with patient at bedside regarding DC planning. Patient states his sister is no longer able to provide care for him and he is looking for LTC placement at [facility]. Palliative following. Per MDR, patient not interested in chemotherapy, plans to talk with Dr Aguila to see if he could pursue palliative radiation therapy while he is in the hospital awaiting long term care placement. ANA LAURA spoke with jay Healyison at [facility] and they are not able to provide patient with radiation therapy @ [facility]. [facility] is still reviewing for bed availability at the facility. CM following.    Final Discharge Disposition Code 03 - skilled nursing facility (SNF)                   Discharge Codes    No documentation.                 Expected Discharge Date and Time       Expected Discharge Date Expected Discharge Time    Feb 23, 2024               Anum Powell RN

## 2024-02-21 NOTE — PROGRESS NOTES
"Palliative Care Daily Progress Note     Referring: Katerina Ga    C/C: none    S: Medical record reviewed. Events noted. Thinks he might be interested in tx if he could live somewhere else with help.  Not seen by rad onc.     ROS:   Some R shoulder pain but a little better than yesterday. Denied SOA, N/V/D.      O: Code Status:   Code Status and Medical Interventions:   Ordered at: 02/18/24 2129     Code Status (Patient has no pulse and is not breathing):    CPR (Attempt to Resuscitate)     Medical Interventions (Patient has pulse or is breathing):    Full Support      Advanced Directives: Advance Directive Status: Patient has advance directive, copy in chart   Goals of Care: Ongoing.   Palliative Performance Scale Score: 50%     /63 (BP Location: Right arm, Patient Position: Lying)   Pulse 70   Temp 98.4 °F (36.9 °C) (Oral)   Resp 16   Ht 175.3 cm (69\")   Wt 66.7 kg (147 lb)   SpO2 97%   BMI 21.71 kg/m²     Intake/Output Summary (Last 24 hours) at 2/21/2024 1111  Last data filed at 2/21/2024 0823  Gross per 24 hour   Intake 975 ml   Output 2205 ml   Net -1230 ml       PE:  General Appearance:    Alert, cooperative, NAD   HEENT:    NC/AT, EOMI, anicteric, MMM, face relaxed   Neck:   supple, trachea midline, no JVD   Lungs:     CTA bilaterally, diminished in bases; respirations regular, even and unlabored; RR on exam    Heart:    RRR, normal S1 and S2, no M/R/G   Abdomen:     Normal bowel sounds, soft, nontender, nondistended   G/U:   Deferred   MSK/Extremities:   Wasting, no edema   Pulses:   Pulses palpable and equal bilaterally   Skin:   Warm, dry   Neurologic:   A/Ox3, cooperative, BAUER   Psych:   Calm, appropriate     Meds: Reviewed and changes not noted    Labs:   Results from last 7 days   Lab Units 02/20/24  0515   WBC 10*3/mm3 6.55   HEMOGLOBIN g/dL 8.6*   HEMATOCRIT % 28.2*   PLATELETS 10*3/mm3 227     Results from last 7 days   Lab Units 02/20/24  0515   SODIUM mmol/L 141   POTASSIUM mmol/L " 4.2   CHLORIDE mmol/L 105   CO2 mmol/L 26.0   BUN mg/dL 12   CREATININE mg/dL 0.57*   GLUCOSE mg/dL 94   CALCIUM mg/dL 9.9     Results from last 7 days   Lab Units 02/20/24  0515 02/19/24  0433 02/18/24  1625   SODIUM mmol/L 141   < > 136   POTASSIUM mmol/L 4.2   < > 4.1   CHLORIDE mmol/L 105   < > 101   CO2 mmol/L 26.0   < > 27.0   BUN mg/dL 12   < > 13   CREATININE mg/dL 0.57*   < > 0.63*   CALCIUM mg/dL 9.9   < > 9.8   BILIRUBIN mg/dL  --   --  0.2   ALK PHOS U/L  --   --  162*   ALT (SGPT) U/L  --   --  25   AST (SGOT) U/L  --   --  32   GLUCOSE mg/dL 94   < > 103*    < > = values in this interval not displayed.     Imaging Results (Last 72 Hours)       Procedure Component Value Units Date/Time    CT Chest With Contrast Diagnostic [[id number]] Collected: 02/20/24 1904     Updated: 02/20/24 1921    Narrative:      CT CHEST W CONTRAST DIAGNOSTIC    Date of Exam: 2/20/2024 4:40 PM EST    Indication: esophageal cancer.    Comparison: 12/29/2023 chest CT scan    Technique: Axial CT images were obtained of the chest after the uneventful intravenous administration of 75 mL Isovue-300.  Reconstructed coronal and sagittal images were also obtained. Automated exposure control and iterative construction methods were   used.      Findings:  Prior study report noted wall thickening of the distal esophagus presumably patient's known neoplasm. Office notes indicate ulcerated mass of the upper third of the esophagus, approximately 2 cm in length. This may be the area of fullness and   low-attenuation seen on today's scan image #17 series 2 and adjacent images. Remainder the esophagus appears grossly normal. No evidence of a frankly invasive mass is identified and there are only shotty normal-sized mediastinal lymph nodes.     There is trace pericardial fluid and no pleural effusion. Airways appear normally patent. Thoracic aorta and pulmonary arteries appear grossly normal for standard contrast enhanced scan. Lungs show stable  mild biapical scarring. There is a calcified   right hilar granuloma. No active pulmonary parenchymal disease is seen.    Included images of the upper abdomen show enlarged left liver lobe and nodular liver capsule, consistent with cirrhosis. Spleen does not appear to be significantly enlarged. A small gallstone is seen in the contracted but otherwise normal-appearing   gallbladder. Included portion of the pancreas, adrenal glands, and upper renal poles appear within normal limits.     The final image of the scan, #115 series 2 shows a small calcification at or near the distal common bile duct. This was not present on the 2/4/2024 scan, and is suggestive of a nonobstructing 1-2 mm distal common duct stone. There are scattered, at least   mildly enlarged latosha hepatis lymph nodes, which appear stable from 2/4/2024. Bony structures appear to be intact.      Impression:      Impression:    1. Focal thickened appearance of the upper thoracic esophagus, potentially patient's known tumor. Remainder the esophagus appears normal.    2. No evidence of malignancy/metastasis in the chest elsewhere.    3. Enlarged latosha hepatis lymph nodes noted.    4. Small gallstones again noted. Today's study appears to show a nonobstructing 1 to 2 mm distal common duct stone as well, new from 2/4/2024 abdominal CT scan.    5. Liver morphology consistent with cirrhosis, but no additional features of cirrhosis are appreciated.      Electronically Signed: Ibrahima Jett MD    2/20/2024 7:17 PM EST    Workstation ID: [id number]    MRI Brain Without Contrast [[id number]] Collected: 02/18/24 1750     Updated: 02/18/24 1800    Narrative:      MRI BRAIN WO CONTRAST    Date of Exam: 2/18/2024 5:05 PM EST    Indication: Dizziness, off balance, present x 2 weeks.     Comparison: 2/4/2024 CT    Technique:  Routine multiplanar/multisequence sequence images of the brain were obtained without contrast administration.      Findings:  Diffusion imaging shows no  evidence of acute infarct. There are scattered subcortical and periventricular T2 hyperintensities which are nonspecific but likely sequela of mild to moderate small vessel ischemic disease. No evidence of intracranial   hemorrhage.    There is normal ventricular volume. The basal cisterns are patent. There is no evidence of extra-axial fluid collection. There is normal flow voids within the intracranial vessels.    No evidence of fracture or suspicious bony lesion. Paranasal sinusitis with gas/fluid level in the right maxillary sinus. Trace bilateral mastoid air cell effusions. Visualized orbits are unremarkable.      Impression:      Impression:  No acute intracranial abnormality. No suspicious mass on this noncontrast MRI.    Paranasal sinusitis with gas/fluid level in the right maxillary sinus.        Electronically Signed: Tashi Alfaro MD    2/18/2024 5:56 PM EST    Workstation ID: [id number]    XR Chest 1 View [[id number]] Collected: 02/18/24 1612     Updated: 02/18/24 1616    Narrative:      XR CHEST 1 VW    Date of Exam: 2/18/2024 3:56 PM EST    Indication: Weak/Dizzy/AMS triage protocol    Comparison: Chest x-ray 12/29/2023    Findings:  Normal cardiomediastinal silhouette. Mild upper lobe emphysema. No focal consolidation. No pleural effusion or pneumothorax. No acute osseous findings.      Impression:      Impression:  No acute cardiopulmonary findings.      Electronically Signed: Nikita Garrison MD    2/18/2024 4:13 PM EST    Workstation ID: [id number]                  Diagnostics: Reviewed    A:     UTI (urinary tract infection)    Anxiety disorder, unspecified    Gastro-esophageal reflux disease without esophagitis    Hyperlipidemia, unspecified    Tobacco use    Iron deficiency anemia    Cirrhosis of liver    Squamous cell carcinoma of esophagus    Generalized weakness       Impression:  Invasive squamous cell carcinoma esophagus  UTI  R rotator cuff tendonitis  Cirrhosis - d/t EtOH  Anemia  Portal htn      Symptoms:  Debility    P:   Continue to monitor for needs.  No symptoms currently. Palliative Care Team will continue to follow patient.     Olga Regalado MD  2/21/2024

## 2024-02-21 NOTE — PROGRESS NOTES
[facility] Medicine Services  PROGRESS NOTE    Patient Name: Val Nassar  : 1960  MRN: [id number]    Date of Admission: 2024  Primary Care Physician: Wesly Minor MD    Subjective   Subjective     CC:  F/U weakness, dizziness    HPI:  Seen this morning. No major complaints. He is wanting to talk to Dr. Aguila again about radiation.       Objective   Objective     Vital Signs:   Temp:  [98 °F (36.7 °C)-98.4 °F (36.9 °C)] 98.4 °F (36.9 °C)  Heart Rate:  [61-79] 70  Resp:  [16-17] 16  BP: (104-119)/(47-70) 119/63     Physical Exam:  Gen-no acute distress  HENT-NCAT, mucous membranes moist  CV-RRR, S1 S2 normal, no m/r/g  Resp-CTAB, no wheezes or rales  Abd-soft, NT, ND, +BS  Ext-no edema  Neuro-A&Ox3, no focal deficits  Skin-no rashes  Psych-appropriate mood  No change from 24      Results Reviewed:  LAB RESULTS:      Lab 24  0515 24  0433 24  1625   WBC 6.55 5.61 6.39   HEMOGLOBIN 8.6* 8.3* 9.2*   HEMATOCRIT 28.2* 27.7* 30.1*   PLATELETS 227 229 262   NEUTROS ABS  --  2.81 3.96   EOS ABS  --  0.28 0.00   MCV 81.3 82.7 85.0   PROCALCITONIN  --   --  0.10   LACTATE  --   --  1.1         Lab 24  0515 24  0433 24  1625   SODIUM 141 138 136   POTASSIUM 4.2 3.9 4.1   CHLORIDE 105 104 101   CO2 26.0 27.0 27.0   ANION GAP 10.0 7.0 8.0   BUN 12 12 13   CREATININE 0.57* 0.56* 0.63*   EGFR 109.5 110.1 106.2   GLUCOSE 94 103* 103*   CALCIUM 9.9 9.5 9.8   MAGNESIUM 1.7 1.4* 1.6         Lab 24  1625   TOTAL PROTEIN 7.6   ALBUMIN 3.8   GLOBULIN 3.8   ALT (SGPT) 25   AST (SGOT) 32   BILIRUBIN 0.2   ALK PHOS 162*         Lab 24  1625   HSTROP T 11                 Brief Urine Lab Results  (Last result in the past 365 days)        Color   Clarity   Blood   Leuk Est   Nitrite   Protein   CREAT   Urine HCG        24 1657 Yellow   Clear   Negative   Small (1+)   Negative   Negative                   Microbiology Results Abnormal        None            CT Chest With Contrast Diagnostic    Result Date: 2/20/2024  CT CHEST W CONTRAST DIAGNOSTIC Date of Exam: 2/20/2024 4:40 PM EST Indication: esophageal cancer. Comparison: 12/29/2023 chest CT scan Technique: Axial CT images were obtained of the chest after the uneventful intravenous administration of 75 mL Isovue-300.  Reconstructed coronal and sagittal images were also obtained. Automated exposure control and iterative construction methods were used. Findings: Prior study report noted wall thickening of the distal esophagus presumably patient's known neoplasm. Office notes indicate ulcerated mass of the upper third of the esophagus, approximately 2 cm in length. This may be the area of fullness and low-attenuation seen on today's scan image #17 series 2 and adjacent images. Remainder the esophagus appears grossly normal. No evidence of a frankly invasive mass is identified and there are only shotty normal-sized mediastinal lymph nodes. There is trace pericardial fluid and no pleural effusion. Airways appear normally patent. Thoracic aorta and pulmonary arteries appear grossly normal for standard contrast enhanced scan. Lungs show stable mild biapical scarring. There is a calcified right hilar granuloma. No active pulmonary parenchymal disease is seen. Included images of the upper abdomen show enlarged left liver lobe and nodular liver capsule, consistent with cirrhosis. Spleen does not appear to be significantly enlarged. A small gallstone is seen in the contracted but otherwise normal-appearing gallbladder. Included portion of the pancreas, adrenal glands, and upper renal poles appear within normal limits. The final image of the scan, #115 series 2 shows a small calcification at or near the distal common bile duct. This was not present on the 2/4/2024 scan, and is suggestive of a nonobstructing 1-2 mm distal common duct stone. There are scattered, at least  mildly enlarged latosha hepatis lymph  nodes, which appear stable from 2/4/2024. Bony structures appear to be intact.     Impression: Impression: 1. Focal thickened appearance of the upper thoracic esophagus, potentially patient's known tumor. Remainder the esophagus appears normal. 2. No evidence of malignancy/metastasis in the chest elsewhere. 3. Enlarged latosah hepatis lymph nodes noted. 4. Small gallstones again noted. Today's study appears to show a nonobstructing 1 to 2 mm distal common duct stone as well, new from 2/4/2024 abdominal CT scan. 5. Liver morphology consistent with cirrhosis, but no additional features of cirrhosis are appreciated. Electronically Signed: Ibrahima Jett MD  2/20/2024 7:17 PM EST  Workstation ID: [id number]         Current medications:  Scheduled Meds:enoxaparin, 40 mg, Subcutaneous, Daily  escitalopram, 10 mg, Oral, Nightly  finasteride, 5 mg, Oral, Daily  folic acid, 1 mg, Oral, Daily  gabapentin, 300 mg, Oral, TID  nitrofurantoin (macrocrystal-monohydrate), 100 mg, Oral, Q12H  pantoprazole, 40 mg, Oral, BID  sodium chloride, 10 mL, Intravenous, Q12H  tamsulosin, 0.4 mg, Oral, Daily  vitamin B-12, 1,000 mcg, Oral, Daily      Continuous Infusions:   PRN Meds:.  acetaminophen **OR** acetaminophen **OR** acetaminophen    senna-docusate sodium **AND** polyethylene glycol **AND** bisacodyl **AND** bisacodyl    Calcium Replacement - Follow Nurse / BPA Driven Protocol    Magnesium Standard Dose Replacement - Follow Nurse / BPA Driven Protocol    oxyCODONE    Phosphorus Replacement - Follow Nurse / BPA Driven Protocol    Potassium Replacement - Follow Nurse / BPA Driven Protocol    sodium chloride    sodium chloride    sodium chloride    Assessment & Plan   Assessment & Plan     Active Hospital Problems    Diagnosis  POA    **UTI (urinary tract infection) [N39.0]  Yes    Generalized weakness [R53.1]  Yes    Cirrhosis of liver [K74.60]  Yes    Iron deficiency anemia [D50.9]  Yes    Squamous cell carcinoma of esophagus [C15.9]  Yes     Anxiety disorder, unspecified [F41.9]  Yes    Hyperlipidemia, unspecified [E78.5]  Yes    Gastro-esophageal reflux disease without esophagitis [K21.9]  Yes    Tobacco use [Z72.0]  Yes      Resolved Hospital Problems   No resolved problems to display.        Brief Hospital Course to date:  Val Nassar is a 63 y.o. male  with PMH significant for PAD, cirrhosis, alcohol abuse, recently diagnosed squamous cell carcinoma of the esophagus, anemia, and HLD, who presents to the ED with complaints of weakness, bilateral arm tingling, and dizziness.    Two recent admissions to [facility]:  *12/25/23-1/3/24: Admitted with GI bleed and atypical pneumonia. 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 No varices noted. Was discharged to [facility] with outpatient Oncology and Radiation Oncology follow ups.  *2/4/24-2/4/24: Same day admit and discharge for dizziness and weakness, found to have orthostatic hypotension which improved with IV fluids and albumin. Coreg discontinued at that time as no evidence of varices on previous EGD and no other indication to be on it. He was discharged home and his Oncology and Rad/Onc appointments were rescheduled as he had been in rehab during that time.     This patient's problems and plans were partially entered by my partner and updated as appropriate by me 02/21/24. Copied text in this note has been reviewed and is accurate as of today's date.      Enterococcus UTI, POA  --UA with 21-50 WBC, 3+ bacteria  --Urine culture with Enterococcus faecalis  --Stop IV Rocephin and switch to Macrobid x 3 days to complete course     Dizziness/lightheadedness  Generalized weakness  --MRI brain negative  --Fall precautions  --Previously with orthostatic hypotension during recent admission, Coreg had been discontinued at that time; orthostatic vitals are negative here  --s/p gentle IV fluids  --PT/OT  evaluation, patient interested in long term care placement     Iron deficiency anemia  Recent GI bleed secondary to PUD + esophageal mass  --Continue BID Protonix  --Was to follow up with GI 6-8 weeks after his admission end of December 2023/early January 2024 -- will need to be rescheduled if still within goals of care (missed appointment on 2/12/24)  --H&H stable, monitor    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 Ga) and Radiation Oncology (Dr. Aguila)  --Consulted Oncology and Rad/Onc here  --Dr. Ga ordered CT chest for restaging which appears stable, however due to poor performance status he is likely not going to be able to tolerate systemic chemotherapy - patient at this point is not interested in chemotherapy, plans to talk with Rad/Onc as well to see if he has any palliative options there, could pursue some palliative radiation therapy while he is in the hospital awaiting long term care placement  --Palliative Care following, plan is LTC placement (Palliative can follow there) with likely eventual transition to hospice     Anxiety  --Continue Lexapro    Chronic pain  --PCP recently prescribed Percocet, will continue here  --Palliative also helping manage     BPH  --Continue Flomax, Proscar     Cirrhosis  History of alcohol abuse  --Sober > 100 days  --Vivitrol per PCP - has been on IM injection every 28 days  --Continue folic acid     Tobacco abuse  --Reports recent cessation        Expected Discharge Location and Transportation: SNF/LTC  Expected Discharge plan is LTC at [facility] if accepted, Palliative can follow there and he may be able to transition to hospice when ready  Expected Discharge Date: 2/23/2024; Expected Discharge Time:      DVT prophylaxis:  Medical DVT prophylaxis orders are present.         AM-PAC 6 Clicks Score (PT): 20 (02/20/24 2030)    CODE STATUS:   Code Status and Medical Interventions:   Ordered  at: 02/18/24 2129     Code Status (Patient has no pulse and is not breathing):    CPR (Attempt to Resuscitate)     Medical Interventions (Patient has pulse or is breathing):    Full Support       Paulina Curtis MD  02/21/24

## 2024-02-21 NOTE — PLAN OF CARE
Problem: Adult Inpatient Plan of Care  Goal: Plan of Care Review  Recent Flowsheet Documentation  Taken 2/21/2024 1524 by Ivis Myers OT  Progress: no change  Plan of Care Reviewed With: patient  Outcome Evaluation: Pt is A/Ox4 and participates in therapy with encouragement. Limited by acute R shoulder pain this session. RN notified. Min A supine to sit EOB. Pt up in room/bathroom with RW support and transfering with CGAx1. Independent for toileting hygiene. Set-up/SBA for sink side grooming. OT will continue to follow IP. Plan is SNF/LTC at d/c.

## 2024-02-21 NOTE — THERAPY TREATMENT NOTE
Patient Name: Val Nassar  : 1960    MRN: [id number]                              Today's Date: 2024       Admit Date: 2024    Visit Dx:     ICD-10-CM ICD-9-CM   1. Urinary tract infection without hematuria, site unspecified  N39.0 599.0   2. General weakness  R53.1 780.79   3. Multiple falls  R29.6 V15.88   4. Unable to ambulate  R26.2 719.7     Patient Active Problem List   Diagnosis    Acute gastric ulcer with hemorrhage    Alcohol abuse, uncomplicated    Anxiety disorder, unspecified    Diverticulum of bladder    Duodenal ulcer, unspecified as acute or chronic, without hemorrhage or perforation    Elevation of level of transaminase and lactic acid dehydrogenase (LDH)    Gastro-esophageal reflux disease without esophagitis    Hyperglycemia, unspecified    Hyperlipidemia, unspecified    Melena    Nicotine dependence, cigarettes, uncomplicated    Tobacco use    Severe malnutrition    Duodenal ulcer    Iron deficiency anemia    Cirrhosis of liver    Acute blood loss anemia    Squamous cell carcinoma of esophagus    Duodenal stenosis    Generalized weakness    Orthostatic hypotension    UTI (urinary tract infection)     Past Medical History:   Diagnosis Date    Anemia     Cancer     Cirrhosis     Duodenal ulcer     Gastric ulcer     GERD (gastroesophageal reflux disease)     History of alcohol abuse     HLD (hyperlipidemia)     Mood disorder      Past Surgical History:   Procedure Laterality Date    ENDOSCOPY N/A 2023    Procedure: ESOPHAGOGASTRODUODENOSCOPY;  Surgeon: Wesly Mckeon MD;  Location: [facility] ENDOSCOPY;  Service: Gastroenterology;  Laterality: N/A;      General Information       Row Name 24 1520          OT Time and Intention    Document Type therapy note (daily note)  -TB     Mode of Treatment occupational therapy;individual therapy  -TB       Row Name 24 152          General Information    Patient Profile Reviewed yes  -TB     Existing  Precautions/Restrictions fall;orthostatic hypotension  -TB     Barriers to Rehab medically complex  -TB       Row Name 02/21/24 1520          Cognition    Orientation Status (Cognition) oriented x 4  -TB       Row Name 02/21/24 1520          Safety Issues, Functional Mobility    Safety Issues Affecting Function (Mobility) insight into deficits/self-awareness;awareness of need for assistance;safety precaution awareness;safety precautions follow-through/compliance  -TB     Impairments Affecting Function (Mobility) balance;endurance/activity tolerance;strength;pain;range of motion (ROM)  -TB               User Key  (r) = Recorded By, (t) = Taken By, (c) = Cosigned By      Initials Name Provider Type    TB Ivis Myers, OT Occupational Therapist                     Mobility/ADL's       Row Name 02/21/24 1520          Bed Mobility    Bed Mobility supine-sit;scooting/bridging  -TB     Scooting/Bridging [place] (Bed Mobility) standby assist;verbal cues  -TB     Supine-Sit [place] (Bed Mobility) minimum assist (75% patient effort);verbal cues  -TB     Bed Mobility, Safety Issues decreased use of arms for pushing/pulling  -TB     Assistive Device (Bed Mobility) bed rails  -TB     Comment, (Bed Mobility) Assist to raise trunk from flat bed surface. Limited by acute R shoulder pain.  -TB       Row Name 02/21/24 1520          Transfers    Transfers sit-stand transfer;stand-sit transfer;toilet transfer;bed-chair transfer  -TB     Comment, (Transfers) Cues for sequencing and safety  -TB       Row Name 02/21/24 1520          Bed-Chair Transfer    Bed-Chair [place] (Transfers) contact guard;1 person assist;verbal cues  -TB     Assistive Device (Bed-Chair Transfers) walker, front-wheeled  -TB       Row Name 02/21/24 1520          Sit-Stand Transfer    Sit-Stand [place] (Transfers) contact guard;1 person assist;verbal cues  -TB     Assistive Device (Sit-Stand Transfers) walker, front-wheeled  -TB        Row Name 02/21/24 1520          Stand-Sit Transfer    Stand-Sit [place] (Transfers) contact guard;1 person assist;verbal cues  -TB     Assistive Device (Stand-Sit Transfers) walker, front-wheeled  -TB       Row Name 02/21/24 1520          Toilet Transfer    Type (Toilet Transfer) sit-stand;stand-sit  -TB     [place] Level (Toilet Transfer) contact guard;1 person assist;verbal cues  -TB     Assistive Device (Toilet Transfer) raised toilet seat;grab bars/safety frame;walker, front-wheeled  -TB       Row Name 02/21/24 1520          Functional Mobility    Functional Mobility- Ind. Level contact guard assist;1 person;verbal cues required  -TB     Functional Mobility- Device walker, front-wheeled  -TB     Functional Mobility-Distance (Feet) 40  -TB     Functional Mobility- Safety Issues balance decreased during turns;sequencing ability decreased  -     Functional Mobility- Comment Pt up in room/bathroom with RW support and assist x1. Good effort.  -TB     Patient was able to Ambulate yes  -       Row Name 02/21/24 1520          Activities of Daily Living    BADL Assessment/Intervention grooming;toileting;feeding  -       Row Name 02/21/24 1520          Grooming Assessment/Training    [place] Level (Grooming) set up;hair care, combing/brushing;wash face, hands;standby assist  -TB     Position (Grooming) sink side;supported standing  -       Row Name 02/21/24 1520          Self-Feeding Assessment/Training    [place] Level (Feeding) independent;liquids to mouth  -TB     Position (Self-Feeding) supported sitting  -       Row Name 02/21/24 1520          Toileting Assessment/Training    [place] Level (Toileting) contact guard assist;adjust/manage clothing;independent;perform perineal hygiene  -TB     Position (Toileting) unsupported sitting;supported standing  -TB               User Key  (r) = Recorded By, (t) = Taken By, (c) = Cosigned By      Initials Name Provider Type    Ivis Mccarthy  ANUSHA Page Occupational Therapist                   Obj/Interventions       Row Name 02/21/24 1524          Balance    Balance Assessment sitting dynamic balance;sit to stand dynamic balance;standing dynamic balance  -TB     Dynamic Sitting Balance supervision  -TB     Position, Sitting Balance unsupported;sitting in chair;sitting edge of bed  commode  -TB     Sit to Stand Dynamic Balance contact guard;1-person assist;verbal cues  -TB     Dynamic Standing Balance contact guard;1-person assist;verbal cues  -TB     Position/Device Used, Standing Balance supported;walker, front-wheeled  -TB     Balance Interventions sitting;standing;sit to stand;supported;dynamic;dynamic reaching;occupation based/functional task;UE activity with balance activity  -TB               User Key  (r) = Recorded By, (t) = Taken By, (c) = Cosigned By      Initials Name Provider Type    TB Ivis Myers OT Occupational Therapist                   Goals/Plan    No documentation.                  Clinical Impression       Row Name 02/21/24 1524          Pain Assessment    Pain Intervention(s) Ambulation/increased activity;Repositioned  -TB     Additional Documentation Pain Scale: FACES Pre/Post-Treatment (Group)  -TB       Row Name 02/21/24 1524          Pain Scale: FACES Pre/Post-Treatment    Pain: FACES Scale, Pretreatment 4-->hurts little more  -TB     Posttreatment Pain Rating 6-->hurts even more  -TB     Pain Location - Side/Orientation Right  -TB     Pain Location generalized  -TB     Pain Location - shoulder  -TB     Pre/Posttreatment Pain Comment RN notified. Pt's pain limits participation.  -TB       Row Name 02/21/24 1524          Plan of Care Review    Plan of Care Reviewed With patient  -TB     Progress no change  -TB     Outcome Evaluation Pt is A/Ox4 and participates in therapy with encouragement. Limited by acute R shoulder pain this session. RN notified. Min A supine to sit EOB. Pt up in room/bathroom with RW support and  transfering with CGAx1. Independent for toileting hygiene. Set-up/SBA for sink side grooming. OT will continue to follow IP. Plan is SNF/LTC at d/c.  -TB       Row Name 02/21/24 1524          Therapy Plan Review/Discharge Plan (OT)    Anticipated Discharge Disposition (OT) skilled nursing facility  -TB       Row Name 02/21/24 1524          Vital Signs    Pre Systolic BP Rehab --  RN cleared OT  -TB     O2 Delivery Pre Treatment room air  -TB     Pre Patient Position Supine  -TB     Intra Patient Position Standing  -TB     Post Patient Position Sitting  -TB       Row Name 02/21/24 1524          Positioning and Restraints    Pre-Treatment Position in bed  -TB     Post Treatment Position chair  -TB     In Chair notified nsg;reclined;call light within reach;encouraged to call for assist;exit alarm on;waffle cushion;legs elevated  -TB               User Key  (r) = Recorded By, (t) = Taken By, (c) = Cosigned By      Initials Name Provider Type     Ivis Myers, OT Occupational Therapist                   Outcome Measures    No documentation.                   Occupational Therapy Education       Title: PT OT SLP Therapies (In Progress)       Topic: Occupational Therapy (In Progress)       Point: ADL training (Done)       Description:   Instruct learner(s) on proper safety adaptation and remediation techniques during self care or transfers.   Instruct in proper use of assistive devices.                  Learning Progress Summary             Patient Acceptance, E,D, VU,NR by TB at 2/21/2024 1529    Acceptance, E,D,TB, VU,DU,NR by TB at 2/20/2024 0953                         Point: Home exercise program (Not Started)       Description:   Instruct learner(s) on appropriate technique for monitoring, assisting and/or progressing therapeutic exercises/activities.                  Learner Progress:  Not documented in this visit.              Point: Precautions (Not Started)       Description:   Instruct learner(s) on  prescribed precautions during self-care and functional transfers.                  Learner Progress:  Not documented in this visit.              Point: Body mechanics (Not Started)       Description:   Instruct learner(s) on proper positioning and spine alignment during self-care, functional mobility activities and/or exercises.                  Learner Progress:  Not documented in this visit.                              User Key       Initials Effective Dates Name Provider Type Discipline     07/11/23 -  Ivis Myers OT Occupational Therapist OT                  OT Recommendation and Plan  Therapy Frequency (OT): daily  Plan of Care Review  Plan of Care Reviewed With: patient  Progress: no change  Outcome Evaluation: Pt is A/Ox4 and participates in therapy with encouragement. Limited by acute R shoulder pain this session. RN notified. Min A supine to sit EOB. Pt up in room/bathroom with RW support and transfering with CGAx1. Independent for toileting hygiene. Set-up/SBA for sink side grooming. OT will continue to follow IP. Plan is SNF/LTC at d/c.     Time Calculation:   Evaluation Complexity (OT)  Review Occupational Profile/Medical/Therapy History Complexity: expanded/moderate complexity  Assessment, Occupational Performance/Identification of Deficit Complexity: 3-5 performance deficits  Clinical Decision Making Complexity (OT): detailed assessment/moderate complexity  Overall Complexity of Evaluation (OT): moderate complexity     Time Calculation- OT       Row Name 02/21/24 1442             Time Calculation- OT    OT Start Time 1442  -TB      OT Received On 02/21/24  -TB      OT Goal Re-Cert Due Date 03/01/24  -TB         Timed Charges    77940 - OT Self Care/Mgmt Minutes 28  -TB         Total Minutes    Timed Charges Total Minutes 28  -TB       Total Minutes 28  -TB                User Key  (r) = Recorded By, (t) = Taken By, (c) = Cosigned By      Initials Name Provider Type    TB Ivis Myers  Kaylee, OT Occupational Therapist                  Therapy Charges for Today       Code Description Service Date Service Provider Modifiers Qty    94375716317 HC OT SELF CARE/MGMT/TRAIN EA 15 MIN 2/20/2024 Ivis Myers, OT GO 2    [id number]  OT EVAL MOD COMPLEXITY 3 2/20/2024 Ivis Myers OT GO 1    [id number] HC OT SELF CARE/MGMT/TRAIN EA 15 MIN 2/21/2024 Ivis Myers OT GO 2                 Ivis Myers OT  2/21/2024

## 2024-02-22 LAB — GLUCOSE BLDC GLUCOMTR-MCNC: 159 MG/DL (ref 70–130)

## 2024-02-22 PROCEDURE — 97162 PT EVAL MOD COMPLEX 30 MIN: CPT

## 2024-02-22 PROCEDURE — 25010000002 ENOXAPARIN PER 10 MG: Performed by: NURSE PRACTITIONER

## 2024-02-22 PROCEDURE — 82948 REAGENT STRIP/BLOOD GLUCOSE: CPT

## 2024-02-22 PROCEDURE — 99232 SBSQ HOSP IP/OBS MODERATE 35: CPT | Performed by: HOSPITALIST

## 2024-02-22 PROCEDURE — G0378 HOSPITAL OBSERVATION PER HR: HCPCS

## 2024-02-22 PROCEDURE — 96372 THER/PROPH/DIAG INJ SC/IM: CPT

## 2024-02-22 RX ADMIN — FOLIC ACID 1 MG: 1 TABLET ORAL at 09:00

## 2024-02-22 RX ADMIN — LIDOCAINE 2 PATCH: 4 PATCH TOPICAL at 20:04

## 2024-02-22 RX ADMIN — ENOXAPARIN SODIUM 40 MG: 100 INJECTION SUBCUTANEOUS at 09:00

## 2024-02-22 RX ADMIN — FINASTERIDE 5 MG: 5 TABLET, FILM COATED ORAL at 09:00

## 2024-02-22 RX ADMIN — OXYCODONE HYDROCHLORIDE 5 MG: 5 TABLET ORAL at 17:14

## 2024-02-22 RX ADMIN — OXYCODONE HYDROCHLORIDE 5 MG: 5 TABLET ORAL at 11:24

## 2024-02-22 RX ADMIN — NITROFURANTOIN MONOHYDRATE/MACROCRYSTALS 100 MG: 75; 25 CAPSULE ORAL at 20:04

## 2024-02-22 RX ADMIN — NITROFURANTOIN MONOHYDRATE/MACROCRYSTALS 100 MG: 75; 25 CAPSULE ORAL at 09:00

## 2024-02-22 RX ADMIN — OXYCODONE HYDROCHLORIDE 5 MG: 5 TABLET ORAL at 23:26

## 2024-02-22 RX ADMIN — Medication 1000 MCG: at 09:00

## 2024-02-22 RX ADMIN — PANTOPRAZOLE SODIUM 40 MG: 40 TABLET, DELAYED RELEASE ORAL at 08:59

## 2024-02-22 RX ADMIN — TAMSULOSIN HYDROCHLORIDE 0.4 MG: 0.4 CAPSULE ORAL at 09:00

## 2024-02-22 RX ADMIN — ESCITALOPRAM OXALATE 10 MG: 10 TABLET ORAL at 20:04

## 2024-02-22 RX ADMIN — ACETAMINOPHEN 650 MG: 325 TABLET ORAL at 15:06

## 2024-02-22 RX ADMIN — Medication 10 ML: at 09:01

## 2024-02-22 RX ADMIN — OXYCODONE HYDROCHLORIDE 5 MG: 5 TABLET ORAL at 04:48

## 2024-02-22 RX ADMIN — Medication 10 ML: at 20:07

## 2024-02-22 RX ADMIN — PANTOPRAZOLE SODIUM 40 MG: 40 TABLET, DELAYED RELEASE ORAL at 20:04

## 2024-02-22 RX ADMIN — GABAPENTIN 300 MG: 300 CAPSULE ORAL at 20:04

## 2024-02-22 RX ADMIN — ACETAMINOPHEN 650 MG: 325 TABLET ORAL at 20:17

## 2024-02-22 RX ADMIN — GABAPENTIN 300 MG: 300 CAPSULE ORAL at 15:06

## 2024-02-22 RX ADMIN — GABAPENTIN 300 MG: 300 CAPSULE ORAL at 09:00

## 2024-02-22 NOTE — PLAN OF CARE
Goal Outcome Evaluation:  Plan of Care Reviewed With: patient        Progress: no change  Outcome Evaluation: Saw pt on 2/22 at 0950.  Pt. was sitting up in bedside chair endorsing 9/10 left hip pain radiating down the leg as well as right shoulder pain.  He stated current pain regimen is ineffective.  Messaged Dr. JOSE G Regalado to see if she would be willing to review pain regimen.  He demonstrated slight increased wob with conversation.  He stated he has profound claustrophobia and cannot tolerate any doors being closed.  Someone closed his room door overnight and he became very panicked.  He stated he even requires bathroom door to remain cracked open or he can't tolerate it.  Pt. stated he is to begin radiation treatments with Dr. Aguila this hospital admission.  He wants to go to LTC at discharge.  Palliative care to follow along for support, POC and ongoing GOC.       1300 Palliative IDT meeting: SUMAN Bolden RN, CHPN; VIVIENNE Gilliam, APRN ; Dr. JOSE G Regalado; ESTEFANY Simmons MDiv, [facility]; CHACE Jimenez MDiv, [facility]; BRE Mansfield RN; CHACE Hercules RN, CHPN

## 2024-02-22 NOTE — PROGRESS NOTES
"HEMATOLOGY/ONCOLOGY PROGRESS NOTE    S: Lying in bed comfortably.  Denies any specific complaints tonight.        Past medical history, social history and family history was reviewed and unchanged from prior visit.    Review of Systems:    Review of Systems         Medications:  The current medication list was reviewed in the EMR    ALLERGIES:  No Known Allergies      Physical Exam    VITAL SIGNS:  /65 (BP Location: Right arm, Patient Position: Lying)   Pulse 80   Temp 98.1 °F (36.7 °C) (Oral)   Resp 17   Ht 175.3 cm (69\")   Wt 66.7 kg (147 lb)   SpO2 93%   BMI 21.71 kg/m²   Temp:  [98.1 °F (36.7 °C)-98.4 °F (36.9 °C)] 98.1 °F (36.7 °C)      Performance Status:                Physical Exam    Constitutional:  Well developed, Well nourished, No acute distress.    HENT:  Normocephalic, Atraumatic.  Eyes: Conjunctivae normal.  Sclerae anicteric  Musculoskeletal: No peripheral edema.  Skin:  Warm, Dry, No erythema, No rash.   Psych: normal affect  Neuro: A and O x 3         RECENT LABS:    Lab Results   Component Value Date    HGB 8.6 (L) 02/20/2024    HCT 28.2 (L) 02/20/2024    MCV 81.3 02/20/2024     02/20/2024    WBC 6.55 02/20/2024    NEUTROABS 2.81 02/19/2024    LYMPHSABS 1.43 02/05/2024    MONOSABS 1.30 (H) 02/05/2024    EOSABS 0.28 02/19/2024    BASOSABS 0.06 02/19/2024       Lab Results   Component Value Date    GLUCOSE 94 02/20/2024    BUN 12 02/20/2024    CREATININE 0.57 (L) 02/20/2024     02/20/2024    K 4.2 02/20/2024     02/20/2024    CO2 26.0 02/20/2024    CALCIUM 9.9 02/20/2024    PROTEINTOT 7.6 02/18/2024    ALBUMIN 3.8 02/18/2024    BILITOT 0.2 02/18/2024    ALKPHOS 162 (H) 02/18/2024    AST 32 02/18/2024    ALT 25 02/18/2024         Assessment/Plan  This is a complex case of a patient with an apparently localized esophageal cancer who has delayed workup and now therapy decision for at least 5 months.   -CT of the chest reviewed which shows known esophageal mass as well " "as indeterminate adenopathy.  -Recommendation would be for outpatient PET to evaluate the extent of disease.  If he has localized disease, treatment with surgery, chemotherapy with radiation, or a combination thereof may be curative.  However, he is becoming progressively more debilitated and his sister is having a hard time caring for him at home.  However, placing a long-term care facility may limit his access to care in his performance status is currently marginal for systemic therapy.  He reports he is also having discussion with radiation oncology.  -At this point the patient is not interested in pursuing any cancer directed therapy.  He states he wants to go to a nursing facility and \"live out his time.\"  He does understand that the cancer will progress and ultimately become life-threatening without treatment.  -Would recommend ongoing discussions with palliative care and hospitalist team regarding appropriate location for disposition.  -Oncology service available if patient has additional questions.    Katerina Ga MD  [facility]ematology [facility] On[facility]    2/21/2024    "

## 2024-02-22 NOTE — CASE MANAGEMENT/SOCIAL WORK
Case Management Discharge Note      Final Note:     Mr. Nassar has an intermediate care bed at [facility] tomorrow, Friday, 2/23/24, if medically ready.  Confirmed bed with [facility] at [facility].  The facility is accepting the patient as Medicaid intermediate care, so no prior auth required.      Updated Mr. Nassar at the bedside and he is agreeable to the DC plan.      [facility] ambulance scheduled for tomorrow, Friday, morning at 9:45am.  No PCS form is needed as electronically submitted.      Facility pharmacy provider is Esther Uribe, and is noted in EPIC for e-scribing.      Call report to [facility] at ph [phone].    CM will fax the DC summary to fax [phone].    Thank you.         Selected Continued Care - Admitted Since 2/18/2024       Destination Coordination complete.      Service Provider Selected Services Address Phone Fax Patient Preferred    [facility] [address], [facility] [address] [phone] --                      Transportation Services  Ambulance: [facility]    Final Discharge Disposition Code: 03 - skilled nursing facility (SNF)

## 2024-02-22 NOTE — PLAN OF CARE
Problem: Adult Inpatient Plan of Care  Goal: Plan of Care Review  Outcome: Ongoing, Progressing  Goal: Patient-Specific Goal (Individualized)  Outcome: Ongoing, Progressing  Goal: Absence of Hospital-Acquired Illness or Injury  Outcome: Ongoing, Progressing  Intervention: Identify and Manage Fall Risk  Recent Flowsheet Documentation  Taken 2/22/2024 0448 by Marga Silva RN  Safety Promotion/Fall Prevention:   activity supervised   assistive device/personal items within reach   clutter free environment maintained   fall prevention program maintained   nonskid shoes/slippers when out of bed   room organization consistent   safety round/check completed  Taken 2/22/2024 0000 by Marga Silva RN  Safety Promotion/Fall Prevention:   activity supervised   assistive device/personal items within reach   clutter free environment maintained   fall prevention program maintained   nonskid shoes/slippers when out of bed   room organization consistent   safety round/check completed  Taken 2/21/2024 2015 by Marga Silva RN  Safety Promotion/Fall Prevention:   activity supervised   assistive device/personal items within reach   clutter free environment maintained   fall prevention program maintained   nonskid shoes/slippers when out of bed   safety round/check completed   room organization consistent  Intervention: Prevent Skin Injury  Recent Flowsheet Documentation  Taken 2/22/2024 0448 by Marga Silva RN  Body Position:   position changed independently   neutral head position   neutral body alignment   side-lying  Skin Protection:   adhesive use limited   tubing/devices free from skin contact   transparent dressing maintained   incontinence pads utilized  Taken 2/22/2024 0000 by Marga Silva RN  Body Position:   position changed independently   neutral body alignment   neutral head position   side-lying  Skin Protection:   adhesive use limited   incontinence pads utilized   tubing/devices free  from skin contact   transparent dressing maintained  Taken 2/21/2024 2015 by Marga Silva RN  Body Position:   position changed independently   tilted  Skin Protection:   adhesive use limited   incontinence pads utilized   transparent dressing maintained   tubing/devices free from skin contact  Intervention: Prevent and Manage VTE (Venous Thromboembolism) Risk  Recent Flowsheet Documentation  Taken 2/22/2024 0448 by Marga Silva RN  VTE Prevention/Management: sequential compression devices off  Taken 2/22/2024 0000 by Marga Silva RN  VTE Prevention/Management:   bilateral   sequential compression devices off  Taken 2/21/2024 2015 by Marga Silva RN  VTE Prevention/Management:   bilateral   sequential compression devices off  Intervention: Prevent Infection  Recent Flowsheet Documentation  Taken 2/22/2024 0448 by Marga Silva RN  Infection Prevention:   hand hygiene promoted   rest/sleep promoted   single patient room provided  Taken 2/22/2024 0000 by Marga Silva RN  Infection Prevention:   hand hygiene promoted   rest/sleep promoted   single patient room provided  Taken 2/21/2024 2015 by Marga Silva RN  Infection Prevention:   hand hygiene promoted   rest/sleep promoted   single patient room provided  Goal: Optimal Comfort and Wellbeing  Outcome: Ongoing, Progressing  Intervention: Monitor Pain and Promote Comfort  Recent Flowsheet Documentation  Taken 2/22/2024 0448 by Marga Silva RN  Pain Management Interventions:   see MAR   position adjusted  Taken 2/21/2024 2015 by Marga Silva RN  Pain Management Interventions: (md paged for lidocaine patches per pt request) position adjusted  Intervention: Provide Person-Centered Care  Recent Flowsheet Documentation  Taken 2/21/2024 2015 by Marga Silva RN  Trust Relationship/Rapport:   care explained   choices provided   thoughts/feelings acknowledged   reassurance provided   questions encouraged    questions answered   empathic listening provided  Goal: Readiness for Transition of Care  Outcome: Ongoing, Progressing   Goal Outcome Evaluation:

## 2024-02-22 NOTE — PROGRESS NOTES
[facility] Medicine Services  PROGRESS NOTE    Patient Name: Val Nassar  : 1960  MRN: [id number]    Date of Admission: 2024  Primary Care Physician: Wesly Minor MD    Subjective   Subjective     CC:  F/U weakness, dizziness    HPI:  Seen this morning. No complaints other than right shoulder pain.       Objective   Objective     Vital Signs:   Temp:  [98.1 °F (36.7 °C)-98.5 °F (36.9 °C)] 98.1 °F (36.7 °C)  Heart Rate:  [61-80] 69  Resp:  [16-17] 17  BP: (112-123)/(61-70) 119/62     Physical Exam:  Gen-no acute distress  HENT-NCAT, mucous membranes moist  CV-RRR, S1 S2 normal, no m/r/g  Resp-CTAB, no wheezes or rales  Abd-soft, NT, ND, +BS  Ext-no edema  Neuro-A&Ox3, no focal deficits  Skin-no rashes  Psych-appropriate mood  No change from 24      Results Reviewed:  LAB RESULTS:      Lab 24  0515 24  0433 24  1625   WBC 6.55 5.61 6.39   HEMOGLOBIN 8.6* 8.3* 9.2*   HEMATOCRIT 28.2* 27.7* 30.1*   PLATELETS 227 229 262   NEUTROS ABS  --  2.81 3.96   EOS ABS  --  0.28 0.00   MCV 81.3 82.7 85.0   PROCALCITONIN  --   --  0.10   LACTATE  --   --  1.1         Lab 24  0515 24  0433 24  1625   SODIUM 141 138 136   POTASSIUM 4.2 3.9 4.1   CHLORIDE 105 104 101   CO2 26.0 27.0 27.0   ANION GAP 10.0 7.0 8.0   BUN 12 12 13   CREATININE 0.57* 0.56* 0.63*   EGFR 109.5 110.1 106.2   GLUCOSE 94 103* 103*   CALCIUM 9.9 9.5 9.8   MAGNESIUM 1.7 1.4* 1.6         Lab 24  1625   TOTAL PROTEIN 7.6   ALBUMIN 3.8   GLOBULIN 3.8   ALT (SGPT) 25   AST (SGOT) 32   BILIRUBIN 0.2   ALK PHOS 162*         Lab 24  1625   HSTROP T 11                 Brief Urine Lab Results  (Last result in the past 365 days)        Color   Clarity   Blood   Leuk Est   Nitrite   Protein   CREAT   Urine HCG        24 1657 Yellow   Clear   Negative   Small (1+)   Negative   Negative                   Microbiology Results Abnormal       None            CT Chest  With Contrast Diagnostic    Result Date: 2/20/2024  CT CHEST W CONTRAST DIAGNOSTIC Date of Exam: 2/20/2024 4:40 PM EST Indication: esophageal cancer. Comparison: 12/29/2023 chest CT scan Technique: Axial CT images were obtained of the chest after the uneventful intravenous administration of 75 mL Isovue-300.  Reconstructed coronal and sagittal images were also obtained. Automated exposure control and iterative construction methods were used. Findings: Prior study report noted wall thickening of the distal esophagus presumably patient's known neoplasm. Office notes indicate ulcerated mass of the upper third of the esophagus, approximately 2 cm in length. This may be the area of fullness and low-attenuation seen on today's scan image #17 series 2 and adjacent images. Remainder the esophagus appears grossly normal. No evidence of a frankly invasive mass is identified and there are only shotty normal-sized mediastinal lymph nodes. There is trace pericardial fluid and no pleural effusion. Airways appear normally patent. Thoracic aorta and pulmonary arteries appear grossly normal for standard contrast enhanced scan. Lungs show stable mild biapical scarring. There is a calcified right hilar granuloma. No active pulmonary parenchymal disease is seen. Included images of the upper abdomen show enlarged left liver lobe and nodular liver capsule, consistent with cirrhosis. Spleen does not appear to be significantly enlarged. A small gallstone is seen in the contracted but otherwise normal-appearing gallbladder. Included portion of the pancreas, adrenal glands, and upper renal poles appear within normal limits. The final image of the scan, #115 series 2 shows a small calcification at or near the distal common bile duct. This was not present on the 2/4/2024 scan, and is suggestive of a nonobstructing 1-2 mm distal common duct stone. There are scattered, at least  mildly enlarged latosha hepatis lymph nodes, which appear stable from  2/4/2024. Bony structures appear to be intact.     Impression: Impression: 1. Focal thickened appearance of the upper thoracic esophagus, potentially patient's known tumor. Remainder the esophagus appears normal. 2. No evidence of malignancy/metastasis in the chest elsewhere. 3. Enlarged latosha hepatis lymph nodes noted. 4. Small gallstones again noted. Today's study appears to show a nonobstructing 1 to 2 mm distal common duct stone as well, new from 2/4/2024 abdominal CT scan. 5. Liver morphology consistent with cirrhosis, but no additional features of cirrhosis are appreciated. Electronically Signed: Ibrahima Jett MD  2/20/2024 7:17 PM EST  Workstation ID: [id number]         Current medications:  Scheduled Meds:enoxaparin, 40 mg, Subcutaneous, Daily  escitalopram, 10 mg, Oral, Nightly  finasteride, 5 mg, Oral, Daily  folic acid, 1 mg, Oral, Daily  gabapentin, 300 mg, Oral, TID  Lidocaine, 2 patch, Transdermal, Q24H  nitrofurantoin (macrocrystal-monohydrate), 100 mg, Oral, Q12H  pantoprazole, 40 mg, Oral, BID  sodium chloride, 10 mL, Intravenous, Q12H  tamsulosin, 0.4 mg, Oral, Daily  vitamin B-12, 1,000 mcg, Oral, Daily      Continuous Infusions:   PRN Meds:.  acetaminophen **OR** acetaminophen **OR** acetaminophen    senna-docusate sodium **AND** polyethylene glycol **AND** bisacodyl **AND** bisacodyl    Calcium Replacement - Follow Nurse / BPA Driven Protocol    Magnesium Standard Dose Replacement - Follow Nurse / BPA Driven Protocol    oxyCODONE    Phosphorus Replacement - Follow Nurse / BPA Driven Protocol    Potassium Replacement - Follow Nurse / BPA Driven Protocol    sodium chloride    sodium chloride    sodium chloride    Assessment & Plan   Assessment & Plan     Active Hospital Problems    Diagnosis  POA    **UTI (urinary tract infection) [N39.0]  Yes    Generalized weakness [R53.1]  Yes    Cirrhosis of liver [K74.60]  Yes    Iron deficiency anemia [D50.9]  Yes    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        Brief Hospital Course to date:  Val Nassar is a 63 y.o. male  with PMH significant for PAD, cirrhosis, alcohol abuse, recently diagnosed squamous cell carcinoma of the esophagus, anemia, and HLD, who presents to the ED with complaints of weakness, bilateral arm tingling, and dizziness. Workup revealed a UTI.    Two recent admissions to [facility]:  *12/25/23-1/3/24: Admitted with GI bleed and atypical pneumonia. 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 No varices noted. Was discharged to [facility] with outpatient Oncology and Radiation Oncology follow ups.  *2/4/24-2/4/24: Same day admit and discharge for dizziness and weakness, found to have orthostatic hypotension which improved with IV fluids and albumin. Coreg discontinued at that time as no evidence of varices on previous EGD and no other indication to be on it. He was discharged home and his Oncology and Rad/Onc appointments were rescheduled as he had been in rehab during that time.     This patient's problems and plans were partially entered by my partner and updated as appropriate by me 02/22/24. Copied text in this note has been reviewed and is accurate as of today's date.      Enterococcus UTI, POA  --UA with 21-50 WBC, 3+ bacteria  --Urine culture with Enterococcus faecalis  --Stopped IV Rocephin and switched to Macrobid x 3 days to complete course     Dizziness/lightheadedness  Generalized weakness  --MRI brain negative  --Fall precautions  --Previously with orthostatic hypotension during recent admission, Coreg had been discontinued at that time; orthostatic vitals are negative  here  --s/p gentle IV fluids  --PT/OT evaluation, patient interested in long term care placement     Iron deficiency anemia  Recent GI bleed secondary to PUD + esophageal mass  --Continue BID Protonix  --Was to follow up with GI 6-8 weeks after his admission end of December 2023/early January 2024 -- will need to be rescheduled if still within goals of care (missed appointment on 2/12/24)  --H&H stable, monitor    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 Ga) and Radiation Oncology (Dr. Aguila)  --Consulted Oncology and Rad/Onc here  --Dr. Ga ordered CT chest for restaging which appears stable, however due to poor performance status he is not going to be able to tolerate systemic chemotherapy - patient at this point is not interested in chemotherapy, has also talked to Rad/Onc however per Dr. Aguila, he cannot do any palliative radiation therapy without first obtaining a PET scan which can only be done on outpatient basis, and he would also require a prolonged course of radiation (~5 weeks) which would not be feasible in a SNF setting  --Palliative Care following, plan is LTC placement (Palliative can follow there) with likely eventual transition to hospice     Anxiety  --Continue Lexapro    Chronic pain  Right shoulder pain, suspect rotator cuff arthropathy  --PCP recently prescribed Percocet  --Added Lidoderm patches  --Palliative also helping manage - have switched to oxycodone     BPH  --Continue Flomax, Proscar     Cirrhosis  History of alcohol abuse  --Sober > 100 days  --Vivitrol per PCP - has been on IM injection every 28 days  --Continue folic acid     Tobacco abuse  --Reports recent cessation        Expected Discharge Location and Transportation: SNF/LTC  Expected Discharge plan is LTC at [facility] if accepted, Palliative can follow there and he may be able to transition to hospice when ready  Expected Discharge Date:  2/23/2024; Expected Discharge Time:      DVT prophylaxis:  Medical DVT prophylaxis orders are present.         AM-PAC 6 Clicks Score (PT): 18 (02/22/24 6274)    CODE STATUS:   Code Status and Medical Interventions:   Ordered at: 02/18/24 2121     Code Status (Patient has no pulse and is not breathing):    CPR (Attempt to Resuscitate)     Medical Interventions (Patient has pulse or is breathing):    Full Support       Paulina Curtis MD  02/22/24

## 2024-02-22 NOTE — PROGRESS NOTES
FOLLOW UP NOTE    PATIENT:                                                      Val Nassar  MEDICAL RECORD #:                        [id number]  :                                                          1960  DIAGNOSIS:     Esophageal cancer        BRIEF HISTORY:    Have continued to discuss with [facility] medicine and risk-management.  The patient still reports very minimal symptoms and is eating solid food without issues.  The patient reports a little bit of a hanging up when he swallows some foods in his upper neck.  The patient has pain in several other sites of his body but no problem with his neck presently.    MEDICATIONS:   Current Facility-Administered Medications:     acetaminophen (TYLENOL) tablet 650 mg, 650 mg, Oral, Q4H PRN, 650 mg at 24 1506 **OR** acetaminophen (TYLENOL) 160 MG/5ML oral solution 650 mg, 650 mg, Oral, Q4H PRN **OR** acetaminophen (TYLENOL) suppository 650 mg, 650 mg, Rectal, Q4H PRN, Pinky Matias APRN    sennosides-docusate (PERICOLACE) 8.6-50 MG per tablet 2 tablet, 2 tablet, Oral, BID PRN **AND** polyethylene glycol (MIRALAX) packet 17 g, 17 g, Oral, Daily PRN **AND** bisacodyl (DULCOLAX) EC tablet 5 mg, 5 mg, Oral, Daily PRN **AND** bisacodyl (DULCOLAX) suppository 10 mg, 10 mg, Rectal, Daily PRN, Pinky Matias APRN    Calcium Replacement - Follow Nurse / BPA Driven Protocol, , Does not apply, PRN, Pinky Matias APRJOSH    Enoxaparin Sodium (LOVENOX) syringe 40 mg, 40 mg, Subcutaneous, Daily, Pinky Matias APRN, 40 mg at 24 0900    escitalopram (LEXAPRO) tablet 10 mg, 10 mg, Oral, Nightly, Pinky Matias APRN, 10 mg at 24    finasteride (PROSCAR) tablet 5 mg, 5 mg, Oral, Daily, Pinky Matias APRN, 5 mg at 24 0900    folic acid (FOLVITE) tablet 1 mg, 1 mg, Oral, Daily, Pinky Matias APRN, 1 mg at 24 0900    gabapentin (NEURONTIN) capsule 300 mg, 300 mg, Oral, TID, Pinky Matias, APRN, 300  mg at 02/22/24 1506    Lidocaine 4 % 2 patch, 2 patch, Transdermal, Q24H, Pinky Matias APRN, 2 patch at 02/21/24 2057    Magnesium Standard Dose Replacement - Follow Nurse / BPA Driven Protocol, , Does not apply, PRN, Pinky Matias, APRN    nitrofurantoin (macrocrystal-monohydrate) (MACROBID) capsule 100 mg, 100 mg, Oral, Q12H, Paulina Curtis MD, 100 mg at 02/22/24 0900    oxyCODONE (ROXICODONE) immediate release tablet 5 mg, 5 mg, Oral, Q6H PRN, Olga Regalado MD, 5 mg at 02/22/24 1714    pantoprazole (PROTONIX) EC tablet 40 mg, 40 mg, Oral, BID, Pinky Matias APRN, 40 mg at 02/22/24 0859    Phosphorus Replacement - Follow Nurse / BPA Driven Protocol, , Does not apply, PRN, Pinky Matias APRN    Potassium Replacement - Follow Nurse / BPA Driven Protocol, , Does not apply, PRN, Pinky Matias APRN    sodium chloride 0.9 % flush 10 mL, 10 mL, Intravenous, PRN, Chris Montenegro,     sodium chloride 0.9 % flush 10 mL, 10 mL, Intravenous, Q12H, Pinky Matias APRN, 10 mL at 02/22/24 0901    sodium chloride 0.9 % flush 10 mL, 10 mL, Intravenous, PRN, Pinky Matias APRN    sodium chloride 0.9 % infusion 40 mL, 40 mL, Intravenous, PRN, Pinky Matias, APRN    tamsulosin (FLOMAX) 24 hr capsule 0.4 mg, 0.4 mg, Oral, Daily, Pinky Matias APRN, 0.4 mg at 02/22/24 0900    vitamin B-12 (CYANOCOBALAMIN) tablet 1,000 mcg, 1,000 mcg, Oral, Daily, Pinky Matias APRN, 1,000 mcg at 02/22/24 0900      Review of Systems:   Review of Systems - Oncology  A full 14 point review of systems was performed and was negative except as noted in the HPI.      Physical Exam:   Physical Exam  Constitutional:       General: He is not in acute distress.     Appearance: He is well-developed.      Comments: Appears tired   HENT:      Head: Normocephalic and atraumatic.   Eyes:      Conjunctiva/sclera: Conjunctivae normal.      Pupils: Pupils are equal, round, and reactive to light.   Neck:       Trachea: No tracheal deviation.   Pulmonary:      Effort: Pulmonary effort is normal. No respiratory distress.      Breath sounds: No wheezing.   Abdominal:      General: There is no distension.      Palpations: Abdomen is soft.   Musculoskeletal:         General: Normal range of motion.      Cervical back: Normal range of motion.   Skin:     General: Skin is warm and dry.   Neurological:      Mental Status: He is alert.      Cranial Nerves: No cranial nerve deficit.      Coordination: Coordination normal.   Psychiatric:         Behavior: Behavior normal.         Judgment: Judgment normal.           VITAL SIGNS:   Vitals:    02/22/24 0321 02/22/24 0719 02/22/24 1057 02/22/24 1536   BP: 123/65 112/70 119/62 109/63   BP Location: Right arm Right arm Right arm Right arm   Patient Position: Lying Lying Lying Lying   Pulse: 64 75 69 60   Resp: 16 17 17 16   Temp: 98.1 °F (36.7 °C) 98.5 °F (36.9 °C) 98.1 °F (36.7 °C) 97.6 °F (36.4 °C)   TempSrc: Oral Oral Oral Oral   SpO2: 94% 97% 98% 97%   Weight:       Height:                     KPS %:  70    The following portions of the patient's history were reviewed and updated as appropriate: allergies, current medications, past family history, past medical history, past social history, past surgical history and problem list.  CT Chest With Contrast Diagnostic    Result Date: 2/20/2024  CT CHEST W CONTRAST DIAGNOSTIC Date of Exam: 2/20/2024 4:40 PM EST Indication: esophageal cancer. Comparison: 12/29/2023 chest CT scan Technique: Axial CT images were obtained of the chest after the uneventful intravenous administration of 75 mL Isovue-300.  Reconstructed coronal and sagittal images were also obtained. Automated exposure control and iterative construction methods were used. Findings: Prior study report noted wall thickening of the distal esophagus presumably patient's known neoplasm. Office notes indicate ulcerated mass of the upper third of the esophagus, approximately 2 cm in  length. This may be the area of fullness and low-attenuation seen on today's scan image #17 series 2 and adjacent images. Remainder the esophagus appears grossly normal. No evidence of a frankly invasive mass is identified and there are only shotty normal-sized mediastinal lymph nodes. There is trace pericardial fluid and no pleural effusion. Airways appear normally patent. Thoracic aorta and pulmonary arteries appear grossly normal for standard contrast enhanced scan. Lungs show stable mild biapical scarring. There is a calcified right hilar granuloma. No active pulmonary parenchymal disease is seen. Included images of the upper abdomen show enlarged left liver lobe and nodular liver capsule, consistent with cirrhosis. Spleen does not appear to be significantly enlarged. A small gallstone is seen in the contracted but otherwise normal-appearing gallbladder. Included portion of the pancreas, adrenal glands, and upper renal poles appear within normal limits. The final image of the scan, #115 series 2 shows a small calcification at or near the distal common bile duct. This was not present on the 2/4/2024 scan, and is suggestive of a nonobstructing 1-2 mm distal common duct stone. There are scattered, at least  mildly enlarged latosha hepatis lymph nodes, which appear stable from 2/4/2024. Bony structures appear to be intact.     Impression: 1. Focal thickened appearance of the upper thoracic esophagus, potentially patient's known tumor. Remainder the esophagus appears normal. 2. No evidence of malignancy/metastasis in the chest elsewhere. 3. Enlarged latosha hepatis lymph nodes noted. 4. Small gallstones again noted. Today's study appears to show a nonobstructing 1 to 2 mm distal common duct stone as well, new from 2/4/2024 abdominal CT scan. 5. Liver morphology consistent with cirrhosis, but no additional features of cirrhosis are appreciated. Electronically Signed: Ibrahiam Jett MD  2/20/2024 7:17 PM EST  Workstation ID:  [id number]    MRI Brain Without Contrast    Result Date: 2/18/2024  MRI BRAIN WO CONTRAST Date of Exam: 2/18/2024 5:05 PM EST Indication: Dizziness, off balance, present x 2 weeks.  Comparison: 2/4/2024 CT Technique:  Routine multiplanar/multisequence sequence images of the brain were obtained without contrast administration. Findings: Diffusion imaging shows no evidence of acute infarct. There are scattered subcortical and periventricular T2 hyperintensities which are nonspecific but likely sequela of mild to moderate small vessel ischemic disease. No evidence of intracranial hemorrhage. There is normal ventricular volume. The basal cisterns are patent. There is no evidence of extra-axial fluid collection. There is normal flow voids within the intracranial vessels. No evidence of fracture or suspicious bony lesion. Paranasal sinusitis with gas/fluid level in the right maxillary sinus. Trace bilateral mastoid air cell effusions. Visualized orbits are unremarkable.     Impression: No acute intracranial abnormality. No suspicious mass on this noncontrast MRI. Paranasal sinusitis with gas/fluid level in the right maxillary sinus. Electronically Signed: Tashi Alfaro MD  2/18/2024 5:56 PM EST  Workstation ID: [id number]    XR Chest 1 View    Result Date: 2/18/2024  XR CHEST 1 VW Date of Exam: 2/18/2024 3:56 PM EST Indication: Weak/Dizzy/AMS triage protocol Comparison: Chest x-ray 12/29/2023 Findings: Normal cardiomediastinal silhouette. Mild upper lobe emphysema. No focal consolidation. No pleural effusion or pneumothorax. No acute osseous findings.     Impression: No acute cardiopulmonary findings. Electronically Signed: Nikita Garrison MD  2/18/2024 4:13 PM EST  Workstation ID: [id number]    CT Head Without Contrast    Result Date: 2/4/2024  CT HEAD WO CONTRAST Date of Exam: 2/4/2024 9:05 PM EST Indication: dizziness. Comparison: None available. Technique: Axial CT images were obtained of the head without contrast  administration.  Automated exposure control and iterative construction methods were used. Findings: There is mild diffuse generalized atrophy. There are low-attenuation areas in the periventricular white matter consistent with chronic microvascular ischemic change. There is no mass, mass effect or midline shift. There are no abnormal extra-axial fluid collections or areas of acute hemorrhage. There are bilateral maxillary sinus air-fluid levels. There is bilateral ethmoid sinus disease. There are bilateral mastoid effusions.     Impression: Mild atrophy and chronic microvascular ischemia. No acute intracranial process. Acute bilateral maxillary sinusitis. Bilateral mastoid effusions. Electronically Signed: Marvel Vines MD  2/4/2024 9:49 PM EST  Workstation ID: [id number]    CT Abdomen Pelvis With Contrast    Result Date: 2/4/2024  CT ABDOMEN PELVIS W CONTRAST Date of Exam: 2/4/2024 8:06 PM EST Indication: llq pain. Comparison: CT abdomen pelvis dated December 25, 2023 Technique: Axial CT images were obtained of the abdomen and pelvis following the uneventful intravenous administration of 85 mL Isovue-300. Reconstructed coronal and sagittal images were also obtained. Automated exposure control and iterative construction methods were used. Findings: The lung bases are clear. There is mild nodularity of the liver surface. There are stones layering in the gallbladder. The bilateral kidneys, bilateral adrenal glands, pancreas and spleen are normal. There is a large amount of stool throughout the colon with stool to the cecum suggestive of constipation. There is no large or small bowel inflammatory change. There is calcific atherosclerosis of the abdominal aorta and branch vascular structures. There is degenerative disease of the lumbar spine and hips. There is a left posterior urinary bladder diverticulum. The urinary bladder is significantly distended. Bladder outlet obstruction would be in the differential.      Impression: No acute abdominal or pelvic abnormality. Electronically Signed: Marvel Vines MD  2/4/2024 8:44 PM EST  Workstation ID: [id number]             IMPRESSION:    The patient is a very unfortunate 63-year-old gentleman with several comorbidities that affect his performance status and the patient's ability to care for himself in the real world.  The patient does have a sister but he reports he is not welcome to stay with her going forwards and is interested in placement in a long-term care facility.     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  However, the patient's problems largely lie in his social situation and his ability to complete his staging workup to allow for proper tumor targeting and determination of dose based on his stage.  If the patient's disease does appear to be limited he may technically have a curable and certainly treatable disease either way.    We discussed the progression of esophageal cancer today at the patient's request.  We discussed how radiation could potentially help him.     The patient though is concerned about his ability to find placement in a long-term facility as most of these types of establishments do not allow for daily radiation treatments during stay, which the patient is likely to need to have his esophageal cancer treated.  It is difficult for me to know whether or not what dose of radiation the patient would potentially be the best candidate for.  If he does have metastasis or extensive spread of disease he would certainly not be a candidate for curative intent treatments and a short course of radiation would be appropriate.  If the patient has localized disease he may be candidate for curative intent therapy which would be daily radiation treatments for approximately 5 to 6 weeks.     I have  discussed the case with  and case management to help us determine what we need to do next.  I have also discussed the case with Dr. Curtis.      Greater than 1 hour was spent preparing for and coordinating this visit. >50% of the time was spent in direct face to face conversation with the patient teaching, answering question, and providing explanations regarding the patient's case.  The decision to treat the patient with radiation is a complex one and carries the risk of long-term side effects and complications.  The patient's malignancy represents a complicated life threatening condition that requires complex multidisciplinary management for treatment and followup.      RECOMMENDATIONS:      Esophageal cancer  -CT2 N0 M0  -Upper location 20 cm from the incisors  -None circumferential  -Needs to complete staging/workup              -Would only be a candidate for palliative radiotherapy if he has metastasis  -Targeting very difficult based on CT only workup  -Potentially curable  -Generally requires 5 to 6 weeks course of radiation daily to a dose of 50-54 Laws  -Patient not presently admitted due to symptoms from this  -Would need to return daily for treatments              -May not be compatible with present discharge plans  -Recommend PET for staging              -Does have some concerning potential adenopathy in the upper abdomen as well as thickening at the lower esophagus  -Patient interested in avoiding symptoms from untreated cancer if possible     Social distress  -No long-term options imminently exist that allow him to get daily radiation treatments  -Will likely need rehab  -Discussed with social work and   -Discussed with hospitalist           Jeremy Aguila MD    Errors in dictation may reflect use of voice recognition software and not all errors in transcription may have been detected prior to signing.

## 2024-02-22 NOTE — THERAPY EVALUATION
Patient Name: Val Nassar  : 1960    MRN: [id number]                              Today's Date: 2024       Admit Date: 2024    Visit Dx:     ICD-10-CM ICD-9-CM   1. Urinary tract infection without hematuria, site unspecified  N39.0 599.0   2. General weakness  R53.1 780.79   3. Multiple falls  R29.6 V15.88   4. Unable to ambulate  R26.2 719.7     Patient Active Problem List   Diagnosis    Acute gastric ulcer with hemorrhage    Alcohol abuse, uncomplicated    Anxiety disorder, unspecified    Diverticulum of bladder    Duodenal ulcer, unspecified as acute or chronic, without hemorrhage or perforation    Elevation of level of transaminase and lactic acid dehydrogenase (LDH)    Gastro-esophageal reflux disease without esophagitis    Hyperglycemia, unspecified    Hyperlipidemia, unspecified    Melena    Nicotine dependence, cigarettes, uncomplicated    Tobacco use    Severe malnutrition    Duodenal ulcer    Iron deficiency anemia    Cirrhosis of liver    Acute blood loss anemia    Squamous cell carcinoma of esophagus    Duodenal stenosis    Generalized weakness    Orthostatic hypotension    UTI (urinary tract infection)     Past Medical History:   Diagnosis Date    Anemia     Cancer     Cirrhosis     Duodenal ulcer     Gastric ulcer     GERD (gastroesophageal reflux disease)     History of alcohol abuse     HLD (hyperlipidemia)     Mood disorder      Past Surgical History:   Procedure Laterality Date    ENDOSCOPY N/A 2023    Procedure: ESOPHAGOGASTRODUODENOSCOPY;  Surgeon: Wesly Mckeon MD;  Location: [facility] ENDOSCOPY;  Service: Gastroenterology;  Laterality: N/A;      General Information       Row Name 24 0856          Physical Therapy Time and Intention    Document Type evaluation  -AB     Mode of Treatment physical therapy  -AB       Row Name 24 0856          General Information    Patient Profile Reviewed yes  -AB     Prior Level of Function independent:;all household  mobility;community mobility;gait;transfer;bed mobility;ADL's  Pt living with his sister following a rehab stay. Using cane for household distances and rollator for community ambulation.  -AB     Existing Precautions/Restrictions fall;orthostatic hypotension  from previous admission  -AB     Barriers to Rehab medically complex  -AB       Row Name 02/22/24 0856          Living Environment    People in Home sibling(s)  -AB       Row Name 02/22/24 0856          Home Main Entrance    Number of Stairs, Main Entrance one  -AB     Stair Railings, Main Entrance none  -AB       Row Name 02/22/24 0856          Stairs Within Home, Primary    Number of Stairs, Within Home, Primary none  -AB       Row Name 02/22/24 0856          Cognition    Orientation Status (Cognition) oriented x 4  -AB       Row Name 02/22/24 0856          Safety Issues, Functional Mobility    Safety Issues Affecting Function (Mobility) awareness of need for assistance;insight into deficits/self-awareness;safety precaution awareness;safety precautions follow-through/compliance;sequencing abilities  -AB     Impairments Affecting Function (Mobility) balance;endurance/activity tolerance;strength;pain;range of motion (ROM)  -AB               User Key  (r) = Recorded By, (t) = Taken By, (c) = Cosigned By      Initials Name Provider Type    AB Sammie Allred, PT Physical Therapist                   Mobility       Row Name 02/22/24 0903          Bed Mobility    Comment, (Bed Mobility) received in bathroom and left UIC  -AB       Row Name 02/22/24 0903          Transfers    Comment, (Transfers) Cues for hand placement and sequencing.  -AB       Row Name 02/22/24 0903          Sit-Stand Transfer    Sit-Stand [place] (Transfers) contact guard;1 person assist;verbal cues  -AB     Assistive Device (Sit-Stand Transfers) walker, front-wheeled  -AB     Comment, (Sit-Stand Transfer) Cues for safe hand placement.  -AB       Row Name 02/22/24 0903          Gait/Stairs  (Locomotion)    [place] Level (Gait) contact guard;1 person assist;verbal cues  -AB     Assistive Device (Gait) walker, front-wheeled  -AB     Distance in Feet (Gait) 100  -AB     Deviations/Abnormal Patterns (Gait) bilateral deviations;mandy decreased;gait speed decreased;stride length decreased  -AB     Bilateral Gait Deviations forward flexed posture  -AB     [place] Level (Stairs) unable to assess  -AB     Comment, (Gait/Stairs) Pt ambulated with step through gait pattern and slowed mandy. Cues for relaxing shoulders and staying closer to walker. No overt LOB. Further activity limited by fatigue.  -AB               User Key  (r) = Recorded By, (t) = Taken By, (c) = Cosigned By      Initials Name Provider Type    AB Sammie Allred, PT Physical Therapist                   Obj/Interventions       Row Name 02/22/24 0905          Range of Motion Comprehensive    General Range of Motion bilateral lower extremity ROM WNL  -AB       Row Name 02/22/24 0905          Strength Comprehensive (MMT)    General Manual Muscle Testing (MMT) Assessment lower extremity strength deficits identified  -AB     Comment, General Manual Muscle Testing (MMT) Assessment BLE grossly 4/5  -AB       Row Name 02/22/24 0905          Balance    Balance Assessment sitting static balance;sitting dynamic balance;standing dynamic balance;standing static balance  -AB     Static Sitting Balance supervision  -AB     Dynamic Sitting Balance supervision  -AB     Position, Sitting Balance unsupported;sitting in chair  -AB     Static Standing Balance contact guard  -AB     Dynamic Standing Balance contact guard;1-person assist;verbal cues  -AB     Position/Device Used, Standing Balance supported;walker, front-wheeled  -AB     Balance Interventions sitting;standing;supported;sit to stand;dynamic;static;occupation based/functional task  -AB     Comment, Balance No overt LOB.  -AB       Row Name 02/22/24 0905          Sensory Assessment  (Somatosensory)    Sensory Assessment (Somatosensory) LE sensation intact  -AB               User Key  (r) = Recorded By, (t) = Taken By, (c) = Cosigned By      Initials Name Provider Type    AB Sammie Allred PT Physical Therapist                   Goals/Plan       Row Name 02/22/24 0946          Bed Mobility Goal 1 (PT)    Activity/Assistive Device (Bed Mobility Goal 1, PT) supine to sit;sit to supine  -AB     [place] Level/Cues Needed (Bed Mobility Goal 1, PT) independent  -AB     Time Frame (Bed Mobility Goal 1, PT) long term goal (LTG);10 days  -AB       Row Name 02/22/24 0946          Transfer Goal 1 (PT)    Activity/Assistive Device (Transfer Goal 1, PT) sit-to-stand/stand-to-sit;bed-to-chair/chair-to-bed;walker, rolling  -AB     [place] Level/Cues Needed (Transfer Goal 1, PT) independent  -AB     Time Frame (Transfer Goal 1, PT) long term goal (LTG);10 days  -AB       Row Name 02/22/24 0946          Gait Training Goal 1 (PT)    Activity/Assistive Device (Gait Training Goal 1, PT) gait (walking locomotion);assistive device use;walker, rolling  -AB     [place] Level (Gait Training Goal 1, PT) modified independence  -AB     Distance (Gait Training Goal 1, PT) 350  -AB     Time Frame (Gait Training Goal 1, PT) long term goal (LTG);10 days  -AB       Row Name 02/22/24 0946          Therapy Assessment/Plan (PT)    Planned Therapy Interventions (PT) balance training;bed mobility training;gait training;home exercise program;patient/family education;postural re-education;transfer training;stretching;strengthening;ROM (range of motion)  -AB               User Key  (r) = Recorded By, (t) = Taken By, (c) = Cosigned By      Initials Name Provider Type    AB Sammie Allred PT Physical Therapist                   Clinical Impression       Row Name 02/22/24 0905          Pain    Pretreatment Pain Rating 0/10 - no pain  -AB     Posttreatment Pain Rating 0/10 - no pain  -AB       Row Name 02/22/24 0905           Plan of Care Review    Plan of Care Reviewed With patient  -AB     Progress no change  -AB     Outcome Evaluation PT initial eval completed. Pt presents below his functional baseline with decreased endurance and generalized weakness. Pt ambulated with CGA and RW. No overt LOB. Further IPPT is warrented. Pt could return home with 24/7 assist but reports his sister can no longer provide care, therefore PT rec d/c to SNF for best functional outcomes.  -AB       Row Name 02/22/24 0905          Therapy Assessment/Plan (PT)    Rehab Potential (PT) good, to achieve stated therapy goals  -AB     Criteria for Skilled Interventions Met (PT) yes;meets criteria;skilled treatment is necessary  -AB     Therapy Frequency (PT) daily  -AB       Row Name 02/22/24 0905          Vital Signs    Pre Systolic BP Rehab 112  -AB     Pre Treatment Diastolic BP 70  -AB     Posttreatment Heart Rate (beats/min) 89  -AB     O2 Delivery Pre Treatment room air  -AB     O2 Delivery Intra Treatment room air  -AB     Post SpO2 (%) 97  -AB     O2 Delivery Post Treatment room air  -AB     Pre Patient Position Standing  -AB     Intra Patient Position Standing  -AB     Post Patient Position Sitting  -AB       Row Name 02/22/24 0905          Positioning and Restraints    Pre-Treatment Position bathroom  -AB     Post Treatment Position chair  -AB     In Chair reclined;notified nsg;call light within reach;sitting;encouraged to call for assist;exit alarm on;legs elevated;waffle cushion  -AB               User Key  (r) = Recorded By, (t) = Taken By, (c) = Cosigned By      Initials Name Provider Type    AB Sammie Allred, PT Physical Therapist                   Outcome Measures       Row Name 02/22/24 0946          How much help from another person do you currently need...    Turning from your back to your side while in flat bed without using bedrails? 3  -AB     Moving from lying on back to sitting on the side of a flat bed without bedrails? 3  -AB      Moving to and from a bed to a chair (including a wheelchair)? 3  -AB     Standing up from a chair using your arms (e.g., wheelchair, bedside chair)? 3  -AB     Climbing 3-5 steps with a railing? 3  -AB     To walk in hospital room? 3  -AB     AM-PAC 6 Clicks Score (PT) 18  -AB     Highest Level of Mobility Goal 6 --> Walk 10 steps or more  -AB       Row Name 02/22/24 0946          Functional Assessment    Outcome Measure Options AM-PAC 6 Clicks Basic Mobility (PT)  -AB               User Key  (r) = Recorded By, (t) = Taken By, (c) = Cosigned By      Initials Name Provider Type    AB Sammie Allred, PT Physical Therapist                                 Physical Therapy Education       Title: PT OT SLP Therapies (In Progress)       Topic: Physical Therapy (In Progress)       Point: Mobility training (Done)       Learning Progress Summary             Patient Acceptance, E,D, VU,NR by AB at 2/22/2024 0946                         Point: Home exercise program (Not Started)       Learner Progress:  Not documented in this visit.              Point: Body mechanics (Done)       Learning Progress Summary             Patient Acceptance, E,D, VU,NR by AB at 2/22/2024 0946                         Point: Precautions (Done)       Learning Progress Summary             Patient Acceptance, E,D, VU,NR by AB at 2/22/2024 0946                                         User Key       Initials Effective Dates Name Provider Type Discipline    AB 09/22/22 -  Sammie Allred PT Physical Therapist PT                  PT Recommendation and Plan  Planned Therapy Interventions (PT): balance training, bed mobility training, gait training, home exercise program, patient/family education, postural re-education, transfer training, stretching, strengthening, ROM (range of motion)  Plan of Care Reviewed With: patient  Progress: no change  Outcome Evaluation: PT initial eval completed. Pt presents below his functional baseline with decreased endurance  and generalized weakness. Pt ambulated with CGA and RW. No overt LOB. Further IPPT is warrented. Pt could return home with 24/7 assist but reports his sister can no longer provide care, therefore PT rec d/c to SNF for best functional outcomes.     Time Calculation:   PT Evaluation Complexity  History, PT Evaluation Complexity: 3 or more personal factors and/or comorbidities  Examination of Body Systems (PT Eval Complexity): total of 3 or more elements  Clinical Presentation (PT Evaluation Complexity): evolving  Clinical Decision Making (PT Evaluation Complexity): moderate complexity  Overall Complexity (PT Evaluation Complexity): moderate complexity     PT Charges       Row Name 02/22/24 0947             Time Calculation    Start Time 0829  -AB      PT Received On 02/22/24  -AB      PT Goal Re-Cert Due Date 03/03/24  -AB         Untimed Charges    PT Eval/Re-eval Minutes 48  -AB         Total Minutes    Untimed Charges Total Minutes 48  -AB       Total Minutes 48  -AB                User Key  (r) = Recorded By, (t) = Taken By, (c) = Cosigned By      Initials Name Provider Type    AB Sammie Allred, PT Physical Therapist                  Therapy Charges for Today       Code Description Service Date Service Provider Modifiers Qty    [id number]  PT EVAL MOD COMPLEXITY 4 2/22/2024 Sammie Allred, PT GP 1            PT G-Codes  Outcome Measure Options: AM-PAC 6 Clicks Basic Mobility (PT)  AM-PAC 6 Clicks Score (PT): 18  AM-PAC 6 Clicks Score (OT): 19  PT Discharge Summary  Anticipated Discharge Disposition (PT): skilled nursing facility, [facility] (long-term care hospital) (SNF vs. LTC)    Sammie Allred PT  2/22/2024

## 2024-02-22 NOTE — PLAN OF CARE
Goal Outcome Evaluation:  Plan of Care Reviewed With: patient        Progress: no change  Outcome Evaluation: PT initial eval completed. Pt presents below his functional baseline with decreased endurance and generalized weakness. Pt ambulated with CGA and RW. No overt LOB. Further IPPT is warrented. Pt could return home with 24/7 assist but reports his sister can no longer provide care, therefore PT rec d/c to SNF for best functional outcomes.      Anticipated Discharge Disposition (PT): skilled nursing facility, LTCH (long-term care hospital) (SNF vs. LTC)

## 2024-02-22 NOTE — CONSULTS
CONSULTATION NOTE    NAME:      Val Nassar  :                                                          1960  DATE OF CONSULTATION:                       2024   REQUESTING PHYSICIAN:                   No ref. provider found  REASON FOR CONSULTATION:             1. Urinary tract infection without hematuria, site unspecified    2. General weakness    3. Multiple falls    4. Unable to ambulate            BRIEF HISTORY:  Val Nassar  is a very pleasant 63 y.o. male   with multiple medical comorbidities including cirrhosis multiple falls, UTIs, general debilitation and known esophageal cancer presently undergoing workup.  The patient reports that he has very little issue with his swallowing right now with no pain in his neck.  The patient reports pain in the left side of his body in his hip.  The patient reports that he uses a walker to get back and forth to the bathroom.  He reports he has fallen a few times.  He has been staying with his sister in [place] but his sister does not feel comfortable taking care of him and he is interested in seeking long-term placement at a facility.    The patient denies any new lumps or masses in his neck or supraclavicular fossa.  No Known Allergies    Social History     Tobacco Use    Smoking status: Every Day     Packs/day: 1.00     Years: 15.00     Additional pack years: 0.00     Total pack years: 15.00     Types: Cigarettes    Smokeless tobacco: Never   Vaping Use    Vaping Use: Some days    Substances: Flavoring    Devices: Disposable   Substance Use Topics    Alcohol use: Not Currently     Comment: sober for ~ 3 months    Drug use: Yes     Types: Hydrocodone       Past Medical History:   Diagnosis Date    Anemia     Cancer     Cirrhosis     Duodenal ulcer     Gastric ulcer     GERD (gastroesophageal reflux disease)     History of alcohol abuse     HLD (hyperlipidemia)     Mood disorder        family history includes Cancer in his mother; Heart  attack in his father.     Past Surgical History:   Procedure Laterality Date    ENDOSCOPY N/A 12/26/2023    Procedure: ESOPHAGOGASTRODUODENOSCOPY;  Surgeon: Wesly Mckeon MD;  Location: [facility] ENDOSCOPY;  Service: Gastroenterology;  Laterality: N/A;        Review of Systems - Oncology      A full 14 point review of systems was performed and was negative except as noted in the HPI.    Objective   VITAL SIGNS:   Vitals:    02/22/24 0321 02/22/24 0719 02/22/24 1057 02/22/24 1536   BP: 123/65 112/70 119/62 109/63   BP Location: Right arm Right arm Right arm Right arm   Patient Position: Lying Lying Lying Lying   Pulse: 64 75 69 60   Resp: 16 17 17 16   Temp: 98.1 °F (36.7 °C) 98.5 °F (36.9 °C) 98.1 °F (36.7 °C) 97.6 °F (36.4 °C)   TempSrc: Oral Oral Oral Oral   SpO2: 94% 97% 98% 97%   Weight:       Height:                      KSP %:  70    Physical Exam  Constitutional:       General: He is not in acute distress.     Appearance: He is well-developed.      Comments: Appears tired and generally debilitated.   HENT:      Head: Normocephalic and atraumatic.   Eyes:      Conjunctiva/sclera: Conjunctivae normal.      Pupils: Pupils are equal, round, and reactive to light.   Neck:      Trachea: No tracheal deviation.   Pulmonary:      Effort: Pulmonary effort is normal. No respiratory distress.      Breath sounds: No wheezing.   Abdominal:      General: There is no distension.      Palpations: Abdomen is soft.   Musculoskeletal:         General: Normal range of motion.      Cervical back: Normal range of motion.   Skin:     General: Skin is warm and dry.   Neurological:      Mental Status: He is alert.      Cranial Nerves: No cranial nerve deficit.      Coordination: Coordination normal.   Psychiatric:         Behavior: Behavior normal.         Judgment: Judgment normal.      Comments: Decisional  Thought content normal              The following portions of the patient's history were reviewed and updated as appropriate:  allergies, current medications, past family history, past medical history, past social history, past surgical history, and problem list.  CT Chest With Contrast Diagnostic    Result Date: 2/20/2024  CT CHEST W CONTRAST DIAGNOSTIC Date of Exam: 2/20/2024 4:40 PM EST Indication: esophageal cancer. Comparison: 12/29/2023 chest CT scan Technique: Axial CT images were obtained of the chest after the uneventful intravenous administration of 75 mL Isovue-300.  Reconstructed coronal and sagittal images were also obtained. Automated exposure control and iterative construction methods were used. Findings: Prior study report noted wall thickening of the distal esophagus presumably patient's known neoplasm. Office notes indicate ulcerated mass of the upper third of the esophagus, approximately 2 cm in length. This may be the area of fullness and low-attenuation seen on today's scan image #17 series 2 and adjacent images. Remainder the esophagus appears grossly normal. No evidence of a frankly invasive mass is identified and there are only shotty normal-sized mediastinal lymph nodes. There is trace pericardial fluid and no pleural effusion. Airways appear normally patent. Thoracic aorta and pulmonary arteries appear grossly normal for standard contrast enhanced scan. Lungs show stable mild biapical scarring. There is a calcified right hilar granuloma. No active pulmonary parenchymal disease is seen. Included images of the upper abdomen show enlarged left liver lobe and nodular liver capsule, consistent with cirrhosis. Spleen does not appear to be significantly enlarged. A small gallstone is seen in the contracted but otherwise normal-appearing gallbladder. Included portion of the pancreas, adrenal glands, and upper renal poles appear within normal limits. The final image of the scan, #115 series 2 shows a small calcification at or near the distal common bile duct. This was not present on the 2/4/2024 scan, and is suggestive of a  nonobstructing 1-2 mm distal common duct stone. There are scattered, at least  mildly enlarged latosha hepatis lymph nodes, which appear stable from 2/4/2024. Bony structures appear to be intact.     Impression: 1. Focal thickened appearance of the upper thoracic esophagus, potentially patient's known tumor. Remainder the esophagus appears normal. 2. No evidence of malignancy/metastasis in the chest elsewhere. 3. Enlarged latosha hepatis lymph nodes noted. 4. Small gallstones again noted. Today's study appears to show a nonobstructing 1 to 2 mm distal common duct stone as well, new from 2/4/2024 abdominal CT scan. 5. Liver morphology consistent with cirrhosis, but no additional features of cirrhosis are appreciated. Electronically Signed: Ibrahima Jett MD  2/20/2024 7:17 PM EST  Workstation ID: [id number]    MRI Brain Without Contrast    Result Date: 2/18/2024  MRI BRAIN WO CONTRAST Date of Exam: 2/18/2024 5:05 PM EST Indication: Dizziness, off balance, present x 2 weeks.  Comparison: 2/4/2024 CT Technique:  Routine multiplanar/multisequence sequence images of the brain were obtained without contrast administration. Findings: Diffusion imaging shows no evidence of acute infarct. There are scattered subcortical and periventricular T2 hyperintensities which are nonspecific but likely sequela of mild to moderate small vessel ischemic disease. No evidence of intracranial hemorrhage. There is normal ventricular volume. The basal cisterns are patent. There is no evidence of extra-axial fluid collection. There is normal flow voids within the intracranial vessels. No evidence of fracture or suspicious bony lesion. Paranasal sinusitis with gas/fluid level in the right maxillary sinus. Trace bilateral mastoid air cell effusions. Visualized orbits are unremarkable.     Impression: No acute intracranial abnormality. No suspicious mass on this noncontrast MRI. Paranasal sinusitis with gas/fluid level in the right maxillary sinus.  Electronically Signed: Tashi Alfaro MD  2/18/2024 5:56 PM EST  Workstation ID: [id number]    XR Chest 1 View    Result Date: 2/18/2024  XR CHEST 1 VW Date of Exam: 2/18/2024 3:56 PM EST Indication: Weak/Dizzy/AMS triage protocol Comparison: Chest x-ray 12/29/2023 Findings: Normal cardiomediastinal silhouette. Mild upper lobe emphysema. No focal consolidation. No pleural effusion or pneumothorax. No acute osseous findings.     Impression: No acute cardiopulmonary findings. Electronically Signed: Nikita Garrison MD  2/18/2024 4:13 PM EST  Workstation ID: [id number]    CT Head Without Contrast    Result Date: 2/4/2024  CT HEAD WO CONTRAST Date of Exam: 2/4/2024 9:05 PM EST Indication: dizziness. Comparison: None available. Technique: Axial CT images were obtained of the head without contrast administration.  Automated exposure control and iterative construction methods were used. Findings: There is mild diffuse generalized atrophy. There are low-attenuation areas in the periventricular white matter consistent with chronic microvascular ischemic change. There is no mass, mass effect or midline shift. There are no abnormal extra-axial fluid collections or areas of acute hemorrhage. There are bilateral maxillary sinus air-fluid levels. There is bilateral ethmoid sinus disease. There are bilateral mastoid effusions.     Impression: Mild atrophy and chronic microvascular ischemia. No acute intracranial process. Acute bilateral maxillary sinusitis. Bilateral mastoid effusions. Electronically Signed: Marvel Vines MD  2/4/2024 9:49 PM EST  Workstation ID: [id number]    CT Abdomen Pelvis With Contrast    Result Date: 2/4/2024  CT ABDOMEN PELVIS W CONTRAST Date of Exam: 2/4/2024 8:06 PM EST Indication: llq pain. Comparison: CT abdomen pelvis dated December 25, 2023 Technique: Axial CT images were obtained of the abdomen and pelvis following the uneventful intravenous administration of 85 mL Isovue-300. Reconstructed coronal and  sagittal images were also obtained. Automated exposure control and iterative construction methods were used. Findings: The lung bases are clear. There is mild nodularity of the liver surface. There are stones layering in the gallbladder. The bilateral kidneys, bilateral adrenal glands, pancreas and spleen are normal. There is a large amount of stool throughout the colon with stool to the cecum suggestive of constipation. There is no large or small bowel inflammatory change. There is calcific atherosclerosis of the abdominal aorta and branch vascular structures. There is degenerative disease of the lumbar spine and hips. There is a left posterior urinary bladder diverticulum. The urinary bladder is significantly distended. Bladder outlet obstruction would be in the differential.     Impression: No acute abdominal or pelvic abnormality. Electronically Signed: Marvel Vines MD  2/4/2024 8:44 PM EST  Workstation ID: [id number]     WBC   Date Value Ref Range Status   02/20/2024 6.55 3.40 - 10.80 10*3/mm3 Final     RBC   Date Value Ref Range Status   02/20/2024 3.47 (L) 4.14 - 5.80 10*6/mm3 Final     Hemoglobin   Date Value Ref Range Status   02/20/2024 8.6 (L) 13.0 - 17.7 g/dL Final     Hematocrit   Date Value Ref Range Status   02/20/2024 28.2 (L) 37.5 - 51.0 % Final     MCV   Date Value Ref Range Status   02/20/2024 81.3 79.0 - 97.0 fL Final     MCH   Date Value Ref Range Status   02/20/2024 24.8 (L) 26.6 - 33.0 pg Final     MCHC   Date Value Ref Range Status   02/20/2024 30.5 (L) 31.5 - 35.7 g/dL Final     RDW   Date Value Ref Range Status   02/20/2024 16.3 (H) 12.3 - 15.4 % Final     RDW-SD   Date Value Ref Range Status   02/20/2024 47.3 37.0 - 54.0 fl Final     MPV   Date Value Ref Range Status   02/20/2024 9.1 6.0 - 12.0 fL Final     Platelets   Date Value Ref Range Status   02/20/2024 227 140 - 450 10*3/mm3 Final     Lab Results   Component Value Date    GLUCOSE 94 02/20/2024    BUN 12 02/20/2024    CREATININE 0.57  (L) 02/20/2024    EGFR 109.5 02/20/2024    BCR 21.1 02/20/2024    K 4.2 02/20/2024    CO2 26.0 02/20/2024    CALCIUM 9.9 02/20/2024    ALBUMIN 3.8 02/18/2024    BILITOT 0.2 02/18/2024    AST 32 02/18/2024    ALT 25 02/18/2024         Assessment      IMPRESSION:    The patient is a very unfortunate 63-year-old gentleman with several comorbidities that affect his performance status and the patient's ability to care for himself in the real world.  The patient does have a sister but he reports he is not welcome to stay with her going forwards and is interested in placement in a long-term care facility.  The patient has been trying to undergo workup for his esophageal cancer for couple months now but has been unable to complete his staging workup with a PET scan.  On previous scans the patient's disease was not visible on scans although the patient does have some thickening along the lower portion of his esophagus as well as lymph nodes in the latosha hepatic region, retroperitoneum, celiac axis, and right gastric regions that are potentially concerning but not clearly malignant.  This is unusual as the patient's esophageal cancer was found to be in upper lesion 20 cm from the incisors on his last EGD and biopsy with Dr. Mckeon.    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  The patient's problems largely lying in his social situation and his ability to complete his staging workup to allow for proper tumor targeting and determination of dose based on his stage.  The patient's disease is limited he may technically have a curable and certainly treatable disease.  I discussed with the patient's today what he would expect from untreated esophageal cancer.  We discussed that the lesion is likely to grow to impede his swallowing which would impair his ability to eat and  drink.  He inquired as to the mechanism of expiration for people with untreated esophageal cancer and we discussed with him that likely would be dehydration if he did not develop overwhelming systemic burden of disease or blood clots.  The patient reports that he was not at all interested in a feeding tube and may very well want to try to pursue treatments if it would potentially help him to avoid some of the symptoms from untreated esophageal cancer.  The patient though is concerned about his ability to find placement in a long-term facility as most of these types of establishments do not allow for daily radiation treatments during stay, which the patient is likely to need to have his esophageal cancer treated.  It is difficult for me to know whether or not what dose of radiation the patient would potentially be the best candidate for.  If he does have metastasis or extensive spread of disease he would certainly not be a candidate for curative intent treatments and a short course of radiation would be appropriate.  If the patient has localized disease he may be candidate for curative intent therapy which would be daily radiation treatments for approximately 5 to 6 weeks.    I have discussed the case with  and case management to help us determine what we need to do next.  I have also discussed the case with Dr. Curtis.  I discussed the case with Dr. Ga.    Greater than 2 hours were spent preparing for and coordinating this visit. >50% of the time was spent in direct face to face conversation with the patient teaching, answering question, and providing explanations regarding the patient's case.  The decision to treat the patient with radiation is a complex one and carries the risk of long-term side effects and complications.  The patient's malignancy represents a complicated life threatening condition that requires complex multidisciplinary management for treatment and followup.      RECOMMENDATIONS:       Esophageal cancer  -CT2 N0 M0  -Upper location 20 cm from the incisors  -None circumferential  -Needs to complete staging/workup   -Would only be a candidate for palliative radiotherapy if he has metastasis  -Targeting very difficult based on CT only workup  -Potentially curable  -Generally requires 5 to 6 weeks course of radiation daily to a dose of 50-54 Laws  -Patient not presently admitted due to symptoms from this  -Would need to return daily for treatments   -May not be compatible with present discharge plans  -Recommend PET for staging   -Does have some concerning potential adenopathy in the upper abdomen as well as thickening at the lower esophagus  -Patient interested in avoiding symptoms from untreated cancer if possible    Social distress  -No long-term options imminently exist that allow him to get daily radiation treatments  -Will likely need rehab  -Discussed with social work and   -Discussed with hospitalist    No follow-ups on file.           Jeremy Aguila MD      Errors in dictation may reflect use of voice recognition software and not all errors in transcription may have been detected prior to signing.

## 2024-02-23 VITALS
TEMPERATURE: 97.7 F | DIASTOLIC BLOOD PRESSURE: 67 MMHG | SYSTOLIC BLOOD PRESSURE: 124 MMHG | OXYGEN SATURATION: 96 % | HEART RATE: 55 BPM | RESPIRATION RATE: 18 BRPM | WEIGHT: 147 LBS | BODY MASS INDEX: 21.77 KG/M2 | HEIGHT: 69 IN

## 2024-02-23 PROCEDURE — G0378 HOSPITAL OBSERVATION PER HR: HCPCS

## 2024-02-23 PROCEDURE — 99239 HOSP IP/OBS DSCHRG MGMT >30: CPT | Performed by: NURSE PRACTITIONER

## 2024-02-23 PROCEDURE — 96372 THER/PROPH/DIAG INJ SC/IM: CPT

## 2024-02-23 PROCEDURE — 25010000002 ENOXAPARIN PER 10 MG: Performed by: NURSE PRACTITIONER

## 2024-02-23 RX ORDER — NITROFURANTOIN 25; 75 MG/1; MG/1
100 CAPSULE ORAL EVERY 12 HOURS SCHEDULED
Start: 2024-02-23 | End: 2024-02-24

## 2024-02-23 RX ORDER — OXYCODONE HYDROCHLORIDE 5 MG/1
5 TABLET ORAL EVERY 6 HOURS PRN
Qty: 12 TABLET | Refills: 0 | Status: SHIPPED | OUTPATIENT
Start: 2024-02-23

## 2024-02-23 RX ORDER — GABAPENTIN 300 MG/1
300 CAPSULE ORAL 3 TIMES DAILY
Qty: 9 CAPSULE | Refills: 0 | Status: SHIPPED | OUTPATIENT
Start: 2024-02-23 | End: 2024-02-26

## 2024-02-23 RX ORDER — ACETAMINOPHEN 325 MG/1
650 TABLET ORAL EVERY 4 HOURS PRN
Start: 2024-02-23

## 2024-02-23 RX ADMIN — FOLIC ACID 1 MG: 1 TABLET ORAL at 08:19

## 2024-02-23 RX ADMIN — Medication 10 ML: at 08:21

## 2024-02-23 RX ADMIN — Medication 1000 MCG: at 08:20

## 2024-02-23 RX ADMIN — ACETAMINOPHEN 650 MG: 325 TABLET ORAL at 03:30

## 2024-02-23 RX ADMIN — TAMSULOSIN HYDROCHLORIDE 0.4 MG: 0.4 CAPSULE ORAL at 08:20

## 2024-02-23 RX ADMIN — OXYCODONE HYDROCHLORIDE 5 MG: 5 TABLET ORAL at 08:20

## 2024-02-23 RX ADMIN — GABAPENTIN 300 MG: 300 CAPSULE ORAL at 08:20

## 2024-02-23 RX ADMIN — FINASTERIDE 5 MG: 5 TABLET, FILM COATED ORAL at 08:19

## 2024-02-23 RX ADMIN — NITROFURANTOIN MONOHYDRATE/MACROCRYSTALS 100 MG: 75; 25 CAPSULE ORAL at 08:29

## 2024-02-23 RX ADMIN — ENOXAPARIN SODIUM 40 MG: 100 INJECTION SUBCUTANEOUS at 08:19

## 2024-02-23 RX ADMIN — PANTOPRAZOLE SODIUM 40 MG: 40 TABLET, DELAYED RELEASE ORAL at 08:20

## 2024-02-23 NOTE — PROGRESS NOTES
"Palliative Care Daily Progress Note     Referring: Ivelisselaura Ga    C/C: none    S: Medical record reviewed. Events noted.  Has again placed any treatment for cancer on hold.  Seen resting in bed.  No pain c/o.  Reports plans for discharge this AM.  Will see how things go at facility and consider treatment if he gets stronger.  Does not think he needs palliative at this time.    ROS:   Denied pain, SOA, N/V    O: Code Status:   Code Status and Medical Interventions:   Ordered at: 02/18/24 2129     Code Status (Patient has no pulse and is not breathing):    CPR (Attempt to Resuscitate)     Medical Interventions (Patient has pulse or is breathing):    Full Support      Advanced Directives: Advance Directive Status: Patient has advance directive, copy in chart   Goals of Care: Ongoing.   Palliative Performance Scale Score: 50%     /67 (BP Location: Right arm, Patient Position: Lying)   Pulse 55   Temp 97.7 °F (36.5 °C) (Oral)   Resp 18   Ht 175.3 cm (69\")   Wt 66.7 kg (147 lb)   SpO2 96%   BMI 21.71 kg/m²     Intake/Output Summary (Last 24 hours) at 2/23/2024 1015  Last data filed at 2/23/2024 0859  Gross per 24 hour   Intake 522 ml   Output 2700 ml   Net -2178 ml       PE:  General Appearance:    Alert, cooperative, NAD, smiling   HEENT:    EOMI, anicteric, MMM, face relaxed   Neck:   supple, trachea midline, no JVD   Lungs:     CTA bilaterally, diminished in bases; respirations regular, even and unlabored    Heart:    RRR, normal S1 and S2, no M/R/G   Abdomen:     Normal bowel sounds, soft, nontender, nondistended   G/U:   Deferred   MSK/Extremities:   Wasting, no edema   Pulses:   Pulses palpable and equal bilaterally   Skin:   Warm, dry   Neurologic:   A/Ox3, cooperative, BAUER   Psych:   Calm, appropriate     Meds: Reviewed and changes noted    Labs:   Results from last 7 days   Lab Units 02/20/24  0515   WBC 10*3/mm3 6.55   HEMOGLOBIN g/dL 8.6*   HEMATOCRIT % 28.2*   PLATELETS 10*3/mm3 227     Results " from last 7 days   Lab Units 02/20/24  0515   SODIUM mmol/L 141   POTASSIUM mmol/L 4.2   CHLORIDE mmol/L 105   CO2 mmol/L 26.0   BUN mg/dL 12   CREATININE mg/dL 0.57*   GLUCOSE mg/dL 94   CALCIUM mg/dL 9.9     Results from last 7 days   Lab Units 02/20/24  0515 02/19/24  0433 02/18/24  1625   SODIUM mmol/L 141   < > 136   POTASSIUM mmol/L 4.2   < > 4.1   CHLORIDE mmol/L 105   < > 101   CO2 mmol/L 26.0   < > 27.0   BUN mg/dL 12   < > 13   CREATININE mg/dL 0.57*   < > 0.63*   CALCIUM mg/dL 9.9   < > 9.8   BILIRUBIN mg/dL  --   --  0.2   ALK PHOS U/L  --   --  162*   ALT (SGPT) U/L  --   --  25   AST (SGOT) U/L  --   --  32   GLUCOSE mg/dL 94   < > 103*    < > = values in this interval not displayed.     Imaging Results (Last 72 Hours)       Procedure Component Value Units Date/Time    CT Chest With Contrast Diagnostic [[id number]] Collected: 02/20/24 1904     Updated: 02/20/24 1921    Narrative:      CT CHEST W CONTRAST DIAGNOSTIC    Date of Exam: 2/20/2024 4:40 PM EST    Indication: esophageal cancer.    Comparison: 12/29/2023 chest CT scan    Technique: Axial CT images were obtained of the chest after the uneventful intravenous administration of 75 mL Isovue-300.  Reconstructed coronal and sagittal images were also obtained. Automated exposure control and iterative construction methods were   used.      Findings:  Prior study report noted wall thickening of the distal esophagus presumably patient's known neoplasm. Office notes indicate ulcerated mass of the upper third of the esophagus, approximately 2 cm in length. This may be the area of fullness and   low-attenuation seen on today's scan image #17 series 2 and adjacent images. Remainder the esophagus appears grossly normal. No evidence of a frankly invasive mass is identified and there are only shotty normal-sized mediastinal lymph nodes.     There is trace pericardial fluid and no pleural effusion. Airways appear normally patent. Thoracic aorta and pulmonary  arteries appear grossly normal for standard contrast enhanced scan. Lungs show stable mild biapical scarring. There is a calcified   right hilar granuloma. No active pulmonary parenchymal disease is seen.    Included images of the upper abdomen show enlarged left liver lobe and nodular liver capsule, consistent with cirrhosis. Spleen does not appear to be significantly enlarged. A small gallstone is seen in the contracted but otherwise normal-appearing   gallbladder. Included portion of the pancreas, adrenal glands, and upper renal poles appear within normal limits.     The final image of the scan, #115 series 2 shows a small calcification at or near the distal common bile duct. This was not present on the 2/4/2024 scan, and is suggestive of a nonobstructing 1-2 mm distal common duct stone. There are scattered, at least   mildly enlarged latosha hepatis lymph nodes, which appear stable from 2/4/2024. Bony structures appear to be intact.      Impression:      Impression:    1. Focal thickened appearance of the upper thoracic esophagus, potentially patient's known tumor. Remainder the esophagus appears normal.    2. No evidence of malignancy/metastasis in the chest elsewhere.    3. Enlarged latosha hepatis lymph nodes noted.    4. Small gallstones again noted. Today's study appears to show a nonobstructing 1 to 2 mm distal common duct stone as well, new from 2/4/2024 abdominal CT scan.    5. Liver morphology consistent with cirrhosis, but no additional features of cirrhosis are appreciated.      Electronically Signed: Ibrahima Jett MD    2/20/2024 7:17 PM EST    Workstation ID: [id number]                  Diagnostics: Reviewed    A:   UTI (urinary tract infection)    Anxiety disorder, unspecified    Gastro-esophageal reflux disease without esophagitis    Hyperlipidemia, unspecified    Tobacco use    Iron deficiency anemia    Cirrhosis of liver    Squamous cell carcinoma of esophagus    Generalized weakness        Impression:  Invasive squamous cell carcinoma esophagus  UTI  R rotator cuff tendonitis  Cirrhosis - d/t EtOH  Anemia  Portal htn     Symptoms:  Debility    P:   Planned for discharge this AM.  Could be referred to palliative or hospice from facility if pt changes his mind.  Palliative Care Team will continue to follow patient.     Olga Regalado MD  2/23/2024

## 2024-02-23 NOTE — DISCHARGE PLACEMENT REQUEST
"Yannick Nassar (63 y.o. Male)       Date of Birth   1960    Social Security Number       Address   [address] [place] [address]    Home Phone   [phone]    MRN   [id number]       Latter day   Nazarene    Marital Status   Single                            Admission Date   24    Admission Type   Emergency    Admitting Provider   Nikolas Garza MD    Attending Provider   Nikolas Garza MD    Department, Room/Bed   [facility] 3G, S365/1       Discharge Date       Discharge Disposition   Skilled Nursing Facility (DC - External)    Discharge Destination                                 Attending Provider: Nikolas Garza MD    Allergies: No Known Allergies    Isolation: None   Infection: None   Code Status: CPR    Ht: 175.3 cm (69\")   Wt: 66.7 kg (147 lb)    Admission Cmt: None   Principal Problem: UTI (urinary tract infection) [N39.0]                   Active Insurance as of 2024       Primary Coverage       Payor Plan Insurance Group Employer/Plan Group    ANTH MEDICAID [facility] MEDICAID [id number]       Payor Plan Address Payor Plan Phone Number Payor Plan Fax Number Effective Dates    [address] [address] [phone]  2023 - None Entered    [facility] [id number]         Subscriber Name Subscriber Birth Date Member ID       YANNICK NASSAR 1960 [id number]                     Emergency Contacts        (Rel.) Home Phone Work Phone Mobile Phone    YONATAN,LEE (Sister) [phone] -- [phone]    Nergo Nassar (nephew) (Relative) -- [phone]                 Discharge Summary        Liliam Ray, APRN at 24 0721              [facility] Medicine Services  DISCHARGE SUMMARY    Patient Name: Yannick Nassar  : 1960  MRN: [id number]    Date of Admission: 2024  3:50 PM  Date of Discharge:  2024  Primary Care Physician: Wesly Minor, " MD    Consults       Date and Time Order Name Status Description    2/20/2024  8:09 AM Inpatient Palliative Care MD Consult Completed     2/19/2024 12:22 PM Inpatient Radiation Oncology Consult Completed     2/19/2024 12:22 PM Inpatient Hematology & Oncology Consult Completed             Hospital Course     Presenting Problem: f/u weakness, dizziness     Active Hospital Problems    Diagnosis  POA    **UTI (urinary tract infection) [N39.0]  Yes    Generalized weakness [R53.1]  Yes    Cirrhosis of liver [K74.60]  Yes    Iron deficiency anemia [D50.9]  Yes    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          Hospital Course:  Val Nassar is a 63 y.o. male  with PMH significant for PAD, cirrhosis, alcohol abuse, recently diagnosed squamous cell carcinoma of the esophagus, anemia, and HLD, who presents to the ED with complaints of weakness, bilateral arm tingling, and dizziness. Workup revealed a UTI.     Two recent admissions to [facility]:  *12/25/23-1/3/24: Admitted with GI bleed and atypical pneumonia. 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 No varices noted. Was discharged to [facility] with outpatient Oncology and Radiation Oncology follow ups.  *2/4/24-2/4/24: Same day admit and discharge for dizziness and weakness, found to have orthostatic hypotension which improved with IV fluids and albumin. Coreg discontinued at that time as no evidence of varices on previous EGD and no other indication to be on it. He was discharged home and his Oncology and Rad/Onc appointments were rescheduled as he had been in rehab during that time.       Enterococcus  UTI, POA  --UA with 21-50 WBC, 3+ bacteria  --Urine culture with Enterococcus faecalis  --Stopped IV Rocephin and switched to Macrobid x 3 days to complete course     Dizziness/lightheadedness  Generalized weakness  --MRI brain negative  --Fall precautions  --Previously with orthostatic hypotension during recent admission, Coreg had been discontinued at that time; orthostatic vitals are negative here  --s/p gentle IV fluids  --PT/OT evaluation, patient interested in long term care placement     Iron deficiency anemia  Recent GI bleed secondary to PUD + esophageal mass  --Continue BID Protonix  --Was to follow up with GI 6-8 weeks after his admission end of December 2023/early January 2024 -- will need to be rescheduled if still within goals of care (missed appointment on 2/12/24)  --H&H stable, monitor     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 Ga) and Radiation Oncology (Dr. Aguila)  --Consulted Oncology and Rad/Onc here  --Dr. Ga ordered CT chest for restaging which appears stable, however due to poor performance status he is not going to be able to tolerate systemic chemotherapy - patient at this point is not interested in chemotherapy, has also talked to Rad/Onc however per Dr. Aguila, he cannot do any palliative radiation therapy without first obtaining a PET scan which can only be done on outpatient basis, and he would also require a prolonged course of radiation (~5 weeks) which would not be feasible in a SNF setting  --Palliative Care following, plan is LTC placement (Palliative can follow there) with likely eventual transition to hospice     Anxiety  --Continue Lexapro     Chronic pain  Right shoulder pain, suspect rotator cuff arthropathy  --PCP recently prescribed Percocet  --Added Lidoderm patches  --Palliative also helping manage - have switched to oxycodone     BPH  --Continue Flomax, Proscar     Cirrhosis  History of  alcohol abuse  --Sober > 100 days  --Vivitrol per PCP - has been on IM injection every 28 days, will hold while needing narcotics for pain and defer to facility provider on restarting if needed   --Continue folic acid     Tobacco abuse  --Reports recent cessation    Patient has remained clinically stable and will be discharged to facility today.       Discharge Follow Up Recommendations for outpatient labs/diagnostics:   Follow up with pcp after dc from facility if that is plan or follow up with facility provider within 2-3 days if going LTC  Follow up with Dr. Aguila radiation/oncologist if in GOC  Follow up with Palliative care to help with GOC   Follow up with Dr Ga oncologist if in GOC  Will Need PET scan outpatient if in GOC     Day of Discharge     HPI:   Patient is sitting up in bed in NAD eating breakfast. Still having some sciatic pain. Plan for transfer today and he is agreeable     Review of Systems  Gen- No fevers, chills  CV- No chest pain, palpitations  Resp- No cough, dyspnea  GI- No N/V/D, abd pain      Vital Signs:   Temp:  [96.6 °F (35.9 °C)-98.1 °F (36.7 °C)] 97.7 °F (36.5 °C)  Heart Rate:  [55-71] 55  Resp:  [16-18] 18  BP: (109-144)/(61-74) 124/67      Physical Exam:  Constitutional: No acute distress, awake, alert  HENT: NCAT, mucous membranes moist  Respiratory: Clear to auscultation bilaterally, respiratory effort normal room air 99%  Cardiovascular: RRR, no murmurs, rubs, or gallops  Gastrointestinal: Positive bowel sounds, soft, nontender, nondistended  Musculoskeletal: No bilateral ankle edema  Psychiatric: Appropriate affect, cooperative  Neurologic: Oriented x 3, strength symmetric in all extremities, Cranial Nerves grossly intact to confrontation, speech clear  Skin: No rashes      Pertinent  and/or Most Recent Results     LAB RESULTS:      Lab 02/20/24  0515 02/19/24  0433 02/18/24  1625   WBC 6.55 5.61 6.39   HEMOGLOBIN 8.6* 8.3* 9.2*   HEMATOCRIT 28.2* 27.7* 30.1*   PLATELETS  227 229 262   NEUTROS ABS  --  2.81 3.96   EOS ABS  --  0.28 0.00   MCV 81.3 82.7 85.0   PROCALCITONIN  --   --  0.10   LACTATE  --   --  1.1         Lab 02/20/24  0515 02/19/24  0433 02/18/24  1625   SODIUM 141 138 136   POTASSIUM 4.2 3.9 4.1   CHLORIDE 105 104 101   CO2 26.0 27.0 27.0   ANION GAP 10.0 7.0 8.0   BUN 12 12 13   CREATININE 0.57* 0.56* 0.63*   EGFR 109.5 110.1 106.2   GLUCOSE 94 103* 103*   CALCIUM 9.9 9.5 9.8   MAGNESIUM 1.7 1.4* 1.6         Lab 02/18/24  1625   TOTAL PROTEIN 7.6   ALBUMIN 3.8   GLOBULIN 3.8   ALT (SGPT) 25   AST (SGOT) 32   BILIRUBIN 0.2   ALK PHOS 162*         Lab 02/18/24  1625   HSTROP T 11                 Brief Urine Lab Results  (Last result in the past 365 days)        Color   Clarity   Blood   Leuk Est   Nitrite   Protein   CREAT   Urine HCG        02/18/24 1657 Yellow   Clear   Negative   Small (1+)   Negative   Negative                 Microbiology Results (last 10 days)       Procedure Component Value - Date/Time    Urine Culture - Urine, Urine, Clean Catch [[id number]]  (Abnormal)  (Susceptibility) Collected: 02/18/24 1657    Lab Status: Final result Specimen: Urine, Clean Catch Updated: 02/21/24 0914     Urine Culture >100,000 CFU/mL Enterococcus faecalis    Narrative:      Colonization of the urinary tract without infection is common. Treatment is discouraged unless the patient is symptomatic, pregnant, or undergoing an invasive urologic procedure.    Susceptibility        Enterococcus faecalis      VIANEY      Ampicillin Susceptible      Levofloxacin Susceptible      Nitrofurantoin Susceptible      Vancomycin Susceptible                                   CT Chest With Contrast Diagnostic    Result Date: 2/20/2024  CT CHEST W CONTRAST DIAGNOSTIC Date of Exam: 2/20/2024 4:40 PM EST Indication: esophageal cancer. Comparison: 12/29/2023 chest CT scan Technique: Axial CT images were obtained of the chest after the uneventful intravenous administration of 75 mL Isovue-300.   Reconstructed coronal and sagittal images were also obtained. Automated exposure control and iterative construction methods were used. Findings: Prior study report noted wall thickening of the distal esophagus presumably patient's known neoplasm. Office notes indicate ulcerated mass of the upper third of the esophagus, approximately 2 cm in length. This may be the area of fullness and low-attenuation seen on today's scan image #17 series 2 and adjacent images. Remainder the esophagus appears grossly normal. No evidence of a frankly invasive mass is identified and there are only shotty normal-sized mediastinal lymph nodes. There is trace pericardial fluid and no pleural effusion. Airways appear normally patent. Thoracic aorta and pulmonary arteries appear grossly normal for standard contrast enhanced scan. Lungs show stable mild biapical scarring. There is a calcified right hilar granuloma. No active pulmonary parenchymal disease is seen. Included images of the upper abdomen show enlarged left liver lobe and nodular liver capsule, consistent with cirrhosis. Spleen does not appear to be significantly enlarged. A small gallstone is seen in the contracted but otherwise normal-appearing gallbladder. Included portion of the pancreas, adrenal glands, and upper renal poles appear within normal limits. The final image of the scan, #115 series 2 shows a small calcification at or near the distal common bile duct. This was not present on the 2/4/2024 scan, and is suggestive of a nonobstructing 1-2 mm distal common duct stone. There are scattered, at least  mildly enlarged latosha hepatis lymph nodes, which appear stable from 2/4/2024. Bony structures appear to be intact.     Impression: 1. Focal thickened appearance of the upper thoracic esophagus, potentially patient's known tumor. Remainder the esophagus appears normal. 2. No evidence of malignancy/metastasis in the chest elsewhere. 3. Enlarged latosha hepatis lymph nodes noted.  4. Small gallstones again noted. Today's study appears to show a nonobstructing 1 to 2 mm distal common duct stone as well, new from 2/4/2024 abdominal CT scan. 5. Liver morphology consistent with cirrhosis, but no additional features of cirrhosis are appreciated. Electronically Signed: Ibrahima Jett MD  2/20/2024 7:17 PM EST  Workstation ID: [id number]    MRI Brain Without Contrast    Result Date: 2/18/2024  MRI BRAIN WO CONTRAST Date of Exam: 2/18/2024 5:05 PM EST Indication: Dizziness, off balance, present x 2 weeks.  Comparison: 2/4/2024 CT Technique:  Routine multiplanar/multisequence sequence images of the brain were obtained without contrast administration. Findings: Diffusion imaging shows no evidence of acute infarct. There are scattered subcortical and periventricular T2 hyperintensities which are nonspecific but likely sequela of mild to moderate small vessel ischemic disease. No evidence of intracranial hemorrhage. There is normal ventricular volume. The basal cisterns are patent. There is no evidence of extra-axial fluid collection. There is normal flow voids within the intracranial vessels. No evidence of fracture or suspicious bony lesion. Paranasal sinusitis with gas/fluid level in the right maxillary sinus. Trace bilateral mastoid air cell effusions. Visualized orbits are unremarkable.     Impression: No acute intracranial abnormality. No suspicious mass on this noncontrast MRI. Paranasal sinusitis with gas/fluid level in the right maxillary sinus. Electronically Signed: Tashi Alfaro MD  2/18/2024 5:56 PM EST  Workstation ID: [id number]    XR Chest 1 View    Result Date: 2/18/2024  XR CHEST 1 VW Date of Exam: 2/18/2024 3:56 PM EST Indication: Weak/Dizzy/AMS triage protocol Comparison: Chest x-ray 12/29/2023 Findings: Normal cardiomediastinal silhouette. Mild upper lobe emphysema. No focal consolidation. No pleural effusion or pneumothorax. No acute osseous findings.     Impression: No acute cardiopulmonary  findings. Electronically Signed: Nikita Garrison MD  2/18/2024 4:13 PM EST  Workstation ID: [id number]                 Plan for Follow-up of Pending Labs/Results:     Discharge Details        Discharge Medications        New Medications        Instructions Start Date   acetaminophen 325 MG tablet  Commonly known as: TYLENOL   650 mg, Oral, Every 4 Hours PRN      nitrofurantoin (macrocrystal-monohydrate) 100 MG capsule  Commonly known as: MACROBID   100 mg, Oral, Every 12 Hours Scheduled      oxyCODONE 5 MG immediate release tablet  Commonly known as: ROXICODONE   5 mg, Oral, Every 6 Hours PRN             Continue These Medications        Instructions Start Date   escitalopram 10 MG tablet  Commonly known as: LEXAPRO   10 mg, Oral, Nightly      finasteride 5 MG tablet  Commonly known as: PROSCAR   5 mg, Oral, Daily      folic acid 1 MG tablet  Commonly known as: FOLVITE   1 mg, Oral, Daily      gabapentin 300 MG capsule  Commonly known as: NEURONTIN   300 mg, Oral, 3 Times Daily      lactulose 10 GM/15ML solution  Commonly known as: CHRONULAC   20 g, Oral, 2 Times Daily PRN      Lidocaine 4 %   1 patch, Transdermal, Every 24 Hours Scheduled, Remove & Discard patch within 12 hours or as directed by MD      pantoprazole 40 MG EC tablet  Commonly known as: PROTONIX   40 mg, Oral, 2 Times Daily      polyethylene glycol 17 g packet  Commonly known as: MIRALAX   17 g, Oral, Daily PRN      tamsulosin 0.4 MG capsule 24 hr capsule  Commonly known as: FLOMAX   1 capsule, Oral, Daily      vitamin B-12 1000 MCG tablet  Commonly known as: CYANOCOBALAMIN   1,000 mcg, Oral, Daily      Vitamin D3 1.25 MG (03651 UT) capsule   50,000 Units, Oral, Every 7 Days             Stop These Medications      oxyCODONE-acetaminophen 5-325 MG per tablet  Commonly known as: PERCOCET     vitamin D 1.25 MG (54189 UT) capsule capsule  Commonly known as: ERGOCALCIFEROL     Vivitrol 380 MG reconstituted suspension IM suspension  Generic drug:  Naltrexone              No Known Allergies      Discharge Disposition:  Skilled Nursing Facility (DC - External)    Diet:  Hospital:  Diet Order   Procedures    Diet: Cardiac Diets; Healthy Heart (2-3 Na+); Texture: Regular Texture (IDDSI 7); Fluid Consistency: Thin (IDDSI 0)       Diet Instructions       Diet: Cardiac Diets; Healthy Heart (2-3 Na+); Regular Texture (IDDSI 7); Thin (IDDSI 0)      Discharge Diet: Cardiac Diets    Cardiac Diet: Healthy Heart (2-3 Na+)    Texture: Regular Texture (IDDSI 7)    Fluid Consistency: Thin (IDDSI 0)             Activity:  Activity Instructions       Activity as Tolerated      Measure Blood Pressure              Restrictions or Other Recommendations:         CODE STATUS:    Code Status and Medical Interventions:   Ordered at: 02/18/24 2129     Code Status (Patient has no pulse and is not breathing):    CPR (Attempt to Resuscitate)     Medical Interventions (Patient has pulse or is breathing):    Full Support       Future Appointments   Date Time Provider Department Center   2/23/2024  9:45 AM MED 8  [facility]       Additional Instructions for the Follow-ups that You Need to Schedule       Discharge Follow-up with PCP   As directed       Currently Documented PCP:    Wesly Minor MD    PCP Phone Number:    [phone]     Follow Up Details: Follow up with pcp after dc from facility if that is plan or follow up with facility provider within 2-3 days if going LTC        Discharge Follow-up with Specialty: Will Need PET scan outpatient if in GOC   As directed      Specialty: Will Need PET scan outpatient if in GOC        Discharge Follow-up with Specified Provider: Follow up with Dr Ga oncologist if in GOC   As directed      To: Follow up with Dr Ga oncologist if in GOC        Discharge Follow-up with Specified Provider: Follow up with Dr. Aguila radiation/oncologist if in GOC   As directed      To: Follow up with Dr. Aguila radiation/oncologist if in GOC         Discharge Follow-up with Specified Provider: Follow up with Palliative care to help with GOC   As directed      To: Follow up with Palliative care to help with GOC                      NUVIA Mcgee  02/23/24      Time Spent on Discharge:  I spent  45  minutes on this discharge activity which included: face-to-face encounter with the patient, reviewing the data in the system, coordination of the care with the nursing staff as well as consultants, documentation, and entering orders.            Electronically signed by Liliam Ray, NUVIA at 02/23/24 0812

## 2024-02-23 NOTE — PLAN OF CARE
Goal Outcome Evaluation:           Progress: no change          Pt is A & O x4. VSS on room air. PRN tylenol given at 2017 and 0330. PRN oxy given at 2326. Pt has been able to sleep off and on throughout the shift.

## 2024-02-23 NOTE — CASE MANAGEMENT/SOCIAL WORK
"Continued Stay Note  [facility]     Patient Name: Val Nassar  MRN: [id number]  Today's Date: 2/23/2024    Admit Date: 2/18/2024    Plan: Patient visit   Discharge Plan       Row Name 02/23/24 1006       Plan    Plan Patient visit    Plan Comments  Manager and CM Director visited pt prior to his discharge today.  Pt was very pleasant and ready for discharge to a nursing facility.  He seems to rely strongly on his leon as a source of support.  Pt verbalized his understanding that when he goes to [facility] today he is unable to receive radiation therapy.  He said he is at peace with this and has \"talked to God\" about his discharge options.  Pt confirmed that he is unable to return to his sister's home at discharge in order to receive outpatient therapy.  He also said he is at peace with this decision as she works for 9 hours a day and cannot care for him.  Pt stated he isn't safe to live alone.  Pt stated if he is able to regain strength in order to return to his sister's home and get radiation that is good, but he isn't relying on that.  Pt stated that he is very please with the care he has received during this hospital stay and that all discharge options have been considered.  Pt is alert and oriented to his social situation.  He has made the decision to discharge from the hospital to go to [facility] this morning.  Pt has no further needs at this time.      Row Name 02/23/24 0822       Plan    Final Discharge Disposition Code 04 - intermediate care facility                   Discharge Codes    No documentation.                 Expected Discharge Date and Time       Expected Discharge Date Expected Discharge Time    Feb 23, 2024               ELLE Whitlock    "

## 2024-02-23 NOTE — DISCHARGE SUMMARY
[facility] Medicine Services  DISCHARGE SUMMARY    Patient Name: Val Nassar  : 1960  MRN: [id number]    Date of Admission: 2024  3:50 PM  Date of Discharge:  2024  Primary Care Physician: Wesly Minor MD    Consults       Date and Time Order Name Status Description    2024  8:09 AM Inpatient Palliative Care MD Consult Completed     2024 12:22 PM Inpatient Radiation Oncology Consult Completed     2024 12:22 PM Inpatient Hematology & Oncology Consult Completed             Hospital Course     Presenting Problem: f/u weakness, dizziness     Active Hospital Problems    Diagnosis  POA    **UTI (urinary tract infection) [N39.0]  Yes    Generalized weakness [R53.1]  Yes    Cirrhosis of liver [K74.60]  Yes    Iron deficiency anemia [D50.9]  Yes    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          Hospital Course:  Val Nassar is a 63 y.o. male  with PMH significant for PAD, cirrhosis, alcohol abuse, recently diagnosed squamous cell carcinoma of the esophagus, anemia, and HLD, who presents to the ED with complaints of weakness, bilateral arm tingling, and dizziness. Workup revealed a UTI.     Two recent admissions to [facility]:  *23-1/3/24: Admitted with GI bleed and atypical pneumonia. 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 No varices noted. Was discharged to [facility] with outpatient Oncology and Radiation Oncology follow ups.  *24-24: Same day admit and discharge for dizziness and weakness, found to have orthostatic hypotension which improved with IV fluids and  albumin. Coreg discontinued at that time as no evidence of varices on previous EGD and no other indication to be on it. He was discharged home and his Oncology and Rad/Onc appointments were rescheduled as he had been in rehab during that time.       Enterococcus UTI, POA  --UA with 21-50 WBC, 3+ bacteria  --Urine culture with Enterococcus faecalis  --Stopped IV Rocephin and switched to Macrobid x 3 days to complete course     Dizziness/lightheadedness  Generalized weakness  --MRI brain negative  --Fall precautions  --Previously with orthostatic hypotension during recent admission, Coreg had been discontinued at that time; orthostatic vitals are negative here  --s/p gentle IV fluids  --PT/OT evaluation, patient interested in long term care placement     Iron deficiency anemia  Recent GI bleed secondary to PUD + esophageal mass  --Continue BID Protonix  --Was to follow up with GI 6-8 weeks after his admission end of December 2023/early January 2024 -- will need to be rescheduled if still within goals of care (missed appointment on 2/12/24)  --H&H stable, monitor     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 Ga) and Radiation Oncology (Dr. Aguila)  --Consulted Oncology and Rad/Onc here  --Dr. Ga ordered CT chest for restaging which appears stable, however due to poor performance status he is not going to be able to tolerate systemic chemotherapy - patient at this point is not interested in chemotherapy, has also talked to Rad/Onc however per Dr. Aguila, he cannot do any palliative radiation therapy without first obtaining a PET scan which can only be done on outpatient basis, and he would also require a prolonged course of radiation (~5 weeks) which would not be feasible in a SNF setting  --Palliative Care following, plan is LTC placement (Palliative can follow there) with likely eventual transition to hospice     Anxiety  --Continue  Lexapro     Chronic pain  Right shoulder pain, suspect rotator cuff arthropathy  --PCP recently prescribed Percocet  --Added Lidoderm patches  --Palliative also helping manage - have switched to oxycodone     BPH  --Continue Flomax, Proscar     Cirrhosis  History of alcohol abuse  --Sober > 100 days  --Vivitrol per PCP - has been on IM injection every 28 days, will hold while needing narcotics for pain and defer to facility provider on restarting if needed   --Continue folic acid     Tobacco abuse  --Reports recent cessation    Patient has remained clinically stable and will be discharged to facility today.       Discharge Follow Up Recommendations for outpatient labs/diagnostics:   Follow up with pcp after dc from facility if that is plan or follow up with facility provider within 2-3 days if going LTC  Follow up with Dr. Aguila radiation/oncologist if in [facility]  Follow up with Palliative care to help with GOC   Follow up with Dr Ga oncologist if in [facility]OC  Will Need PET scan outpatient if in [facility]OC     Day of Discharge     HPI:   Patient is sitting up in bed in NAD eating breakfast. Still having some sciatic pain. Plan for transfer today and he is agreeable     Review of Systems  Gen- No fevers, chills  CV- No chest pain, palpitations  Resp- No cough, dyspnea  GI- No N/V/D, abd pain      Vital Signs:   Temp:  [96.6 °F (35.9 °C)-98.1 °F (36.7 °C)] 97.7 °F (36.5 °C)  Heart Rate:  [55-71] 55  Resp:  [16-18] 18  BP: (109-144)/(61-74) 124/67      Physical Exam:  Constitutional: No acute distress, awake, alert  HENT: NCAT, mucous membranes moist  Respiratory: Clear to auscultation bilaterally, respiratory effort normal room air 99%  Cardiovascular: RRR, no murmurs, rubs, or gallops  Gastrointestinal: Positive bowel sounds, soft, nontender, nondistended  Musculoskeletal: No bilateral ankle edema  Psychiatric: Appropriate affect, cooperative  Neurologic: Oriented x 3, strength symmetric in all extremities, Cranial Nerves  grossly intact to confrontation, speech clear  Skin: No rashes      Pertinent  and/or Most Recent Results     LAB RESULTS:      Lab 02/20/24  0515 02/19/24  0433 02/18/24  1625   WBC 6.55 5.61 6.39   HEMOGLOBIN 8.6* 8.3* 9.2*   HEMATOCRIT 28.2* 27.7* 30.1*   PLATELETS 227 229 262   NEUTROS ABS  --  2.81 3.96   EOS ABS  --  0.28 0.00   MCV 81.3 82.7 85.0   PROCALCITONIN  --   --  0.10   LACTATE  --   --  1.1         Lab 02/20/24  0515 02/19/24  0433 02/18/24  1625   SODIUM 141 138 136   POTASSIUM 4.2 3.9 4.1   CHLORIDE 105 104 101   CO2 26.0 27.0 27.0   ANION GAP 10.0 7.0 8.0   BUN 12 12 13   CREATININE 0.57* 0.56* 0.63*   EGFR 109.5 110.1 106.2   GLUCOSE 94 103* 103*   CALCIUM 9.9 9.5 9.8   MAGNESIUM 1.7 1.4* 1.6         Lab 02/18/24  1625   TOTAL PROTEIN 7.6   ALBUMIN 3.8   GLOBULIN 3.8   ALT (SGPT) 25   AST (SGOT) 32   BILIRUBIN 0.2   ALK PHOS 162*         Lab 02/18/24  1625   HSTROP T 11                 Brief Urine Lab Results  (Last result in the past 365 days)        Color   Clarity   Blood   Leuk Est   Nitrite   Protein   CREAT   Urine HCG        02/18/24 1657 Yellow   Clear   Negative   Small (1+)   Negative   Negative                 Microbiology Results (last 10 days)       Procedure Component Value - Date/Time    Urine Culture - Urine, Urine, Clean Catch [[id number]]  (Abnormal)  (Susceptibility) Collected: 02/18/24 1657    Lab Status: Final result Specimen: Urine, Clean Catch Updated: 02/21/24 0914     Urine Culture >100,000 CFU/mL Enterococcus faecalis    Narrative:      Colonization of the urinary tract without infection is common. Treatment is discouraged unless the patient is symptomatic, pregnant, or undergoing an invasive urologic procedure.    Susceptibility        Enterococcus faecalis      VIANEY      Ampicillin Susceptible      Levofloxacin Susceptible      Nitrofurantoin Susceptible      Vancomycin Susceptible                                   CT Chest With Contrast Diagnostic    Result Date:  2/20/2024  CT CHEST W CONTRAST DIAGNOSTIC Date of Exam: 2/20/2024 4:40 PM EST Indication: esophageal cancer. Comparison: 12/29/2023 chest CT scan Technique: Axial CT images were obtained of the chest after the uneventful intravenous administration of 75 mL Isovue-300.  Reconstructed coronal and sagittal images were also obtained. Automated exposure control and iterative construction methods were used. Findings: Prior study report noted wall thickening of the distal esophagus presumably patient's known neoplasm. Office notes indicate ulcerated mass of the upper third of the esophagus, approximately 2 cm in length. This may be the area of fullness and low-attenuation seen on today's scan image #17 series 2 and adjacent images. Remainder the esophagus appears grossly normal. No evidence of a frankly invasive mass is identified and there are only shotty normal-sized mediastinal lymph nodes. There is trace pericardial fluid and no pleural effusion. Airways appear normally patent. Thoracic aorta and pulmonary arteries appear grossly normal for standard contrast enhanced scan. Lungs show stable mild biapical scarring. There is a calcified right hilar granuloma. No active pulmonary parenchymal disease is seen. Included images of the upper abdomen show enlarged left liver lobe and nodular liver capsule, consistent with cirrhosis. Spleen does not appear to be significantly enlarged. A small gallstone is seen in the contracted but otherwise normal-appearing gallbladder. Included portion of the pancreas, adrenal glands, and upper renal poles appear within normal limits. The final image of the scan, #115 series 2 shows a small calcification at or near the distal common bile duct. This was not present on the 2/4/2024 scan, and is suggestive of a nonobstructing 1-2 mm distal common duct stone. There are scattered, at least  mildly enlarged latosha hepatis lymph nodes, which appear stable from 2/4/2024. Bony structures appear to be  intact.     Impression: 1. Focal thickened appearance of the upper thoracic esophagus, potentially patient's known tumor. Remainder the esophagus appears normal. 2. No evidence of malignancy/metastasis in the chest elsewhere. 3. Enlarged latosha hepatis lymph nodes noted. 4. Small gallstones again noted. Today's study appears to show a nonobstructing 1 to 2 mm distal common duct stone as well, new from 2/4/2024 abdominal CT scan. 5. Liver morphology consistent with cirrhosis, but no additional features of cirrhosis are appreciated. Electronically Signed: Ibrahima Jett MD  2/20/2024 7:17 PM EST  Workstation ID: [id number]    MRI Brain Without Contrast    Result Date: 2/18/2024  MRI BRAIN WO CONTRAST Date of Exam: 2/18/2024 5:05 PM EST Indication: Dizziness, off balance, present x 2 weeks.  Comparison: 2/4/2024 CT Technique:  Routine multiplanar/multisequence sequence images of the brain were obtained without contrast administration. Findings: Diffusion imaging shows no evidence of acute infarct. There are scattered subcortical and periventricular T2 hyperintensities which are nonspecific but likely sequela of mild to moderate small vessel ischemic disease. No evidence of intracranial hemorrhage. There is normal ventricular volume. The basal cisterns are patent. There is no evidence of extra-axial fluid collection. There is normal flow voids within the intracranial vessels. No evidence of fracture or suspicious bony lesion. Paranasal sinusitis with gas/fluid level in the right maxillary sinus. Trace bilateral mastoid air cell effusions. Visualized orbits are unremarkable.     Impression: No acute intracranial abnormality. No suspicious mass on this noncontrast MRI. Paranasal sinusitis with gas/fluid level in the right maxillary sinus. Electronically Signed: Tashi Alfaro MD  2/18/2024 5:56 PM EST  Workstation ID: [id number]    XR Chest 1 View    Result Date: 2/18/2024  XR CHEST 1 VW Date of Exam: 2/18/2024 3:56 PM EST  Indication: Weak/Dizzy/AMS triage protocol Comparison: Chest x-ray 12/29/2023 Findings: Normal cardiomediastinal silhouette. Mild upper lobe emphysema. No focal consolidation. No pleural effusion or pneumothorax. No acute osseous findings.     Impression: No acute cardiopulmonary findings. Electronically Signed: Nikita Garrison MD  2/18/2024 4:13 PM EST  Workstation ID: [id number]                 Plan for Follow-up of Pending Labs/Results:     Discharge Details        Discharge Medications        New Medications        Instructions Start Date   acetaminophen 325 MG tablet  Commonly known as: TYLENOL   650 mg, Oral, Every 4 Hours PRN      nitrofurantoin (macrocrystal-monohydrate) 100 MG capsule  Commonly known as: MACROBID   100 mg, Oral, Every 12 Hours Scheduled      oxyCODONE 5 MG immediate release tablet  Commonly known as: ROXICODONE   5 mg, Oral, Every 6 Hours PRN             Continue These Medications        Instructions Start Date   escitalopram 10 MG tablet  Commonly known as: LEXAPRO   10 mg, Oral, Nightly      finasteride 5 MG tablet  Commonly known as: PROSCAR   5 mg, Oral, Daily      folic acid 1 MG tablet  Commonly known as: FOLVITE   1 mg, Oral, Daily      gabapentin 300 MG capsule  Commonly known as: NEURONTIN   300 mg, Oral, 3 Times Daily      lactulose 10 GM/15ML solution  Commonly known as: CHRONULAC   20 g, Oral, 2 Times Daily PRN      Lidocaine 4 %   1 patch, Transdermal, Every 24 Hours Scheduled, Remove & Discard patch within 12 hours or as directed by MD      pantoprazole 40 MG EC tablet  Commonly known as: PROTONIX   40 mg, Oral, 2 Times Daily      polyethylene glycol 17 g packet  Commonly known as: MIRALAX   17 g, Oral, Daily PRN      tamsulosin 0.4 MG capsule 24 hr capsule  Commonly known as: FLOMAX   1 capsule, Oral, Daily      vitamin B-12 1000 MCG tablet  Commonly known as: CYANOCOBALAMIN   1,000 mcg, Oral, Daily      Vitamin D3 1.25 MG (94479 UT) capsule   50,000 Units, Oral, Every 7 Days              Stop These Medications      oxyCODONE-acetaminophen 5-325 MG per tablet  Commonly known as: PERCOCET     vitamin D 1.25 MG (00798 UT) capsule capsule  Commonly known as: ERGOCALCIFEROL     Vivitrol 380 MG reconstituted suspension IM suspension  Generic drug: Naltrexone              No Known Allergies      Discharge Disposition:  Skilled Nursing Facility (DC - External)    Diet:  Hospital:  Diet Order   Procedures    Diet: Cardiac Diets; Healthy Heart (2-3 Na+); Texture: Regular Texture (IDDSI 7); Fluid Consistency: Thin (IDDSI 0)       Diet Instructions       Diet: Cardiac Diets; Healthy Heart (2-3 Na+); Regular Texture (IDDSI 7); Thin (IDDSI 0)      Discharge Diet: Cardiac Diets    Cardiac Diet: Healthy Heart (2-3 Na+)    Texture: Regular Texture (IDDSI 7)    Fluid Consistency: Thin (IDDSI 0)             Activity:  Activity Instructions       Activity as Tolerated      Measure Blood Pressure              Restrictions or Other Recommendations:         CODE STATUS:    Code Status and Medical Interventions:   Ordered at: 02/18/24 2129     Code Status (Patient has no pulse and is not breathing):    CPR (Attempt to Resuscitate)     Medical Interventions (Patient has pulse or is breathing):    Full Support       Future Appointments   Date Time Provider Department Center   2/23/2024  9:45 AM MED [facility]       Additional Instructions for the Follow-ups that You Need to Schedule       Discharge Follow-up with PCP   As directed       Currently Documented PCP:    Wesly Minor MD    PCP Phone Number:    [phone]     Follow Up Details: Follow up with pcp after dc from facility if that is plan or follow up with facility provider within 2-3 days if going LTC        Discharge Follow-up with Specialty: Will Need PET scan outpatient if in GOC   As directed      Specialty: Will Need PET scan outpatient if in GOC        Discharge Follow-up with Specified Provider: Follow up with Dr Ga oncologist if in  GOC   As directed      To: Follow up with Dr Ga oncologist if in GOC        Discharge Follow-up with Specified Provider: Follow up with Dr. Aguila radiation/oncologist if in GOC   As directed      To: Follow up with Dr. Aguila radiation/oncologist if in GOC        Discharge Follow-up with Specified Provider: Follow up with Palliative care to help with GOC   As directed      To: Follow up with Palliative care to help with GOC                      Liliam Ray, APRN  02/23/24      Time Spent on Discharge:  I spent  45  minutes on this discharge activity which included: face-to-face encounter with the patient, reviewing the data in the system, coordination of the care with the nursing staff as well as consultants, documentation, and entering orders.

## 2024-02-23 NOTE — PLAN OF CARE
Goal Outcome Evaluation:              Outcome Evaluation: Patient d/c [facility]. Report called to Lala. PRN PO pain medication administered

## 2024-02-26 DIAGNOSIS — C15.9 SQUAMOUS CELL CARCINOMA OF ESOPHAGUS: Primary | ICD-10-CM

## 2024-02-28 ENCOUNTER — TELEPHONE (OUTPATIENT)
Dept: RADIATION ONCOLOGY | Facility: HOSPITAL | Age: 64
End: 2024-02-28
Payer: MEDICAID

## 2024-02-28 NOTE — TELEPHONE ENCOUNTER
[phone] was given all appointments and instructions. Will also mail a new patient packet.

## 2024-03-06 ENCOUNTER — HOSPITAL ENCOUNTER (OUTPATIENT)
Dept: PET IMAGING | Facility: HOSPITAL | Age: 64
Discharge: HOME | End: 2024-03-06
Payer: MEDICAID

## 2024-03-06 ENCOUNTER — TELEPHONE (OUTPATIENT)
Dept: RADIATION ONCOLOGY | Facility: HOSPITAL | Age: 64
End: 2024-03-06
Payer: MEDICAID

## 2024-03-06 DIAGNOSIS — C15.9 SQUAMOUS CELL CARCINOMA OF ESOPHAGUS: ICD-10-CM

## 2024-03-06 LAB — GLUCOSE BLDC GLUCOMTR-MCNC: 99 MG/DL (ref 70–130)

## 2024-03-06 PROCEDURE — 82948 REAGENT STRIP/BLOOD GLUCOSE: CPT

## 2024-03-06 PROCEDURE — 0 FLUDEOXYGLUCOSE F18 SOLUTION: Performed by: RADIOLOGY

## 2024-03-06 PROCEDURE — 78815 PET IMAGE W/CT SKULL-THIGH: CPT

## 2024-03-06 PROCEDURE — A9552 F18 FDG: HCPCS | Performed by: RADIOLOGY

## 2024-03-06 RX ADMIN — FLUDEOXYGLUCOSE F 18 1 DOSE: 200 INJECTION, SOLUTION INTRAVENOUS at 14:25

## 2024-03-06 NOTE — TELEPHONE ENCOUNTER
Tere from [facility] called to state patients sister called to report that PET scan had not been approved by insurance, verified with Carolyn Daniel that scan is approved.   Pt to keep scheduled appointments.

## 2024-03-13 ENCOUNTER — OFFICE VISIT (OUTPATIENT)
Dept: ONCOLOGY | Facility: CLINIC | Age: 64
End: 2024-03-13
Payer: MEDICAID

## 2024-03-13 VITALS
SYSTOLIC BLOOD PRESSURE: 146 MMHG | HEART RATE: 74 BPM | BODY MASS INDEX: 23.99 KG/M2 | HEIGHT: 69 IN | TEMPERATURE: 97.8 F | OXYGEN SATURATION: 98 % | DIASTOLIC BLOOD PRESSURE: 71 MMHG | WEIGHT: 162 LBS

## 2024-03-13 DIAGNOSIS — C15.3 MALIGNANT NEOPLASM OF UPPER THIRD OF ESOPHAGUS: Primary | ICD-10-CM

## 2024-03-13 NOTE — PROGRESS NOTES
[facility]  Office number [phone]    Fax number [phone]     Follow up     Date: 2024     Patient Name: Val Nassar Jr.  MRN: [id number]  : 1960      Chief Complaint: esophageal cancer    Cancer Staging:  Cancer Staging   Stage II (cT2, cN1, cM0)      History of Present Illness: Val Nassar Jr. is a pleasant 63 y.o. male who presents today for evaluation of upper third esophageal squamous cell carcinoma. He has a longstanding history of cirrhosis     He originally was diagnosed with a localized esophageal squamous cell carcinoma of the upper third of the esophagus in the 2023.  At the time he was living in [place].  He underwent an endoscopy confirming cancer diagnosis at [facility] in [facility].  He then relocated to [facility] to live with his sister because of his cancer diagnosis.      He presented to [facility] in 2023 with an upper GI bleed secondary to peptic ulcer disease and possible cholecystitis.  His presenting hemoglobin was 6.9.     EGD performed 2023 showed an ulcerated mass in the upper third of the esophagus which was nonobstructing and partially circumferential spanning 2 cm in length.  Biopsy results are pending.    Additional findings include gastritis, portal hypertension gastropathy and nonbleeding duodenal ulcer.  Duodenal stenosis.    He was discharged to a rehab facility and ultimately discharged home to live with his sister.  He was doing poorly and was unable to care for himself.  He did not keep his appointments with medical oncology, thoracic surgery, or radiation oncology and did not complete staging PET/CT.  He then represented to the hospital in late 2024 with a UTI and confusion.  During that admission he was assessed by palliative care, radiation oncology, and medical oncology.  He elected to be discharged to a skilled nursing facility for  long-term care as he does not want to be a burden on his sister and did not feel that he could care for himself at home.  Prior to discharge there were conversations between case management and social work at the hospital and his facility and the patient was under the impression that he could not receive radiation treatment while living at the skilled facility.  However he did elect to complete PET CT scan and returns for results.    PET/CT performed 3/6/2024 shows thickening of the esophagus with an uptake of 16 by SUV.  There was notable hypermetabolic activity in the left lateral shoulder subcutaneous tissue which corresponds to an area where he received a prior injection at his PCP office for alcohol use which he reports has been raised and pruritic since the injection 6 weeks prior.  He had mild paratracheal adenopathy as well as an right paratracheal lymph node in the right supraclavicular region  He reports that he has been tolerating regular diet with no obstructive symptoms prior to presentation.     He continues to feel weak but is clinically improving.  He tells me he is planning inpatient rehab on discharge.  His hemoglobin continues to improve and is 9.1 today.  CT of the abdomen pelvis in addition to the gallbladder findings showed no metastatic disease.  A chest x-ray this admission showed mild interstitial edema.    Eating soft diet. Has gained 10 lb. however he notes he is now experiencing more pain from the neck to the mid esophagus. Some difficulty swallowing, for example a cut up grape this week  Doing PT twice per week on bike 25 min twice per week. Pain in shoulder, with limited ROM on right right > left   Sciatica and low back pain worse. Taking oxycodone 7.5 q6 hours PRN, takes an average of BID, helps transiently, left foot swelling.   Walking with walker  Night sweats.    Past Medical History:   Past Medical History:   Diagnosis Date    Anemia     Cancer     Cirrhosis     Duodenal ulcer      Gastric ulcer     GERD (gastroesophageal reflux disease)     History of alcohol abuse     HLD (hyperlipidemia)     Mood disorder        Past Surgical History:   Past Surgical History:   Procedure Laterality Date    ENDOSCOPY N/A 12/26/2023    Procedure: ESOPHAGOGASTRODUODENOSCOPY;  Surgeon: Wesly Mckeon MD;  Location: [facility] ENDOSCOPY;  Service: Gastroenterology;  Laterality: N/A;       Family History:   Family History   Problem Relation Age of Onset    Cancer Mother     Heart attack Father        Social History:   Social History     Socioeconomic History    Marital status: Single   Tobacco Use    Smoking status: Every Day     Current packs/day: 1.00     Average packs/day: 1 pack/day for 15.0 years (15.0 ttl pk-yrs)     Types: Cigarettes    Smokeless tobacco: Never   Vaping Use    Vaping status: Some Days    Substances: Flavoring    Devices: Disposable   Substance and Sexual Activity    Alcohol use: Not Currently     Comment: sober for ~ 3 months    Drug use: Yes     Types: Hydrocodone    Sexual activity: Defer       Medications:     Current Outpatient Medications:     acetaminophen (TYLENOL) 325 MG tablet, Take 2 tablets by mouth Every 4 (Four) Hours As Needed for Mild Pain., Disp: , Rfl:     Cholecalciferol (Vitamin D3) 1.25 MG (52649 UT) capsule, Take 1 capsule by mouth Every 7 (Seven) Days., Disp: , Rfl:     escitalopram (LEXAPRO) 10 MG tablet, Take 1 tablet by mouth Every Night., Disp: , Rfl:     finasteride (PROSCAR) 5 MG tablet, Take 1 tablet by mouth Daily., Disp: , Rfl:     folic acid (FOLVITE) 1 MG tablet, Take 1 tablet by mouth Daily., Disp: , Rfl:     gabapentin (NEURONTIN) 300 MG capsule, Take 1 capsule by mouth 3 (Three) Times a Day for 3 days., Disp: 9 capsule, Rfl: 0    lactulose (CHRONULAC) 10 GM/15ML solution, Take 30 mL by mouth 2 (Two) Times a Day As Needed., Disp: , Rfl:     Lidocaine 4 %, Place 1 patch on the skin as directed by provider Daily. Remove & Discard patch within 12 hours or as  "directed by MD, Disp: , Rfl:     oxyCODONE (ROXICODONE) 5 MG immediate release tablet, Take 1 tablet by mouth Every 6 (Six) Hours As Needed for Moderate Pain., Disp: 12 tablet, Rfl: 0    pantoprazole (PROTONIX) 40 MG EC tablet, Take 1 tablet by mouth 2 (Two) Times a Day., Disp: , Rfl:     polyethylene glycol (MIRALAX) 17 g packet, Take 17 g by mouth Daily As Needed (Use if senna-docusate is ineffective)., Disp: , Rfl:     tamsulosin (FLOMAX) 0.4 MG capsule 24 hr capsule, Take 1 capsule by mouth Daily., Disp: , Rfl:     vitamin B-12 (CYANOCOBALAMIN) 1000 MCG tablet, Take 1 tablet by mouth Daily., Disp: , Rfl:     Allergies:   No Known Allergies    Objective     Vital Signs:   Vitals:    03/13/24 1426   BP: 146/71   Pulse: 74   Temp: 97.8 °F (36.6 °C)   TempSrc: Temporal   SpO2: 98%   Weight: 73.5 kg (162 lb)   Height: 175.3 cm (69.02\")   PainSc: 0-No pain    Body mass index is 23.91 kg/m².   Pain Score    03/13/24 1426   PainSc: 0-No pain       ECOG Performance Status: 2    Physical Exam:   General: No acute distress. Well appearing   HEENT: Normocephalic, atraumatic. Sclera anicteric.   Neck: supple, no adenopathy.   Cardiovascular: regular rate and rhythm. No murmurs.   Respiratory: Normal rate. Clear to auscultation bilaterally  Abdomen: Soft, nontender, non distended with normoactive bowel sounds  Lymph: no cervical, supraclavicular or axillary adenopathy  Neuro: Alert and oriented x 3. No focal deficits.   Ext: Symmetric, no swelling.   Psych: Euthymic      Laboratory/Imaging Reviewed:   Hospital Outpatient Visit on 03/06/2024   Component Date Value Ref Range Status    Glucose 03/06/2024 99  70 - 130 mg/dL Final       NM PET/CT Skull Base to Mid Thigh    Result Date: 3/7/2024  Narrative: NM PET/CT SKULL BASE TO MID THIGH Date of Exam: 3/6/2024 2:24 PM EST Indication: esophogeal ca. Comparison: Abdomen pelvis CT scan 2/4/2024. No prior PET scan Technique: 11.8 mCi of F-18 FDG was administered intravenously. PET " imaging was obtained from skull base to mid-thigh approximately 60 minutes after radiotracer injection. A low dose non contrast CT was obtained for attenuation correction and anatomic localization. Fused PET-CT and 3D MIP reconstructions were utilized for image interpretation.  Fasting blood glucose level: 99 mg/dl. Reference uptake values: Mediastinum: 2.5 SUVmax Liver: 3.0 SUVmax Normalization method: Body Weight Findings: History indicates known esophageal cancer undergoing work-up, no treatment to date. Some left-sided neck pain and dysphagia. Today's 3D images shows a focus of increased uptake in the central upper chest, apparently regional to the esophagus, and 2 small foci of uptake in the right lower neck or superior stomach. There is focal activity in the superficial soft tissues lateral to the left shoulder. There is normal and normal variant uptake elsewhere. Multiplanar images show no significant asymmetry of uptake in the brain. No hypermetabolic activity is seen in the neck down to the right supraclavicular fat, where a small lymph node has maximal SUV of 6.6, concerning for metastatic involvement. A couple of small adjacent right upper paratracheal lymph nodes have maximal SUV  of 3.3.  In the proximal thoracic there is focal thickening of the esophagus and strongly hypermetabolic activity, maximal SUV 15.9 apparently corresponding to the patient's known neoplasm. Hypermetabolic activity in the lateral left shoulder subcutaneous tissues measures 8.0. CT scan appearance suggests potential subcutaneous hematoma. Please correlate with any history of trauma to this area. No hypermetabolic mediastinal, hilar, chest wall or pulmonary parenchymal mass is seen elsewhere. Below the diaphragm, no hypermetabolic liver, adrenal, or other solid organ disease is seen. No hypermetabolic adenopathy is appreciated. There is expected GI and  tract activity. Focal activity in the adjacent posterior spinous processes  "of L3, L4 and  L5 measures approximately 4.1 - 4.2. With reference to the previous abdominal CT scan, sagittal images shows associated DJD, with the posterior spinous processes appear hypertrophied and in contact, or so-called \"Baastrup disease\". No pathologic marrow space uptake is seen elsewhere. Incidental note is made of a large, approximately 6 cm left bladder diverticulum.    Impression: Impression: 1. Intensely hypermetabolic activity in the proximal thoracic esophagus, apparently corresponding to patient's known neoplasm. 2. 3 small normal sized but hypermetabolic nodes respectively in the right supraclavicular fat, and right superior mediastinum. 3. Hypermetabolic activity in the lateral subcutaneous tissues of the left shoulder, which appears to correspond to subcutaneous bruising. Please correlate with any history of trauma to this area. 4. DJD-related uptake in the hypertrophied and degenerated posterior spinous processes of L3, L4 and L5.. Electronically Signed: Ibrahima Jett MD  3/7/2024 4:27 PM EST  Workstation ID: [id number]    CT Chest With Contrast Diagnostic    Result Date: 2/20/2024  Narrative: CT CHEST W CONTRAST DIAGNOSTIC Date of Exam: 2/20/2024 4:40 PM EST Indication: esophageal cancer. Comparison: 12/29/2023 chest CT scan Technique: Axial CT images were obtained of the chest after the uneventful intravenous administration of 75 mL Isovue-300.  Reconstructed coronal and sagittal images were also obtained. Automated exposure control and iterative construction methods were used. Findings: Prior study report noted wall thickening of the distal esophagus presumably patient's known neoplasm. Office notes indicate ulcerated mass of the upper third of the esophagus, approximately 2 cm in length. This may be the area of fullness and low-attenuation seen on today's scan image #17 series 2 and adjacent images. Remainder the esophagus appears grossly normal. No evidence of a frankly invasive mass is " identified and there are only shotty normal-sized mediastinal lymph nodes. There is trace pericardial fluid and no pleural effusion. Airways appear normally patent. Thoracic aorta and pulmonary arteries appear grossly normal for standard contrast enhanced scan. Lungs show stable mild biapical scarring. There is a calcified right hilar granuloma. No active pulmonary parenchymal disease is seen. Included images of the upper abdomen show enlarged left liver lobe and nodular liver capsule, consistent with cirrhosis. Spleen does not appear to be significantly enlarged. A small gallstone is seen in the contracted but otherwise normal-appearing gallbladder. Included portion of the pancreas, adrenal glands, and upper renal poles appear within normal limits. The final image of the scan, #115 series 2 shows a small calcification at or near the distal common bile duct. This was not present on the 2/4/2024 scan, and is suggestive of a nonobstructing 1-2 mm distal common duct stone. There are scattered, at least  mildly enlarged latosha hepatis lymph nodes, which appear stable from 2/4/2024. Bony structures appear to be intact.     Impression: Impression: 1. Focal thickened appearance of the upper thoracic esophagus, potentially patient's known tumor. Remainder the esophagus appears normal. 2. No evidence of malignancy/metastasis in the chest elsewhere. 3. Enlarged latosha hepatis lymph nodes noted. 4. Small gallstones again noted. Today's study appears to show a nonobstructing 1 to 2 mm distal common duct stone as well, new from 2/4/2024 abdominal CT scan. 5. Liver morphology consistent with cirrhosis, but no additional features of cirrhosis are appreciated. Electronically Signed: Ibrahima Jett MD  2/20/2024 7:17 PM EST  Workstation ID: [id number]    MRI Brain Without Contrast    Result Date: 2/18/2024  Narrative: MRI BRAIN WO CONTRAST Date of Exam: 2/18/2024 5:05 PM EST Indication: Dizziness, off balance, present x 2 weeks.   Comparison: 2/4/2024 CT Technique:  Routine multiplanar/multisequence sequence images of the brain were obtained without contrast administration. Findings: Diffusion imaging shows no evidence of acute infarct. There are scattered subcortical and periventricular T2 hyperintensities which are nonspecific but likely sequela of mild to moderate small vessel ischemic disease. No evidence of intracranial hemorrhage. There is normal ventricular volume. The basal cisterns are patent. There is no evidence of extra-axial fluid collection. There is normal flow voids within the intracranial vessels. No evidence of fracture or suspicious bony lesion. Paranasal sinusitis with gas/fluid level in the right maxillary sinus. Trace bilateral mastoid air cell effusions. Visualized orbits are unremarkable.     Impression: Impression: No acute intracranial abnormality. No suspicious mass on this noncontrast MRI. Paranasal sinusitis with gas/fluid level in the right maxillary sinus. Electronically Signed: Tashi Alfaro MD  2/18/2024 5:56 PM EST  Workstation ID: [id number]    XR Chest 1 View    Result Date: 2/18/2024  Narrative: XR CHEST 1 VW Date of Exam: 2/18/2024 3:56 PM EST Indication: Weak/Dizzy/AMS triage protocol Comparison: Chest x-ray 12/29/2023 Findings: Normal cardiomediastinal silhouette. Mild upper lobe emphysema. No focal consolidation. No pleural effusion or pneumothorax. No acute osseous findings.     Impression: Impression: No acute cardiopulmonary findings. Electronically Signed: Nikita Garrison MD  2/18/2024 4:13 PM EST  Workstation ID: [id number]     Procedures      Narrative & Impression   NM PET/CT SKULL BASE TO MID THIGH     Date of Exam: 3/6/2024 2:24 PM EST     Indication: esophogeal ca.     Comparison: Abdomen pelvis CT scan 2/4/2024. No prior PET scan     Technique: 11.8 mCi of F-18 FDG was administered intravenously. PET imaging was obtained from skull base to mid-thigh approximately 60 minutes after radiotracer  injection. A low dose non contrast CT was obtained for attenuation correction and anatomic   localization. Fused PET-CT and 3D MIP reconstructions were utilized for image interpretation.  Fasting blood glucose level: 99 mg/dl.     Reference uptake values:  Mediastinum: 2.5 SUVmax  Liver: 3.0 SUVmax  Normalization method: Body Weight     Findings:  History indicates known esophageal cancer undergoing work-up, no treatment to date. Some left-sided neck pain and dysphagia.     Today's 3D images shows a focus of increased uptake in the central upper chest, apparently regional to the esophagus, and 2 small foci of uptake in the right lower neck or superior stomach. There is focal activity in the superficial soft tissues lateral   to the left shoulder. There is normal and normal variant uptake elsewhere.     Multiplanar images show no significant asymmetry of uptake in the brain.     No hypermetabolic activity is seen in the neck down to the right supraclavicular fat, where a small lymph node has maximal SUV of 6.6, concerning for metastatic involvement. A couple of small adjacent right upper paratracheal lymph nodes have maximal SUV   of 3.3.      In the proximal thoracic there is focal thickening of the esophagus and strongly hypermetabolic activity, maximal SUV 15.9 apparently corresponding to the patient's known neoplasm. Hypermetabolic activity in the lateral left shoulder subcutaneous   tissues measures 8.0. CT scan appearance suggests potential subcutaneous hematoma. Please correlate with any history of trauma to this area.     No hypermetabolic mediastinal, hilar, chest wall or pulmonary parenchymal mass is seen elsewhere.     Below the diaphragm, no hypermetabolic liver, adrenal, or other solid organ disease is seen. No hypermetabolic adenopathy is appreciated. There is expected GI and  tract activity. Focal activity in the adjacent posterior spinous processes of L3, L4 and   L5 measures approximately 4.1 -  "4.2. With reference to the previous abdominal CT scan, sagittal images shows associated DJD, with the posterior spinous processes appear hypertrophied and in contact, or so-called \"Baastrup disease\". No pathologic marrow   space uptake is seen elsewhere. Incidental note is made of a large, approximately 6 cm left bladder diverticulum.  IMPRESSION:  Impression:     1. Intensely hypermetabolic activity in the proximal thoracic esophagus, apparently corresponding to patient's known neoplasm.     2. 3 small normal sized but hypermetabolic nodes respectively in the right supraclavicular fat, and right superior mediastinum.      3. Hypermetabolic activity in the lateral subcutaneous tissues of the left shoulder, which appears to correspond to subcutaneous bruising. Please correlate with any history of trauma to this area.     4. DJD-related uptake in the hypertrophied and degenerated posterior spinous processes of L3, L4 and L5..       Had a fall on right shoulderbut not the rightgot a injection in the left shoulder  to help him stop drinking at dr. Wesly Albert 6 weeks ago, swelled up and red.     Narrative & Impression   MRI BRAIN WO CONTRAST     Date of Exam: 2/18/2024 5:05 PM EST     Indication: Dizziness, off balance, present x 2 weeks.     Comparison: 2/4/2024 CT     Technique:  Routine multiplanar/multisequence sequence images of the brain were obtained without contrast administration.        Findings:  Diffusion imaging shows no evidence of acute infarct. There are scattered subcortical and periventricular T2 hyperintensities which are nonspecific but likely sequela of mild to moderate small vessel ischemic disease. No evidence of intracranial   hemorrhage.     There is normal ventricular volume. The basal cisterns are patent. There is no evidence of extra-axial fluid collection. There is normal flow voids within the intracranial vessels.     No evidence of fracture or suspicious bony lesion. Paranasal sinusitis " with gas/fluid level in the right maxillary sinus. Trace bilateral mastoid air cell effusions. Visualized orbits are unremarkable.     IMPRESSION:  Impression:  No acute intracranial abnormality. No suspicious mass on this noncontrast MRI.     Paranasal sinusitis with gas/fluid level in the right maxillary sinus.           Assessment / Plan      Assessment/Plan:     1. 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  SUV avid right supraclavicular lymph node  3.  Indeterminate paratracheal adenopathy  -I personally reviewed his PET/CT.  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  -He previously has had discussions with myself and radiation oncology.  Although he is interested in pursuing cancer directed therapy, he tells me that his main priority is to continue to live in his current skilled nursing facility.  He tells me that he wishes to die there.  He feels comfortable and supported.  He is concerned if he pursues cancer directed therapy he would lose his spot at the facility.  However, if he is able to complete cancer directed therapy while remaining at his skilled facility, he would be interested in doing that.    -He is potentially a candidate for chemoradiation, although I will need to discuss this further with radiation oncology to make sure they agree that the supraclavicular lymph node is within his field of treatment.  The patient's impression is that he could not receive radiation while at his facility but that he could potentially receive chemotherapy.    -If he elects against chemoradiation or is not felt to be a candidate by radiation oncology, we could potentially consider chemotherapy/chemoimmunotherapy but this would be palliative.    -We also discussed the anticipated course of untreated esophageal malignancy and likely progressive symptoms.  I have referred him to palliative  care for ongoing symptom management as well as to social work for support.    -My nurse did call and speak with the nursing director and we will try to identify what treatments the patient may be able to receive while in his chosen facility.  Hopefully we can have this information with the time he meets with radiation oncology next week.    -Based on PET/CT result 6 weeks of definitive chemoradiation would likely be optimal therapy.  -Add MMR testing to original biopsy as not previously reported    Follow Up:   1-2 weeks     Katerina Ga MD  Hematology and Oncology     I have spent a total of 40 min on reviewing test results/preparing to see patient, counseling patient, performing medically appropriate exam , placing orders, coordinating care and documenting clinical information in the electronic or other health record

## 2024-03-14 ENCOUNTER — TELEPHONE (OUTPATIENT)
Dept: ONCOLOGY | Facility: CLINIC | Age: 64
End: 2024-03-14
Payer: MEDICAID

## 2024-03-14 DIAGNOSIS — C15.3 MALIGNANT NEOPLASM OF UPPER THIRD OF ESOPHAGUS: Primary | ICD-10-CM

## 2024-03-14 NOTE — TELEPHONE ENCOUNTER
Dr. Ga requested staff to contact [facility] (verbal permission given to RN by patient during office visit yesterday to speak with [facility]) to see if patient can receive chemotherapy treatment while he is a resident there.  Spoke with Yaneth WRIGHT.  (Her cell number: [phone]) She stated it depends on the treatment he gets and associated precautions associated with it.  She also said it depends on the exposure to other staff and residents, the costs associated with it and whom would be responsible for the payments for treatment.  She stated they need to know what the treatment is and what all it entails.  She stated whether patient decides he wants treatment is up to him, it is just if their facility can accommodate him with it.  She stated he shares a bathroom with another resident and there are no private rooms available.  She said patient could use a BSC if needed but it would be up to him if he wanted to/dignity.  Advised her RN will discuss with Dr. Ga and will call her back to advise.  She verbalized understanding.

## 2024-03-14 NOTE — TELEPHONE ENCOUNTER
Spoke with Yaneth RWIGHT.  Advised her per MD the different treatment options that patient could potentially receive.  Discussed with her possibility of radiation daily.  Advised her of precautions including infection risks and chemo risk to others (with bodily fluids).  Advised her that this office cannot answer the question about payment, will be sent to his insurance for authorization.  She verbalized understanding and stated she has concerns for transportation as they provide transportation but not always daily.  She stated she will speak with Mr. Nassar regarding his wishes and contact this office back.  Advised her Mr. Nassar has a follow up with Dr. Ga next week and she can review treatment options with him then.  She verbalized understanding.  Dr. Ga notified.

## 2024-03-15 ENCOUNTER — TELEPHONE (OUTPATIENT)
Dept: ONCOLOGY | Facility: CLINIC | Age: 64
End: 2024-03-15
Payer: MEDICAID

## 2024-03-15 LAB
CYTO UR: NORMAL
LAB AP CASE REPORT: NORMAL
LAB AP CLINICAL INFORMATION: NORMAL
LAB AP DIAGNOSIS COMMENT: NORMAL
LAB AP OUTSIDE REPORT, ADDENDUM: NORMAL
LAB AP SPECIAL STAINS: NORMAL
PATH REPORT.ADDENDUM SPEC: NORMAL
PATH REPORT.FINAL DX SPEC: NORMAL
PATH REPORT.GROSS SPEC: NORMAL

## 2024-03-15 NOTE — TELEPHONE ENCOUNTER
Caller: LILIANA EASON    Relationship: [employer]    Best call back number: [phone]     Who are you requesting to speak with (clinical staff, provider,  specific staff member): JOHANNE    What was the call regarding: CALLING JOHANNE BACK. PLEASE RETURN CALL

## 2024-03-15 NOTE — TELEPHONE ENCOUNTER
Notified Yaneth that patient stated he does want treatment if they can accommodate and that he would like to speak with her further.  Also, that he plans to find out a formal treatment plan with Dr. Ga at his scheduled follow up and will then let Yaneth know the plan to see if they can accommodate it.  She verbalized understanding and stated she will speak with patient.

## 2024-03-15 NOTE — TELEPHONE ENCOUNTER
Notified patient Yaneth stated he had told her he doesn't want treatment.  Patient stated he does want treatment if [facility] can accommodate.  Patient asked RN to contact Yaneth back and let her know he wants to speak with her further regarding treatment.  Asked patient if he is planning on keeping appointment with Dr. Aguila and Dr. Ga.  He stated yes and that he would like to discuss with Dr. Ga further at follow up what treatment is recommended and will let [facility] know at that time what it would entail and decide at that time.  Advised Dr. Ga will be notified when she returns on 3-18-24 what his wishes are.  He verbalized understanding.

## 2024-03-15 NOTE — TELEPHONE ENCOUNTER
Yaneth stated she spoke with patient and he told her he doesn't want treatment.  Advised her RN will call and discuss with patient.  She verbalized understanding.

## 2024-03-18 ENCOUNTER — HOSPITAL ENCOUNTER (OUTPATIENT)
Dept: RADIATION ONCOLOGY | Facility: HOSPITAL | Age: 64
Setting detail: RADIATION/ONCOLOGY SERIES
Discharge: HOME | End: 2024-03-18
Payer: MEDICAID

## 2024-03-18 ENCOUNTER — HOSPITAL ENCOUNTER (EMERGENCY)
Facility: HOSPITAL | Age: 64
Discharge: NURSING, REHAB OR LONG-TERM CARE | End: 2024-03-19
Attending: EMERGENCY MEDICINE | Admitting: EMERGENCY MEDICINE
Payer: MEDICAID

## 2024-03-18 ENCOUNTER — OFFICE VISIT (OUTPATIENT)
Dept: RADIATION ONCOLOGY | Facility: HOSPITAL | Age: 64
End: 2024-03-18
Payer: MEDICAID

## 2024-03-18 VITALS
BODY MASS INDEX: 23.41 KG/M2 | RESPIRATION RATE: 16 BRPM | OXYGEN SATURATION: 98 % | HEIGHT: 70 IN | SYSTOLIC BLOOD PRESSURE: 131 MMHG | HEART RATE: 80 BPM | TEMPERATURE: 98.1 F | WEIGHT: 163.5 LBS | DIASTOLIC BLOOD PRESSURE: 63 MMHG

## 2024-03-18 VITALS
HEIGHT: 69 IN | RESPIRATION RATE: 16 BRPM | OXYGEN SATURATION: 97 % | DIASTOLIC BLOOD PRESSURE: 66 MMHG | HEART RATE: 66 BPM | TEMPERATURE: 97.5 F | WEIGHT: 163 LBS | BODY MASS INDEX: 24.14 KG/M2 | SYSTOLIC BLOOD PRESSURE: 126 MMHG

## 2024-03-18 DIAGNOSIS — S61.212A LACERATION OF RIGHT MIDDLE FINGER WITHOUT FOREIGN BODY WITHOUT DAMAGE TO NAIL, INITIAL ENCOUNTER: Primary | ICD-10-CM

## 2024-03-18 DIAGNOSIS — C15.9 SQUAMOUS CELL CARCINOMA OF ESOPHAGUS: Primary | ICD-10-CM

## 2024-03-18 PROCEDURE — G0463 HOSPITAL OUTPT CLINIC VISIT: HCPCS

## 2024-03-18 PROCEDURE — 99283 EMERGENCY DEPT VISIT LOW MDM: CPT

## 2024-03-18 PROCEDURE — 90715 TDAP VACCINE 7 YRS/> IM: CPT | Performed by: EMERGENCY MEDICINE

## 2024-03-18 PROCEDURE — 90471 IMMUNIZATION ADMIN: CPT | Performed by: EMERGENCY MEDICINE

## 2024-03-18 PROCEDURE — 25010000002 TETANUS-DIPHTH-ACELL PERTUSSIS 5-2.5-18.5 LF-MCG/0.5 SUSPENSION PREFILLED SYRINGE: Performed by: EMERGENCY MEDICINE

## 2024-03-18 RX ORDER — DIAPER,BRIEF,INFANT-TODD,DISP
1 EACH MISCELLANEOUS EVERY 12 HOURS SCHEDULED
Status: COMPLETED | OUTPATIENT
Start: 2024-03-18 | End: 2024-03-18

## 2024-03-18 RX ADMIN — TETANUS TOXOID, REDUCED DIPHTHERIA TOXOID AND ACELLULAR PERTUSSIS VACCINE, ADSORBED 0.5 ML: 5; 2.5; 8; 8; 2.5 SUSPENSION INTRAMUSCULAR at 23:33

## 2024-03-18 RX ADMIN — BACITRACIN 0.9 G: 500 OINTMENT TOPICAL at 23:26

## 2024-03-19 NOTE — DISCHARGE INSTRUCTIONS
Keep the wound clean and dry, wash with warm soapy water 1-2 times a day.  Return to the ER as needed for new or worsening symptoms

## 2024-03-20 ENCOUNTER — HOSPITAL ENCOUNTER (OUTPATIENT)
Dept: RADIATION ONCOLOGY | Facility: HOSPITAL | Age: 64
Discharge: HOME | End: 2024-03-20

## 2024-03-20 PROCEDURE — 77332 RADIATION TREATMENT AID(S): CPT | Performed by: RADIOLOGY

## 2024-03-20 PROCEDURE — 77334 RADIATION TREATMENT AID(S): CPT | Performed by: RADIOLOGY

## 2024-03-20 PROCEDURE — 77470 SPECIAL RADIATION TREATMENT: CPT | Performed by: RADIOLOGY

## 2024-03-21 ENCOUNTER — LAB (OUTPATIENT)
Dept: LAB | Facility: HOSPITAL | Age: 64
End: 2024-03-21
Payer: MEDICAID

## 2024-03-21 ENCOUNTER — DOCUMENTATION (OUTPATIENT)
Dept: OTHER | Facility: HOSPITAL | Age: 64
End: 2024-03-21
Payer: MEDICAID

## 2024-03-21 ENCOUNTER — OFFICE VISIT (OUTPATIENT)
Dept: ONCOLOGY | Facility: CLINIC | Age: 64
End: 2024-03-21
Payer: MEDICAID

## 2024-03-21 ENCOUNTER — TELEPHONE (OUTPATIENT)
Dept: ONCOLOGY | Facility: CLINIC | Age: 64
End: 2024-03-21

## 2024-03-21 VITALS
BODY MASS INDEX: 24.14 KG/M2 | OXYGEN SATURATION: 99 % | DIASTOLIC BLOOD PRESSURE: 64 MMHG | HEART RATE: 82 BPM | WEIGHT: 163 LBS | HEIGHT: 69 IN | TEMPERATURE: 98.6 F | RESPIRATION RATE: 18 BRPM | SYSTOLIC BLOOD PRESSURE: 133 MMHG

## 2024-03-21 DIAGNOSIS — C15.3 MALIGNANT NEOPLASM OF UPPER THIRD OF ESOPHAGUS: ICD-10-CM

## 2024-03-21 DIAGNOSIS — C15.3 MALIGNANT NEOPLASM OF UPPER THIRD OF ESOPHAGUS: Primary | ICD-10-CM

## 2024-03-21 PROBLEM — D50.9 IRON DEFICIENCY ANEMIA: Status: ACTIVE | Noted: 2024-03-21

## 2024-03-21 LAB
ALBUMIN SERPL-MCNC: 3.7 G/DL (ref 3.5–5.2)
ALBUMIN/GLOB SERPL: 1 G/DL
ALP SERPL-CCNC: 158 U/L (ref 39–117)
ALT SERPL W P-5'-P-CCNC: 11 U/L (ref 1–41)
ANION GAP SERPL CALCULATED.3IONS-SCNC: 8 MMOL/L (ref 5–15)
AST SERPL-CCNC: 19 U/L (ref 1–40)
BASOPHILS # BLD AUTO: 0.03 10*3/MM3 (ref 0–0.2)
BASOPHILS NFR BLD AUTO: 0.4 % (ref 0–1.5)
BILIRUB SERPL-MCNC: 0.2 MG/DL (ref 0–1.2)
BUN SERPL-MCNC: 10 MG/DL (ref 8–23)
BUN/CREAT SERPL: 17.5 (ref 7–25)
CALCIUM SPEC-SCNC: 9.9 MG/DL (ref 8.6–10.5)
CHLORIDE SERPL-SCNC: 100 MMOL/L (ref 98–107)
CO2 SERPL-SCNC: 28 MMOL/L (ref 22–29)
CREAT SERPL-MCNC: 0.57 MG/DL (ref 0.76–1.27)
DEPRECATED RDW RBC AUTO: 43.9 FL (ref 37–54)
EGFRCR SERPLBLD CKD-EPI 2021: 109.5 ML/MIN/1.73
EOSINOPHIL # BLD AUTO: 0.34 10*3/MM3 (ref 0–0.4)
EOSINOPHIL NFR BLD AUTO: 5.1 % (ref 0.3–6.2)
ERYTHROCYTE [DISTWIDTH] IN BLOOD BY AUTOMATED COUNT: 16 % (ref 12.3–15.4)
FERRITIN SERPL-MCNC: 11.12 NG/ML (ref 30–400)
FOLATE SERPL-MCNC: >20 NG/ML (ref 4.78–24.2)
GLOBULIN UR ELPH-MCNC: 3.8 GM/DL
GLUCOSE SERPL-MCNC: 89 MG/DL (ref 65–99)
HAPTOGLOB SERPL-MCNC: 96 MG/DL (ref 30–200)
HCT VFR BLD AUTO: 28.1 % (ref 37.5–51)
HGB BLD-MCNC: 8.3 G/DL (ref 13–17.7)
IMM GRANULOCYTES # BLD AUTO: 0.01 10*3/MM3 (ref 0–0.05)
IMM GRANULOCYTES NFR BLD AUTO: 0.1 % (ref 0–0.5)
IRON 24H UR-MRATE: 18 MCG/DL (ref 59–158)
IRON SATN MFR SERPL: 4 % (ref 20–50)
LYMPHOCYTES # BLD AUTO: 0.93 10*3/MM3 (ref 0.7–3.1)
LYMPHOCYTES NFR BLD AUTO: 13.9 % (ref 19.6–45.3)
MCH RBC QN AUTO: 22.4 PG (ref 26.6–33)
MCHC RBC AUTO-ENTMCNC: 29.5 G/DL (ref 31.5–35.7)
MCV RBC AUTO: 75.9 FL (ref 79–97)
MONOCYTES # BLD AUTO: 1.43 10*3/MM3 (ref 0.1–0.9)
MONOCYTES NFR BLD AUTO: 21.3 % (ref 5–12)
NEUTROPHILS NFR BLD AUTO: 3.97 10*3/MM3 (ref 1.7–7)
NEUTROPHILS NFR BLD AUTO: 59.2 % (ref 42.7–76)
PLATELET # BLD AUTO: 263 10*3/MM3 (ref 140–450)
PMV BLD AUTO: 8.6 FL (ref 6–12)
POTASSIUM SERPL-SCNC: 4.3 MMOL/L (ref 3.5–5.2)
PROT SERPL-MCNC: 7.5 G/DL (ref 6–8.5)
RBC # BLD AUTO: 3.7 10*6/MM3 (ref 4.14–5.8)
RETICS # AUTO: 0.07 10*6/MM3 (ref 0.02–0.13)
RETICS/RBC NFR AUTO: 1.74 % (ref 0.7–1.9)
SODIUM SERPL-SCNC: 136 MMOL/L (ref 136–145)
TIBC SERPL-MCNC: 505 MCG/DL (ref 298–536)
TRANSFERRIN SERPL-MCNC: 339 MG/DL (ref 200–360)
VIT B12 BLD-MCNC: 1178 PG/ML (ref 211–946)
WBC NRBC COR # BLD AUTO: 6.71 10*3/MM3 (ref 3.4–10.8)

## 2024-03-21 PROCEDURE — 82746 ASSAY OF FOLIC ACID SERUM: CPT

## 2024-03-21 PROCEDURE — 84165 PROTEIN E-PHORESIS SERUM: CPT

## 2024-03-21 PROCEDURE — 80053 COMPREHEN METABOLIC PANEL: CPT

## 2024-03-21 PROCEDURE — 36415 COLL VENOUS BLD VENIPUNCTURE: CPT

## 2024-03-21 PROCEDURE — 83010 ASSAY OF HAPTOGLOBIN QUANT: CPT

## 2024-03-21 PROCEDURE — 82607 VITAMIN B-12: CPT

## 2024-03-21 PROCEDURE — 85025 COMPLETE CBC W/AUTO DIFF WBC: CPT

## 2024-03-21 PROCEDURE — 83540 ASSAY OF IRON: CPT

## 2024-03-21 PROCEDURE — 85045 AUTOMATED RETICULOCYTE COUNT: CPT

## 2024-03-21 PROCEDURE — 84466 ASSAY OF TRANSFERRIN: CPT

## 2024-03-21 PROCEDURE — 82728 ASSAY OF FERRITIN: CPT

## 2024-03-21 NOTE — PROGRESS NOTES
WALT contacted Yaneth at [facility] to discuss pt's radiation schedule per request of medical team. WALT explained that pt will be receiving 5-6 weeks of daily radiation and his plan will be ready in about 10 business days. Yaneth asked if WALT could email information to which WALT was agreeable and emailed it to [email or web address]. WALT offered to send a [facility] referral to assist with transportation to [place] as pt is able to get in and out of a car independently and used a rollator walker. Yaneth confirmed this would be helpful as biggest barrier is transportation. WALT provided education on lyft rides as well if needed and there are no [facility] drivers available. Yaneth thanked WALT for the information, and was agreeable to reach out should any other needs arise.      Interventions:  Transportation Needs: medical transportation ([facility] referral)

## 2024-03-21 NOTE — PROGRESS NOTES
Plan IV iron as iron remains low. Please let him know. Please schedule one during his chemo education if possible and time remaining weeks with his chemo dates once known. He may take ferrous sulfate daily in interim

## 2024-03-21 NOTE — PROGRESS NOTES
Hematology and Oncology [place]  Office number [phone]    Fax number [phone]     Follow up     Date: 24    Patient Name: Val Nassar Jr.  MRN: [id number]  : 1960    Chief Complaint: esophageal cancer    Cancer Staging:  Cancer Staging   Stage II (cT2, cN1, cM0)    History of Present Illness: Val Nassar Jr. is a pleasant 63 y.o. male who presents today for evaluation of upper third esophageal squamous cell carcinoma. He has a longstanding history of cirrhosis     He originally was diagnosed with a localized esophageal squamous cell carcinoma of the upper third of the esophagus in the 2023.  At the time he was living in [place].  He underwent an endoscopy confirming cancer diagnosis at [facility] in [facility].  He then relocated to [facility] to live with his sister because of his cancer diagnosis.      He presented to [facility] in 2023 with an upper GI bleed secondary to peptic ulcer disease and possible cholecystitis.  His presenting hemoglobin was 6.9.     EGD performed 2023 showed an ulcerated mass in the upper third of the esophagus which was nonobstructing and partially circumferential spanning 2 cm in length.  Biopsy results are pending.    Additional findings include gastritis, portal hypertension gastropathy and nonbleeding duodenal ulcer.  Duodenal stenosis.    He was discharged to a rehab facility and ultimately discharged home to live with his sister.  He was doing poorly and was unable to care for himself.  He did not keep his appointments with medical oncology, thoracic surgery, or radiation oncology and did not complete staging PET/CT.  He then represented to the hospital in late 2024 with a UTI and confusion.  During that admission he was assessed by palliative care, radiation oncology, and medical oncology.  He elected to be discharged to a skilled nursing facility for long-term  care as he does not want to be a burden on his sister and did not feel that he could care for himself at home.  Prior to discharge there were conversations between case management and social work at the hospital and his facility and the patient was under the impression that he could not receive radiation treatment while living at the skilled facility.  However he did elect to complete PET CT scan and returns for results.    PET/CT performed 3/6/2024 shows thickening of the esophagus with an uptake of 16 by SUV.  There was notable hypermetabolic activity in the left lateral shoulder subcutaneous tissue which corresponds to an area where he received a prior injection at his PCP office for alcohol use which he reports has been raised and pruritic since the injection 6 weeks prior.  He had mild paratracheal adenopathy as well as an right paratracheal lymph node in the right supraclavicular region  He reports that he has been tolerating regular diet with no obstructive symptoms prior to presentation.     He continues to feel weak but is clinically improving.  He tells me he is planning inpatient rehab on discharge.  His hemoglobin continues to improve and is 9.1 today.  CT of the abdomen pelvis in addition to the gallbladder findings showed no metastatic disease.  A chest x-ray this admission showed mild interstitial edema.    Treatment history:  Taxol/carbo/radiation: planning 4/2024    Interval history:  Met with palliative care at his facility. Taking oxycodone 5 mg for chest wall pain with partial control. States fentanyl patch pending delivery  Drenching sweats at night. No fever or infectious symptoms  Dry, scratchy throat, chronic  Was in ER after cut his hand with razor  Hemoglobin 8.1 3/6 labs from facility. Prior transfusions in last few months.  Met with radiation oncology and underwent simulation    Past Medical History:   Past Medical History:   Diagnosis Date    Anemia     Cancer     Cirrhosis     Duodenal  ulcer     Gastric ulcer     GERD (gastroesophageal reflux disease)     History of alcohol abuse     HLD (hyperlipidemia)     Mood disorder        Past Surgical History:   Past Surgical History:   Procedure Laterality Date    ENDOSCOPY N/A 12/26/2023    Procedure: ESOPHAGOGASTRODUODENOSCOPY;  Surgeon: Wesly Mckeon MD;  [facility];  Service: Gastroenterology;  Laterality: N/A;       Family History:   Family History   Problem Relation Age of Onset    Cancer Mother     Breast cancer Mother     Heart attack Father        Social History:   Social History     Socioeconomic History    Marital status: Single   Tobacco Use    Smoking status: Every Day     Current packs/day: 1.00     Average packs/day: 1 pack/day for 15.0 years (15.0 ttl pk-yrs)     Types: Cigarettes    Smokeless tobacco: Never   Vaping Use    Vaping status: Some Days    Substances: Flavoring    Devices: Disposable   Substance and Sexual Activity    Alcohol use: Not Currently     Comment: sober for ~ 3 months    Drug use: Yes     Types: Hydrocodone    Sexual activity: Defer       Medications:     Current Outpatient Medications:     acetaminophen (TYLENOL) 325 MG tablet, Take 2 tablets by mouth Every 4 (Four) Hours As Needed for Mild Pain., Disp: , Rfl:     Cholecalciferol (Vitamin D3) 1.25 MG (95132 UT) capsule, Take 1 capsule by mouth Every 7 (Seven) Days., Disp: , Rfl:     escitalopram (LEXAPRO) 10 MG tablet, Take 1 tablet by mouth Every Night., Disp: , Rfl:     finasteride (PROSCAR) 5 MG tablet, Take 1 tablet by mouth Daily., Disp: , Rfl:     folic acid (FOLVITE) 1 MG tablet, Take 1 tablet by mouth Daily., Disp: , Rfl:     gabapentin (NEURONTIN) 300 MG capsule, Take 1 capsule by mouth 3 (Three) Times a Day for 3 days., Disp: 9 capsule, Rfl: 0    lactulose (CHRONULAC) 10 GM/15ML solution, Take 30 mL by mouth 2 (Two) Times a Day As Needed., Disp: , Rfl:     Lidocaine 4 %, Place 1 patch on the skin as directed by provider Daily. Remove &  "Discard patch within 12 hours or as directed by MD, Disp: , Rfl:     oxyCODONE (ROXICODONE) 5 MG immediate release tablet, Take 1 tablet by mouth Every 6 (Six) Hours As Needed for Moderate Pain. (Patient taking differently: Take 1.5 tablets by mouth Every 6 (Six) Hours As Needed for Moderate Pain.), Disp: 12 tablet, Rfl: 0    pantoprazole (PROTONIX) 40 MG EC tablet, Take 1 tablet by mouth 2 (Two) Times a Day. (Patient not taking: Reported on 3/18/2024), Disp: , Rfl:     polyethylene glycol (MIRALAX) 17 g packet, Take 17 g by mouth Daily As Needed (Use if senna-docusate is ineffective)., Disp: , Rfl:     tamsulosin (FLOMAX) 0.4 MG capsule 24 hr capsule, Take 1 capsule by mouth Daily., Disp: , Rfl:     vitamin B-12 (CYANOCOBALAMIN) 1000 MCG tablet, Take 1 tablet by mouth Daily., Disp: , Rfl:     Allergies:   No Known Allergies    Objective     Vital Signs:   Vitals:    03/21/24 0845   BP: 133/64   Pulse: 82   Resp: 18   Temp: 98.6 °F (37 °C)   TempSrc: Temporal   SpO2: 99%   Weight: 73.9 kg (163 lb)   Height: 175.3 cm (69.02\")   PainSc: 0-No pain    Body mass index is 24.06 kg/m².   Pain Score    03/21/24 0845   PainSc: 0-No pain       ECOG Performance Status: 2    Physical Exam:   General: No acute distress. Well appearing   HEENT: Normocephalic, atraumatic. Sclera anicteric.   Neck: supple, no adenopathy.   Cardiovascular: regular rate and rhythm. No murmurs.   Respiratory: Normal rate. Clear to auscultation bilaterally  Abdomen: Soft, nontender, non distended with normoactive bowel sounds  Lymph: no cervical, supraclavicular or axillary adenopathy  Neuro: Alert and oriented x 3. No focal deficits.   Ext: Symmetric, no swelling.     Laboratory/Imaging Reviewed:   No visits with results within 2 Week(s) from this visit.   Latest known visit with results is:   Hospital Outpatient Visit on 03/06/2024   Component Date Value Ref Range Status    Glucose 03/06/2024 99  70 - 130 mg/dL Final       NM PET/CT Skull Base to Mid " Thigh    Result Date: 3/7/2024  Narrative: NM PET/CT SKULL BASE TO MID THIGH Date of Exam: 3/6/2024 2:24 PM EST Indication: esophogeal ca. Comparison: Abdomen pelvis CT scan 2/4/2024. No prior PET scan Technique: 11.8 mCi of F-18 FDG was administered intravenously. PET imaging was obtained from skull base to mid-thigh approximately 60 minutes after radiotracer injection. A low dose non contrast CT was obtained for attenuation correction and anatomic localization. Fused PET-CT and 3D MIP reconstructions were utilized for image interpretation.  Fasting blood glucose level: 99 mg/dl. Reference uptake values: Mediastinum: 2.5 SUVmax Liver: 3.0 SUVmax Normalization method: Body Weight Findings: History indicates known esophageal cancer undergoing work-up, no treatment to date. Some left-sided neck pain and dysphagia. Today's 3D images shows a focus of increased uptake in the central upper chest, apparently regional to the esophagus, and 2 small foci of uptake in the right lower neck or superior stomach. There is focal activity in the superficial soft tissues lateral to the left shoulder. There is normal and normal variant uptake elsewhere. Multiplanar images show no significant asymmetry of uptake in the brain. No hypermetabolic activity is seen in the neck down to the right supraclavicular fat, where a small lymph node has maximal SUV of 6.6, concerning for metastatic involvement. A couple of small adjacent right upper paratracheal lymph nodes have maximal SUV  of 3.3.  In the proximal thoracic there is focal thickening of the esophagus and strongly hypermetabolic activity, maximal SUV 15.9 apparently corresponding to the patient's known neoplasm. Hypermetabolic activity in the lateral left shoulder subcutaneous tissues measures 8.0. CT scan appearance suggests potential subcutaneous hematoma. Please correlate with any history of trauma to this area. No hypermetabolic mediastinal, hilar, chest wall or pulmonary  "parenchymal mass is seen elsewhere. Below the diaphragm, no hypermetabolic liver, adrenal, or other solid organ disease is seen. No hypermetabolic adenopathy is appreciated. There is expected GI and  tract activity. Focal activity in the adjacent posterior spinous processes of L3, L4 and  L5 measures approximately 4.1 - 4.2. With reference to the previous abdominal CT scan, sagittal images shows associated DJD, with the posterior spinous processes appear hypertrophied and in contact, or so-called \"Baastrup disease\". No pathologic marrow space uptake is seen elsewhere. Incidental note is made of a large, approximately 6 cm left bladder diverticulum.    Impression: Impression: 1. Intensely hypermetabolic activity in the proximal thoracic esophagus, apparently corresponding to patient's known neoplasm. 2. 3 small normal sized but hypermetabolic nodes respectively in the right supraclavicular fat, and right superior mediastinum. 3. Hypermetabolic activity in the lateral subcutaneous tissues of the left shoulder, which appears to correspond to subcutaneous bruising. Please correlate with any history of trauma to this area. 4. DJD-related uptake in the hypertrophied and degenerated posterior spinous processes of L3, L4 and L5.. Electronically Signed: Ibrahima Jett MD  3/7/2024 4:27 PM EST  Workstation ID: [id number]    CT Chest With Contrast Diagnostic    Result Date: 2/20/2024  Narrative: CT CHEST W CONTRAST DIAGNOSTIC Date of Exam: 2/20/2024 4:40 PM EST Indication: esophageal cancer. Comparison: 12/29/2023 chest CT scan Technique: Axial CT images were obtained of the chest after the uneventful intravenous administration of 75 mL Isovue-300.  Reconstructed coronal and sagittal images were also obtained. Automated exposure control and iterative construction methods were used. Findings: Prior study report noted wall thickening of the distal esophagus presumably patient's known neoplasm. Office notes indicate ulcerated mass of " the upper third of the esophagus, approximately 2 cm in length. This may be the area of fullness and low-attenuation seen on today's scan image #17 series 2 and adjacent images. Remainder the esophagus appears grossly normal. No evidence of a frankly invasive mass is identified and there are only shotty normal-sized mediastinal lymph nodes. There is trace pericardial fluid and no pleural effusion. Airways appear normally patent. Thoracic aorta and pulmonary arteries appear grossly normal for standard contrast enhanced scan. Lungs show stable mild biapical scarring. There is a calcified right hilar granuloma. No active pulmonary parenchymal disease is seen. Included images of the upper abdomen show enlarged left liver lobe and nodular liver capsule, consistent with cirrhosis. Spleen does not appear to be significantly enlarged. A small gallstone is seen in the contracted but otherwise normal-appearing gallbladder. Included portion of the pancreas, adrenal glands, and upper renal poles appear within normal limits. The final image of the scan, #115 series 2 shows a small calcification at or near the distal common bile duct. This was not present on the 2/4/2024 scan, and is suggestive of a nonobstructing 1-2 mm distal common duct stone. There are scattered, at least  mildly enlarged latosha hepatis lymph nodes, which appear stable from 2/4/2024. Bony structures appear to be intact.     Impression: Impression: 1. Focal thickened appearance of the upper thoracic esophagus, potentially patient's known tumor. Remainder the esophagus appears normal. 2. No evidence of malignancy/metastasis in the chest elsewhere. 3. Enlarged latosha hepatis lymph nodes noted. 4. Small gallstones again noted. Today's study appears to show a nonobstructing 1 to 2 mm distal common duct stone as well, new from 2/4/2024 abdominal CT scan. 5. Liver morphology consistent with cirrhosis, but no additional features of cirrhosis are appreciated.  Electronically Signed: Ibrahima Jett MD  2/20/2024 7:17 PM EST  Workstation ID: [id number]     Procedures      Narrative & Impression   NM PET/CT SKULL BASE TO MID THIGH     Date of Exam: 3/6/2024 2:24 PM EST     Indication: esophogeal ca.     Comparison: Abdomen pelvis CT scan 2/4/2024. No prior PET scan     Technique: 11.8 mCi of F-18 FDG was administered intravenously. PET imaging was obtained from skull base to mid-thigh approximately 60 minutes after radiotracer injection. A low dose non contrast CT was obtained for attenuation correction and anatomic   localization. Fused PET-CT and 3D MIP reconstructions were utilized for image interpretation.  Fasting blood glucose level: 99 mg/dl.     Reference uptake values:  Mediastinum: 2.5 SUVmax  Liver: 3.0 SUVmax  Normalization method: Body Weight     Findings:  History indicates known esophageal cancer undergoing work-up, no treatment to date. Some left-sided neck pain and dysphagia.     Today's 3D images shows a focus of increased uptake in the central upper chest, apparently regional to the esophagus, and 2 small foci of uptake in the right lower neck or superior stomach. There is focal activity in the superficial soft tissues lateral   to the left shoulder. There is normal and normal variant uptake elsewhere.     Multiplanar images show no significant asymmetry of uptake in the brain.     No hypermetabolic activity is seen in the neck down to the right supraclavicular fat, where a small lymph node has maximal SUV of 6.6, concerning for metastatic involvement. A couple of small adjacent right upper paratracheal lymph nodes have maximal SUV   of 3.3.      In the proximal thoracic there is focal thickening of the esophagus and strongly hypermetabolic activity, maximal SUV 15.9 apparently corresponding to the patient's known neoplasm. Hypermetabolic activity in the lateral left shoulder subcutaneous   tissues measures 8.0. CT scan appearance suggests potential subcutaneous  "hematoma. Please correlate with any history of trauma to this area.     No hypermetabolic mediastinal, hilar, chest wall or pulmonary parenchymal mass is seen elsewhere.     Below the diaphragm, no hypermetabolic liver, adrenal, or other solid organ disease is seen. No hypermetabolic adenopathy is appreciated. There is expected GI and  tract activity. Focal activity in the adjacent posterior spinous processes of L3, L4 and   L5 measures approximately 4.1 - 4.2. With reference to the previous abdominal CT scan, sagittal images shows associated DJD, with the posterior spinous processes appear hypertrophied and in contact, or so-called \"Baastrup disease\". No pathologic marrow   space uptake is seen elsewhere. Incidental note is made of a large, approximately 6 cm left bladder diverticulum.  IMPRESSION:  Impression:     1. Intensely hypermetabolic activity in the proximal thoracic esophagus, apparently corresponding to patient's known neoplasm.     2. 3 small normal sized but hypermetabolic nodes respectively in the right supraclavicular fat, and right superior mediastinum.      3. Hypermetabolic activity in the lateral subcutaneous tissues of the left shoulder, which appears to correspond to subcutaneous bruising. Please correlate with any history of trauma to this area.     4. DJD-related uptake in the hypertrophied and degenerated posterior spinous processes of L3, L4 and L5..       Had a fall on right shoulderbut not the rightgot a injection in the left shoulder  to help him stop drinking at dr. Wesly Albert 6 weeks ago, swelled up and red.     Narrative & Impression   MRI BRAIN WO CONTRAST     Date of Exam: 2/18/2024 5:05 PM EST     Indication: Dizziness, off balance, present x 2 weeks.     Comparison: 2/4/2024 CT     Technique:  Routine multiplanar/multisequence sequence images of the brain were obtained without contrast administration.        Findings:  Diffusion imaging shows no evidence of acute infarct. " There are scattered subcortical and periventricular T2 hyperintensities which are nonspecific but likely sequela of mild to moderate small vessel ischemic disease. No evidence of intracranial   hemorrhage.     There is normal ventricular volume. The basal cisterns are patent. There is no evidence of extra-axial fluid collection. There is normal flow voids within the intracranial vessels.     No evidence of fracture or suspicious bony lesion. Paranasal sinusitis with gas/fluid level in the right maxillary sinus. Trace bilateral mastoid air cell effusions. Visualized orbits are unremarkable.     IMPRESSION:  Impression:  No acute intracranial abnormality. No suspicious mass on this noncontrast MRI.     Paranasal sinusitis with gas/fluid level in the right maxillary sinus.           Assessment / Plan      Assessment/Plan:     1.  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  SUV avid right supraclavicular lymph node  3.  Indeterminate paratracheal adenopathy  -I personally reviewed his PET/CT.  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  -He previously has had discussions with myself and radiation oncology.  Although he is interested in pursuing cancer directed therapy, he tells me that his main priority is to continue to live in his current skilled nursing facility.  He tells me that he wishes to die there.  He feels comfortable and supported.  He is concerned if he pursues cancer directed therapy he would lose his spot at the facility.  However, if he is able to complete cancer directed therapy while remaining at his skilled facility, he would be interested in doing that.    -I have spoken with radiation oncology and recounted this discussion with the patient today.  I also reviewed radiation oncology notes.  I recommend proceeding with curative intent chemoradiation with carboplatin and  paclitaxel sensitization.  Treatment plan  is in place and simulation performed yesterday.  I anticipate treatment start in 2 weeks if radiation simulation is ready and pending clearance from his facility.  -Will schedule chemotherapy education. Will plan PIV access for now and reassess need for port.   -My nurse did call and speak with the nursing director and shared a copy of the treatment plan with them.  Mr. Nael has indicated that he would prioritize remaining in his current facility over cancer treatment if he is not able to complete cancer treatment at his facility, but is interested in proceeding with potentially curative intent therapy if this can be facilitated in his current nursing facility.    -Add MMR testing to original biopsy as not previously reported    4.  Cancer related pain  -Fentanyl patch plus oxycodone  -Verified that he will be followed by palliative care while he remains in his facility.    5.  Normocytic anemia  -Labs reviewed and consistent with iron deficiency anemia despite oral iron trial.  -Plan IV iron course given severity of anemia and chemotherapy planned.    Follow Up:   1-2 weeks     Katerina Ga MD  Hematology and Oncology     Time spent on the day of service including time before, during, and after the office visit exceeded 40 minutes including time reviewing records, examining counseling patient's.  Ordering tests, reviewing results, coordinating care, discussion with nursing, placing orders and documentation,

## 2024-03-21 NOTE — TELEPHONE ENCOUNTER
Spoke with Yaneth, director or nursing at [facility].  She stated Palliative Care has seen him there and can continue to see him throughout treatment.  Advised her if not, he can be referred to Palliative at [facility] in this clinic.  She verbalized understanding. Dr. Ga notified.

## 2024-03-22 ENCOUNTER — TELEPHONE (OUTPATIENT)
Dept: ONCOLOGY | Facility: CLINIC | Age: 64
End: 2024-03-22
Payer: MEDICAID

## 2024-03-22 LAB
ALBUMIN SERPL ELPH-MCNC: 3.4 G/DL (ref 2.9–4.4)
ALBUMIN/GLOB SERPL: 0.9 {RATIO} (ref 0.7–1.7)
ALPHA1 GLOB SERPL ELPH-MCNC: 0.4 G/DL (ref 0–0.4)
ALPHA2 GLOB SERPL ELPH-MCNC: 0.7 G/DL (ref 0.4–1)
B-GLOBULIN SERPL ELPH-MCNC: 1.1 G/DL (ref 0.7–1.3)
GAMMA GLOB SERPL ELPH-MCNC: 1.7 G/DL (ref 0.4–1.8)
GLOBULIN SER CALC-MCNC: 3.8 G/DL (ref 2.2–3.9)
LABORATORY COMMENT REPORT: NORMAL
M PROTEIN SERPL ELPH-MCNC: NORMAL G/DL
PROT PATTERN SERPL ELPH-IMP: NORMAL
PROT SERPL-MCNC: 7.2 G/DL (ref 6–8.5)

## 2024-03-22 NOTE — TELEPHONE ENCOUNTER
Spoke with ADRIANA Wolfe.  Advised her per Dr. Ga:  Plan IV iron as iron remains low. Please schedule one infusion during his chemo education if possible and time remaining weeks with his chemo dates once known. He may take ferrous sulfate daily in interim. Advised her of all appointments scheduled including IV Venofer.  She stated she is fine with patient receiving treatment.  She wanted WALT Webber to contact her regarding transportation.  Referral to WALT already in place.  Message sent to Margareth MORENO. She also asked if chemo team can supply her with necessary precautions regarding chemo medications that patient/staff will need to take.  Message sent to chemo pharmacy team with request.  Yaneth verbalized understanding of advise per MD given and agreed. She stated she will make sure he takes ferrous sulfate in the interim per MD recommendation.

## 2024-03-22 NOTE — TELEPHONE ENCOUNTER
Called [facility] to speak to ADRIANA Wolfe with results and recommendations.  No answer.  Staff stated they will send a message to notify her to return call.  Message sent to scheduling to get patient scheduled.  Authorization request already submitted by authorization team.

## 2024-03-22 NOTE — TELEPHONE ENCOUNTER
Notified patient per Dr. Ga: Plan IV iron as iron remains low. Please schedule one infusion during his chemo education if possible and time remaining weeks with his chemo dates once known. He may take ferrous sulfate daily in interim.  Patient verbalized understanding and asked to contact his nurse at [facility] and let them know as well.

## 2024-03-25 PROCEDURE — 77399 UNLISTED PX MED RADJ PHYSICS: CPT | Performed by: RADIOLOGY

## 2024-03-26 ENCOUNTER — DOCUMENTATION (OUTPATIENT)
Dept: OTHER | Facility: HOSPITAL | Age: 64
End: 2024-03-26
Payer: MEDICAID

## 2024-03-26 PROCEDURE — 77338 DESIGN MLC DEVICE FOR IMRT: CPT | Performed by: RADIOLOGY

## 2024-03-26 PROCEDURE — 77300 RADIATION THERAPY DOSE PLAN: CPT | Performed by: RADIOLOGY

## 2024-03-26 PROCEDURE — 77301 RADIOTHERAPY DOSE PLAN IMRT: CPT | Performed by: RADIOLOGY

## 2024-03-26 NOTE — PROGRESS NOTES
WALT attempted to reach Yaneth, at [facility] to discuss transportation concerns. Yaneth explained they spoke with [facility], and main concern is having to give an estimated  time. WALT normalized her concerns, and explained that if drivers are not available SW can facilitate ordering a Lyft to get pt home. Yaneth was agreeable to plan and confirmed ride was scheduled for 4/1. She reported she plans to accompany pt to apts on 3/28 as it is several educational visits. WALT was agreeable to plan. WALT encouraged Yaneth to reach out with any questions or concerns. She was agreeable and thanked WALT.

## 2024-03-26 NOTE — PROGRESS NOTES
WALT contacted Yaneth after speaking with radiation oncology team to assist with transportation. Pt is set to start radiation tomorrow 3/27. Pt does not have transportation set up due to short notice, therefore WALT scheduled lyft ride. WALT relayed information to Yaneth whom is going to assist pt. WALT will meet pt following radiation to get pt back to [facility]. PT is able to get in and out of car independently and uses walker. WALT will leave Lyft waiver with radiation check in for pt to complete.   Yaneth is going to contact [facility] tomorrow 3/27 to schedule rides for remainder of rad tx. WALT will arrange lyfts for any rides they can not provide. Yaneth was agreeable to plan.  WALT will be available for ongoing support and assistance.     Interventions:  Transportation Needs: uber/lyft (scheduled lyft for radiation starting tomorrow 3/27)

## 2024-03-27 ENCOUNTER — HOSPITAL ENCOUNTER (OUTPATIENT)
Dept: RADIATION ONCOLOGY | Facility: HOSPITAL | Age: 64
Discharge: HOME | End: 2024-03-27

## 2024-03-27 DIAGNOSIS — C15.3 MALIGNANT NEOPLASM OF UPPER THIRD OF ESOPHAGUS: Primary | ICD-10-CM

## 2024-03-27 PROCEDURE — 77386: CPT | Performed by: RADIOLOGY

## 2024-03-28 ENCOUNTER — DOCUMENTATION (OUTPATIENT)
Dept: OTHER | Facility: HOSPITAL | Age: 64
End: 2024-03-28
Payer: MEDICAID

## 2024-03-28 ENCOUNTER — HOSPITAL ENCOUNTER (OUTPATIENT)
Dept: ONCOLOGY | Facility: HOSPITAL | Age: 64
Discharge: HOME | End: 2024-03-28
Payer: MEDICAID

## 2024-03-28 ENCOUNTER — SPECIALTY PHARMACY (OUTPATIENT)
Dept: ONCOLOGY | Facility: HOSPITAL | Age: 64
End: 2024-03-28
Payer: MEDICAID

## 2024-03-28 ENCOUNTER — OFFICE VISIT (OUTPATIENT)
Dept: ONCOLOGY | Facility: CLINIC | Age: 64
End: 2024-03-28
Payer: MEDICAID

## 2024-03-28 ENCOUNTER — HOSPITAL ENCOUNTER (OUTPATIENT)
Dept: RADIATION ONCOLOGY | Facility: HOSPITAL | Age: 64
Discharge: HOME | End: 2024-03-28

## 2024-03-28 VITALS
DIASTOLIC BLOOD PRESSURE: 56 MMHG | RESPIRATION RATE: 20 BRPM | HEIGHT: 69 IN | SYSTOLIC BLOOD PRESSURE: 110 MMHG | BODY MASS INDEX: 24.29 KG/M2 | HEART RATE: 81 BPM | WEIGHT: 164 LBS | TEMPERATURE: 98.6 F | OXYGEN SATURATION: 99 %

## 2024-03-28 VITALS
BODY MASS INDEX: 24.51 KG/M2 | RESPIRATION RATE: 16 BRPM | TEMPERATURE: 97.9 F | WEIGHT: 165.5 LBS | HEIGHT: 69 IN | HEART RATE: 66 BPM | SYSTOLIC BLOOD PRESSURE: 135 MMHG | DIASTOLIC BLOOD PRESSURE: 63 MMHG

## 2024-03-28 DIAGNOSIS — C15.3 MALIGNANT NEOPLASM OF UPPER THIRD OF ESOPHAGUS: Primary | ICD-10-CM

## 2024-03-28 DIAGNOSIS — C15.9 SQUAMOUS CELL CARCINOMA OF ESOPHAGUS: Primary | ICD-10-CM

## 2024-03-28 DIAGNOSIS — C15.3 MALIGNANT NEOPLASM OF UPPER THIRD OF ESOPHAGUS: ICD-10-CM

## 2024-03-28 DIAGNOSIS — D62 ACUTE BLOOD LOSS ANEMIA: ICD-10-CM

## 2024-03-28 DIAGNOSIS — D50.0 IRON DEFICIENCY ANEMIA DUE TO CHRONIC BLOOD LOSS: Primary | ICD-10-CM

## 2024-03-28 DIAGNOSIS — D50.0 IRON DEFICIENCY ANEMIA DUE TO CHRONIC BLOOD LOSS: ICD-10-CM

## 2024-03-28 PROCEDURE — 77386: CPT | Performed by: RADIOLOGY

## 2024-03-28 PROCEDURE — 1125F AMNT PAIN NOTED PAIN PRSNT: CPT | Performed by: NURSE PRACTITIONER

## 2024-03-28 PROCEDURE — 96365 THER/PROPH/DIAG IV INF INIT: CPT

## 2024-03-28 PROCEDURE — 25810000003 SODIUM CHLORIDE 0.9 % SOLUTION: Performed by: INTERNAL MEDICINE

## 2024-03-28 PROCEDURE — 99214 OFFICE O/P EST MOD 30 MIN: CPT | Performed by: NURSE PRACTITIONER

## 2024-03-28 PROCEDURE — 25010000002 IRON SUCROSE PER 1 MG: Performed by: INTERNAL MEDICINE

## 2024-03-28 PROCEDURE — 96374 THER/PROPH/DIAG INJ IV PUSH: CPT

## 2024-03-28 RX ORDER — FAMOTIDINE 10 MG/ML
20 INJECTION, SOLUTION INTRAVENOUS AS NEEDED
Status: CANCELLED | OUTPATIENT
Start: 2024-04-01

## 2024-03-28 RX ORDER — FAMOTIDINE 10 MG/ML
20 INJECTION, SOLUTION INTRAVENOUS ONCE
Status: CANCELLED | OUTPATIENT
Start: 2024-04-01

## 2024-03-28 RX ORDER — SODIUM CHLORIDE 9 MG/ML
20 INJECTION, SOLUTION INTRAVENOUS ONCE
Status: CANCELLED | OUTPATIENT
Start: 2024-04-01

## 2024-03-28 RX ORDER — SODIUM CHLORIDE 9 MG/ML
20 INJECTION, SOLUTION INTRAVENOUS ONCE
Status: COMPLETED | OUTPATIENT
Start: 2024-03-28 | End: 2024-03-28

## 2024-03-28 RX ORDER — PALONOSETRON 0.05 MG/ML
0.25 INJECTION, SOLUTION INTRAVENOUS ONCE
Status: CANCELLED | OUTPATIENT
Start: 2024-04-01

## 2024-03-28 RX ORDER — SODIUM CHLORIDE 9 MG/ML
20 INJECTION, SOLUTION INTRAVENOUS ONCE
Status: CANCELLED | OUTPATIENT
Start: 2024-03-28

## 2024-03-28 RX ORDER — ONDANSETRON HYDROCHLORIDE 8 MG/1
8 TABLET, FILM COATED ORAL EVERY 8 HOURS PRN
Qty: 30 TABLET | Refills: 3 | Status: SHIPPED | OUTPATIENT
Start: 2024-03-28 | End: 2024-03-28

## 2024-03-28 RX ORDER — ONDANSETRON HYDROCHLORIDE 8 MG/1
8 TABLET, FILM COATED ORAL EVERY 8 HOURS PRN
Qty: 30 TABLET | Refills: 3 | Status: SHIPPED | OUTPATIENT
Start: 2024-03-28

## 2024-03-28 RX ORDER — DIPHENHYDRAMINE HYDROCHLORIDE 50 MG/ML
50 INJECTION INTRAMUSCULAR; INTRAVENOUS AS NEEDED
Status: CANCELLED | OUTPATIENT
Start: 2024-04-01

## 2024-03-28 RX ADMIN — IRON SUCROSE 200 MG: 20 INJECTION, SOLUTION INTRAVENOUS at 09:54

## 2024-03-28 RX ADMIN — SODIUM CHLORIDE 20 ML/HR: 9 INJECTION, SOLUTION INTRAVENOUS at 09:51

## 2024-03-28 NOTE — PROGRESS NOTES
CHEMOTHERAPY PREPARATION    Val Nassar Jr.  [id number]  1960    Chief Complaint: Treatment preparation and needs assessment    History of present illness:  Val Nassar Jr. is a 63 y.o. year old male who is here today for treatment preparation and needs assessment.  The patient has been diagnosed with esophageal cancer and is scheduled to begin treatment with PACLitaxel / CARBOplatin AUC=2 (Weekly) + XRT.     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     Past Medical History, Past Surgical History, Social History, Family History have been reviewed and are without significant changes except as mentioned.    Review of Systems:    Review of Systems   Constitutional:  Negative for appetite change, fatigue, fever and unexpected weight change.   HENT:  Negative for mouth sores, sore throat and trouble swallowing.    Respiratory:  Negative for cough, shortness of breath and wheezing.    Cardiovascular:  Negative for chest pain, palpitations and leg swelling.   Gastrointestinal:  Negative for abdominal distention, abdominal pain, constipation, diarrhea, nausea and vomiting.   Genitourinary:  Negative for difficulty urinating, dysuria and frequency.   Musculoskeletal:  Negative for arthralgias.        Left neck/upper chest area   Skin:  Negative for pallor, rash and wound.   Neurological:  Negative for dizziness and weakness.   Hematological:  Does not bruise/bleed easily.   Psychiatric/Behavioral:  Positive for sleep disturbance. Negative for confusion. The patient is not nervous/anxious.      Physical  Exam:    Vitals:    03/28/24 1337   BP: 110/56   Pulse: 81   Resp: 20   Temp: 98.6 °F (37 °C)   SpO2: 99%     Vitals:    03/28/24 1337   PainSc:   9        ECOG: (1) Restricted in Physically Strenuous Activity, Ambulatory & Able to Do Work of Light Nature  General: well appearing, in no acute distress  Psych: Mood is stable          NEEDS ASSESSMENTS    Genetics  The patient's new diagnosis and family history have been reviewed for genetic counseling needs. A genetic referral is not recommended.     Psychosocial  The patient has completed a PHQ-9 Depression Screening and the Distress Thermometer (DT) today.   PHQ-9 results show 5-9 (Mild Depression). The patient scored their distress today as 0 on a scale of 0-10 with 0 being no distress and 10 being extreme distress.   Problems marked by the patient as being an issue for them within the last week include physical problems.   Results were reviewed along with psychosocial resources offered by our cancer center.  Our oncology social worker will be flagged for a DT score of 4 or above, and a same day call will be made for a score of 9 or 10.  A mental health referral is offered at this time. The patient is not interested in a referral to [facility].   Copies of patient's questionnaires will be scanned into EMR for details and further reference.    Barriers to care  A barriers form was also completed by the patient today. We discussed services offered by our facility to help him have adequate access to care. The patient was given the name and contact information for our Oncology Social Worker, Margareth Wright.  Based upon barriers assessment today, the patient will not require a follow-up call from the  to further discuss needs.   has already had follow up with patient to help with transportation.  A copy of the barriers form will also be scanned into EMR for details and further reference.     VAD Assessment  The patient and I discussed  "planned intervenous chemotherapy as well as other IV treatments that are often needed throughout the course of treatment. These may include, but are not limited to blood transfusions, antibiotics, and IV hydration. The vasculature does appear to be adequate for multiple peripheral IVs throughout their treatment course.     Advanced Care Planning  The patient and I discussed advanced care planning, \"Conversations that Matter\".   This service was offered, free of charge, for development of advance directives with a certified ACP facilitator.  The patient does have an up-to-date advanced directive. This document is on file with our office. The patient is not interested in an appointment with one of our facilitators to create or update their advanced directives.      Palliative Care  The patient and I discussed palliative care services. Palliative care is not the same as Hospice care. This is specialized medical care for people living with serious illness with the goal of improving quality of life for the patient and their family. [facility] offers our patients outpatient palliative care early along with their treatment to assist in coordination of care, symptom management, pain management, and medical decision making.  Oncology criteria for palliative care referral is not met at this time. The patient is not interested in a palliative care consultation.     Additional Referral needs  none      CHEMOTHERAPY EDUCATION    Chemotherapy education completed and consent obtained per oncology pharmacist.  See their documentation for further details.    Booklets Given: Chemotherapy and You [x]  Nutrition for the Patient with Cancer During Treatment [x]    Sexuality/Fertility Books []     Chemotherapy Regimen:   Treatment Plans       Name Type Plan Dates Plan Provider         Active    OP SUPPORTIVE Iron Sucrose (Venofer) 200 MG ONCOLOGY SUPPORTIVE CARE 1  3/26/2024 - Present Katerina Ga MD     OP LUNG PACLitaxel / CARBOplatin " AUC=2 (Weekly) + XRT  ONCOLOGY TREATMENT  3/13/2024 - Present Katerina Ga MD                    Chemotherapy education comprehension reviewed. Questions answered.    Assessment and Plan:    Diagnoses and all orders for this visit:    1. Squamous cell carcinoma of esophagus (Primary)  -     Discontinue: ondansetron (ZOFRAN) 8 MG tablet; Take 1 tablet by mouth Every 8 (Eight) Hours As Needed for Nausea or Vomiting.  Dispense: 30 tablet; Refill: 3  -     ondansetron (ZOFRAN) 8 MG tablet; Take 1 tablet by mouth Every 8 (Eight) Hours As Needed for Nausea or Vomiting.  Dispense: 30 tablet; Refill: 3      The patient and I have reviewed their cancer diagnosis and scheduled treatment plan. Needs assessment was completed including genetics, psychosocial needs, barriers to care, VAD evaluation, advanced care planning, and palliative care services. Referrals have been ordered as appropriate based upon our evaluation and patient desires.     Chemotherapy teaching was also completed today per pharmacy.  Adequate time was given to answer questions.  Patient and family are aware of their care team members and contact information if they have questions or problems throughout the treatment course. The patient is adequately prepared to begin treatment as scheduled on 4/2/2024.     Reviewed with patient education regarding Zofran prescriptions sent to pharmacy.       Patient will have pretreatment labs drawn on 4/1/2024.    I spent 30 minutes caring for Val on this date of service. This time includes time spent by me in the following activities: preparing for the visit, reviewing tests, counseling and educating the patient/family/caregiver, ordering medications, tests, or procedures, referring and communicating with other health care professionals, documenting information in the medical record, and care coordination.     Mayi Lopes, APRN  03/28/24

## 2024-03-28 NOTE — SIGNIFICANT NOTE
SW met with pt in infusion lobby to provide support. PT had three hour gap between radiation and oncology pharmacy and APRN visit, therefore SW offered  [employer] food vouchers. Secondly, SW explained ride had been arranged for apt on 3/29. Pt expressed appreciation and had no other needs.       03/28/24 1200   Oncology Interventions   Practical Needs Food  (provided  [employer] food vouchers (2))   Transportation Needs uber/lyft  (scheduled lyft for apt 3/29)

## 2024-03-28 NOTE — PROGRESS NOTES
Outpatient Infusion  [address] [place] [address]  [phone]      CHEMOTHERAPY EDUCATION    NAME:  Val Nassar      : 1960           DATE: 24    Medication Education Sheets: (select all that apply)  Carboplatin and Paclitaxel    Other Education Sheets: (select all that apply)  CINV, Diarrhea, and Symptom Tracker Sheet and [facility] Information    Chemotherapy Regimen:   OP LUNG PACLitaxel / CARBOplatin AUC=2 (Weekly) + XRT        TOPICS EDUCATION PROVIDED COMMENTS   ANEMIA:  role of RBC, cause, s/s, ways to manage, role of transfusion [x] Reviewed the role of RBC and the use of transfusions if hemoglobin decreases too much.  Patient to notify us if he experiences shortness of breath, dizziness, or palpitations.  Also let patient know he could feel more tired than usual and to try to stay active, but rest if he needs to.    THROMBOCYTOPENIA:  role of platelet, cause, s/s, ways to prevent bleeding, things to avoid, when to seek help [x] Reviewed the role of platelets in blood clotting and when to call clinic (bloody nose that bleeds for 5 minutes despite pressure, a cut that won't stop bleeding despite pressure, gums that bleed excessively with brushing or flossing). Recommended using an electric razor, soft bristle toothbrush, and blowing the nose gently.    NEUTROPENIA:  role of WBC, cause, infection precautions, s/s of infection, when to call MD [x] Reviewed the role of WBC, good infection prevention practices, and when to call the clinic (temperature 100.4F, sore throat, burning urination, etc)   NUTRITION & APPETITE CHANGES:  importance of maintaining healthy diet & weight, ways to manage to improve intake, dietary consult, exercise regimen, electrolyte and/or blood glucose abnormalities [x] Decreased Appetite: Discussed the risk of decreased appetite. Recommended eating smaller, more frequent meals. Instructed the patient to contact the clinic if losing  weight or having difficulty eating enough to maintain energy level.   DIARRHEA:  causes, s/s of dehydration, ways to manage, dietary changes, when to call MD [x] Chemotherapy : Discussed the risk of diarrhea. Instructed patient on use OTC loperamide with diarrhea onset, but emphasized the importance of calling the clinic if 4-6 episodes in 24 hours not relieved by OTC loperamide.   CONSTIPATION:  causes, ways to manage, dietary changes, when to call MD [x] Provided supplementary handout with instructions for use of docusate and other OTC therapies to manage constipation.  Instructed patient to call us if medications aren't working.    NAUSEA & VOMITING:  cause, use of antiemetics, dietary changes, when to call MD [x] Emetic risk: Moderate  Premeds: Dexamethasone and Palonosetron  Scheduled home meds: none  PRN home meds: Ondansetron  Pharmacy home meds sent to: [facility] will send after needs assessment appointment    Instructed the patient to take a dose of the PRN medication at the first onset of nausea and if it's not working to call us for additional medications.  Also provided non-drug measures to mitigate nausea.   MOUTH SORES:  causes, oral care, ways to manage [x] Mouth sores can be prevented by making a mouth wash mixture of salt, baking soda, and water. The patient was instructed to swish and spit four times daily after meals and before bedtime. Also recommended using a soft bristle toothbrush and avoiding alcohol-containing OTC mouthwashes.    ALOPECIA:  cause, ways to manage, resources [x] Discussed the possibility of hair loss with the patient. Informed patient that he could request a prescription for a wig if desired and most of the cost is usually covered by insurance. Recommended covering the head with a hat and/or protecting the skin on the head with SPF 30 or higher.    NERVOUS SYSTEM CHANGES:  causes, s/s, neuropathies, cognitive changes, ways to manage [x] discussed the adverse effect of  peripheral neuropathy and signs/symptoms associated with this adverse effect. Instructed patient to call if symptoms become more frequent or worsen.    PAIN:  causes, ways to manage [x] Chemo: Discussed muscle and joint aches/pains with chemotherapy, and recommended the use of OTC pain relief with ibuprofen or acetaminophen if needed.   SKIN & NAIL CHANGES:  cause, s/s, ways to manage [x] Chemotherapy: Informed the patient of the possibility for dark or white lines on finger and toenails during treatment, and that nails may become brittle and even fall off in extreme cases. This will likely reverse after treatment concludes.    ORGAN TOXICITIES:  cause, s/s, need for diagnostic tests, labs, when to notify MD [x] Discussed potential effects on organ systems, monitoring, diagnostic tests, labs, and when to notify the clinic. Discussed the signs/symptoms of the following: hepatotoxicity, nephrotoxicity, and skin changes.   INFUSION RELATED REACTIONS or INJECTION-SITE REACTION:  Cause, s/s, anaphylaxis, monitoring, etc. [x] Premeds: Diphenhydramine and Famotidine    Discussed the risk of an infusion reaction and symptoms such as: fever, chills, dizziness, itchiness or rash, flushing, trouble breathing, wheezing, sudden back pain, or feeling faint.  Instructed the patient to notify his nurse if he starts feeling weird at any point during his infusion.    Reviewed how infusion reactions are managed.   SURVIVORSHIP:  distress, distress assessment, secondary malignancies, early/late effects, follow-up, social issues, social support []    MISCELLANEOUS:  drug interactions, administration, labs, etc. [x] Discussed chemotherapy schedule, lab draws, infusion times, and total expected visit time.   DDIs: No significant DDIs  Lab draws: On or before day 1 of each cycle, no sooner than 3 days early.     INFERTILITY & SEXUALITY:    causes, fertility preservation options, sexuality changes, ways to manage, importance of birth  control [x] IV Oncology Therapy: Reviewed safe sex practices and the importance of minimizing exposure to body fluids for 48 hours after each dose of IV oncology therapy., The patient is not sexually active with a woman of childbearing potential.   HOME CARE:  storing of oral chemo, how to manage bodily fluids [x] IV - Counseled on management of soiled linens and proper flush technique.  Discussed how to manage all the side effects at home and advised when to contact the MD office     Medications:  Prior to Admission medications    Medication Sig Start Date End Date Taking? Authorizing Provider   acetaminophen (TYLENOL) 325 MG tablet Take 2 tablets by mouth Every 4 (Four) Hours As Needed for Mild Pain. 2/23/24   Liliam Ray APRN   Cholecalciferol (Vitamin D3) 1.25 MG (76540 UT) capsule Take 1 capsule by mouth Every 7 (Seven) Days. 1/6/24   Ike Garsia MD   escitalopram (LEXAPRO) 10 MG tablet Take 1 tablet by mouth Every Night.    Ike Garsia MD   finasteride (PROSCAR) 5 MG tablet Take 1 tablet by mouth Daily.    Ike Garsia MD   folic acid (FOLVITE) 1 MG tablet Take 1 tablet by mouth Daily.    Ike Garsia MD   gabapentin (NEURONTIN) 300 MG capsule Take 1 capsule by mouth 3 (Three) Times a Day for 3 days. 2/23/24 2/26/24  Liliam Ray APRN   lactulose (CHRONULAC) 10 GM/15ML solution Take 30 mL by mouth 2 (Two) Times a Day As Needed.    Ike Garsia MD   Lidocaine 4 % Place 1 patch on the skin as directed by provider Daily. Remove & Discard patch within 12 hours or as directed by MD 1/3/24   Pauly Mallory MD   oxyCODONE (ROXICODONE) 5 MG immediate release tablet Take 1 tablet by mouth Every 6 (Six) Hours As Needed for Moderate Pain.  Patient taking differently: Take 1.5 tablets by mouth Every 6 (Six) Hours As Needed for Moderate Pain. 2/23/24   Liliam Ray APRN   pantoprazole (PROTONIX) 40 MG EC tablet Take 1 tablet by mouth 2 (Two) Times  a Day.  Patient not taking: Reported on 3/18/2024    ProviderIke MD   polyethylene glycol (MIRALAX) 17 g packet Take 17 g by mouth Daily As Needed (Use if senna-docusate is ineffective). 1/3/24   Pauly Mallory MD   tamsulosin (FLOMAX) 0.4 MG capsule 24 hr capsule Take 1 capsule by mouth Daily.    Ike Garsia MD   vitamin B-12 (CYANOCOBALAMIN) 1000 MCG tablet Take 1 tablet by mouth Daily.    ProviderIke MD       Notes: All questions and concerns were addressed. Provided a personalized treatment calendar to patient (includes treatment and lab schedule). Provided patient with contact information for the pharmacist and clinic while instructing them to call if any questions or concerns arise. Informed consent for treatment was obtained. Patient was receptive to information and expressed understanding.     Karen Nuñez, PharmD, [facility]  Clinical Oncology Pharmacy Specialist  Phone: [phone]      3/28/2024  13:34 EDT

## 2024-03-29 ENCOUNTER — HOSPITAL ENCOUNTER (OUTPATIENT)
Dept: RADIATION ONCOLOGY | Facility: HOSPITAL | Age: 64
Discharge: HOME | End: 2024-03-29

## 2024-03-29 ENCOUNTER — TELEPHONE (OUTPATIENT)
Dept: ONCOLOGY | Facility: CLINIC | Age: 64
End: 2024-03-29
Payer: MEDICAID

## 2024-03-29 PROCEDURE — 77386: CPT | Performed by: RADIOLOGY

## 2024-03-29 PROCEDURE — 77336 RADIATION PHYSICS CONSULT: CPT | Performed by: RADIOLOGY

## 2024-03-29 NOTE — TELEPHONE ENCOUNTER
Called [facility].  Spoke with Tere.  She stated Monica is incorrect.  This script came from their Palliative care and she will contact their palliative care for refill.

## 2024-03-29 NOTE — TELEPHONE ENCOUNTER
----- Message from Genaro Mclain sent at 3/29/2024  3:34 PM EDT -----  Regarding: austin Anderson called from [facility]. They need script for his fentanyl increase and they use [facility] [phone].  He is completely out and they wont send anymore without that script she said.      [phone]

## 2024-04-01 ENCOUNTER — HOSPITAL ENCOUNTER (OUTPATIENT)
Dept: RADIATION ONCOLOGY | Facility: HOSPITAL | Age: 64
Setting detail: RADIATION/ONCOLOGY SERIES
Discharge: HOME | End: 2024-04-01
Payer: MEDICAID

## 2024-04-01 ENCOUNTER — APPOINTMENT (OUTPATIENT)
Dept: RADIATION ONCOLOGY | Facility: HOSPITAL | Age: 64
End: 2024-04-01
Payer: MEDICAID

## 2024-04-01 ENCOUNTER — HOSPITAL ENCOUNTER (OUTPATIENT)
Dept: ONCOLOGY | Facility: HOSPITAL | Age: 64
Discharge: HOME | End: 2024-04-01
Admitting: INTERNAL MEDICINE
Payer: MEDICAID

## 2024-04-01 ENCOUNTER — DOCUMENTATION (OUTPATIENT)
Dept: OTHER | Facility: HOSPITAL | Age: 64
End: 2024-04-01
Payer: MEDICAID

## 2024-04-01 VITALS
HEART RATE: 71 BPM | WEIGHT: 164 LBS | SYSTOLIC BLOOD PRESSURE: 142 MMHG | TEMPERATURE: 97.7 F | RESPIRATION RATE: 16 BRPM | HEIGHT: 69 IN | DIASTOLIC BLOOD PRESSURE: 67 MMHG | BODY MASS INDEX: 24.29 KG/M2

## 2024-04-01 DIAGNOSIS — C15.3 MALIGNANT NEOPLASM OF UPPER THIRD OF ESOPHAGUS: ICD-10-CM

## 2024-04-01 LAB
ALBUMIN SERPL-MCNC: 3.8 G/DL (ref 3.5–5.2)
ALBUMIN/GLOB SERPL: 0.9 G/DL
ALP SERPL-CCNC: 167 U/L (ref 39–117)
ALT SERPL W P-5'-P-CCNC: 13 U/L (ref 1–41)
ANION GAP SERPL CALCULATED.3IONS-SCNC: 8 MMOL/L (ref 5–15)
AST SERPL-CCNC: 23 U/L (ref 1–40)
BASOPHILS # BLD AUTO: 0.03 10*3/MM3 (ref 0–0.2)
BASOPHILS NFR BLD AUTO: 0.5 % (ref 0–1.5)
BILIRUB SERPL-MCNC: 0.3 MG/DL (ref 0–1.2)
BUN SERPL-MCNC: 8 MG/DL (ref 8–23)
BUN/CREAT SERPL: 13.3 (ref 7–25)
CALCIUM SPEC-SCNC: 10 MG/DL (ref 8.6–10.5)
CHLORIDE SERPL-SCNC: 96 MMOL/L (ref 98–107)
CO2 SERPL-SCNC: 28 MMOL/L (ref 22–29)
CREAT SERPL-MCNC: 0.6 MG/DL (ref 0.76–1.27)
DEPRECATED RDW RBC AUTO: 55.5 FL (ref 37–54)
EGFRCR SERPLBLD CKD-EPI 2021: 107.8 ML/MIN/1.73
EOSINOPHIL # BLD AUTO: 0.36 10*3/MM3 (ref 0–0.4)
EOSINOPHIL NFR BLD AUTO: 5.9 % (ref 0.3–6.2)
ERYTHROCYTE [DISTWIDTH] IN BLOOD BY AUTOMATED COUNT: 21.6 % (ref 12.3–15.4)
GLOBULIN UR ELPH-MCNC: 4.2 GM/DL
GLUCOSE SERPL-MCNC: 100 MG/DL (ref 65–99)
HCT VFR BLD AUTO: 30.3 % (ref 37.5–51)
HGB BLD-MCNC: 8.9 G/DL (ref 13–17.7)
IMM GRANULOCYTES # BLD AUTO: 0.01 10*3/MM3 (ref 0–0.05)
IMM GRANULOCYTES NFR BLD AUTO: 0.2 % (ref 0–0.5)
LYMPHOCYTES # BLD AUTO: 0.76 10*3/MM3 (ref 0.7–3.1)
LYMPHOCYTES NFR BLD AUTO: 12.4 % (ref 19.6–45.3)
MCH RBC QN AUTO: 23.4 PG (ref 26.6–33)
MCHC RBC AUTO-ENTMCNC: 29.4 G/DL (ref 31.5–35.7)
MCV RBC AUTO: 79.7 FL (ref 79–97)
MONOCYTES # BLD AUTO: 1.17 10*3/MM3 (ref 0.1–0.9)
MONOCYTES NFR BLD AUTO: 19.1 % (ref 5–12)
NEUTROPHILS NFR BLD AUTO: 3.78 10*3/MM3 (ref 1.7–7)
NEUTROPHILS NFR BLD AUTO: 61.9 % (ref 42.7–76)
PLATELET # BLD AUTO: 263 10*3/MM3 (ref 140–450)
PMV BLD AUTO: 8.9 FL (ref 6–12)
POTASSIUM SERPL-SCNC: 4.2 MMOL/L (ref 3.5–5.2)
PROT SERPL-MCNC: 8 G/DL (ref 6–8.5)
RBC # BLD AUTO: 3.8 10*6/MM3 (ref 4.14–5.8)
SODIUM SERPL-SCNC: 132 MMOL/L (ref 136–145)
WBC NRBC COR # BLD AUTO: 6.11 10*3/MM3 (ref 3.4–10.8)

## 2024-04-01 PROCEDURE — 85025 COMPLETE CBC W/AUTO DIFF WBC: CPT | Performed by: INTERNAL MEDICINE

## 2024-04-01 PROCEDURE — 80053 COMPREHEN METABOLIC PANEL: CPT | Performed by: INTERNAL MEDICINE

## 2024-04-01 PROCEDURE — 36415 COLL VENOUS BLD VENIPUNCTURE: CPT

## 2024-04-01 PROCEDURE — 77386: CPT | Performed by: RADIOLOGY

## 2024-04-01 NOTE — SIGNIFICANT NOTE
WALT arranged lyft home for pt per request. After speaking with Yaneth at [facility], it was brought to our attention that pt can not take private rides such as lyft or road to recovery due to being at a nursing facility. Because of that, pt has been set up with [facility] for all rides. WALT apologized for not knowing this information, but was agreeable to plan. WALT will assist with coordination as needed. Yaneth thanked Walt and was agreeable to reach out ongoing.       04/01/24 0900   Oncology Interventions   Transportation Needs uber/lyft  (arranged lyft ride home for pt)

## 2024-04-02 ENCOUNTER — HOSPITAL ENCOUNTER (OUTPATIENT)
Dept: ONCOLOGY | Facility: HOSPITAL | Age: 64
Discharge: HOME | End: 2024-04-02
Admitting: INTERNAL MEDICINE
Payer: MEDICAID

## 2024-04-02 ENCOUNTER — HOSPITAL ENCOUNTER (OUTPATIENT)
Dept: RADIATION ONCOLOGY | Facility: HOSPITAL | Age: 64
Discharge: HOME | End: 2024-04-02

## 2024-04-02 ENCOUNTER — DOCUMENTATION (OUTPATIENT)
Dept: NUTRITION | Facility: HOSPITAL | Age: 64
End: 2024-04-02
Payer: MEDICAID

## 2024-04-02 ENCOUNTER — OFFICE VISIT (OUTPATIENT)
Dept: ONCOLOGY | Facility: CLINIC | Age: 64
End: 2024-04-02
Payer: MEDICAID

## 2024-04-02 VITALS
SYSTOLIC BLOOD PRESSURE: 139 MMHG | WEIGHT: 169.4 LBS | OXYGEN SATURATION: 100 % | HEIGHT: 69 IN | BODY MASS INDEX: 25.09 KG/M2 | DIASTOLIC BLOOD PRESSURE: 63 MMHG | HEART RATE: 67 BPM | TEMPERATURE: 98 F | RESPIRATION RATE: 17 BRPM

## 2024-04-02 VITALS — HEART RATE: 61 BPM | DIASTOLIC BLOOD PRESSURE: 52 MMHG | SYSTOLIC BLOOD PRESSURE: 120 MMHG

## 2024-04-02 VITALS — WEIGHT: 167.1 LBS | BODY MASS INDEX: 24.68 KG/M2

## 2024-04-02 DIAGNOSIS — D50.0 IRON DEFICIENCY ANEMIA DUE TO CHRONIC BLOOD LOSS: ICD-10-CM

## 2024-04-02 DIAGNOSIS — C15.9 SQUAMOUS CELL CARCINOMA OF ESOPHAGUS: ICD-10-CM

## 2024-04-02 DIAGNOSIS — D62 ACUTE BLOOD LOSS ANEMIA: ICD-10-CM

## 2024-04-02 DIAGNOSIS — C15.3 MALIGNANT NEOPLASM OF UPPER THIRD OF ESOPHAGUS: Primary | ICD-10-CM

## 2024-04-02 PROCEDURE — 25010000002 DEXAMETHASONE SODIUM PHOSPHATE 100 MG/10ML SOLUTION: Performed by: INTERNAL MEDICINE

## 2024-04-02 PROCEDURE — 25810000003 SODIUM CHLORIDE 0.9 % SOLUTION 250 ML FLEX CONT: Performed by: INTERNAL MEDICINE

## 2024-04-02 PROCEDURE — 99214 OFFICE O/P EST MOD 30 MIN: CPT | Performed by: NURSE PRACTITIONER

## 2024-04-02 PROCEDURE — 25010000002 PALONOSETRON PER 25 MCG: Performed by: INTERNAL MEDICINE

## 2024-04-02 PROCEDURE — 25010000002 IRON SUCROSE PER 1 MG: Performed by: NURSE PRACTITIONER

## 2024-04-02 PROCEDURE — 25010000002 PACLITAXEL PER 1 MG: Performed by: INTERNAL MEDICINE

## 2024-04-02 PROCEDURE — 96375 TX/PRO/DX INJ NEW DRUG ADDON: CPT

## 2024-04-02 PROCEDURE — 77386: CPT | Performed by: RADIOLOGY

## 2024-04-02 PROCEDURE — 25810000003 SODIUM CHLORIDE 0.9 % SOLUTION: Performed by: INTERNAL MEDICINE

## 2024-04-02 PROCEDURE — 25010000002 CARBOPLATIN PER 50 MG: Performed by: INTERNAL MEDICINE

## 2024-04-02 PROCEDURE — 25010000002 DIPHENHYDRAMINE PER 50 MG: Performed by: INTERNAL MEDICINE

## 2024-04-02 PROCEDURE — 96413 CHEMO IV INFUSION 1 HR: CPT

## 2024-04-02 PROCEDURE — 96417 CHEMO IV INFUS EACH ADDL SEQ: CPT

## 2024-04-02 RX ORDER — OMEPRAZOLE 40 MG/1
CAPSULE, DELAYED RELEASE ORAL
COMMUNITY
Start: 2024-03-19

## 2024-04-02 RX ORDER — FENTANYL 12.5 UG/1
PATCH TRANSDERMAL
COMMUNITY
Start: 2024-03-19

## 2024-04-02 RX ORDER — SODIUM CHLORIDE 9 MG/ML
20 INJECTION, SOLUTION INTRAVENOUS ONCE
Status: COMPLETED | OUTPATIENT
Start: 2024-04-02 | End: 2024-04-02

## 2024-04-02 RX ORDER — FAMOTIDINE 10 MG/ML
20 INJECTION, SOLUTION INTRAVENOUS ONCE
Status: COMPLETED | OUTPATIENT
Start: 2024-04-02 | End: 2024-04-02

## 2024-04-02 RX ORDER — SODIUM CHLORIDE 9 MG/ML
20 INJECTION, SOLUTION INTRAVENOUS ONCE
Status: CANCELLED | OUTPATIENT
Start: 2024-04-02

## 2024-04-02 RX ORDER — PALONOSETRON 0.05 MG/ML
0.25 INJECTION, SOLUTION INTRAVENOUS ONCE
Status: COMPLETED | OUTPATIENT
Start: 2024-04-02 | End: 2024-04-02

## 2024-04-02 RX ADMIN — DEXAMETHASONE SODIUM PHOSPHATE 12 MG: 10 INJECTION, SOLUTION INTRAMUSCULAR; INTRAVENOUS at 12:04

## 2024-04-02 RX ADMIN — IRON SUCROSE 200 MG: 20 INJECTION, SOLUTION INTRAVENOUS at 11:43

## 2024-04-02 RX ADMIN — CARBOPLATIN 300 MG: 10 INJECTION, SOLUTION INTRAVENOUS at 14:01

## 2024-04-02 RX ADMIN — FAMOTIDINE 20 MG: 10 INJECTION, SOLUTION INTRAVENOUS at 12:05

## 2024-04-02 RX ADMIN — SODIUM CHLORIDE 20 ML/HR: 9 INJECTION, SOLUTION INTRAVENOUS at 11:42

## 2024-04-02 RX ADMIN — SODIUM CHLORIDE 95 MG: 9 INJECTION, SOLUTION INTRAVENOUS at 12:53

## 2024-04-02 RX ADMIN — PALONOSETRON 0.25 MG: 0.25 INJECTION, SOLUTION INTRAVENOUS at 12:04

## 2024-04-02 RX ADMIN — DIPHENHYDRAMINE HYDROCHLORIDE 50 MG: 50 INJECTION INTRAMUSCULAR; INTRAVENOUS at 12:04

## 2024-04-02 NOTE — PROGRESS NOTES
"Outpatient Oncology Nutrition     Reason for Visit:     Oncology Nutrition Screening, Nutritional Assessment, and Patient Education    Patient Name:  Val Nassar Jr.    :  1960    MRN:  [id number]    Date of Encounter: 2024    Nutrition Assessment     Diagnosis:  Esophageal cancer / esophageal mass at 20 cm from the incisors with several hypermetabolic lymph nodes in the right supraclavicular fossa and superior right mediastinum / clinical stage II    Surgery:    Chemotherapy:  OP LUNG PACLitaxel / CARBOplatin AUC=2 (Weekly) + XRT - start date for chemo 4/3/24    Radiation:  Curative intent therapy / 50-54 Gray to the esophagus and involved adenopathy / supraclavicular fossae bilaterally and the esophagus benefit from a lower dose of radiotherapy to dose of around 45 Laws     Patient Active Problem List   Diagnosis    Acute gastric ulcer with hemorrhage    Alcohol abuse, uncomplicated    Anxiety disorder, unspecified    Diverticulum of bladder    Duodenal ulcer, unspecified as acute or chronic, without hemorrhage or perforation    Elevation of level of transaminase and lactic acid dehydrogenase (LDH)    Gastro-esophageal reflux disease without esophagitis    Hyperglycemia, unspecified    Hyperlipidemia, unspecified    Melena    Nicotine dependence, cigarettes, uncomplicated    Tobacco use    Severe malnutrition    Duodenal ulcer    Iron deficiency anemia    Cirrhosis of liver    Acute blood loss anemia    Squamous cell carcinoma of esophagus    Duodenal stenosis    Generalized weakness    Orthostatic hypotension    UTI (urinary tract infection)    Malignant neoplasm of upper third of esophagus    Iron deficiency anemia     Cirrhosis  Food / Nutrition Related History       Hydration Status     Goal:  85 ounces per day    Enteral Feeding   NA    Anthropometric Measurements     Height:    Ht Readings from Last 1 Encounters:   24 175.3 cm (69.02\")       Weight:    Wt Readings from " Last 1 Encounters:   04/02/24 76.8 kg (169 lb 6.4 oz)       BMI:  25 / normal    Weight Change:  26 lbs weight gain past 3 months    Review of Lab Data (Time Frame - 1 month / 2 month)               Component  Ref Range & Units 1 d ago  (4/1/24) 12 d ago  (3/21/24) 12 d ago  (3/21/24) 3 wk ago  (3/6/24) 1 mo ago  (2/22/24) 1 mo ago  (2/20/24) 1 mo ago  (2/19/24)   Glucose  65 - 99 mg/dL 100 High   89 99 R 159 High  R 94 103 High    BUN  8 - 23 mg/dL 8  10   12 12   Creatinine  0.76 - 1.27 mg/dL 0.60 Low   0.57 Low    0.57 Low  0.56 Low    Sodium  136 - 145 mmol/L 132 Low   136   141 138   Potassium  3.5 - 5.2 mmol/L 4.2  4.3   4.2 3.9   Chloride  98 - 107 mmol/L 96 Low   100   105 104   CO2  22.0 - 29.0 mmol/L 28.0  28.0   26.0 27.0   Calcium  8.6 - 10.5 mg/dL 10.0  9.9   9.9 9.5   Total Protein  6.0 - 8.5 g/dL 8.0 7.2 7.5       Albumin  3.5 - 5.2 g/dL 3.8 3.4 R 3.7       ALT (SGPT)  1 - 41 U/L 13  11       AST (SGOT)  1 - 40 U/L 23  19       Alkaline Phosphatase  39 - 117 U/L 167 High   158 High        Total Bilirubin  0.0 - 1.2 mg/dL 0.3  0.2       Globulin  gm/dL 4.2  3.8 CM       Comment: Calculated Result   A/G Ratio  g/dL 0.9 0.9 R 1.0       BUN/Creatinine Ratio  7.0 - 25.0 13.3  17.5   21.1 21.4   Anion Gap  5.0 - 15.0 mmol/L 8.0  8.0   10.0 7.0   eGFR  >60.0 mL/min/1.73 107.8  109.5   109.5 110.1   Resulting Agency  [facility]  [facility]  [facility]  [facility]  [facility]  [facility]                   Medication Review   Reviewed 4/2/24    Nutrition Focused Physical Findings       Nutrition Impact Symptoms   Fatigue  Dysphagia    Physical Activity      Not my normal self, but able to be up and about with fairly normal activities    Current Nutritional Intake     Oral diet:      Oral nutritional supplements:    Intake:    Malnutrition Risk Assessment     Recent weight loss over the past 6 months:  0 = No    How much weight loss:      Eating poorly because of a decreased appetite:  0 = No    Malnutrition  "Screening Score:     MST = 0 or 1 Patient not at risk for malnutrition however with progression of chemoradiation, risk will increase    Nutrition Diagnosis     Problem    Etiology    Signs / Symptoms      Nutrition Intervention   Initial consultation with patient during RAD ONC status checks.  Patient has been living in a NH for the past 3 months.  He states that during this time, he has gained 26 lbs and continued to thrive physically and socially.    He currently is experiencing dysphagia with meats and will only occasionally eat fish or chicken \"if he gums it\".  He focuses on a soft, moist diet; drinks at least 3-4 glasses of milk per day.  He states that he continues to drink 3-4 Mountain Dew per day, along with water and tea.  If he is unable to eat the meal that has been prepared for him, he states that he can always request alternate choices.    Discussed the importance of nutrition with diagnosis with focus on healthy nutrient dense choices, high calorie to prevent additional weight loss, high protein for repletion / preservation of LBM and adequate hydration. Encouraged small frequent meals and snacks with inclusion of protein at each.      Discussed what to expect with progression of treatment and how that would affect his oral intake and indication for modification of consistencies and textures.      Provided samples of Boost Plus and Ensure Complete and encouraged patient to inquire if the NH would be able to supplement his oral intake with these to increase protein intake.    Will follow.        Goal       Monitoring / Evaluation     "

## 2024-04-02 NOTE — PROGRESS NOTES
Hematology and Oncology [place]  Office number [phone]    Fax number [phone]     Follow up     Date: 24    Patient Name: Val Nassar Jr.  MRN: [id number]  : 1960    Chief Complaint: esophageal cancer    Cancer Staging:  Cancer Staging   Stage II (cT2, cN1, cM0)    History of Present Illness: Val Nassar Jr. is a pleasant 63 y.o. male who presents today for evaluation of upper third esophageal squamous cell carcinoma. He has a longstanding history of cirrhosis     He originally was diagnosed with a localized esophageal squamous cell carcinoma of the upper third of the esophagus in the 2023.  At the time he was living in [place].  He underwent an endoscopy confirming cancer diagnosis at [facility] in [facility].  He then relocated to [facility] to live with his sister because of his cancer diagnosis.      He presented to [facility] in 2023 with an upper GI bleed secondary to peptic ulcer disease and possible cholecystitis.  His presenting hemoglobin was 6.9.     EGD performed 2023 showed an ulcerated mass in the upper third of the esophagus which was nonobstructing and partially circumferential spanning 2 cm in length.  Biopsy results are pending.    Additional findings include gastritis, portal hypertension gastropathy and nonbleeding duodenal ulcer.  Duodenal stenosis.    He was discharged to a rehab facility and ultimately discharged home to live with his sister.  He was doing poorly and was unable to care for himself.  He did not keep his appointments with medical oncology, thoracic surgery, or radiation oncology and did not complete staging PET/CT.  He then represented to the hospital in late 2024 with a UTI and confusion.  During that admission he was assessed by palliative care, radiation oncology, and medical oncology.  He elected to be discharged to a skilled nursing facility for long-term  care as he does not want to be a burden on his sister and did not feel that he could care for himself at home.  Prior to discharge there were conversations between case management and social work at the hospital and his facility and the patient was under the impression that he could not receive radiation treatment while living at the skilled facility.  However he did elect to complete PET CT scan and returns for results.    PET/CT performed 3/6/2024 shows thickening of the esophagus with an uptake of 16 by SUV.  There was notable hypermetabolic activity in the left lateral shoulder subcutaneous tissue which corresponds to an area where he received a prior injection at his PCP office for alcohol use which he reports has been raised and pruritic since the injection 6 weeks prior.  He had mild paratracheal adenopathy as well as an right paratracheal lymph node in the right supraclavicular region  He reports that he has been tolerating regular diet with no obstructive symptoms prior to presentation.    Treatment history:  Radiation: initiated 3/27/2024  Taxol/carbo: 4/2/2024 cycle 1    Interval history:  Palliative care following at his facility.  He has fentanyl patch and oxycodone for breakthrough pain.  He doesn't feel like his pain is well controlled at this time.  He states he has not seen palliative in a couple weeks.  He has started radiation and completed 5 treatments thus far.  He is anxious to get started with chemotherapy today.  He had chemotherapy education and needs assessment appointment and feels like all his questions were answered.  He received first dose of IV iron last week and tolerated it well.     Past Medical History:   Past Medical History:   Diagnosis Date    Anemia     Cancer     Cirrhosis     Duodenal ulcer     Gastric ulcer     GERD (gastroesophageal reflux disease)     History of alcohol abuse     HLD (hyperlipidemia)     Mood disorder        Past Surgical History:   Past Surgical History:    Procedure Laterality Date    ENDOSCOPY N/A 12/26/2023    Procedure: ESOPHAGOGASTRODUODENOSCOPY;  Surgeon: Wesly Mckeon MD;  Location: [facility] ENDOSCOPY;  Service: Gastroenterology;  Laterality: N/A;       Family History:   Family History   Problem Relation Age of Onset    Cancer Mother     Breast cancer Mother     Heart attack Father        Social History:   Social History     Socioeconomic History    Marital status: Single   Tobacco Use    Smoking status: Every Day     Current packs/day: 1.00     Average packs/day: 1 pack/day for 15.0 years (15.0 ttl pk-yrs)     Types: Cigarettes    Smokeless tobacco: Never   Vaping Use    Vaping status: Some Days    Substances: Flavoring    Devices: Disposable   Substance and Sexual Activity    Alcohol use: Not Currently     Comment: sober for ~ 3 months    Drug use: Yes     Types: Hydrocodone    Sexual activity: Defer       Medications:     Current Outpatient Medications:     acetaminophen (TYLENOL) 325 MG tablet, Take 2 tablets by mouth Every 4 (Four) Hours As Needed for Mild Pain., Disp: , Rfl:     Cholecalciferol (Vitamin D3) 1.25 MG (53172 UT) capsule, Take 1 capsule by mouth Every 7 (Seven) Days., Disp: , Rfl:     escitalopram (LEXAPRO) 10 MG tablet, Take 1 tablet by mouth Every Night., Disp: , Rfl:     fentaNYL (DURAGESIC) 12 MCG/HR, Place  on the skin as directed by provider., Disp: , Rfl:     finasteride (PROSCAR) 5 MG tablet, Take 1 tablet by mouth Daily., Disp: , Rfl:     folic acid (FOLVITE) 1 MG tablet, Take 1 tablet by mouth Daily., Disp: , Rfl:     lactulose (CHRONULAC) 10 GM/15ML solution, Take 30 mL by mouth 2 (Two) Times a Day As Needed., Disp: , Rfl:     Lidocaine 4 %, Place 1 patch on the skin as directed by provider Daily. Remove & Discard patch within 12 hours or as directed by MD, Disp: , Rfl:     omeprazole (priLOSEC) 40 MG capsule, Take  by mouth., Disp: , Rfl:     ondansetron (ZOFRAN) 8 MG tablet, Take 1 tablet by mouth Every 8 (Eight) Hours As Needed  for Nausea or Vomiting., Disp: 30 tablet, Rfl: 3    oxyCODONE (ROXICODONE) 5 MG immediate release tablet, Take 1 tablet by mouth Every 6 (Six) Hours As Needed for Moderate Pain. (Patient taking differently: Take 1.5 tablets by mouth Every 6 (Six) Hours As Needed for Moderate Pain.), Disp: 12 tablet, Rfl: 0    pantoprazole (PROTONIX) 40 MG EC tablet, Take 1 tablet by mouth 2 (Two) Times a Day., Disp: , Rfl:     polyethylene glycol (MIRALAX) 17 g packet, Take 17 g by mouth Daily As Needed (Use if senna-docusate is ineffective)., Disp: , Rfl:     tamsulosin (FLOMAX) 0.4 MG capsule 24 hr capsule, Take 1 capsule by mouth Daily., Disp: , Rfl:     vitamin B-12 (CYANOCOBALAMIN) 1000 MCG tablet, Take 1 tablet by mouth Daily., Disp: , Rfl:     gabapentin (NEURONTIN) 300 MG capsule, Take 1 capsule by mouth 3 (Three) Times a Day for 3 days., Disp: 9 capsule, Rfl: 0  No current facility-administered medications for this visit.    Facility-Administered Medications Ordered in Other Visits:     CARBOplatin (PARAPLATIN) 300 mg in sodium chloride 0.9 % 130 mL chemo IVPB, 300 mg, Intravenous, Once, Katerina Ga MD    dexAMETHasone (DECADRON) IVPB 12 mg, 12 mg, Intravenous, Once, Katerina Ga MD    diphenhydrAMINE (BENADRYL) IVPB 50 mg, 50 mg, Intravenous, Once, Katerina Ga MD    famotidine (PEPCID) injection 20 mg, 20 mg, Intravenous, Once, Katerina Ga MD    iron sucrose (VENOFER) 200 mg in sodium chloride 0.9 % 100 mL IVPB, 200 mg, Intravenous, Once, Mayi Lopes APRN    PACLitaxel (TAXOL) 95 mg in sodium chloride 0.9 % 265.8 mL chemo IVPB, 50 mg/m2 (Treatment Plan Recorded), Intravenous, Once, Katerina Ga MD    palonosetron (ALOXI) injection 0.25 mg, 0.25 mg, Intravenous, Once, Katerina Ga MD    sodium chloride 0.9 % infusion, 20 mL/hr, Intravenous, Once, Katerina Ga MD    Allergies:   No Known Allergies    Objective     Vital Signs:   Vitals:    04/02/24 1031   BP: 139/63   Pulse: 67   Resp: 17  "  Temp: 98 °F (36.7 °C)   TempSrc: Temporal   SpO2: 100%   Weight: 76.8 kg (169 lb 6.4 oz)   Height: 175.3 cm (69.02\")   PainSc:   9   PainLoc: Shoulder  Comment: Both shoulders    Body mass index is 25 kg/m².   Pain Score    04/02/24 1031   PainSc:   9   PainLoc: Shoulder  Comment: Both shoulders     ECOG Performance Status: 2    Physical Exam:   General: No acute distress. Well appearing   HEENT: Normocephalic, atraumatic. Sclera anicteric.   Neck: supple, no adenopathy.   Cardiovascular: regular rate and rhythm. No murmurs.   Respiratory: Normal rate. Clear to auscultation bilaterally  Abdomen: Soft, nontender, non distended with normoactive bowel sounds  Lymph: no cervical, supraclavicular or axillary adenopathy  Neuro: Alert and oriented x 3. No focal deficits.   Ext: Symmetric, no swelling.     Laboratory/Imaging Reviewed:   Hospital Outpatient Visit on 04/01/2024   Component Date Value Ref Range Status    Glucose 04/01/2024 100 (H)  65 - 99 mg/dL Final    BUN 04/01/2024 8  8 - 23 mg/dL Final    Creatinine 04/01/2024 0.60 (L)  0.76 - 1.27 mg/dL Final    Sodium 04/01/2024 132 (L)  136 - 145 mmol/L Final    Potassium 04/01/2024 4.2  3.5 - 5.2 mmol/L Final    Chloride 04/01/2024 96 (L)  98 - 107 mmol/L Final    CO2 04/01/2024 28.0  22.0 - 29.0 mmol/L Final    Calcium 04/01/2024 10.0  8.6 - 10.5 mg/dL Final    Total Protein 04/01/2024 8.0  6.0 - 8.5 g/dL Final    Albumin 04/01/2024 3.8  3.5 - 5.2 g/dL Final    ALT (SGPT) 04/01/2024 13  1 - 41 U/L Final    AST (SGOT) 04/01/2024 23  1 - 40 U/L Final    Alkaline Phosphatase 04/01/2024 167 (H)  39 - 117 U/L Final    Total Bilirubin 04/01/2024 0.3  0.0 - 1.2 mg/dL Final    Globulin 04/01/2024 4.2  gm/dL Final    Calculated Result    A/G Ratio 04/01/2024 0.9  g/dL Final    BUN/Creatinine Ratio 04/01/2024 13.3  7.0 - 25.0 Final    Anion Gap 04/01/2024 8.0  5.0 - 15.0 mmol/L Final    eGFR 04/01/2024 107.8  >60.0 mL/min/1.73 Final    WBC 04/01/2024 6.11  3.40 - 10.80 " 10*3/mm3 Final    RBC 04/01/2024 3.80 (L)  4.14 - 5.80 10*6/mm3 Final    Hemoglobin 04/01/2024 8.9 (L)  13.0 - 17.7 g/dL Final    Hematocrit 04/01/2024 30.3 (L)  37.5 - 51.0 % Final    MCV 04/01/2024 79.7  79.0 - 97.0 fL Final    MCH 04/01/2024 23.4 (L)  26.6 - 33.0 pg Final    MCHC 04/01/2024 29.4 (L)  31.5 - 35.7 g/dL Final    RDW 04/01/2024 21.6 (H)  12.3 - 15.4 % Final    RDW-SD 04/01/2024 55.5 (H)  37.0 - 54.0 fl Final    MPV 04/01/2024 8.9  6.0 - 12.0 fL Final    Platelets 04/01/2024 263  140 - 450 10*3/mm3 Final    Neutrophil % 04/01/2024 61.9  42.7 - 76.0 % Final    Lymphocyte % 04/01/2024 12.4 (L)  19.6 - 45.3 % Final    Monocyte % 04/01/2024 19.1 (H)  5.0 - 12.0 % Final    Eosinophil % 04/01/2024 5.9  0.3 - 6.2 % Final    Basophil % 04/01/2024 0.5  0.0 - 1.5 % Final    Immature Grans % 04/01/2024 0.2  0.0 - 0.5 % Final    Neutrophils, Absolute 04/01/2024 3.78  1.70 - 7.00 10*3/mm3 Final    Lymphocytes, Absolute 04/01/2024 0.76  0.70 - 3.10 10*3/mm3 Final    Monocytes, Absolute 04/01/2024 1.17 (H)  0.10 - 0.90 10*3/mm3 Final    Eosinophils, Absolute 04/01/2024 0.36  0.00 - 0.40 10*3/mm3 Final    Basophils, Absolute 04/01/2024 0.03  0.00 - 0.20 10*3/mm3 Final    Immature Grans, Absolute 04/01/2024 0.01  0.00 - 0.05 10*3/mm3 Final   Lab on 03/21/2024   Component Date Value Ref Range Status    Vitamin B-12 03/21/2024 1,178 (H)  211 - 946 pg/mL Final    Folate 03/21/2024 >20.00  4.78 - 24.20 ng/mL Final    Glucose 03/21/2024 89  65 - 99 mg/dL Final    BUN 03/21/2024 10  8 - 23 mg/dL Final    Creatinine 03/21/2024 0.57 (L)  0.76 - 1.27 mg/dL Final    Sodium 03/21/2024 136  136 - 145 mmol/L Final    Potassium 03/21/2024 4.3  3.5 - 5.2 mmol/L Final    Chloride 03/21/2024 100  98 - 107 mmol/L Final    CO2 03/21/2024 28.0  22.0 - 29.0 mmol/L Final    Calcium 03/21/2024 9.9  8.6 - 10.5 mg/dL Final    Total Protein 03/21/2024 7.5  6.0 - 8.5 g/dL Final    Albumin 03/21/2024 3.7  3.5 - 5.2 g/dL Final    ALT (SGPT)  03/21/2024 11  1 - 41 U/L Final    AST (SGOT) 03/21/2024 19  1 - 40 U/L Final    Alkaline Phosphatase 03/21/2024 158 (H)  39 - 117 U/L Final    Total Bilirubin 03/21/2024 0.2  0.0 - 1.2 mg/dL Final    Globulin 03/21/2024 3.8  gm/dL Final    Calculated Result    A/G Ratio 03/21/2024 1.0  g/dL Final    BUN/Creatinine Ratio 03/21/2024 17.5  7.0 - 25.0 Final    Anion Gap 03/21/2024 8.0  5.0 - 15.0 mmol/L Final    eGFR 03/21/2024 109.5  >60.0 mL/min/1.73 Final    Ferritin 03/21/2024 11.12 (L)  30.00 - 400.00 ng/mL Final    Iron 03/21/2024 18 (L)  59 - 158 mcg/dL Final    Iron Saturation (TSAT) 03/21/2024 4 (L)  20 - 50 % Final    Transferrin 03/21/2024 339  200 - 360 mg/dL Final    TIBC 03/21/2024 505  298 - 536 mcg/dL Final    Total Protein 03/21/2024 7.2  6.0 - 8.5 g/dL Final    Albumin 03/21/2024 3.4  2.9 - 4.4 g/dL Final    Alpha-1-Globulin 03/21/2024 0.4  0.0 - 0.4 g/dL Final    Alpha-2-Globulin 03/21/2024 0.7  0.4 - 1.0 g/dL Final    Beta Globulin 03/21/2024 1.1  0.7 - 1.3 g/dL Final    Gamma Globulin 03/21/2024 1.7  0.4 - 1.8 g/dL Final    M-Josh 03/21/2024 Not Observed  Not Observed g/dL Final    Globulin 03/21/2024 3.8  2.2 - 3.9 g/dL Final    A/G Ratio 03/21/2024 0.9  0.7 - 1.7 Final    Please note 03/21/2024 Comment   Final    Protein electrophoresis scan will follow via computer, mail, or   delivery.    SPE Interpretation 03/21/2024 Comment   Final    The SPE pattern appears unremarkable. Evidence of monoclonal  protein is not apparent.    Reticulocyte % 03/21/2024 1.74  0.70 - 1.90 % Final    Reticulocyte Absolute 03/21/2024 0.0663  0.0200 - 0.1300 10*6/mm3 Final    Haptoglobin 03/21/2024 96  30 - 200 mg/dL Final    WBC 03/21/2024 6.71  3.40 - 10.80 10*3/mm3 Final    RBC 03/21/2024 3.70 (L)  4.14 - 5.80 10*6/mm3 Final    Hemoglobin 03/21/2024 8.3 (L)  13.0 - 17.7 g/dL Final    Hematocrit 03/21/2024 28.1 (L)  37.5 - 51.0 % Final    MCV 03/21/2024 75.9 (L)  79.0 - 97.0 fL Final    MCH 03/21/2024 22.4  (L)  26.6 - 33.0 pg Final    MCHC 03/21/2024 29.5 (L)  31.5 - 35.7 g/dL Final    RDW 03/21/2024 16.0 (H)  12.3 - 15.4 % Final    RDW-SD 03/21/2024 43.9  37.0 - 54.0 fl Final    MPV 03/21/2024 8.6  6.0 - 12.0 fL Final    Platelets 03/21/2024 263  140 - 450 10*3/mm3 Final    Neutrophil % 03/21/2024 59.2  42.7 - 76.0 % Final    Lymphocyte % 03/21/2024 13.9 (L)  19.6 - 45.3 % Final    Monocyte % 03/21/2024 21.3 (H)  5.0 - 12.0 % Final    Eosinophil % 03/21/2024 5.1  0.3 - 6.2 % Final    Basophil % 03/21/2024 0.4  0.0 - 1.5 % Final    Immature Grans % 03/21/2024 0.1  0.0 - 0.5 % Final    Neutrophils, Absolute 03/21/2024 3.97  1.70 - 7.00 10*3/mm3 Final    Lymphocytes, Absolute 03/21/2024 0.93  0.70 - 3.10 10*3/mm3 Final    Monocytes, Absolute 03/21/2024 1.43 (H)  0.10 - 0.90 10*3/mm3 Final    Eosinophils, Absolute 03/21/2024 0.34  0.00 - 0.40 10*3/mm3 Final    Basophils, Absolute 03/21/2024 0.03  0.00 - 0.20 10*3/mm3 Final    Immature Grans, Absolute 03/21/2024 0.01  0.00 - 0.05 10*3/mm3 Final       NM PET/CT Skull Base to Mid Thigh    Result Date: 3/7/2024  Narrative: NM PET/CT SKULL BASE TO MID THIGH Date of Exam: 3/6/2024 2:24 PM EST Indication: esophogeal ca. Comparison: Abdomen pelvis CT scan 2/4/2024. No prior PET scan Technique: 11.8 mCi of F-18 FDG was administered intravenously. PET imaging was obtained from skull base to mid-thigh approximately 60 minutes after radiotracer injection. A low dose non contrast CT was obtained for attenuation correction and anatomic localization. Fused PET-CT and 3D MIP reconstructions were utilized for image interpretation.  Fasting blood glucose level: 99 mg/dl. Reference uptake values: Mediastinum: 2.5 SUVmax Liver: 3.0 SUVmax Normalization method: Body Weight Findings: History indicates known esophageal cancer undergoing work-up, no treatment to date. Some left-sided neck pain and dysphagia. Today's 3D images shows a focus of increased uptake in the central upper chest,  "apparently regional to the esophagus, and 2 small foci of uptake in the right lower neck or superior stomach. There is focal activity in the superficial soft tissues lateral to the left shoulder. There is normal and normal variant uptake elsewhere. Multiplanar images show no significant asymmetry of uptake in the brain. No hypermetabolic activity is seen in the neck down to the right supraclavicular fat, where a small lymph node has maximal SUV of 6.6, concerning for metastatic involvement. A couple of small adjacent right upper paratracheal lymph nodes have maximal SUV  of 3.3.  In the proximal thoracic there is focal thickening of the esophagus and strongly hypermetabolic activity, maximal SUV 15.9 apparently corresponding to the patient's known neoplasm. Hypermetabolic activity in the lateral left shoulder subcutaneous tissues measures 8.0. CT scan appearance suggests potential subcutaneous hematoma. Please correlate with any history of trauma to this area. No hypermetabolic mediastinal, hilar, chest wall or pulmonary parenchymal mass is seen elsewhere. Below the diaphragm, no hypermetabolic liver, adrenal, or other solid organ disease is seen. No hypermetabolic adenopathy is appreciated. There is expected GI and  tract activity. Focal activity in the adjacent posterior spinous processes of L3, L4 and  L5 measures approximately 4.1 - 4.2. With reference to the previous abdominal CT scan, sagittal images shows associated DJD, with the posterior spinous processes appear hypertrophied and in contact, or so-called \"Baastrup disease\". No pathologic marrow space uptake is seen elsewhere. Incidental note is made of a large, approximately 6 cm left bladder diverticulum.    Impression: Impression: 1. Intensely hypermetabolic activity in the proximal thoracic esophagus, apparently corresponding to patient's known neoplasm. 2. 3 small normal sized but hypermetabolic nodes respectively in the right supraclavicular fat, and " right superior mediastinum. 3. Hypermetabolic activity in the lateral subcutaneous tissues of the left shoulder, which appears to correspond to subcutaneous bruising. Please correlate with any history of trauma to this area. 4. DJD-related uptake in the hypertrophied and degenerated posterior spinous processes of L3, L4 and L5.. Electronically Signed: Ibrahima Jett MD  3/7/2024 4:27 PM EST  Workstation ID: [id number]     Procedures      Narrative & Impression   NM PET/CT SKULL BASE TO MID THIGH     Date of Exam: 3/6/2024 2:24 PM EST     Indication: esophogeal ca.     Comparison: Abdomen pelvis CT scan 2/4/2024. No prior PET scan     Technique: 11.8 mCi of F-18 FDG was administered intravenously. PET imaging was obtained from skull base to mid-thigh approximately 60 minutes after radiotracer injection. A low dose non contrast CT was obtained for attenuation correction and anatomic   localization. Fused PET-CT and 3D MIP reconstructions were utilized for image interpretation.  Fasting blood glucose level: 99 mg/dl.     Reference uptake values:  Mediastinum: 2.5 SUVmax  Liver: 3.0 SUVmax  Normalization method: Body Weight     Findings:  History indicates known esophageal cancer undergoing work-up, no treatment to date. Some left-sided neck pain and dysphagia.     Today's 3D images shows a focus of increased uptake in the central upper chest, apparently regional to the esophagus, and 2 small foci of uptake in the right lower neck or superior stomach. There is focal activity in the superficial soft tissues lateral   to the left shoulder. There is normal and normal variant uptake elsewhere.     Multiplanar images show no significant asymmetry of uptake in the brain.     No hypermetabolic activity is seen in the neck down to the right supraclavicular fat, where a small lymph node has maximal SUV of 6.6, concerning for metastatic involvement. A couple of small adjacent right upper paratracheal lymph nodes have maximal SUV   of  "3.3.      In the proximal thoracic there is focal thickening of the esophagus and strongly hypermetabolic activity, maximal SUV 15.9 apparently corresponding to the patient's known neoplasm. Hypermetabolic activity in the lateral left shoulder subcutaneous   tissues measures 8.0. CT scan appearance suggests potential subcutaneous hematoma. Please correlate with any history of trauma to this area.     No hypermetabolic mediastinal, hilar, chest wall or pulmonary parenchymal mass is seen elsewhere.     Below the diaphragm, no hypermetabolic liver, adrenal, or other solid organ disease is seen. No hypermetabolic adenopathy is appreciated. There is expected GI and  tract activity. Focal activity in the adjacent posterior spinous processes of L3, L4 and   L5 measures approximately 4.1 - 4.2. With reference to the previous abdominal CT scan, sagittal images shows associated DJD, with the posterior spinous processes appear hypertrophied and in contact, or so-called \"Baastrup disease\". No pathologic marrow   space uptake is seen elsewhere. Incidental note is made of a large, approximately 6 cm left bladder diverticulum.  IMPRESSION:  Impression:     1. Intensely hypermetabolic activity in the proximal thoracic esophagus, apparently corresponding to patient's known neoplasm.     2. 3 small normal sized but hypermetabolic nodes respectively in the right supraclavicular fat, and right superior mediastinum.      3. Hypermetabolic activity in the lateral subcutaneous tissues of the left shoulder, which appears to correspond to subcutaneous bruising. Please correlate with any history of trauma to this area.     4. DJD-related uptake in the hypertrophied and degenerated posterior spinous processes of L3, L4 and L5..       Had a fall on right shoulderbut not the rightgot a injection in the left shoulder  to help him stop drinking at dr. Wesly Albert 6 weeks ago, swelled up and red.     Narrative & Impression   MRI BRAIN WO " CONTRAST     Date of Exam: 2/18/2024 5:05 PM EST     Indication: Dizziness, off balance, present x 2 weeks.     Comparison: 2/4/2024 CT     Technique:  Routine multiplanar/multisequence sequence images of the brain were obtained without contrast administration.        Findings:  Diffusion imaging shows no evidence of acute infarct. There are scattered subcortical and periventricular T2 hyperintensities which are nonspecific but likely sequela of mild to moderate small vessel ischemic disease. No evidence of intracranial   hemorrhage.     There is normal ventricular volume. The basal cisterns are patent. There is no evidence of extra-axial fluid collection. There is normal flow voids within the intracranial vessels.     No evidence of fracture or suspicious bony lesion. Paranasal sinusitis with gas/fluid level in the right maxillary sinus. Trace bilateral mastoid air cell effusions. Visualized orbits are unremarkable.     IMPRESSION:  Impression:  No acute intracranial abnormality. No suspicious mass on this noncontrast MRI.     Paranasal sinusitis with gas/fluid level in the right maxillary sinus.           Assessment / Plan      Assessment/Plan:     1.  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  SUV avid right supraclavicular lymph node  3.  Indeterminate paratracheal adenopathy  -This most consistent with a T2 N1 squamous cell carcinoma of the upper esophagus as I would characterize the supraclavicular lymph node as regional adenopathy.  -He previously has had discussions with Dr. Ga and radiation oncology.  Although he is interested in pursuing cancer directed therapy,  his main priority is to continue to live in his current skilled nursing facility.  He states that he wishes to die there.  He feels comfortable and supported.  He is concerned if he pursues cancer directed therapy he would lose his spot at the facility.  However, if he is able to complete cancer directed therapy while remaining at  his skilled facility, he would be interested in doing that.    -Recommend proceeding with curative intent chemoradiation with carboplatin and paclitaxel sensitization.  Radiation started on 3/27/2024 and tolerating it well thus far.    -Nurse did call and speak with the nursing director and shared a copy of the treatment plan with them.  Mr. Neal has indicated that he would prioritize remaining in his current facility over cancer treatment if he is not able to complete cancer treatment at his facility, but is interested in proceeding with potentially curative intent therapy if this can be facilitated in his current nursing facility.    -Add MMR testing to original biopsy as not previously reported    -I reviewed his labs from yesterday that are appropriate for treatment.  We will proceed with carboplatin Alimta cycle 1 today.      4.  Cancer related pain  -Fentanyl patch plus oxycodone  -He will be followed by palliative care while he remains in his facility.  I encouraged him to have his nurse at the facility contact palliative to revisit patient as he feels like his pain is not well managed at this time.      5.  Normocytic anemia  -Labs reviewed and consistent with iron deficiency anemia despite oral iron trial.  -He has completed first dose of IV iron.  He will get second dose today.  Hemoglobin slightly improved at 8.9.  Will continue to monitor.      Total time of patient care on day of service including time prior to, face to face with patient, and following visit spent in reviewing records, lab results, imaging studies, discussion with patient, and documentation/charting was > 30 minutes.     Follow Up:   1 week       NUVIA Fowler  Hematology and Oncology

## 2024-04-03 ENCOUNTER — HOSPITAL ENCOUNTER (OUTPATIENT)
Dept: RADIATION ONCOLOGY | Facility: HOSPITAL | Age: 64
Discharge: HOME | End: 2024-04-03

## 2024-04-03 PROCEDURE — 77386: CPT | Performed by: RADIOLOGY

## 2024-04-04 ENCOUNTER — HOSPITAL ENCOUNTER (OUTPATIENT)
Dept: RADIATION ONCOLOGY | Facility: HOSPITAL | Age: 64
Discharge: HOME | End: 2024-04-04

## 2024-04-04 PROCEDURE — 77336 RADIATION PHYSICS CONSULT: CPT | Performed by: RADIOLOGY

## 2024-04-04 PROCEDURE — 77386: CPT | Performed by: RADIOLOGY

## 2024-04-05 ENCOUNTER — HOSPITAL ENCOUNTER (OUTPATIENT)
Dept: RADIATION ONCOLOGY | Facility: HOSPITAL | Age: 64
Discharge: HOME | End: 2024-04-05

## 2024-04-05 DIAGNOSIS — C15.9 SQUAMOUS CELL CARCINOMA OF ESOPHAGUS: Primary | ICD-10-CM

## 2024-04-05 PROCEDURE — 77386: CPT | Performed by: RADIOLOGY

## 2024-04-08 ENCOUNTER — HOSPITAL ENCOUNTER (OUTPATIENT)
Dept: RADIATION ONCOLOGY | Facility: HOSPITAL | Age: 64
Discharge: HOME | End: 2024-04-08
Payer: MEDICAID

## 2024-04-08 ENCOUNTER — HOSPITAL ENCOUNTER (OUTPATIENT)
Dept: ONCOLOGY | Facility: HOSPITAL | Age: 64
Discharge: HOME | End: 2024-04-08
Admitting: NURSE PRACTITIONER
Payer: MEDICAID

## 2024-04-08 VITALS
HEIGHT: 69 IN | HEART RATE: 72 BPM | TEMPERATURE: 96.1 F | SYSTOLIC BLOOD PRESSURE: 142 MMHG | BODY MASS INDEX: 24.9 KG/M2 | WEIGHT: 168.1 LBS | RESPIRATION RATE: 16 BRPM | DIASTOLIC BLOOD PRESSURE: 65 MMHG

## 2024-04-08 DIAGNOSIS — C15.3 MALIGNANT NEOPLASM OF UPPER THIRD OF ESOPHAGUS: ICD-10-CM

## 2024-04-08 LAB
ALBUMIN SERPL-MCNC: 3.6 G/DL (ref 3.5–5.2)
ALBUMIN/GLOB SERPL: 1 G/DL
ALP SERPL-CCNC: 153 U/L (ref 39–117)
ALT SERPL W P-5'-P-CCNC: 15 U/L (ref 1–41)
ANION GAP SERPL CALCULATED.3IONS-SCNC: 8 MMOL/L (ref 5–15)
AST SERPL-CCNC: 28 U/L (ref 1–40)
BASOPHILS # BLD AUTO: 0.02 10*3/MM3 (ref 0–0.2)
BASOPHILS NFR BLD AUTO: 0.5 % (ref 0–1.5)
BILIRUB SERPL-MCNC: 0.4 MG/DL (ref 0–1.2)
BUN SERPL-MCNC: 8 MG/DL (ref 8–23)
BUN/CREAT SERPL: 12.9 (ref 7–25)
CALCIUM SPEC-SCNC: 9.6 MG/DL (ref 8.6–10.5)
CHLORIDE SERPL-SCNC: 103 MMOL/L (ref 98–107)
CO2 SERPL-SCNC: 28 MMOL/L (ref 22–29)
CREAT SERPL-MCNC: 0.62 MG/DL (ref 0.76–1.27)
DEPRECATED RDW RBC AUTO: 67.7 FL (ref 37–54)
EGFRCR SERPLBLD CKD-EPI 2021: 106.7 ML/MIN/1.73
EOSINOPHIL # BLD AUTO: 0.21 10*3/MM3 (ref 0–0.4)
EOSINOPHIL NFR BLD AUTO: 5 % (ref 0.3–6.2)
ERYTHROCYTE [DISTWIDTH] IN BLOOD BY AUTOMATED COUNT: 24.2 % (ref 12.3–15.4)
GLOBULIN UR ELPH-MCNC: 3.7 GM/DL
GLUCOSE SERPL-MCNC: 100 MG/DL (ref 65–99)
HCT VFR BLD AUTO: 30.3 % (ref 37.5–51)
HGB BLD-MCNC: 9 G/DL (ref 13–17.7)
IMM GRANULOCYTES # BLD AUTO: 0.01 10*3/MM3 (ref 0–0.05)
IMM GRANULOCYTES NFR BLD AUTO: 0.2 % (ref 0–0.5)
LYMPHOCYTES # BLD AUTO: 0.57 10*3/MM3 (ref 0.7–3.1)
LYMPHOCYTES NFR BLD AUTO: 13.6 % (ref 19.6–45.3)
MCH RBC QN AUTO: 24.1 PG (ref 26.6–33)
MCHC RBC AUTO-ENTMCNC: 29.7 G/DL (ref 31.5–35.7)
MCV RBC AUTO: 81.2 FL (ref 79–97)
MONOCYTES # BLD AUTO: 0.44 10*3/MM3 (ref 0.1–0.9)
MONOCYTES NFR BLD AUTO: 10.5 % (ref 5–12)
NEUTROPHILS NFR BLD AUTO: 2.93 10*3/MM3 (ref 1.7–7)
NEUTROPHILS NFR BLD AUTO: 70.2 % (ref 42.7–76)
PLATELET # BLD AUTO: 248 10*3/MM3 (ref 140–450)
PMV BLD AUTO: 9.3 FL (ref 6–12)
POTASSIUM SERPL-SCNC: 5 MMOL/L (ref 3.5–5.2)
PROT SERPL-MCNC: 7.3 G/DL (ref 6–8.5)
RBC # BLD AUTO: 3.73 10*6/MM3 (ref 4.14–5.8)
SODIUM SERPL-SCNC: 139 MMOL/L (ref 136–145)
WBC NRBC COR # BLD AUTO: 4.18 10*3/MM3 (ref 3.4–10.8)

## 2024-04-08 PROCEDURE — 85025 COMPLETE CBC W/AUTO DIFF WBC: CPT | Performed by: NURSE PRACTITIONER

## 2024-04-08 PROCEDURE — 36415 COLL VENOUS BLD VENIPUNCTURE: CPT

## 2024-04-08 PROCEDURE — 80053 COMPREHEN METABOLIC PANEL: CPT | Performed by: NURSE PRACTITIONER

## 2024-04-08 PROCEDURE — 77386: CPT | Performed by: RADIOLOGY

## 2024-04-09 ENCOUNTER — HOSPITAL ENCOUNTER (OUTPATIENT)
Dept: RADIATION ONCOLOGY | Facility: HOSPITAL | Age: 64
Discharge: HOME | End: 2024-04-09

## 2024-04-09 ENCOUNTER — OFFICE VISIT (OUTPATIENT)
Dept: ONCOLOGY | Facility: CLINIC | Age: 64
End: 2024-04-09
Payer: MEDICAID

## 2024-04-09 ENCOUNTER — HOSPITAL ENCOUNTER (OUTPATIENT)
Dept: ONCOLOGY | Facility: HOSPITAL | Age: 64
Discharge: HOME | End: 2024-04-09
Admitting: NURSE PRACTITIONER
Payer: MEDICAID

## 2024-04-09 ENCOUNTER — TELEPHONE (OUTPATIENT)
Dept: ONCOLOGY | Facility: CLINIC | Age: 64
End: 2024-04-09

## 2024-04-09 VITALS
RESPIRATION RATE: 20 BRPM | OXYGEN SATURATION: 100 % | SYSTOLIC BLOOD PRESSURE: 136 MMHG | DIASTOLIC BLOOD PRESSURE: 65 MMHG | WEIGHT: 168 LBS | TEMPERATURE: 98.5 F | BODY MASS INDEX: 24.88 KG/M2 | HEART RATE: 70 BPM | HEIGHT: 69 IN

## 2024-04-09 VITALS — SYSTOLIC BLOOD PRESSURE: 169 MMHG | HEART RATE: 65 BPM | DIASTOLIC BLOOD PRESSURE: 75 MMHG

## 2024-04-09 DIAGNOSIS — D50.0 IRON DEFICIENCY ANEMIA DUE TO CHRONIC BLOOD LOSS: ICD-10-CM

## 2024-04-09 DIAGNOSIS — D62 ACUTE BLOOD LOSS ANEMIA: ICD-10-CM

## 2024-04-09 DIAGNOSIS — C15.3 MALIGNANT NEOPLASM OF UPPER THIRD OF ESOPHAGUS: Primary | ICD-10-CM

## 2024-04-09 DIAGNOSIS — C15.9 SQUAMOUS CELL CARCINOMA OF ESOPHAGUS: ICD-10-CM

## 2024-04-09 DIAGNOSIS — R29.898 UPPER EXTREMITY WEAKNESS: Primary | ICD-10-CM

## 2024-04-09 DIAGNOSIS — C15.3 MALIGNANT NEOPLASM OF UPPER THIRD OF ESOPHAGUS: ICD-10-CM

## 2024-04-09 PROCEDURE — 1160F RVW MEDS BY RX/DR IN RCRD: CPT | Performed by: NURSE PRACTITIONER

## 2024-04-09 PROCEDURE — 25010000002 DEXAMETHASONE SODIUM PHOSPHATE 100 MG/10ML SOLUTION: Performed by: NURSE PRACTITIONER

## 2024-04-09 PROCEDURE — 96375 TX/PRO/DX INJ NEW DRUG ADDON: CPT

## 2024-04-09 PROCEDURE — 25010000002 IRON SUCROSE PER 1 MG: Performed by: NURSE PRACTITIONER

## 2024-04-09 PROCEDURE — 77386: CPT | Performed by: RADIOLOGY

## 2024-04-09 PROCEDURE — 25010000002 DIPHENHYDRAMINE PER 50 MG: Performed by: NURSE PRACTITIONER

## 2024-04-09 PROCEDURE — 25010000002 PACLITAXEL PER 1 MG: Performed by: NURSE PRACTITIONER

## 2024-04-09 PROCEDURE — 1125F AMNT PAIN NOTED PAIN PRSNT: CPT | Performed by: NURSE PRACTITIONER

## 2024-04-09 PROCEDURE — 99214 OFFICE O/P EST MOD 30 MIN: CPT | Performed by: NURSE PRACTITIONER

## 2024-04-09 PROCEDURE — 96417 CHEMO IV INFUS EACH ADDL SEQ: CPT

## 2024-04-09 PROCEDURE — 1159F MED LIST DOCD IN RCRD: CPT | Performed by: NURSE PRACTITIONER

## 2024-04-09 PROCEDURE — 25810000003 SODIUM CHLORIDE 0.9 % SOLUTION 250 ML FLEX CONT: Performed by: NURSE PRACTITIONER

## 2024-04-09 PROCEDURE — 96367 TX/PROPH/DG ADDL SEQ IV INF: CPT

## 2024-04-09 PROCEDURE — 25010000002 PALONOSETRON PER 25 MCG: Performed by: NURSE PRACTITIONER

## 2024-04-09 PROCEDURE — 25810000003 SODIUM CHLORIDE 0.9 % SOLUTION: Performed by: NURSE PRACTITIONER

## 2024-04-09 PROCEDURE — 96413 CHEMO IV INFUSION 1 HR: CPT

## 2024-04-09 PROCEDURE — 25010000002 CARBOPLATIN PER 50 MG: Performed by: NURSE PRACTITIONER

## 2024-04-09 RX ORDER — FAMOTIDINE 10 MG/ML
20 INJECTION, SOLUTION INTRAVENOUS AS NEEDED
Status: CANCELLED | OUTPATIENT
Start: 2024-04-09

## 2024-04-09 RX ORDER — DIPHENHYDRAMINE, LIDOCAINE, NYSTATIN
KIT ORAL
Qty: 240 ML | Refills: 3 | Status: SHIPPED | OUTPATIENT
Start: 2024-04-09

## 2024-04-09 RX ORDER — SODIUM CHLORIDE 9 MG/ML
20 INJECTION, SOLUTION INTRAVENOUS ONCE
Status: COMPLETED | OUTPATIENT
Start: 2024-04-09 | End: 2024-04-09

## 2024-04-09 RX ORDER — PALONOSETRON 0.05 MG/ML
0.25 INJECTION, SOLUTION INTRAVENOUS ONCE
Status: CANCELLED | OUTPATIENT
Start: 2024-04-09

## 2024-04-09 RX ORDER — SODIUM CHLORIDE 9 MG/ML
20 INJECTION, SOLUTION INTRAVENOUS ONCE
Status: CANCELLED | OUTPATIENT
Start: 2024-04-09

## 2024-04-09 RX ORDER — FAMOTIDINE 10 MG/ML
20 INJECTION, SOLUTION INTRAVENOUS ONCE
Status: COMPLETED | OUTPATIENT
Start: 2024-04-09 | End: 2024-04-09

## 2024-04-09 RX ORDER — DIPHENHYDRAMINE HYDROCHLORIDE 50 MG/ML
50 INJECTION INTRAMUSCULAR; INTRAVENOUS AS NEEDED
Status: CANCELLED | OUTPATIENT
Start: 2024-04-09

## 2024-04-09 RX ORDER — FAMOTIDINE 10 MG/ML
20 INJECTION, SOLUTION INTRAVENOUS ONCE
Status: CANCELLED | OUTPATIENT
Start: 2024-04-09

## 2024-04-09 RX ORDER — SODIUM CHLORIDE 9 MG/ML
20 INJECTION, SOLUTION INTRAVENOUS ONCE
Status: DISCONTINUED | OUTPATIENT
Start: 2024-04-09 | End: 2024-04-10 | Stop reason: HOSPADM

## 2024-04-09 RX ORDER — PALONOSETRON 0.05 MG/ML
0.25 INJECTION, SOLUTION INTRAVENOUS ONCE
Status: COMPLETED | OUTPATIENT
Start: 2024-04-09 | End: 2024-04-09

## 2024-04-09 RX ADMIN — SODIUM CHLORIDE 95 MG: 9 INJECTION, SOLUTION INTRAVENOUS at 12:08

## 2024-04-09 RX ADMIN — IRON SUCROSE 200 MG: 20 INJECTION, SOLUTION INTRAVENOUS at 11:10

## 2024-04-09 RX ADMIN — PALONOSETRON HYDROCHLORIDE 0.25 MG: 0.25 INJECTION INTRAVENOUS at 10:47

## 2024-04-09 RX ADMIN — DEXAMETHASONE SODIUM PHOSPHATE 12 MG: 10 INJECTION, SOLUTION INTRAMUSCULAR; INTRAVENOUS at 10:50

## 2024-04-09 RX ADMIN — SODIUM CHLORIDE 20 ML/HR: 9 INJECTION, SOLUTION INTRAVENOUS at 10:44

## 2024-04-09 RX ADMIN — FAMOTIDINE 20 MG: 10 INJECTION, SOLUTION INTRAVENOUS at 10:42

## 2024-04-09 RX ADMIN — CARBOPLATIN 300 MG: 10 INJECTION, SOLUTION INTRAVENOUS at 13:23

## 2024-04-09 RX ADMIN — DIPHENHYDRAMINE HYDROCHLORIDE 25 MG: 50 INJECTION, SOLUTION INTRAMUSCULAR; INTRAVENOUS at 10:52

## 2024-04-09 NOTE — TELEPHONE ENCOUNTER
Spoke with patient's nurse at [facility].  Advised her that prescription needs to be sent for magic mouthwash and asked where it needs to go.  She stated [facility] (phone [phone] and fax # [phone]).  Contacted [facility] and they do compound medications and can get this prescription to patient tonight.  Refill request sent to NUVIA Grimaldo per her request.

## 2024-04-09 NOTE — PROGRESS NOTES
The patient was seen for a status check today.      The patient is receiving his radiation treatments.  He has a completed dose of 1800 of a planned 54 Gray.  The patient was upstairs receiving chemotherapy when he was seen.    The patient reports that he is having some sore swallowing but is adjusting his diet.  Patient denies any pain that requires pain medication at this time but he is having some numbness and tingling in his upper arms.  Is unclear what is the cause of this.  The patient has plans to get an MRI scan with Dr. Ga, so we will try to keep an eye out for this and adapt our plan if we need to.    I recommend the patient proceed with radiation treatments at this time.    Thank you for allowing me to be involved in the care of the patient. If you have any questions or concerns, please feel free to call or contact me at your earliest convenience.     Jeremy Aguila  Radiation Oncology

## 2024-04-09 NOTE — PROGRESS NOTES
Hematology and Oncology [place]  Office number [phone]    Fax number [phone]     Follow up     Date: 24    Patient Name: Val Nassar Jr.  MRN: [id number]  : 1960    Chief Complaint: esophageal cancer    Cancer Staging:  Cancer Staging   Stage II (cT2, cN1, cM0)    History of Present Illness: Val Nassar Jr. is a pleasant 63 y.o. male who presents today for evaluation of upper third esophageal squamous cell carcinoma. He has a longstanding history of cirrhosis     He originally was diagnosed with a localized esophageal squamous cell carcinoma of the upper third of the esophagus in the 2023.  At the time he was living in [place].  He underwent an endoscopy confirming cancer diagnosis at [facility] in [facility].  He then relocated to [facility] to live with his sister because of his cancer diagnosis.      He presented to [facility] in 2023 with an upper GI bleed secondary to peptic ulcer disease and possible cholecystitis.  His presenting hemoglobin was 6.9.     EGD performed 2023 showed an ulcerated mass in the upper third of the esophagus which was nonobstructing and partially circumferential spanning 2 cm in length.  Biopsy results are pending.    Additional findings include gastritis, portal hypertension gastropathy and nonbleeding duodenal ulcer.  Duodenal stenosis.    He was discharged to a rehab facility and ultimately discharged home to live with his sister.  He was doing poorly and was unable to care for himself.  He did not keep his appointments with medical oncology, thoracic surgery, or radiation oncology and did not complete staging PET/CT.  He then represented to the hospital in late 2024 with a UTI and confusion.  During that admission he was assessed by palliative care, radiation oncology, and medical oncology.  He elected to be discharged to a skilled nursing facility for long-term  care as he does not want to be a burden on his sister and did not feel that he could care for himself at home.  Prior to discharge there were conversations between case management and social work at the hospital and his facility and the patient was under the impression that he could not receive radiation treatment while living at the skilled facility.  However he did elect to complete PET CT scan and returns for results.    PET/CT performed 3/6/2024 shows thickening of the esophagus with an uptake of 16 by SUV.  There was notable hypermetabolic activity in the left lateral shoulder subcutaneous tissue which corresponds to an area where he received a prior injection at his PCP office for alcohol use which he reports has been raised and pruritic since the injection 6 weeks prior.  He had mild paratracheal adenopathy as well as an right paratracheal lymph node in the right supraclavicular region  He reports that he has been tolerating regular diet with no obstructive symptoms prior to presentation.    Treatment history:  Radiation: initiated 3/27/2024  Taxol/carbo: C1 4/2/2024; C2 4/9/2024   IV iron infusion: 3/28, 4/2, 4/9    Interval history:  Tolerated first cycle of chemotherapy fairly well.  He had intermittent nausea with an episode of vomiting but mostly controlled with Zofran as needed.  He is eating and drinking fairly well.  He is drinking some water during the day but mostly drinking diet Mt. Dew ~3-4 glasses a day.  He had constipation initially but that has resolved.  He reports sore throat and is using an OTC topical spray without much relief.  He continues on fentanyl patch 25 mcg with oxycodone for breakthrough pain.  He is being followed by palliative care at his facility.  His pain is fairly controlled although he is having some issues with sciatica pain.  He is taking gabapentin.  He reports headaches following radiation treatment but states they does not last long.  He complains of numbness and  tingling of his hands up to his elbows.  He states it is constant and he does have some weakness.  He has some difficulty opening things like jars but denies any issues with fine motor skills.  He reports it has been going on for about 10 days stating it started abruptly.  When asked, he cannot recall if it was occurring prior to starting chemotherapy a week ago.  He is also having some left foot tingling that is not constant but occasional.  He does not have any cervical disease that he is aware of.  He has received 3 treatments of IV iron and tolerating it well.      Past Medical History:   Past Medical History:   Diagnosis Date    Anemia     Cancer     Cirrhosis     Duodenal ulcer     Gastric ulcer     GERD (gastroesophageal reflux disease)     History of alcohol abuse     HLD (hyperlipidemia)     Mood disorder        Past Surgical History:   Past Surgical History:   Procedure Laterality Date    ENDOSCOPY N/A 12/26/2023    Procedure: ESOPHAGOGASTRODUODENOSCOPY;  Surgeon: Wesly Mckeon MD;  Location: [facility] ENDOSCOPY;  Service: Gastroenterology;  Laterality: N/A;       Family History:   Family History   Problem Relation Age of Onset    Cancer Mother     Breast cancer Mother     Heart attack Father        Social History:   Social History     Socioeconomic History    Marital status: Single   Tobacco Use    Smoking status: Every Day     Current packs/day: 1.00     Average packs/day: 1 pack/day for 15.0 years (15.0 ttl pk-yrs)     Types: Cigarettes    Smokeless tobacco: Never   Vaping Use    Vaping status: Some Days    Substances: Flavoring    Devices: Disposable   Substance and Sexual Activity    Alcohol use: Not Currently     Comment: sober for ~ 3 months    Drug use: Yes     Types: Hydrocodone    Sexual activity: Defer       Medications:     Current Outpatient Medications:     acetaminophen (TYLENOL) 325 MG tablet, Take 2 tablets by mouth Every 4 (Four) Hours As Needed for Mild Pain., Disp: , Rfl:      Cholecalciferol (Vitamin D3) 1.25 MG (70841 UT) capsule, Take 1 capsule by mouth Every 7 (Seven) Days., Disp: , Rfl:     escitalopram (LEXAPRO) 10 MG tablet, Take 1 tablet by mouth Every Night., Disp: , Rfl:     fentaNYL (DURAGESIC) 12 MCG/HR, Place  on the skin as directed by provider., Disp: , Rfl:     finasteride (PROSCAR) 5 MG tablet, Take 1 tablet by mouth Daily., Disp: , Rfl:     folic acid (FOLVITE) 1 MG tablet, Take 1 tablet by mouth Daily., Disp: , Rfl:     lactulose (CHRONULAC) 10 GM/15ML solution, Take 30 mL by mouth 2 (Two) Times a Day As Needed., Disp: , Rfl:     Lidocaine 4 %, Place 1 patch on the skin as directed by provider Daily. Remove & Discard patch within 12 hours or as directed by MD, Disp: , Rfl:     omeprazole (priLOSEC) 40 MG capsule, Take  by mouth., Disp: , Rfl:     ondansetron (ZOFRAN) 8 MG tablet, Take 1 tablet by mouth Every 8 (Eight) Hours As Needed for Nausea or Vomiting., Disp: 30 tablet, Rfl: 3    oxyCODONE (ROXICODONE) 5 MG immediate release tablet, Take 1 tablet by mouth Every 6 (Six) Hours As Needed for Moderate Pain. (Patient taking differently: Take 1.5 tablets by mouth Every 6 (Six) Hours As Needed for Moderate Pain.), Disp: 12 tablet, Rfl: 0    pantoprazole (PROTONIX) 40 MG EC tablet, Take 1 tablet by mouth 2 (Two) Times a Day., Disp: , Rfl:     polyethylene glycol (MIRALAX) 17 g packet, Take 17 g by mouth Daily As Needed (Use if senna-docusate is ineffective)., Disp: , Rfl:     tamsulosin (FLOMAX) 0.4 MG capsule 24 hr capsule, Take 1 capsule by mouth Daily., Disp: , Rfl:     vitamin B-12 (CYANOCOBALAMIN) 1000 MCG tablet, Take 1 tablet by mouth Daily., Disp: , Rfl:     gabapentin (NEURONTIN) 300 MG capsule, Take 1 capsule by mouth 3 (Three) Times a Day for 3 days., Disp: 9 capsule, Rfl: 0    Allergies:   No Known Allergies    Objective     Vital Signs:   There were no vitals filed for this visit.   There is no height or weight on file to calculate BMI.   There were no vitals  filed for this visit.    ECOG Performance Status: 2    Physical Exam:   General: No acute distress. Well appearing   HEENT: Normocephalic, atraumatic. Sclera anicteric. Posterior pharynx with mild erythema, no exudate  Neck: supple, no adenopathy.   Cardiovascular: regular rate and rhythm. No murmurs.   Respiratory: Normal rate. Clear to auscultation bilaterally  Abdomen: Soft, nontender, non distended with normoactive bowel sounds  Lymph: no cervical, supraclavicular or axillary adenopathy  Neuro: Alert and oriented x 3. No focal deficits.  Hand grasps equal bilaterally, 4/5   Ext: Symmetric, no swelling.     Laboratory/Imaging Reviewed:   Hospital Outpatient Visit on 04/08/2024   Component Date Value Ref Range Status    Glucose 04/08/2024 100 (H)  65 - 99 mg/dL Final    BUN 04/08/2024 8  8 - 23 mg/dL Final    Creatinine 04/08/2024 0.62 (L)  0.76 - 1.27 mg/dL Final    Sodium 04/08/2024 139  136 - 145 mmol/L Final    Potassium 04/08/2024 5.0  3.5 - 5.2 mmol/L Final    Chloride 04/08/2024 103  98 - 107 mmol/L Final    CO2 04/08/2024 28.0  22.0 - 29.0 mmol/L Final    Calcium 04/08/2024 9.6  8.6 - 10.5 mg/dL Final    Total Protein 04/08/2024 7.3  6.0 - 8.5 g/dL Final    Albumin 04/08/2024 3.6  3.5 - 5.2 g/dL Final    ALT (SGPT) 04/08/2024 15  1 - 41 U/L Final    AST (SGOT) 04/08/2024 28  1 - 40 U/L Final    Alkaline Phosphatase 04/08/2024 153 (H)  39 - 117 U/L Final    Total Bilirubin 04/08/2024 0.4  0.0 - 1.2 mg/dL Final    Globulin 04/08/2024 3.7  gm/dL Final    Calculated Result    A/G Ratio 04/08/2024 1.0  g/dL Final    BUN/Creatinine Ratio 04/08/2024 12.9  7.0 - 25.0 Final    Anion Gap 04/08/2024 8.0  5.0 - 15.0 mmol/L Final    eGFR 04/08/2024 106.7  >60.0 mL/min/1.73 Final    WBC 04/08/2024 4.18  3.40 - 10.80 10*3/mm3 Final    RBC 04/08/2024 3.73 (L)  4.14 - 5.80 10*6/mm3 Final    Hemoglobin 04/08/2024 9.0 (L)  13.0 - 17.7 g/dL Final    Hematocrit 04/08/2024 30.3 (L)  37.5 - 51.0 % Final    MCV 04/08/2024 81.2   79.0 - 97.0 fL Final    MCH 04/08/2024 24.1 (L)  26.6 - 33.0 pg Final    MCHC 04/08/2024 29.7 (L)  31.5 - 35.7 g/dL Final    RDW 04/08/2024 24.2 (H)  12.3 - 15.4 % Final    RDW-SD 04/08/2024 67.7 (H)  37.0 - 54.0 fl Final    MPV 04/08/2024 9.3  6.0 - 12.0 fL Final    Platelets 04/08/2024 248  140 - 450 10*3/mm3 Final    Neutrophil % 04/08/2024 70.2  42.7 - 76.0 % Final    Lymphocyte % 04/08/2024 13.6 (L)  19.6 - 45.3 % Final    Monocyte % 04/08/2024 10.5  5.0 - 12.0 % Final    Eosinophil % 04/08/2024 5.0  0.3 - 6.2 % Final    Basophil % 04/08/2024 0.5  0.0 - 1.5 % Final    Immature Grans % 04/08/2024 0.2  0.0 - 0.5 % Final    Neutrophils, Absolute 04/08/2024 2.93  1.70 - 7.00 10*3/mm3 Final    Lymphocytes, Absolute 04/08/2024 0.57 (L)  0.70 - 3.10 10*3/mm3 Final    Monocytes, Absolute 04/08/2024 0.44  0.10 - 0.90 10*3/mm3 Final    Eosinophils, Absolute 04/08/2024 0.21  0.00 - 0.40 10*3/mm3 Final    Basophils, Absolute 04/08/2024 0.02  0.00 - 0.20 10*3/mm3 Final    Immature Grans, Absolute 04/08/2024 0.01  0.00 - 0.05 10*3/mm3 Final   Hospital Outpatient Visit on 04/01/2024   Component Date Value Ref Range Status    Glucose 04/01/2024 100 (H)  65 - 99 mg/dL Final    BUN 04/01/2024 8  8 - 23 mg/dL Final    Creatinine 04/01/2024 0.60 (L)  0.76 - 1.27 mg/dL Final    Sodium 04/01/2024 132 (L)  136 - 145 mmol/L Final    Potassium 04/01/2024 4.2  3.5 - 5.2 mmol/L Final    Chloride 04/01/2024 96 (L)  98 - 107 mmol/L Final    CO2 04/01/2024 28.0  22.0 - 29.0 mmol/L Final    Calcium 04/01/2024 10.0  8.6 - 10.5 mg/dL Final    Total Protein 04/01/2024 8.0  6.0 - 8.5 g/dL Final    Albumin 04/01/2024 3.8  3.5 - 5.2 g/dL Final    ALT (SGPT) 04/01/2024 13  1 - 41 U/L Final    AST (SGOT) 04/01/2024 23  1 - 40 U/L Final    Alkaline Phosphatase 04/01/2024 167 (H)  39 - 117 U/L Final    Total Bilirubin 04/01/2024 0.3  0.0 - 1.2 mg/dL Final    Globulin 04/01/2024 4.2  gm/dL Final    Calculated Result    A/G Ratio 04/01/2024 0.9  g/dL  Final    BUN/Creatinine Ratio 04/01/2024 13.3  7.0 - 25.0 Final    Anion Gap 04/01/2024 8.0  5.0 - 15.0 mmol/L Final    eGFR 04/01/2024 107.8  >60.0 mL/min/1.73 Final    WBC 04/01/2024 6.11  3.40 - 10.80 10*3/mm3 Final    RBC 04/01/2024 3.80 (L)  4.14 - 5.80 10*6/mm3 Final    Hemoglobin 04/01/2024 8.9 (L)  13.0 - 17.7 g/dL Final    Hematocrit 04/01/2024 30.3 (L)  37.5 - 51.0 % Final    MCV 04/01/2024 79.7  79.0 - 97.0 fL Final    MCH 04/01/2024 23.4 (L)  26.6 - 33.0 pg Final    MCHC 04/01/2024 29.4 (L)  31.5 - 35.7 g/dL Final    RDW 04/01/2024 21.6 (H)  12.3 - 15.4 % Final    RDW-SD 04/01/2024 55.5 (H)  37.0 - 54.0 fl Final    MPV 04/01/2024 8.9  6.0 - 12.0 fL Final    Platelets 04/01/2024 263  140 - 450 10*3/mm3 Final    Neutrophil % 04/01/2024 61.9  42.7 - 76.0 % Final    Lymphocyte % 04/01/2024 12.4 (L)  19.6 - 45.3 % Final    Monocyte % 04/01/2024 19.1 (H)  5.0 - 12.0 % Final    Eosinophil % 04/01/2024 5.9  0.3 - 6.2 % Final    Basophil % 04/01/2024 0.5  0.0 - 1.5 % Final    Immature Grans % 04/01/2024 0.2  0.0 - 0.5 % Final    Neutrophils, Absolute 04/01/2024 3.78  1.70 - 7.00 10*3/mm3 Final    Lymphocytes, Absolute 04/01/2024 0.76  0.70 - 3.10 10*3/mm3 Final    Monocytes, Absolute 04/01/2024 1.17 (H)  0.10 - 0.90 10*3/mm3 Final    Eosinophils, Absolute 04/01/2024 0.36  0.00 - 0.40 10*3/mm3 Final    Basophils, Absolute 04/01/2024 0.03  0.00 - 0.20 10*3/mm3 Final    Immature Grans, Absolute 04/01/2024 0.01  0.00 - 0.05 10*3/mm3 Final       No results found.    Procedures      Narrative & Impression   NM PET/CT SKULL BASE TO MID THIGH     Date of Exam: 3/6/2024 2:24 PM EST     Indication: esophogeal ca.     Comparison: Abdomen pelvis CT scan 2/4/2024. No prior PET scan     Technique: 11.8 mCi of F-18 FDG was administered intravenously. PET imaging was obtained from skull base to mid-thigh approximately 60 minutes after radiotracer injection. A low dose non contrast CT was obtained for attenuation correction and  anatomic   localization. Fused PET-CT and 3D MIP reconstructions were utilized for image interpretation.  Fasting blood glucose level: 99 mg/dl.     Reference uptake values:  Mediastinum: 2.5 SUVmax  Liver: 3.0 SUVmax  Normalization method: Body Weight     Findings:  History indicates known esophageal cancer undergoing work-up, no treatment to date. Some left-sided neck pain and dysphagia.     Today's 3D images shows a focus of increased uptake in the central upper chest, apparently regional to the esophagus, and 2 small foci of uptake in the right lower neck or superior stomach. There is focal activity in the superficial soft tissues lateral   to the left shoulder. There is normal and normal variant uptake elsewhere.     Multiplanar images show no significant asymmetry of uptake in the brain.     No hypermetabolic activity is seen in the neck down to the right supraclavicular fat, where a small lymph node has maximal SUV of 6.6, concerning for metastatic involvement. A couple of small adjacent right upper paratracheal lymph nodes have maximal SUV   of 3.3.      In the proximal thoracic there is focal thickening of the esophagus and strongly hypermetabolic activity, maximal SUV 15.9 apparently corresponding to the patient's known neoplasm. Hypermetabolic activity in the lateral left shoulder subcutaneous   tissues measures 8.0. CT scan appearance suggests potential subcutaneous hematoma. Please correlate with any history of trauma to this area.     No hypermetabolic mediastinal, hilar, chest wall or pulmonary parenchymal mass is seen elsewhere.     Below the diaphragm, no hypermetabolic liver, adrenal, or other solid organ disease is seen. No hypermetabolic adenopathy is appreciated. There is expected GI and  tract activity. Focal activity in the adjacent posterior spinous processes of L3, L4 and   L5 measures approximately 4.1 - 4.2. With reference to the previous abdominal CT scan, sagittal images shows  "associated DJD, with the posterior spinous processes appear hypertrophied and in contact, or so-called \"Baastrup disease\". No pathologic marrow   space uptake is seen elsewhere. Incidental note is made of a large, approximately 6 cm left bladder diverticulum.  IMPRESSION:  Impression:     1. Intensely hypermetabolic activity in the proximal thoracic esophagus, apparently corresponding to patient's known neoplasm.     2. 3 small normal sized but hypermetabolic nodes respectively in the right supraclavicular fat, and right superior mediastinum.      3. Hypermetabolic activity in the lateral subcutaneous tissues of the left shoulder, which appears to correspond to subcutaneous bruising. Please correlate with any history of trauma to this area.     4. DJD-related uptake in the hypertrophied and degenerated posterior spinous processes of L3, L4 and L5..       Had a fall on right shoulderbut not the rightgot a injection in the left shoulder  to help him stop drinking at dr. Wesly Albert 6 weeks ago, swelled up and red.     Narrative & Impression   MRI BRAIN WO CONTRAST     Date of Exam: 2/18/2024 5:05 PM EST     Indication: Dizziness, off balance, present x 2 weeks.     Comparison: 2/4/2024 CT     Technique:  Routine multiplanar/multisequence sequence images of the brain were obtained without contrast administration.        Findings:  Diffusion imaging shows no evidence of acute infarct. There are scattered subcortical and periventricular T2 hyperintensities which are nonspecific but likely sequela of mild to moderate small vessel ischemic disease. No evidence of intracranial   hemorrhage.     There is normal ventricular volume. The basal cisterns are patent. There is no evidence of extra-axial fluid collection. There is normal flow voids within the intracranial vessels.     No evidence of fracture or suspicious bony lesion. Paranasal sinusitis with gas/fluid level in the right maxillary sinus. Trace bilateral mastoid " air cell effusions. Visualized orbits are unremarkable.     IMPRESSION:  Impression:  No acute intracranial abnormality. No suspicious mass on this noncontrast MRI.     Paranasal sinusitis with gas/fluid level in the right maxillary sinus.           Assessment / Plan      Assessment/Plan:     1.  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  SUV avid right supraclavicular lymph node  3.  Indeterminate paratracheal adenopathy  -This most consistent with a T2 N1 squamous cell carcinoma of the upper esophagus as I would characterize the supraclavicular lymph node as regional adenopathy.  -He previously has had discussions with Dr. Ga and radiation oncology.  Although he is interested in pursuing cancer directed therapy,  his main priority is to continue to live in his current skilled nursing facility.  He states that he wishes to die there.  He feels comfortable and supported.  He is concerned if he pursues cancer directed therapy he would lose his spot at the facility.  However, if he is able to complete cancer directed therapy while remaining at his skilled facility, he would be interested in doing that.  -Recommend proceeding with curative intent chemoradiation with carboplatin and paclitaxel sensitization.  Radiation started on 3/27/2024 and tolerating it well thus far.    -Nurse did call and speak with the nursing director and shared a copy of the treatment plan with them.  Mr. Neal has indicated that he would prioritize remaining in his current facility over cancer treatment if he is not able to complete cancer treatment at his facility, but is interested in proceeding with potentially curative intent therapy if this can be facilitated in his current nursing facility.    -Add MMR testing to original biopsy as not previously reported    -I reviewed his labs from yesterday that are appropriate for treatment.  We will proceed with carboplatin Alimta cycle 2 today.  I encouraged him to continue Zofran  as needed for nausea.  Encouraged him to continue drinking water and try to cut back on the Diet Mt. Dew.      4.  Cancer related pain  -Fentanyl patch plus oxycodone  -He is being followed by palliative care while he remains in his facility.    -I have encouraged him to ask for stool softeners and MiriLax as needed for constipation.  I educated him on bowel regimen while on narcotics and chemotherapy.      5.  Normocytic anemia  -Labs reviewed and consistent with iron deficiency anemia despite oral iron trial.  -He has completed two dose of IV iron.  He will get third dose today.  Hemoglobin slightly improved at 9.0.  Will continue to monitor.      6.  Bilateral hand numbness and tingling  -Started abruptly about 10 days ago with associated weakness with difficulty opening jars, no fine motor deficit.   -I discussed with Dr. Ga and will proceed with treatment and get MRI cervical spine to further evaluate.  Based on history and physical exam, it is not felt to be related to treatment although we will monitor closely.      7.  Treatment induced mucositis  -Will send prescription for MMW    Total time of patient care on day of service including time prior to, face to face with patient, and following visit spent in reviewing records, lab results, imaging studies, discussion with patient, and documentation/charting was > 30 minutes.     Follow Up:   1 week       NUVIA Fowler  Hematology and Oncology

## 2024-04-09 NOTE — TELEPHONE ENCOUNTER
----- Message from NUVIA Fowler sent at 4/9/2024  1:14 PM EDT -----  Regarding: Prescription  Can you please send him a prescription for MMW to his facility?    Thanks!

## 2024-04-10 ENCOUNTER — HOSPITAL ENCOUNTER (OUTPATIENT)
Dept: RADIATION ONCOLOGY | Facility: HOSPITAL | Age: 64
Discharge: HOME | End: 2024-04-10

## 2024-04-10 PROCEDURE — 77386: CPT | Performed by: RADIOLOGY

## 2024-04-11 ENCOUNTER — HOSPITAL ENCOUNTER (OUTPATIENT)
Dept: RADIATION ONCOLOGY | Facility: HOSPITAL | Age: 64
Discharge: HOME | End: 2024-04-11

## 2024-04-11 PROCEDURE — 77386: CPT | Performed by: RADIOLOGY

## 2024-04-12 ENCOUNTER — HOSPITAL ENCOUNTER (OUTPATIENT)
Dept: MRI IMAGING | Facility: HOSPITAL | Age: 64
Discharge: HOME | End: 2024-04-12
Payer: MEDICAID

## 2024-04-12 ENCOUNTER — HOSPITAL ENCOUNTER (OUTPATIENT)
Dept: RADIATION ONCOLOGY | Facility: HOSPITAL | Age: 64
Discharge: HOME | End: 2024-04-12

## 2024-04-12 DIAGNOSIS — R29.898 UPPER EXTREMITY WEAKNESS: ICD-10-CM

## 2024-04-12 PROCEDURE — 77336 RADIATION PHYSICS CONSULT: CPT | Performed by: RADIOLOGY

## 2024-04-12 PROCEDURE — A9577 INJ MULTIHANCE: HCPCS | Performed by: NURSE PRACTITIONER

## 2024-04-12 PROCEDURE — 0 GADOBENATE DIMEGLUMINE 529 MG/ML SOLUTION: Performed by: NURSE PRACTITIONER

## 2024-04-12 PROCEDURE — 72156 MRI NECK SPINE W/O & W/DYE: CPT

## 2024-04-12 PROCEDURE — 77386: CPT | Performed by: RADIOLOGY

## 2024-04-12 RX ADMIN — GADOBENATE DIMEGLUMINE 15 ML: 529 INJECTION, SOLUTION INTRAVENOUS at 11:01

## 2024-04-15 ENCOUNTER — HOSPITAL ENCOUNTER (OUTPATIENT)
Dept: ONCOLOGY | Facility: HOSPITAL | Age: 64
Discharge: HOME | End: 2024-04-15
Admitting: NURSE PRACTITIONER
Payer: MEDICAID

## 2024-04-15 ENCOUNTER — HOSPITAL ENCOUNTER (OUTPATIENT)
Dept: RADIATION ONCOLOGY | Facility: HOSPITAL | Age: 64
Discharge: HOME | End: 2024-04-15
Payer: MEDICAID

## 2024-04-15 ENCOUNTER — TELEPHONE (OUTPATIENT)
Dept: ONCOLOGY | Facility: CLINIC | Age: 64
End: 2024-04-15
Payer: MEDICAID

## 2024-04-15 VITALS
WEIGHT: 167 LBS | HEART RATE: 70 BPM | DIASTOLIC BLOOD PRESSURE: 63 MMHG | TEMPERATURE: 98.5 F | RESPIRATION RATE: 18 BRPM | SYSTOLIC BLOOD PRESSURE: 134 MMHG | BODY MASS INDEX: 24.66 KG/M2

## 2024-04-15 DIAGNOSIS — C15.3 MALIGNANT NEOPLASM OF UPPER THIRD OF ESOPHAGUS: Primary | ICD-10-CM

## 2024-04-15 DIAGNOSIS — C15.3 MALIGNANT NEOPLASM OF UPPER THIRD OF ESOPHAGUS: ICD-10-CM

## 2024-04-15 LAB
ALBUMIN SERPL-MCNC: 3.8 G/DL (ref 3.5–5.2)
ALBUMIN/GLOB SERPL: 1 G/DL
ALP SERPL-CCNC: 157 U/L (ref 39–117)
ALT SERPL W P-5'-P-CCNC: 14 U/L (ref 1–41)
ANION GAP SERPL CALCULATED.3IONS-SCNC: 8 MMOL/L (ref 5–15)
AST SERPL-CCNC: 24 U/L (ref 1–40)
BASOPHILS # BLD AUTO: 0.02 10*3/MM3 (ref 0–0.2)
BASOPHILS NFR BLD AUTO: 0.8 % (ref 0–1.5)
BILIRUB SERPL-MCNC: 0.4 MG/DL (ref 0–1.2)
BUN SERPL-MCNC: 11 MG/DL (ref 8–23)
BUN/CREAT SERPL: 17.7 (ref 7–25)
CALCIUM SPEC-SCNC: 9.2 MG/DL (ref 8.6–10.5)
CHLORIDE SERPL-SCNC: 102 MMOL/L (ref 98–107)
CO2 SERPL-SCNC: 28 MMOL/L (ref 22–29)
CREAT SERPL-MCNC: 0.62 MG/DL (ref 0.76–1.27)
DEPRECATED RDW RBC AUTO: 69.5 FL (ref 37–54)
EGFRCR SERPLBLD CKD-EPI 2021: 106.7 ML/MIN/1.73
EOSINOPHIL # BLD AUTO: 0.05 10*3/MM3 (ref 0–0.4)
EOSINOPHIL NFR BLD AUTO: 2 % (ref 0.3–6.2)
ERYTHROCYTE [DISTWIDTH] IN BLOOD BY AUTOMATED COUNT: 25.7 % (ref 12.3–15.4)
GLOBULIN UR ELPH-MCNC: 3.8 GM/DL
GLUCOSE SERPL-MCNC: 130 MG/DL (ref 65–99)
HCT VFR BLD AUTO: 28.9 % (ref 37.5–51)
HGB BLD-MCNC: 9 G/DL (ref 13–17.7)
IMM GRANULOCYTES # BLD AUTO: 0.01 10*3/MM3 (ref 0–0.05)
IMM GRANULOCYTES NFR BLD AUTO: 0.4 % (ref 0–0.5)
LYMPHOCYTES # BLD AUTO: 0.44 10*3/MM3 (ref 0.7–3.1)
LYMPHOCYTES NFR BLD AUTO: 18 % (ref 19.6–45.3)
MCH RBC QN AUTO: 25 PG (ref 26.6–33)
MCHC RBC AUTO-ENTMCNC: 31.1 G/DL (ref 31.5–35.7)
MCV RBC AUTO: 80.3 FL (ref 79–97)
MONOCYTES # BLD AUTO: 0.26 10*3/MM3 (ref 0.1–0.9)
MONOCYTES NFR BLD AUTO: 10.6 % (ref 5–12)
NEUTROPHILS NFR BLD AUTO: 1.67 10*3/MM3 (ref 1.7–7)
NEUTROPHILS NFR BLD AUTO: 68.2 % (ref 42.7–76)
PLATELET # BLD AUTO: 168 10*3/MM3 (ref 140–450)
PMV BLD AUTO: 9.3 FL (ref 6–12)
POTASSIUM SERPL-SCNC: 3.9 MMOL/L (ref 3.5–5.2)
PROT SERPL-MCNC: 7.6 G/DL (ref 6–8.5)
RBC # BLD AUTO: 3.6 10*6/MM3 (ref 4.14–5.8)
SODIUM SERPL-SCNC: 138 MMOL/L (ref 136–145)
WBC NRBC COR # BLD AUTO: 2.45 10*3/MM3 (ref 3.4–10.8)

## 2024-04-15 PROCEDURE — 77386: CPT | Performed by: RADIOLOGY

## 2024-04-15 PROCEDURE — 85025 COMPLETE CBC W/AUTO DIFF WBC: CPT | Performed by: NURSE PRACTITIONER

## 2024-04-15 PROCEDURE — 80053 COMPREHEN METABOLIC PANEL: CPT | Performed by: NURSE PRACTITIONER

## 2024-04-15 PROCEDURE — 36415 COLL VENOUS BLD VENIPUNCTURE: CPT

## 2024-04-15 NOTE — TELEPHONE ENCOUNTER
Received call from infusion that patient was asking when his port would be placed.  I did not see an order in the chart but I asked Dr. Ga and she said that we can place an order for port per IR or [facility]. Delisa Soto, ref. Coordinator is checking to see if IR can schedule it soon. If not, I will change the order to [facility].  Delisa will let me know and let patient know.

## 2024-04-16 ENCOUNTER — TELEPHONE (OUTPATIENT)
Dept: ONCOLOGY | Facility: CLINIC | Age: 64
End: 2024-04-16

## 2024-04-16 ENCOUNTER — OFFICE VISIT (OUTPATIENT)
Dept: ONCOLOGY | Facility: CLINIC | Age: 64
End: 2024-04-16
Payer: MEDICAID

## 2024-04-16 ENCOUNTER — HOSPITAL ENCOUNTER (OUTPATIENT)
Dept: RADIATION ONCOLOGY | Facility: HOSPITAL | Age: 64
Discharge: HOME | End: 2024-04-16

## 2024-04-16 VITALS
WEIGHT: 165 LBS | OXYGEN SATURATION: 99 % | DIASTOLIC BLOOD PRESSURE: 60 MMHG | SYSTOLIC BLOOD PRESSURE: 127 MMHG | BODY MASS INDEX: 24.44 KG/M2 | TEMPERATURE: 98 F | HEART RATE: 61 BPM | HEIGHT: 69 IN | RESPIRATION RATE: 18 BRPM

## 2024-04-16 VITALS — WEIGHT: 165.6 LBS | BODY MASS INDEX: 24.45 KG/M2

## 2024-04-16 DIAGNOSIS — Z51.11 CHEMOTHERAPY MANAGEMENT, ENCOUNTER FOR: ICD-10-CM

## 2024-04-16 DIAGNOSIS — C15.3 MALIGNANT NEOPLASM OF UPPER THIRD OF ESOPHAGUS: Primary | ICD-10-CM

## 2024-04-16 PROCEDURE — 77386: CPT | Performed by: RADIOLOGY

## 2024-04-16 RX ORDER — ECHINACEA PURPUREA EXTRACT 125 MG
1 TABLET ORAL AS NEEDED
Qty: 1 EACH | Refills: 0 | Status: SHIPPED | OUTPATIENT
Start: 2024-04-16

## 2024-04-16 RX ORDER — PREDNISONE 10 MG/1
TABLET ORAL
Qty: 21 TABLET | Refills: 0 | Status: SHIPPED | OUTPATIENT
Start: 2024-04-16

## 2024-04-16 NOTE — TELEPHONE ENCOUNTER
Spoke with Veronica, per verbal release given by patient in the clinic.  Advised her per Dr. Ga that treatment held today due to neuropathy, rescheduled to 4/23/24, advised of times.  Advised her that patient should be receiving MMW 4 times per day and new prescriptions for ocean nasal spray and prednisone. Advised on where prescriptions sent and Veronica stated this is fine.  Advised her that patient is scheduled for port placement, advised of date and time. Advised Veronica that patient's pain is not controlled and that he reports he has not seen palliative care at [facility] in a few weeks.  Advised her per MD that if palliative care there can't adjust his medications, patient can be set up with palliative care here as his pain medications need to be increased due to mucositis from treatment.  She verbalized understanding of all and stated she will contact this office if palliative care needs to be set up at [facility].  Advised SUE Coon in infusion treatment held today.     independent

## 2024-04-16 NOTE — PROGRESS NOTES
Hematology and Oncology [place]  Office number [phone]    Fax number [phone]     Follow up     Date: 24    Patient Name: Val Nassar Jr.  MRN: [id number]  : 1960    Chief Complaint: esophageal cancer    Cancer Staging:  Cancer Staging   Stage II (cT2, cN1, cM0)    History of Present Illness: Val Nassar Jr. is a pleasant 63 y.o. male who presents today for evaluation of upper third esophageal squamous cell carcinoma. He has a longstanding history of cirrhosis     He originally was diagnosed with a localized esophageal squamous cell carcinoma of the upper third of the esophagus in the 2023.  At the time he was living in [place].  He underwent an endoscopy confirming cancer diagnosis at [facility] in [facility].  He then relocated to [facility] to live with his sister because of his cancer diagnosis.      He presented to [facility] in 2023 with an upper GI bleed secondary to peptic ulcer disease and possible cholecystitis.  His presenting hemoglobin was 6.9.     EGD performed 2023 showed an ulcerated mass in the upper third of the esophagus which was nonobstructing and partially circumferential spanning 2 cm in length.  Biopsy results are pending.    Additional findings include gastritis, portal hypertension gastropathy and nonbleeding duodenal ulcer.  Duodenal stenosis.    He was discharged to a rehab facility and ultimately discharged home to live with his sister.  He was doing poorly and was unable to care for himself.  He did not keep his appointments with medical oncology, thoracic surgery, or radiation oncology and did not complete staging PET/CT.  He then represented to the hospital in late 2024 with a UTI and confusion.  During that admission he was assessed by palliative care, radiation oncology, and medical oncology.  He elected to be discharged to a skilled nursing facility for long-term  care as he does not want to be a burden on his sister and did not feel that he could care for himself at home.  Prior to discharge there were conversations between case management and social work at the hospital and his facility and the patient was under the impression that he could not receive radiation treatment while living at the skilled facility.  However he did elect to complete PET CT scan and returns for results.    PET/CT performed 3/6/2024 shows thickening of the esophagus with an uptake of 16 by SUV.  There was notable hypermetabolic activity in the left lateral shoulder subcutaneous tissue which corresponds to an area where he received a prior injection at his PCP office for alcohol use which he reports has been raised and pruritic since the injection 6 weeks prior.  He had mild paratracheal adenopathy as well as an right paratracheal lymph node in the right supraclavicular region  He reports that he has been tolerating regular diet with no obstructive symptoms prior to presentation.     He continues to feel weak but is clinically improving.  He tells me he is planning inpatient rehab on discharge.  His hemoglobin continues to improve and is 9.1 today.  CT of the abdomen pelvis in addition to the gallbladder findings showed no metastatic disease.  A chest x-ray this admission showed mild interstitial edema.    Treatment history:  Taxol/carbo/radiation: C1, 4/2/2024; C2, 4/9/24; C3, held    Interval history:  He presents for follow-up prior to weekly chemotherapy.  He is stressed today and has a couple of hours late for his appointment.  He says the bus from his facility left him this morning.  He reports his pain is not currently well-controlled.  He is using a fentanyl patch, and states that his oxycodone was decreased from 7.5 mg every 6 hours as needed to 5 mg every 6 hours as needed.  He is having worsening low back pain with sciatica which he attributes to laying on the radiation table.  He was  being followed by palliative care at his facility who were titrating his pain regimen but does not believe he is seeing them in a couple of weeks.  He reports pain in the back of his throat particularly with swallowing.  He does have Magic mouthwash but has only been utilizing it once per day and has been swishing and spitting rather than swishing and swallowing.  He notes progressive numbness from his bilateral elbows to his fingers.  He is dropping objects and having trouble with fine motor difficulty.  Example is having trouble getting his straw in his cup.  Initially, he was not sure whether this had initiated prior to chemo, but now says that it has been worsening while on chemotherapy.  Not sleeping well.    MRI cervical spine from 4/12/2024 showed:  1.Active left-sided facet arthropathy at C3/4 and to lesser degree C4/5 with associated surrounding marrow and soft tissue edema.  2.Moderate multifactorial degenerative change of the mid to lower cervical spine otherwise with central canal and neuroforaminal stenosis. Potential exiting nerve contact from C3/4 through C6/7 as detailed above.      Past Medical History:   Past Medical History:   Diagnosis Date    Anemia     Cancer     Cirrhosis     Duodenal ulcer     Gastric ulcer     GERD (gastroesophageal reflux disease)     History of alcohol abuse     HLD (hyperlipidemia)     Mood disorder        Past Surgical History:   Past Surgical History:   Procedure Laterality Date    ENDOSCOPY N/A 12/26/2023    Procedure: ESOPHAGOGASTRODUODENOSCOPY;  Surgeon: Wesly Mckeon MD;  Location: [facility] ENDOSCOPY;  Service: Gastroenterology;  Laterality: N/A;       Family History:   Family History   Problem Relation Age of Onset    Cancer Mother     Breast cancer Mother     Heart attack Father        Social History:   Social History     Socioeconomic History    Marital status: Single   Tobacco Use    Smoking status: Every Day     Current packs/day: 1.00     Average packs/day: 1  pack/day for 15.0 years (15.0 ttl pk-yrs)     Types: Cigarettes    Smokeless tobacco: Never   Vaping Use    Vaping status: Some Days    Substances: Flavoring    Devices: Disposable   Substance and Sexual Activity    Alcohol use: Not Currently     Comment: sober for ~ 3 months    Drug use: Yes     Types: Hydrocodone    Sexual activity: Defer       Medications:     Current Outpatient Medications:     acetaminophen (TYLENOL) 325 MG tablet, Take 2 tablets by mouth Every 4 (Four) Hours As Needed for Mild Pain., Disp: , Rfl:     Cholecalciferol (Vitamin D3) 1.25 MG (70946 UT) capsule, Take 1 capsule by mouth Every 7 (Seven) Days., Disp: , Rfl:     escitalopram (LEXAPRO) 10 MG tablet, Take 1 tablet by mouth Every Night., Disp: , Rfl:     fentaNYL (DURAGESIC) 12 MCG/HR, Place  on the skin as directed by provider., Disp: , Rfl:     finasteride (PROSCAR) 5 MG tablet, Take 1 tablet by mouth Daily., Disp: , Rfl:     folic acid (FOLVITE) 1 MG tablet, Take 1 tablet by mouth Daily., Disp: , Rfl:     gabapentin (NEURONTIN) 300 MG capsule, Take 1 capsule by mouth 3 (Three) Times a Day for 3 days., Disp: 9 capsule, Rfl: 0    lactulose (CHRONULAC) 10 GM/15ML solution, Take 30 mL by mouth 2 (Two) Times a Day As Needed., Disp: , Rfl:     Lidocaine 4 %, Place 1 patch on the skin as directed by provider Daily. Remove & Discard patch within 12 hours or as directed by MD, Disp: , Rfl:     nystatin susp + lidocaine viscous (MAGIC MOUTHWASH) oral suspension, 5-10 ml swish and spit or swallow QID prn, Disp: 240 mL, Rfl: 3    omeprazole (priLOSEC) 40 MG capsule, Take  by mouth., Disp: , Rfl:     ondansetron (ZOFRAN) 8 MG tablet, Take 1 tablet by mouth Every 8 (Eight) Hours As Needed for Nausea or Vomiting., Disp: 30 tablet, Rfl: 3    oxyCODONE (ROXICODONE) 5 MG immediate release tablet, Take 1 tablet by mouth Every 6 (Six) Hours As Needed for Moderate Pain. (Patient taking differently: Take 1.5 tablets by mouth Every 6 (Six) Hours As Needed for  "Moderate Pain.), Disp: 12 tablet, Rfl: 0    pantoprazole (PROTONIX) 40 MG EC tablet, Take 1 tablet by mouth 2 (Two) Times a Day., Disp: , Rfl:     polyethylene glycol (MIRALAX) 17 g packet, Take 17 g by mouth Daily As Needed (Use if senna-docusate is ineffective)., Disp: , Rfl:     tamsulosin (FLOMAX) 0.4 MG capsule 24 hr capsule, Take 1 capsule by mouth Daily., Disp: , Rfl:     vitamin B-12 (CYANOCOBALAMIN) 1000 MCG tablet, Take 1 tablet by mouth Daily., Disp: , Rfl:     Allergies:   No Known Allergies    Objective     Vital Signs:   Vitals:    04/16/24 1053   BP: 127/60   Pulse: 61   Resp: 18   Temp: 98 °F (36.7 °C)   TempSrc: Temporal   SpO2: 99%   Weight: 74.8 kg (165 lb)   Height: 175.3 cm (69.02\")   PainSc:   7    Body mass index is 24.35 kg/m².   Pain Score    04/16/24 1053   PainSc:   7       ECOG Performance Status: 2    Physical Exam:   General: No acute distress. Well appearing   HEENT: Normocephalic, atraumatic. OP clear  Neck: supple, no adenopathy.   Cardiovascular: regular rate and rhythm. No murmurs.   Respiratory: Normal rate. Clear to auscultation bilaterally  Abdomen: Soft, nontender, non distended with normoactive bowel sounds  Lymph: no cervical, supraclavicular or axillary adenopathy  Neuro: Alert and oriented x 3. No focal deficits.   Ext: Symmetric, no swelling.     Laboratory/Imaging Reviewed:   Hospital Outpatient Visit on 04/15/2024   Component Date Value Ref Range Status    Glucose 04/15/2024 130 (H)  65 - 99 mg/dL Final    BUN 04/15/2024 11  8 - 23 mg/dL Final    Creatinine 04/15/2024 0.62 (L)  0.76 - 1.27 mg/dL Final    Sodium 04/15/2024 138  136 - 145 mmol/L Final    Potassium 04/15/2024 3.9  3.5 - 5.2 mmol/L Final    Chloride 04/15/2024 102  98 - 107 mmol/L Final    CO2 04/15/2024 28.0  22.0 - 29.0 mmol/L Final    Calcium 04/15/2024 9.2  8.6 - 10.5 mg/dL Final    Total Protein 04/15/2024 7.6  6.0 - 8.5 g/dL Final    Albumin 04/15/2024 3.8  3.5 - 5.2 g/dL Final    ALT (SGPT) 04/15/2024 " 14  1 - 41 U/L Final    AST (SGOT) 04/15/2024 24  1 - 40 U/L Final    Alkaline Phosphatase 04/15/2024 157 (H)  39 - 117 U/L Final    Total Bilirubin 04/15/2024 0.4  0.0 - 1.2 mg/dL Final    Globulin 04/15/2024 3.8  gm/dL Final    Calculated Result    A/G Ratio 04/15/2024 1.0  g/dL Final    BUN/Creatinine Ratio 04/15/2024 17.7  7.0 - 25.0 Final    Anion Gap 04/15/2024 8.0  5.0 - 15.0 mmol/L Final    eGFR 04/15/2024 106.7  >60.0 mL/min/1.73 Final    WBC 04/15/2024 2.45 (L)  3.40 - 10.80 10*3/mm3 Final    RBC 04/15/2024 3.60 (L)  4.14 - 5.80 10*6/mm3 Final    Hemoglobin 04/15/2024 9.0 (L)  13.0 - 17.7 g/dL Final    Hematocrit 04/15/2024 28.9 (L)  37.5 - 51.0 % Final    MCV 04/15/2024 80.3  79.0 - 97.0 fL Final    MCH 04/15/2024 25.0 (L)  26.6 - 33.0 pg Final    MCHC 04/15/2024 31.1 (L)  31.5 - 35.7 g/dL Final    RDW 04/15/2024 25.7 (H)  12.3 - 15.4 % Final    RDW-SD 04/15/2024 69.5 (H)  37.0 - 54.0 fl Final    MPV 04/15/2024 9.3  6.0 - 12.0 fL Final    Platelets 04/15/2024 168  140 - 450 10*3/mm3 Final    Neutrophil % 04/15/2024 68.2  42.7 - 76.0 % Final    Lymphocyte % 04/15/2024 18.0 (L)  19.6 - 45.3 % Final    Monocyte % 04/15/2024 10.6  5.0 - 12.0 % Final    Eosinophil % 04/15/2024 2.0  0.3 - 6.2 % Final    Basophil % 04/15/2024 0.8  0.0 - 1.5 % Final    Immature Grans % 04/15/2024 0.4  0.0 - 0.5 % Final    Neutrophils, Absolute 04/15/2024 1.67 (L)  1.70 - 7.00 10*3/mm3 Final    Lymphocytes, Absolute 04/15/2024 0.44 (L)  0.70 - 3.10 10*3/mm3 Final    Monocytes, Absolute 04/15/2024 0.26  0.10 - 0.90 10*3/mm3 Final    Eosinophils, Absolute 04/15/2024 0.05  0.00 - 0.40 10*3/mm3 Final    Basophils, Absolute 04/15/2024 0.02  0.00 - 0.20 10*3/mm3 Final    Immature Grans, Absolute 04/15/2024 0.01  0.00 - 0.05 10*3/mm3 Final   Hospital Outpatient Visit on 04/08/2024   Component Date Value Ref Range Status    Glucose 04/08/2024 100 (H)  65 - 99 mg/dL Final    BUN 04/08/2024 8  8 - 23 mg/dL Final    Creatinine 04/08/2024  0.62 (L)  0.76 - 1.27 mg/dL Final    Sodium 04/08/2024 139  136 - 145 mmol/L Final    Potassium 04/08/2024 5.0  3.5 - 5.2 mmol/L Final    Chloride 04/08/2024 103  98 - 107 mmol/L Final    CO2 04/08/2024 28.0  22.0 - 29.0 mmol/L Final    Calcium 04/08/2024 9.6  8.6 - 10.5 mg/dL Final    Total Protein 04/08/2024 7.3  6.0 - 8.5 g/dL Final    Albumin 04/08/2024 3.6  3.5 - 5.2 g/dL Final    ALT (SGPT) 04/08/2024 15  1 - 41 U/L Final    AST (SGOT) 04/08/2024 28  1 - 40 U/L Final    Alkaline Phosphatase 04/08/2024 153 (H)  39 - 117 U/L Final    Total Bilirubin 04/08/2024 0.4  0.0 - 1.2 mg/dL Final    Globulin 04/08/2024 3.7  gm/dL Final    Calculated Result    A/G Ratio 04/08/2024 1.0  g/dL Final    BUN/Creatinine Ratio 04/08/2024 12.9  7.0 - 25.0 Final    Anion Gap 04/08/2024 8.0  5.0 - 15.0 mmol/L Final    eGFR 04/08/2024 106.7  >60.0 mL/min/1.73 Final    WBC 04/08/2024 4.18  3.40 - 10.80 10*3/mm3 Final    RBC 04/08/2024 3.73 (L)  4.14 - 5.80 10*6/mm3 Final    Hemoglobin 04/08/2024 9.0 (L)  13.0 - 17.7 g/dL Final    Hematocrit 04/08/2024 30.3 (L)  37.5 - 51.0 % Final    MCV 04/08/2024 81.2  79.0 - 97.0 fL Final    MCH 04/08/2024 24.1 (L)  26.6 - 33.0 pg Final    MCHC 04/08/2024 29.7 (L)  31.5 - 35.7 g/dL Final    RDW 04/08/2024 24.2 (H)  12.3 - 15.4 % Final    RDW-SD 04/08/2024 67.7 (H)  37.0 - 54.0 fl Final    MPV 04/08/2024 9.3  6.0 - 12.0 fL Final    Platelets 04/08/2024 248  140 - 450 10*3/mm3 Final    Neutrophil % 04/08/2024 70.2  42.7 - 76.0 % Final    Lymphocyte % 04/08/2024 13.6 (L)  19.6 - 45.3 % Final    Monocyte % 04/08/2024 10.5  5.0 - 12.0 % Final    Eosinophil % 04/08/2024 5.0  0.3 - 6.2 % Final    Basophil % 04/08/2024 0.5  0.0 - 1.5 % Final    Immature Grans % 04/08/2024 0.2  0.0 - 0.5 % Final    Neutrophils, Absolute 04/08/2024 2.93  1.70 - 7.00 10*3/mm3 Final    Lymphocytes, Absolute 04/08/2024 0.57 (L)  0.70 - 3.10 10*3/mm3 Final    Monocytes, Absolute 04/08/2024 0.44  0.10 - 0.90 10*3/mm3 Final     Eosinophils, Absolute 04/08/2024 0.21  0.00 - 0.40 10*3/mm3 Final    Basophils, Absolute 04/08/2024 0.02  0.00 - 0.20 10*3/mm3 Final    Immature Grans, Absolute 04/08/2024 0.01  0.00 - 0.05 10*3/mm3 Final       MRI Cervical Spine With & Without Contrast    Result Date: 4/12/2024  Narrative: MRI CERVICAL SPINE W WO CONTRAST Date of Exam: 4/12/2024 10:15 AM EDT Indication: bilateral upper extremity weakness with numbness and tingling.  Comparison: PET/CT 3/6/2024 Technique:  Routine multiplanar/multisequence sequence images of the cervical spine were obtained before and after the uneventful administration of 15 cc Multihance.  Findings: ALIGNMENT: There is 1-2 mm of anterior listhesis of C4 on C5 and retrolisthesis of C5 on C6 and C6 on C7. DISK SPACE: There is moderate lower cervical spondylosis. VERTEBRA: No acute fracture or destructive process. There is a chronic mild superior plate compression fracture of T1 reducing vertebral body height by 10-20%. There is marrow edema within the left C3 and C4 pedicles and facets centered on the C3/4 facet  joint consistent with active arthropathy. CORD: Normal anatomy and signal intensity. CRANIOVETEBRAL JUNCTION: Normal SOFT TISSUES: No mass nor lymphadenopathy. ENHANCEMENT: There is enhancement associated with the marrow edema centered at the left C3/4 facet joint. LEVELS: C2-C3: The posterior disc is unremarkable.  No significant facet arthropathy. No significant neural foraminal or spinal canal stenosis. C3-C4: Broad-based posterior disc osteophyte complex. The AP diameter of the central canal measures 9 mm. Mild uncovertebral perjury and neuroforaminal stenosis on the right. There is advanced active left-sided facet arthropathy with moderate neuroforaminal stenosis and potential exiting nerve contact. C4-C5: Broad-based posterior disc osteophyte complex. AP diameter of the central canal measures 10 mm. There is minimal right and mild to moderate left uncovertebral  operatively. There is moderate active left facet arthropathy. There is moderate left neuroforaminal stenosis with potential exiting nerve contact on the left. C5-C6: Broad-based posterior disc osteophyte complex. AP diameter of the central canal measures 10 mm. There is moderate uncovertebral hypertrophy with mild facet arthropathy with moderate to severe neuroforaminal stenosis and probable bilateral exiting nerve contact. C6-C7: Broad-based posterior disc osteophyte complex. AP diameter of the central canal measures 9 mm. Moderate to advanced right and moderate left uncovertebral operatively and mild facet arthropathy. There is severe neuroforaminal stenosis with exiting nerve contact bilaterally C7-T1: The posterior disc is unremarkable. The uncovertebral joints appear within normal limits. No significant facet arthropathy. No significant neural foraminal or spinal canal stenosis.     Impression: Impression: 1.Active left-sided facet arthropathy at C3/4 and to lesser degree C4/5 with associated surrounding marrow and soft tissue edema. 2.Moderate multifactorial degenerative change of the mid to lower cervical spine otherwise with central canal and neuroforaminal stenosis. Potential exiting nerve contact from C3/4 through C6/7 as detailed above. Electronically Signed: Emanuel Rodriguez MD  4/12/2024 3:06 PM EDT  Workstation ID: [id number]     Procedures      Narrative & Impression   NM PET/CT SKULL BASE TO MID THIGH     Date of Exam: 3/6/2024 2:24 PM EST     Indication: esophogeal ca.     Comparison: Abdomen pelvis CT scan 2/4/2024. No prior PET scan     Technique: 11.8 mCi of F-18 FDG was administered intravenously. PET imaging was obtained from skull base to mid-thigh approximately 60 minutes after radiotracer injection. A low dose non contrast CT was obtained for attenuation correction and anatomic   localization. Fused PET-CT and 3D MIP reconstructions were utilized for image interpretation.  Fasting blood glucose  "level: 99 mg/dl.     Reference uptake values:  Mediastinum: 2.5 SUVmax  Liver: 3.0 SUVmax  Normalization method: Body Weight     Findings:  History indicates known esophageal cancer undergoing work-up, no treatment to date. Some left-sided neck pain and dysphagia.     Today's 3D images shows a focus of increased uptake in the central upper chest, apparently regional to the esophagus, and 2 small foci of uptake in the right lower neck or superior stomach. There is focal activity in the superficial soft tissues lateral   to the left shoulder. There is normal and normal variant uptake elsewhere.     Multiplanar images show no significant asymmetry of uptake in the brain.     No hypermetabolic activity is seen in the neck down to the right supraclavicular fat, where a small lymph node has maximal SUV of 6.6, concerning for metastatic involvement. A couple of small adjacent right upper paratracheal lymph nodes have maximal SUV   of 3.3.      In the proximal thoracic there is focal thickening of the esophagus and strongly hypermetabolic activity, maximal SUV 15.9 apparently corresponding to the patient's known neoplasm. Hypermetabolic activity in the lateral left shoulder subcutaneous   tissues measures 8.0. CT scan appearance suggests potential subcutaneous hematoma. Please correlate with any history of trauma to this area.     No hypermetabolic mediastinal, hilar, chest wall or pulmonary parenchymal mass is seen elsewhere.     Below the diaphragm, no hypermetabolic liver, adrenal, or other solid organ disease is seen. No hypermetabolic adenopathy is appreciated. There is expected GI and  tract activity. Focal activity in the adjacent posterior spinous processes of L3, L4 and   L5 measures approximately 4.1 - 4.2. With reference to the previous abdominal CT scan, sagittal images shows associated DJD, with the posterior spinous processes appear hypertrophied and in contact, or so-called \"Baastrup disease\". No pathologic " marrow   space uptake is seen elsewhere. Incidental note is made of a large, approximately 6 cm left bladder diverticulum.  IMPRESSION:  Impression:     1. Intensely hypermetabolic activity in the proximal thoracic esophagus, apparently corresponding to patient's known neoplasm.     2. 3 small normal sized but hypermetabolic nodes respectively in the right supraclavicular fat, and right superior mediastinum.      3. Hypermetabolic activity in the lateral subcutaneous tissues of the left shoulder, which appears to correspond to subcutaneous bruising. Please correlate with any history of trauma to this area.     4. DJD-related uptake in the hypertrophied and degenerated posterior spinous processes of L3, L4 and L5..       Had a fall on right shoulderbut not the rightgot a injection in the left shoulder  to help him stop drinking at dr. Wesly Albert 6 weeks ago, swelled up and red.     Narrative & Impression   MRI BRAIN WO CONTRAST     Date of Exam: 2/18/2024 5:05 PM EST     Indication: Dizziness, off balance, present x 2 weeks.     Comparison: 2/4/2024 CT     Technique:  Routine multiplanar/multisequence sequence images of the brain were obtained without contrast administration.        Findings:  Diffusion imaging shows no evidence of acute infarct. There are scattered subcortical and periventricular T2 hyperintensities which are nonspecific but likely sequela of mild to moderate small vessel ischemic disease. No evidence of intracranial   hemorrhage.     There is normal ventricular volume. The basal cisterns are patent. There is no evidence of extra-axial fluid collection. There is normal flow voids within the intracranial vessels.     No evidence of fracture or suspicious bony lesion. Paranasal sinusitis with gas/fluid level in the right maxillary sinus. Trace bilateral mastoid air cell effusions. Visualized orbits are unremarkable.     IMPRESSION:  Impression:  No acute intracranial abnormality. No suspicious mass  on this noncontrast MRI.     Paranasal sinusitis with gas/fluid level in the right maxillary sinus.           Assessment / Plan      Assessment/Plan:     1.  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  SUV avid right supraclavicular lymph node  3.  Indeterminate paratracheal adenopathy  4.  BUE weakness  -I personally reviewed his PET/CT.  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  -Hold treatment today given his progressive BUE neuropathy. I am going to start him on a steroid burst. I reviewed his MRI. If symptoms stabilize we will resume with DA taxol. I will reach out to Dr. Aguila, based on cervical location of his tumor this could be contributing to his symptoms.   -MMR intact  -Labs reviewed    4.  Cancer related pain  -Fentanyl patch plus oxycodone. My nurse will reach out to facility with respect to re-evaluation for DA  -Change MMW to swish and swallow    5.  Normocytic anemia  -Labs reviewed and consistent with iron deficiency anemia despite oral iron trial.  -Plan IV iron course given severity of anemia and chemotherapy planned.    6. Mild epistaxis/Rhinorrhea  -Trial of saline mist spray and cool mist humidifier at night.  -Discussed urgent evaluation for any prolonged or severe episodes of epistaxis that do not resolve with pressure    Follow Up:   1 week     Katerina Ga MD  Hematology and Oncology     I have spent a total of 40 min on reviewing test results/preparing to see patient, counseling patient, performing medically appropriate exam and documenting clinical information in the electronic or other health record

## 2024-04-17 ENCOUNTER — HOSPITAL ENCOUNTER (OUTPATIENT)
Dept: RADIATION ONCOLOGY | Facility: HOSPITAL | Age: 64
Discharge: HOME | End: 2024-04-17

## 2024-04-17 PROCEDURE — 77386: CPT | Performed by: RADIOLOGY

## 2024-04-18 ENCOUNTER — HOSPITAL ENCOUNTER (OUTPATIENT)
Dept: RADIATION ONCOLOGY | Facility: HOSPITAL | Age: 64
Discharge: HOME | End: 2024-04-18

## 2024-04-18 PROCEDURE — 77336 RADIATION PHYSICS CONSULT: CPT | Performed by: RADIOLOGY

## 2024-04-18 PROCEDURE — 77386: CPT | Performed by: RADIOLOGY

## 2024-04-19 ENCOUNTER — HOSPITAL ENCOUNTER (OUTPATIENT)
Dept: RADIATION ONCOLOGY | Facility: HOSPITAL | Age: 64
Discharge: HOME | End: 2024-04-19

## 2024-04-19 PROCEDURE — 77386: CPT | Performed by: RADIOLOGY

## 2024-04-22 ENCOUNTER — HOSPITAL ENCOUNTER (OUTPATIENT)
Dept: ONCOLOGY | Facility: HOSPITAL | Age: 64
Discharge: HOME | End: 2024-04-22
Admitting: INTERNAL MEDICINE
Payer: MEDICAID

## 2024-04-22 ENCOUNTER — HOSPITAL ENCOUNTER (OUTPATIENT)
Dept: RADIATION ONCOLOGY | Facility: HOSPITAL | Age: 64
Discharge: HOME | End: 2024-04-22
Payer: MEDICAID

## 2024-04-22 VITALS
RESPIRATION RATE: 16 BRPM | SYSTOLIC BLOOD PRESSURE: 151 MMHG | HEIGHT: 69 IN | DIASTOLIC BLOOD PRESSURE: 68 MMHG | WEIGHT: 167.5 LBS | HEART RATE: 66 BPM | TEMPERATURE: 98.3 F | BODY MASS INDEX: 24.81 KG/M2

## 2024-04-22 DIAGNOSIS — C15.3 MALIGNANT NEOPLASM OF UPPER THIRD OF ESOPHAGUS: Primary | ICD-10-CM

## 2024-04-22 LAB
ALBUMIN SERPL-MCNC: 3.8 G/DL (ref 3.5–5.2)
ALBUMIN/GLOB SERPL: 1.2 G/DL
ALP SERPL-CCNC: 152 U/L (ref 39–117)
ALT SERPL W P-5'-P-CCNC: 16 U/L (ref 1–41)
ANION GAP SERPL CALCULATED.3IONS-SCNC: 8 MMOL/L (ref 5–15)
AST SERPL-CCNC: 24 U/L (ref 1–40)
BASOPHILS # BLD AUTO: 0.04 10*3/MM3 (ref 0–0.2)
BASOPHILS NFR BLD AUTO: 1.6 % (ref 0–1.5)
BILIRUB SERPL-MCNC: 0.3 MG/DL (ref 0–1.2)
BUN SERPL-MCNC: 10 MG/DL (ref 8–23)
BUN/CREAT SERPL: 18.2 (ref 7–25)
CALCIUM SPEC-SCNC: 9.6 MG/DL (ref 8.6–10.5)
CHLORIDE SERPL-SCNC: 104 MMOL/L (ref 98–107)
CO2 SERPL-SCNC: 28 MMOL/L (ref 22–29)
CREAT SERPL-MCNC: 0.55 MG/DL (ref 0.76–1.27)
DEPRECATED RDW RBC AUTO: 79.1 FL (ref 37–54)
EGFRCR SERPLBLD CKD-EPI 2021: 110.7 ML/MIN/1.73
EOSINOPHIL # BLD AUTO: 0.03 10*3/MM3 (ref 0–0.4)
EOSINOPHIL NFR BLD AUTO: 1.2 % (ref 0.3–6.2)
ERYTHROCYTE [DISTWIDTH] IN BLOOD BY AUTOMATED COUNT: 27.3 % (ref 12.3–15.4)
GLOBULIN UR ELPH-MCNC: 3.3 GM/DL
GLUCOSE SERPL-MCNC: 98 MG/DL (ref 65–99)
HCT VFR BLD AUTO: 31.3 % (ref 37.5–51)
HGB BLD-MCNC: 9.3 G/DL (ref 13–17.7)
IMM GRANULOCYTES # BLD AUTO: 0.02 10*3/MM3 (ref 0–0.05)
IMM GRANULOCYTES NFR BLD AUTO: 0.8 % (ref 0–0.5)
LYMPHOCYTES # BLD AUTO: 0.31 10*3/MM3 (ref 0.7–3.1)
LYMPHOCYTES NFR BLD AUTO: 12.3 % (ref 19.6–45.3)
MCH RBC QN AUTO: 25.2 PG (ref 26.6–33)
MCHC RBC AUTO-ENTMCNC: 29.7 G/DL (ref 31.5–35.7)
MCV RBC AUTO: 84.8 FL (ref 79–97)
MONOCYTES # BLD AUTO: 0.94 10*3/MM3 (ref 0.1–0.9)
MONOCYTES NFR BLD AUTO: 37.3 % (ref 5–12)
NEUTROPHILS NFR BLD AUTO: 1.18 10*3/MM3 (ref 1.7–7)
NEUTROPHILS NFR BLD AUTO: 46.8 % (ref 42.7–76)
PLATELET # BLD AUTO: 171 10*3/MM3 (ref 140–450)
PMV BLD AUTO: 8.6 FL (ref 6–12)
POTASSIUM SERPL-SCNC: 4.2 MMOL/L (ref 3.5–5.2)
PROT SERPL-MCNC: 7.1 G/DL (ref 6–8.5)
RBC # BLD AUTO: 3.69 10*6/MM3 (ref 4.14–5.8)
SODIUM SERPL-SCNC: 140 MMOL/L (ref 136–145)
WBC NRBC COR # BLD AUTO: 2.52 10*3/MM3 (ref 3.4–10.8)

## 2024-04-22 PROCEDURE — 80053 COMPREHEN METABOLIC PANEL: CPT | Performed by: INTERNAL MEDICINE

## 2024-04-22 PROCEDURE — 85025 COMPLETE CBC W/AUTO DIFF WBC: CPT | Performed by: INTERNAL MEDICINE

## 2024-04-22 PROCEDURE — 36415 COLL VENOUS BLD VENIPUNCTURE: CPT

## 2024-04-22 PROCEDURE — 77386: CPT | Performed by: RADIOLOGY

## 2024-04-23 ENCOUNTER — HOSPITAL ENCOUNTER (OUTPATIENT)
Dept: RADIATION ONCOLOGY | Facility: HOSPITAL | Age: 64
Discharge: HOME | End: 2024-04-23

## 2024-04-23 ENCOUNTER — HOSPITAL ENCOUNTER (OUTPATIENT)
Dept: ONCOLOGY | Facility: HOSPITAL | Age: 64
Discharge: HOME | End: 2024-04-23
Admitting: INTERNAL MEDICINE
Payer: MEDICAID

## 2024-04-23 ENCOUNTER — TELEPHONE (OUTPATIENT)
Dept: ONCOLOGY | Facility: CLINIC | Age: 64
End: 2024-04-23

## 2024-04-23 ENCOUNTER — OFFICE VISIT (OUTPATIENT)
Dept: ONCOLOGY | Facility: CLINIC | Age: 64
End: 2024-04-23
Payer: MEDICAID

## 2024-04-23 VITALS — WEIGHT: 167.2 LBS | BODY MASS INDEX: 24.68 KG/M2

## 2024-04-23 VITALS
HEIGHT: 69 IN | BODY MASS INDEX: 24.59 KG/M2 | DIASTOLIC BLOOD PRESSURE: 60 MMHG | OXYGEN SATURATION: 98 % | SYSTOLIC BLOOD PRESSURE: 128 MMHG | TEMPERATURE: 98.6 F | WEIGHT: 166 LBS | HEART RATE: 65 BPM

## 2024-04-23 DIAGNOSIS — D62 ACUTE BLOOD LOSS ANEMIA: ICD-10-CM

## 2024-04-23 DIAGNOSIS — D50.0 IRON DEFICIENCY ANEMIA DUE TO CHRONIC BLOOD LOSS: ICD-10-CM

## 2024-04-23 DIAGNOSIS — M54.12 CERVICAL RADICULOPATHY: Primary | ICD-10-CM

## 2024-04-23 DIAGNOSIS — C15.3 MALIGNANT NEOPLASM OF UPPER THIRD OF ESOPHAGUS: Primary | ICD-10-CM

## 2024-04-23 DIAGNOSIS — C15.3 MALIGNANT NEOPLASM OF UPPER THIRD OF ESOPHAGUS: ICD-10-CM

## 2024-04-23 PROCEDURE — 25810000003 SODIUM CHLORIDE 0.9 % SOLUTION: Performed by: INTERNAL MEDICINE

## 2024-04-23 PROCEDURE — 77386: CPT | Performed by: RADIOLOGY

## 2024-04-23 PROCEDURE — 1125F AMNT PAIN NOTED PAIN PRSNT: CPT | Performed by: INTERNAL MEDICINE

## 2024-04-23 PROCEDURE — 25010000002 DEXAMETHASONE SODIUM PHOSPHATE 100 MG/10ML SOLUTION: Performed by: INTERNAL MEDICINE

## 2024-04-23 PROCEDURE — 96413 CHEMO IV INFUSION 1 HR: CPT

## 2024-04-23 PROCEDURE — 96367 TX/PROPH/DG ADDL SEQ IV INF: CPT

## 2024-04-23 PROCEDURE — 25010000002 IRON SUCROSE PER 1 MG: Performed by: INTERNAL MEDICINE

## 2024-04-23 PROCEDURE — 25010000002 CARBOPLATIN PER 50 MG: Performed by: INTERNAL MEDICINE

## 2024-04-23 PROCEDURE — 96375 TX/PRO/DX INJ NEW DRUG ADDON: CPT

## 2024-04-23 PROCEDURE — 25010000002 PALONOSETRON PER 25 MCG: Performed by: INTERNAL MEDICINE

## 2024-04-23 PROCEDURE — 99214 OFFICE O/P EST MOD 30 MIN: CPT | Performed by: INTERNAL MEDICINE

## 2024-04-23 RX ORDER — SODIUM CHLORIDE 9 MG/ML
20 INJECTION, SOLUTION INTRAVENOUS ONCE
Status: CANCELLED | OUTPATIENT
Start: 2024-04-23

## 2024-04-23 RX ORDER — FAMOTIDINE 10 MG/ML
20 INJECTION, SOLUTION INTRAVENOUS ONCE
Status: CANCELLED | OUTPATIENT
Start: 2024-04-23

## 2024-04-23 RX ORDER — SODIUM CHLORIDE 9 MG/ML
20 INJECTION, SOLUTION INTRAVENOUS ONCE
Status: DISCONTINUED | OUTPATIENT
Start: 2024-04-23 | End: 2024-04-24 | Stop reason: HOSPADM

## 2024-04-23 RX ORDER — PALONOSETRON 0.05 MG/ML
0.25 INJECTION, SOLUTION INTRAVENOUS ONCE
Status: COMPLETED | OUTPATIENT
Start: 2024-04-23 | End: 2024-04-23

## 2024-04-23 RX ORDER — SODIUM CHLORIDE 9 MG/ML
20 INJECTION, SOLUTION INTRAVENOUS ONCE
OUTPATIENT
Start: 2024-04-30

## 2024-04-23 RX ORDER — FAMOTIDINE 10 MG/ML
20 INJECTION, SOLUTION INTRAVENOUS AS NEEDED
Status: DISCONTINUED | OUTPATIENT
Start: 2024-04-23 | End: 2024-04-24 | Stop reason: HOSPADM

## 2024-04-23 RX ORDER — DIPHENHYDRAMINE HYDROCHLORIDE 50 MG/ML
50 INJECTION INTRAMUSCULAR; INTRAVENOUS AS NEEDED
Status: DISCONTINUED | OUTPATIENT
Start: 2024-04-23 | End: 2024-04-24 | Stop reason: HOSPADM

## 2024-04-23 RX ORDER — SODIUM CHLORIDE 9 MG/ML
20 INJECTION, SOLUTION INTRAVENOUS ONCE
Status: COMPLETED | OUTPATIENT
Start: 2024-04-23 | End: 2024-04-23

## 2024-04-23 RX ORDER — GABAPENTIN 800 MG/1
800 TABLET ORAL 3 TIMES DAILY
COMMUNITY
Start: 2024-04-22

## 2024-04-23 RX ORDER — FAMOTIDINE 10 MG/ML
20 INJECTION, SOLUTION INTRAVENOUS AS NEEDED
Status: CANCELLED | OUTPATIENT
Start: 2024-04-23

## 2024-04-23 RX ORDER — PALONOSETRON 0.05 MG/ML
0.25 INJECTION, SOLUTION INTRAVENOUS ONCE
Status: CANCELLED | OUTPATIENT
Start: 2024-04-23

## 2024-04-23 RX ORDER — FAMOTIDINE 10 MG/ML
20 INJECTION, SOLUTION INTRAVENOUS ONCE
Status: DISCONTINUED | OUTPATIENT
Start: 2024-04-23 | End: 2024-04-23

## 2024-04-23 RX ORDER — DIPHENHYDRAMINE HYDROCHLORIDE 50 MG/ML
50 INJECTION INTRAMUSCULAR; INTRAVENOUS AS NEEDED
Status: CANCELLED | OUTPATIENT
Start: 2024-04-23

## 2024-04-23 RX ORDER — DOCUSATE SODIUM 100 MG/1
100 CAPSULE, LIQUID FILLED ORAL 2 TIMES DAILY PRN
Qty: 30 CAPSULE | Refills: 1 | Status: SHIPPED | OUTPATIENT
Start: 2024-04-23

## 2024-04-23 RX ADMIN — IRON SUCROSE 200 MG: 20 INJECTION, SOLUTION INTRAVENOUS at 12:43

## 2024-04-23 RX ADMIN — PALONOSETRON HYDROCHLORIDE 0.25 MG: 0.25 INJECTION, SOLUTION INTRAVENOUS at 12:12

## 2024-04-23 RX ADMIN — CARBOPLATIN 230 MG: 10 INJECTION, SOLUTION INTRAVENOUS at 13:08

## 2024-04-23 RX ADMIN — SODIUM CHLORIDE 20 ML/HR: 9 INJECTION, SOLUTION INTRAVENOUS at 12:10

## 2024-04-23 RX ADMIN — DEXAMETHASONE SODIUM PHOSPHATE 12 MG: 10 INJECTION, SOLUTION INTRAMUSCULAR; INTRAVENOUS at 12:12

## 2024-04-23 NOTE — TELEPHONE ENCOUNTER
Notified ADRIANA Martin of upcoming appointments (dates, times and instructions for port), new prescription sent to pharmacy and new referral. She verbalized understanding.

## 2024-04-23 NOTE — TELEPHONE ENCOUNTER
Called Beronica Mueller to speak to ADRIANA Martin, per patient verbal okay.  Called to advise her of upcoming appointments, new prescription and new referral.  She was on another call.  Left message requesting a return call.

## 2024-04-23 NOTE — PROGRESS NOTES
Hematology and Oncology [place]  Office number [phone]    Fax number [phone]     Follow up     Date: 24    Patient Name: Val Nassar Jr.  MRN: [id number]  : 1960    Chief Complaint: esophageal cancer    Cancer Staging:  Cancer Staging   Stage II (cT2, cN1, cM0)    History of Present Illness: Val Nassar Jr. is a pleasant 63 y.o. male who presents today for evaluation of upper third esophageal squamous cell carcinoma. He has a longstanding history of cirrhosis     He originally was diagnosed with a localized esophageal squamous cell carcinoma of the upper third of the esophagus in the 2023.  At the time he was living in [place].  He underwent an endoscopy confirming cancer diagnosis at [facility] in [facility].  He then relocated to [facility] to live with his sister because of his cancer diagnosis.      He presented to [facility] in [facility]23 with an upper GI bleed secondary to peptic ulcer disease and possible cholecystitis.  His presenting hemoglobin was 6.9.     EGD performed 2023 showed an ulcerated mass in the upper third of the esophagus which was nonobstructing and partially circumferential spanning 2 cm in length.  Biopsy results are pending.    Additional findings include gastritis, portal hypertension gastropathy and nonbleeding duodenal ulcer.  Duodenal stenosis.    He was discharged to a rehab facility and ultimately discharged home to live with his sister.  He was doing poorly and was unable to care for himself.  He did not keep his appointments with medical oncology, thoracic surgery, or radiation oncology and did not complete staging PET/CT.  He then represented to the hospital in late 2024 with a UTI and confusion.  During that admission he was assessed by palliative care, radiation oncology, and medical oncology.  He elected to be discharged to a skilled nursing facility for long-term  care as he does not want to be a burden on his sister and did not feel that he could care for himself at home.  Prior to discharge there were conversations between case management and social work at the hospital and his facility and the patient was under the impression that he could not receive radiation treatment while living at the skilled facility.  However he did elect to complete PET CT scan and returns for results.    PET/CT performed 3/6/2024 shows thickening of the esophagus with an uptake of 16 by SUV.  There was notable hypermetabolic activity in the left lateral shoulder subcutaneous tissue which corresponds to an area where he received a prior injection at his PCP office for alcohol use which he reports has been raised and pruritic since the injection 6 weeks prior.  He had mild paratracheal adenopathy as well as an right paratracheal lymph node in the right supraclavicular region  He reports that he has been tolerating regular diet with no obstructive symptoms prior to presentation.     He continues to feel weak but is clinically improving.  He tells me he is planning inpatient rehab on discharge.  His hemoglobin continues to improve and is 9.1 today.  CT of the abdomen pelvis in addition to the gallbladder findings showed no metastatic disease.  A chest x-ray this admission showed mild interstitial edema.  MRI cervical spine from 4/12/2024 showed:  1.Active left-sided facet arthropathy at C3/4 and to lesser degree C4/5 with associated surrounding marrow and soft tissue edema.  2.Moderate multifactorial degenerative change of the mid to lower cervical spine otherwise with central canal and neuroforaminal stenosis. Potential exiting nerve contact from C3/4 through C6/7 as detailed above.    Treatment history:  Taxol/carbo/radiation: C1, 4/2/2024; C2, 4/9/24; C3, held    Interval history:  He is several hours late for his infusion today, states some problems getting bus to his facility and he got his  appts confused.   Neck stiff but not painful  Working out on bike, doing 1800 steps   Feels tired, but not napping consistently  Nausea controlled.   MMW is helping his throat. Eating soft foods well. Drinking boost. Wt stable  Constipation, has to intermittently strain. Not using stool softener  Tingling in tips of toes 2-3 weeks, numbness in hands with dropping items unchanged. No improvement in steroid taper.  No fever or infectious symptoms. Has sciatica.     Past Medical History:   Past Medical History:   Diagnosis Date    Anemia     Cancer     Cirrhosis     Duodenal ulcer     Gastric ulcer     GERD (gastroesophageal reflux disease)     History of alcohol abuse     HLD (hyperlipidemia)     Mood disorder        Past Surgical History:   Past Surgical History:   Procedure Laterality Date    ENDOSCOPY N/A 12/26/2023    Procedure: ESOPHAGOGASTRODUODENOSCOPY;  Surgeon: Wesly Mckeon MD;  Location: [facility] ENDOSCOPY;  Service: Gastroenterology;  Laterality: N/A;       Family History:   Family History   Problem Relation Age of Onset    Cancer Mother     Breast cancer Mother     Heart attack Father        Social History:   Social History     Socioeconomic History    Marital status: Single   Tobacco Use    Smoking status: Every Day     Current packs/day: 1.00     Average packs/day: 1 pack/day for 15.0 years (15.0 ttl pk-yrs)     Types: Cigarettes    Smokeless tobacco: Never   Vaping Use    Vaping status: Some Days    Substances: Flavoring    Devices: Disposable   Substance and Sexual Activity    Alcohol use: Not Currently     Comment: sober for ~ 3 months    Drug use: Yes     Types: Hydrocodone    Sexual activity: Defer       Medications:     Current Outpatient Medications:     gabapentin (NEURONTIN) 800 MG tablet, , Disp: , Rfl:     acetaminophen (TYLENOL) 325 MG tablet, Take 2 tablets by mouth Every 4 (Four) Hours As Needed for Mild Pain., Disp: , Rfl:     Cholecalciferol (Vitamin D3) 1.25 MG (20856 UT) capsule, Take 1  capsule by mouth Every 7 (Seven) Days., Disp: , Rfl:     escitalopram (LEXAPRO) 10 MG tablet, Take 1 tablet by mouth Every Night., Disp: , Rfl:     fentaNYL (DURAGESIC) 12 MCG/HR, Place  on the skin as directed by provider., Disp: , Rfl:     finasteride (PROSCAR) 5 MG tablet, Take 1 tablet by mouth Daily., Disp: , Rfl:     folic acid (FOLVITE) 1 MG tablet, Take 1 tablet by mouth Daily., Disp: , Rfl:     gabapentin (NEURONTIN) 300 MG capsule, Take 1 capsule by mouth 3 (Three) Times a Day for 3 days., Disp: 9 capsule, Rfl: 0    lactulose (CHRONULAC) 10 GM/15ML solution, Take 30 mL by mouth 2 (Two) Times a Day As Needed., Disp: , Rfl:     Lidocaine 4 %, Place 1 patch on the skin as directed by provider Daily. Remove & Discard patch within 12 hours or as directed by MD, Disp: , Rfl:     nystatin susp + lidocaine viscous (MAGIC MOUTHWASH) oral suspension, 5-10 ml swish and spit or swallow QID prn, Disp: 240 mL, Rfl: 3    omeprazole (priLOSEC) 40 MG capsule, Take  by mouth., Disp: , Rfl:     ondansetron (ZOFRAN) 8 MG tablet, Take 1 tablet by mouth Every 8 (Eight) Hours As Needed for Nausea or Vomiting., Disp: 30 tablet, Rfl: 3    oxyCODONE (ROXICODONE) 5 MG immediate release tablet, Take 1 tablet by mouth Every 6 (Six) Hours As Needed for Moderate Pain. (Patient taking differently: Take 1.5 tablets by mouth Every 6 (Six) Hours As Needed for Moderate Pain.), Disp: 12 tablet, Rfl: 0    pantoprazole (PROTONIX) 40 MG EC tablet, Take 1 tablet by mouth 2 (Two) Times a Day., Disp: , Rfl:     polyethylene glycol (MIRALAX) 17 g packet, Take 17 g by mouth Daily As Needed (Use if senna-docusate is ineffective)., Disp: , Rfl:     predniSONE (DELTASONE) 10 MG tablet, Take daily based on following titration schedule: 60 mg x 1 day, then 50 mg x 1, then 40 mg x 1, then 30 mg x 1 day, then 20 mg x 1 day, then 10 mg x 1 day then stop, Disp: 21 tablet, Rfl: 0    sodium chloride (Ocean Nasal Spray) 0.65 % nasal spray, 1 spray into the  "nostril(s) as directed by provider As Needed for Congestion., Disp: 1 each, Rfl: 0    tamsulosin (FLOMAX) 0.4 MG capsule 24 hr capsule, Take 1 capsule by mouth Daily., Disp: , Rfl:     vitamin B-12 (CYANOCOBALAMIN) 1000 MCG tablet, Take 1 tablet by mouth Daily., Disp: , Rfl:     Allergies:   No Known Allergies    Objective     Vital Signs:   Vitals:    04/23/24 1046   BP: 128/60   Pulse: 65   Temp: 98.6 °F (37 °C)   TempSrc: Temporal   SpO2: 98%   Weight: 75.3 kg (166 lb)   Height: 175.3 cm (69.02\")   PainSc:   5    Body mass index is 24.5 kg/m².   Pain Score    04/23/24 1046   PainSc:   5       ECOG Performance Status: 2    Physical Exam:   General: No acute distress. Well appearing   HEENT: Normocephalic, atraumatic. OP clear  Neck: supple, no adenopathy.   Cardiovascular: regular rate and rhythm. No murmurs.   Respiratory: Normal rate. Clear to auscultation bilaterally  Abdomen: Soft, nontender, non distended with normoactive bowel sounds  Lymph: no cervical, supraclavicular or axillary adenopathy  Neuro: Alert and oriented x 3. No focal deficits.   Ext: Symmetric, no swelling.     Laboratory/Imaging Reviewed:   Hospital Outpatient Visit on 04/22/2024   Component Date Value Ref Range Status    Glucose 04/22/2024 98  65 - 99 mg/dL Final    BUN 04/22/2024 10  8 - 23 mg/dL Final    Creatinine 04/22/2024 0.55 (L)  0.76 - 1.27 mg/dL Final    Sodium 04/22/2024 140  136 - 145 mmol/L Final    Potassium 04/22/2024 4.2  3.5 - 5.2 mmol/L Final    Chloride 04/22/2024 104  98 - 107 mmol/L Final    CO2 04/22/2024 28.0  22.0 - 29.0 mmol/L Final    Calcium 04/22/2024 9.6  8.6 - 10.5 mg/dL Final    Total Protein 04/22/2024 7.1  6.0 - 8.5 g/dL Final    Albumin 04/22/2024 3.8  3.5 - 5.2 g/dL Final    ALT (SGPT) 04/22/2024 16  1 - 41 U/L Final    AST (SGOT) 04/22/2024 24  1 - 40 U/L Final    Alkaline Phosphatase 04/22/2024 152 (H)  39 - 117 U/L Final    Total Bilirubin 04/22/2024 0.3  0.0 - 1.2 mg/dL Final    Globulin 04/22/2024 3.3 "  gm/dL Final    Calculated Result    A/G Ratio 04/22/2024 1.2  g/dL Final    BUN/Creatinine Ratio 04/22/2024 18.2  7.0 - 25.0 Final    Anion Gap 04/22/2024 8.0  5.0 - 15.0 mmol/L Final    eGFR 04/22/2024 110.7  >60.0 mL/min/1.73 Final    WBC 04/22/2024 2.52 (L)  3.40 - 10.80 10*3/mm3 Final    RBC 04/22/2024 3.69 (L)  4.14 - 5.80 10*6/mm3 Final    Hemoglobin 04/22/2024 9.3 (L)  13.0 - 17.7 g/dL Final    Hematocrit 04/22/2024 31.3 (L)  37.5 - 51.0 % Final    MCV 04/22/2024 84.8  79.0 - 97.0 fL Final    MCH 04/22/2024 25.2 (L)  26.6 - 33.0 pg Final    MCHC 04/22/2024 29.7 (L)  31.5 - 35.7 g/dL Final    RDW 04/22/2024 27.3 (H)  12.3 - 15.4 % Final    RDW-SD 04/22/2024 79.1 (H)  37.0 - 54.0 fl Final    MPV 04/22/2024 8.6  6.0 - 12.0 fL Final    Platelets 04/22/2024 171  140 - 450 10*3/mm3 Final    Neutrophil % 04/22/2024 46.8  42.7 - 76.0 % Final    Lymphocyte % 04/22/2024 12.3 (L)  19.6 - 45.3 % Final    Monocyte % 04/22/2024 37.3 (H)  5.0 - 12.0 % Final    Eosinophil % 04/22/2024 1.2  0.3 - 6.2 % Final    Basophil % 04/22/2024 1.6 (H)  0.0 - 1.5 % Final    Immature Grans % 04/22/2024 0.8 (H)  0.0 - 0.5 % Final    Neutrophils, Absolute 04/22/2024 1.18 (L)  1.70 - 7.00 10*3/mm3 Final    Lymphocytes, Absolute 04/22/2024 0.31 (L)  0.70 - 3.10 10*3/mm3 Final    Monocytes, Absolute 04/22/2024 0.94 (H)  0.10 - 0.90 10*3/mm3 Final    Eosinophils, Absolute 04/22/2024 0.03  0.00 - 0.40 10*3/mm3 Final    Basophils, Absolute 04/22/2024 0.04  0.00 - 0.20 10*3/mm3 Final    Immature Grans, Absolute 04/22/2024 0.02  0.00 - 0.05 10*3/mm3 Final   Hospital Outpatient Visit on 04/15/2024   Component Date Value Ref Range Status    Glucose 04/15/2024 130 (H)  65 - 99 mg/dL Final    BUN 04/15/2024 11  8 - 23 mg/dL Final    Creatinine 04/15/2024 0.62 (L)  0.76 - 1.27 mg/dL Final    Sodium 04/15/2024 138  136 - 145 mmol/L Final    Potassium 04/15/2024 3.9  3.5 - 5.2 mmol/L Final    Chloride 04/15/2024 102  98 - 107 mmol/L Final    CO2  04/15/2024 28.0  22.0 - 29.0 mmol/L Final    Calcium 04/15/2024 9.2  8.6 - 10.5 mg/dL Final    Total Protein 04/15/2024 7.6  6.0 - 8.5 g/dL Final    Albumin 04/15/2024 3.8  3.5 - 5.2 g/dL Final    ALT (SGPT) 04/15/2024 14  1 - 41 U/L Final    AST (SGOT) 04/15/2024 24  1 - 40 U/L Final    Alkaline Phosphatase 04/15/2024 157 (H)  39 - 117 U/L Final    Total Bilirubin 04/15/2024 0.4  0.0 - 1.2 mg/dL Final    Globulin 04/15/2024 3.8  gm/dL Final    Calculated Result    A/G Ratio 04/15/2024 1.0  g/dL Final    BUN/Creatinine Ratio 04/15/2024 17.7  7.0 - 25.0 Final    Anion Gap 04/15/2024 8.0  5.0 - 15.0 mmol/L Final    eGFR 04/15/2024 106.7  >60.0 mL/min/1.73 Final    WBC 04/15/2024 2.45 (L)  3.40 - 10.80 10*3/mm3 Final    RBC 04/15/2024 3.60 (L)  4.14 - 5.80 10*6/mm3 Final    Hemoglobin 04/15/2024 9.0 (L)  13.0 - 17.7 g/dL Final    Hematocrit 04/15/2024 28.9 (L)  37.5 - 51.0 % Final    MCV 04/15/2024 80.3  79.0 - 97.0 fL Final    MCH 04/15/2024 25.0 (L)  26.6 - 33.0 pg Final    MCHC 04/15/2024 31.1 (L)  31.5 - 35.7 g/dL Final    RDW 04/15/2024 25.7 (H)  12.3 - 15.4 % Final    RDW-SD 04/15/2024 69.5 (H)  37.0 - 54.0 fl Final    MPV 04/15/2024 9.3  6.0 - 12.0 fL Final    Platelets 04/15/2024 168  140 - 450 10*3/mm3 Final    Neutrophil % 04/15/2024 68.2  42.7 - 76.0 % Final    Lymphocyte % 04/15/2024 18.0 (L)  19.6 - 45.3 % Final    Monocyte % 04/15/2024 10.6  5.0 - 12.0 % Final    Eosinophil % 04/15/2024 2.0  0.3 - 6.2 % Final    Basophil % 04/15/2024 0.8  0.0 - 1.5 % Final    Immature Grans % 04/15/2024 0.4  0.0 - 0.5 % Final    Neutrophils, Absolute 04/15/2024 1.67 (L)  1.70 - 7.00 10*3/mm3 Final    Lymphocytes, Absolute 04/15/2024 0.44 (L)  0.70 - 3.10 10*3/mm3 Final    Monocytes, Absolute 04/15/2024 0.26  0.10 - 0.90 10*3/mm3 Final    Eosinophils, Absolute 04/15/2024 0.05  0.00 - 0.40 10*3/mm3 Final    Basophils, Absolute 04/15/2024 0.02  0.00 - 0.20 10*3/mm3 Final    Immature Grans, Absolute 04/15/2024 0.01  0.00 -  0.05 10*3/mm3 Final       MRI Cervical Spine With & Without Contrast    Result Date: 4/12/2024  Narrative: MRI CERVICAL SPINE W WO CONTRAST Date of Exam: 4/12/2024 10:15 AM EDT Indication: bilateral upper extremity weakness with numbness and tingling.  Comparison: PET/CT 3/6/2024 Technique:  Routine multiplanar/multisequence sequence images of the cervical spine were obtained before and after the uneventful administration of 15 cc Multihance.  Findings: ALIGNMENT: There is 1-2 mm of anterior listhesis of C4 on C5 and retrolisthesis of C5 on C6 and C6 on C7. DISK SPACE: There is moderate lower cervical spondylosis. VERTEBRA: No acute fracture or destructive process. There is a chronic mild superior plate compression fracture of T1 reducing vertebral body height by 10-20%. There is marrow edema within the left C3 and C4 pedicles and facets centered on the C3/4 facet  joint consistent with active arthropathy. CORD: Normal anatomy and signal intensity. CRANIOVETEBRAL JUNCTION: Normal SOFT TISSUES: No mass nor lymphadenopathy. ENHANCEMENT: There is enhancement associated with the marrow edema centered at the left C3/4 facet joint. LEVELS: C2-C3: The posterior disc is unremarkable.  No significant facet arthropathy. No significant neural foraminal or spinal canal stenosis. C3-C4: Broad-based posterior disc osteophyte complex. The AP diameter of the central canal measures 9 mm. Mild uncovertebral perjury and neuroforaminal stenosis on the right. There is advanced active left-sided facet arthropathy with moderate neuroforaminal stenosis and potential exiting nerve contact. C4-C5: Broad-based posterior disc osteophyte complex. AP diameter of the central canal measures 10 mm. There is minimal right and mild to moderate left uncovertebral operatively. There is moderate active left facet arthropathy. There is moderate left neuroforaminal stenosis with potential exiting nerve contact on the left. C5-C6: Broad-based posterior  disc osteophyte complex. AP diameter of the central canal measures 10 mm. There is moderate uncovertebral hypertrophy with mild facet arthropathy with moderate to severe neuroforaminal stenosis and probable bilateral exiting nerve contact. C6-C7: Broad-based posterior disc osteophyte complex. AP diameter of the central canal measures 9 mm. Moderate to advanced right and moderate left uncovertebral operatively and mild facet arthropathy. There is severe neuroforaminal stenosis with exiting nerve contact bilaterally C7-T1: The posterior disc is unremarkable. The uncovertebral joints appear within normal limits. No significant facet arthropathy. No significant neural foraminal or spinal canal stenosis.     Impression: Impression: 1.Active left-sided facet arthropathy at C3/4 and to lesser degree C4/5 with associated surrounding marrow and soft tissue edema. 2.Moderate multifactorial degenerative change of the mid to lower cervical spine otherwise with central canal and neuroforaminal stenosis. Potential exiting nerve contact from C3/4 through C6/7 as detailed above. Electronically Signed: Emanuel Rodriguez MD  4/12/2024 3:06 PM EDT  Workstation ID: [id number]     Procedures      Narrative & Impression   NM PET/CT SKULL BASE TO MID THIGH     Date of Exam: 3/6/2024 2:24 PM EST     Indication: esophogeal ca.     Comparison: Abdomen pelvis CT scan 2/4/2024. No prior PET scan     Technique: 11.8 mCi of F-18 FDG was administered intravenously. PET imaging was obtained from skull base to mid-thigh approximately 60 minutes after radiotracer injection. A low dose non contrast CT was obtained for attenuation correction and anatomic   localization. Fused PET-CT and 3D MIP reconstructions were utilized for image interpretation.  Fasting blood glucose level: 99 mg/dl.     Reference uptake values:  Mediastinum: 2.5 SUVmax  Liver: 3.0 SUVmax  Normalization method: Body Weight     Findings:  History indicates known esophageal cancer  "undergoing work-up, no treatment to date. Some left-sided neck pain and dysphagia.     Today's 3D images shows a focus of increased uptake in the central upper chest, apparently regional to the esophagus, and 2 small foci of uptake in the right lower neck or superior stomach. There is focal activity in the superficial soft tissues lateral   to the left shoulder. There is normal and normal variant uptake elsewhere.     Multiplanar images show no significant asymmetry of uptake in the brain.     No hypermetabolic activity is seen in the neck down to the right supraclavicular fat, where a small lymph node has maximal SUV of 6.6, concerning for metastatic involvement. A couple of small adjacent right upper paratracheal lymph nodes have maximal SUV   of 3.3.      In the proximal thoracic there is focal thickening of the esophagus and strongly hypermetabolic activity, maximal SUV 15.9 apparently corresponding to the patient's known neoplasm. Hypermetabolic activity in the lateral left shoulder subcutaneous   tissues measures 8.0. CT scan appearance suggests potential subcutaneous hematoma. Please correlate with any history of trauma to this area.     No hypermetabolic mediastinal, hilar, chest wall or pulmonary parenchymal mass is seen elsewhere.     Below the diaphragm, no hypermetabolic liver, adrenal, or other solid organ disease is seen. No hypermetabolic adenopathy is appreciated. There is expected GI and  tract activity. Focal activity in the adjacent posterior spinous processes of L3, L4 and   L5 measures approximately 4.1 - 4.2. With reference to the previous abdominal CT scan, sagittal images shows associated DJD, with the posterior spinous processes appear hypertrophied and in contact, or so-called \"Baastrup disease\". No pathologic marrow   space uptake is seen elsewhere. Incidental note is made of a large, approximately 6 cm left bladder diverticulum.  IMPRESSION:  Impression:     1. Intensely hypermetabolic " activity in the proximal thoracic esophagus, apparently corresponding to patient's known neoplasm.     2. 3 small normal sized but hypermetabolic nodes respectively in the right supraclavicular fat, and right superior mediastinum.      3. Hypermetabolic activity in the lateral subcutaneous tissues of the left shoulder, which appears to correspond to subcutaneous bruising. Please correlate with any history of trauma to this area.     4. DJD-related uptake in the hypertrophied and degenerated posterior spinous processes of L3, L4 and L5..       Had a fall on right shoulderbut not the rightgot a injection in the left shoulder  to help him stop drinking at dr. Wesly Albert 6 weeks ago, swelled up and red.     Narrative & Impression   MRI BRAIN WO CONTRAST     Date of Exam: 2/18/2024 5:05 PM EST     Indication: Dizziness, off balance, present x 2 weeks.     Comparison: 2/4/2024 CT     Technique:  Routine multiplanar/multisequence sequence images of the brain were obtained without contrast administration.        Findings:  Diffusion imaging shows no evidence of acute infarct. There are scattered subcortical and periventricular T2 hyperintensities which are nonspecific but likely sequela of mild to moderate small vessel ischemic disease. No evidence of intracranial   hemorrhage.     There is normal ventricular volume. The basal cisterns are patent. There is no evidence of extra-axial fluid collection. There is normal flow voids within the intracranial vessels.     No evidence of fracture or suspicious bony lesion. Paranasal sinusitis with gas/fluid level in the right maxillary sinus. Trace bilateral mastoid air cell effusions. Visualized orbits are unremarkable.     IMPRESSION:  Impression:  No acute intracranial abnormality. No suspicious mass on this noncontrast MRI.     Paranasal sinusitis with gas/fluid level in the right maxillary sinus.           Assessment / Plan      Assessment/Plan:     1.  Malignant neoplasm of  upper third of esophagus  2.  SUV avid right supraclavicular lymph node  3.  Indeterminate paratracheal adenopathy  4.  BUE weakness  5.  Leukpenia  -I personally reviewed his PET/CT.  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  -Hold treatment today given his progressive BUE neuropathy. I am going to start him on a steroid burst. I reviewed his MRI. If symptoms stabilize we will resume. Mild neutropenia noted.   -He is having no improvement in his symptoms following steroid and now notes some mild lower extremity symptoms. I am going to hold taxol this week, refer to neurosurgery, Resume carboplatin and will DA to AUC 1.5 given borderline neutropenia.   -MMR intact  -CBC/CMP reviewed notable for above    6.  Cancer related pain  -Fentanyl patch plus oxycodone. My nurse will reach out to facility with respect to re-evaluation for DA  -Change MMW to swish and swallow    5.  Normocytic anemia  -Labs reviewed and consistent with iron deficiency anemia despite oral iron trial.  -He is completing IV iron  -Hemoglobin stable    6. Mild epistaxis/Rhinorrhea  -Trial of saline mist spray and cool mist humidifier at night.  -Discussed urgent evaluation for any prolonged or severe episodes of epistaxis that do not resolve with pressure    Follow Up:   1 week     Katerina Ga MD  Hematology and Oncology

## 2024-04-23 NOTE — TELEPHONE ENCOUNTER
Contacted Beronica Mueller as patient came to radiation appointment today but not appointment with Dr. Ga and infusion appointment. Nursing staff unavailable and DON not available either.

## 2024-04-24 ENCOUNTER — HOSPITAL ENCOUNTER (OUTPATIENT)
Dept: RADIATION ONCOLOGY | Facility: HOSPITAL | Age: 64
Discharge: HOME | End: 2024-04-24

## 2024-04-24 ENCOUNTER — PREP FOR SURGERY (OUTPATIENT)
Dept: OTHER | Facility: HOSPITAL | Age: 64
End: 2024-04-24
Payer: MEDICAID

## 2024-04-24 PROCEDURE — 77386: CPT | Performed by: RADIOLOGY

## 2024-04-24 RX ORDER — SODIUM CHLORIDE 9 MG/ML
40 INJECTION, SOLUTION INTRAVENOUS AS NEEDED
Status: CANCELLED | OUTPATIENT
Start: 2024-04-24

## 2024-04-24 RX ORDER — SODIUM CHLORIDE 0.9 % (FLUSH) 0.9 %
10 SYRINGE (ML) INJECTION AS NEEDED
Status: CANCELLED | OUTPATIENT
Start: 2024-04-24

## 2024-04-24 RX ORDER — SODIUM CHLORIDE 0.9 % (FLUSH) 0.9 %
3 SYRINGE (ML) INJECTION EVERY 12 HOURS SCHEDULED
Status: CANCELLED | OUTPATIENT
Start: 2024-04-24

## 2024-04-25 ENCOUNTER — HOSPITAL ENCOUNTER (OUTPATIENT)
Dept: RADIATION ONCOLOGY | Facility: HOSPITAL | Age: 64
Discharge: HOME | End: 2024-04-25

## 2024-04-25 ENCOUNTER — HOSPITAL ENCOUNTER (OUTPATIENT)
Dept: INTERVENTIONAL RADIOLOGY/VASCULAR | Facility: HOSPITAL | Age: 64
Discharge: HOME | End: 2024-04-25
Payer: MEDICAID

## 2024-04-25 VITALS
HEIGHT: 69 IN | SYSTOLIC BLOOD PRESSURE: 146 MMHG | TEMPERATURE: 97.5 F | BODY MASS INDEX: 24.73 KG/M2 | OXYGEN SATURATION: 97 % | RESPIRATION RATE: 12 BRPM | DIASTOLIC BLOOD PRESSURE: 68 MMHG | WEIGHT: 167 LBS | HEART RATE: 65 BPM

## 2024-04-25 DIAGNOSIS — C15.3 MALIGNANT NEOPLASM OF UPPER THIRD OF ESOPHAGUS: ICD-10-CM

## 2024-04-25 LAB
INR PPP: 0.97 (ref 0.89–1.12)
PROTHROMBIN TIME: 13 SECONDS (ref 12.2–14.5)

## 2024-04-25 PROCEDURE — 85610 PROTHROMBIN TIME: CPT | Performed by: NURSE PRACTITIONER

## 2024-04-25 PROCEDURE — 25010000002 HEPARIN LOCK FLUSH PER 10 UNITS

## 2024-04-25 PROCEDURE — 77001 FLUOROGUIDE FOR VEIN DEVICE: CPT

## 2024-04-25 PROCEDURE — 77336 RADIATION PHYSICS CONSULT: CPT | Performed by: RADIOLOGY

## 2024-04-25 PROCEDURE — 99152 MOD SED SAME PHYS/QHP 5/>YRS: CPT

## 2024-04-25 PROCEDURE — 76937 US GUIDE VASCULAR ACCESS: CPT

## 2024-04-25 PROCEDURE — 25010000002 MIDAZOLAM PER 1 MG: Performed by: RADIOLOGY

## 2024-04-25 PROCEDURE — C1894 INTRO/SHEATH, NON-LASER: HCPCS

## 2024-04-25 PROCEDURE — 25010000002 FENTANYL CITRATE (PF) 50 MCG/ML SOLUTION: Performed by: RADIOLOGY

## 2024-04-25 PROCEDURE — 77386: CPT | Performed by: RADIOLOGY

## 2024-04-25 PROCEDURE — C1788 PORT, INDWELLING, IMP: HCPCS

## 2024-04-25 RX ORDER — MIDAZOLAM HYDROCHLORIDE 1 MG/ML
INJECTION INTRAMUSCULAR; INTRAVENOUS AS NEEDED
Status: COMPLETED | OUTPATIENT
Start: 2024-04-25 | End: 2024-04-25

## 2024-04-25 RX ORDER — SODIUM CHLORIDE 0.9 % (FLUSH) 0.9 %
3 SYRINGE (ML) INJECTION EVERY 12 HOURS SCHEDULED
Status: DISCONTINUED | OUTPATIENT
Start: 2024-04-25 | End: 2024-04-27 | Stop reason: HOSPADM

## 2024-04-25 RX ORDER — LIDOCAINE HYDROCHLORIDE AND EPINEPHRINE 10; 10 MG/ML; UG/ML
INJECTION, SOLUTION INFILTRATION; PERINEURAL
Status: COMPLETED
Start: 2024-04-25 | End: 2024-04-25

## 2024-04-25 RX ORDER — DUTASTERIDE 0.5 MG/1
0.5 CAPSULE, LIQUID FILLED ORAL DAILY
COMMUNITY

## 2024-04-25 RX ORDER — PROMETHAZINE HYDROCHLORIDE 25 MG/1
25 TABLET ORAL EVERY 6 HOURS PRN
COMMUNITY

## 2024-04-25 RX ORDER — LORATADINE 10 MG/1
CAPSULE, LIQUID FILLED ORAL DAILY
COMMUNITY

## 2024-04-25 RX ORDER — FENTANYL 25 UG/1
1 PATCH TRANSDERMAL
COMMUNITY

## 2024-04-25 RX ORDER — FENTANYL CITRATE 50 UG/ML
INJECTION, SOLUTION INTRAMUSCULAR; INTRAVENOUS AS NEEDED
Status: COMPLETED | OUTPATIENT
Start: 2024-04-25 | End: 2024-04-25

## 2024-04-25 RX ORDER — MIDAZOLAM HYDROCHLORIDE 1 MG/ML
INJECTION INTRAMUSCULAR; INTRAVENOUS
Status: DISCONTINUED
Start: 2024-04-25 | End: 2024-04-26 | Stop reason: HOSPADM

## 2024-04-25 RX ORDER — SODIUM CHLORIDE 0.9 % (FLUSH) 0.9 %
10 SYRINGE (ML) INJECTION AS NEEDED
Status: DISCONTINUED | OUTPATIENT
Start: 2024-04-25 | End: 2024-04-27 | Stop reason: HOSPADM

## 2024-04-25 RX ORDER — GUAIFENESIN 600 MG/1
1200 TABLET, EXTENDED RELEASE ORAL EVERY 12 HOURS PRN
COMMUNITY

## 2024-04-25 RX ORDER — HEPARIN SODIUM 200 [USP'U]/100ML
INJECTION, SOLUTION INTRAVENOUS
Status: DISCONTINUED
Start: 2024-04-25 | End: 2024-04-26 | Stop reason: HOSPADM

## 2024-04-25 RX ORDER — HEPARIN SODIUM (PORCINE) LOCK FLUSH IV SOLN 100 UNIT/ML 100 UNIT/ML
SOLUTION INTRAVENOUS
Status: COMPLETED
Start: 2024-04-25 | End: 2024-04-25

## 2024-04-25 RX ORDER — CHOLECALCIFEROL (VITAMIN D3) 125 MCG
5 CAPSULE ORAL NIGHTLY PRN
COMMUNITY

## 2024-04-25 RX ORDER — FERROUS GLUCONATE 324(38)MG
324 TABLET ORAL
COMMUNITY

## 2024-04-25 RX ORDER — BENZONATATE 100 MG/1
100 CAPSULE ORAL EVERY 8 HOURS PRN
COMMUNITY

## 2024-04-25 RX ORDER — FENTANYL CITRATE 50 UG/ML
INJECTION, SOLUTION INTRAMUSCULAR; INTRAVENOUS
Status: DISCONTINUED
Start: 2024-04-25 | End: 2024-04-26 | Stop reason: HOSPADM

## 2024-04-25 RX ORDER — SODIUM CHLORIDE 9 MG/ML
40 INJECTION, SOLUTION INTRAVENOUS AS NEEDED
Status: DISCONTINUED | OUTPATIENT
Start: 2024-04-25 | End: 2024-04-27 | Stop reason: HOSPADM

## 2024-04-25 RX ADMIN — FENTANYL CITRATE 50 MCG: 50 INJECTION, SOLUTION INTRAMUSCULAR; INTRAVENOUS at 14:13

## 2024-04-25 RX ADMIN — LIDOCAINE HYDROCHLORIDE AND EPINEPHRINE 8 ML: 10; 10 INJECTION, SOLUTION INFILTRATION; PERINEURAL at 14:24

## 2024-04-25 RX ADMIN — HEPARIN 500 UNITS: 100 SYRINGE at 14:24

## 2024-04-25 RX ADMIN — FENTANYL CITRATE 25 MCG: 50 INJECTION, SOLUTION INTRAMUSCULAR; INTRAVENOUS at 14:16

## 2024-04-25 RX ADMIN — MIDAZOLAM HYDROCHLORIDE 1 MG: 1 INJECTION, SOLUTION INTRAMUSCULAR; INTRAVENOUS at 14:13

## 2024-04-25 NOTE — NURSING NOTE
Pt discharged from outpatient prep and recovery s/p implanted port placement. Pt tolerated procedure without complications. Dressing to site right chest noted clean dry and intact. Site without evidence of hematoma. Discharge instructions reviewed with patient who verbalized understanding. Written instructions provided as well to be given to snf upon his return. Pt was provided with power port information packet at time of discharge. Pt transported to exit via wheelchair per nurse. Pt being transported by [facility] vehicle to return him to [facility].

## 2024-04-25 NOTE — POST-PROCEDURE NOTE
The following procedure was performed: Port pl    Please see corresponding Radiology report for in detail procedural information. The Radiology report will be dictated shortly, if not done so already. Please see the IR RN note for the information regarding medicines administered if any, gale-procedural vitals and I/O information.

## 2024-04-25 NOTE — DISCHARGE INSTRUCTIONS
Keep dressing clean and dry for 48 hours. After 48 hours, you may removed dressing and leave open to air. Leave the skin adhesive in place as it will come off on it's own. You may shower after 48 hours. Avoid rubbing the area and pat it dry gently.     Avoid lifting anything over 10 pounds or any other strenuous activities such as pulling, pushing or tugging for the next 4-5 days.     Monitor the incision and puncture site for any redness, drainage, or separation of the incision. Contact the provider for any issues with site or if you develop a fever above 101.     Some bruising and soreness is to be expected. You may take over-the-counter medications for any discomfort or utilize an ice pack for 20 minutes at a time for the first 24 hours.      Seek immediate medical attention for any uncontrolled pain in shoulder/chest/neck on procedural side, any difficulty breathing or excessive bruising/bleeding.     You may use the implanted port immediately. If not in use for extended period of time, port must be accessed and flushed once every month.

## 2024-04-25 NOTE — PRE-PROCEDURE NOTE
[facility]   Vascular Interventional Radiology  History & Physicial        Patient Name:Val Nassar Jr.    : 1960    MRN: [id number]    Primary Care Physician: Wesly Minor MD    Referring Physician: Katerina Ga MD     Date of admission: 2024    Subjective     Reason for Consult: Port pl    History of Present Illness     Val Nassar Jr. is a 63 y.o. male referred to IR as noted above.      Active Hospital Problems:  There are no active hospital problems to display for this patient.      Personal History     Past Medical History:   Diagnosis Date    Anemia     Cancer     Cirrhosis     Duodenal ulcer     Gastric ulcer     GERD (gastroesophageal reflux disease)     History of alcohol abuse     HLD (hyperlipidemia)     Mood disorder        Past Surgical History:   Procedure Laterality Date    ENDOSCOPY N/A 2023    Procedure: ESOPHAGOGASTRODUODENOSCOPY;  Surgeon: Wesly Mckeon MD;  Location: [facility] ENDOSCOPY;  Service: Gastroenterology;  Laterality: N/A;       Family History: His family history includes Breast cancer in his mother; Cancer in his mother; Heart attack in his father.     Social History: He  reports that he has been smoking cigarettes. He has a 15 pack-year smoking history. He has never used smokeless tobacco. He reports that he does not currently use alcohol. He reports current drug use. Drug: Hydrocodone.    Home Medications:  Lidocaine, Loratadine, Vitamin D3, acetaminophen, benzonatate, docusate sodium, dutasteride, escitalopram, fentaNYL, ferrous gluconate, finasteride, folic acid, gabapentin, guaiFENesin, lactulose, melatonin, nystatin susp + lidocaine viscous, omeprazole, ondansetron, oxyCODONE, pantoprazole, phenol, polyethylene glycol, predniSONE, promethazine, sodium chloride, tamsulosin, and vitamin B-12    Current Medications:    fentaNYL citrate (PF)    heparin (porcine)    heparin    lidocaine 1% - EPINEPHrine [id number]     "midazolam    sodium chloride    sodium chloride    sodium chloride     Allergies:  No Known Allergies    Review of Systems    IR Procedure pertinent significant findings are mentioned in the PMH and PSH above.    Objective     Visit Vitals  /68 (BP Location: Left arm, Patient Position: Lying)   Pulse 65   Temp 97.5 °F (36.4 °C) (Temporal)   Ht 175.3 cm (69\")   Wt 75.8 kg (167 lb)   SpO2 95%   BMI 24.66 kg/m²        Physical Exam    A&Ox3.   Able to communicate  No Apparent Distress  Average physique   CVS: VS as noted. Chart reviewed. Stable for the procedure.  Respiratory: Non labored breathing. No signs of respiratory compromise.    Result Review      I have personally reviewed the results from the time of this admission to 4/25/2024 13:48 EDT and agree with these findings.  [x]  Laboratory  []  Microbiology  [x]  Radiology  []  EKG/Telemetry   []  Cardiology/Vascular   []  Pathology  []  Old records  []  Other:    Most notable findings include: As noted:    Results from last 7 days   Lab Units 04/25/24  0953 04/22/24  0947   INR  0.97  --    WBC 10*3/mm3  --  2.52*   HEMOGLOBIN g/dL  --  9.3*   HEMATOCRIT %  --  31.3*   PLATELETS 10*3/mm3  --  171       Results from last 7 days   Lab Units 04/22/24  0947   SODIUM mmol/L 140   POTASSIUM mmol/L 4.2   CHLORIDE mmol/L 104   CO2 mmol/L 28.0   BUN mg/dL 10   CREATININE mg/dL 0.55*   EGFR mL/min/1.73 110.7   GLUCOSE mg/dL 98           Lab 04/22/24  0947   TOTAL PROTEIN 7.1   ALBUMIN 3.8   GLOBULIN 3.3   ALT (SGPT) 16   AST (SGOT) 24   BILIRUBIN 0.3   ALK PHOS 152*       Estimated Creatinine Clearance: 145.5 mL/min (A) (by C-G formula based on SCr of 0.55 mg/dL (L)). No results found for: \"CREATININE\"    COVID19   Date Value Ref Range Status   12/26/2023 Not Detected Not Detected - Ref. Range Final        No results found for: \"PREGTESTUR\", \"PREGSERUM\", \"HCG\", \"HCGQUANT\"     ASA SCALE ASSESSMENT (applicable ONLY if sedation planned):   3     MALLAMPATI " CLASSIFICATION (applicable ONLY if sedation planned):   1    Assessment / Plan     Val Nassar Jr. is a 63 y.o. male referred to the IR service with above problem.    Plan:   As above.    Notice: The note was created before the performance of the procedure. It might have been left in the pending status and signed off after the procedure. The time stamp on the note may be misleading.    Silverio Tadeo MD   Vascular Interventional Radiology  04/25/24   1:48 PM EDT

## 2024-04-25 NOTE — NURSING NOTE
Image guided 8 [place] Smartport placed to Right IJ by Dr Tadeo in Interventional Radiology.  Patient tolerated procedure well. Patient given 75 mcg Fentanyl & 1 mg Versed with a sedation time of 20 minutes. Report called to nurse.

## 2024-04-26 ENCOUNTER — TELEPHONE (OUTPATIENT)
Dept: INFUSION THERAPY | Facility: HOSPITAL | Age: 64
End: 2024-04-26
Payer: MEDICAID

## 2024-04-29 ENCOUNTER — HOSPITAL ENCOUNTER (OUTPATIENT)
Dept: RADIATION ONCOLOGY | Facility: HOSPITAL | Age: 64
Discharge: HOME | End: 2024-04-29
Payer: MEDICAID

## 2024-04-29 ENCOUNTER — HOSPITAL ENCOUNTER (OUTPATIENT)
Dept: ONCOLOGY | Facility: HOSPITAL | Age: 64
Discharge: HOME | End: 2024-04-29
Admitting: INTERNAL MEDICINE
Payer: MEDICAID

## 2024-04-29 VITALS
HEART RATE: 77 BPM | RESPIRATION RATE: 16 BRPM | HEIGHT: 69 IN | SYSTOLIC BLOOD PRESSURE: 131 MMHG | BODY MASS INDEX: 25.03 KG/M2 | DIASTOLIC BLOOD PRESSURE: 66 MMHG | TEMPERATURE: 98 F | WEIGHT: 169 LBS

## 2024-04-29 DIAGNOSIS — C15.3 MALIGNANT NEOPLASM OF UPPER THIRD OF ESOPHAGUS: Primary | ICD-10-CM

## 2024-04-29 DIAGNOSIS — C15.9 SQUAMOUS CELL CARCINOMA OF ESOPHAGUS: ICD-10-CM

## 2024-04-29 LAB
ALBUMIN SERPL-MCNC: 3.8 G/DL (ref 3.5–5.2)
ALBUMIN/GLOB SERPL: 1.2 G/DL
ALP SERPL-CCNC: 159 U/L (ref 39–117)
ALT SERPL W P-5'-P-CCNC: 17 U/L (ref 1–41)
ANION GAP SERPL CALCULATED.3IONS-SCNC: 10 MMOL/L (ref 5–15)
AST SERPL-CCNC: 21 U/L (ref 1–40)
BASOPHILS # BLD AUTO: 0.04 10*3/MM3 (ref 0–0.2)
BASOPHILS NFR BLD AUTO: 0.8 % (ref 0–1.5)
BILIRUB SERPL-MCNC: 0.3 MG/DL (ref 0–1.2)
BUN SERPL-MCNC: 8 MG/DL (ref 8–23)
BUN/CREAT SERPL: 14.5 (ref 7–25)
CALCIUM SPEC-SCNC: 9.3 MG/DL (ref 8.6–10.5)
CHLORIDE SERPL-SCNC: 102 MMOL/L (ref 98–107)
CO2 SERPL-SCNC: 26 MMOL/L (ref 22–29)
CREAT SERPL-MCNC: 0.55 MG/DL (ref 0.76–1.27)
EGFRCR SERPLBLD CKD-EPI 2021: 110.7 ML/MIN/1.73
EOSINOPHIL # BLD AUTO: 0.07 10*3/MM3 (ref 0–0.4)
EOSINOPHIL NFR BLD AUTO: 1.5 % (ref 0.3–6.2)
ERYTHROCYTE [DISTWIDTH] IN BLOOD BY AUTOMATED COUNT: ABNORMAL %
GLOBULIN UR ELPH-MCNC: 3.1 GM/DL
GLUCOSE SERPL-MCNC: 115 MG/DL (ref 65–99)
HCT VFR BLD AUTO: 31.8 % (ref 37.5–51)
HGB BLD-MCNC: 9.9 G/DL (ref 13–17.7)
IMM GRANULOCYTES # BLD AUTO: 0.05 10*3/MM3 (ref 0–0.05)
IMM GRANULOCYTES NFR BLD AUTO: 1 % (ref 0–0.5)
LYMPHOCYTES # BLD AUTO: 0.53 10*3/MM3 (ref 0.7–3.1)
LYMPHOCYTES NFR BLD AUTO: 11 % (ref 19.6–45.3)
MCH RBC QN AUTO: 26.8 PG (ref 26.6–33)
MCHC RBC AUTO-ENTMCNC: 31.1 G/DL (ref 31.5–35.7)
MCV RBC AUTO: 85.9 FL (ref 79–97)
MONOCYTES # BLD AUTO: 1.13 10*3/MM3 (ref 0.1–0.9)
MONOCYTES NFR BLD AUTO: 23.5 % (ref 5–12)
NEUTROPHILS NFR BLD AUTO: 2.98 10*3/MM3 (ref 1.7–7)
NEUTROPHILS NFR BLD AUTO: 62.2 % (ref 42.7–76)
PLATELET # BLD AUTO: 134 10*3/MM3 (ref 140–450)
PMV BLD AUTO: 8.7 FL (ref 6–12)
POTASSIUM SERPL-SCNC: 4.1 MMOL/L (ref 3.5–5.2)
PROT SERPL-MCNC: 6.9 G/DL (ref 6–8.5)
RBC # BLD AUTO: 3.7 10*6/MM3 (ref 4.14–5.8)
SODIUM SERPL-SCNC: 138 MMOL/L (ref 136–145)
WBC NRBC COR # BLD AUTO: 4.8 10*3/MM3 (ref 3.4–10.8)

## 2024-04-29 PROCEDURE — 85025 COMPLETE CBC W/AUTO DIFF WBC: CPT | Performed by: INTERNAL MEDICINE

## 2024-04-29 PROCEDURE — 77386: CPT | Performed by: RADIOLOGY

## 2024-04-29 PROCEDURE — 36591 DRAW BLOOD OFF VENOUS DEVICE: CPT

## 2024-04-29 PROCEDURE — 25010000002 HEPARIN LOCK FLUSH PER 10 UNITS: Performed by: INTERNAL MEDICINE

## 2024-04-29 PROCEDURE — 80053 COMPREHEN METABOLIC PANEL: CPT | Performed by: INTERNAL MEDICINE

## 2024-04-29 RX ORDER — SODIUM CHLORIDE 0.9 % (FLUSH) 0.9 %
10 SYRINGE (ML) INJECTION AS NEEDED
Status: CANCELLED | OUTPATIENT
Start: 2024-04-29

## 2024-04-29 RX ORDER — HEPARIN SODIUM (PORCINE) LOCK FLUSH IV SOLN 100 UNIT/ML 100 UNIT/ML
500 SOLUTION INTRAVENOUS AS NEEDED
Status: DISCONTINUED | OUTPATIENT
Start: 2024-04-29 | End: 2024-04-30 | Stop reason: HOSPADM

## 2024-04-29 RX ORDER — HEPARIN SODIUM (PORCINE) LOCK FLUSH IV SOLN 100 UNIT/ML 100 UNIT/ML
500 SOLUTION INTRAVENOUS AS NEEDED
Status: CANCELLED | OUTPATIENT
Start: 2024-04-29

## 2024-04-29 RX ORDER — LIDOCAINE AND PRILOCAINE 25; 25 MG/G; MG/G
1 CREAM TOPICAL AS NEEDED
Qty: 30 G | Refills: 3 | Status: SHIPPED | OUTPATIENT
Start: 2024-04-29

## 2024-04-29 RX ADMIN — HEPARIN 500 UNITS: 100 SYRINGE at 10:25

## 2024-04-30 ENCOUNTER — OFFICE VISIT (OUTPATIENT)
Dept: ONCOLOGY | Facility: CLINIC | Age: 64
End: 2024-04-30
Payer: MEDICAID

## 2024-04-30 ENCOUNTER — HOSPITAL ENCOUNTER (OUTPATIENT)
Dept: ONCOLOGY | Facility: HOSPITAL | Age: 64
Discharge: HOME | End: 2024-04-30
Admitting: INTERNAL MEDICINE
Payer: MEDICAID

## 2024-04-30 ENCOUNTER — HOSPITAL ENCOUNTER (OUTPATIENT)
Dept: RADIATION ONCOLOGY | Facility: HOSPITAL | Age: 64
Discharge: HOME | End: 2024-04-30

## 2024-04-30 ENCOUNTER — DOCUMENTATION (OUTPATIENT)
Dept: NUTRITION | Facility: HOSPITAL | Age: 64
End: 2024-04-30
Payer: MEDICAID

## 2024-04-30 VITALS — BODY MASS INDEX: 24.72 KG/M2 | WEIGHT: 167.4 LBS

## 2024-04-30 VITALS
DIASTOLIC BLOOD PRESSURE: 58 MMHG | HEART RATE: 77 BPM | HEIGHT: 69 IN | TEMPERATURE: 98 F | BODY MASS INDEX: 24.73 KG/M2 | SYSTOLIC BLOOD PRESSURE: 124 MMHG | WEIGHT: 167 LBS | OXYGEN SATURATION: 97 %

## 2024-04-30 VITALS — HEART RATE: 65 BPM | RESPIRATION RATE: 16 BRPM | SYSTOLIC BLOOD PRESSURE: 134 MMHG | DIASTOLIC BLOOD PRESSURE: 58 MMHG

## 2024-04-30 DIAGNOSIS — D50.0 IRON DEFICIENCY ANEMIA DUE TO CHRONIC BLOOD LOSS: Primary | ICD-10-CM

## 2024-04-30 DIAGNOSIS — D62 ACUTE BLOOD LOSS ANEMIA: ICD-10-CM

## 2024-04-30 DIAGNOSIS — C15.9 SQUAMOUS CELL CARCINOMA OF ESOPHAGUS: ICD-10-CM

## 2024-04-30 DIAGNOSIS — C15.3 MALIGNANT NEOPLASM OF UPPER THIRD OF ESOPHAGUS: Primary | ICD-10-CM

## 2024-04-30 DIAGNOSIS — C15.3 MALIGNANT NEOPLASM OF UPPER THIRD OF ESOPHAGUS: ICD-10-CM

## 2024-04-30 PROCEDURE — 25010000002 CARBOPLATIN PER 50 MG: Performed by: INTERNAL MEDICINE

## 2024-04-30 PROCEDURE — 25010000002 DEXAMETHASONE SODIUM PHOSPHATE 100 MG/10ML SOLUTION: Performed by: INTERNAL MEDICINE

## 2024-04-30 PROCEDURE — 25810000003 SODIUM CHLORIDE 0.9 % SOLUTION 250 ML FLEX CONT: Performed by: INTERNAL MEDICINE

## 2024-04-30 PROCEDURE — 25010000002 PALONOSETRON PER 25 MCG: Performed by: INTERNAL MEDICINE

## 2024-04-30 PROCEDURE — 77386: CPT | Performed by: RADIOLOGY

## 2024-04-30 PROCEDURE — 96375 TX/PRO/DX INJ NEW DRUG ADDON: CPT

## 2024-04-30 PROCEDURE — 25010000002 DIPHENHYDRAMINE PER 50 MG: Performed by: INTERNAL MEDICINE

## 2024-04-30 PROCEDURE — 96417 CHEMO IV INFUS EACH ADDL SEQ: CPT

## 2024-04-30 PROCEDURE — 96367 TX/PROPH/DG ADDL SEQ IV INF: CPT

## 2024-04-30 PROCEDURE — 25010000002 HEPARIN LOCK FLUSH PER 10 UNITS: Performed by: INTERNAL MEDICINE

## 2024-04-30 PROCEDURE — 96413 CHEMO IV INFUSION 1 HR: CPT

## 2024-04-30 PROCEDURE — 25010000002 IRON SUCROSE PER 1 MG: Performed by: INTERNAL MEDICINE

## 2024-04-30 PROCEDURE — 25010000002 PACLITAXEL PER 1 MG: Performed by: INTERNAL MEDICINE

## 2024-04-30 PROCEDURE — 1125F AMNT PAIN NOTED PAIN PRSNT: CPT | Performed by: INTERNAL MEDICINE

## 2024-04-30 PROCEDURE — 25810000003 SODIUM CHLORIDE 0.9 % SOLUTION: Performed by: INTERNAL MEDICINE

## 2024-04-30 PROCEDURE — 99214 OFFICE O/P EST MOD 30 MIN: CPT | Performed by: INTERNAL MEDICINE

## 2024-04-30 RX ORDER — SODIUM CHLORIDE 9 MG/ML
20 INJECTION, SOLUTION INTRAVENOUS ONCE
Status: DISCONTINUED | OUTPATIENT
Start: 2024-04-30 | End: 2024-05-01 | Stop reason: HOSPADM

## 2024-04-30 RX ORDER — PALONOSETRON 0.05 MG/ML
0.25 INJECTION, SOLUTION INTRAVENOUS ONCE
Status: CANCELLED | OUTPATIENT
Start: 2024-04-30

## 2024-04-30 RX ORDER — SODIUM CHLORIDE 0.9 % (FLUSH) 0.9 %
10 SYRINGE (ML) INJECTION AS NEEDED
Status: CANCELLED | OUTPATIENT
Start: 2024-04-30

## 2024-04-30 RX ORDER — HEPARIN SODIUM (PORCINE) LOCK FLUSH IV SOLN 100 UNIT/ML 100 UNIT/ML
500 SOLUTION INTRAVENOUS AS NEEDED
Status: CANCELLED | OUTPATIENT
Start: 2024-04-30

## 2024-04-30 RX ORDER — FAMOTIDINE 10 MG/ML
20 INJECTION, SOLUTION INTRAVENOUS AS NEEDED
Status: CANCELLED | OUTPATIENT
Start: 2024-04-30

## 2024-04-30 RX ORDER — FAMOTIDINE 10 MG/ML
20 INJECTION, SOLUTION INTRAVENOUS ONCE
Status: CANCELLED | OUTPATIENT
Start: 2024-04-30

## 2024-04-30 RX ORDER — DIPHENHYDRAMINE HYDROCHLORIDE 50 MG/ML
50 INJECTION INTRAMUSCULAR; INTRAVENOUS AS NEEDED
Status: CANCELLED | OUTPATIENT
Start: 2024-04-30

## 2024-04-30 RX ORDER — DIPHENHYDRAMINE HCL 25 MG
25 CAPSULE ORAL NIGHTLY PRN
Qty: 30 CAPSULE | Refills: 0 | Status: SHIPPED | OUTPATIENT
Start: 2024-04-30

## 2024-04-30 RX ORDER — SODIUM CHLORIDE 9 MG/ML
20 INJECTION, SOLUTION INTRAVENOUS ONCE
Status: CANCELLED | OUTPATIENT
Start: 2024-04-30

## 2024-04-30 RX ORDER — HEPARIN SODIUM (PORCINE) LOCK FLUSH IV SOLN 100 UNIT/ML 100 UNIT/ML
500 SOLUTION INTRAVENOUS AS NEEDED
Status: DISCONTINUED | OUTPATIENT
Start: 2024-04-30 | End: 2024-05-01 | Stop reason: HOSPADM

## 2024-04-30 RX ORDER — SODIUM CHLORIDE 0.9 % (FLUSH) 0.9 %
10 SYRINGE (ML) INJECTION AS NEEDED
Status: DISCONTINUED | OUTPATIENT
Start: 2024-04-30 | End: 2024-05-01 | Stop reason: HOSPADM

## 2024-04-30 RX ORDER — FAMOTIDINE 10 MG/ML
20 INJECTION, SOLUTION INTRAVENOUS ONCE
Status: COMPLETED | OUTPATIENT
Start: 2024-04-30 | End: 2024-04-30

## 2024-04-30 RX ORDER — PALONOSETRON 0.05 MG/ML
0.25 INJECTION, SOLUTION INTRAVENOUS ONCE
Status: COMPLETED | OUTPATIENT
Start: 2024-04-30 | End: 2024-04-30

## 2024-04-30 RX ORDER — SODIUM CHLORIDE 9 MG/ML
20 INJECTION, SOLUTION INTRAVENOUS ONCE
Status: COMPLETED | OUTPATIENT
Start: 2024-04-30 | End: 2024-04-30

## 2024-04-30 RX ADMIN — SODIUM CHLORIDE 20 ML/HR: 9 INJECTION, SOLUTION INTRAVENOUS at 12:24

## 2024-04-30 RX ADMIN — DIPHENHYDRAMINE HYDROCHLORIDE 25 MG: 50 INJECTION INTRAMUSCULAR; INTRAVENOUS at 12:33

## 2024-04-30 RX ADMIN — CARBOPLATIN 230 MG: 10 INJECTION, SOLUTION INTRAVENOUS at 15:16

## 2024-04-30 RX ADMIN — PALONOSETRON HYDROCHLORIDE 0.25 MG: 0.25 INJECTION INTRAVENOUS at 12:28

## 2024-04-30 RX ADMIN — FAMOTIDINE 20 MG: 10 INJECTION, SOLUTION INTRAVENOUS at 12:37

## 2024-04-30 RX ADMIN — DEXAMETHASONE SODIUM PHOSPHATE 12 MG: 10 INJECTION, SOLUTION INTRAMUSCULAR; INTRAVENOUS at 12:31

## 2024-04-30 RX ADMIN — SODIUM CHLORIDE 75 MG: 9 INJECTION, SOLUTION INTRAVENOUS at 14:00

## 2024-04-30 RX ADMIN — Medication 10 ML: at 15:57

## 2024-04-30 RX ADMIN — IRON SUCROSE 200 MG: 20 INJECTION, SOLUTION INTRAVENOUS at 12:58

## 2024-04-30 RX ADMIN — HEPARIN 500 UNITS: 100 SYRINGE at 15:57

## 2024-04-30 NOTE — PROGRESS NOTES
ONC Nutrition    Diagnosis:  Esophageal cancer / esophageal mass at 20 cm from the incisors with several hypermetabolic lymph nodes in the right supraclavicular fossa and superior right mediastinum / clinical stage II     Surgery:     Chemotherapy:  OP LUNG PACLitaxel / CARBOplatin AUC=2 (Weekly) + XRT - start date for chemo 4/3/24     Radiation:  Curative intent therapy / 50-54 Gray to the esophagus and involved adenopathy / supraclavicular fossae bilaterally and the esophagus benefit from a lower dose of radiotherapy to dose of around 45 Laws - 6 remaining treatments    Weight 167.4 / weight stable through treatment    Follow up with patient during RAD ONC status checks.  Patient doing well with progression of treatment.  He describes swallowing difficulty as mild; using MMW, but only swishes with it and spits it out because it adversely affects his digestive tract.  Appetite remains good and per recall of intake, he is eating well with stable weight.  He is modifying his diet for softer consistencies and textures for ease of swallowing.  Encouraged boosting intake of high protein foods.    He does note some difficulty with swallowing water; discussed effects of temperature and trying water at room temperature for easier swallowing.    He supplements 1-2x per day with Boost ONS.    He is constipated and states that he is having increasing difficulty with trying to urinate.  Took a stool softener for constipation 4 days prior; good BM this morning.    Will continue to follow.

## 2024-04-30 NOTE — PROGRESS NOTES
[facility] [place]  Office number [phone]    Fax number [phone]     Follow up     Date: 24    Patient Name: Val Nassar Jr.  MRN: [id number]  : 1960    Chief Complaint: esophageal cancer    Cancer Staging:  Cancer Staging   Stage II (cT2, cN1, cM0)    History of Present Illness: Val Nassar Jr. is a pleasant 63 y.o. male who presents today for evaluation of upper third esophageal squamous cell carcinoma. He has a longstanding history of cirrhosis     He originally was diagnosed with a localized esophageal squamous cell carcinoma of the upper third of the esophagus in the 2023.  At the time he was living in [place].  He underwent an endoscopy confirming cancer diagnosis at [facility] in [facility].  He then relocated to [facility] to live with his sister because of his cancer diagnosis.      He presented to [facility] in 2023 with an upper GI bleed secondary to peptic ulcer disease and possible cholecystitis.  His presenting hemoglobin was 6.9.     EGD performed 2023 showed an ulcerated mass in the upper third of the esophagus which was nonobstructing and partially circumferential spanning 2 cm in length.  Biopsy results are pending.    Additional findings include gastritis, portal hypertension gastropathy and nonbleeding duodenal ulcer.  Duodenal stenosis.    He was discharged to a rehab facility and ultimately discharged home to live with his sister.  He was doing poorly and was unable to care for himself.  He did not keep his appointments with medical oncology, thoracic surgery, or radiation oncology and did not complete staging PET/CT.  He then represented to the hospital in late 2024 with a UTI and confusion.  During that admission he was assessed by palliative care, radiation oncology, and medical oncology.  He elected to be discharged to a skilled nursing facility for long-term  care as he does not want to be a burden on his sister and did not feel that he could care for himself at home.  Prior to discharge there were conversations between case management and social work at the hospital and his facility and the patient was under the impression that he could not receive radiation treatment while living at the skilled facility.  However he did elect to complete PET CT scan and returns for results.    PET/CT performed 3/6/2024 shows thickening of the esophagus with an uptake of 16 by SUV.  There was notable hypermetabolic activity in the left lateral shoulder subcutaneous tissue which corresponds to an area where he received a prior injection at his PCP office for alcohol use which he reports has been raised and pruritic since the injection 6 weeks prior.  He had mild paratracheal adenopathy as well as an right paratracheal lymph node in the right supraclavicular region  He reports that he has been tolerating regular diet with no obstructive symptoms prior to presentation.     He continues to feel weak but is clinically improving.  He tells me he is planning inpatient rehab on discharge.  His hemoglobin continues to improve and is 9.1 today.  CT of the abdomen pelvis in addition to the gallbladder findings showed no metastatic disease.  A chest x-ray this admission showed mild interstitial edema.  MRI cervical spine from 4/12/2024 showed:  1.Active left-sided facet arthropathy at C3/4 and to lesser degree C4/5 with associated surrounding marrow and soft tissue edema.  2.Moderate multifactorial degenerative change of the mid to lower cervical spine otherwise with central canal and neuroforaminal stenosis. Potential exiting nerve contact from C3/4 through C6/7 as detailed above.    Treatment history:  Taxol/carbo/radiation: C1, 4/2/2024; C2, 4/9/24; C3, held, C4, carbo only; C5, carbo and DA taxol    Interval history:  Pain is well controlled. Swallowing still bothersome. Using MMW helps.  Radiation dermitis. No nausea or vomiting.   Hand tingling is about the same. Able to  paperclip for example, but can't feel that he is picking it up. This is no worse since reintroducing carboplatin.  Trouble sleeping. No diarrhea.   Constipation resolved with laxative. Hard to pass He is also having trouble urinating x 1 week during that time but now going. He is on flomax. .   Past Medical History:   Past Medical History:   Diagnosis Date    Anemia     Cancer     Cirrhosis     Duodenal ulcer     Gastric ulcer     GERD (gastroesophageal reflux disease)     History of alcohol abuse     HLD (hyperlipidemia)     Mood disorder        Past Surgical History:   Past Surgical History:   Procedure Laterality Date    ENDOSCOPY N/A 12/26/2023    Procedure: ESOPHAGOGASTRODUODENOSCOPY;  Surgeon: Wesly Mckeon MD;  Location: [facility] ENDOSCOPY;  Service: Gastroenterology;  Laterality: N/A;       Family History:   Family History   Problem Relation Age of Onset    Cancer Mother     Breast cancer Mother     Heart attack Father        Social History:   Social History     Socioeconomic History    Marital status: Single   Tobacco Use    Smoking status: Every Day     Current packs/day: 1.00     Average packs/day: 1 pack/day for 15.0 years (15.0 ttl pk-yrs)     Types: Cigarettes    Smokeless tobacco: Never   Vaping Use    Vaping status: Some Days    Substances: Flavoring    Devices: Disposable   Substance and Sexual Activity    Alcohol use: Not Currently     Comment: sober for ~ 3 months    Drug use: Yes     Types: Hydrocodone    Sexual activity: Defer       Medications:     Current Outpatient Medications:     acetaminophen (TYLENOL) 325 MG tablet, Take 2 tablets by mouth Every 4 (Four) Hours As Needed for Mild Pain., Disp: , Rfl:     benzonatate (TESSALON) 100 MG capsule, Take 1 capsule by mouth Every 8 (Eight) Hours As Needed for Cough., Disp: , Rfl:     Cholecalciferol (Vitamin D3) 1.25 MG (02383 UT) capsule, Take 1 capsule by  mouth Every 7 (Seven) Days., Disp: , Rfl:     docusate sodium (Colace) 100 MG capsule, Take 1 capsule by mouth 2 (Two) Times a Day As Needed for Constipation., Disp: 30 capsule, Rfl: 1    dutasteride (AVODART) 0.5 MG capsule, Take 1 capsule by mouth Daily., Disp: , Rfl:     escitalopram (LEXAPRO) 10 MG tablet, Take 1 tablet by mouth Every Night., Disp: , Rfl:     fentaNYL (DURAGESIC) 12 MCG/HR, Place  on the skin as directed by provider., Disp: , Rfl:     fentaNYL (DURAGESIC) 25 MCG/HR patch, Place 1 patch on the skin as directed by provider Every 72 (Seventy-Two) Hours., Disp: , Rfl:     ferrous gluconate (FERGON) 324 MG tablet, Take 1 tablet by mouth Daily With Breakfast., Disp: , Rfl:     finasteride (PROSCAR) 5 MG tablet, Take 1 tablet by mouth Daily., Disp: , Rfl:     folic acid (FOLVITE) 1 MG tablet, Take 1 tablet by mouth Daily., Disp: , Rfl:     gabapentin (NEURONTIN) 300 MG capsule, Take 1 capsule by mouth 3 (Three) Times a Day for 3 days., Disp: 9 capsule, Rfl: 0    gabapentin (NEURONTIN) 800 MG tablet, 1 tablet 3 (Three) Times a Day., Disp: , Rfl:     guaiFENesin (MUCINEX) 600 MG 12 hr tablet, Take 2 tablets by mouth Every 12 (Twelve) Hours As Needed for Cough., Disp: , Rfl:     lactulose (CHRONULAC) 10 GM/15ML solution, Take 30 mL by mouth 2 (Two) Times a Day As Needed., Disp: , Rfl:     Lidocaine 4 %, Place 1 patch on the skin as directed by provider Daily. Remove & Discard patch within 12 hours or as directed by MD, Disp: , Rfl:     lidocaine-prilocaine (EMLA) 2.5-2.5 % cream, Apply 1 Application topically to the appropriate area as directed As Needed (45-60 minutes prior to port access.  Cover with saran/plastic wrap.)., Disp: 30 g, Rfl: 3    Loratadine 10 MG capsule, Take  by mouth Daily., Disp: , Rfl:     melatonin 5 MG tablet tablet, Take 1 tablet by mouth At Night As Needed., Disp: , Rfl:     nystatin susp + lidocaine viscous (MAGIC MOUTHWASH) oral suspension, 5-10 ml swish and spit or swallow QID  "prn, Disp: 240 mL, Rfl: 3    omeprazole (priLOSEC) 40 MG capsule, Take  by mouth., Disp: , Rfl:     ondansetron (ZOFRAN) 8 MG tablet, Take 1 tablet by mouth Every 8 (Eight) Hours As Needed for Nausea or Vomiting., Disp: 30 tablet, Rfl: 3    oxyCODONE (ROXICODONE) 5 MG immediate release tablet, Take 1 tablet by mouth Every 6 (Six) Hours As Needed for Moderate Pain. (Patient taking differently: Take 1.5 tablets by mouth Every 6 (Six) Hours As Needed for Moderate Pain.), Disp: 12 tablet, Rfl: 0    pantoprazole (PROTONIX) 40 MG EC tablet, Take 1 tablet by mouth 2 (Two) Times a Day., Disp: , Rfl:     phenol (CHLORASEPTIC) 1.4 % liquid liquid, Apply 1 spray to the mouth or throat Every 4 (Four) Hours As Needed., Disp: , Rfl:     polyethylene glycol (MIRALAX) 17 g packet, Take 17 g by mouth Daily As Needed (Use if senna-docusate is ineffective)., Disp: , Rfl:     predniSONE (DELTASONE) 10 MG tablet, Take daily based on following titration schedule: 60 mg x 1 day, then 50 mg x 1, then 40 mg x 1, then 30 mg x 1 day, then 20 mg x 1 day, then 10 mg x 1 day then stop, Disp: 21 tablet, Rfl: 0    promethazine (PHENERGAN) 25 MG tablet, Take 1 tablet by mouth Every 6 (Six) Hours As Needed for Nausea or Vomiting., Disp: , Rfl:     sodium chloride (Ocean Nasal Spray) 0.65 % nasal spray, 1 spray into the nostril(s) as directed by provider As Needed for Congestion., Disp: 1 each, Rfl: 0    tamsulosin (FLOMAX) 0.4 MG capsule 24 hr capsule, Take 1 capsule by mouth Daily., Disp: , Rfl:     vitamin B-12 (CYANOCOBALAMIN) 1000 MCG tablet, Take 1 tablet by mouth Daily., Disp: , Rfl:   No current facility-administered medications for this visit.    Allergies:   No Known Allergies    Objective     Vital Signs:   Vitals:    04/30/24 1019   BP: 124/58   Pulse: 77   Temp: 98 °F (36.7 °C)   TempSrc: Temporal   SpO2: 97%   Weight: 75.8 kg (167 lb)   Height: 175.3 cm (69.02\")   PainSc:   6    Body mass index is 24.65 kg/m².   Pain Score    04/30/24 " 1019   PainSc:   6       ECOG Performance Status: 2    Physical Exam:   General: No acute distress. Well appearing   HEENT: Normocephalic, atraumatic.   Neck: supple, no adenopathy.   Cardiovascular: regular rate and rhythm. No murmurs.   Respiratory: Normal rate. Clear to auscultation bilaterally  Abdomen: Soft, nontender, non distended with normoactive bowel sounds  Lymph: no cervical, supraclavicular or axillary adenopathy  Neuro: Alert and oriented x 3. No focal deficits.   Ext: Symmetric, no swelling.   Skin radiation dermatitis    Laboratory/Imaging Reviewed:   Hospital Outpatient Visit on 04/29/2024   Component Date Value Ref Range Status    Glucose 04/29/2024 115 (H)  65 - 99 mg/dL Final    BUN 04/29/2024 8  8 - 23 mg/dL Final    Creatinine 04/29/2024 0.55 (L)  0.76 - 1.27 mg/dL Final    Sodium 04/29/2024 138  136 - 145 mmol/L Final    Potassium 04/29/2024 4.1  3.5 - 5.2 mmol/L Final    Chloride 04/29/2024 102  98 - 107 mmol/L Final    CO2 04/29/2024 26.0  22.0 - 29.0 mmol/L Final    Calcium 04/29/2024 9.3  8.6 - 10.5 mg/dL Final    Total Protein 04/29/2024 6.9  6.0 - 8.5 g/dL Final    Albumin 04/29/2024 3.8  3.5 - 5.2 g/dL Final    ALT (SGPT) 04/29/2024 17  1 - 41 U/L Final    AST (SGOT) 04/29/2024 21  1 - 40 U/L Final    Alkaline Phosphatase 04/29/2024 159 (H)  39 - 117 U/L Final    Total Bilirubin 04/29/2024 0.3  0.0 - 1.2 mg/dL Final    Globulin 04/29/2024 3.1  gm/dL Final    Calculated Result    A/G Ratio 04/29/2024 1.2  g/dL Final    BUN/Creatinine Ratio 04/29/2024 14.5  7.0 - 25.0 Final    Anion Gap 04/29/2024 10.0  5.0 - 15.0 mmol/L Final    eGFR 04/29/2024 110.7  >60.0 mL/min/1.73 Final    WBC 04/29/2024 4.80  3.40 - 10.80 10*3/mm3 Final    RBC 04/29/2024 3.70 (L)  4.14 - 5.80 10*6/mm3 Final    Hemoglobin 04/29/2024 9.9 (L)  13.0 - 17.7 g/dL Final    Hematocrit 04/29/2024 31.8 (L)  37.5 - 51.0 % Final    MCV 04/29/2024 85.9  79.0 - 97.0 fL Final    MCH 04/29/2024 26.8  26.6 - 33.0 pg Final    MCHC  04/29/2024 31.1 (L)  31.5 - 35.7 g/dL Final    RDW 04/29/2024    Final    Unable to calculate      MPV 04/29/2024 8.7  6.0 - 12.0 fL Final    Platelets 04/29/2024 134 (L)  140 - 450 10*3/mm3 Final    Neutrophil % 04/29/2024 62.2  42.7 - 76.0 % Final    Lymphocyte % 04/29/2024 11.0 (L)  19.6 - 45.3 % Final    Monocyte % 04/29/2024 23.5 (H)  5.0 - 12.0 % Final    Eosinophil % 04/29/2024 1.5  0.3 - 6.2 % Final    Basophil % 04/29/2024 0.8  0.0 - 1.5 % Final    Immature Grans % 04/29/2024 1.0 (H)  0.0 - 0.5 % Final    Neutrophils, Absolute 04/29/2024 2.98  1.70 - 7.00 10*3/mm3 Final    Lymphocytes, Absolute 04/29/2024 0.53 (L)  0.70 - 3.10 10*3/mm3 Final    Monocytes, Absolute 04/29/2024 1.13 (H)  0.10 - 0.90 10*3/mm3 Final    Eosinophils, Absolute 04/29/2024 0.07  0.00 - 0.40 10*3/mm3 Final    Basophils, Absolute 04/29/2024 0.04  0.00 - 0.20 10*3/mm3 Final    Immature Grans, Absolute 04/29/2024 0.05  0.00 - 0.05 10*3/mm3 Final   Hospital Outpatient Visit on 04/25/2024   Component Date Value Ref Range Status    Protime 04/25/2024 13.0  12.2 - 14.5 Seconds Final    INR 04/25/2024 0.97  0.89 - 1.12 Final   Hospital Outpatient Visit on 04/22/2024   Component Date Value Ref Range Status    Glucose 04/22/2024 98  65 - 99 mg/dL Final    BUN 04/22/2024 10  8 - 23 mg/dL Final    Creatinine 04/22/2024 0.55 (L)  0.76 - 1.27 mg/dL Final    Sodium 04/22/2024 140  136 - 145 mmol/L Final    Potassium 04/22/2024 4.2  3.5 - 5.2 mmol/L Final    Chloride 04/22/2024 104  98 - 107 mmol/L Final    CO2 04/22/2024 28.0  22.0 - 29.0 mmol/L Final    Calcium 04/22/2024 9.6  8.6 - 10.5 mg/dL Final    Total Protein 04/22/2024 7.1  6.0 - 8.5 g/dL Final    Albumin 04/22/2024 3.8  3.5 - 5.2 g/dL Final    ALT (SGPT) 04/22/2024 16  1 - 41 U/L Final    AST (SGOT) 04/22/2024 24  1 - 40 U/L Final    Alkaline Phosphatase 04/22/2024 152 (H)  39 - 117 U/L Final    Total Bilirubin 04/22/2024 0.3  0.0 - 1.2 mg/dL Final    Globulin 04/22/2024 3.3  gm/dL Final     Calculated Result    A/G Ratio 04/22/2024 1.2  g/dL Final    BUN/Creatinine Ratio 04/22/2024 18.2  7.0 - 25.0 Final    Anion Gap 04/22/2024 8.0  5.0 - 15.0 mmol/L Final    eGFR 04/22/2024 110.7  >60.0 mL/min/1.73 Final    WBC 04/22/2024 2.52 (L)  3.40 - 10.80 10*3/mm3 Final    RBC 04/22/2024 3.69 (L)  4.14 - 5.80 10*6/mm3 Final    Hemoglobin 04/22/2024 9.3 (L)  13.0 - 17.7 g/dL Final    Hematocrit 04/22/2024 31.3 (L)  37.5 - 51.0 % Final    MCV 04/22/2024 84.8  79.0 - 97.0 fL Final    MCH 04/22/2024 25.2 (L)  26.6 - 33.0 pg Final    MCHC 04/22/2024 29.7 (L)  31.5 - 35.7 g/dL Final    RDW 04/22/2024 27.3 (H)  12.3 - 15.4 % Final    RDW-SD 04/22/2024 79.1 (H)  37.0 - 54.0 fl Final    MPV 04/22/2024 8.6  6.0 - 12.0 fL Final    Platelets 04/22/2024 171  140 - 450 10*3/mm3 Final    Neutrophil % 04/22/2024 46.8  42.7 - 76.0 % Final    Lymphocyte % 04/22/2024 12.3 (L)  19.6 - 45.3 % Final    Monocyte % 04/22/2024 37.3 (H)  5.0 - 12.0 % Final    Eosinophil % 04/22/2024 1.2  0.3 - 6.2 % Final    Basophil % 04/22/2024 1.6 (H)  0.0 - 1.5 % Final    Immature Grans % 04/22/2024 0.8 (H)  0.0 - 0.5 % Final    Neutrophils, Absolute 04/22/2024 1.18 (L)  1.70 - 7.00 10*3/mm3 Final    Lymphocytes, Absolute 04/22/2024 0.31 (L)  0.70 - 3.10 10*3/mm3 Final    Monocytes, Absolute 04/22/2024 0.94 (H)  0.10 - 0.90 10*3/mm3 Final    Eosinophils, Absolute 04/22/2024 0.03  0.00 - 0.40 10*3/mm3 Final    Basophils, Absolute 04/22/2024 0.04  0.00 - 0.20 10*3/mm3 Final    Immature Grans, Absolute 04/22/2024 0.02  0.00 - 0.05 10*3/mm3 Final       IR Port Placement    Result Date: 4/25/2024  Narrative: IR PORT PLACEMENT  History: starting weekly chemo 4/16  : Silverio Tadeo MD.  Modality: Sonography and fluoroscopy  DOSE REDUCTION: The examination was performed according to departmental dose-optimization program which includes automated exposure control, adjustment of the mA and/or kV. Fluoro time: 0.0 Radiation dose: 0.1 mGy air  Rosa Elena.  SEDATION: Moderate sedation was administered. 1 milligram of Versed and 75 micrograms of fentanyl IV was used for moderate sedation. Total intra service time of sedation was 20 minutes. The sedation was administered and the patient's vital signs monitored throughout the procedure and recorded in the patient's medical record by the nurse under my direct supervision.  Anesthesia: Lidocaine 1% with epinephrine, local infiltration Medicines: None      Estimated blood loss:  < 5 cc.        Technique: A thorough discussion of the risks, benefits, and alternatives of the procedure, and if applicable, moderate sedation, was carried out with the patient. They were encouraged to ask any questions. Any questions were answered. They verbalized understanding. A written informed consent was then signed.  A multi-component timeout was performed prior to starting the procedure using the departmental protocol. The procedure room personnel used personal protective equipment. The operators used sterile gloves and if indicated, sterile gowns. The surgical site was prepped with chlorhexidine gluconate  and draped in the maximal applicable sterile fashion. A preliminary ultrasonogram was performed of the neck that revealed a patent and compressible internal jugular vein. Pertinent ultrasound images were stored in the PACS for documentation. Using aseptic precautions, real-time ultrasound guidance, the internal jugular vein was accessed with a micropuncture needle after local anesthetic infiltration. A 018 guidewire was advanced into the central venous system under fluoroscopic guidance. Over the wire a micropuncture sheath was placed. Through the micropuncture sheath, a 035 wire was advanced into the venous system under fluoroscopic guidance. Over the wire a peel-away sheath was placed. After local anesthesia, using sharp and blunt dissection, a reservoir pocket of appropriate size was created in the infra clavicular chest wall.  The port catheter was connected to the port reservoir. The catheter was tunneled to the venotomy site using a  tunneling device. The the reservoir was placed in the reservoir pocket. The catheter was trimmed to an appropriate length. The catheter was advanced into the venous system through the peel-away sheath which was removed. The port aspirated and flushed well and was terminally packed with heparin 100 units per cc, total 500 units. The port reservoir was irrigated with saline. The incision was closed in layers using absorbable suture and Dermabond. The venotomy site was closed using Dermabond. An aseptic dressing was applied using the protocol for Dermabond. The patient was transferred to the recovery area and was discharged from the department in stable condition.  Complications: None immediate.    Findings: Patent and compressible internal jugular vein. Final image shows the latosha catheter to be in good position with the catheter tip at the cavoatrial junction/right atrium, an excellent position for use. There is no immediate complication.      Impression: Impression:    Successful ultrasound and fluoroscopic guided right internal jugular vein route single lumen Port-A-Cath placement as described above. Thank you for the opportunity to assist in the care of your patient. Electronically Signed: Silverio Tadeo MD  4/25/2024 3:57 PM EDT  Workstation ID: [id number]    MRI Cervical Spine With & Without Contrast    Result Date: 4/12/2024  Narrative: MRI CERVICAL SPINE W WO CONTRAST Date of Exam: 4/12/2024 10:15 AM EDT Indication: bilateral upper extremity weakness with numbness and tingling.  Comparison: PET/CT 3/6/2024 Technique:  Routine multiplanar/multisequence sequence images of the cervical spine were obtained before and after the uneventful administration of 15 cc Multihance.  Findings: ALIGNMENT: There is 1-2 mm of anterior listhesis of C4 on C5 and retrolisthesis of C5 on C6 and C6 on C7. DISK SPACE: There  is moderate lower cervical spondylosis. VERTEBRA: No acute fracture or destructive process. There is a chronic mild superior plate compression fracture of T1 reducing vertebral body height by 10-20%. There is marrow edema within the left C3 and C4 pedicles and facets centered on the C3/4 facet  joint consistent with active arthropathy. CORD: Normal anatomy and signal intensity. CRANIOVETEBRAL JUNCTION: Normal SOFT TISSUES: No mass nor lymphadenopathy. ENHANCEMENT: There is enhancement associated with the marrow edema centered at the left C3/4 facet joint. LEVELS: C2-C3: The posterior disc is unremarkable.  No significant facet arthropathy. No significant neural foraminal or spinal canal stenosis. C3-C4: Broad-based posterior disc osteophyte complex. The AP diameter of the central canal measures 9 mm. Mild uncovertebral perjury and neuroforaminal stenosis on the right. There is advanced active left-sided facet arthropathy with moderate neuroforaminal stenosis and potential exiting nerve contact. C4-C5: Broad-based posterior disc osteophyte complex. AP diameter of the central canal measures 10 mm. There is minimal right and mild to moderate left uncovertebral operatively. There is moderate active left facet arthropathy. There is moderate left neuroforaminal stenosis with potential exiting nerve contact on the left. C5-C6: Broad-based posterior disc osteophyte complex. AP diameter of the central canal measures 10 mm. There is moderate uncovertebral hypertrophy with mild facet arthropathy with moderate to severe neuroforaminal stenosis and probable bilateral exiting nerve contact. C6-C7: Broad-based posterior disc osteophyte complex. AP diameter of the central canal measures 9 mm. Moderate to advanced right and moderate left uncovertebral operatively and mild facet arthropathy. There is severe neuroforaminal stenosis with exiting nerve contact bilaterally C7-T1: The posterior disc is unremarkable. The uncovertebral  joints appear within normal limits. No significant facet arthropathy. No significant neural foraminal or spinal canal stenosis.     Impression: Impression: 1.Active left-sided facet arthropathy at C3/4 and to lesser degree C4/5 with associated surrounding marrow and soft tissue edema. 2.Moderate multifactorial degenerative change of the mid to lower cervical spine otherwise with central canal and neuroforaminal stenosis. Potential exiting nerve contact from C3/4 through C6/7 as detailed above. Electronically Signed: Emanuel Rodriguez MD  4/12/2024 3:06 PM EDT  Workstation ID: [id number]     Procedures      Narrative & Impression   NM PET/CT SKULL BASE TO MID THIGH     Date of Exam: 3/6/2024 2:24 PM EST     Indication: esophogeal ca.     Comparison: Abdomen pelvis CT scan 2/4/2024. No prior PET scan     Technique: 11.8 mCi of F-18 FDG was administered intravenously. PET imaging was obtained from skull base to mid-thigh approximately 60 minutes after radiotracer injection. A low dose non contrast CT was obtained for attenuation correction and anatomic   localization. Fused PET-CT and 3D MIP reconstructions were utilized for image interpretation.  Fasting blood glucose level: 99 mg/dl.     Reference uptake values:  Mediastinum: 2.5 SUVmax  Liver: 3.0 SUVmax  Normalization method: Body Weight     Findings:  History indicates known esophageal cancer undergoing work-up, no treatment to date. Some left-sided neck pain and dysphagia.     Today's 3D images shows a focus of increased uptake in the central upper chest, apparently regional to the esophagus, and 2 small foci of uptake in the right lower neck or superior stomach. There is focal activity in the superficial soft tissues lateral   to the left shoulder. There is normal and normal variant uptake elsewhere.     Multiplanar images show no significant asymmetry of uptake in the brain.     No hypermetabolic activity is seen in the neck down to the right supraclavicular fat,  "where a small lymph node has maximal SUV of 6.6, concerning for metastatic involvement. A couple of small adjacent right upper paratracheal lymph nodes have maximal SUV   of 3.3.      In the proximal thoracic there is focal thickening of the esophagus and strongly hypermetabolic activity, maximal SUV 15.9 apparently corresponding to the patient's known neoplasm. Hypermetabolic activity in the lateral left shoulder subcutaneous   tissues measures 8.0. CT scan appearance suggests potential subcutaneous hematoma. Please correlate with any history of trauma to this area.     No hypermetabolic mediastinal, hilar, chest wall or pulmonary parenchymal mass is seen elsewhere.     Below the diaphragm, no hypermetabolic liver, adrenal, or other solid organ disease is seen. No hypermetabolic adenopathy is appreciated. There is expected GI and  tract activity. Focal activity in the adjacent posterior spinous processes of L3, L4 and   L5 measures approximately 4.1 - 4.2. With reference to the previous abdominal CT scan, sagittal images shows associated DJD, with the posterior spinous processes appear hypertrophied and in contact, or so-called \"[place] disease\". No pathologic marrow   space uptake is seen elsewhere. Incidental note is made of a large, approximately 6 cm left bladder diverticulum.  IMPRESSION:  Impression:     1. Intensely hypermetabolic activity in the proximal thoracic esophagus, apparently corresponding to patient's known neoplasm.     2. 3 small normal sized but hypermetabolic nodes respectively in the right supraclavicular fat, and right superior mediastinum.      3. Hypermetabolic activity in the lateral subcutaneous tissues of the left shoulder, which appears to correspond to subcutaneous bruising. Please correlate with any history of trauma to this area.     4. DJD-related uptake in the hypertrophied and degenerated posterior spinous processes of L3, L4 and L5..       Had a fall on right shoulderbut not " the rightgot a injection in the left shoulder  to help him stop drinking at dr. Wesly Albert 6 weeks ago, swelled up and red.     Narrative & Impression   MRI BRAIN WO CONTRAST     Date of Exam: 2/18/2024 5:05 PM EST     Indication: Dizziness, off balance, present x 2 weeks.     Comparison: 2/4/2024 CT     Technique:  Routine multiplanar/multisequence sequence images of the brain were obtained without contrast administration.        Findings:  Diffusion imaging shows no evidence of acute infarct. There are scattered subcortical and periventricular T2 hyperintensities which are nonspecific but likely sequela of mild to moderate small vessel ischemic disease. No evidence of intracranial   hemorrhage.     There is normal ventricular volume. The basal cisterns are patent. There is no evidence of extra-axial fluid collection. There is normal flow voids within the intracranial vessels.     No evidence of fracture or suspicious bony lesion. Paranasal sinusitis with gas/fluid level in the right maxillary sinus. Trace bilateral mastoid air cell effusions. Visualized orbits are unremarkable.     IMPRESSION:  Impression:  No acute intracranial abnormality. No suspicious mass on this noncontrast MRI.     Paranasal sinusitis with gas/fluid level in the right maxillary sinus.           Assessment / Plan      Assessment/Plan:     1.  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  SUV avid right supraclavicular lymph node  3.  Indeterminate paratracheal adenopathy  4.  BUE weakness  5.  Leukpenia  -I personally reviewed his PET/CT.  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  -Hold treatment today given his progressive BUE neuropathy. I am going to start him on a steroid burst. I reviewed his MRI. If symptoms stabilize we will resume. Mild neutropenia noted.   -He is having no improvement in his symptoms  following steroid and now notes some mild lower extremity symptoms. He tolerated retintrodction with carboplatin DA 1.5 for neutropenia. No worsening symptoms. Reintroduce taxol this week with DA 80%, refer to neurosurgery is pending.   -MMR intact  -CBC/CMP reviewed and adequate for treatment    6.  Cancer related pain  -Fentanyl patch plus oxycodone. Fentanyl recently adjusted  -Encouraged him to continue aggressive bowel regimen  -Continue MMW    7.  Normocytic anemia  -Labs reviewed and consistent with iron deficiency anemia despite oral iron trial.  -He is completing IV iron      Follow Up:   1 week     Katerina Ga MD  Hematology and Oncology

## 2024-05-01 ENCOUNTER — HOSPITAL ENCOUNTER (OUTPATIENT)
Dept: RADIATION ONCOLOGY | Facility: HOSPITAL | Age: 64
Setting detail: RADIATION/ONCOLOGY SERIES
End: 2024-05-01
Payer: MEDICAID

## 2024-05-01 ENCOUNTER — HOSPITAL ENCOUNTER (OUTPATIENT)
Dept: RADIATION ONCOLOGY | Facility: HOSPITAL | Age: 64
Setting detail: RADIATION/ONCOLOGY SERIES
Discharge: HOME | End: 2024-05-01
Payer: MEDICAID

## 2024-05-01 DIAGNOSIS — C15.3 MALIGNANT NEOPLASM OF UPPER THIRD OF ESOPHAGUS: Primary | ICD-10-CM

## 2024-05-01 PROCEDURE — 77386: CPT | Performed by: RADIOLOGY

## 2024-05-02 PROCEDURE — 77386: CPT | Performed by: RADIOLOGY

## 2024-05-03 ENCOUNTER — HOSPITAL ENCOUNTER (OUTPATIENT)
Dept: RADIATION ONCOLOGY | Facility: HOSPITAL | Age: 64
Discharge: HOME | End: 2024-05-03

## 2024-05-03 PROCEDURE — 77386: CPT | Performed by: RADIOLOGY

## 2024-05-03 PROCEDURE — 77336 RADIATION PHYSICS CONSULT: CPT | Performed by: RADIOLOGY

## 2024-05-06 ENCOUNTER — LAB (OUTPATIENT)
Dept: LAB | Facility: HOSPITAL | Age: 64
End: 2024-05-06
Payer: MEDICAID

## 2024-05-06 ENCOUNTER — OFFICE VISIT (OUTPATIENT)
Dept: PALLIATIVE CARE | Facility: CLINIC | Age: 64
End: 2024-05-06
Payer: MEDICAID

## 2024-05-06 ENCOUNTER — OFFICE VISIT (OUTPATIENT)
Dept: NEUROSURGERY | Facility: CLINIC | Age: 64
End: 2024-05-06
Payer: MEDICAID

## 2024-05-06 ENCOUNTER — HOSPITAL ENCOUNTER (OUTPATIENT)
Dept: RADIATION ONCOLOGY | Facility: HOSPITAL | Age: 64
Discharge: HOME | End: 2024-05-06
Payer: MEDICAID

## 2024-05-06 ENCOUNTER — HOSPITAL ENCOUNTER (OUTPATIENT)
Dept: ONCOLOGY | Facility: HOSPITAL | Age: 64
Discharge: HOME | End: 2024-05-06
Payer: MEDICAID

## 2024-05-06 VITALS
DIASTOLIC BLOOD PRESSURE: 64 MMHG | TEMPERATURE: 97.8 F | HEART RATE: 68 BPM | BODY MASS INDEX: 24.37 KG/M2 | RESPIRATION RATE: 18 BRPM | OXYGEN SATURATION: 98 % | WEIGHT: 165.1 LBS | SYSTOLIC BLOOD PRESSURE: 107 MMHG

## 2024-05-06 VITALS
RESPIRATION RATE: 16 BRPM | TEMPERATURE: 97.2 F | HEART RATE: 82 BPM | WEIGHT: 165.3 LBS | DIASTOLIC BLOOD PRESSURE: 51 MMHG | BODY MASS INDEX: 24.48 KG/M2 | SYSTOLIC BLOOD PRESSURE: 98 MMHG | HEIGHT: 69 IN

## 2024-05-06 VITALS — HEIGHT: 69 IN | WEIGHT: 165 LBS | TEMPERATURE: 98.2 F | BODY MASS INDEX: 24.44 KG/M2

## 2024-05-06 DIAGNOSIS — Z51.81 THERAPEUTIC DRUG MONITORING: Primary | ICD-10-CM

## 2024-05-06 DIAGNOSIS — F17.210 NICOTINE DEPENDENCE, CIGARETTES, UNCOMPLICATED: ICD-10-CM

## 2024-05-06 DIAGNOSIS — M54.2 NECK PAIN: Primary | ICD-10-CM

## 2024-05-06 DIAGNOSIS — Z92.3 H/O HEAD AND NECK RADIATION: ICD-10-CM

## 2024-05-06 DIAGNOSIS — C15.9 SQUAMOUS CELL CARCINOMA OF ESOPHAGUS: ICD-10-CM

## 2024-05-06 DIAGNOSIS — C15.3 MALIGNANT NEOPLASM OF UPPER THIRD OF ESOPHAGUS: ICD-10-CM

## 2024-05-06 DIAGNOSIS — G89.3 CANCER RELATED PAIN: ICD-10-CM

## 2024-05-06 DIAGNOSIS — C15.9 SQUAMOUS CELL CARCINOMA OF ESOPHAGUS: Primary | ICD-10-CM

## 2024-05-06 LAB
ALBUMIN SERPL-MCNC: 3.7 G/DL (ref 3.5–5.2)
ALBUMIN/GLOB SERPL: 1.1 G/DL
ALP SERPL-CCNC: 141 U/L (ref 39–117)
ALT SERPL W P-5'-P-CCNC: 15 U/L (ref 1–41)
AMPHET+METHAMPHET UR QL: NEGATIVE
AMPHETAMINES UR QL: NEGATIVE
ANION GAP SERPL CALCULATED.3IONS-SCNC: 10 MMOL/L (ref 5–15)
AST SERPL-CCNC: 20 U/L (ref 1–40)
BARBITURATES UR QL SCN: NEGATIVE
BASOPHILS # BLD AUTO: 0.02 10*3/MM3 (ref 0–0.2)
BASOPHILS NFR BLD AUTO: 0.3 % (ref 0–1.5)
BENZODIAZ UR QL SCN: NEGATIVE
BILIRUB SERPL-MCNC: 0.4 MG/DL (ref 0–1.2)
BUN SERPL-MCNC: 17 MG/DL (ref 8–23)
BUN/CREAT SERPL: 27.4 (ref 7–25)
BUPRENORPHINE SERPL-MCNC: NEGATIVE NG/ML
CALCIUM SPEC-SCNC: 9.3 MG/DL (ref 8.6–10.5)
CANNABINOIDS SERPL QL: NEGATIVE
CHLORIDE SERPL-SCNC: 100 MMOL/L (ref 98–107)
CO2 SERPL-SCNC: 26 MMOL/L (ref 22–29)
COCAINE UR QL: NEGATIVE
CREAT SERPL-MCNC: 0.62 MG/DL (ref 0.76–1.27)
DEPRECATED RDW RBC AUTO: ABNORMAL FL
EGFRCR SERPLBLD CKD-EPI 2021: 106.7 ML/MIN/1.73
EOSINOPHIL # BLD AUTO: 0.08 10*3/MM3 (ref 0–0.4)
EOSINOPHIL NFR BLD AUTO: 1.3 % (ref 0.3–6.2)
ERYTHROCYTE [DISTWIDTH] IN BLOOD BY AUTOMATED COUNT: ABNORMAL %
FENTANYL UR-MCNC: POSITIVE NG/ML
GLOBULIN UR ELPH-MCNC: 3.5 GM/DL
GLUCOSE SERPL-MCNC: 123 MG/DL (ref 65–99)
HCT VFR BLD AUTO: 31.7 % (ref 37.5–51)
HGB BLD-MCNC: 9.8 G/DL (ref 13–17.7)
IMM GRANULOCYTES # BLD AUTO: 0.02 10*3/MM3 (ref 0–0.05)
IMM GRANULOCYTES NFR BLD AUTO: 0.3 % (ref 0–0.5)
LYMPHOCYTES # BLD AUTO: 0.42 10*3/MM3 (ref 0.7–3.1)
LYMPHOCYTES NFR BLD AUTO: 7 % (ref 19.6–45.3)
MCH RBC QN AUTO: 26.6 PG (ref 26.6–33)
MCHC RBC AUTO-ENTMCNC: 30.9 G/DL (ref 31.5–35.7)
MCV RBC AUTO: 85.9 FL (ref 79–97)
METHADONE UR QL SCN: NEGATIVE
MONOCYTES # BLD AUTO: 0.34 10*3/MM3 (ref 0.1–0.9)
MONOCYTES NFR BLD AUTO: 5.6 % (ref 5–12)
NEUTROPHILS NFR BLD AUTO: 5.15 10*3/MM3 (ref 1.7–7)
NEUTROPHILS NFR BLD AUTO: 85.5 % (ref 42.7–76)
OPIATES UR QL: NEGATIVE
OXYCODONE UR QL SCN: POSITIVE
PCP UR QL SCN: NEGATIVE
PLATELET # BLD AUTO: 122 10*3/MM3 (ref 140–450)
PMV BLD AUTO: 9.3 FL (ref 6–12)
POTASSIUM SERPL-SCNC: 4 MMOL/L (ref 3.5–5.2)
PROT SERPL-MCNC: 7.2 G/DL (ref 6–8.5)
RBC # BLD AUTO: 3.69 10*6/MM3 (ref 4.14–5.8)
SODIUM SERPL-SCNC: 136 MMOL/L (ref 136–145)
TRICYCLICS UR QL SCN: NEGATIVE
WBC NRBC COR # BLD AUTO: 6.03 10*3/MM3 (ref 3.4–10.8)

## 2024-05-06 PROCEDURE — 1159F MED LIST DOCD IN RCRD: CPT | Performed by: PHYSICIAN ASSISTANT

## 2024-05-06 PROCEDURE — 99204 OFFICE O/P NEW MOD 45 MIN: CPT | Performed by: NEUROLOGICAL SURGERY

## 2024-05-06 PROCEDURE — 36591 DRAW BLOOD OFF VENOUS DEVICE: CPT

## 2024-05-06 PROCEDURE — 1125F AMNT PAIN NOTED PAIN PRSNT: CPT | Performed by: PHYSICIAN ASSISTANT

## 2024-05-06 PROCEDURE — 1160F RVW MEDS BY RX/DR IN RCRD: CPT | Performed by: PHYSICIAN ASSISTANT

## 2024-05-06 PROCEDURE — 80307 DRUG TEST PRSMV CHEM ANLYZR: CPT | Performed by: PHYSICIAN ASSISTANT

## 2024-05-06 PROCEDURE — 80053 COMPREHEN METABOLIC PANEL: CPT | Performed by: INTERNAL MEDICINE

## 2024-05-06 PROCEDURE — 77386: CPT | Performed by: RADIOLOGY

## 2024-05-06 PROCEDURE — 99205 OFFICE O/P NEW HI 60 MIN: CPT | Performed by: PHYSICIAN ASSISTANT

## 2024-05-06 PROCEDURE — 85025 COMPLETE CBC W/AUTO DIFF WBC: CPT | Performed by: INTERNAL MEDICINE

## 2024-05-06 PROCEDURE — 25010000002 HEPARIN LOCK FLUSH PER 10 UNITS: Performed by: INTERNAL MEDICINE

## 2024-05-06 RX ORDER — SODIUM CHLORIDE 0.9 % (FLUSH) 0.9 %
10 SYRINGE (ML) INJECTION AS NEEDED
Status: DISCONTINUED | OUTPATIENT
Start: 2024-05-06 | End: 2024-05-07 | Stop reason: HOSPADM

## 2024-05-06 RX ORDER — SODIUM CHLORIDE 0.9 % (FLUSH) 0.9 %
10 SYRINGE (ML) INJECTION AS NEEDED
Status: CANCELLED | OUTPATIENT
Start: 2024-05-06

## 2024-05-06 RX ORDER — HEPARIN SODIUM (PORCINE) LOCK FLUSH IV SOLN 100 UNIT/ML 100 UNIT/ML
500 SOLUTION INTRAVENOUS AS NEEDED
Status: CANCELLED | OUTPATIENT
Start: 2024-05-06

## 2024-05-06 RX ORDER — HEPARIN SODIUM (PORCINE) LOCK FLUSH IV SOLN 100 UNIT/ML 100 UNIT/ML
500 SOLUTION INTRAVENOUS AS NEEDED
Status: DISCONTINUED | OUTPATIENT
Start: 2024-05-06 | End: 2024-05-07 | Stop reason: HOSPADM

## 2024-05-06 RX ORDER — OXYCODONE AND ACETAMINOPHEN 7.5; 325 MG/1; MG/1
TABLET ORAL
COMMUNITY
Start: 2024-04-23

## 2024-05-06 RX ORDER — MOMETASONE FUROATE 1 MG/G
CREAM TOPICAL
COMMUNITY
Start: 2024-05-01

## 2024-05-06 RX ADMIN — HEPARIN 500 UNITS: 100 SYRINGE at 11:48

## 2024-05-06 RX ADMIN — Medication 10 ML: at 11:48

## 2024-05-07 ENCOUNTER — OFFICE VISIT (OUTPATIENT)
Dept: ONCOLOGY | Facility: CLINIC | Age: 64
End: 2024-05-07
Payer: MEDICAID

## 2024-05-07 ENCOUNTER — HOSPITAL ENCOUNTER (OUTPATIENT)
Dept: RADIATION ONCOLOGY | Facility: HOSPITAL | Age: 64
Discharge: HOME | End: 2024-05-07

## 2024-05-07 ENCOUNTER — DOCUMENTATION (OUTPATIENT)
Dept: RADIATION ONCOLOGY | Facility: HOSPITAL | Age: 64
End: 2024-05-07
Payer: MEDICAID

## 2024-05-07 ENCOUNTER — HOSPITAL ENCOUNTER (OUTPATIENT)
Dept: ONCOLOGY | Facility: HOSPITAL | Age: 64
Discharge: HOME | End: 2024-05-07
Admitting: INTERNAL MEDICINE
Payer: MEDICAID

## 2024-05-07 VITALS — BODY MASS INDEX: 24.82 KG/M2 | WEIGHT: 168.1 LBS

## 2024-05-07 VITALS
HEIGHT: 69 IN | RESPIRATION RATE: 18 BRPM | HEART RATE: 74 BPM | DIASTOLIC BLOOD PRESSURE: 57 MMHG | WEIGHT: 168 LBS | SYSTOLIC BLOOD PRESSURE: 104 MMHG | OXYGEN SATURATION: 98 % | TEMPERATURE: 97.5 F | BODY MASS INDEX: 24.88 KG/M2

## 2024-05-07 VITALS — DIASTOLIC BLOOD PRESSURE: 49 MMHG | HEART RATE: 61 BPM | SYSTOLIC BLOOD PRESSURE: 126 MMHG

## 2024-05-07 DIAGNOSIS — C15.9 SQUAMOUS CELL CARCINOMA OF ESOPHAGUS: ICD-10-CM

## 2024-05-07 DIAGNOSIS — C15.3 MALIGNANT NEOPLASM OF UPPER THIRD OF ESOPHAGUS: Primary | ICD-10-CM

## 2024-05-07 PROCEDURE — 25010000002 PACLITAXEL PER 1 MG: Performed by: INTERNAL MEDICINE

## 2024-05-07 PROCEDURE — 25010000002 DIPHENHYDRAMINE PER 50 MG: Performed by: INTERNAL MEDICINE

## 2024-05-07 PROCEDURE — 96417 CHEMO IV INFUS EACH ADDL SEQ: CPT

## 2024-05-07 PROCEDURE — 99214 OFFICE O/P EST MOD 30 MIN: CPT | Performed by: INTERNAL MEDICINE

## 2024-05-07 PROCEDURE — 25810000003 SODIUM CHLORIDE 0.9 % SOLUTION 250 ML FLEX CONT: Performed by: INTERNAL MEDICINE

## 2024-05-07 PROCEDURE — 96375 TX/PRO/DX INJ NEW DRUG ADDON: CPT

## 2024-05-07 PROCEDURE — 1126F AMNT PAIN NOTED NONE PRSNT: CPT | Performed by: INTERNAL MEDICINE

## 2024-05-07 PROCEDURE — 25010000002 HEPARIN LOCK FLUSH PER 10 UNITS: Performed by: INTERNAL MEDICINE

## 2024-05-07 PROCEDURE — 77386: CPT | Performed by: RADIOLOGY

## 2024-05-07 PROCEDURE — 25010000002 CARBOPLATIN PER 50 MG: Performed by: INTERNAL MEDICINE

## 2024-05-07 PROCEDURE — 25010000002 PALONOSETRON PER 25 MCG: Performed by: INTERNAL MEDICINE

## 2024-05-07 PROCEDURE — 96367 TX/PROPH/DG ADDL SEQ IV INF: CPT

## 2024-05-07 PROCEDURE — 25810000003 SODIUM CHLORIDE 0.9 % SOLUTION: Performed by: INTERNAL MEDICINE

## 2024-05-07 PROCEDURE — 25010000002 DEXAMETHASONE SODIUM PHOSPHATE 100 MG/10ML SOLUTION: Performed by: INTERNAL MEDICINE

## 2024-05-07 PROCEDURE — 96413 CHEMO IV INFUSION 1 HR: CPT

## 2024-05-07 RX ORDER — HEPARIN SODIUM (PORCINE) LOCK FLUSH IV SOLN 100 UNIT/ML 100 UNIT/ML
500 SOLUTION INTRAVENOUS AS NEEDED
Status: DISCONTINUED | OUTPATIENT
Start: 2024-05-07 | End: 2024-05-08 | Stop reason: HOSPADM

## 2024-05-07 RX ORDER — FAMOTIDINE 10 MG/ML
20 INJECTION, SOLUTION INTRAVENOUS ONCE
Status: CANCELLED | OUTPATIENT
Start: 2024-05-07

## 2024-05-07 RX ORDER — SODIUM CHLORIDE 0.9 % (FLUSH) 0.9 %
10 SYRINGE (ML) INJECTION AS NEEDED
OUTPATIENT
Start: 2024-05-07

## 2024-05-07 RX ORDER — PALONOSETRON 0.05 MG/ML
0.25 INJECTION, SOLUTION INTRAVENOUS ONCE
Status: COMPLETED | OUTPATIENT
Start: 2024-05-07 | End: 2024-05-07

## 2024-05-07 RX ORDER — HEPARIN SODIUM (PORCINE) LOCK FLUSH IV SOLN 100 UNIT/ML 100 UNIT/ML
500 SOLUTION INTRAVENOUS AS NEEDED
OUTPATIENT
Start: 2024-05-07

## 2024-05-07 RX ORDER — PALONOSETRON 0.05 MG/ML
0.25 INJECTION, SOLUTION INTRAVENOUS ONCE
Status: CANCELLED | OUTPATIENT
Start: 2024-05-07

## 2024-05-07 RX ORDER — SODIUM CHLORIDE 9 MG/ML
20 INJECTION, SOLUTION INTRAVENOUS ONCE
Status: CANCELLED | OUTPATIENT
Start: 2024-05-07

## 2024-05-07 RX ORDER — FAMOTIDINE 10 MG/ML
20 INJECTION, SOLUTION INTRAVENOUS AS NEEDED
Status: CANCELLED | OUTPATIENT
Start: 2024-05-07

## 2024-05-07 RX ORDER — DIPHENHYDRAMINE HYDROCHLORIDE 50 MG/ML
50 INJECTION INTRAMUSCULAR; INTRAVENOUS AS NEEDED
Status: CANCELLED | OUTPATIENT
Start: 2024-05-07

## 2024-05-07 RX ORDER — FAMOTIDINE 10 MG/ML
20 INJECTION, SOLUTION INTRAVENOUS ONCE
Status: COMPLETED | OUTPATIENT
Start: 2024-05-07 | End: 2024-05-07

## 2024-05-07 RX ORDER — SODIUM CHLORIDE 9 MG/ML
20 INJECTION, SOLUTION INTRAVENOUS ONCE
Status: COMPLETED | OUTPATIENT
Start: 2024-05-07 | End: 2024-05-07

## 2024-05-07 RX ADMIN — SODIUM CHLORIDE 75 MG: 9 INJECTION, SOLUTION INTRAVENOUS at 14:08

## 2024-05-07 RX ADMIN — FAMOTIDINE 20 MG: 10 INJECTION, SOLUTION INTRAVENOUS at 12:59

## 2024-05-07 RX ADMIN — SODIUM CHLORIDE 20 ML/HR: 9 INJECTION, SOLUTION INTRAVENOUS at 13:07

## 2024-05-07 RX ADMIN — PALONOSETRON HYDROCHLORIDE 0.25 MG: 0.25 INJECTION, SOLUTION INTRAVENOUS at 13:01

## 2024-05-07 RX ADMIN — HEPARIN 500 UNITS: 100 SYRINGE at 16:03

## 2024-05-07 RX ADMIN — DEXAMETHASONE SODIUM PHOSPHATE 12 MG: 100 INJECTION INTRAMUSCULAR; INTRAVENOUS at 13:06

## 2024-05-07 RX ADMIN — DIPHENHYDRAMINE HYDROCHLORIDE 25 MG: 50 INJECTION INTRAMUSCULAR; INTRAVENOUS at 13:04

## 2024-05-07 RX ADMIN — CARBOPLATIN 230 MG: 10 INJECTION, SOLUTION INTRAVENOUS at 15:22

## 2024-05-08 ENCOUNTER — HOSPITAL ENCOUNTER (OUTPATIENT)
Dept: RADIATION ONCOLOGY | Facility: HOSPITAL | Age: 64
Setting detail: RADIATION/ONCOLOGY SERIES
Discharge: HOME | End: 2024-05-08
Payer: MEDICAID

## 2024-05-08 PROCEDURE — 77386: CPT | Performed by: RADIOLOGY

## 2024-05-14 ENCOUNTER — TELEPHONE (OUTPATIENT)
Dept: ONCOLOGY | Facility: CLINIC | Age: 64
End: 2024-05-14

## 2024-05-14 ENCOUNTER — DOCUMENTATION (OUTPATIENT)
Dept: OTHER | Facility: HOSPITAL | Age: 64
End: 2024-05-14
Payer: MEDICAID

## 2024-05-14 ENCOUNTER — OFFICE VISIT (OUTPATIENT)
Dept: ONCOLOGY | Facility: CLINIC | Age: 64
End: 2024-05-14
Payer: MEDICAID

## 2024-05-14 VITALS
DIASTOLIC BLOOD PRESSURE: 64 MMHG | SYSTOLIC BLOOD PRESSURE: 125 MMHG | OXYGEN SATURATION: 98 % | HEART RATE: 83 BPM | BODY MASS INDEX: 25.18 KG/M2 | HEIGHT: 69 IN | RESPIRATION RATE: 18 BRPM | TEMPERATURE: 97.7 F | WEIGHT: 170 LBS

## 2024-05-14 DIAGNOSIS — R10.2 PELVIC PAIN: ICD-10-CM

## 2024-05-14 DIAGNOSIS — C15.3 MALIGNANT NEOPLASM OF UPPER THIRD OF ESOPHAGUS: Primary | ICD-10-CM

## 2024-05-14 RX ORDER — GABAPENTIN 250 MG/5ML
800 SOLUTION ORAL 3 TIMES DAILY
Qty: 1000 ML | Refills: 0 | Status: SHIPPED | OUTPATIENT
Start: 2024-05-14

## 2024-05-14 NOTE — PROGRESS NOTES
Hematology and Oncology [place]  Office number [phone]    Fax number [phone]     Follow up     Date: 24    Patient Name: Val Nassar Jr.  MRN: [id number]  : 1960    Chief Complaint: esophageal cancer    Cancer Staging:  Cancer Staging   Stage II (cT2, cN1, cM0)    History of Present Illness: Val Nassar Jr. is a pleasant 63 y.o. male who presents today for evaluation of upper third esophageal squamous cell carcinoma. He has a longstanding history of cirrhosis     He originally was diagnosed with a localized esophageal squamous cell carcinoma of the upper third of the esophagus in the 2023.  At the time he was living in [place].  He underwent an endoscopy confirming cancer diagnosis at [facility] in [facility].  He then relocated to [facility] to live with his sister because of his cancer diagnosis.      He presented to [facility] in [facility]23 with an upper GI bleed secondary to peptic ulcer disease and possible cholecystitis.  His presenting hemoglobin was 6.9.     EGD performed 2023 showed an ulcerated mass in the upper third of the esophagus which was nonobstructing and partially circumferential spanning 2 cm in length.  Biopsy results are pending.    Additional findings include gastritis, portal hypertension gastropathy and nonbleeding duodenal ulcer.  Duodenal stenosis.    He was discharged to a rehab facility and ultimately discharged home to live with his sister.  He was doing poorly and was unable to care for himself.  He did not keep his appointments with medical oncology, thoracic surgery, or radiation oncology and did not complete staging PET/CT.  He then represented to the hospital in late 2024 with a UTI and confusion.  During that admission he was assessed by palliative care, radiation oncology, and medical oncology.  He elected to be discharged to a skilled nursing facility for long-term  care as he does not want to be a burden on his sister and did not feel that he could care for himself at home.  Prior to discharge there were conversations between case management and social work at the hospital and his facility and the patient was under the impression that he could not receive radiation treatment while living at the skilled facility.  However he did elect to complete PET CT scan and returns for results.    PET/CT performed 3/6/2024 shows thickening of the esophagus with an uptake of 16 by SUV.  There was notable hypermetabolic activity in the left lateral shoulder subcutaneous tissue which corresponds to an area where he received a prior injection at his PCP office for alcohol use which he reports has been raised and pruritic since the injection 6 weeks prior.  He had mild paratracheal adenopathy as well as an right paratracheal lymph node in the right supraclavicular region  He reports that he has been tolerating regular diet with no obstructive symptoms prior to presentation.     He continues to feel weak but is clinically improving.  He tells me he is planning inpatient rehab on discharge.  His hemoglobin continues to improve and is 9.1 today.  CT of the abdomen pelvis in addition to the gallbladder findings showed no metastatic disease.  A chest x-ray this admission showed mild interstitial edema.  MRI cervical spine from 4/12/2024 showed:  1.Active left-sided facet arthropathy at C3/4 and to lesser degree C4/5 with associated surrounding marrow and soft tissue edema.  2.Moderate multifactorial degenerative change of the mid to lower cervical spine otherwise with central canal and neuroforaminal stenosis. Potential exiting nerve contact from C3/4 through C6/7 as detailed above.    Treatment history:  Taxol/carbo/radiation: C1, 4/2/2024; C2, 4/9/24; C3, held, C4, carbo only; C5, carbo and DA taxol    Interval history:  He is here for follow-up after completing chemoradiation on 5/8/2024.  He  reports pain and swelling in his left groin since Sunday.  Dysuria and hesitancy x 1 week  No difficulty with bowel movements  Drinking well. Choked on a gabapentin 4x since Thursday. Feels it is getting harder to swallow even water. Pain with swallowing     Past Medical History:   Past Medical History:   Diagnosis Date    Anemia     Cancer     ESOPHAGEAL    Cirrhosis     Duodenal ulcer     Gastric ulcer     GERD (gastroesophageal reflux disease)     History of alcohol abuse     HLD (hyperlipidemia)     Mood disorder        Past Surgical History:   Past Surgical History:   Procedure Laterality Date    ENDOSCOPY N/A 12/26/2023    Procedure: ESOPHAGOGASTRODUODENOSCOPY;  Surgeon: Wesly Mckeon MD;  Location: [facility] ENDOSCOPY;  Service: Gastroenterology;  Laterality: N/A;       Family History:   Family History   Problem Relation Age of Onset    Cancer Mother         LUNG AND BREAST    Breast cancer Mother     Heart attack Father        Social History:   Social History     Socioeconomic History    Marital status: Single   Tobacco Use    Smoking status: Every Day     Current packs/day: 1.00     Average packs/day: 1 pack/day for 15.0 years (15.0 ttl pk-yrs)     Types: Cigarettes     Passive exposure: Current    Smokeless tobacco: Never   Vaping Use    Vaping status: Some Days    Substances: Flavoring    Devices: Disposable   Substance and Sexual Activity    Alcohol use: Not Currently     Comment: sober for ~ 3 months    Drug use: Yes     Types: Hydrocodone    Sexual activity: Defer       Medications:     Current Outpatient Medications:     acetaminophen (TYLENOL) 325 MG tablet, Take 2 tablets by mouth Every 4 (Four) Hours As Needed for Mild Pain., Disp: , Rfl:     benzonatate (TESSALON) 100 MG capsule, Take 1 capsule by mouth Every 8 (Eight) Hours As Needed for Cough., Disp: , Rfl:     Cholecalciferol (Vitamin D3) 1.25 MG (38482 UT) capsule, Take 1 capsule by mouth Every 7 (Seven) Days., Disp: , Rfl:     Diclofenac Sodium  (Voltaren) 1 % gel gel, Apply 4 g topically to the appropriate area as directed 2 (Two) Times a Day., Disp: 50 g, Rfl: 1    diphenhydrAMINE (BENADRYL) 25 mg capsule, Take 1 capsule by mouth At Night As Needed for Itching (insomnia)., Disp: 30 capsule, Rfl: 0    docusate sodium (Colace) 100 MG capsule, Take 1 capsule by mouth 2 (Two) Times a Day As Needed for Constipation., Disp: 30 capsule, Rfl: 1    dutasteride (AVODART) 0.5 MG capsule, Take 1 capsule by mouth Daily., Disp: , Rfl:     escitalopram (LEXAPRO) 10 MG tablet, Take 1 tablet by mouth Every Night., Disp: , Rfl:     fentaNYL (DURAGESIC) 25 MCG/HR patch, Place 1 patch on the skin as directed by provider Every 72 (Seventy-Two) Hours., Disp: , Rfl:     ferrous gluconate (FERGON) 324 MG tablet, Take 1 tablet by mouth Daily With Breakfast., Disp: , Rfl:     finasteride (PROSCAR) 5 MG tablet, Take 1 tablet by mouth Daily., Disp: , Rfl:     folic acid (FOLVITE) 1 MG tablet, Take 1 tablet by mouth Daily., Disp: , Rfl:     gabapentin (NEURONTIN) 300 MG capsule, Take 1 capsule by mouth 3 (Three) Times a Day for 3 days., Disp: 9 capsule, Rfl: 0    gabapentin (NEURONTIN) 800 MG tablet, 1 tablet 3 (Three) Times a Day., Disp: , Rfl:     guaiFENesin (MUCINEX) 600 MG 12 hr tablet, Take 2 tablets by mouth Every 12 (Twelve) Hours As Needed for Cough., Disp: , Rfl:     lactulose (CHRONULAC) 10 GM/15ML solution, Take 30 mL by mouth 2 (Two) Times a Day As Needed., Disp: , Rfl:     Lidocaine 4 %, Place 1 patch on the skin as directed by provider Daily. Remove & Discard patch within 12 hours or as directed by MD, Disp: , Rfl:     lidocaine-prilocaine (EMLA) 2.5-2.5 % cream, Apply 1 Application topically to the appropriate area as directed As Needed (45-60 minutes prior to port access.  Cover with saran/plastic wrap.)., Disp: 30 g, Rfl: 3    Loratadine 10 MG capsule, Take  by mouth Daily., Disp: , Rfl:     melatonin 5 MG tablet tablet, Take 1 tablet by mouth At Night As Needed.,  "Disp: , Rfl:     mometasone (ELOCON) 0.1 % cream, , Disp: , Rfl:     nystatin (MYCOSTATIN) 100,000 unit/mL suspension, , Disp: , Rfl:     nystatin susp + lidocaine viscous (MAGIC MOUTHWASH) oral suspension, 5-10 ml swish and spit or swallow QID prn, Disp: 240 mL, Rfl: 3    omeprazole (priLOSEC) 40 MG capsule, Take  by mouth., Disp: , Rfl:     ondansetron (ZOFRAN) 8 MG tablet, Take 1 tablet by mouth Every 8 (Eight) Hours As Needed for Nausea or Vomiting., Disp: 30 tablet, Rfl: 3    oxyCODONE-acetaminophen (PERCOCET) 7.5-325 MG per tablet, , Disp: , Rfl:     pantoprazole (PROTONIX) 40 MG EC tablet, Take 1 tablet by mouth 2 (Two) Times a Day., Disp: , Rfl:     phenol (CHLORASEPTIC) 1.4 % liquid liquid, Apply 1 spray to the mouth or throat Every 4 (Four) Hours As Needed., Disp: , Rfl:     polyethylene glycol (MIRALAX) 17 g packet, Take 17 g by mouth Daily As Needed (Use if senna-docusate is ineffective)., Disp: , Rfl:     predniSONE (DELTASONE) 10 MG tablet, Take daily based on following titration schedule: 60 mg x 1 day, then 50 mg x 1, then 40 mg x 1, then 30 mg x 1 day, then 20 mg x 1 day, then 10 mg x 1 day then stop, Disp: 21 tablet, Rfl: 0    promethazine (PHENERGAN) 25 MG tablet, Take 1 tablet by mouth Every 6 (Six) Hours As Needed for Nausea or Vomiting., Disp: , Rfl:     sodium chloride (Ocean Nasal Spray) 0.65 % nasal spray, 1 spray into the nostril(s) as directed by provider As Needed for Congestion., Disp: 1 each, Rfl: 0    tamsulosin (FLOMAX) 0.4 MG capsule 24 hr capsule, Take 1 capsule by mouth Daily., Disp: , Rfl:     vitamin B-12 (CYANOCOBALAMIN) 1000 MCG tablet, Take 1 tablet by mouth Daily., Disp: , Rfl:     Allergies:   No Known Allergies    Objective     Vital Signs:   Vitals:    05/14/24 1022   BP: 125/64   Pulse: 83   Resp: 18   Temp: 97.7 °F (36.5 °C)   TempSrc: Temporal   SpO2: 98%   Weight: 77.1 kg (170 lb)   Height: 175.3 cm (69.02\")   PainSc:   7    Body mass index is 25.09 kg/m².   Pain Score "    05/14/24 1022   PainSc:   7       ECOG Performance Status: 2    Physical Exam:   General: No acute distress. Well appearing   HEENT: Normocephalic, atraumatic.   Neck: supple, no adenopathy. Radiation dermatitis  Cardiovascular: regular rate and rhythm. No murmurs.   Respiratory: Normal rate. Clear to auscultation bilaterally  Abdomen: Soft, nontender, non distended with normoactive bowel sounds.   Exam consistent with inguinal hernia which is tender.  Lymph: no cervical, supraclavicular or axillary adenopathy  Neuro: Alert and oriented x 3. No focal deficits.   Ext: Symmetric, no swelling.   Chaperone: Delisa Jarvis  Laboratory/Imaging Reviewed:   Hospital Outpatient Visit on 05/06/2024   Component Date Value Ref Range Status    Glucose 05/06/2024 123 (H)  65 - 99 mg/dL Final    BUN 05/06/2024 17  8 - 23 mg/dL Final    Creatinine 05/06/2024 0.62 (L)  0.76 - 1.27 mg/dL Final    Sodium 05/06/2024 136  136 - 145 mmol/L Final    Potassium 05/06/2024 4.0  3.5 - 5.2 mmol/L Final    Chloride 05/06/2024 100  98 - 107 mmol/L Final    CO2 05/06/2024 26.0  22.0 - 29.0 mmol/L Final    Calcium 05/06/2024 9.3  8.6 - 10.5 mg/dL Final    Total Protein 05/06/2024 7.2  6.0 - 8.5 g/dL Final    Albumin 05/06/2024 3.7  3.5 - 5.2 g/dL Final    ALT (SGPT) 05/06/2024 15  1 - 41 U/L Final    AST (SGOT) 05/06/2024 20  1 - 40 U/L Final    Alkaline Phosphatase 05/06/2024 141 (H)  39 - 117 U/L Final    Total Bilirubin 05/06/2024 0.4  0.0 - 1.2 mg/dL Final    Globulin 05/06/2024 3.5  gm/dL Final    Calculated Result    A/G Ratio 05/06/2024 1.1  g/dL Final    BUN/Creatinine Ratio 05/06/2024 27.4 (H)  7.0 - 25.0 Final    Anion Gap 05/06/2024 10.0  5.0 - 15.0 mmol/L Final    eGFR 05/06/2024 106.7  >60.0 mL/min/1.73 Final    WBC 05/06/2024 6.03  3.40 - 10.80 10*3/mm3 Final    RBC 05/06/2024 3.69 (L)  4.14 - 5.80 10*6/mm3 Final    Hemoglobin 05/06/2024 9.8 (L)  13.0 - 17.7 g/dL Final    Hematocrit 05/06/2024 31.7 (L)  37.5 - 51.0 % Final     MCV 05/06/2024 85.9  79.0 - 97.0 fL Final    MCH 05/06/2024 26.6  26.6 - 33.0 pg Final    MCHC 05/06/2024 30.9 (L)  31.5 - 35.7 g/dL Final    RDW 05/06/2024    Final    Unable to calculate       RDW-SD 05/06/2024    Final    Unable to calculate    MPV 05/06/2024 9.3  6.0 - 12.0 fL Final    Platelets 05/06/2024 122 (L)  140 - 450 10*3/mm3 Final    Neutrophil % 05/06/2024 85.5 (H)  42.7 - 76.0 % Final    Lymphocyte % 05/06/2024 7.0 (L)  19.6 - 45.3 % Final    Monocyte % 05/06/2024 5.6  5.0 - 12.0 % Final    Eosinophil % 05/06/2024 1.3  0.3 - 6.2 % Final    Basophil % 05/06/2024 0.3  0.0 - 1.5 % Final    Immature Grans % 05/06/2024 0.3  0.0 - 0.5 % Final    Neutrophils, Absolute 05/06/2024 5.15  1.70 - 7.00 10*3/mm3 Final    Lymphocytes, Absolute 05/06/2024 0.42 (L)  0.70 - 3.10 10*3/mm3 Final    Monocytes, Absolute 05/06/2024 0.34  0.10 - 0.90 10*3/mm3 Final    Eosinophils, Absolute 05/06/2024 0.08  0.00 - 0.40 10*3/mm3 Final    Basophils, Absolute 05/06/2024 0.02  0.00 - 0.20 10*3/mm3 Final    Immature Grans, Absolute 05/06/2024 0.02  0.00 - 0.05 10*3/mm3 Final   Office Visit on 05/06/2024   Component Date Value Ref Range Status    THC, Screen, Urine 05/06/2024 Negative  Negative Final    Phencyclidine (PCP), Urine 05/06/2024 Negative  Negative Final    Cocaine Screen, Urine 05/06/2024 Negative  Negative Final    Methamphetamine, Ur 05/06/2024 Negative  Negative Final    Opiate Screen 05/06/2024 Negative  Negative Final    Amphetamine Screen, Urine 05/06/2024 Negative  Negative Final    Benzodiazepine Screen, Urine 05/06/2024 Negative  Negative Final    Tricyclic Antidepressants Screen 05/06/2024 Negative  Negative Final    Methadone Screen, Urine 05/06/2024 Negative  Negative Final    Barbiturates Screen, Urine 05/06/2024 Negative  Negative Final    Oxycodone Screen, Urine 05/06/2024 Positive (A)  Negative Final    Buprenorphine, Screen, Urine 05/06/2024 Negative  Negative Final    Fentanyl, Urine 05/06/2024  Positive (A)  Negative Final       IR Port Placement    Result Date: 4/25/2024  Narrative: IR PORT PLACEMENT  History: starting weekly chemo 4/16  : Silverio Tadeo MD.  Modality: Sonography and fluoroscopy  DOSE REDUCTION: The examination was performed according to departmental dose-optimization program which includes automated exposure control, adjustment of the mA and/or kV. Fluoro time: 0.0 Radiation dose: 0.1 mGy air Kerma.  SEDATION: Moderate sedation was administered. 1 milligram of Versed and 75 micrograms of fentanyl IV was used for moderate sedation. Total intra service time of sedation was 20 minutes. The sedation was administered and the patient's vital signs monitored throughout the procedure and recorded in the patient's medical record by the nurse under my direct supervision.  Anesthesia: Lidocaine 1% with epinephrine, local infiltration Medicines: None      Estimated blood loss:  < 5 cc.        Technique: A thorough discussion of the risks, benefits, and alternatives of the procedure, and if applicable, moderate sedation, was carried out with the patient. They were encouraged to ask any questions. Any questions were answered. They verbalized understanding. A written informed consent was then signed.  A multi-component timeout was performed prior to starting the procedure using the departmental protocol. The procedure room personnel used personal protective equipment. The operators used sterile gloves and if indicated, sterile gowns. The surgical site was prepped with chlorhexidine gluconate  and draped in the maximal applicable sterile fashion. A preliminary ultrasonogram was performed of the neck that revealed a patent and compressible internal jugular vein. Pertinent ultrasound images were stored in the PACS for documentation. Using aseptic precautions, real-time ultrasound guidance, the internal jugular vein was accessed with a micropuncture needle after local anesthetic  infiltration. A 018 guidewire was advanced into the central venous system under fluoroscopic guidance. Over the wire a micropuncture sheath was placed. Through the micropuncture sheath, a 035 wire was advanced into the venous system under fluoroscopic guidance. Over the wire a peel-away sheath was placed. After local anesthesia, using sharp and blunt dissection, a reservoir pocket of appropriate size was created in the infra clavicular chest wall. The port catheter was connected to the port reservoir. The catheter was tunneled to the venotomy site using a  tunneling device. The the reservoir was placed in the reservoir pocket. The catheter was trimmed to an appropriate length. The catheter was advanced into the venous system through the peel-away sheath which was removed. The port aspirated and flushed well and was terminally packed with heparin 100 units per cc, total 500 units. The port reservoir was irrigated with saline. The incision was closed in layers using absorbable suture and Dermabond. The venotomy site was closed using Dermabond. An aseptic dressing was applied using the protocol for Dermabond. The patient was transferred to the recovery area and was discharged from the department in stable condition.  Complications: None immediate.    Findings: Patent and compressible internal jugular vein. Final image shows the latosha catheter to be in good position with the catheter tip at the cavoatrial junction/right atrium, an excellent position for use. There is no immediate complication.      Impression: Impression:    Successful ultrasound and fluoroscopic guided right internal jugular vein route single lumen Port-A-Cath placement as described above. Thank you for the opportunity to assist in the care of your patient. Electronically Signed: Silverio Tadeo MD  4/25/2024 3:57 PM EDT  Workstation ID: [id number]     Procedures      Narrative & Impression   NM PET/CT SKULL BASE TO MID THIGH     Date of Exam: 3/6/2024  2:24 PM EST     Indication: esophogeal ca.     Comparison: Abdomen pelvis CT scan 2/4/2024. No prior PET scan     Technique: 11.8 mCi of F-18 FDG was administered intravenously. PET imaging was obtained from skull base to mid-thigh approximately 60 minutes after radiotracer injection. A low dose non contrast CT was obtained for attenuation correction and anatomic   localization. Fused PET-CT and 3D MIP reconstructions were utilized for image interpretation.  Fasting blood glucose level: 99 mg/dl.     Reference uptake values:  Mediastinum: 2.5 SUVmax  Liver: 3.0 SUVmax  Normalization method: Body Weight     Findings:  History indicates known esophageal cancer undergoing work-up, no treatment to date. Some left-sided neck pain and dysphagia.     Today's 3D images shows a focus of increased uptake in the central upper chest, apparently regional to the esophagus, and 2 small foci of uptake in the right lower neck or superior stomach. There is focal activity in the superficial soft tissues lateral   to the left shoulder. There is normal and normal variant uptake elsewhere.     Multiplanar images show no significant asymmetry of uptake in the brain.     No hypermetabolic activity is seen in the neck down to the right supraclavicular fat, where a small lymph node has maximal SUV of 6.6, concerning for metastatic involvement. A couple of small adjacent right upper paratracheal lymph nodes have maximal SUV   of 3.3.      In the proximal thoracic there is focal thickening of the esophagus and strongly hypermetabolic activity, maximal SUV 15.9 apparently corresponding to the patient's known neoplasm. Hypermetabolic activity in the lateral left shoulder subcutaneous   tissues measures 8.0. CT scan appearance suggests potential subcutaneous hematoma. Please correlate with any history of trauma to this area.     No hypermetabolic mediastinal, hilar, chest wall or pulmonary parenchymal mass is seen elsewhere.     Below the  "diaphragm, no hypermetabolic liver, adrenal, or other solid organ disease is seen. No hypermetabolic adenopathy is appreciated. There is expected GI and  tract activity. Focal activity in the adjacent posterior spinous processes of L3, L4 and   L5 measures approximately 4.1 - 4.2. With reference to the previous abdominal CT scan, sagittal images shows associated DJD, with the posterior spinous processes appear hypertrophied and in contact, or so-called \"Baastrup disease\". No pathologic marrow   space uptake is seen elsewhere. Incidental note is made of a large, approximately 6 cm left bladder diverticulum.  IMPRESSION:  Impression:     1. Intensely hypermetabolic activity in the proximal thoracic esophagus, apparently corresponding to patient's known neoplasm.     2. 3 small normal sized but hypermetabolic nodes respectively in the right supraclavicular fat, and right superior mediastinum.      3. Hypermetabolic activity in the lateral subcutaneous tissues of the left shoulder, which appears to correspond to subcutaneous bruising. Please correlate with any history of trauma to this area.     4. DJD-related uptake in the hypertrophied and degenerated posterior spinous processes of L3, L4 and L5..       Had a fall on right shoulderbut not the rightgot a injection in the left shoulder  to help him stop drinking at dr. Wesly Albert 6 weeks ago, swelled up and red.     Narrative & Impression   MRI BRAIN WO CONTRAST     Date of Exam: 2/18/2024 5:05 PM EST     Indication: Dizziness, off balance, present x 2 weeks.     Comparison: 2/4/2024 CT     Technique:  Routine multiplanar/multisequence sequence images of the brain were obtained without contrast administration.        Findings:  Diffusion imaging shows no evidence of acute infarct. There are scattered subcortical and periventricular T2 hyperintensities which are nonspecific but likely sequela of mild to moderate small vessel ischemic disease. No evidence of " intracranial   hemorrhage.     There is normal ventricular volume. The basal cisterns are patent. There is no evidence of extra-axial fluid collection. There is normal flow voids within the intracranial vessels.     No evidence of fracture or suspicious bony lesion. Paranasal sinusitis with gas/fluid level in the right maxillary sinus. Trace bilateral mastoid air cell effusions. Visualized orbits are unremarkable.     IMPRESSION:  Impression:  No acute intracranial abnormality. No suspicious mass on this noncontrast MRI.     Paranasal sinusitis with gas/fluid level in the right maxillary sinus.       Assessment / Plan      Assessment/Plan:     1.  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  SUV avid right supraclavicular lymph node  3.  Indeterminate paratracheal adenopathy  4.  Symptomatic cervical stenosis    -I personally reviewed his PET/CT.  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  -Hold treatment today given his progressive BUE neuropathy. I am going to start him on a steroid burst. I reviewed his MRI. If symptoms stabilize we will resume. Mild neutropenia noted.   -He did require dose reduction of chemotherapy for neuropathy.  He completed therapy on 5/8/2024  -I ordered repeat labs today including a CBC and CMP.  He will follow-up in 1 months to ensure further improvement and side effects.  Anticipate his first baseline scan in early August.  -MMR intact    6.  Cancer related pain  -Fentanyl patch plus oxycodone per palliative care.  Hopefully his needs will lessen as his mucositis resolves.  -I am going to change his gabapentin to liquid until his mucositis resolves.    7.  Dysuria  8.  Pelvic pain  -I ordered a UA with culture and asked him to have it completed today.  -I also ordered abdominal pelvic CT in light of his pain.    Follow Up:   1 mo    Katerina Ga MD  Hematology  and Oncology

## 2024-05-14 NOTE — TELEPHONE ENCOUNTER
Spoke with ADRIANA Martin, per request of patient.  She verified that patient has been taking Neurontin 800 mg PO TID.  Advised her patient should stop the capsule and new prescription for Neurontin liquid, same dose, sent to his pharmacy for him to start as he c/o hard time swallowing capsule.  Also, advised her of appointment date/time/location of CT AP. She verbalized understanding.

## 2024-05-15 ENCOUNTER — TELEPHONE (OUTPATIENT)
Dept: ONCOLOGY | Facility: CLINIC | Age: 64
End: 2024-05-15
Payer: MEDICAID

## 2024-05-15 NOTE — TELEPHONE ENCOUNTER
Contacted patient as RN unable to get through to speak to nursing staff at [facility].  Advised patient per MD that labs (CBC, CMP and UA with culture if needed) are needing to be completed.  He verbalized understanding and gave the call to Jayashree Tatum LPN at [facility].  Advised Jayashree of labs needed and that per jenniffer Caal to complete at [facility] and send results to this office.  She stated she will place orders and fax results back tomorrow, 5-16-24.  She stated they do not need the orders from Dr. Ga faxed to them, they will complete them.  This office fax number provided.

## 2024-05-16 NOTE — TELEPHONE ENCOUNTER
Called [facility] to get lab results they were collecting today.  Unable to get anyone to answer.  Attempted multiple times.

## 2024-05-16 NOTE — TELEPHONE ENCOUNTER
"Patient stated he didn't allow [facility] to draw his labs today because \"the nurse wanted me to go outside to draw them and I told her no.\"  He stated he has spoken with management there and they will draw it tomorrow.    "

## 2024-05-17 NOTE — TELEPHONE ENCOUNTER
Spoke with Veronica WRIGHT, at [facility].  She stated the urine was obtained but not resulted and will do labs today and not through port.  Advised Veronica that they can call on call physician over the weekend with results.  Veronica verbalized understanding.

## 2024-05-17 NOTE — TELEPHONE ENCOUNTER
Called patient to see if he had labs completed today at [facility] as discussed.  No answer received and unable to leave .

## 2024-05-19 ENCOUNTER — HOSPITAL ENCOUNTER (OUTPATIENT)
Facility: HOSPITAL | Age: 64
Discharge: HOME | End: 2024-05-19
Admitting: INTERNAL MEDICINE
Payer: MEDICAID

## 2024-05-19 DIAGNOSIS — C15.3 MALIGNANT NEOPLASM OF UPPER THIRD OF ESOPHAGUS: ICD-10-CM

## 2024-05-19 DIAGNOSIS — R10.2 PELVIC PAIN: ICD-10-CM

## 2024-05-19 PROCEDURE — 25510000001 IOPAMIDOL 61 % SOLUTION: Performed by: INTERNAL MEDICINE

## 2024-05-19 PROCEDURE — 74177 CT ABD & PELVIS W/CONTRAST: CPT

## 2024-05-19 RX ADMIN — IOPAMIDOL 100 ML: 612 INJECTION, SOLUTION INTRAVENOUS at 11:26

## 2024-05-20 NOTE — TELEPHONE ENCOUNTER
Spoke with patient and his nurse, Jayashree, at [facility].  Jayashree stated urine has been collected as she will fax results to this office. Fax number provided.  She stated their facility could not get blood drawn from patient for labs due to access.  Patient set up at this facility for lab draw this week.  The call was sent to scheduling for appointment to be set up so Jayashree can set up transportation for patient.  Jayashree verbalized understanding.

## 2024-05-21 ENCOUNTER — HOSPITAL ENCOUNTER (OUTPATIENT)
Dept: ONCOLOGY | Facility: HOSPITAL | Age: 64
Discharge: HOME | End: 2024-05-21
Payer: MEDICAID

## 2024-05-21 VITALS
SYSTOLIC BLOOD PRESSURE: 122 MMHG | TEMPERATURE: 98.5 F | HEIGHT: 69 IN | WEIGHT: 166 LBS | BODY MASS INDEX: 24.59 KG/M2 | HEART RATE: 72 BPM | RESPIRATION RATE: 18 BRPM | DIASTOLIC BLOOD PRESSURE: 60 MMHG

## 2024-05-21 DIAGNOSIS — C15.9 SQUAMOUS CELL CARCINOMA OF ESOPHAGUS: Primary | ICD-10-CM

## 2024-05-21 DIAGNOSIS — C15.3 MALIGNANT NEOPLASM OF UPPER THIRD OF ESOPHAGUS: ICD-10-CM

## 2024-05-21 LAB
ALBUMIN SERPL-MCNC: 3.5 G/DL (ref 3.5–5.2)
ALBUMIN/GLOB SERPL: 1.1 G/DL
ALP SERPL-CCNC: 142 U/L (ref 39–117)
ALT SERPL W P-5'-P-CCNC: 14 U/L (ref 1–41)
ANION GAP SERPL CALCULATED.3IONS-SCNC: 9 MMOL/L (ref 5–15)
AST SERPL-CCNC: 23 U/L (ref 1–40)
BASOPHILS # BLD AUTO: 0.02 10*3/MM3 (ref 0–0.2)
BASOPHILS NFR BLD AUTO: 0.7 % (ref 0–1.5)
BILIRUB SERPL-MCNC: 0.2 MG/DL (ref 0–1.2)
BUN SERPL-MCNC: 9 MG/DL (ref 8–23)
BUN/CREAT SERPL: 15.5 (ref 7–25)
CALCIUM SPEC-SCNC: 9.1 MG/DL (ref 8.6–10.5)
CHLORIDE SERPL-SCNC: 101 MMOL/L (ref 98–107)
CO2 SERPL-SCNC: 25 MMOL/L (ref 22–29)
CREAT SERPL-MCNC: 0.58 MG/DL (ref 0.76–1.27)
DEPRECATED RDW RBC AUTO: 84.9 FL (ref 37–54)
EGFRCR SERPLBLD CKD-EPI 2021: 108.9 ML/MIN/1.73
EOSINOPHIL # BLD AUTO: 0.06 10*3/MM3 (ref 0–0.4)
EOSINOPHIL NFR BLD AUTO: 2.2 % (ref 0.3–6.2)
ERYTHROCYTE [DISTWIDTH] IN BLOOD BY AUTOMATED COUNT: 26.8 % (ref 12.3–15.4)
GLOBULIN UR ELPH-MCNC: 3.2 GM/DL
GLUCOSE SERPL-MCNC: 88 MG/DL (ref 65–99)
HCT VFR BLD AUTO: 29 % (ref 37.5–51)
HGB BLD-MCNC: 9.1 G/DL (ref 13–17.7)
IMM GRANULOCYTES # BLD AUTO: 0.01 10*3/MM3 (ref 0–0.05)
IMM GRANULOCYTES NFR BLD AUTO: 0.4 % (ref 0–0.5)
LYMPHOCYTES # BLD AUTO: 0.41 10*3/MM3 (ref 0.7–3.1)
LYMPHOCYTES NFR BLD AUTO: 14.9 % (ref 19.6–45.3)
MCH RBC QN AUTO: 28 PG (ref 26.6–33)
MCHC RBC AUTO-ENTMCNC: 31.4 G/DL (ref 31.5–35.7)
MCV RBC AUTO: 89.2 FL (ref 79–97)
MONOCYTES # BLD AUTO: 1.11 10*3/MM3 (ref 0.1–0.9)
MONOCYTES NFR BLD AUTO: 40.4 % (ref 5–12)
NEUTROPHILS NFR BLD AUTO: 1.14 10*3/MM3 (ref 1.7–7)
NEUTROPHILS NFR BLD AUTO: 41.4 % (ref 42.7–76)
PLATELET # BLD AUTO: 160 10*3/MM3 (ref 140–450)
PMV BLD AUTO: 8.3 FL (ref 6–12)
POTASSIUM SERPL-SCNC: 4.4 MMOL/L (ref 3.5–5.2)
PROT SERPL-MCNC: 6.7 G/DL (ref 6–8.5)
RBC # BLD AUTO: 3.25 10*6/MM3 (ref 4.14–5.8)
SODIUM SERPL-SCNC: 135 MMOL/L (ref 136–145)
WBC NRBC COR # BLD AUTO: 2.75 10*3/MM3 (ref 3.4–10.8)

## 2024-05-21 PROCEDURE — 85025 COMPLETE CBC W/AUTO DIFF WBC: CPT | Performed by: INTERNAL MEDICINE

## 2024-05-21 PROCEDURE — 25010000002 HEPARIN LOCK FLUSH PER 10 UNITS: Performed by: INTERNAL MEDICINE

## 2024-05-21 PROCEDURE — 80053 COMPREHEN METABOLIC PANEL: CPT | Performed by: INTERNAL MEDICINE

## 2024-05-21 PROCEDURE — 36591 DRAW BLOOD OFF VENOUS DEVICE: CPT

## 2024-05-21 RX ORDER — SODIUM CHLORIDE 0.9 % (FLUSH) 0.9 %
10 SYRINGE (ML) INJECTION AS NEEDED
OUTPATIENT
Start: 2024-05-21

## 2024-05-21 RX ORDER — HEPARIN SODIUM (PORCINE) LOCK FLUSH IV SOLN 100 UNIT/ML 100 UNIT/ML
500 SOLUTION INTRAVENOUS AS NEEDED
Status: DISCONTINUED | OUTPATIENT
Start: 2024-05-21 | End: 2024-05-22 | Stop reason: HOSPADM

## 2024-05-21 RX ORDER — HEPARIN SODIUM (PORCINE) LOCK FLUSH IV SOLN 100 UNIT/ML 100 UNIT/ML
500 SOLUTION INTRAVENOUS AS NEEDED
OUTPATIENT
Start: 2024-05-21

## 2024-05-21 RX ORDER — SODIUM CHLORIDE 0.9 % (FLUSH) 0.9 %
10 SYRINGE (ML) INJECTION AS NEEDED
Status: DISCONTINUED | OUTPATIENT
Start: 2024-05-21 | End: 2024-05-22 | Stop reason: HOSPADM

## 2024-05-21 RX ADMIN — HEPARIN 500 UNITS: 100 SYRINGE at 09:35

## 2024-05-21 RX ADMIN — Medication 10 ML: at 09:35

## 2024-05-31 ENCOUNTER — TELEPHONE (OUTPATIENT)
Dept: ONCOLOGY | Facility: CLINIC | Age: 64
End: 2024-05-31
Payer: MEDICAID

## 2024-05-31 RX ORDER — NITROFURANTOIN 25; 75 MG/1; MG/1
100 CAPSULE ORAL 2 TIMES DAILY
Qty: 14 CAPSULE | Refills: 0 | Status: SHIPPED | OUTPATIENT
Start: 2024-05-31

## 2024-05-31 NOTE — TELEPHONE ENCOUNTER
Spoke with patient whom placed ERNESTINA Pichardo, whom is caring for patient, on the phone.  He stated patient has not received any antibiotics in the last month.  Advised patient and Marino that an antibiotic will be sent to his pharmacy and patient should take it.  Both verbalized understanding and Marino stated he will let the provider at [facility] know.

## 2024-05-31 NOTE — TELEPHONE ENCOUNTER
Patient stated he completed urine. Asked to speak to his nurse there as this RN cannot get call answered when calling [facility].  Spoke with [facility].  He stated it has not resulted and they will recollect and send this office the results.

## 2024-06-03 ENCOUNTER — OFFICE VISIT (OUTPATIENT)
Dept: RADIATION ONCOLOGY | Facility: HOSPITAL | Age: 64
End: 2024-06-03
Payer: MEDICAID

## 2024-06-03 ENCOUNTER — HOSPITAL ENCOUNTER (OUTPATIENT)
Dept: RADIATION ONCOLOGY | Facility: HOSPITAL | Age: 64
Setting detail: RADIATION/ONCOLOGY SERIES
Discharge: HOME | End: 2024-06-03
Payer: MEDICAID

## 2024-06-03 ENCOUNTER — DOCUMENTATION (OUTPATIENT)
Dept: OTHER | Facility: HOSPITAL | Age: 64
End: 2024-06-03
Payer: MEDICAID

## 2024-06-03 ENCOUNTER — OFFICE VISIT (OUTPATIENT)
Dept: ONCOLOGY | Facility: CLINIC | Age: 64
End: 2024-06-03
Payer: MEDICAID

## 2024-06-03 VITALS
HEART RATE: 68 BPM | OXYGEN SATURATION: 98 % | BODY MASS INDEX: 24.41 KG/M2 | WEIGHT: 165.3 LBS | TEMPERATURE: 98.6 F | DIASTOLIC BLOOD PRESSURE: 57 MMHG | RESPIRATION RATE: 16 BRPM | SYSTOLIC BLOOD PRESSURE: 126 MMHG

## 2024-06-03 VITALS
HEIGHT: 69 IN | RESPIRATION RATE: 20 BRPM | SYSTOLIC BLOOD PRESSURE: 113 MMHG | WEIGHT: 166 LBS | DIASTOLIC BLOOD PRESSURE: 66 MMHG | TEMPERATURE: 98.2 F | BODY MASS INDEX: 24.59 KG/M2 | OXYGEN SATURATION: 98 % | HEART RATE: 78 BPM

## 2024-06-03 DIAGNOSIS — C15.9 SQUAMOUS CELL CARCINOMA OF ESOPHAGUS: Primary | ICD-10-CM

## 2024-06-03 PROCEDURE — 1125F AMNT PAIN NOTED PAIN PRSNT: CPT | Performed by: NURSE PRACTITIONER

## 2024-06-03 PROCEDURE — 1160F RVW MEDS BY RX/DR IN RCRD: CPT | Performed by: NURSE PRACTITIONER

## 2024-06-03 PROCEDURE — 99213 OFFICE O/P EST LOW 20 MIN: CPT | Performed by: NURSE PRACTITIONER

## 2024-06-03 PROCEDURE — 1159F MED LIST DOCD IN RCRD: CPT | Performed by: NURSE PRACTITIONER

## 2024-06-03 RX ORDER — DIPHENHYDRAMINE, LIDOCAINE, NYSTATIN
KIT ORAL
Qty: 240 ML | Refills: 3 | Status: SHIPPED | OUTPATIENT
Start: 2024-06-03

## 2024-06-03 NOTE — PROGRESS NOTES
"ONCOLOGY ACUTE CARE CLINIC VISIT    Val Nassar Jr.  [id number]  1960    Chief Complaint:  port issue    History of present illness:  Val Nassar Jr. is a 63 y.o. year old male who is currently undergoing treatment for esophageal cancer.  He has completed chemotherapy and radiation.  He had follow up in radiation today and expressed concern about port.  I was asked to see him for further evaluation of his port.      He got port placed on 4/25/2024.  He states that for the past week things have been getting caught on the incision on his neck and it's uncomfortable.  He has been wearing a bandaid.  He denies any redness, drainage or pain.  He cleans incision daily with soap and water.  No fever or chills.  He wanted to be seen today to make sure it was not infected or \"coming out\".  He is currently on Macrobid for UTI that he started 2 days ago.  He has follow up with Dr. Ga on 6/18/2024.      Oncology History:    Oncology/Hematology History   Squamous cell carcinoma of esophagus   1/3/2024 Initial Diagnosis    Squamous cell carcinoma of esophagus     3/27/2024 - 5/8/2024 Radiation    Radiation OncologyTreatment Course:  Val Nassar Jr. received 5400 cGy in 30 fractions to esophagus via External Beam Radiation - EBRT.     4/29/2024 -  Chemotherapy    OP CENTRAL VENOUS ACCESS DEVICE Access, Care, and Maintenance (CVAD)     Malignant neoplasm of upper third of esophagus   3/14/2024 Initial Diagnosis    Malignant neoplasm of upper third of esophagus     3/28/2024 -  Chemotherapy    OP SUPPORTIVE Iron Sucrose (Venofer) 200 MG     4/2/2024 -  Chemotherapy    OP LUNG PACLitaxel / CARBOplatin AUC=2 (Weekly) + XRT      4/29/2024 -  Chemotherapy    OP CENTRAL VENOUS ACCESS DEVICE Access, Care, and Maintenance (CVAD)         Past Medical History:   Diagnosis Date    Anemia     Cancer     ESOPHAGEAL    Cirrhosis     Duodenal ulcer     Gastric ulcer     GERD " "(gastroesophageal reflux disease)     History of alcohol abuse     History of radiation therapy 05/08/2024    esophagus    HLD (hyperlipidemia)     Mood disorder        Past Surgical History:   Procedure Laterality Date    ENDOSCOPY N/A 12/26/2023    Procedure: ESOPHAGOGASTRODUODENOSCOPY;  Surgeon: Wesly Mckeon MD;  Location: [facility] ENDOSCOPY;  Service: Gastroenterology;  Laterality: N/A;    VENOUS ACCESS DEVICE (PORT) INSERTION Right 04/25/2024       Family History   Problem Relation Age of Onset    Cancer Mother         LUNG AND BREAST    Breast cancer Mother     Heart attack Father        Past medical history, social history and family history was reviewed.    Medications: The current medication list was reviewed and reconciled.     Allergies:  has No Known Allergies.    Review of Systems:    Review of Systems  Discomfort at port site on right neck and states \"getting caught on things\"    PHYSICAL EXAM:    Vitals:    06/03/24 1053   BP: 113/66   Pulse: 78   Resp: 20   Temp: 98.2 °F (36.8 °C)   SpO2: 98%     Vitals:    06/03/24 1053   PainSc:   6   PainLoc: Throat          ECOG: (1) Restricted in Physically Strenuous Activity, Ambulatory & Able to Do Work of Light Nature  General: well appearing male in no acute distress  Cardiovascular: regular rate and rhythm, no murmurs  Lungs: clear to auscultation bilaterally. No respiratory distress  Abdomen: soft, nontender, nondistended.  No palpable organomegaly  Extremities: no lower extremity edema, cyanosis, or clubbing  Skin: no rashes, lesions, bruising, or petechiae.  Port-A-Cath in place without any redness or drainage, entrance site on neck with suture slightly protruding out of skin, no redness or drainage noted.  Neuro: Alert and oriented x3. Moves all extremities.    Assessment and Plan:    Diagnoses and all orders for this visit:    1. Squamous cell carcinoma of esophagus (Primary)    2. Port issue    Discussion: Patient received chemotherapy and radiation " for esophageal cancer.  His last chemotherapy was on 5/7/2024.  He had port placed on 4/25/2024.  The neck site has a tiny piece of suture protruding out.  I called IR and discussed with nurse who reached out to MD.  They sometimes use sutures which are dissolvable and recommended to clip it.  I attempted to clip it but was unable as it was not long enough.  I instructed him to continue to keep clean and cover with Band-Aid if needed to keep from getting caught on his clothing.  He will monitor for signs and symptoms of infection.  We will re-assess at his follow up on 6/18/2024.  He will notify us in the interim if any issues or concerns.      Total time of patient care on day of service including time prior to, face to face with patient, and following visit spent in reviewing records, discussion with patient, and documentation/charting was > 20 minutes.       Mayi Lopes, APRN    6/3/2024

## 2024-06-03 NOTE — SIGNIFICANT NOTE
SW met with pt per request of radiation medicine to provide meal ticket. SW met with pt and provided  Free Meal coupon. Pt thanked WALT and had no other needs at this time.       06/03/24 0900   Oncology Interventions   Practical Needs Food  (provided  Free meal ticket)

## 2024-06-03 NOTE — PROGRESS NOTES
FOLLOW UP NOTE    PATIENT:                                                      Val Nassar Jr.  MEDICAL RECORD #:                        [id number]  :                                                          1960  COMPLETION DATE:   2024  DIAGNOSIS:     Squamous cell carcinoma of esophagus  - Stage II (cT2, cN1, cM0)      BRIEF HISTORY:  Val Nassar is a very pleasant 63 y.o. male with multiple medical comorbidities including tobacco and alcohol abuse, cirrhosis,  iron deficiency anemia, multiple falls, UTIs, and general debilitation who developed progressive dysphagia to solids.  He was diagnosed with a localized esophageal squamous cell carcinoma of the upper third of the esophagus in the fall  while living in [place].  He then relocated to [place] to be closer to his sister.  EGD performed 2023 at [facility] during an admission for upper GI bleed secondary to peptic ulcer disease showed an ulcerated mass in the upper third of the esophagus which was nonobstructing and partially circumferential spanning 2 cm in length, located 20 cm from the incisors.  Biopsy revealed invasive squamous cell carcinoma.  The patient then had a CT scan at  that showed some thickening of the esophagus but otherwise nothing else.  The patient was readmitted to [facility] and underwent staging on 2024 with a CT scan of the abdomen pelvis and CT scan of the chest on 2024.  The scans showed no evidence of distant disease but concern for some thickening of the esophagus.  MRI of the brain was negative.  The patient was discharged from the hospital to a nursing home with plans for continued workup as an outpatient.      The patient completed staging PET/CT on 3/6/2024 which showed a hypermetabolic upper thoracic esophageal lesion, consistent with his biopsy-proven neoplasm.  The patient also had several hypermetabolic lymph nodes in the right supraclavicular fossa and superior right mediastinum  "concerning for regional spread.  The patient had some uptake in the spine but it was thought to represent benign disease.    The patient saw Dr. Ga who recommended definitive therapy.  He was not considered an operative candidate.    The patient underwent a course of concurrent chemoradiation with carboplatin and Taxol.  The esophagus and involved adenopathy were treated to a cumulative dose of 54 Gray in 30 fractions utilizing IMRT and IGR T technique, completing 5/8/2024.    The patient tolerated treatment fairly well.  He did develop the expected grade 1 esophagitis symptoms, managed with dietary modifications as well as Magic mouthwash and Percocet as needed.  He reports dysphagia and odynophagia are mildly improved since completion.  He continues to eat mostly soft or puréed textured foods.  Appetite is good, he has actually gained a few pounds since beginning treatment.  He drinks 1 Boost daily.  Denies nausea or vomiting.  Endorses some exertional dyspnea and uses Mucinex on occasion for productive cough.  He denies developing infield dermatitis.  He is independent with ADLs but does reside in a skilled nursing facility.      He voices concerns about his Port-A-Cath, including an area just under his collar bone that is \"rough\" and gets caught on clothing.  He notes occasional clear thin drainage from the site but denies redness, streaking, fever, chills, constitutional symptoms.  He is being treated with Macrobid for a UTI.      MEDICATIONS: Medication reconciliation for the patient was reviewed and confirmed in the electronic medical record.    Review of Systems   Constitutional:  Positive for appetite change and fatigue.   HENT:   Positive for sore throat and trouble swallowing.    Respiratory:  Positive for cough and shortness of breath.    Genitourinary:  Positive for dysuria (On Macrobid for UTI).    Musculoskeletal:  Positive for arthralgias, back pain, gait problem (uses walker) and myalgias.      "   Left hip sciatica   Skin:         PAC catheter irritation   Neurological:  Positive for gait problem (uses walker).   Psychiatric/Behavioral:  The patient is nervous/anxious.    All other systems reviewed and are negative.          KPS 80%      Physical Exam  Vitals and nursing note reviewed.   Constitutional:       General: He is not in acute distress.     Appearance: Normal appearance. He is well-developed.   HENT:      Head: Normocephalic and atraumatic.   Eyes:      Conjunctiva/sclera: Conjunctivae normal.      Pupils: Pupils are equal, round, and reactive to light.   Cardiovascular:      Rate and Rhythm: Normal rate and regular rhythm.      Heart sounds: No murmur heard.     No friction rub.   Pulmonary:      Effort: Pulmonary effort is normal. No respiratory distress.      Breath sounds: Normal breath sounds. No wheezing.   Abdominal:      General: Bowel sounds are normal. There is no distension.      Palpations: Abdomen is soft. There is no mass.      Tenderness: There is no abdominal tenderness.   Musculoskeletal:         General: Normal range of motion.      Cervical back: Normal range of motion and neck supple.   Lymphadenopathy:      Cervical: No cervical adenopathy.   Skin:     General: Skin is warm and dry.      Comments: PAC catheter appears to be very superficial under the skin with tiny area of skin erosion at the supraclav, without evidence of drainage, erythema, calor, or streaking surrounding the site.  Covered with Band-Aid.   Neurological:      Mental Status: He is alert and oriented to person, place, and time.   Psychiatric:         Behavior: Behavior normal.         Thought Content: Thought content normal.         Judgment: Judgment normal.         VITAL SIGNS:   Vitals:    06/03/24 0830   BP: 126/57   Pulse: 68   Resp: 16   Temp: 98.6 °F (37 °C)   TempSrc: Temporal   SpO2: 98%   Weight: 75 kg (165 lb 4.8 oz)                 The following portions of the patient's history were reviewed and  updated as appropriate: allergies, current medications, past family history, past medical history, past social history, past surgical history and problem list.         Diagnoses and all orders for this visit:    1. Squamous cell carcinoma of esophagus (Primary)  -     nystatin susp + lidocaine viscous (MAGIC MOUTHWASH) oral suspension; 5-10 ml swish and spit or swallow QID prn  Dispense: 240 mL; Refill: 3         IMPRESSION:  Val Nassar is a 63 y.o. gentleman what appears to be a clinical T2 N1 M0 squamous cell carcinoma of the esophagus that is limited to the upper one third of the esophagus and right supraclavicular fossa at this time point.  He underwent definitive nonoperative management with concurrent chemoradiotherapy, completing 5/8/2024.  The patient tolerated treatment fairly well.  He is recovering from the anticipated acute radiation related side effects.  He is slowly advancing his diet as tolerated.  Dr. Ga is reportedly planning to order restaging PET/CT scan 3 months posttreatment, expected early August 2024, to evaluate response.  We will schedule the patient to return to our clinic at that time for follow-up and imaging review.  Additionally, we discussed the role of surveillance EGD in accordance to NCCN guidelines.      RECOMMENDATIONS:      Squamous cell carcinoma of the esophagus  -cT2 N2 M0  -Biopsy proven  -Upper location 20 cm from the incisors  -Disease limited to upper esophagus and the right supraclavicular fossa/mediastinum without obvious evidence of more distant disease on PET  -Recommend 5 to 6 weeks course of radiation daily to a dose of 50-54 Laws with concurrent chemotherapy  -Would also be candidate for palliative treatments with a quad shot  -Recommend PET scan fusion for planning  -Status post concurrent chemoRT with carbo Taxol   -Status post cumulative dose of 54 Gray/30 fractions utilizing IGR T and IMRT  -Completed 5/8/2024  -Grade 1 fatigue, grade 1 radiation  induced esophagitis subsiding  -Anticipate restaging PET/CT scan ~8/2024 per medical oncology  -Recommend repeat EGD for ongoing surveillance  -Follows with Dr. Ga  -RTC ~2 months after imaging      Social distress  -Lives at [facility]  -Relies on bus for transportation  -Fixed income  -Margareth Wright CSW for help with available resources and assistance      Cerumen impaction  -Recommend Debrox otic gtt as directed on box      Tobacco abuse  -Smokes 10-15 cigarettes/day  -Discussed NRT options  -Patient uninterested in cessation at this time      Port-A-Cath issues  -Clinical findings concerning for PAC catheter eruption through the skin at the supraclav  -Recommend empiric treatment with Keflex and keeping site covered with Band-Aid until the patient is seen by Dr. Ga on 6/18/2024  -Given complex social situation dependence on public transportation, he prefers waiting to be evaluated by med onc later today to see if PAC needs to be removed  -I have contacted NUVIA Grimaldo, who has graciously agreed to evaluate the patient related to this        Return in about 2 months (around 8/7/2024) for Office Visit.    NUVIA Sunshine      I spent a total of 60 minutes on today's visit, with more than 30 minutes in direct face to face communication, and the remainder of the time spent in reviewing the relevant history, records, available imaging, and for documentation.

## 2024-06-05 ENCOUNTER — APPOINTMENT (OUTPATIENT)
Dept: RADIATION ONCOLOGY | Facility: HOSPITAL | Age: 64
End: 2024-06-05
Payer: MEDICAID

## 2024-06-18 ENCOUNTER — OFFICE VISIT (OUTPATIENT)
Dept: ONCOLOGY | Facility: CLINIC | Age: 64
End: 2024-06-18
Payer: MEDICAID

## 2024-06-18 ENCOUNTER — HOSPITAL ENCOUNTER (OUTPATIENT)
Dept: ONCOLOGY | Facility: HOSPITAL | Age: 64
Discharge: HOME | End: 2024-06-18
Payer: MEDICAID

## 2024-06-18 VITALS
SYSTOLIC BLOOD PRESSURE: 127 MMHG | HEIGHT: 69 IN | HEART RATE: 82 BPM | BODY MASS INDEX: 24.29 KG/M2 | WEIGHT: 164 LBS | TEMPERATURE: 99.1 F | DIASTOLIC BLOOD PRESSURE: 59 MMHG | OXYGEN SATURATION: 96 %

## 2024-06-18 DIAGNOSIS — C15.3 MALIGNANT NEOPLASM OF UPPER THIRD OF ESOPHAGUS: Primary | ICD-10-CM

## 2024-06-18 DIAGNOSIS — C15.9 SQUAMOUS CELL CARCINOMA OF ESOPHAGUS: ICD-10-CM

## 2024-06-18 DIAGNOSIS — C15.9 SQUAMOUS CELL CARCINOMA OF ESOPHAGUS: Primary | ICD-10-CM

## 2024-06-18 LAB
ALBUMIN SERPL-MCNC: 3.7 G/DL (ref 3.5–5.2)
ALBUMIN/GLOB SERPL: 1 G/DL
ALP SERPL-CCNC: 171 U/L (ref 39–117)
ALT SERPL W P-5'-P-CCNC: 9 U/L (ref 1–41)
ANION GAP SERPL CALCULATED.3IONS-SCNC: 11 MMOL/L (ref 5–15)
AST SERPL-CCNC: 24 U/L (ref 1–40)
BASOPHILS # BLD AUTO: 0.02 10*3/MM3 (ref 0–0.2)
BASOPHILS NFR BLD AUTO: 0.2 % (ref 0–1.5)
BILIRUB SERPL-MCNC: 0.4 MG/DL (ref 0–1.2)
BUN SERPL-MCNC: 10 MG/DL (ref 8–23)
BUN/CREAT SERPL: 14.7 (ref 7–25)
CALCIUM SPEC-SCNC: 9.6 MG/DL (ref 8.6–10.5)
CHLORIDE SERPL-SCNC: 97 MMOL/L (ref 98–107)
CO2 SERPL-SCNC: 26 MMOL/L (ref 22–29)
CREAT SERPL-MCNC: 0.68 MG/DL (ref 0.76–1.27)
DEPRECATED RDW RBC AUTO: 66.7 FL (ref 37–54)
EGFRCR SERPLBLD CKD-EPI 2021: 103.8 ML/MIN/1.73
EOSINOPHIL # BLD AUTO: 0.56 10*3/MM3 (ref 0–0.4)
EOSINOPHIL NFR BLD AUTO: 6.8 % (ref 0.3–6.2)
ERYTHROCYTE [DISTWIDTH] IN BLOOD BY AUTOMATED COUNT: 19.6 % (ref 12.3–15.4)
GLOBULIN UR ELPH-MCNC: 3.8 GM/DL
GLUCOSE SERPL-MCNC: 127 MG/DL (ref 65–99)
HCT VFR BLD AUTO: 34.4 % (ref 37.5–51)
HGB BLD-MCNC: 10.8 G/DL (ref 13–17.7)
IMM GRANULOCYTES # BLD AUTO: 0.02 10*3/MM3 (ref 0–0.05)
IMM GRANULOCYTES NFR BLD AUTO: 0.2 % (ref 0–0.5)
LYMPHOCYTES # BLD AUTO: 0.75 10*3/MM3 (ref 0.7–3.1)
LYMPHOCYTES NFR BLD AUTO: 9.1 % (ref 19.6–45.3)
MCH RBC QN AUTO: 28.8 PG (ref 26.6–33)
MCHC RBC AUTO-ENTMCNC: 31.4 G/DL (ref 31.5–35.7)
MCV RBC AUTO: 91.7 FL (ref 79–97)
MONOCYTES # BLD AUTO: 1.65 10*3/MM3 (ref 0.1–0.9)
MONOCYTES NFR BLD AUTO: 20.1 % (ref 5–12)
NEUTROPHILS NFR BLD AUTO: 5.21 10*3/MM3 (ref 1.7–7)
NEUTROPHILS NFR BLD AUTO: 63.6 % (ref 42.7–76)
PLATELET # BLD AUTO: 182 10*3/MM3 (ref 140–450)
PMV BLD AUTO: 8.7 FL (ref 6–12)
POTASSIUM SERPL-SCNC: 4 MMOL/L (ref 3.5–5.2)
PROT SERPL-MCNC: 7.5 G/DL (ref 6–8.5)
RBC # BLD AUTO: 3.75 10*6/MM3 (ref 4.14–5.8)
SODIUM SERPL-SCNC: 134 MMOL/L (ref 136–145)
WBC NRBC COR # BLD AUTO: 8.21 10*3/MM3 (ref 3.4–10.8)

## 2024-06-18 PROCEDURE — G0463 HOSPITAL OUTPT CLINIC VISIT: HCPCS

## 2024-06-18 PROCEDURE — 80053 COMPREHEN METABOLIC PANEL: CPT | Performed by: INTERNAL MEDICINE

## 2024-06-18 PROCEDURE — 36591 DRAW BLOOD OFF VENOUS DEVICE: CPT

## 2024-06-18 PROCEDURE — 85025 COMPLETE CBC W/AUTO DIFF WBC: CPT | Performed by: INTERNAL MEDICINE

## 2024-06-18 PROCEDURE — 25010000002 HEPARIN LOCK FLUSH PER 10 UNITS: Performed by: INTERNAL MEDICINE

## 2024-06-18 RX ORDER — HEPARIN SODIUM (PORCINE) LOCK FLUSH IV SOLN 100 UNIT/ML 100 UNIT/ML
500 SOLUTION INTRAVENOUS AS NEEDED
Status: DISCONTINUED | OUTPATIENT
Start: 2024-06-18 | End: 2024-06-19 | Stop reason: HOSPADM

## 2024-06-18 RX ORDER — HEPARIN SODIUM (PORCINE) LOCK FLUSH IV SOLN 100 UNIT/ML 100 UNIT/ML
500 SOLUTION INTRAVENOUS AS NEEDED
OUTPATIENT
Start: 2024-06-18

## 2024-06-18 RX ORDER — SODIUM CHLORIDE 0.9 % (FLUSH) 0.9 %
10 SYRINGE (ML) INJECTION AS NEEDED
OUTPATIENT
Start: 2024-06-18

## 2024-06-18 RX ADMIN — HEPARIN 500 UNITS: 100 SYRINGE at 13:21

## 2024-06-18 NOTE — PROGRESS NOTES
Hematology and Oncology [place]  Office number [phone]    Fax number [phone]     Follow up     Date: 24    Patient Name: Val Nassar Jr.  MRN: [id number]  : 1960    Chief Complaint: esophageal cancer    Cancer Staging:  Cancer Staging   Stage II (cT2, cN1, cM0)    History of Present Illness: Val Nassar Jr. is a pleasant 63 y.o. male who presents today for evaluation of upper third esophageal squamous cell carcinoma. He has a longstanding history of cirrhosis     He originally was diagnosed with a localized esophageal squamous cell carcinoma of the upper third of the esophagus in the 2023.  At the time he was living in [place].  He underwent an endoscopy confirming cancer diagnosis at [facility] in [facility].  He then relocated to [facility] to live with his sister because of his cancer diagnosis.      He presented to [facility] in 2023 with an upper GI bleed secondary to peptic ulcer disease and possible cholecystitis.  His presenting hemoglobin was 6.9.     EGD performed 2023 showed an ulcerated mass in the upper third of the esophagus which was nonobstructing and partially circumferential spanning 2 cm in length.  Biopsy results are pending.    Additional findings include gastritis, portal hypertension gastropathy and nonbleeding duodenal ulcer.  Duodenal stenosis.    He was discharged to a rehab facility and ultimately discharged home to live with his sister.  He was doing poorly and was unable to care for himself.  He did not keep his appointments with medical oncology, thoracic surgery, or radiation oncology and did not complete staging PET/CT.  He then represented to the hospital in late 2024 with a UTI and confusion.  During that admission he was assessed by palliative care, radiation oncology, and medical oncology.  He elected to be discharged to a skilled nursing facility for long-term  care as he does not want to be a burden on his sister and did not feel that he could care for himself at home.  Prior to discharge there were conversations between case management and social work at the hospital and his facility and the patient was under the impression that he could not receive radiation treatment while living at the skilled facility.  However he did elect to complete PET CT scan and returns for results.    PET/CT performed 3/6/2024 shows thickening of the esophagus with an uptake of 16 by SUV.  There was notable hypermetabolic activity in the left lateral shoulder subcutaneous tissue which corresponds to an area where he received a prior injection at his PCP office for alcohol use which he reports has been raised and pruritic since the injection 6 weeks prior.  He had mild paratracheal adenopathy as well as an right paratracheal lymph node in the right supraclavicular region  He reports that he has been tolerating regular diet with no obstructive symptoms prior to presentation.     He continues to feel weak but is clinically improving.  He tells me he is planning inpatient rehab on discharge.  His hemoglobin continues to improve and is 9.1 today.  CT of the abdomen pelvis in addition to the gallbladder findings showed no metastatic disease.  A chest x-ray this admission showed mild interstitial edema.  MRI cervical spine from 4/12/2024 showed:  1.Active left-sided facet arthropathy at C3/4 and to lesser degree C4/5 with associated surrounding marrow and soft tissue edema.  2.Moderate multifactorial degenerative change of the mid to lower cervical spine otherwise with central canal and neuroforaminal stenosis. Potential exiting nerve contact from C3/4 through C6/7 as detailed above.    Treatment history:  Taxol/carbo/radiation: C1, 4/2/2024; C2, 4/9/24; C3, held, C4, carbo only; C5, carbo and DA taxol: completed chemoradiation on 5/8/2024.    Interval history:  Has left sciatica at baseline but for  the last week has hurt on the lateral left hip and severe until walking, worse with activation.   Urinating better reports he was treated with 7 day course of antibiotics.   Swallowing is better, no longer painful    Past Medical History:   Past Medical History:   Diagnosis Date    Anemia     Cancer     ESOPHAGEAL    Cirrhosis     Duodenal ulcer     Gastric ulcer     GERD (gastroesophageal reflux disease)     History of alcohol abuse     History of radiation therapy 05/08/2024    esophagus    HLD (hyperlipidemia)     Mood disorder        Past Surgical History:   Past Surgical History:   Procedure Laterality Date    ENDOSCOPY N/A 12/26/2023    Procedure: ESOPHAGOGASTRODUODENOSCOPY;  Surgeon: Wesly Mckeon MD;  Location: [facility] ENDOSCOPY;  Service: Gastroenterology;  Laterality: N/A;    VENOUS ACCESS DEVICE (PORT) INSERTION Right 04/25/2024       Family History:   Family History   Problem Relation Age of Onset    Cancer Mother         LUNG AND BREAST    Breast cancer Mother     Heart attack Father        Social History:   Social History     Socioeconomic History    Marital status: Single   Tobacco Use    Smoking status: Every Day     Current packs/day: 1.00     Average packs/day: 1 pack/day for 15.0 years (15.0 ttl pk-yrs)     Types: Cigarettes     Passive exposure: Current    Smokeless tobacco: Never   Vaping Use    Vaping status: Some Days    Substances: Flavoring    Devices: Disposable   Substance and Sexual Activity    Alcohol use: Not Currently     Comment: sober for ~ 3 months    Drug use: Yes     Types: Hydrocodone    Sexual activity: Defer       Medications:     Current Outpatient Medications:     acetaminophen (TYLENOL) 325 MG tablet, Take 2 tablets by mouth Every 4 (Four) Hours As Needed for Mild Pain., Disp: , Rfl:     benzonatate (TESSALON) 100 MG capsule, Take 1 capsule by mouth Every 8 (Eight) Hours As Needed for Cough., Disp: , Rfl:     Cholecalciferol (Vitamin D3) 1.25 MG (16315 UT) capsule, Take 1  capsule by mouth Every 7 (Seven) Days., Disp: , Rfl:     Diclofenac Sodium (Voltaren) 1 % gel gel, Apply 4 g topically to the appropriate area as directed 2 (Two) Times a Day., Disp: 50 g, Rfl: 1    diphenhydrAMINE (BENADRYL) 25 mg capsule, Take 1 capsule by mouth At Night As Needed for Itching (insomnia)., Disp: 30 capsule, Rfl: 0    docusate sodium (Colace) 100 MG capsule, Take 1 capsule by mouth 2 (Two) Times a Day As Needed for Constipation., Disp: 30 capsule, Rfl: 1    dutasteride (AVODART) 0.5 MG capsule, Take 1 capsule by mouth Daily., Disp: , Rfl:     escitalopram (LEXAPRO) 10 MG tablet, Take 1 tablet by mouth Every Night., Disp: , Rfl:     fentaNYL (DURAGESIC) 25 MCG/HR patch, Place 1 patch on the skin as directed by provider Every 72 (Seventy-Two) Hours., Disp: , Rfl:     ferrous gluconate (FERGON) 324 MG tablet, Take 1 tablet by mouth Daily With Breakfast., Disp: , Rfl:     finasteride (PROSCAR) 5 MG tablet, Take 1 tablet by mouth Daily., Disp: , Rfl:     folic acid (FOLVITE) 1 MG tablet, Take 1 tablet by mouth Daily., Disp: , Rfl:     gabapentin (NEURONTIN) 50 mg/mL solution, Take 16 mL by mouth 3 (Three) Times a Day., Disp: 1000 mL, Rfl: 0    guaiFENesin (MUCINEX) 600 MG 12 hr tablet, Take 2 tablets by mouth Every 12 (Twelve) Hours As Needed for Cough., Disp: , Rfl:     lactulose (CHRONULAC) 10 GM/15ML solution, Take 30 mL by mouth 2 (Two) Times a Day As Needed., Disp: , Rfl:     Lidocaine 4 %, Place 1 patch on the skin as directed by provider Daily. Remove & Discard patch within 12 hours or as directed by MD, Disp: , Rfl:     lidocaine-prilocaine (EMLA) 2.5-2.5 % cream, Apply 1 Application topically to the appropriate area as directed As Needed (45-60 minutes prior to port access.  Cover with saran/plastic wrap.)., Disp: 30 g, Rfl: 3    Loratadine 10 MG capsule, Take  by mouth Daily., Disp: , Rfl:     melatonin 5 MG tablet tablet, Take 1 tablet by mouth At Night As Needed., Disp: , Rfl:     mometasone  (ELOCON) 0.1 % cream, , Disp: , Rfl:     nitrofurantoin, macrocrystal-monohydrate, (Macrobid) 100 MG capsule, Take 1 capsule by mouth 2 (Two) Times a Day., Disp: 14 capsule, Rfl: 0    nystatin (MYCOSTATIN) 100,000 unit/mL suspension, , Disp: , Rfl:     nystatin susp + lidocaine viscous (MAGIC MOUTHWASH) oral suspension, 5-10 ml swish and spit or swallow QID prn, Disp: 240 mL, Rfl: 3    omeprazole (priLOSEC) 40 MG capsule, Take  by mouth., Disp: , Rfl:     ondansetron (ZOFRAN) 8 MG tablet, Take 1 tablet by mouth Every 8 (Eight) Hours As Needed for Nausea or Vomiting., Disp: 30 tablet, Rfl: 3    oxyCODONE-acetaminophen (PERCOCET) 7.5-325 MG per tablet, , Disp: , Rfl:     pantoprazole (PROTONIX) 40 MG EC tablet, Take 1 tablet by mouth 2 (Two) Times a Day., Disp: , Rfl:     phenol (CHLORASEPTIC) 1.4 % liquid liquid, Apply 1 spray to the mouth or throat Every 4 (Four) Hours As Needed., Disp: , Rfl:     polyethylene glycol (MIRALAX) 17 g packet, Take 17 g by mouth Daily As Needed (Use if senna-docusate is ineffective)., Disp: , Rfl:     predniSONE (DELTASONE) 10 MG tablet, Take daily based on following titration schedule: 60 mg x 1 day, then 50 mg x 1, then 40 mg x 1, then 30 mg x 1 day, then 20 mg x 1 day, then 10 mg x 1 day then stop, Disp: 21 tablet, Rfl: 0    promethazine (PHENERGAN) 25 MG tablet, Take 1 tablet by mouth Every 6 (Six) Hours As Needed for Nausea or Vomiting., Disp: , Rfl:     sodium chloride (Ocean Nasal Spray) 0.65 % nasal spray, 1 spray into the nostril(s) as directed by provider As Needed for Congestion., Disp: 1 each, Rfl: 0    tamsulosin (FLOMAX) 0.4 MG capsule 24 hr capsule, Take 1 capsule by mouth Daily., Disp: , Rfl:     vitamin B-12 (CYANOCOBALAMIN) 1000 MCG tablet, Take 1 tablet by mouth Daily., Disp: , Rfl:     Allergies:   No Known Allergies    Objective     Vital Signs:   Vitals:    06/18/24 1231   BP: 127/59   Pulse: 82   Temp: 99.1 °F (37.3 °C)   TempSrc: Infrared   SpO2: 96%   Weight:  "74.4 kg (164 lb)   Height: 175.3 cm (69.02\")   PainSc:   9    Body mass index is 24.21 kg/m².   Pain Score    06/18/24 1231   PainSc:   9       ECOG Performance Status: 2    Physical Exam:   General: No acute distress. Well appearing   HEENT: Normocephalic, atraumatic.   Neck: supple, no adenopathy.  Cardiovascular: regular rate and rhythm. No murmurs.   Respiratory: Normal rate. Clear to auscultation bilaterally  Abdomen: Soft, nontender, non distended with +BS  Lymph: no cervical, supraclavicular or axillary adenopathy  Neuro: Alert and oriented x 3. No focal deficits.   Ext: Symmetric, no swelling.     Laboratory/Imaging Reviewed:   Hospital Outpatient Visit on 06/18/2024   Component Date Value Ref Range Status    Glucose 06/18/2024 127 (H)  65 - 99 mg/dL Final    BUN 06/18/2024 10  8 - 23 mg/dL Final    Creatinine 06/18/2024 0.68 (L)  0.76 - 1.27 mg/dL Final    Sodium 06/18/2024 134 (L)  136 - 145 mmol/L Final    Potassium 06/18/2024 4.0  3.5 - 5.2 mmol/L Final    Chloride 06/18/2024 97 (L)  98 - 107 mmol/L Final    CO2 06/18/2024 26.0  22.0 - 29.0 mmol/L Final    Calcium 06/18/2024 9.6  8.6 - 10.5 mg/dL Final    Total Protein 06/18/2024 7.5  6.0 - 8.5 g/dL Final    Albumin 06/18/2024 3.7  3.5 - 5.2 g/dL Final    ALT (SGPT) 06/18/2024 9  1 - 41 U/L Final    AST (SGOT) 06/18/2024 24  1 - 40 U/L Final    Alkaline Phosphatase 06/18/2024 171 (H)  39 - 117 U/L Final    Total Bilirubin 06/18/2024 0.4  0.0 - 1.2 mg/dL Final    Globulin 06/18/2024 3.8  gm/dL Final    Calculated Result    A/G Ratio 06/18/2024 1.0  g/dL Final    BUN/Creatinine Ratio 06/18/2024 14.7  7.0 - 25.0 Final    Anion Gap 06/18/2024 11.0  5.0 - 15.0 mmol/L Final    eGFR 06/18/2024 103.8  >60.0 mL/min/1.73 Final    WBC 06/18/2024 8.21  3.40 - 10.80 10*3/mm3 Final    RBC 06/18/2024 3.75 (L)  4.14 - 5.80 10*6/mm3 Final    Hemoglobin 06/18/2024 10.8 (L)  13.0 - 17.7 g/dL Final    Hematocrit 06/18/2024 34.4 (L)  37.5 - 51.0 % Final    MCV 06/18/2024 " 91.7  79.0 - 97.0 fL Final    MCH 06/18/2024 28.8  26.6 - 33.0 pg Final    MCHC 06/18/2024 31.4 (L)  31.5 - 35.7 g/dL Final    RDW 06/18/2024 19.6 (H)  12.3 - 15.4 % Final    RDW-SD 06/18/2024 66.7 (H)  37.0 - 54.0 fl Final    MPV 06/18/2024 8.7  6.0 - 12.0 fL Final    Platelets 06/18/2024 182  140 - 450 10*3/mm3 Final    Neutrophil % 06/18/2024 63.6  42.7 - 76.0 % Final    Lymphocyte % 06/18/2024 9.1 (L)  19.6 - 45.3 % Final    Monocyte % 06/18/2024 20.1 (H)  5.0 - 12.0 % Final    Eosinophil % 06/18/2024 6.8 (H)  0.3 - 6.2 % Final    Basophil % 06/18/2024 0.2  0.0 - 1.5 % Final    Immature Grans % 06/18/2024 0.2  0.0 - 0.5 % Final    Neutrophils, Absolute 06/18/2024 5.21  1.70 - 7.00 10*3/mm3 Final    Lymphocytes, Absolute 06/18/2024 0.75  0.70 - 3.10 10*3/mm3 Final    Monocytes, Absolute 06/18/2024 1.65 (H)  0.10 - 0.90 10*3/mm3 Final    Eosinophils, Absolute 06/18/2024 0.56 (H)  0.00 - 0.40 10*3/mm3 Final    Basophils, Absolute 06/18/2024 0.02  0.00 - 0.20 10*3/mm3 Final    Immature Grans, Absolute 06/18/2024 0.02  0.00 - 0.05 10*3/mm3 Final     No results found.    Procedures    Had a fall on right shoulderbut not the rightgot a injection in the left shoulder  to help him stop drinking at dr. Wesly Albert 6 weeks ago, swelled up and red.     Narrative & Impression   MRI BRAIN WO CONTRAST     Date of Exam: 2/18/2024 5:05 PM EST     Indication: Dizziness, off balance, present x 2 weeks.     Comparison: 2/4/2024 CT     Technique:  Routine multiplanar/multisequence sequence images of the brain were obtained without contrast administration.        Findings:  Diffusion imaging shows no evidence of acute infarct. There are scattered subcortical and periventricular T2 hyperintensities which are nonspecific but likely sequela of mild to moderate small vessel ischemic disease. No evidence of intracranial   hemorrhage.     There is normal ventricular volume. The basal cisterns are patent. There is no evidence of  extra-axial fluid collection. There is normal flow voids within the intracranial vessels.     No evidence of fracture or suspicious bony lesion. Paranasal sinusitis with gas/fluid level in the right maxillary sinus. Trace bilateral mastoid air cell effusions. Visualized orbits are unremarkable.     IMPRESSION:  Impression:  No acute intracranial abnormality. No suspicious mass on this noncontrast MRI.     Paranasal sinusitis with gas/fluid level in the right maxillary sinus.       Assessment / Plan      Assessment/Plan:     1.  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  SUV avid right supraclavicular lymph node  3.  Indeterminate paratracheal adenopathy  4.  Symptomatic cervical stenosis  5. Left hip pain  -I personally reviewed his PET/CT.  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  -Hold treatment today given his progressive BUE neuropathy. I am going to start him on a steroid burst. I reviewed his MRI. If symptoms stabilize we will resume. Mild neutropenia noted.   -He did require dose reduction of chemotherapy for neuropathy.  He completed therapy on 5/8/2024  -I ordered repeat labs today including a CBC and CMP.  He will follow-up in 1 months to ensure further improvement and side effects.  PET CT ordered for early August.  -MMR intact  Orders Placed This Encounter   Procedures    XR Hip With or Without Pelvis 2 - 3 View Left    NM PET/CT Skull Base to Mid Thigh    Comprehensive Metabolic Panel    CBC & Differential     6.  Cancer related pain  -Fentanyl patch plus oxycodone per palliative care.  Hopefully his needs will lessen as his mucositis resolves.  -I am going to change his gabapentin to liquid until his mucositis resolves.    Follow Up:   3 mo    Katerina Ga MD  Hematology and Oncology

## 2024-07-05 ENCOUNTER — HOSPITAL ENCOUNTER (EMERGENCY)
Facility: HOSPITAL | Age: 64
Discharge: HOME | End: 2024-07-06
Attending: EMERGENCY MEDICINE
Payer: MEDICAID

## 2024-07-05 VITALS
RESPIRATION RATE: 18 BRPM | DIASTOLIC BLOOD PRESSURE: 78 MMHG | HEIGHT: 69 IN | OXYGEN SATURATION: 94 % | HEART RATE: 48 BPM | WEIGHT: 164.02 LBS | SYSTOLIC BLOOD PRESSURE: 132 MMHG | BODY MASS INDEX: 24.29 KG/M2 | TEMPERATURE: 98.6 F

## 2024-07-05 DIAGNOSIS — G95.9 CERVICAL MYELOPATHY: ICD-10-CM

## 2024-07-05 DIAGNOSIS — C15.9 MALIGNANT NEOPLASM OF ESOPHAGUS, UNSPECIFIED LOCATION: ICD-10-CM

## 2024-07-05 DIAGNOSIS — M79.2 NEUROPATHIC PAIN, ARM: Primary | ICD-10-CM

## 2024-07-05 PROCEDURE — 99283 EMERGENCY DEPT VISIT LOW MDM: CPT

## 2024-07-05 PROCEDURE — 25010000002 DEXAMETHASONE PER 1 MG: Performed by: EMERGENCY MEDICINE

## 2024-07-05 PROCEDURE — 96375 TX/PRO/DX INJ NEW DRUG ADDON: CPT

## 2024-07-05 PROCEDURE — 25010000002 HYDROMORPHONE 1 MG/ML SOLUTION: Performed by: EMERGENCY MEDICINE

## 2024-07-05 PROCEDURE — 96374 THER/PROPH/DIAG INJ IV PUSH: CPT

## 2024-07-05 RX ORDER — DEXAMETHASONE SODIUM PHOSPHATE 10 MG/ML
10 INJECTION INTRAMUSCULAR; INTRAVENOUS ONCE
Status: COMPLETED | OUTPATIENT
Start: 2024-07-05 | End: 2024-07-05

## 2024-07-05 RX ADMIN — DEXAMETHASONE SODIUM PHOSPHATE 10 MG: 10 INJECTION INTRAMUSCULAR; INTRAVENOUS at 21:42

## 2024-07-05 RX ADMIN — HYDROMORPHONE HYDROCHLORIDE 1 MG: 1 INJECTION, SOLUTION INTRAMUSCULAR; INTRAVENOUS; SUBCUTANEOUS at 21:42

## 2024-07-06 PROCEDURE — 25010000002 HEPARIN LOCK FLUSH PER 10 UNITS: Performed by: EMERGENCY MEDICINE

## 2024-07-06 PROCEDURE — 96375 TX/PRO/DX INJ NEW DRUG ADDON: CPT

## 2024-07-06 RX ORDER — HEPARIN SODIUM (PORCINE) LOCK FLUSH IV SOLN 100 UNIT/ML 100 UNIT/ML
300 SOLUTION INTRAVENOUS ONCE
Status: COMPLETED | OUTPATIENT
Start: 2024-07-06 | End: 2024-07-06

## 2024-07-06 RX ADMIN — HEPARIN 300 UNITS: 100 SYRINGE at 01:02

## 2024-07-06 NOTE — ED PROVIDER NOTES
Subjective   History of Present Illness    Pt presents with bilateral arm pain and numbness.  He says this is an issue that has been going on for several months.  He is on pain meds but today it is not covering his pain.  He denies weakness in the arms/hands, just so numb he feels he can't pick things up.  He has been seen for this and is not sure what dx has been made.    History provided by:  Patient      Review of Systems    Past Medical History:   Diagnosis Date    Anemia     Cancer     ESOPHAGEAL    Cirrhosis     Duodenal ulcer     Gastric ulcer     GERD (gastroesophageal reflux disease)     History of alcohol abuse     History of radiation therapy 05/08/2024    esophagus    HLD (hyperlipidemia)     Mood disorder        No Known Allergies    Past Surgical History:   Procedure Laterality Date    ENDOSCOPY N/A 12/26/2023    Procedure: ESOPHAGOGASTRODUODENOSCOPY;  Surgeon: Wesly Mckeon MD;  Location: [facility] ENDOSCOPY;  Service: Gastroenterology;  Laterality: N/A;    VENOUS ACCESS DEVICE (PORT) INSERTION Right 04/25/2024       Family History   Problem Relation Age of Onset    Cancer Mother         LUNG AND BREAST    Breast cancer Mother     Heart attack Father        Social History     Socioeconomic History    Marital status: Single   Tobacco Use    Smoking status: Every Day     Current packs/day: 1.00     Average packs/day: 1 pack/day for 15.0 years (15.0 ttl pk-yrs)     Types: Cigarettes     Passive exposure: Current    Smokeless tobacco: Never   Vaping Use    Vaping status: Some Days    Substances: Flavoring    Devices: Disposable   Substance and Sexual Activity    Alcohol use: Not Currently     Comment: sober for ~ 3 months    Drug use: Yes     Types: Hydrocodone    Sexual activity: Defer           Objective   Physical Exam  Vitals and nursing note reviewed.   Constitutional:       General: He is not in acute distress.     Appearance: Normal appearance. He is not ill-appearing.   HENT:      Head:  "Normocephalic and atraumatic.   Eyes:      General: No scleral icterus.        Right eye: No discharge.         Left eye: No discharge.      Conjunctiva/sclera: Conjunctivae normal.   Cardiovascular:      Rate and Rhythm: Normal rate.   Pulmonary:      Effort: Pulmonary effort is normal. No respiratory distress.   Musculoskeletal:         General: No swelling or deformity.      Cervical back: Normal range of motion and neck supple.   Skin:     General: Skin is dry.      Findings: No rash.   Neurological:      General: No focal deficit present.      Mental Status: He is alert. Mental status is at baseline.      Comments: Normal bilateral .  Sensation intact but he reports numbness on exam.   Psychiatric:         Mood and Affect: Mood normal.         Behavior: Behavior normal.         Thought Content: Thought content normal.         Procedures           ED Course    I reviewed prior records.  Oncology note 6/18/24 for esophageal cancer in the setting of cirrhosis.  Notes history of UGIB in Dec 2023 with EGD finding cancer.  Notes indicated he did not keep appointments with oncology, surgery or radiation oncology and in Feb 2024 was admitted to SNF due to inability to care for himself.  He was started on chemo on 4/2/24 and completed on 5/8/24 along with radiation therapy.  Note indicates sciatica and degenerative disease of the cervical spine.  Note says palliative care is managing his pain meds and describes fentanyl patches with oxycodone and gabapentin.  A PET on 3/6/24 did not show metastatic disease in the spine.    Oncology note 4/16/24: \"He notes progressive numbness from his bilateral elbows to his fingers. He is dropping objects and having trouble with fine motor difficulty. Example is having trouble getting his straw in his cup. Initially, he was not sure whether this had initiated prior to chemo, but now says that it has been worsening while on chemotherapy. \"    MRI C-spine 4/12/24: " Impression:  1.Active left-sided facet arthropathy at C3/4 and to lesser degree C4/5 with associated surrounding marrow and soft tissue edema.  2.Moderate multifactorial degenerative change of the mid to lower cervical spine otherwise with central canal and neuroforaminal stenosis. Potential exiting nerve contact from C3/4 through C6/7 as detailed above.      Pain meds given with relief, he feels better.  He is on chronic fentanyl patch, oxycodone as well as gabapentin.  Decadron given here.  He will need to talk to Dr Minor about adjusting his chronic pain regimen if his symptoms are not controlled.    It appears from prior records that this is an acute exacerbation of a chronic issue.  There is nothing to suggest stroke or other more serious pathology today.  He has appt with his oncologist next week.    Patient stable on serial rechecks.  Discussed findings, concerns, plan of care, expected course, reasons to return and followup.  Provided the opportunity to ask questions.                                             Medical Decision Making  Problems Addressed:  Cervical myelopathy: complicated acute illness or injury  Malignant neoplasm of esophagus, unspecified location: complicated acute illness or injury  Neuropathic pain, arm: complicated acute illness or injury    Risk  Prescription drug management.        Final diagnoses:   Neuropathic pain, arm   Malignant neoplasm of esophagus, unspecified location   Cervical myelopathy       ED Disposition  ED Disposition       ED Disposition   Discharge    Condition   Stable    Comment   --               Wesly Minor MD  [address] [place] [address]  [phone]    In 1 day           Medication List      No changes were made to your prescriptions during this visit.            Mehul Ferreira MD  07/06/24 2207

## 2024-07-11 ENCOUNTER — HOSPITAL ENCOUNTER (OUTPATIENT)
Facility: HOSPITAL | Age: 64
Discharge: HOME | End: 2024-07-11
Payer: MEDICAID

## 2024-07-11 DIAGNOSIS — C15.9 SQUAMOUS CELL CARCINOMA OF ESOPHAGUS: ICD-10-CM

## 2024-07-11 LAB — GLUCOSE BLDC GLUCOMTR-MCNC: 110 MG/DL (ref 70–130)

## 2024-07-11 PROCEDURE — 78815 PET IMAGE W/CT SKULL-THIGH: CPT

## 2024-07-11 PROCEDURE — 82948 REAGENT STRIP/BLOOD GLUCOSE: CPT

## 2024-07-11 PROCEDURE — 0 FLUDEOXYGLUCOSE F18 SOLUTION: Performed by: INTERNAL MEDICINE

## 2024-07-11 PROCEDURE — A9552 F18 FDG: HCPCS | Performed by: INTERNAL MEDICINE

## 2024-07-11 RX ADMIN — FLUDEOXYGLUCOSE F18 1 DOSE: 300 INJECTION INTRAVENOUS at 11:23

## 2024-07-16 ENCOUNTER — OFFICE VISIT (OUTPATIENT)
Dept: ONCOLOGY | Facility: CLINIC | Age: 64
End: 2024-07-16
Payer: MEDICAID

## 2024-07-16 ENCOUNTER — HOSPITAL ENCOUNTER (OUTPATIENT)
Dept: ONCOLOGY | Facility: HOSPITAL | Age: 64
Discharge: HOME | End: 2024-07-16
Admitting: INTERNAL MEDICINE
Payer: MEDICAID

## 2024-07-16 VITALS
BODY MASS INDEX: 25.33 KG/M2 | DIASTOLIC BLOOD PRESSURE: 58 MMHG | HEART RATE: 70 BPM | OXYGEN SATURATION: 94 % | HEIGHT: 69 IN | WEIGHT: 171 LBS | SYSTOLIC BLOOD PRESSURE: 125 MMHG | TEMPERATURE: 97.3 F

## 2024-07-16 DIAGNOSIS — C15.9 SQUAMOUS CELL CARCINOMA OF ESOPHAGUS: ICD-10-CM

## 2024-07-16 DIAGNOSIS — C15.3 MALIGNANT NEOPLASM OF UPPER THIRD OF ESOPHAGUS: Primary | ICD-10-CM

## 2024-07-16 DIAGNOSIS — M54.42 LOW BACK PAIN WITH LEFT-SIDED SCIATICA, UNSPECIFIED BACK PAIN LATERALITY, UNSPECIFIED CHRONICITY: Primary | ICD-10-CM

## 2024-07-16 DIAGNOSIS — N32.3 ACQUIRED BLADDER DIVERTICULUM: ICD-10-CM

## 2024-07-16 DIAGNOSIS — M54.2 NECK PAIN: ICD-10-CM

## 2024-07-16 DIAGNOSIS — Z87.898 HISTORY OF URINARY RETENTION: ICD-10-CM

## 2024-07-16 LAB
ALBUMIN SERPL-MCNC: 3.7 G/DL (ref 3.5–5.2)
ALBUMIN/GLOB SERPL: 1 G/DL
ALP SERPL-CCNC: 171 U/L (ref 39–117)
ALT SERPL W P-5'-P-CCNC: 16 U/L (ref 1–41)
ANION GAP SERPL CALCULATED.3IONS-SCNC: 6 MMOL/L (ref 5–15)
AST SERPL-CCNC: 37 U/L (ref 1–40)
BASOPHILS # BLD AUTO: 0.03 10*3/MM3 (ref 0–0.2)
BASOPHILS NFR BLD AUTO: 0.4 % (ref 0–1.5)
BILIRUB SERPL-MCNC: 0.3 MG/DL (ref 0–1.2)
BUN SERPL-MCNC: 12 MG/DL (ref 8–23)
BUN/CREAT SERPL: 15.6 (ref 7–25)
CALCIUM SPEC-SCNC: 9.2 MG/DL (ref 8.6–10.5)
CHLORIDE SERPL-SCNC: 104 MMOL/L (ref 98–107)
CO2 SERPL-SCNC: 28 MMOL/L (ref 22–29)
CREAT SERPL-MCNC: 0.77 MG/DL (ref 0.76–1.27)
DEPRECATED RDW RBC AUTO: 55.5 FL (ref 37–54)
EGFRCR SERPLBLD CKD-EPI 2021: 100 ML/MIN/1.73
EOSINOPHIL # BLD AUTO: 0.61 10*3/MM3 (ref 0–0.4)
EOSINOPHIL NFR BLD AUTO: 8.3 % (ref 0.3–6.2)
ERYTHROCYTE [DISTWIDTH] IN BLOOD BY AUTOMATED COUNT: 16.4 % (ref 12.3–15.4)
GLOBULIN UR ELPH-MCNC: 3.7 GM/DL
GLUCOSE SERPL-MCNC: 95 MG/DL (ref 65–99)
HCT VFR BLD AUTO: 35.9 % (ref 37.5–51)
HGB BLD-MCNC: 11.3 G/DL (ref 13–17.7)
IMM GRANULOCYTES # BLD AUTO: 0.01 10*3/MM3 (ref 0–0.05)
IMM GRANULOCYTES NFR BLD AUTO: 0.1 % (ref 0–0.5)
LYMPHOCYTES # BLD AUTO: 0.7 10*3/MM3 (ref 0.7–3.1)
LYMPHOCYTES NFR BLD AUTO: 9.6 % (ref 19.6–45.3)
MCH RBC QN AUTO: 29.2 PG (ref 26.6–33)
MCHC RBC AUTO-ENTMCNC: 31.5 G/DL (ref 31.5–35.7)
MCV RBC AUTO: 92.8 FL (ref 79–97)
MONOCYTES # BLD AUTO: 1.26 10*3/MM3 (ref 0.1–0.9)
MONOCYTES NFR BLD AUTO: 17.2 % (ref 5–12)
NEUTROPHILS NFR BLD AUTO: 4.7 10*3/MM3 (ref 1.7–7)
NEUTROPHILS NFR BLD AUTO: 64.4 % (ref 42.7–76)
PLATELET # BLD AUTO: 148 10*3/MM3 (ref 140–450)
PMV BLD AUTO: 8.6 FL (ref 6–12)
POTASSIUM SERPL-SCNC: 4.5 MMOL/L (ref 3.5–5.2)
PROT SERPL-MCNC: 7.4 G/DL (ref 6–8.5)
RBC # BLD AUTO: 3.87 10*6/MM3 (ref 4.14–5.8)
SODIUM SERPL-SCNC: 138 MMOL/L (ref 136–145)
WBC NRBC COR # BLD AUTO: 7.31 10*3/MM3 (ref 3.4–10.8)

## 2024-07-16 PROCEDURE — 85025 COMPLETE CBC W/AUTO DIFF WBC: CPT | Performed by: INTERNAL MEDICINE

## 2024-07-16 PROCEDURE — 25010000002 HEPARIN LOCK FLUSH PER 10 UNITS: Performed by: INTERNAL MEDICINE

## 2024-07-16 PROCEDURE — 99214 OFFICE O/P EST MOD 30 MIN: CPT | Performed by: INTERNAL MEDICINE

## 2024-07-16 PROCEDURE — 36591 DRAW BLOOD OFF VENOUS DEVICE: CPT

## 2024-07-16 PROCEDURE — 1126F AMNT PAIN NOTED NONE PRSNT: CPT | Performed by: INTERNAL MEDICINE

## 2024-07-16 PROCEDURE — 80053 COMPREHEN METABOLIC PANEL: CPT | Performed by: INTERNAL MEDICINE

## 2024-07-16 RX ORDER — HEPARIN SODIUM (PORCINE) LOCK FLUSH IV SOLN 100 UNIT/ML 100 UNIT/ML
500 SOLUTION INTRAVENOUS AS NEEDED
OUTPATIENT
Start: 2024-07-16

## 2024-07-16 RX ORDER — SODIUM CHLORIDE 0.9 % (FLUSH) 0.9 %
10 SYRINGE (ML) INJECTION AS NEEDED
OUTPATIENT
Start: 2024-07-16

## 2024-07-16 RX ORDER — HEPARIN SODIUM (PORCINE) LOCK FLUSH IV SOLN 100 UNIT/ML 100 UNIT/ML
500 SOLUTION INTRAVENOUS AS NEEDED
Status: DISCONTINUED | OUTPATIENT
Start: 2024-07-16 | End: 2024-07-17 | Stop reason: HOSPADM

## 2024-07-16 RX ADMIN — HEPARIN 500 UNITS: 100 SYRINGE at 13:10

## 2024-07-16 NOTE — PATIENT INSTRUCTIONS
Call to schedule a follow up with neurosurgery  Call if you do not hear from urology about an appointment  Follow up with gastroenterology to discuss timing of upper endoscopy to monitor for cancer recurrence

## 2024-07-16 NOTE — PROGRESS NOTES
Hematology and Oncology [place]  Office number [phone]    Fax number [phone]     Follow up     Date: 24    Patient Name: Val Nassar Jr.  MRN: [id number]  : 1960    Chief Complaint: esophageal cancer    Cancer Staging:  Cancer Staging   Stage II (cT2, cN1, cM0)    History of Present Illness: Val Nassar Jr. is a pleasant 63 y.o. male who presents today for evaluation of upper third esophageal squamous cell carcinoma. He has a longstanding history of cirrhosis     He originally was diagnosed with a localized esophageal squamous cell carcinoma of the upper third of the esophagus in the 2023.  At the time he was living in [place].  He underwent an endoscopy confirming cancer diagnosis at [facility] in [facility].  He then relocated to [facility] to live with his sister because of his cancer diagnosis.      He presented to [facility] in 2023 with an upper GI bleed secondary to peptic ulcer disease and possible cholecystitis.  His presenting hemoglobin was 6.9.     EGD performed 2023 showed an ulcerated mass in the upper third of the esophagus which was nonobstructing and partially circumferential spanning 2 cm in length.  Biopsy results are pending.    Additional findings include gastritis, portal hypertension gastropathy and nonbleeding duodenal ulcer.  Duodenal stenosis.    He was discharged to a rehab facility and ultimately discharged home to live with his sister.  He was doing poorly and was unable to care for himself.  He did not keep his appointments with medical oncology, thoracic surgery, or radiation oncology and did not complete staging PET/CT.  He then represented to the hospital in late 2024 with a UTI and confusion.  During that admission he was assessed by palliative care, radiation oncology, and medical oncology.  He elected to be discharged to a skilled nursing facility for long-term  care as he does not want to be a burden on his sister and did not feel that he could care for himself at home.  Prior to discharge there were conversations between case management and social work at the hospital and his facility and the patient was under the impression that he could not receive radiation treatment while living at the skilled facility.  However he did elect to complete PET CT scan and returns for results.    PET/CT performed 3/6/2024 shows thickening of the esophagus with an uptake of 16 by SUV.  There was notable hypermetabolic activity in the left lateral shoulder subcutaneous tissue which corresponds to an area where he received a prior injection at his PCP office for alcohol use which he reports has been raised and pruritic since the injection 6 weeks prior.  He had mild paratracheal adenopathy as well as an right paratracheal lymph node in the right supraclavicular region  He reports that he has been tolerating regular diet with no obstructive symptoms prior to presentation.     He continues to feel weak but is clinically improving.  He tells me he is planning inpatient rehab on discharge.  His hemoglobin continues to improve and is 9.1 today.  CT of the abdomen pelvis in addition to the gallbladder findings showed no metastatic disease.  A chest x-ray this admission showed mild interstitial edema.  MRI cervical spine from 4/12/2024 showed:  1.Active left-sided facet arthropathy at C3/4 and to lesser degree C4/5 with associated surrounding marrow and soft tissue edema.  2.Moderate multifactorial degenerative change of the mid to lower cervical spine otherwise with central canal and neuroforaminal stenosis. Potential exiting nerve contact from C3/4 through C6/7 as detailed above.    Treatment history:  Taxol/carbo/radiation: C1, 4/2/2024; C2, 4/9/24; C3, held, C4, carbo only; C5, carbo and DA taxol: completed chemoradiation on 5/8/2024.    Interval history:  He is here for follow-up.  He is  ambulating with a wheeled walker.  He endorses continued bilateral hand painful neuropathy as well as left sciatica involving the thigh.  He has persisting neck and low back pain.  He was previously evaluated by neurosurgery, but at that time as he was undergoing chemoradiation no additional follow-ups were scheduled.  He is interested in following up with them to discuss his persistent symptoms or any surgical options to address them.  Denies swallowing difficulties.  He expresses financial concerns.  He lost his Social Security disability paperwork and despite calling the Social Security office multiple times per day has not been able to reach anyone to have new paperwork faxed to him.  He states he has not been able to get transportation to the so security office from this facility because he has not been able to reach anyone to schedule an appointment.  He is having trouble affording things such as denture cream.  This is making it difficult for him to eat properly.  He reports he is picking up cigarette butts and re-rolling them as he cannot afford cigarettes.  He says his anxiety is too bad to stop smoking.    Past Medical History:   Past Medical History:   Diagnosis Date    Anemia     Cancer     ESOPHAGEAL    Cirrhosis     Duodenal ulcer     Gastric ulcer     GERD (gastroesophageal reflux disease)     History of alcohol abuse     History of radiation therapy 05/08/2024    esophagus    HLD (hyperlipidemia)     Mood disorder        Past Surgical History:   Past Surgical History:   Procedure Laterality Date    ENDOSCOPY N/A 12/26/2023    Procedure: ESOPHAGOGASTRODUODENOSCOPY;  Surgeon: Wesly Mckeon MD;  Location: [facility] ENDOSCOPY;  Service: Gastroenterology;  Laterality: N/A;    VENOUS ACCESS DEVICE (PORT) INSERTION Right 04/25/2024       Family History:   Family History   Problem Relation Age of Onset    Cancer Mother         LUNG AND BREAST    Breast cancer Mother     Heart attack Father        Social  History:   Social History     Socioeconomic History    Marital status: Single   Tobacco Use    Smoking status: Every Day     Current packs/day: 1.00     Average packs/day: 1 pack/day for 15.0 years (15.0 ttl pk-yrs)     Types: Cigarettes     Passive exposure: Current    Smokeless tobacco: Never   Vaping Use    Vaping status: Some Days    Substances: Flavoring    Devices: Disposable   Substance and Sexual Activity    Alcohol use: Not Currently     Comment: sober for ~ 3 months    Drug use: Yes     Types: Hydrocodone    Sexual activity: Defer       Medications:     Current Outpatient Medications:     acetaminophen (TYLENOL) 325 MG tablet, Take 2 tablets by mouth Every 4 (Four) Hours As Needed for Mild Pain., Disp: , Rfl:     benzonatate (TESSALON) 100 MG capsule, Take 1 capsule by mouth Every 8 (Eight) Hours As Needed for Cough., Disp: , Rfl:     Cholecalciferol (Vitamin D3) 1.25 MG (24862 UT) capsule, Take 1 capsule by mouth Every 7 (Seven) Days., Disp: , Rfl:     Diclofenac Sodium (Voltaren) 1 % gel gel, Apply 4 g topically to the appropriate area as directed 2 (Two) Times a Day., Disp: 50 g, Rfl: 1    diphenhydrAMINE (BENADRYL) 25 mg capsule, Take 1 capsule by mouth At Night As Needed for Itching (insomnia)., Disp: 30 capsule, Rfl: 0    docusate sodium (Colace) 100 MG capsule, Take 1 capsule by mouth 2 (Two) Times a Day As Needed for Constipation., Disp: 30 capsule, Rfl: 1    dutasteride (AVODART) 0.5 MG capsule, Take 1 capsule by mouth Daily., Disp: , Rfl:     escitalopram (LEXAPRO) 10 MG tablet, Take 1 tablet by mouth Every Night., Disp: , Rfl:     fentaNYL (DURAGESIC) 25 MCG/HR patch, Place 1 patch on the skin as directed by provider Every 72 (Seventy-Two) Hours., Disp: , Rfl:     ferrous gluconate (FERGON) 324 MG tablet, Take 1 tablet by mouth Daily With Breakfast., Disp: , Rfl:     finasteride (PROSCAR) 5 MG tablet, Take 1 tablet by mouth Daily., Disp: , Rfl:     folic acid (FOLVITE) 1 MG tablet, Take 1 tablet  by mouth Daily., Disp: , Rfl:     gabapentin (NEURONTIN) 50 mg/mL solution, Take 16 mL by mouth 3 (Three) Times a Day., Disp: 1000 mL, Rfl: 0    guaiFENesin (MUCINEX) 600 MG 12 hr tablet, Take 2 tablets by mouth Every 12 (Twelve) Hours As Needed for Cough., Disp: , Rfl:     lactulose (CHRONULAC) 10 GM/15ML solution, Take 30 mL by mouth 2 (Two) Times a Day As Needed., Disp: , Rfl:     Lidocaine 4 %, Place 1 patch on the skin as directed by provider Daily. Remove & Discard patch within 12 hours or as directed by MD, Disp: , Rfl:     lidocaine-prilocaine (EMLA) 2.5-2.5 % cream, Apply 1 Application topically to the appropriate area as directed As Needed (45-60 minutes prior to port access.  Cover with saran/plastic wrap.)., Disp: 30 g, Rfl: 3    Loratadine 10 MG capsule, Take  by mouth Daily., Disp: , Rfl:     melatonin 5 MG tablet tablet, Take 1 tablet by mouth At Night As Needed., Disp: , Rfl:     mometasone (ELOCON) 0.1 % cream, , Disp: , Rfl:     nitrofurantoin, macrocrystal-monohydrate, (Macrobid) 100 MG capsule, Take 1 capsule by mouth 2 (Two) Times a Day., Disp: 14 capsule, Rfl: 0    nystatin (MYCOSTATIN) 100,000 unit/mL suspension, , Disp: , Rfl:     nystatin susp + lidocaine viscous (MAGIC MOUTHWASH) oral suspension, 5-10 ml swish and spit or swallow QID prn, Disp: 240 mL, Rfl: 3    omeprazole (priLOSEC) 40 MG capsule, Take  by mouth., Disp: , Rfl:     ondansetron (ZOFRAN) 8 MG tablet, Take 1 tablet by mouth Every 8 (Eight) Hours As Needed for Nausea or Vomiting., Disp: 30 tablet, Rfl: 3    oxyCODONE-acetaminophen (PERCOCET) 7.5-325 MG per tablet, , Disp: , Rfl:     pantoprazole (PROTONIX) 40 MG EC tablet, Take 1 tablet by mouth 2 (Two) Times a Day., Disp: , Rfl:     phenol (CHLORASEPTIC) 1.4 % liquid liquid, Apply 1 spray to the mouth or throat Every 4 (Four) Hours As Needed., Disp: , Rfl:     polyethylene glycol (MIRALAX) 17 g packet, Take 17 g by mouth Daily As Needed (Use if senna-docusate is ineffective).,  "Disp: , Rfl:     predniSONE (DELTASONE) 10 MG tablet, Take daily based on following titration schedule: 60 mg x 1 day, then 50 mg x 1, then 40 mg x 1, then 30 mg x 1 day, then 20 mg x 1 day, then 10 mg x 1 day then stop, Disp: 21 tablet, Rfl: 0    promethazine (PHENERGAN) 25 MG tablet, Take 1 tablet by mouth Every 6 (Six) Hours As Needed for Nausea or Vomiting., Disp: , Rfl:     sodium chloride (Ocean Nasal Spray) 0.65 % nasal spray, 1 spray into the nostril(s) as directed by provider As Needed for Congestion., Disp: 1 each, Rfl: 0    tamsulosin (FLOMAX) 0.4 MG capsule 24 hr capsule, Take 1 capsule by mouth Daily., Disp: , Rfl:     vitamin B-12 (CYANOCOBALAMIN) 1000 MCG tablet, Take 1 tablet by mouth Daily., Disp: , Rfl:     Allergies:   No Known Allergies    Objective     Vital Signs:   Vitals:    07/16/24 1146   BP: 125/58   Pulse: 70   Temp: 97.3 °F (36.3 °C)   TempSrc: Infrared   SpO2: 94%   Weight: 77.6 kg (171 lb)   Height: 175.3 cm (69.02\")   PainSc: 0-No pain    Body mass index is 25.24 kg/m².   Pain Score    07/16/24 1146   PainSc: 0-No pain       ECOG Performance Status: 2    Physical Exam:   General: No acute distress.   HEENT: Normocephalic, atraumatic.   Neck: supple, no adenopathy.  Cardiovascular: regular rate and rhythm. No murmurs.   Respiratory: Normal rate. Clear to auscultation bilaterally  Abdomen: Soft, nontender, non distended with +BS  Lymph: no cervical, supraclavicular or axillary adenopathy  Neuro: Alert and oriented x 3.  Ambulates with a wheeled walker  Ext: Symmetric, no swelling.     Laboratory/Imaging Reviewed:   Hospital Outpatient Visit on 07/11/2024   Component Date Value Ref Range Status    Glucose 07/11/2024 110  70 - 130 mg/dL Final    Serial Number: [id number]:  [id number]     NM PET/CT Skull Base to Mid Thigh    Result Date: 7/15/2024  Narrative: NM PET/CT SKULL BASE TO MID THIGH Date of Exam: 7/11/2024 11:37 AM EDT Indication: esophageal cancer. Comparison: PET/CT March " 6, 2024 Technique: 13.2 mCi of F-18 FDG was administered intravenously. PET imaging was obtained from skull base to mid-thigh approximately 60 minutes after radiotracer injection. A low dose non contrast CT was obtained for attenuation correction and anatomic localization. Fused PET-CT and 3D MIP reconstructions were utilized for image interpretation.  Fasting blood glucose level: 110 mg/dl. Reference uptake values: Mediastinum: 2.72 SUVmax Liver: 3.14 SUVmax Normalization method: Body Weight Findings: Head-neck: There is a focus of metabolic activity in the nasopharynx with an SUV of 6.6. This was not present previously. The significance is uncertain. There is significantly decreased metabolic activity within right supraclavicular lymph node maximum SUV 2.65 previously 6.6. There is a fluid level in the right maxillary sinus which could reflect some sinusitis. CHEST: There is no abnormal metabolic activity within the lungs, mediastinum or hilar areas. Previously noted metabolic activity within the thoracic esophagus is no longer identified. Abdomen/pelvis: There is distention of the bladder with a bladder diverticulum which may relate to more chronic bladder outlet issues and has been noted. No unusual metabolic activity is appreciated within the abdomen or pelvis. MUSCULOSKELETAL: Previously noted metabolic activity involving the soft tissues around the left shoulder area has pretty much resolved. There is metabolic activity in the area of the cervical spine which has been noted and could be degenerative in nature. There is some metabolic activity within the more posterior right paraspinal musculature in the lower cervical spine as well as in the posterior right paraspinal musculature area in the lumbar area around L2-3 that could be physiologic. There is some metabolic activity involving the interspinous ligaments within the lumbar area which has been noted and could be more reflective of  degenerative/inflammatory changes.     Impression: Impression: 1.Interval resolution of previously noted activity in the esophagus and significantly decreased activity in the right supraclavicular lymph node. 2.New nonspecific activity in the nasopharynx that may be physiologic. Endoscopic evaluation could be obtained to reassess 3.Right maxillary sinusitis 4.There remains activity in the cervical spine and lumbar area that could be more degenerative and inflammatory in nature. Suggest CT of the cervical spine to better define the anatomy given the metabolic activity and abnormal findings on previous MRI of  this area. 5.Distended bladder with bladder diverticulum which could reflect more chronic bladder outlet issues. Electronically Signed: Jorge Richmond MD  7/15/2024 9:50 AM EDT  Workstation ID: [id number]     Procedures    Had a fall on right shoulderbut not the rightgot a injection in the left shoulder  to help him stop drinking at dr. Wesly Albert 6 weeks ago, swelled up and red.     Narrative & Impression   MRI BRAIN WO CONTRAST     Date of Exam: 2/18/2024 5:05 PM EST     Indication: Dizziness, off balance, present x 2 weeks.     Comparison: 2/4/2024 CT     Technique:  Routine multiplanar/multisequence sequence images of the brain were obtained without contrast administration.        Findings:  Diffusion imaging shows no evidence of acute infarct. There are scattered subcortical and periventricular T2 hyperintensities which are nonspecific but likely sequela of mild to moderate small vessel ischemic disease. No evidence of intracranial   hemorrhage.     There is normal ventricular volume. The basal cisterns are patent. There is no evidence of extra-axial fluid collection. There is normal flow voids within the intracranial vessels.     No evidence of fracture or suspicious bony lesion. Paranasal sinusitis with gas/fluid level in the right maxillary sinus. Trace bilateral mastoid air cell effusions. Visualized  orbits are unremarkable.     IMPRESSION:  Impression:  No acute intracranial abnormality. No suspicious mass on this noncontrast MRI.     Paranasal sinusitis with gas/fluid level in the right maxillary sinus.       Assessment / Plan      Assessment/Plan:     1.  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  SUV avid right supraclavicular lymph node  3.  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 -MMR intact  -He completed concurrent chemoradiation but required dose reduction and treatment delays for neuropathy.  He completed therapy on 5/8/2024.  Follow-up PET/CT reviewed today and shows shows no evidence of disease.  -Will continue with CT of the neck, chest abdomen pelvis (given cervical location of his tumor and regional adenopathy involving the supraclavicular region) every 3 to 6 months for 2 years and then annually for 5 years as per the NCCN guidelines.  I will also refer him to GI for consideration of EGD surveillance.  He was previously evaluated by Dr. Mckeon.  -CBC and CMP pending today.    4.  Symptomatic cervical stenosis  5.  Low back and hip pain  -I reviewed his prior neurosurgery notes.  He has previously been referred to physical therapy and pain management.      6.  Active smoker  -Encouraged cessation.  Patient is precontemplative.    7.  Financial concerns  I discussed with  who is planning to reach out to the patient as well as a  at his facility.    8. Access  -Port flush with labs today    9.  Bladder distention and diverticuli  He has a history of urinary tract infections and reports obstructive voiding symptoms.  Referral to urology.  CMP pending    Follow Up:   3 mo with labs and scans    Katerina Ga MD  Hematology and Oncology

## 2024-07-16 NOTE — PROGRESS NOTES
Reevaluation NOTE    NAME:      Val Nassar Jr.  :                                                          1960  DATE OF CONSULTATION:                       24  REQUESTING PHYSICIAN:                   Katerina Ga MD  REASON FOR CONSULTATION:            Further evaluation and management of the patient's esophageal cancer for consideration of treatments with chemotherapy and radiation at the request of Dr. Ga.  Squamous cell carcinoma of esophagus  Staging form: Esophagus - Squamous Cell Carcinoma, AJCC 8th Edition  - Clinical: Stage II (cT2, cN1, cM0) - Unsigned           BRIEF HISTORY: Val Nassar  is a very pleasant 63 y.o. male   with multiple medical comorbidities including cirrhosis multiple falls, UTIs, general debilitation and esophageal cancer initially had a EGD on 2023 was found to have esophageal mass at 20 cm from the incisors.  The patient had other biopsies that were negative.  The patient then had a CT scan at  that showed some thickening of the esophagus but otherwise nothing else.  The patient was readmitted to the hospital underwent staging on  with a CT scan of the abdomen pelvis and the CT scan of the chest on 2024.  Patient had an MRI of his brain on .  The scans showed no evidence of distant disease but concern for some thickening of the esophagus.  The patient was discharged from the hospital to a nursing home with plans for continued workup as an outpatient.  Patient PET scan on 3/6/2024.  This showed an upper thoracic esophageal lesion that was hypermetabolic concerning for cancer.  The patient had some uptake in the spine but it was thought to represent benign disease.  The patient also had several hypermetabolic lymph nodes in the right supraclavicular fossa and superior right mediastinum.    The patient saw Dr. Ga who recommended definitive therapy.  The patient reports today to discuss treatments.    The patient  Walk test:     SpO2 room air at rest: 96%  SPO2 room air w/ exertion: 87%  SpO2 on 2 pulse dose w exertion: 95%    Patient walked a total of 1250 ft.           is having more pain and difficulty swallowing.  The patient ports he is interested in curative intent therapy if he can receive it and continue to stay at the nursing home.      BMI:  Body mass index is 23.46 kg/m².      Social History     Substance and Sexual Activity   Alcohol Use Not Currently    Comment: sober for ~ 3 months       No Known Allergies    Social History     Tobacco Use    Smoking status: Every Day     Current packs/day: 1.00     Average packs/day: 1 pack/day for 15.0 years (15.0 ttl pk-yrs)     Types: Cigarettes    Smokeless tobacco: Never   Vaping Use    Vaping status: Some Days    Substances: Flavoring    Devices: Disposable   Substance Use Topics    Alcohol use: Not Currently     Comment: sober for ~ 3 months    Drug use: Yes     Types: Hydrocodone         Past Medical History:   Diagnosis Date    Anemia     Cancer     Cirrhosis     Duodenal ulcer     Gastric ulcer     GERD (gastroesophageal reflux disease)     History of alcohol abuse     HLD (hyperlipidemia)     Mood disorder        family history includes Breast cancer in his mother; Cancer in his mother; Heart attack in his father.     Past Surgical History:   Procedure Laterality Date    ENDOSCOPY N/A 12/26/2023    Procedure: ESOPHAGOGASTRODUODENOSCOPY;  Surgeon: Wesly Mckeon MD;  Location: [facility] ENDOSCOPY;  Service: Gastroenterology;  Laterality: N/A;        Review of Systems   Constitutional:  Positive for fatigue.   HENT:   Positive for sore throat and trouble swallowing.    Respiratory:  Positive for shortness of breath.    Musculoskeletal:         Throat and ear pain   Neurological:  Positive for dizziness, headaches and light-headedness.   Psychiatric/Behavioral:  Positive for sleep disturbance.     A full 14 point review of systems was performed and was negative except as noted in the HPI.         Objective   VITAL SIGNS:   Vitals:    03/18/24 0954   BP: 131/63   Pulse: 80   Resp: 16   Temp: 98.1 °F (36.7 °C)   TempSrc: Temporal  "  SpO2: 98%   Weight: 74.2 kg (163 lb 8 oz)   Height: 177.8 cm (70\")   PainSc:   9        KPS       70%    Physical Exam  Constitutional:       General: He is not in acute distress.     Appearance: He is well-developed.   HENT:      Head: Normocephalic and atraumatic.   Eyes:      Conjunctiva/sclera: Conjunctivae normal.      Pupils: Pupils are equal, round, and reactive to light.   Neck:      Trachea: No tracheal deviation.   Pulmonary:      Effort: Pulmonary effort is normal. No respiratory distress.      Breath sounds: No wheezing.   Abdominal:      General: There is no distension.      Palpations: Abdomen is soft.   Musculoskeletal:         General: Normal range of motion.      Cervical back: Normal range of motion.   Skin:     General: Skin is warm and dry.   Neurological:      Mental Status: He is alert.      Cranial Nerves: No cranial nerve deficit.      Coordination: Coordination normal.   Psychiatric:         Behavior: Behavior normal.         Judgment: Judgment normal.              The following portions of the patient's history were reviewed and updated as appropriate: allergies, current medications, past family history, past medical history, past social history, past surgical history, and problem list.  I have personally requested reviewed and interpreted the patient's images and radiology reports and pathology reports listed below:  NM PET/CT Skull Base to Mid Thigh    Result Date: 3/7/2024  Impression: 1. Intensely hypermetabolic activity in the proximal thoracic esophagus, apparently corresponding to patient's known neoplasm. 2. 3 small normal sized but hypermetabolic nodes respectively in the right supraclavicular fat, and right superior mediastinum. 3. Hypermetabolic activity in the lateral subcutaneous tissues of the left shoulder, which appears to correspond to subcutaneous bruising. Please correlate with any history of trauma to this area. 4. DJD-related uptake in the hypertrophied and " degenerated posterior spinous processes of L3, L4 and L5.. Electronically Signed: Ibrahima Jett MD  3/7/2024 4:27 PM EST  Workstation ID: [id number]    CT Chest With Contrast Diagnostic    Result Date: 2/20/2024  Impression: 1. Focal thickened appearance of the upper thoracic esophagus, potentially patient's known tumor. Remainder the esophagus appears normal. 2. No evidence of malignancy/metastasis in the chest elsewhere. 3. Enlarged latosha hepatis lymph nodes noted. 4. Small gallstones again noted. Today's study appears to show a nonobstructing 1 to 2 mm distal common duct stone as well, new from 2/4/2024 abdominal CT scan. 5. Liver morphology consistent with cirrhosis, but no additional features of cirrhosis are appreciated. Electronically Signed: Ibrahima Jett MD  2/20/2024 7:17 PM EST  Workstation ID: [id number]    MRI Brain Without Contrast    Result Date: 2/18/2024  Impression: No acute intracranial abnormality. No suspicious mass on this noncontrast MRI. Paranasal sinusitis with gas/fluid level in the right maxillary sinus. Electronically Signed: Tashi Alfaro MD  2/18/2024 5:56 PM EST  Workstation ID: [id number]        CT Head Without Contrast    Result Date: 2/4/2024  Impression: Mild atrophy and chronic microvascular ischemia. No acute intracranial process. Acute bilateral maxillary sinusitis. Bilateral mastoid effusions. Electronically Signed: Marvel Vines MD  2/4/2024 9:49 PM EST  Workstation ID: [id number]    CT Abdomen Pelvis With Contrast    Result Date: 2/4/2024  Impression: No acute abdominal or pelvic abnormality. Electronically Signed: Marvel Vines MD  2/4/2024 8:44 PM EST  Workstation ID: [id number]    CT Chest With Contrast Diagnostic    Result Date: 12/29/2023  Impression: Wall thickening of the distal esophagus presumably represents site of known neoplasm. Minimal pleural effusions. Mild right basilar atelectasis. No findings suspicious for metastatic disease in the chest. Electronically Signed: Estrellita  MD Deb  12/29/2023 3:43 PM EST  Workstation ID: [id number]      FL Video Swallow With Speech Single Contrast    Result Date: 12/28/2023  Impression: Fluoroscopy provided for modified barium swallow. No aspiration was seen during swallowing evaluation. Limited upper GI series appeared grossly normal. Please see speech therapy report for full details and recommendations. Electronically Signed: Ibrahima Jett MD  12/28/2023 5:31 PM EST  Workstation ID: [id number]    FL Limited Ugi For Mbs Reflux Single-Contrast    Result Date: 12/28/2023  Impression: Fluoroscopy provided for modified barium swallow. No aspiration was seen during swallowing evaluation. Limited upper GI series appeared grossly normal. Please see speech therapy report for full details and recommendations. Electronically Signed: Ibrahima Jett MD  12/28/2023 5:31 PM EST  Workstation ID: [id number]    NM HIDA SCAN WITHOUT PHARMACOLOGICAL INTERVENTION    Result Date: 12/27/2023  1. Visualization of the gallbladder. No evidence of acute cholecystitis or biliary obstruction. Electronically Signed: Desmond Aviles MD  12/27/2023 2:26 PM EST  Workstation ID: [id number]        CT Abdomen Pelvis With Contrast    Result Date: 12/25/2023  Impression: Abnormal appearance of the gallbladder with wall thickening, abnormal gallbladder wall enhancement and pericholecystic fluid consistent with acute cholecystitis. Electronically Signed: Marvel Vines MD  12/25/2023 8:59 PM EST  Workstation ID: [id number]       I reviewed the patient's pathology.  I reviewed the patient's EGD report.  Signed I discussed the case with Dr. Ga personally.    Assessment      IMPRESSION:     The patient is a very pleasant 63-year-old gentleman with esophageal cancer.  The patient does appear to have advancing disease.  His disease is limited to the esophagus and the right supraclavicular fossa at this time point.  We discussed options for treatment today and the patient is interested in curative  intent therapy.  Is unlikely patient is a candidate for surgery so would recommend a dose of 50-54 Gray to the esophagus and involved adenopathy.  The manger of the patient's supraclavicular fossae bilaterally and the esophagus benefit from a lower dose of radiotherapy to dose of around 45 Gray.  We discussed options for treatment today and the patient is interested in this.  We discussed the anticipated side effects and the patient will work to keep his weight up.    Dr. Ga think the patient can tolerate systemic chemotherapy and we will work with that on board.    The patient's main concern is his social situation.  It is uncertain if the patient can come for this number of treatments.  The patient reports that he is injured and cured based therapy but if it would prevent him from continuing to live at the nursing home then he would for about treatments.  We do have palliative regimen that could be a possibility for him somewhere in the middle if required.  We requested social work to interface with the nursing home to determine what he is a candidate for in terms of the number of treatments he can have.    I spent 50 minutes the patient's case today.  I discussed case personally with Dr. Ga.    RECOMMENDATIONS:      Esophageal cancer  -CT2 N2 M0  -Disease limited to upper esophagus and the right supraclavicular fossa/mediastinum  -Upper location 20 cm from the incisors  -recommend 5 to 6 weeks course of radiation daily to a dose of 50-54 Laws  -Would need to return daily for treatments   -Unclear if it is possible with his status at the nursing home  -Would also be candidate for palliative treatments with a quad shot.  -Recommend IGR T and IMRT  -Recommend CT simulation  -Commend PET scan fusion for planning  -Recommend concurrent chemotherapy  -Follows with Dr. Ga       Social distress  -At [facility]  -They will help us determine what he is a candidate for in terms of number treatments.        Jeremy Aguila MD      Errors in dictation may reflect use of voice recognition software and not all errors in transcription may have been detected prior to signing.

## 2024-07-23 ENCOUNTER — TELEPHONE (OUTPATIENT)
Dept: ONCOLOGY | Facility: CLINIC | Age: 64
End: 2024-07-23
Payer: MEDICAID

## 2024-07-23 NOTE — TELEPHONE ENCOUNTER
Spoke with Yaneth, director at [facility].  She stated they have PT there that they can set up for patient as ordered by Dr. Ga.  Order from Dr. Ga faxed to her at fax number provided by her : [phone].

## 2024-07-23 NOTE — TELEPHONE ENCOUNTER
----- Message from Delisa STEVENS sent at 7/17/2024 10:14 AM EDT -----  HIS INSURANCE DOES NOT COVER HIS PT REFERRAL.

## 2024-07-24 ENCOUNTER — DOCUMENTATION (OUTPATIENT)
Dept: OTHER | Facility: HOSPITAL | Age: 64
End: 2024-07-24
Payer: MEDICAID

## 2024-07-24 NOTE — PROGRESS NOTES
WALT contacted Yaneth, director at [facility] to talk about pt concerns. WALT addressed pt need for denture cream, to which Yaneth explained that the facility has all the resources he needs and they do have denture cream for him. WALT also addressed losing social security paperwork, to which she reported she will talk to social work staff and will assist with taking pt to social security office if needed. WALT explained due to not being in active treatment,  resources are limited. Yaneth verbalized understanding and reported pt is well taken care of. WALT will be available ongoing.

## 2024-08-07 ENCOUNTER — OFFICE VISIT (OUTPATIENT)
Dept: UROLOGY | Facility: CLINIC | Age: 64
End: 2024-08-07
Payer: MEDICAID

## 2024-08-07 VITALS — BODY MASS INDEX: 25.33 KG/M2 | HEIGHT: 69 IN | WEIGHT: 171 LBS

## 2024-08-07 DIAGNOSIS — R33.9 URINARY RETENTION: ICD-10-CM

## 2024-08-07 DIAGNOSIS — N31.9 NEUROGENIC BLADDER: ICD-10-CM

## 2024-08-07 DIAGNOSIS — R82.81 PYURIA: ICD-10-CM

## 2024-08-07 DIAGNOSIS — N32.3 ACQUIRED BLADDER DIVERTICULUM: Primary | ICD-10-CM

## 2024-08-07 LAB
BILIRUB BLD-MCNC: NEGATIVE MG/DL
CLARITY, POC: ABNORMAL
COLOR UR: ABNORMAL
EXPIRATION DATE: ABNORMAL
GLUCOSE UR STRIP-MCNC: NEGATIVE MG/DL
KETONES UR QL: NEGATIVE
LEUKOCYTE EST, POC: ABNORMAL
Lab: ABNORMAL
NITRITE UR-MCNC: NEGATIVE MG/ML
PH UR: 6 [PH] (ref 5–8)
PROT UR STRIP-MCNC: NEGATIVE MG/DL
RBC # UR STRIP: NEGATIVE /UL
SP GR UR: 1.01 (ref 1–1.03)
UROBILINOGEN UR QL: NORMAL

## 2024-08-07 PROCEDURE — 87086 URINE CULTURE/COLONY COUNT: CPT | Performed by: STUDENT IN AN ORGANIZED HEALTH CARE EDUCATION/TRAINING PROGRAM

## 2024-08-07 PROCEDURE — 87077 CULTURE AEROBIC IDENTIFY: CPT | Performed by: STUDENT IN AN ORGANIZED HEALTH CARE EDUCATION/TRAINING PROGRAM

## 2024-08-07 PROCEDURE — 87186 SC STD MICRODIL/AGAR DIL: CPT | Performed by: STUDENT IN AN ORGANIZED HEALTH CARE EDUCATION/TRAINING PROGRAM

## 2024-08-07 RX ORDER — ZOLPIDEM TARTRATE 5 MG/1
TABLET ORAL
COMMUNITY
Start: 2024-07-18

## 2024-08-07 NOTE — PROGRESS NOTES
Office Visit New Urology      Patient Name: Val Nassar Jr.  : 1960   MRN: [id number]     Chief Complaint:    Chief Complaint   Patient presents with    Acquired bladder diverticulum       Referring Provider: Katerina Ga MD    History of Present Illness: Val Nassar Jr. is a 63 y.o. male who presents to Urology today for bladder diverticulum and LUTS.     He is on Flomax and Dutasteride chronically for difficult urination, dribbling stream, delayed emptying, urgency, frequency. He did not fill out IPSS Survey today but continues to describe severe urinary symptoms. He has had near urgency incontinence but denies using pads or diapers.     Patient's recent CT scan on 2024 demonstrates small prostate with a severely distended bladder and a very large inferior bladder wall diverticulum indicative of chronic incomplete emptying.  The patient has a history of former alcoholism.  He has cirrhosis of the liver.  He has esophageal cancer status post radiation.  Follows closely with oncology.     mL     Pyuria noted on UA today.  Relatively asymptomatic.    Subjective      Review of System: Review of Systems   Genitourinary:  Positive for difficulty urinating, dysuria, frequency and urgency.      I have reviewed the ROS documented by my clinical staff, I have updated appropriately and I agree. Иван Garrison MD    Past Medical History:   Past Medical History:   Diagnosis Date    Anemia     Cancer     ESOPHAGEAL    Cirrhosis     Duodenal ulcer     Gastric ulcer     GERD (gastroesophageal reflux disease)     History of alcohol abuse     History of radiation therapy 2024    esophagus    HLD (hyperlipidemia)     Mood disorder        Past Surgical History:   Past Surgical History:   Procedure Laterality Date    ENDOSCOPY N/A 2023    Procedure: ESOPHAGOGASTRODUODENOSCOPY;  Surgeon: Wesly Mckeon MD;  Location: [facility] ENDOSCOPY;  Service:  Gastroenterology;  Laterality: N/A;    VENOUS ACCESS DEVICE (PORT) INSERTION Right 04/25/2024       Family History:   Family History   Problem Relation Age of Onset    Cancer Mother         LUNG AND BREAST    Breast cancer Mother     Heart attack Father        Social History:   Social History     Socioeconomic History    Marital status: Single   Tobacco Use    Smoking status: Every Day     Current packs/day: 1.00     Average packs/day: 1 pack/day for 15.0 years (15.0 ttl pk-yrs)     Types: Cigarettes     Passive exposure: Current    Smokeless tobacco: Never   Vaping Use    Vaping status: Some Days    Substances: Flavoring    Devices: Disposable   Substance and Sexual Activity    Alcohol use: Not Currently     Comment: sober for ~ 3 months    Drug use: Yes     Types: Hydrocodone    Sexual activity: Not Currently     Partners: Female       Medications:     Current Outpatient Medications:     acetaminophen (TYLENOL) 325 MG tablet, Take 2 tablets by mouth Every 4 (Four) Hours As Needed for Mild Pain., Disp: , Rfl:     benzonatate (TESSALON) 100 MG capsule, Take 1 capsule by mouth Every 8 (Eight) Hours As Needed for Cough., Disp: , Rfl:     Cholecalciferol (Vitamin D3) 1.25 MG (78917 UT) capsule, Take 1 capsule by mouth Every 7 (Seven) Days., Disp: , Rfl:     Diclofenac Sodium (Voltaren) 1 % gel gel, Apply 4 g topically to the appropriate area as directed 2 (Two) Times a Day., Disp: 50 g, Rfl: 1    diphenhydrAMINE (BENADRYL) 25 mg capsule, Take 1 capsule by mouth At Night As Needed for Itching (insomnia)., Disp: 30 capsule, Rfl: 0    docusate sodium (Colace) 100 MG capsule, Take 1 capsule by mouth 2 (Two) Times a Day As Needed for Constipation., Disp: 30 capsule, Rfl: 1    dutasteride (AVODART) 0.5 MG capsule, Take 1 capsule by mouth Daily., Disp: , Rfl:     escitalopram (LEXAPRO) 10 MG tablet, Take 1 tablet by mouth Every Night., Disp: , Rfl:     fentaNYL (DURAGESIC) 25 MCG/HR patch, Place 1 patch on the skin as  directed by provider Every 72 (Seventy-Two) Hours., Disp: , Rfl:     ferrous gluconate (FERGON) 324 MG tablet, Take 1 tablet by mouth Daily With Breakfast., Disp: , Rfl:     finasteride (PROSCAR) 5 MG tablet, Take 1 tablet by mouth Daily., Disp: , Rfl:     folic acid (FOLVITE) 1 MG tablet, Take 1 tablet by mouth Daily., Disp: , Rfl:     gabapentin (NEURONTIN) 50 mg/mL solution, Take 16 mL by mouth 3 (Three) Times a Day., Disp: 1000 mL, Rfl: 0    guaiFENesin (MUCINEX) 600 MG 12 hr tablet, Take 2 tablets by mouth Every 12 (Twelve) Hours As Needed for Cough., Disp: , Rfl:     lactulose (CHRONULAC) 10 GM/15ML solution, Take 30 mL by mouth 2 (Two) Times a Day As Needed., Disp: , Rfl:     Lidocaine 4 %, Place 1 patch on the skin as directed by provider Daily. Remove & Discard patch within 12 hours or as directed by MD, Disp: , Rfl:     lidocaine-prilocaine (EMLA) 2.5-2.5 % cream, Apply 1 Application topically to the appropriate area as directed As Needed (45-60 minutes prior to port access.  Cover with saran/plastic wrap.)., Disp: 30 g, Rfl: 3    Loratadine 10 MG capsule, Take  by mouth Daily., Disp: , Rfl:     melatonin 5 MG tablet tablet, Take 1 tablet by mouth At Night As Needed., Disp: , Rfl:     mometasone (ELOCON) 0.1 % cream, , Disp: , Rfl:     nitrofurantoin, macrocrystal-monohydrate, (Macrobid) 100 MG capsule, Take 1 capsule by mouth 2 (Two) Times a Day., Disp: 14 capsule, Rfl: 0    nystatin (MYCOSTATIN) 100,000 unit/mL suspension, , Disp: , Rfl:     nystatin susp + lidocaine viscous (MAGIC MOUTHWASH) oral suspension, 5-10 ml swish and spit or swallow QID prn, Disp: 240 mL, Rfl: 3    omeprazole (priLOSEC) 40 MG capsule, Take  by mouth., Disp: , Rfl:     ondansetron (ZOFRAN) 8 MG tablet, Take 1 tablet by mouth Every 8 (Eight) Hours As Needed for Nausea or Vomiting., Disp: 30 tablet, Rfl: 3    oxyCODONE-acetaminophen (PERCOCET) 7.5-325 MG per tablet, , Disp: , Rfl:     pantoprazole (PROTONIX) 40 MG EC tablet, Take  "1 tablet by mouth 2 (Two) Times a Day., Disp: , Rfl:     phenol (CHLORASEPTIC) 1.4 % liquid liquid, Apply 1 spray to the mouth or throat Every 4 (Four) Hours As Needed., Disp: , Rfl:     polyethylene glycol (MIRALAX) 17 g packet, Take 17 g by mouth Daily As Needed (Use if senna-docusate is ineffective)., Disp: , Rfl:     predniSONE (DELTASONE) 10 MG tablet, Take daily based on following titration schedule: 60 mg x 1 day, then 50 mg x 1, then 40 mg x 1, then 30 mg x 1 day, then 20 mg x 1 day, then 10 mg x 1 day then stop, Disp: 21 tablet, Rfl: 0    promethazine (PHENERGAN) 25 MG tablet, Take 1 tablet by mouth Every 6 (Six) Hours As Needed for Nausea or Vomiting., Disp: , Rfl:     sodium chloride (Ocean Nasal Spray) 0.65 % nasal spray, 1 spray into the nostril(s) as directed by provider As Needed for Congestion., Disp: 1 each, Rfl: 0    tamsulosin (FLOMAX) 0.4 MG capsule 24 hr capsule, Take 1 capsule by mouth Daily., Disp: , Rfl:     vitamin B-12 (CYANOCOBALAMIN) 1000 MCG tablet, Take 1 tablet by mouth Daily., Disp: , Rfl:     zolpidem (AMBIEN) 5 MG tablet, , Disp: , Rfl:     Allergies:   No Known Allergies      Post void residual bladder scan:   438 mL    Objective     Physical Exam:   Vital Signs:   Vitals:    08/07/24 1358   Weight: 77.6 kg (171 lb)   Height: 175.3 cm (69.02\")     Body mass index is 25.24 kg/m².     Physical Exam  Constitutional:       Appearance: Normal appearance.   HENT:      Head: Normocephalic and atraumatic.      Nose: Nose normal.   Eyes:      Extraocular Movements: Extraocular movements intact.      Conjunctiva/sclera: Conjunctivae normal.      Pupils: Pupils are equal, round, and reactive to light.   Musculoskeletal:         General: Normal range of motion.      Cervical back: Normal range of motion and neck supple.   Skin:     General: Skin is warm and dry.      Findings: No lesion or rash.   Neurological:      General: No focal deficit present.      Mental Status: He is alert and " oriented to person, place, and time. Mental status is at baseline.   Psychiatric:         Mood and Affect: Mood normal.         Behavior: Behavior normal.         Labs:   Brief Urine Lab Results  (Last result in the past 365 days)        Color   Clarity   Blood   Leuk Est   Nitrite   Protein   CREAT   Urine HCG        08/07/24 1408 Dark Yellow   Cloudy  [C]   Negative   Large (3+)  [C]   Negative   Negative                    [C] - Corrected Result               Urine Culture          2/18/2024    16:57   Urine Culture   Urine Culture >100,000 CFU/mL Enterococcus faecalis         Lab Results   Component Value Date    GLUCOSE 95 07/16/2024    CALCIUM 9.2 07/16/2024     07/16/2024    K 4.5 07/16/2024    CO2 28.0 07/16/2024     07/16/2024    BUN 12 07/16/2024    CREATININE 0.77 07/16/2024    BCR 15.6 07/16/2024    ANIONGAP 6.0 07/16/2024       Lab Results   Component Value Date    WBC 7.31 07/16/2024    HGB 11.3 (L) 07/16/2024    HCT 35.9 (L) 07/16/2024    MCV 92.8 07/16/2024     07/16/2024       Images:   NM PET/CT Skull Base to Mid Thigh    Result Date: 7/15/2024  Impression: 1.Interval resolution of previously noted activity in the esophagus and significantly decreased activity in the right supraclavicular lymph node. 2.New nonspecific activity in the nasopharynx that may be physiologic. Endoscopic evaluation could be obtained to reassess 3.Right maxillary sinusitis 4.There remains activity in the cervical spine and lumbar area that could be more degenerative and inflammatory in nature. Suggest CT of the cervical spine to better define the anatomy given the metabolic activity and abnormal findings on previous MRI of  this area. 5.Distended bladder with bladder diverticulum which could reflect more chronic bladder outlet issues. Electronically Signed: Jorge Richmond MD  7/15/2024 9:50 AM EDT  Workstation ID: [id number]    CT Abdomen Pelvis With Contrast    Result Date: 5/22/2024  Impression:  1.Persistent urinary bladder wall distention with left posterior lateral diverticulum having mild diffuse mural thickening and minimal surrounding inflammatory change. While this may be related to chronic bladder outlet obstruction, cystitis is also a consideration. Correlate with symptoms and urinalysis. 2.Other stable chronic findings. Electronically Signed: Emanuel Rodriguez MD  5/22/2024 10:28 AM EDT  Workstation ID: [id number]    IR Port Placement    Result Date: 4/25/2024  Impression:    Successful ultrasound and fluoroscopic guided right internal jugular vein route single lumen Port-A-Cath placement as described above. Thank you for the opportunity to assist in the care of your patient. Electronically Signed: Silverio Tadeo MD  4/25/2024 3:57 PM EDT  Workstation ID: [id number]    MRI Cervical Spine With & Without Contrast    Result Date: 4/12/2024  Impression: 1.Active left-sided facet arthropathy at C3/4 and to lesser degree C4/5 with associated surrounding marrow and soft tissue edema. 2.Moderate multifactorial degenerative change of the mid to lower cervical spine otherwise with central canal and neuroforaminal stenosis. Potential exiting nerve contact from C3/4 through C6/7 as detailed above. Electronically Signed: Emanuel Rodriguez MD  4/12/2024 3:06 PM EDT  Workstation ID: [id number]      Measures:   Tobacco:   Val Nassar Jr.  reports that he has been smoking cigarettes. He has a 15 pack-year smoking history. He has been exposed to tobacco smoke. He has never used smokeless tobacco. I have educated him on the risk of diseases from using tobacco products such as cancer, COPD, and heart disease.     I advised him to quit and he is not willing to quit.    I spent 3  minutes counseling the patient.         Assessment / Plan      Assessment/Plan:   Val Nassar Jr. is a 63 y.o. male who presented today for evaluation of urinary retention, incomplete bladder emptying, large bladder  diverticulum.  The patient's prostate is very small and this is very unlikely to be causing obstruction.  The patient very likely has a neurogenic bladder secondary to his medical issues including his former alcoholism.  We discussed there is a chronic nerve injury to the bladder likely and he has very likely lost bladder function.  I would not recommend surgical intervention for this and I recommend he initiate intermittent catheterization to ensure bladder emptying, 3 times a day with a 14 American straight catheter.  He is amenable to a teaching visit Friday with MA.  I will see him back in 3 months.  Will send catheter supplies to his home. He needs PSA screening per AUA guidelines, no obvious PSA results in the system.    Given pyuria on UA, I will send a culture.  I will call him with an antibiotic if necessary.    Diagnoses and all orders for this visit:    1. Acquired bladder diverticulum (Primary)  -     POC Urinalysis Dipstick, Automated  -     PSA Diagnostic; Future    2. Urinary retention  -     PSA Diagnostic; Future    3. Neurogenic bladder  -     PSA Diagnostic; Future    4. Pyuria  -     Urine Culture - Urine, Urine, Clean Catch        - learn CIC, 14 Fr straight cath Friday with MA team  - 3x cath daily  - f/u 3 months    Follow Up:   Return in about 2 days (around 8/9/2024) for MA visit for self catheterization teaching (14 Fr straight cath) .    I spent approximately 30 minutes providing clinical care for this patient; including review of patient's chart and provider documentation, face to face time spent with patient in examination room (obtaining history, performing physical exam, discussing diagnosis and management options), placing orders, and completing patient documentation.     Иван Garrison MD  [facility] [place]

## 2024-08-09 DIAGNOSIS — N30.00 ACUTE CYSTITIS WITHOUT HEMATURIA: Primary | ICD-10-CM

## 2024-08-09 RX ORDER — NITROFURANTOIN 25; 75 MG/1; MG/1
100 CAPSULE ORAL 2 TIMES DAILY
Qty: 14 CAPSULE | Refills: 0 | Status: SHIPPED | OUTPATIENT
Start: 2024-08-09

## 2024-08-09 NOTE — PROGRESS NOTES
Patient has a urinary tract infection.  I am sending him Macrobid x 1 week for urine clearance, we will follow-up on culture.  Thanks, please let him know

## 2024-08-10 LAB — BACTERIA SPEC AEROBE CULT: ABNORMAL

## 2024-08-15 ENCOUNTER — HOSPITAL ENCOUNTER (OUTPATIENT)
Dept: RADIATION ONCOLOGY | Facility: HOSPITAL | Age: 64
Setting detail: RADIATION/ONCOLOGY SERIES
Discharge: HOME | End: 2024-08-15
Payer: MEDICAID

## 2024-08-15 ENCOUNTER — APPOINTMENT (OUTPATIENT)
Dept: RADIATION ONCOLOGY | Facility: HOSPITAL | Age: 64
End: 2024-08-15
Payer: MEDICAID

## 2024-08-16 ENCOUNTER — CLINICAL SUPPORT (OUTPATIENT)
Dept: UROLOGY | Facility: CLINIC | Age: 64
End: 2024-08-16
Payer: MEDICAID

## 2024-08-16 DIAGNOSIS — N32.3 ACQUIRED BLADDER DIVERTICULUM: Primary | ICD-10-CM

## 2024-08-16 LAB
BILIRUB BLD-MCNC: NEGATIVE MG/DL
CLARITY, POC: ABNORMAL
COLOR UR: YELLOW
EXPIRATION DATE: ABNORMAL
GLUCOSE UR STRIP-MCNC: NEGATIVE MG/DL
KETONES UR QL: NEGATIVE
LEUKOCYTE EST, POC: ABNORMAL
Lab: ABNORMAL
NITRITE UR-MCNC: NEGATIVE MG/ML
PH UR: 6.5 [PH] (ref 5–8)
PROT UR STRIP-MCNC: NEGATIVE MG/DL
RBC # UR STRIP: ABNORMAL /UL
SP GR UR: 1.01 (ref 1–1.03)
UROBILINOGEN UR QL: NORMAL

## 2024-08-16 PROCEDURE — 87086 URINE CULTURE/COLONY COUNT: CPT | Performed by: STUDENT IN AN ORGANIZED HEALTH CARE EDUCATION/TRAINING PROGRAM

## 2024-08-16 PROCEDURE — 87077 CULTURE AEROBIC IDENTIFY: CPT | Performed by: STUDENT IN AN ORGANIZED HEALTH CARE EDUCATION/TRAINING PROGRAM

## 2024-08-16 PROCEDURE — 87186 SC STD MICRODIL/AGAR DIL: CPT | Performed by: STUDENT IN AN ORGANIZED HEALTH CARE EDUCATION/TRAINING PROGRAM

## 2024-08-16 NOTE — PROGRESS NOTES
Pt came into clinic today for self catheterization teaching.  I presented the pt with a 14fr straight farida onli catheter. I started with having the pt wash his hands and talked about the importance of good hygiene prior to inserting the catheter. I had the pt show me how to properly open the catheter packaging keeping it as sterile as possible. The pt was able to insert the catheter into the bladder and had good urine flow. The pt voided 1000 mL of yellow milky/couldy urine. UA was ran on pt, results are in the chart. I will send urine off for culture since 3+ leukocytes are present.   I let the pt know that I would be placing a order with [employer] and that a rep from the company would be reaching out to get catheters sent to his nursing home facility. I instructed the pt to continue to drink fluids to stay hydrated. I also let the pt know that if they are unable to get the catheter in, or have a return of straight blood to give us a call. I also let them know that if they suddenly develop flu like symptoms they would need to get to the nearest ER as that is a sign of a serious infection. Pt verbalized understanding.       ===  MA note reviewed    Иван Garrison MD

## 2024-08-19 ENCOUNTER — OFFICE VISIT (OUTPATIENT)
Dept: RADIATION ONCOLOGY | Facility: HOSPITAL | Age: 64
End: 2024-08-19
Payer: MEDICAID

## 2024-08-19 VITALS
BODY MASS INDEX: 33.61 KG/M2 | DIASTOLIC BLOOD PRESSURE: 72 MMHG | RESPIRATION RATE: 18 BRPM | SYSTOLIC BLOOD PRESSURE: 139 MMHG | HEART RATE: 55 BPM | OXYGEN SATURATION: 97 % | WEIGHT: 171.2 LBS | TEMPERATURE: 97.8 F | HEIGHT: 60 IN

## 2024-08-19 DIAGNOSIS — C15.3 MALIGNANT NEOPLASM OF UPPER THIRD OF ESOPHAGUS: Primary | ICD-10-CM

## 2024-08-19 PROCEDURE — G0463 HOSPITAL OUTPT CLINIC VISIT: HCPCS

## 2024-08-19 NOTE — PROGRESS NOTES
"FOLLOW UP NOTE    PATIENT:                                                      Val Nassar Jr.  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  He was diagnosed with a localized esophageal squamous cell carcinoma of the upper third of the esophagus in the fall  while living in [place].  He then relocated to [place] to be closer to his sister.  EGD performed 2023 at [facility] during an admission for upper GI bleed secondary to peptic ulcer disease showed an ulcerated mass in the upper third of the esophagus which was nonobstructing and partially circumferential spanning 2 cm in length, located 20 cm from the incisors.  Biopsy revealed invasive squamous cell carcinoma.  The patient then had a CT scan at  that showed some thickening of the esophagus but otherwise nothing else.  The patient was readmitted to [facility] and underwent staging on 2024 with a CT scan of the abdomen pelvis and CT scan of the chest on 2024.  The scans showed no evidence of distant disease but concern for some thickening of the esophagus.  MRI of the brain was negative.  The patient was discharged from the hospital to a nursing home with plans for continued workup as an outpatient.       The patient completed staging PET/CT on 3/6/2024 which showed a hypermetabolic upper thoracic esophageal lesion, consistent with his biopsy-proven neoplasm.  The patient also had several hypermetabolic lymph nodes in the right supraclavicular fossa and superior right " mediastinum concerning for regional spread.  The patient had some uptake in the spine but it was thought to represent benign disease.     The patient saw Dr. Ga who recommended definitive therapy.  He was not considered an operative candidate.     The patient underwent a course of concurrent chemoradiation with carboplatin and Taxol.  The esophagus and involved adenopathy were treated to a cumulative dose of 54 Gray in 30 fractions utilizing IMRT and IGR T technique, completing 5/8/2024. The patient tolerated treatment fairly well.    He reports since we last saw him he has been doing fairly well.  He has recently gained 2 pounds.  He he says he is able to tolerate normal food however he is waiting on getting a new set of dentures.  Softer foods are more appetizing to him.  He reports he has been active and walks around his care facility, [facility] and he was able to start a garden which he enjoys working in.    Reports all of his comorbidities are at baseline.  Does take narcotics for his usual aches and pains.  He is currently on Macrobid for a UTI.  Dr. Garrison recommends self cathing which she has been doing twice a day.  He denies fevers chills and night sweats.    MEDICATIONS: Medication reconciliation for the patient was reviewed and confirmed in the electronic medical record.    Review of Systems:   Review of Systems   Constitutional:         Reports improved appetite.  Has recently gained 2 pounds.  Tolerating more textures of food.   Respiratory: Negative.     Cardiovascular:         Mild bilateral lower leg swelling.   Gastrointestinal: Negative.    Genitourinary:  Positive for dysuria.         On Macrobid for UTI   Musculoskeletal:  Positive for gait problem.        Left hip/leg/ sciatic pain   Skin:         Reports rash on right foot that they are prescribing him cream for.   Neurological:  Positive for gait problem.        Neuropathy from chemotherapy.  Uses walker to get around, reports  "however he is very mobile.   Psychiatric/Behavioral: Negative.         Physical Exam:   Physical Exam  Constitutional:       Appearance: Normal appearance.   HENT:      Head: Normocephalic.      Nose: Nose normal.      Mouth/Throat:      Mouth: Mucous membranes are moist.   Cardiovascular:      Rate and Rhythm: Bradycardia present.   Pulmonary:      Effort: Pulmonary effort is normal.   Abdominal:      General: Abdomen is flat.   Musculoskeletal:      Right lower le+ Pitting Edema present.      Left lower le+ Pitting Edema present.   Neurological:      General: No focal deficit present.      Mental Status: He is alert and oriented to person, place, and time.   Psychiatric:         Mood and Affect: Mood normal.         Behavior: Behavior normal.         Thought Content: Thought content normal.         VITAL SIGNS:   Vitals:    24 0955   BP: 139/72   Pulse: 55   Resp: 18   Temp: 97.8 °F (36.6 °C)   TempSrc: Skin   SpO2: 97%   Weight: 77.7 kg (171 lb 3.2 oz)   Height: 144.8 cm (57\")   PainSc:   7   PainLoc: Leg  Comment: left                 KPS %:  80    The following portions of the patient's history were reviewed and updated as appropriate: allergies, current medications, past family history, past medical history, past social history, past surgical history and problem list.            IMPRESSION:  Val Nassar is a 63 y.o. gentleman what appears to be a clinical T2 N1 M0 squamous cell carcinoma of the esophagus that is limited to the upper one third of the esophagus and right supraclavicular fossa at this time point.  He underwent definitive nonoperative management with concurrent chemoradiotherapy, completing 2024.  The patient tolerated treatment fairly well.     Patient reports he has done well.  Able to tolerate normal foods no.  Has gained weight.  Remains active walking and gardening.  No new health concerns at this time.    RECOMMENDATIONS:    Squamous cell carcinoma of the " esophagus  -cT2 N2 M0  -Biopsy proven  -Upper location 20 cm from the incisors  -Disease limited to upper esophagus and the right supraclavicular fossa/mediastinum without obvious evidence of more distant disease on PET  -Recommend 5 to 6 weeks course of radiation daily to a dose of 50-54 Laws with concurrent chemotherapy  -Would also be candidate for palliative treatments with a quad shot  -Recommend PET scan fusion for planning  -Status post concurrent chemoRT with carbo Taxol              -Status post cumulative dose of 54 Gray/30 fractions utilizing IGR T and IMRT  -Completed 5/8/2024  -Grade 1 fatigue, grade 1 radiation induced esophagitis subsiding  -Restaging scans every 3 months, next scans scheduled 10/9/2024 and follow-up follow-up with Dr. Ga on 10/16/2024.  -Recommend repeat EGD for ongoing surveillance.  Has upcoming appointment with gastroenterology, Dr. Walter.  -Follows with Dr. Ga  -RTC ~3 months after imaging        Social distress  -Lives at [facility]  -Relies on bus for transportation  -Fixed income  -Margareth Wright W for help with available resources and assistance        Cerumen impaction  -Recommend Debrox otic gtt as directed on box        Tobacco abuse  -Smokes 1 ppd currently  -Smoking does plan on smoking cessation.  He does use Nicorette gum.        Port-A-Cath issues  -Going for port flush today.  -No concerns at this time.    Recurrent UTIs  -Follows with Dr. Garrison.  -Now self cathing twice per day.  -Currently on Macrobid for UTI.      I spent a total of 37 minutes on todays visit, with more than 25 minutes in direct face to face communication, and the remainder of the time spent in reviewing the relevant history, records, available imaging, and for documentation.             NUVIA Gómez    Errors in dictation may reflect use of voice recognition software and not all errors in transcription may have been detected prior to signing.

## 2024-08-19 NOTE — LETTER
"2024       No Recipients    Patient: Val Nassar Jr.   YOB: 1960   Date of Visit: 2024     Dear Jeremy Aguila MD:       Thank you for referring Val Nassar to me for evaluation. Below are the relevant portions of my assessment and plan of care.    If you have questions, please do not hesitate to call me. I look forward to following Val along with you.         Sincerely,        NUVIA Gómez        CC:   No Recipients    Shirlene Kauffman APRN  [id number] [phone]  Sign when Signing Visit  FOLLOW UP NOTE    PATIENT:                                                      Val Nassar Jr.  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  He was diagnosed with a localized esophageal squamous cell carcinoma of the upper third of the esophagus in the fall  while living in [place].  He then relocated to [place] to be closer to his sister.  EGD performed 2023 at [facility] during an admission for upper GI bleed secondary to peptic ulcer disease showed an ulcerated mass in the upper third of the esophagus which was nonobstructing and partially circumferential spanning 2 cm in length, located 20 cm from the incisors.  Biopsy revealed invasive squamous cell carcinoma.  The patient then had a CT scan at  that showed some thickening of the esophagus but otherwise nothing else.  The patient was readmitted to [facility] and underwent staging on 2024 with a CT scan of the abdomen pelvis and " CT scan of the chest on 2/20/2024.  The scans showed no evidence of distant disease but concern for some thickening of the esophagus.  MRI of the brain was negative.  The patient was discharged from the hospital to a nursing home with plans for continued workup as an outpatient.       The patient completed staging PET/CT on 3/6/2024 which showed a hypermetabolic upper thoracic esophageal lesion, consistent with his biopsy-proven neoplasm.  The patient also had several hypermetabolic lymph nodes in the right supraclavicular fossa and superior right mediastinum concerning for regional spread.  The patient had some uptake in the spine but it was thought to represent benign disease.     The patient saw Dr. Ga who recommended definitive therapy.  He was not considered an operative candidate.     The patient underwent a course of concurrent chemoradiation with carboplatin and Taxol.  The esophagus and involved adenopathy were treated to a cumulative dose of 54 Gray in 30 fractions utilizing IMRT and IGR T technique, completing 5/8/2024. The patient tolerated treatment fairly well.    He reports since we last saw him he has been doing fairly well.  He has recently gained 2 pounds.  He he says he is able to tolerate normal food however he is waiting on getting a new set of dentures.  Softer foods are more appetizing to him.  He reports he has been active and walks around his care facility, [facility] and he was able to start a garden which he enjoys working in.    Reports all of his comorbidities are at baseline.  Does take narcotics for his usual aches and pains.  He is currently on Macrobid for a UTI.  Dr. Garrison recommends self cathing which she has been doing twice a day.  He denies fevers chills and night sweats.    MEDICATIONS: Medication reconciliation for the patient was reviewed and confirmed in the electronic medical record.    Review of Systems:   Review of Systems   Constitutional:         Reports  "improved appetite.  Has recently gained 2 pounds.  Tolerating more textures of food.   Respiratory: Negative.     Cardiovascular:         Mild bilateral lower leg swelling.   Gastrointestinal: Negative.    Genitourinary:  Positive for dysuria.         On Macrobid for UTI   Musculoskeletal:  Positive for gait problem.        Left hip/leg/ sciatic pain   Skin:         Reports rash on right foot that they are prescribing him cream for.   Neurological:  Positive for gait problem.        Neuropathy from chemotherapy.  Uses walker to get around, reports however he is very mobile.   Psychiatric/Behavioral: Negative.         Physical Exam:   Physical Exam  Constitutional:       Appearance: Normal appearance.   HENT:      Head: Normocephalic.      Nose: Nose normal.      Mouth/Throat:      Mouth: Mucous membranes are moist.   Cardiovascular:      Rate and Rhythm: Bradycardia present.   Pulmonary:      Effort: Pulmonary effort is normal.   Abdominal:      General: Abdomen is flat.   Musculoskeletal:      Right lower le+ Pitting Edema present.      Left lower le+ Pitting Edema present.   Neurological:      General: No focal deficit present.      Mental Status: He is alert and oriented to person, place, and time.   Psychiatric:         Mood and Affect: Mood normal.         Behavior: Behavior normal.         Thought Content: Thought content normal.         VITAL SIGNS:   Vitals:    24 0955   BP: 139/72   Pulse: 55   Resp: 18   Temp: 97.8 °F (36.6 °C)   TempSrc: Skin   SpO2: 97%   Weight: 77.7 kg (171 lb 3.2 oz)   Height: 144.8 cm (57\")   PainSc:   7   PainLoc: Leg  Comment: left                 KPS %:  80    The following portions of the patient's history were reviewed and updated as appropriate: allergies, current medications, past family history, past medical history, past social history, past surgical history and problem list.            IMPRESSION:  Val Nassar is a 63 y.o. gentleman what appears to be " a clinical T2 N1 M0 squamous cell carcinoma of the esophagus that is limited to the upper one third of the esophagus and right supraclavicular fossa at this time point.  He underwent definitive nonoperative management with concurrent chemoradiotherapy, completing 5/8/2024.  The patient tolerated treatment fairly well.     Patient reports he has done well.  Able to tolerate normal foods no.  Has gained weight.  Remains active walking and gardening.  No new health concerns at this time.    RECOMMENDATIONS:    Squamous cell carcinoma of the esophagus  -cT2 N2 M0  -Biopsy proven  -Upper location 20 cm from the incisors  -Disease limited to upper esophagus and the right supraclavicular fossa/mediastinum without obvious evidence of more distant disease on PET  -Recommend 5 to 6 weeks course of radiation daily to a dose of 50-54 Laws with concurrent chemotherapy  -Would also be candidate for palliative treatments with a quad shot  -Recommend PET scan fusion for planning  -Status post concurrent chemoRT with carbo Taxol              -Status post cumulative dose of 54 Gray/30 fractions utilizing IGR T and IMRT  -Completed 5/8/2024  -Grade 1 fatigue, grade 1 radiation induced esophagitis subsiding  -Restaging scans every 3 months, next scans scheduled 10/9/2024 and follow-up follow-up with Dr. Ga on 10/16/2024.  -Recommend repeat EGD for ongoing surveillance.  Has upcoming appointment with gastroenterology, Dr. Walter.  -Follows with Dr. Ga  -RTC ~3 months after imaging        Social distress  -Lives at [facility]  -Relies on bus for transportation  -Fixed income  -Marg[facility] for help with available resources and assistance        Cerumen impaction  -Recommend Debrox otic gtt as directed on box        Tobacco abuse  -Smokes 1 ppd currently  -Smoking does plan on smoking cessation.  He does use Nicorette gum.        Port-A-Cath issues  -Going for port flush today.  -No concerns at this  time.    Recurrent UTIs  -Follows with Dr. Garrison.  -Now self cathing twice per day.  -Currently on Macrobid for UTI.      I spent a total of 37 minutes on todays visit, with more than 25 minutes in direct face to face communication, and the remainder of the time spent in reviewing the relevant history, records, available imaging, and for documentation.             NUVIA Gómez    Errors in dictation may reflect use of voice recognition software and not all errors in transcription may have been detected prior to signing.

## 2024-08-20 DIAGNOSIS — N30.00 ACUTE CYSTITIS WITHOUT HEMATURIA: Primary | ICD-10-CM

## 2024-08-20 LAB — BACTERIA SPEC AEROBE CULT: ABNORMAL

## 2024-08-20 RX ORDER — AMOXICILLIN AND CLAVULANATE POTASSIUM 875; 125 MG/1; MG/1
1 TABLET, FILM COATED ORAL 2 TIMES DAILY
Qty: 14 TABLET | Refills: 0 | Status: SHIPPED | OUTPATIENT
Start: 2024-08-20 | End: 2024-08-27

## 2024-08-22 ENCOUNTER — OUTSIDE FACILITY SERVICE (OUTPATIENT)
Dept: GASTROENTEROLOGY | Facility: CLINIC | Age: 64
End: 2024-08-22
Payer: MEDICAID

## 2024-08-22 PROCEDURE — 43239 EGD BIOPSY SINGLE/MULTIPLE: CPT | Performed by: INTERNAL MEDICINE

## 2024-08-22 PROCEDURE — 88305 TISSUE EXAM BY PATHOLOGIST: CPT | Performed by: INTERNAL MEDICINE

## 2024-08-23 ENCOUNTER — LAB REQUISITION (OUTPATIENT)
Dept: LAB | Facility: HOSPITAL | Age: 64
End: 2024-08-23
Payer: MEDICAID

## 2024-08-23 DIAGNOSIS — K26.4 CHRONIC OR UNSPECIFIED DUODENAL ULCER WITH HEMORRHAGE: ICD-10-CM

## 2024-08-23 DIAGNOSIS — K31.5 OBSTRUCTION OF DUODENUM: ICD-10-CM

## 2024-08-23 DIAGNOSIS — Z85.01 PERSONAL HISTORY OF MALIGNANT NEOPLASM OF ESOPHAGUS: ICD-10-CM

## 2024-08-23 DIAGNOSIS — K31.89 OTHER DISEASES OF STOMACH AND DUODENUM: ICD-10-CM

## 2024-08-23 DIAGNOSIS — K76.6 PORTAL HYPERTENSION: ICD-10-CM

## 2024-08-23 DIAGNOSIS — C15.9 MALIGNANT NEOPLASM OF ESOPHAGUS, UNSPECIFIED: ICD-10-CM

## 2024-08-26 LAB
CYTO UR: NORMAL
LAB AP CASE REPORT: NORMAL
LAB AP CLINICAL INFORMATION: NORMAL
PATH REPORT.FINAL DX SPEC: NORMAL
PATH REPORT.GROSS SPEC: NORMAL

## 2024-08-27 LAB — BACTERIA SPEC AEROBE CULT: ABNORMAL

## 2024-09-11 ENCOUNTER — TELEPHONE (OUTPATIENT)
Dept: UROLOGY | Facility: CLINIC | Age: 64
End: 2024-09-11
Payer: MEDICAID

## 2024-09-11 NOTE — TELEPHONE ENCOUNTER
Caller: PAMELA     Relationship to patient: [facility] (UNIT COORDINATOR)    Best call back number: [phone]    Patient is needing:ORDERS FOR STRAIGHT CATH 3XDAY POST VOID- PT IS REFUSING TO HAVE THIS PERFORMED. PAMELA HAS ASKED THAT WE INFORMED DR ABEL AND THE ORDER WILL BE CANCELLED.

## 2024-09-13 ENCOUNTER — DOCUMENTATION (OUTPATIENT)
Dept: OTHER | Facility: HOSPITAL | Age: 64
End: 2024-09-13
Payer: MEDICAID

## 2024-09-13 NOTE — PROGRESS NOTES
WALT received phone call from Jaymie at [facility] asking for assisting with getting port flush scheduled for pt. WALT reached out to infusion and was able to get it scheduled for 9/19 at 10:30am. WALT contacted Jaymie and relayed information. WALT provided her with infusion check in number to contact should apt time need to change. Jaymie thanked WALT and had no other needs.

## 2024-09-19 ENCOUNTER — HOSPITAL ENCOUNTER (OUTPATIENT)
Dept: ONCOLOGY | Facility: HOSPITAL | Age: 64
Discharge: HOME | End: 2024-09-19
Payer: MEDICAID

## 2024-09-19 VITALS
DIASTOLIC BLOOD PRESSURE: 56 MMHG | SYSTOLIC BLOOD PRESSURE: 117 MMHG | RESPIRATION RATE: 16 BRPM | BODY MASS INDEX: 33.57 KG/M2 | WEIGHT: 171 LBS | HEIGHT: 60 IN | HEART RATE: 68 BPM | TEMPERATURE: 98.4 F

## 2024-09-19 DIAGNOSIS — C15.3 MALIGNANT NEOPLASM OF UPPER THIRD OF ESOPHAGUS: Primary | ICD-10-CM

## 2024-09-19 DIAGNOSIS — C15.9 SQUAMOUS CELL CARCINOMA OF ESOPHAGUS: ICD-10-CM

## 2024-09-19 PROCEDURE — 25010000002 HEPARIN LOCK FLUSH PER 10 UNITS: Performed by: INTERNAL MEDICINE

## 2024-09-19 PROCEDURE — 96523 IRRIG DRUG DELIVERY DEVICE: CPT

## 2024-09-19 RX ORDER — SODIUM CHLORIDE 0.9 % (FLUSH) 0.9 %
10 SYRINGE (ML) INJECTION AS NEEDED
OUTPATIENT
Start: 2024-09-19

## 2024-09-19 RX ORDER — HEPARIN SODIUM (PORCINE) LOCK FLUSH IV SOLN 100 UNIT/ML 100 UNIT/ML
500 SOLUTION INTRAVENOUS AS NEEDED
OUTPATIENT
Start: 2024-09-19

## 2024-09-19 RX ORDER — HEPARIN SODIUM (PORCINE) LOCK FLUSH IV SOLN 100 UNIT/ML 100 UNIT/ML
500 SOLUTION INTRAVENOUS AS NEEDED
Status: DISCONTINUED | OUTPATIENT
Start: 2024-09-19 | End: 2024-09-20 | Stop reason: HOSPADM

## 2024-09-19 RX ADMIN — HEPARIN 500 UNITS: 100 SYRINGE at 10:35

## 2024-10-09 ENCOUNTER — HOSPITAL ENCOUNTER (OUTPATIENT)
Dept: CT IMAGING | Facility: HOSPITAL | Age: 64
Discharge: HOME | End: 2024-10-09
Admitting: INTERNAL MEDICINE
Payer: MEDICAID

## 2024-10-09 DIAGNOSIS — C15.9 SQUAMOUS CELL CARCINOMA OF ESOPHAGUS: ICD-10-CM

## 2024-10-09 PROCEDURE — 70491 CT SOFT TISSUE NECK W/DYE: CPT

## 2024-10-09 PROCEDURE — 74177 CT ABD & PELVIS W/CONTRAST: CPT

## 2024-10-09 PROCEDURE — 71260 CT THORAX DX C+: CPT

## 2024-10-09 PROCEDURE — 25510000001 IOPAMIDOL 61 % SOLUTION: Performed by: INTERNAL MEDICINE

## 2024-10-09 RX ORDER — IOPAMIDOL 612 MG/ML
100 INJECTION, SOLUTION INTRAVASCULAR
Status: COMPLETED | OUTPATIENT
Start: 2024-10-09 | End: 2024-10-09

## 2024-10-09 RX ADMIN — IOPAMIDOL 90 ML: 612 INJECTION, SOLUTION INTRAVENOUS at 15:03

## 2024-10-16 ENCOUNTER — TELEPHONE (OUTPATIENT)
Dept: ONCOLOGY | Facility: CLINIC | Age: 64
End: 2024-10-16

## 2024-10-16 NOTE — TELEPHONE ENCOUNTER
Caller: KATE    Relationship:  [facility]    Best call back number: [phone]    PATIENT CALLED REQUESTING TO CANCEL SAME DAY APPT.    Did the patient call AFTER the start time of their scheduled appointment?   NO      Any additional information: HUB UNABLE TO WARM TRANSFER.  PLEASE CALL KATE AT [facility] TO GET TODAY'S APPT RESCHEDULED, PT DOES NOT FEEL  WELL AND WANTED TO CANCEL/RESCHEDULE.

## 2024-10-22 ENCOUNTER — TELEPHONE (OUTPATIENT)
Dept: UROLOGY | Facility: CLINIC | Age: 64
End: 2024-10-22
Payer: MEDICAID

## 2024-10-22 NOTE — TELEPHONE ENCOUNTER
Jaymie is the nurse over where he lives and called saying he is straight cathing himself once a day, should he be doing it more than that. Please put in orders and give the nurse a call at [phone]

## 2024-10-23 NOTE — TELEPHONE ENCOUNTER
"Spoke with Mervin at [address] [place] [address] about getting the pt some catheters and supplies.     His response:  \"Because he's in a skilled nursing home, insurance doesn't cover catheters. The facility he is staying at legally has to provide them. I will definitely get him samples to help out but will also talk to the nurse and explain they need to provide the catheters as long as he is there.\"  "

## 2024-10-24 ENCOUNTER — OFFICE VISIT (OUTPATIENT)
Dept: ONCOLOGY | Facility: CLINIC | Age: 64
End: 2024-10-24
Payer: MEDICAID

## 2024-10-24 VITALS
OXYGEN SATURATION: 96 % | SYSTOLIC BLOOD PRESSURE: 124 MMHG | RESPIRATION RATE: 18 BRPM | HEART RATE: 70 BPM | WEIGHT: 176 LBS | TEMPERATURE: 97.3 F | HEIGHT: 69 IN | DIASTOLIC BLOOD PRESSURE: 71 MMHG | BODY MASS INDEX: 26.07 KG/M2

## 2024-10-24 DIAGNOSIS — G89.29 CHRONIC LOW BACK PAIN WITH LEFT-SIDED SCIATICA, UNSPECIFIED BACK PAIN LATERALITY: Primary | ICD-10-CM

## 2024-10-24 DIAGNOSIS — M54.42 CHRONIC LOW BACK PAIN WITH LEFT-SIDED SCIATICA, UNSPECIFIED BACK PAIN LATERALITY: Primary | ICD-10-CM

## 2024-10-24 PROCEDURE — 1125F AMNT PAIN NOTED PAIN PRSNT: CPT | Performed by: INTERNAL MEDICINE

## 2024-10-24 PROCEDURE — 99214 OFFICE O/P EST MOD 30 MIN: CPT | Performed by: INTERNAL MEDICINE

## 2024-10-24 RX ORDER — SENNOSIDES 8.6 MG/1
TABLET, COATED ORAL
COMMUNITY
Start: 2024-10-15

## 2024-10-24 RX ORDER — LIDOCAINE 50 MG/G
PATCH TOPICAL
COMMUNITY
Start: 2024-09-12

## 2024-10-24 RX ORDER — GABAPENTIN 300 MG/1
CAPSULE ORAL
COMMUNITY
Start: 2024-10-10

## 2024-10-24 NOTE — PROGRESS NOTES
Hematology and Oncology [place]  Office number [phone]    Fax number [phone]     Follow up     Date: 10/24/24    Patient Name: Val Nassar Jr.  MRN: [id number]  : 1960    Chief Complaint: esophageal cancer    Cancer Staging:  Cancer Staging   Stage II (cT2, cN1, cM0)    History of Present Illness: Val Nassar Jr. is a pleasant 64 y.o. male who presents today for evaluation of upper third esophageal squamous cell carcinoma. He has a longstanding history of cirrhosis     He originally was diagnosed with a localized esophageal squamous cell carcinoma of the upper third of the esophagus in the 2023.  At the time he was living in [place].  He underwent an endoscopy confirming cancer diagnosis at [facility] in [facility].  He then relocated to [facility] to live with his sister because of his cancer diagnosis.      He presented to [facility] in 2023 with an upper GI bleed secondary to peptic ulcer disease and possible cholecystitis.  His presenting hemoglobin was 6.9.     EGD performed 2023 showed an ulcerated mass in the upper third of the esophagus which was nonobstructing and partially circumferential spanning 2 cm in length.  Biopsy results are pending.    Additional findings include gastritis, portal hypertension gastropathy and nonbleeding duodenal ulcer.  Duodenal stenosis.    He was discharged to a rehab facility and ultimately discharged home to live with his sister.  He was doing poorly and was unable to care for himself.  He did not keep his appointments with medical oncology, thoracic surgery, or radiation oncology and did not complete staging PET/CT.  He then represented to the hospital in late 2024 with a UTI and confusion.  During that admission he was assessed by palliative care, radiation oncology, and medical oncology.  He elected to be discharged to a skilled nursing facility for long-term  care as he does not want to be a burden on his sister and did not feel that he could care for himself at home.  Prior to discharge there were conversations between case management and social work at the hospital and his facility and the patient was under the impression that he could not receive radiation treatment while living at the skilled facility.  However he did elect to complete PET CT scan and returns for results.    PET/CT performed 3/6/2024 shows thickening of the esophagus with an uptake of 16 by SUV.  There was notable hypermetabolic activity in the left lateral shoulder subcutaneous tissue which corresponds to an area where he received a prior injection at his PCP office for alcohol use which he reports has been raised and pruritic since the injection 6 weeks prior.  He had mild paratracheal adenopathy as well as an right paratracheal lymph node in the right supraclavicular region  He reports that he has been tolerating regular diet with no obstructive symptoms prior to presentation.     He continues to feel weak but is clinically improving.  He tells me he is planning inpatient rehab on discharge.  His hemoglobin continues to improve and is 9.1 today.  CT of the abdomen pelvis in addition to the gallbladder findings showed no metastatic disease.  A chest x-ray this admission showed mild interstitial edema.  MRI cervical spine from 4/12/2024 showed:  1.Active left-sided facet arthropathy at C3/4 and to lesser degree C4/5 with associated surrounding marrow and soft tissue edema.  2.Moderate multifactorial degenerative change of the mid to lower cervical spine otherwise with central canal and neuroforaminal stenosis. Potential exiting nerve contact from C3/4 through C6/7 as detailed above.    Treatment history:  Taxol/carbo/radiation: C1, 4/2/2024; C2, 4/9/24; C3, held, C4, carbo only; C5, carbo and DA taxol: completed chemoradiation on 5/8/2024.    Interval history:  He is here for follow-up.  He is  ambulating with a wheeled walker.  Sciatica pain continues to be bothersome. He was referred to pain management by neurosurgery in the Spring but per referral notes RN at his facility declined to schedule at the time. He would like to revisit this as his pain level is not well controlled currently. Continues self-cathing. Endoscopy 8/2024 with no active disease    Past Medical History:   Past Medical History:   Diagnosis Date    Anemia     Cancer     ESOPHAGEAL    Cirrhosis     Duodenal ulcer     Gastric ulcer     GERD (gastroesophageal reflux disease)     History of alcohol abuse     History of radiation therapy 05/08/2024    esophagus    HLD (hyperlipidemia)     Mood disorder        Past Surgical History:   Past Surgical History:   Procedure Laterality Date    ENDOSCOPY N/A 12/26/2023    Procedure: ESOPHAGOGASTRODUODENOSCOPY;  Surgeon: Wesly Mckeon MD;  Location: [facility] ENDOSCOPY;  Service: Gastroenterology;  Laterality: N/A;    VENOUS ACCESS DEVICE (PORT) INSERTION Right 04/25/2024       Family History:   Family History   Problem Relation Age of Onset    Cancer Mother         LUNG AND BREAST    Breast cancer Mother     Heart attack Father        Social History:   Social History     Socioeconomic History    Marital status: Single   Tobacco Use    Smoking status: Every Day     Current packs/day: 1.00     Average packs/day: 1 pack/day for 15.0 years (15.0 ttl pk-yrs)     Types: Cigarettes     Passive exposure: Current    Smokeless tobacco: Never   Vaping Use    Vaping status: Some Days    Substances: Flavoring    Devices: Disposable   Substance and Sexual Activity    Alcohol use: Not Currently     Comment: sober for ~ 3 months    Drug use: Yes     Types: Hydrocodone    Sexual activity: Not Currently     Partners: Female       Medications:     Current Outpatient Medications:     acetaminophen (TYLENOL) 325 MG tablet, Take 2 tablets by mouth Every 4 (Four) Hours As Needed for Mild Pain., Disp: , Rfl:     benzonatate  (TESSALON) 100 MG capsule, Take 1 capsule by mouth Every 8 (Eight) Hours As Needed for Cough., Disp: , Rfl:     Cholecalciferol (Vitamin D3) 1.25 MG (38147 UT) capsule, Take 1 capsule by mouth Every 7 (Seven) Days., Disp: , Rfl:     Diclofenac Sodium (Voltaren) 1 % gel gel, Apply 4 g topically to the appropriate area as directed 2 (Two) Times a Day., Disp: 50 g, Rfl: 1    diphenhydrAMINE (BENADRYL) 25 mg capsule, Take 1 capsule by mouth At Night As Needed for Itching (insomnia)., Disp: 30 capsule, Rfl: 0    docusate sodium (Colace) 100 MG capsule, Take 1 capsule by mouth 2 (Two) Times a Day As Needed for Constipation., Disp: 30 capsule, Rfl: 1    dutasteride (AVODART) 0.5 MG capsule, Take 1 capsule by mouth Daily., Disp: , Rfl:     escitalopram (LEXAPRO) 10 MG tablet, Take 1 tablet by mouth Every Night., Disp: , Rfl:     fentaNYL (DURAGESIC) 25 MCG/HR patch, Place 1 patch on the skin as directed by provider Every 72 (Seventy-Two) Hours., Disp: , Rfl:     ferrous gluconate (FERGON) 324 MG tablet, Take 1 tablet by mouth Daily With Breakfast., Disp: , Rfl:     finasteride (PROSCAR) 5 MG tablet, Take 1 tablet by mouth Daily., Disp: , Rfl:     folic acid (FOLVITE) 1 MG tablet, Take 1 tablet by mouth Daily., Disp: , Rfl:     gabapentin (NEURONTIN) 300 MG capsule, , Disp: , Rfl:     gabapentin (NEURONTIN) 50 mg/mL solution, Take 16 mL by mouth 3 (Three) Times a Day., Disp: 1000 mL, Rfl: 0    guaiFENesin (MUCINEX) 600 MG 12 hr tablet, Take 2 tablets by mouth Every 12 (Twelve) Hours As Needed for Cough., Disp: , Rfl:     lactulose (CHRONULAC) 10 GM/15ML solution, Take 30 mL by mouth 2 (Two) Times a Day As Needed., Disp: , Rfl:     lidocaine (LIDODERM) 5 %, , Disp: , Rfl:     Lidocaine 4 %, Place 1 patch on the skin as directed by provider Daily. Remove & Discard patch within 12 hours or as directed by MD, Disp: , Rfl:     lidocaine-prilocaine (EMLA) 2.5-2.5 % cream, Apply 1 Application topically to the appropriate area as  directed As Needed (45-60 minutes prior to port access.  Cover with saran/plastic wrap.)., Disp: 30 g, Rfl: 3    Loratadine 10 MG capsule, Take  by mouth Daily., Disp: , Rfl:     melatonin 5 MG tablet tablet, Take 1 tablet by mouth At Night As Needed., Disp: , Rfl:     mometasone (ELOCON) 0.1 % cream, , Disp: , Rfl:     nitrofurantoin, macrocrystal-monohydrate, (Macrobid) 100 MG capsule, Take 1 capsule by mouth 2 (Two) Times a Day., Disp: 14 capsule, Rfl: 0    nystatin (MYCOSTATIN) 100,000 unit/mL suspension, , Disp: , Rfl:     nystatin susp + lidocaine viscous (MAGIC MOUTHWASH) oral suspension, 5-10 ml swish and spit or swallow QID prn, Disp: 240 mL, Rfl: 3    omeprazole (priLOSEC) 40 MG capsule, Take  by mouth., Disp: , Rfl:     ondansetron (ZOFRAN) 8 MG tablet, Take 1 tablet by mouth Every 8 (Eight) Hours As Needed for Nausea or Vomiting., Disp: 30 tablet, Rfl: 3    oxyCODONE-acetaminophen (PERCOCET) 7.5-325 MG per tablet, , Disp: , Rfl:     pantoprazole (PROTONIX) 40 MG EC tablet, Take 1 tablet by mouth 2 (Two) Times a Day., Disp: , Rfl:     phenol (CHLORASEPTIC) 1.4 % liquid liquid, Apply 1 spray to the mouth or throat Every 4 (Four) Hours As Needed., Disp: , Rfl:     polyethylene glycol (MIRALAX) 17 g packet, Take 17 g by mouth Daily As Needed (Use if senna-docusate is ineffective)., Disp: , Rfl:     promethazine (PHENERGAN) 25 MG tablet, Take 1 tablet by mouth Every 6 (Six) Hours As Needed for Nausea or Vomiting., Disp: , Rfl:     Senna-Time 8.6 MG tablet, , Disp: , Rfl:     sodium chloride (Ocean Nasal Spray) 0.65 % nasal spray, 1 spray into the nostril(s) as directed by provider As Needed for Congestion., Disp: 1 each, Rfl: 0    tamsulosin (FLOMAX) 0.4 MG capsule 24 hr capsule, Take 1 capsule by mouth Daily., Disp: , Rfl:     vitamin B-12 (CYANOCOBALAMIN) 1000 MCG tablet, Take 1 tablet by mouth Daily., Disp: , Rfl:     zolpidem (AMBIEN) 5 MG tablet, , Disp: , Rfl:     predniSONE (DELTASONE) 10 MG tablet,  "Take daily based on following titration schedule: 60 mg x 1 day, then 50 mg x 1, then 40 mg x 1, then 30 mg x 1 day, then 20 mg x 1 day, then 10 mg x 1 day then stop (Patient not taking: Reported on 10/24/2024), Disp: 21 tablet, Rfl: 0    Allergies:   No Known Allergies    Objective     Vital Signs:   Vitals:    10/24/24 1602   BP: 124/71   Pulse: 70   Resp: 18   Temp: 97.3 °F (36.3 °C)   TempSrc: Infrared   SpO2: 96%   Weight: 79.8 kg (176 lb)   Height: 175.3 cm (69\")   PainSc:   7   PainLoc: Leg    Body mass index is 25.99 kg/m².   Pain Score    10/24/24 1602   PainSc:   7   PainLoc: Leg       ECOG Performance Status: 2    Physical Exam:   General: No acute distress.   HEENT: Normocephalic, atraumatic.   Neck: supple, no adenopathy.  Cardiovascular: regular rate and rhythm. No murmurs.   Respiratory: Normal rate. Clear to auscultation bilaterally  Abdomen: Soft, nontender, non distended with +BS  Lymph: no cervical, supraclavicular or axillary adenopathy  Neuro: Alert and oriented x 3.  Ambulates with a wheeled walker  Ext: Symmetric, no swelling.     Laboratory/Imaging Reviewed:   No visits with results within 2 Week(s) from this visit.   Latest known visit with results is:   Lab Requisition on 08/22/2024   Component Date Value Ref Range Status    Case Report 08/22/2024    Final                    Value:Surgical Pathology Report                         Case: [id number]                                  Authorizing Provider:  Jaymie Walter MD           Collected:           08/22/2024 12:40 PM          Ordering Location:     [facility]   Received:            08/23/2024 09:36 AM                                 LABORATORY                                                                   Pathologist:           Ryan Cuenca MD                                                        Specimen:    Esophagus                                                                                  Clinical " Information 08/22/2024    Final                    Value:This result contains rich text formatting which cannot be displayed here.    Final Diagnosis 08/22/2024    Final                    Value:This result contains rich text formatting which cannot be displayed here.    Gross Description 08/22/2024    Final                    Value:This result contains rich text formatting which cannot be displayed here.    Microscopic Description 08/22/2024    Final                    Value:This result contains rich text formatting which cannot be displayed here.     CT Chest With Contrast Diagnostic    Result Date: 10/10/2024  Narrative: CT SOFT TISSUE NECK W CONTRAST, CT ABDOMEN PELVIS W CONTRAST, CT CHEST W CONTRAST DIAGNOSTIC Date of Exam: 10/9/2024 2:05 PM EDT Indication: high cervical esophageal cnacer. Comparison: None available. Technique: Axial CT images were obtained of the neck, chest abdomen and pelvis after the uneventful intravenous administration of 90 mL Isovue-300.  Reconstructed coronal and sagittal images were also obtained. Automated exposure control and iterative construction methods were used. Findings: CT neck: Limited intracranial evaluation demonstrates no acute findings. The orbits appear normal. The paranasal sinuses demonstrate some mild mucosal thickening and partial fluid opacification within the maxillary sinuses and left frontal sinus. There is no evidence of aerodigestive tract mass or other focal luminal abnormality. Vascular structures remain patent, with some calcific atherosclerotic changes noted at the left ICA origin. The parotid and submandibular glands appear normal. There is no distinct pathologic cervical lymphadenopathy. The thyroid appears homogeneous. The osseous structures demonstrate some multilevel spondylosis, otherwise without evidence of acute fracture or aggressive osseous lesion. CT CHEST: No distinct pathologic axillary adenopathy or other worrisome body wall soft tissue  finding in the chest. Limited evaluation of the thoracic esophagus demonstrates no evidence of luminal mass or other focal abnormality. There is noted pathologic mediastinal lymphadenopathy. Minimally atherosclerotic nonaneurysmal thoracic aorta. Some apical scarring is present, potentially posttreatment related. There is otherwise no evidence of acute infectious or inflammatory process of the lung fields. No distinct suspicious focal pulmonary nodularity is present. Central airways are clear. The thoracic osseous structures demonstrate spondylosis, without evidence of acute fracture or distinct focal suspicious osseous lesion. CT abdomen pelvis: The body wall soft tissues demonstrate no acute or suspicious findings. The osseous structures demonstrate no evidence of acute fracture or distinct focal suspicious osseous lesion. The liver demonstrates questionable caudate hypertrophy and surface contour nodularity, otherwise without suspicious focal lesion. The spleen is homogeneous. The pancreas and bilateral adrenal glands appear normal. Unremarkable gallbladder. There is no worrisome retroperitoneal lymphadenopathy or mesenteric nodularity. Atherosclerotic, nonaneurysmal abdominal aorta. Small and large bowel loops are nondilated. There is no suspicious focal bowel wall thickening. A large posterior urinary bladder diverticulum is noted.     Impression: Impression: No specific evidence of recurrent or metastatic disease in the neck, chest, abdomen and pelvis. No acute findings. Chronic findings are noted as above, including somewhat equivocal cirrhotic morphology of the liver. Correlate with any known liver parenchymal disease. Findings which could be seen with chronic sinusitis in the correct clinical setting. Electronically Signed: Yuri Medina MD  10/10/2024 10:39 AM EDT  Workstation ID: [id number]    CT Soft Tissue Neck With Contrast    Result Date: 10/10/2024  Narrative: CT SOFT TISSUE NECK W CONTRAST, CT  ABDOMEN PELVIS W CONTRAST, CT CHEST W CONTRAST DIAGNOSTIC Date of Exam: 10/9/2024 2:05 PM EDT Indication: high cervical esophageal cnacer. Comparison: None available. Technique: Axial CT images were obtained of the neck, chest abdomen and pelvis after the uneventful intravenous administration of 90 mL Isovue-300.  Reconstructed coronal and sagittal images were also obtained. Automated exposure control and iterative construction methods were used. Findings: CT neck: Limited intracranial evaluation demonstrates no acute findings. The orbits appear normal. The paranasal sinuses demonstrate some mild mucosal thickening and partial fluid opacification within the maxillary sinuses and left frontal sinus. There is no evidence of aerodigestive tract mass or other focal luminal abnormality. Vascular structures remain patent, with some calcific atherosclerotic changes noted at the left ICA origin. The parotid and submandibular glands appear normal. There is no distinct pathologic cervical lymphadenopathy. The thyroid appears homogeneous. The osseous structures demonstrate some multilevel spondylosis, otherwise without evidence of acute fracture or aggressive osseous lesion. CT CHEST: No distinct pathologic axillary adenopathy or other worrisome body wall soft tissue finding in the chest. Limited evaluation of the thoracic esophagus demonstrates no evidence of luminal mass or other focal abnormality. There is noted pathologic mediastinal lymphadenopathy. Minimally atherosclerotic nonaneurysmal thoracic aorta. Some apical scarring is present, potentially posttreatment related. There is otherwise no evidence of acute infectious or inflammatory process of the lung fields. No distinct suspicious focal pulmonary nodularity is present. Central airways are clear. The thoracic osseous structures demonstrate spondylosis, without evidence of acute fracture or distinct focal suspicious osseous lesion. CT abdomen pelvis: The body wall  soft tissues demonstrate no acute or suspicious findings. The osseous structures demonstrate no evidence of acute fracture or distinct focal suspicious osseous lesion. The liver demonstrates questionable caudate hypertrophy and surface contour nodularity, otherwise without suspicious focal lesion. The spleen is homogeneous. The pancreas and bilateral adrenal glands appear normal. Unremarkable gallbladder. There is no worrisome retroperitoneal lymphadenopathy or mesenteric nodularity. Atherosclerotic, nonaneurysmal abdominal aorta. Small and large bowel loops are nondilated. There is no suspicious focal bowel wall thickening. A large posterior urinary bladder diverticulum is noted.     Impression: Impression: No specific evidence of recurrent or metastatic disease in the neck, chest, abdomen and pelvis. No acute findings. Chronic findings are noted as above, including somewhat equivocal cirrhotic morphology of the liver. Correlate with any known liver parenchymal disease. Findings which could be seen with chronic sinusitis in the correct clinical setting. Electronically Signed: Yuri Medina MD  10/10/2024 10:39 AM EDT  Workstation ID: [id number]    CT Abdomen Pelvis With Contrast    Result Date: 10/10/2024  Narrative: CT SOFT TISSUE NECK W CONTRAST, CT ABDOMEN PELVIS W CONTRAST, CT CHEST W CONTRAST DIAGNOSTIC Date of Exam: 10/9/2024 2:05 PM EDT Indication: high cervical esophageal cnacer. Comparison: None available. Technique: Axial CT images were obtained of the neck, chest abdomen and pelvis after the uneventful intravenous administration of 90 mL Isovue-300.  Reconstructed coronal and sagittal images were also obtained. Automated exposure control and iterative construction methods were used. Findings: CT neck: Limited intracranial evaluation demonstrates no acute findings. The orbits appear normal. The paranasal sinuses demonstrate some mild mucosal thickening and partial fluid opacification within the maxillary  sinuses and left frontal sinus. There is no evidence of aerodigestive tract mass or other focal luminal abnormality. Vascular structures remain patent, with some calcific atherosclerotic changes noted at the left ICA origin. The parotid and submandibular glands appear normal. There is no distinct pathologic cervical lymphadenopathy. The thyroid appears homogeneous. The osseous structures demonstrate some multilevel spondylosis, otherwise without evidence of acute fracture or aggressive osseous lesion. CT CHEST: No distinct pathologic axillary adenopathy or other worrisome body wall soft tissue finding in the chest. Limited evaluation of the thoracic esophagus demonstrates no evidence of luminal mass or other focal abnormality. There is noted pathologic mediastinal lymphadenopathy. Minimally atherosclerotic nonaneurysmal thoracic aorta. Some apical scarring is present, potentially posttreatment related. There is otherwise no evidence of acute infectious or inflammatory process of the lung fields. No distinct suspicious focal pulmonary nodularity is present. Central airways are clear. The thoracic osseous structures demonstrate spondylosis, without evidence of acute fracture or distinct focal suspicious osseous lesion. CT abdomen pelvis: The body wall soft tissues demonstrate no acute or suspicious findings. The osseous structures demonstrate no evidence of acute fracture or distinct focal suspicious osseous lesion. The liver demonstrates questionable caudate hypertrophy and surface contour nodularity, otherwise without suspicious focal lesion. The spleen is homogeneous. The pancreas and bilateral adrenal glands appear normal. Unremarkable gallbladder. There is no worrisome retroperitoneal lymphadenopathy or mesenteric nodularity. Atherosclerotic, nonaneurysmal abdominal aorta. Small and large bowel loops are nondilated. There is no suspicious focal bowel wall thickening. A large posterior urinary bladder diverticulum  is noted.     Impression: Impression: No specific evidence of recurrent or metastatic disease in the neck, chest, abdomen and pelvis. No acute findings. Chronic findings are noted as above, including somewhat equivocal cirrhotic morphology of the liver. Correlate with any known liver parenchymal disease. Findings which could be seen with chronic sinusitis in the correct clinical setting. Electronically Signed: Yuri Medina MD  10/10/2024 10:39 AM EDT  Workstation ID: [id number]     Procedures    Had a fall on right shoulderbut not the rightgot a injection in the left shoulder  to help him stop drinking at dr. Wesly Albert 6 weeks ago, swelled up and red.     Narrative & Impression   MRI BRAIN WO CONTRAST     Date of Exam: 2/18/2024 5:05 PM EST     Indication: Dizziness, off balance, present x 2 weeks.     Comparison: 2/4/2024 CT     Technique:  Routine multiplanar/multisequence sequence images of the brain were obtained without contrast administration.        Findings:  Diffusion imaging shows no evidence of acute infarct. There are scattered subcortical and periventricular T2 hyperintensities which are nonspecific but likely sequela of mild to moderate small vessel ischemic disease. No evidence of intracranial   hemorrhage.     There is normal ventricular volume. The basal cisterns are patent. There is no evidence of extra-axial fluid collection. There is normal flow voids within the intracranial vessels.     No evidence of fracture or suspicious bony lesion. Paranasal sinusitis with gas/fluid level in the right maxillary sinus. Trace bilateral mastoid air cell effusions. Visualized orbits are unremarkable.     IMPRESSION:  Impression:  No acute intracranial abnormality. No suspicious mass on this noncontrast MRI.     Paranasal sinusitis with gas/fluid level in the right maxillary sinus.       Assessment / Plan      Assessment/Plan:     1.  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  SUV avid right  supraclavicular lymph node  3.  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 -MMR intact  -He completed concurrent chemoradiation but required dose reduction and treatment delays for neuropathy.  He completed therapy on 5/8/2024.    -Follow-up PET/CT showed EMIL  -Reviewed CT showing EMIL 10/2024  -Port labs pending, will time with next rad onc visit 11/2024  -Will continue with CT of the neck, chest abdomen pelvis (given cervical location of his tumor and regional adenopathy involving the supraclavicular region) every 3 to 6 months for 2 years and then annually for 5 years as per the NCCN guidelines.    -Following with Dr. Walter, EGD and pathology reviewed from 8/2024, continue EGD surveillance every 6 mo.   Orders Placed This Encounter   Procedures    CT Chest With Contrast    CT Abdomen Pelvis With Contrast    CT Cervical Spine With Contrast    Comprehensive Metabolic Panel    Ambulatory Referral to Pain Management    CBC & Differential       4.  Symptomatic cervical stenosis  5.  Low back and hip pain  -I reviewed his prior neurosurgery notes.  He has previously been referred to physical therapy and pain management.   Ordered pain management referral per his request.     6.  Active smoker  -Encouraged cessation.  Patient is precontemplative.    7. Access  -Port flush with labs today    9.  Bladder distention and diverticuli  He has a history of urinary tract infections and reports obstructive voiding symptoms.  Now self cathing    Follow Up:   3 mo with labs and scans    Katerina Ga MD  Hematology and Oncology

## 2024-10-30 ENCOUNTER — TELEPHONE (OUTPATIENT)
Dept: ONCOLOGY | Facility: CLINIC | Age: 64
End: 2024-10-30
Payer: MEDICAID

## 2024-10-30 DIAGNOSIS — M54.42 LOW BACK PAIN WITH LEFT-SIDED SCIATICA, UNSPECIFIED BACK PAIN LATERALITY, UNSPECIFIED CHRONICITY: Primary | ICD-10-CM

## 2024-10-30 NOTE — TELEPHONE ENCOUNTER
Caller: LUIS    Relationship: Other    Best call back number: [phone]    What is the best time to reach you: ANYTIME    Who are you requesting to speak with (clinical staff, provider,  specific staff member): CLINICAL    What was the call regarding: [facility]IS STATED PATIENT CANNOT BE SCHEDULED WITH [facility] UNTIL A LUMBAR MRI IS ORDERED. DR SEGURA CAN ORDER THIS AND THEY WILL WATCH FOR THIS TO BE COMPLETED.     PLEASE ADVISE.

## 2024-10-31 NOTE — TELEPHONE ENCOUNTER
Message sent to scheduling that patient needs port flush with labs on 11/21/24 and then MRI scheduled after 11/21/24.  Also, asked scheduling to contact [facility] to review all appointments so transportation can be set up.

## 2024-10-31 NOTE — TELEPHONE ENCOUNTER
Advised patient that he needs a MRI Lumbar Spine before pain management will schedule him and per Dr. Ga, order placed.  Patient verbalized understanding and agreed.  When scheduling, he asked that scheduling reach out to [facility] so transportation can be provided by them.  Message sent to scheduling.

## 2024-11-14 ENCOUNTER — OFFICE VISIT (OUTPATIENT)
Dept: UROLOGY | Facility: CLINIC | Age: 64
End: 2024-11-14
Payer: MEDICARE

## 2024-11-14 VITALS
BODY MASS INDEX: 26.07 KG/M2 | HEIGHT: 69 IN | OXYGEN SATURATION: 96 % | DIASTOLIC BLOOD PRESSURE: 76 MMHG | SYSTOLIC BLOOD PRESSURE: 161 MMHG | HEART RATE: 78 BPM | WEIGHT: 176 LBS

## 2024-11-14 DIAGNOSIS — N32.3 ACQUIRED BLADDER DIVERTICULUM: Primary | ICD-10-CM

## 2024-11-14 DIAGNOSIS — R33.9 URINARY RETENTION: ICD-10-CM

## 2024-11-14 DIAGNOSIS — N30.00 ACUTE CYSTITIS WITHOUT HEMATURIA: ICD-10-CM

## 2024-11-14 LAB
BILIRUB BLD-MCNC: NEGATIVE MG/DL
CLARITY, POC: ABNORMAL
COLOR UR: YELLOW
EXPIRATION DATE: ABNORMAL
GLUCOSE UR STRIP-MCNC: NEGATIVE MG/DL
KETONES UR QL: NEGATIVE
LEUKOCYTE EST, POC: ABNORMAL
Lab: ABNORMAL
NITRITE UR-MCNC: NEGATIVE MG/ML
PH UR: 6 [PH] (ref 5–8)
PROT UR STRIP-MCNC: NEGATIVE MG/DL
RBC # UR STRIP: ABNORMAL /UL
SP GR UR: 1.01 (ref 1–1.03)
UROBILINOGEN UR QL: NORMAL

## 2024-11-14 PROCEDURE — 87077 CULTURE AEROBIC IDENTIFY: CPT | Performed by: STUDENT IN AN ORGANIZED HEALTH CARE EDUCATION/TRAINING PROGRAM

## 2024-11-14 PROCEDURE — 87086 URINE CULTURE/COLONY COUNT: CPT | Performed by: STUDENT IN AN ORGANIZED HEALTH CARE EDUCATION/TRAINING PROGRAM

## 2024-11-14 PROCEDURE — 87186 SC STD MICRODIL/AGAR DIL: CPT | Performed by: STUDENT IN AN ORGANIZED HEALTH CARE EDUCATION/TRAINING PROGRAM

## 2024-11-14 RX ORDER — NITROFURANTOIN 25; 75 MG/1; MG/1
100 CAPSULE ORAL 2 TIMES DAILY
Qty: 10 CAPSULE | Refills: 0 | Status: SHIPPED | OUTPATIENT
Start: 2024-11-14 | End: 2024-11-18

## 2024-11-14 NOTE — PROGRESS NOTES
Follow Up Office Visit      Patient Name: Val Nassar Jr.  : 1960   MRN: [id number]     Chief Complaint:    Chief Complaint   Patient presents with    Acquired bladder diverticulum       Referring Provider: No ref. provider found    History of Present Illness: Val Nassar Jr. is a 64 y.o. male who presents today for follow up of neurogenic atonic bladder with urinary retention.  We presume this is likely secondary to history of chronic alcohol abuse.  He has previously seen urologist at an outside facility at which point his PVRs are running greater than 400 cc.  He was given Flomax and finasteride.  He continues these medications.  He was seen in August at which point his PVR was 438 cc.  His prostate appears very small on CT.  The patient learn intermittent catheterization and was found to have a UTI at the time.  Once we catheterized his bladder in the office he put out significantly purulent urine.  A urine culture in August showed Enterococcus.  This was treated with Macrobid.  Patient has been initiated on intermittent catheterization and performs 3X daily caths.  He is getting out 500-1000 cc of urine each time he caths.  He is doing great with the catheterization regimen.  He received his catheters from his nursing facility.    Reports his urine is a bit cloudy and smelly today.  It is positive for blood and leukocytes.  I will send a urine culture and treat him with another course of Macrobid.    Subjective      Review of System: Review of Systems   Genitourinary:  Positive for difficulty urinating.      I have reviewed the ROS documented by my clinical staff, I have updated appropriately and I agree. Иван Garrison MD    I have reviewed and the following portions of the patient's history were updated as appropriate: past family history, past medical history, past social history, past surgical history and problem list.    Medications:     Current Outpatient  Medications:     acetaminophen (TYLENOL) 325 MG tablet, Take 2 tablets by mouth Every 4 (Four) Hours As Needed for Mild Pain., Disp: , Rfl:     benzonatate (TESSALON) 100 MG capsule, Take 1 capsule by mouth Every 8 (Eight) Hours As Needed for Cough., Disp: , Rfl:     Cholecalciferol (Vitamin D3) 1.25 MG (97722 UT) capsule, Take 1 capsule by mouth Every 7 (Seven) Days., Disp: , Rfl:     Diclofenac Sodium (Voltaren) 1 % gel gel, Apply 4 g topically to the appropriate area as directed 2 (Two) Times a Day., Disp: 50 g, Rfl: 1    diphenhydrAMINE (BENADRYL) 25 mg capsule, Take 1 capsule by mouth At Night As Needed for Itching (insomnia)., Disp: 30 capsule, Rfl: 0    docusate sodium (Colace) 100 MG capsule, Take 1 capsule by mouth 2 (Two) Times a Day As Needed for Constipation., Disp: 30 capsule, Rfl: 1    dutasteride (AVODART) 0.5 MG capsule, Take 1 capsule by mouth Daily., Disp: , Rfl:     escitalopram (LEXAPRO) 10 MG tablet, Take 1 tablet by mouth Every Night., Disp: , Rfl:     fentaNYL (DURAGESIC) 25 MCG/HR patch, Place 1 patch on the skin as directed by provider Every 72 (Seventy-Two) Hours., Disp: , Rfl:     ferrous gluconate (FERGON) 324 MG tablet, Take 1 tablet by mouth Daily With Breakfast., Disp: , Rfl:     finasteride (PROSCAR) 5 MG tablet, Take 1 tablet by mouth Daily., Disp: , Rfl:     folic acid (FOLVITE) 1 MG tablet, Take 1 tablet by mouth Daily., Disp: , Rfl:     gabapentin (NEURONTIN) 300 MG capsule, , Disp: , Rfl:     gabapentin (NEURONTIN) 50 mg/mL solution, Take 16 mL by mouth 3 (Three) Times a Day., Disp: 1000 mL, Rfl: 0    guaiFENesin (MUCINEX) 600 MG 12 hr tablet, Take 2 tablets by mouth Every 12 (Twelve) Hours As Needed for Cough., Disp: , Rfl:     lactulose (CHRONULAC) 10 GM/15ML solution, Take 30 mL by mouth 2 (Two) Times a Day As Needed., Disp: , Rfl:     lidocaine (LIDODERM) 5 %, , Disp: , Rfl:     Lidocaine 4 %, Place 1 patch on the skin as directed by provider Daily. Remove & Discard patch  within 12 hours or as directed by MD, Disp: , Rfl:     lidocaine-prilocaine (EMLA) 2.5-2.5 % cream, Apply 1 Application topically to the appropriate area as directed As Needed (45-60 minutes prior to port access.  Cover with saran/plastic wrap.)., Disp: 30 g, Rfl: 3    Loratadine 10 MG capsule, Take  by mouth Daily., Disp: , Rfl:     melatonin 5 MG tablet tablet, Take 1 tablet by mouth At Night As Needed., Disp: , Rfl:     mometasone (ELOCON) 0.1 % cream, , Disp: , Rfl:     nystatin (MYCOSTATIN) 100,000 unit/mL suspension, , Disp: , Rfl:     nystatin susp + lidocaine viscous (MAGIC MOUTHWASH) oral suspension, 5-10 ml swish and spit or swallow QID prn, Disp: 240 mL, Rfl: 3    omeprazole (priLOSEC) 40 MG capsule, Take  by mouth., Disp: , Rfl:     ondansetron (ZOFRAN) 8 MG tablet, Take 1 tablet by mouth Every 8 (Eight) Hours As Needed for Nausea or Vomiting., Disp: 30 tablet, Rfl: 3    oxyCODONE-acetaminophen (PERCOCET) 7.5-325 MG per tablet, , Disp: , Rfl:     pantoprazole (PROTONIX) 40 MG EC tablet, Take 1 tablet by mouth 2 (Two) Times a Day., Disp: , Rfl:     phenol (CHLORASEPTIC) 1.4 % liquid liquid, Apply 1 spray to the mouth or throat Every 4 (Four) Hours As Needed., Disp: , Rfl:     polyethylene glycol (MIRALAX) 17 g packet, Take 17 g by mouth Daily As Needed (Use if senna-docusate is ineffective)., Disp: , Rfl:     promethazine (PHENERGAN) 25 MG tablet, Take 1 tablet by mouth Every 6 (Six) Hours As Needed for Nausea or Vomiting., Disp: , Rfl:     Senna-Time 8.6 MG tablet, , Disp: , Rfl:     sodium chloride (Ocean Nasal Spray) 0.65 % nasal spray, 1 spray into the nostril(s) as directed by provider As Needed for Congestion., Disp: 1 each, Rfl: 0    tamsulosin (FLOMAX) 0.4 MG capsule 24 hr capsule, Take 1 capsule by mouth Daily., Disp: , Rfl:     vitamin B-12 (CYANOCOBALAMIN) 1000 MCG tablet, Take 1 tablet by mouth Daily., Disp: , Rfl:     zolpidem (AMBIEN) 5 MG tablet, , Disp: , Rfl:     nitrofurantoin,  "macrocrystal-monohydrate, (Macrobid) 100 MG capsule, Take 1 capsule by mouth 2 (Two) Times a Day., Disp: 10 capsule, Rfl: 0    predniSONE (DELTASONE) 10 MG tablet, Take daily based on following titration schedule: 60 mg x 1 day, then 50 mg x 1, then 40 mg x 1, then 30 mg x 1 day, then 20 mg x 1 day, then 10 mg x 1 day then stop (Patient not taking: Reported on 10/24/2024), Disp: 21 tablet, Rfl: 0    Allergies:   No Known Allergies          Objective     Physical Exam:   Vital Signs:   Vitals:    11/14/24 1134   BP: 161/76   Pulse: 78   SpO2: 96%   Weight: 79.8 kg (176 lb)   Height: 175.3 cm (69\")     Body mass index is 25.99 kg/m².     Physical Exam  Constitutional:       Appearance: Normal appearance.   HENT:      Head: Normocephalic and atraumatic.      Nose: Nose normal.   Eyes:      Extraocular Movements: Extraocular movements intact.      Conjunctiva/sclera: Conjunctivae normal.      Pupils: Pupils are equal, round, and reactive to light.   Musculoskeletal:         General: Normal range of motion.      Cervical back: Normal range of motion and neck supple.   Skin:     General: Skin is warm and dry.      Findings: No lesion or rash.   Neurological:      General: No focal deficit present.      Mental Status: He is alert and oriented to person, place, and time. Mental status is at baseline.   Psychiatric:         Mood and Affect: Mood normal.         Behavior: Behavior normal.         Labs:   Brief Urine Lab Results  (Last result in the past 365 days)        Color   Clarity   Blood   Leuk Est   Nitrite   Protein   CREAT   Urine HCG        11/14/24 1141 Yellow   Cloudy   3+   Large (3+)   Negative   Negative                   Urine Culture          2/18/2024    16:57 8/7/2024    16:00 8/16/2024    15:52   Urine Culture   Urine Culture >100,000 CFU/mL Enterococcus faecalis  >100,000 CFU/mL Enterococcus faecalis  >100,000 CFU/mL Enterococcus faecalis         Lab Results   Component Value Date    GLUCOSE 95 07/16/2024 "    CALCIUM 9.2 07/16/2024     07/16/2024    K 4.5 07/16/2024    CO2 28.0 07/16/2024     07/16/2024    BUN 12 07/16/2024    CREATININE 0.77 07/16/2024    BCR 15.6 07/16/2024    ANIONGAP 6.0 07/16/2024       Lab Results   Component Value Date    WBC 7.31 07/16/2024    HGB 11.3 (L) 07/16/2024    HCT 35.9 (L) 07/16/2024    MCV 92.8 07/16/2024     07/16/2024       Images:   CT Chest With Contrast Diagnostic    Result Date: 10/10/2024  Impression: No specific evidence of recurrent or metastatic disease in the neck, chest, abdomen and pelvis. No acute findings. Chronic findings are noted as above, including somewhat equivocal cirrhotic morphology of the liver. Correlate with any known liver parenchymal disease. Findings which could be seen with chronic sinusitis in the correct clinical setting. Electronically Signed: Yuri Medina MD  10/10/2024 10:39 AM EDT  Workstation ID: [id number]    CT Soft Tissue Neck With Contrast    Result Date: 10/10/2024  Impression: No specific evidence of recurrent or metastatic disease in the neck, chest, abdomen and pelvis. No acute findings. Chronic findings are noted as above, including somewhat equivocal cirrhotic morphology of the liver. Correlate with any known liver parenchymal disease. Findings which could be seen with chronic sinusitis in the correct clinical setting. Electronically Signed: Yuri Medina MD  10/10/2024 10:39 AM EDT  Workstation ID: [id number]    CT Abdomen Pelvis With Contrast    Result Date: 10/10/2024  Impression: No specific evidence of recurrent or metastatic disease in the neck, chest, abdomen and pelvis. No acute findings. Chronic findings are noted as above, including somewhat equivocal cirrhotic morphology of the liver. Correlate with any known liver parenchymal disease. Findings which could be seen with chronic sinusitis in the correct clinical setting. Electronically Signed: Yuri Medina MD  10/10/2024 10:39 AM EDT  Workstation ID:  [id number]      Measures:   Tobacco:   Val Nassar Jr.  reports that he has been smoking cigarettes. He has a 15 pack-year smoking history. He has been exposed to tobacco smoke. He has never used smokeless tobacco. I have educated him on the risk of diseases from using tobacco products such as cancer, COPD, and heart disease.     I advised him to quit and he is not willing to quit.    I spent 3  minutes counseling the patient.           Assessment / Plan      Assessment/Plan:   64 y.o. male who presented today for follow up of urinary retention likely secondary to atonic neurogenic bladder.  We believe this is likely related to chronic alcohol abuse effect.  He will continue catheterizing 3 times daily.  We discussed increasing his catheterization regimen to 4 times daily if he has persistent volumes higher than 700 cc.  I will treat him with Macrobid and send a urine culture today given urine findings.  This could be colonization but he reports some foul-smelling cloudy urine today so we will go ahead and treat.  See me back in 6 months.    Diagnoses and all orders for this visit:    1. Acquired bladder diverticulum (Primary)  -     POC Urinalysis Dipstick, Automated    2. Urinary retention  -     POC Urinalysis Dipstick, Automated    3. Acute cystitis without hematuria  -     Urine Culture - Urine, Urine, Clean Catch  -     nitrofurantoin, macrocrystal-monohydrate, (Macrobid) 100 MG capsule; Take 1 capsule by mouth 2 (Two) Times a Day.  Dispense: 10 capsule; Refill: 0           Follow Up:   Return in about 6 months (around 5/14/2025).    I spent approximately 20 minutes providing clinical care for this patient; including review of patient's chart and provider documentation, face to face time spent with patient in examination room (obtaining history, performing physical exam, discussing diagnosis and management options), placing orders, and completing patient documentation.     Иван Garrison,  MD  [facility]

## 2024-11-15 NOTE — PROGRESS NOTES
Patient has a urinary tract infection.  He was prescribed a 5-day course of Macrobid.  I will await susceptibilities and speciation and let him know if need for antibiotic change.  Thanks

## 2024-11-17 LAB — BACTERIA SPEC AEROBE CULT: ABNORMAL

## 2024-11-18 DIAGNOSIS — N30.00 ACUTE CYSTITIS WITHOUT HEMATURIA: Primary | ICD-10-CM

## 2024-11-18 RX ORDER — SULFAMETHOXAZOLE AND TRIMETHOPRIM 800; 160 MG/1; MG/1
1 TABLET ORAL 2 TIMES DAILY
Qty: 10 TABLET | Refills: 0 | Status: SHIPPED | OUTPATIENT
Start: 2024-11-18

## 2024-11-18 NOTE — PROGRESS NOTES
Please let Jerrod know his urine culture came back with Citrobacter which appears resistant to Macrobid only.  He was prescribed Macrobid, unfortunately not suitable for clearance of the infection.  I sent him a separate 5-day course of Bactrim to take.  Please let him know thank you

## 2024-11-21 ENCOUNTER — HOSPITAL ENCOUNTER (OUTPATIENT)
Dept: RADIATION ONCOLOGY | Facility: HOSPITAL | Age: 64
Setting detail: RADIATION/ONCOLOGY SERIES
Discharge: HOME | End: 2024-11-21
Payer: MEDICARE

## 2024-11-21 ENCOUNTER — TELEPHONE (OUTPATIENT)
Dept: ONCOLOGY | Facility: CLINIC | Age: 64
End: 2024-11-21
Payer: MEDICARE

## 2024-11-21 NOTE — TELEPHONE ENCOUNTER
Called [facility] as patient did not answer.  Advised Latrice that patient was a no show to lab appointment today and MD cannot approve him to do MRI with contrast tomorrow if CrCl is not evaluated first.  Latrice stated she will cancel transportation to the MRI and will let patient know. Dr. Ga notified and per MD, order for MRI cancelled.  Contacted central scheduling and cancelled appointment.  Also, message sent per MD to reschedule labs.

## 2024-11-26 ENCOUNTER — HOSPITAL ENCOUNTER (OUTPATIENT)
Dept: ONCOLOGY | Facility: HOSPITAL | Age: 64
Discharge: HOME | End: 2024-11-26
Admitting: INTERNAL MEDICINE
Payer: MEDICARE

## 2024-11-26 VITALS
TEMPERATURE: 98.7 F | HEIGHT: 69 IN | BODY MASS INDEX: 25.77 KG/M2 | DIASTOLIC BLOOD PRESSURE: 72 MMHG | RESPIRATION RATE: 16 BRPM | WEIGHT: 174 LBS | HEART RATE: 70 BPM | SYSTOLIC BLOOD PRESSURE: 166 MMHG

## 2024-11-26 DIAGNOSIS — C15.3 MALIGNANT NEOPLASM OF UPPER THIRD OF ESOPHAGUS: Primary | ICD-10-CM

## 2024-11-26 DIAGNOSIS — M54.42 CHRONIC LOW BACK PAIN WITH LEFT-SIDED SCIATICA, UNSPECIFIED BACK PAIN LATERALITY: ICD-10-CM

## 2024-11-26 DIAGNOSIS — C15.9 SQUAMOUS CELL CARCINOMA OF ESOPHAGUS: ICD-10-CM

## 2024-11-26 DIAGNOSIS — G89.29 CHRONIC LOW BACK PAIN WITH LEFT-SIDED SCIATICA, UNSPECIFIED BACK PAIN LATERALITY: ICD-10-CM

## 2024-11-26 LAB
ALBUMIN SERPL-MCNC: 4 G/DL (ref 3.5–5.2)
ALBUMIN/GLOB SERPL: 1.2 G/DL
ALP SERPL-CCNC: 145 U/L (ref 39–117)
ALT SERPL W P-5'-P-CCNC: 16 U/L (ref 1–41)
ANION GAP SERPL CALCULATED.3IONS-SCNC: 10 MMOL/L (ref 5–15)
AST SERPL-CCNC: 25 U/L (ref 1–40)
BASOPHILS # BLD AUTO: 0.03 10*3/MM3 (ref 0–0.2)
BASOPHILS NFR BLD AUTO: 0.5 % (ref 0–1.5)
BILIRUB SERPL-MCNC: 0.4 MG/DL (ref 0–1.2)
BUN SERPL-MCNC: 9 MG/DL (ref 8–23)
BUN/CREAT SERPL: 12.7 (ref 7–25)
CALCIUM SPEC-SCNC: 9.8 MG/DL (ref 8.6–10.5)
CHLORIDE SERPL-SCNC: 102 MMOL/L (ref 98–107)
CO2 SERPL-SCNC: 27 MMOL/L (ref 22–29)
CREAT SERPL-MCNC: 0.71 MG/DL (ref 0.76–1.27)
DEPRECATED RDW RBC AUTO: 52.8 FL (ref 37–54)
EGFRCR SERPLBLD CKD-EPI 2021: 101.8 ML/MIN/1.73
EOSINOPHIL # BLD AUTO: 0.33 10*3/MM3 (ref 0–0.4)
EOSINOPHIL NFR BLD AUTO: 6 % (ref 0.3–6.2)
ERYTHROCYTE [DISTWIDTH] IN BLOOD BY AUTOMATED COUNT: 15.2 % (ref 12.3–15.4)
GLOBULIN UR ELPH-MCNC: 3.3 GM/DL
GLUCOSE SERPL-MCNC: 95 MG/DL (ref 65–99)
HCT VFR BLD AUTO: 36 % (ref 37.5–51)
HGB BLD-MCNC: 11.8 G/DL (ref 13–17.7)
IMM GRANULOCYTES # BLD AUTO: 0.01 10*3/MM3 (ref 0–0.05)
IMM GRANULOCYTES NFR BLD AUTO: 0.2 % (ref 0–0.5)
LYMPHOCYTES # BLD AUTO: 0.8 10*3/MM3 (ref 0.7–3.1)
LYMPHOCYTES NFR BLD AUTO: 14.6 % (ref 19.6–45.3)
MCH RBC QN AUTO: 30.6 PG (ref 26.6–33)
MCHC RBC AUTO-ENTMCNC: 32.8 G/DL (ref 31.5–35.7)
MCV RBC AUTO: 93.5 FL (ref 79–97)
MONOCYTES # BLD AUTO: 0.89 10*3/MM3 (ref 0.1–0.9)
MONOCYTES NFR BLD AUTO: 16.2 % (ref 5–12)
NEUTROPHILS NFR BLD AUTO: 3.43 10*3/MM3 (ref 1.7–7)
NEUTROPHILS NFR BLD AUTO: 62.5 % (ref 42.7–76)
PLATELET # BLD AUTO: 145 10*3/MM3 (ref 140–450)
PMV BLD AUTO: 8.2 FL (ref 6–12)
POTASSIUM SERPL-SCNC: 4 MMOL/L (ref 3.5–5.2)
PROT SERPL-MCNC: 7.3 G/DL (ref 6–8.5)
RBC # BLD AUTO: 3.85 10*6/MM3 (ref 4.14–5.8)
SODIUM SERPL-SCNC: 139 MMOL/L (ref 136–145)
WBC NRBC COR # BLD AUTO: 5.49 10*3/MM3 (ref 3.4–10.8)

## 2024-11-26 PROCEDURE — 25010000002 HEPARIN LOCK FLUSH PER 10 UNITS: Performed by: INTERNAL MEDICINE

## 2024-11-26 PROCEDURE — 80053 COMPREHEN METABOLIC PANEL: CPT | Performed by: INTERNAL MEDICINE

## 2024-11-26 PROCEDURE — 85025 COMPLETE CBC W/AUTO DIFF WBC: CPT | Performed by: INTERNAL MEDICINE

## 2024-11-26 PROCEDURE — 36591 DRAW BLOOD OFF VENOUS DEVICE: CPT

## 2024-11-26 RX ORDER — SODIUM CHLORIDE 0.9 % (FLUSH) 0.9 %
10 SYRINGE (ML) INJECTION AS NEEDED
OUTPATIENT
Start: 2024-11-26

## 2024-11-26 RX ORDER — HEPARIN SODIUM (PORCINE) LOCK FLUSH IV SOLN 100 UNIT/ML 100 UNIT/ML
500 SOLUTION INTRAVENOUS AS NEEDED
OUTPATIENT
Start: 2024-11-26

## 2024-11-26 RX ORDER — HEPARIN SODIUM (PORCINE) LOCK FLUSH IV SOLN 100 UNIT/ML 100 UNIT/ML
500 SOLUTION INTRAVENOUS AS NEEDED
Status: DISCONTINUED | OUTPATIENT
Start: 2024-11-26 | End: 2024-11-27 | Stop reason: HOSPADM

## 2024-11-26 RX ADMIN — HEPARIN 500 UNITS: 100 SYRINGE at 13:41

## 2024-11-27 ENCOUNTER — TELEPHONE (OUTPATIENT)
Dept: ONCOLOGY | Facility: CLINIC | Age: 64
End: 2024-11-27
Payer: MEDICARE

## 2024-11-27 DIAGNOSIS — M54.42 LOW BACK PAIN WITH LEFT-SIDED SCIATICA, UNSPECIFIED BACK PAIN LATERALITY, UNSPECIFIED CHRONICITY: Primary | ICD-10-CM

## 2024-11-27 NOTE — TELEPHONE ENCOUNTER
Called patient to advise that Dr. Ga reordered MRI Lumbar spine after labs were completed and reviewed for renal function, labs okay for imaging.  No answer received. Unable to leave  to request return call.

## 2024-12-04 NOTE — TELEPHONE ENCOUNTER
Noted a MRI Lumbar spine on 5/7/21.  Shawna stated it has to be within 3 years.  Advised her order will be placed per Dr. Ga (per MD verbal order) and scheduling will be notified to schedule it.  She stated they will watch for it to be completed and then get him scheduled at their clinic.     Bed/Stretcher in lowest position, wheels locked, appropriate side rails in place/Call bell, personal items and telephone in reach/Instruct patient to call for assistance before getting out of bed/chair/stretcher/Non-slip footwear applied when patient is off stretcher/[place] to call system/Physically safe environment - no spills, clutter or unnecessary equipment/Purposeful proactive rounding/Room/bathroom lighting operational, light cord in reach

## 2024-12-17 ENCOUNTER — HOSPITAL ENCOUNTER (OUTPATIENT)
Dept: MRI IMAGING | Facility: HOSPITAL | Age: 64
Discharge: HOME | End: 2024-12-17
Admitting: INTERNAL MEDICINE
Payer: MEDICARE

## 2024-12-17 DIAGNOSIS — M54.42 LOW BACK PAIN WITH LEFT-SIDED SCIATICA, UNSPECIFIED BACK PAIN LATERALITY, UNSPECIFIED CHRONICITY: ICD-10-CM

## 2024-12-17 PROCEDURE — A9577 INJ MULTIHANCE: HCPCS | Performed by: INTERNAL MEDICINE

## 2024-12-17 PROCEDURE — 25510000002 GADOBENATE DIMEGLUMINE 529 MG/ML SOLUTION: Performed by: INTERNAL MEDICINE

## 2024-12-17 PROCEDURE — 72158 MRI LUMBAR SPINE W/O & W/DYE: CPT

## 2024-12-17 RX ADMIN — GADOBENATE DIMEGLUMINE 15 ML: 529 INJECTION, SOLUTION INTRAVENOUS at 15:19

## 2024-12-18 ENCOUNTER — OFFICE VISIT (OUTPATIENT)
Dept: PAIN MEDICINE | Facility: CLINIC | Age: 64
End: 2024-12-18
Payer: MEDICARE

## 2024-12-18 VITALS — HEIGHT: 69 IN | WEIGHT: 176 LBS | BODY MASS INDEX: 26.07 KG/M2

## 2024-12-18 DIAGNOSIS — M48.062 SPINAL STENOSIS OF LUMBAR REGION WITH NEUROGENIC CLAUDICATION: Primary | ICD-10-CM

## 2024-12-18 RX ORDER — HYDROCORTISONE 25 MG/G
CREAM TOPICAL
COMMUNITY
Start: 2024-12-07

## 2024-12-18 NOTE — PROGRESS NOTES
Referring Physician: Katerina Ga MD  [address] [facility]  [address]  [place] [address]    Primary Physician: Wesly Minor MD    CHIEF COMPLAINT or REASON FOR VISIT: Back Pain (New patient)      Initial history of present illness on 12/18/2024:  Mr. Val Nassar is 64 y.o. male who presents as a new patient referral for evaluation treatment of chronic low back pain with radiation to bilateral lower extremities.  Patient reports a many year history of this issue which has been worsening over time without any specific inciting event or trauma.  He reports bilateral low back pain with radiation to bilateral lower extremities exacerbated with ambulation and prolonged standing and ameliorated with rest.  He does live in a nursing home.  He uses a rolling walker for ambulation.  He denies any history of spinal surgery or intervention.  He does use a fentanyl patch while in the nursing home.  This has improved his neuropathy but he continues to complain of his claudicating back pain.  Patient denies any bowel or bladder dysfunction, lower extremity weakness, new onset saddle anesthesia or unexplained weight loss.  He is treated with palliative care for issues relating to cirrhosis,esophageal squamous cell carcinoma.  He has been evaluated by neurosurgery, Dr. Malhotra, who referred for nonsurgical management.      Interval history:    Interventions:      Objective Pain Scoring:   BRIEF PAIN INVENTORY:  Total score:   Pain Score    12/18/24 1012   PainSc: 10-Worst pain ever   PainLoc: Back      PHQ-2: 3  PHQ-9: 7  Opioid Risk Tool:         Review of Systems:   ROS negative except as otherwise noted     Past Medical History:   Past Medical History:   Diagnosis Date    Anemia     Cancer     ESOPHAGEAL    Cirrhosis     Duodenal ulcer     Gastric ulcer     GERD (gastroesophageal reflux disease)     History of alcohol abuse     History of radiation therapy 05/08/2024    esophagus    HLD (hyperlipidemia)      Mood disorder          Past Surgical History:   Past Surgical History:   Procedure Laterality Date    ENDOSCOPY N/A 12/26/2023    Procedure: ESOPHAGOGASTRODUODENOSCOPY;  Surgeon: Wesly Mckeon MD;  Location: [facility] ENDOSCOPY;  Service: Gastroenterology;  Laterality: N/A;    VENOUS ACCESS DEVICE (PORT) INSERTION Right 04/25/2024         Family History   Family History   Problem Relation Age of Onset    Cancer Mother         LUNG AND BREAST    Breast cancer Mother     Heart attack Father          Social History   Social History     Socioeconomic History    Marital status: Single   Tobacco Use    Smoking status: Every Day     Current packs/day: 1.00     Average packs/day: 1 pack/day for 15.0 years (15.0 ttl pk-yrs)     Types: Cigarettes     Passive exposure: Current    Smokeless tobacco: Never   Vaping Use    Vaping status: Some Days    Substances: Flavoring    Devices: Disposable   Substance and Sexual Activity    Alcohol use: Not Currently     Comment: sober for ~ 3 months    Drug use: Yes     Types: Hydrocodone    Sexual activity: Not Currently     Partners: Female        Medications:     Current Outpatient Medications:     acetaminophen (TYLENOL) 325 MG tablet, Take 2 tablets by mouth Every 4 (Four) Hours As Needed for Mild Pain., Disp: , Rfl:     benzonatate (TESSALON) 100 MG capsule, Take 1 capsule by mouth Every 8 (Eight) Hours As Needed for Cough., Disp: , Rfl:     Cholecalciferol (Vitamin D3) 1.25 MG (65218 UT) capsule, Take 1 capsule by mouth Every 7 (Seven) Days., Disp: , Rfl:     Diclofenac Sodium (Voltaren) 1 % gel gel, Apply 4 g topically to the appropriate area as directed 2 (Two) Times a Day., Disp: 50 g, Rfl: 1    diphenhydrAMINE (BENADRYL) 25 mg capsule, Take 1 capsule by mouth At Night As Needed for Itching (insomnia)., Disp: 30 capsule, Rfl: 0    docusate sodium (Colace) 100 MG capsule, Take 1 capsule by mouth 2 (Two) Times a Day As Needed for Constipation., Disp: 30 capsule, Rfl: 1    dutasteride  (AVODART) 0.5 MG capsule, Take 1 capsule by mouth Daily., Disp: , Rfl:     escitalopram (LEXAPRO) 10 MG tablet, Take 1 tablet by mouth Every Night., Disp: , Rfl:     fentaNYL (DURAGESIC) 25 MCG/HR patch, Place 1 patch on the skin as directed by provider Every 72 (Seventy-Two) Hours., Disp: , Rfl:     ferrous gluconate (FERGON) 324 MG tablet, Take 1 tablet by mouth Daily With Breakfast., Disp: , Rfl:     finasteride (PROSCAR) 5 MG tablet, Take 1 tablet by mouth Daily., Disp: , Rfl:     folic acid (FOLVITE) 1 MG tablet, Take 1 tablet by mouth Daily., Disp: , Rfl:     gabapentin (NEURONTIN) 300 MG capsule, , Disp: , Rfl:     gabapentin (NEURONTIN) 50 mg/mL solution, Take 16 mL by mouth 3 (Three) Times a Day., Disp: 1000 mL, Rfl: 0    guaiFENesin (MUCINEX) 600 MG 12 hr tablet, Take 2 tablets by mouth Every 12 (Twelve) Hours As Needed for Cough., Disp: , Rfl:     hydrocortisone 2.5 % cream, , Disp: , Rfl:     lactulose (CHRONULAC) 10 GM/15ML solution, Take 30 mL by mouth 2 (Two) Times a Day As Needed., Disp: , Rfl:     lidocaine (LIDODERM) 5 %, , Disp: , Rfl:     Lidocaine 4 %, Place 1 patch on the skin as directed by provider Daily. Remove & Discard patch within 12 hours or as directed by MD, Disp: , Rfl:     lidocaine-prilocaine (EMLA) 2.5-2.5 % cream, Apply 1 Application topically to the appropriate area as directed As Needed (45-60 minutes prior to port access.  Cover with saran/plastic wrap.)., Disp: 30 g, Rfl: 3    Loratadine 10 MG capsule, Take  by mouth Daily., Disp: , Rfl:     melatonin 5 MG tablet tablet, Take 1 tablet by mouth At Night As Needed., Disp: , Rfl:     mometasone (ELOCON) 0.1 % cream, , Disp: , Rfl:     nystatin (MYCOSTATIN) 100,000 unit/mL suspension, , Disp: , Rfl:     nystatin susp + lidocaine viscous (MAGIC MOUTHWASH) oral suspension, 5-10 ml swish and spit or swallow QID prn, Disp: 240 mL, Rfl: 3    omeprazole (priLOSEC) 40 MG capsule, Take  by mouth., Disp: , Rfl:     ondansetron (ZOFRAN) 8  "MG tablet, Take 1 tablet by mouth Every 8 (Eight) Hours As Needed for Nausea or Vomiting., Disp: 30 tablet, Rfl: 3    oxyCODONE-acetaminophen (PERCOCET) 7.5-325 MG per tablet, , Disp: , Rfl:     pantoprazole (PROTONIX) 40 MG EC tablet, Take 1 tablet by mouth 2 (Two) Times a Day., Disp: , Rfl:     phenol (CHLORASEPTIC) 1.4 % liquid liquid, Apply 1 spray to the mouth or throat Every 4 (Four) Hours As Needed., Disp: , Rfl:     polyethylene glycol (MIRALAX) 17 g packet, Take 17 g by mouth Daily As Needed (Use if senna-docusate is ineffective)., Disp: , Rfl:     predniSONE (DELTASONE) 10 MG tablet, Take daily based on following titration schedule: 60 mg x 1 day, then 50 mg x 1, then 40 mg x 1, then 30 mg x 1 day, then 20 mg x 1 day, then 10 mg x 1 day then stop, Disp: 21 tablet, Rfl: 0    promethazine (PHENERGAN) 25 MG tablet, Take 1 tablet by mouth Every 6 (Six) Hours As Needed for Nausea or Vomiting., Disp: , Rfl:     Senna-Time 8.6 MG tablet, , Disp: , Rfl:     sodium chloride (Ocean Nasal Spray) 0.65 % nasal spray, 1 spray into the nostril(s) as directed by provider As Needed for Congestion., Disp: 1 each, Rfl: 0    sulfamethoxazole-trimethoprim (Bactrim DS) 800-160 MG per tablet, Take 1 tablet by mouth 2 (Two) Times a Day., Disp: 10 tablet, Rfl: 0    tamsulosin (FLOMAX) 0.4 MG capsule 24 hr capsule, Take 1 capsule by mouth Daily., Disp: , Rfl:     vitamin B-12 (CYANOCOBALAMIN) 1000 MCG tablet, Take 1 tablet by mouth Daily., Disp: , Rfl:     zolpidem (AMBIEN) 5 MG tablet, , Disp: , Rfl:   No current facility-administered medications for this visit.        Physical Exam:     Vitals:    12/18/24 1012   Weight: 79.8 kg (176 lb)   Height: 175.3 cm (69\")   PainSc: 10-Worst pain ever   PainLoc: Back        General: Alert and oriented, No acute distress.   HEENT: Normocephalic, atraumatic.   Cardiovascular: No gross edema  Respiratory: Respirations are non-labored     Thoracic Spine:   Inspection: no gross " abnormality  Paraspinal muscle palpation: nontender  Spinous process palpation: nontender    Lumbar Spine:   No masses or atrophy  Range of motion - Flexion reduced. Extension reduced.    Facet Loading: Positive bilaterally  Facet Palpation - Nontender   Chula finger/Gaenslen's/Ryan's/BK/Thigh thrust -   Straight leg raise/slump test: Negative bilaterally  Multifidus toe-touch test:    Motor Exam:     Strength: Rate on 1-5 scale Right Left    L1/2- hip flexion 5/5  5/5    L3- knee extension 5/5  5/5    L4- ankle dorsiflexion 5/5  5/5    L5- great toe extension 5/5  5/5    S1- ankle plantarflexion 5/5  5/5    Sensory Exam: Full and equal sensation to light touch throughout.       Neurologic: Cranial Nerves II-XII are grossly intact.    Clonus -negative bilaterally    Psychiatric: Cooperative.   Gait: Antalgic flexed forward  Assistive Devices: Rolling walker    Imaging Studies:   No results found for this or any previous visit.        Independent review of radiographic imaging: Available for interpretation is a lumbar MRI dated December 17, 2024 demonstrating grade 1 L4 on L5 anterolisthesis with bilateral neuroforaminal stenosis and moderate canal stenosis; there is facet arthropathy at L4/L5; L5/S1 degenerative disc disease with Modic 2 endplate changes and broad disc bulge causing bilateral neuroforaminal stenosis and canal stenosis.  There is atrophy of the paraspinal musculature and multifidus.    Impression & Plan:       12/18/2024: Val Nassar is a 64 y.o. male with past medical history significant for gastric ulcer, cirrhosis,esophageal squamous cell carcinoma, EtOH abuse, anxiety, GERD, HLD, malnutrition, chronic opioid use who presents to the pain clinic for evaluation and treatment of chronic low back pain radiation bilateral lower extremities.  MRI interpretation as above.  Evaluation consistent with lumbar spinal stenosis with neurogenic claudication.  We discussed epidural steroid  injection to improve pain.  If greater than 50% relief for at least 2-3 months can consider repeat as needed every 3 to 4 months.  I had a discussion with the patient regarding the risks of the procedure including bleeding, infection, damage to surrounding structures.  We discussed the potential adverse effects of corticosteroid injection including flushing of the face, lipodystrophy, skin discoloration, elevated blood glucose, increased blood pressure.  Risks of frequent steroid administration include weight gain, hormonal changes, mood changes, osteoporosis.    1. Spinal stenosis of lumbar region with neurogenic claudication        PLAN:  1. Medication Recommendations: Defer to nursing home care    2. Physical Therapy: Patient has daily access to physical therapy at the nursing home    3. Psychological: defer    4. Complementary and alternative (CAM) Therapies:     5. Labs/Diagnostic studies: None indicated     6. Imaging: MRI independently interpreted and reviewed with patient. Obtain lumbar radiograph with flexion and extension.    7. Interventions: Schedule lumbar interlaminar epidural steroid injection (14455).  Will likely enter at L4/L5    8. Referrals: None indicated     9. Records: Heme-onc, neurosurgery notes reviewed    10. Lifestyle goals:    Follow-up 1 month      [facility]  Robin Quevedo MD          Quality Metrics:

## 2024-12-19 DIAGNOSIS — M48.062 SPINAL STENOSIS OF LUMBAR REGION WITH NEUROGENIC CLAUDICATION: Primary | ICD-10-CM

## 2025-01-02 ENCOUNTER — DOCUMENTATION (OUTPATIENT)
Dept: PAIN MEDICINE | Facility: CLINIC | Age: 65
End: 2025-01-02

## 2025-01-02 ENCOUNTER — OUTSIDE FACILITY SERVICE (OUTPATIENT)
Dept: PAIN MEDICINE | Facility: CLINIC | Age: 65
End: 2025-01-02
Payer: MEDICARE

## 2025-01-02 NOTE — PROGRESS NOTES
[facility]  [address] [place] [address]      PROCEDURE: Fluoroscopically-guided Lumbar Interlaminar Epidural Steroid Injection     PRE-OP DIAGNOSIS: Lumbar spinal stenosis with neurogenic claudication  POST-OP DIAGNOSIS: Same    BLOOD THINNERS (ANTIPLATELETS/ANTICOAGULANTS): Were discussed with the patient and ZAKIYA Guidelines were followed.     CONSENT: Risks, benefits and options were explained to the patient, all questions were answered and written informed consent was obtained.     ANESTHESIA: Moderate sedation was required to maintain comfort, safety, and cooperation during the procedure.  The duration of sedation service was over 10 minutes.  Patient received 1mg IV Versed and 0mcg IV fentanyl.  Independent observation and monitoring was performed by freya ordoñez.  The patient's level of consciousness and physiologic status was continually monitored with pulse oximetry, EKG from 1055 to 1106.  There were no complications or adverse events during sedation.  After the sedation patient was taken to the recovery area.       PROCEDURE NOTE: A pre-procedural time out was performed to confirm the correct patient, procedure, side, and site. Standard ASA monitors were applied and oxygen via nasal cannula was provided. All proceduralists donned sterile gloves with masks and surgical hats. The patient was placed prone with pillow under the abdomen and all pressure points padded. The patient's lumbar spine was prepped in standard fashion using [Chlorhexidine] and draped with sterile towels. The target lumbar interspace was identified using fluoroscopy. The overlying skin and subcutaneous tissue was anesthetized with 1% lidocaine. A 20 gauge 10 cm Tuohy needle was advanced using intermittent AP and lateral fluoroscopy. The ligamentum flavum was engaged. Access to the epidural space was gained using a loss of resistance technique to air. Needle placement was confirmed with 1  ml of Omnipaque 180 mgI/ml contrast using biplanar live fluoroscopic imaging. An epidurogram was noted without evidence of intravascular or intrathecal spread. After negative aspiration, a mixture containing 3 ml of preservative-free normal saline, dexamethasone 10mg steroid, lidocaine 1% - 2 ml local anesthetic for a total volume of 6 ml was injected under direct visualization with fluoroscopy. The Tuohy needle was flushed, removed and a bandage applied.     EBL: None     COMPLICATIONS: None     The patient tolerated the procedure well. Vital signs were stable. Sensory and motor exam was unchanged from baseline. The patient was observed in recovery and was discharged after meeting established criteria.    FOLLOW UP: As scheduled     ADDITIONAL NOTES:     [facility]   Robin Quevedo MD      CODES:  [id number]  [id number]

## 2025-01-16 ENCOUNTER — HOSPITAL ENCOUNTER (OUTPATIENT)
Dept: CT IMAGING | Facility: HOSPITAL | Age: 65
Discharge: HOME | End: 2025-01-16
Admitting: INTERNAL MEDICINE
Payer: MEDICARE

## 2025-01-16 DIAGNOSIS — M54.42 CHRONIC LOW BACK PAIN WITH LEFT-SIDED SCIATICA, UNSPECIFIED BACK PAIN LATERALITY: ICD-10-CM

## 2025-01-16 DIAGNOSIS — G89.29 CHRONIC LOW BACK PAIN WITH LEFT-SIDED SCIATICA, UNSPECIFIED BACK PAIN LATERALITY: ICD-10-CM

## 2025-01-16 PROCEDURE — 25510000001 IOPAMIDOL 61 % SOLUTION: Performed by: INTERNAL MEDICINE

## 2025-01-16 PROCEDURE — 74177 CT ABD & PELVIS W/CONTRAST: CPT

## 2025-01-16 PROCEDURE — 71260 CT THORAX DX C+: CPT

## 2025-01-16 PROCEDURE — 25510000002 DIATRIZOATE MEGLUMINE & SODIUM PER 1 ML: Performed by: INTERNAL MEDICINE

## 2025-01-16 PROCEDURE — 70491 CT SOFT TISSUE NECK W/DYE: CPT

## 2025-01-16 RX ORDER — DIATRIZOATE MEGLUMINE AND DIATRIZOATE SODIUM 660; 100 MG/ML; MG/ML
15 SOLUTION ORAL; RECTAL ONCE
Status: COMPLETED | OUTPATIENT
Start: 2025-01-16 | End: 2025-01-16

## 2025-01-16 RX ORDER — IOPAMIDOL 612 MG/ML
100 INJECTION, SOLUTION INTRAVASCULAR
Status: COMPLETED | OUTPATIENT
Start: 2025-01-16 | End: 2025-01-16

## 2025-01-16 RX ADMIN — IOPAMIDOL 100 ML: 612 INJECTION, SOLUTION INTRAVENOUS at 14:22

## 2025-01-16 RX ADMIN — DIATRIZOATE MEGLUMINE AND DIATRIZOATE SODIUM 15 ML: 660; 100 LIQUID ORAL; RECTAL at 14:23

## 2025-01-21 ENCOUNTER — APPOINTMENT (OUTPATIENT)
Dept: CT IMAGING | Facility: HOSPITAL | Age: 65
End: 2025-01-21
Payer: MEDICARE

## 2025-01-21 ENCOUNTER — HOSPITAL ENCOUNTER (EMERGENCY)
Facility: HOSPITAL | Age: 65
Discharge: NURSING, REHAB OR LONG-TERM CARE | End: 2025-01-22
Attending: EMERGENCY MEDICINE | Admitting: EMERGENCY MEDICINE
Payer: MEDICARE

## 2025-01-21 DIAGNOSIS — M62.830 MUSCLE SPASM OF BACK: Primary | ICD-10-CM

## 2025-01-21 LAB
ALBUMIN SERPL-MCNC: 3.9 G/DL (ref 3.5–5.2)
ALBUMIN/GLOB SERPL: 1.3 G/DL
ALP SERPL-CCNC: 102 U/L (ref 39–117)
ALT SERPL W P-5'-P-CCNC: 19 U/L (ref 1–41)
AMMONIA BLD-SCNC: 59 UMOL/L (ref 16–60)
AMPHET+METHAMPHET UR QL: NEGATIVE
AMPHETAMINES UR QL: NEGATIVE
ANION GAP SERPL CALCULATED.3IONS-SCNC: 7 MMOL/L (ref 5–15)
AST SERPL-CCNC: 23 U/L (ref 1–40)
BARBITURATES UR QL SCN: NEGATIVE
BASOPHILS # BLD AUTO: 0.02 10*3/MM3 (ref 0–0.2)
BASOPHILS NFR BLD AUTO: 0.4 % (ref 0–1.5)
BENZODIAZ UR QL SCN: NEGATIVE
BILIRUB SERPL-MCNC: 0.3 MG/DL (ref 0–1.2)
BUN SERPL-MCNC: 13 MG/DL (ref 8–23)
BUN/CREAT SERPL: 18.6 (ref 7–25)
BUPRENORPHINE SERPL-MCNC: NEGATIVE NG/ML
CALCIUM SPEC-SCNC: 9.3 MG/DL (ref 8.6–10.5)
CANNABINOIDS SERPL QL: NEGATIVE
CHLORIDE SERPL-SCNC: 103 MMOL/L (ref 98–107)
CO2 SERPL-SCNC: 25 MMOL/L (ref 22–29)
COCAINE UR QL: NEGATIVE
CREAT SERPL-MCNC: 0.7 MG/DL (ref 0.76–1.27)
DEPRECATED RDW RBC AUTO: 52.2 FL (ref 37–54)
EGFRCR SERPLBLD CKD-EPI 2021: 102.3 ML/MIN/1.73
EOSINOPHIL # BLD AUTO: 0.43 10*3/MM3 (ref 0–0.4)
EOSINOPHIL NFR BLD AUTO: 7.5 % (ref 0.3–6.2)
ERYTHROCYTE [DISTWIDTH] IN BLOOD BY AUTOMATED COUNT: 15.3 % (ref 12.3–15.4)
ETHANOL BLD-MCNC: <10 MG/DL (ref 0–10)
GLOBULIN UR ELPH-MCNC: 2.9 GM/DL
GLUCOSE SERPL-MCNC: 105 MG/DL (ref 65–99)
HCT VFR BLD AUTO: 35.1 % (ref 37.5–51)
HGB BLD-MCNC: 11.5 G/DL (ref 13–17.7)
IMM GRANULOCYTES # BLD AUTO: 0.02 10*3/MM3 (ref 0–0.05)
IMM GRANULOCYTES NFR BLD AUTO: 0.4 % (ref 0–0.5)
LYMPHOCYTES # BLD AUTO: 0.96 10*3/MM3 (ref 0.7–3.1)
LYMPHOCYTES NFR BLD AUTO: 16.8 % (ref 19.6–45.3)
MCH RBC QN AUTO: 30.8 PG (ref 26.6–33)
MCHC RBC AUTO-ENTMCNC: 32.8 G/DL (ref 31.5–35.7)
MCV RBC AUTO: 94.1 FL (ref 79–97)
METHADONE UR QL SCN: NEGATIVE
MONOCYTES # BLD AUTO: 0.72 10*3/MM3 (ref 0.1–0.9)
MONOCYTES NFR BLD AUTO: 12.6 % (ref 5–12)
NEUTROPHILS NFR BLD AUTO: 3.55 10*3/MM3 (ref 1.7–7)
NEUTROPHILS NFR BLD AUTO: 62.3 % (ref 42.7–76)
NRBC BLD AUTO-RTO: 0 /100 WBC (ref 0–0.2)
OPIATES UR QL: NEGATIVE
OXYCODONE UR QL SCN: POSITIVE
PCP UR QL SCN: NEGATIVE
PLATELET # BLD AUTO: 128 10*3/MM3 (ref 140–450)
PMV BLD AUTO: 9.7 FL (ref 6–12)
POTASSIUM SERPL-SCNC: 4.1 MMOL/L (ref 3.5–5.2)
PROT SERPL-MCNC: 6.8 G/DL (ref 6–8.5)
RBC # BLD AUTO: 3.73 10*6/MM3 (ref 4.14–5.8)
SODIUM SERPL-SCNC: 135 MMOL/L (ref 136–145)
TRICYCLICS UR QL SCN: NEGATIVE
WBC NRBC COR # BLD AUTO: 5.7 10*3/MM3 (ref 3.4–10.8)

## 2025-01-21 PROCEDURE — 74176 CT ABD & PELVIS W/O CONTRAST: CPT

## 2025-01-21 PROCEDURE — 99284 EMERGENCY DEPT VISIT MOD MDM: CPT

## 2025-01-21 PROCEDURE — 82077 ASSAY SPEC XCP UR&BREATH IA: CPT | Performed by: EMERGENCY MEDICINE

## 2025-01-21 PROCEDURE — 82140 ASSAY OF AMMONIA: CPT | Performed by: EMERGENCY MEDICINE

## 2025-01-21 PROCEDURE — 80307 DRUG TEST PRSMV CHEM ANLYZR: CPT | Performed by: EMERGENCY MEDICINE

## 2025-01-21 PROCEDURE — 85025 COMPLETE CBC W/AUTO DIFF WBC: CPT | Performed by: EMERGENCY MEDICINE

## 2025-01-21 PROCEDURE — 80053 COMPREHEN METABOLIC PANEL: CPT | Performed by: EMERGENCY MEDICINE

## 2025-01-21 RX ORDER — MORPHINE SULFATE 2 MG/ML
2 INJECTION, SOLUTION INTRAMUSCULAR; INTRAVENOUS ONCE
Status: COMPLETED | OUTPATIENT
Start: 2025-01-22 | End: 2025-01-22

## 2025-01-22 VITALS
HEART RATE: 50 BPM | BODY MASS INDEX: 25.2 KG/M2 | OXYGEN SATURATION: 96 % | HEIGHT: 70 IN | WEIGHT: 176 LBS | TEMPERATURE: 98 F | SYSTOLIC BLOOD PRESSURE: 123 MMHG | RESPIRATION RATE: 18 BRPM | DIASTOLIC BLOOD PRESSURE: 71 MMHG

## 2025-01-22 LAB — FENTANYL UR-MCNC: POSITIVE NG/ML

## 2025-01-22 PROCEDURE — 25010000002 MORPHINE PER 10 MG: Performed by: EMERGENCY MEDICINE

## 2025-01-22 PROCEDURE — 96374 THER/PROPH/DIAG INJ IV PUSH: CPT

## 2025-01-22 RX ADMIN — MORPHINE SULFATE 2 MG: 2 INJECTION, SOLUTION INTRAMUSCULAR; INTRAVENOUS at 00:29

## 2025-01-22 NOTE — DISCHARGE INSTRUCTIONS
Apply heat to the low back and perform regular stretching.    Take tylenol as needed to help with pain.    Follow-up with primary care physician for recheck in 1 week.

## 2025-01-22 NOTE — ED PROVIDER NOTES
85 Subjective   History of Present Illness  64-year-old male with a history of chronic back pain who presents for evaluation of left lower back pain.  The patient currently resides in a nursing home.  He reports sudden onset of severe left lower back/flank pain roughly an hour and a half prior to arrival.  No radiation into the anterior abdomen.  No dysuria or urinary frequency.  No diarrhea or blood in the stool.  No chest pain or upper back pain.  He denies upper respiratory symptoms or shortness of breath.  No sick contacts.  No trauma or injury to that area.  He admits that his overall baseline activity is limited secondary to being in a nursing facility.  He does In-N-Out cath himself 4 times a day secondary to neurogenic bladder.  No acute changes in that regard.      Review of Systems   Constitutional:  Negative for chills, fatigue and fever.   HENT:  Negative for congestion, ear pain, postnasal drip, sinus pressure and sore throat.    Eyes:  Negative for pain, redness and visual disturbance.   Respiratory:  Negative for cough, chest tightness and shortness of breath.    Cardiovascular:  Negative for chest pain, palpitations and leg swelling.   Gastrointestinal:  Negative for abdominal pain, anal bleeding, blood in stool, diarrhea, nausea and vomiting.   Endocrine: Negative for polydipsia and polyuria.   Genitourinary:  Positive for flank pain. Negative for difficulty urinating, dysuria, frequency and urgency.   Musculoskeletal:  Positive for back pain. Negative for arthralgias and neck pain.   Skin:  Negative for pallor and rash.   Allergic/Immunologic: Negative for environmental allergies and immunocompromised state.   Neurological:  Negative for dizziness, weakness and headaches.   Hematological:  Negative for adenopathy.   Psychiatric/Behavioral:  Negative for confusion, self-injury and suicidal ideas. The patient is not nervous/anxious.    All other systems reviewed and are negative.      Past Medical  History:   Diagnosis Date    Anemia     Cancer     ESOPHAGEAL    Cirrhosis     Duodenal ulcer     Gastric ulcer     GERD (gastroesophageal reflux disease)     History of alcohol abuse     History of radiation therapy 05/08/2024    esophagus    HLD (hyperlipidemia)     Mood disorder        No Known Allergies    Past Surgical History:   Procedure Laterality Date    ENDOSCOPY N/A 12/26/2023    Procedure: ESOPHAGOGASTRODUODENOSCOPY;  Surgeon: Wesly Mckeon MD;  Location: [facility] ENDOSCOPY;  Service: Gastroenterology;  Laterality: N/A;    VENOUS ACCESS DEVICE (PORT) INSERTION Right 04/25/2024       Family History   Problem Relation Age of Onset    Cancer Mother         LUNG AND BREAST    Breast cancer Mother     Heart attack Father        Social History     Socioeconomic History    Marital status: Single   Tobacco Use    Smoking status: Every Day     Current packs/day: 1.00     Average packs/day: 1 pack/day for 15.0 years (15.0 ttl pk-yrs)     Types: Cigarettes     Passive exposure: Current    Smokeless tobacco: Never   Vaping Use    Vaping status: Some Days    Substances: Flavoring    Devices: Disposable   Substance and Sexual Activity    Alcohol use: Not Currently     Comment: sober for ~ 3 months    Drug use: Yes     Types: Hydrocodone    Sexual activity: Not Currently     Partners: Female           Objective   Physical Exam  Vitals and nursing note reviewed.   Constitutional:       General: He is not in acute distress.     Appearance: Normal appearance. He is well-developed. He is not toxic-appearing or diaphoretic.   HENT:      Head: Normocephalic and atraumatic.      Right Ear: External ear normal.      Left Ear: External ear normal.      Nose: Nose normal.   Eyes:      General: Lids are normal.      Pupils: Pupils are equal, round, and reactive to light.   Neck:      Trachea: No tracheal deviation.   Cardiovascular:      Rate and Rhythm: Normal rate and regular rhythm.      Pulses: No decreased pulses.      Heart  sounds: Normal heart sounds. No murmur heard.     No friction rub. No gallop.   Pulmonary:      Effort: Pulmonary effort is normal. No respiratory distress.      Breath sounds: Normal breath sounds. No decreased breath sounds, wheezing, rhonchi or rales.   Abdominal:      General: Bowel sounds are normal.      Palpations: Abdomen is soft.      Tenderness: There is no abdominal tenderness. There is no guarding or rebound.   Musculoskeletal:         General: No deformity. Normal range of motion.      Cervical back: Normal range of motion and neck supple.      Lumbar back: Tenderness present.      Comments: Tenderness to palpation in the left lower back.  No redness, swelling, rash, crepitus, or edema.    Tenderness to percussion in the left flank.   Lymphadenopathy:      Cervical: No cervical adenopathy.   Skin:     General: Skin is warm and dry.      Findings: No rash.   Neurological:      Mental Status: He is alert and oriented to person, place, and time.      Cranial Nerves: No cranial nerve deficit.      Sensory: No sensory deficit.   Psychiatric:         Speech: Speech normal.         Behavior: Behavior normal.         Thought Content: Thought content normal.         Judgment: Judgment normal.         Procedures           ED Course                                                       Medical Decision Making  Differential includes urinary tract infection, muscle strain, kidney stone, acute on chronic back pain, other unspecified etiology.    Labs show normal white count, baseline H&H, normal kidney function with no significant electrolyte abnormalities.  Urine drug screen consistent with known chronic pain use.  Normal ammonia level, which was checked due to the patient's underlying history of cirrhosis.    CT scan Abdo pelvis shows no evidence of renal or ureteral stone or hydronephrosis.  No other acute intra-abdominal process identified.  Patient does have a large bladder diverticulum which is unchanged  relative to previous comparison.    The patient remained well-appearing throughout the ER course even without administration of pain medication and was noted to be resting comfortably on repeat evaluation.    He will be discharged with the advised to apply heat to the left lower back 20 minutes at a time and perform regular stretching.    Problems Addressed:  Muscle spasm of back: complicated acute illness or injury with systemic symptoms    Amount and/or Complexity of Data Reviewed  External Data Reviewed: labs and radiology.  Labs: ordered. Decision-making details documented in ED Course.  Radiology: ordered and independent interpretation performed. Decision-making details documented in ED Course.    Risk  Prescription drug management.        Final diagnoses:   Muscle spasm of back       ED Disposition  ED Disposition       ED Disposition   Discharge    Condition   Stable    Comment   --               Wesly Minor MD  [address] [place] [address]  [phone]    In 1 week           Medication List      No changes were made to your prescriptions during this visit.            Lana Vega MD  01/22/25 [id number]

## 2025-01-23 ENCOUNTER — OFFICE VISIT (OUTPATIENT)
Dept: ONCOLOGY | Facility: CLINIC | Age: 65
End: 2025-01-23
Payer: MEDICARE

## 2025-01-23 ENCOUNTER — HOSPITAL ENCOUNTER (OUTPATIENT)
Dept: ONCOLOGY | Facility: HOSPITAL | Age: 65
Discharge: HOME | End: 2025-01-23
Admitting: STUDENT IN AN ORGANIZED HEALTH CARE EDUCATION/TRAINING PROGRAM
Payer: MEDICARE

## 2025-01-23 VITALS
WEIGHT: 176 LBS | OXYGEN SATURATION: 96 % | TEMPERATURE: 97.5 F | HEART RATE: 68 BPM | BODY MASS INDEX: 26.07 KG/M2 | DIASTOLIC BLOOD PRESSURE: 76 MMHG | RESPIRATION RATE: 16 BRPM | HEIGHT: 69 IN | SYSTOLIC BLOOD PRESSURE: 115 MMHG

## 2025-01-23 DIAGNOSIS — M54.42 CHRONIC LOW BACK PAIN WITH LEFT-SIDED SCIATICA, UNSPECIFIED BACK PAIN LATERALITY: Primary | ICD-10-CM

## 2025-01-23 DIAGNOSIS — M54.42 CHRONIC LOW BACK PAIN WITH LEFT-SIDED SCIATICA, UNSPECIFIED BACK PAIN LATERALITY: ICD-10-CM

## 2025-01-23 DIAGNOSIS — C15.9 SQUAMOUS CELL CARCINOMA OF ESOPHAGUS: ICD-10-CM

## 2025-01-23 DIAGNOSIS — N32.3 ACQUIRED BLADDER DIVERTICULUM: ICD-10-CM

## 2025-01-23 DIAGNOSIS — R33.9 URINARY RETENTION: ICD-10-CM

## 2025-01-23 DIAGNOSIS — G89.29 CHRONIC LOW BACK PAIN WITH LEFT-SIDED SCIATICA, UNSPECIFIED BACK PAIN LATERALITY: Primary | ICD-10-CM

## 2025-01-23 DIAGNOSIS — N31.9 NEUROGENIC BLADDER: ICD-10-CM

## 2025-01-23 DIAGNOSIS — G89.29 CHRONIC LOW BACK PAIN WITH LEFT-SIDED SCIATICA, UNSPECIFIED BACK PAIN LATERALITY: ICD-10-CM

## 2025-01-23 DIAGNOSIS — C15.3 MALIGNANT NEOPLASM OF UPPER THIRD OF ESOPHAGUS: Primary | ICD-10-CM

## 2025-01-23 LAB
ALBUMIN SERPL-MCNC: 4.1 G/DL (ref 3.5–5.2)
ALBUMIN/GLOB SERPL: 1.3 G/DL
ALP SERPL-CCNC: 106 U/L (ref 39–117)
ALT SERPL W P-5'-P-CCNC: 20 U/L (ref 1–41)
ANION GAP SERPL CALCULATED.3IONS-SCNC: 8 MMOL/L (ref 5–15)
AST SERPL-CCNC: 29 U/L (ref 1–40)
BASOPHILS # BLD AUTO: 0.03 10*3/MM3 (ref 0–0.2)
BASOPHILS NFR BLD AUTO: 0.6 % (ref 0–1.5)
BILIRUB SERPL-MCNC: 0.3 MG/DL (ref 0–1.2)
BUN SERPL-MCNC: 11 MG/DL (ref 8–23)
BUN/CREAT SERPL: 13.9 (ref 7–25)
CALCIUM SPEC-SCNC: 9.6 MG/DL (ref 8.6–10.5)
CHLORIDE SERPL-SCNC: 103 MMOL/L (ref 98–107)
CO2 SERPL-SCNC: 28 MMOL/L (ref 22–29)
CREAT SERPL-MCNC: 0.79 MG/DL (ref 0.76–1.27)
DEPRECATED RDW RBC AUTO: 55.8 FL (ref 37–54)
EGFRCR SERPLBLD CKD-EPI 2021: 98.6 ML/MIN/1.73
EOSINOPHIL # BLD AUTO: 0.34 10*3/MM3 (ref 0–0.4)
EOSINOPHIL NFR BLD AUTO: 6.8 % (ref 0.3–6.2)
ERYTHROCYTE [DISTWIDTH] IN BLOOD BY AUTOMATED COUNT: 15.4 % (ref 12.3–15.4)
GLOBULIN UR ELPH-MCNC: 3.1 GM/DL
GLUCOSE SERPL-MCNC: 109 MG/DL (ref 65–99)
HCT VFR BLD AUTO: 36.9 % (ref 37.5–51)
HGB BLD-MCNC: 11.9 G/DL (ref 13–17.7)
IMM GRANULOCYTES # BLD AUTO: 0.01 10*3/MM3 (ref 0–0.05)
IMM GRANULOCYTES NFR BLD AUTO: 0.2 % (ref 0–0.5)
LYMPHOCYTES # BLD AUTO: 0.84 10*3/MM3 (ref 0.7–3.1)
LYMPHOCYTES NFR BLD AUTO: 16.9 % (ref 19.6–45.3)
MCH RBC QN AUTO: 31.3 PG (ref 26.6–33)
MCHC RBC AUTO-ENTMCNC: 32.2 G/DL (ref 31.5–35.7)
MCV RBC AUTO: 97.1 FL (ref 79–97)
MONOCYTES # BLD AUTO: 0.83 10*3/MM3 (ref 0.1–0.9)
MONOCYTES NFR BLD AUTO: 16.7 % (ref 5–12)
NEUTROPHILS NFR BLD AUTO: 2.92 10*3/MM3 (ref 1.7–7)
NEUTROPHILS NFR BLD AUTO: 58.8 % (ref 42.7–76)
PLATELET # BLD AUTO: 129 10*3/MM3 (ref 140–450)
PMV BLD AUTO: 9.2 FL (ref 6–12)
POTASSIUM SERPL-SCNC: 4.6 MMOL/L (ref 3.5–5.2)
PROT SERPL-MCNC: 7.2 G/DL (ref 6–8.5)
PSA SERPL-MCNC: 0.03 NG/ML (ref 0–4)
RBC # BLD AUTO: 3.8 10*6/MM3 (ref 4.14–5.8)
SODIUM SERPL-SCNC: 139 MMOL/L (ref 136–145)
WBC NRBC COR # BLD AUTO: 4.97 10*3/MM3 (ref 3.4–10.8)

## 2025-01-23 PROCEDURE — 36591 DRAW BLOOD OFF VENOUS DEVICE: CPT

## 2025-01-23 PROCEDURE — 80053 COMPREHEN METABOLIC PANEL: CPT | Performed by: INTERNAL MEDICINE

## 2025-01-23 PROCEDURE — 85025 COMPLETE CBC W/AUTO DIFF WBC: CPT | Performed by: INTERNAL MEDICINE

## 2025-01-23 PROCEDURE — 84153 ASSAY OF PSA TOTAL: CPT | Performed by: STUDENT IN AN ORGANIZED HEALTH CARE EDUCATION/TRAINING PROGRAM

## 2025-01-23 PROCEDURE — 25010000002 HEPARIN LOCK FLUSH PER 10 UNITS: Performed by: INTERNAL MEDICINE

## 2025-01-23 RX ORDER — SODIUM CHLORIDE 0.9 % (FLUSH) 0.9 %
10 SYRINGE (ML) INJECTION AS NEEDED
OUTPATIENT
Start: 2025-01-23

## 2025-01-23 RX ORDER — HEPARIN SODIUM (PORCINE) LOCK FLUSH IV SOLN 100 UNIT/ML 100 UNIT/ML
500 SOLUTION INTRAVENOUS AS NEEDED
OUTPATIENT
Start: 2025-01-23

## 2025-01-23 RX ORDER — HEPARIN SODIUM (PORCINE) LOCK FLUSH IV SOLN 100 UNIT/ML 100 UNIT/ML
500 SOLUTION INTRAVENOUS AS NEEDED
Status: DISCONTINUED | OUTPATIENT
Start: 2025-01-23 | End: 2025-01-24 | Stop reason: HOSPADM

## 2025-01-23 RX ADMIN — HEPARIN 500 UNITS: 100 SYRINGE at 15:13

## 2025-01-23 NOTE — PROGRESS NOTES
[facility]  Office number [phone]    Fax number [phone]     Follow up     Date: 25    Patient Name: Vla Nassar Jr.  MRN: [id number]  : 1960    Chief Complaint: esophageal cancer    Cancer Staging:  Cancer Staging   Stage II (cT2, cN1, cM0)    History of Present Illness: Val Nassar Jr. is a pleasant 64 y.o. male who presents today for evaluation of upper third esophageal squamous cell carcinoma. He has a longstanding history of cirrhosis     He originally was diagnosed with a localized esophageal squamous cell carcinoma of the upper third of the esophagus in the 2023.  At the time he was living in [place].  He underwent an endoscopy confirming cancer diagnosis at [facility] in [facility].  He then relocated to [facility] to live with his sister because of his cancer diagnosis.      He presented to [facility] in 2023 with an upper GI bleed secondary to peptic ulcer disease and possible cholecystitis.  His presenting hemoglobin was 6.9.     EGD performed 2023 showed an ulcerated mass in the upper third of the esophagus which was nonobstructing and partially circumferential spanning 2 cm in length.  Biopsy results are pending.    Additional findings include gastritis, portal hypertension gastropathy and nonbleeding duodenal ulcer.  Duodenal stenosis.    He was discharged to a rehab facility and ultimately discharged home to live with his sister.  He was doing poorly and was unable to care for himself.  He did not keep his appointments with medical oncology, thoracic surgery, or radiation oncology and did not complete staging PET/CT.  He then represented to the hospital in late 2024 with a UTI and confusion.  During that admission he was assessed by palliative care, radiation oncology, and medical oncology.  He elected to be discharged to a skilled nursing facility for long-term  care as he does not want to be a burden on his sister and did not feel that he could care for himself at home.  Prior to discharge there were conversations between case management and social work at the hospital and his facility and the patient was under the impression that he could not receive radiation treatment while living at the skilled facility.  However he did elect to complete PET CT scan and returns for results.    PET/CT performed 3/6/2024 shows thickening of the esophagus with an uptake of 16 by SUV.  There was notable hypermetabolic activity in the left lateral shoulder subcutaneous tissue which corresponds to an area where he received a prior injection at his PCP office for alcohol use which he reports has been raised and pruritic since the injection 6 weeks prior.  He had mild paratracheal adenopathy as well as an right paratracheal lymph node in the right supraclavicular region  He reports that he has been tolerating regular diet with no obstructive symptoms prior to presentation.     He continues to feel weak but is clinically improving.  He tells me he is planning inpatient rehab on discharge.  His hemoglobin continues to improve and is 9.1 today.  CT of the abdomen pelvis in addition to the gallbladder findings showed no metastatic disease.  A chest x-ray this admission showed mild interstitial edema.  MRI cervical spine from 4/12/2024 showed:  1.Active left-sided facet arthropathy at C3/4 and to lesser degree C4/5 with associated surrounding marrow and soft tissue edema.  2.Moderate multifactorial degenerative change of the mid to lower cervical spine otherwise with central canal and neuroforaminal stenosis. Potential exiting nerve contact from C3/4 through C6/7 as detailed above.    Treatment history:  Taxol/carbo/radiation: C1, 4/2/2024; C2, 4/9/24; C3, held, C4, carbo only; C5, carbo and DA taxol: completed chemoradiation on 5/8/2024.    Interval history:  He is here for follow-up.  Back pain  is worse. Had a steroid injection 10 day prior with Dr. Quevedo which he does not feel helped. Was seen in ED with severe muscle spasm 2 days ago. UA and CT abdomen unremarkable. Does not feel like his current regimen of fentanyl  patch and oxycodone 7.5 and gabapentin. Has pain management 2/3/25 but is not aware of that appointment.  He is ambulating with a wheeled walker.   Continues self-cathing. Endoscopy 8/2024 with no active disease    Past Medical History:   Past Medical History:   Diagnosis Date    Anemia     Cancer     ESOPHAGEAL    Cirrhosis     Duodenal ulcer     Gastric ulcer     GERD (gastroesophageal reflux disease)     History of alcohol abuse     History of radiation therapy 05/08/2024    esophagus    HLD (hyperlipidemia)     Mood disorder        Past Surgical History:   Past Surgical History:   Procedure Laterality Date    ENDOSCOPY N/A 12/26/2023    Procedure: ESOPHAGOGASTRODUODENOSCOPY;  Surgeon: Wesly Mckeon MD;  Location: [facility] ENDOSCOPY;  Service: Gastroenterology;  Laterality: N/A;    VENOUS ACCESS DEVICE (PORT) INSERTION Right 04/25/2024       Family History:   Family History   Problem Relation Age of Onset    Cancer Mother         LUNG AND BREAST    Breast cancer Mother     Heart attack Father        Social History:   Social History     Socioeconomic History    Marital status: Single   Tobacco Use    Smoking status: Every Day     Current packs/day: 1.00     Average packs/day: 1 pack/day for 15.0 years (15.0 ttl pk-yrs)     Types: Cigarettes     Passive exposure: Current    Smokeless tobacco: Never   Vaping Use    Vaping status: Some Days    Substances: Flavoring    Devices: Disposable   Substance and Sexual Activity    Alcohol use: Not Currently     Comment: sober for ~ 3 months    Drug use: Yes     Types: Hydrocodone    Sexual activity: Not Currently     Partners: Female       Medications:     Current Outpatient Medications:     acetaminophen (TYLENOL) 325 MG tablet, Take 2 tablets by  mouth Every 4 (Four) Hours As Needed for Mild Pain., Disp: , Rfl:     benzonatate (TESSALON) 100 MG capsule, Take 1 capsule by mouth Every 8 (Eight) Hours As Needed for Cough., Disp: , Rfl:     Cholecalciferol (Vitamin D3) 1.25 MG (32726 UT) capsule, Take 1 capsule by mouth Every 7 (Seven) Days., Disp: , Rfl:     Diclofenac Sodium (Voltaren) 1 % gel gel, Apply 4 g topically to the appropriate area as directed 2 (Two) Times a Day., Disp: 50 g, Rfl: 1    diphenhydrAMINE (BENADRYL) 25 mg capsule, Take 1 capsule by mouth At Night As Needed for Itching (insomnia)., Disp: 30 capsule, Rfl: 0    docusate sodium (Colace) 100 MG capsule, Take 1 capsule by mouth 2 (Two) Times a Day As Needed for Constipation., Disp: 30 capsule, Rfl: 1    dutasteride (AVODART) 0.5 MG capsule, Take 1 capsule by mouth Daily., Disp: , Rfl:     escitalopram (LEXAPRO) 10 MG tablet, Take 1 tablet by mouth Every Night., Disp: , Rfl:     fentaNYL (DURAGESIC) 25 MCG/HR patch, Place 1 patch on the skin as directed by provider Every 72 (Seventy-Two) Hours., Disp: , Rfl:     ferrous gluconate (FERGON) 324 MG tablet, Take 1 tablet by mouth Daily With Breakfast., Disp: , Rfl:     finasteride (PROSCAR) 5 MG tablet, Take 1 tablet by mouth Daily., Disp: , Rfl:     folic acid (FOLVITE) 1 MG tablet, Take 1 tablet by mouth Daily., Disp: , Rfl:     gabapentin (NEURONTIN) 300 MG capsule, , Disp: , Rfl:     gabapentin (NEURONTIN) 50 mg/mL solution, Take 16 mL by mouth 3 (Three) Times a Day., Disp: 1000 mL, Rfl: 0    guaiFENesin (MUCINEX) 600 MG 12 hr tablet, Take 2 tablets by mouth Every 12 (Twelve) Hours As Needed for Cough., Disp: , Rfl:     hydrocortisone 2.5 % cream, , Disp: , Rfl:     lactulose (CHRONULAC) 10 GM/15ML solution, Take 30 mL by mouth 2 (Two) Times a Day As Needed., Disp: , Rfl:     lidocaine (LIDODERM) 5 %, , Disp: , Rfl:     Lidocaine 4 %, Place 1 patch on the skin as directed by provider Daily. Remove & Discard patch within 12 hours or as directed  by MD, Disp: , Rfl:     lidocaine-prilocaine (EMLA) 2.5-2.5 % cream, Apply 1 Application topically to the appropriate area as directed As Needed (45-60 minutes prior to port access.  Cover with saran/plastic wrap.)., Disp: 30 g, Rfl: 3    Loratadine 10 MG capsule, Take  by mouth Daily., Disp: , Rfl:     melatonin 5 MG tablet tablet, Take 1 tablet by mouth At Night As Needed., Disp: , Rfl:     mometasone (ELOCON) 0.1 % cream, , Disp: , Rfl:     nystatin (MYCOSTATIN) 100,000 unit/mL suspension, , Disp: , Rfl:     nystatin susp + lidocaine viscous (MAGIC MOUTHWASH) oral suspension, 5-10 ml swish and spit or swallow QID prn, Disp: 240 mL, Rfl: 3    omeprazole (priLOSEC) 40 MG capsule, Take  by mouth., Disp: , Rfl:     ondansetron (ZOFRAN) 8 MG tablet, Take 1 tablet by mouth Every 8 (Eight) Hours As Needed for Nausea or Vomiting., Disp: 30 tablet, Rfl: 3    oxyCODONE-acetaminophen (PERCOCET) 7.5-325 MG per tablet, , Disp: , Rfl:     pantoprazole (PROTONIX) 40 MG EC tablet, Take 1 tablet by mouth 2 (Two) Times a Day., Disp: , Rfl:     phenol (CHLORASEPTIC) 1.4 % liquid liquid, Apply 1 spray to the mouth or throat Every 4 (Four) Hours As Needed., Disp: , Rfl:     polyethylene glycol (MIRALAX) 17 g packet, Take 17 g by mouth Daily As Needed (Use if senna-docusate is ineffective)., Disp: , Rfl:     predniSONE (DELTASONE) 10 MG tablet, Take daily based on following titration schedule: 60 mg x 1 day, then 50 mg x 1, then 40 mg x 1, then 30 mg x 1 day, then 20 mg x 1 day, then 10 mg x 1 day then stop, Disp: 21 tablet, Rfl: 0    promethazine (PHENERGAN) 25 MG tablet, Take 1 tablet by mouth Every 6 (Six) Hours As Needed for Nausea or Vomiting., Disp: , Rfl:     Senna-Time 8.6 MG tablet, , Disp: , Rfl:     sodium chloride (Ocean Nasal Spray) 0.65 % nasal spray, 1 spray into the nostril(s) as directed by provider As Needed for Congestion., Disp: 1 each, Rfl: 0    sulfamethoxazole-trimethoprim (Bactrim DS) 800-160 MG per tablet,  "Take 1 tablet by mouth 2 (Two) Times a Day., Disp: 10 tablet, Rfl: 0    tamsulosin (FLOMAX) 0.4 MG capsule 24 hr capsule, Take 1 capsule by mouth Daily., Disp: , Rfl:     vitamin B-12 (CYANOCOBALAMIN) 1000 MCG tablet, Take 1 tablet by mouth Daily., Disp: , Rfl:     zolpidem (AMBIEN) 5 MG tablet, , Disp: , Rfl:     Allergies:   No Known Allergies    Objective     Vital Signs:   Vitals:    01/23/25 1428   BP: 115/76  Comment: LUE   Pulse: 68   Resp: 16   Temp: 97.5 °F (36.4 °C)   TempSrc: Temporal   SpO2: 96%  Comment: RA   Weight: 79.8 kg (176 lb)   Height: 175.3 cm (69\")   PainSc:   8    Body mass index is 25.99 kg/m².   Pain Score    01/23/25 1428   PainSc:   8       ECOG Performance Status: 2    Physical Exam:   General: No acute distress.   HEENT: Normocephalic, atraumatic.   Neck: supple, no adenopathy.  Cardiovascular: regular rate and rhythm. No murmurs.   Respiratory: Normal rate. Clear to auscultation bilaterally  Abdomen: Soft, nontender, non distended with +BS  Lymph: no cervical, supraclavicular or axillary adenopathy  Neuro: Alert and oriented x 3.  Ambulates with a wheeled walker  Ext: Symmetric, no swelling.     Laboratory/Imaging Reviewed:   Admission on 01/21/2025, Discharged on 01/22/2025   Component Date Value Ref Range Status    Ammonia 01/21/2025 59  16 - 60 umol/L Final    Glucose 01/21/2025 105 (H)  65 - 99 mg/dL Final    BUN 01/21/2025 13  8 - 23 mg/dL Final    Creatinine 01/21/2025 0.70 (L)  0.76 - 1.27 mg/dL Final    Sodium 01/21/2025 135 (L)  136 - 145 mmol/L Final    Potassium 01/21/2025 4.1  3.5 - 5.2 mmol/L Final    Chloride 01/21/2025 103  98 - 107 mmol/L Final    CO2 01/21/2025 25.0  22.0 - 29.0 mmol/L Final    Calcium 01/21/2025 9.3  8.6 - 10.5 mg/dL Final    Total Protein 01/21/2025 6.8  6.0 - 8.5 g/dL Final    Albumin 01/21/2025 3.9  3.5 - 5.2 g/dL Final    ALT (SGPT) 01/21/2025 19  1 - 41 U/L Final    AST (SGOT) 01/21/2025 23  1 - 40 U/L Final    Alkaline Phosphatase 01/21/2025 102 "  39 - 117 U/L Final    Total Bilirubin 01/21/2025 0.3  0.0 - 1.2 mg/dL Final    Globulin 01/21/2025 2.9  gm/dL Final    Calculated Result    A/G Ratio 01/21/2025 1.3  g/dL Final    BUN/Creatinine Ratio 01/21/2025 18.6  7.0 - 25.0 Final    Anion Gap 01/21/2025 7.0  5.0 - 15.0 mmol/L Final    eGFR 01/21/2025 102.3  >60.0 mL/min/1.73 Final    THC, Screen, Urine 01/21/2025 Negative  Negative Final    Phencyclidine (PCP), Urine 01/21/2025 Negative  Negative Final    Cocaine Screen, Urine 01/21/2025 Negative  Negative Final    Methamphetamine, Ur 01/21/2025 Negative  Negative Final    Opiate Screen 01/21/2025 Negative  Negative Final    Amphetamine Screen, Urine 01/21/2025 Negative  Negative Final    Benzodiazepine Screen, Urine 01/21/2025 Negative  Negative Final    Tricyclic Antidepressants Screen 01/21/2025 Negative  Negative Final    Methadone Screen, Urine 01/21/2025 Negative  Negative Final    Barbiturates Screen, Urine 01/21/2025 Negative  Negative Final    Oxycodone Screen, Urine 01/21/2025 Positive (A)  Negative Final    Buprenorphine, Screen, Urine 01/21/2025 Negative  Negative Final    Ethanol 01/21/2025 <10  0 - 10 mg/dL Final    WBC 01/21/2025 5.70  3.40 - 10.80 10*3/mm3 Final    RBC 01/21/2025 3.73 (L)  4.14 - 5.80 10*6/mm3 Final    Hemoglobin 01/21/2025 11.5 (L)  13.0 - 17.7 g/dL Final    Hematocrit 01/21/2025 35.1 (L)  37.5 - 51.0 % Final    MCV 01/21/2025 94.1  79.0 - 97.0 fL Final    MCH 01/21/2025 30.8  26.6 - 33.0 pg Final    MCHC 01/21/2025 32.8  31.5 - 35.7 g/dL Final    RDW 01/21/2025 15.3  12.3 - 15.4 % Final    RDW-SD 01/21/2025 52.2  37.0 - 54.0 fl Final    MPV 01/21/2025 9.7  6.0 - 12.0 fL Final    Platelets 01/21/2025 128 (L)  140 - 450 10*3/mm3 Final    Neutrophil % 01/21/2025 62.3  42.7 - 76.0 % Final    Lymphocyte % 01/21/2025 16.8 (L)  19.6 - 45.3 % Final    Monocyte % 01/21/2025 12.6 (H)  5.0 - 12.0 % Final    Eosinophil % 01/21/2025 7.5 (H)  0.3 - 6.2 % Final    Basophil % 01/21/2025 0.4   0.0 - 1.5 % Final    Immature Grans % 01/21/2025 0.4  0.0 - 0.5 % Final    Neutrophils, Absolute 01/21/2025 3.55  1.70 - 7.00 10*3/mm3 Final    Lymphocytes, Absolute 01/21/2025 0.96  0.70 - 3.10 10*3/mm3 Final    Monocytes, Absolute 01/21/2025 0.72  0.10 - 0.90 10*3/mm3 Final    Eosinophils, Absolute 01/21/2025 0.43 (H)  0.00 - 0.40 10*3/mm3 Final    Basophils, Absolute 01/21/2025 0.02  0.00 - 0.20 10*3/mm3 Final    Immature Grans, Absolute 01/21/2025 0.02  0.00 - 0.05 10*3/mm3 Final    nRBC 01/21/2025 0.0  0.0 - 0.2 /100 WBC Final    Fentanyl, Urine 01/21/2025 Positive (A)  Negative Final     CT Abdomen Pelvis Without Contrast    Result Date: 1/21/2025  Narrative: CT ABDOMEN PELVIS WO CONTRAST Date of Exam: 1/21/2025 10:38 PM EST Indication: left flank pain. Comparison: 1/16/2025 Technique: Axial CT images were obtained of the abdomen and pelvis without the administration of contrast. Reconstructed coronal and sagittal images were also obtained. Automated exposure control and iterative construction methods were used. Findings: Visualized lung bases are clear. Liver margins are nodular suggesting changes of cirrhosis. Liver is otherwise unremarkable. Gallbladder appears normal. Pancreas and spleen are within normal limits. Bilateral adrenal glands appear normal. Kidneys appear normal bilaterally. No evidence of hydronephrosis. No abdominal or retroperitoneal adenopathy. The upper GI tract is within normal limits. Pelvis: There is a large diverticulum arising from the posterior left lateral wall of urinary bladder. This is unchanged from prior exam. Urinary bladder is otherwise unremarkable. There are multiple colonic diverticula. No evidence of diverticulitis. The appendix is normal.     Impression: Impression: 1. No evidence of renal or ureteral stone or hydronephrosis. No acute process seen within the abdomen or pelvis. 2. Colonic diverticulosis. 3. Large bladder diverticulum unchanged from prior study.  Electronically Signed: Jerrod Olsen MD  1/21/2025 10:49 PM EST  Workstation ID: [id number]    CT Chest With Contrast Diagnostic    Result Date: 1/16/2025  Narrative: CT SOFT TISSUE NECK W CONTRAST, CT ABDOMEN PELVIS W CONTRAST, CT CHEST W CONTRAST DIAGNOSTIC Date of Exam: 1/16/2025 1:12 PM EST Indication: esophageal cancer. Comparison: 10/9/2024. Technique: Axial CT images were obtained of the neck, chest, abdomen and pelvis after the uneventful intravenous administration of 100 mL Isovue-300.  Reconstructed coronal and sagittal images were also obtained. Automated exposure control and iterative construction methods were used. Findings: CT neck: Limited intracranial evaluation demonstrates no acute findings. The orbits appear normal. The paranasal sinuses demonstrate some mild mucosal thickening and partial fluid opacification within the maxillary sinuses and ethmoid air cells, similar to prior. There is no evidence of aerodigestive tract mass or other focal luminal abnormality. Vascular structures remain patent, with some calcific atherosclerotic changes noted at the left ICA origin. The parotid and submandibular glands appear normal.  There is no distinct pathologic cervical lymphadenopathy. The thyroid appears homogeneous. The osseous structures demonstrate some multilevel spondylosis, otherwise without evidence of acute fracture or aggressive osseous lesion. CT CHEST: No pathologic axillary adenopathy or other worrisome body wall soft tissue finding in the chest. Limited evaluation of the thoracic esophagus demonstrates no evidence of luminal mass or other focal abnormality. There is no new pathologic mediastinal lymphadenopathy. Minimally atherosclerotic nonaneurysmal thoracic aorta. Some apical scarring is present, potentially posttreatment related. There is otherwise no evidence of acute infectious or inflammatory process of the lung fields. No distinct suspicious focal pulmonary nodularity is present.  Central airways are clear. The thoracic osseous structures demonstrate spondylosis, without evidence of acute fracture or distinct focal suspicious osseous lesion. CT abdomen pelvis: The body wall soft tissues demonstrate no acute or suspicious findings. The osseous structures demonstrate no evidence of acute fracture or distinct focal suspicious osseous lesion. The liver again demonstrates morphologic changes of cirrhosis, without suspicious focal lesion. The spleen is homogeneous. The pancreas and bilateral adrenal glands appear normal. Unremarkable gallbladder. There is no worrisome retroperitoneal lymphadenopathy or mesenteric nodularity. Atherosclerotic, nonaneurysmal abdominal aorta. Small and large bowel loops are nondilated. There is no suspicious focal bowel wall thickening. A large posterior urinary bladder diverticulum is noted.     Impression: Impression: No specific evidence of recurrent or metastatic disease in the neck, chest, abdomen and pelvis. No unexpected or acute findings otherwise. Chronic findings are noted as above, including morphologic changes of cirrhosis. Electronically Signed: Yrui Medina MD  1/16/2025 11:35 PM EST  Workstation ID: [id number]    CT Abdomen Pelvis With Contrast    Result Date: 1/16/2025  Narrative: CT SOFT TISSUE NECK W CONTRAST, CT ABDOMEN PELVIS W CONTRAST, CT CHEST W CONTRAST DIAGNOSTIC Date of Exam: 1/16/2025 1:12 PM EST Indication: esophageal cancer. Comparison: 10/9/2024. Technique: Axial CT images were obtained of the neck, chest, abdomen and pelvis after the uneventful intravenous administration of 100 mL Isovue-300.  Reconstructed coronal and sagittal images were also obtained. Automated exposure control and iterative construction methods were used. Findings: CT neck: Limited intracranial evaluation demonstrates no acute findings. The orbits appear normal. The paranasal sinuses demonstrate some mild mucosal thickening and partial fluid opacification within the  maxillary sinuses and ethmoid air cells, similar to prior. There is no evidence of aerodigestive tract mass or other focal luminal abnormality. Vascular structures remain patent, with some calcific atherosclerotic changes noted at the left ICA origin. The parotid and submandibular glands appear normal.  There is no distinct pathologic cervical lymphadenopathy. The thyroid appears homogeneous. The osseous structures demonstrate some multilevel spondylosis, otherwise without evidence of acute fracture or aggressive osseous lesion. CT CHEST: No pathologic axillary adenopathy or other worrisome body wall soft tissue finding in the chest. Limited evaluation of the thoracic esophagus demonstrates no evidence of luminal mass or other focal abnormality. There is no new pathologic mediastinal lymphadenopathy. Minimally atherosclerotic nonaneurysmal thoracic aorta. Some apical scarring is present, potentially posttreatment related. There is otherwise no evidence of acute infectious or inflammatory process of the lung fields. No distinct suspicious focal pulmonary nodularity is present. Central airways are clear. The thoracic osseous structures demonstrate spondylosis, without evidence of acute fracture or distinct focal suspicious osseous lesion. CT abdomen pelvis: The body wall soft tissues demonstrate no acute or suspicious findings. The osseous structures demonstrate no evidence of acute fracture or distinct focal suspicious osseous lesion. The liver again demonstrates morphologic changes of cirrhosis, without suspicious focal lesion. The spleen is homogeneous. The pancreas and bilateral adrenal glands appear normal. Unremarkable gallbladder. There is no worrisome retroperitoneal lymphadenopathy or mesenteric nodularity. Atherosclerotic, nonaneurysmal abdominal aorta. Small and large bowel loops are nondilated. There is no suspicious focal bowel wall thickening. A large posterior urinary bladder diverticulum is noted.      Impression: Impression: No specific evidence of recurrent or metastatic disease in the neck, chest, abdomen and pelvis. No unexpected or acute findings otherwise. Chronic findings are noted as above, including morphologic changes of cirrhosis. Electronically Signed: Yuri Medina MD  1/16/2025 11:35 PM EST  Workstation ID: [id number]    CT Soft Tissue Neck With Contrast    Result Date: 1/16/2025  Narrative: CT SOFT TISSUE NECK W CONTRAST, CT ABDOMEN PELVIS W CONTRAST, CT CHEST W CONTRAST DIAGNOSTIC Date of Exam: 1/16/2025 1:12 PM EST Indication: esophageal cancer. Comparison: 10/9/2024. Technique: Axial CT images were obtained of the neck, chest, abdomen and pelvis after the uneventful intravenous administration of 100 mL Isovue-300.  Reconstructed coronal and sagittal images were also obtained. Automated exposure control and iterative construction methods were used. Findings: CT neck: Limited intracranial evaluation demonstrates no acute findings. The orbits appear normal. The paranasal sinuses demonstrate some mild mucosal thickening and partial fluid opacification within the maxillary sinuses and ethmoid air cells, similar to prior. There is no evidence of aerodigestive tract mass or other focal luminal abnormality. Vascular structures remain patent, with some calcific atherosclerotic changes noted at the left ICA origin. The parotid and submandibular glands appear normal.  There is no distinct pathologic cervical lymphadenopathy. The thyroid appears homogeneous. The osseous structures demonstrate some multilevel spondylosis, otherwise without evidence of acute fracture or aggressive osseous lesion. CT CHEST: No pathologic axillary adenopathy or other worrisome body wall soft tissue finding in the chest. Limited evaluation of the thoracic esophagus demonstrates no evidence of luminal mass or other focal abnormality. There is no new pathologic mediastinal lymphadenopathy. Minimally atherosclerotic nonaneurysmal  thoracic aorta. Some apical scarring is present, potentially posttreatment related. There is otherwise no evidence of acute infectious or inflammatory process of the lung fields. No distinct suspicious focal pulmonary nodularity is present. Central airways are clear. The thoracic osseous structures demonstrate spondylosis, without evidence of acute fracture or distinct focal suspicious osseous lesion. CT abdomen pelvis: The body wall soft tissues demonstrate no acute or suspicious findings. The osseous structures demonstrate no evidence of acute fracture or distinct focal suspicious osseous lesion. The liver again demonstrates morphologic changes of cirrhosis, without suspicious focal lesion. The spleen is homogeneous. The pancreas and bilateral adrenal glands appear normal. Unremarkable gallbladder. There is no worrisome retroperitoneal lymphadenopathy or mesenteric nodularity. Atherosclerotic, nonaneurysmal abdominal aorta. Small and large bowel loops are nondilated. There is no suspicious focal bowel wall thickening. A large posterior urinary bladder diverticulum is noted.     Impression: Impression: No specific evidence of recurrent or metastatic disease in the neck, chest, abdomen and pelvis. No unexpected or acute findings otherwise. Chronic findings are noted as above, including morphologic changes of cirrhosis. Electronically Signed: Yuri Medina MD  1/16/2025 11:35 PM EST  Workstation ID: [id number]     Procedures    Had a fall on right shoulderbut not the rightgot a injection in the left shoulder  to help him stop drinking at dr. Wesly Albert 6 weeks ago, swelled up and red.     Narrative & Impression   MRI BRAIN WO CONTRAST     Date of Exam: 2/18/2024 5:05 PM EST     Indication: Dizziness, off balance, present x 2 weeks.     Comparison: 2/4/2024 CT     Technique:  Routine multiplanar/multisequence sequence images of the brain were obtained without contrast administration.        Findings:  Diffusion  imaging shows no evidence of acute infarct. There are scattered subcortical and periventricular T2 hyperintensities which are nonspecific but likely sequela of mild to moderate small vessel ischemic disease. No evidence of intracranial   hemorrhage.     There is normal ventricular volume. The basal cisterns are patent. There is no evidence of extra-axial fluid collection. There is normal flow voids within the intracranial vessels.     No evidence of fracture or suspicious bony lesion. Paranasal sinusitis with gas/fluid level in the right maxillary sinus. Trace bilateral mastoid air cell effusions. Visualized orbits are unremarkable.     IMPRESSION:  Impression:  No acute intracranial abnormality. No suspicious mass on this noncontrast MRI.     Paranasal sinusitis with gas/fluid level in the right maxillary sinus.       Assessment / Plan      Assessment/Plan:     1.  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  SUV avid right supraclavicular lymph node  3.  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 -MMR intact  -He completed concurrent chemoradiation but required dose reduction and treatment delays for neuropathy.  He completed therapy on 5/8/2024.    -Follow-up PET/CT showed EMIL  -Reviewed CT showing EMIL 1/2025  -Port labs pending  -Will continue with CT of the neck, chest abdomen pelvis (given cervical location of his tumor and regional adenopathy involving the supraclavicular region) every 3 to 6 months for 2 years and then annually for 5 years as per the NCCN guidelines.    Orders Placed This Encounter   Procedures    CT Soft Tissue Neck With Contrast    CT Chest With Contrast    CT Abdomen Pelvis With Contrast    Comprehensive Metabolic Panel    Ambulatory Referral to Gastroenterology    CBC & Differential      -Following with   [facility], EGD and pathology reviewed from 8/2024, continue EGD surveillance every 6 mo. Referral placed.      4.  Symptomatic cervical stenosis  5.  Low back and hip pain  -Reviewed ED records  -Follow up with pain management    6.  Active smoker  -Encouraged cessation.  Patient is precontemplative.    7. Access  -Port flush with labs today    9.  Bladder distention and diverticuli  He has a history of urinary tract infections and reports obstructive voiding symptoms.   -Self cathing    Follow Up:   4 mo with labs and scans    Katerina Ga MD  Hematology and Oncology

## 2025-02-03 ENCOUNTER — OFFICE VISIT (OUTPATIENT)
Dept: PAIN MEDICINE | Facility: CLINIC | Age: 65
End: 2025-02-03
Payer: MEDICARE

## 2025-02-03 VITALS — WEIGHT: 183 LBS | BODY MASS INDEX: 27.11 KG/M2 | HEIGHT: 69 IN

## 2025-02-03 DIAGNOSIS — M48.062 SPINAL STENOSIS OF LUMBAR REGION WITH NEUROGENIC CLAUDICATION: Primary | ICD-10-CM

## 2025-02-03 NOTE — PROGRESS NOTES
Referring Physician: No referring provider defined for this encounter.    Primary Physician: Wesly Minor MD    CHIEF COMPLAINT or REASON FOR VISIT: Follow-up (Post Lumbar Interlaminar Epidural Steroid Injection ) and Back Pain      Initial history of present illness on 12/18/2024:  Mr. Val Nassar is 64 y.o. male who presents as a new patient referral for evaluation treatment of chronic low back pain with radiation to bilateral lower extremities.  Patient reports a many year history of this issue which has been worsening over time without any specific inciting event or trauma.  He reports bilateral low back pain with radiation to bilateral lower extremities exacerbated with ambulation and prolonged standing and ameliorated with rest.  He does live in a nursing home.  He uses a rolling walker for ambulation.  He denies any history of spinal surgery or intervention.  He does use a fentanyl patch while in the nursing home.  This has improved his neuropathy but he continues to complain of his claudicating back pain.  Patient denies any bowel or bladder dysfunction, lower extremity weakness, new onset saddle anesthesia or unexplained weight loss.  He is treated with palliative care for issues relating to cirrhosis,esophageal squamous cell carcinoma.  He has been evaluated by neurosurgery, Dr. Malhotra, who referred for nonsurgical management.      Interval history:  Patient returns to clinic today after undergoing a lumbar interlaminar epidural steroid injection.  He reports minimal relief from this procedure.  He continues to complain of chronic low back pain with radiation to bilateral lower extremities, left greater than right.  He denies any new injuries or events since his previous appointment.  He is interested in strategies to help alleviate his pain.    Interventions:  1/02/2025: LESI with minimal relief    Objective Pain Scoring:   BRIEF PAIN INVENTORY:  Total score:   Pain Score    02/03/25 1324    PainSc:   9   PainLoc: Back      PHQ-2: 4  PHQ-9: 12  Opioid Risk Tool:         Review of Systems:   ROS negative except as otherwise noted     Past Medical History:   Past Medical History:   Diagnosis Date    Anemia     Cancer     ESOPHAGEAL    Cirrhosis     Duodenal ulcer     Gastric ulcer     GERD (gastroesophageal reflux disease)     History of alcohol abuse     History of radiation therapy 05/08/2024    esophagus    HLD (hyperlipidemia)     Mood disorder          Past Surgical History:   Past Surgical History:   Procedure Laterality Date    ENDOSCOPY N/A 12/26/2023    Procedure: ESOPHAGOGASTRODUODENOSCOPY;  Surgeon: Wesly Mckeon MD;  Location: [facility] ENDOSCOPY;  Service: Gastroenterology;  Laterality: N/A;    VENOUS ACCESS DEVICE (PORT) INSERTION Right 04/25/2024         Family History   Family History   Problem Relation Age of Onset    Cancer Mother         LUNG AND BREAST    Breast cancer Mother     Heart attack Father          Social History   Social History     Socioeconomic History    Marital status: Single   Tobacco Use    Smoking status: Every Day     Current packs/day: 1.00     Average packs/day: 1 pack/day for 15.0 years (15.0 ttl pk-yrs)     Types: Cigarettes     Passive exposure: Current    Smokeless tobacco: Never   Vaping Use    Vaping status: Some Days    Substances: Flavoring    Devices: Disposable   Substance and Sexual Activity    Alcohol use: Not Currently     Comment: sober for ~ 3 months    Drug use: Yes     Types: Hydrocodone    Sexual activity: Not Currently     Partners: Female        Medications:     Current Outpatient Medications:     acetaminophen (TYLENOL) 325 MG tablet, Take 2 tablets by mouth Every 4 (Four) Hours As Needed for Mild Pain., Disp: , Rfl:     benzonatate (TESSALON) 100 MG capsule, Take 1 capsule by mouth Every 8 (Eight) Hours As Needed for Cough., Disp: , Rfl:     Cholecalciferol (Vitamin D3) 1.25 MG (50930 UT) capsule, Take 1 capsule by mouth Every 7 (Seven) Days.,  Disp: , Rfl:     Diclofenac Sodium (Voltaren) 1 % gel gel, Apply 4 g topically to the appropriate area as directed 2 (Two) Times a Day., Disp: 50 g, Rfl: 1    diphenhydrAMINE (BENADRYL) 25 mg capsule, Take 1 capsule by mouth At Night As Needed for Itching (insomnia)., Disp: 30 capsule, Rfl: 0    docusate sodium (Colace) 100 MG capsule, Take 1 capsule by mouth 2 (Two) Times a Day As Needed for Constipation., Disp: 30 capsule, Rfl: 1    dutasteride (AVODART) 0.5 MG capsule, Take 1 capsule by mouth Daily., Disp: , Rfl:     escitalopram (LEXAPRO) 10 MG tablet, Take 1 tablet by mouth Every Night., Disp: , Rfl:     fentaNYL (DURAGESIC) 25 MCG/HR patch, Place 1 patch on the skin as directed by provider Every 72 (Seventy-Two) Hours., Disp: , Rfl:     ferrous gluconate (FERGON) 324 MG tablet, Take 1 tablet by mouth Daily With Breakfast., Disp: , Rfl:     finasteride (PROSCAR) 5 MG tablet, Take 1 tablet by mouth Daily., Disp: , Rfl:     folic acid (FOLVITE) 1 MG tablet, Take 1 tablet by mouth Daily., Disp: , Rfl:     gabapentin (NEURONTIN) 300 MG capsule, , Disp: , Rfl:     gabapentin (NEURONTIN) 50 mg/mL solution, Take 16 mL by mouth 3 (Three) Times a Day., Disp: 1000 mL, Rfl: 0    guaiFENesin (MUCINEX) 600 MG 12 hr tablet, Take 2 tablets by mouth Every 12 (Twelve) Hours As Needed for Cough., Disp: , Rfl:     hydrocortisone 2.5 % cream, , Disp: , Rfl:     lactulose (CHRONULAC) 10 GM/15ML solution, Take 30 mL by mouth 2 (Two) Times a Day As Needed., Disp: , Rfl:     lidocaine (LIDODERM) 5 %, , Disp: , Rfl:     Lidocaine 4 %, Place 1 patch on the skin as directed by provider Daily. Remove & Discard patch within 12 hours or as directed by MD, Disp: , Rfl:     lidocaine-prilocaine (EMLA) 2.5-2.5 % cream, Apply 1 Application topically to the appropriate area as directed As Needed (45-60 minutes prior to port access.  Cover with saran/plastic wrap.)., Disp: 30 g, Rfl: 3    Loratadine 10 MG capsule, Take  by mouth Daily., Disp: ,  Rfl:     melatonin 5 MG tablet tablet, Take 1 tablet by mouth At Night As Needed., Disp: , Rfl:     mometasone (ELOCON) 0.1 % cream, , Disp: , Rfl:     nystatin (MYCOSTATIN) 100,000 unit/mL suspension, , Disp: , Rfl:     nystatin susp + lidocaine viscous (MAGIC MOUTHWASH) oral suspension, 5-10 ml swish and spit or swallow QID prn, Disp: 240 mL, Rfl: 3    omeprazole (priLOSEC) 40 MG capsule, Take  by mouth., Disp: , Rfl:     ondansetron (ZOFRAN) 8 MG tablet, Take 1 tablet by mouth Every 8 (Eight) Hours As Needed for Nausea or Vomiting., Disp: 30 tablet, Rfl: 3    oxyCODONE-acetaminophen (PERCOCET) 7.5-325 MG per tablet, , Disp: , Rfl:     pantoprazole (PROTONIX) 40 MG EC tablet, Take 1 tablet by mouth 2 (Two) Times a Day., Disp: , Rfl:     phenol (CHLORASEPTIC) 1.4 % liquid liquid, Apply 1 spray to the mouth or throat Every 4 (Four) Hours As Needed., Disp: , Rfl:     polyethylene glycol (MIRALAX) 17 g packet, Take 17 g by mouth Daily As Needed (Use if senna-docusate is ineffective)., Disp: , Rfl:     predniSONE (DELTASONE) 10 MG tablet, Take daily based on following titration schedule: 60 mg x 1 day, then 50 mg x 1, then 40 mg x 1, then 30 mg x 1 day, then 20 mg x 1 day, then 10 mg x 1 day then stop, Disp: 21 tablet, Rfl: 0    promethazine (PHENERGAN) 25 MG tablet, Take 1 tablet by mouth Every 6 (Six) Hours As Needed for Nausea or Vomiting., Disp: , Rfl:     Senna-Time 8.6 MG tablet, , Disp: , Rfl:     sodium chloride (Ocean Nasal Spray) 0.65 % nasal spray, 1 spray into the nostril(s) as directed by provider As Needed for Congestion., Disp: 1 each, Rfl: 0    sulfamethoxazole-trimethoprim (Bactrim DS) 800-160 MG per tablet, Take 1 tablet by mouth 2 (Two) Times a Day., Disp: 10 tablet, Rfl: 0    tamsulosin (FLOMAX) 0.4 MG capsule 24 hr capsule, Take 1 capsule by mouth Daily., Disp: , Rfl:     vitamin B-12 (CYANOCOBALAMIN) 1000 MCG tablet, Take 1 tablet by mouth Daily., Disp: , Rfl:     zolpidem (AMBIEN) 5 MG tablet, ,  "Disp: , Rfl:         Physical Exam:     Vitals:    02/03/25 1324   Weight: 83 kg (183 lb)   Height: 175.3 cm (69.02\")   PainSc:   9   PainLoc: Back        General: Alert and oriented, No acute distress.   HEENT: Normocephalic, atraumatic.   Cardiovascular: No gross edema  Respiratory: Respirations are non-labored     Thoracic Spine:   Inspection: no gross abnormality  Paraspinal muscle palpation: nontender  Spinous process palpation: nontender    Lumbar Spine:   No masses or atrophy  Range of motion - Flexion reduced. Extension reduced.    Facet Loading: Positive bilaterally  Facet Palpation - Nontender   [place] finger/Gaenslen's/Ryan's/BK/Thigh thrust -   Straight leg raise/slump test: Negative bilaterally  Multifidus toe-touch test:    Motor Exam:     Strength: Rate on 1-5 scale Right Left    L1/2- hip flexion 5/5  5/5    L3- knee extension 5/5  5/5    L4- ankle dorsiflexion 5/5  5/5    L5- great toe extension 5/5  5/5    S1- ankle plantarflexion 5/5  5/5    Sensory Exam: Full and equal sensation to light touch throughout.       Neurologic: Cranial Nerves II-XII are grossly intact.    Clonus -negative bilaterally    Psychiatric: Cooperative.   Gait: Antalgic flexed forward  Assistive Devices: Rolling walker    Imaging Studies:   No results found for this or any previous visit.        Independent review of radiographic imaging: Available for interpretation is a lumbar MRI dated December 17, 2024 demonstrating grade 1 L4 on L5 anterolisthesis with bilateral neuroforaminal stenosis and moderate canal stenosis; there is facet arthropathy at L4/L5; L5/S1 degenerative disc disease with Modic 2 endplate changes and broad disc bulge causing bilateral neuroforaminal stenosis and canal stenosis.  There is atrophy of the paraspinal musculature and multifidus.    Impression & Plan:       12/18/2024: Val Nassar is a 64 y.o. male with past medical history significant for gastric ulcer, cirrhosis,esophageal " squamous cell carcinoma, EtOH abuse, anxiety, GERD, HLD, malnutrition, chronic opioid use who presents to the pain clinic for evaluation and treatment of chronic low back pain radiation bilateral lower extremities.  MRI interpretation as above.  Evaluation consistent with lumbar spinal stenosis with neurogenic claudication.  We discussed epidural steroid injection to improve pain.  If greater than 50% relief for at least 2-3 months can consider repeat as needed every 3 to 4 months.  I had a discussion with the patient regarding the risks of the procedure including bleeding, infection, damage to surrounding structures.  We discussed the potential adverse effects of corticosteroid injection including flushing of the face, lipodystrophy, skin discoloration, elevated blood glucose, increased blood pressure.  Risks of frequent steroid administration include weight gain, hormonal changes, mood changes, osteoporosis.  2/3/2025: Minimal relief from LESI.  Will proceed with bilateral L4/5 and L5/S1 TFESI.    1. Spinal stenosis of lumbar region with neurogenic claudication          PLAN:  1. Medication Recommendations: Defer to nursing home care    2. Physical Therapy: Patient has daily access to physical therapy at the nursing home    3. Psychological: defer    4. Complementary and alternative (CAM) Therapies:     5. Labs/Diagnostic studies: Last platelet count on 1/23/2025 was 129.    6. Imaging: Pending lumbar radiograph with flexion and extension.    7. Interventions: Bilateral L4/5 and L5/S1 transforaminal epidural steroid injections (89240, [id number]).   We discussed epidural steroid injection to improve pain.  If greater than 50% relief for at least 2 to 3 months can consider repeat as needed every 3 to 4 months.  Had a discussion with the patient regarding the risk of the procedure including bleeding, infection, damage to surrounding structures.  We discussed the potential adverse effects of corticosteroid injection  including flushing of the face, lipodystrophy, skin discoloration, elevated blood glucose, increased blood pressure.  Risks of frequent steroid administration includes weight gain, hormonal changes, mood changes, osteoporosis.     8. Referrals: None indicated     9. Records:     10. Lifestyle goals:    Follow-up 1 month      [facility]  Robin Quevedo MD          Quality Metrics:

## 2025-02-10 ENCOUNTER — OUTSIDE FACILITY SERVICE (OUTPATIENT)
Dept: GASTROENTEROLOGY | Facility: CLINIC | Age: 65
End: 2025-02-10
Payer: MEDICARE

## 2025-02-10 PROCEDURE — 43239 EGD BIOPSY SINGLE/MULTIPLE: CPT | Performed by: INTERNAL MEDICINE

## 2025-02-10 PROCEDURE — 88305 TISSUE EXAM BY PATHOLOGIST: CPT | Performed by: INTERNAL MEDICINE

## 2025-02-11 ENCOUNTER — LAB REQUISITION (OUTPATIENT)
Dept: LAB | Facility: HOSPITAL | Age: 65
DRG: 870 | End: 2025-02-11
Payer: MEDICARE

## 2025-02-11 ENCOUNTER — APPOINTMENT (OUTPATIENT)
Dept: GENERAL RADIOLOGY | Facility: HOSPITAL | Age: 65
DRG: 870 | End: 2025-02-11
Payer: MEDICARE

## 2025-02-11 ENCOUNTER — APPOINTMENT (OUTPATIENT)
Dept: CT IMAGING | Facility: HOSPITAL | Age: 65
DRG: 870 | End: 2025-02-11
Payer: MEDICARE

## 2025-02-11 ENCOUNTER — HOSPITAL ENCOUNTER (INPATIENT)
Facility: HOSPITAL | Age: 65
LOS: 29 days | Discharge: NURSING, REHAB OR LONG-TERM CARE | DRG: 870 | End: 2025-03-12
Attending: STUDENT IN AN ORGANIZED HEALTH CARE EDUCATION/TRAINING PROGRAM | Admitting: INTERNAL MEDICINE
Payer: MEDICARE

## 2025-02-11 ENCOUNTER — APPOINTMENT (OUTPATIENT)
Dept: CARDIOLOGY | Facility: HOSPITAL | Age: 65
DRG: 870 | End: 2025-02-11
Payer: MEDICARE

## 2025-02-11 DIAGNOSIS — N39.0 ACUTE UTI (URINARY TRACT INFECTION): ICD-10-CM

## 2025-02-11 DIAGNOSIS — J98.11 ATELECTASIS: ICD-10-CM

## 2025-02-11 DIAGNOSIS — R13.12 OROPHARYNGEAL DYSPHAGIA: ICD-10-CM

## 2025-02-11 DIAGNOSIS — D62 ACUTE BLOOD LOSS ANEMIA: ICD-10-CM

## 2025-02-11 DIAGNOSIS — K31.5 OBSTRUCTION OF DUODENUM: ICD-10-CM

## 2025-02-11 DIAGNOSIS — I95.9 HYPOTENSION, UNSPECIFIED HYPOTENSION TYPE: ICD-10-CM

## 2025-02-11 DIAGNOSIS — K31.89 OTHER DISEASES OF STOMACH AND DUODENUM: ICD-10-CM

## 2025-02-11 DIAGNOSIS — K70.30 ALCOHOLIC CIRRHOSIS OF LIVER WITHOUT ASCITES: ICD-10-CM

## 2025-02-11 DIAGNOSIS — E43 SEVERE MALNUTRITION: ICD-10-CM

## 2025-02-11 DIAGNOSIS — C15.9 MALIGNANT NEOPLASM OF ESOPHAGUS, UNSPECIFIED: ICD-10-CM

## 2025-02-11 DIAGNOSIS — R65.21 SEPTIC SHOCK: Primary | ICD-10-CM

## 2025-02-11 DIAGNOSIS — K92.1 MELENA: ICD-10-CM

## 2025-02-11 DIAGNOSIS — A41.9 SEPTIC SHOCK: Primary | ICD-10-CM

## 2025-02-11 DIAGNOSIS — C15.9 SQUAMOUS CELL CARCINOMA OF ESOPHAGUS: ICD-10-CM

## 2025-02-11 DIAGNOSIS — Z85.01 PERSONAL HISTORY OF MALIGNANT NEOPLASM OF ESOPHAGUS: ICD-10-CM

## 2025-02-11 PROBLEM — R39.89 SUSPECTED UTI: Status: ACTIVE | Noted: 2025-02-11

## 2025-02-11 PROBLEM — N17.9 AKI (ACUTE KIDNEY INJURY): Status: ACTIVE | Noted: 2025-02-11

## 2025-02-11 PROBLEM — E87.20 METABOLIC ACIDOSIS: Status: ACTIVE | Noted: 2025-02-11

## 2025-02-11 LAB
ALBUMIN SERPL-MCNC: 3.5 G/DL (ref 3.5–5.2)
ALBUMIN/GLOB SERPL: 1.2 G/DL
ALP SERPL-CCNC: 72 U/L (ref 39–117)
ALT SERPL W P-5'-P-CCNC: 107 U/L (ref 1–41)
AMMONIA BLD-SCNC: 22 UMOL/L (ref 16–60)
AMPHET+METHAMPHET UR QL: NEGATIVE
AMPHETAMINES UR QL: NEGATIVE
ANION GAP SERPL CALCULATED.3IONS-SCNC: 15 MMOL/L (ref 5–15)
ANION GAP SERPL CALCULATED.3IONS-SCNC: 28 MMOL/L (ref 5–15)
APAP SERPL-MCNC: <5 MCG/ML (ref 0–30)
ARTERIAL PATENCY WRIST A: ABNORMAL
ARTERIAL PATENCY WRIST A: ABNORMAL
AST SERPL-CCNC: 175 U/L (ref 1–40)
ATMOSPHERIC PRESS: ABNORMAL MM[HG]
ATMOSPHERIC PRESS: ABNORMAL MM[HG]
AV MEAN PRESS GRAD SYS DOP V1V2: 14 MMHG
AV VMAX SYS DOP: 240.5 CM/SEC
BACTERIA UR QL AUTO: ABNORMAL /HPF
BARBITURATES UR QL SCN: NEGATIVE
BASE EXCESS BLDA CALC-SCNC: -11.1 MMOL/L (ref 0–2)
BASE EXCESS BLDA CALC-SCNC: -12.6 MMOL/L (ref 0–2)
BASOPHILS # BLD MANUAL: 0 10*3/MM3 (ref 0–0.2)
BASOPHILS NFR BLD MANUAL: 0 % (ref 0–1.5)
BDY SITE: ABNORMAL
BDY SITE: ABNORMAL
BENZODIAZ UR QL SCN: NEGATIVE
BH CV ECHO MEAS - AO MAX PG: 23.2 MMHG
BH CV ECHO MEAS - AO ROOT DIAM: 2.9 CM
BH CV ECHO MEAS - AO V2 VTI: 42.5 CM
BH CV ECHO MEAS - AVA(I,D): 1.24 CM2
BH CV ECHO MEAS - EDV(CUBED): 85.2 ML
BH CV ECHO MEAS - EDV(MOD-SP2): 110 ML
BH CV ECHO MEAS - EDV(MOD-SP4): 115 ML
BH CV ECHO MEAS - EF(MOD-SP2): 62.6 %
BH CV ECHO MEAS - EF(MOD-SP4): 63.5 %
BH CV ECHO MEAS - ESV(CUBED): 27 ML
BH CV ECHO MEAS - ESV(MOD-SP2): 41.1 ML
BH CV ECHO MEAS - ESV(MOD-SP4): 42 ML
BH CV ECHO MEAS - FS: 31.8 %
BH CV ECHO MEAS - IVS/LVPW: 1 CM
BH CV ECHO MEAS - IVSD: 1.1 CM
BH CV ECHO MEAS - LA DIMENSION: 3.7 CM
BH CV ECHO MEAS - LAT PEAK E' VEL: 13.1 CM/SEC
BH CV ECHO MEAS - LV MASS(C)D: 168.9 GRAMS
BH CV ECHO MEAS - LV MAX PG: 3.4 MMHG
BH CV ECHO MEAS - LV MEAN PG: 2 MMHG
BH CV ECHO MEAS - LV V1 MAX: 92 CM/SEC
BH CV ECHO MEAS - LV V1 VTI: 15.2 CM
BH CV ECHO MEAS - LVIDD: 4.4 CM
BH CV ECHO MEAS - LVIDS: 3 CM
BH CV ECHO MEAS - LVOT AREA: 3.5 CM2
BH CV ECHO MEAS - LVOT DIAM: 2.1 CM
BH CV ECHO MEAS - LVPWD: 1.1 CM
BH CV ECHO MEAS - MED PEAK E' VEL: 8.9 CM/SEC
BH CV ECHO MEAS - MV A MAX VEL: 100 CM/SEC
BH CV ECHO MEAS - MV DEC SLOPE: 456 CM/SEC2
BH CV ECHO MEAS - MV DEC TIME: 0.16 SEC
BH CV ECHO MEAS - MV E MAX VEL: 127 CM/SEC
BH CV ECHO MEAS - MV E/A: 1.27
BH CV ECHO MEAS - MV MAX PG: 7.5 MMHG
BH CV ECHO MEAS - MV MEAN PG: 4 MMHG
BH CV ECHO MEAS - MV P1/2T: 88.6 MSEC
BH CV ECHO MEAS - MV V2 VTI: 33.5 CM
BH CV ECHO MEAS - MVA(P1/2T): 2.48 CM2
BH CV ECHO MEAS - MVA(VTI): 1.57 CM2
BH CV ECHO MEAS - PA ACC TIME: 0.14 SEC
BH CV ECHO MEAS - RAP SYSTOLE: 15 MMHG
BH CV ECHO MEAS - SV(LVOT): 52.6 ML
BH CV ECHO MEAS - SV(MOD-SP2): 68.9 ML
BH CV ECHO MEAS - SV(MOD-SP4): 73 ML
BH CV ECHO MEASUREMENTS AVERAGE E/E' RATIO: 11.55
BH CV VAS BP RIGHT ARM: NORMAL MMHG
BH CV XLRA - RV BASE: 3.6 CM
BH CV XLRA - RV LENGTH: 7.7 CM
BH CV XLRA - RV MID: 3 CM
BH CV XLRA - TDI S': 12.4 CM/SEC
BILIRUB SERPL-MCNC: 0.5 MG/DL (ref 0–1.2)
BILIRUB UR QL STRIP: NEGATIVE
BODY TEMPERATURE: 37
BODY TEMPERATURE: 37
BUN SERPL-MCNC: 27 MG/DL (ref 8–23)
BUN SERPL-MCNC: 35 MG/DL (ref 8–23)
BUN/CREAT SERPL: 12 (ref 7–25)
BUN/CREAT SERPL: 8.4 (ref 7–25)
BUPRENORPHINE SERPL-MCNC: NEGATIVE NG/ML
CALCIUM SPEC-SCNC: 7.5 MG/DL (ref 8.6–10.5)
CALCIUM SPEC-SCNC: 9.6 MG/DL (ref 8.6–10.5)
CANNABINOIDS SERPL QL: NEGATIVE
CHLORIDE SERPL-SCNC: 108 MMOL/L (ref 98–107)
CHLORIDE SERPL-SCNC: 99 MMOL/L (ref 98–107)
CLARITY UR: ABNORMAL
CO2 BLDA-SCNC: 15.3 MMOL/L (ref 22–33)
CO2 BLDA-SCNC: 15.8 MMOL/L (ref 22–33)
CO2 SERPL-SCNC: 10 MMOL/L (ref 22–29)
CO2 SERPL-SCNC: 14 MMOL/L (ref 22–29)
COCAINE UR QL: NEGATIVE
COHGB MFR BLD: 1.1 % (ref 0–2)
COHGB MFR BLD: 1.3 % (ref 0–2)
COLOR UR: YELLOW
CORTIS SERPL-MCNC: 43.35 MCG/DL
CREAT SERPL-MCNC: 2.91 MG/DL (ref 0.76–1.27)
CREAT SERPL-MCNC: 3.22 MG/DL (ref 0.76–1.27)
CRP SERPL-MCNC: 8.21 MG/DL (ref 0–0.5)
D DIMER PPP FEU-MCNC: 5.87 MCGFEU/ML (ref 0–0.65)
D-LACTATE SERPL-SCNC: 10 MMOL/L (ref 0.5–2)
D-LACTATE SERPL-SCNC: 4.4 MMOL/L (ref 0.5–2)
D-LACTATE SERPL-SCNC: 4.6 MMOL/L (ref 0.5–2)
D-LACTATE SERPL-SCNC: 5.6 MMOL/L (ref 0.5–2)
DEPRECATED RDW RBC AUTO: 56.2 FL (ref 37–54)
EGFRCR SERPLBLD CKD-EPI 2021: 20.5 ML/MIN/1.73
EGFRCR SERPLBLD CKD-EPI 2021: 23.2 ML/MIN/1.73
EOSINOPHIL # BLD MANUAL: 0 10*3/MM3 (ref 0–0.4)
EOSINOPHIL NFR BLD MANUAL: 0 % (ref 0.3–6.2)
EPAP: 0
EPAP: 0
ERYTHROCYTE [DISTWIDTH] IN BLOOD BY AUTOMATED COUNT: 15.3 % (ref 12.3–15.4)
ETHANOL BLD-MCNC: <10 MG/DL (ref 0–10)
FENTANYL UR-MCNC: POSITIVE NG/ML
FLUAV RNA RESP QL NAA+PROBE: NOT DETECTED
FLUBV RNA RESP QL NAA+PROBE: NOT DETECTED
GEN 5 1HR TROPONIN T REFLEX: 120 NG/L
GLOBULIN UR ELPH-MCNC: 3 GM/DL
GLUCOSE BLDC GLUCOMTR-MCNC: 100 MG/DL (ref 70–130)
GLUCOSE BLDC GLUCOMTR-MCNC: 106 MG/DL (ref 70–130)
GLUCOSE BLDC GLUCOMTR-MCNC: 97 MG/DL (ref 70–130)
GLUCOSE SERPL-MCNC: 81 MG/DL (ref 65–99)
GLUCOSE SERPL-MCNC: 95 MG/DL (ref 65–99)
GLUCOSE UR STRIP-MCNC: NEGATIVE MG/DL
HCO3 BLDA-SCNC: 14.2 MMOL/L (ref 20–26)
HCO3 BLDA-SCNC: 14.8 MMOL/L (ref 20–26)
HCT VFR BLD AUTO: 40.1 % (ref 37.5–51)
HCT VFR BLD CALC: 32.2 % (ref 38–51)
HCT VFR BLD CALC: 34.1 % (ref 38–51)
HGB BLD-MCNC: 12.8 G/DL (ref 13–17.7)
HGB BLDA-MCNC: 10.5 G/DL (ref 13.5–17.5)
HGB BLDA-MCNC: 11.1 G/DL (ref 13.5–17.5)
HGB UR QL STRIP.AUTO: ABNORMAL
HOLD SPECIMEN: NORMAL
HYALINE CASTS UR QL AUTO: ABNORMAL /LPF
INHALED O2 CONCENTRATION: 80 %
INHALED O2 CONCENTRATION: 90 %
INR PPP: 1.21 (ref 0.89–1.12)
IPAP: 0
IPAP: 0
IVRT: 88 MS
KETONES UR QL STRIP: NEGATIVE
LEFT ATRIUM VOLUME INDEX: 27.1 ML/M2
LEFT ATRIUM VOLUME: 53.9 ML
LEUKOCYTE ESTERASE UR QL STRIP.AUTO: ABNORMAL
LV EF 2D ECHO EST: 60 %
LV EF BIPLANE MOD: 62.9 %
LYMPHOCYTES # BLD MANUAL: 0.55 10*3/MM3 (ref 0.7–3.1)
LYMPHOCYTES NFR BLD MANUAL: 10 % (ref 5–12)
Lab: ABNORMAL
MAGNESIUM SERPL-MCNC: 1.1 MG/DL (ref 1.6–2.4)
MAGNESIUM SERPL-MCNC: 1.3 MG/DL (ref 1.6–2.4)
MCH RBC QN AUTO: 31.6 PG (ref 26.6–33)
MCHC RBC AUTO-ENTMCNC: 31.9 G/DL (ref 31.5–35.7)
MCV RBC AUTO: 99 FL (ref 79–97)
METHADONE UR QL SCN: NEGATIVE
METHGB BLD QL: 0 % (ref 0–1.5)
METHGB BLD QL: 0.2 % (ref 0–1.5)
MODALITY: ABNORMAL
MODALITY: ABNORMAL
MONOCYTES # BLD: 2.76 10*3/MM3 (ref 0.1–0.9)
MRSA DNA SPEC QL NAA+PROBE: NEGATIVE
NEUTROPHILS # BLD AUTO: 24.27 10*3/MM3 (ref 1.7–7)
NEUTROPHILS NFR BLD MANUAL: 51 % (ref 42.7–76)
NEUTS BAND NFR BLD MANUAL: 37 % (ref 0–5)
NITRITE UR QL STRIP: NEGATIVE
NOTIFIED BY: ABNORMAL
NOTIFIED WHO: ABNORMAL
NRBC SPEC MANUAL: 2 /100 WBC (ref 0–0.2)
NT-PROBNP SERPL-MCNC: ABNORMAL PG/ML (ref 0–900)
NT-PROBNP SERPL-MCNC: ABNORMAL PG/ML (ref 0–900)
OPIATES UR QL: NEGATIVE
OXYCODONE UR QL SCN: POSITIVE
OXYHGB MFR BLDV: 92.7 % (ref 94–99)
OXYHGB MFR BLDV: 95.7 % (ref 94–99)
PAW @ PEAK INSP FLOW SETTING VENT: 0 CMH2O
PAW @ PEAK INSP FLOW SETTING VENT: 0 CMH2O
PCO2 BLDA: 32.7 MM HG (ref 35–45)
PCO2 BLDA: 35.3 MM HG (ref 35–45)
PCO2 TEMP ADJ BLD: 32.7 MM HG (ref 35–48)
PCO2 TEMP ADJ BLD: 35.3 MM HG (ref 35–48)
PCP UR QL SCN: NEGATIVE
PH BLDA: 7.21 PH UNITS (ref 7.35–7.45)
PH BLDA: 7.26 PH UNITS (ref 7.35–7.45)
PH UR STRIP.AUTO: 7 [PH] (ref 5–8)
PH, TEMP CORRECTED: 7.21 PH UNITS
PH, TEMP CORRECTED: 7.26 PH UNITS
PHOSPHATE SERPL-MCNC: 4.2 MG/DL (ref 2.5–4.5)
PLAT MORPH BLD: NORMAL
PLATELET # BLD AUTO: 136 10*3/MM3 (ref 140–450)
PMV BLD AUTO: 10.2 FL (ref 6–12)
PO2 BLDA: 100 MM HG (ref 83–108)
PO2 BLDA: 73.4 MM HG (ref 83–108)
PO2 TEMP ADJ BLD: 100 MM HG (ref 83–108)
PO2 TEMP ADJ BLD: 73.4 MM HG (ref 83–108)
POTASSIUM SERPL-SCNC: 3.5 MMOL/L (ref 3.5–5.2)
POTASSIUM SERPL-SCNC: 3.7 MMOL/L (ref 3.5–5.2)
PROCALCITONIN SERPL-MCNC: >100 NG/ML (ref 0–0.25)
PROT SERPL-MCNC: 6.5 G/DL (ref 6–8.5)
PROT UR QL STRIP: ABNORMAL
PROTHROMBIN TIME: 15.4 SECONDS (ref 12.2–14.5)
RBC # BLD AUTO: 4.05 10*6/MM3 (ref 4.14–5.8)
RBC # UR STRIP: ABNORMAL /HPF
RBC MORPH BLD: NORMAL
REF LAB TEST METHOD: ABNORMAL
RSV RNA RESP QL NAA+PROBE: NOT DETECTED
SALICYLATES SERPL-MCNC: <0.3 MG/DL
SARS-COV-2 RNA RESP QL NAA+PROBE: NOT DETECTED
SODIUM SERPL-SCNC: 137 MMOL/L (ref 136–145)
SODIUM SERPL-SCNC: 137 MMOL/L (ref 136–145)
SP GR UR STRIP: 1.01 (ref 1–1.03)
SQUAMOUS #/AREA URNS HPF: ABNORMAL /HPF
T4 FREE SERPL-MCNC: 0.89 NG/DL (ref 0.92–1.68)
TOTAL RATE: 0 BREATHS/MINUTE
TOTAL RATE: 0 BREATHS/MINUTE
TRICYCLICS UR QL SCN: NEGATIVE
TROPONIN T % DELTA: -10
TROPONIN T NUMERIC DELTA: -14 NG/L
TROPONIN T SERPL HS-MCNC: 134 NG/L
TSH SERPL DL<=0.05 MIU/L-ACNC: 12.88 UIU/ML (ref 0.27–4.2)
UROBILINOGEN UR QL STRIP: ABNORMAL
VARIANT LYMPHS NFR BLD MANUAL: 2 % (ref 19.6–45.3)
WBC # UR STRIP: ABNORMAL /HPF
WBC MORPH BLD: NORMAL
WBC NRBC COR # BLD AUTO: 27.58 10*3/MM3 (ref 3.4–10.8)
WHOLE BLOOD HOLD COAG: NORMAL
WHOLE BLOOD HOLD SPECIMEN: NORMAL

## 2025-02-11 PROCEDURE — 82140 ASSAY OF AMMONIA: CPT | Performed by: STUDENT IN AN ORGANIZED HEALTH CARE EDUCATION/TRAINING PROGRAM

## 2025-02-11 PROCEDURE — 25010000002 MORPHINE PER 10 MG: Performed by: NURSE PRACTITIONER

## 2025-02-11 PROCEDURE — 87186 SC STD MICRODIL/AGAR DIL: CPT | Performed by: STUDENT IN AN ORGANIZED HEALTH CARE EDUCATION/TRAINING PROGRAM

## 2025-02-11 PROCEDURE — 82375 ASSAY CARBOXYHB QUANT: CPT

## 2025-02-11 PROCEDURE — 83880 ASSAY OF NATRIURETIC PEPTIDE: CPT | Performed by: NURSE PRACTITIONER

## 2025-02-11 PROCEDURE — 83735 ASSAY OF MAGNESIUM: CPT | Performed by: NURSE PRACTITIONER

## 2025-02-11 PROCEDURE — 83050 HGB METHEMOGLOBIN QUAN: CPT

## 2025-02-11 PROCEDURE — C1751 CATH, INF, PER/CENT/MIDLINE: HCPCS

## 2025-02-11 PROCEDURE — 25010000002 PHENYLEPHRINE 10 MG/ML SOLUTION 5 ML VIAL: Performed by: NURSE PRACTITIONER

## 2025-02-11 PROCEDURE — 80143 DRUG ASSAY ACETAMINOPHEN: CPT | Performed by: STUDENT IN AN ORGANIZED HEALTH CARE EDUCATION/TRAINING PROGRAM

## 2025-02-11 PROCEDURE — 87154 CUL TYP ID BLD PTHGN 6+ TRGT: CPT | Performed by: STUDENT IN AN ORGANIZED HEALTH CARE EDUCATION/TRAINING PROGRAM

## 2025-02-11 PROCEDURE — 36556 INSERT NON-TUNNEL CV CATH: CPT | Performed by: NURSE PRACTITIONER

## 2025-02-11 PROCEDURE — 80053 COMPREHEN METABOLIC PANEL: CPT | Performed by: STUDENT IN AN ORGANIZED HEALTH CARE EDUCATION/TRAINING PROGRAM

## 2025-02-11 PROCEDURE — 25010000002 VANCOMYCIN 1.75-0.9 GM/500ML-% SOLUTION: Performed by: STUDENT IN AN ORGANIZED HEALTH CARE EDUCATION/TRAINING PROGRAM

## 2025-02-11 PROCEDURE — 84145 PROCALCITONIN (PCT): CPT | Performed by: STUDENT IN AN ORGANIZED HEALTH CARE EDUCATION/TRAINING PROGRAM

## 2025-02-11 PROCEDURE — 82948 REAGENT STRIP/BLOOD GLUCOSE: CPT

## 2025-02-11 PROCEDURE — 25810000003 SODIUM CHLORIDE 0.9 % SOLUTION: Performed by: STUDENT IN AN ORGANIZED HEALTH CARE EDUCATION/TRAINING PROGRAM

## 2025-02-11 PROCEDURE — 87077 CULTURE AEROBIC IDENTIFY: CPT | Performed by: NURSE PRACTITIONER

## 2025-02-11 PROCEDURE — 25010000002 THIAMINE PER 100 MG: Performed by: NURSE PRACTITIONER

## 2025-02-11 PROCEDURE — 85025 COMPLETE CBC W/AUTO DIFF WBC: CPT | Performed by: STUDENT IN AN ORGANIZED HEALTH CARE EDUCATION/TRAINING PROGRAM

## 2025-02-11 PROCEDURE — 87186 SC STD MICRODIL/AGAR DIL: CPT | Performed by: NURSE PRACTITIONER

## 2025-02-11 PROCEDURE — 85007 BL SMEAR W/DIFF WBC COUNT: CPT | Performed by: STUDENT IN AN ORGANIZED HEALTH CARE EDUCATION/TRAINING PROGRAM

## 2025-02-11 PROCEDURE — 84100 ASSAY OF PHOSPHORUS: CPT | Performed by: STUDENT IN AN ORGANIZED HEALTH CARE EDUCATION/TRAINING PROGRAM

## 2025-02-11 PROCEDURE — 83880 ASSAY OF NATRIURETIC PEPTIDE: CPT | Performed by: STUDENT IN AN ORGANIZED HEALTH CARE EDUCATION/TRAINING PROGRAM

## 2025-02-11 PROCEDURE — 02HV33Z INSERTION OF INFUSION DEVICE INTO SUPERIOR VENA CAVA, PERCUTANEOUS APPROACH: ICD-10-PCS | Performed by: NURSE PRACTITIONER

## 2025-02-11 PROCEDURE — 93306 TTE W/DOPPLER COMPLETE: CPT | Performed by: INTERNAL MEDICINE

## 2025-02-11 PROCEDURE — 87086 URINE CULTURE/COLONY COUNT: CPT | Performed by: NURSE PRACTITIONER

## 2025-02-11 PROCEDURE — 94799 UNLISTED PULMONARY SVC/PX: CPT

## 2025-02-11 PROCEDURE — B548ZZA ULTRASONOGRAPHY OF SUPERIOR VENA CAVA, GUIDANCE: ICD-10-PCS | Performed by: NURSE PRACTITIONER

## 2025-02-11 PROCEDURE — 82533 TOTAL CORTISOL: CPT | Performed by: NURSE PRACTITIONER

## 2025-02-11 PROCEDURE — 25010000002 HYDROMORPHONE PER 4 MG: Performed by: NURSE PRACTITIONER

## 2025-02-11 PROCEDURE — 76937 US GUIDE VASCULAR ACCESS: CPT | Performed by: NURSE PRACTITIONER

## 2025-02-11 PROCEDURE — 25010000002 HYDROCORTISONE SOD SUC (PF) 100 MG RECONSTITUTED SOLUTION: Performed by: INTERNAL MEDICINE

## 2025-02-11 PROCEDURE — 84484 ASSAY OF TROPONIN QUANT: CPT | Performed by: STUDENT IN AN ORGANIZED HEALTH CARE EDUCATION/TRAINING PROGRAM

## 2025-02-11 PROCEDURE — 25010000003 DEXTROSE 5 % SOLUTION 1,000 ML FLEX CONT: Performed by: NURSE PRACTITIONER

## 2025-02-11 PROCEDURE — 74177 CT ABD & PELVIS W/CONTRAST: CPT

## 2025-02-11 PROCEDURE — 36620 INSERTION CATHETER ARTERY: CPT | Performed by: NURSE PRACTITIONER

## 2025-02-11 PROCEDURE — 87040 BLOOD CULTURE FOR BACTERIA: CPT | Performed by: STUDENT IN AN ORGANIZED HEALTH CARE EDUCATION/TRAINING PROGRAM

## 2025-02-11 PROCEDURE — 25010000002 LIDOCAINE PF 1% 1 % SOLUTION: Performed by: NURSE PRACTITIONER

## 2025-02-11 PROCEDURE — 82805 BLOOD GASES W/O2 SATURATION: CPT

## 2025-02-11 PROCEDURE — 84439 ASSAY OF FREE THYROXINE: CPT | Performed by: STUDENT IN AN ORGANIZED HEALTH CARE EDUCATION/TRAINING PROGRAM

## 2025-02-11 PROCEDURE — 71045 X-RAY EXAM CHEST 1 VIEW: CPT

## 2025-02-11 PROCEDURE — P9047 ALBUMIN (HUMAN), 25%, 50ML: HCPCS | Performed by: STUDENT IN AN ORGANIZED HEALTH CARE EDUCATION/TRAINING PROGRAM

## 2025-02-11 PROCEDURE — 25810000003 LACTATED RINGERS SOLUTION: Performed by: INTERNAL MEDICINE

## 2025-02-11 PROCEDURE — 87637 SARSCOV2&INF A&B&RSV AMP PRB: CPT | Performed by: STUDENT IN AN ORGANIZED HEALTH CARE EDUCATION/TRAINING PROGRAM

## 2025-02-11 PROCEDURE — 25010000002 HEPARIN (PORCINE) PER 1000 UNITS: Performed by: NURSE PRACTITIONER

## 2025-02-11 PROCEDURE — 71260 CT THORAX DX C+: CPT

## 2025-02-11 PROCEDURE — 36415 COLL VENOUS BLD VENIPUNCTURE: CPT

## 2025-02-11 PROCEDURE — 25010000003 VASOPRESSIN 0.2 UNITS/ML SOLUTION: Performed by: STUDENT IN AN ORGANIZED HEALTH CARE EDUCATION/TRAINING PROGRAM

## 2025-02-11 PROCEDURE — 99291 CRITICAL CARE FIRST HOUR: CPT

## 2025-02-11 PROCEDURE — 25010000002 MAGNESIUM SULFATE 2 GM/50ML SOLUTION: Performed by: NURSE PRACTITIONER

## 2025-02-11 PROCEDURE — 94761 N-INVAS EAR/PLS OXIMETRY MLT: CPT

## 2025-02-11 PROCEDURE — 25810000003 SODIUM CHLORIDE 0.9 % SOLUTION 250 ML FLEX CONT: Performed by: NURSE PRACTITIONER

## 2025-02-11 PROCEDURE — 81001 URINALYSIS AUTO W/SCOPE: CPT | Performed by: STUDENT IN AN ORGANIZED HEALTH CARE EDUCATION/TRAINING PROGRAM

## 2025-02-11 PROCEDURE — 25810000003 SODIUM CHLORIDE 0.9 % SOLUTION: Performed by: NURSE PRACTITIONER

## 2025-02-11 PROCEDURE — 25010000002 MORPHINE PER 10 MG: Performed by: INTERNAL MEDICINE

## 2025-02-11 PROCEDURE — 25510000001 IOPAMIDOL 61 % SOLUTION: Performed by: STUDENT IN AN ORGANIZED HEALTH CARE EDUCATION/TRAINING PROGRAM

## 2025-02-11 PROCEDURE — 80179 DRUG ASSAY SALICYLATE: CPT | Performed by: STUDENT IN AN ORGANIZED HEALTH CARE EDUCATION/TRAINING PROGRAM

## 2025-02-11 PROCEDURE — 25010000002 PIPERACILLIN SOD-TAZOBACTAM PER 1 G: Performed by: STUDENT IN AN ORGANIZED HEALTH CARE EDUCATION/TRAINING PROGRAM

## 2025-02-11 PROCEDURE — 84443 ASSAY THYROID STIM HORMONE: CPT | Performed by: STUDENT IN AN ORGANIZED HEALTH CARE EDUCATION/TRAINING PROGRAM

## 2025-02-11 PROCEDURE — 87641 MR-STAPH DNA AMP PROBE: CPT

## 2025-02-11 PROCEDURE — 25010000002 PIPERACILLIN SOD-TAZOBACTAM PER 1 G: Performed by: NURSE PRACTITIONER

## 2025-02-11 PROCEDURE — 36600 WITHDRAWAL OF ARTERIAL BLOOD: CPT

## 2025-02-11 PROCEDURE — 85379 FIBRIN DEGRADATION QUANT: CPT | Performed by: STUDENT IN AN ORGANIZED HEALTH CARE EDUCATION/TRAINING PROGRAM

## 2025-02-11 PROCEDURE — 80307 DRUG TEST PRSMV CHEM ANLYZR: CPT | Performed by: STUDENT IN AN ORGANIZED HEALTH CARE EDUCATION/TRAINING PROGRAM

## 2025-02-11 PROCEDURE — 99291 CRITICAL CARE FIRST HOUR: CPT | Performed by: INTERNAL MEDICINE

## 2025-02-11 PROCEDURE — 83605 ASSAY OF LACTIC ACID: CPT | Performed by: STUDENT IN AN ORGANIZED HEALTH CARE EDUCATION/TRAINING PROGRAM

## 2025-02-11 PROCEDURE — 70450 CT HEAD/BRAIN W/O DYE: CPT

## 2025-02-11 PROCEDURE — 82077 ASSAY SPEC XCP UR&BREATH IA: CPT | Performed by: STUDENT IN AN ORGANIZED HEALTH CARE EDUCATION/TRAINING PROGRAM

## 2025-02-11 PROCEDURE — P9612 CATHETERIZE FOR URINE SPEC: HCPCS

## 2025-02-11 PROCEDURE — 93306 TTE W/DOPPLER COMPLETE: CPT

## 2025-02-11 PROCEDURE — 87077 CULTURE AEROBIC IDENTIFY: CPT | Performed by: STUDENT IN AN ORGANIZED HEALTH CARE EDUCATION/TRAINING PROGRAM

## 2025-02-11 PROCEDURE — 86140 C-REACTIVE PROTEIN: CPT | Performed by: STUDENT IN AN ORGANIZED HEALTH CARE EDUCATION/TRAINING PROGRAM

## 2025-02-11 PROCEDURE — 25810000003 SODIUM CHLORIDE 0.9 % SOLUTION 250 ML FLEX CONT: Performed by: STUDENT IN AN ORGANIZED HEALTH CARE EDUCATION/TRAINING PROGRAM

## 2025-02-11 PROCEDURE — 83735 ASSAY OF MAGNESIUM: CPT | Performed by: STUDENT IN AN ORGANIZED HEALTH CARE EDUCATION/TRAINING PROGRAM

## 2025-02-11 PROCEDURE — 85610 PROTHROMBIN TIME: CPT | Performed by: STUDENT IN AN ORGANIZED HEALTH CARE EDUCATION/TRAINING PROGRAM

## 2025-02-11 PROCEDURE — 25010000002 ALBUMIN HUMAN 25% PER 50 ML: Performed by: STUDENT IN AN ORGANIZED HEALTH CARE EDUCATION/TRAINING PROGRAM

## 2025-02-11 PROCEDURE — 93005 ELECTROCARDIOGRAM TRACING: CPT | Performed by: STUDENT IN AN ORGANIZED HEALTH CARE EDUCATION/TRAINING PROGRAM

## 2025-02-11 RX ORDER — HYDROCORTISONE SODIUM SUCCINATE 100 MG/2ML
100 INJECTION INTRAMUSCULAR; INTRAVENOUS EVERY 8 HOURS
Status: DISCONTINUED | OUTPATIENT
Start: 2025-02-11 | End: 2025-02-11

## 2025-02-11 RX ORDER — LIDOCAINE HYDROCHLORIDE 10 MG/ML
5 INJECTION, SOLUTION EPIDURAL; INFILTRATION; INTRACAUDAL; PERINEURAL ONCE
Status: COMPLETED | OUTPATIENT
Start: 2025-02-11 | End: 2025-02-11

## 2025-02-11 RX ORDER — PANTOPRAZOLE SODIUM 40 MG/10ML
40 INJECTION, POWDER, LYOPHILIZED, FOR SOLUTION INTRAVENOUS
Status: DISCONTINUED | OUTPATIENT
Start: 2025-02-11 | End: 2025-02-17

## 2025-02-11 RX ORDER — HEPARIN SODIUM 5000 [USP'U]/ML
5000 INJECTION, SOLUTION INTRAVENOUS; SUBCUTANEOUS EVERY 12 HOURS SCHEDULED
Status: DISCONTINUED | OUTPATIENT
Start: 2025-02-11 | End: 2025-02-15

## 2025-02-11 RX ORDER — IOPAMIDOL 612 MG/ML
85 INJECTION, SOLUTION INTRAVASCULAR
Status: COMPLETED | OUTPATIENT
Start: 2025-02-11 | End: 2025-02-11

## 2025-02-11 RX ORDER — HYDROCORTISONE SODIUM SUCCINATE 100 MG/2ML
50 INJECTION INTRAMUSCULAR; INTRAVENOUS EVERY 6 HOURS SCHEDULED
Status: DISCONTINUED | OUTPATIENT
Start: 2025-02-11 | End: 2025-02-18

## 2025-02-11 RX ORDER — MORPHINE SULFATE 2 MG/ML
2 INJECTION, SOLUTION INTRAMUSCULAR; INTRAVENOUS ONCE
Status: COMPLETED | OUTPATIENT
Start: 2025-02-11 | End: 2025-02-11

## 2025-02-11 RX ORDER — MAGNESIUM SULFATE HEPTAHYDRATE 40 MG/ML
2 INJECTION, SOLUTION INTRAVENOUS ONCE
Status: COMPLETED | OUTPATIENT
Start: 2025-02-12 | End: 2025-02-12

## 2025-02-11 RX ORDER — NOREPINEPHRINE BITARTRATE 0.03 MG/ML
INJECTION, SOLUTION INTRAVENOUS
Status: COMPLETED
Start: 2025-02-11 | End: 2025-02-11

## 2025-02-11 RX ORDER — VANCOMYCIN 1.75 GRAM/500 ML IN 0.9 % SODIUM CHLORIDE INTRAVENOUS
20 ONCE
Status: COMPLETED | OUTPATIENT
Start: 2025-02-11 | End: 2025-02-11

## 2025-02-11 RX ORDER — ALBUMIN (HUMAN) 12.5 G/50ML
12.5 SOLUTION INTRAVENOUS ONCE
Status: COMPLETED | OUTPATIENT
Start: 2025-02-11 | End: 2025-02-11

## 2025-02-11 RX ORDER — SODIUM CHLORIDE 0.9 % (FLUSH) 0.9 %
10 SYRINGE (ML) INJECTION AS NEEDED
Status: DISCONTINUED | OUTPATIENT
Start: 2025-02-11 | End: 2025-02-13

## 2025-02-11 RX ORDER — NOREPINEPHRINE BITARTRATE 0.03 MG/ML
.02-.3 INJECTION, SOLUTION INTRAVENOUS
Status: DISCONTINUED | OUTPATIENT
Start: 2025-02-11 | End: 2025-02-11

## 2025-02-11 RX ORDER — LIDOCAINE HYDROCHLORIDE 10 MG/ML
5 INJECTION, SOLUTION INFILTRATION; PERINEURAL ONCE
Status: DISCONTINUED | OUTPATIENT
Start: 2025-02-11 | End: 2025-02-11

## 2025-02-11 RX ORDER — MORPHINE SULFATE 2 MG/ML
2 INJECTION, SOLUTION INTRAMUSCULAR; INTRAVENOUS
Status: DISCONTINUED | OUTPATIENT
Start: 2025-02-11 | End: 2025-02-11

## 2025-02-11 RX ORDER — SODIUM CHLORIDE 9 MG/ML
40 INJECTION, SOLUTION INTRAVENOUS AS NEEDED
Status: DISCONTINUED | OUTPATIENT
Start: 2025-02-11 | End: 2025-02-21

## 2025-02-11 RX ORDER — FLUDROCORTISONE ACETATE 0.1 MG/1
100 TABLET ORAL DAILY
Status: DISCONTINUED | OUTPATIENT
Start: 2025-02-11 | End: 2025-02-11

## 2025-02-11 RX ORDER — MAGNESIUM SULFATE HEPTAHYDRATE 40 MG/ML
6 INJECTION, SOLUTION INTRAVENOUS ONCE
Status: DISCONTINUED | OUTPATIENT
Start: 2025-02-11 | End: 2025-02-11

## 2025-02-11 RX ORDER — MAGNESIUM SULFATE HEPTAHYDRATE 40 MG/ML
2 INJECTION, SOLUTION INTRAVENOUS
Status: COMPLETED | OUTPATIENT
Start: 2025-02-11 | End: 2025-02-12

## 2025-02-11 RX ORDER — HYDROMORPHONE HYDROCHLORIDE 1 MG/ML
0.5 INJECTION, SOLUTION INTRAMUSCULAR; INTRAVENOUS; SUBCUTANEOUS
Status: DISCONTINUED | OUTPATIENT
Start: 2025-02-11 | End: 2025-02-17

## 2025-02-11 RX ORDER — SODIUM CHLORIDE 0.9 % (FLUSH) 0.9 %
10 SYRINGE (ML) INJECTION AS NEEDED
Status: DISCONTINUED | OUTPATIENT
Start: 2025-02-11 | End: 2025-03-06

## 2025-02-11 RX ORDER — SODIUM CHLORIDE 0.9 % (FLUSH) 0.9 %
10 SYRINGE (ML) INJECTION EVERY 12 HOURS SCHEDULED
Status: DISCONTINUED | OUTPATIENT
Start: 2025-02-11 | End: 2025-02-24

## 2025-02-11 RX ADMIN — MUPIROCIN 1 APPLICATION: 20 OINTMENT TOPICAL at 12:15

## 2025-02-11 RX ADMIN — IOPAMIDOL 85 ML: 612 INJECTION, SOLUTION INTRAVENOUS at 10:28

## 2025-02-11 RX ADMIN — PIPERACILLIN AND TAZOBACTAM 3.38 G: 3; .375 INJECTION, POWDER, LYOPHILIZED, FOR SOLUTION INTRAVENOUS at 09:29

## 2025-02-11 RX ADMIN — PANTOPRAZOLE SODIUM 40 MG: 40 INJECTION, POWDER, FOR SOLUTION INTRAVENOUS at 12:15

## 2025-02-11 RX ADMIN — NOREPINEPHRINE BITARTRATE 0.02 MCG/KG/MIN: 0.03 INJECTION, SOLUTION INTRAVENOUS at 09:11

## 2025-02-11 RX ADMIN — PHENYLEPHRINE HYDROCHLORIDE 0.5 MCG/KG/MIN: 10 INJECTION INTRAVENOUS at 18:48

## 2025-02-11 RX ADMIN — NOREPINEPHRINE BITARTRATE 0.26 MCG/KG/MIN: 0.03 INJECTION, SOLUTION INTRAVENOUS at 15:58

## 2025-02-11 RX ADMIN — HYDROMORPHONE HYDROCHLORIDE 0.5 MG: 1 INJECTION, SOLUTION INTRAMUSCULAR; INTRAVENOUS; SUBCUTANEOUS at 22:19

## 2025-02-11 RX ADMIN — NOREPINEPHRINE BITARTRATE 0.3 MCG/KG/MIN: 0.03 INJECTION, SOLUTION INTRAVENOUS at 21:26

## 2025-02-11 RX ADMIN — MUPIROCIN 1 APPLICATION: 20 OINTMENT TOPICAL at 20:22

## 2025-02-11 RX ADMIN — HEPARIN SODIUM 5000 UNITS: 5000 INJECTION INTRAVENOUS; SUBCUTANEOUS at 12:15

## 2025-02-11 RX ADMIN — HEPARIN SODIUM 5000 UNITS: 5000 INJECTION INTRAVENOUS; SUBCUTANEOUS at 20:22

## 2025-02-11 RX ADMIN — SODIUM CHLORIDE 2490 ML: 9 INJECTION, SOLUTION INTRAVENOUS at 09:13

## 2025-02-11 RX ADMIN — Medication 10 ML: at 20:23

## 2025-02-11 RX ADMIN — ALBUMIN (HUMAN) 12.5 G: 0.25 INJECTION, SOLUTION INTRAVENOUS at 10:33

## 2025-02-11 RX ADMIN — HYDROMORPHONE HYDROCHLORIDE 0.5 MG: 1 INJECTION, SOLUTION INTRAMUSCULAR; INTRAVENOUS; SUBCUTANEOUS at 20:27

## 2025-02-11 RX ADMIN — HYDROCORTISONE SODIUM SUCCINATE 50 MG: 100 INJECTION, POWDER, FOR SOLUTION INTRAMUSCULAR; INTRAVENOUS at 20:22

## 2025-02-11 RX ADMIN — MAGNESIUM SULFATE HEPTAHYDRATE 2 G: 40 INJECTION, SOLUTION INTRAVENOUS at 23:36

## 2025-02-11 RX ADMIN — MAGNESIUM SULFATE HEPTAHYDRATE 2 G: 40 INJECTION, SOLUTION INTRAVENOUS at 22:11

## 2025-02-11 RX ADMIN — Medication 10 ML: at 11:48

## 2025-02-11 RX ADMIN — MORPHINE SULFATE 2 MG: 2 INJECTION, SOLUTION INTRAMUSCULAR; INTRAVENOUS at 18:13

## 2025-02-11 RX ADMIN — VASOPRESSIN IN 0.9% SODIUM CHLORIDE 0.03 UNITS/MIN: 20 INJECTION INTRAVENOUS at 16:09

## 2025-02-11 RX ADMIN — SODIUM CHLORIDE, SODIUM LACTATE, POTASSIUM CHLORIDE, CALCIUM CHLORIDE 1000 ML: 20; 30; 600; 310 INJECTION, SOLUTION INTRAVENOUS at 12:23

## 2025-02-11 RX ADMIN — PIPERACILLIN AND TAZOBACTAM 4.5 G: 4; .5 INJECTION, POWDER, FOR SOLUTION INTRAVENOUS at 17:36

## 2025-02-11 RX ADMIN — SODIUM CHLORIDE 1000 ML: 9 INJECTION, SOLUTION INTRAVENOUS at 18:46

## 2025-02-11 RX ADMIN — THIAMINE HYDROCHLORIDE 500 MG: 100 INJECTION, SOLUTION INTRAMUSCULAR; INTRAVENOUS at 23:02

## 2025-02-11 RX ADMIN — Medication 1750 MG: at 10:31

## 2025-02-11 RX ADMIN — SODIUM BICARBONATE 150 MEQ: 84 INJECTION, SOLUTION INTRAVENOUS at 22:05

## 2025-02-11 RX ADMIN — VASOPRESSIN IN 0.9% SODIUM CHLORIDE 0.03 UNITS/MIN: 20 INJECTION INTRAVENOUS at 10:35

## 2025-02-11 RX ADMIN — NOREPINEPHRINE BITARTRATE 0.3 MCG/KG/MIN: 1 INJECTION, SOLUTION, CONCENTRATE INTRAVENOUS at 23:55

## 2025-02-11 RX ADMIN — LIDOCAINE HYDROCHLORIDE 5 ML: 10 INJECTION, SOLUTION EPIDURAL; INFILTRATION; INTRACAUDAL; PERINEURAL at 20:22

## 2025-02-11 RX ADMIN — MORPHINE SULFATE 2 MG: 2 INJECTION, SOLUTION INTRAMUSCULAR; INTRAVENOUS at 16:14

## 2025-02-11 NOTE — ED PROVIDER NOTES
EMERGENCY DEPARTMENT ENCOUNTER    Pt Name: Val Nassar Jr.  MRN: [id number]  Pt :   1960  Room Number:  N201/1  Date of encounter:  2025  PCP: Wesly Minor MD  ED Provider: Leonel Briggs MD    Historian: EMS, patient      HPI:  Chief Complaint: Somnolence, hypoxia, diffuse bodyaches        Context: Val Nassar Jr. is a 64-year-old man with history of cirrhosis, cancer, stomach ulcers, alcohol abuse, who presents from his nursing facility because of somnolence and hypoxia.  The nursing facility said he slept all night and they found him to be somnolent and difficult to arouse they called EMS who found him to be hypoxic at 70% he does not have a baseline oxygen requirement.  He is moaning and complains of abdominal pain as well as diffuse bodyaches.  EMS said he was hypotensive and round as well his fingerstick was 85.  Upon arrival here he complains mostly of the abdominal pain.  He is also having shortness of breath.  No other complaints at this time.       PAST MEDICAL HISTORY  Past Medical History:   Diagnosis Date    Anemia     Cancer     ESOPHAGEAL    Cirrhosis     Duodenal ulcer     Gastric ulcer     GERD (gastroesophageal reflux disease)     History of alcohol abuse     History of radiation therapy 2024    esophagus    HLD (hyperlipidemia)     Mood disorder          PAST SURGICAL HISTORY  Past Surgical History:   Procedure Laterality Date    ENDOSCOPY N/A 2023    Procedure: ESOPHAGOGASTRODUODENOSCOPY;  Surgeon: Wesly Mckeon MD;  Location: [facility] ENDOSCOPY;  Service: Gastroenterology;  Laterality: N/A;    VENOUS ACCESS DEVICE (PORT) INSERTION Right 2024         FAMILY HISTORY  Family History   Problem Relation Age of Onset    Cancer Mother         LUNG AND BREAST    Breast cancer Mother     Heart attack Father          SOCIAL HISTORY  Social History     Socioeconomic History    Marital status: Single   Tobacco Use    Smoking status:  Every Day     Current packs/day: 1.00     Average packs/day: 1 pack/day for 15.0 years (15.0 ttl pk-yrs)     Types: Cigarettes     Passive exposure: Current    Smokeless tobacco: Current   Vaping Use    Vaping status: Every Day    Substances: Nicotine, Flavoring    Devices: Disposable   Substance and Sexual Activity    Alcohol use: Yes     Alcohol/week: 4.0 standard drinks of alcohol     Types: 4 Cans of beer per week     Comment: pt states he drinks 3-4 beers a day a couple times a week    Drug use: Yes     Types: Hydrocodone    Sexual activity: Not Currently     Partners: Female         ALLERGIES  Patient has no known allergies.        REVIEW OF SYSTEMS  Review of Systems       All systems reviewed and negative except for those discussed in HPI.       PHYSICAL EXAM    I have reviewed the triage vital signs and nursing notes.    ED Triage Vitals [02/11/25 0857]   Temp Heart Rate Resp BP SpO2   98.8 °F (37.1 °C) 104 22 (!) 73/44 95 %      Temp src Heart Rate Source Patient Position BP Location FiO2 (%)   Oral Monitor -- -- --       Physical Exam  GENERAL:   Appears acute on chronically ill, pale, cool, moaning  HENT: Nares patent.  Dry mucous membranes  EYES: No scleral icterus.  CV: Borderline tachycardia  RESPIRATORY: Normal effort.  No audible wheezes, rales or rhonchi.  ABDOMEN: Soft, nontender  MUSCULOSKELETAL: No deformities.   NEURO: Alert but intermittently confused in conversation, moves all extremities, follows commands.  SKIN: Pale, cool, mottled      LAB RESULTS  Recent Results (from the past 24 hours)   POC Glucose Once    Collection Time: 02/11/25  8:57 AM    Specimen: Blood   Result Value Ref Range    Glucose 100 70 - 130 mg/dL   Green Top (Gel)    Collection Time: 02/11/25  9:01 AM   Result Value Ref Range    Extra Tube Hold for add-ons.    Lavender Top    Collection Time: 02/11/25  9:01 AM   Result Value Ref Range    Extra Tube hold for add-on    Gold Top - SST    Collection Time: 02/11/25  9:01 AM    Result Value Ref Range    Extra Tube Hold for add-ons.    Gray Top    Collection Time: 02/11/25  9:01 AM   Result Value Ref Range    Extra Tube Hold for add-ons.    Light Blue Top    Collection Time: 02/11/25  9:01 AM   Result Value Ref Range    Extra Tube Hold for add-ons.    CBC Auto Differential    Collection Time: 02/11/25  9:01 AM    Specimen: Blood   Result Value Ref Range    WBC 27.58 (H) 3.40 - 10.80 10*3/mm3    RBC 4.05 (L) 4.14 - 5.80 10*6/mm3    Hemoglobin 12.8 (L) 13.0 - 17.7 g/dL    Hematocrit 40.1 37.5 - 51.0 %    MCV 99.0 (H) 79.0 - 97.0 fL    MCH 31.6 26.6 - 33.0 pg    MCHC 31.9 31.5 - 35.7 g/dL    RDW 15.3 12.3 - 15.4 %    RDW-SD 56.2 (H) 37.0 - 54.0 fl    MPV 10.2 6.0 - 12.0 fL    Platelets 136 (L) 140 - 450 10*3/mm3   D-dimer, Quantitative    Collection Time: 02/11/25  9:01 AM    Specimen: Blood   Result Value Ref Range    D-Dimer, Quantitative 5.87 (H) 0.00 - 0.65 MCGFEU/mL   Protime-INR    Collection Time: 02/11/25  9:01 AM    Specimen: Blood   Result Value Ref Range    Protime 15.4 (H) 12.2 - 14.5 Seconds    INR 1.21 (H) 0.89 - 1.12   Lactic Acid, Plasma    Collection Time: 02/11/25  9:01 AM    Specimen: Blood   Result Value Ref Range    Lactate 10.0 (C) 0.5 - 2.0 mmol/L   C-reactive Protein    Collection Time: 02/11/25  9:01 AM    Specimen: Blood   Result Value Ref Range    C-Reactive Protein 8.21 (H) 0.00 - 0.50 mg/dL   Acetaminophen Level    Collection Time: 02/11/25  9:01 AM    Specimen: Blood   Result Value Ref Range    Acetaminophen <5.0 0.0 - 30.0 mcg/mL   Ammonia    Collection Time: 02/11/25  9:01 AM    Specimen: Blood   Result Value Ref Range    Ammonia 22 16 - 60 umol/L   Manual Differential    Collection Time: 02/11/25  9:01 AM    Specimen: Blood   Result Value Ref Range    Neutrophil % 51.0 42.7 - 76.0 %    Lymphocyte % 2.0 (L) 19.6 - 45.3 %    Monocyte % 10.0 5.0 - 12.0 %    Eosinophil % 0.0 (L) 0.3 - 6.2 %    Basophil % 0.0 0.0 - 1.5 %    Bands %  37.0 (H) 0.0 - 5.0 %     Neutrophils Absolute 24.27 (H) 1.70 - 7.00 10*3/mm3    Lymphocytes Absolute 0.55 (L) 0.70 - 3.10 10*3/mm3    Monocytes Absolute 2.76 (H) 0.10 - 0.90 10*3/mm3    Eosinophils Absolute 0.00 0.00 - 0.40 10*3/mm3    Basophils Absolute 0.00 0.00 - 0.20 10*3/mm3    nRBC 2.0 (H) 0.0 - 0.2 /100 WBC    RBC Morphology Normal Normal    WBC Morphology Normal Normal    Platelet Morphology Normal Normal   ECG 12 Lead ED Triage Standing Order; Weak / Dizzy / AMS    Collection Time: 02/11/25  9:04 AM   Result Value Ref Range    QT Interval 370 ms    QTC Interval 486 ms   Comprehensive Metabolic Panel    Collection Time: 02/11/25  9:26 AM    Specimen: Blood   Result Value Ref Range    Glucose 81 65 - 99 mg/dL    BUN 27 (H) 8 - 23 mg/dL    Creatinine 3.22 (H) 0.76 - 1.27 mg/dL    Sodium 137 136 - 145 mmol/L    Potassium 3.5 3.5 - 5.2 mmol/L    Chloride 99 98 - 107 mmol/L    CO2 10.0 (L) 22.0 - 29.0 mmol/L    Calcium 9.6 8.6 - 10.5 mg/dL    Total Protein 6.5 6.0 - 8.5 g/dL    Albumin 3.5 3.5 - 5.2 g/dL    ALT (SGPT) 107 (H) 1 - 41 U/L    AST (SGOT) 175 (H) 1 - 40 U/L    Alkaline Phosphatase 72 39 - 117 U/L    Total Bilirubin 0.5 0.0 - 1.2 mg/dL    Globulin 3.0 gm/dL    A/G Ratio 1.2 g/dL    BUN/Creatinine Ratio 8.4 7.0 - 25.0    Anion Gap 28.0 (H) 5.0 - 15.0 mmol/L    eGFR 20.5 (L) >60.0 mL/min/1.73   High Sensitivity Troponin T    Collection Time: 02/11/25  9:26 AM    Specimen: Blood   Result Value Ref Range    HS Troponin T 134 (C) <22 ng/L   Magnesium    Collection Time: 02/11/25  9:26 AM    Specimen: Blood   Result Value Ref Range    Magnesium 1.3 (L) 1.6 - 2.4 mg/dL   BNP    Collection Time: 02/11/25  9:26 AM    Specimen: Blood   Result Value Ref Range    proBNP 13,127.0 (H) 0.0 - 900.0 pg/mL   Procalcitonin    Collection Time: 02/11/25  9:26 AM    Specimen: Blood   Result Value Ref Range    Procalcitonin >100.00 (H) 0.00 - 0.25 ng/mL   Ethanol    Collection Time: 02/11/25  9:26 AM    Specimen: Blood   Result Value Ref Range     Ethanol <10 0 - 10 mg/dL   Salicylate Level    Collection Time: 02/11/25  9:26 AM    Specimen: Blood   Result Value Ref Range    Salicylate <0.3 <=30.0 mg/dL   Phosphorus    Collection Time: 02/11/25  9:26 AM    Specimen: Blood   Result Value Ref Range    Phosphorus 4.2 2.5 - 4.5 mg/dL   TSH Rfx On Abnormal To Free T4    Collection Time: 02/11/25  9:26 AM    Specimen: Blood   Result Value Ref Range    TSH 12.880 (H) 0.270 - 4.200 uIU/mL   COVID-19, FLU A/B, RSV PCR 1 HR TAT - Swab, Nasopharynx    Collection Time: 02/11/25  9:26 AM    Specimen: Nasopharynx; Swab   Result Value Ref Range    COVID19 Not Detected Not Detected - Ref. Range    Influenza A PCR Not Detected Not Detected    Influenza B PCR Not Detected Not Detected    RSV, PCR Not Detected Not Detected   T4, Free    Collection Time: 02/11/25  9:26 AM    Specimen: Blood   Result Value Ref Range    Free T4 0.89 (L) 0.92 - 1.68 ng/dL   Urinalysis With Microscopic If Indicated (No Culture) - Urine, Catheter    Collection Time: 02/11/25  9:42 AM    Specimen: Urine, Catheter   Result Value Ref Range    Color, UA Yellow Yellow, Straw    Appearance, UA Turbid (A) Clear    pH, UA 7.0 5.0 - 8.0    Specific Gravity, UA 1.008 1.001 - 1.030    Glucose, UA Negative Negative    Ketones, UA Negative Negative    Bilirubin, UA Negative Negative    Blood, UA Moderate (2+) (A) Negative    Protein,  mg/dL (2+) (A) Negative    Leuk Esterase, UA Large (3+) (A) Negative    Nitrite, UA Negative Negative    Urobilinogen, UA 0.2 E.U./dL 0.2 - 1.0 E.U./dL   Urine Drug Screen - Urine, Catheter    Collection Time: 02/11/25  9:42 AM    Specimen: Urine, Catheter   Result Value Ref Range    THC, Screen, Urine Negative Negative    Phencyclidine (PCP), Urine Negative Negative    Cocaine Screen, Urine Negative Negative    Methamphetamine, Ur Negative Negative    Opiate Screen Negative Negative    Amphetamine Screen, Urine Negative Negative    Benzodiazepine Screen, Urine Negative  Negative    Tricyclic Antidepressants Screen Negative Negative    Methadone Screen, Urine Negative Negative    Barbiturates Screen, Urine Negative Negative    Oxycodone Screen, Urine Positive (A) Negative    Buprenorphine, Screen, Urine Negative Negative   Fentanyl, Urine - Urine, Catheter    Collection Time: 02/11/25  9:42 AM    Specimen: Urine, Catheter   Result Value Ref Range    Fentanyl, Urine Positive (A) Negative   Urinalysis, Microscopic Only - Urine, Catheter    Collection Time: 02/11/25  9:42 AM    Specimen: Urine, Catheter   Result Value Ref Range    RBC, UA 11-20 (A) None Seen, 0-2 /HPF    WBC, UA Too Numerous to Count (A) None Seen, 0-2 /HPF    Bacteria, UA 2+ (A) None Seen, Trace /HPF    Squamous Epithelial Cells, UA 0-2 None Seen, 0-2 /HPF    Hyaline Casts, UA Too Numerous to Count 0 - 6 /LPF    Methodology Automated Microscopy    Blood Gas, Arterial With Co-Ox    Collection Time: 02/11/25 10:06 AM    Specimen: Arterial Blood   Result Value Ref Range    Site Left Radial     Tony's Test N/A     pH, Arterial 7.214 (C) 7.350 - 7.450 pH units    pCO2, Arterial 35.3 35.0 - 45.0 mm Hg    pO2, Arterial 100.0 83.0 - 108.0 mm Hg    HCO3, Arterial 14.2 (L) 20.0 - 26.0 mmol/L    Base Excess, Arterial -12.6 (L) 0.0 - 2.0 mmol/L    Hemoglobin, Blood Gas 10.5 (L) 13.5 - 17.5 g/dL    Hematocrit, Blood Gas 32.2 (L) 38.0 - 51.0 %    Oxyhemoglobin 95.7 94 - 99 %    Methemoglobin 0.20 0.00 - 1.50 %    Carboxyhemoglobin 1.1 0 - 2 %    CO2 Content 15.3 (L) 22 - 33 mmol/L    Temperature 37.0     Barometric Pressure for Blood Gas      Modality NRB     FIO2 80 %    Rate 0 Breaths/minute    PIP 0 cmH2O    IPAP 0     EPAP 0     Notified Who MD REUBEN     Notified By [id number]     Notified Time 02/11/2025 10:04     pH, Temp Corrected 7.214 pH Units    pCO2, Temperature Corrected 35.3 35 - 48 mm Hg    pO2, Temperature Corrected 100 83 - 108 mm Hg   High Sensitivity Troponin T 1Hr    Collection Time: 02/11/25 10:42 AM     Specimen: Blood   Result Value Ref Range    HS Troponin T 120 (C) <22 ng/L    Troponin T Numeric Delta -14 ng/L    Troponin T % Delta -10 Abnormal if >/= 20%   STAT Lactic Acid, Reflex    Collection Time: 02/11/25  1:51 PM    Specimen: Blood   Result Value Ref Range    Lactate 5.6 (C) 0.5 - 2.0 mmol/L       If labs were ordered, I independently reviewed the results and considered them in treating the patient.        RADIOLOGY  XR Chest 1 View    Result Date: 2/11/2025  XR CHEST 1 VW Date of Exam: 2/11/2025 1:22 PM EST Indication: central line placement Comparison: CT chest 2/11/2025, AP chest 2/11/2025. Findings: Right chest wall latosha catheter extends to the lower SVC level. New left IJ approach central line extends to the mid SVC level. No right or left pneumothorax is seen. Increased left basilar airspace disease since today's earlier chest x-ray which may present developing pneumonia. Small left pleural effusion is thought to be present. Mild right basilar atelectasis unchanged. Stable heart size at upper limits normal. Osteopenia.     Impression: 1. Left IJ central line tip extends to the upper SVC level. No visible pneumothorax. 2. Developing left lower lobe airspace disease worrisome for pneumonia, compared to earlier today. 3. Small left pleural effusion Electronically Signed: Awa Lilly MD  2/11/2025 1:49 PM EST  Workstation ID: [id number]    CT Chest With Contrast Diagnostic    Result Date: 2/11/2025  CT ABDOMEN PELVIS W CONTRAST, CT CHEST W CONTRAST DIAGNOSTIC Date of Exam: 2/11/2025 10:17 AM EST Indication: Abdominal distention and tenderness, altered mental status. Comparison: None available. Technique: Axial CT images were obtained of the chest abdomen and pelvis following the uneventful intravenous administration of intravenous contrast. Reconstructed coronal and sagittal images were also obtained. Automated exposure control and iterative construction methods were used. Findings: The central  tracheobronchial tree is patent. Biapical pleural thickening. There is mild emphysema. Stable scarring in the medial right upper lobe possibly from radiation changes. There is a focal area of heterogeneous density in the right paratracheal region at the anatomical level of the azygos not present on the previous exam may represent an engorged azygos or developing lymphadenopathy adjacent to the azygos (image 30 series 2). There is no mediastinal lymphadenopathy. The heart and great vessels appear within normal limits. There are coronary artery calcifications. There is a right internal jugular chest port. Mild distended fluid-filled esophagus.  Interval development of bilateral patchy consolidations in the lower lobes compatible with pneumonia appears more accentuated on the left. The liver shows homogeneous density with no focal lesions. There is minimal nodularity of the liver surface. The gallbladder is mildly distended with no wall thickening or pericholecystic fluid. The spleen is unremarkable. Diffuse pancreatic atrophy. The  adrenal glands are unremarkable. Interval development of multiple patchy areas of hypodensity involving the left renal cortex with associated extensive perinephric fat stranding compatible with acute pyelonephritis. There is no hydronephrosis or obstructing stones. There is also diffuse  left periureteric fat stranding. The right kidney is unremarkable. The small and large bowel loops are nondilated. Multiple fluid-filled loops of small bowel are seen. There is sigmoid diverticulosis with no diverticulitis. There is diffuse bladder wall thickening. There is a large bladder diverticulum. No acute fractures or destructive bone lesions are seen. Grade 1 spondylolisthesis L4-L5     Impression: 1.Interval development of bilateral lower lobe consolidations compatible with pneumonia, more accentuated on the left. 2.Interval development of multiple patchy areas of hypodensity involving the left  renal cortex with associated extensive perinephric fat stranding compatible with acute pyelonephritis. There is also diffuse left periureteric fat stranding. 3.Diffuse bladder wall thickening and large bladder diverticulum. Correlate clinically for cystitis. 4.Sigmoid diverticulosis with no diverticulitis. 5.Mild nodularity of the liver surface. Correlate with liver function tests. 6.Focal area of heterogeneous density in the right paratracheal region at the anatomical level of the azygos not present on the previous exam. This may represent an engorged azygos or developing lymphadenopathy adjacent to the azygos Electronically Signed: Rich Ruiz MD  2/11/2025 11:19 AM EST  Workstation ID: [id number]    CT Abdomen Pelvis With Contrast    Result Date: 2/11/2025  CT ABDOMEN PELVIS W CONTRAST, CT CHEST W CONTRAST DIAGNOSTIC Date of Exam: 2/11/2025 10:17 AM EST Indication: Abdominal distention and tenderness, altered mental status. Comparison: None available. Technique: Axial CT images were obtained of the chest abdomen and pelvis following the uneventful intravenous administration of intravenous contrast. Reconstructed coronal and sagittal images were also obtained. Automated exposure control and iterative construction methods were used. Findings: The central tracheobronchial tree is patent. Biapical pleural thickening. There is mild emphysema. Stable scarring in the medial right upper lobe possibly from radiation changes. There is a focal area of heterogeneous density in the right paratracheal region at the anatomical level of the azygos not present on the previous exam may represent an engorged azygos or developing lymphadenopathy adjacent to the azygos (image 30 series 2). There is no mediastinal lymphadenopathy. The heart and great vessels appear within normal limits. There are coronary artery calcifications. There is a right internal jugular chest port. Mild distended fluid-filled esophagus.  Interval development  of bilateral patchy consolidations in the lower lobes compatible with pneumonia appears more accentuated on the left. The liver shows homogeneous density with no focal lesions. There is minimal nodularity of the liver surface. The gallbladder is mildly distended with no wall thickening or pericholecystic fluid. The spleen is unremarkable. Diffuse pancreatic atrophy. The  adrenal glands are unremarkable. Interval development of multiple patchy areas of hypodensity involving the left renal cortex with associated extensive perinephric fat stranding compatible with acute pyelonephritis. There is no hydronephrosis or obstructing stones. There is also diffuse  left periureteric fat stranding. The right kidney is unremarkable. The small and large bowel loops are nondilated. Multiple fluid-filled loops of small bowel are seen. There is sigmoid diverticulosis with no diverticulitis. There is diffuse bladder wall thickening. There is a large bladder diverticulum. No acute fractures or destructive bone lesions are seen. Grade 1 spondylolisthesis L4-L5     Impression: 1.Interval development of bilateral lower lobe consolidations compatible with pneumonia, more accentuated on the left. 2.Interval development of multiple patchy areas of hypodensity involving the left renal cortex with associated extensive perinephric fat stranding compatible with acute pyelonephritis. There is also diffuse left periureteric fat stranding. 3.Diffuse bladder wall thickening and large bladder diverticulum. Correlate clinically for cystitis. 4.Sigmoid diverticulosis with no diverticulitis. 5.Mild nodularity of the liver surface. Correlate with liver function tests. 6.Focal area of heterogeneous density in the right paratracheal region at the anatomical level of the azygos not present on the previous exam. This may represent an engorged azygos or developing lymphadenopathy adjacent to the azygos Electronically Signed: Rich Ruiz MD  2/11/2025  11:19 AM EST  Workstation ID: [id number]    CT Head Without Contrast    Result Date: 2/11/2025  CT HEAD WO CONTRAST Date of Exam: 2/11/2025 10:17 AM EST Indication: Altered mental status suspect metabolic. Comparison: MRI brain from February 10, 2024 and CT head from February 4, 2024 Technique: Axial CT images were obtained of the head without contrast administration.  Automated exposure control and iterative construction methods were used. Findings: No acute intracranial hemorrhage or extra-axial collection is identified. The ventricles appear normal in caliber, with no evidence of mass effect or midline shift. The basal cisterns appear patent. The gray-white differentiation appears preserved. The calvarium appear intact. There is moderate frothy paranasal sinus mucosal disease. The mastoid air cells are well-aerated. Scattered foci of periventricular and subcortical white matter hypodensities are nonspecific, but likely the sequela of mild chronic small vessel ischemic disease.     Impression: 1.No acute intracranial process identified. 2.Findings suggestive of mild chronic small vessel ischemic disease. 3.Moderate frothy paranasal sinus mucosal disease. Correlate for acute sinusitis. Electronically Signed: Arnold Abrams MD  2/11/2025 10:39 AM EST  Workstation ID: [id number]    XR Chest 1 View    Result Date: 2/11/2025  XR CHEST 1 VW Date of Exam: 2/11/2025 8:59 AM EST Indication: Weak/Dizzy/AMS triage protocol Comparison: CT chest from January 16, 2025 Findings: A right internal jugular port its tip at the mid SVC. The lungs are clear. The heart and mediastinal contours appear normal. There is pulmonary vascular congestion. The osseous structures appear intact.     Impression: 1.Pulmonary vascular congestion, which could reflect mild pulmonary edema. 2.No focal pneumonia identified. Electronically Signed: Arnold Abrams MD  2/11/2025 9:16 AM EST  Workstation ID: [id number]     I ordered and independently reviewed  the above noted radiographic studies.      I viewed images of chest x-ray which showed no acute pathology per my independent interpretation.  CT scan of the chest showing findings consistent with bilateral lower lobe pneumonia  CT scan of the abdomen pelvis, showing what appears to be bilobed bladder connected by fistula, bladder wall thickening, left perinephric stranding    See radiologist's dictation for official interpretation.        PROCEDURES    Procedures    ECG 12 Lead ED Triage Standing Order; Weak / Dizzy / AMS   Preliminary Result   Test Reason : ED Triage Standing Order~   Blood Pressure :   */*   mmHG   Vent. Rate : 104 BPM     Atrial Rate : 104 BPM      P-R Int : 136 ms          QRS Dur :  76 ms       QT Int : 370 ms       P-R-T Axes :  45  -9  71 degrees     QTcB Int : 486 ms      Sinus tachycardia   Minimal voltage criteria for LVH, may be normal variant   Borderline ECG   When compared with ECG of 18-Feb-2024 23:02,   Vent. rate has increased by  51 bpm   QRS duration has decreased   T wave inversion now evident in Lateral leads   QT has lengthened      Referred By: EDMD           Confirmed By:           MEDICATIONS GIVEN IN ER    Medications   sodium chloride 0.9 % flush 10 mL (has no administration in time range)   norepinephrine (LEVOPHED) 8 mg in 250 mL NS infusion (premix) (0.26 mcg/kg/min × 83 kg Intravenous Rate/Dose Change 2/11/25 1036)   vasopressin (PITRESSIN) 20 units in 100 mL NS infusion (0.03 Units/min Intravenous New Bag 2/11/25 1035)   sodium chloride 0.9 % flush 10 mL (10 mL Intravenous Given 2/11/25 1148)   sodium chloride 0.9 % flush 10 mL (has no administration in time range)   sodium chloride 0.9 % infusion 40 mL (has no administration in time range)   mupirocin (BACTROBAN) 2 % nasal ointment 1 Application (1 Application Each Nare Given 2/11/25 1215)   heparin (porcine) 5000 UNIT/ML injection 5,000 Units (5,000 Units Subcutaneous Given 2/11/25 1215)   pantoprazole (PROTONIX)  injection 40 mg (40 mg Intravenous Given 2/11/25 1215)   Pharmacy to dose vancomycin (has no administration in time range)   piperacillin-tazobactam (ZOSYN) 4.5 g IVPB in 100 mL NS MBP (CD) (has no administration in time range)   morphine injection 2 mg (has no administration in time range)   vancomycin (dosing per levels) (has no administration in time range)   vancomycin IVPB 1750 mg in 0.9% Sodium Chloride (premix) 500 mL (1,750 mg Intravenous New Bag 2/11/25 1031)   piperacillin-tazobactam (ZOSYN) 3.375 g IVPB in 100 mL NS MBP (CD) (0 g Intravenous Stopped 2/11/25 1014)   sodium chloride 0.9 % bolus 2,490 mL (0 mL Intravenous Stopped 2/11/25 1014)   albumin human 25 % IV SOLN 12.5 g (0 g Intravenous Stopped 2/11/25 1107)   iopamidol (ISOVUE-300) 61 % injection 85 mL (85 mL Intravenous Given 2/11/25 1028)   lactated ringers bolus 1,000 mL (1,000 mL Intravenous New Bag 2/11/25 1223)         MEDICAL DECISION MAKING, PROGRESS, and CONSULTS    All labs, if obtained, have been independently reviewed by me.  All radiology studies, if obtained, have been reviewed by me and the radiologist dictating the report.  All EKGs, if obtained, have been independently viewed and interpreted by me/my attending physician.      Discussion below represents my analysis of pertinent findings related to patient's condition, differential diagnosis, treatment plan and final disposition.                                          Differential diagnosis:    Sepsis, respiratory failure, pneumonia, pulmonary embolism, bacteremia, urinary tract infection, intra-abdominal infection      Additional sources:    - Discussed/ obtained information from independent historians: EMS    - External (non-ED) record review:  Chart review of oncology notes for esophageal squamous cell carcinoma shows history of:    Anemia     · Cancer    · Cirrhosis    · Duodenal ulcer    · Gastric ulcer    · GERD (gastroesophageal reflux disease)    · History of alcohol  abuse    · HLD (hyperlipidemia)    · Mood disorder     - Chronic or social conditions impacting care: Cirrhosis, mood disorder, history of alcohol abuse, esophageal cancer        Orders placed during this visit:  Orders Placed This Encounter   Procedures    Insert Central Line At Bedside    COVID PRE-OP / PRE-PROCEDURE SCREENING ORDER (NO ISOLATION) - Swab, Nasopharynx    Blood Culture - Blood,    Blood Culture - Blood,    COVID-19, FLU A/B, RSV PCR 1 HR TAT - Swab, Nasopharynx    Urine Culture - Urine,    MRSA Screen, PCR (Inpatient) - Swab, Nares    XR Chest 1 View    CT Head Without Contrast    CT Chest With Contrast Diagnostic    CT Abdomen Pelvis With Contrast    XR Chest 1 View    [facility] Draw    Comprehensive Metabolic Panel    High Sensitivity Troponin T    Magnesium    CBC Auto Differential    Blood Gas, Arterial -With Co-Ox Panel: Yes    BNP    D-dimer, Quantitative    Protime-INR    Lactic Acid, Plasma    Procalcitonin    C-reactive Protein    Acetaminophen Level    Ethanol    Salicylate Level    Phosphorus    TSH Rfx On Abnormal To Free T4    Ammonia    Urinalysis With Microscopic If Indicated (No Culture) - Urine, Catheter    Urine Drug Screen - Urine, Catheter    STAT Lactic Acid, Reflex    Manual Differential    Fentanyl, Urine - Urine, Clean Catch    Urinalysis, Microscopic Only - Urine, Clean Catch    Blood Gas, Arterial With Co-Ox    T4, Free    High Sensitivity Troponin T 1Hr    Comprehensive Metabolic Panel    CBC Auto Differential    BNP    Phosphorus    Vancomycin, Random    Lactic Acid, Plasma    STAT Lactic Acid, Reflex    NPO Diet NPO Type: Strict NPO    Undress & Gown    Vital Signs    Vital Signs Every Hour and Per Hospital Policy Based on Patient Condition    Telemetry - Place Orders & Notify Provider of Results When Patient Experiences Acute Chest Pain, Dysrhythmia or Respiratory Distress    Continuous Pulse Oximetry    Height & Weight    Daily Weights    Intake & Output    Oral Care -  Patient Not on NPPV & Not Intubated    Target Arousal Level RASS 0 to -2    Use Mobility Guidelines for Advancement of Activity    Saline Lock & Maintain IV Access    Daily CHG Bath While in Critical Care    Insert Indwelling Urinary Catheter    Assess Need for Indwelling Urinary Catheter - Follow Removal Protocol    Urinary Catheter Care    Oxygen Therapy- Nasal Cannula; Titrate 1-6 LPM Per SpO2; 90 - 95%    POC Glucose Once    POC Glucose Once    ECG 12 Lead ED Triage Standing Order; Weak / Dizzy / AMS    Adult Transthoracic Echo Complete w/ Color, Spectral and Contrast if Necessary Per Protocol    Insert Peripheral IV    Insert Peripheral IV    Inpatient Admission    ICU / CCU Bed Request ([facility] / [facility] / [facility] ONLY)    Fall Precautions    CBC & Differential    Green Top (Gel)    Lavender Top    Gold Top - SST    Gray Top    Light Blue Top         Additional orders considered but not ordered:      ED Course:    Consultants:      ED Course as of 02/11/25 1429   Tue Feb 11, 2025   0846 Chart review of oncology notes for esophageal squamous cell carcinoma shows history of:    Anemia     · Cancer    · Cirrhosis    · Duodenal ulcer    · Gastric ulcer    · GERD (gastroesophageal reflux disease)    · History of alcohol abuse    · HLD (hyperlipidemia)    · Mood disorder      [CC]   0928 This is a 64-year-old man with history of cirrhosis, cancer, stomach ulcers, alcohol abuse, who presents from his nursing facility because of somnolence and hypoxia.  The nursing facility said he slept all night and they found him to be somnolent and difficult to arouse they called EMS who found him to be hypoxic at 70% he does not have a baseline oxygen requirement.  He is moaning and complains of abdominal pain as well as diffuse bodyaches.  EMS said he was hypotensive and round as well his fingerstick was 85.  Upon arrival here he complains mostly of the abdominal pain.  He is also having shortness of breath.  No other complaints at this  time. [CC]   0930 Patient arrived septic and ill-appearing pale requiring nonrebreather to keep sats in the 90s he has abdominal distention and diffuse tenderness without focal tenderness.  Borderline tachycardic and hypotensive blood pressure 73/44 immediately started IV fluids and did not have immediate response of started on norepinephrine appears to be septic empirically treating with broad-spectrum antibiotics and full sepsis fluid bolus obtaining full infectious workup and blood gas CT scan of the head chest and abdomen.  Will likely need ICU level care. [CC]   1423 After full sepsis fluid bolus we are continuing to uptitrate on the norepinephrine dosing now more than 0.2 MAP still in the 60s I have ordered a second pressor with vasopressin.  Labs consistent with sepsis impressive white count of 27.58 and mild anemia he has lactic acidemia lactate of 10 and an anion gap of 28 [CC]   1424 Creatinine of 3.22 this was normal 2 weeks ago and procalcitonin more than 100 continuing to treat his sepsis we are continuing to titrate up on the vasopressors, urinalysis consistent with UTI.  CT scans were obtained and show findings consistent both with pneumonia and pyelonephritis.  With multiple vasopressors ordered borderline hypotension and oxygenating via nonrebreather he certainly needs ICU level care I discussed with Dr. Regalado in the ICU for admission. [CC]      ED Course User Index  [CC] Leonel Briggs MD     100 minutes of critical care provided. This time excludes other billable procedures. Time does include preparation of documents, medical consultations, review of old records, and direct bedside care. Patient is at high risk for life-threatening deterioration due to sepsis, pneumonia, pyelonephritis, acute renal failure, hypotension requiring aggressive crystalloid multiple IV vasopressors and albumin infusion.           Shared Decision Making:  After my consideration of clinical presentation and any  laboratory/radiology studies obtained, I discussed the findings with the patient/patient representative who is in agreement with the treatment plan and the final disposition.   Risks and benefits of discharge and/or observation/admission were discussed.       AS OF 14:29 EST VITALS:    BP - 98/56  HR - 99  TEMP - 98.2 °F (36.8 °C) (Axillary)  O2 SATS - 96%                  DIAGNOSIS  Final diagnoses:   Septic shock   Acute UTI (urinary tract infection)   Hypotension, unspecified hypotension type   Severe malnutrition   Squamous cell carcinoma of esophagus         DISPOSITION  ICU      Please note that portions of this document were completed with voice recognition software.        Leonel Briggs MD  02/11/25 4821

## 2025-02-11 NOTE — H&P
Pulmonary/Critical Care History and Physical Exam     LOS: 0 days   Patient Care Team:  Wesly Minor MD as PCP - General (Family Medicine)  Katerina Ga MD as Referring Physician (Hematology and Oncology)  Jeremy Aguila MD as Consulting Physician (Radiation Oncology)  Shirlene Kauffman APRN as Radiation Oncologist (Nurse Practitioner)    Chief Complaint:    Chief Complaint   Patient presents with    Altered Mental Status    Shortness of Breath       Subjective     HPI:     Mr. Nassar is a 64 year old male smoker and resident of [facility] with pmhx of alcoholic cirrhosis, metastatic squamous cell carcinoma of esophagus, and HLD who presented with weakness, dizziness, and AMS. Upon arrival to ED, he was noted to be hypotensive. He was given albumin and started on levophed and vasopressin. CT head without acute process. CT Abd/pelvis and chest pending showed some basilar atelectasis versus infiltrate, evidence of left-sided pyelonephritis and sigmoid diverticulosis along with possible cystitis. Additional workup notable for GALILEO (creatinine 3.22, BUN 27), UA with 2+ bacteria, large amount of WBCs. UDS positive for oxycodone and fentanyl. Ethanol < 10. ABG shows metabolic acidosis with pH of 7.214, HCO3 14.2, anion gap 28.0. , , bili normal. CXR shows pulmonary vascular congestion. ProBNP 13,127. Trop 134. WBC 27.58 Procal > 100.    Patient will be admitted to the ICU for ongoing management of septic shock.  He received full sepsis fluid bolus and was requiring norepinephrine at 0.26 mcg/kg/min and vasopressin 0.03 mcg/kg/min.  Patient currently reports left lower quadrant abdominal pain as well as bilateral shoulder pain.    Time spent: 10 minutes. Electronically signed by NUVIA Falcon, 02/11/25, 11:12 AM EST.    I performed an independent history and physical examination. Portions of the history were obtained by NUVIA and were modified by me according  to my findings. The above note reflects my findings, assessment, and plan.    Guerrero Regalado MD    History taken from: Patient, staff, chart    Past Medical History:   Diagnosis Date    Anemia     Cancer     ESOPHAGEAL    Cirrhosis     Duodenal ulcer     Gastric ulcer     GERD (gastroesophageal reflux disease)     History of alcohol abuse     History of radiation therapy 05/08/2024    esophagus    HLD (hyperlipidemia)     Mood disorder        Past Surgical History:   Procedure Laterality Date    ENDOSCOPY N/A 12/26/2023    Procedure: ESOPHAGOGASTRODUODENOSCOPY;  Surgeon: Wesly Mckeon MD;  Location: [facility] ENDOSCOPY;  Service: Gastroenterology;  Laterality: N/A;    VENOUS ACCESS DEVICE (PORT) INSERTION Right 04/25/2024       Family History   Problem Relation Age of Onset    Cancer Mother         LUNG AND BREAST    Breast cancer Mother     Heart attack Father        Social History     Socioeconomic History    Marital status: Single   Tobacco Use    Smoking status: Every Day     Current packs/day: 1.00     Average packs/day: 1 pack/day for 15.0 years (15.0 ttl pk-yrs)     Types: Cigarettes     Passive exposure: Current    Smokeless tobacco: Current   Vaping Use    Vaping status: Every Day    Substances: Nicotine, Flavoring    Devices: Disposable   Substance and Sexual Activity    Alcohol use: Yes     Alcohol/week: 4.0 standard drinks of alcohol     Types: 4 Cans of beer per week     Comment: pt states he drinks 3-4 beers a day a couple times a week    Drug use: Yes     Types: Hydrocodone    Sexual activity: Not Currently     Partners: Female       No Known Allergies    Past Medical History/Family History/Social History were reviewed by me and updates were made.     Review of Systems:    Review of 14 systems was completed with positives and pertinent negatives noted in the subjective section.  All other systems reviewed and are negative.         Objective     Vital Signs  Temp:  [98.2 °F (36.8 °C)-98.8 °F (37.1  °C)] 98.2 °F (36.8 °C)  Heart Rate:  [] 96  Resp:  [22] 22  BP: ()/(38-84) 97/53  Body mass index is 27.02 kg/m².       Intake/Output Summary (Last 24 hours) at 2/11/2025 1210  Last data filed at 2/11/2025 1107  Gross per 24 hour   Intake 2640 ml   Output --   Net 2640 ml      Physical Exam:     General Appearance:    Alert, cooperative, in no acute distress   Head:    Normocephalic, without obvious abnormality, atraumatic   Eyes:            Lids and lashes normal, conjunctivae and sclerae normal,    no icterus, no pallor, corneas clear, PER   ENMT:   Ears appear intact with no abnormalities noted         Neck: Trachea midline, no thyromegaly, no JVD   Lungs/resp:     Normal effort, symmetric chest rise, no crepitus, clear to      auscultation bilaterally, no chest wall tenderness                  Heart/CV:    Regular rhythm and normal rate, normal S1 and S2, no         murmur   Abdomen/GI:   Soft, moderate tenderness to palpation left lower quadrant and suprapubic, nondistended.  Bowel sounds present.   G/U:     Deferred   Extremities/MSK:   No clubbing, cyanosis or edema.  Normal tone.  No deformities.    Pulses:   Pulses palpable and equal bilaterally   Skin:   No bleeding, bruising or rash   Hem/Lymph:   No cervical or supraclavicular adenopathy.    Neurologic:   Moves all extremities with no obvious focal motor deficit.  Cranial nerves 2 - 12 grossly intact            Psychiatric:  Normal mood and affect, oriented x 3.     The above findings are documentation of my personal physical exam findings from today.   Electronically signed by:  Guerrero Regalado MD  02/11/25  12:10 EST     Results Review:     I reviewed the patient's new clinical results.   Results from last 7 days   Lab Units 02/11/25  0926   SODIUM mmol/L 137   POTASSIUM mmol/L 3.5   CHLORIDE mmol/L 99   CO2 mmol/L 10.0*   BUN mg/dL 27*   CREATININE mg/dL 3.22*   CALCIUM mg/dL 9.6   BILIRUBIN mg/dL 0.5   ALK PHOS U/L 72   ALT (SGPT) U/L  107*   AST (SGOT) U/L 175*   GLUCOSE mg/dL 81     Results from last 7 days   Lab Units 02/11/25  0901   WBC 10*3/mm3 27.58*   HEMOGLOBIN g/dL 12.8*   HEMATOCRIT % 40.1   PLATELETS 10*3/mm3 136*   MONOCYTES % % 10.0   EOSINOPHIL % % 0.0*     Results from last 7 days   Lab Units 02/11/25  1006   PH, ARTERIAL pH units 7.214*   PO2 ART mm Hg 100.0   PCO2, ARTERIAL mm Hg 35.3   HCO3 ART mmol/L 14.2*       I reviewed the patient's new imaging including images and reports.    I reviewed the patient's CT scan of the chest/abdomen/pelvis as well as CT scan of the head.  Radiologist's impressions follow:    Impression:   1.Interval development of bilateral lower lobe consolidations compatible with pneumonia, more accentuated on the left.   2.Interval development of multiple patchy areas of hypodensity involving the left renal cortex with associated extensive perinephric fat stranding compatible with acute pyelonephritis. There is also diffuse left periureteric fat stranding.   3.Diffuse bladder wall thickening and large bladder diverticulum. Correlate clinically for cystitis.   4.Sigmoid diverticulosis with no diverticulitis.   5.Mild nodularity of the liver surface. Correlate with liver function tests.   6.Focal area of heterogeneous density in the right paratracheal region at the anatomical level of the azygos not present on the previous exam. This may represent an engorged azygos or developing lymphadenopathy adjacent to the azygos     Impression   Impression:  1.No acute intracranial process identified.  2.Findings suggestive of mild chronic small vessel ischemic disease.  3.Moderate frothy paranasal sinus mucosal disease. Correlate for acute sinusitis.       Medication Review:   heparin (porcine), 5,000 Units, Subcutaneous, Q12H  lactated ringers, 1,000 mL, Intravenous, Once  mupirocin, 1 Application, Each Nare, BID  pantoprazole, 40 mg, Intravenous, Q AM  piperacillin-tazobactam, 4.5 g, Intravenous, Q8H  sodium  chloride, 10 mL, Intravenous, Q12H  [START ON 2/12/2025] vancomycin (dosing per levels), , Not Applicable, Daily  vancomycin, 20 mg/kg, Intravenous, Once      norepinephrine, 0.02-0.3 mcg/kg/min, Last Rate: 0.26 mcg/kg/min (02/11/25 1036)  Pharmacy to dose vancomycin,   vasopressin, 0.03 Units/min, Last Rate: 0.03 Units/min (02/11/25 1035)        Assessment & Plan     Active Hospital Problems    Diagnosis     **Septic shock     GALILEO (acute kidney injury)     Metabolic acidosis     Suspected UTI     Cirrhosis of liver     Squamous cell carcinoma of esophagus     Hyperlipidemia, unspecified     Tobacco use      64 y.o. 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  He was found to be hypotensive in the emergency department and was given sepsis fluid bolus and ultimately started on Levophed and vasopressin.  Labs were concerning for evidence of GALILEO, UTI present on admission, metabolic acidosis, elevated proBNP to 13,000, troponin of 134, white blood cell of 27.58, and procalcitonin greater than 100.    CT scan showed no acute abnormality on CT scan of the head with the exception of some possible sinusitis.  CT of the chest/abdomen/pelvis concerning for basilar atelectasis versus less likely pneumonia in my opinion as well as evidence of cystitis, left-sided pyelonephritis, and sigmoid diverticulosis without diverticulitis.    Patient will be admitted to the ICU for ongoing management of septic shock, likely from urinary origin.    Plan:  1.  For septic shock due to urinary source: Follow-up cultures.  Patient may require Lubin catheter placement as he had emptied his bladder prior to CT scan it appears he has ongoing dilation of his urinary bladder/urinary retention, broad-spectrum antibiotics.  Additional liter of fluid now and continue pressors.  Follow lactic acid levels.  2.  For  elevated proBNP/concern for heart failure: Check echocardiogram.  3.  For GALILEO: Place Lubin catheter for apparent obstruction.  Treat underlying sepsis.  Renally dose medications and avoid nephrotoxins when possible.  4.  DVT prophylaxis: Subcutaneous heparin.    Patient is critically ill secondary to septic shock with acute renal failure, lactic acidosis and has high risk of life-threatening decline in condition.  As such, patient requires continuous monitoring frequent reassessment or consideration of adjustment management to minimize this risk.  I personally reassessed him multiple times today.    Critical care time : 44 minutes spent by me personally.(This excludes time spent performing separately reportable procedures and services). Critical care time includes high complexity decision making to assess, manipulate, and support vital organ system failure in this individual who has impairment of one or more vital organ systems such that there is a high probability of imminent or life threatening deterioration in the patient’s condition.    Electronically signed by:    Guerrero Regalado MD  02/11/25  12:10 EST        Please note that portions of this note were completed with [employer] - a voice recognition program.

## 2025-02-11 NOTE — PROCEDURES
"Insert Central Line At Bedside    Date/Time: 2/11/2025 1:19 PM    Performed by: Renée Lozano APRN  Authorized by: Renée Lozano APRN  Consent: Verbal consent obtained. Written consent obtained.  Risks and benefits: risks, benefits and alternatives were discussed  Consent given by: patient  Patient identity confirmed: arm band and hospital-assigned identification number  Time out: Immediately prior to procedure a \"time out\" was called to verify the correct patient, procedure, equipment, support staff and site/side marked as required.  Indications: vascular access    Anesthesia:  Local Anesthetic: lidocaine 1% without epinephrine    Sedation:  Patient sedated: no    Preparation: skin prepped with ChloraPrep  Skin prep agent dried: skin prep agent completely dried prior to procedure  Sterile barriers: all five maximum sterile barriers used - cap, mask, sterile gown, sterile gloves, and large sterile sheet  Hand hygiene: hand hygiene performed prior to central venous catheter insertion  Location details: left internal jugular  Patient position: Trendelenburg  Catheter type: triple lumen  Catheter size: 12 Fr  Ultrasound guidance: yes  Sterile ultrasound techniques: sterile gel and sterile probe covers were used  Number of attempts: 2  Successful placement: yes  Post-procedure: line sutured and dressing applied  Assessment: blood return through all ports, placement verified by x-ray and no pneumothorax on x-ray  Comments: Access needle penetrated through left internal jugular vein into the left carotid artery. Bright red blood was noted. Syringe was removed and pulsatile blood noted. Guidewire advanced and ultrasound used to confirm placement into the left carotid artery. Needle and guidewire were removed. Firm manual pressure was applied for approximately 5 minutes. No hematoma. The left internal jugular was successfully accessed on second attempt. Dark red non-pulsatile blood aspirated. Guidewire " visualized in LIJ via ultrasound. L IJ was dilated and catheter advanced over the guidewire. Bright red blood was aspirated from all ports. All ports flushed easily. Sutures x3. Sterile dressing applied.    Electronically signed by NUVIA Falcon, 02/11/25, 1:31 PM EST.      Electronically signed by NUVIA Falcon, 02/11/25, 1:20 PM EST.

## 2025-02-11 NOTE — PROGRESS NOTES
"Pharmacy Consult - Vancomycin Dosing and Monitoring (Renal Dysfunction / Dialysis)    Val Nassar Jr. is a 64 y.o. male receiving vancomycin therapy.     Indication: Sepsis - UTI  Consulting Provider: ICU APRN  ID Consult: No    Goal Trough: 15 - 20 mcg/mL    Current Antimicrobial Therapy  Anti-Infectives (From admission, onward)      Ordered     Dose/Rate Route Frequency Start Stop    02/11/25 1136  piperacillin-tazobactam (ZOSYN) 4.5 g IVPB in 100 mL NS MBP (CD)        Ordering Provider: Renée Lozano APRN    4.5 g  over 4 Hours Intravenous Every 8 Hours 02/11/25 1821 02/16/25 1820    02/11/25 1136  Pharmacy to dose vancomycin        Ordering Provider: Renée Lozano APRN     Not Applicable Continuous PRN 02/11/25 1134 02/16/25 1133    02/11/25 0906  vancomycin IVPB 1750 mg in 0.9% Sodium Chloride (premix) 500 mL        Ordering Provider: Leonel Briggs MD    20 mg/kg × 83 kg  285.7 mL/hr over 105 Minutes Intravenous Once 02/11/25 0922      02/11/25 0906  piperacillin-tazobactam (ZOSYN) 3.375 g IVPB in 100 mL NS MBP (CD)        Ordering Provider: Leonel Briggs MD    3.375 g  over 30 Minutes Intravenous Once 02/11/25 0922 02/11/25 1014          Allergies  Allergies as of 02/11/2025    (No Known Allergies)     Labs  Results from last 7 days   Lab Units 02/11/25  0926   BUN mg/dL 27*   CREATININE mg/dL 3.22*     Results from last 7 days   Lab Units 02/11/25  0901   WBC 10*3/mm3 27.58*     Evaluation of Dosing     Last Dose Received in the ED/Outside Facility: 1750 mg IV once in ED  Is Patient on Dialysis or Renal Replacement: No    Height - 175.3 cm (69\")  Weight - 83 kg (183 lb)    Estimated Creatinine Clearance: 26.9 mL/min (A) (by C-G formula based on SCr of 3.22 mg/dL (H)).    Intake & Output (last 3 days)         02/08 0701 02/09 0700 02/09 0701  02/10 0700 02/10 0701  02/11 0700 02/11 0701  02/12 0700    IV Piggyback    2640    Total Intake(mL/kg)    2640 " (31.8)    Net    +8715                  Microbiology  Microbiology Results (last 10 days)       Procedure Component Value - Date/Time    COVID PRE-OP / PRE-PROCEDURE SCREENING ORDER (NO ISOLATION) - Swab, Nasopharynx [[id number]]  (Normal) Collected: 02/11/25 0926    Lab Status: Final result Specimen: Swab from Nasopharynx Updated: 02/11/25 1024    Narrative:      The following orders were created for panel order COVID PRE-OP / PRE-PROCEDURE SCREENING ORDER (NO ISOLATION) - Swab, Nasopharynx.  Procedure                               Abnormality         Status                     ---------                               -----------         ------                     COVID-19, FLU A/B, RSV P...[[id number]]  Normal              Final result                 Please view results for these tests on the individual orders.    COVID-19, FLU A/B, RSV PCR 1 HR TAT - Swab, Nasopharynx [[id number]]  (Normal) Collected: 02/11/25 0926    Lab Status: Final result Specimen: Swab from Nasopharynx Updated: 02/11/25 1024     COVID19 Not Detected     Influenza A PCR Not Detected     Influenza B PCR Not Detected     RSV, PCR Not Detected    Narrative:      Fact sheet for providers: https://www.fda.gov/media/428369/download    Fact sheet for patients: https://www.fda.gov/media/942088/download    Test performed by PCR.          Vancomycin Levels              Assessment/Plan:    Pharmacy to dose vancomycin for UTI, sepsis. Goal trough 15 - 20 mcg/mL.  Patient received a loading dose of vancomycin 1750 mg IV once (~21 mg/kg) @ 1031.  Serum creatinine 3.22 mg/dL on admission. CrCl 26.9 mL as estimated by C-G formula.  Will dose vancomycin by levels and obtain a random level tomorrow (2/12) with AM labs.  Monitor renal function, cultures and sensitivities, and clinical status, and adjust regimen as necessary.  Pharmacy will continue to follow.    Saul Morales, PharmD  2/11/2025  11:55 EST

## 2025-02-11 NOTE — PLAN OF CARE
"Goal Outcome Evaluation:      Pt drowsy/lethargic but answers all orientation questions correctly. Pt reports SOB, arrived from ED on 15L NRB, placed on high flow 40L/70% on arrival. O2 sats >92%. Levo and vaso. Abx. Lubin catheter. LA trending down. Moving all extremities. Pt c/o pain \"everywhere\". PRN morphine. NSR vs. Sinus tachycardia.                                       "

## 2025-02-11 NOTE — CASE MANAGEMENT/SOCIAL WORK
Discharge Planning Assessment  [facility]     Patient Name: Val Nassar Jr.  MRN: [id number]  Today's Date: 2/11/2025    Admit Date: 2/11/2025    Plan: Return to LTC bed @ [facility]   Discharge Needs Assessment       Row Name 02/11/25 1458       Living Environment    People in Home facility resident    Current Living Arrangements extended care facility    In the past 12 months has the electric, gas, oil, or water company threatened to shut off services in your home? No    Primary Care Provided by other (see comments)    Provides Primary Care For no one    Family Caregiver if Needed other relative(s);sibling(s)    Family Caregiver Names Negro nephew sister Douglas Watkins    Quality of Family Relationships unable to assess    Living Arrangement Comments Per Awa crow @ [facility], he is in Medicaid LTC       Transportation Needs    In the past 12 months, has lack of transportation kept you from medical appointments or from getting medications? no    In the past 12 months, has lack of transportation kept you from meetings, work, or from getting things needed for daily living? No       Food Insecurity    Within the past 12 months, you worried that your food would run out before you got the money to buy more. Never true    Within the past 12 months, the food you bought just didn't last and you didn't have money to get more. Never true       Transition Planning    Patient/Family Anticipates Transition to long-term care facility    Patient/Family Anticipated Services at Transition     Transportation Anticipated health plan transportation       Discharge Needs Assessment    Equipment Currently Used at Home walker, rolling    Do you want help finding or keeping work or a job? Patient declined    Do you want help with school or training? For example, starting or completing job training or getting a high school diploma, GED or equivalent Patient declined                   Discharge Plan        Row Name 02/11/25 1500       Plan    Plan Return to LTC bed @ [facility]    Patient/Family in Agreement with Plan yes    Plan Comments I spoke w/liaison Awa @ [facility], she stated that patient is in Medicaid LTC bed @ [facility], does not wear oxygen @ baseline, uses RW and ambulates @ [facility] w/RW, he is a smoker, able to ambulate outside to smoke. Scripts filled @ facility pharmacy of [place], [place]. Follows w/facility PCP.  I attempted to meet w/patient in room, he was on HFNC, drowsy, confirmed he is in LTC bed @ [facility] and that is about all he would say. I will f/u on Wednesday.  Will need transportation back to [facility] @ d/c, and will probably want to skill him.    Final Discharge Disposition Code 04 - intermediate care facility                  Continued Care and Services - Admitted Since 2/11/2025       Destination Coordination complete.      Service Provider Request Status Services Address Phone Fax Patient Preferred    [facility]  Selected Intermediate Care [address] [facility] , [place] [id number] [phone] --                     Demographic Summary       Row Name 02/11/25 1456       General Information    Arrived From long-term care    Referral Source emergency department    Preferred Language English    General Information Comments LW paperwork on file lists sudheer Nassar as HCS, sister as second.  PCP facility MD.                   Functional Status       Row Name 02/11/25 1457       Functional Status    Usual Activity Tolerance moderate    Current Activity Tolerance fair       Physical Activity    On average, how many days per week do you engage in moderate to strenuous exercise (like a brisk walk)? 0 days    On average, how many minutes do you engage in exercise at this level? 0 min    Number of minutes of exercise per week 0       Functional Status, IADL    Medications completely dependent    Meal Preparation completely  dependent    Housekeeping completely dependent    Laundry completely dependent    Shopping completely dependent    If for any reason you need help with day-to-day activities such as bathing, preparing meals, shopping, managing finances, etc., do you get the help you need? Patient declined                   Psychosocial    No documentation.                  Abuse/Neglect    No documentation.                  Legal    No documentation.                  Substance Abuse    No documentation.                  Patient Forms    No documentation.                     Nikolas Hernandez RN

## 2025-02-12 ENCOUNTER — APPOINTMENT (OUTPATIENT)
Dept: GENERAL RADIOLOGY | Facility: HOSPITAL | Age: 65
DRG: 870 | End: 2025-02-12
Payer: MEDICARE

## 2025-02-12 PROBLEM — J15.1 PNEUMONIA OF BOTH LOWER LOBES DUE TO PSEUDOMONAS SPECIES: Status: ACTIVE | Noted: 2025-02-12

## 2025-02-12 PROBLEM — R78.81 BACTEREMIA DUE TO PSEUDOMONAS: Status: ACTIVE | Noted: 2025-02-12

## 2025-02-12 PROBLEM — B96.5 BACTEREMIA DUE TO PSEUDOMONAS: Status: ACTIVE | Noted: 2025-02-12

## 2025-02-12 PROBLEM — R39.89 SUSPECTED UTI: Status: RESOLVED | Noted: 2025-02-11 | Resolved: 2025-02-12

## 2025-02-12 LAB
ACANTHOCYTES BLD QL SMEAR: ABNORMAL
ALBUMIN SERPL-MCNC: 3 G/DL (ref 3.5–5.2)
ALBUMIN/GLOB SERPL: 1.1 G/DL
ALP SERPL-CCNC: 42 U/L (ref 39–117)
ALT SERPL W P-5'-P-CCNC: 227 U/L (ref 1–41)
ANION GAP SERPL CALCULATED.3IONS-SCNC: 18 MMOL/L (ref 5–15)
ARTERIAL PATENCY WRIST A: ABNORMAL
ARTERIAL PATENCY WRIST A: ABNORMAL
AST SERPL-CCNC: 424 U/L (ref 1–40)
ATMOSPHERIC PRESS: ABNORMAL MM[HG]
ATMOSPHERIC PRESS: ABNORMAL MM[HG]
BACTERIA BLD CULT: ABNORMAL
BASE EXCESS BLDA CALC-SCNC: -11.1 MMOL/L (ref 0–2)
BASE EXCESS BLDA CALC-SCNC: -4.8 MMOL/L (ref 0–2)
BASOPHILS # BLD MANUAL: 0 10*3/MM3 (ref 0–0.2)
BASOPHILS NFR BLD MANUAL: 0 % (ref 0–1.5)
BDY SITE: ABNORMAL
BDY SITE: ABNORMAL
BILIRUB SERPL-MCNC: 0.4 MG/DL (ref 0–1.2)
BODY TEMPERATURE: 37
BODY TEMPERATURE: 37
BOTTLE TYPE: ABNORMAL
BUN SERPL-MCNC: 40 MG/DL (ref 8–23)
BUN/CREAT SERPL: 14.4 (ref 7–25)
BURR CELLS BLD QL SMEAR: ABNORMAL
CALCIUM SPEC-SCNC: 7.7 MG/DL (ref 8.6–10.5)
CHLORIDE SERPL-SCNC: 103 MMOL/L (ref 98–107)
CO2 BLDA-SCNC: 16.7 MMOL/L (ref 22–33)
CO2 BLDA-SCNC: 21.2 MMOL/L (ref 22–33)
CO2 SERPL-SCNC: 13 MMOL/L (ref 22–29)
COHGB MFR BLD: 0.9 % (ref 0–2)
COHGB MFR BLD: 1.1 % (ref 0–2)
CREAT SERPL-MCNC: 2.77 MG/DL (ref 0.76–1.27)
CYTO UR: NORMAL
D-LACTATE SERPL-SCNC: 3.6 MMOL/L (ref 0.5–2)
D-LACTATE SERPL-SCNC: 4.1 MMOL/L (ref 0.5–2)
D-LACTATE SERPL-SCNC: 4.2 MMOL/L (ref 0.5–2)
D-LACTATE SERPL-SCNC: 4.4 MMOL/L (ref 0.5–2)
DEPRECATED RDW RBC AUTO: 57.2 FL (ref 37–54)
EGFRCR SERPLBLD CKD-EPI 2021: 24.6 ML/MIN/1.73
EOSINOPHIL # BLD MANUAL: 0 10*3/MM3 (ref 0–0.4)
EOSINOPHIL NFR BLD MANUAL: 0 % (ref 0.3–6.2)
EPAP: 0
EPAP: 0
ERYTHROCYTE [DISTWIDTH] IN BLOOD BY AUTOMATED COUNT: 15.6 % (ref 12.3–15.4)
GLOBULIN UR ELPH-MCNC: 2.8 GM/DL
GLUCOSE BLDC GLUCOMTR-MCNC: 112 MG/DL (ref 70–130)
GLUCOSE BLDC GLUCOMTR-MCNC: 112 MG/DL (ref 70–130)
GLUCOSE BLDC GLUCOMTR-MCNC: 116 MG/DL (ref 70–130)
GLUCOSE BLDC GLUCOMTR-MCNC: 122 MG/DL (ref 70–130)
GLUCOSE SERPL-MCNC: 121 MG/DL (ref 65–99)
HCO3 BLDA-SCNC: 15.6 MMOL/L (ref 20–26)
HCO3 BLDA-SCNC: 20.1 MMOL/L (ref 20–26)
HCT VFR BLD AUTO: 35.8 % (ref 37.5–51)
HCT VFR BLD CALC: 35.8 % (ref 38–51)
HCT VFR BLD CALC: 36.3 % (ref 38–51)
HGB BLD-MCNC: 11.5 G/DL (ref 13–17.7)
HGB BLDA-MCNC: 11.7 G/DL (ref 13.5–17.5)
HGB BLDA-MCNC: 11.8 G/DL (ref 13.5–17.5)
INHALED O2 CONCENTRATION: 80 %
INHALED O2 CONCENTRATION: 80 %
IPAP: 0
IPAP: 0
L PNEUMO1 AG UR QL IA: NEGATIVE
LAB AP CASE REPORT: NORMAL
LAB AP CLINICAL INFORMATION: NORMAL
LYMPHOCYTES # BLD MANUAL: 0.8 10*3/MM3 (ref 0.7–3.1)
LYMPHOCYTES NFR BLD MANUAL: 6 % (ref 5–12)
MAGNESIUM SERPL-MCNC: 3.4 MG/DL (ref 1.6–2.4)
MCH RBC QN AUTO: 31.7 PG (ref 26.6–33)
MCHC RBC AUTO-ENTMCNC: 32.1 G/DL (ref 31.5–35.7)
MCV RBC AUTO: 98.6 FL (ref 79–97)
METAMYELOCYTES NFR BLD MANUAL: 7 % (ref 0–0)
METHGB BLD QL: 0.3 % (ref 0–1.5)
METHGB BLD QL: 0.5 % (ref 0–1.5)
MODALITY: ABNORMAL
MODALITY: ABNORMAL
MONOCYTES # BLD: 1.6 10*3/MM3 (ref 0.1–0.9)
NEUTROPHILS # BLD AUTO: 22.34 10*3/MM3 (ref 1.7–7)
NEUTROPHILS NFR BLD MANUAL: 61 % (ref 42.7–76)
NEUTS BAND NFR BLD MANUAL: 23 % (ref 0–5)
NEUTS VAC BLD QL SMEAR: ABNORMAL
OXYHGB MFR BLDV: 94.5 % (ref 94–99)
OXYHGB MFR BLDV: 95.3 % (ref 94–99)
PATH REPORT.FINAL DX SPEC: NORMAL
PATH REPORT.GROSS SPEC: NORMAL
PAW @ PEAK INSP FLOW SETTING VENT: 0 CMH2O
PAW @ PEAK INSP FLOW SETTING VENT: 0 CMH2O
PCO2 BLDA: 35.9 MM HG (ref 35–45)
PCO2 BLDA: 37 MM HG (ref 35–45)
PCO2 TEMP ADJ BLD: 35.9 MM HG (ref 35–48)
PCO2 TEMP ADJ BLD: 37 MM HG (ref 35–48)
PEEP RESPIRATORY: 10 CM[H2O]
PEEP RESPIRATORY: 13 CM[H2O]
PH BLDA: 7.23 PH UNITS (ref 7.35–7.45)
PH BLDA: 7.36 PH UNITS (ref 7.35–7.45)
PH, TEMP CORRECTED: 7.23 PH UNITS
PH, TEMP CORRECTED: 7.36 PH UNITS
PHOSPHATE SERPL-MCNC: 6.2 MG/DL (ref 2.5–4.5)
PLAT MORPH BLD: NORMAL
PLATELET # BLD AUTO: 94 10*3/MM3 (ref 140–450)
PMV BLD AUTO: 11 FL (ref 6–12)
PO2 BLDA: 78 MM HG (ref 83–108)
PO2 BLDA: 93 MM HG (ref 83–108)
PO2 TEMP ADJ BLD: 78 MM HG (ref 83–108)
PO2 TEMP ADJ BLD: 93 MM HG (ref 83–108)
POTASSIUM SERPL-SCNC: 3.9 MMOL/L (ref 3.5–5.2)
PROT SERPL-MCNC: 5.8 G/DL (ref 6–8.5)
QT INTERVAL: 370 MS
QTC INTERVAL: 486 MS
RBC # BLD AUTO: 3.63 10*6/MM3 (ref 4.14–5.8)
S PNEUM AG SPEC QL LA: NEGATIVE
SODIUM SERPL-SCNC: 134 MMOL/L (ref 136–145)
TOTAL RATE: 0 BREATHS/MINUTE
TOTAL RATE: 22 BREATHS/MINUTE
TOXIC GRANULATION: ABNORMAL
VANCOMYCIN SERPL-MCNC: 15.1 MCG/ML (ref 5–40)
VARIANT LYMPHS NFR BLD MANUAL: 3 % (ref 19.6–45.3)
VENTILATOR MODE: ABNORMAL
VENTILATOR MODE: ABNORMAL
VT ON VENT VENT: 0.47 ML
WBC NRBC COR # BLD AUTO: 26.6 10*3/MM3 (ref 3.4–10.8)

## 2025-02-12 PROCEDURE — 83050 HGB METHEMOGLOBIN QUAN: CPT

## 2025-02-12 PROCEDURE — 94002 VENT MGMT INPAT INIT DAY: CPT

## 2025-02-12 PROCEDURE — 25010000003 VASOPRESSIN 0.2 UNITS/ML SOLUTION: Performed by: STUDENT IN AN ORGANIZED HEALTH CARE EDUCATION/TRAINING PROGRAM

## 2025-02-12 PROCEDURE — 0BH17EZ INSERTION OF ENDOTRACHEAL AIRWAY INTO TRACHEA, VIA NATURAL OR ARTIFICIAL OPENING: ICD-10-PCS | Performed by: NURSE PRACTITIONER

## 2025-02-12 PROCEDURE — 83605 ASSAY OF LACTIC ACID: CPT | Performed by: STUDENT IN AN ORGANIZED HEALTH CARE EDUCATION/TRAINING PROGRAM

## 2025-02-12 PROCEDURE — 82805 BLOOD GASES W/O2 SATURATION: CPT

## 2025-02-12 PROCEDURE — 25010000002 PHENYLEPHRINE 10 MG/ML SOLUTION

## 2025-02-12 PROCEDURE — 31500 INSERT EMERGENCY AIRWAY: CPT

## 2025-02-12 PROCEDURE — 25010000002 THIAMINE PER 100 MG: Performed by: NURSE PRACTITIONER

## 2025-02-12 PROCEDURE — 94761 N-INVAS EAR/PLS OXIMETRY MLT: CPT

## 2025-02-12 PROCEDURE — 99291 CRITICAL CARE FIRST HOUR: CPT | Performed by: INTERNAL MEDICINE

## 2025-02-12 PROCEDURE — 25010000002 MEROPENEM PER 100 MG: Performed by: INTERNAL MEDICINE

## 2025-02-12 PROCEDURE — 03HY32Z INSERTION OF MONITORING DEVICE INTO UPPER ARTERY, PERCUTANEOUS APPROACH: ICD-10-PCS | Performed by: NURSE PRACTITIONER

## 2025-02-12 PROCEDURE — 25010000003 DEXTROSE 5 % SOLUTION 1,000 ML FLEX CONT: Performed by: NURSE PRACTITIONER

## 2025-02-12 PROCEDURE — 25010000002 HEPARIN (PORCINE) PER 1000 UNITS: Performed by: NURSE PRACTITIONER

## 2025-02-12 PROCEDURE — 83735 ASSAY OF MAGNESIUM: CPT | Performed by: NURSE PRACTITIONER

## 2025-02-12 PROCEDURE — 80053 COMPREHEN METABOLIC PANEL: CPT | Performed by: NURSE PRACTITIONER

## 2025-02-12 PROCEDURE — 25010000002 TOBRAMYCIN PER 80 MG: Performed by: INTERNAL MEDICINE

## 2025-02-12 PROCEDURE — 25810000003 SODIUM CHLORIDE 0.9 % SOLUTION 250 ML FLEX CONT: Performed by: NURSE PRACTITIONER

## 2025-02-12 PROCEDURE — 82375 ASSAY CARBOXYHB QUANT: CPT

## 2025-02-12 PROCEDURE — 94799 UNLISTED PULMONARY SVC/PX: CPT

## 2025-02-12 PROCEDURE — 82948 REAGENT STRIP/BLOOD GLUCOSE: CPT

## 2025-02-12 PROCEDURE — 85025 COMPLETE CBC W/AUTO DIFF WBC: CPT | Performed by: NURSE PRACTITIONER

## 2025-02-12 PROCEDURE — 85007 BL SMEAR W/DIFF WBC COUNT: CPT | Performed by: NURSE PRACTITIONER

## 2025-02-12 PROCEDURE — 74018 RADEX ABDOMEN 1 VIEW: CPT

## 2025-02-12 PROCEDURE — 5A1955Z RESPIRATORY VENTILATION, GREATER THAN 96 CONSECUTIVE HOURS: ICD-10-PCS | Performed by: NURSE PRACTITIONER

## 2025-02-12 PROCEDURE — 84100 ASSAY OF PHOSPHORUS: CPT | Performed by: NURSE PRACTITIONER

## 2025-02-12 PROCEDURE — 25010000002 HYDROMORPHONE PER 4 MG: Performed by: NURSE PRACTITIONER

## 2025-02-12 PROCEDURE — 25010000002 PIPERACILLIN SOD-TAZOBACTAM PER 1 G: Performed by: NURSE PRACTITIONER

## 2025-02-12 PROCEDURE — 83605 ASSAY OF LACTIC ACID: CPT | Performed by: INTERNAL MEDICINE

## 2025-02-12 PROCEDURE — 25010000002 FENTANYL 10 MCG/1 ML NS: Performed by: NURSE PRACTITIONER

## 2025-02-12 PROCEDURE — 25010000002 HYDROCORTISONE SOD SUC (PF) 100 MG RECONSTITUTED SOLUTION: Performed by: INTERNAL MEDICINE

## 2025-02-12 PROCEDURE — 25010000002 MAGNESIUM SULFATE 2 GM/50ML SOLUTION: Performed by: NURSE PRACTITIONER

## 2025-02-12 PROCEDURE — 87899 AGENT NOS ASSAY W/OPTIC: CPT | Performed by: NURSE PRACTITIONER

## 2025-02-12 PROCEDURE — 71045 X-RAY EXAM CHEST 1 VIEW: CPT

## 2025-02-12 PROCEDURE — 80202 ASSAY OF VANCOMYCIN: CPT

## 2025-02-12 PROCEDURE — 87449 NOS EACH ORGANISM AG IA: CPT | Performed by: NURSE PRACTITIONER

## 2025-02-12 PROCEDURE — 25010000002 PHENYLEPHRINE 10 MG/ML SOLUTION 5 ML VIAL: Performed by: NURSE PRACTITIONER

## 2025-02-12 RX ORDER — POLYVINYL ALCOHOL 14 MG/ML
2 SOLUTION/ DROPS OPHTHALMIC
Status: DISCONTINUED | OUTPATIENT
Start: 2025-02-12 | End: 2025-03-12 | Stop reason: HOSPADM

## 2025-02-12 RX ORDER — ROCURONIUM BROMIDE 50 MG/5 ML
100 SYRINGE (ML) INTRAVENOUS ONCE
Status: COMPLETED | OUTPATIENT
Start: 2025-02-12 | End: 2025-02-12

## 2025-02-12 RX ORDER — BISACODYL 5 MG/1
5 TABLET, DELAYED RELEASE ORAL DAILY PRN
Status: DISCONTINUED | OUTPATIENT
Start: 2025-02-12 | End: 2025-02-13

## 2025-02-12 RX ORDER — CHLORHEXIDINE GLUCONATE ORAL RINSE 1.2 MG/ML
15 SOLUTION DENTAL EVERY 12 HOURS SCHEDULED
Status: DISCONTINUED | OUTPATIENT
Start: 2025-02-12 | End: 2025-03-03 | Stop reason: SDUPTHER

## 2025-02-12 RX ORDER — FLUDROCORTISONE ACETATE 0.1 MG/1
100 TABLET ORAL DAILY
Status: DISCONTINUED | OUTPATIENT
Start: 2025-02-12 | End: 2025-02-12

## 2025-02-12 RX ORDER — POLYETHYLENE GLYCOL 3350 17 G/17G
17 POWDER, FOR SOLUTION ORAL DAILY PRN
Status: DISCONTINUED | OUTPATIENT
Start: 2025-02-12 | End: 2025-02-13

## 2025-02-12 RX ORDER — AMOXICILLIN 250 MG
2 CAPSULE ORAL 2 TIMES DAILY
Status: DISCONTINUED | OUTPATIENT
Start: 2025-02-12 | End: 2025-02-13

## 2025-02-12 RX ORDER — BISACODYL 10 MG
10 SUPPOSITORY, RECTAL RECTAL DAILY PRN
Status: DISCONTINUED | OUTPATIENT
Start: 2025-02-12 | End: 2025-02-13

## 2025-02-12 RX ORDER — ETOMIDATE 2 MG/ML
30 INJECTION INTRAVENOUS ONCE
Status: DISCONTINUED | OUTPATIENT
Start: 2025-02-12 | End: 2025-02-12

## 2025-02-12 RX ORDER — INDOMETHACIN 25 MG/1
50 CAPSULE ORAL ONCE
Status: COMPLETED | OUTPATIENT
Start: 2025-02-12 | End: 2025-02-12

## 2025-02-12 RX ORDER — PHENYLEPHRINE HYDROCHLORIDE 10 MG/ML
INJECTION INTRAVENOUS
Status: COMPLETED
Start: 2025-02-12 | End: 2025-02-12

## 2025-02-12 RX ADMIN — THIAMINE HYDROCHLORIDE 500 MG: 100 INJECTION, SOLUTION INTRAMUSCULAR; INTRAVENOUS at 14:05

## 2025-02-12 RX ADMIN — POLYVINYL ALCOHOL 2 DROP: 14 SOLUTION/ DROPS OPHTHALMIC at 20:08

## 2025-02-12 RX ADMIN — THIAMINE HYDROCHLORIDE 500 MG: 100 INJECTION, SOLUTION INTRAMUSCULAR; INTRAVENOUS at 22:21

## 2025-02-12 RX ADMIN — NOREPINEPHRINE BITARTRATE 0.3 MCG/KG/MIN: 1 INJECTION, SOLUTION, CONCENTRATE INTRAVENOUS at 08:44

## 2025-02-12 RX ADMIN — MUPIROCIN 1 APPLICATION: 20 OINTMENT TOPICAL at 20:02

## 2025-02-12 RX ADMIN — HYDROCORTISONE SODIUM SUCCINATE 50 MG: 100 INJECTION, POWDER, FOR SOLUTION INTRAMUSCULAR; INTRAVENOUS at 23:40

## 2025-02-12 RX ADMIN — MUPIROCIN 1 APPLICATION: 20 OINTMENT TOPICAL at 08:26

## 2025-02-12 RX ADMIN — VASOPRESSIN IN 0.9% SODIUM CHLORIDE 0.03 UNITS/MIN: 20 INJECTION INTRAVENOUS at 12:05

## 2025-02-12 RX ADMIN — MAGNESIUM SULFATE HEPTAHYDRATE 2 G: 40 INJECTION, SOLUTION INTRAVENOUS at 00:52

## 2025-02-12 RX ADMIN — PIPERACILLIN AND TAZOBACTAM 4.5 G: 4; .5 INJECTION, POWDER, FOR SOLUTION INTRAVENOUS at 02:32

## 2025-02-12 RX ADMIN — SENNOSIDES AND DOCUSATE SODIUM 2 TABLET: 50; 8.6 TABLET ORAL at 20:02

## 2025-02-12 RX ADMIN — HEPARIN SODIUM 5000 UNITS: 5000 INJECTION INTRAVENOUS; SUBCUTANEOUS at 08:25

## 2025-02-12 RX ADMIN — HYDROCORTISONE SODIUM SUCCINATE 50 MG: 100 INJECTION, POWDER, FOR SOLUTION INTRAMUSCULAR; INTRAVENOUS at 00:04

## 2025-02-12 RX ADMIN — VASOPRESSIN IN 0.9% SODIUM CHLORIDE 0.03 UNITS/MIN: 20 INJECTION INTRAVENOUS at 02:53

## 2025-02-12 RX ADMIN — NOREPINEPHRINE BITARTRATE 0.3 MCG/KG/MIN: 1 INJECTION, SOLUTION, CONCENTRATE INTRAVENOUS at 20:00

## 2025-02-12 RX ADMIN — HYDROCORTISONE SODIUM SUCCINATE 50 MG: 100 INJECTION, POWDER, FOR SOLUTION INTRAMUSCULAR; INTRAVENOUS at 12:06

## 2025-02-12 RX ADMIN — Medication 50 MCG/HR: at 02:37

## 2025-02-12 RX ADMIN — Medication 200 MCG/HR: at 19:36

## 2025-02-12 RX ADMIN — TOBRAMYCIN 500 MG: 40 INJECTION INTRAMUSCULAR; INTRAVENOUS at 12:55

## 2025-02-12 RX ADMIN — HEPARIN SODIUM 5000 UNITS: 5000 INJECTION INTRAVENOUS; SUBCUTANEOUS at 20:01

## 2025-02-12 RX ADMIN — SODIUM BICARBONATE 150 MEQ: 84 INJECTION, SOLUTION INTRAVENOUS at 12:05

## 2025-02-12 RX ADMIN — SODIUM BICARBONATE 150 MEQ: 84 INJECTION, SOLUTION INTRAVENOUS at 17:33

## 2025-02-12 RX ADMIN — SODIUM BICARBONATE 150 MEQ: 84 INJECTION, SOLUTION INTRAVENOUS at 07:19

## 2025-02-12 RX ADMIN — PIPERACILLIN AND TAZOBACTAM 4.5 G: 4; .5 INJECTION, POWDER, FOR SOLUTION INTRAVENOUS at 10:12

## 2025-02-12 RX ADMIN — Medication 100 MG: at 01:57

## 2025-02-12 RX ADMIN — POLYVINYL ALCOHOL 2 DROP: 14 SOLUTION/ DROPS OPHTHALMIC at 12:20

## 2025-02-12 RX ADMIN — PHENYLEPHRINE HYDROCHLORIDE 3 MCG/KG/MIN: 10 INJECTION INTRAVENOUS at 08:25

## 2025-02-12 RX ADMIN — PIPERACILLIN AND TAZOBACTAM 4.5 G: 4; .5 INJECTION, POWDER, FOR SOLUTION INTRAVENOUS at 17:33

## 2025-02-12 RX ADMIN — POLYVINYL ALCOHOL 2 DROP: 14 SOLUTION/ DROPS OPHTHALMIC at 04:06

## 2025-02-12 RX ADMIN — CHLORHEXIDINE GLUCONATE 0.12% ORAL RINSE 15 ML: 1.2 LIQUID ORAL at 08:44

## 2025-02-12 RX ADMIN — THIAMINE HYDROCHLORIDE 500 MG: 100 INJECTION, SOLUTION INTRAMUSCULAR; INTRAVENOUS at 05:02

## 2025-02-12 RX ADMIN — PHENYLEPHRINE HYDROCHLORIDE 2.5 MCG/KG/MIN: 10 INJECTION INTRAVENOUS at 22:47

## 2025-02-12 RX ADMIN — HYDROCORTISONE SODIUM SUCCINATE 50 MG: 100 INJECTION, POWDER, FOR SOLUTION INTRAMUSCULAR; INTRAVENOUS at 17:33

## 2025-02-12 RX ADMIN — PANTOPRAZOLE SODIUM 40 MG: 40 INJECTION, POWDER, FOR SOLUTION INTRAVENOUS at 05:02

## 2025-02-12 RX ADMIN — HYDROCORTISONE SODIUM SUCCINATE 50 MG: 100 INJECTION, POWDER, FOR SOLUTION INTRAMUSCULAR; INTRAVENOUS at 05:02

## 2025-02-12 RX ADMIN — PHENYLEPHRINE HYDROCHLORIDE: 10 INJECTION INTRAVENOUS at 02:05

## 2025-02-12 RX ADMIN — SODIUM BICARBONATE 50 MEQ: 84 INJECTION INTRAVENOUS at 03:22

## 2025-02-12 RX ADMIN — PHENYLEPHRINE HYDROCHLORIDE 2.5 MCG/KG/MIN: 10 INJECTION INTRAVENOUS at 02:35

## 2025-02-12 RX ADMIN — VASOPRESSIN IN 0.9% SODIUM CHLORIDE 0.03 UNITS/MIN: 20 INJECTION INTRAVENOUS at 23:39

## 2025-02-12 RX ADMIN — CHLORHEXIDINE GLUCONATE 0.12% ORAL RINSE 15 ML: 1.2 LIQUID ORAL at 20:01

## 2025-02-12 RX ADMIN — MEROPENEM 1000 MG: 1 INJECTION, POWDER, FOR SOLUTION INTRAVENOUS at 10:12

## 2025-02-12 RX ADMIN — FLUDROCORTISONE ACETATE 100 MCG: 0.1 TABLET ORAL at 03:05

## 2025-02-12 RX ADMIN — SENNOSIDES AND DOCUSATE SODIUM 2 TABLET: 50; 8.6 TABLET ORAL at 08:26

## 2025-02-12 RX ADMIN — CHLORHEXIDINE GLUCONATE 0.12% ORAL RINSE 15 ML: 1.2 LIQUID ORAL at 03:05

## 2025-02-12 RX ADMIN — Medication 50 MG: at 01:54

## 2025-02-12 RX ADMIN — POLYVINYL ALCOHOL 2 DROP: 14 SOLUTION/ DROPS OPHTHALMIC at 16:15

## 2025-02-12 RX ADMIN — HYDROMORPHONE HYDROCHLORIDE 0.5 MG: 1 INJECTION, SOLUTION INTRAMUSCULAR; INTRAVENOUS; SUBCUTANEOUS at 00:23

## 2025-02-12 RX ADMIN — Medication 10 ML: at 08:26

## 2025-02-12 RX ADMIN — Medication 10 ML: at 20:14

## 2025-02-12 RX ADMIN — PHENYLEPHRINE HYDROCHLORIDE 3 MCG/KG/MIN: 10 INJECTION INTRAVENOUS at 14:05

## 2025-02-12 NOTE — PLAN OF CARE
Goal Outcome Evaluation:  Plan of Care Reviewed With: patient        Progress: declining  Outcome Evaluation: Pt noted to be in distress at onset of shift.  3rd Pressor had just been initiated and pt was complaining of significant pain in abdoment.  Arterial line was placed by APRN and treatment plan was readjusted.  Pt was given dilaudid for pain which he reported treated his pain well though after approx 90 minutes it would return.  Pt was also visibly air hungry though he denied shortness of breath.  An ABG revealed marked metabolic acidosis.  A Bicarb drip was initiated.  UOP from oswald catheter appears to be inadequate when considering pt fluid intake via IV.  Pt also requiring more and more FiO2 and flow on high flow nasal cannula ant at 0030 pt was on maximum levels 60L and 100%.  At just after midnight pt complained of shortness of air which progressively worsened.  Pt was intubated just prior to 0200 and was subsequently placed on Fentanyl drip.  Pt currently on 80% FiO2 and PEEP 10 with saturation 93%.  Pt is currently breathing with vent with no discomfort being noted.  Fentanyl infuses at 50mcg/hr, Bicarb Drip continues, Norepinephrine at 0.3 mcg/kg/min but had been as high as 0.5 mcg/kg/min, phenylephrine continues at 3 mcg/kg/min, Vasopressin continues at 0.3 units/min.  Magnesium replaced, broad spectrum antibiotics given.  Pt has been mildly febrile throughout currently at 37.7 degrees.  Will continue to intently monitor this patient.

## 2025-02-12 NOTE — CASE MANAGEMENT/SOCIAL WORK
Continued Stay Note  [facility]     Patient Name: Val Nassar Jr.  MRN: [id number]  Today's Date: 2/12/2025    Admit Date: 2/11/2025    Plan: ongoing   Discharge Plan       Row Name 02/12/25 1301       Plan    Plan ongoing    Patient/Family in Agreement with Plan other (see comments)    Plan Comments Patient required intubation early this am, currently intubated/sedated/MV/pressors on bicarb drip. DC TBD. CM will continue to follow.                   Discharge Codes    No documentation.                 Expected Discharge Date and Time       Expected Discharge Date Expected Discharge Time    Feb 18, 2025               Nikolas Hernandez RN

## 2025-02-12 NOTE — PLAN OF CARE
Goal Outcome Evaluation:   Patient sedated on vent. Remains on high FiO2 80% and peep 13. No weaning at this time.

## 2025-02-12 NOTE — PROGRESS NOTES
Intensive Care Follow-up     Hospital:  LOS: 1 day   Mr. Val Nassar Jr., 64 y.o. male is followed for:   Septic shock            History of present illness:   Mr. Nassar is a 64 year old male smoker and resident of [facility] with pmhx of alcoholic cirrhosis, metastatic squamous cell carcinoma of esophagus, and HLD who presented with weakness, dizziness, and AMS. Upon arrival to ED, he was noted to be hypotensive. He was given albumin and started on levophed and vasopressin. CT head without acute process. CT Abd/pelvis and chest pending showed some basilar atelectasis versus infiltrate, evidence of left-sided pyelonephritis and sigmoid diverticulosis along with possible cystitis. Additional workup notable for GALILEO (creatinine 3.22, BUN 27), UA with 2+ bacteria, large amount of WBCs. UDS positive for oxycodone and fentanyl. Ethanol < 10. ABG shows metabolic acidosis with pH of 7.214, HCO3 14.2, anion gap 28.0. , , bili normal. CXR shows pulmonary vascular congestion. ProBNP 13,127. Trop 134. WBC 27.58 Procal > 100.     Patient will be admitted to the ICU for ongoing management of septic shock.  He received full sepsis fluid bolus and was requiring norepinephrine at 0.26 mcg/kg/min and vasopressin 0.03 mcg/kg/min.  Patient currently reports left lower quadrant abdominal pain as well as bilateral shoulder pain.      Subjective   Interval History:  Blood cultures now positive for Pseudomonas aeruginosa.  Intubated overnight on multiple pressors.             The patient's past medical, surgical and social history were reviewed and updated in Epic as appropriate.       Objective     Infusions:  fentanyl 10 mcg/mL,  mcg/hr, Last Rate: 150 mcg/hr (02/12/25 1017)  norepinephrine, 0.02-0.5 mcg/kg/min, Last Rate: 0.3 mcg/kg/min (02/12/25 0886)  Pharmacy to dose vancomycin,   phenylephrine, 0.5-3 mcg/kg/min, Last Rate: 3 mcg/kg/min (02/12/25 0825)  vasopressin, 0.03  Units/min, Last Rate: 0.03 Units/min (02/12/25 0253)      Medications:  chlorhexidine, 15 mL, Mouth/Throat, Q12H  fludrocortisone, 100 mcg, Oral, Daily  heparin (porcine), 5,000 Units, Subcutaneous, Q12H  hydrocortisone sodium succinate, 50 mg, Intravenous, Q6H  meropenem, 1,000 mg, Intravenous, Once  mupirocin, 1 Application, Each Nare, BID  pantoprazole, 40 mg, Intravenous, Q AM  piperacillin-tazobactam, 4.5 g, Intravenous, Q8H  senna-docusate sodium, 2 tablet, Oral, BID  sodium chloride, 10 mL, Intravenous, Q12H  thiamine (B-1) IV, 500 mg, Intravenous, Q8H  vancomycin (dosing per levels), , Not Applicable, Daily      I reviewed the patient's medications.    Vital Sign Min/Max for last 24 hours  Temp  Min: 98.2 °F (36.8 °C)  Max: 100 °F (37.8 °C)   BP  Min: 71/57  Max: 120/55   Pulse  Min: 91  Max: 108   Resp  Min: 20  Max: 30   SpO2  Min: 85 %  Max: 100 %   Flow (L/min) (Oxygen Therapy)  Min: 40  Max: 60       Input/Output for last 24 hour shift  02/11 0701 - 02/12 0700  In: 6890.2 [I.V.:3290.2]  Out: 1225 [Urine:1225]   Mode: VC+/AC  FiO2 (%):  [80 %-100 %] 80 %  S RR:  [20-22] 22  S VT:  [470 mL-500 mL] 470 mL  PEEP/CPAP (cm H2O):  [10 cm H20] 10 cm H20  MAP (cm H2O):  [0-18] 18  GENERAL : Sedated, lying in bed, NAD  RESPIRATORY/THORAX : ET tube in place, Coarse breath sounds bilaterally  CARDIOVASCULAR : Normal S1/S2, RRR. 1+ lower ext edema.  GASTROINTESTINAL : Soft, NT/ND. BS x 4 normoactive. No hepatosplenomegaly.  MUSCULOSKELETAL : No cyanosis, clubbing, or ischemia  NEUROLOGICAL: Sedated, Moving all ext.    Results from last 7 days   Lab Units 02/12/25  0303 02/11/25  0901   WBC 10*3/mm3 26.60* 27.58*   HEMOGLOBIN g/dL 11.5* 12.8*   PLATELETS 10*3/mm3 94* 136*     Results from last 7 days   Lab Units 02/12/25  0303 02/11/25 2039 02/11/25  0926   SODIUM mmol/L 134* 137 137   POTASSIUM mmol/L 3.9 3.7 3.5   CO2 mmol/L 13.0* 14.0* 10.0*   BUN mg/dL 40* 35* 27*   CREATININE mg/dL 2.77* 2.91* 3.22*   MAGNESIUM  mg/dL 3.4* 1.1* 1.3*   PHOSPHORUS mg/dL 6.2*  --  4.2   GLUCOSE mg/dL 121* 95 81     Estimated Creatinine Clearance: 31.2 mL/min (A) (by C-G formula based on SCr of 2.77 mg/dL (H)).    Results from last 7 days   Lab Units 02/12/25  0304   PH, ARTERIAL pH units 7.233*   PCO2, ARTERIAL mm Hg 37.0   PO2 ART mm Hg 93.0       I reviewed the patient's new clinical results.  I reviewed the patient's new imaging results/reports including actual images and agree with reports.       Imaging Results (Last 24 Hours)       Procedure Component Value Units Date/Time    XR Chest 1 View [[id number]] Collected: 02/12/25 0249     Updated: 02/12/25 0253    Narrative:      XR CHEST 1 VW, XR ABDOMEN KUB    Date of Exam: 2/12/2025 2:09 AM EST    Indication: tube placement    Comparison: 2/11/2025.    Findings:  There is a left internal jugular CVC catheter with the tip in the distal SVC. Enteric tube present within the stomach. Endotracheal tube tip in the mid trachea. Right internal jugular Mediport present with the tip in the distal SVC. Cardiac silhouette   appears unremarkable. Patchy airspace disease is seen within the lung bases bilaterally, improved on the left as compared to the previous study. Resolution of left-sided pleural effusion. No pneumothorax. Nonobstructive bowel gas pattern. No evidence of   free air.      Impression:      Impression:  1.Left internal jugular CVC catheter with the tip in the distal SVC. No pneumothorax. Enteric tube within the stomach. Endotracheal tube tip in the mid trachea.  2.Bibasilar airspace disease, improved on the left as compared to the previous study. Resolution of left-sided pleural effusion.          Electronically Signed: Natalie Abdi MD    2/12/2025 2:50 AM EST    Workstation ID: [id number]    XR Abdomen KUB [[id number]] Collected: 02/12/25 0249     Updated: 02/12/25 0253    Narrative:      XR CHEST 1 VW, XR ABDOMEN KUB    Date of Exam: 2/12/2025 2:09 AM EST    Indication: tube  placement    Comparison: 2/11/2025.    Findings:  There is a left internal jugular CVC catheter with the tip in the distal SVC. Enteric tube present within the stomach. Endotracheal tube tip in the mid trachea. Right internal jugular Mediport present with the tip in the distal SVC. Cardiac silhouette   appears unremarkable. Patchy airspace disease is seen within the lung bases bilaterally, improved on the left as compared to the previous study. Resolution of left-sided pleural effusion. No pneumothorax. Nonobstructive bowel gas pattern. No evidence of   free air.      Impression:      Impression:  1.Left internal jugular CVC catheter with the tip in the distal SVC. No pneumothorax. Enteric tube within the stomach. Endotracheal tube tip in the mid trachea.  2.Bibasilar airspace disease, improved on the left as compared to the previous study. Resolution of left-sided pleural effusion.          Electronically Signed: Natalie Abdi MD    2/12/2025 2:50 AM EST    Workstation ID: [id number]    XR Chest 1 View [[id number]] Collected: 02/11/25 1347     Updated: 02/11/25 1352    Narrative:      XR CHEST 1 VW    Date of Exam: 2/11/2025 1:22 PM EST    Indication: central line placement    Comparison: CT chest 2/11/2025, AP chest 2/11/2025.    Findings:  Right chest wall latosha catheter extends to the lower SVC level. New left IJ approach central line extends to the mid SVC level. No right or left pneumothorax is seen. Increased left basilar airspace disease since today's earlier chest x-ray which may   present developing pneumonia. Small left pleural effusion is thought to be present. Mild right basilar atelectasis unchanged. Stable heart size at upper limits normal. Osteopenia.      Impression:      Impression:    1. Left IJ central line tip extends to the upper SVC level. No visible pneumothorax.  2. Developing left lower lobe airspace disease worrisome for pneumonia, compared to earlier today.  3. Small left pleural  effusion    Electronically Signed: Awa Lilly MD    2/11/2025 1:49 PM EST    Workstation ID: [id number]    CT Chest With Contrast Diagnostic [[id number]] Collected: 02/11/25 1036     Updated: 02/11/25 1122    Narrative:      CT ABDOMEN PELVIS W CONTRAST, CT CHEST W CONTRAST DIAGNOSTIC    Date of Exam: 2/11/2025 10:17 AM EST    Indication: Abdominal distention and tenderness, altered mental status.    Comparison: None available.    Technique: Axial CT images were obtained of the chest abdomen and pelvis following the uneventful intravenous administration of intravenous contrast. Reconstructed coronal and sagittal images were also obtained. Automated exposure control and iterative   construction methods were used.      Findings:  The central tracheobronchial tree is patent. Biapical pleural thickening. There is mild emphysema. Stable scarring in the medial right upper lobe possibly from radiation changes. There is a focal area of heterogeneous density in the right paratracheal   region at the anatomical level of the azygos not present on the previous exam may represent an engorged azygos or developing lymphadenopathy adjacent to the azygos (image 30 series 2). There is no mediastinal lymphadenopathy. The heart and great vessels   appear within normal limits. There are coronary artery calcifications. There is a right internal jugular chest port. Mild distended fluid-filled esophagus.   Interval development of bilateral patchy consolidations in the lower lobes compatible with pneumonia appears more accentuated on the left.    The liver shows homogeneous density with no focal lesions. There is minimal nodularity of the liver surface. The gallbladder is mildly distended with no wall thickening or pericholecystic fluid. The spleen is unremarkable. Diffuse pancreatic atrophy. The   adrenal glands are unremarkable.    Interval development of multiple patchy areas of hypodensity involving the left renal cortex with  associated extensive perinephric fat stranding compatible with acute pyelonephritis. There is no hydronephrosis or obstructing stones. There is also diffuse   left periureteric fat stranding.    The right kidney is unremarkable. The small and large bowel loops are nondilated. Multiple fluid-filled loops of small bowel are seen. There is sigmoid diverticulosis with no diverticulitis.    There is diffuse bladder wall thickening. There is a large bladder diverticulum. No acute fractures or destructive bone lesions are seen. Grade 1 spondylolisthesis L4-L5      Impression:      Impression:  1.Interval development of bilateral lower lobe consolidations compatible with pneumonia, more accentuated on the left.  2.Interval development of multiple patchy areas of hypodensity involving the left renal cortex with associated extensive perinephric fat stranding compatible with acute pyelonephritis. There is also diffuse left periureteric fat stranding.  3.Diffuse bladder wall thickening and large bladder diverticulum. Correlate clinically for cystitis.  4.Sigmoid diverticulosis with no diverticulitis.  5.Mild nodularity of the liver surface. Correlate with liver function tests.  6.Focal area of heterogeneous density in the right paratracheal region at the anatomical level of the azygos not present on the previous exam. This may represent an engorged azygos or developing lymphadenopathy adjacent to the azygos        Electronically Signed: Rich Ruiz MD    2/11/2025 11:19 AM EST    Workstation ID: [id number]    CT Abdomen Pelvis With Contrast [[id number]] Collected: 02/11/25 1036     Updated: 02/11/25 1122    Narrative:      CT ABDOMEN PELVIS W CONTRAST, CT CHEST W CONTRAST DIAGNOSTIC    Date of Exam: 2/11/2025 10:17 AM EST    Indication: Abdominal distention and tenderness, altered mental status.    Comparison: None available.    Technique: Axial CT images were obtained of the chest abdomen and pelvis following the uneventful  intravenous administration of intravenous contrast. Reconstructed coronal and sagittal images were also obtained. Automated exposure control and iterative   construction methods were used.      Findings:  The central tracheobronchial tree is patent. Biapical pleural thickening. There is mild emphysema. Stable scarring in the medial right upper lobe possibly from radiation changes. There is a focal area of heterogeneous density in the right paratracheal   region at the anatomical level of the azygos not present on the previous exam may represent an engorged azygos or developing lymphadenopathy adjacent to the azygos (image 30 series 2). There is no mediastinal lymphadenopathy. The heart and great vessels   appear within normal limits. There are coronary artery calcifications. There is a right internal jugular chest port. Mild distended fluid-filled esophagus.   Interval development of bilateral patchy consolidations in the lower lobes compatible with pneumonia appears more accentuated on the left.    The liver shows homogeneous density with no focal lesions. There is minimal nodularity of the liver surface. The gallbladder is mildly distended with no wall thickening or pericholecystic fluid. The spleen is unremarkable. Diffuse pancreatic atrophy. The   adrenal glands are unremarkable.    Interval development of multiple patchy areas of hypodensity involving the left renal cortex with associated extensive perinephric fat stranding compatible with acute pyelonephritis. There is no hydronephrosis or obstructing stones. There is also diffuse   left periureteric fat stranding.    The right kidney is unremarkable. The small and large bowel loops are nondilated. Multiple fluid-filled loops of small bowel are seen. There is sigmoid diverticulosis with no diverticulitis.    There is diffuse bladder wall thickening. There is a large bladder diverticulum. No acute fractures or destructive bone lesions are seen. Grade 1  spondylolisthesis L4-L5      Impression:      Impression:  1.Interval development of bilateral lower lobe consolidations compatible with pneumonia, more accentuated on the left.  2.Interval development of multiple patchy areas of hypodensity involving the left renal cortex with associated extensive perinephric fat stranding compatible with acute pyelonephritis. There is also diffuse left periureteric fat stranding.  3.Diffuse bladder wall thickening and large bladder diverticulum. Correlate clinically for cystitis.  4.Sigmoid diverticulosis with no diverticulitis.  5.Mild nodularity of the liver surface. Correlate with liver function tests.  6.Focal area of heterogeneous density in the right paratracheal region at the anatomical level of the azygos not present on the previous exam. This may represent an engorged azygos or developing lymphadenopathy adjacent to the azygos        Electronically Signed: Rich Ruiz MD    2/11/2025 11:19 AM EST    Workstation ID: [id number]    CT Head Without Contrast [[id number]] Collected: 02/11/25 1030     Updated: 02/11/25 1042    Narrative:      CT HEAD WO CONTRAST    Date of Exam: 2/11/2025 10:17 AM EST    Indication: Altered mental status suspect metabolic.    Comparison: MRI brain from February 10, 2024 and CT head from February 4, 2024    Technique: Axial CT images were obtained of the head without contrast administration.  Automated exposure control and iterative construction methods were used.      Findings:  No acute intracranial hemorrhage or extra-axial collection is identified. The ventricles appear normal in caliber, with no evidence of mass effect or midline shift. The basal cisterns appear patent. The gray-white differentiation appears preserved.    The calvarium appear intact. There is moderate frothy paranasal sinus mucosal disease. The mastoid air cells are well-aerated. Scattered foci of periventricular and subcortical white matter hypodensities are nonspecific,  but likely the sequela of mild   chronic small vessel ischemic disease.      Impression:      Impression:  1.No acute intracranial process identified.  2.Findings suggestive of mild chronic small vessel ischemic disease.  3.Moderate frothy paranasal sinus mucosal disease. Correlate for acute sinusitis.        Electronically Signed: Arnold Abrams MD    2/11/2025 10:39 AM EST    Workstation ID: [id number]            Assessment & Plan   Impression        Septic shock    Hyperlipidemia, unspecified    Tobacco use    Cirrhosis of liver    Squamous cell carcinoma of esophagus    GALILEO (acute kidney injury)    Metabolic acidosis    Suspected UTI       Plan        64-year-old male with history of alcoholic cirrhosis, metastatic squamous cell carcinoma of the esophagus, hyperlipidemia.  Who presented to the [facility] ICU with pneumonia and Pseudomonas bacteremia leading to acute hypoxic respiratory failure and septic shock.    -Continue mechanical ventilation, wean oxygen to saturation greater than 88%  -Contiue for sedation  -Waiting for speciation of Pseudomonas, will double cover with Zosyn and meropenem until we have sensitivities back.  -Trend lactate every 6 hours  -Continue bicarb drip  -Pressors as needed to maintain a MAP greater than 65  -Continue stress dose steroids  -Echo from 210 showed normal EF with no wall motion or valvular abnormalities  -DVT prophylaxis  -GI prophylaxis  -A.M. labs    I conducted multidisciplinary rounds and the plan of care was discussed with the multidisciplinary team at that time. In attendance at multidisciplinary rounds was clinical pharmacist, dietitian, nursing staff, and case management.    I discussed the patient's findings and my recommendations with nursing staff     Critical Care time spent in direct patient care: 35 minutes (excluding procedure time, if applicable) including high complexity decision making to assess, manipulate, and support vital organ system  failure in this individual who has impairment of one or more vital organ systems such that there is a high probability of imminent or life threatening deterioration in the patient's condition.      Sanaz Luz, DO  Pulmonary, Critical care and Sleep Medicine

## 2025-02-12 NOTE — PROCEDURES
Insert Arterial Line    Date/Time: 2/11/2025 8:00 PM    Performed by: Khurram Schroeder APRN  Authorized by: Khurram Schroeder APRN    Universal Protocol:     Verbal consent obtained?: Yes      Risks and benefits: Risks, benefits and alternatives were discussed      Consent given by:  Patient    Patient states understanding of procedure being performed: Yes      Patient's understanding of procedure matches consent: Yes      Procedure consent matches procedure scheduled: Yes      Relevant documents present and verified: Yes      Test results available and properly labeled: Yes      Site marked: Yes      Imaging studies available: Yes      Required items: Required blood products, implants, devices and special equipment available      Patient identity confirmed:  Verbally with patient, arm band, hospital-assigned identification number and provided demographic data    Time out: Immediately prior to the procedure a time out was called    A time out verifies correct patient, procedure, equipment, support staff and site/side marked as required:   Preparation:     Preparation: Patient was prepped and draped in usual sterile fashion    Indications:     Indications: hemodynamic monitoring    Location:     Location:  Left radial  Anesthesia:     Anesthesia:  Local infiltration    Local anesthetic:  Lidocaine 1% without epinephrine    Anesthetic total (ml):  1    Patient sedated: No    Procedure Details:     Ultrasound Guidance: yes      Tony's test normal?: Yes      Needle gauge:  20    Seldinger technique: Seldinger technique used      Number of attempts:  2  Post-procedure:     Post-procedure:  Dressing applied and line sutured    Post-procedure CMS:  Unchanged     patient tolerated the procedure well with no immediate complications

## 2025-02-12 NOTE — PLAN OF CARE
Goal Outcome Evaluation:  Plan of Care Reviewed With: patient        Progress: no change  Outcome Evaluation: .          Neuro: Pt will nod head to questions at times and is following commands. Becomes restless/anxious with stimulation and had nodded yes when asked if in pain - Fentanyl titrated in response. Extremities dusky and slow cap refill - wrapped in warm blankets and still has palpable pulses.   Cardiac: NSR/ST per monitor. Requiring Levo, Parviz, and Vaso to maintain BP WNL. Starting to wean Parviz gtt this evening.   Resp: Remains on vent, PEEP 13/ FiO2 80%. Lungs sound less coarse this evening when compared to this morning. Only small amounts of secretions with ETT suctioning.    GI/: No BM. Abdomen round, distended, and taut. Scheduled bowel regimen given. Adequate UOP per oswald cath.     - Contacted Patient's Nephew, Negro, who is POA. Updated on condition and answered any questions.

## 2025-02-13 ENCOUNTER — APPOINTMENT (OUTPATIENT)
Dept: GENERAL RADIOLOGY | Facility: HOSPITAL | Age: 65
DRG: 870 | End: 2025-02-13
Payer: MEDICARE

## 2025-02-13 LAB
ALBUMIN SERPL-MCNC: 2.4 G/DL (ref 3.5–5.2)
ALP SERPL-CCNC: 62 U/L (ref 39–117)
ALT SERPL W P-5'-P-CCNC: 226 U/L (ref 1–41)
ANION GAP SERPL CALCULATED.3IONS-SCNC: 16 MMOL/L (ref 5–15)
ARTERIAL PATENCY WRIST A: ABNORMAL
AST SERPL-CCNC: 294 U/L (ref 1–40)
ATMOSPHERIC PRESS: ABNORMAL MM[HG]
BACTERIA SPEC AEROBE CULT: ABNORMAL
BASE EXCESS BLDA CALC-SCNC: 0.4 MMOL/L (ref 0–2)
BASOPHILS # BLD MANUAL: 0 10*3/MM3 (ref 0–0.2)
BASOPHILS NFR BLD MANUAL: 0 % (ref 0–1.5)
BDY SITE: ABNORMAL
BILIRUB CONJ SERPL-MCNC: 0.4 MG/DL (ref 0–0.3)
BILIRUB INDIRECT SERPL-MCNC: 0.4 MG/DL
BILIRUB SERPL-MCNC: 0.8 MG/DL (ref 0–1.2)
BODY TEMPERATURE: 37
BUN SERPL-MCNC: 54 MG/DL (ref 8–23)
BUN/CREAT SERPL: 20.8 (ref 7–25)
BURR CELLS BLD QL SMEAR: ABNORMAL
CALCIUM SPEC-SCNC: 7 MG/DL (ref 8.6–10.5)
CHLORIDE SERPL-SCNC: 106 MMOL/L (ref 98–107)
CO2 BLDA-SCNC: 25.1 MMOL/L (ref 22–33)
CO2 SERPL-SCNC: 21 MMOL/L (ref 22–29)
COHGB MFR BLD: 1.1 % (ref 0–2)
CREAT SERPL-MCNC: 2.6 MG/DL (ref 0.76–1.27)
D-LACTATE SERPL-SCNC: 2.7 MMOL/L (ref 0.5–2)
D-LACTATE SERPL-SCNC: 2.9 MMOL/L (ref 0.5–2)
DEPRECATED RDW RBC AUTO: 53.5 FL (ref 37–54)
EGFRCR SERPLBLD CKD-EPI 2021: 26.5 ML/MIN/1.73
EOSINOPHIL # BLD MANUAL: 0 10*3/MM3 (ref 0–0.4)
EOSINOPHIL NFR BLD MANUAL: 0 % (ref 0.3–6.2)
EPAP: 0
ERYTHROCYTE [DISTWIDTH] IN BLOOD BY AUTOMATED COUNT: 15.5 % (ref 12.3–15.4)
GLUCOSE BLDC GLUCOMTR-MCNC: 83 MG/DL (ref 70–130)
GLUCOSE BLDC GLUCOMTR-MCNC: 83 MG/DL (ref 70–130)
GLUCOSE BLDC GLUCOMTR-MCNC: 84 MG/DL (ref 70–130)
GLUCOSE BLDC GLUCOMTR-MCNC: 84 MG/DL (ref 70–130)
GLUCOSE SERPL-MCNC: 116 MG/DL (ref 65–99)
HCO3 BLDA-SCNC: 24 MMOL/L (ref 20–26)
HCT VFR BLD AUTO: 33.7 % (ref 37.5–51)
HCT VFR BLD CALC: 34.6 % (ref 38–51)
HGB BLD-MCNC: 11.2 G/DL (ref 13–17.7)
HGB BLDA-MCNC: 11.3 G/DL (ref 13.5–17.5)
INHALED O2 CONCENTRATION: 50 %
IPAP: 0
LYMPHOCYTES # BLD MANUAL: 1.39 10*3/MM3 (ref 0.7–3.1)
LYMPHOCYTES NFR BLD MANUAL: 3 % (ref 5–12)
MAGNESIUM SERPL-MCNC: 2.7 MG/DL (ref 1.6–2.4)
MCH RBC QN AUTO: 31 PG (ref 26.6–33)
MCHC RBC AUTO-ENTMCNC: 33.2 G/DL (ref 31.5–35.7)
MCV RBC AUTO: 93.4 FL (ref 79–97)
METAMYELOCYTES NFR BLD MANUAL: 9 % (ref 0–0)
METHGB BLD QL: 0.3 % (ref 0–1.5)
MODALITY: ABNORMAL
MONOCYTES # BLD: 1.04 10*3/MM3 (ref 0.1–0.9)
MYELOCYTES NFR BLD MANUAL: 3 % (ref 0–0)
NEUTROPHILS # BLD AUTO: 28.21 10*3/MM3 (ref 1.7–7)
NEUTROPHILS NFR BLD MANUAL: 43 % (ref 42.7–76)
NEUTS BAND NFR BLD MANUAL: 38 % (ref 0–5)
NRBC SPEC MANUAL: 0 /100 WBC (ref 0–0.2)
OXYHGB MFR BLDV: 92.7 % (ref 94–99)
PAW @ PEAK INSP FLOW SETTING VENT: 0 CMH2O
PCO2 BLDA: 34.6 MM HG (ref 35–45)
PCO2 TEMP ADJ BLD: 34.6 MM HG (ref 35–48)
PEEP RESPIRATORY: 13 CM[H2O]
PH BLDA: 7.45 PH UNITS (ref 7.35–7.45)
PH, TEMP CORRECTED: 7.45 PH UNITS
PHOSPHATE SERPL-MCNC: 4.9 MG/DL (ref 2.5–4.5)
PLATELET # BLD AUTO: 74 10*3/MM3 (ref 140–450)
PMV BLD AUTO: 11.4 FL (ref 6–12)
PO2 BLDA: 67.9 MM HG (ref 83–108)
PO2 TEMP ADJ BLD: 67.9 MM HG (ref 83–108)
POTASSIUM SERPL-SCNC: 4 MMOL/L (ref 3.5–5.2)
PROT SERPL-MCNC: 5.5 G/DL (ref 6–8.5)
RBC # BLD AUTO: 3.61 10*6/MM3 (ref 4.14–5.8)
SMALL PLATELETS BLD QL SMEAR: ABNORMAL
SODIUM SERPL-SCNC: 143 MMOL/L (ref 136–145)
TOTAL RATE: 22 BREATHS/MINUTE
VARIANT LYMPHS NFR BLD MANUAL: 2 % (ref 0–5)
VARIANT LYMPHS NFR BLD MANUAL: 2 % (ref 19.6–45.3)
VENTILATOR MODE: ABNORMAL
VT ON VENT VENT: 0.47 ML
WBC MORPH BLD: NORMAL
WBC NRBC COR # BLD AUTO: 34.83 10*3/MM3 (ref 3.4–10.8)

## 2025-02-13 PROCEDURE — 94003 VENT MGMT INPAT SUBQ DAY: CPT

## 2025-02-13 PROCEDURE — 25010000002 PIPERACILLIN SOD-TAZOBACTAM PER 1 G: Performed by: NURSE PRACTITIONER

## 2025-02-13 PROCEDURE — 25010000002 HEPARIN (PORCINE) PER 1000 UNITS: Performed by: NURSE PRACTITIONER

## 2025-02-13 PROCEDURE — 25810000003 SODIUM CHLORIDE 0.9 % SOLUTION 250 ML FLEX CONT: Performed by: NURSE PRACTITIONER

## 2025-02-13 PROCEDURE — 82805 BLOOD GASES W/O2 SATURATION: CPT

## 2025-02-13 PROCEDURE — 80076 HEPATIC FUNCTION PANEL: CPT | Performed by: INTERNAL MEDICINE

## 2025-02-13 PROCEDURE — 85025 COMPLETE CBC W/AUTO DIFF WBC: CPT | Performed by: INTERNAL MEDICINE

## 2025-02-13 PROCEDURE — 82375 ASSAY CARBOXYHB QUANT: CPT

## 2025-02-13 PROCEDURE — 82948 REAGENT STRIP/BLOOD GLUCOSE: CPT

## 2025-02-13 PROCEDURE — 80048 BASIC METABOLIC PNL TOTAL CA: CPT | Performed by: INTERNAL MEDICINE

## 2025-02-13 PROCEDURE — 25010000002 THIAMINE PER 100 MG: Performed by: NURSE PRACTITIONER

## 2025-02-13 PROCEDURE — 83605 ASSAY OF LACTIC ACID: CPT | Performed by: INTERNAL MEDICINE

## 2025-02-13 PROCEDURE — 3E0G76Z INTRODUCTION OF NUTRITIONAL SUBSTANCE INTO UPPER GI, VIA NATURAL OR ARTIFICIAL OPENING: ICD-10-PCS | Performed by: NURSE PRACTITIONER

## 2025-02-13 PROCEDURE — 25010000002 PROPOFOL 10 MG/ML EMULSION: Performed by: NURSE PRACTITIONER

## 2025-02-13 PROCEDURE — 83735 ASSAY OF MAGNESIUM: CPT | Performed by: INTERNAL MEDICINE

## 2025-02-13 PROCEDURE — 94761 N-INVAS EAR/PLS OXIMETRY MLT: CPT

## 2025-02-13 PROCEDURE — 71045 X-RAY EXAM CHEST 1 VIEW: CPT

## 2025-02-13 PROCEDURE — 83050 HGB METHEMOGLOBIN QUAN: CPT

## 2025-02-13 PROCEDURE — 25010000003 VASOPRESSIN 0.2 UNITS/ML SOLUTION: Performed by: STUDENT IN AN ORGANIZED HEALTH CARE EDUCATION/TRAINING PROGRAM

## 2025-02-13 PROCEDURE — 25010000002 HYDROCORTISONE SOD SUC (PF) 100 MG RECONSTITUTED SOLUTION: Performed by: INTERNAL MEDICINE

## 2025-02-13 PROCEDURE — 25010000002 FENTANYL 10 MCG/1 ML NS: Performed by: NURSE PRACTITIONER

## 2025-02-13 PROCEDURE — 99291 CRITICAL CARE FIRST HOUR: CPT | Performed by: INTERNAL MEDICINE

## 2025-02-13 PROCEDURE — 85007 BL SMEAR W/DIFF WBC COUNT: CPT | Performed by: INTERNAL MEDICINE

## 2025-02-13 PROCEDURE — 94799 UNLISTED PULMONARY SVC/PX: CPT

## 2025-02-13 PROCEDURE — 84100 ASSAY OF PHOSPHORUS: CPT | Performed by: INTERNAL MEDICINE

## 2025-02-13 RX ORDER — POLYETHYLENE GLYCOL 3350 17 G/17G
17 POWDER, FOR SOLUTION ORAL DAILY PRN
Status: DISCONTINUED | OUTPATIENT
Start: 2025-02-13 | End: 2025-02-17

## 2025-02-13 RX ORDER — SENNOSIDES A AND B 8.6 MG/1
1 TABLET, FILM COATED ORAL 2 TIMES DAILY
COMMUNITY
End: 2025-03-12 | Stop reason: HOSPADM

## 2025-02-13 RX ORDER — AMOXICILLIN 250 MG
2 CAPSULE ORAL 2 TIMES DAILY
Status: DISCONTINUED | OUTPATIENT
Start: 2025-02-13 | End: 2025-02-17

## 2025-02-13 RX ORDER — BISACODYL 10 MG
10 SUPPOSITORY, RECTAL RECTAL DAILY PRN
Status: DISCONTINUED | OUTPATIENT
Start: 2025-02-13 | End: 2025-02-17

## 2025-02-13 RX ADMIN — THIAMINE HYDROCHLORIDE 500 MG: 100 INJECTION, SOLUTION INTRAMUSCULAR; INTRAVENOUS at 14:20

## 2025-02-13 RX ADMIN — POLYETHYLENE GLYCOL 3350 17 G: 17 POWDER, FOR SOLUTION ORAL at 16:21

## 2025-02-13 RX ADMIN — PROPOFOL 15 MCG/KG/MIN: 10 INJECTION, EMULSION INTRAVENOUS at 18:06

## 2025-02-13 RX ADMIN — SENNOSIDES AND DOCUSATE SODIUM 2 TABLET: 50; 8.6 TABLET ORAL at 21:11

## 2025-02-13 RX ADMIN — MUPIROCIN 1 APPLICATION: 20 OINTMENT TOPICAL at 08:34

## 2025-02-13 RX ADMIN — PROPOFOL 5 MCG/KG/MIN: 10 INJECTION, EMULSION INTRAVENOUS at 08:17

## 2025-02-13 RX ADMIN — POLYVINYL ALCOHOL 2 DROP: 14 SOLUTION/ DROPS OPHTHALMIC at 16:21

## 2025-02-13 RX ADMIN — HEPARIN SODIUM 5000 UNITS: 5000 INJECTION INTRAVENOUS; SUBCUTANEOUS at 21:11

## 2025-02-13 RX ADMIN — VASOPRESSIN IN 0.9% SODIUM CHLORIDE 0.03 UNITS/MIN: 20 INJECTION INTRAVENOUS at 10:51

## 2025-02-13 RX ADMIN — SENNOSIDES AND DOCUSATE SODIUM 2 TABLET: 50; 8.6 TABLET ORAL at 08:34

## 2025-02-13 RX ADMIN — PIPERACILLIN AND TAZOBACTAM 4.5 G: 4; .5 INJECTION, POWDER, FOR SOLUTION INTRAVENOUS at 18:06

## 2025-02-13 RX ADMIN — PIPERACILLIN AND TAZOBACTAM 4.5 G: 4; .5 INJECTION, POWDER, FOR SOLUTION INTRAVENOUS at 02:48

## 2025-02-13 RX ADMIN — THIAMINE HYDROCHLORIDE 500 MG: 100 INJECTION, SOLUTION INTRAMUSCULAR; INTRAVENOUS at 05:40

## 2025-02-13 RX ADMIN — MUPIROCIN 1 APPLICATION: 20 OINTMENT TOPICAL at 21:11

## 2025-02-13 RX ADMIN — NOREPINEPHRINE BITARTRATE 0.3 MCG/KG/MIN: 1 INJECTION, SOLUTION, CONCENTRATE INTRAVENOUS at 05:39

## 2025-02-13 RX ADMIN — PANTOPRAZOLE SODIUM 40 MG: 40 INJECTION, POWDER, FOR SOLUTION INTRAVENOUS at 05:41

## 2025-02-13 RX ADMIN — HYDROCORTISONE SODIUM SUCCINATE 50 MG: 100 INJECTION, POWDER, FOR SOLUTION INTRAMUSCULAR; INTRAVENOUS at 12:10

## 2025-02-13 RX ADMIN — Medication 10 ML: at 08:34

## 2025-02-13 RX ADMIN — Medication 10 ML: at 21:11

## 2025-02-13 RX ADMIN — CHLORHEXIDINE GLUCONATE 0.12% ORAL RINSE 15 ML: 1.2 LIQUID ORAL at 21:11

## 2025-02-13 RX ADMIN — Medication 300 MCG/HR: at 15:59

## 2025-02-13 RX ADMIN — HYDROCORTISONE SODIUM SUCCINATE 50 MG: 100 INJECTION, POWDER, FOR SOLUTION INTRAMUSCULAR; INTRAVENOUS at 18:06

## 2025-02-13 RX ADMIN — HEPARIN SODIUM 5000 UNITS: 5000 INJECTION INTRAVENOUS; SUBCUTANEOUS at 08:34

## 2025-02-13 RX ADMIN — NOREPINEPHRINE BITARTRATE 0.32 MCG/KG/MIN: 1 INJECTION, SOLUTION, CONCENTRATE INTRAVENOUS at 16:21

## 2025-02-13 RX ADMIN — HYDROCORTISONE SODIUM SUCCINATE 50 MG: 100 INJECTION, POWDER, FOR SOLUTION INTRAMUSCULAR; INTRAVENOUS at 05:43

## 2025-02-13 RX ADMIN — POLYVINYL ALCOHOL 2 DROP: 14 SOLUTION/ DROPS OPHTHALMIC at 08:34

## 2025-02-13 RX ADMIN — PIPERACILLIN AND TAZOBACTAM 4.5 G: 4; .5 INJECTION, POWDER, FOR SOLUTION INTRAVENOUS at 10:50

## 2025-02-13 RX ADMIN — CHLORHEXIDINE GLUCONATE 0.12% ORAL RINSE 15 ML: 1.2 LIQUID ORAL at 08:34

## 2025-02-13 RX ADMIN — Medication 250 MCG/HR: at 07:15

## 2025-02-13 NOTE — PROGRESS NOTES
Clinical Nutrition     Patient Name: Val Nassar Jr.  YOB: 1960  MRN: [id number]  Date of Encounter: 02/13/25 16:47 EST  Admission date: 2/11/2025  Reason for Visit: Identified at risk by screening criteria    Assessment   Nutrition Assessment   Admission Diagnosis:  Septic shock [A41.9, R65.21]    Problem List:    Septic shock    Hyperlipidemia, unspecified    Tobacco use    Cirrhosis of liver    Squamous cell carcinoma of esophagus    GALILEO (acute kidney injury)    Metabolic acidosis    Pneumonia of both lower lobes due to Pseudomonas species    Bacteremia due to Pseudomonas      PMH:   He  has a past medical history of Anemia, Cancer, Cirrhosis, Duodenal ulcer, Gastric ulcer, GERD (gastroesophageal reflux disease), History of alcohol abuse, History of radiation therapy (05/08/2024), HLD (hyperlipidemia), and Mood disorder.    PSH:  He  has a past surgical history that includes Esophagogastroduodenoscopy (N/A, 12/26/2023) and Venous Access Device (Port) (Right, 04/25/2024).    Substance history: Etoh abuse, vaping    Applicable Nutrition History:     ARF/VENT 2-12-25    Labs    Labs Reviewed: Yes    Results from last 7 days   Lab Units 02/13/25  0549 02/13/25  0317 02/12/25  1743 02/12/25  0519 02/12/25  0303 02/12/25  0010 02/11/25  2039 02/11/25  1351 02/11/25  0926   GLUCOSE mg/dL  --  116*  --   --  121*  --  95  --  81   BUN mg/dL  --  54*  --   --  40*  --  35*  --  27*   CREATININE mg/dL  --  2.60*  --   --  2.77*  --  2.91*  --  3.22*   SODIUM mmol/L  --  143  --   --  134*  --  137  --  137   CHLORIDE mmol/L  --  106  --   --  103  --  108*  --  99   POTASSIUM mmol/L  --  4.0  --   --  3.9  --  3.7  --  3.5   PHOSPHORUS mg/dL  --  4.9*  --   --  6.2*  --   --   --  4.2   MAGNESIUM mg/dL  --  2.7*  --   --  3.4*  --  1.1*  --  1.3*   ALT (SGPT) U/L  --  226*  --   --  227*  --   --   --  107*   LACTATE mmol/L 2.7* 2.9* 3.6*   < > 4.4*   < >  --    < >  --      < > = values in this interval not displayed.       Results from last 7 days   Lab Units 02/13/25  0317 02/12/25  0303 02/11/25  0926 02/11/25  0901   ALBUMIN g/dL 2.4* 3.0* 3.5  --    CRP mg/dL  --   --   --  8.21*       Results from last 7 days   Lab Units 02/13/25  1052 02/13/25  0508 02/12/25  2313 02/12/25  1735 02/12/25  1154 02/12/25  0516   GLUCOSE mg/dL 84 83 112 116 112 122     Lab Results   Lab Value Date/Time    HGBA1C 4.6 (L) 12/25/2023 1925         Results from last 7 days   Lab Units 02/11/25 2039 02/11/25  0926   PROBNP pg/mL 25,206.0* 13,127.0*       Medications    Medications Reviewed: yes    Scheduled Meds:chlorhexidine, 15 mL, Mouth/Throat, Q12H  heparin (porcine), 5,000 Units, Subcutaneous, Q12H  hydrocortisone sodium succinate, 50 mg, Intravenous, Q6H  mupirocin, 1 Application, Each Nare, BID  pantoprazole, 40 mg, Intravenous, Q AM  piperacillin-tazobactam, 4.5 g, Intravenous, Q8H  senna-docusate sodium, 2 tablet, Nasogastric, BID  sodium chloride, 10 mL, Intravenous, Q12H      Continuous Infusions:fentanyl 10 mcg/mL,  mcg/hr, Last Rate: 300 mcg/hr (02/13/25 1559)  norepinephrine, 0.02-0.5 mcg/kg/min, Last Rate: 0.32 mcg/kg/min (02/13/25 1621)  phenylephrine, 0.5-3 mcg/kg/min, Last Rate: Stopped (02/13/25 1020)  propofol, 5-50 mcg/kg/min, Last Rate: 15 mcg/kg/min (02/13/25 1021)  vasopressin, 0.03 Units/min, Last Rate: Stopped (02/13/25 1450)      PRN Meds:.  senna-docusate sodium **AND** polyethylene glycol **AND** [DISCONTINUED] bisacodyl **AND** bisacodyl    fentaNYL    HYDROmorphone    polyvinyl alcohol    sodium chloride    sodium chloride    Intake/Ouptut 24 hrs (0701 - 0700)   I&O's Reviewed: yes    Intake & Output (last day)         02/12 0701 02/13 0700 02/13 0701 02/14 0700    I.V. (mL/kg) 3809.5 (45.9) 634.1 (7.6)    NG/GT 80 40    IV Piggyback 552.5 159.9    Total Intake(mL/kg) 4442 (53.5) 834 (10)    Urine (mL/kg/hr) 1120 (0.6) 325 (0.4)    Stool      Total Output 1120 325  "   Net +3322 +509                 Anthropometrics     Height: Height: 175.3 cm (69.02\")  Last Filed Weight: Weight: 83 kg (182 lb 15.7 oz) (02/11/25 1613)  Method:  bedscale  BMI: BMI (Calculated): 27    UBW: ?  Weight change:  per EMR ~ 6lb wt gain since January     Weight       Weight (kg) Weight (lbs) Weight Method Visit Report   4/23/2024 75.841 kg  167 lb 3.2 oz   --     75.297 kg  166 lb   --    4/25/2024 75.751 kg  167 lb  Stated     4/29/2024 76.658 kg  169 lb      4/30/2024 75.932 kg  167 lb 6.4 oz   --     75.751 kg  167 lb   --    5/6/2024 74.889 kg  165 lb 1.6 oz   --     74.98 kg  165 lb 4.8 oz   --     74.844 kg  165 lb   --        --        --        --    5/7/2024 76.25 kg  168 lb 1.6 oz   --     76.204 kg  168 lb   --    5/14/2024 77.111 kg  170 lb   --    5/21/2024 75.297 kg  166 lb      6/3/2024 74.98 kg  165 lb 4.8 oz   --     75.297 kg  166 lb   --        --        --    6/18/2024 74.39 kg  164 lb   --    7/5/2024 74.4 kg  164 lb 0.4 oz  Estimated     7/16/2024 77.565 kg  171 lb   --    8/7/2024 77.565 kg  171 lb   --    8/19/2024 77.656 kg  171 lb 3.2 oz   --    9/19/2024 77.565 kg  171 lb      10/24/2024 79.833 kg  176 lb   --    11/14/2024 79.833 kg  176 lb   --    11/26/2024 78.926 kg  174 lb      12/18/2024 79.833 kg  176 lb   --    1/21/2025 79.833 kg  176 lb  Stated     1/23/2025 79.833 kg  176 lb   --    2/3/2025 83.008 kg  183 lb   --    2/11/2025 83.008 kg  183 lb       83 kg  182 lb 15.7 oz          Nutrition Focused Physical Exam     Date:     Unable to perform due to Pt unable to participate at time of visit     Subjective   Reported/Observed/Food/Nutrition Related History:     Pt intubated, sedated, + fentanyl, propofol 7.56ml, levophed 0.32mcg    Per RN: pt's belly distended, more firm than before, bowel regimen started, marginal UOP, have been toni to wean off asiya and vasopressin    Needs Assessment   Date: 2-13-25    Height used:Height: 175.3 cm (69.02\")  Weights used: 182lb/ " 83kg    Estimated Calorie needs: ~ 1900kcal  Method:  20Kcals/Kkcal  Method:  MSJ x 1.2: 1926kcal  Method:  PSU: 2102kcal    Estimated Protein needs: ~83g protein with current renal function  Method: 1-1.2g protein per k-100g protein    Current Nutrition Prescription     PO: NPO Diet NPO Type: Strict NPO  Oral Nutrition Supplement:  Intake: N/A    Assessment & Plan   Nutrition Diagnosis   Date: 25 Updated:   Problem Inadequate energy intake    Etiology ARF/VENT/Pressors   Signs/Symptoms NPO   Status: New    Goal:   Nutrition to support treatment      Nutrition Intervention      Follow treatment progress, Care plan reviewed    Pt critically ill, still on high dose of pressor support, but have been able to make progress weaning today, EN contraindicated at present    When pt hemodynamically stable, consider trophic feed    TF recs: start Peptamen 1.5 at 20ml/hr, free water @25ml/hr    If pt tolerates trophic feed, suggest gradually advance TF 20ml Q 12 hours to goal rate @60ml/hr, free water @25ml/hr    TF at goal volume will provide 1200ml, 1800kcal, 95% kcal needs, 82g protein, 99% protein needs,1424ml free water    Propofol @7.5ml will provide an additional 198kcal      Monitoring/Evaluation:   Per protocol, I&O, Pertinent labs, Weight, Skin status, GI status, Symptoms, Hemodynamic stability    Monica Chan, RADHA  Time Spent: 45min

## 2025-02-13 NOTE — PLAN OF CARE
Goal Outcome Evaluation:  Plan of Care Reviewed With: patient        Progress: improving  Outcome Evaluation: .          Neuro: Pt extremely restless and agitated with care at beginning of shift. APRN notified and Propofol gtt initiated. Pt responded well, now moderately - deeply sedated. Will arouse to voice and stimulation, following commands. Pupils reactive, but sluggish.    Cardiac: Sinus rhythm per monitor. Parviz titrated off early this AM, along with Vaso this afternoon. Pt remains on Levophed gtt to maintain BP WNL.   Resp: Remains on vent, PEEP 13/ FiO2 40%. Coarse lung sounds.    GI/: Abdomen round, distended, and taut. Additional prn bowel regimen added today, one dose of miralax given. No BM. UOP adequate, but marginal.     - open blisters on legs and back assessed by WOC today. See notes, pictures, and orders.  - Left upper extremity noted to be dusky compared to right upper extremity. Attending at bedside and assessed extremity. Ordered to remove art line. Art line removed at 1005, extremity now with better color and <3 sec cap refill.

## 2025-02-13 NOTE — PROGRESS NOTES
Intensive Care Follow-up     Hospital:  LOS: 2 days   Mr. Val Nassar Jr., 64 y.o. male is followed for:   Septic shock            History of present illness:   Mr. Nassar is a 64 year old male smoker and resident of [facility] with pmhx of alcoholic cirrhosis, metastatic squamous cell carcinoma of esophagus, and HLD who presented with weakness, dizziness, and AMS. Upon arrival to ED, he was noted to be hypotensive. He was given albumin and started on levophed and vasopressin. CT head without acute process. CT Abd/pelvis and chest pending showed some basilar atelectasis versus infiltrate, evidence of left-sided pyelonephritis and sigmoid diverticulosis along with possible cystitis. Additional workup notable for GALILEO (creatinine 3.22, BUN 27), UA with 2+ bacteria, large amount of WBCs. UDS positive for oxycodone and fentanyl. Ethanol < 10. ABG shows metabolic acidosis with pH of 7.214, HCO3 14.2, anion gap 28.0. , , bili normal. CXR shows pulmonary vascular congestion. ProBNP 13,127. Trop 134. WBC 27.58 Procal > 100.     Patient will be admitted to the ICU for ongoing management of septic shock.  He received full sepsis fluid bolus and was requiring norepinephrine at 0.26 mcg/kg/min and vasopressin 0.03 mcg/kg/min.  Patient currently reports left lower quadrant abdominal pain as well as bilateral shoulder pain.      Subjective   Interval History:  Patient doing better today.  On less pressors.  Remains on mechanical ventilation.             The patient's past medical, surgical and social history were reviewed and updated in Epic as appropriate.       Objective     Infusions:  fentanyl 10 mcg/mL,  mcg/hr, Last Rate: 300 mcg/hr (02/13/25 0800)  norepinephrine, 0.02-0.5 mcg/kg/min, Last Rate: 0.3 mcg/kg/min (02/13/25 0539)  phenylephrine, 0.5-3 mcg/kg/min, Last Rate: 0.5 mcg/kg/min (02/13/25 0555)  propofol, 5-50 mcg/kg/min, Last Rate: 15 mcg/kg/min (02/13/25  1021)  vasopressin, 0.03 Units/min, Last Rate: 0.03 Units/min (02/13/25 1051)      Medications:  chlorhexidine, 15 mL, Mouth/Throat, Q12H  heparin (porcine), 5,000 Units, Subcutaneous, Q12H  hydrocortisone sodium succinate, 50 mg, Intravenous, Q6H  mupirocin, 1 Application, Each Nare, BID  pantoprazole, 40 mg, Intravenous, Q AM  piperacillin-tazobactam, 4.5 g, Intravenous, Q8H  senna-docusate sodium, 2 tablet, Oral, BID  sodium chloride, 10 mL, Intravenous, Q12H  thiamine (B-1) IV, 500 mg, Intravenous, Q8H      I reviewed the patient's medications.    Vital Sign Min/Max for last 24 hours  Temp  Min: 99 °F (37.2 °C)  Max: 101.7 °F (38.7 °C)   BP  Min: 79/61  Max: 122/79   Pulse  Min: 75  Max: 98   Resp  Min: 22  Max: 22   SpO2  Min: 84 %  Max: 98 %   No data recorded       Input/Output for last 24 hour shift  02/12 0701 - 02/13 0700  In: 4442 [I.V.:3809.5]  Out: 1120 [Urine:1120]   Mode: VC+/AC  FiO2 (%):  [40 %-80 %] 40 %  S RR:  [22] 22  S VT:  [470 mL] 470 mL  PEEP/CPAP (cm H2O):  [13 cm H20] 13 cm H20  MAP (cm H2O):  [19-21] 20  GENERAL : Sedated, lying in bed, NAD  RESPIRATORY/THORAX : ET tube in place, Coarse breath sounds bilaterally  CARDIOVASCULAR : Normal S1/S2, RRR. 1+ lower ext edema.  GASTROINTESTINAL : Soft, NT/ND. BS x 4 normoactive. No hepatosplenomegaly.  MUSCULOSKELETAL : No cyanosis, clubbing, or ischemia  NEUROLOGICAL: Sedated, Moving all ext.    Results from last 7 days   Lab Units 02/13/25  0317 02/12/25  0303 02/11/25  0901   WBC 10*3/mm3 34.83* 26.60* 27.58*   HEMOGLOBIN g/dL 11.2* 11.5* 12.8*   PLATELETS 10*3/mm3 74* 94* 136*     Results from last 7 days   Lab Units 02/13/25  0317 02/12/25  0303 02/11/25 2039 02/11/25  0926   SODIUM mmol/L 143 134* 137 137   POTASSIUM mmol/L 4.0 3.9 3.7 3.5   CO2 mmol/L 21.0* 13.0* 14.0* 10.0*   BUN mg/dL 54* 40* 35* 27*   CREATININE mg/dL 2.60* 2.77* 2.91* 3.22*   MAGNESIUM mg/dL 2.7* 3.4* 1.1* 1.3*   PHOSPHORUS mg/dL 4.9* 6.2*  --  4.2   GLUCOSE mg/dL 116*  121* 95 81     Estimated Creatinine Clearance: 33.3 mL/min (A) (by C-G formula based on SCr of 2.6 mg/dL (H)).    Results from last 7 days   Lab Units 02/13/25  0421   PH, ARTERIAL pH units 7.450   PCO2, ARTERIAL mm Hg 34.6*   PO2 ART mm Hg 67.9*       I reviewed the patient's new clinical results.  I reviewed the patient's new imaging results/reports including actual images and agree with reports.       Imaging Results (Last 24 Hours)       Procedure Component Value Units Date/Time    XR Chest 1 View [[id number]] Collected: 02/13/25 0716     Updated: 02/13/25 0720    Narrative:      XR CHEST 1 VW    Date of Exam: 2/13/2025 2:21 AM EST    Indication: Intubated Patient    Comparison: 2/12/2025    Findings:  There been no interval tube or line changes. Heart size and pulmonary vessels are within normal limits. There is worsening bibasilar airspace disease which may be due to worsening atelectasis or pneumonia. Small right pleural effusion cannot be entirely   excluded. No definite left pleural effusion. No pneumothorax.      Impression:      Impression:    1. Worsening bibasilar airspace disease with probable small right pleural effusion.      Electronically Signed: Jerrod Olsen MD    2/13/2025 7:17 AM EST    Workstation ID: [id number]            Assessment & Plan   Impression        Septic shock    Hyperlipidemia, unspecified    Tobacco use    Cirrhosis of liver    Squamous cell carcinoma of esophagus    GALILEO (acute kidney injury)    Metabolic acidosis    Pneumonia of both lower lobes due to Pseudomonas species    Bacteremia due to Pseudomonas       Plan        64-year-old male with history of alcoholic cirrhosis, metastatic squamous cell carcinoma of the esophagus, hyperlipidemia.  Who presented to the [facility] ICU with pneumonia and Pseudomonas bacteremia leading to acute hypoxic respiratory failure and septic shock.  The urinary culture is pansensitive Pseudomonas.     -Continue mechanical  ventilation, wean oxygen to saturation greater than 88%  -Contiue for sedation  -Continue Zosyn for coverage of Pseudomonas bacteremia and UTI  -Pressors as needed to maintain a MAP greater than 65  -Continue stress dose steroids  -Echo from 2/10 showed normal EF with no wall motion or valvular abnormalities  -DVT prophylaxis  -GI prophylaxis  -A.M. labs    I conducted multidisciplinary rounds and the plan of care was discussed with the multidisciplinary team at that time. In attendance at multidisciplinary rounds was clinical pharmacist, dietitian, nursing staff, and case management.    I discussed the patient's findings and my recommendations with nursing staff     Critical Care time spent in direct patient care: 35 minutes (excluding procedure time, if applicable) including high complexity decision making to assess, manipulate, and support vital organ system failure in this individual who has impairment of one or more vital organ systems such that there is a high probability of imminent or life threatening deterioration in the patient's condition.      Sanaz Luz, DO  Pulmonary, Critical care and Sleep Medicine

## 2025-02-13 NOTE — PLAN OF CARE
Goal Outcome Evaluation:  Plan of Care Reviewed With: patient        Progress: declining  Outcome Evaluation: Early in shift pt required phenylephrine drip increased up to 2.5 mcg/kg/hr.  Pt bathed with tepid water and placed on cooling blanket for continued elevated fever.  As pt core temperature dropped, oxygenation improved as well as blood pressure.  Pt also notably appeared more comfortable and his eyes closed and appears relaxed.  Pt is able to mouth words more effectively.  As a result as of this time FiO2 has been weaned to 50% and phenylephrine is currently at 0.8 mcg/kg/hr with likely further weaning.  Bicarb drip off with expiration.  Lactic acid down to 2.9 from 3.4.  WBC is increased to 34.  It was noted during bath that patient had 3 large wounds where skin appears to have peeled off.  Dressed with zeroform and mepilex and WOCN has been consulted.  UOP remain consistent.  Extremeties continue to appear dusky with capillary refill greater than 3 seconds, extremeties have been wrapped in blankets to facilitate circulation.

## 2025-02-13 NOTE — NURSING NOTE
WOC consulted for ruptured blisters to back, lower gluteals and posterior thighs    Patient had been requiring multiple vasopressor therapy.  Nursing staff have since been able to wean vasopressors.  Patient remains intubated.    Patient turned to side-lying position to assess areas of concern.    There are large areas where bullae appear to have ruptured.  Not clear if this is medication related. His BLE do not appear edematous.    For these wounds-recommend cleansing with normal saline, apply multilayer Xeroform and cover with an Optifoam dressing. See orders    Pressure injury prevention/     If new blisters occur or there is worsening of the current ruptured bullae then the MD should be notified to r/o medication induced skin changes.     WOC will follow.    Jere Boles RN, BSN, CCRN, CWOCN  Wound, Ostomy and Continence (WOC) Department  [facility]

## 2025-02-14 ENCOUNTER — APPOINTMENT (OUTPATIENT)
Dept: GENERAL RADIOLOGY | Facility: HOSPITAL | Age: 65
DRG: 870 | End: 2025-02-14
Payer: MEDICARE

## 2025-02-14 LAB
ALBUMIN SERPL-MCNC: 2.5 G/DL (ref 3.5–5.2)
ALP SERPL-CCNC: 93 U/L (ref 39–117)
ALT SERPL W P-5'-P-CCNC: 176 U/L (ref 1–41)
ANION GAP SERPL CALCULATED.3IONS-SCNC: 16 MMOL/L (ref 5–15)
APTT PPP: 29.9 SECONDS (ref 60–90)
ARTERIAL PATENCY WRIST A: ABNORMAL
AST SERPL-CCNC: 185 U/L (ref 1–40)
ATMOSPHERIC PRESS: ABNORMAL MM[HG]
BACTERIA SPEC AEROBE CULT: ABNORMAL
BACTERIA SPEC AEROBE CULT: ABNORMAL
BASE EXCESS BLDA CALC-SCNC: -1.4 MMOL/L (ref 0–2)
BDY SITE: ABNORMAL
BILIRUB CONJ SERPL-MCNC: 0.8 MG/DL (ref 0–0.3)
BILIRUB INDIRECT SERPL-MCNC: 0.4 MG/DL
BILIRUB SERPL-MCNC: 1.2 MG/DL (ref 0–1.2)
BODY TEMPERATURE: 37
BUN SERPL-MCNC: 71 MG/DL (ref 8–23)
BUN/CREAT SERPL: 27.4 (ref 7–25)
CALCIUM SPEC-SCNC: 7.7 MG/DL (ref 8.6–10.5)
CHLORIDE SERPL-SCNC: 106 MMOL/L (ref 98–107)
CO2 BLDA-SCNC: 23.2 MMOL/L (ref 22–33)
CO2 SERPL-SCNC: 22 MMOL/L (ref 22–29)
COHGB MFR BLD: 1.2 % (ref 0–2)
CREAT SERPL-MCNC: 2.59 MG/DL (ref 0.76–1.27)
DEPRECATED RDW RBC AUTO: 53 FL (ref 37–54)
EGFRCR SERPLBLD CKD-EPI 2021: 26.7 ML/MIN/1.73
EPAP: 0
ERYTHROCYTE [DISTWIDTH] IN BLOOD BY AUTOMATED COUNT: 15.6 % (ref 12.3–15.4)
FIBRINOGEN PPP-MCNC: 889 MG/DL (ref 203–470)
GLUCOSE BLDC GLUCOMTR-MCNC: 101 MG/DL (ref 70–130)
GLUCOSE BLDC GLUCOMTR-MCNC: 107 MG/DL (ref 70–130)
GLUCOSE BLDC GLUCOMTR-MCNC: 95 MG/DL (ref 70–130)
GLUCOSE SERPL-MCNC: 96 MG/DL (ref 65–99)
GRAM STN SPEC: ABNORMAL
HCO3 BLDA-SCNC: 22.2 MMOL/L (ref 20–26)
HCT VFR BLD AUTO: 31.9 % (ref 37.5–51)
HCT VFR BLD CALC: 32.7 % (ref 38–51)
HGB BLD-MCNC: 10.8 G/DL (ref 13–17.7)
HGB BLDA-MCNC: 10.7 G/DL (ref 13.5–17.5)
INHALED O2 CONCENTRATION: 40 %
INR PPP: 1.27 (ref 0.89–1.12)
IPAP: 0
ISOLATED FROM: ABNORMAL
ISOLATED FROM: ABNORMAL
MAGNESIUM SERPL-MCNC: 2.9 MG/DL (ref 1.6–2.4)
MCH RBC QN AUTO: 31.5 PG (ref 26.6–33)
MCHC RBC AUTO-ENTMCNC: 33.9 G/DL (ref 31.5–35.7)
MCV RBC AUTO: 93 FL (ref 79–97)
METHGB BLD QL: 0.2 % (ref 0–1.5)
MODALITY: ABNORMAL
OXYHGB MFR BLDV: 95.8 % (ref 94–99)
PAW @ PEAK INSP FLOW SETTING VENT: 0 CMH2O
PCO2 BLDA: 32.8 MM HG (ref 35–45)
PCO2 TEMP ADJ BLD: 32.8 MM HG (ref 35–48)
PEEP RESPIRATORY: 13 CM[H2O]
PH BLDA: 7.44 PH UNITS (ref 7.35–7.45)
PH, TEMP CORRECTED: 7.44 PH UNITS
PHOSPHATE SERPL-MCNC: 4.7 MG/DL (ref 2.5–4.5)
PLATELET # BLD AUTO: 69 10*3/MM3 (ref 140–450)
PMV BLD AUTO: 11.5 FL (ref 6–12)
PO2 BLDA: 87.3 MM HG (ref 83–108)
PO2 TEMP ADJ BLD: 87.3 MM HG (ref 83–108)
POTASSIUM SERPL-SCNC: 3.4 MMOL/L (ref 3.5–5.2)
PROCALCITONIN SERPL-MCNC: >100 NG/ML (ref 0–0.25)
PROT SERPL-MCNC: 5.8 G/DL (ref 6–8.5)
PROTHROMBIN TIME: 16.1 SECONDS (ref 12.2–14.5)
RBC # BLD AUTO: 3.43 10*6/MM3 (ref 4.14–5.8)
SODIUM SERPL-SCNC: 144 MMOL/L (ref 136–145)
TOTAL RATE: 22 BREATHS/MINUTE
VENTILATOR MODE: ABNORMAL
VT ON VENT VENT: 0.47 ML
WBC NRBC COR # BLD AUTO: 39.05 10*3/MM3 (ref 3.4–10.8)

## 2025-02-14 PROCEDURE — 82948 REAGENT STRIP/BLOOD GLUCOSE: CPT

## 2025-02-14 PROCEDURE — 25010000002 PIPERACILLIN SOD-TAZOBACTAM PER 1 G: Performed by: NURSE PRACTITIONER

## 2025-02-14 PROCEDURE — 83050 HGB METHEMOGLOBIN QUAN: CPT

## 2025-02-14 PROCEDURE — 85027 COMPLETE CBC AUTOMATED: CPT

## 2025-02-14 PROCEDURE — 25010000002 HYDROCORTISONE SOD SUC (PF) 100 MG RECONSTITUTED SOLUTION: Performed by: INTERNAL MEDICINE

## 2025-02-14 PROCEDURE — 99291 CRITICAL CARE FIRST HOUR: CPT | Performed by: STUDENT IN AN ORGANIZED HEALTH CARE EDUCATION/TRAINING PROGRAM

## 2025-02-14 PROCEDURE — 84145 PROCALCITONIN (PCT): CPT

## 2025-02-14 PROCEDURE — 25010000002 FENTANYL 10 MCG/1 ML NS: Performed by: NURSE PRACTITIONER

## 2025-02-14 PROCEDURE — 83735 ASSAY OF MAGNESIUM: CPT | Performed by: INTERNAL MEDICINE

## 2025-02-14 PROCEDURE — 80076 HEPATIC FUNCTION PANEL: CPT

## 2025-02-14 PROCEDURE — 36600 WITHDRAWAL OF ARTERIAL BLOOD: CPT

## 2025-02-14 PROCEDURE — 85610 PROTHROMBIN TIME: CPT

## 2025-02-14 PROCEDURE — 94003 VENT MGMT INPAT SUBQ DAY: CPT

## 2025-02-14 PROCEDURE — 85384 FIBRINOGEN ACTIVITY: CPT

## 2025-02-14 PROCEDURE — 87040 BLOOD CULTURE FOR BACTERIA: CPT

## 2025-02-14 PROCEDURE — 25010000002 CEFEPIME PER 500 MG: Performed by: NURSE PRACTITIONER

## 2025-02-14 PROCEDURE — 25810000003 SODIUM CHLORIDE 0.9 % SOLUTION 250 ML FLEX CONT: Performed by: NURSE PRACTITIONER

## 2025-02-14 PROCEDURE — 94799 UNLISTED PULMONARY SVC/PX: CPT

## 2025-02-14 PROCEDURE — 25010000002 PROPOFOL 10 MG/ML EMULSION: Performed by: NURSE PRACTITIONER

## 2025-02-14 PROCEDURE — 94761 N-INVAS EAR/PLS OXIMETRY MLT: CPT

## 2025-02-14 PROCEDURE — 80048 BASIC METABOLIC PNL TOTAL CA: CPT | Performed by: INTERNAL MEDICINE

## 2025-02-14 PROCEDURE — 84100 ASSAY OF PHOSPHORUS: CPT | Performed by: INTERNAL MEDICINE

## 2025-02-14 PROCEDURE — 71045 X-RAY EXAM CHEST 1 VIEW: CPT

## 2025-02-14 PROCEDURE — 82375 ASSAY CARBOXYHB QUANT: CPT

## 2025-02-14 PROCEDURE — 82805 BLOOD GASES W/O2 SATURATION: CPT

## 2025-02-14 PROCEDURE — 74018 RADEX ABDOMEN 1 VIEW: CPT

## 2025-02-14 PROCEDURE — 85730 THROMBOPLASTIN TIME PARTIAL: CPT

## 2025-02-14 RX ORDER — POTASSIUM CHLORIDE 1.5 G/1.58G
40 POWDER, FOR SOLUTION ORAL ONCE
Status: COMPLETED | OUTPATIENT
Start: 2025-02-14 | End: 2025-02-14

## 2025-02-14 RX ADMIN — POTASSIUM CHLORIDE 40 MEQ: 1.5 POWDER, FOR SOLUTION ORAL at 13:30

## 2025-02-14 RX ADMIN — HYDROCORTISONE SODIUM SUCCINATE 50 MG: 100 INJECTION, POWDER, FOR SOLUTION INTRAMUSCULAR; INTRAVENOUS at 01:07

## 2025-02-14 RX ADMIN — BISACODYL 10 MG: 10 SUPPOSITORY RECTAL at 08:32

## 2025-02-14 RX ADMIN — MUPIROCIN 1 APPLICATION: 20 OINTMENT TOPICAL at 08:32

## 2025-02-14 RX ADMIN — Medication 10 ML: at 08:32

## 2025-02-14 RX ADMIN — Medication 300 MCG/HR: at 23:54

## 2025-02-14 RX ADMIN — CHLORHEXIDINE GLUCONATE 0.12% ORAL RINSE 15 ML: 1.2 LIQUID ORAL at 20:14

## 2025-02-14 RX ADMIN — CEFEPIME 2000 MG: 2 INJECTION, POWDER, FOR SOLUTION INTRAVENOUS at 20:12

## 2025-02-14 RX ADMIN — CEFEPIME 2000 MG: 2 INJECTION, POWDER, FOR SOLUTION INTRAVENOUS at 11:39

## 2025-02-14 RX ADMIN — PANTOPRAZOLE SODIUM 40 MG: 40 INJECTION, POWDER, FOR SOLUTION INTRAVENOUS at 06:27

## 2025-02-14 RX ADMIN — Medication 300 MCG/HR: at 16:40

## 2025-02-14 RX ADMIN — PROPOFOL 20 MCG/KG/MIN: 10 INJECTION, EMULSION INTRAVENOUS at 23:55

## 2025-02-14 RX ADMIN — Medication 10 ML: at 20:14

## 2025-02-14 RX ADMIN — CHLORHEXIDINE GLUCONATE 0.12% ORAL RINSE 15 ML: 1.2 LIQUID ORAL at 08:32

## 2025-02-14 RX ADMIN — Medication 300 MCG/HR: at 00:12

## 2025-02-14 RX ADMIN — PROPOFOL 20 MCG/KG/MIN: 10 INJECTION, EMULSION INTRAVENOUS at 15:01

## 2025-02-14 RX ADMIN — PROPOFOL 25 MCG/KG/MIN: 10 INJECTION, EMULSION INTRAVENOUS at 02:08

## 2025-02-14 RX ADMIN — NOREPINEPHRINE BITARTRATE 0.28 MCG/KG/MIN: 1 INJECTION, SOLUTION, CONCENTRATE INTRAVENOUS at 02:07

## 2025-02-14 RX ADMIN — PIPERACILLIN AND TAZOBACTAM 4.5 G: 4; .5 INJECTION, POWDER, FOR SOLUTION INTRAVENOUS at 02:07

## 2025-02-14 RX ADMIN — SENNOSIDES AND DOCUSATE SODIUM 2 TABLET: 50; 8.6 TABLET ORAL at 20:14

## 2025-02-14 RX ADMIN — MUPIROCIN 1 APPLICATION: 20 OINTMENT TOPICAL at 20:14

## 2025-02-14 RX ADMIN — Medication 300 MCG/HR: at 08:20

## 2025-02-14 RX ADMIN — HYDROCORTISONE SODIUM SUCCINATE 50 MG: 100 INJECTION, POWDER, FOR SOLUTION INTRAMUSCULAR; INTRAVENOUS at 06:28

## 2025-02-14 RX ADMIN — PROPOFOL 15 MCG/KG/MIN: 10 INJECTION, EMULSION INTRAVENOUS at 06:58

## 2025-02-14 RX ADMIN — HYDROCORTISONE SODIUM SUCCINATE 50 MG: 100 INJECTION, POWDER, FOR SOLUTION INTRAMUSCULAR; INTRAVENOUS at 11:39

## 2025-02-14 RX ADMIN — HYDROCORTISONE SODIUM SUCCINATE 50 MG: 100 INJECTION, POWDER, FOR SOLUTION INTRAMUSCULAR; INTRAVENOUS at 17:53

## 2025-02-14 NOTE — PLAN OF CARE
Goal Outcome Evaluation:  Remains intubated/sedated, fentanyl at 300 and propofol at 15  Responds to voice/pain, follows commands occasionally. Moves all extremities.   Levophed (double conc.) infusing at .2 mcg/kg/min  UOP adequate  No BM, abdomen remains distended, APRN notified and KUB obtained.   Critical WBC, APRN notified, see orders  Tmax 99.5  BUE restraints remain for pt safety.

## 2025-02-14 NOTE — CASE MANAGEMENT/SOCIAL WORK
Continued Stay Note   [facility]     Patient Name: Val Nassar Jr.  MRN: [id number]  Today's Date: 2/14/2025    Admit Date: 2/11/2025    Plan: Ongoing   Discharge Plan       Row Name 02/14/25 1055       Plan    Plan Ongoing    Patient/Family in Agreement with Plan other (see comments)    Plan Comments Pt remains on Mechanical Ventilator and sedated. D/C plan is ongoing.  will continue to follow.                   Discharge Codes    No documentation.                 Expected Discharge Date and Time       Expected Discharge Date Expected Discharge Time    Feb 18, 2025               Sandy Lopes RN

## 2025-02-14 NOTE — PLAN OF CARE
Goal Outcome Evaluation:  Plan of Care Reviewed With: patient  Progress: improving     -patient remains responsive to pain, and voice. Does not follow commands  -remains vented at peep 11, fio2 weaned to 40%  -levo continuing to infuse, weaned to 0.08  -fentanyl and propofol infusing for sedation. Attempted to wean propofol however patient would alan to 49 and low 50s with extremely painful expression on face. Propofol increased and patient does not appear as uncomfortable.   -prn dulcolax suppository given but no BM noted  -SB to SR on the monitor

## 2025-02-14 NOTE — PROGRESS NOTES
Intensive Care Follow-up     Hospital:  LOS: 3 days   Mr. Val Nassar Jr., 64 y.o. male is followed for:   Septic shock          History of present illness:   Mr. Nassar is a 64 year old male smoker and resident of [facility] with pmhx of alcoholic cirrhosis, metastatic squamous cell carcinoma of esophagus, and HLD who presented with weakness, dizziness, and AMS. Upon arrival to ED, he was noted to be hypotensive. He was given albumin and started on levophed and vasopressin. CT head without acute process. CT Abd/pelvis and chest pending showed some basilar atelectasis versus infiltrate, evidence of left-sided pyelonephritis and sigmoid diverticulosis along with possible cystitis. Additional workup notable for GALILEO (creatinine 3.22, BUN 27), UA with 2+ bacteria, large amount of WBCs. UDS positive for oxycodone and fentanyl. Ethanol < 10. ABG shows metabolic acidosis with pH of 7.214, HCO3 14.2, anion gap 28.0. , , bili normal. CXR shows pulmonary vascular congestion. ProBNP 13,127. Trop 134. WBC 27.58 Procal > 100.     Patient will be admitted to the ICU for ongoing management of septic shock.  He received full sepsis fluid bolus and was requiring norepinephrine at 0.26 mcg/kg/min and vasopressin 0.03 mcg/kg/min.  Patient currently reports left lower quadrant abdominal pain as well as bilateral shoulder pain.      Subjective   Interval History:  No acute events overnight. Only on levophed. Off asiya and vasoopressin.  Remains on mechanical ventilation, 55% FiO2. Weaned PEEP this AM.              The patient's past medical, surgical and social history were reviewed and updated in Epic as appropriate.       Objective     Infusions:  fentanyl 10 mcg/mL,  mcg/hr, Last Rate: 300 mcg/hr (02/14/25 0012)  norepinephrine, 0.02-0.5 mcg/kg/min, Last Rate: 0.24 mcg/kg/min (02/14/25 0501)  phenylephrine, 0.5-3 mcg/kg/min, Last Rate: Stopped (02/13/25 1020)  propofol, 5-50  mcg/kg/min, Last Rate: 15 mcg/kg/min (02/14/25 0245)  vasopressin, 0.03 Units/min, Last Rate: Stopped (02/13/25 1450)      Medications:  chlorhexidine, 15 mL, Mouth/Throat, Q12H  heparin (porcine), 5,000 Units, Subcutaneous, Q12H  hydrocortisone sodium succinate, 50 mg, Intravenous, Q6H  mupirocin, 1 Application, Each Nare, BID  pantoprazole, 40 mg, Intravenous, Q AM  piperacillin-tazobactam, 4.5 g, Intravenous, Q8H  senna-docusate sodium, 2 tablet, Nasogastric, BID  sodium chloride, 10 mL, Intravenous, Q12H      I reviewed the patient's medications.    Vital Sign Min/Max for last 24 hours  Temp  Min: 98.6 °F (37 °C)  Max: 99.9 °F (37.7 °C)   BP  Min: 85/48  Max: 142/111   Pulse  Min: 65  Max: 81   Resp  Min: 22  Max: 22   SpO2  Min: 87 %  Max: 96 %   No data recorded       Input/Output for last 24 hour shift  02/13 0701 - 02/14 0700  In: 2222 [I.V.:1597.6]  Out: 1095 [Urine:1095]   Mode: VC+/AC  FiO2 (%):  [40 %] 40 %  S RR:  [22] 22  S VT:  [470 mL] 470 mL  PEEP/CPAP (cm H2O):  [13 cm H20] 13 cm H20  MAP (cm H2O):  [19-21] 20  GENERAL : Sedated, lying in bed, NAD  RESPIRATORY/THORAX : ET tube in place, Coarse breath sounds bilaterally  CARDIOVASCULAR : Normal S1/S2, RRR. 1+ lower ext edema.  GASTROINTESTINAL : Soft, NT/ND. BS x 4 normoactive. No hepatosplenomegaly.  MUSCULOSKELETAL : No cyanosis, clubbing, or ischemia  NEUROLOGICAL: Sedated, Moving all ext.    Results from last 7 days   Lab Units 02/14/25  0502 02/13/25  0317 02/12/25  0303   WBC 10*3/mm3 39.05* 34.83* 26.60*   HEMOGLOBIN g/dL 10.8* 11.2* 11.5*   PLATELETS 10*3/mm3 69* 74* 94*     Results from last 7 days   Lab Units 02/14/25  0502 02/13/25  0317 02/12/25  0303   SODIUM mmol/L 144 143 134*   POTASSIUM mmol/L 3.4* 4.0 3.9   CO2 mmol/L 22.0 21.0* 13.0*   BUN mg/dL 71* 54* 40*   CREATININE mg/dL 2.59* 2.60* 2.77*   MAGNESIUM mg/dL 2.9* 2.7* 3.4*   PHOSPHORUS mg/dL 4.7* 4.9* 6.2*   GLUCOSE mg/dL 96 116* 121*     Estimated Creatinine Clearance: 33.4  mL/min (A) (by C-G formula based on SCr of 2.59 mg/dL (H)).    Results from last 7 days   Lab Units 02/14/25  0309   PH, ARTERIAL pH units 7.439   PCO2, ARTERIAL mm Hg 32.8*   PO2 ART mm Hg 87.3       I reviewed the patient's new clinical results.  I reviewed the patient's new imaging results/reports including actual images and agree with reports.       Imaging Results (Last 24 Hours)       Procedure Component Value Units Date/Time    XR Chest 1 View [[id number]] Resulted: 02/14/25 0225     Updated: 02/14/25 0557    XR Chest 1 View [[id number]] Collected: 02/13/25 0716     Updated: 02/13/25 0720    Narrative:      XR CHEST 1 VW    Date of Exam: 2/13/2025 2:21 AM EST    Indication: Intubated Patient    Comparison: 2/12/2025    Findings:  There been no interval tube or line changes. Heart size and pulmonary vessels are within normal limits. There is worsening bibasilar airspace disease which may be due to worsening atelectasis or pneumonia. Small right pleural effusion cannot be entirely   excluded. No definite left pleural effusion. No pneumothorax.      Impression:      Impression:    1. Worsening bibasilar airspace disease with probable small right pleural effusion.      Electronically Signed: Jerrod Olsen MD    2/13/2025 7:17 AM EST    Workstation ID: [id number]            Assessment & Plan   Impression        Septic shock    Hyperlipidemia, unspecified    Tobacco use    Cirrhosis of liver    Squamous cell carcinoma of esophagus    GALILEO (acute kidney injury)    Metabolic acidosis    Pneumonia of both lower lobes due to Pseudomonas species    Bacteremia due to Pseudomonas       Plan        64-year-old male with history of alcoholic cirrhosis, metastatic squamous cell carcinoma of the esophagus, hyperlipidemia.  Who presented to the [facility] ICU with pneumonia and Pseudomonas bacteremia leading to acute hypoxic respiratory failure and septic shock.  The urinary culture and blood culture is pansensitive  Pseudomonas.      -Continue mechanical ventilation, FiO2 55% and PEEP 11. Wean FiO2 for O2 sat >88%  -Contiue fentanyl and propofol for sedation  -Continue Zosyn for coverage of Pseudomonas bacteremia and UTI  -Levophed for MAP greater than 65. Vaso and asiya off since yesterday.   -Continue stress dose steroids  -Cr 2.59, stable.  -WBC increased, repeat blood cx. On appropriate antbx as above.   -KUB reviewed, no acute issues. Bowel regimen. Consider starting TF in 1-2 days if pressors remain minimal. Nutrition following.    -LFTS downtrending, monitor daily   -Echo from 2/10 showed normal EF with no wall motion or valvular abnormalities  -Heparin for DVT prophylaxis, holding given plts downtrending and bleeding at injection site  -GI prophylaxis    I conducted multidisciplinary rounds and the plan of care was discussed with the multidisciplinary team at that time. In attendance at multidisciplinary rounds was clinical pharmacist, dietitian, nursing staff, and case management.    I discussed the patient's findings and my recommendations with nursing staff     Critical Care time spent in direct patient care: 34 minutes (excluding procedure time, if applicable) including high complexity decision making to assess, manipulate, and support vital organ system failure in this individual who has impairment of one or more vital organ systems such that there is a high probability of imminent or life threatening deterioration in the patient's condition.      Laura An MD  Pulmonary, Critical care

## 2025-02-15 ENCOUNTER — APPOINTMENT (OUTPATIENT)
Dept: GENERAL RADIOLOGY | Facility: HOSPITAL | Age: 65
DRG: 870 | End: 2025-02-15
Payer: MEDICARE

## 2025-02-15 LAB
ANION GAP SERPL CALCULATED.3IONS-SCNC: 15 MMOL/L (ref 5–15)
ARTERIAL PATENCY WRIST A: ABNORMAL
ATMOSPHERIC PRESS: ABNORMAL MM[HG]
BASE EXCESS BLDA CALC-SCNC: -0.9 MMOL/L (ref 0–2)
BASOPHILS # BLD MANUAL: 0 10*3/MM3 (ref 0–0.2)
BASOPHILS NFR BLD MANUAL: 0 % (ref 0–1.5)
BDY SITE: ABNORMAL
BODY TEMPERATURE: 37
BUN SERPL-MCNC: 83 MG/DL (ref 8–23)
BUN/CREAT SERPL: 41.5 (ref 7–25)
BURR CELLS BLD QL SMEAR: ABNORMAL
CALCIUM SPEC-SCNC: 8.4 MG/DL (ref 8.6–10.5)
CHLORIDE SERPL-SCNC: 112 MMOL/L (ref 98–107)
CO2 BLDA-SCNC: 22.7 MMOL/L (ref 22–33)
CO2 SERPL-SCNC: 18 MMOL/L (ref 22–29)
COHGB MFR BLD: 1.1 % (ref 0–2)
CREAT SERPL-MCNC: 2 MG/DL (ref 0.76–1.27)
DEPRECATED RDW RBC AUTO: 56.4 FL (ref 37–54)
EGFRCR SERPLBLD CKD-EPI 2021: 36.4 ML/MIN/1.73
EOSINOPHIL # BLD MANUAL: 0 10*3/MM3 (ref 0–0.4)
EOSINOPHIL NFR BLD MANUAL: 0 % (ref 0.3–6.2)
EPAP: 0
ERYTHROCYTE [DISTWIDTH] IN BLOOD BY AUTOMATED COUNT: 15.9 % (ref 12.3–15.4)
GLUCOSE BLDC GLUCOMTR-MCNC: 105 MG/DL (ref 70–130)
GLUCOSE BLDC GLUCOMTR-MCNC: 107 MG/DL (ref 70–130)
GLUCOSE BLDC GLUCOMTR-MCNC: 114 MG/DL (ref 70–130)
GLUCOSE BLDC GLUCOMTR-MCNC: 122 MG/DL (ref 70–130)
GLUCOSE BLDC GLUCOMTR-MCNC: 137 MG/DL (ref 70–130)
GLUCOSE SERPL-MCNC: 111 MG/DL (ref 65–99)
HCO3 BLDA-SCNC: 21.8 MMOL/L (ref 20–26)
HCT VFR BLD AUTO: 33.1 % (ref 37.5–51)
HCT VFR BLD CALC: 31.4 % (ref 38–51)
HGB BLD-MCNC: 10.5 G/DL (ref 13–17.7)
HGB BLDA-MCNC: 10.3 G/DL (ref 13.5–17.5)
INHALED O2 CONCENTRATION: 40 %
IPAP: 0
LYMPHOCYTES # BLD MANUAL: 0.99 10*3/MM3 (ref 0.7–3.1)
LYMPHOCYTES NFR BLD MANUAL: 8 % (ref 5–12)
MAGNESIUM SERPL-MCNC: 3 MG/DL (ref 1.6–2.4)
MCH RBC QN AUTO: 30.5 PG (ref 26.6–33)
MCHC RBC AUTO-ENTMCNC: 31.7 G/DL (ref 31.5–35.7)
MCV RBC AUTO: 96.2 FL (ref 79–97)
METHGB BLD QL: 0.4 % (ref 0–1.5)
MODALITY: ABNORMAL
MONOCYTES # BLD: 2.64 10*3/MM3 (ref 0.1–0.9)
NEUTROPHILS # BLD AUTO: 29.4 10*3/MM3 (ref 1.7–7)
NEUTROPHILS NFR BLD MANUAL: 80 % (ref 42.7–76)
NEUTS BAND NFR BLD MANUAL: 9 % (ref 0–5)
NRBC SPEC MANUAL: 0 /100 WBC (ref 0–0.2)
OXYHGB MFR BLDV: 96.1 % (ref 94–99)
PAW @ PEAK INSP FLOW SETTING VENT: 0 CMH2O
PCO2 BLDA: 28.7 MM HG (ref 35–45)
PCO2 TEMP ADJ BLD: 28.7 MM HG (ref 35–48)
PEEP RESPIRATORY: 11 CM[H2O]
PH BLDA: 7.49 PH UNITS (ref 7.35–7.45)
PH, TEMP CORRECTED: 7.49 PH UNITS
PHOSPHATE SERPL-MCNC: 3.9 MG/DL (ref 2.5–4.5)
PLAT MORPH BLD: NORMAL
PLATELET # BLD AUTO: 53 10*3/MM3 (ref 140–450)
PMV BLD AUTO: 12.2 FL (ref 6–12)
PO2 BLDA: 90.6 MM HG (ref 83–108)
PO2 TEMP ADJ BLD: 90.6 MM HG (ref 83–108)
POTASSIUM SERPL-SCNC: 3.6 MMOL/L (ref 3.5–5.2)
RBC # BLD AUTO: 3.44 10*6/MM3 (ref 4.14–5.8)
SODIUM SERPL-SCNC: 145 MMOL/L (ref 136–145)
TOTAL RATE: 22 BREATHS/MINUTE
VARIANT LYMPHS NFR BLD MANUAL: 3 % (ref 19.6–45.3)
VENTILATOR MODE: ABNORMAL
VT ON VENT VENT: 0.47 ML
WBC MORPH BLD: NORMAL
WBC NRBC COR # BLD AUTO: 33.03 10*3/MM3 (ref 3.4–10.8)

## 2025-02-15 PROCEDURE — 84100 ASSAY OF PHOSPHORUS: CPT | Performed by: INTERNAL MEDICINE

## 2025-02-15 PROCEDURE — 94664 DEMO&/EVAL PT USE INHALER: CPT

## 2025-02-15 PROCEDURE — 94799 UNLISTED PULMONARY SVC/PX: CPT

## 2025-02-15 PROCEDURE — 71045 X-RAY EXAM CHEST 1 VIEW: CPT

## 2025-02-15 PROCEDURE — 25010000002 HYDROCORTISONE SOD SUC (PF) 100 MG RECONSTITUTED SOLUTION: Performed by: INTERNAL MEDICINE

## 2025-02-15 PROCEDURE — 25810000003 SODIUM CHLORIDE 0.9 % SOLUTION 250 ML FLEX CONT: Performed by: NURSE PRACTITIONER

## 2025-02-15 PROCEDURE — 82805 BLOOD GASES W/O2 SATURATION: CPT

## 2025-02-15 PROCEDURE — 94003 VENT MGMT INPAT SUBQ DAY: CPT

## 2025-02-15 PROCEDURE — 25010000002 PROPOFOL 10 MG/ML EMULSION: Performed by: NURSE PRACTITIONER

## 2025-02-15 PROCEDURE — 25010000002 FENTANYL 10 MCG/1 ML NS: Performed by: NURSE PRACTITIONER

## 2025-02-15 PROCEDURE — 85025 COMPLETE CBC W/AUTO DIFF WBC: CPT | Performed by: INTERNAL MEDICINE

## 2025-02-15 PROCEDURE — 82948 REAGENT STRIP/BLOOD GLUCOSE: CPT

## 2025-02-15 PROCEDURE — 83735 ASSAY OF MAGNESIUM: CPT | Performed by: INTERNAL MEDICINE

## 2025-02-15 PROCEDURE — 99291 CRITICAL CARE FIRST HOUR: CPT | Performed by: INTERNAL MEDICINE

## 2025-02-15 PROCEDURE — 25010000002 CEFEPIME PER 500 MG: Performed by: NURSE PRACTITIONER

## 2025-02-15 PROCEDURE — 94761 N-INVAS EAR/PLS OXIMETRY MLT: CPT

## 2025-02-15 PROCEDURE — 80048 BASIC METABOLIC PNL TOTAL CA: CPT | Performed by: INTERNAL MEDICINE

## 2025-02-15 PROCEDURE — 82375 ASSAY CARBOXYHB QUANT: CPT

## 2025-02-15 PROCEDURE — 85007 BL SMEAR W/DIFF WBC COUNT: CPT | Performed by: INTERNAL MEDICINE

## 2025-02-15 PROCEDURE — 36600 WITHDRAWAL OF ARTERIAL BLOOD: CPT

## 2025-02-15 PROCEDURE — 83050 HGB METHEMOGLOBIN QUAN: CPT

## 2025-02-15 PROCEDURE — 25010000002 MIDAZOLAM 1 MG/ML 100ML NS 100 MG/100ML SOLUTION: Performed by: INTERNAL MEDICINE

## 2025-02-15 RX ORDER — ESCITALOPRAM OXALATE 10 MG/1
10 TABLET ORAL NIGHTLY
Status: DISCONTINUED | OUTPATIENT
Start: 2025-02-15 | End: 2025-02-17

## 2025-02-15 RX ORDER — MIDAZOLAM IN NACL,ISO-OSMOT/PF 50 MG/50ML
1-10 INFUSION BOTTLE (ML) INTRAVENOUS
Status: DISCONTINUED | OUTPATIENT
Start: 2025-02-15 | End: 2025-02-23

## 2025-02-15 RX ORDER — POTASSIUM CHLORIDE 1.5 G/1.58G
40 POWDER, FOR SOLUTION ORAL ONCE
Status: COMPLETED | OUTPATIENT
Start: 2025-02-15 | End: 2025-02-15

## 2025-02-15 RX ORDER — GABAPENTIN 300 MG/1
300 CAPSULE ORAL EVERY 8 HOURS SCHEDULED
Status: DISCONTINUED | OUTPATIENT
Start: 2025-02-15 | End: 2025-02-17

## 2025-02-15 RX ADMIN — NALOXEGOL OXALATE 25 MG: 25 TABLET, FILM COATED ORAL at 09:58

## 2025-02-15 RX ADMIN — CHLORHEXIDINE GLUCONATE 0.12% ORAL RINSE 15 ML: 1.2 LIQUID ORAL at 09:34

## 2025-02-15 RX ADMIN — HYDROCORTISONE SODIUM SUCCINATE 50 MG: 100 INJECTION, POWDER, FOR SOLUTION INTRAMUSCULAR; INTRAVENOUS at 17:31

## 2025-02-15 RX ADMIN — GABAPENTIN 300 MG: 300 CAPSULE ORAL at 22:39

## 2025-02-15 RX ADMIN — Medication 300 MCG/HR: at 09:05

## 2025-02-15 RX ADMIN — Medication 10 ML: at 09:34

## 2025-02-15 RX ADMIN — NOREPINEPHRINE BITARTRATE 0.06 MCG/KG/MIN: 1 INJECTION, SOLUTION, CONCENTRATE INTRAVENOUS at 04:28

## 2025-02-15 RX ADMIN — ESCITALOPRAM OXALATE 10 MG: 10 TABLET ORAL at 20:01

## 2025-02-15 RX ADMIN — Medication 10 ML: at 20:03

## 2025-02-15 RX ADMIN — MIDAZOLAM HYDROCHLORIDE 1 MG/HR: 1 INJECTION, SOLUTION INTRAVENOUS at 10:11

## 2025-02-15 RX ADMIN — POTASSIUM CHLORIDE 40 MEQ: 1.5 POWDER, FOR SOLUTION ORAL at 09:58

## 2025-02-15 RX ADMIN — PROPOFOL 15 MCG/KG/MIN: 10 INJECTION, EMULSION INTRAVENOUS at 05:57

## 2025-02-15 RX ADMIN — POLYETHYLENE GLYCOL 3350 17 G: 17 POWDER, FOR SOLUTION ORAL at 05:59

## 2025-02-15 RX ADMIN — POLYVINYL ALCOHOL 2 DROP: 14 SOLUTION/ DROPS OPHTHALMIC at 00:22

## 2025-02-15 RX ADMIN — Medication 300 MCG/HR: at 17:12

## 2025-02-15 RX ADMIN — CEFEPIME 2000 MG: 2 INJECTION, POWDER, FOR SOLUTION INTRAVENOUS at 20:01

## 2025-02-15 RX ADMIN — CHLORHEXIDINE GLUCONATE 0.12% ORAL RINSE 15 ML: 1.2 LIQUID ORAL at 20:03

## 2025-02-15 RX ADMIN — GABAPENTIN 300 MG: 300 CAPSULE ORAL at 17:12

## 2025-02-15 RX ADMIN — HYDROCORTISONE SODIUM SUCCINATE 50 MG: 100 INJECTION, POWDER, FOR SOLUTION INTRAMUSCULAR; INTRAVENOUS at 00:03

## 2025-02-15 RX ADMIN — HYDROCORTISONE SODIUM SUCCINATE 50 MG: 100 INJECTION, POWDER, FOR SOLUTION INTRAMUSCULAR; INTRAVENOUS at 05:58

## 2025-02-15 RX ADMIN — CEFEPIME 2000 MG: 2 INJECTION, POWDER, FOR SOLUTION INTRAVENOUS at 09:34

## 2025-02-15 RX ADMIN — HYDROCORTISONE SODIUM SUCCINATE 50 MG: 100 INJECTION, POWDER, FOR SOLUTION INTRAMUSCULAR; INTRAVENOUS at 12:22

## 2025-02-15 RX ADMIN — MUPIROCIN 1 APPLICATION: 20 OINTMENT TOPICAL at 09:34

## 2025-02-15 RX ADMIN — PANTOPRAZOLE SODIUM 40 MG: 40 INJECTION, POWDER, FOR SOLUTION INTRAVENOUS at 05:58

## 2025-02-15 RX ADMIN — MUPIROCIN 1 APPLICATION: 20 OINTMENT TOPICAL at 20:03

## 2025-02-15 NOTE — PLAN OF CARE
Goal Outcome Evaluation:         On going not progressing-unable to wean from vent

## 2025-02-15 NOTE — PLAN OF CARE
Goal Outcome Evaluation:  Plan of Care Reviewed With: patient  Progress: declining     -patient remains responsive to voice, withdraws from pain  -propofol transitioned to versed for sedation, fentanyl continuing to infuse as well  -levo turned off and MAP staying above 65  -peep 11, fio2 increased from 40 to 60%. Rate changed to 20  -remains SB to SR  -movantik given today, moderate liquid BM  -TF started

## 2025-02-15 NOTE — PROGRESS NOTES
Nutrition Services    Patient Name:  Val Nassar Jr.  YOB: 1960  MRN: [id number]  Admit Date:  2/11/2025    RN informed MD abad with starting EN.    Pt currently hemodynamically stable, levo at 0.02 mcg/kg/min, vasopressin stopped. Bowels moved.    Recommend initiate EN:  Initiate Peptamen 1.5 at 20 ml/hr and advance as tolerated by 20 ml/hr Q 12 hr to goal rate 60 ml/hr. Water flushes 25 ml/hr.    TF at goal volume will provide 1200ml, 1800 kcal (95% est. needs), 82g protein (99% est. needs) 924 ml free water in formula, 1424 ml total water with flushes.    Propofol at low dose rate currently providing additional ~70 kcal.     ADDENDUM 2/16: Tolerating EN initiated yesterday as above, to reach goal later today. Remains off vasopressor support and now off propofol as well. Sodium elevated at 151, will increase water flushes from 25 ml/hr to 45 ml/hr (500 ml to 900 ml flushes) to provide 1824 ml total water.    Electronically signed by:  Nicky Leyva RD  02/15/25 10:30 EST

## 2025-02-15 NOTE — PLAN OF CARE
Goal Outcome Evaluation:  Pt remains intubated/sedated. FiO2 40%, PEEP 11, rate 22.   Fentanyl at 300 mcg/hr, Propofol at 15 mcg/kg/min  Levophed weaned to .04 mcg/kg/min  Responds to voice and localizes pain  UOP adequate, 690 mL  Tmax 99.5  SB/NSR  No BM, miralax given  BUE restraints remain for patient safety.

## 2025-02-15 NOTE — PROGRESS NOTES
INTENSIVIST   PROGRESS NOTE     Hospital:  LOS: 4 days     Mr. Val Nassar Jr., 64 y.o. male is followed for a Chief Complaint of: Shock, Respiratory Failure      Subjective   S     Interval History:  No acute events. PEEP remains at 11 this AM. Nursing notes bradycardia with Propofol. Remains on low-dose Levophed.        The patient's relevant past medical, surgical and social history were reviewed and updated in Epic as appropriate.      ROS: Unable to obtain secondary to sedation and mechanical ventilation.     Objective   O     Vitals:  Temp  Min: 97.9 °F (36.6 °C)  Max: 99.5 °F (37.5 °C)  BP  Min: 89/65  Max: 115/70  Pulse  Min: 47  Max: 64  Resp  Min: 22  Max: 23  SpO2  Min: 91 %  Max: 97 % No data recorded    Intake/Ouptut 24 hrs (7:00AM - 6:59 AM)  Intake & Output (last 3 days)         02/12 0701 02/13 0700 02/13 0701 02/14 0700 02/14 0701  02/15 0700 02/15 0701 02/16 0700    I.V. (mL/kg) 3809.5 (45.9) 1740.7 (19.9) 1179 (13.4)     NG/GT 80 150 170     IV Piggyback 552.5 474.4 200     Total Intake(mL/kg) 4442 (53.5) 2365.1 (27.1) 1549 (17.5)     Urine (mL/kg/hr) 1120 (0.6) 1245 (0.6) 1540 (0.7)     Stool        Total Output 1120 1245 1540     Net +3322 +1120.1 +9                     Medications (drips):  fentanyl 10 mcg/mL, Last Rate: 300 mcg/hr (02/15/25 0905)  midazolam, Last Rate: 1 mg/hr (02/15/25 1025)  norepinephrine, Last Rate: 0.02 mcg/kg/min (02/15/25 0916)  propofol, Last Rate: 5 mcg/kg/min (02/15/25 1024)  vasopressin, Last Rate: Stopped (02/13/25 1450)        Mechanical Ventilator Settings:          Resp Rate (Set): 20     FiO2 (%): 40 %  PEEP/CPAP (cm H2O): 11 cm H20    Minute Ventilation (L/min) (Obs): 9.15 L/min  Resp Rate (Observed) Vent: 20  I:E Ratio (Set): 1:2.09  I:E Ratio (Obs): 1:2.1    PIP Observed (cm H2O): 33 cm H2O  Plateau Pressure (cm H2O): (S) 29 cm H2O    Physical Examination  Telemetry:  Sinus bradycardia.    Constitutional:  No acute distress.  ETT in place  on mechanical ventilation.    Eyes: No scleral icterus.   PERRL, EOM intact.    Neck:  Supple, FROM   Cardiovascular: Normal rate, regular and rhythm. Normal heart sounds.  No murmurs, gallop or rub.   Respiratory: No respiratory distress. Normal respiratory effort.  Diminished. No wheezing.    Abdominal:  Soft. No masses. Nontender. No distension. No HSM.   Extremities: No digital cyanosis. No clubbing.  No peripheral edema.   Skin: No rashes, lesions or ulcers   Neurological:   Sedated. Opens eyes to stimulation.              Results from last 7 days   Lab Units 02/15/25  0347 02/14/25  0502 02/13/25  0317   WBC 10*3/mm3 33.03* 39.05* 34.83*   HEMOGLOBIN g/dL 10.5* 10.8* 11.2*   MCV fL 96.2 93.0 93.4   PLATELETS 10*3/mm3 53* 69* 74*     Results from last 7 days   Lab Units 02/15/25  0347 02/14/25  0502 02/13/25  0317   SODIUM mmol/L 145 144 143   POTASSIUM mmol/L 3.6 3.4* 4.0   CO2 mmol/L 18.0* 22.0 21.0*   CREATININE mg/dL 2.00* 2.59* 2.60*   GLUCOSE mg/dL 111* 96 116*   MAGNESIUM mg/dL 3.0* 2.9* 2.7*   PHOSPHORUS mg/dL 3.9 4.7* 4.9*     Estimated Creatinine Clearance: 40.5 mL/min (A) (by C-G formula based on SCr of 2 mg/dL (H)).  Results from last 7 days   Lab Units 02/14/25  0502 02/13/25  0317 02/12/25  0303   ALK PHOS U/L 93 62 42   BILIRUBIN mg/dL 1.2 0.8 0.4   BILIRUBIN DIRECT mg/dL 0.8* 0.4*  --    ALT (SGPT) U/L 176* 226* 227*   AST (SGOT) U/L 185* 294* 424*       Results from last 7 days   Lab Units 02/15/25  0359 02/14/25  0309 02/13/25  0421 02/12/25  1358 02/12/25  0304   PH, ARTERIAL pH units 7.489* 7.439 7.450 7.357 7.233*   PCO2, ARTERIAL mm Hg 28.7* 32.8* 34.6* 35.9 37.0   PO2 ART mm Hg 90.6 87.3 67.9* 78.0* 93.0   FIO2 % 40 40 50 80 80       Images:  Imaging Results (Last 24 Hours)       Procedure Component Value Units Date/Time    XR Chest 1 View [[id number]] Collected: 02/15/25 0801     Updated: 02/15/25 0805    Narrative:      XR CHEST 1 VW    Date of Exam: 2/15/2025 3:20 AM EST    Indication:  Intubated Patient    Comparison: Chest x-ray 2/14/2025    Findings:  Stable medical support devices. Stable cardiomediastinal silhouette. Redemonstration of hazy bibasilar opacities compatible with layering pleural effusions. Increased retrocardiac opacities likely reflect atelectasis. No pneumothorax.      Impression:      Impression:  Stable medical support devices. Stable cardiomediastinal silhouette. Redemonstration of hazy bibasilar opacities compatible with layering pleural effusions. Increased retrocardiac opacities likely reflect atelectasis. No pneumothorax.      Electronically Signed: Nikita Garrison MD    2/15/2025 8:02 AM EST    Workstation ID: [id number]               Results: Reviewed.  I reviewed the patient's new laboratory and imaging results.  I independently reviewed the patient's new images.    Medications: Reviewed.    Assessment & Plan   A / P     Mr. Nassar is a 65yo M nursing home resident with a history of tobacco use, alcoholic cirrhosis, metastatic squamous cell of the esophagus and HLD who presented with weakness, dizziness and AMS. In the ED, he was noted to be hypotensive. He was given Albumin and started on Levophed and Vasopressin.    CT head negative for an acute abnormality. CT chest/abdomen/pelvis performed and was remarkable to pyelonephritis, sigmoid diverticulosis, and possible cystitis.     Labs were significant for GALILEO and UA was consistent with a UTI.     He was admitted to the ICU.     He required intubation on the morning of 2/12/25.     Blood and urine culture with Pseudomonas and he is on Cefepime.     This AM, he remains on mechanical ventilation with an FiO2 of 40% and PEEP of 11. Vasopressin has been stopped but he is still requiring Levophed.        Nutrition:   NPO Diet NPO Type: Tube Feeding  Advance Directives:   Code Status and Medical Interventions: CPR (Attempt to Resuscitate); Full Support   Ordered at: 02/12/25 1029     Level Of Support Discussed With:     Patient     Code Status (Patient has no pulse and is not breathing):    CPR (Attempt to Resuscitate)     Medical Interventions (Patient has pulse or is breathing):    Full Support       Active Hospital Problems    Diagnosis     **Septic shock     Pneumonia of both lower lobes due to Pseudomonas species     Bacteremia due to Pseudomonas     GALILEO (acute kidney injury)     Metabolic acidosis     Cirrhosis of liver     Squamous cell carcinoma of esophagus     Hyperlipidemia, unspecified     Tobacco use        Assessment / Plan:    Continue mechanical ventilation. Not appropriate for ventilator weaning. Titrate PEEP  Change Propofol to Versed. Continue Fentanyl.   Continue Cefepime.   Titrate Levophed.   Monitor renal function. Continue oswald catheter.   No bowel movement despite bowel regimen. Add Movantik for opiate induced constipation.   Start trophic tube feeds. Do not advance until off vasopressors and bowel function has returned.   AM labs and CXR    Remains critically ill secondary to management of mechanical ventilation and titration of vasopressors.     High level of risk due to:  severe exacerbation of chronic illness and illness with threat to life or bodily function.    Plan of care and goals reviewed during interdisciplinary rounds.  I discussed the patient's findings and my recommendations with nursing staff    Critical Care Time: was greater than 33 minutes.(This excludes time spent performing separately reportable procedures and services). including high complexity decision making to assess, manipulate, and support vital organ system failure in this individual who has impairment of one or more vital organ systems such that there is a high probability of imminent or life threatening deterioration in the patient’s condition.      Ludmila Mina, DO    Intensive Care Medicine and Pulmonary Medicine

## 2025-02-16 ENCOUNTER — APPOINTMENT (OUTPATIENT)
Dept: GENERAL RADIOLOGY | Facility: HOSPITAL | Age: 65
DRG: 870 | End: 2025-02-16
Payer: MEDICARE

## 2025-02-16 LAB
ALBUMIN SERPL-MCNC: 2.7 G/DL (ref 3.5–5.2)
ALP SERPL-CCNC: 142 U/L (ref 39–117)
ALT SERPL W P-5'-P-CCNC: 115 U/L (ref 1–41)
AMMONIA BLD-SCNC: 24 UMOL/L (ref 16–60)
ANION GAP SERPL CALCULATED.3IONS-SCNC: 11 MMOL/L (ref 5–15)
ARTERIAL PATENCY WRIST A: ABNORMAL
AST SERPL-CCNC: 98 U/L (ref 1–40)
ATMOSPHERIC PRESS: ABNORMAL MM[HG]
BASE EXCESS BLDA CALC-SCNC: -1.3 MMOL/L (ref 0–2)
BDY SITE: ABNORMAL
BILIRUB CONJ SERPL-MCNC: 0.5 MG/DL (ref 0–0.3)
BILIRUB INDIRECT SERPL-MCNC: 0.4 MG/DL
BILIRUB SERPL-MCNC: 0.9 MG/DL (ref 0–1.2)
BODY TEMPERATURE: 37
BUN SERPL-MCNC: 90 MG/DL (ref 8–23)
BUN/CREAT SERPL: 59.2 (ref 7–25)
CALCIUM SPEC-SCNC: 8.8 MG/DL (ref 8.6–10.5)
CHLORIDE SERPL-SCNC: 117 MMOL/L (ref 98–107)
CO2 BLDA-SCNC: 23.7 MMOL/L (ref 22–33)
CO2 SERPL-SCNC: 23 MMOL/L (ref 22–29)
COHGB MFR BLD: 1.1 % (ref 0–2)
CREAT SERPL-MCNC: 1.52 MG/DL (ref 0.76–1.27)
CRP SERPL-MCNC: 16.2 MG/DL (ref 0–0.5)
EGFRCR SERPLBLD CKD-EPI 2021: 50.5 ML/MIN/1.73
EPAP: 0
GLUCOSE BLDC GLUCOMTR-MCNC: 133 MG/DL (ref 70–130)
GLUCOSE BLDC GLUCOMTR-MCNC: 144 MG/DL (ref 70–130)
GLUCOSE BLDC GLUCOMTR-MCNC: 150 MG/DL (ref 70–130)
GLUCOSE BLDC GLUCOMTR-MCNC: 150 MG/DL (ref 70–130)
GLUCOSE SERPL-MCNC: 150 MG/DL (ref 65–99)
HCO3 BLDA-SCNC: 22.6 MMOL/L (ref 20–26)
HCT VFR BLD CALC: 32 % (ref 38–51)
HGB BLDA-MCNC: 10.4 G/DL (ref 13.5–17.5)
INHALED O2 CONCENTRATION: 60 %
IPAP: 0
MAGNESIUM SERPL-MCNC: 3.1 MG/DL (ref 1.6–2.4)
METHGB BLD QL: 0.3 % (ref 0–1.5)
MODALITY: ABNORMAL
OXYHGB MFR BLDV: 94.9 % (ref 94–99)
PAW @ PEAK INSP FLOW SETTING VENT: 0 CMH2O
PCO2 BLDA: 34.2 MM HG (ref 35–45)
PCO2 TEMP ADJ BLD: 34.2 MM HG (ref 35–48)
PEEP RESPIRATORY: 11 CM[H2O]
PH BLDA: 7.43 PH UNITS (ref 7.35–7.45)
PH, TEMP CORRECTED: 7.43 PH UNITS
PHOSPHATE SERPL-MCNC: 3.2 MG/DL (ref 2.5–4.5)
PO2 BLDA: 82.9 MM HG (ref 83–108)
PO2 TEMP ADJ BLD: 82.9 MM HG (ref 83–108)
POTASSIUM SERPL-SCNC: 4 MMOL/L (ref 3.5–5.2)
PREALB SERPL-MCNC: 6.1 MG/DL (ref 20–40)
PROCALCITONIN SERPL-MCNC: 93.99 NG/ML (ref 0–0.25)
PROT SERPL-MCNC: 6 G/DL (ref 6–8.5)
SODIUM SERPL-SCNC: 151 MMOL/L (ref 136–145)
TOTAL RATE: 20 BREATHS/MINUTE
VENTILATOR MODE: ABNORMAL
VT ON VENT VENT: 0.47 ML

## 2025-02-16 PROCEDURE — 25010000002 BUMETANIDE PER 0.5 MG: Performed by: INTERNAL MEDICINE

## 2025-02-16 PROCEDURE — 94799 UNLISTED PULMONARY SVC/PX: CPT

## 2025-02-16 PROCEDURE — 94003 VENT MGMT INPAT SUBQ DAY: CPT

## 2025-02-16 PROCEDURE — 83050 HGB METHEMOGLOBIN QUAN: CPT

## 2025-02-16 PROCEDURE — P9047 ALBUMIN (HUMAN), 25%, 50ML: HCPCS | Performed by: INTERNAL MEDICINE

## 2025-02-16 PROCEDURE — 25010000002 ALBUMIN HUMAN 25% PER 50 ML: Performed by: INTERNAL MEDICINE

## 2025-02-16 PROCEDURE — 82140 ASSAY OF AMMONIA: CPT | Performed by: INTERNAL MEDICINE

## 2025-02-16 PROCEDURE — 99291 CRITICAL CARE FIRST HOUR: CPT | Performed by: INTERNAL MEDICINE

## 2025-02-16 PROCEDURE — 25010000002 HYDROCORTISONE SOD SUC (PF) 100 MG RECONSTITUTED SOLUTION: Performed by: INTERNAL MEDICINE

## 2025-02-16 PROCEDURE — 94761 N-INVAS EAR/PLS OXIMETRY MLT: CPT

## 2025-02-16 PROCEDURE — 82375 ASSAY CARBOXYHB QUANT: CPT

## 2025-02-16 PROCEDURE — 84145 PROCALCITONIN (PCT): CPT

## 2025-02-16 PROCEDURE — 25010000002 CEFEPIME PER 500 MG: Performed by: INTERNAL MEDICINE

## 2025-02-16 PROCEDURE — 71045 X-RAY EXAM CHEST 1 VIEW: CPT

## 2025-02-16 PROCEDURE — 36600 WITHDRAWAL OF ARTERIAL BLOOD: CPT

## 2025-02-16 PROCEDURE — 82805 BLOOD GASES W/O2 SATURATION: CPT

## 2025-02-16 PROCEDURE — 84134 ASSAY OF PREALBUMIN: CPT

## 2025-02-16 PROCEDURE — 25010000002 FUROSEMIDE PER 20 MG: Performed by: INTERNAL MEDICINE

## 2025-02-16 PROCEDURE — 86140 C-REACTIVE PROTEIN: CPT

## 2025-02-16 PROCEDURE — 84100 ASSAY OF PHOSPHORUS: CPT | Performed by: INTERNAL MEDICINE

## 2025-02-16 PROCEDURE — 82948 REAGENT STRIP/BLOOD GLUCOSE: CPT

## 2025-02-16 PROCEDURE — 25010000002 FENTANYL 10 MCG/1 ML NS: Performed by: NURSE PRACTITIONER

## 2025-02-16 PROCEDURE — 25010000002 MIDAZOLAM 1 MG/ML 100ML NS 100 MG/100ML SOLUTION: Performed by: INTERNAL MEDICINE

## 2025-02-16 PROCEDURE — 25010000002 CEFEPIME PER 500 MG: Performed by: NURSE PRACTITIONER

## 2025-02-16 PROCEDURE — 83735 ASSAY OF MAGNESIUM: CPT | Performed by: INTERNAL MEDICINE

## 2025-02-16 PROCEDURE — 80048 BASIC METABOLIC PNL TOTAL CA: CPT | Performed by: INTERNAL MEDICINE

## 2025-02-16 PROCEDURE — 80076 HEPATIC FUNCTION PANEL: CPT | Performed by: INTERNAL MEDICINE

## 2025-02-16 RX ORDER — FUROSEMIDE 10 MG/ML
40 INJECTION INTRAMUSCULAR; INTRAVENOUS ONCE
Status: COMPLETED | OUTPATIENT
Start: 2025-02-16 | End: 2025-02-16

## 2025-02-16 RX ORDER — ALBUMIN (HUMAN) 12.5 G/50ML
50 SOLUTION INTRAVENOUS ONCE
Status: COMPLETED | OUTPATIENT
Start: 2025-02-16 | End: 2025-02-16

## 2025-02-16 RX ORDER — NOREPINEPHRINE BITARTRATE 0.03 MG/ML
.02-.3 INJECTION, SOLUTION INTRAVENOUS
Status: DISCONTINUED | OUTPATIENT
Start: 2025-02-16 | End: 2025-02-23

## 2025-02-16 RX ORDER — BUMETANIDE 0.25 MG/ML
2 INJECTION, SOLUTION INTRAMUSCULAR; INTRAVENOUS ONCE
Status: COMPLETED | OUTPATIENT
Start: 2025-02-16 | End: 2025-02-16

## 2025-02-16 RX ADMIN — HYDROCORTISONE SODIUM SUCCINATE 50 MG: 100 INJECTION, POWDER, FOR SOLUTION INTRAMUSCULAR; INTRAVENOUS at 23:53

## 2025-02-16 RX ADMIN — CHLORHEXIDINE GLUCONATE 0.12% ORAL RINSE 15 ML: 1.2 LIQUID ORAL at 08:45

## 2025-02-16 RX ADMIN — BUMETANIDE 2 MG: 0.25 INJECTION INTRAMUSCULAR; INTRAVENOUS at 18:52

## 2025-02-16 RX ADMIN — NALOXEGOL OXALATE 25 MG: 25 TABLET, FILM COATED ORAL at 06:33

## 2025-02-16 RX ADMIN — CEFEPIME 2000 MG: 2 INJECTION, POWDER, FOR SOLUTION INTRAVENOUS at 23:53

## 2025-02-16 RX ADMIN — CEFEPIME 2000 MG: 2 INJECTION, POWDER, FOR SOLUTION INTRAVENOUS at 15:40

## 2025-02-16 RX ADMIN — POLYVINYL ALCOHOL 2 DROP: 14 SOLUTION/ DROPS OPHTHALMIC at 08:46

## 2025-02-16 RX ADMIN — ALBUMIN (HUMAN) 50 G: 0.25 INJECTION, SOLUTION INTRAVENOUS at 18:06

## 2025-02-16 RX ADMIN — Medication 275 MCG/HR: at 01:19

## 2025-02-16 RX ADMIN — GABAPENTIN 300 MG: 300 CAPSULE ORAL at 06:33

## 2025-02-16 RX ADMIN — Medication 10 ML: at 08:46

## 2025-02-16 RX ADMIN — PANTOPRAZOLE SODIUM 40 MG: 40 INJECTION, POWDER, FOR SOLUTION INTRAVENOUS at 06:33

## 2025-02-16 RX ADMIN — FUROSEMIDE 40 MG: 10 INJECTION, SOLUTION INTRAMUSCULAR; INTRAVENOUS at 10:17

## 2025-02-16 RX ADMIN — SENNOSIDES AND DOCUSATE SODIUM 2 TABLET: 50; 8.6 TABLET ORAL at 08:45

## 2025-02-16 RX ADMIN — CEFEPIME 2000 MG: 2 INJECTION, POWDER, FOR SOLUTION INTRAVENOUS at 08:45

## 2025-02-16 RX ADMIN — HYDROCORTISONE SODIUM SUCCINATE 50 MG: 100 INJECTION, POWDER, FOR SOLUTION INTRAMUSCULAR; INTRAVENOUS at 11:47

## 2025-02-16 RX ADMIN — ESCITALOPRAM OXALATE 10 MG: 10 TABLET ORAL at 20:06

## 2025-02-16 RX ADMIN — GABAPENTIN 300 MG: 300 CAPSULE ORAL at 13:00

## 2025-02-16 RX ADMIN — HYDROCORTISONE SODIUM SUCCINATE 50 MG: 100 INJECTION, POWDER, FOR SOLUTION INTRAMUSCULAR; INTRAVENOUS at 06:33

## 2025-02-16 RX ADMIN — GABAPENTIN 300 MG: 300 CAPSULE ORAL at 22:24

## 2025-02-16 RX ADMIN — CHLORHEXIDINE GLUCONATE 0.12% ORAL RINSE 15 ML: 1.2 LIQUID ORAL at 20:06

## 2025-02-16 RX ADMIN — Medication 10 ML: at 20:07

## 2025-02-16 RX ADMIN — MIDAZOLAM HYDROCHLORIDE 3 MG/HR: 1 INJECTION, SOLUTION INTRAVENOUS at 12:59

## 2025-02-16 RX ADMIN — HYDROCORTISONE SODIUM SUCCINATE 50 MG: 100 INJECTION, POWDER, FOR SOLUTION INTRAMUSCULAR; INTRAVENOUS at 18:09

## 2025-02-16 RX ADMIN — HYDROCORTISONE SODIUM SUCCINATE 50 MG: 100 INJECTION, POWDER, FOR SOLUTION INTRAMUSCULAR; INTRAVENOUS at 00:11

## 2025-02-16 RX ADMIN — SENNOSIDES AND DOCUSATE SODIUM 2 TABLET: 50; 8.6 TABLET ORAL at 20:06

## 2025-02-16 RX ADMIN — Medication 200 MCG/HR: at 12:59

## 2025-02-16 NOTE — PLAN OF CARE
Problem: Adult Inpatient Plan of Care  Goal: Plan of Care Review  Outcome: Not Progressing  Flowsheets (Taken 2/16/2025 1631)  Outcome Evaluation: Patient remains on mechanical ventilation, PEEP 11, FiO2 100% to maintain sats >92%. Provider notified, see orders. Vital signs otherwise stable. Remains off pressors. Afebrile. Not following any commands or doing purposeful movements. Fentanyl and midazolam infusing for sedation/comfort. Increased versed for ventilator synchrony. UOP adequate. No BM.

## 2025-02-16 NOTE — PLAN OF CARE
Goal Outcome Evaluation:  Pt remains SB and nonsymptomatic, lowest rate recorded to be 36, has been in 50s for most of shift. APRN notified, no new orders.   Intubated/sedated. FiO2 55%, PEEP 11, rate 20. Fentanyl at 200 mcg/hr and versed at 2 mg/hr  UOP adequate  Afebrile  Pt responds to voice and withdraws from pain, does not follow commands.   Levophed remains off.   Tubefeed infusing at 40 mL/hr, increase to goal (60mL) at noon today. Pt seems to be tolerating.   BUE restraints remain for patient safety.

## 2025-02-16 NOTE — PROGRESS NOTES
INTENSIVIST   PROGRESS NOTE     Hospital:  LOS: 5 days     Mr. Val Nassar Jr., 64 y.o. male is followed for a Chief Complaint of: Shock, Respiratory Failure      Subjective   S     Interval History:  Off Vasopressors. Oxygenation worse this AM and FiO2 has been increased. Bowel movement x 1 yesterday with the addition of Movantik. Tolerating tube feeds.        The patient's relevant past medical, surgical and social history were reviewed and updated in Epic as appropriate.      ROS: Unable to obtain secondary to sedation and mechanical ventilation.     Objective   O     Vitals:  Temp  Min: 97.2 °F (36.2 °C)  Max: 99.1 °F (37.3 °C)  BP  Min: 82/53  Max: 133/113  Pulse  Min: 46  Max: 80  Resp  Min: 20  Max: 20  SpO2  Min: 89 %  Max: 99 % No data recorded    Intake/Ouptut 24 hrs (7:00AM - 6:59 AM)  Intake & Output (last 3 days)         02/12 0701 02/13 0700 02/13 0701 02/14 0700 02/14 0701  02/15 0700 02/15 0701 02/16 0700    I.V. (mL/kg) 3809.5 (45.9) 1740.7 (19.9) 1179 (13.4)     NG/GT 80 150 170     IV Piggyback 552.5 474.4 200     Total Intake(mL/kg) 4442 (53.5) 2365.1 (27.1) 1549 (17.5)     Urine (mL/kg/hr) 1120 (0.6) 1245 (0.6) 1540 (0.7)     Stool        Total Output 1120 1245 1540     Net +3322 +1120.1 +9                     Medications (drips):  fentanyl 10 mcg/mL, Last Rate: 200 mcg/hr (02/16/25 0137)  midazolam, Last Rate: 2 mg/hr (02/16/25 0053)  norepinephrine, Last Rate: Stopped (02/15/25 1222)  vasopressin, Last Rate: Stopped (02/13/25 1450)        Mechanical Ventilator Settings:          Resp Rate (Set): 20     FiO2 (%): 55 %  PEEP/CPAP (cm H2O): 11 cm H20    Minute Ventilation (L/min) (Obs): 9.07 L/min  Resp Rate (Observed) Vent: 20  I:E Ratio (Set): 1:2.09  I:E Ratio (Obs): 1:2.1    PIP Observed (cm H2O): 30 cm H2O  Plateau Pressure (cm H2O): 26 cm H2O    Physical Examination  Telemetry:  Sinus bradycardia.    Constitutional:  No acute distress.  ETT in place on mechanical  ventilation.    Eyes: No scleral icterus.   PERRL, EOM intact.    Neck:  Supple, FROM   Cardiovascular: Normal rate, regular and rhythm. Normal heart sounds.  No murmurs, gallop or rub.   Respiratory: No respiratory distress. Normal respiratory effort.  Diminished. More at the left base. No wheezing.    Abdominal:  Soft. No masses. Nontender. No distension. No HSM.   Extremities: No digital cyanosis. No clubbing.  No peripheral edema.   Skin: No rashes, lesions or ulcers   Neurological:   Sedated. Opens eyes to stimulation.              Results from last 7 days   Lab Units 02/15/25  0347 02/14/25  0502 02/13/25  0317   WBC 10*3/mm3 33.03* 39.05* 34.83*   HEMOGLOBIN g/dL 10.5* 10.8* 11.2*   MCV fL 96.2 93.0 93.4   PLATELETS 10*3/mm3 53* 69* 74*     Results from last 7 days   Lab Units 02/16/25  0521 02/15/25  0347 02/14/25  0502   SODIUM mmol/L 151* 145 144   POTASSIUM mmol/L 4.0 3.6 3.4*   CO2 mmol/L 23.0 18.0* 22.0   CREATININE mg/dL 1.52* 2.00* 2.59*   GLUCOSE mg/dL 150* 111* 96   MAGNESIUM mg/dL 3.1* 3.0* 2.9*   PHOSPHORUS mg/dL 3.2 3.9 4.7*     Estimated Creatinine Clearance: 53.3 mL/min (A) (by C-G formula based on SCr of 1.52 mg/dL (H)).  Results from last 7 days   Lab Units 02/16/25  0521 02/14/25  0502 02/13/25  0317   ALK PHOS U/L 142* 93 62   BILIRUBIN mg/dL 0.9 1.2 0.8   BILIRUBIN DIRECT mg/dL 0.5* 0.8* 0.4*   ALT (SGPT) U/L 115* 176* 226*   AST (SGOT) U/L 98* 185* 294*       Results from last 7 days   Lab Units 02/16/25  0311 02/15/25  0359 02/14/25  0309 02/13/25  0421 02/12/25  1358   PH, ARTERIAL pH units 7.428 7.489* 7.439 7.450 7.357   PCO2, ARTERIAL mm Hg 34.2* 28.7* 32.8* 34.6* 35.9   PO2 ART mm Hg 82.9* 90.6 87.3 67.9* 78.0*   FIO2 % 60 40 40 50 80       Images:  Imaging Results (Last 24 Hours)       Procedure Component Value Units Date/Time    XR Chest 1 View [[id number]] Collected: 02/16/25 0913     Updated: 02/16/25 0919    Narrative:      XR CHEST 1 VW    Date of Exam: 2/16/2025 4:15 AM  EST    Indication: Respiratory failure, intubated    Comparison: 2/15/2025    Findings:  ET tube NG tube and left IJ catheter remain in satisfactory position. Right-sided Port-A-Cath is again noted. Heart and vasculature appear upper limits of normal size. Hazy opacity of the right lung base is mildly improved. There is persistent dense   opacity of the left lung base also mildly improved. CT scan from 2/11/2025 showed dense bilateral lower lobe airspace disease, rather than effusion. Lung apices appear practically clear. No pneumothorax is seen.      Impression:      Impression:  Mildly improved bibasilar pulmonary disease compared to 2/15/2025 exam. No pneumothorax or other new chest pathology is seen.      Electronically Signed: Ibrahima Jett MD    2/16/2025 9:16 AM EST    Workstation ID: [id number]               Results: Reviewed.  I reviewed the patient's new laboratory and imaging results.  I independently reviewed the patient's new images.    Medications: Reviewed.    Assessment & Plan   A / P     Mr. Nassar is a 63yo M nursing home resident with a history of tobacco use, alcoholic cirrhosis, metastatic squamous cell of the esophagus and HLD who presented with weakness, dizziness and AMS. In the ED, he was noted to be hypotensive. He was given Albumin and started on Levophed and Vasopressin.    CT head negative for an acute abnormality. CT chest/abdomen/pelvis performed and was remarkable to pyelonephritis, sigmoid diverticulosis, and possible cystitis.     Labs were significant for GALILEO and UA was consistent with a UTI.     He was admitted to the ICU.     He required intubation on the morning of 2/12/25.     Blood and urine culture with Pseudomonas and he is on Cefepime.     This AM, he remains on mechanical ventilation with an FiO2 of 55% and PEEP of 11. Levophed has been weaned off.       Nutrition:   NPO Diet NPO Type: Tube Feeding  Advance Directives:   Code Status and Medical Interventions: CPR (Attempt  to Resuscitate); Full Support   Ordered at: 02/12/25 1029     Level Of Support Discussed With:    Patient     Code Status (Patient has no pulse and is not breathing):    CPR (Attempt to Resuscitate)     Medical Interventions (Patient has pulse or is breathing):    Full Support       Active Hospital Problems    Diagnosis     **Septic shock     Pneumonia of both lower lobes due to Pseudomonas species     Bacteremia due to Pseudomonas     GALILEO (acute kidney injury)     Metabolic acidosis     Cirrhosis of liver     Squamous cell carcinoma of esophagus     Hyperlipidemia, unspecified     Tobacco use        Assessment / Plan:    Continue mechanical ventilation. Not appropriate for ventilator weaning. Titrate FiO2 and PEEP if tolerated.   Continue Versed and Fentanyl. Propofol stopped on 2/15 secondary to bradycardia.   Continue Cefepime.   40mg IV Lasix x 1.   Restart Levophed if needed to facilitate diuresis. Consider adding Midodrine if Levophed needs to be restarted.   Monitor renal function. Continue oswald catheter.   Continue bowel regimen.    Continue tube feeds as tolerated.   AM labs and CXR    Remains critically ill secondary to management of mechanical ventilation with worsening oxygenation and titration of vasopressors.     High level of risk due to:  severe exacerbation of chronic illness and illness with threat to life or bodily function.    Plan of care and goals reviewed during interdisciplinary rounds.  I discussed the patient's findings and my recommendations with nursing staff    Critical Care Time: was greater than 31 minutes.(This excludes time spent performing separately reportable procedures and services). including high complexity decision making to assess, manipulate, and support vital organ system failure in this individual who has impairment of one or more vital organ systems such that there is a high probability of imminent or life threatening deterioration in the patient’s  condition.      Ludmila Case, DO    Intensive Care Medicine and Pulmonary Medicine

## 2025-02-16 NOTE — PLAN OF CARE
Goal Outcome Evaluation:         Patient requiring an increase in O2 today, unable to wean.

## 2025-02-17 ENCOUNTER — APPOINTMENT (OUTPATIENT)
Dept: GENERAL RADIOLOGY | Facility: HOSPITAL | Age: 65
DRG: 870 | End: 2025-02-17
Payer: MEDICARE

## 2025-02-17 LAB
ALBUMIN SERPL-MCNC: 3.2 G/DL (ref 3.5–5.2)
ALBUMIN/GLOB SERPL: 1.2 G/DL
ALP SERPL-CCNC: 151 U/L (ref 39–117)
ALT SERPL W P-5'-P-CCNC: 95 U/L (ref 1–41)
ANION GAP SERPL CALCULATED.3IONS-SCNC: 12 MMOL/L (ref 5–15)
ARTERIAL PATENCY WRIST A: ABNORMAL
ARTERIAL PATENCY WRIST A: ABNORMAL
AST SERPL-CCNC: 90 U/L (ref 1–40)
ATMOSPHERIC PRESS: ABNORMAL MM[HG]
ATMOSPHERIC PRESS: ABNORMAL MM[HG]
BASE EXCESS BLDA CALC-SCNC: 1.2 MMOL/L (ref 0–2)
BASE EXCESS BLDA CALC-SCNC: 4.6 MMOL/L (ref 0–2)
BASOPHILS # BLD AUTO: 0.12 10*3/MM3 (ref 0–0.2)
BASOPHILS NFR BLD AUTO: 0.4 % (ref 0–1.5)
BDY SITE: ABNORMAL
BDY SITE: ABNORMAL
BILIRUB SERPL-MCNC: 0.9 MG/DL (ref 0–1.2)
BODY TEMPERATURE: 37
BODY TEMPERATURE: 37
BUN SERPL-MCNC: 103 MG/DL (ref 8–23)
BUN/CREAT SERPL: 78.6 (ref 7–25)
CALCIUM SPEC-SCNC: 8.9 MG/DL (ref 8.6–10.5)
CHLORIDE SERPL-SCNC: 117 MMOL/L (ref 98–107)
CO2 BLDA-SCNC: 26.2 MMOL/L (ref 22–33)
CO2 BLDA-SCNC: 29.9 MMOL/L (ref 22–33)
CO2 SERPL-SCNC: 25 MMOL/L (ref 22–29)
COHGB MFR BLD: 0.9 % (ref 0–2)
COHGB MFR BLD: 1.1 % (ref 0–2)
CREAT SERPL-MCNC: 1.31 MG/DL (ref 0.76–1.27)
CRP SERPL-MCNC: 7.62 MG/DL (ref 0–0.5)
DEPRECATED RDW RBC AUTO: 55.7 FL (ref 37–54)
EGFRCR SERPLBLD CKD-EPI 2021: 60.4 ML/MIN/1.73
EOSINOPHIL # BLD AUTO: 0.01 10*3/MM3 (ref 0–0.4)
EOSINOPHIL NFR BLD AUTO: 0 % (ref 0.3–6.2)
EPAP: 0
EPAP: 0
ERYTHROCYTE [DISTWIDTH] IN BLOOD BY AUTOMATED COUNT: 16.2 % (ref 12.3–15.4)
GLOBULIN UR ELPH-MCNC: 2.7 GM/DL
GLUCOSE BLDC GLUCOMTR-MCNC: 146 MG/DL (ref 70–130)
GLUCOSE BLDC GLUCOMTR-MCNC: 171 MG/DL (ref 70–130)
GLUCOSE BLDC GLUCOMTR-MCNC: 172 MG/DL (ref 70–130)
GLUCOSE SERPL-MCNC: 157 MG/DL (ref 65–99)
HCO3 BLDA-SCNC: 25.1 MMOL/L (ref 20–26)
HCO3 BLDA-SCNC: 28.7 MMOL/L (ref 20–26)
HCT VFR BLD AUTO: 31.5 % (ref 37.5–51)
HCT VFR BLD CALC: 31 % (ref 38–51)
HCT VFR BLD CALC: 32.2 % (ref 38–51)
HGB BLD-MCNC: 10.2 G/DL (ref 13–17.7)
HGB BLDA-MCNC: 10.1 G/DL (ref 13.5–17.5)
HGB BLDA-MCNC: 10.5 G/DL (ref 13.5–17.5)
IMM GRANULOCYTES # BLD AUTO: 1.13 10*3/MM3 (ref 0–0.05)
IMM GRANULOCYTES NFR BLD AUTO: 4.2 % (ref 0–0.5)
INHALED O2 CONCENTRATION: 100 %
INHALED O2 CONCENTRATION: 100 %
INR PPP: 1.13 (ref 0.89–1.12)
IPAP: 0
IPAP: 0
LYMPHOCYTES # BLD AUTO: 1.33 10*3/MM3 (ref 0.7–3.1)
LYMPHOCYTES NFR BLD AUTO: 4.9 % (ref 19.6–45.3)
MAGNESIUM SERPL-MCNC: 2 MG/DL (ref 1.6–2.4)
MAGNESIUM SERPL-MCNC: 2.2 MG/DL (ref 1.6–2.4)
MCH RBC QN AUTO: 30.4 PG (ref 26.6–33)
MCHC RBC AUTO-ENTMCNC: 32.4 G/DL (ref 31.5–35.7)
MCV RBC AUTO: 93.8 FL (ref 79–97)
METHGB BLD QL: 0.3 % (ref 0–1.5)
METHGB BLD QL: 0.4 % (ref 0–1.5)
MODALITY: ABNORMAL
MODALITY: ABNORMAL
MONOCYTES # BLD AUTO: 2.81 10*3/MM3 (ref 0.1–0.9)
MONOCYTES NFR BLD AUTO: 10.4 % (ref 5–12)
NEUTROPHILS NFR BLD AUTO: 21.73 10*3/MM3 (ref 1.7–7)
NEUTROPHILS NFR BLD AUTO: 80.1 % (ref 42.7–76)
NRBC BLD AUTO-RTO: 1.5 /100 WBC (ref 0–0.2)
OXYHGB MFR BLDV: 85.2 % (ref 94–99)
OXYHGB MFR BLDV: 95.3 % (ref 94–99)
PAW @ PEAK INSP FLOW SETTING VENT: 0 CMH2O
PAW @ PEAK INSP FLOW SETTING VENT: 0 CMH2O
PCO2 BLDA: 36.1 MM HG (ref 35–45)
PCO2 BLDA: 39.8 MM HG (ref 35–45)
PCO2 TEMP ADJ BLD: 36.1 MM HG (ref 35–48)
PCO2 TEMP ADJ BLD: 39.8 MM HG (ref 35–48)
PEEP RESPIRATORY: 12 CM[H2O]
PH BLDA: 7.45 PH UNITS (ref 7.35–7.45)
PH BLDA: 7.46 PH UNITS (ref 7.35–7.45)
PH, TEMP CORRECTED: 7.45 PH UNITS
PH, TEMP CORRECTED: 7.46 PH UNITS
PHOSPHATE SERPL-MCNC: 2.9 MG/DL (ref 2.5–4.5)
PLATELET # BLD AUTO: 74 10*3/MM3 (ref 140–450)
PMV BLD AUTO: 12.7 FL (ref 6–12)
PO2 BLDA: 51.8 MM HG (ref 83–108)
PO2 BLDA: 82.9 MM HG (ref 83–108)
PO2 TEMP ADJ BLD: 51.8 MM HG (ref 83–108)
PO2 TEMP ADJ BLD: 82.9 MM HG (ref 83–108)
POTASSIUM SERPL-SCNC: 3.2 MMOL/L (ref 3.5–5.2)
POTASSIUM SERPL-SCNC: 3.2 MMOL/L (ref 3.5–5.2)
PREALB SERPL-MCNC: 10.3 MG/DL (ref 20–40)
PROT SERPL-MCNC: 5.9 G/DL (ref 6–8.5)
PROTHROMBIN TIME: 14.6 SECONDS (ref 12.2–14.5)
RBC # BLD AUTO: 3.36 10*6/MM3 (ref 4.14–5.8)
RBC MORPH BLD: NORMAL
SMALL PLATELETS BLD QL SMEAR: NORMAL
SODIUM SERPL-SCNC: 154 MMOL/L (ref 136–145)
TOTAL RATE: 0 BREATHS/MINUTE
TOTAL RATE: 25 BREATHS/MINUTE
TRIGL SERPL-MCNC: 107 MG/DL (ref 0–150)
VENTILATOR MODE: ABNORMAL
VT ON VENT VENT: 0.56 ML
WBC MORPH BLD: NORMAL
WBC NRBC COR # BLD AUTO: 27.13 10*3/MM3 (ref 3.4–10.8)

## 2025-02-17 PROCEDURE — 80053 COMPREHEN METABOLIC PANEL: CPT | Performed by: INTERNAL MEDICINE

## 2025-02-17 PROCEDURE — 25010000002 BUMETANIDE PER 0.5 MG: Performed by: INTERNAL MEDICINE

## 2025-02-17 PROCEDURE — 93010 ELECTROCARDIOGRAM REPORT: CPT | Performed by: INTERNAL MEDICINE

## 2025-02-17 PROCEDURE — 85007 BL SMEAR W/DIFF WBC COUNT: CPT | Performed by: INTERNAL MEDICINE

## 2025-02-17 PROCEDURE — P9047 ALBUMIN (HUMAN), 25%, 50ML: HCPCS

## 2025-02-17 PROCEDURE — 85025 COMPLETE CBC W/AUTO DIFF WBC: CPT | Performed by: INTERNAL MEDICINE

## 2025-02-17 PROCEDURE — 87070 CULTURE OTHR SPECIMN AEROBIC: CPT | Performed by: INTERNAL MEDICINE

## 2025-02-17 PROCEDURE — 25010000002 FENTANYL 10 MCG/1 ML NS: Performed by: NURSE PRACTITIONER

## 2025-02-17 PROCEDURE — 25010000002 MIDAZOLAM 1 MG/ML 100ML NS 100 MG/100ML SOLUTION: Performed by: INTERNAL MEDICINE

## 2025-02-17 PROCEDURE — 84134 ASSAY OF PREALBUMIN: CPT | Performed by: INTERNAL MEDICINE

## 2025-02-17 PROCEDURE — 94003 VENT MGMT INPAT SUBQ DAY: CPT

## 2025-02-17 PROCEDURE — 85610 PROTHROMBIN TIME: CPT

## 2025-02-17 PROCEDURE — 84478 ASSAY OF TRIGLYCERIDES: CPT | Performed by: INTERNAL MEDICINE

## 2025-02-17 PROCEDURE — 99291 CRITICAL CARE FIRST HOUR: CPT | Performed by: INTERNAL MEDICINE

## 2025-02-17 PROCEDURE — 87206 SMEAR FLUORESCENT/ACID STAI: CPT | Performed by: INTERNAL MEDICINE

## 2025-02-17 PROCEDURE — 84100 ASSAY OF PHOSPHORUS: CPT | Performed by: INTERNAL MEDICINE

## 2025-02-17 PROCEDURE — 82375 ASSAY CARBOXYHB QUANT: CPT

## 2025-02-17 PROCEDURE — 87116 MYCOBACTERIA CULTURE: CPT | Performed by: INTERNAL MEDICINE

## 2025-02-17 PROCEDURE — 94799 UNLISTED PULMONARY SVC/PX: CPT

## 2025-02-17 PROCEDURE — 87177 OVA AND PARASITES SMEARS: CPT | Performed by: INTERNAL MEDICINE

## 2025-02-17 PROCEDURE — 25010000002 POTASSIUM CHLORIDE PER 2 MEQ

## 2025-02-17 PROCEDURE — 71045 X-RAY EXAM CHEST 1 VIEW: CPT

## 2025-02-17 PROCEDURE — 36600 WITHDRAWAL OF ARTERIAL BLOOD: CPT

## 2025-02-17 PROCEDURE — 31622 DX BRONCHOSCOPE/WASH: CPT | Performed by: INTERNAL MEDICINE

## 2025-02-17 PROCEDURE — 83050 HGB METHEMOGLOBIN QUAN: CPT

## 2025-02-17 PROCEDURE — 83735 ASSAY OF MAGNESIUM: CPT | Performed by: INTERNAL MEDICINE

## 2025-02-17 PROCEDURE — 83735 ASSAY OF MAGNESIUM: CPT | Performed by: NURSE PRACTITIONER

## 2025-02-17 PROCEDURE — 82948 REAGENT STRIP/BLOOD GLUCOSE: CPT

## 2025-02-17 PROCEDURE — 82805 BLOOD GASES W/O2 SATURATION: CPT

## 2025-02-17 PROCEDURE — 84132 ASSAY OF SERUM POTASSIUM: CPT | Performed by: NURSE PRACTITIONER

## 2025-02-17 PROCEDURE — 25010000002 ALBUMIN HUMAN 25% PER 50 ML

## 2025-02-17 PROCEDURE — 25010000002 CHLOROTHIAZIDE PER 500 MG: Performed by: INTERNAL MEDICINE

## 2025-02-17 PROCEDURE — 25010000002 HYDROCORTISONE SOD SUC (PF) 100 MG RECONSTITUTED SOLUTION: Performed by: INTERNAL MEDICINE

## 2025-02-17 PROCEDURE — 87102 FUNGUS ISOLATION CULTURE: CPT | Performed by: INTERNAL MEDICINE

## 2025-02-17 PROCEDURE — 25010000002 CEFEPIME PER 500 MG: Performed by: INTERNAL MEDICINE

## 2025-02-17 PROCEDURE — 86140 C-REACTIVE PROTEIN: CPT | Performed by: INTERNAL MEDICINE

## 2025-02-17 PROCEDURE — 87205 SMEAR GRAM STAIN: CPT | Performed by: INTERNAL MEDICINE

## 2025-02-17 PROCEDURE — 87209 SMEAR COMPLEX STAIN: CPT | Performed by: INTERNAL MEDICINE

## 2025-02-17 PROCEDURE — 0BJ08ZZ INSPECTION OF TRACHEOBRONCHIAL TREE, VIA NATURAL OR ARTIFICIAL OPENING ENDOSCOPIC: ICD-10-PCS | Performed by: INTERNAL MEDICINE

## 2025-02-17 PROCEDURE — 93005 ELECTROCARDIOGRAM TRACING: CPT | Performed by: INTERNAL MEDICINE

## 2025-02-17 RX ORDER — BUMETANIDE 0.25 MG/ML
2 INJECTION, SOLUTION INTRAMUSCULAR; INTRAVENOUS EVERY 4 HOURS
Status: COMPLETED | OUTPATIENT
Start: 2025-02-17 | End: 2025-02-17

## 2025-02-17 RX ORDER — PANTOPRAZOLE SODIUM 40 MG/10ML
40 INJECTION, POWDER, LYOPHILIZED, FOR SOLUTION INTRAVENOUS EVERY 12 HOURS SCHEDULED
Status: DISCONTINUED | OUTPATIENT
Start: 2025-02-18 | End: 2025-03-12 | Stop reason: HOSPADM

## 2025-02-17 RX ORDER — POTASSIUM CHLORIDE 29.8 MG/ML
20 INJECTION INTRAVENOUS
Status: COMPLETED | OUTPATIENT
Start: 2025-02-17 | End: 2025-02-17

## 2025-02-17 RX ORDER — GABAPENTIN 300 MG/1
300 CAPSULE ORAL EVERY 8 HOURS SCHEDULED
Status: DISCONTINUED | OUTPATIENT
Start: 2025-02-18 | End: 2025-03-12 | Stop reason: HOSPADM

## 2025-02-17 RX ORDER — LACTULOSE 10 G/15ML
20 SOLUTION ORAL 3 TIMES DAILY PRN
Status: DISCONTINUED | OUTPATIENT
Start: 2025-02-17 | End: 2025-02-21

## 2025-02-17 RX ORDER — DOCUSATE SODIUM 50 MG/5 ML
100 LIQUID (ML) ORAL 2 TIMES DAILY
Status: DISCONTINUED | OUTPATIENT
Start: 2025-02-17 | End: 2025-02-21

## 2025-02-17 RX ORDER — ALBUMIN (HUMAN) 12.5 G/50ML
50 SOLUTION INTRAVENOUS ONCE
Status: DISCONTINUED | OUTPATIENT
Start: 2025-02-17 | End: 2025-02-17

## 2025-02-17 RX ORDER — ALBUMIN (HUMAN) 12.5 G/50ML
50 SOLUTION INTRAVENOUS ONCE
Status: COMPLETED | OUTPATIENT
Start: 2025-02-17 | End: 2025-02-17

## 2025-02-17 RX ORDER — METOLAZONE 5 MG/1
10 TABLET ORAL ONCE
Status: COMPLETED | OUTPATIENT
Start: 2025-02-17 | End: 2025-02-17

## 2025-02-17 RX ORDER — POTASSIUM CHLORIDE 29.8 MG/ML
20 INJECTION INTRAVENOUS
Status: COMPLETED | OUTPATIENT
Start: 2025-02-17 | End: 2025-02-18

## 2025-02-17 RX ORDER — ESCITALOPRAM OXALATE 10 MG/1
10 TABLET ORAL NIGHTLY
Status: DISCONTINUED | OUTPATIENT
Start: 2025-02-18 | End: 2025-03-12 | Stop reason: HOSPADM

## 2025-02-17 RX ADMIN — Medication 200 MCG/HR: at 12:51

## 2025-02-17 RX ADMIN — POTASSIUM CHLORIDE 20 MEQ: 400 INJECTION, SOLUTION INTRAVENOUS at 09:41

## 2025-02-17 RX ADMIN — DOCUSATE SODIUM 100 MG: 50 LIQUID ORAL at 23:51

## 2025-02-17 RX ADMIN — Medication 200 MCG/HR: at 01:07

## 2025-02-17 RX ADMIN — POTASSIUM CHLORIDE 20 MEQ: 400 INJECTION, SOLUTION INTRAVENOUS at 21:17

## 2025-02-17 RX ADMIN — CEFEPIME 2000 MG: 2 INJECTION, POWDER, FOR SOLUTION INTRAVENOUS at 07:48

## 2025-02-17 RX ADMIN — SENNOSIDES AND DOCUSATE SODIUM 2 TABLET: 50; 8.6 TABLET ORAL at 21:25

## 2025-02-17 RX ADMIN — Medication 10 ML: at 16:34

## 2025-02-17 RX ADMIN — SENNOSIDES AND DOCUSATE SODIUM 2 TABLET: 50; 8.6 TABLET ORAL at 08:05

## 2025-02-17 RX ADMIN — Medication 10 ML: at 21:18

## 2025-02-17 RX ADMIN — BUMETANIDE 2 MG: 0.25 INJECTION INTRAMUSCULAR; INTRAVENOUS at 13:30

## 2025-02-17 RX ADMIN — BUMETANIDE 2 MG: 0.25 INJECTION INTRAMUSCULAR; INTRAVENOUS at 09:54

## 2025-02-17 RX ADMIN — CEFEPIME 2000 MG: 2 INJECTION, POWDER, FOR SOLUTION INTRAVENOUS at 17:10

## 2025-02-17 RX ADMIN — CHLORHEXIDINE GLUCONATE 0.12% ORAL RINSE 15 ML: 1.2 LIQUID ORAL at 08:05

## 2025-02-17 RX ADMIN — HYDROCORTISONE SODIUM SUCCINATE 50 MG: 100 INJECTION, POWDER, FOR SOLUTION INTRAMUSCULAR; INTRAVENOUS at 23:51

## 2025-02-17 RX ADMIN — CHLOROTHIAZIDE SODIUM 500 MG: 500 INJECTION, POWDER, LYOPHILIZED, FOR SOLUTION INTRAVENOUS at 09:54

## 2025-02-17 RX ADMIN — CEFEPIME 2000 MG: 2 INJECTION, POWDER, FOR SOLUTION INTRAVENOUS at 23:47

## 2025-02-17 RX ADMIN — POTASSIUM CHLORIDE 20 MEQ: 400 INJECTION, SOLUTION INTRAVENOUS at 23:47

## 2025-02-17 RX ADMIN — MIDAZOLAM HYDROCHLORIDE 10 MG/HR: 1 INJECTION, SOLUTION INTRAVENOUS at 18:18

## 2025-02-17 RX ADMIN — GABAPENTIN 300 MG: 300 CAPSULE ORAL at 13:30

## 2025-02-17 RX ADMIN — ESCITALOPRAM OXALATE 10 MG: 10 TABLET ORAL at 21:17

## 2025-02-17 RX ADMIN — CHLORHEXIDINE GLUCONATE 0.12% ORAL RINSE 15 ML: 1.2 LIQUID ORAL at 21:18

## 2025-02-17 RX ADMIN — GABAPENTIN 300 MG: 300 CAPSULE ORAL at 21:18

## 2025-02-17 RX ADMIN — HYDROCORTISONE SODIUM SUCCINATE 50 MG: 100 INJECTION, POWDER, FOR SOLUTION INTRAMUSCULAR; INTRAVENOUS at 05:49

## 2025-02-17 RX ADMIN — Medication 275 MCG/HR: at 21:21

## 2025-02-17 RX ADMIN — NALOXEGOL OXALATE 25 MG: 25 TABLET, FILM COATED ORAL at 07:48

## 2025-02-17 RX ADMIN — PANTOPRAZOLE SODIUM 40 MG: 40 INJECTION, POWDER, FOR SOLUTION INTRAVENOUS at 05:49

## 2025-02-17 RX ADMIN — POTASSIUM CHLORIDE 20 MEQ: 400 INJECTION, SOLUTION INTRAVENOUS at 22:26

## 2025-02-17 RX ADMIN — HYDROCORTISONE SODIUM SUCCINATE 50 MG: 100 INJECTION, POWDER, FOR SOLUTION INTRAMUSCULAR; INTRAVENOUS at 17:10

## 2025-02-17 RX ADMIN — POTASSIUM CHLORIDE 20 MEQ: 400 INJECTION, SOLUTION INTRAVENOUS at 07:46

## 2025-02-17 RX ADMIN — HYDROCORTISONE SODIUM SUCCINATE 50 MG: 100 INJECTION, POWDER, FOR SOLUTION INTRAMUSCULAR; INTRAVENOUS at 12:04

## 2025-02-17 RX ADMIN — METOLAZONE 10 MG: 5 TABLET ORAL at 09:54

## 2025-02-17 RX ADMIN — GABAPENTIN 300 MG: 300 CAPSULE ORAL at 05:49

## 2025-02-17 RX ADMIN — ALBUMIN (HUMAN) 50 G: 0.25 INJECTION, SOLUTION INTRAVENOUS at 04:24

## 2025-02-17 NOTE — PROGRESS NOTES
Clinical Nutrition     Patient Name: Val Nassar Jr.  YOB: 1960  MRN: [id number]  Date of Encounter: 02/17/25 14:35 EST  Admission date: 2/11/2025  Reason for Visit: Follow-up protocol, EN    Assessment   Nutrition Assessment   Admission Diagnosis:  Septic shock [A41.9, R65.21]    Problem List:    Septic shock    Hyperlipidemia, unspecified    Tobacco use    Cirrhosis of liver    Squamous cell carcinoma of esophagus    GALILEO (acute kidney injury)    Metabolic acidosis    Pneumonia of both lower lobes due to Pseudomonas species    Bacteremia due to Pseudomonas      PMH:   He  has a past medical history of Anemia, Cancer, Cirrhosis, Duodenal ulcer, Gastric ulcer, GERD (gastroesophageal reflux disease), History of alcohol abuse, History of radiation therapy (05/08/2024), HLD (hyperlipidemia), and Mood disorder.    PSH:  He  has a past surgical history that includes Esophagogastroduodenoscopy (N/A, 12/26/2023) and Venous Access Device (Port) (Right, 04/25/2024).    Substance history: Etoh abuse, vaping    Applicable Nutrition History:     ARF/VENT 2-12-25  WOC following for posterior thigh, gluteal skin tears.     Labs    Labs Reviewed: Yes    Results from last 7 days   Lab Units 02/17/25  0329 02/16/25  0521 02/15/25  0347 02/14/25  0502 02/13/25  0549 02/13/25  0317 02/12/25  1743   GLUCOSE mg/dL 157* 150* 111* 96  --  116*  --    BUN mg/dL 103* 90* 83* 71*  --  54*  --    CREATININE mg/dL 1.31* 1.52* 2.00* 2.59*  --  2.60*  --    SODIUM mmol/L 154* 151* 145 144  --  143  --    CHLORIDE mmol/L 117* 117* 112* 106  --  106  --    POTASSIUM mmol/L 3.2* 4.0 3.6 3.4*  --  4.0  --    PHOSPHORUS mg/dL 2.9 3.2 3.9 4.7*  --  4.9*  --    MAGNESIUM mg/dL 2.2 3.1* 3.0* 2.9*  --  2.7*  --    ALT (SGPT) U/L 95* 115*  --  176*  --  226*  --    LACTATE mmol/L  --   --   --   --  2.7* 2.9* 3.6*       Results from last 7 days   Lab Units 02/17/25  0329 02/16/25  0521 02/14/25  0502  02/11/25  0926 02/11/25  0901   ALBUMIN g/dL 3.2* 2.7* 2.5*   < >  --    PREALBUMIN mg/dL 10.3* 6.1*  --   --   --    CRP mg/dL 7.62* 16.20*  --   --  8.21*   TRIGLYCERIDES mg/dL 107  --   --   --   --     < > = values in this interval not displayed.       Results from last 7 days   Lab Units 02/17/25  1127 02/16/25  2338 02/16/25  1746 02/16/25  1133 02/16/25  0555 02/15/25  2346   GLUCOSE mg/dL 172* 133* 150* 150* 144* 137*     Lab Results   Lab Value Date/Time    HGBA1C 4.6 (L) 12/25/2023 1925         Results from last 7 days   Lab Units 02/11/25 2039 02/11/25  0926   PROBNP pg/mL 25,206.0* 13,127.0*       Medications    Medications Reviewed: yes    Scheduled Meds:cefepime, 2,000 mg, Intravenous, Q8H  chlorhexidine, 15 mL, Mouth/Throat, Q12H  escitalopram, 10 mg, Nasogastric, Nightly  gabapentin, 300 mg, Nasogastric, Q8H  hydrocortisone sodium succinate, 50 mg, Intravenous, Q6H  Naloxegol Oxalate, 25 mg, Nasogastric, QAM  pantoprazole, 40 mg, Intravenous, Q AM  senna-docusate sodium, 2 tablet, Nasogastric, BID  sodium chloride, 10 mL, Intravenous, Q12H      Continuous Infusions:fentanyl 10 mcg/mL,  mcg/hr, Last Rate: 225 mcg/hr (02/17/25 1315)  midazolam, 1-10 mg/hr, Last Rate: 3 mg/hr (02/17/25 1315)  norepinephrine, 0.02-0.3 mcg/kg/min  vasopressin, 0.03 Units/min, Last Rate: Stopped (02/13/25 1450)      PRN Meds:.  senna-docusate sodium **AND** polyethylene glycol **AND** [DISCONTINUED] bisacodyl **AND** bisacodyl    fentaNYL    HYDROmorphone    polyvinyl alcohol    sodium chloride    sodium chloride    Intake/Ouptut 24 hrs (0701 - 0700)   I&O's Reviewed: yes    Intake & Output (last day)         02/16 0701  02/17 0700 02/17 0701 02/18 0700    I.V. (mL/kg) 1229.5 (13.9) 297 (3.4)    Other 696 270    NG/GT 1240 428    IV Piggyback  168.6    Total Intake(mL/kg) 3165.5 (35.8) 1163.6 (13.2)    Urine (mL/kg/hr) 3800 (1.8) 2450 (3.7)    Total Output 3800 2450    Net -634.5 -1286.5                "  Anthropometrics     Height: Height: 175.3 cm (69.02\")  Last Filed Weight: Weight: 88.4 kg (194 lb 14.2 oz) (02/16/25 0418)  Method:  bedscale  BMI: BMI (Calculated): 28.8    UBW: ?  Weight change:  per EMR ~ 6lb wt gain since January     Weight       Weight (kg) Weight (lbs) Weight Method Visit Report   4/23/2024 75.841 kg  167 lb 3.2 oz   --     75.297 kg  166 lb   --    4/25/2024 75.751 kg  167 lb  Stated     4/29/2024 76.658 kg  169 lb      4/30/2024 75.932 kg  167 lb 6.4 oz   --     75.751 kg  167 lb   --    5/6/2024 74.889 kg  165 lb 1.6 oz   --     74.98 kg  165 lb 4.8 oz   --     74.844 kg  165 lb   --        --        --        --    5/7/2024 76.25 kg  168 lb 1.6 oz   --     76.204 kg  168 lb   --    5/14/2024 77.111 kg  170 lb   --    5/21/2024 75.297 kg  166 lb      6/3/2024 74.98 kg  165 lb 4.8 oz   --     75.297 kg  166 lb   --        --        --    6/18/2024 74.39 kg  164 lb   --    7/5/2024 74.4 kg  164 lb 0.4 oz  Estimated     7/16/2024 77.565 kg  171 lb   --    8/7/2024 77.565 kg  171 lb   --    8/19/2024 77.656 kg  171 lb 3.2 oz   --    9/19/2024 77.565 kg  171 lb      10/24/2024 79.833 kg  176 lb   --    11/14/2024 79.833 kg  176 lb   --    11/26/2024 78.926 kg  174 lb      12/18/2024 79.833 kg  176 lb   --    1/21/2025 79.833 kg  176 lb  Stated     1/23/2025 79.833 kg  176 lb   --    2/3/2025 83.008 kg  183 lb   --    2/11/2025 83.008 kg  183 lb       83 kg  182 lb 15.7 oz          Nutrition Focused Physical Exam     Date:     Unable to perform due to Pt unable to participate at time of visit     Subjective   Reported/Observed/Food/Nutrition Related History:   2/17  Pt intubated, sedated, on fentanyl & VERSED. Spoke with nurse, reported tolerating TF well, has low residuals, and bowels are moving. LBM on 2/15. Noted that Na continues to increase, even after increasing free water on 2/15. Pt may benefit from IV fluid to correct hypernatremia.     2/13  Pt intubated, sedated, + fentanyl, propofol " "7.56ml, levophed 0.32mcg    Per RN: pt's belly distended, more firm than before, bowel regimen started, marginal UOP, have been toni to wean off asiya and vasopressin    Needs Assessment   Date: 25    Height used:Height: 175.3 cm (69.02\")  Weights used: 182lb/ 83kg    Estimated Calorie needs: ~ 1900kcal  Method:  20Kcals/Kkcal  Method:  MSJ x 1.2: 6kcal  Method:  PSU: 2102kcal    Estimated Protein needs: ~83g protein with current renal function  Method: 1-1.2g protein per k-100g protein    Current Nutrition Prescription     PO: NPO Diet NPO Type: Tube Feeding  Oral Nutrition Supplement:  Intake: N/A    EN: Peptamen 1.5  Goal Rate: 60 ml/hr  Water Flushes: 45 ml/hr  Modular: None  Route: OG  Tube: Large bore    At goal over: 20Hrs/day     Rx will supply:   Goal Volume 1200  mL/day     Flush Volume 900 mL/day     Energy 1800 Kcal/day 95 % Est Need   Protein 82 g/day 99 % Est Need   Fiber 0 g/day     Water in   mL     Total Water 1824 mL     Meet DRI Yes        --------------------------------------------------------------------------  Product/Rate verified at bedside: Yes  Infusing Rate at time of visit: 60 ml/hr    Average Delivery from [facility] Day:   Volume 1030 mL/day 86  % Goal Vol.   Flush Volume 906 mL/day     Energy  Kcal/day  % Est Need   Protein  g/day  % Est Need   Fiber  g/day     Water in  EN  mL     Total Water  mL     Meet DRI Yes             Assessment & Plan   Nutrition Diagnosis   Date: 25 Updated: 25   Problem Inadequate energy intake    Etiology ARF/VENT   Signs/Symptoms NPO   Status: Active; EN started on 2/15    Goal:   Nutrition to support treatment and Maintain EN      Nutrition Intervention      Follow treatment progress, Care plan reviewed    Na at 154, Pt may benefit from IV fluid to correct hypernatremia.    Monitoring/Evaluation:   Per protocol, I&O, Pertinent labs, EN delivery/tolerance, Weight, Skin status, GI status, Symptoms, Hemodynamic " stability    Delaney Elizabeth  Time Spent: 30 min

## 2025-02-17 NOTE — PROGRESS NOTES
INTENSIVIST   PROGRESS NOTE     Hospital:  LOS: 6 days     Mr. Val Nassar Jr., 64 y.o. male is followed for a Chief Complaint of: Shock, Respiratory Failure      Subjective   S     Interval History:  Sedated.    Intubated.    Episodes of O2 desaturation.    Last Bowel Movement: 25 (25 1200)     Temp  Min: 98.2 °F (36.8 °C)  Max: 99.9 °F (37.7 °C)      The patient's relevant past medical, surgical and social history were reviewed and updated in Epic as appropriate.        History     Last Reviewed by Aristeo Kowalski MD on 2025 at  1:36 PM    Sections Reviewed    Medical, Surgical, Family, Tobacco, Alcohol, Drug Use, Sexual Activity,   Social Documentation    Problem list reviewed by Aristeo Kowalski MD on 2025 at  1:35 PM  Medicines reviewed by Aristeo Kowalski MD on 2025 at  1:35 PM  Allergies reviewed by Aristeo Kowalski MD on 2025 at  1:35 PM     Objective   O     Physical Examination    Vitals:  Temp: 99.1 °F (37.3 °C) (25) Temp  Min: 98.2 °F (36.8 °C)  Max: 99.9 °F (37.7 °C)   Temp core:      BP: 120/64 (25) BP  Min: 89/55  Max: 128/60   MAP (non-invasive) Noninvasive MAP (mmHg): 99 (25) Noninvasive MAP (mmHg)  Av.3  Min: 48  Max: 119   Pulse: (!) 46 (25) Pulse  Min: 45  Max: 101   Resp: 23 (25) Resp  Min: 21  Max: 24   SpO2: 100 % (25) SpO2  Min: 80 %  Max: 100 %   Device: ventilator (25)    Flow Rate: 15 (manually bagging, bronch in progress.) (25) Flow (L/min) (Oxygen Therapy)  Min: 15  Max: 80     Intake/Ouptut 24 hrs (7:00AM - 6:59 AM)  Intake & Output (last 2 days)          0701   07 0701   0700    I.V. (mL/kg) 1229.5 (13.9) 510.6 (5.8)    Other 696 325    NG/GT 1240 556    IV Piggyback  207.6    Total Intake(mL/kg) 3165.5 (35.8) 1599.2 (18.1)    Urine (mL/kg/hr) 3800 (1.8) 4500 (3.6)    Total Output 3800 4500    Net -634.5 -2900.9                   Medications (drips):  fentanyl 10 mcg/mL, Last Rate: 275 mcg/hr (02/17/25 2026)  midazolam, Last Rate: 8 mg/hr (02/17/25 2027)  norepinephrine  vasopressin, Last Rate: Stopped (02/13/25 1450)            02/15/25  0440 02/16/25  0418   Weight: 88.3 kg (194 lb 10.7 oz) 88.4 kg (194 lb 14.2 oz)        Invasive Mechanical Ventilator   Settings: Observed:   Mode: VC+/AC (02/17/25 2100)    Resp Rate (Set): 25 (02/17/25 2100) Resp Rate (Observed) Vent: 25 (02/17/25 2100)   Vt (Set, mL): 560 mL (02/17/25 2100) Vt, Exp (observed, mL): 560 mL (02/17/25 2100)    Minute Ventilation (L/min) (Obs): 13.9 L/min (02/17/25 2100)    I:E Ratio (Obs): 1:1.4 (02/17/25 2100)       FiO2 (%): 100 % (02/17/25 2100) Plateau Pressure (cm H2O): (S) 30 cm H2O (02/17/25 2100)   PEEP/CPAP (cm H2O): 12 cm H20 (02/17/25 2100) Driving Pressure (cm H2O): 18 cm H2O (02/17/25 2100)    Static Compliance (L/cm H2O): 33 (02/16/25 0712)      Telemetry:  Rhythm: normal sinus rhythm (02/17/25 1800)  Atrial Rhythm: atrial fibrillation (02/17/25 0600)      Constitutional:  No acute distress.   Cardiovascular: RRR.    Respiratory: Normal breath sounds  (+) Rhonchi   Abdominal:  Soft with no tenderness.   Extremities: Edema  Scrotal edema   Neurological:   Sedated.  Best Eye Response: 3-->(E3) to speech (02/17/25 1800)  Best Motor Response: 4-->(M4) withdraws from pain (02/17/25 1800)  Best Verbal Response: 1-->(V1) none (02/17/25 1800)  Candi Coma Scale Score: 8 (02/17/25 1800)           Results Reviewed:  Laboratory  Microbiology  Radiology  Pathology    Hematology:  Results from last 7 days   Lab Units 02/17/25  0329 02/15/25  0347 02/14/25  0502 02/13/25 0317   WBC 10*3/mm3 27.13* 33.03* 39.05* 34.83*   BANDS % %  --  9.0*  --  38.0*   HEMOGLOBIN g/dL 10.2* 10.5* 10.8* 11.2*   MCV fL 93.8 96.2 93.0 93.4   PLATELETS 10*3/mm3 74* 53* 69* 74*     Results from last 7 days   Lab Units 02/17/25  0329 02/15/25  0347 02/13/25  0317 02/12/25  0303   NEUTROS  ABS 10*3/mm3 21.73* 29.40* 28.21* 22.34*   LYMPHS ABS 10*3/mm3 1.33 0.99 1.39 0.80   EOS ABS 10*3/mm3 0.01 0.00 0.00 0.00     Chemistry:  Estimated Creatinine Clearance: 61.9 mL/min (A) (by C-G formula based on SCr of 1.31 mg/dL (H)).    Results from last 7 days   Lab Units 02/17/25 1915 02/17/25 0329 02/16/25  0521   SODIUM mmol/L  --  154* 151*   POTASSIUM mmol/L 3.2* 3.2* 4.0   CHLORIDE mmol/L  --  117* 117*   CO2 mmol/L  --  25.0 23.0   BUN mg/dL  --  103* 90*   CREATININE mg/dL  --  1.31* 1.52*   GLUCOSE mg/dL  --  157* 150*     Results from last 7 days   Lab Units 02/17/25 1915 02/17/25 0329 02/16/25  0521   CALCIUM mg/dL  --  8.9 8.8   MAGNESIUM mg/dL 2.0 2.2 3.1*   PHOSPHORUS mg/dL  --  2.9 3.2     Hepatic Panel:  Results from last 7 days   Lab Units 02/17/25 0329 02/16/25 0521 02/14/25  0502 02/13/25  0317   ALBUMIN g/dL 3.2* 2.7* 2.5* 2.4*   TOTAL PROTEIN g/dL 5.9* 6.0 5.8* 5.5*   BILIRUBIN mg/dL 0.9 0.9 1.2 0.8   BILIRUBIN DIRECT mg/dL  --  0.5* 0.8* 0.4*   AST (SGOT) U/L 90* 98* 185* 294*   ALT (SGPT) U/L 95* 115* 176* 226*   ALK PHOS U/L 151* 142* 93 62     Coagulation Labs:  Results from last 7 days   Lab Units 02/17/25 0329 02/14/25  0735   PROTIME Seconds 14.6* 16.1*   INR  1.13* 1.27*   APTT seconds  --  29.9*      Cardiac Labs:  Results from last 7 days   Lab Units 02/11/25 2039 02/11/25  1042 02/11/25  0926   PROBNP pg/mL 25,206.0*  --  13,127.0*   HSTROP T ng/L  --  120* 134*     Biomarkers:  Results from last 7 days   Lab Units 02/17/25  0329 02/16/25  0521 02/14/25  0735 02/14/25  0502 02/13/25  0549 02/13/25  0317 02/12/25  1743 02/11/25  1351 02/11/25  0926 02/11/25  0901   CRP mg/dL 7.62* 16.20*  --   --   --   --   --   --   --  8.21*   LACTATE mmol/L  --   --   --   --  2.7* 2.9* 3.6*   < >  --  10.0*   PROCALCITONIN ng/mL  --  93.99*  --  >100.00*  --   --   --   --  >100.00*  --    D DIMER QUANT MCGFEU/mL  --   --   --   --   --   --   --   --   --  5.87*   FIBRINOGEN mg/dL  --    --  889*  --   --   --   --   --   --   --     < > = values in this interval not displayed.       COVID-19  Lab Results   Component Value Date    COVID19 Not Detected 02/11/2025    COVID19 Not Detected 12/26/2023       Arterial Blood Gases:  Results from last 7 days   Lab Units 02/17/25  1633 02/17/25  0348 02/16/25  0311   PH, ARTERIAL pH units 7.465* 7.450 7.428   PCO2, ARTERIAL mm Hg 39.8 36.1 34.2*   PO2 ART mm Hg 51.8* 82.9* 82.9*   FIO2 % 100 100 60       Images:  XR Chest 1 View    Result Date: 2/17/2025  Impression: Similar-appearing cardiomegaly with mild pulmonary edema and small bilateral pleural effusions. Mild bibasal opacities, likely atelectasis. Support lines and tubes as above. Electronically Signed: Dashawn Tineo  2/17/2025 4:32 PM EST  Workstation ID: [id number]    XR Chest 1 View    Result Date: 2/17/2025  Impression: 1. Mildly improved pulmonary vascular congestion. 2. Persistent bibasilar atelectasis and effusion, which appears mildly increased on the left. Electronically Signed: Ibrahima Jett MD  2/17/2025 7:18 AM EST  Workstation ID: [id number]    XR Chest 1 View    Result Date: 2/16/2025  Impression: Support devices have appropriate position. Pulmonary edema with moderate pleural effusions and bibasilar airspace opacity with slight progression of pulmonary edema since previous study. Electronically Signed: Jessi Lutz MD  2/16/2025 5:35 PM EST  Workstation ID: [id number]    XR Chest 1 View    Result Date: 2/16/2025  Impression: Mildly improved bibasilar pulmonary disease compared to 2/15/2025 exam. No pneumothorax or other new chest pathology is seen. Electronically Signed: Ibrahima Jett MD  2/16/2025 9:16 AM EST  Workstation ID: [id number]     Echo:  Results for orders placed during the hospital encounter of 02/11/25    Adult Transthoracic Echo Complete w/ Color, Spectral and Contrast if Necessary Per Protocol    Interpretation Summary    Left ventricular systolic function is normal. Estimated left  "ventricular EF = 60%    Left ventricular wall thickness is consistent with mild concentric hypertrophy.    Mild aortic valve stenosis is present.    Aortic valve maximum pressure gradient is 23 mmHg. Aortic valve mean pressure gradient is 14 mmHg.      Results: Reviewed.  I reviewed the patient's new laboratory and imaging results.  I independently reviewed the patient's new images.    Medications: Reviewed.    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 male admitted on 2/11/2025 with weakness, dizziness, hypotension and AMS due to Septic shock [A41.9, R65.21]    CT head negative for an acute abnormality. CT chest/abdomen/pelvis performed and was remarkable to pyelonephritis, sigmoid diverticulosis, and possible cystitis.     He required intubation on the morning of 2/12/25.     Blood and urine culture with Pseudomonas and he is on Cefepime.     Comorbidities: tobacco use, alcoholic cirrhosis, metastatic squamous cell of the esophagus and HLD.    Septic Shock.  Pseudomonas bacteremia  Respiratory Failure  Intubated 02/12/25  Invasive Mechanical Ventilation   Bilateral infiltrates  Cardiovascular  Dyslipidemia   GI/Hepatology    Cirrhosis  Renal  GALILEO  Oncology  Squamous cell carcinoma of the esophagus  Nutrition Support   Tube Feeding: Formula: Peptamen 1.5 (Peptide Based, Critical Care, ALI); Feeding Type: Continuous; Start at: 20 mL/hr; Then Advance By: 20 mL/hr; Every: 12 hours; To " Goal Rate of: 60 mL/hr; Total Daily Feeding Volume (mL/day): 1200; Water Flush: O...  NG/OG Tube Orogastric 16 Fr Center mouth-Tube Feeding Rate (mL/hr): 60 mL/HR (Based on a feeding duration of 20 h/d)      Lab Results   Component Value Date    PREALBUMIN 10.3 (L) 02/17/2025    PREALBUMIN 6.1 (L) 02/16/2025    CRP 7.62 (H) 02/17/2025    CRP 16.20 (H) 02/16/2025    CRP 8.21 (H) 02/11/2025     Endocrine   Body mass index is 28.77 kg/m². Overweight: 25.0-29.9kg/m2   No history of Diabetes    Lab Results   Lab Value Date/Time    HGBA1C 4.6 (L) 12/25/2023 1925     Results from last 7 days   Lab Units 02/17/25  1819 02/17/25  1127 02/16/25  2338 02/16/25  1746 02/16/25  1133 02/16/25  0555 02/15/25  2346 02/15/25  1816   GLUCOSE mg/dL 146* 172* 133* 150* 150* 144* 137* 122       Diet: NPO Diet NPO Type: Tube Feeding   Advance Directives: Code Status and Medical Interventions: CPR (Attempt to Resuscitate); Full Support   Ordered at: 02/12/25 1029     Level Of Support Discussed With:    Patient     Code Status (Patient has no pulse and is not breathing):    CPR (Attempt to Resuscitate)     Medical Interventions (Patient has pulse or is breathing):    Full Support        VTE Prophylaxis:  No VTE prophylaxis order currently exists.         In brief:  Emergent Bronch, therapeutic due to hypoxemia. R/O mucus plugs.  Continue antibiotics  Leukocytosis trending down  BUN > 100 R/O GIB, R/O Renal  Goal: Glucose < 180 mg/dL.  Disposition: Keep in ICU.    Plan of care and goals reviewed during interdisciplinary rounds.  I discussed the patient's findings and my recommendations with nursing staff    Time: was greater than 31 minutes. This is non-concurrent time.   (This excludes time spent performing separately reportable procedures and services).    He has a high risk of imminent or life-threatening  deterioration, which requires the highest level of physician preparedness to intervene urgently. I devoted my full attention to the  direct care of this patient for the amount of time indicated above.     Time spent with family or surrogate(s) is included only if the patient was incapable of providing the necessary information or participating in medical decision making.    He has the following organ/system impairments Respiratory Failure.        [x] Primary Attending Intensive Care Medicine - Nutrition Support   [] Consultant    Copied text in this note has been reviewed and is accurate as of 02/17/25

## 2025-02-17 NOTE — PLAN OF CARE
Problem: Adult Inpatient Plan of Care  Goal: Plan of Care Review  2/17/2025 0748 by Ad Espinal RN  Outcome: Progressing  Flowsheets  Taken 2/17/2025 0748  Progress: improving  Plan of Care Reviewed With: patient  Taken 2/17/2025 0701  Outcome Evaluation: Pt on MV. PEEP @ 11. FiO2 @ 100. Fent @ 200. Versed @ 2. Albumin x2 given during shift. Afebrile during shift. UOP adequate. Fent bolus given x1 for comfort. RUE restraints in place for safety.     Problem: Restraint, Nonviolent  Goal: Absence of Harm or Injury  2/17/2025 0748 by Ad Espinal RN  Outcome: Progressing  Intervention: Implement Least Restrictive Safety Strategies  Recent Flowsheet Documentation  Taken 2/17/2025 0600 by Ad Espinal RN  Medical Device Protection: IV pole/bag removed from visual field  Diversional Activities: television  Taken 2/17/2025 0400 by Ad Espinal RN  Medical Device Protection: IV pole/bag removed from visual field  Diversional Activities: television  Taken 2/17/2025 0318 by Ad Espinal RN  Medical Device Protection: IV pole/bag removed from visual field  Diversional Activities: television  Taken 2/17/2025 0200 by Ad Espinal RN  Medical Device Protection: IV pole/bag removed from visual field  Diversional Activities: television  Taken 2/17/2025 0000 by Ad Espinal RN  Medical Device Protection: IV pole/bag removed from visual field  Diversional Activities: television  Taken 2/16/2025 2200 by Ad Espinal RN  Medical Device Protection: IV pole/bag removed from visual field  Diversional Activities: television  Taken 2/16/2025 2000 by Ad Espinal RN  Medical Device Protection: IV pole/bag removed from visual field  Diversional Activities: television  Intervention: Protect Dignity, Rights and Personal Wellbeing  Recent Flowsheet Documentation  Taken 2/17/2025 0600 by Ad Espinal RN  Trust Relationship/Rapport:   care explained   reassurance provided  Taken  2/17/2025 0400 by Ad Espinal RN  Trust Relationship/Rapport:   care explained   reassurance provided  Taken 2/17/2025 0200 by Ad Espinal RN  Trust Relationship/Rapport:   care explained   reassurance provided  Taken 2/17/2025 0000 by Ad Espinal RN  Trust Relationship/Rapport:   care explained   reassurance provided  Taken 2/16/2025 2200 by Ad Espinal RN  Trust Relationship/Rapport:   care explained   reassurance provided  Taken 2/16/2025 2000 by Ad Espinal RN  Trust Relationship/Rapport:   care explained   reassurance provided  Intervention: Protect Skin and Joint Integrity  Recent Flowsheet Documentation  Taken 2/17/2025 0600 by Ad Espinal RN  Body Position:   turned   right  Skin Protection:   incontinence pads utilized   transparent dressing maintained   silicone foam dressing in place   skin sealant/moisture barrier applied  Range of Motion: ROM (range of motion) performed  Taken 2/17/2025 0400 by Ad Espinal RN  Body Position:   turned   left  Skin Protection:   incontinence pads utilized   transparent dressing maintained  Range of Motion: ROM (range of motion) performed  Taken 2/17/2025 0200 by Ad Espinal RN  Body Position:   turned   right  Skin Protection: transparent dressing maintained  Range of Motion: ROM (range of motion) performed  Taken 2/17/2025 0000 by Ad Espinal RN  Body Position:   turned   right  Skin Protection: transparent dressing maintained  Range of Motion: ROM (range of motion) performed  Taken 2/16/2025 2200 by Ad Espinal RN  Body Position: supine  Skin Protection:   transparent dressing maintained   incontinence pads utilized  Range of Motion: ROM (range of motion) performed  Taken 2/16/2025 2000 by Ad Espinal RN  Body Position:   turned   left  Skin Protection:   incontinence pads utilized   silicone foam dressing in place   transparent dressing maintained  Range of Motion: ROM (range of motion)  performed

## 2025-02-17 NOTE — PROCEDURES
"Bronchoscopy    Date/Time: 2/17/2025 4:05 PM    Performed by: Aristeo Kowalski MD  Authorized by: Aristeo Kowalski MD  Consent: The procedure was performed in an emergent situation.  Patient identity confirmed: provided demographic data, arm band and hospital-assigned identification number  Time out: Immediately prior to procedure a \"time out\" was called to verify the correct patient, procedure, equipment, support staff and site/side marked as required.  Patient tolerance: patient tolerated the procedure well with no immediate complications        BRONCHOSCOPY REPORT     Date: 02/17/25       Procedure(s): Bronchoscopy, Therapeutic, initial   Surgeon: Aristeo Kowalski MD   Pre-Operative Diagnosis: Atelectasis  Hypoxemia.   Evaluate ETT position.   Post-Operative Diagnosis: Same   ASA and Mallampati: ASA Class Mallampati } N/A already intubated   Time out: Immediately prior to procedure a \"time out\" was called to verify the correct patient, procedure, equipment, support staff and site/side as required.      Findings:    A fiberoptic bronchoscope was inserted into the main airway via the ETT.      An anatomical survey was done of the main airways and the subsegmental bronchus: including the Right Mainstem bronchus, the Right Upper Lobe bronchus, Bronchus Intermedius, Right Middle Lobe, Superior Segment of the Right Lower Lobe and the Basilar segments of the Right Lower Lobe.     Then, the scope was withdrawn back to the chiqui and advanced into the Left Mainstem Bronchus, Left Upper Lobe bronchus, Lingula Bronchus, and the basilar segments of the Left Lower Lobe.    Vocal Cords: Vocal Cords not able to examined since patient is intubated.   Main Tachea and Chiqui: ETT above the chiqui  25 cm at the lip   Bronchial Tree: Mucus Plugging right basilar segments, and CRISTIAN. Lingular and LLL segments.  Clear/White secretions, but significant mucus plugs in the distal segements.     Specimens as described in this table:      BW " BALF Lipscomb PSB EBBx TBBx EBUS    Location R + L  MS         [id number] Respiratory Cx& GS [x]       [id number], [id number] BAL Cx Quant & GS  []      [id number] PSB Cx Quant    []    [id number] Fungus smear-GMS [x] []      [id number] Fungus Cx [x] []      [id number] AFB Smear and Cx [x] []      [id number] Respiratory Virus, Panel w/COVID [x] []      [id number] O+P Examination [x] []                   Estimated Blood Loss: 0 mL.   Medications: None  He is already intubated and on Invasive Mechanical Ventilation    Complications: None

## 2025-02-17 NOTE — NURSING NOTE
WOC consulted for possible deep tissue pressure injury to penis and possible DTI to back of head-left side.    Patient placed in side-lying position to assess both back of head and gluteal region.    There is an erythemic area to the patient's left occiput. the area appears dry with some flaky skin.  Appears more psoriatic or eczema related.  Continue to rotate patient's head on pillow.    Patient's penis and scrotum are extremely edematous today.  There is some moisture breakdown in this area.  Small area area of purple discoloration does not correlate with a deep tissue pressure injury on the left side of the patient's penis.    Patient is at very high risk for pressure injury development.  Continue with multilayer Xeroform and silicone border dressing to these areas.  Change every 3 days or sooner strikethrough soiling.    Turn patient using a foam wedge and utilize offloading heel boots for heel prevention.    WOC will follow for assessment and management of posterior thigh, gluteal skin tears.    Jere Boles RN, BSN, CWOCN  Wound, Ostomy and Continence (WOC) Department  [facility]

## 2025-02-18 ENCOUNTER — APPOINTMENT (OUTPATIENT)
Dept: GENERAL RADIOLOGY | Facility: HOSPITAL | Age: 65
DRG: 870 | End: 2025-02-18
Payer: MEDICARE

## 2025-02-18 PROBLEM — A41.52 SEPTIC SHOCK DUE TO PSEUDOMONAS SPECIES: Status: ACTIVE | Noted: 2025-02-11

## 2025-02-18 LAB
ANION GAP SERPL CALCULATED.3IONS-SCNC: 15 MMOL/L (ref 5–15)
ARTERIAL PATENCY WRIST A: ABNORMAL
ARTERIAL PATENCY WRIST A: ABNORMAL
ATMOSPHERIC PRESS: ABNORMAL MM[HG]
ATMOSPHERIC PRESS: ABNORMAL MM[HG]
BASE EXCESS BLDA CALC-SCNC: 3.6 MMOL/L (ref 0–2)
BASE EXCESS BLDA CALC-SCNC: 3.7 MMOL/L (ref 0–2)
BASOPHILS # BLD AUTO: 0.17 10*3/MM3 (ref 0–0.2)
BASOPHILS NFR BLD AUTO: 0.6 % (ref 0–1.5)
BDY SITE: ABNORMAL
BDY SITE: ABNORMAL
BODY TEMPERATURE: 37
BODY TEMPERATURE: 37
BUN SERPL-MCNC: 104 MG/DL (ref 8–23)
BUN/CREAT SERPL: 83.2 (ref 7–25)
CALCIUM SPEC-SCNC: 9.4 MG/DL (ref 8.6–10.5)
CHLORIDE SERPL-SCNC: 118 MMOL/L (ref 98–107)
CO2 BLDA-SCNC: 26.9 MMOL/L (ref 22–33)
CO2 BLDA-SCNC: 29.1 MMOL/L (ref 22–33)
CO2 SERPL-SCNC: 24 MMOL/L (ref 22–29)
COHGB MFR BLD: 0.8 % (ref 0–2)
COHGB MFR BLD: 1.2 % (ref 0–2)
CREAT SERPL-MCNC: 1.25 MG/DL (ref 0.76–1.27)
DEPRECATED RDW RBC AUTO: 56.9 FL (ref 37–54)
EGFRCR SERPLBLD CKD-EPI 2021: 63.9 ML/MIN/1.73
EOSINOPHIL # BLD AUTO: 0 10*3/MM3 (ref 0–0.4)
EOSINOPHIL NFR BLD AUTO: 0 % (ref 0.3–6.2)
EPAP: 0
EPAP: 0
ERYTHROCYTE [DISTWIDTH] IN BLOOD BY AUTOMATED COUNT: 16.6 % (ref 12.3–15.4)
GIE STN SPEC: ABNORMAL
GLUCOSE BLDC GLUCOMTR-MCNC: 150 MG/DL (ref 70–130)
GLUCOSE BLDC GLUCOMTR-MCNC: 152 MG/DL (ref 70–130)
GLUCOSE BLDC GLUCOMTR-MCNC: 216 MG/DL (ref 70–130)
GLUCOSE SERPL-MCNC: 180 MG/DL (ref 65–99)
HCO3 BLDA-SCNC: 26 MMOL/L (ref 20–26)
HCO3 BLDA-SCNC: 27.9 MMOL/L (ref 20–26)
HCT VFR BLD AUTO: 33.5 % (ref 37.5–51)
HCT VFR BLD CALC: 30.4 % (ref 38–51)
HCT VFR BLD CALC: 32.2 % (ref 38–51)
HGB BLD-MCNC: 10.8 G/DL (ref 13–17.7)
HGB BLDA-MCNC: 10.5 G/DL (ref 13.5–17.5)
HGB BLDA-MCNC: 9.9 G/DL (ref 13.5–17.5)
IMM GRANULOCYTES # BLD AUTO: 1.56 10*3/MM3 (ref 0–0.05)
IMM GRANULOCYTES NFR BLD AUTO: 5.5 % (ref 0–0.5)
INHALED O2 CONCENTRATION: 100 %
INHALED O2 CONCENTRATION: 50 %
IPAP: 0
IPAP: 0
LYMPHOCYTES # BLD AUTO: 1.42 10*3/MM3 (ref 0.7–3.1)
LYMPHOCYTES NFR BLD AUTO: 5 % (ref 19.6–45.3)
MAGNESIUM SERPL-MCNC: 2.2 MG/DL (ref 1.6–2.4)
MCH RBC QN AUTO: 30.6 PG (ref 26.6–33)
MCHC RBC AUTO-ENTMCNC: 32.2 G/DL (ref 31.5–35.7)
MCV RBC AUTO: 94.9 FL (ref 79–97)
METHGB BLD QL: 0.2 % (ref 0–1.5)
METHGB BLD QL: 0.5 % (ref 0–1.5)
MODALITY: ABNORMAL
MODALITY: ABNORMAL
MONOCYTES # BLD AUTO: 2.53 10*3/MM3 (ref 0.1–0.9)
MONOCYTES NFR BLD AUTO: 8.9 % (ref 5–12)
NEUTROPHILS NFR BLD AUTO: 22.68 10*3/MM3 (ref 1.7–7)
NEUTROPHILS NFR BLD AUTO: 80 % (ref 42.7–76)
NRBC BLD AUTO-RTO: 0.5 /100 WBC (ref 0–0.2)
NT-PROBNP SERPL-MCNC: ABNORMAL PG/ML (ref 0–900)
OXYHGB MFR BLDV: 95.8 % (ref 94–99)
OXYHGB MFR BLDV: 97.4 % (ref 94–99)
PAW @ PEAK INSP FLOW SETTING VENT: 0 CMH2O
PAW @ PEAK INSP FLOW SETTING VENT: 0 CMH2O
PCO2 BLDA: 30.4 MM HG (ref 35–45)
PCO2 BLDA: 40.5 MM HG (ref 35–45)
PCO2 TEMP ADJ BLD: 30.4 MM HG (ref 35–48)
PCO2 TEMP ADJ BLD: 40.5 MM HG (ref 35–48)
PEEP RESPIRATORY: 10 CM[H2O]
PH BLDA: 7.45 PH UNITS (ref 7.35–7.45)
PH BLDA: 7.54 PH UNITS (ref 7.35–7.45)
PH, TEMP CORRECTED: 7.45 PH UNITS
PH, TEMP CORRECTED: 7.54 PH UNITS
PHOSPHATE SERPL-MCNC: 1.9 MG/DL (ref 2.5–4.5)
PLAT MORPH BLD: NORMAL
PLATELET # BLD AUTO: 79 10*3/MM3 (ref 140–450)
PMV BLD AUTO: 12.1 FL (ref 6–12)
PO2 BLDA: 107 MM HG (ref 83–108)
PO2 BLDA: 87.9 MM HG (ref 83–108)
PO2 TEMP ADJ BLD: 107 MM HG (ref 83–108)
PO2 TEMP ADJ BLD: 87.9 MM HG (ref 83–108)
POTASSIUM SERPL-SCNC: 3.5 MMOL/L (ref 3.5–5.2)
PROCALCITONIN SERPL-MCNC: 37.48 NG/ML (ref 0–0.25)
RBC # BLD AUTO: 3.53 10*6/MM3 (ref 4.14–5.8)
RBC MORPH BLD: NORMAL
SODIUM SERPL-SCNC: 157 MMOL/L (ref 136–145)
TOTAL RATE: 0 BREATHS/MINUTE
TOTAL RATE: 17 BREATHS/MINUTE
VENTILATOR MODE: ABNORMAL
VT ON VENT VENT: 0.47 ML
WBC MORPH BLD: NORMAL
WBC NRBC COR # BLD AUTO: 28.36 10*3/MM3 (ref 3.4–10.8)

## 2025-02-18 PROCEDURE — 36600 WITHDRAWAL OF ARTERIAL BLOOD: CPT

## 2025-02-18 PROCEDURE — 94799 UNLISTED PULMONARY SVC/PX: CPT

## 2025-02-18 PROCEDURE — 99232 SBSQ HOSP IP/OBS MODERATE 35: CPT | Performed by: INTERNAL MEDICINE

## 2025-02-18 PROCEDURE — 71045 X-RAY EXAM CHEST 1 VIEW: CPT

## 2025-02-18 PROCEDURE — 25010000002 HYDROCORTISONE SOD SUC (PF) 100 MG RECONSTITUTED SOLUTION: Performed by: INTERNAL MEDICINE

## 2025-02-18 PROCEDURE — 84145 PROCALCITONIN (PCT): CPT | Performed by: INTERNAL MEDICINE

## 2025-02-18 PROCEDURE — 25010000003 DEXTROSE 5 % SOLUTION: Performed by: INTERNAL MEDICINE

## 2025-02-18 PROCEDURE — 82948 REAGENT STRIP/BLOOD GLUCOSE: CPT

## 2025-02-18 PROCEDURE — 85025 COMPLETE CBC W/AUTO DIFF WBC: CPT | Performed by: INTERNAL MEDICINE

## 2025-02-18 PROCEDURE — 25010000002 POTASSIUM CHLORIDE PER 2 MEQ

## 2025-02-18 PROCEDURE — 80048 BASIC METABOLIC PNL TOTAL CA: CPT | Performed by: INTERNAL MEDICINE

## 2025-02-18 PROCEDURE — 85007 BL SMEAR W/DIFF WBC COUNT: CPT | Performed by: INTERNAL MEDICINE

## 2025-02-18 PROCEDURE — 25010000002 FENTANYL 10 MCG/1 ML NS: Performed by: INTERNAL MEDICINE

## 2025-02-18 PROCEDURE — 94003 VENT MGMT INPAT SUBQ DAY: CPT

## 2025-02-18 PROCEDURE — 83735 ASSAY OF MAGNESIUM: CPT | Performed by: INTERNAL MEDICINE

## 2025-02-18 PROCEDURE — 84100 ASSAY OF PHOSPHORUS: CPT | Performed by: INTERNAL MEDICINE

## 2025-02-18 PROCEDURE — 25010000002 CEFEPIME PER 500 MG: Performed by: INTERNAL MEDICINE

## 2025-02-18 PROCEDURE — 82805 BLOOD GASES W/O2 SATURATION: CPT

## 2025-02-18 PROCEDURE — 83880 ASSAY OF NATRIURETIC PEPTIDE: CPT | Performed by: INTERNAL MEDICINE

## 2025-02-18 PROCEDURE — 25010000002 MIDAZOLAM 1 MG/ML 100ML NS 100 MG/100ML SOLUTION: Performed by: INTERNAL MEDICINE

## 2025-02-18 PROCEDURE — 83050 HGB METHEMOGLOBIN QUAN: CPT

## 2025-02-18 PROCEDURE — 82375 ASSAY CARBOXYHB QUANT: CPT

## 2025-02-18 PROCEDURE — 25810000003 SODIUM CHLORIDE 0.9 % SOLUTION

## 2025-02-18 RX ORDER — FENTANYL/ROPIVACAINE/NS/PF 2-625MCG/1
15 PLASTIC BAG, INJECTION (ML) EPIDURAL ONCE
Status: COMPLETED | OUTPATIENT
Start: 2025-02-18 | End: 2025-02-18

## 2025-02-18 RX ORDER — POTASSIUM CHLORIDE 29.8 MG/ML
20 INJECTION INTRAVENOUS
Status: COMPLETED | OUTPATIENT
Start: 2025-02-18 | End: 2025-02-18

## 2025-02-18 RX ORDER — DEXTROSE MONOHYDRATE 50 MG/ML
100 INJECTION, SOLUTION INTRAVENOUS CONTINUOUS
Status: DISCONTINUED | OUTPATIENT
Start: 2025-02-18 | End: 2025-02-23

## 2025-02-18 RX ORDER — HYDROCORTISONE SODIUM SUCCINATE 100 MG/2ML
50 INJECTION INTRAMUSCULAR; INTRAVENOUS EVERY 12 HOURS
Status: DISCONTINUED | OUTPATIENT
Start: 2025-02-18 | End: 2025-02-20

## 2025-02-18 RX ADMIN — Medication 10 ML: at 21:33

## 2025-02-18 RX ADMIN — DEXTROSE MONOHYDRATE 100 ML/HR: 5 INJECTION, SOLUTION INTRAVENOUS at 10:36

## 2025-02-18 RX ADMIN — Medication 200 MCG/HR: at 21:06

## 2025-02-18 RX ADMIN — DOCUSATE SODIUM 100 MG: 50 LIQUID ORAL at 21:32

## 2025-02-18 RX ADMIN — CHLORHEXIDINE GLUCONATE 0.12% ORAL RINSE 15 ML: 1.2 LIQUID ORAL at 07:39

## 2025-02-18 RX ADMIN — GABAPENTIN 300 MG: 300 CAPSULE ORAL at 21:32

## 2025-02-18 RX ADMIN — GABAPENTIN 300 MG: 300 CAPSULE ORAL at 15:18

## 2025-02-18 RX ADMIN — SODIUM CHLORIDE 15 MMOL: 9 INJECTION, SOLUTION INTRAVENOUS at 12:12

## 2025-02-18 RX ADMIN — CHLORHEXIDINE GLUCONATE 0.12% ORAL RINSE 15 ML: 1.2 LIQUID ORAL at 21:32

## 2025-02-18 RX ADMIN — CEFEPIME 2000 MG: 2 INJECTION, POWDER, FOR SOLUTION INTRAVENOUS at 07:20

## 2025-02-18 RX ADMIN — POTASSIUM CHLORIDE 20 MEQ: 29.8 INJECTION, SOLUTION INTRAVENOUS at 07:21

## 2025-02-18 RX ADMIN — PANTOPRAZOLE SODIUM 40 MG: 40 INJECTION, POWDER, FOR SOLUTION INTRAVENOUS at 07:39

## 2025-02-18 RX ADMIN — Medication 10 ML: at 12:12

## 2025-02-18 RX ADMIN — POTASSIUM CHLORIDE 20 MEQ: 29.8 INJECTION, SOLUTION INTRAVENOUS at 05:51

## 2025-02-18 RX ADMIN — CEFEPIME 2000 MG: 2 INJECTION, POWDER, FOR SOLUTION INTRAVENOUS at 15:18

## 2025-02-18 RX ADMIN — MIDAZOLAM HYDROCHLORIDE 2 MG/HR: 1 INJECTION, SOLUTION INTRAVENOUS at 15:46

## 2025-02-18 RX ADMIN — GABAPENTIN 300 MG: 300 CAPSULE ORAL at 05:51

## 2025-02-18 RX ADMIN — DOCUSATE SODIUM 100 MG: 50 LIQUID ORAL at 07:39

## 2025-02-18 RX ADMIN — CEFEPIME 2000 MG: 2 INJECTION, POWDER, FOR SOLUTION INTRAVENOUS at 23:47

## 2025-02-18 RX ADMIN — DEXTROSE MONOHYDRATE 100 ML/HR: 5 INJECTION, SOLUTION INTRAVENOUS at 21:07

## 2025-02-18 RX ADMIN — PANTOPRAZOLE SODIUM 40 MG: 40 INJECTION, POWDER, FOR SOLUTION INTRAVENOUS at 21:32

## 2025-02-18 RX ADMIN — ESCITALOPRAM OXALATE 10 MG: 10 TABLET ORAL at 21:32

## 2025-02-18 RX ADMIN — Medication 150 MCG/HR: at 08:36

## 2025-02-18 RX ADMIN — HYDROCORTISONE SODIUM SUCCINATE 50 MG: 100 INJECTION, POWDER, FOR SOLUTION INTRAMUSCULAR; INTRAVENOUS at 05:51

## 2025-02-18 RX ADMIN — HYDROCORTISONE SODIUM SUCCINATE 50 MG: 100 INJECTION, POWDER, FOR SOLUTION INTRAMUSCULAR; INTRAVENOUS at 18:02

## 2025-02-18 NOTE — PROGRESS NOTES
INTENSIVIST   PROGRESS NOTE     Hospital:  LOS: 7 days     Mr. Val Nassar Jr., 64 y.o. male is followed for a Chief Complaint of: Shock, Respiratory Failure      Subjective   S     Interval History:    Sedated.    Intubated.    Still high FiO2  and PEEP.    Last Bowel Movement: 25 (25 1200)     Temp  Min: 97.7 °F (36.5 °C)  Max: 99.9 °F (37.7 °C)      The patient's relevant past medical, surgical and social history were reviewed and updated in Epic as appropriate.        History     Last Reviewed by Arsiteo Kowalski MD on 2025 at  1:36 PM    Sections Reviewed    Medical, Surgical, Family, Tobacco, Alcohol, Drug Use, Sexual Activity,   Social Documentation    Problem list reviewed by Aristeo Kowalski MD on 2025 at  1:35 PM  Medicines reviewed by Aristeo Kowalski MD on 2025 at  1:35 PM  Allergies reviewed by Aristeo Kowalski MD on 2025 at  1:35 PM     Objective   O     Physical Examination    Vitals:  Temp: 97.7 °F (36.5 °C) (25 0400) Temp  Min: 97.7 °F (36.5 °C)  Max: 99.9 °F (37.7 °C)   Temp core:      BP: 128/59 (25 0600) BP  Min: 97/53  Max: 138/79   MAP (non-invasive) Noninvasive MAP (mmHg): 91 (25 0600) Noninvasive MAP (mmHg)  Av.6  Min: 48  Max: 119   Pulse: (!) 43 (25 0600) Pulse  Min: 36  Max: 75   Resp: 23 (25 2100) Resp  Min: 22  Max: 23   SpO2: 100 % (25 0600) SpO2  Min: 80 %  Max: 100 %   Device: ventilator (25 0400)    Flow Rate: 15 (manually bagging, bronch in progress.) (25 1600) Flow (L/min) (Oxygen Therapy)  Min: 15  Max: 15     Intake/Ouptut 24 hrs (7:00AM - 6:59 AM)  Intake & Output (last 2 days)          07 0700  07 07 07 07    I.V. (mL/kg) 1229.5 (13.9) 912.9 (10.3) 37.7 (0.4)    Other 696 776 90    NG/GT 1240 1265 129    IV Piggyback  544.6 150    Total Intake(mL/kg) 3165.5 (35.8) 3498.5 (39.6) 406.7 (4.6)    Urine (mL/kg/hr) 3800 (1.8) 5715 (2.7) 200  (2)    Total Output 3800 5715 200    Net -634.5 -2216.5 +206.7                   Medications (drips):  fentanyl 10 mcg/mL, Last Rate: 200 mcg/hr (02/18/25 0403)  midazolam, Last Rate: 2 mg/hr (02/18/25 0722)  norepinephrine            02/15/25  0440 02/16/25  0418   Weight: 88.3 kg (194 lb 10.7 oz) 88.4 kg (194 lb 14.2 oz)        Invasive Mechanical Ventilator   Settings: Observed:   Mode: VC+/AC (02/18/25 0655)    Resp Rate (Set): 25 (02/18/25 0706) Resp Rate (Observed) Vent: 25 (02/18/25 0706)   Vt (Set, mL): 560 mL (02/18/25 0706) Vt, Exp (observed, mL): 536 mL (02/18/25 0706)    Minute Ventilation (L/min) (Obs): 13.4 L/min (02/18/25 0706)    I:E Ratio (Obs): 1:1.4 (02/18/25 0706)       FiO2 (%): (S) 90 % (02/18/25 0706) Plateau Pressure (cm H2O): 31 cm H2O (02/18/25 0655)   PEEP/CPAP (cm H2O): 12 cm H20 (02/18/25 0706) Driving Pressure (cm H2O): 19 cm H2O (02/18/25 0655)    Static Compliance (L/cm H2O): 33 (02/16/25 0712)      Telemetry:  Rhythm: sinus bradycardia (02/18/25 0600)  Atrial Rhythm: atrial fibrillation (02/17/25 0600)      Constitutional:  No acute distress.   Cardiovascular: RRR.    Respiratory: Normal breath sounds  (+) Rhonchi   Abdominal:  Soft with no tenderness.   Extremities: Edema  Scrotal edema   Neurological:   Sedated.  Best Eye Response: 3-->(E3) to speech (02/18/25 0600)  Best Motor Response: 4-->(M4) withdraws from pain (02/18/25 0600)  Best Verbal Response: 1-->(V1) none (02/18/25 0600)  McGuffey Coma Scale Score: 8 (02/18/25 0600)           Results Reviewed:  Laboratory  Microbiology  Radiology  Pathology    Hematology:  Results from last 7 days   Lab Units 02/18/25  0303 02/17/25  0329 02/15/25  0347 02/14/25  0502 02/13/25  0317   WBC 10*3/mm3 28.36* 27.13* 33.03*   < > 34.83*   BANDS % %  --   --  9.0*  --  38.0*   HEMOGLOBIN g/dL 10.8* 10.2* 10.5*   < > 11.2*   MCV fL 94.9 93.8 96.2   < > 93.4   PLATELETS 10*3/mm3 79* 74* 53*   < > 74*    < > = values in this interval not  displayed.     Results from last 7 days   Lab Units 02/18/25  0303 02/17/25  0329 02/15/25  0347 02/13/25 0317 02/12/25  0303   NEUTROS ABS 10*3/mm3 22.68* 21.73* 29.40* 28.21* 22.34*   LYMPHS ABS 10*3/mm3 1.42 1.33 0.99 1.39 0.80   EOS ABS 10*3/mm3 0.00 0.01 0.00 0.00 0.00     Chemistry:  Estimated Creatinine Clearance: 64.8 mL/min (by C-G formula based on SCr of 1.25 mg/dL).    Results from last 7 days   Lab Units 02/18/25 0303 02/17/25 1915 02/17/25 0329   SODIUM mmol/L 157*  --  154*   POTASSIUM mmol/L 3.5 3.2* 3.2*   CHLORIDE mmol/L 118*  --  117*   CO2 mmol/L 24.0  --  25.0   BUN mg/dL 104*  --  103*   CREATININE mg/dL 1.25  --  1.31*   GLUCOSE mg/dL 180*  --  157*     Results from last 7 days   Lab Units 02/18/25 0303 02/17/25 1915 02/17/25  0329   CALCIUM mg/dL 9.4  --  8.9   MAGNESIUM mg/dL 2.2 2.0 2.2   PHOSPHORUS mg/dL 1.9*  --  2.9     Hepatic Panel:  Results from last 7 days   Lab Units 02/17/25 0329 02/16/25  0521 02/14/25  0502 02/13/25 0317   ALBUMIN g/dL 3.2* 2.7* 2.5* 2.4*   TOTAL PROTEIN g/dL 5.9* 6.0 5.8* 5.5*   BILIRUBIN mg/dL 0.9 0.9 1.2 0.8   BILIRUBIN DIRECT mg/dL  --  0.5* 0.8* 0.4*   AST (SGOT) U/L 90* 98* 185* 294*   ALT (SGPT) U/L 95* 115* 176* 226*   ALK PHOS U/L 151* 142* 93 62     Coagulation Labs:  Results from last 7 days   Lab Units 02/17/25 0329 02/14/25  0735   PROTIME Seconds 14.6* 16.1*   INR  1.13* 1.27*   APTT seconds  --  29.9*      Cardiac Labs:  Results from last 7 days   Lab Units 02/18/25  0303 02/11/25  2039 02/11/25  1042 02/11/25  0926   PROBNP pg/mL 10,574.0* 25,206.0*  --  13,127.0*   HSTROP T ng/L  --   --  120* 134*     Biomarkers:  Results from last 7 days   Lab Units 02/18/25  0303 02/17/25  0329 02/16/25  0521 02/14/25  0735 02/14/25  0502 02/13/25  0549 02/13/25  0317 02/12/25  1743 02/11/25  0926 02/11/25  0901   CRP mg/dL  --  7.62* 16.20*  --   --   --   --   --   --  8.21*   LACTATE mmol/L  --   --   --   --   --  2.7* 2.9* 3.6*   < > 10.0*    PROCALCITONIN ng/mL 37.48*  --  93.99*  --  >100.00*  --   --   --    < >  --    D DIMER QUANT MCGFEU/mL  --   --   --   --   --   --   --   --   --  5.87*   FIBRINOGEN mg/dL  --   --   --  889*  --   --   --   --   --   --     < > = values in this interval not displayed.       COVID-19  Lab Results   Component Value Date    COVID19 Not Detected 02/11/2025    COVID19 Not Detected 12/26/2023       Arterial Blood Gases:  Results from last 7 days   Lab Units 02/18/25  0309 02/17/25  1633 02/17/25  0348   PH, ARTERIAL pH units 7.539* 7.465* 7.450   PCO2, ARTERIAL mm Hg 30.4* 39.8 36.1   PO2 ART mm Hg 107.0 51.8* 82.9*   FIO2 % 100 100 100       Images:  XR Chest 1 View    Result Date: 2/17/2025  Impression: Similar-appearing cardiomegaly with mild pulmonary edema and small bilateral pleural effusions. Mild bibasal opacities, likely atelectasis. Support lines and tubes as above. Electronically Signed: Dashawn Tineo  2/17/2025 4:32 PM EST  Workstation ID: [id number]    XR Chest 1 View    Result Date: 2/17/2025  Impression: 1. Mildly improved pulmonary vascular congestion. 2. Persistent bibasilar atelectasis and effusion, which appears mildly increased on the left. Electronically Signed: Ibrahima Jett MD  2/17/2025 7:18 AM EST  Workstation ID: [id number]    XR Chest 1 View    Result Date: 2/16/2025  Impression: Support devices have appropriate position. Pulmonary edema with moderate pleural effusions and bibasilar airspace opacity with slight progression of pulmonary edema since previous study. Electronically Signed: Jessi Lutz MD  2/16/2025 5:35 PM EST  Workstation ID: [id number]     Echo:  Results for orders placed during the hospital encounter of 02/11/25    Adult Transthoracic Echo Complete w/ Color, Spectral and Contrast if Necessary Per Protocol    Interpretation Summary    Left ventricular systolic function is normal. Estimated left ventricular EF = 60%    Left ventricular wall thickness is consistent with mild  "concentric hypertrophy.    Mild aortic valve stenosis is present.    Aortic valve maximum pressure gradient is 23 mmHg. Aortic valve mean pressure gradient is 14 mmHg.      Results: Reviewed.  I reviewed the patient's new laboratory and imaging results.  I independently reviewed the patient's new images.    Medications: Reviewed.    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 male admitted on 2/11/2025 with weakness, dizziness, hypotension and AMS due to Septic shock [A41.9, R65.21]    CT head negative for an acute abnormality. CT chest/abdomen/pelvis performed and was remarkable to pyelonephritis, sigmoid diverticulosis, and possible cystitis.     He required intubation on the morning of 2/12/25.     Blood and urine culture with Pseudomonas and he is on Cefepime.     Comorbidities: tobacco use, alcoholic cirrhosis, metastatic squamous cell of the esophagus and HLD.    Septic Shock.  Pseudomonas bacteremia  Respiratory Failure  Intubated 02/12/25  Invasive Mechanical Ventilation   Bilateral infiltrates  Cardiovascular  Dyslipidemia   GI/Hepatology    Cirrhosis  Renal  GALILEO  Oncology  Squamous cell carcinoma of the esophagus  Nutrition Support   Tube Feeding: Formula: Peptamen 1.5 (Peptide Based, Critical Care, ALI); Feeding Type: Continuous; Start at: 20 mL/hr; Then Advance By: 20 mL/hr; Every: 12 hours; To Goal Rate of: 60 mL/hr; Total Daily Feeding Volume (mL/day): 1200; Water Flush: " O...  NG/OG Tube Orogastric 16 Fr Center mouth-Tube Feeding Rate (mL/hr): 60 mL/HR (Based on a feeding duration of 20 h/d)      Lab Results   Component Value Date    PREALBUMIN 10.3 (L) 02/17/2025    PREALBUMIN 6.1 (L) 02/16/2025    CRP 7.62 (H) 02/17/2025    CRP 16.20 (H) 02/16/2025    CRP 8.21 (H) 02/11/2025     Endocrine   Body mass index is 28.77 kg/m². Overweight: 25.0-29.9kg/m2   No history of Diabetes    Lab Results   Lab Value Date/Time    HGBA1C 4.6 (L) 12/25/2023 1925     Results from last 7 days   Lab Units 02/18/25  0533 02/17/25  2313 02/17/25  1819 02/17/25  1127 02/16/25  2338 02/16/25  1746 02/16/25  1133 02/16/25  0555   GLUCOSE mg/dL 216* 171* 146* 172* 133* 150* 150* 144*       Diet: NPO Diet NPO Type: Tube Feeding   Advance Directives: Code Status and Medical Interventions: CPR (Attempt to Resuscitate); Full Support   Ordered at: 02/12/25 1029     Level Of Support Discussed With:    Patient     Code Status (Patient has no pulse and is not breathing):    CPR (Attempt to Resuscitate)     Medical Interventions (Patient has pulse or is breathing):    Full Support        VTE Prophylaxis:  No VTE prophylaxis order currently exists.         In brief:  Decrease hydrocortisone to 50 mg BID  Start IVF D5W 100 mL/h  Continue antibiotics. PCT decreasing.  Adjust VE     Leukocytosis about same as yesterday.  BUN > 100 with normal sCr, R/O volume depletion, R/O GIB, R/O Renal.  Goal: Glucose < 180 mg/dL.  Disposition: Keep in ICU.    Plan of care and goals reviewed during interdisciplinary rounds.  I discussed the patient's findings and my recommendations with nursing staff    Time: was greater than 32 minutes. This is non-concurrent time.   (This excludes time spent performing separately reportable procedures and services).    He has a high risk of imminent or life-threatening  deterioration, which requires the highest level of physician preparedness to intervene urgently. I devoted my full attention to the  direct care of this patient for the amount of time indicated above.     Time spent with family or surrogate(s) is included only if the patient was incapable of providing the necessary information or participating in medical decision making.    He has the following organ/system impairments Respiratory Failure.        [x] Primary Attending Intensive Care Medicine - Nutrition Support   [] Consultant    Copied text in this note has been reviewed and is accurate as of 02/18/25

## 2025-02-18 NOTE — PLAN OF CARE
Goal Outcome Evaluation:      Pt arousable but unable to follow commands, does withdraw after decreasing sedation. HR 30-40's overnight, better after weaning fent + versed. L pupil > R. Fent @ 150, versed @ 5. K replaced at shift change last night, redraw this AM 3.5, replacement being given. UOP adequate. Restraints in place, bath given. -210's, NP aware of no sliding scale. No family present at beside / no updates overnight.      Problem: Restraint, Nonviolent  Goal: Absence of Harm or Injury  Outcome: Not Progressing  Intervention: Implement Least Restrictive Safety Strategies  Recent Flowsheet Documentation  Taken 2/18/2025 0400 by Jamila Cotto RN  Medical Device Protection:   IV pole/bag removed from visual field   torso covered   tubing secured  Diversional Activities: television  Taken 2/18/2025 0225 by Jamila Cotto RN  Medical Device Protection:   IV pole/bag removed from visual field   torso covered   tubing secured  Diversional Activities: television  Taken 2/18/2025 0200 by Jamila Cotto RN  Medical Device Protection:   IV pole/bag removed from visual field   torso covered   tubing secured  Diversional Activities: television  Taken 2/18/2025 0000 by Jamila Cotto RN  Medical Device Protection:   IV pole/bag removed from visual field   torso covered   tubing secured  Diversional Activities: television  Taken 2/17/2025 2200 by Jamila Cotto RN  Medical Device Protection:   IV pole/bag removed from visual field   torso covered   tubing secured  Diversional Activities: television  Taken 2/17/2025 2000 by Jamila Cotto RN  Medical Device Protection:   IV pole/bag removed from visual field   torso covered   tubing secured  Diversional Activities: television  Intervention: Protect Dignity, Rights and Personal Wellbeing  Recent Flowsheet Documentation  Taken 2/18/2025 0400 by Jamila Cotto RN  Trust Relationship/Rapport: care explained  Taken 2/18/2025 0200 by Jamila Cotto RN Trust  Relationship/Rapport: care explained  Taken 2/18/2025 0000 by Jamila Cotto RN  Trust Relationship/Rapport: care explained  Taken 2/17/2025 2200 by Jamila Cotto RN  Trust Relationship/Rapport: care explained  Taken 2/17/2025 2000 by Jamila Cotto RN  Trust Relationship/Rapport: care explained  Intervention: Protect Skin and Joint Integrity  Recent Flowsheet Documentation  Taken 2/18/2025 0400 by Jamila Cotto RN  Body Position:   right   turned   lower extremity elevated   upper extremity elevated  Skin Protection:   incontinence pads utilized   transparent dressing maintained  Taken 2/18/2025 0200 by Jamila Cotto RN  Body Position:   left   lower extremity elevated   upper extremity elevated  Skin Protection:   incontinence pads utilized   transparent dressing maintained  Taken 2/18/2025 0000 by Jamila Cotto RN  Body Position:   right   turned   lower extremity elevated   upper extremity elevated  Skin Protection:   incontinence pads utilized   transparent dressing maintained  Taken 2/17/2025 2200 by Jamila Cotto RN  Body Position:   left   turned   lower extremity elevated   upper extremity elevated  Skin Protection:   incontinence pads utilized   transparent dressing maintained  Taken 2/17/2025 2000 by Jamila Cotto RN  Body Position:   right   turned   lower extremity elevated   upper extremity elevated  Skin Protection:   incontinence pads utilized   transparent dressing maintained

## 2025-02-18 NOTE — PLAN OF CARE
Problem: Adult Inpatient Plan of Care  Goal: Plan of Care Review  Outcome: Not Progressing  Flowsheets (Taken 2/17/2025 1922)  Progress: declining  Outcome Evaluation: Pt Fi02 weaned this AM from 100% to 60%, PEEP of 11.  This afternoon, pt O2 saturation dropped to 78%, FiO2 increased to 100% without improvement.  Peak pressures in the low to mid 40's, sedation increased- no improvement.  Pt required manual ventilation with the ambu bag.  Pt bronched by provider @ bedside, post bronch pt FiO2 100%, PEEP 12, RR increased to 25, TV increased to 560.  Blood gas revealed pO2 51.8.  Peak pressures remained high in the low to mid 40's.  Provider aware.  Spo2 currently 100%, peak pressures improved to mid-high 30's.  Pt diuresed today, 4.5 L UOP.  BM x1.  TF infusing per orders.  Tmax 37.7.  Restraints remain in place.  Pt opens eyes to verbal and tactile stimuation, but does not follow commands.  Plan of Care Reviewed With: patient  Goal: Patient-Specific Goal (Individualized)  Outcome: Not Progressing  Goal: Absence of Hospital-Acquired Illness or Injury  Outcome: Not Progressing  Intervention: Identify and Manage Fall Risk  Recent Flowsheet Documentation  Taken 2/17/2025 1800 by Joy Mclain, RN  Safety Promotion/Fall Prevention:   activity supervised   clutter free environment maintained   fall prevention program maintained   nonskid shoes/slippers when out of bed   room organization consistent   safety round/check completed  Taken 2/17/2025 1600 by Joy Mclain, RN  Safety Promotion/Fall Prevention:   activity supervised   clutter free environment maintained   fall prevention program maintained   nonskid shoes/slippers when out of bed   room organization consistent   safety round/check completed  Taken 2/17/2025 1400 by Joy Mclain, RN  Safety Promotion/Fall Prevention:   activity supervised   clutter free environment maintained   fall prevention program maintained   nonskid  shoes/slippers when out of bed   room organization consistent   safety round/check completed  Taken 2/17/2025 1200 by Joy Mclain RN  Safety Promotion/Fall Prevention:   activity supervised   clutter free environment maintained   fall prevention program maintained   nonskid shoes/slippers when out of bed   room organization consistent   safety round/check completed  Taken 2/17/2025 1000 by Joy Mclain RN  Safety Promotion/Fall Prevention:   activity supervised   clutter free environment maintained   fall prevention program maintained   nonskid shoes/slippers when out of bed   room organization consistent   safety round/check completed  Taken 2/17/2025 0800 by Joy Mclain RN  Safety Promotion/Fall Prevention:   activity supervised   clutter free environment maintained   fall prevention program maintained   nonskid shoes/slippers when out of bed   room organization consistent   safety round/check completed  Intervention: Prevent Skin Injury  Recent Flowsheet Documentation  Taken 2/17/2025 1800 by Joy Mclain RN  Body Position:   turned   left   heels elevated   lower extremity elevated   upper extremity elevated  Skin Protection:   incontinence pads utilized   silicone foam dressing in place  Taken 2/17/2025 1600 by Joy Mclain RN  Body Position:   neutral body alignment   neutral head position   supine   sitting up in bed  Skin Protection:   incontinence pads utilized   protective footwear used   silicone foam dressing in place  Taken 2/17/2025 1400 by Joy Mclain RN  Body Position:   turned   right   lower extremity elevated   upper extremity elevated   heels elevated  Taken 2/17/2025 1200 by Joy Mclain RN  Body Position:   turned   left   heels elevated   lower extremity elevated   upper extremity elevated  Skin Protection:   incontinence pads utilized   silicone foam dressing in place   protective footwear  used  Taken 2/17/2025 1000 by Jyo Mclain RN  Body Position:   turned   right   heels elevated   lower extremity elevated   upper extremity elevated  Skin Protection:   incontinence pads utilized   protective footwear used   silicone foam dressing in place  Taken 2/17/2025 0800 by Joy Mclain RN  Body Position:   turned   left   heels elevated   lower extremity elevated   upper extremity elevated  Skin Protection:   incontinence pads utilized   protective footwear used   pulse oximeter probe site changed   silicone foam dressing in place  Intervention: Prevent and Manage VTE (Venous Thromboembolism) Risk  Recent Flowsheet Documentation  Taken 2/17/2025 0800 by Joy Mclain RN  VTE Prevention/Management:   bilateral   SCDs (sequential compression devices) on  Intervention: Prevent Infection  Recent Flowsheet Documentation  Taken 2/17/2025 1800 by Joy Mclain RN  Infection Prevention:   environmental surveillance performed   visitors restricted/screened   single patient room provided   rest/sleep promoted   personal protective equipment utilized  Taken 2/17/2025 1600 by Joy Mclain RN  Infection Prevention:   environmental surveillance performed   visitors restricted/screened   single patient room provided   rest/sleep promoted   personal protective equipment utilized  Taken 2/17/2025 1400 by Joy Mclain RN  Infection Prevention:   environmental surveillance performed   visitors restricted/screened   single patient room provided   rest/sleep promoted   personal protective equipment utilized  Taken 2/17/2025 1200 by Joy Mclain RN  Infection Prevention:   environmental surveillance performed   single patient room provided   rest/sleep promoted   personal protective equipment utilized   hand hygiene promoted   equipment surfaces disinfected  Taken 2/17/2025 0800 by Joy Mclain RN  Infection Prevention:    environmental surveillance performed   single patient room provided   rest/sleep promoted   personal protective equipment utilized   hand hygiene promoted   equipment surfaces disinfected  Goal: Optimal Comfort and Wellbeing  Outcome: Not Progressing  Intervention: Monitor Pain and Promote Comfort  Recent Flowsheet Documentation  Taken 2/17/2025 1515 by Joy Mclain, RN  Pain Management Interventions:   pain medication given   position adjusted  Taken 2/17/2025 1138 by Joy Mclain, RN  Pain Management Interventions: (fentanyl bolus) pain medication given  Intervention: Provide Person-Centered Care  Recent Flowsheet Documentation  Taken 2/17/2025 1400 by Joy Mclain, RN  Trust Relationship/Rapport:   reassurance provided   care explained  Taken 2/17/2025 1200 by Joy Mclain, SUE  Trust Relationship/Rapport:   care explained   reassurance provided  Taken 2/17/2025 0800 by Joy Mclain, SUE  Trust Relationship/Rapport:   care explained   reassurance provided  Goal: Readiness for Transition of Care  Outcome: Not Progressing   Goal Outcome Evaluation:  Plan of Care Reviewed With: patient        Progress: declining  Outcome Evaluation: Pt Fi02 weaned this AM from 100% to 60%, PEEP of 11.  This afternoon, pt O2 saturation dropped to 78%, FiO2 increased to 100% without improvement.  Peak pressures in the low to mid 40's, sedation increased- no improvement.  Pt required manual ventilation with the ambu bag.  Pt bronched by provider @ bedside, post bronch pt FiO2 100%, PEEP 12, RR increased to 25, TV increased to 560.  Blood gas revealed pO2 51.8.  Peak pressures remained high in the low to mid 40's.  Provider aware.  Spo2 currently 100%, peak pressures improved to mid-high 30's.  Pt diuresed today, 4.5 L UOP.  BM x1.  TF infusing per orders.  Tmax 37.7.  Restraints remain in place.  Pt opens eyes to verbal and tactile stimuation, but does not follow commands.

## 2025-02-18 NOTE — PLAN OF CARE
Problem: Adult Inpatient Plan of Care  Goal: Plan of Care Review  Outcome: Progressing  Flowsheets (Taken 2/18/2025 1816)  Progress: improving  Outcome Evaluation: Pt remains on mechanical ventilation/sedation.  PEEP weaned from 12 to 10, FiO2 weaned from 100% to 50%.  Fentanyl @ 200, versed @ 2, D5W added d/t increasing sodium- infusing @ 100. Pt opens eyes, but does not visually track.  Will not follow commands, but will withdrawal from pain.  Tmax 37.5 this shift.  No BM, UOP 1195.  Restraints remain in place.  Plan of Care Reviewed With: patient  Goal: Patient-Specific Goal (Individualized)  Outcome: Progressing  Goal: Absence of Hospital-Acquired Illness or Injury  Outcome: Progressing  Intervention: Identify and Manage Fall Risk  Recent Flowsheet Documentation  Taken 2/18/2025 1800 by Joy Mlcain, RN  Safety Promotion/Fall Prevention:   activity supervised   clutter free environment maintained   fall prevention program maintained   nonskid shoes/slippers when out of bed   room organization consistent   safety round/check completed  Taken 2/18/2025 1600 by Joy Mclain, RN  Safety Promotion/Fall Prevention:   activity supervised   clutter free environment maintained   fall prevention program maintained   nonskid shoes/slippers when out of bed   room organization consistent   safety round/check completed  Taken 2/18/2025 1400 by Joy Mclain, RN  Safety Promotion/Fall Prevention:   activity supervised   clutter free environment maintained   fall prevention program maintained   nonskid shoes/slippers when out of bed   room organization consistent   safety round/check completed  Taken 2/18/2025 1200 by Joy Mclain, RN  Safety Promotion/Fall Prevention:   activity supervised   clutter free environment maintained   fall prevention program maintained   nonskid shoes/slippers when out of bed   room organization consistent   safety round/check completed  Taken  2/18/2025 1000 by Joy Mclain RN  Safety Promotion/Fall Prevention:   activity supervised   clutter free environment maintained   fall prevention program maintained   nonskid shoes/slippers when out of bed   room organization consistent   safety round/check completed  Taken 2/18/2025 0800 by Joy Mclain RN  Safety Promotion/Fall Prevention:   activity supervised   clutter free environment maintained   fall prevention program maintained   nonskid shoes/slippers when out of bed   room organization consistent   safety round/check completed  Intervention: Prevent Skin Injury  Recent Flowsheet Documentation  Taken 2/18/2025 1800 by Joy Mclain RN  Body Position:   turned   left   heels elevated   lower extremity elevated   upper extremity elevated  Skin Protection:   incontinence pads utilized   silicone foam dressing in place   protective footwear used  Taken 2/18/2025 1600 by Joy Mclain RN  Body Position:   turned   heels elevated   lower extremity elevated   neutral body alignment   neutral head position   squatting   upper extremity elevated  Skin Protection:   incontinence pads utilized   protective footwear used   silicone foam dressing in place  Taken 2/18/2025 1400 by Joy Mclain RN  Body Position:   turned   left   heels elevated   lower extremity elevated   upper extremity elevated  Skin Protection:   incontinence pads utilized   protective footwear used   silicone foam dressing in place  Taken 2/18/2025 1200 by Joy Mclain RN  Body Position:   turned   heels elevated   lower extremity elevated   neutral body alignment   neutral head position   upper extremity elevated  Skin Protection:   incontinence pads utilized   silicone foam dressing in place   protective footwear used  Taken 2/18/2025 1000 by Joy Mclain RN  Body Position:   turned   right   heels elevated   lower extremity elevated   upper extremity  elevated  Skin Protection:   incontinence pads utilized   silicone foam dressing in place   pulse oximeter probe site changed   protective footwear used  Taken 2/18/2025 0800 by Joy Mclain RN  Body Position:   turned   lower extremity elevated   neutral body alignment   neutral head position   heels elevated   upper extremity elevated  Skin Protection:   incontinence pads utilized   silicone foam dressing in place   pulse oximeter probe site changed   protective footwear used  Intervention: Prevent and Manage VTE (Venous Thromboembolism) Risk  Recent Flowsheet Documentation  Taken 2/18/2025 0800 by Joy Mclain RN  VTE Prevention/Management:   bilateral   SCDs (sequential compression devices) on  Intervention: Prevent Infection  Recent Flowsheet Documentation  Taken 2/18/2025 1800 by Joy Mclain RN  Infection Prevention:   environmental surveillance performed   visitors restricted/screened   single patient room provided   rest/sleep promoted   personal protective equipment utilized   hand hygiene promoted  Taken 2/18/2025 1600 by Joy Mclain RN  Infection Prevention:   environmental surveillance performed   visitors restricted/screened   single patient room provided   rest/sleep promoted   personal protective equipment utilized  Taken 2/18/2025 1400 by Joy Mclain RN  Infection Prevention:   environmental surveillance performed   visitors restricted/screened   single patient room provided   rest/sleep promoted   personal protective equipment utilized  Taken 2/18/2025 1200 by Joy Mclain RN  Infection Prevention:   environmental surveillance performed   visitors restricted/screened   single patient room provided   rest/sleep promoted   personal protective equipment utilized   hand hygiene promoted  Taken 2/18/2025 1000 by Joy Mclain RN  Infection Prevention:   environmental surveillance performed   visitors  restricted/screened   single patient room provided   rest/sleep promoted   personal protective equipment utilized  Taken 2/18/2025 0800 by Joy Mclain, RN  Infection Prevention:   environmental surveillance performed   visitors restricted/screened   single patient room provided   rest/sleep promoted   personal protective equipment utilized   hand hygiene promoted   equipment surfaces disinfected  Goal: Optimal Comfort and Wellbeing  Outcome: Progressing  Intervention: Monitor Pain and Promote Comfort  Recent Flowsheet Documentation  Taken 2/18/2025 1010 by Joy Mclain RN  Pain Management Interventions: pain medication given  Intervention: Provide Person-Centered Care  Recent Flowsheet Documentation  Taken 2/18/2025 1800 by Joy Mclain RN  Trust Relationship/Rapport:   care explained   reassurance provided  Taken 2/18/2025 1600 by Joy Mclain RN  Trust Relationship/Rapport:   care explained   reassurance provided  Taken 2/18/2025 1400 by Joy Mclain RN  Trust Relationship/Rapport:   care explained   reassurance provided  Taken 2/18/2025 1200 by Joy Mclain RN  Trust Relationship/Rapport:   care explained   reassurance provided  Taken 2/18/2025 1000 by Joy Mclain RN  Trust Relationship/Rapport:   care explained   reassurance provided  Taken 2/18/2025 0800 by Joy Mclain RN  Trust Relationship/Rapport:   care explained   reassurance provided  Goal: Readiness for Transition of Care  Outcome: Progressing     Problem: Restraint, Nonviolent  Goal: Absence of Harm or Injury  Outcome: Progressing  Intervention: Implement Least Restrictive Safety Strategies  Recent Flowsheet Documentation  Taken 2/18/2025 1800 by Joy Mclain RN  Medical Device Protection:   IV pole/bag removed from visual field   torso covered   tubing secured  Diversional Activities: television  Taken 2/18/2025 1600 by Joy Mclain  SUE Anderson  Medical Device Protection:   IV pole/bag removed from visual field   torso covered   tubing secured  Diversional Activities: television  Taken 2/18/2025 1400 by Joy Mclain RN  Medical Device Protection:   IV pole/bag removed from visual field   torso covered   tubing secured  Diversional Activities: television  Taken 2/18/2025 1200 by Joy Mclain RN  Medical Device Protection:   IV pole/bag removed from visual field   torso covered   tubing secured  Diversional Activities: television  Taken 2/18/2025 1000 by Joy Mclain RN  Medical Device Protection:   IV pole/bag removed from visual field   torso covered   tubing secured  Diversional Activities: television  Taken 2/18/2025 0800 by Joy Mclain RN  Medical Device Protection:   IV pole/bag removed from visual field   torso covered   tubing secured  Diversional Activities: television  Intervention: Protect Dignity, Rights and Personal Wellbeing  Recent Flowsheet Documentation  Taken 2/18/2025 1800 by Joy Mclain RN  Trust Relationship/Rapport:   care explained   reassurance provided  Taken 2/18/2025 1600 by Joy Mclain RN  Trust Relationship/Rapport:   care explained   reassurance provided  Taken 2/18/2025 1400 by Joy Mclain RN  Trust Relationship/Rapport:   care explained   reassurance provided  Taken 2/18/2025 1200 by Joy Mclain RN  Trust Relationship/Rapport:   care explained   reassurance provided  Taken 2/18/2025 1000 by Joy Mclain RN  Trust Relationship/Rapport:   care explained   reassurance provided  Taken 2/18/2025 0800 by Joy Mclain RN  Trust Relationship/Rapport:   care explained   reassurance provided  Intervention: Protect Skin and Joint Integrity  Recent Flowsheet Documentation  Taken 2/18/2025 1800 by Joy Mclain RN  Body Position:   turned   left   heels elevated   lower extremity  elevated   upper extremity elevated  Skin Protection:   incontinence pads utilized   silicone foam dressing in place   protective footwear used  Range of Motion: ROM (range of motion) performed  Taken 2/18/2025 1600 by Joy Mclain RN  Body Position:   turned   heels elevated   lower extremity elevated   neutral body alignment   neutral head position   squatting   upper extremity elevated  Skin Protection:   incontinence pads utilized   protective footwear used   silicone foam dressing in place  Range of Motion: ROM (range of motion) performed  Taken 2/18/2025 1400 by Joy Mclain RN  Body Position:   turned   left   heels elevated   lower extremity elevated   upper extremity elevated  Skin Protection:   incontinence pads utilized   protective footwear used   silicone foam dressing in place  Range of Motion: ROM (range of motion) performed  Taken 2/18/2025 1200 by Joy Mclain RN  Body Position:   turned   heels elevated   lower extremity elevated   neutral body alignment   neutral head position   upper extremity elevated  Skin Protection:   incontinence pads utilized   silicone foam dressing in place   protective footwear used  Range of Motion: ROM (range of motion) performed  Taken 2/18/2025 1000 by Joy Mclain RN  Body Position:   turned   right   heels elevated   lower extremity elevated   upper extremity elevated  Skin Protection:   incontinence pads utilized   silicone foam dressing in place   pulse oximeter probe site changed   protective footwear used  Range of Motion: ROM (range of motion) performed  Taken 2/18/2025 0800 by Joy Mclain RN  Body Position:   turned   lower extremity elevated   neutral body alignment   neutral head position   heels elevated   upper extremity elevated  Skin Protection:   incontinence pads utilized   silicone foam dressing in place   pulse oximeter probe site changed   protective footwear used  Range of Motion:  ROM (range of motion) performed     Problem: Mechanical Ventilation Invasive  Goal: Effective Communication  Outcome: Progressing  Intervention: Ensure Effective Communication  Recent Flowsheet Documentation  Taken 2/18/2025 1800 by Joy Mclain RN  Trust Relationship/Rapport:   care explained   reassurance provided  Diversional Activities: television  Taken 2/18/2025 1600 by Joy Mclain RN  Trust Relationship/Rapport:   care explained   reassurance provided  Diversional Activities: television  Taken 2/18/2025 1400 by Joy Mclain RN  Trust Relationship/Rapport:   care explained   reassurance provided  Diversional Activities: television  Taken 2/18/2025 1200 by Joy Mclain RN  Trust Relationship/Rapport:   care explained   reassurance provided  Diversional Activities: television  Taken 2/18/2025 1000 by Joy Mclain RN  Trust Relationship/Rapport:   care explained   reassurance provided  Diversional Activities: television  Taken 2/18/2025 0800 by Joy Mclain RN  Trust Relationship/Rapport:   care explained   reassurance provided  Diversional Activities: television  Goal: Optimal Device Function  Outcome: Progressing  Intervention: Optimize Device Care and Function  Recent Flowsheet Documentation  Taken 2/18/2025 1800 by Joy Mclain RN  Airway Safety Measures:   manual resuscitator/mask at bedside   oxygen flowmeter at bedside   suction at bedside  Taken 2/18/2025 1600 by Joy Mclain RN  Oral Care:   tongue brushed   teeth brushed - suction toothbrush   swabbed with antiseptic solution   suction provided   lip/mouth moisturizer applied  Airway Safety Measures:   manual resuscitator/mask at bedside   oxygen flowmeter at bedside   suction at bedside  Taken 2/18/2025 1400 by Joy Mclain RN  Oral Care:   tongue brushed   teeth brushed - suction toothbrush   swabbed with antiseptic solution   suction  provided   lip/mouth moisturizer applied  Airway Safety Measures:   manual resuscitator/mask at bedside   suction at bedside   oxygen flowmeter at bedside  Taken 2/18/2025 1200 by Joy Mclain, RN  Oral Care:   tongue brushed   teeth brushed - suction toothbrush   swabbed with antiseptic solution   suction provided   lip/mouth moisturizer applied  Airway Safety Measures:   manual resuscitator/mask at bedside   oxygen flowmeter at bedside   suction at bedside  Taken 2/18/2025 1000 by Joy Mclain, RN  Airway Safety Measures:   manual resuscitator/mask at bedside   suction at bedside   oxygen flowmeter at bedside  Taken 2/18/2025 0800 by Joy Mclain, RN  Airway/Ventilation Management: airway patency maintained  Oral Care:   tongue brushed   teeth brushed - suction toothbrush   swabbed with antiseptic solution   suction provided   lip/mouth moisturizer applied  Airway Safety Measures:   manual resuscitator/mask at bedside   suction at bedside   oxygen flowmeter at bedside  Goal: Mechanical Ventilation Liberation  Outcome: Progressing  Intervention: Promote Extubation and Mechanical Ventilation Liberation  Recent Flowsheet Documentation  Taken 2/18/2025 1800 by Joy Mclain, RN  Medication Review/Management: medications reviewed  Taken 2/18/2025 1600 by Joy Mclain, RN  Medication Review/Management: medications reviewed  Taken 2/18/2025 1400 by Joy Mclain, RN  Medication Review/Management: medications reviewed  Taken 2/18/2025 1200 by Joy Mclain, RN  Medication Review/Management: medications reviewed  Taken 2/18/2025 1000 by Joy Mclain, RN  Medication Review/Management: medications reviewed  Taken 2/18/2025 0800 by Joy Mclain, RN  Medication Review/Management: medications reviewed  Goal: Optimal Nutrition Delivery  Outcome: Progressing  Goal: Absence of Device-Related Skin and Tissue Injury  Outcome:  Progressing  Intervention: Maintain Skin and Tissue Health  Recent Flowsheet Documentation  Taken 2/18/2025 1800 by Joy Mclain RN  Device Skin Pressure Protection:   absorbent pad utilized/changed   adhesive use limited   positioning supports utilized   pressure points protected  Taken 2/18/2025 1600 by Joy Mclain RN  Device Skin Pressure Protection:   absorbent pad utilized/changed   adhesive use limited   positioning supports utilized   pressure points protected  Taken 2/18/2025 1400 by Joy Mclain RN  Device Skin Pressure Protection:   absorbent pad utilized/changed   adhesive use limited   positioning supports utilized   pressure points protected  Taken 2/18/2025 1200 by Joy Mclain RN  Device Skin Pressure Protection:   absorbent pad utilized/changed   adhesive use limited   positioning supports utilized   pressure points protected   skin-to-device areas padded  Taken 2/18/2025 1000 by Joy Mclain RN  Device Skin Pressure Protection:   absorbent pad utilized/changed   adhesive use limited   positioning supports utilized   pressure points protected  Taken 2/18/2025 0800 by Joy Mclain RN  Device Skin Pressure Protection:   absorbent pad utilized/changed   adhesive use limited   positioning supports utilized   pressure points protected   skin-to-device areas padded  Goal: Absence of Ventilator-Induced Lung Injury  Outcome: Progressing  Intervention: Facilitate Lung-Protection Measures  Recent Flowsheet Documentation  Taken 2/18/2025 0800 by Joy Mclain RN  Lung Protection Measures:   fluid excess minimized   low inspiratory pressure provided   low tidal volume provided   lung compliance monitored   optimal PEEP applied   plateau/inspiratory pressure monitored   ventilator synchrony promoted   ventilator waveforms monitored  Intervention: Prevent Ventilator-Associated Pneumonia  Recent Flowsheet Documentation  Taken  2/18/2025 1800 by Joy Mclain RN  Head of Bed ([facility]) Positioning: [facility] at 30 degrees  Taken 2/18/2025 1600 by Joy Mclain RN  Head of Bed ([facility]) Positioning: HOB at 30 degrees  Oral Care:   tongue brushed   teeth brushed - suction toothbrush   swabbed with antiseptic solution   suction provided   lip/mouth moisturizer applied  Taken 2/18/2025 1400 by Joy Mclain RN  Head of Bed ([facility]) Positioning: HOB at 30 degrees  Oral Care:   tongue brushed   teeth brushed - suction toothbrush   swabbed with antiseptic solution   suction provided   lip/mouth moisturizer applied  Taken 2/18/2025 1200 by Joy Mclain RN  Head of Bed ([facility]) Positioning: HOB at 30 degrees  Oral Care:   tongue brushed   teeth brushed - suction toothbrush   swabbed with antiseptic solution   suction provided   lip/mouth moisturizer applied  Taken 2/18/2025 1000 by Joy Mclain RN  Head of Bed ([facility]) Positioning: HOB at 30 degrees  Taken 2/18/2025 0800 by Joy Mclain RN  Head of Bed ([facility]) Positioning: HOB at 30 degrees  VAP Prevention Bundle:   HOB elevation maintained   oral care regularly provided   readiness to extubate assessed   stress ulcer prophylaxis provided   vent circuit breaks minimized   VTE prophylaxis provided  VAP Prevention Contraindications: condition unstable  VAP Prevention Measures: not completed (see contraindications)  Oral Care:   tongue brushed   teeth brushed - suction toothbrush   swabbed with antiseptic solution   suction provided   lip/mouth moisturizer applied   Goal Outcome Evaluation:  Plan of Care Reviewed With: patient        Progress: improving  Outcome Evaluation: Pt remains on mechanical ventilation/sedation.  PEEP weaned from 12 to 10, FiO2 weaned from 100% to 50%.  Fentanyl @ 200, versed @ 2, D5W added d/t increasing sodium- infusing @ 100. Pt opens eyes, but does not visually track.  Will not follow commands, but will withdrawal from pain.   Tmax 37.5 this shift.  No BM, UOP 1195.  Restraints remain in place.

## 2025-02-19 ENCOUNTER — APPOINTMENT (OUTPATIENT)
Dept: GENERAL RADIOLOGY | Facility: HOSPITAL | Age: 65
DRG: 870 | End: 2025-02-19
Payer: MEDICARE

## 2025-02-19 LAB
ANION GAP SERPL CALCULATED.3IONS-SCNC: 6 MMOL/L (ref 5–15)
ARTERIAL PATENCY WRIST A: ABNORMAL
ATMOSPHERIC PRESS: ABNORMAL MM[HG]
BACTERIA SPEC AEROBE CULT: NORMAL
BACTERIA SPEC AEROBE CULT: NORMAL
BACTERIA SPEC RESP CULT: ABNORMAL
BACTERIA SPEC RESP CULT: ABNORMAL
BASE EXCESS BLDA CALC-SCNC: 3.2 MMOL/L (ref 0–2)
BASOPHILS # BLD AUTO: 0.12 10*3/MM3 (ref 0–0.2)
BASOPHILS NFR BLD AUTO: 0.5 % (ref 0–1.5)
BDY SITE: ABNORMAL
BODY TEMPERATURE: 37
BUN SERPL-MCNC: 86 MG/DL (ref 8–23)
BUN/CREAT SERPL: 77.5 (ref 7–25)
CALCIUM SPEC-SCNC: 8.7 MG/DL (ref 8.6–10.5)
CHLORIDE SERPL-SCNC: 118 MMOL/L (ref 98–107)
CO2 BLDA-SCNC: 28.6 MMOL/L (ref 22–33)
CO2 SERPL-SCNC: 28 MMOL/L (ref 22–29)
COHGB MFR BLD: 1.1 % (ref 0–2)
CREAT SERPL-MCNC: 1.11 MG/DL (ref 0.76–1.27)
DEPRECATED RDW RBC AUTO: 59.3 FL (ref 37–54)
EGFRCR SERPLBLD CKD-EPI 2021: 73.7 ML/MIN/1.73
EOSINOPHIL # BLD AUTO: 0.13 10*3/MM3 (ref 0–0.4)
EOSINOPHIL NFR BLD AUTO: 0.6 % (ref 0.3–6.2)
EPAP: 0
ERYTHROCYTE [DISTWIDTH] IN BLOOD BY AUTOMATED COUNT: 17.2 % (ref 12.3–15.4)
GLUCOSE BLDC GLUCOMTR-MCNC: 140 MG/DL (ref 70–130)
GLUCOSE BLDC GLUCOMTR-MCNC: 155 MG/DL (ref 70–130)
GLUCOSE BLDC GLUCOMTR-MCNC: 160 MG/DL (ref 70–130)
GLUCOSE BLDC GLUCOMTR-MCNC: 167 MG/DL (ref 70–130)
GLUCOSE BLDC GLUCOMTR-MCNC: 187 MG/DL (ref 70–130)
GLUCOSE SERPL-MCNC: 161 MG/DL (ref 65–99)
GRAM STN SPEC: ABNORMAL
HCO3 BLDA-SCNC: 27.4 MMOL/L (ref 20–26)
HCT VFR BLD AUTO: 34.1 % (ref 37.5–51)
HCT VFR BLD CALC: 31.6 % (ref 38–51)
HGB BLD-MCNC: 10.7 G/DL (ref 13–17.7)
HGB BLDA-MCNC: 10.3 G/DL (ref 13.5–17.5)
IMM GRANULOCYTES # BLD AUTO: 1.73 10*3/MM3 (ref 0–0.05)
IMM GRANULOCYTES NFR BLD AUTO: 7.8 % (ref 0–0.5)
INHALED O2 CONCENTRATION: 50 %
IPAP: 0
LYMPHOCYTES # BLD AUTO: 0.96 10*3/MM3 (ref 0.7–3.1)
LYMPHOCYTES NFR BLD AUTO: 4.3 % (ref 19.6–45.3)
MAGNESIUM SERPL-MCNC: 1.9 MG/DL (ref 1.6–2.4)
MCH RBC QN AUTO: 30.7 PG (ref 26.6–33)
MCHC RBC AUTO-ENTMCNC: 31.4 G/DL (ref 31.5–35.7)
MCV RBC AUTO: 98 FL (ref 79–97)
METHGB BLD QL: 0.2 % (ref 0–1.5)
MODALITY: ABNORMAL
MONOCYTES # BLD AUTO: 1.5 10*3/MM3 (ref 0.1–0.9)
MONOCYTES NFR BLD AUTO: 6.7 % (ref 5–12)
NEUTROPHILS NFR BLD AUTO: 17.8 10*3/MM3 (ref 1.7–7)
NEUTROPHILS NFR BLD AUTO: 80.1 % (ref 42.7–76)
NRBC BLD AUTO-RTO: 0.4 /100 WBC (ref 0–0.2)
OXYHGB MFR BLDV: 94.9 % (ref 94–99)
PAW @ PEAK INSP FLOW SETTING VENT: 0 CMH2O
PCO2 BLDA: 39.2 MM HG (ref 35–45)
PCO2 TEMP ADJ BLD: 39.2 MM HG (ref 35–48)
PEEP RESPIRATORY: 10 CM[H2O]
PH BLDA: 7.45 PH UNITS (ref 7.35–7.45)
PH, TEMP CORRECTED: 7.45 PH UNITS
PHOSPHATE SERPL-MCNC: 2.5 MG/DL (ref 2.5–4.5)
PLATELET # BLD AUTO: 91 10*3/MM3 (ref 140–450)
PMV BLD AUTO: 12.4 FL (ref 6–12)
PO2 BLDA: 79.2 MM HG (ref 83–108)
PO2 TEMP ADJ BLD: 79.2 MM HG (ref 83–108)
POTASSIUM SERPL-SCNC: 3.9 MMOL/L (ref 3.5–5.2)
RBC # BLD AUTO: 3.48 10*6/MM3 (ref 4.14–5.8)
RBC MORPH BLD: NORMAL
SMALL PLATELETS BLD QL SMEAR: NORMAL
SODIUM SERPL-SCNC: 152 MMOL/L (ref 136–145)
TOTAL RATE: 15 BREATHS/MINUTE
VENTILATOR MODE: ABNORMAL
VT ON VENT VENT: 0.47 ML
WBC MORPH BLD: NORMAL
WBC NRBC COR # BLD AUTO: 22.24 10*3/MM3 (ref 3.4–10.8)

## 2025-02-19 PROCEDURE — 80048 BASIC METABOLIC PNL TOTAL CA: CPT | Performed by: INTERNAL MEDICINE

## 2025-02-19 PROCEDURE — 25010000002 ENOXAPARIN PER 10 MG: Performed by: INTERNAL MEDICINE

## 2025-02-19 PROCEDURE — 36600 WITHDRAWAL OF ARTERIAL BLOOD: CPT

## 2025-02-19 PROCEDURE — 94003 VENT MGMT INPAT SUBQ DAY: CPT

## 2025-02-19 PROCEDURE — 82948 REAGENT STRIP/BLOOD GLUCOSE: CPT

## 2025-02-19 PROCEDURE — 25010000002 CEFEPIME PER 500 MG: Performed by: INTERNAL MEDICINE

## 2025-02-19 PROCEDURE — 25010000003 DEXTROSE 5 % SOLUTION: Performed by: INTERNAL MEDICINE

## 2025-02-19 PROCEDURE — 94002 VENT MGMT INPAT INIT DAY: CPT

## 2025-02-19 PROCEDURE — 83050 HGB METHEMOGLOBIN QUAN: CPT

## 2025-02-19 PROCEDURE — 85025 COMPLETE CBC W/AUTO DIFF WBC: CPT | Performed by: INTERNAL MEDICINE

## 2025-02-19 PROCEDURE — 82375 ASSAY CARBOXYHB QUANT: CPT

## 2025-02-19 PROCEDURE — 94799 UNLISTED PULMONARY SVC/PX: CPT

## 2025-02-19 PROCEDURE — 83735 ASSAY OF MAGNESIUM: CPT | Performed by: INTERNAL MEDICINE

## 2025-02-19 PROCEDURE — 25010000002 FENTANYL 10 MCG/1 ML NS: Performed by: INTERNAL MEDICINE

## 2025-02-19 PROCEDURE — 84100 ASSAY OF PHOSPHORUS: CPT | Performed by: INTERNAL MEDICINE

## 2025-02-19 PROCEDURE — 85007 BL SMEAR W/DIFF WBC COUNT: CPT | Performed by: INTERNAL MEDICINE

## 2025-02-19 PROCEDURE — 25010000002 MIDAZOLAM 1 MG/ML 100ML NS 100 MG/100ML SOLUTION: Performed by: INTERNAL MEDICINE

## 2025-02-19 PROCEDURE — 99291 CRITICAL CARE FIRST HOUR: CPT | Performed by: INTERNAL MEDICINE

## 2025-02-19 PROCEDURE — 71045 X-RAY EXAM CHEST 1 VIEW: CPT

## 2025-02-19 PROCEDURE — 82805 BLOOD GASES W/O2 SATURATION: CPT

## 2025-02-19 PROCEDURE — 25010000002 HYDROCORTISONE SOD SUC (PF) 100 MG RECONSTITUTED SOLUTION: Performed by: INTERNAL MEDICINE

## 2025-02-19 RX ORDER — ENOXAPARIN SODIUM 100 MG/ML
40 INJECTION SUBCUTANEOUS EVERY 24 HOURS
Status: DISCONTINUED | OUTPATIENT
Start: 2025-02-19 | End: 2025-03-05

## 2025-02-19 RX ADMIN — DEXTROSE MONOHYDRATE 100 ML/HR: 5 INJECTION, SOLUTION INTRAVENOUS at 07:07

## 2025-02-19 RX ADMIN — Medication 225 MCG/HR: at 20:13

## 2025-02-19 RX ADMIN — CEFEPIME 2000 MG: 2 INJECTION, POWDER, FOR SOLUTION INTRAVENOUS at 07:29

## 2025-02-19 RX ADMIN — HYDROCORTISONE SODIUM SUCCINATE 50 MG: 100 INJECTION, POWDER, FOR SOLUTION INTRAMUSCULAR; INTRAVENOUS at 05:52

## 2025-02-19 RX ADMIN — ENOXAPARIN SODIUM 40 MG: 100 INJECTION SUBCUTANEOUS at 13:44

## 2025-02-19 RX ADMIN — DEXTROSE MONOHYDRATE 100 ML/HR: 5 INJECTION, SOLUTION INTRAVENOUS at 16:20

## 2025-02-19 RX ADMIN — LACTULOSE 20 G: 20 SOLUTION ORAL at 07:33

## 2025-02-19 RX ADMIN — Medication 10 ML: at 21:45

## 2025-02-19 RX ADMIN — CHLORHEXIDINE GLUCONATE 0.12% ORAL RINSE 15 ML: 1.2 LIQUID ORAL at 21:45

## 2025-02-19 RX ADMIN — PANTOPRAZOLE SODIUM 40 MG: 40 INJECTION, POWDER, FOR SOLUTION INTRAVENOUS at 21:45

## 2025-02-19 RX ADMIN — GABAPENTIN 300 MG: 300 CAPSULE ORAL at 05:52

## 2025-02-19 RX ADMIN — DOCUSATE SODIUM 100 MG: 50 LIQUID ORAL at 07:29

## 2025-02-19 RX ADMIN — GABAPENTIN 300 MG: 300 CAPSULE ORAL at 13:44

## 2025-02-19 RX ADMIN — DOCUSATE SODIUM 100 MG: 50 LIQUID ORAL at 21:45

## 2025-02-19 RX ADMIN — CHLORHEXIDINE GLUCONATE 0.12% ORAL RINSE 15 ML: 1.2 LIQUID ORAL at 07:29

## 2025-02-19 RX ADMIN — CEFEPIME 2000 MG: 2 INJECTION, POWDER, FOR SOLUTION INTRAVENOUS at 23:56

## 2025-02-19 RX ADMIN — PANTOPRAZOLE SODIUM 40 MG: 40 INJECTION, POWDER, FOR SOLUTION INTRAVENOUS at 07:29

## 2025-02-19 RX ADMIN — GABAPENTIN 300 MG: 300 CAPSULE ORAL at 21:45

## 2025-02-19 RX ADMIN — HYDROCORTISONE SODIUM SUCCINATE 50 MG: 100 INJECTION, POWDER, FOR SOLUTION INTRAMUSCULAR; INTRAVENOUS at 16:22

## 2025-02-19 RX ADMIN — Medication 200 MCG/HR: at 08:25

## 2025-02-19 RX ADMIN — ESCITALOPRAM OXALATE 10 MG: 10 TABLET ORAL at 21:45

## 2025-02-19 RX ADMIN — MIDAZOLAM HYDROCHLORIDE 2 MG/HR: 1 INJECTION, SOLUTION INTRAVENOUS at 16:15

## 2025-02-19 RX ADMIN — CEFEPIME 2000 MG: 2 INJECTION, POWDER, FOR SOLUTION INTRAVENOUS at 16:21

## 2025-02-19 NOTE — PLAN OF CARE
Goal Outcome Evaluation:      Pt more alert, able to nod appropriately at times and wiggle toes to command, very weak. SB 40-50, -120, tolerating same vent settings FiO2 50%. No BM, abdomen taut, may possibly need additional added to promote BM. Versed @ 2, fent @ 200. Bath given. K+ and Phos within normal range this AM. D5 @ 100, Na down to 152. UOP 1425.   Problem: Restraint, Nonviolent  Goal: Absence of Harm or Injury  Outcome: Not Progressing  Intervention: Implement Least Restrictive Safety Strategies  Recent Flowsheet Documentation  Taken 2/19/2025 0400 by Jamila Cotto, SUE  Medical Device Protection:   IV pole/bag removed from visual field   tubing secured   torso covered  Diversional Activities: television  Taken 2/19/2025 0253 by Jamila Cotto RN  Medical Device Protection:   IV pole/bag removed from visual field   torso covered   tubing secured  Diversional Activities: television  Taken 2/19/2025 0200 by Jamila Cotto RN  Medical Device Protection:   IV pole/bag removed from visual field   tubing secured   torso covered  Diversional Activities: television  Taken 2/19/2025 0000 by Jamila Cotto RN  Medical Device Protection:   IV pole/bag removed from visual field   torso covered   tubing secured  Diversional Activities: television  Taken 2/18/2025 2200 by Jamila Cotto RN  Medical Device Protection:   IV pole/bag removed from visual field   torso covered   tubing secured  Diversional Activities: television  Taken 2/18/2025 2000 by Jamila Cotto, RN  Medical Device Protection:   IV pole/bag removed from visual field   torso covered   tubing secured  Diversional Activities: television  Intervention: Protect Dignity, Rights and Personal Wellbeing  Recent Flowsheet Documentation  Taken 2/19/2025 0400 by Jamila Cotto, SUE  Trust Relationship/Rapport:   care explained   reassurance provided  Taken 2/19/2025 0200 by Jamila Cotto RN  Trust Relationship/Rapport: care explained  Taken 2/19/2025 0000 by  Jamila Cotto RN  Trust Relationship/Rapport:   care explained   reassurance provided  Taken 2/18/2025 2200 by Jamila Cotto RN  Trust Relationship/Rapport: care explained  Taken 2/18/2025 2000 by Jamila Cotto RN  Trust Relationship/Rapport:   care explained   reassurance provided  Intervention: Protect Skin and Joint Integrity  Recent Flowsheet Documentation  Taken 2/19/2025 0400 by Jamila Cotto RN  Body Position:   right   turned   lower extremity elevated   upper extremity elevated  Skin Protection:   incontinence pads utilized   transparent dressing maintained  Taken 2/19/2025 0200 by Jamila Cotto RN  Body Position:   left   turned   lower extremity elevated   upper extremity elevated  Skin Protection:   incontinence pads utilized   transparent dressing maintained  Taken 2/19/2025 0000 by Jamila Cotto RN  Body Position:   right   turned   lower extremity elevated   upper extremity elevated  Skin Protection:   incontinence pads utilized   transparent dressing maintained  Taken 2/18/2025 2200 by Jamila Cotto RN  Body Position:   left   turned   lower extremity elevated  Skin Protection:   incontinence pads utilized   transparent dressing maintained  Taken 2/18/2025 2110 by Jamila Cotto RN  Range of Motion: ROM (range of motion) performed  Taken 2/18/2025 2000 by Jamila Cotto RN  Body Position:   right   turned   lower extremity elevated   upper extremity elevated  Skin Protection: incontinence pads utilized

## 2025-02-19 NOTE — PLAN OF CARE
Problem: Adult Inpatient Plan of Care  Goal: Plan of Care Review  Outcome: Progressing  Flowsheets (Taken 2/19/2025 1834)  Outcome Evaluation: Pt remains on mechanical ventilation/sedation.  PEEP weaned to 5, FiO2 40%, versed @ 2, fentanyl @ 225, D5W @ 100.  Pt opens eyes, will make eye contact @ times, will wiggle toes, only withdraws from upper extremitites. Lactulose given this AM, no BM, but passing flatus.  UOP ~ 1.7L this shift.  TF infusing.  Restraints remain in place.  Negro updated via telephone.  Goal: Patient-Specific Goal (Individualized)  Outcome: Progressing  Goal: Absence of Hospital-Acquired Illness or Injury  Outcome: Progressing  Intervention: Identify and Manage Fall Risk  Recent Flowsheet Documentation  Taken 2/19/2025 1800 by Joy Mclain, RN  Safety Promotion/Fall Prevention:   activity supervised   clutter free environment maintained   fall prevention program maintained   nonskid shoes/slippers when out of bed   room organization consistent   safety round/check completed  Taken 2/19/2025 1600 by Joy Mclain, RN  Safety Promotion/Fall Prevention:   activity supervised   clutter free environment maintained   fall prevention program maintained   nonskid shoes/slippers when out of bed   room organization consistent   safety round/check completed  Taken 2/19/2025 1400 by Joy Mclain, RN  Safety Promotion/Fall Prevention:   safety round/check completed   room organization consistent  Taken 2/19/2025 1200 by Joy Mclain, RN  Safety Promotion/Fall Prevention: safety round/check completed  Taken 2/19/2025 1000 by Joy Mclain, RN  Safety Promotion/Fall Prevention:   safety round/check completed   activity supervised  Taken 2/19/2025 0800 by Joy Mclain, RN  Safety Promotion/Fall Prevention:   activity supervised   clutter free environment maintained   fall prevention program maintained   nonskid shoes/slippers when out of  bed   room organization consistent   safety round/check completed  Intervention: Prevent Skin Injury  Recent Flowsheet Documentation  Taken 2/19/2025 1800 by Joy Mclain RN  Body Position:   turned   heels elevated   lower extremity elevated   neutral body alignment   neutral head position   upper extremity elevated  Skin Protection:   incontinence pads utilized   silicone foam dressing in place   protective footwear used  Taken 2/19/2025 1600 by Joy Mclain RN  Body Position:   turned   left   heels elevated   lower extremity elevated   upper extremity elevated  Skin Protection:   incontinence pads utilized   silicone foam dressing in place   protective footwear used  Taken 2/19/2025 1400 by Joy Mclain RN  Body Position:   turned   heels elevated   lower extremity elevated   neutral body alignment   neutral head position   upper extremity elevated  Skin Protection:   incontinence pads utilized   silicone foam dressing in place   protective footwear used  Taken 2/19/2025 1200 by Joy Mclain RN  Body Position:   turned   right   heels elevated   lower extremity elevated   upper extremity elevated  Skin Protection:   incontinence pads utilized   silicone foam dressing in place   protective footwear used  Taken 2/19/2025 1000 by Joy Mclain RN  Body Position:   turned   left   heels elevated   lower extremity elevated   upper extremity elevated  Skin Protection:   incontinence pads utilized   silicone foam dressing in place   protective footwear used  Taken 2/19/2025 0800 by Joy Mclain RN  Body Position:   turned   heels elevated   lower extremity elevated   neutral body alignment   neutral head position   upper extremity elevated  Skin Protection:   incontinence pads utilized   silicone foam dressing in place   protective footwear used  Intervention: Prevent and Manage VTE (Venous Thromboembolism) Risk  Recent Flowsheet  Documentation  Taken 2/19/2025 0800 by Joy Mclain, RN  VTE Prevention/Management:   bilateral   SCDs (sequential compression devices) on  Intervention: Prevent Infection  Recent Flowsheet Documentation  Taken 2/19/2025 1600 by Joy Mclain RN  Infection Prevention:   environmental surveillance performed   visitors restricted/screened   single patient room provided   rest/sleep promoted   personal protective equipment utilized  Taken 2/19/2025 1400 by Joy Mclain, RN  Infection Prevention:   visitors restricted/screened   single patient room provided   rest/sleep promoted   personal protective equipment utilized   hand hygiene promoted   equipment surfaces disinfected   environmental surveillance performed  Taken 2/19/2025 1200 by Joy Mclain RN  Infection Prevention:   environmental surveillance performed   visitors restricted/screened   single patient room provided   rest/sleep promoted   personal protective equipment utilized  Taken 2/19/2025 1000 by Joy Mclain RN  Infection Prevention: environmental surveillance performed  Taken 2/19/2025 0800 by Joy Mclain RN  Infection Prevention:   environmental surveillance performed   visitors restricted/screened   single patient room provided   rest/sleep promoted   personal protective equipment utilized   hand hygiene promoted   equipment surfaces disinfected  Goal: Optimal Comfort and Wellbeing  Outcome: Progressing  Intervention: Provide Person-Centered Care  Recent Flowsheet Documentation  Taken 2/19/2025 1600 by Joy Mclain RN  Trust Relationship/Rapport:   care explained   reassurance provided  Taken 2/19/2025 1200 by Joy Mclain RN  Trust Relationship/Rapport:   care explained   reassurance provided  Taken 2/19/2025 0800 by Joy Mclain RN  Trust Relationship/Rapport:   care explained   reassurance provided  Goal: Readiness for Transition of  Care  Outcome: Progressing     Problem: Fall Injury Risk  Goal: Absence of Fall and Fall-Related Injury  Outcome: Progressing  Intervention: Identify and Manage Contributors  Recent Flowsheet Documentation  Taken 2/19/2025 1800 by Joy Mclain, RN  Medication Review/Management: medications reviewed  Taken 2/19/2025 1600 by Joy Mclain, RN  Medication Review/Management: medications reviewed  Taken 2/19/2025 1400 by Joy Mclain, RN  Medication Review/Management: medications reviewed  Taken 2/19/2025 1200 by Joy Mclain, RN  Medication Review/Management: medications reviewed  Taken 2/19/2025 1000 by Joy Mclain, RN  Medication Review/Management: medications reviewed  Taken 2/19/2025 0800 by Joy Mclain, RN  Medication Review/Management: medications reviewed  Intervention: Promote Injury-Free Environment  Recent Flowsheet Documentation  Taken 2/19/2025 1800 by Joy Mclain, RN  Safety Promotion/Fall Prevention:   activity supervised   clutter free environment maintained   fall prevention program maintained   nonskid shoes/slippers when out of bed   room organization consistent   safety round/check completed  Taken 2/19/2025 1600 by Joy Mclain, RN  Safety Promotion/Fall Prevention:   activity supervised   clutter free environment maintained   fall prevention program maintained   nonskid shoes/slippers when out of bed   room organization consistent   safety round/check completed  Taken 2/19/2025 1400 by Joy Mclain, RN  Safety Promotion/Fall Prevention:   safety round/check completed   room organization consistent  Taken 2/19/2025 1200 by Jyo Mclain, RN  Safety Promotion/Fall Prevention: safety round/check completed  Taken 2/19/2025 1000 by Joy Mclain, RN  Safety Promotion/Fall Prevention:   safety round/check completed   activity supervised  Taken 2/19/2025 0800 by Chemo  Joy Monica, RN  Safety Promotion/Fall Prevention:   activity supervised   clutter free environment maintained   fall prevention program maintained   nonskid shoes/slippers when out of bed   room organization consistent   safety round/check completed     Problem: Skin Injury Risk Increased  Goal: Skin Health and Integrity  Outcome: Progressing  Intervention: Optimize Skin Protection  Recent Flowsheet Documentation  Taken 2/19/2025 1800 by Joy Mclain, RN  Activity Management: bedrest  Pressure Reduction Techniques:   frequent weight shift encouraged   heels elevated off bed   pressure points protected   weight shift assistance provided  Head of Bed (HOB) Positioning: HOB at 30 degrees  Pressure Reduction Devices:   alternating pressure pump (ROSE MARY)   elbow protectors utilized   specialty bed utilized   pressure-redistributing mattress utilized   positioning supports utilized   heel offloading device utilized   foam padding utilized  Skin Protection:   incontinence pads utilized   silicone foam dressing in place   protective footwear used  Taken 2/19/2025 1600 by Joy Mclain, RN  Activity Management: bedrest  Pressure Reduction Techniques:   frequent weight shift encouraged   heels elevated off bed   pressure points protected   weight shift assistance provided  Head of Bed (HOB) Positioning: HOB at 30 degrees  Pressure Reduction Devices:   alternating pressure pump (ROSE MARY)   elbow protectors utilized   specialty bed utilized   pressure-redistributing mattress utilized   positioning supports utilized   heel offloading device utilized   foam padding utilized  Skin Protection:   incontinence pads utilized   silicone foam dressing in place   protective footwear used  Taken 2/19/2025 1400 by Joy Mclain, RN  Activity Management: bedrest  Pressure Reduction Techniques:   frequent weight shift encouraged   heels elevated off bed   pressure points protected   weight shift assistance  provided  Head of Bed (HOB) Positioning: HOB at 30 degrees  Pressure Reduction Devices:   alternating pressure pump (ROSE MARY)   elbow protectors utilized   specialty bed utilized   pressure-redistributing mattress utilized   positioning supports utilized   heel offloading device utilized   foam padding utilized  Skin Protection:   incontinence pads utilized   silicone foam dressing in place   protective footwear used  Taken 2/19/2025 1200 by Joy Mclain RN  Activity Management: bedrest  Pressure Reduction Techniques:   frequent weight shift encouraged   heels elevated off bed   pressure points protected   weight shift assistance provided  Head of Bed (HOB) Positioning: HOB at 30 degrees  Pressure Reduction Devices:   alternating pressure pump (ROSE MARY)   elbow protectors utilized   specialty bed utilized   pressure-redistributing mattress utilized   positioning supports utilized   heel offloading device utilized   foam padding utilized  Skin Protection:   incontinence pads utilized   silicone foam dressing in place   protective footwear used  Taken 2/19/2025 1000 by Joy Mclain RN  Activity Management: bedrest  Pressure Reduction Techniques:   frequent weight shift encouraged   heels elevated off bed   pressure points protected   weight shift assistance provided  Head of Bed (HOB) Positioning: HOB at 30 degrees  Pressure Reduction Devices:   alternating pressure pump (ROSE MARY)   elbow protectors utilized   specialty bed utilized   pressure-redistributing mattress utilized   positioning supports utilized   heel offloading device utilized  Skin Protection:   incontinence pads utilized   silicone foam dressing in place   protective footwear used  Taken 2/19/2025 0800 by Joy Mclain RN  Activity Management: bedrest  Pressure Reduction Techniques:   frequent weight shift encouraged   heels elevated off bed   pressure points protected   weight shift assistance provided  Head of Bed (HOB)  Positioning: HOB at 30 degrees  Pressure Reduction Devices:   alternating pressure pump (ROSE MARY)   elbow protectors utilized   specialty bed utilized   heel offloading device utilized   pressure-redistributing mattress utilized   positioning supports utilized   foam padding utilized  Skin Protection:   incontinence pads utilized   silicone foam dressing in place   protective footwear used     Problem: Comorbidity Management  Goal: Maintenance of Behavioral Health Symptom Control  Outcome: Progressing  Intervention: Maintain Behavioral Health Symptom Control  Recent Flowsheet Documentation  Taken 2/19/2025 1800 by Joy Mclain RN  Medication Review/Management: medications reviewed  Taken 2/19/2025 1600 by Joy Mclain RN  Medication Review/Management: medications reviewed  Taken 2/19/2025 1400 by Joy Mclain, RN  Medication Review/Management: medications reviewed  Taken 2/19/2025 1200 by Joy Mclain, RN  Medication Review/Management: medications reviewed  Taken 2/19/2025 1000 by Joy Mclain, RN  Medication Review/Management: medications reviewed  Taken 2/19/2025 0800 by Joy Mclain, RN  Medication Review/Management: medications reviewed  Goal: Maintenance of Heart Failure Symptom Control  Outcome: Progressing  Intervention: Maintain Heart Failure Management  Recent Flowsheet Documentation  Taken 2/19/2025 1800 by Joy Mclain, RN  Medication Review/Management: medications reviewed  Taken 2/19/2025 1600 by Joy Mclain, RN  Medication Review/Management: medications reviewed  Taken 2/19/2025 1400 by Joy Mclain, RN  Medication Review/Management: medications reviewed  Taken 2/19/2025 1200 by Joy Mclain, RN  Medication Review/Management: medications reviewed  Taken 2/19/2025 1000 by Joy Mclain, RN  Medication Review/Management: medications reviewed  Taken 2/19/2025 0800 by Chemo  Joy Anderson RN  Medication Review/Management: medications reviewed     Problem: Sepsis/Septic Shock  Goal: Optimal Coping  Outcome: Progressing  Goal: Absence of Bleeding  Outcome: Progressing  Goal: Blood Glucose Level Within Target Range  Outcome: Progressing  Goal: Absence of Infection Signs and Symptoms  Outcome: Progressing  Intervention: Initiate Sepsis Management  Recent Flowsheet Documentation  Taken 2/19/2025 1600 by Joy Mclain, RN  Infection Prevention:   environmental surveillance performed   visitors restricted/screened   single patient room provided   rest/sleep promoted   personal protective equipment utilized  Taken 2/19/2025 1400 by Joy Mclain RN  Infection Prevention:   visitors restricted/screened   single patient room provided   rest/sleep promoted   personal protective equipment utilized   hand hygiene promoted   equipment surfaces disinfected   environmental surveillance performed  Taken 2/19/2025 1200 by Joy Mclain RN  Infection Prevention:   environmental surveillance performed   visitors restricted/screened   single patient room provided   rest/sleep promoted   personal protective equipment utilized  Taken 2/19/2025 1000 by Joy Mclain, RN  Infection Prevention: environmental surveillance performed  Taken 2/19/2025 0800 by Joy Mclain, RN  Infection Prevention:   environmental surveillance performed   visitors restricted/screened   single patient room provided   rest/sleep promoted   personal protective equipment utilized   hand hygiene promoted   equipment surfaces disinfected  Intervention: Promote Stabilization  Recent Flowsheet Documentation  Taken 2/19/2025 0800 by Joy Mclain, RN  Lung Protection Measures:   fluid excess minimized   low inspiratory pressure provided   low tidal volume provided   lung compliance monitored   optimal PEEP applied   plateau/inspiratory pressure monitored   ventilator  synchrony promoted   ventilator waveforms monitored  Intervention: Promote Recovery  Recent Flowsheet Documentation  Taken 2/19/2025 1800 by Joy Mclain, RN  Activity Management: bedrest  Taken 2/19/2025 1600 by Joy Mclain, RN  Activity Management: bedrest  Taken 2/19/2025 1400 by Joy Mclain, RN  Activity Management: bedrest  Taken 2/19/2025 1200 by Joy Mclain, RN  Activity Management: bedrest  Taken 2/19/2025 1000 by Joy Mclain, RN  Activity Management: bedrest  Taken 2/19/2025 0800 by Joy Mclain RN  Activity Management: bedrest  Airway/Ventilation Management: airway patency maintained  Goal: Optimal Nutrition Delivery  Outcome: Progressing     Problem: Restraint, Nonviolent  Goal: Absence of Harm or Injury  Outcome: Progressing  Intervention: Implement Least Restrictive Safety Strategies  Recent Flowsheet Documentation  Taken 2/19/2025 1800 by Joy Mclain RN  Medical Device Protection:   IV pole/bag removed from visual field   torso covered   tubing secured  Diversional Activities: television  Taken 2/19/2025 1600 by Joy Mclain RN  Medical Device Protection:   IV pole/bag removed from visual field   torso covered   tubing secured  Diversional Activities: television  Taken 2/19/2025 1400 by Joy Mclain RN  Medical Device Protection:   IV pole/bag removed from visual field   torso covered   tubing secured  Diversional Activities: television  Taken 2/19/2025 1200 by Joy Mclain RN  Medical Device Protection:   IV pole/bag removed from visual field   torso covered   tubing secured  Diversional Activities: television  Taken 2/19/2025 1000 by Joy Mclain RN  Medical Device Protection:   IV pole/bag removed from visual field   torso covered   tubing secured  Diversional Activities: television  Taken 2/19/2025 0800 by Joy Mclain RN  Medical Device  Protection:   IV pole/bag removed from visual field   torso covered   tubing secured  Diversional Activities: television  Intervention: Protect Dignity, Rights and Personal Wellbeing  Recent Flowsheet Documentation  Taken 2/19/2025 1600 by Joy Mclain RN  Trust Relationship/Rapport:   care explained   reassurance provided  Taken 2/19/2025 1200 by Joy Mclain RN  Trust Relationship/Rapport:   care explained   reassurance provided  Taken 2/19/2025 0800 by Joy Mclain RN  Trust Relationship/Rapport:   care explained   reassurance provided  Intervention: Protect Skin and Joint Integrity  Recent Flowsheet Documentation  Taken 2/19/2025 1800 by Joy Mclain RN  Body Position:   turned   heels elevated   lower extremity elevated   neutral body alignment   neutral head position   upper extremity elevated  Skin Protection:   incontinence pads utilized   silicone foam dressing in place   protective footwear used  Range of Motion: ROM (range of motion) performed  Taken 2/19/2025 1600 by Joy Mclain RN  Body Position:   turned   left   heels elevated   lower extremity elevated   upper extremity elevated  Skin Protection:   incontinence pads utilized   silicone foam dressing in place   protective footwear used  Range of Motion: ROM (range of motion) performed  Taken 2/19/2025 1400 by Joy Mclain RN  Body Position:   turned   heels elevated   lower extremity elevated   neutral body alignment   neutral head position   upper extremity elevated  Skin Protection:   incontinence pads utilized   silicone foam dressing in place   protective footwear used  Range of Motion: ROM (range of motion) performed  Taken 2/19/2025 1200 by Joy Mclain RN  Body Position:   turned   right   heels elevated   lower extremity elevated   upper extremity elevated  Skin Protection:   incontinence pads utilized   silicone foam dressing in place   protective  footwear used  Range of Motion: ROM (range of motion) performed  Taken 2/19/2025 1000 by Joy Mclain RN  Body Position:   turned   left   heels elevated   lower extremity elevated   upper extremity elevated  Skin Protection:   incontinence pads utilized   silicone foam dressing in place   protective footwear used  Range of Motion: ROM (range of motion) performed  Taken 2/19/2025 0800 by Joy Mclain RN  Body Position:   turned   heels elevated   lower extremity elevated   neutral body alignment   neutral head position   upper extremity elevated  Skin Protection:   incontinence pads utilized   silicone foam dressing in place   protective footwear used  Range of Motion: ROM (range of motion) performed     Problem: Mechanical Ventilation Invasive  Goal: Effective Communication  Outcome: Progressing  Intervention: Ensure Effective Communication  Recent Flowsheet Documentation  Taken 2/19/2025 1800 by Joy Mclain RN  Diversional Activities: television  Taken 2/19/2025 1600 by Joy Mclain RN  Trust Relationship/Rapport:   care explained   reassurance provided  Diversional Activities: television  Taken 2/19/2025 1400 by Joy Mclain RN  Diversional Activities: television  Taken 2/19/2025 1200 by Joy Mclain RN  Trust Relationship/Rapport:   care explained   reassurance provided  Diversional Activities: television  Taken 2/19/2025 1000 by Joy Mclain RN  Diversional Activities: television  Taken 2/19/2025 0800 by Joy Mclain RN  Trust Relationship/Rapport:   care explained   reassurance provided  Diversional Activities: television  Goal: Optimal Device Function  Outcome: Progressing  Intervention: Optimize Device Care and Function  Recent Flowsheet Documentation  Taken 2/19/2025 1800 by Joy Mclain RN  Oral Care:   tongue brushed   teeth brushed - suction toothbrush   swabbed with antiseptic solution    suction provided   lip/mouth moisturizer applied  Airway Safety Measures:   manual resuscitator/mask at bedside   suction at bedside   oxygen flowmeter at bedside  Taken 2/19/2025 1600 by Joy Mclain, RN  Oral Care:   tongue brushed   teeth brushed - suction toothbrush   swabbed with antiseptic solution   suction provided   lip/mouth moisturizer applied  Airway Safety Measures:   manual resuscitator/mask at bedside   suction at bedside   oxygen flowmeter at bedside  Taken 2/19/2025 1400 by Joy Mclain, RN  Airway Safety Measures:   manual resuscitator/mask at bedside   suction at bedside   oxygen flowmeter at bedside  Taken 2/19/2025 1200 by Joy Mclain, RN  Oral Care:   tongue brushed   teeth brushed - suction toothbrush   swabbed with antiseptic solution   suction provided   lip/mouth moisturizer applied  Airway Safety Measures:   manual resuscitator/mask at bedside   suction at bedside   oxygen flowmeter at bedside  Taken 2/19/2025 0800 by Joy Mclain, RN  Airway/Ventilation Management: airway patency maintained  Oral Care:   lip/mouth moisturizer applied   tongue brushed   teeth brushed - suction toothbrush   swabbed with antiseptic solution   suction provided  Airway Safety Measures:   manual resuscitator/mask at bedside   suction at bedside   oxygen flowmeter at bedside  Goal: Mechanical Ventilation Liberation  Outcome: Progressing  Intervention: Promote Extubation and Mechanical Ventilation Liberation  Recent Flowsheet Documentation  Taken 2/19/2025 1800 by Joy Mclain, RN  Medication Review/Management: medications reviewed  Taken 2/19/2025 1600 by Joy Mclain, RN  Medication Review/Management: medications reviewed  Taken 2/19/2025 1400 by Joy Mclain, RN  Medication Review/Management: medications reviewed  Taken 2/19/2025 1200 by Joy Mclain, RN  Medication Review/Management: medications reviewed  Taken  2/19/2025 1000 by Joy Mclain RN  Medication Review/Management: medications reviewed  Taken 2/19/2025 0800 by Joy Mclain RN  Medication Review/Management: medications reviewed  Goal: Optimal Nutrition Delivery  Outcome: Progressing  Goal: Absence of Device-Related Skin and Tissue Injury  Outcome: Progressing  Intervention: Maintain Skin and Tissue Health  Recent Flowsheet Documentation  Taken 2/19/2025 1800 by Joy Mclain RN  Device Skin Pressure Protection:   absorbent pad utilized/changed   adhesive use limited   positioning supports utilized   pressure points protected  Taken 2/19/2025 1600 by Joy Mclain RN  Device Skin Pressure Protection:   absorbent pad utilized/changed   adhesive use limited   positioning supports utilized   pressure points protected  Taken 2/19/2025 1400 by Joy Mclain RN  Device Skin Pressure Protection:   absorbent pad utilized/changed   adhesive use limited   positioning supports utilized   pressure points protected  Taken 2/19/2025 1200 by Joy Mclain RN  Device Skin Pressure Protection:   absorbent pad utilized/changed   adhesive use limited   positioning supports utilized   pressure points protected  Taken 2/19/2025 1000 by Jyo Mclain RN  Device Skin Pressure Protection:   absorbent pad utilized/changed   adhesive use limited   positioning supports utilized   pressure points protected  Taken 2/19/2025 0800 by Joy Mclain RN  Device Skin Pressure Protection:   absorbent pad utilized/changed   adhesive use limited   positioning supports utilized   pressure points protected   tubing/devices free from skin contact  Goal: Absence of Ventilator-Induced Lung Injury  Outcome: Progressing  Intervention: Facilitate Lung-Protection Measures  Recent Flowsheet Documentation  Taken 2/19/2025 0800 by Joy Mclain RN  Lung Protection Measures:   fluid excess minimized   low  inspiratory pressure provided   low tidal volume provided   lung compliance monitored   optimal PEEP applied   plateau/inspiratory pressure monitored   ventilator synchrony promoted   ventilator waveforms monitored  Intervention: Prevent Ventilator-Associated Pneumonia  Recent Flowsheet Documentation  Taken 2/19/2025 1800 by Joy Mclain RN  Head of Bed ([facility]) Positioning: [facility] at 30 degrees  Oral Care:   tongue brushed   teeth brushed - suction toothbrush   swabbed with antiseptic solution   suction provided   lip/mouth moisturizer applied  Taken 2/19/2025 1600 by Joy Mclain RN  Head of Bed ([facility]) Positioning: [facility] at 30 degrees  Oral Care:   tongue brushed   teeth brushed - suction toothbrush   swabbed with antiseptic solution   suction provided   lip/mouth moisturizer applied  Taken 2/19/2025 1400 by Joy Mclain RN  Head of Bed ([facility]) Positioning: [facility] at 30 degrees  Taken 2/19/2025 1200 by Joy Mclain RN  Head of Bed ([facility]) Positioning: [facility] at 30 degrees  Oral Care:   tongue brushed   teeth brushed - suction toothbrush   swabbed with antiseptic solution   suction provided   lip/mouth moisturizer applied  Taken 2/19/2025 1000 by Joy Mclain RN  Head of Bed ([facility]) Positioning: [facility] at 30 degrees  Taken 2/19/2025 0800 by Joy Mclain RN  Head of Bed ([facility]) Positioning: [facility] at 30 degrees  VAP Prevention Bundle:   HOB elevation maintained   oral care regularly provided   readiness to extubate assessed   stress ulcer prophylaxis provided   vent circuit breaks minimized   VTE prophylaxis provided  VAP Prevention Contraindications: condition unstable  VAP Prevention Measures: not completed (see contraindications)  Oral Care:   lip/mouth moisturizer applied   tongue brushed   teeth brushed - suction toothbrush   swabbed with antiseptic solution   suction provided   Goal Outcome Evaluation:  Plan of Care Reviewed With: patient        Progress:  improving  Outcome Evaluation: Pt remains on mechanical ventilation/sedation.  PEEP weaned to 5, FiO2 40%, versed @ 2, fentanyl @ 225, D5W @ 100.  Pt opens eyes, will make eye contact @ times, will wiggle toes, only withdraws from upper extremitites. Lactulose given this AM, no BM, but passing flatus.  UOP ~ 1.7L this shift.  TF infusing.  Restraints remain in place.  Negro updated via telephone.

## 2025-02-19 NOTE — PROGRESS NOTES
INTENSIVIST   PROGRESS NOTE     Hospital:  LOS: 8 days     Mr. Val Nassar Jr., 64 y.o. male is followed for a Chief Complaint of: Shock, Respiratory Failure      Subjective   S     Interval History:    Open eyes.    He does not follow commands.    Last Bowel Movement: 25 (25 1200)     Temp  Min: 98.1 °F (36.7 °C)  Max: 99.1 °F (37.3 °C)      The patient's relevant past medical, surgical and social history were reviewed and updated in Epic as appropriate.        History     Last Reviewed by Aristeo Kowalski MD on 2025 at  1:36 PM    Sections Reviewed    Medical, Surgical, Family, Tobacco, Alcohol, Drug Use, Sexual Activity,   Social Documentation    Problem list reviewed by Aristeo Kowalski MD on 2025 at  1:35 PM  Medicines reviewed by Aristeo Kowalski MD on 2025 at  1:35 PM  Allergies reviewed by Aristeo Kowalski MD on 2025 at  1:35 PM     Objective   O     Physical Examination    Vitals:  Temp: 98.1 °F (36.7 °C) (25 0400) Temp  Min: 98.1 °F (36.7 °C)  Max: 99.1 °F (37.3 °C)   Temp core:      BP: 116/61 (25 0600) BP  Min: 103/53  Max: 125/60   MAP (non-invasive) Noninvasive MAP (mmHg): 89 (25 0600) Noninvasive MAP (mmHg)  Av.9  Min: 48  Max: 119   Pulse: 55 (25 0600) Pulse  Min: 45  Max: 64   Resp: 18 (25 0449) Resp  Min: 15  Max: 19   SpO2: 94 % (25 0600) SpO2  Min: 93 %  Max: 100 %   Device: ventilator (25 0449)    Flow Rate: 15 (manually bagging, bronch in progress.) (25 1600) No data recorded     Intake/Ouptut 24 hrs (7:00AM - 6:59 AM)  Intake & Output (last 2 days)          0701   0700  0701   07 07 0700    I.V. (mL/kg) 912.9 (10.3) 2982.5 (33.7) 214.2 (2.4)    Other 776 932 90    NG/GT 1265 1390 180    IV Piggyback 544.6 362.3 100    Total Intake(mL/kg) 3498.5 (39.6) 5666.8 (64.1) 584.2 (6.6)    Urine (mL/kg/hr) 5715 (2.7) 2620 (1.2) 225 (1.9)    Total Output 5715 2620 225     Net -2216.5 +3046.8 +359.2                   Medications (drips):  dextrose, Last Rate: 100 mL/hr (02/19/25 0707)  fentanyl 10 mcg/mL, Last Rate: 200 mcg/hr (02/19/25 0700)  midazolam, Last Rate: 2 mg/hr (02/19/25 0700)  norepinephrine            02/15/25  0440 02/16/25  0418   Weight: 88.3 kg (194 lb 10.7 oz) 88.4 kg (194 lb 14.2 oz)        Invasive Mechanical Ventilator   Settings: Observed:   Mode: VC+/AC (02/19/25 0449)    Resp Rate (Set): 15 (02/19/25 0449) Resp Rate (Observed) Vent: 18 (02/19/25 0600)   Vt (Set, mL): 470 mL (02/19/25 0449) Vt, Exp (observed, mL): 637 mL (02/19/25 0449)    Minute Ventilation (L/min) (Obs): 8.13 L/min (02/19/25 0449)    I:E Ratio (Obs): 1:2.6 (02/19/25 0449)       FiO2 (%): 50 % (02/19/25 0449) Plateau Pressure (cm H2O): (S) 23 cm H2O (02/18/25 1858)   PEEP/CPAP (cm H2O): 10 cm H20 (02/19/25 0449) Driving Pressure (cm H2O): 13.6 cm H2O (02/18/25 1858)    Static Compliance (L/cm H2O): 31 (02/18/25 1858)      Telemetry:  Rhythm: sinus bradycardia (02/19/25 0600)  Atrial Rhythm: atrial fibrillation (02/17/25 0600)      Constitutional:  No acute distress.   Cardiovascular: RRR.    Respiratory: Normal breath sounds  (+) Rhonchi   Abdominal:  Soft with no tenderness.   Extremities: Edema  Scrotal edema   Neurological:   Sedated.  Best Eye Response: 3-->(E3) to speech (02/19/25 0600)  Best Motor Response: 5-->(M5) localizes pain (02/19/25 0600)  Best Verbal Response: 1-->(V1) none (02/19/25 0600)  [place] Coma Scale Score: 9 (02/19/25 0600)           Results Reviewed:  Laboratory  Microbiology  Radiology  Pathology    Hematology:  Results from last 7 days   Lab Units 02/19/25  0239 02/18/25  0303 02/17/25  0329 02/15/25  0347 02/14/25  0502 02/13/25  0317   WBC 10*3/mm3 22.24* 28.36* 27.13* 33.03*   < > 34.83*   BANDS % %  --   --   --  9.0*  --  38.0*   HEMOGLOBIN g/dL 10.7* 10.8* 10.2* 10.5*   < > 11.2*   MCV fL 98.0* 94.9 93.8 96.2   < > 93.4   PLATELETS 10*3/mm3 91* 79* 74* 53*   <  > 74*    < > = values in this interval not displayed.     Results from last 7 days   Lab Units 02/19/25  0239 02/18/25  0303 02/17/25  0329 02/15/25  0347 02/13/25  0317   NEUTROS ABS 10*3/mm3 17.80* 22.68* 21.73* 29.40* 28.21*   LYMPHS ABS 10*3/mm3 0.96 1.42 1.33 0.99 1.39   EOS ABS 10*3/mm3 0.13 0.00 0.01 0.00 0.00     Chemistry:  Estimated Creatinine Clearance: 73 mL/min (by C-G formula based on SCr of 1.11 mg/dL).    Results from last 7 days   Lab Units 02/19/25 0239 02/18/25  0303   SODIUM mmol/L 152* 157*   POTASSIUM mmol/L 3.9 3.5   CHLORIDE mmol/L 118* 118*   CO2 mmol/L 28.0 24.0   BUN mg/dL 86* 104*   CREATININE mg/dL 1.11 1.25   GLUCOSE mg/dL 161* 180*     Results from last 7 days   Lab Units 02/19/25 0239 02/18/25  0303   CALCIUM mg/dL 8.7 9.4   MAGNESIUM mg/dL 1.9 2.2   PHOSPHORUS mg/dL 2.5 1.9*     Hepatic Panel:  Results from last 7 days   Lab Units 02/17/25 0329 02/16/25  0521 02/14/25  0502 02/13/25  0317   ALBUMIN g/dL 3.2* 2.7* 2.5* 2.4*   TOTAL PROTEIN g/dL 5.9* 6.0 5.8* 5.5*   BILIRUBIN mg/dL 0.9 0.9 1.2 0.8   BILIRUBIN DIRECT mg/dL  --  0.5* 0.8* 0.4*   AST (SGOT) U/L 90* 98* 185* 294*   ALT (SGPT) U/L 95* 115* 176* 226*   ALK PHOS U/L 151* 142* 93 62     Cardiac Labs:  Results from last 7 days   Lab Units 02/18/25  0303   PROBNP pg/mL 10,574.0*     Biomarkers:  Results from last 7 days   Lab Units 02/18/25  0303 02/17/25  0329 02/16/25  0521 02/14/25  0735 02/14/25  0502 02/13/25  0549 02/13/25  0317 02/12/25  1743   CRP mg/dL  --  7.62* 16.20*  --   --   --   --   --    LACTATE mmol/L  --   --   --   --   --  2.7* 2.9* 3.6*   PROCALCITONIN ng/mL 37.48*  --  93.99*  --  >100.00*  --   --   --    FIBRINOGEN mg/dL  --   --   --  889*  --   --   --   --        COVID-19  Lab Results   Component Value Date    COVID19 Not Detected 02/11/2025    COVID19 Not Detected 12/26/2023       Arterial Blood Gases:  Results from last 7 days   Lab Units 02/19/25  0313 02/18/25  1626 02/18/25  0309   PH,  ARTERIAL pH units 7.453* 7.447 7.539*   PCO2, ARTERIAL mm Hg 39.2 40.5 30.4*   PO2 ART mm Hg 79.2* 87.9 107.0   FIO2 % 50 50 100       Images:  XR Chest 1 View    Result Date: 2/19/2025  Impression: Stable exam demonstrating bibasilar airspace disease and suspected small bilateral pleural effusions Electronically Signed: Eder Draper  2/19/2025 7:49 AM EST  Workstation ID: [id number]    XR Chest 1 View    Result Date: 2/18/2025  Impression: Similar small bilateral pleural effusions with bibasilar opacities. Electronically Signed: Rashad Chau MD  2/18/2025 8:41 AM EST  Workstation ID: [id number]    XR Chest 1 View    Result Date: 2/17/2025  Impression: Similar-appearing cardiomegaly with mild pulmonary edema and small bilateral pleural effusions. Mild bibasal opacities, likely atelectasis. Support lines and tubes as above. Electronically Signed: Dashawn Tineo  2/17/2025 4:32 PM EST  Workstation ID: [id number]     Echo:  Results for orders placed during the hospital encounter of 02/11/25    Adult Transthoracic Echo Complete w/ Color, Spectral and Contrast if Necessary Per Protocol    Interpretation Summary    Left ventricular systolic function is normal. Estimated left ventricular EF = 60%    Left ventricular wall thickness is consistent with mild concentric hypertrophy.    Mild aortic valve stenosis is present.    Aortic valve maximum pressure gradient is 23 mmHg. Aortic valve mean pressure gradient is 14 mmHg.      Results: Reviewed.  I reviewed the patient's new laboratory and imaging results.  I independently reviewed the patient's new images.    Medications: Reviewed.    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 male admitted on 2/11/2025 with weakness, dizziness, hypotension and AMS due to Septic shock [A41.9, R65.21]    CT head negative for an acute abnormality. CT chest/abdomen/pelvis performed and was remarkable to pyelonephritis, sigmoid diverticulosis, and possible cystitis.     He required intubation on the morning of 2/12/25.     Blood and urine culture with Pseudomonas and he is on Cefepime.     Comorbidities: tobacco use, alcoholic cirrhosis, metastatic squamous cell of the esophagus and HLD.    Septic Shock.  Pseudomonas bacteremia/pneumonia  Respiratory Failure  Intubated 02/12/25  Invasive Mechanical Ventilation   Bilateral infiltrates  Cardiovascular  Dyslipidemia   GI/Hepatology    Cirrhosis  Renal  GALILEO  Hematology  Thrombocytopenia improving.  Oncology  Squamous cell carcinoma of the esophagus  Nutrition Support   Tube Feeding: Formula: Peptamen 1.5 (Peptide Based, Critical Care, ALI); Feeding Type: Continuous; Start at: 20 mL/hr; Then Advance By: 20 mL/hr; Every: 12 hours; To Goal Rate of: 60 mL/hr; Total Daily Feeding Volume (mL/day): 1200; Water Flush: O...  NG/OG Tube Orogastric 16 Fr Center mouth-Tube Feeding Rate (mL/hr): 60 mL/HR (Based on a feeding duration of 20 h/d)      Lab Results   Component Value Date    PREALBUMIN 10.3 (L) 02/17/2025    PREALBUMIN 6.1 (L) 02/16/2025    CRP 7.62 (H) 02/17/2025    CRP 16.20 (H) 02/16/2025    CRP 8.21 (H) 02/11/2025     Endocrine   Body mass index is 28.77 kg/m². Overweight: 25.0-29.9kg/m2   No history of Diabetes    Lab Results   Lab Value Date/Time    HGBA1C 4.6 (L) 12/25/2023 1925     Results from last 7 days   Lab Units 02/19/25  0602 02/18/25  2357 02/18/25  1728 02/18/25  1142 02/18/25  0533 " 02/17/25  2313 02/17/25  1819 02/17/25  1127   GLUCOSE mg/dL 140* 167* 150* 152* 216* 171* 146* 172*       Diet: NPO Diet NPO Type: Tube Feeding   Advance Directives: Code Status and Medical Interventions: CPR (Attempt to Resuscitate); Full Support   Ordered at: 02/12/25 1029     Level Of Support Discussed With:    Patient     Code Status (Patient has no pulse and is not breathing):    CPR (Attempt to Resuscitate)     Medical Interventions (Patient has pulse or is breathing):    Full Support        VTE Prophylaxis:  No VTE prophylaxis order currently exists.         In brief:  Continue hydrocortisone to 50 mg BID  Continue IVF D5W 100 mL/h, BUN improving.  Continue antibiotics. PCT decreasing. Complete about 10-14 days.  Not ready to be liberated from Invasive Mechanical Ventilation   Goal: Glucose < 180 mg/dL.  Start pharmacologic VTE prophylaxis.  Disposition: Keep in ICU.    Plan of care and goals reviewed during interdisciplinary rounds.  I discussed the patient's findings and my recommendations with nursing staff    Time: was greater than 33 minutes. This is non-concurrent time.   (This excludes time spent performing separately reportable procedures and services).    He has a high risk of imminent or life-threatening  deterioration, which requires the highest level of physician preparedness to intervene urgently. I devoted my full attention to the direct care of this patient for the amount of time indicated above.     Time spent with family or surrogate(s) is included only if the patient was incapable of providing the necessary information or participating in medical decision making.    He has the following organ/system impairments Respiratory Failure.        [x] Primary Attending Intensive Care Medicine - Nutrition Support   [] Consultant    Copied text in this note has been reviewed and is accurate as of 02/19/25

## 2025-02-19 NOTE — CASE MANAGEMENT/SOCIAL WORK
Continued Stay Note   [place]     Patient Name: Val Nassar Jr.  MRN: [id number]  Today's Date: 2/19/2025    Admit Date: 2/11/2025    Plan: ongoing   Discharge Plan       Row Name 02/19/25 1116       Plan    Plan ongoing    Patient/Family in Agreement with Plan other (see comments)    Plan Comments Discussed in MDR, patient remains intubated/MV and on sedation. DC TBD, in C @ [facility].  will continue to follow.    Final Discharge Disposition Code 04 - intermediate care facility                   Discharge Codes    No documentation.                 Expected Discharge Date and Time       Expected Discharge Date Expected Discharge Time    Feb 18, 2025               Nikolas Hernandez RN

## 2025-02-19 NOTE — PROGRESS NOTES
Clinical Nutrition     Patient Name: Val Nassar Jr.  YOB: 1960  MRN: [id number]  Date of Encounter: 02/19/25 12:42 EST  Admission date: 2/11/2025  Reason for Visit: Follow-up protocol, EN    Assessment   Nutrition Assessment   Admission Diagnosis:  Septic shock [A41.9, R65.21]    Problem List:    Septic shock due to Pseudomonas species    Hyperlipidemia, unspecified    Tobacco use    Cirrhosis of liver    Squamous cell carcinoma of esophagus    GALILEO (acute kidney injury)    Metabolic acidosis    Pneumonia of both lower lobes due to Pseudomonas species    Bacteremia due to Pseudomonas      PMH:   He  has a past medical history of Anemia, Cancer, Cirrhosis, Duodenal ulcer, Gastric ulcer, GERD (gastroesophageal reflux disease), History of alcohol abuse, History of radiation therapy (05/08/2024), HLD (hyperlipidemia), and Mood disorder.    PSH:  He  has a past surgical history that includes Esophagogastroduodenoscopy (N/A, 12/26/2023) and Venous Access Device (Port) (Right, 04/25/2024).    Substance history: Etoh abuse, vaping    Applicable Nutrition History:     ARF/VENT 2-12-25    s/p Bronch 2-18-25    WOC following for posterior thigh, gluteal skin tears, scrotal MASD.     Labs    Labs Reviewed: Yes    Results from last 7 days   Lab Units 02/19/25  0239 02/18/25  0303 02/17/25  1915 02/17/25  0329 02/16/25  0521 02/15/25  0347 02/14/25  0502 02/13/25  0549 02/13/25  0317 02/13/25  0317 02/12/25  1743   GLUCOSE mg/dL 161* 180*  --  157* 150*   < > 96  --    < > 116*  --    BUN mg/dL 86* 104*  --  103* 90*   < > 71*  --    < > 54*  --    CREATININE mg/dL 1.11 1.25  --  1.31* 1.52*   < > 2.59*  --    < > 2.60*  --    SODIUM mmol/L 152* 157*  --  154* 151*   < > 144  --    < > 143  --    CHLORIDE mmol/L 118* 118*  --  117* 117*   < > 106  --    < > 106  --    POTASSIUM mmol/L 3.9 3.5 3.2* 3.2* 4.0   < > 3.4*  --    < > 4.0  --    PHOSPHORUS mg/dL 2.5 1.9*  --  2.9 3.2   <  > 4.7*  --    < > 4.9*  --    MAGNESIUM mg/dL 1.9 2.2 2.0 2.2 3.1*   < > 2.9*  --    < > 2.7*  --    ALT (SGPT) U/L  --   --   --  95* 115*  --  176*  --    < > 226*  --    LACTATE mmol/L  --   --   --   --   --   --   --  2.7*  --  2.9* 3.6*    < > = values in this interval not displayed.       Results from last 7 days   Lab Units 02/17/25  0329 02/16/25  0521 02/14/25  0502   ALBUMIN g/dL 3.2* 2.7* 2.5*   PREALBUMIN mg/dL 10.3* 6.1*  --    CRP mg/dL 7.62* 16.20*  --    TRIGLYCERIDES mg/dL 107  --   --        Results from last 7 days   Lab Units 02/19/25  1153 02/19/25  0602 02/18/25  2357 02/18/25  1728 02/18/25  1142 02/18/25  0533   GLUCOSE mg/dL 187* 140* 167* 150* 152* 216*     Lab Results   Lab Value Date/Time    HGBA1C 4.6 (L) 12/25/2023 1925         Results from last 7 days   Lab Units 02/18/25  0303   PROBNP pg/mL 10,574.0*       Medications    Medications Reviewed: yes    Scheduled Meds:cefepime, 2,000 mg, Intravenous, Q8H  chlorhexidine, 15 mL, Mouth/Throat, Q12H  docusate sodium, 100 mg, Per G Tube, BID  enoxaparin, 40 mg, Subcutaneous, Q24H  escitalopram, 10 mg, Per G Tube, Nightly  gabapentin, 300 mg, Per G Tube, Q8H  hydrocortisone sodium succinate, 50 mg, Intravenous, Q12H  pantoprazole, 40 mg, Intravenous, Q12H  sodium chloride, 10 mL, Intravenous, Q12H      Continuous Infusions:dextrose, 100 mL/hr, Last Rate: 100 mL/hr (02/19/25 0707)  fentanyl 10 mcg/mL,  mcg/hr, Last Rate: 225 mcg/hr (02/19/25 1135)  midazolam, 1-10 mg/hr, Last Rate: 2 mg/hr (02/19/25 0700)  norepinephrine, 0.02-0.3 mcg/kg/min      PRN Meds:.  fentaNYL    docusate sodium **AND** lactulose    polyvinyl alcohol    sodium chloride    sodium chloride    Intake/Ouptut 24 hrs (0701 - 0700)   I&O's Reviewed: yes    Intake & Output (last day)         02/18 0701 02/19 0700 02/19 0701 02/20 0700    I.V. (mL/kg) 2982.5 (33.7) 680.8 (7.7)    Other 932 90    NG/GT 1390 180    IV Piggyback 362.3 100    Total Intake(mL/kg) 5666.8  "(64.1) 1050.8 (11.9)    Urine (mL/kg/hr) 2620 (1.2) 600 (1.2)    Total Output 2620 600    Net +3046.8 +450.8                 Anthropometrics     Height: Height: 175.3 cm (69.02\")  Last Filed Weight: Weight: 88.4 kg (194 lb 14.2 oz) (02/16/25 0418)  Method:  bedscale  BMI: BMI (Calculated): 28.8    UBW: ?  Weight change:  per EMR ~ 6lb wt gain since January     Weight       Weight (kg) Weight (lbs) Weight Method Visit Report   4/23/2024 75.841 kg  167 lb 3.2 oz   --     75.297 kg  166 lb   --    4/25/2024 75.751 kg  167 lb  Stated     4/29/2024 76.658 kg  169 lb      4/30/2024 75.932 kg  167 lb 6.4 oz   --     75.751 kg  167 lb   --    5/6/2024 74.889 kg  165 lb 1.6 oz   --     74.98 kg  165 lb 4.8 oz   --     74.844 kg  165 lb   --        --        --        --    5/7/2024 76.25 kg  168 lb 1.6 oz   --     76.204 kg  168 lb   --    5/14/2024 77.111 kg  170 lb   --    5/21/2024 75.297 kg  166 lb      6/3/2024 74.98 kg  165 lb 4.8 oz   --     75.297 kg  166 lb   --        --        --    6/18/2024 74.39 kg  164 lb   --    7/5/2024 74.4 kg  164 lb 0.4 oz  Estimated     7/16/2024 77.565 kg  171 lb   --    8/7/2024 77.565 kg  171 lb   --    8/19/2024 77.656 kg  171 lb 3.2 oz   --    9/19/2024 77.565 kg  171 lb      10/24/2024 79.833 kg  176 lb   --    11/14/2024 79.833 kg  176 lb   --    11/26/2024 78.926 kg  174 lb      12/18/2024 79.833 kg  176 lb   --    1/21/2025 79.833 kg  176 lb  Stated     1/23/2025 79.833 kg  176 lb   --    2/3/2025 83.008 kg  183 lb   --    2/11/2025 83.008 kg  183 lb       83 kg  182 lb 15.7 oz          Nutrition Focused Physical Exam     Date:     Unable to perform due to Pt unable to participate at time of visit     Subjective   Reported/Observed/Food/Nutrition Related History:     2-19: pt intubated, sedated, + fentanyl, versed, D5%@100ml    Per RN: pt has been more alert, oxygen demands decreased, tolerating TF, abdomen still distended, given lactulose (last bm 2-17)    2/17  Pt intubated, " "sedated, on fentanyl & VERSED. Spoke with nurse, reported tolerating TF well, has low residuals, and bowels are moving. LBM on 2/15. Noted that Na continues to increase, even after increasing free water on 2/15. Pt may benefit from IV fluid to correct hypernatremia.       Pt intubated, sedated, + fentanyl, propofol 7.56ml, levophed 0.32mcg    Per RN: pt's belly distended, more firm than before, bowel regimen started, marginal UOP, have been toni to wean off asiya and vasopressin    Needs Assessment   Date: 25    Height used:Height: 175.3 cm (69.02\")  Weights used: 182lb/ 83kg    Estimated Calorie needs: ~ 1800kcal  Method:  20Kcals/Kkcal  Method:  MSJ x 1.2: 1926kcal  Method:  PSU:1837kcal    Estimated Protein needs: ~113g protein with current renal function  Method: 1.2-1.5g protein per k-125 protein    Current Nutrition Prescription     PO: NPO Diet NPO Type: Tube Feeding  Oral Nutrition Supplement:  Intake: N/A    EN: Peptamen 1.5  Goal Rate: 60 ml/hr  Water Flushes: 45 ml/hr  Modular: None  Route: OG  Tube: Large bore    At goal over: 20Hrs/day     Rx will supply:   Goal Volume 1200  mL/day     Flush Volume 900 mL/day     Energy 1800 Kcal/day 100 % Est Need   Protein 82 g/day 73 % Est Need   Fiber 0 g/day     Water in   mL     Total Water 1824 mL     Meet DRI Yes        --------------------------------------------------------------------------  Product/Rate verified at bedside: Yes  Infusing Rate at time of visit: 60 ml/hr    Average Delivery from [facility] Day:   Volume 1310 mL/day 109  % Goal Vol.   Flush Volume 1012 mL/day     Energy  Kcal/day  % Est Need   Protein  g/day  % Est Need   Fiber  g/day     Water in  EN  mL     Total Water  mL     Meet DRI Yes             Assessment & Plan   Nutrition Diagnosis   Date: 25 Updated: 25   Problem Inadequate energy intake    Etiology ARF/VENT   Signs/Symptoms 109% goal volume EN   Status: Active; improved, EN started on " 2/15    Goal:   Nutrition to support treatment and Adjust EN      Nutrition Intervention      Follow treatment progress, Care plan reviewed    GALILEO significantly improved with hydration    Suggest change EN to better meet est protein needs as renal function improved    TF recs: Peptamen AF @75ml/hr, free water @50ml/hr    TF at goal volume will provide 1500ml, 1800kcal, 100% kcal needs, 114g protein, 101% protein needs, 9g fiber, 2215ml free water    Maintain IVF's until hypernatremia resolved    Monitoring/Evaluation:   Per protocol, I&O, Pertinent labs, EN delivery/tolerance, Weight, Skin status, GI status, Symptoms, Hemodynamic stability    Monica Chan, RD  Time Spent: 45min

## 2025-02-20 ENCOUNTER — APPOINTMENT (OUTPATIENT)
Dept: GENERAL RADIOLOGY | Facility: HOSPITAL | Age: 65
DRG: 870 | End: 2025-02-20
Payer: MEDICARE

## 2025-02-20 LAB
ALBUMIN SERPL-MCNC: 2.6 G/DL (ref 3.5–5.2)
ALBUMIN/GLOB SERPL: 0.9 G/DL
ALP SERPL-CCNC: 131 U/L (ref 39–117)
ALT SERPL W P-5'-P-CCNC: 101 U/L (ref 1–41)
ANION GAP SERPL CALCULATED.3IONS-SCNC: 7 MMOL/L (ref 5–15)
ARTERIAL PATENCY WRIST A: ABNORMAL
AST SERPL-CCNC: 62 U/L (ref 1–40)
ATMOSPHERIC PRESS: ABNORMAL MM[HG]
BASE EXCESS BLDA CALC-SCNC: 2.9 MMOL/L (ref 0–2)
BASOPHILS # BLD MANUAL: 0 10*3/MM3 (ref 0–0.2)
BASOPHILS NFR BLD MANUAL: 0 % (ref 0–1.5)
BDY SITE: ABNORMAL
BILIRUB SERPL-MCNC: 0.6 MG/DL (ref 0–1.2)
BODY TEMPERATURE: 37
BUN SERPL-MCNC: 65 MG/DL (ref 8–23)
BUN/CREAT SERPL: 75.6 (ref 7–25)
CALCIUM SPEC-SCNC: 8.4 MG/DL (ref 8.6–10.5)
CHLORIDE SERPL-SCNC: 110 MMOL/L (ref 98–107)
CO2 BLDA-SCNC: 27.9 MMOL/L (ref 22–33)
CO2 SERPL-SCNC: 27 MMOL/L (ref 22–29)
COHGB MFR BLD: 1.4 % (ref 0–2)
CREAT SERPL-MCNC: 0.86 MG/DL (ref 0.76–1.27)
DEPRECATED RDW RBC AUTO: 57.2 FL (ref 37–54)
EGFRCR SERPLBLD CKD-EPI 2021: 96.1 ML/MIN/1.73
EOSINOPHIL # BLD MANUAL: 0.39 10*3/MM3 (ref 0–0.4)
EOSINOPHIL NFR BLD MANUAL: 2 % (ref 0.3–6.2)
EPAP: 0
ERYTHROCYTE [DISTWIDTH] IN BLOOD BY AUTOMATED COUNT: 16.7 % (ref 12.3–15.4)
GLOBULIN UR ELPH-MCNC: 3 GM/DL
GLUCOSE BLDC GLUCOMTR-MCNC: 138 MG/DL (ref 70–130)
GLUCOSE BLDC GLUCOMTR-MCNC: 163 MG/DL (ref 70–130)
GLUCOSE BLDC GLUCOMTR-MCNC: 186 MG/DL (ref 70–130)
GLUCOSE SERPL-MCNC: 123 MG/DL (ref 65–99)
HCO3 BLDA-SCNC: 26.8 MMOL/L (ref 20–26)
HCT VFR BLD AUTO: 30.1 % (ref 37.5–51)
HCT VFR BLD CALC: 29.3 % (ref 38–51)
HGB BLD-MCNC: 9.5 G/DL (ref 13–17.7)
HGB BLDA-MCNC: 9.6 G/DL (ref 13.5–17.5)
INHALED O2 CONCENTRATION: 40 %
IPAP: 0
LYMPHOCYTES # BLD MANUAL: 0.97 10*3/MM3 (ref 0.7–3.1)
LYMPHOCYTES NFR BLD MANUAL: 7 % (ref 5–12)
MAGNESIUM SERPL-MCNC: 1.8 MG/DL (ref 1.6–2.4)
MCH RBC QN AUTO: 30.7 PG (ref 26.6–33)
MCHC RBC AUTO-ENTMCNC: 31.6 G/DL (ref 31.5–35.7)
MCV RBC AUTO: 97.4 FL (ref 79–97)
METAMYELOCYTES NFR BLD MANUAL: 2 % (ref 0–0)
METHGB BLD QL: 0.3 % (ref 0–1.5)
MODALITY: ABNORMAL
MONOCYTES # BLD: 1.36 10*3/MM3 (ref 0.1–0.9)
NEUTROPHILS # BLD AUTO: 16.27 10*3/MM3 (ref 1.7–7)
NEUTROPHILS NFR BLD MANUAL: 79 % (ref 42.7–76)
NEUTS BAND NFR BLD MANUAL: 5 % (ref 0–5)
NRBC SPEC MANUAL: 0 /100 WBC (ref 0–0.2)
OXYHGB MFR BLDV: 95.1 % (ref 94–99)
PAW @ PEAK INSP FLOW SETTING VENT: 0 CMH2O
PCO2 BLDA: 37.3 MM HG (ref 35–45)
PCO2 TEMP ADJ BLD: 37.3 MM HG (ref 35–48)
PEEP RESPIRATORY: 5 CM[H2O]
PH BLDA: 7.46 PH UNITS (ref 7.35–7.45)
PH, TEMP CORRECTED: 7.46 PH UNITS
PHOSPHATE SERPL-MCNC: 2.4 MG/DL (ref 2.5–4.5)
PLAT MORPH BLD: NORMAL
PLATELET # BLD AUTO: 85 10*3/MM3 (ref 140–450)
PMV BLD AUTO: 12.1 FL (ref 6–12)
PO2 BLDA: 80.6 MM HG (ref 83–108)
PO2 TEMP ADJ BLD: 80.6 MM HG (ref 83–108)
POTASSIUM SERPL-SCNC: 3.5 MMOL/L (ref 3.5–5.2)
PROCALCITONIN SERPL-MCNC: 9.25 NG/ML (ref 0–0.25)
PROT SERPL-MCNC: 5.6 G/DL (ref 6–8.5)
QT INTERVAL: 362 MS
QTC INTERVAL: 425 MS
RBC # BLD AUTO: 3.09 10*6/MM3 (ref 4.14–5.8)
SODIUM SERPL-SCNC: 144 MMOL/L (ref 136–145)
TARGETS BLD QL SMEAR: ABNORMAL
TOTAL RATE: 15 BREATHS/MINUTE
TOXIC GRANULATION: ABNORMAL
VARIANT LYMPHS NFR BLD MANUAL: 5 % (ref 19.6–45.3)
VENTILATOR MODE: ABNORMAL
VT ON VENT VENT: 0.47 ML
WBC NRBC COR # BLD AUTO: 19.37 10*3/MM3 (ref 3.4–10.8)

## 2025-02-20 PROCEDURE — 36600 WITHDRAWAL OF ARTERIAL BLOOD: CPT

## 2025-02-20 PROCEDURE — 25010000002 HYDROCORTISONE SOD SUC (PF) 100 MG RECONSTITUTED SOLUTION: Performed by: INTERNAL MEDICINE

## 2025-02-20 PROCEDURE — 25010000003 DEXTROSE 5 % SOLUTION: Performed by: INTERNAL MEDICINE

## 2025-02-20 PROCEDURE — 99291 CRITICAL CARE FIRST HOUR: CPT | Performed by: INTERNAL MEDICINE

## 2025-02-20 PROCEDURE — 25010000002 CEFEPIME PER 500 MG: Performed by: INTERNAL MEDICINE

## 2025-02-20 PROCEDURE — 85025 COMPLETE CBC W/AUTO DIFF WBC: CPT | Performed by: INTERNAL MEDICINE

## 2025-02-20 PROCEDURE — 82948 REAGENT STRIP/BLOOD GLUCOSE: CPT

## 2025-02-20 PROCEDURE — 94799 UNLISTED PULMONARY SVC/PX: CPT

## 2025-02-20 PROCEDURE — 25010000002 FENTANYL 10 MCG/1 ML NS: Performed by: INTERNAL MEDICINE

## 2025-02-20 PROCEDURE — 71045 X-RAY EXAM CHEST 1 VIEW: CPT

## 2025-02-20 PROCEDURE — 94003 VENT MGMT INPAT SUBQ DAY: CPT

## 2025-02-20 PROCEDURE — 84100 ASSAY OF PHOSPHORUS: CPT | Performed by: INTERNAL MEDICINE

## 2025-02-20 PROCEDURE — 25010000002 MIDAZOLAM 1 MG/ML 100ML NS 100 MG/100ML SOLUTION: Performed by: INTERNAL MEDICINE

## 2025-02-20 PROCEDURE — 94761 N-INVAS EAR/PLS OXIMETRY MLT: CPT

## 2025-02-20 PROCEDURE — 82375 ASSAY CARBOXYHB QUANT: CPT

## 2025-02-20 PROCEDURE — 83050 HGB METHEMOGLOBIN QUAN: CPT

## 2025-02-20 PROCEDURE — 84145 PROCALCITONIN (PCT): CPT | Performed by: INTERNAL MEDICINE

## 2025-02-20 PROCEDURE — 80053 COMPREHEN METABOLIC PANEL: CPT | Performed by: INTERNAL MEDICINE

## 2025-02-20 PROCEDURE — 25010000002 ENOXAPARIN PER 10 MG: Performed by: INTERNAL MEDICINE

## 2025-02-20 PROCEDURE — 83735 ASSAY OF MAGNESIUM: CPT | Performed by: INTERNAL MEDICINE

## 2025-02-20 PROCEDURE — 82805 BLOOD GASES W/O2 SATURATION: CPT

## 2025-02-20 PROCEDURE — 85007 BL SMEAR W/DIFF WBC COUNT: CPT | Performed by: INTERNAL MEDICINE

## 2025-02-20 RX ADMIN — ENOXAPARIN SODIUM 40 MG: 100 INJECTION SUBCUTANEOUS at 12:36

## 2025-02-20 RX ADMIN — HYDROCORTISONE SODIUM SUCCINATE 50 MG: 100 INJECTION, POWDER, FOR SOLUTION INTRAMUSCULAR; INTRAVENOUS at 06:26

## 2025-02-20 RX ADMIN — DEXTROSE MONOHYDRATE 100 ML/HR: 5 INJECTION, SOLUTION INTRAVENOUS at 13:55

## 2025-02-20 RX ADMIN — Medication 10 ML: at 08:25

## 2025-02-20 RX ADMIN — GABAPENTIN 300 MG: 300 CAPSULE ORAL at 06:24

## 2025-02-20 RX ADMIN — ESCITALOPRAM OXALATE 10 MG: 10 TABLET ORAL at 22:04

## 2025-02-20 RX ADMIN — CEFEPIME 2000 MG: 2 INJECTION, POWDER, FOR SOLUTION INTRAVENOUS at 08:25

## 2025-02-20 RX ADMIN — CEFEPIME 2000 MG: 2 INJECTION, POWDER, FOR SOLUTION INTRAVENOUS at 16:55

## 2025-02-20 RX ADMIN — CEFEPIME 2000 MG: 2 INJECTION, POWDER, FOR SOLUTION INTRAVENOUS at 23:46

## 2025-02-20 RX ADMIN — GABAPENTIN 300 MG: 300 CAPSULE ORAL at 13:55

## 2025-02-20 RX ADMIN — CHLORHEXIDINE GLUCONATE 0.12% ORAL RINSE 15 ML: 1.2 LIQUID ORAL at 08:25

## 2025-02-20 RX ADMIN — PANTOPRAZOLE SODIUM 40 MG: 40 INJECTION, POWDER, FOR SOLUTION INTRAVENOUS at 08:25

## 2025-02-20 RX ADMIN — Medication 250 MCG/HR: at 18:15

## 2025-02-20 RX ADMIN — DEXTROSE MONOHYDRATE 100 ML/HR: 5 INJECTION, SOLUTION INTRAVENOUS at 04:05

## 2025-02-20 RX ADMIN — GABAPENTIN 300 MG: 300 CAPSULE ORAL at 22:04

## 2025-02-20 RX ADMIN — PANTOPRAZOLE SODIUM 40 MG: 40 INJECTION, POWDER, FOR SOLUTION INTRAVENOUS at 22:04

## 2025-02-20 RX ADMIN — CHLORHEXIDINE GLUCONATE 0.12% ORAL RINSE 15 ML: 1.2 LIQUID ORAL at 22:03

## 2025-02-20 RX ADMIN — DOCUSATE SODIUM 100 MG: 50 LIQUID ORAL at 22:03

## 2025-02-20 RX ADMIN — HYDROCORTISONE SODIUM SUCCINATE 50 MG: 100 INJECTION, POWDER, FOR SOLUTION INTRAMUSCULAR; INTRAVENOUS at 18:24

## 2025-02-20 RX ADMIN — Medication 200 MCG/HR: at 06:43

## 2025-02-20 RX ADMIN — Medication 10 ML: at 22:19

## 2025-02-20 RX ADMIN — DEXTROSE MONOHYDRATE 100 ML/HR: 5 INJECTION, SOLUTION INTRAVENOUS at 23:46

## 2025-02-20 RX ADMIN — DOCUSATE SODIUM 100 MG: 50 LIQUID ORAL at 08:25

## 2025-02-20 RX ADMIN — MIDAZOLAM HYDROCHLORIDE 3 MG/HR: 1 INJECTION, SOLUTION INTRAVENOUS at 22:00

## 2025-02-20 NOTE — PROGRESS NOTES
INTENSIVIST   PROGRESS NOTE     Hospital:  LOS: 9 days     Mr. Val Nassar Jr., 64 y.o. male is followed for a Chief Complaint of: Shock, Respiratory Failure      Subjective   S     Interval History:    He seems more alert than yesterday.    Open eyes and follows simple commands.    Last Bowel Movement: 25 (25 1200)     Temp  Min: 99 °F (37.2 °C)  Max: 100.2 °F (37.9 °C)      The patient's relevant past medical, surgical and social history were reviewed and updated in Epic as appropriate.        History     Last Reviewed by Aristeo Kowalski MD on 2025 at  1:36 PM    Sections Reviewed    Medical, Surgical, Family, Tobacco, Alcohol, Drug Use, Sexual Activity,   Social Documentation    Problem list reviewed by Aristeo Kowalski MD on 2025 at  1:35 PM  Medicines reviewed by Aristeo Kowalski MD on 2025 at  1:35 PM  Allergies reviewed by Aristeo Kowalski MD on 2025 at  1:35 PM     Objective   O     Physical Examination    Vitals:  Temp: 99.1 °F (37.3 °C) (25 0400) Temp  Min: 99 °F (37.2 °C)  Max: 100.2 °F (37.9 °C)   Temp core:      BP: 112/53 (25 0700) BP  Min: 100/44  Max: 128/61   MAP (non-invasive) Noninvasive MAP (mmHg): 82 (25 07) Noninvasive MAP (mmHg)  Av.1  Min: 48  Max: 119   Pulse: 58 (25 0700) Pulse  Min: 49  Max: 74   Resp: 18 (25 0431) Resp  Min: 16  Max: 21   SpO2: 93 % (25 07) SpO2  Min: 90 %  Max: 94 %   Device: ventilator (25 0431)           Intake/Ouptut 24 hrs (7:00AM - 6:59 AM)  Intake & Output (last 2 days)          0701   0700  0701   07 07 07    I.V. (mL/kg) 2982.5 (33.7) 3098.8 (35.1)     Other 932 1088     NG/GT 1390 1607     IV Piggyback 362.3 299.9     Total Intake(mL/kg) 5666.8 (64.1) 6093.7 (68.9)     Urine (mL/kg/hr) 2620 (1.2) 2570 (1.2)     Total Output 2620 2570     Net +3046.8 +3523.7                    Medications (drips):  dextrose, Last Rate: 100 mL/hr  (02/20/25 0405)  fentanyl 10 mcg/mL, Last Rate: 200 mcg/hr (02/20/25 0643)  midazolam, Last Rate: 2 mg/hr (02/19/25 1615)  norepinephrine            02/15/25  0440 02/16/25  0418   Weight: 88.3 kg (194 lb 10.7 oz) 88.4 kg (194 lb 14.2 oz)        Invasive Mechanical Ventilator   Settings: Observed:   Mode: VC+/AC (02/20/25 0700)    Resp Rate (Set): 15 (02/20/25 0700) Resp Rate (Observed) Vent: 19 (02/20/25 0700)   Vt (Set, mL): 470 mL (02/20/25 0700) Vt, Exp (observed, mL): 311 mL (02/20/25 0700)    Minute Ventilation (L/min) (Obs): 7.81 L/min (02/20/25 0700)    I:E Ratio (Obs): 1:1.9 (02/20/25 0700)       FiO2 (%): 40 % (02/20/25 0700) Plateau Pressure (cm H2O): (S) 23 cm H2O (02/20/25 0700)   PEEP/CPAP (cm H2O): 5 cm H20 (02/20/25 0700) Driving Pressure (cm H2O): 18.5 cm H2O (02/20/25 0700)    Static Compliance (L/cm H2O): 35 (02/19/25 1855)      Telemetry:  Rhythm: sinus bradycardia (02/20/25 0600)  Atrial Rhythm: atrial fibrillation (02/17/25 0600)      Constitutional:  No acute distress.   Cardiovascular: RRR.    Respiratory: Normal breath sounds  (+) Rhonchi   Abdominal:  Soft with no tenderness.   Extremities: Edema (hands)   Neurological:   Sedated. But opens eyes.  Best Eye Response: 4-->(E4) spontaneous (02/20/25 0600)  Best Motor Response: 6-->(M6) obeys commands (02/20/25 0600)  Best Verbal Response: 1-->(V1) none (02/20/25 0600)  Verbena Coma Scale Score: 11 (02/20/25 0600)           Results Reviewed:  Laboratory  Microbiology  Radiology  Pathology    Hematology:  Results from last 7 days   Lab Units 02/20/25  0511 02/19/25  0239 02/18/25  0303 02/17/25  0329 02/15/25  0347   WBC 10*3/mm3 19.37* 22.24* 28.36*   < > 33.03*   BANDS % % 5.0  --   --   --  9.0*   HEMOGLOBIN g/dL 9.5* 10.7* 10.8*   < > 10.5*   MCV fL 97.4* 98.0* 94.9   < > 96.2   PLATELETS 10*3/mm3 85* 91* 79*   < > 53*    < > = values in this interval not displayed.     Results from last 7 days   Lab Units 02/20/25  0511 02/19/25  0239  02/18/25  0303 02/17/25  0329 02/17/25  0329 02/15/25  0347   NEUTROS ABS 10*3/mm3 16.27* 17.80* 22.68*   < > 21.73* 29.40*   LYMPHS ABS 10*3/mm3 0.97 0.96 1.42  --  1.33 0.99   EOS ABS 10*3/mm3 0.39 0.13 0.00   < > 0.01 0.00    < > = values in this interval not displayed.     Chemistry:  Estimated Creatinine Clearance: 94.2 mL/min (by C-G formula based on SCr of 0.86 mg/dL).    Results from last 7 days   Lab Units 02/20/25  0511 02/19/25  0239   SODIUM mmol/L 144 152*   POTASSIUM mmol/L 3.5 3.9   CHLORIDE mmol/L 110* 118*   CO2 mmol/L 27.0 28.0   BUN mg/dL 65* 86*   CREATININE mg/dL 0.86 1.11   GLUCOSE mg/dL 123* 161*     Results from last 7 days   Lab Units 02/20/25  0511 02/19/25  0239   CALCIUM mg/dL 8.4* 8.7   MAGNESIUM mg/dL 1.8 1.9   PHOSPHORUS mg/dL 2.4* 2.5     Hepatic Panel:  Results from last 7 days   Lab Units 02/20/25  0511 02/17/25 0329 02/16/25  0521 02/14/25  0502   ALBUMIN g/dL 2.6* 3.2* 2.7* 2.5*   TOTAL PROTEIN g/dL 5.6* 5.9* 6.0 5.8*   BILIRUBIN mg/dL 0.6 0.9 0.9 1.2   BILIRUBIN DIRECT mg/dL  --   --  0.5* 0.8*   AST (SGOT) U/L 62* 90* 98* 185*   ALT (SGPT) U/L 101* 95* 115* 176*   ALK PHOS U/L 131* 151* 142* 93     Cardiac Labs:  Results from last 7 days   Lab Units 02/18/25  0303   PROBNP pg/mL 10,574.0*     Biomarkers:  Results from last 7 days   Lab Units 02/20/25  0511 02/18/25  0303 02/17/25  0329 02/16/25  0521 02/14/25  0735   CRP mg/dL  --   --  7.62* 16.20*  --    PROCALCITONIN ng/mL 9.25* 37.48*  --  93.99*  --    FIBRINOGEN mg/dL  --   --   --   --  889*       COVID-19  Lab Results   Component Value Date    COVID19 Not Detected 02/11/2025    COVID19 Not Detected 12/26/2023       Arterial Blood Gases:  Results from last 7 days   Lab Units 02/20/25  0257 02/19/25  0313 02/18/25  1626   PH, ARTERIAL pH units 7.464* 7.453* 7.447   PCO2, ARTERIAL mm Hg 37.3 39.2 40.5   PO2 ART mm Hg 80.6* 79.2* 87.9   FIO2 % 40 50 50       Images:  XR Chest 1 View    Result Date: 2/20/2025  Impression:  "Support hardware projects unchanged. There is persistent edema pattern present including perihilar opacities and small to moderate right and trace left pleural effusions. There is no distinct pneumothorax. No new focal airspace consolidation. Unchanged heart and mediastinal contours. Electronically Signed: Yuri Medina MD  2/20/2025 6:46 AM EST  Workstation ID: [id number]    XR Chest 1 View    Result Date: 2/19/2025  Impression: Stable exam demonstrating bibasilar airspace disease and suspected small bilateral pleural effusions Electronically Signed: Eder Draper  2/19/2025 7:49 AM EST  Workstation ID: [id number]     Echo:  Results for orders placed during the hospital encounter of 02/11/25    Adult Transthoracic Echo Complete w/ Color, Spectral and Contrast if Necessary Per Protocol    Interpretation Summary    Left ventricular systolic function is normal. Estimated left ventricular EF = 60%    Left ventricular wall thickness is consistent with mild concentric hypertrophy.    Mild aortic valve stenosis is present.    Aortic valve maximum pressure gradient is 23 mmHg. Aortic valve mean pressure gradient is 14 mmHg.      Results: Reviewed.  I reviewed the patient's new laboratory and imaging results.  I independently reviewed the patient's new images.    Medications: Reviewed.    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 male admitted on 2/11/2025 with " weakness, dizziness, hypotension and AMS due to Septic shock [A41.9, R65.21]    CT head negative for an acute abnormality. CT chest/abdomen/pelvis performed and was remarkable to pyelonephritis, sigmoid diverticulosis, and possible cystitis.     He required intubation on the morning of 2/12/25.     Blood and urine culture with Pseudomonas and he is on Cefepime.     Comorbidities: tobacco use, alcoholic cirrhosis, metastatic squamous cell of the esophagus and HLD.    Septic Shock.  Pseudomonas bacteremia/pneumonia  Respiratory Failure  Intubated 02/12/25  Invasive Mechanical Ventilation   Bilateral infiltrates  Cardiovascular  Dyslipidemia   GI/Hepatology    Cirrhosis  Renal  GALILEO  Hematology  Thrombocytopenia improving.  Oncology  Squamous cell carcinoma of the esophagus  Nutrition Support   Tube Feeding: Formula: Peptamen 1.5 (Peptide Based, Critical Care, ALI); Feeding Type: Continuous; Start at: 20 mL/hr; Then Advance By: 20 mL/hr; Every: 12 hours; To Goal Rate of: 60 mL/hr; Total Daily Feeding Volume (mL/day): 1200; Water Flush: O...  NG/OG Tube Orogastric 16 Fr Center mouth-Tube Feeding Rate (mL/hr): 60 mL/HR (Based on a feeding duration of 20 h/d)      Lab Results   Component Value Date    PREALBUMIN 10.3 (L) 02/17/2025    PREALBUMIN 6.1 (L) 02/16/2025    CRP 7.62 (H) 02/17/2025    CRP 16.20 (H) 02/16/2025    CRP 8.21 (H) 02/11/2025     Endocrine   Body mass index is 28.77 kg/m². Overweight: 25.0-29.9kg/m2   No history of Diabetes    Lab Results   Lab Value Date/Time    HGBA1C 4.6 (L) 12/25/2023 1925     Results from last 7 days   Lab Units 02/20/25  0554 02/19/25  2334 02/19/25  1803 02/19/25  1153 02/19/25  0602 02/18/25  2357 02/18/25  1728 02/18/25  1142   GLUCOSE mg/dL 138* 160* 155* 187* 140* 167* 150* 152*       Diet: NPO Diet NPO Type: Tube Feeding   Advance Directives: Code Status and Medical Interventions: CPR (Attempt to Resuscitate); Full Support   Ordered at: 02/12/25 1029     Level Of Support  Discussed With:    Patient     Code Status (Patient has no pulse and is not breathing):    CPR (Attempt to Resuscitate)     Medical Interventions (Patient has pulse or is breathing):    Full Support        VTE Prophylaxis:  Pharmacologic VTE prophylaxis orders are present.         In brief:  Stop hydrocortisone to 50 mg BID and re-assess need for pressors.  Continue IVF D5W 100 mL/h, BUN continues to improve.  Continue antibiotics. PCT decreasing. Complete about 10-14 days.  Not ready to be liberated from Invasive Mechanical Ventilation  Candida albicans in Bronch Wash (02/17), most likely a colonization.  Goal: Glucose < 180 mg/dL  Disposition: Keep in ICU.    Plan of care and goals reviewed during interdisciplinary rounds.  I discussed the patient's findings and my recommendations with nursing staff    Time: was greater than 34 minutes. This is non-concurrent time.   (This excludes time spent performing separately reportable procedures and services).    He has a high risk of imminent or life-threatening  deterioration, which requires the highest level of physician preparedness to intervene urgently. I devoted my full attention to the direct care of this patient for the amount of time indicated above.     Time spent with family or surrogate(s) is included only if the patient was incapable of providing the necessary information or participating in medical decision making.    He has the following organ/system impairments Respiratory Failure.        [x] Primary Attending Intensive Care Medicine - Nutrition Support   [] Consultant    Copied text in this note has been reviewed and is accurate as of 02/20/25

## 2025-02-20 NOTE — PLAN OF CARE
Goal Outcome Evaluation:     Pt easily arousable, nods appropriately at times/wiggles toes and fingers to command. SB 50-60, same vent settings P 5, FiO2 40%, tolerating well. UOP adequate, no BM, abdomen still very distended/hard. Fent @ 200, Versed @ 2, D5W @ 100. Bath given, all dressings changed on back side.     Problem: Restraint, Nonviolent  Goal: Absence of Harm or Injury  Intervention: Implement Least Restrictive Safety Strategies  Recent Flowsheet Documentation  Taken 2/20/2025 0400 by Jamila Cotto RN  Medical Device Protection:   IV pole/bag removed from visual field   torso covered   tubing secured  Diversional Activities: television  Taken 2/20/2025 0200 by Jamila Cotto RN  Medical Device Protection:   IV pole/bag removed from visual field   torso covered   tubing secured  Diversional Activities: television  Taken 2/20/2025 0003 by Jamila Cotto RN  Medical Device Protection:   IV pole/bag removed from visual field   torso covered   tubing secured  Diversional Activities: television  Taken 2/20/2025 0000 by Jamila Cotto RN  Medical Device Protection:   IV pole/bag removed from visual field   torso covered   tubing secured  Diversional Activities: television  Taken 2/19/2025 2200 by Jamila Cotto RN  Medical Device Protection:   IV pole/bag removed from visual field   torso covered   tubing secured  Diversional Activities: television  Taken 2/19/2025 2000 by Jamila Cotto RN  Medical Device Protection:   IV pole/bag removed from visual field   torso covered   tubing secured  Diversional Activities: television

## 2025-02-20 NOTE — PLAN OF CARE
Goal Outcome Evaluation:            On going not progressing-unable to wean from vent

## 2025-02-21 ENCOUNTER — APPOINTMENT (OUTPATIENT)
Dept: PULMONOLOGY | Facility: HOSPITAL | Age: 65
DRG: 870 | End: 2025-02-21
Payer: MEDICARE

## 2025-02-21 ENCOUNTER — APPOINTMENT (OUTPATIENT)
Dept: GENERAL RADIOLOGY | Facility: HOSPITAL | Age: 65
DRG: 870 | End: 2025-02-21
Payer: MEDICARE

## 2025-02-21 LAB
ALBUMIN SERPL-MCNC: 2.6 G/DL (ref 3.5–5.2)
ALBUMIN/GLOB SERPL: 0.7 G/DL
ALP SERPL-CCNC: 138 U/L (ref 39–117)
ALT SERPL W P-5'-P-CCNC: 80 U/L (ref 1–41)
ANION GAP SERPL CALCULATED.3IONS-SCNC: 12 MMOL/L (ref 5–15)
ARTERIAL PATENCY WRIST A: ABNORMAL
AST SERPL-CCNC: 52 U/L (ref 1–40)
ATMOSPHERIC PRESS: ABNORMAL MM[HG]
BASE EXCESS BLDA CALC-SCNC: 1.5 MMOL/L (ref 0–2)
BASOPHILS # BLD MANUAL: 0 10*3/MM3 (ref 0–0.2)
BASOPHILS NFR BLD MANUAL: 0 % (ref 0–1.5)
BDY SITE: ABNORMAL
BILIRUB SERPL-MCNC: 0.6 MG/DL (ref 0–1.2)
BODY TEMPERATURE: 37
BUN SERPL-MCNC: 53 MG/DL (ref 8–23)
BUN/CREAT SERPL: 66.3 (ref 7–25)
CALCIUM SPEC-SCNC: 8.5 MG/DL (ref 8.6–10.5)
CHLORIDE SERPL-SCNC: 103 MMOL/L (ref 98–107)
CO2 BLDA-SCNC: 27.1 MMOL/L (ref 22–33)
CO2 SERPL-SCNC: 23 MMOL/L (ref 22–29)
COHGB MFR BLD: 1.6 % (ref 0–2)
CREAT SERPL-MCNC: 0.8 MG/DL (ref 0.76–1.27)
D-LACTATE SERPL-SCNC: 1.8 MMOL/L (ref 0.5–2)
DEPRECATED RDW RBC AUTO: 55.8 FL (ref 37–54)
EGFRCR SERPLBLD CKD-EPI 2021: 98.2 ML/MIN/1.73
EOSINOPHIL # BLD MANUAL: 0 10*3/MM3 (ref 0–0.4)
EOSINOPHIL NFR BLD MANUAL: 0 % (ref 0.3–6.2)
EPAP: 0
ERYTHROCYTE [DISTWIDTH] IN BLOOD BY AUTOMATED COUNT: 16.7 % (ref 12.3–15.4)
GLOBULIN UR ELPH-MCNC: 3.6 GM/DL
GLUCOSE BLDC GLUCOMTR-MCNC: 125 MG/DL (ref 70–130)
GLUCOSE BLDC GLUCOMTR-MCNC: 131 MG/DL (ref 70–130)
GLUCOSE BLDC GLUCOMTR-MCNC: 142 MG/DL (ref 70–130)
GLUCOSE BLDC GLUCOMTR-MCNC: 156 MG/DL (ref 70–130)
GLUCOSE BLDC GLUCOMTR-MCNC: 197 MG/DL (ref 70–130)
GLUCOSE SERPL-MCNC: 165 MG/DL (ref 65–99)
HCO3 BLDA-SCNC: 25.9 MMOL/L (ref 20–26)
HCT VFR BLD AUTO: 32.5 % (ref 37.5–51)
HCT VFR BLD CALC: 29.9 % (ref 38–51)
HGB BLD-MCNC: 10.5 G/DL (ref 13–17.7)
HGB BLDA-MCNC: 9.7 G/DL (ref 13.5–17.5)
INHALED O2 CONCENTRATION: 40 %
IPAP: 0
LYMPHOCYTES # BLD MANUAL: 0 10*3/MM3 (ref 0.7–3.1)
LYMPHOCYTES NFR BLD MANUAL: 5 % (ref 5–12)
MAGNESIUM SERPL-MCNC: 1.8 MG/DL (ref 1.6–2.4)
MCH RBC QN AUTO: 30.9 PG (ref 26.6–33)
MCHC RBC AUTO-ENTMCNC: 32.3 G/DL (ref 31.5–35.7)
MCV RBC AUTO: 95.6 FL (ref 79–97)
METHGB BLD QL: 0.2 % (ref 0–1.5)
MODALITY: ABNORMAL
MONOCYTES # BLD: 1.25 10*3/MM3 (ref 0.1–0.9)
NEUTROPHILS # BLD AUTO: 23.77 10*3/MM3 (ref 1.7–7)
NEUTROPHILS NFR BLD MANUAL: 90 % (ref 42.7–76)
NEUTS BAND NFR BLD MANUAL: 5 % (ref 0–5)
NRBC SPEC MANUAL: 0 /100 WBC (ref 0–0.2)
OXYHGB MFR BLDV: 91.1 % (ref 94–99)
PAW @ PEAK INSP FLOW SETTING VENT: 0 CMH2O
PCO2 BLDA: 38.8 MM HG (ref 35–45)
PCO2 TEMP ADJ BLD: 38.8 MM HG (ref 35–48)
PEEP RESPIRATORY: 5 CM[H2O]
PH BLDA: 7.43 PH UNITS (ref 7.35–7.45)
PH, TEMP CORRECTED: 7.43 PH UNITS
PHOSPHATE SERPL-MCNC: 2.7 MG/DL (ref 2.5–4.5)
PLAT MORPH BLD: NORMAL
PLATELET # BLD AUTO: 79 10*3/MM3 (ref 140–450)
PMV BLD AUTO: 11.9 FL (ref 6–12)
PO2 BLDA: 64.3 MM HG (ref 83–108)
PO2 TEMP ADJ BLD: 64.3 MM HG (ref 83–108)
POTASSIUM SERPL-SCNC: 4 MMOL/L (ref 3.5–5.2)
PROCALCITONIN SERPL-MCNC: 5.87 NG/ML (ref 0–0.25)
PROT SERPL-MCNC: 6.2 G/DL (ref 6–8.5)
RBC # BLD AUTO: 3.4 10*6/MM3 (ref 4.14–5.8)
RBC MORPH BLD: NORMAL
SODIUM SERPL-SCNC: 138 MMOL/L (ref 136–145)
TOTAL RATE: 0 BREATHS/MINUTE
VARIANT LYMPHS NFR BLD MANUAL: 0 % (ref 19.6–45.3)
VENTILATOR MODE: ABNORMAL
VT ON VENT VENT: 0.47 ML
WBC MORPH BLD: NORMAL
WBC NRBC COR # BLD AUTO: 25.02 10*3/MM3 (ref 3.4–10.8)

## 2025-02-21 PROCEDURE — 94003 VENT MGMT INPAT SUBQ DAY: CPT

## 2025-02-21 PROCEDURE — 25010000002 FENTANYL 10 MCG/1 ML NS: Performed by: INTERNAL MEDICINE

## 2025-02-21 PROCEDURE — 82375 ASSAY CARBOXYHB QUANT: CPT

## 2025-02-21 PROCEDURE — 25010000002 ENOXAPARIN PER 10 MG: Performed by: INTERNAL MEDICINE

## 2025-02-21 PROCEDURE — 25010000003 DEXTROSE 5 % SOLUTION: Performed by: INTERNAL MEDICINE

## 2025-02-21 PROCEDURE — 82805 BLOOD GASES W/O2 SATURATION: CPT

## 2025-02-21 PROCEDURE — 85007 BL SMEAR W/DIFF WBC COUNT: CPT | Performed by: INTERNAL MEDICINE

## 2025-02-21 PROCEDURE — 80053 COMPREHEN METABOLIC PANEL: CPT | Performed by: INTERNAL MEDICINE

## 2025-02-21 PROCEDURE — 83605 ASSAY OF LACTIC ACID: CPT | Performed by: INTERNAL MEDICINE

## 2025-02-21 PROCEDURE — 84100 ASSAY OF PHOSPHORUS: CPT | Performed by: INTERNAL MEDICINE

## 2025-02-21 PROCEDURE — 85025 COMPLETE CBC W/AUTO DIFF WBC: CPT | Performed by: INTERNAL MEDICINE

## 2025-02-21 PROCEDURE — 83735 ASSAY OF MAGNESIUM: CPT | Performed by: INTERNAL MEDICINE

## 2025-02-21 PROCEDURE — 99291 CRITICAL CARE FIRST HOUR: CPT | Performed by: INTERNAL MEDICINE

## 2025-02-21 PROCEDURE — 25010000002 CEFEPIME PER 500 MG: Performed by: INTERNAL MEDICINE

## 2025-02-21 PROCEDURE — 94690 O2 UPTK REST INDIRECT: CPT

## 2025-02-21 PROCEDURE — 36600 WITHDRAWAL OF ARTERIAL BLOOD: CPT

## 2025-02-21 PROCEDURE — 82948 REAGENT STRIP/BLOOD GLUCOSE: CPT

## 2025-02-21 PROCEDURE — 84145 PROCALCITONIN (PCT): CPT | Performed by: INTERNAL MEDICINE

## 2025-02-21 PROCEDURE — 94799 UNLISTED PULMONARY SVC/PX: CPT

## 2025-02-21 PROCEDURE — 83050 HGB METHEMOGLOBIN QUAN: CPT

## 2025-02-21 PROCEDURE — 71045 X-RAY EXAM CHEST 1 VIEW: CPT

## 2025-02-21 RX ORDER — DOCUSATE SODIUM 50 MG/5 ML
100 LIQUID (ML) ORAL 2 TIMES DAILY
Status: DISCONTINUED | OUTPATIENT
Start: 2025-02-21 | End: 2025-03-12 | Stop reason: HOSPADM

## 2025-02-21 RX ORDER — ACETAMINOPHEN 160 MG/5ML
650 SOLUTION ORAL ONCE
Status: COMPLETED | OUTPATIENT
Start: 2025-02-21 | End: 2025-02-21

## 2025-02-21 RX ORDER — LACTULOSE 10 G/15ML
20 SOLUTION ORAL 3 TIMES DAILY
Status: DISCONTINUED | OUTPATIENT
Start: 2025-02-21 | End: 2025-02-25

## 2025-02-21 RX ADMIN — PANTOPRAZOLE SODIUM 40 MG: 40 INJECTION, POWDER, FOR SOLUTION INTRAVENOUS at 08:26

## 2025-02-21 RX ADMIN — DOCUSATE SODIUM 100 MG: 50 LIQUID ORAL at 20:13

## 2025-02-21 RX ADMIN — Medication 200 MCG/HR: at 16:22

## 2025-02-21 RX ADMIN — Medication 250 MCG/HR: at 02:45

## 2025-02-21 RX ADMIN — GABAPENTIN 300 MG: 300 CAPSULE ORAL at 05:31

## 2025-02-21 RX ADMIN — LACTULOSE 20 G: 20 SOLUTION ORAL at 20:13

## 2025-02-21 RX ADMIN — ACETAMINOPHEN 650 MG: 650 SOLUTION ORAL at 18:08

## 2025-02-21 RX ADMIN — GABAPENTIN 300 MG: 300 CAPSULE ORAL at 15:03

## 2025-02-21 RX ADMIN — DEXTROSE MONOHYDRATE 100 ML/HR: 5 INJECTION, SOLUTION INTRAVENOUS at 11:22

## 2025-02-21 RX ADMIN — PANTOPRAZOLE SODIUM 40 MG: 40 INJECTION, POWDER, FOR SOLUTION INTRAVENOUS at 20:13

## 2025-02-21 RX ADMIN — LACTULOSE 20 G: 20 SOLUTION ORAL at 16:27

## 2025-02-21 RX ADMIN — ENOXAPARIN SODIUM 40 MG: 100 INJECTION SUBCUTANEOUS at 12:24

## 2025-02-21 RX ADMIN — CHLORHEXIDINE GLUCONATE 0.12% ORAL RINSE 15 ML: 1.2 LIQUID ORAL at 20:13

## 2025-02-21 RX ADMIN — CHLORHEXIDINE GLUCONATE 0.12% ORAL RINSE 15 ML: 1.2 LIQUID ORAL at 08:27

## 2025-02-21 RX ADMIN — CEFEPIME 2000 MG: 2 INJECTION, POWDER, FOR SOLUTION INTRAVENOUS at 16:27

## 2025-02-21 RX ADMIN — CEFEPIME 2000 MG: 2 INJECTION, POWDER, FOR SOLUTION INTRAVENOUS at 08:26

## 2025-02-21 RX ADMIN — ESCITALOPRAM OXALATE 10 MG: 10 TABLET ORAL at 20:13

## 2025-02-21 RX ADMIN — DEXTROSE MONOHYDRATE 100 ML/HR: 5 INJECTION, SOLUTION INTRAVENOUS at 22:54

## 2025-02-21 RX ADMIN — LACTULOSE 20 G: 20 SOLUTION ORAL at 12:23

## 2025-02-21 RX ADMIN — Medication 10 ML: at 08:27

## 2025-02-21 RX ADMIN — DOCUSATE SODIUM 100 MG: 50 LIQUID ORAL at 08:26

## 2025-02-21 RX ADMIN — Medication 10 ML: at 20:13

## 2025-02-21 RX ADMIN — LACTULOSE 20 G: 20 SOLUTION ORAL at 08:26

## 2025-02-21 NOTE — PLAN OF CARE
Goal Outcome Evaluation:  Plan of Care Reviewed With: patient        Progress: no change  Outcome Evaluation: .           - Pt more awake today. Lightly sedated on fentanyl and versed. Followed commands intermittently and nodded head x 1.   - Sinus alan/sinus rhythm per monitor. BP remained WNL with no pressor requirement   - Lung sounds still very coarse. Only a small amount of secretions with ETT suctioning. PEEP 5/ FiO2 40%  - Tube feed remains at goal rate. Abd round and distended. Pt with a smear BM at beginning of shift, but no true BM today.   - Adequate UOP per oswald cath   - D5W remains at 100mL/Hr

## 2025-02-21 NOTE — PLAN OF CARE
Goal Outcome Evaluation:                                          Patient wiggles toes/fingers to command. Responds to voice/light stimulation.  Fentanyl @ 150, Vers @ 2.   SR-SB per tele monitor.   Vent settings unchanged.   Abdomen remains firm, taut; bowel regimen continued.    UOP adequate, 1400ml.   Restraints remain in place for safety.

## 2025-02-21 NOTE — PROGRESS NOTES
INTENSIVIST   PROGRESS NOTE     Hospital:  LOS: 10 days     Mr. Val Nassar Jr., 64 y.o. male is followed for a Chief Complaint of: Shock, Respiratory Failure      Subjective   S     Interval History:    He continues to improve. More alert.    Constipation.    Last Bowel Movement: 25 (25 0800)     Temp  Min: 98.8 °F (37.1 °C)  Max: 99.3 °F (37.4 °C)      The patient's relevant past medical, surgical and social history were reviewed and updated in Epic as appropriate.        History     Last Reviewed by Aristeo Kowalski MD on 2025 at  1:36 PM    Sections Reviewed    Medical, Surgical, Family, Tobacco, Alcohol, Drug Use, Sexual Activity,   Social Documentation    Problem list reviewed by Aristeo Kowalski MD on 2025 at  1:35 PM  Medicines reviewed by Aristeo Kowalski MD on 2025 at  1:35 PM  Allergies reviewed by Aristeo Kowalski MD on 2025 at  1:35 PM     Objective   O     Physical Examination    Vitals:  Temp: 98.8 °F (37.1 °C) (25 0400) Temp  Min: 98.8 °F (37.1 °C)  Max: 99.3 °F (37.4 °C)   Temp core:      BP: 127/63 (25 06) BP  Min: 103/47  Max: 130/62   MAP (non-invasive) Noninvasive MAP (mmHg): 92 (25 06) Noninvasive MAP (mmHg)  Av.2  Min: 48  Max: 119   Pulse: 52 (25) Pulse  Min: 49  Max: 74   Resp: 16 (25) Resp  Min: 16  Max: 21   SpO2: 97 % (25) SpO2  Min: 89 %  Max: 98 %   Device: ventilator (25)           Intake/Ouptut 24 hrs (7:00AM - 6:59 AM)  Intake & Output (last 2 days)          0701   0700  0701   0700  0701   07    I.V. (mL/kg) 3098.8 (35.1) 2876.3 (32.5)     Other 1088 1055     NG/GT 1607 1253     IV Piggyback 299.9 234.1     Total Intake(mL/kg) 6093.7 (68.9) 5418.4 (61.3)     Urine (mL/kg/hr) 2570 (1.2) 2715 (1.3)     Stool  0     Total Output 2570 2715     Net +3523.7 +2703.4            Stool Unmeasured Occurrence  1 x             Medications  (drips):  dextrose, Last Rate: 100 mL/hr (02/20/25 2346)  fentanyl 10 mcg/mL, Last Rate: 150 mcg/hr (02/21/25 0533)  midazolam, Last Rate: 2 mg/hr (02/21/25 0443)  norepinephrine            02/15/25  0440 02/16/25  0418   Weight: 88.3 kg (194 lb 10.7 oz) 88.4 kg (194 lb 14.2 oz)        Invasive Mechanical Ventilator   Settings: Observed:   Mode: VC+/AC (02/21/25 0436)    Resp Rate (Set): 15 (02/21/25 0436) Resp Rate (Observed) Vent: 16 (02/21/25 0600)   Vt (Set, mL): 470 mL (02/21/25 0436) Vt, Exp (observed, mL): 472 mL (02/21/25 0436)    Minute Ventilation (L/min) (Obs): 7.74 L/min (02/21/25 0436)    I:E Ratio (Obs): 1:2.6 (02/21/25 0436)       FiO2 (%): 40 % (02/21/25 0436) Plateau Pressure (cm H2O): 24 cm H2O (02/20/25 1824)   PEEP/CPAP (cm H2O): 5 cm H20 (02/21/25 0436) Driving Pressure (cm H2O): 19.3 cm H2O (02/20/25 1824)    Static Compliance (L/cm H2O): 27 (02/20/25 1824)      Telemetry:  Rhythm: sinus bradycardia (02/21/25 0400)  Atrial Rhythm: atrial fibrillation (02/17/25 0600)      Constitutional:  No acute distress.   Cardiovascular: RRR.    Respiratory: Normal breath sounds  (+) Rhonchi   Abdominal:  Soft with no tenderness.   Extremities: Edema (hands)   Neurological:   Sedated. Opens eyes. More focused.  Best Eye Response: 3-->(E3) to speech (02/21/25 0400)  Best Motor Response: 6-->(M6) obeys commands (02/21/25 0400)  Best Verbal Response: 1-->(V1) none (02/21/25 0400)  Candi Coma Scale Score: 10 (02/21/25 0400)           Results Reviewed:  Laboratory  Microbiology  Radiology  Pathology    Hematology:  Results from last 7 days   Lab Units 02/21/25  0327 02/20/25  0511 02/19/25  0239   WBC 10*3/mm3 25.02* 19.37* 22.24*   BANDS % % 5.0 5.0  --    HEMOGLOBIN g/dL 10.5* 9.5* 10.7*   MCV fL 95.6 97.4* 98.0*   PLATELETS 10*3/mm3 79* 85* 91*     Results from last 7 days   Lab Units 02/21/25  0327 02/20/25  0511 02/19/25  0239 02/18/25  0303 02/17/25  0329 02/17/25  0329 02/15/25  0347   NEUTROS ABS  10*3/mm3 23.77* 16.27* 17.80* 22.68*   < > 21.73* 29.40*   LYMPHS ABS 10*3/mm3 0.00* 0.97 0.96 1.42  --  1.33 0.99   EOS ABS 10*3/mm3 0.00 0.39 0.13 0.00   < > 0.01 0.00    < > = values in this interval not displayed.     Chemistry:  Estimated Creatinine Clearance: 101.3 mL/min (by C-G formula based on SCr of 0.8 mg/dL).    Results from last 7 days   Lab Units 02/21/25 0327 02/20/25  0511   SODIUM mmol/L 138 144   POTASSIUM mmol/L 4.0 3.5   CHLORIDE mmol/L 103 110*   CO2 mmol/L 23.0 27.0   BUN mg/dL 53* 65*   CREATININE mg/dL 0.80 0.86   GLUCOSE mg/dL 165* 123*     Results from last 7 days   Lab Units 02/21/25 0327 02/20/25  0511   CALCIUM mg/dL 8.5* 8.4*   MAGNESIUM mg/dL 1.8 1.8   PHOSPHORUS mg/dL 2.7 2.4*     Hepatic Panel:  Results from last 7 days   Lab Units 02/21/25 0327 02/20/25  0511 02/17/25 0329 02/16/25  0521   ALBUMIN g/dL 2.6* 2.6* 3.2* 2.7*   TOTAL PROTEIN g/dL 6.2 5.6* 5.9* 6.0   BILIRUBIN mg/dL 0.6 0.6 0.9 0.9   BILIRUBIN DIRECT mg/dL  --   --   --  0.5*   AST (SGOT) U/L 52* 62* 90* 98*   ALT (SGPT) U/L 80* 101* 95* 115*   ALK PHOS U/L 138* 131* 151* 142*     Cardiac Labs:  Results from last 7 days   Lab Units 02/18/25  0303   PROBNP pg/mL 10,574.0*     Biomarkers:  Results from last 7 days   Lab Units 02/21/25 0327 02/20/25  0511 02/18/25  0303 02/17/25  0329 02/16/25  0521 02/16/25  0521 02/14/25  0735   CRP mg/dL  --   --   --  7.62*  --  16.20*  --    LACTATE mmol/L 1.8  --   --   --   --   --   --    PROCALCITONIN ng/mL 5.87* 9.25* 37.48*  --    < > 93.99*  --    FIBRINOGEN mg/dL  --   --   --   --   --   --  889*    < > = values in this interval not displayed.       COVID-19  Lab Results   Component Value Date    COVID19 Not Detected 02/11/2025    COVID19 Not Detected 12/26/2023       Arterial Blood Gases:  Results from last 7 days   Lab Units 02/21/25  0310 02/20/25  0257 02/19/25 0313   PH, ARTERIAL pH units 7.432 7.464* 7.453*   PCO2, ARTERIAL mm Hg 38.8 37.3 39.2   PO2 ART mm Hg  "64.3* 80.6* 79.2*   FIO2 % 40 40 50       Images:  XR Chest 1 View    Result Date: 2/20/2025  Impression: Support hardware projects unchanged. There is persistent edema pattern present including perihilar opacities and small to moderate right and trace left pleural effusions. There is no distinct pneumothorax. No new focal airspace consolidation. Unchanged heart and mediastinal contours. Electronically Signed: Yuri Medina MD  2/20/2025 6:46 AM EST  Workstation ID: [id number]     Echo:  Results for orders placed during the hospital encounter of 02/11/25    Adult Transthoracic Echo Complete w/ Color, Spectral and Contrast if Necessary Per Protocol    Interpretation Summary    Left ventricular systolic function is normal. Estimated left ventricular EF = 60%    Left ventricular wall thickness is consistent with mild concentric hypertrophy.    Mild aortic valve stenosis is present.    Aortic valve maximum pressure gradient is 23 mmHg. Aortic valve mean pressure gradient is 14 mmHg.      Results: Reviewed.  I reviewed the patient's new laboratory and imaging results.  I independently reviewed the patient's new images.    Medications: Reviewed.    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 male admitted on 2/11/2025 with weakness, dizziness, hypotension and AMS due to Septic shock [A41.9, R65.21]    CT head negative for an acute abnormality. CT " chest/abdomen/pelvis performed and was remarkable to pyelonephritis, sigmoid diverticulosis, and possible cystitis.     He required intubation on the morning of 2/12/25.     Blood and urine culture with Pseudomonas and he is on Cefepime.     Comorbidities: tobacco use, alcoholic cirrhosis, metastatic squamous cell of the esophagus and HLD.    Septic Shock.  Pseudomonas bacteremia/pneumonia  Respiratory Failure  Intubated 02/12/25  Invasive Mechanical Ventilation   Bilateral infiltrates  Cardiovascular  Dyslipidemia   GI/Hepatology    Cirrhosis  Renal  GALILEO  Hematology  Thrombocytopenia improving.  Oncology  Squamous cell carcinoma of the esophagus  Nutrition Support   Tube Feeding: Formula: Peptamen 1.5 (Peptide Based, Critical Care, ALI); Feeding Type: Continuous; Start at: 20 mL/hr; Then Advance By: 20 mL/hr; Every: 12 hours; To Goal Rate of: 60 mL/hr; Total Daily Feeding Volume (mL/day): 1200; Water Flush: O...  NG/OG Tube Orogastric 16 Fr Center mouth-Tube Feeding Rate (mL/hr): 60 mL/HR (Based on a feeding duration of 20 h/d)  02/21/25 MRRE 2093. RQ 0.83      Lab Results   Component Value Date    PREALBUMIN 10.3 (L) 02/17/2025    PREALBUMIN 6.1 (L) 02/16/2025    CRP 7.62 (H) 02/17/2025    CRP 16.20 (H) 02/16/2025    CRP 8.21 (H) 02/11/2025     Endocrine   Body mass index is 28.77 kg/m². Overweight: 25.0-29.9kg/m2   No history of Diabetes    Lab Results   Lab Value Date/Time    HGBA1C 4.6 (L) 12/25/2023 1925     Results from last 7 days   Lab Units 02/21/25  0535 02/21/25  0003 02/20/25  1731 02/20/25  1137 02/20/25  0554 02/19/25  2334 02/19/25  1803 02/19/25  1153   GLUCOSE mg/dL 131* 197* 163* 186* 138* 160* 155* 187*       Diet: NPO Diet NPO Type: Tube Feeding   Advance Directives: Code Status and Medical Interventions: CPR (Attempt to Resuscitate); Full Support   Ordered at: 02/12/25 1029     Level Of Support Discussed With:    Patient     Code Status (Patient has no pulse and is not breathing):    CPR  (Attempt to Resuscitate)     Medical Interventions (Patient has pulse or is breathing):    Full Support        VTE Prophylaxis:  Pharmacologic VTE prophylaxis orders are present.         In brief:  Off pressors. Off hydrocortisone.  More alert as Uremia improving.  Continue IVF D5W 100 mL/h, at least one more day, and re-assess BUN. He has some edema.  Continue antibiotics. PCT decreasing. Complete about 10-14 days.  Not ready to be liberated from Invasive Mechanical Ventilation  Candida albicans in Bronch Wash (02/17), most likely a colonization. No treatment at present.  Goal: Glucose < 180 mg/dL  He has Lactulose prn, but it has not been used. We will change to scheduled doses until he starts having BMs.  We will increase Enteral Nutrition to provide 2100 Kcal/d. Hold on increasing protein intake too much, while BUN is still high.  Disposition: Keep in ICU.    Plan of care and goals reviewed during interdisciplinary rounds.  I discussed the patient's findings and my recommendations with nursing staff    Time: was greater than 35 minutes. This is non-concurrent time.   (This excludes time spent performing separately reportable procedures and services).    He has a high risk of imminent or life-threatening  deterioration, which requires the highest level of physician preparedness to intervene urgently. I devoted my full attention to the direct care of this patient for the amount of time indicated above.     Time spent with family or surrogate(s) is included only if the patient was incapable of providing the necessary information or participating in medical decision making.    He has the following organ/system impairments Respiratory Failure.        [x] Primary Attending Intensive Care Medicine - Nutrition Support   [] Consultant    Copied text in this note has been reviewed and is accurate as of 02/21/25

## 2025-02-21 NOTE — CASE MANAGEMENT/SOCIAL WORK
Continued Stay Note   [place]     Patient Name: Val Nassar Jr.  MRN: [id number]  Today's Date: 2/21/2025    Admit Date: 2/11/2025    Plan: ongoing, back to LTC bed @ [facility] when medically ready.   Discharge Plan       Row Name 02/21/25 1048       Plan    Plan ongoing, back to LT bed @ [facility] when medically ready.    Patient/Family in Agreement with Plan yes    Plan Comments Discussed in MDR, patient remains intubated/MV and on sedation. He is following commands, nodding appropriately.  I spoke w/nephmargo Tom via phone, d/c plan remains to return to LTC bed @ [facility]. CM will continue to follow.    Final Discharge Disposition Code 04 - intermediate care facility                   Discharge Codes    No documentation.                 Expected Discharge Date and Time       Expected Discharge Date Expected Discharge Time    Feb 18, 2025               Nikolas Hernandez RN

## 2025-02-21 NOTE — PROGRESS NOTES
Clinical Nutrition     Patient Name: Val Nassar Jr.  YOB: 1960  MRN: [id number]  Date of Encounter: 02/21/25 12:18 EST  Admission date: 2/11/2025  Reason for Visit: MDR, Follow-up protocol, EN    Assessment   Nutrition Assessment   Admission Diagnosis:  Septic shock [A41.9, R65.21]    Problem List:    Septic shock due to Pseudomonas species    Hyperlipidemia, unspecified    Tobacco use    Cirrhosis of liver    Squamous cell carcinoma of esophagus    GALILEO (acute kidney injury)    Metabolic acidosis    Pneumonia of both lower lobes due to Pseudomonas species    Bacteremia due to Pseudomonas      PMH:   He  has a past medical history of Anemia, Cancer, Cirrhosis, Duodenal ulcer, Gastric ulcer, GERD (gastroesophageal reflux disease), History of alcohol abuse, History of radiation therapy (05/08/2024), HLD (hyperlipidemia), and Mood disorder.    PSH:  He  has a past surgical history that includes Esophagogastroduodenoscopy (N/A, 12/26/2023) and Venous Access Device (Port) (Right, 04/25/2024).    Substance history: Etoh abuse, vaping    Applicable Nutrition History:     ARF/VENT 2-12-25    s/p Bronch 2-17-25    WOC following for posterior thigh, gluteal skin tears, scrotal MASD.     Labs    Labs Reviewed: Yes    Results from last 7 days   Lab Units 02/21/25  0327 02/20/25  0511 02/19/25  0239 02/17/25  1915 02/17/25  0329   GLUCOSE mg/dL 165* 123* 161*   < > 157*   BUN mg/dL 53* 65* 86*   < > 103*   CREATININE mg/dL 0.80 0.86 1.11   < > 1.31*   SODIUM mmol/L 138 144 152*   < > 154*   CHLORIDE mmol/L 103 110* 118*   < > 117*   POTASSIUM mmol/L 4.0 3.5 3.9   < > 3.2*   PHOSPHORUS mg/dL 2.7 2.4* 2.5   < > 2.9   MAGNESIUM mg/dL 1.8 1.8 1.9   < > 2.2   ALT (SGPT) U/L 80* 101*  --   --  95*   LACTATE mmol/L 1.8  --   --   --   --     < > = values in this interval not displayed.       Results from last 7 days   Lab Units 02/21/25  0327 02/20/25  0511 02/17/25  0329 02/16/25  0521  "  ALBUMIN g/dL 2.6* 2.6* 3.2* 2.7*   PREALBUMIN mg/dL  --   --  10.3* 6.1*   CRP mg/dL  --   --  7.62* 16.20*   TRIGLYCERIDES mg/dL  --   --  107  --        Results from last 7 days   Lab Units 02/21/25  1147 02/21/25  0535 02/21/25  0003 02/20/25  1731 02/20/25  1137 02/20/25  0554   GLUCOSE mg/dL 125 131* 197* 163* 186* 138*     Lab Results   Lab Value Date/Time    HGBA1C 4.6 (L) 12/25/2023 1925         Results from last 7 days   Lab Units 02/18/25  0303   PROBNP pg/mL 10,574.0*       Medications    Medications Reviewed: yes    Scheduled Meds:cefepime, 2,000 mg, Intravenous, Q8H  chlorhexidine, 15 mL, Mouth/Throat, Q12H  docusate sodium, 100 mg, Per G Tube, BID   And  lactulose, 20 g, Per G Tube, TID  enoxaparin, 40 mg, Subcutaneous, Q24H  escitalopram, 10 mg, Per G Tube, Nightly  gabapentin, 300 mg, Per G Tube, Q8H  pantoprazole, 40 mg, Intravenous, Q12H  sodium chloride, 10 mL, Intravenous, Q12H      Continuous Infusions:dextrose, 100 mL/hr, Last Rate: 100 mL/hr (02/21/25 1122)  fentanyl 10 mcg/mL,  mcg/hr, Last Rate: 200 mcg/hr (02/21/25 1018)  midazolam, 1-10 mg/hr, Last Rate: 3 mg/hr (02/21/25 1122)  norepinephrine, 0.02-0.3 mcg/kg/min      PRN Meds:.  polyvinyl alcohol    sodium chloride    sodium chloride    Intake/Ouptut 24 hrs (0701 - 0700)   I&O's Reviewed: yes    Intake & Output (last day)         02/20 0701 02/21 0700 02/21 0701 02/22 0700    I.V. (mL/kg) 2876.3 (32.5) 632.9 (7.2)    Other 1055 135    NG/GT 1253 211    IV Piggyback 234.1 40.6    Total Intake(mL/kg) 5418.4 (61.3) 1019.5 (11.5)    Urine (mL/kg/hr) 2715 (1.3) 650 (1.4)    Stool 0     Total Output 2715 650    Net +2703.4 +369.5          Stool Unmeasured Occurrence 1 x            Anthropometrics     Height: Height: 175.3 cm (69.02\")  Last Filed Weight: Weight: 88.4 kg (194 lb 14.2 oz) (02/16/25 0418)  Method:  bedscale  BMI: BMI (Calculated): 28.8    UBW: ?  Weight change:  per EMR ~ 6lb wt gain since January     Weight       Weight " (kg) Weight (lbs) Weight Method Visit Report   4/23/2024 75.841 kg  167 lb 3.2 oz   --     75.297 kg  166 lb   --    4/25/2024 75.751 kg  167 lb  Stated     4/29/2024 76.658 kg  169 lb      4/30/2024 75.932 kg  167 lb 6.4 oz   --     75.751 kg  167 lb   --    5/6/2024 74.889 kg  165 lb 1.6 oz   --     74.98 kg  165 lb 4.8 oz   --     74.844 kg  165 lb   --        --        --        --    5/7/2024 76.25 kg  168 lb 1.6 oz   --     76.204 kg  168 lb   --    5/14/2024 77.111 kg  170 lb   --    5/21/2024 75.297 kg  166 lb      6/3/2024 74.98 kg  165 lb 4.8 oz   --     75.297 kg  166 lb   --        --        --    6/18/2024 74.39 kg  164 lb   --    7/5/2024 74.4 kg  164 lb 0.4 oz  Estimated     7/16/2024 77.565 kg  171 lb   --    8/7/2024 77.565 kg  171 lb   --    8/19/2024 77.656 kg  171 lb 3.2 oz   --    9/19/2024 77.565 kg  171 lb      10/24/2024 79.833 kg  176 lb   --    11/14/2024 79.833 kg  176 lb   --    11/26/2024 78.926 kg  174 lb      12/18/2024 79.833 kg  176 lb   --    1/21/2025 79.833 kg  176 lb  Stated     1/23/2025 79.833 kg  176 lb   --    2/3/2025 83.008 kg  183 lb   --    2/11/2025 83.008 kg  183 lb       83 kg  182 lb 15.7 oz          Nutrition Focused Physical Exam     Date:     Unable to perform due to Pt unable to participate at time of visit     Subjective   Reported/Observed/Food/Nutrition Related History:     2-21-25: pt intubated, sedated + fentanyl, versed, D5%@100ml    Per RN: pt tolerating TF, has not had a bm, is much more alert today, follows commands    Per MD: plan to maintain D5% for 1 more day,  decrease free water to 30ml/hr    2-19: pt intubated, sedated, + fentanyl, versed, D5%@100ml    Per RN: pt has been more alert, oxygen demands decreased, tolerating TF, abdomen still distended, given lactulose (last bm 2-17)    2/17  Pt intubated, sedated, on fentanyl & VERSED. Spoke with nurse, reported tolerating TF well, has low residuals, and bowels are moving. LBM on 2/15. Noted that Na  "continues to increase, even after increasing free water on 2/15. Pt may benefit from IV fluid to correct hypernatremia.       Pt intubated, sedated, + fentanyl, propofol 7.56ml, levophed 0.32mcg    Per RN: pt's belly distended, more firm than before, bowel regimen started, marginal UOP, have been toni to wean off asiya and vasopressin    Needs Assessment   Date: 25    Height used:Height: 175.3 cm (69.02\")  Weights used: 182lb/ 83kg    Estimated Calorie needs: ~ 1800kcal  Method:  20Kcals/Kkcal  Method:  MSJ x 1.2: 1926kcal  Method:  PSU:1837kcal    Estimated Protein needs: ~113g protein with current renal function  Method: 1.2-1.5g protein per k-125 protein    Current Nutrition Prescription     PO: NPO Diet NPO Type: Tube Feeding  Oral Nutrition Supplement:  Intake: N/A    EN: Peptamen 1.5  Goal Rate: 60 ml/hr  Water Flushes:30ml/hr  Modular: None  Route: OG  Tube: Large bore    At goal over: 20Hrs/day     Rx will supply:   Goal Volume 1200  mL/day     Flush Volume 600 mL/day     Energy 1800 Kcal/day 100 % Est Need   Protein 82 g/day 73 % Est Need   Fiber 0 g/day     Water in   mL     Total Water 60685 mL     Meet DRI Yes        --------------------------------------------------------------------------  Product/Rate verified at bedside: Yes  Infusing Rate at time of visit: 60 ml/hr    Average Delivery from Chartin Day:   Volume 1223 mL/day 102  % Goal Vol.   Flush Volume 1085 mL/day     Energy  Kcal/day  % Est Need   Protein  g/day  % Est Need   Fiber  g/day     Water in  EN  mL     Total Water  mL     Meet DRI Yes             Assessment & Plan   Nutrition Diagnosis   Date: 25 Updated: 25   Problem Inadequate energy intake    Etiology ARF/VENT   Signs/Symptoms 109% goal volume EN   Status: Active; improved, EN started on 2/15    Goal:   Nutrition to support treatment and Adjust EN      Nutrition Intervention      Follow treatment progress, Care plan reviewed    GALILEO " significantly improved with hydration    Suggest change EN to better meet est protein needs as renal function improved    TF recs: Peptamen AF @75ml/hr, free water @30ml/hr    TF at goal volume will provide 1500ml, 1800kcal, 100% kcal needs, 114g protein, 101% protein needs, 9g fiber, 1815ml free water    Metabolic cart ordered this am to re-assess kcal needs, has not been performed yet    Monitoring/Evaluation:   Per protocol, I&O, Pertinent labs, EN delivery/tolerance, Weight, Skin status, GI status, Symptoms, Hemodynamic stability    Monica Chan, RADHA  Time Spent: 30min

## 2025-02-22 ENCOUNTER — APPOINTMENT (OUTPATIENT)
Dept: GENERAL RADIOLOGY | Facility: HOSPITAL | Age: 65
DRG: 870 | End: 2025-02-22
Payer: MEDICARE

## 2025-02-22 LAB
ANION GAP SERPL CALCULATED.3IONS-SCNC: 11 MMOL/L (ref 5–15)
ARTERIAL PATENCY WRIST A: POSITIVE
ATMOSPHERIC PRESS: ABNORMAL MM[HG]
BASE EXCESS BLDA CALC-SCNC: -1.2 MMOL/L (ref 0–2)
BASOPHILS # BLD AUTO: 0.05 10*3/MM3 (ref 0–0.2)
BASOPHILS NFR BLD AUTO: 0.2 % (ref 0–1.5)
BDY SITE: ABNORMAL
BODY TEMPERATURE: 37
BUN SERPL-MCNC: 51 MG/DL (ref 8–23)
BUN/CREAT SERPL: 53.7 (ref 7–25)
CALCIUM SPEC-SCNC: 8.5 MG/DL (ref 8.6–10.5)
CHLORIDE SERPL-SCNC: 104 MMOL/L (ref 98–107)
CO2 BLDA-SCNC: 25 MMOL/L (ref 22–33)
CO2 SERPL-SCNC: 21 MMOL/L (ref 22–29)
COHGB MFR BLD: 1.4 % (ref 0–2)
CREAT SERPL-MCNC: 0.95 MG/DL (ref 0.76–1.27)
DEPRECATED RDW RBC AUTO: 58.3 FL (ref 37–54)
EGFRCR SERPLBLD CKD-EPI 2021: 88.8 ML/MIN/1.73
EOSINOPHIL # BLD AUTO: 0.29 10*3/MM3 (ref 0–0.4)
EOSINOPHIL NFR BLD AUTO: 1.3 % (ref 0.3–6.2)
EPAP: 0
ERYTHROCYTE [DISTWIDTH] IN BLOOD BY AUTOMATED COUNT: 17.1 % (ref 12.3–15.4)
GLUCOSE BLDC GLUCOMTR-MCNC: 130 MG/DL (ref 70–130)
GLUCOSE BLDC GLUCOMTR-MCNC: 138 MG/DL (ref 70–130)
GLUCOSE BLDC GLUCOMTR-MCNC: 145 MG/DL (ref 70–130)
GLUCOSE BLDC GLUCOMTR-MCNC: 160 MG/DL (ref 70–130)
GLUCOSE SERPL-MCNC: 152 MG/DL (ref 65–99)
HCO3 BLDA-SCNC: 23.8 MMOL/L (ref 20–26)
HCT VFR BLD AUTO: 26.9 % (ref 37.5–51)
HCT VFR BLD CALC: 25.7 % (ref 38–51)
HGB BLD-MCNC: 8.5 G/DL (ref 13–17.7)
HGB BLDA-MCNC: 8.4 G/DL (ref 13.5–17.5)
IMM GRANULOCYTES # BLD AUTO: 1.24 10*3/MM3 (ref 0–0.05)
IMM GRANULOCYTES NFR BLD AUTO: 5.6 % (ref 0–0.5)
INHALED O2 CONCENTRATION: 40 %
IPAP: 0
LYMPHOCYTES # BLD AUTO: 0.7 10*3/MM3 (ref 0.7–3.1)
LYMPHOCYTES NFR BLD AUTO: 3.2 % (ref 19.6–45.3)
MAGNESIUM SERPL-MCNC: 2.1 MG/DL (ref 1.6–2.4)
MCH RBC QN AUTO: 30.2 PG (ref 26.6–33)
MCHC RBC AUTO-ENTMCNC: 31.6 G/DL (ref 31.5–35.7)
MCV RBC AUTO: 95.7 FL (ref 79–97)
METHGB BLD QL: 0.2 % (ref 0–1.5)
MODALITY: ABNORMAL
MONOCYTES # BLD AUTO: 2.05 10*3/MM3 (ref 0.1–0.9)
MONOCYTES NFR BLD AUTO: 9.3 % (ref 5–12)
NEUTROPHILS NFR BLD AUTO: 17.8 10*3/MM3 (ref 1.7–7)
NEUTROPHILS NFR BLD AUTO: 80.4 % (ref 42.7–76)
NRBC BLD AUTO-RTO: 0.1 /100 WBC (ref 0–0.2)
OXYHGB MFR BLDV: 96.3 % (ref 94–99)
PAW @ PEAK INSP FLOW SETTING VENT: 0 CMH2O
PCO2 BLDA: 40.3 MM HG (ref 35–45)
PCO2 TEMP ADJ BLD: 40.3 MM HG (ref 35–48)
PEEP RESPIRATORY: 5 CM[H2O]
PH BLDA: 7.38 PH UNITS (ref 7.35–7.45)
PH, TEMP CORRECTED: 7.38 PH UNITS
PLATELET # BLD AUTO: 95 10*3/MM3 (ref 140–450)
PMV BLD AUTO: 12.4 FL (ref 6–12)
PO2 BLDA: 96.6 MM HG (ref 83–108)
PO2 TEMP ADJ BLD: 96.6 MM HG (ref 83–108)
POTASSIUM SERPL-SCNC: 3.4 MMOL/L (ref 3.5–5.2)
POTASSIUM SERPL-SCNC: 4.3 MMOL/L (ref 3.5–5.2)
RBC # BLD AUTO: 2.81 10*6/MM3 (ref 4.14–5.8)
SODIUM SERPL-SCNC: 136 MMOL/L (ref 136–145)
TOTAL RATE: 17 BREATHS/MINUTE
VENTILATOR MODE: ABNORMAL
VT ON VENT VENT: 0.47 ML
WBC NRBC COR # BLD AUTO: 22.13 10*3/MM3 (ref 3.4–10.8)

## 2025-02-22 PROCEDURE — 94799 UNLISTED PULMONARY SVC/PX: CPT

## 2025-02-22 PROCEDURE — 82948 REAGENT STRIP/BLOOD GLUCOSE: CPT

## 2025-02-22 PROCEDURE — 85025 COMPLETE CBC W/AUTO DIFF WBC: CPT | Performed by: INTERNAL MEDICINE

## 2025-02-22 PROCEDURE — 99291 CRITICAL CARE FIRST HOUR: CPT | Performed by: INTERNAL MEDICINE

## 2025-02-22 PROCEDURE — 84132 ASSAY OF SERUM POTASSIUM: CPT | Performed by: INTERNAL MEDICINE

## 2025-02-22 PROCEDURE — 25010000002 ENOXAPARIN PER 10 MG: Performed by: INTERNAL MEDICINE

## 2025-02-22 PROCEDURE — 36600 WITHDRAWAL OF ARTERIAL BLOOD: CPT

## 2025-02-22 PROCEDURE — 25010000002 FENTANYL 10 MCG/1 ML NS: Performed by: INTERNAL MEDICINE

## 2025-02-22 PROCEDURE — 94761 N-INVAS EAR/PLS OXIMETRY MLT: CPT

## 2025-02-22 PROCEDURE — 83735 ASSAY OF MAGNESIUM: CPT | Performed by: INTERNAL MEDICINE

## 2025-02-22 PROCEDURE — 25010000002 FUROSEMIDE PER 20 MG: Performed by: INTERNAL MEDICINE

## 2025-02-22 PROCEDURE — 25010000003 DEXTROSE 5 % SOLUTION: Performed by: INTERNAL MEDICINE

## 2025-02-22 PROCEDURE — 25010000002 CEFEPIME PER 500 MG: Performed by: INTERNAL MEDICINE

## 2025-02-22 PROCEDURE — 80048 BASIC METABOLIC PNL TOTAL CA: CPT | Performed by: INTERNAL MEDICINE

## 2025-02-22 PROCEDURE — 71045 X-RAY EXAM CHEST 1 VIEW: CPT

## 2025-02-22 PROCEDURE — 83050 HGB METHEMOGLOBIN QUAN: CPT

## 2025-02-22 PROCEDURE — 82375 ASSAY CARBOXYHB QUANT: CPT

## 2025-02-22 PROCEDURE — 82805 BLOOD GASES W/O2 SATURATION: CPT

## 2025-02-22 RX ORDER — FUROSEMIDE 10 MG/ML
40 INJECTION INTRAMUSCULAR; INTRAVENOUS EVERY 12 HOURS
Status: COMPLETED | OUTPATIENT
Start: 2025-02-22 | End: 2025-02-24

## 2025-02-22 RX ORDER — POTASSIUM CHLORIDE 1.5 G/1.58G
40 POWDER, FOR SOLUTION ORAL EVERY 4 HOURS
Status: COMPLETED | OUTPATIENT
Start: 2025-02-22 | End: 2025-02-22

## 2025-02-22 RX ORDER — TAMSULOSIN HYDROCHLORIDE 0.4 MG/1
0.4 CAPSULE ORAL DAILY
Status: DISCONTINUED | OUTPATIENT
Start: 2025-02-22 | End: 2025-02-25

## 2025-02-22 RX ORDER — FINASTERIDE 5 MG/1
5 TABLET, FILM COATED ORAL DAILY
Status: DISCONTINUED | OUTPATIENT
Start: 2025-02-22 | End: 2025-02-24

## 2025-02-22 RX ORDER — NYSTATIN 100000 [USP'U]/G
POWDER TOPICAL EVERY 8 HOURS SCHEDULED
Status: DISCONTINUED | OUTPATIENT
Start: 2025-02-22 | End: 2025-03-12 | Stop reason: HOSPADM

## 2025-02-22 RX ADMIN — CHLORHEXIDINE GLUCONATE 0.12% ORAL RINSE 15 ML: 1.2 LIQUID ORAL at 08:12

## 2025-02-22 RX ADMIN — NYSTATIN 1 APPLICATION: 100000 POWDER TOPICAL at 14:09

## 2025-02-22 RX ADMIN — POTASSIUM CHLORIDE 40 MEQ: 1.5 POWDER, FOR SOLUTION ORAL at 10:37

## 2025-02-22 RX ADMIN — FUROSEMIDE 40 MG: 10 INJECTION, SOLUTION INTRAMUSCULAR; INTRAVENOUS at 11:52

## 2025-02-22 RX ADMIN — PANTOPRAZOLE SODIUM 40 MG: 40 INJECTION, POWDER, FOR SOLUTION INTRAVENOUS at 21:02

## 2025-02-22 RX ADMIN — Medication 10 ML: at 21:02

## 2025-02-22 RX ADMIN — LACTULOSE 20 G: 20 SOLUTION ORAL at 08:12

## 2025-02-22 RX ADMIN — POTASSIUM CHLORIDE 40 MEQ: 1.5 POWDER, FOR SOLUTION ORAL at 06:46

## 2025-02-22 RX ADMIN — NYSTATIN: 100000 POWDER TOPICAL at 22:26

## 2025-02-22 RX ADMIN — ENOXAPARIN SODIUM 40 MG: 100 INJECTION SUBCUTANEOUS at 11:52

## 2025-02-22 RX ADMIN — CEFEPIME 2000 MG: 2 INJECTION, POWDER, FOR SOLUTION INTRAVENOUS at 16:18

## 2025-02-22 RX ADMIN — Medication 50 MCG/HR: at 18:16

## 2025-02-22 RX ADMIN — ESCITALOPRAM OXALATE 10 MG: 10 TABLET ORAL at 21:01

## 2025-02-22 RX ADMIN — GABAPENTIN 300 MG: 300 CAPSULE ORAL at 14:09

## 2025-02-22 RX ADMIN — GABAPENTIN 300 MG: 300 CAPSULE ORAL at 05:06

## 2025-02-22 RX ADMIN — CEFEPIME 2000 MG: 2 INJECTION, POWDER, FOR SOLUTION INTRAVENOUS at 08:12

## 2025-02-22 RX ADMIN — Medication 200 MCG/HR: at 03:56

## 2025-02-22 RX ADMIN — FINASTERIDE 5 MG: 5 TABLET, FILM COATED ORAL at 16:18

## 2025-02-22 RX ADMIN — DEXTROSE MONOHYDRATE 100 ML/HR: 5 INJECTION, SOLUTION INTRAVENOUS at 20:20

## 2025-02-22 RX ADMIN — GABAPENTIN 300 MG: 300 CAPSULE ORAL at 00:02

## 2025-02-22 RX ADMIN — PANTOPRAZOLE SODIUM 40 MG: 40 INJECTION, POWDER, FOR SOLUTION INTRAVENOUS at 08:12

## 2025-02-22 RX ADMIN — TAMSULOSIN HYDROCHLORIDE 0.4 MG: 0.4 CAPSULE ORAL at 16:18

## 2025-02-22 RX ADMIN — CEFEPIME 2000 MG: 2 INJECTION, POWDER, FOR SOLUTION INTRAVENOUS at 00:02

## 2025-02-22 RX ADMIN — CHLORHEXIDINE GLUCONATE 0.12% ORAL RINSE 15 ML: 1.2 LIQUID ORAL at 21:01

## 2025-02-22 RX ADMIN — DEXTROSE MONOHYDRATE 100 ML/HR: 5 INJECTION, SOLUTION INTRAVENOUS at 09:47

## 2025-02-22 RX ADMIN — Medication 10 ML: at 08:13

## 2025-02-22 RX ADMIN — DOCUSATE SODIUM 100 MG: 50 LIQUID ORAL at 08:12

## 2025-02-22 RX ADMIN — GABAPENTIN 300 MG: 300 CAPSULE ORAL at 21:02

## 2025-02-22 NOTE — PLAN OF CARE
Goal Outcome Evaluation:  Plan of Care Reviewed With: patient        Progress: improving  Outcome Evaluation: .           - Working on turning sedation down. Versed has been off for a few hours and fentanyl down to 100 from 200. Pt alert to voice and following commands. Spoke with RT and they believe he is still too drowsy for a SBT at this time. Will continue titrating down on sedation.   - VSS, vent settings remain the same. Large amount of secretions from ETT suctioning.   - BM x 2 today. Large amount of loose stool. Wound care completed several times d/t dressing soiling. Wounds were beginning to bleed with the frequent dressing changes and decision was made to apply an external fecal collection bag to try and protect the skin. The skin where the external bag was placed was intact and blanchable at time of application. Skin barrier spray was used before application  - Adequate UOP, Diuresis started today

## 2025-02-22 NOTE — PLAN OF CARE
Goal Outcome Evaluation:  Plan of Care Reviewed With: patient        Progress: improving  Outcome Evaluation: .           - pt alert this morning, followed all commands and nodded head appropriately to questions. Pt did become more restless and tachypenic as day went on, sedation titrated up for comfort and vent synchrony.   - VSS, vent setting remain the same   - More ETT secretions with suctioning today  - TF remains at goal. Abdomen remains distended. No BM still. Lactulose now scheduled tid. Passing gas this afternoon/evening.  - Adequate UOP per oswald   - remains on fentanyl, versed, and D5W

## 2025-02-22 NOTE — PROGRESS NOTES
Pulmonary/Critical Care Follow-up     LOS: 11 days   Patient Care Team:  Wesly Minor MD as PCP - General (Family Medicine)  Katerina Ga MD as Referring Physician (Hematology and Oncology)  Jeremy Aguila MD as Consulting Physician (Radiation Oncology)  Shirlene Kauffman APRN as Radiation Oncologist (Nurse Practitioner)        Chief Complaint   Patient presents with    Altered Mental Status    Shortness of Breath     Subjective     Patient remains on ventilator.  He is tolerating 40% FiO2.    Interval History:     History taken from:     PMH/FH/Social History were reviewed and updated appropriately in the electronic medical record.     Review of Systems:    Review of 14 systems was completed with positives and pertinent negatives noted in the subjective section.  All other systems reviewed and are negative.         Objective     Vital Signs  Temp:  [99 °F (37.2 °C)-101.7 °F (38.7 °C)] 99.3 °F (37.4 °C)  Heart Rate:  [64-76] 68  Resp:  [15-22] 18  BP: ()/(52-63) 108/57  FiO2 (%):  [40 %] 40 %  02/21 0701 - 02/22 0700  In: 5410.2 [I.V.:2838.3]  Out: 2350 [Urine:2110]  Body mass index is 28.77 kg/m².  Mode: VC+/AC  FiO2 (%):  [40 %] 40 %  S RR:  [15] 15  S VT:  [470 mL] 470 mL  PEEP/CPAP (cm H2O):  [5 cm H20] 5 cm H20  MAP (cm H2O):  [6.9-13] 7.2  IV drips:  dextrose, Last Rate: 100 mL/hr (02/22/25 0947)  fentanyl 10 mcg/mL, Last Rate: 200 mcg/hr (02/22/25 0356)  midazolam, Last Rate: 3 mg/hr (02/21/25 1122)  norepinephrine       Physical Exam:     Constitutional:   Sedated on ventilator, in no acute distress   Head:   Normocephalic, atraumatic   Eyes:           Lids and lashes normal, conjunctivae and sclerae normal.  PER   ENMT:  Ears appear intact with no abnormalities noted     Lips normal.     Neck:  Trachea midline, no JVD   Lungs/Resp:    On ventilator, symmetric chest rise, no crepitus, moderate rhonchi 2+ bilateral lower extremity bilaterally.               Heart/CV:   Regular rhythm and  normal rate, no murmur   Abdomen/GI:    Soft, nontender, nondistended   :    Deferred   Extremities/MSK:  No clubbing or cyanosis.  No edema.     Pulses:  Pulses palpable and equal bilaterally   Skin:  No bleeding, bruising or rash   Heme/Lymph:  No cervical or supraclavicular adenopathy.   Neurologic:    Psychiatric:    Sedated  Sedated  Non-agitated, normal affect.    The above physical exam findings were reviewed and reflect my exam findings as of today's exam.   Electronically signed by:  Guerrero Regalado MD  02/22/25  10:44 EST      Results Review:     I reviewed the patient's new clinical results.   Results from last 7 days   Lab Units 02/22/25 0318 02/21/25 0327 02/20/25  0511 02/17/25  1915 02/17/25  0329   SODIUM mmol/L 136 138 144   < > 154*   POTASSIUM mmol/L 3.4* 4.0 3.5   < > 3.2*   CHLORIDE mmol/L 104 103 110*   < > 117*   CO2 mmol/L 21.0* 23.0 27.0   < > 25.0   BUN mg/dL 51* 53* 65*   < > 103*   CREATININE mg/dL 0.95 0.80 0.86   < > 1.31*   CALCIUM mg/dL 8.5* 8.5* 8.4*   < > 8.9   BILIRUBIN mg/dL  --  0.6 0.6  --  0.9   ALK PHOS U/L  --  138* 131*  --  151*   ALT (SGPT) U/L  --  80* 101*  --  95*   AST (SGOT) U/L  --  52* 62*  --  90*   GLUCOSE mg/dL 152* 165* 123*   < > 157*    < > = values in this interval not displayed.     Results from last 7 days   Lab Units 02/22/25  0318 02/21/25 0327 02/20/25  0511   WBC 10*3/mm3 22.13* 25.02* 19.37*   HEMOGLOBIN g/dL 8.5* 10.5* 9.5*   HEMATOCRIT % 26.9* 32.5* 30.1*   PLATELETS 10*3/mm3 95* 79* 85*   MONOCYTES % %  --  5.0 7.0   EOSINOPHIL % %  --  0.0* 2.0     Results from last 7 days   Lab Units 02/22/25  0343   PH, ARTERIAL pH units 7.380   PO2 ART mm Hg 96.6   PCO2, ARTERIAL mm Hg 40.3   HCO3 ART mmol/L 23.8     Results from last 7 days   Lab Units 02/22/25  0318 02/21/25  0327 02/20/25  0511 02/19/25  0239   MAGNESIUM mg/dL 2.1 1.8 1.8 1.9   PHOSPHORUS mg/dL  --  2.7 2.4* 2.5       I reviewed the patient's new imaging including images and  reports.    Chest x-ray to/20 2/2025 shows bilateral pulmonary infiltrates concerning for pulmonary edema versus pneumonia.    Medication Review:   cefepime, 2,000 mg, Intravenous, Q8H  chlorhexidine, 15 mL, Mouth/Throat, Q12H  docusate sodium, 100 mg, Per G Tube, BID   And  lactulose, 20 g, Per G Tube, TID  enoxaparin, 40 mg, Subcutaneous, Q24H  escitalopram, 10 mg, Per G Tube, Nightly  gabapentin, 300 mg, Per G Tube, Q8H  pantoprazole, 40 mg, Intravenous, Q12H  sodium chloride, 10 mL, Intravenous, Q12H      dextrose, 100 mL/hr, Last Rate: 100 mL/hr (02/22/25 0942)  fentanyl 10 mcg/mL,  mcg/hr, Last Rate: 200 mcg/hr (02/22/25 1196)  midazolam, 1-10 mg/hr, Last Rate: 3 mg/hr (02/21/25 1122)  norepinephrine, 0.02-0.3 mcg/kg/min        Assessment & Plan         Septic shock due to Pseudomonas species    Hyperlipidemia, unspecified    Tobacco use    Cirrhosis of liver    Squamous cell carcinoma of esophagus    GALILEO (acute kidney injury)    Metabolic acidosis    Pneumonia of both lower lobes due to Pseudomonas species    Bacteremia due to Pseudomonas    64 y.o. 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  He was found to be hypotensive in the emergency department and was given sepsis fluid bolus and ultimately started on Levophed and vasopressin.  Labs were concerning for evidence of GALILEO, UTI present on admission, metabolic acidosis, elevated proBNP to 13,000, troponin of 134, white blood cell of 27.58, and procalcitonin greater than 100.     CT scan showed no acute abnormality on CT scan of the head with the exception of some possible sinusitis.  CT of the chest/abdomen/pelvis concerning for basilar atelectasis versus less likely pneumonia in my opinion as well as evidence of cystitis, left-sided pyelonephritis, and sigmoid diverticulosis without diverticulitis.    Urine and blood  cultures grew Pseudomonas and patient has been treated with cefepime.    Patient decompensated requiring intubation/mechanical ventilation on 2/12/2025.  Patient required bronchoscopy on 2/17 for mucous plugging.  Cultures from bronchoscopy growing usual respiratory kishore.    He is down to 40% FiO2 and we will wean sedation and proceeded with spontaneous breathing trials if appropriate.     Plan:  1.  For septic shock due to urinary source growing Pseudomonas from blood and urine: Continue cefepime.  Currently off pressors.  Monitor closely.  Normal LVEF  2.  For elevated proBNP/concern for heart failure: Without significant valvular disease on echocardiogram 2/11/2025.  3.  For GALILEO/bladder outlet obstruction: Continue Lubin catheter.  Start tamsulosin/finasteride.  4.  For respiratory: Remains on mechanical ventilator.  Had some mucous plugging on 2/17.  Bilateral infiltrates concerning for pulmonary edema.  Scheduled diuretics.  Wean sedation and proceed with spontaneous breathing trials if appropriate.  5.  DVT prophylaxis: Subcutaneous heparin.  6.  For nutrition: Continue tube feeding.  7.  For thrombocytopenia: Stable, likely secondary cirrhosis.    Patient is critically ill secondary to tenuous respiratory and hemodynamic status and has high risk of life-threatening decompensation in condition.   As such, the patient requires continuous monitoring and frequent reassessment for consideration of adjustment in management to minimize this risk.  I personally reassessed the patient multiple times today.    Critical care time : 42 minutes spent by me personally and independently.(This excludes time spent performing separately reportable procedures and services).  Critical care time includes high complexity decision making to assess, manipulate, and support vital organ system failure in this individual who has impairment of one or more vital organ systems such that there is a high probability of imminent or life  threatening deterioration in the patient’s condition.    Electronically signed by:    Guerrero Regalado MD  02/22/25  10:44 EST      *. Please note that portions of this note were completed with [employer] - a voice recognition program.

## 2025-02-23 ENCOUNTER — APPOINTMENT (OUTPATIENT)
Dept: GENERAL RADIOLOGY | Facility: HOSPITAL | Age: 65
DRG: 870 | End: 2025-02-23
Payer: MEDICARE

## 2025-02-23 LAB
ANION GAP SERPL CALCULATED.3IONS-SCNC: 7 MMOL/L (ref 5–15)
ARTERIAL PATENCY WRIST A: POSITIVE
ATMOSPHERIC PRESS: ABNORMAL MM[HG]
BASE EXCESS BLDA CALC-SCNC: -1.6 MMOL/L (ref 0–2)
BDY SITE: ABNORMAL
BODY TEMPERATURE: 37
BUN SERPL-MCNC: 67 MG/DL (ref 8–23)
BUN/CREAT SERPL: 46.9 (ref 7–25)
CALCIUM SPEC-SCNC: 8.9 MG/DL (ref 8.6–10.5)
CHLORIDE SERPL-SCNC: 103 MMOL/L (ref 98–107)
CO2 BLDA-SCNC: 23.2 MMOL/L (ref 22–33)
CO2 SERPL-SCNC: 23 MMOL/L (ref 22–29)
COHGB MFR BLD: 1.5 % (ref 0–2)
CREAT SERPL-MCNC: 1.43 MG/DL (ref 0.76–1.27)
DEPRECATED RDW RBC AUTO: 58.8 FL (ref 37–54)
EGFRCR SERPLBLD CKD-EPI 2021: 54.4 ML/MIN/1.73
EPAP: 0
ERYTHROCYTE [DISTWIDTH] IN BLOOD BY AUTOMATED COUNT: 17.3 % (ref 12.3–15.4)
GLUCOSE BLDC GLUCOMTR-MCNC: 162 MG/DL (ref 70–130)
GLUCOSE BLDC GLUCOMTR-MCNC: 163 MG/DL (ref 70–130)
GLUCOSE BLDC GLUCOMTR-MCNC: 190 MG/DL (ref 70–130)
GLUCOSE BLDC GLUCOMTR-MCNC: 225 MG/DL (ref 70–130)
GLUCOSE SERPL-MCNC: 163 MG/DL (ref 65–99)
HCO3 BLDA-SCNC: 22.2 MMOL/L (ref 20–26)
HCT VFR BLD AUTO: 25 % (ref 37.5–51)
HCT VFR BLD CALC: 25.3 % (ref 38–51)
HGB BLD-MCNC: 8.1 G/DL (ref 13–17.7)
HGB BLDA-MCNC: 8.2 G/DL (ref 13.5–17.5)
INHALED O2 CONCENTRATION: 40 %
IPAP: 0
MAGNESIUM SERPL-MCNC: 2 MG/DL (ref 1.6–2.4)
MCH RBC QN AUTO: 31.2 PG (ref 26.6–33)
MCHC RBC AUTO-ENTMCNC: 32.4 G/DL (ref 31.5–35.7)
MCV RBC AUTO: 96.2 FL (ref 79–97)
METHGB BLD QL: 0.2 % (ref 0–1.5)
MODALITY: ABNORMAL
OXYHGB MFR BLDV: 94.9 % (ref 94–99)
PAW @ PEAK INSP FLOW SETTING VENT: 0 CMH2O
PCO2 BLDA: 32.8 MM HG (ref 35–45)
PCO2 TEMP ADJ BLD: 32.8 MM HG (ref 35–48)
PEEP RESPIRATORY: 5 CM[H2O]
PH BLDA: 7.44 PH UNITS (ref 7.35–7.45)
PH, TEMP CORRECTED: 7.44 PH UNITS
PHOSPHATE SERPL-MCNC: 3.8 MG/DL (ref 2.5–4.5)
PLATELET # BLD AUTO: 128 10*3/MM3 (ref 140–450)
PMV BLD AUTO: 11.7 FL (ref 6–12)
PO2 BLDA: 77.5 MM HG (ref 83–108)
PO2 TEMP ADJ BLD: 77.5 MM HG (ref 83–108)
POTASSIUM SERPL-SCNC: 4.5 MMOL/L (ref 3.5–5.2)
RBC # BLD AUTO: 2.6 10*6/MM3 (ref 4.14–5.8)
SODIUM SERPL-SCNC: 133 MMOL/L (ref 136–145)
TOTAL RATE: 23 BREATHS/MINUTE
VENTILATOR MODE: ABNORMAL
VT ON VENT VENT: 0.47 ML
WBC NRBC COR # BLD AUTO: 25.16 10*3/MM3 (ref 3.4–10.8)

## 2025-02-23 PROCEDURE — 82805 BLOOD GASES W/O2 SATURATION: CPT

## 2025-02-23 PROCEDURE — 82948 REAGENT STRIP/BLOOD GLUCOSE: CPT

## 2025-02-23 PROCEDURE — 80048 BASIC METABOLIC PNL TOTAL CA: CPT | Performed by: INTERNAL MEDICINE

## 2025-02-23 PROCEDURE — 25010000002 ENOXAPARIN PER 10 MG: Performed by: INTERNAL MEDICINE

## 2025-02-23 PROCEDURE — 25010000003 DEXTROSE 5 % SOLUTION: Performed by: INTERNAL MEDICINE

## 2025-02-23 PROCEDURE — 36600 WITHDRAWAL OF ARTERIAL BLOOD: CPT

## 2025-02-23 PROCEDURE — 94003 VENT MGMT INPAT SUBQ DAY: CPT

## 2025-02-23 PROCEDURE — 84100 ASSAY OF PHOSPHORUS: CPT | Performed by: INTERNAL MEDICINE

## 2025-02-23 PROCEDURE — 94761 N-INVAS EAR/PLS OXIMETRY MLT: CPT

## 2025-02-23 PROCEDURE — 99291 CRITICAL CARE FIRST HOUR: CPT | Performed by: INTERNAL MEDICINE

## 2025-02-23 PROCEDURE — 94799 UNLISTED PULMONARY SVC/PX: CPT

## 2025-02-23 PROCEDURE — 83735 ASSAY OF MAGNESIUM: CPT | Performed by: INTERNAL MEDICINE

## 2025-02-23 PROCEDURE — 74018 RADEX ABDOMEN 1 VIEW: CPT

## 2025-02-23 PROCEDURE — 25010000002 FENTANYL 10 MCG/1 ML NS: Performed by: INTERNAL MEDICINE

## 2025-02-23 PROCEDURE — 25010000002 FUROSEMIDE PER 20 MG: Performed by: INTERNAL MEDICINE

## 2025-02-23 PROCEDURE — 82375 ASSAY CARBOXYHB QUANT: CPT

## 2025-02-23 PROCEDURE — 83050 HGB METHEMOGLOBIN QUAN: CPT

## 2025-02-23 PROCEDURE — 85027 COMPLETE CBC AUTOMATED: CPT | Performed by: INTERNAL MEDICINE

## 2025-02-23 PROCEDURE — 63710000001 PREDNISONE PER 1 MG

## 2025-02-23 PROCEDURE — 25010000002 CEFEPIME PER 500 MG: Performed by: INTERNAL MEDICINE

## 2025-02-23 PROCEDURE — 71045 X-RAY EXAM CHEST 1 VIEW: CPT

## 2025-02-23 RX ORDER — PREDNISONE 20 MG/1
20 TABLET ORAL EVERY 12 HOURS SCHEDULED
Status: DISCONTINUED | OUTPATIENT
Start: 2025-02-23 | End: 2025-02-23

## 2025-02-23 RX ORDER — PREDNISONE 20 MG/1
20 TABLET ORAL EVERY 12 HOURS SCHEDULED
Status: COMPLETED | OUTPATIENT
Start: 2025-02-23 | End: 2025-02-27

## 2025-02-23 RX ORDER — METOPROLOL TARTRATE 1 MG/ML
5 INJECTION, SOLUTION INTRAVENOUS EVERY 6 HOURS
Status: DISCONTINUED | OUTPATIENT
Start: 2025-02-23 | End: 2025-02-27

## 2025-02-23 RX ORDER — DEXMEDETOMIDINE HYDROCHLORIDE 4 UG/ML
.2-1.5 INJECTION, SOLUTION INTRAVENOUS
Status: DISCONTINUED | OUTPATIENT
Start: 2025-02-23 | End: 2025-02-27

## 2025-02-23 RX ADMIN — CHLORHEXIDINE GLUCONATE 0.12% ORAL RINSE 15 ML: 1.2 LIQUID ORAL at 21:11

## 2025-02-23 RX ADMIN — ESCITALOPRAM OXALATE 10 MG: 10 TABLET ORAL at 21:11

## 2025-02-23 RX ADMIN — PANTOPRAZOLE SODIUM 40 MG: 40 INJECTION, POWDER, FOR SOLUTION INTRAVENOUS at 21:12

## 2025-02-23 RX ADMIN — ENOXAPARIN SODIUM 40 MG: 100 INJECTION SUBCUTANEOUS at 12:22

## 2025-02-23 RX ADMIN — Medication 100 MCG/HR: at 08:14

## 2025-02-23 RX ADMIN — CEFEPIME 2000 MG: 2 INJECTION, POWDER, FOR SOLUTION INTRAVENOUS at 08:00

## 2025-02-23 RX ADMIN — FUROSEMIDE 40 MG: 10 INJECTION, SOLUTION INTRAMUSCULAR; INTRAVENOUS at 12:22

## 2025-02-23 RX ADMIN — Medication 10 ML: at 08:02

## 2025-02-23 RX ADMIN — DOCUSATE SODIUM 100 MG: 50 LIQUID ORAL at 08:01

## 2025-02-23 RX ADMIN — LACTULOSE 20 G: 20 SOLUTION ORAL at 16:18

## 2025-02-23 RX ADMIN — DEXTROSE MONOHYDRATE 100 ML/HR: 5 INJECTION, SOLUTION INTRAVENOUS at 07:30

## 2025-02-23 RX ADMIN — GABAPENTIN 300 MG: 300 CAPSULE ORAL at 14:17

## 2025-02-23 RX ADMIN — CHLORHEXIDINE GLUCONATE 0.12% ORAL RINSE 15 ML: 1.2 LIQUID ORAL at 08:01

## 2025-02-23 RX ADMIN — CEFEPIME 2000 MG: 2 INJECTION, POWDER, FOR SOLUTION INTRAVENOUS at 16:17

## 2025-02-23 RX ADMIN — FUROSEMIDE 40 MG: 10 INJECTION, SOLUTION INTRAMUSCULAR; INTRAVENOUS at 00:21

## 2025-02-23 RX ADMIN — NYSTATIN: 100000 POWDER TOPICAL at 05:58

## 2025-02-23 RX ADMIN — TAMSULOSIN HYDROCHLORIDE 0.4 MG: 0.4 CAPSULE ORAL at 08:04

## 2025-02-23 RX ADMIN — PREDNISONE 20 MG: 20 TABLET ORAL at 21:12

## 2025-02-23 RX ADMIN — GABAPENTIN 300 MG: 300 CAPSULE ORAL at 21:11

## 2025-02-23 RX ADMIN — NYSTATIN: 100000 POWDER TOPICAL at 21:12

## 2025-02-23 RX ADMIN — PREDNISONE 20 MG: 20 TABLET ORAL at 12:22

## 2025-02-23 RX ADMIN — DEXMEDETOMIDINE HYDROCHLORIDE 0.2 MCG/KG/HR: 4 INJECTION, SOLUTION INTRAVENOUS at 10:11

## 2025-02-23 RX ADMIN — DEXMEDETOMIDINE HYDROCHLORIDE 0.5 MCG/KG/HR: 4 INJECTION, SOLUTION INTRAVENOUS at 22:22

## 2025-02-23 RX ADMIN — METOPROLOL TARTRATE 5 MG: 5 INJECTION INTRAVENOUS at 10:46

## 2025-02-23 RX ADMIN — PANTOPRAZOLE SODIUM 40 MG: 40 INJECTION, POWDER, FOR SOLUTION INTRAVENOUS at 08:02

## 2025-02-23 RX ADMIN — Medication 10 ML: at 21:12

## 2025-02-23 RX ADMIN — FINASTERIDE 5 MG: 5 TABLET, FILM COATED ORAL at 08:04

## 2025-02-23 RX ADMIN — CEFEPIME 2000 MG: 2 INJECTION, POWDER, FOR SOLUTION INTRAVENOUS at 00:21

## 2025-02-23 RX ADMIN — GABAPENTIN 300 MG: 300 CAPSULE ORAL at 05:57

## 2025-02-23 RX ADMIN — NYSTATIN: 100000 POWDER TOPICAL at 16:18

## 2025-02-23 NOTE — PLAN OF CARE
Goal Outcome Evaluation:                                          Remains on mechanical ventilation. Settings unchanged.  Fentanyl titrated to 150 to maintain comfort.   D5W continues.  Restraints in place for safety.  1 large bowel movement, dark brown/green in color.  UOP 2.7L.  Patient wiggles toes to command, albeit weak.   Visually tracks, nods head yes/no.

## 2025-02-23 NOTE — PROGRESS NOTES
Pulmonary/Critical Care Follow-up     LOS: 12 days   Patient Care Team:  Wesly Minor MD as PCP - General (Family Medicine)  Katerina Ga MD as Referring Physician (Hematology and Oncology)  Jeremy Aguila MD as Consulting Physician (Radiation Oncology)  Shirlene Kauffman APRN as Radiation Oncologist (Nurse Practitioner)        Chief Complaint   Patient presents with    Altered Mental Status    Shortness of Breath     Subjective     Patient remains on ventilator.  He is tolerating 40% FiO2.    Interval History:     Patient is awake and alert.  I trialed him on spontaneous breathing.  He did go into atrial fibrillation and I gave him a dose of metoprolol and he converted back to sinus rhythm.  He remains on spontaneous breathing currently.  He has had a fair amount of secretions.    History taken from:     PMH/FH/Social History were reviewed and updated appropriately in the electronic medical record.     Review of Systems:    Review of 14 systems was completed with positives and pertinent negatives noted in the subjective section.  All other systems reviewed and are negative.         Objective     Vital Signs  Temp:  [98.8 °F (37.1 °C)-100.6 °F (38.1 °C)] 99.1 °F (37.3 °C)  Heart Rate:  [] 125  Resp:  [17-35] 35  BP: ()/(48-78) 178/78  FiO2 (%):  [40 %] 40 %  02/22 0701 - 02/23 0700  In: 4881.9 [I.V.:2782]  Out: 5055 [Urine:5055]  Body mass index is 28.77 kg/m².  Mode: VC+/AC  FiO2 (%):  [40 %] 40 %  S RR:  [15] 15  S VT:  [470 mL] 470 mL  PEEP/CPAP (cm H2O):  [5 cm H20] 5 cm H20  MAP (cm H2O):  [7.4-13] 11  IV drips:  dexmedetomidine, Last Rate: 0.2 mcg/kg/hr (02/23/25 1011)  dextrose, Last Rate: 100 mL/hr (02/23/25 0730)  fentanyl 10 mcg/mL, Last Rate: 100 mcg/hr (02/23/25 0814)       Physical Exam:     Constitutional:   Somewhat agitated.  Mildly agitated on ventilator, in no acute distress   Head:   Normocephalic, atraumatic   Eyes:           Lids and lashes normal, conjunctivae and  sclerae normal.  PER   ENMT:  Ears appear intact with no abnormalities noted     Lips normal.     Neck:  Trachea midline, no JVD   Lungs/Resp:    On ventilator, symmetric chest rise, no crepitus, moderate rhonchi 2+ bilateral lower extremity bilaterally.               Heart/CV:   Regular rhythm and normal rate, no murmur   Abdomen/GI:    Soft, nontender, nondistended   :    Deferred   Extremities/MSK:  No clubbing or cyanosis.  No edema.     Pulses:  Pulses palpable and equal bilaterally   Skin:  No bleeding, bruising or rash   Heme/Lymph:  No cervical or supraclavicular adenopathy.   Neurologic:    Psychiatric:    Moves all extremities.      Not agitated     The above physical exam findings were reviewed and reflect my exam findings as of today's exam.   Electronically signed by:  Guerrero Regalado MD  02/23/25  10:36 EST      Results Review:     I reviewed the patient's new clinical results.   Results from last 7 days   Lab Units 02/22/25  1545 02/22/25 0318 02/21/25 0327 02/20/25  0511 02/17/25  1915 02/17/25 0329   SODIUM mmol/L  --  136 138 144   < > 154*   POTASSIUM mmol/L 4.3 3.4* 4.0 3.5   < > 3.2*   CHLORIDE mmol/L  --  104 103 110*   < > 117*   CO2 mmol/L  --  21.0* 23.0 27.0   < > 25.0   BUN mg/dL  --  51* 53* 65*   < > 103*   CREATININE mg/dL  --  0.95 0.80 0.86   < > 1.31*   CALCIUM mg/dL  --  8.5* 8.5* 8.4*   < > 8.9   BILIRUBIN mg/dL  --   --  0.6 0.6  --  0.9   ALK PHOS U/L  --   --  138* 131*  --  151*   ALT (SGPT) U/L  --   --  80* 101*  --  95*   AST (SGOT) U/L  --   --  52* 62*  --  90*   GLUCOSE mg/dL  --  152* 165* 123*   < > 157*    < > = values in this interval not displayed.     Results from last 7 days   Lab Units 02/22/25 0318 02/21/25 0327 02/20/25  0511   WBC 10*3/mm3 22.13* 25.02* 19.37*   HEMOGLOBIN g/dL 8.5* 10.5* 9.5*   HEMATOCRIT % 26.9* 32.5* 30.1*   PLATELETS 10*3/mm3 95* 79* 85*   MONOCYTES % %  --  5.0 7.0   EOSINOPHIL % %  --  0.0* 2.0     Results from last 7 days   Lab  Units 02/23/25  0338   PH, ARTERIAL pH units 7.439   PO2 ART mm Hg 77.5*   PCO2, ARTERIAL mm Hg 32.8*   HCO3 ART mmol/L 22.2     Results from last 7 days   Lab Units 02/22/25  0318 02/21/25  0327 02/20/25  0511 02/19/25  0239   MAGNESIUM mg/dL 2.1 1.8 1.8 1.9   PHOSPHORUS mg/dL  --  2.7 2.4* 2.5       I reviewed the patient's new imaging including images and reports.    Chest x-ray to/20 2/2025 shows bilateral pulmonary infiltrates concerning for pulmonary edema versus pneumonia.    Medication Review:   cefepime, 2,000 mg, Intravenous, Q8H  chlorhexidine, 15 mL, Mouth/Throat, Q12H  docusate sodium, 100 mg, Per G Tube, BID   And  lactulose, 20 g, Per G Tube, TID  enoxaparin, 40 mg, Subcutaneous, Q24H  escitalopram, 10 mg, Per G Tube, Nightly  finasteride, 5 mg, Oral, Daily  furosemide, 40 mg, Intravenous, Q12H  gabapentin, 300 mg, Per G Tube, Q8H  nystatin, , Topical, Q8H  pantoprazole, 40 mg, Intravenous, Q12H  sodium chloride, 10 mL, Intravenous, Q12H  tamsulosin, 0.4 mg, Oral, Daily      dexmedetomidine, 0.2-1.5 mcg/kg/hr, Last Rate: 0.2 mcg/kg/hr (02/23/25 1011)  dextrose, 100 mL/hr, Last Rate: 100 mL/hr (02/23/25 0730)  fentanyl 10 mcg/mL,  mcg/hr, Last Rate: 100 mcg/hr (02/23/25 0814)        Assessment & Plan         Septic shock due to Pseudomonas species    Hyperlipidemia, unspecified    Tobacco use    Cirrhosis of liver    Squamous cell carcinoma of esophagus    GALILEO (acute kidney injury)    Metabolic acidosis    Pneumonia of both lower lobes due to Pseudomonas species    Bacteremia due to Pseudomonas    64 y.o. 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  He was found to be hypotensive in the emergency department and was given sepsis fluid bolus and ultimately started on Levophed and vasopressin.  Labs were concerning for evidence of GALILEO, UTI present on admission,  metabolic acidosis, elevated proBNP to 13,000, troponin of 134, white blood cell of 27.58, and procalcitonin greater than 100.     CT scan showed no acute abnormality on CT scan of the head with the exception of some possible sinusitis.  CT of the chest/abdomen/pelvis concerning for basilar atelectasis versus less likely pneumonia in my opinion as well as evidence of cystitis, left-sided pyelonephritis, and sigmoid diverticulosis without diverticulitis.    Urine and blood cultures grew Pseudomonas and patient has been treated with cefepime.    Patient decompensated requiring intubation/mechanical ventilation on 2/12/2025.  Patient required bronchoscopy on 2/17 for mucous plugging.  Cultures from bronchoscopy growing usual respiratory kishore.    Patient is having a fair amount of secretions today.  I did do a pressure support trial and he unfortunately flipped into atrial fibrillation and later spontaneously converted after a dose of metoprolol.      I will probably do a little more pressure support today but will hold off on an extubation attempt secondary to the secretions and the episode of atrial fibrillation today.    I will give him some steroids.     Plan:  1.  For septic shock due to urinary source growing Pseudomonas from blood and urine: Continue cefepime.  Currently off pressors.  Monitor closely.  Normal LVEF  2.  For elevated proBNP/concern for heart failure: Without significant valvular disease on echocardiogram 2/11/2025.  3.  For GALILEO/bladder outlet obstruction: Continue Lubin catheter.  Start tamsulosin/finasteride.  4.  For respiratory: Remains on mechanical ventilator.  Had some mucous plugging on 2/17.  Bilateral infiltrates concerning for pulmonary edema.  Scheduled diuretics.  Wean sedation as tolerated and do spontaneous breathing trials as tolerated.  5.  DVT prophylaxis: Subcutaneous heparin.  6.  For nutrition: Continue tube feeding.  7.  For thrombocytopenia: Stable, likely secondary  cirrhosis.    Patient is critically ill secondary to tenuous respiratory and hemodynamic status and has high risk of life-threatening decompensation in condition.   As such, the patient requires continuous monitoring and frequent reassessment for consideration of adjustment in management to minimize this risk.  I personally reassessed the patient multiple times today.    Critical care time : 35 minutes spent by me personally and independently.(This excludes time spent performing separately reportable procedures and services).  Critical care time includes high complexity decision making to assess, manipulate, and support vital organ system failure in this individual who has impairment of one or more vital organ systems such that there is a high probability of imminent or life threatening deterioration in the patient’s condition.    Electronically signed by:    Guerrero Regalado MD  02/23/25  10:36 EST      *. Please note that portions of this note were completed with [employer] - a voice recognition program.

## 2025-02-23 NOTE — PLAN OF CARE
Problem: Adult Inpatient Plan of Care  Goal: Plan of Care Review  Outcome: Progressing  Flowsheets (Taken 2/23/2025 7870)  Progress: improving  Plan of Care Reviewed With: patient   Goal Outcome Evaluation:  Plan of Care Reviewed With: patient        Progress: improving   SBT this am, patient agitated and diaphoretic lasting about 1hr, then HR elevated to 160's with rhythm change to afib, confirmed with EKG. Emesis episode noted with SBT. Lopressor given for HR control, rhythm back to NSR. 2nd SBT about 5 hours today, Precedex added at low dose for anxiety. Fentanyl weaned to minimal dosing. BM today with Lactulose. Afebrile. Enteral feeding tube placed in right nare, in anticipation for extubation tomorrow. UOP adequate. Restraints continued for safety.

## 2025-02-24 ENCOUNTER — APPOINTMENT (OUTPATIENT)
Dept: GENERAL RADIOLOGY | Facility: HOSPITAL | Age: 65
DRG: 870 | End: 2025-02-24
Payer: MEDICARE

## 2025-02-24 LAB
ABO GROUP BLD: NORMAL
ANION GAP SERPL CALCULATED.3IONS-SCNC: 11 MMOL/L (ref 5–15)
ARTERIAL PATENCY WRIST A: ABNORMAL
ARTERIAL PATENCY WRIST A: POSITIVE
ARTERIAL PATENCY WRIST A: POSITIVE
ATMOSPHERIC PRESS: ABNORMAL MM[HG]
BASE EXCESS BLDA CALC-SCNC: -1.2 MMOL/L (ref 0–2)
BASE EXCESS BLDA CALC-SCNC: -2.1 MMOL/L (ref 0–2)
BASE EXCESS BLDA CALC-SCNC: -2.2 MMOL/L (ref 0–2)
BASOPHILS # BLD AUTO: 0.02 10*3/MM3 (ref 0–0.2)
BASOPHILS NFR BLD AUTO: 0.1 % (ref 0–1.5)
BDY SITE: ABNORMAL
BLD GP AB SCN SERPL QL: NEGATIVE
BODY TEMPERATURE: 37
BUN SERPL-MCNC: 73 MG/DL (ref 8–23)
BUN/CREAT SERPL: 56.6 (ref 7–25)
CALCIUM SPEC-SCNC: 8.9 MG/DL (ref 8.6–10.5)
CHLORIDE SERPL-SCNC: 104 MMOL/L (ref 98–107)
CO2 BLDA-SCNC: 22.5 MMOL/L (ref 22–33)
CO2 BLDA-SCNC: 23.2 MMOL/L (ref 22–33)
CO2 BLDA-SCNC: 24.2 MMOL/L (ref 22–33)
CO2 SERPL-SCNC: 19 MMOL/L (ref 22–29)
COHGB MFR BLD: 1.1 % (ref 0–2)
COHGB MFR BLD: 1.3 % (ref 0–2)
COHGB MFR BLD: 1.5 % (ref 0–2)
CREAT SERPL-MCNC: 1.29 MG/DL (ref 0.76–1.27)
CRP SERPL-MCNC: 11.29 MG/DL (ref 0–0.5)
DEPRECATED RDW RBC AUTO: 54 FL (ref 37–54)
EGFRCR SERPLBLD CKD-EPI 2021: 61.5 ML/MIN/1.73
EOSINOPHIL # BLD AUTO: 0.01 10*3/MM3 (ref 0–0.4)
EOSINOPHIL NFR BLD AUTO: 0 % (ref 0.3–6.2)
EPAP: 0
EPAP: 0
ERYTHROCYTE [DISTWIDTH] IN BLOOD BY AUTOMATED COUNT: 16.5 % (ref 12.3–15.4)
GLUCOSE BLDC GLUCOMTR-MCNC: 130 MG/DL (ref 70–130)
GLUCOSE BLDC GLUCOMTR-MCNC: 146 MG/DL (ref 70–130)
GLUCOSE BLDC GLUCOMTR-MCNC: 166 MG/DL (ref 70–130)
GLUCOSE BLDC GLUCOMTR-MCNC: 172 MG/DL (ref 70–130)
GLUCOSE BLDC GLUCOMTR-MCNC: 250 MG/DL (ref 70–130)
GLUCOSE SERPL-MCNC: 220 MG/DL (ref 65–99)
HCO3 BLDA-SCNC: 21.6 MMOL/L (ref 20–26)
HCO3 BLDA-SCNC: 22.3 MMOL/L (ref 20–26)
HCO3 BLDA-SCNC: 23 MMOL/L (ref 20–26)
HCT VFR BLD AUTO: 20.4 % (ref 37.5–51)
HCT VFR BLD AUTO: 20.6 % (ref 37.5–51)
HCT VFR BLD AUTO: 23.4 % (ref 37.5–51)
HCT VFR BLD CALC: 21.6 % (ref 38–51)
HCT VFR BLD CALC: 22.1 % (ref 38–51)
HCT VFR BLD CALC: 24.6 % (ref 38–51)
HEMOCCULT STL QL: POSITIVE
HGB BLD-MCNC: 6.8 G/DL (ref 13–17.7)
HGB BLD-MCNC: 7 G/DL (ref 13–17.7)
HGB BLD-MCNC: 7.7 G/DL (ref 13–17.7)
HGB BLDA-MCNC: 7 G/DL (ref 13.5–17.5)
HGB BLDA-MCNC: 7.2 G/DL (ref 13.5–17.5)
HGB BLDA-MCNC: 8 G/DL (ref 13.5–17.5)
IMM GRANULOCYTES # BLD AUTO: 0.56 10*3/MM3 (ref 0–0.05)
IMM GRANULOCYTES NFR BLD AUTO: 2.6 % (ref 0–0.5)
INHALED O2 CONCENTRATION: 36 %
INHALED O2 CONCENTRATION: 40 %
INHALED O2 CONCENTRATION: 40 %
IPAP: 0
IPAP: 0
IRON 24H UR-MRATE: 36 MCG/DL (ref 59–158)
IRON SATN MFR SERPL: 18 % (ref 20–50)
LYMPHOCYTES # BLD AUTO: 0.51 10*3/MM3 (ref 0.7–3.1)
LYMPHOCYTES NFR BLD AUTO: 2.3 % (ref 19.6–45.3)
MAGNESIUM SERPL-MCNC: 2 MG/DL (ref 1.6–2.4)
MCH RBC QN AUTO: 31.3 PG (ref 26.6–33)
MCHC RBC AUTO-ENTMCNC: 34.3 G/DL (ref 31.5–35.7)
MCV RBC AUTO: 91.1 FL (ref 79–97)
METHGB BLD QL: 0.1 % (ref 0–1.5)
METHGB BLD QL: 0.2 % (ref 0–1.5)
METHGB BLD QL: 0.2 % (ref 0–1.5)
MODALITY: ABNORMAL
MONOCYTES # BLD AUTO: 1.46 10*3/MM3 (ref 0.1–0.9)
MONOCYTES NFR BLD AUTO: 6.7 % (ref 5–12)
NEUTROPHILS NFR BLD AUTO: 19.29 10*3/MM3 (ref 1.7–7)
NEUTROPHILS NFR BLD AUTO: 88.3 % (ref 42.7–76)
NRBC BLD AUTO-RTO: 0 /100 WBC (ref 0–0.2)
OXYHGB MFR BLDV: 96.4 % (ref 94–99)
OXYHGB MFR BLDV: 96.6 % (ref 94–99)
OXYHGB MFR BLDV: 97.5 % (ref 94–99)
PAW @ PEAK INSP FLOW SETTING VENT: 0 CMH2O
PAW @ PEAK INSP FLOW SETTING VENT: 0 CMH2O
PCO2 BLDA: 30.9 MM HG (ref 35–45)
PCO2 BLDA: 31.1 MM HG (ref 35–45)
PCO2 BLDA: 40.5 MM HG (ref 35–45)
PCO2 TEMP ADJ BLD: 30.9 MM HG (ref 35–48)
PCO2 TEMP ADJ BLD: 31.1 MM HG (ref 35–48)
PCO2 TEMP ADJ BLD: 40.5 MM HG (ref 35–48)
PEEP RESPIRATORY: 5 CM[H2O]
PEEP RESPIRATORY: 5 CM[H2O]
PH BLDA: 7.36 PH UNITS (ref 7.35–7.45)
PH BLDA: 7.45 PH UNITS (ref 7.35–7.45)
PH BLDA: 7.47 PH UNITS (ref 7.35–7.45)
PH, TEMP CORRECTED: 7.36 PH UNITS
PH, TEMP CORRECTED: 7.45 PH UNITS
PH, TEMP CORRECTED: 7.47 PH UNITS
PHOSPHATE SERPL-MCNC: 3.4 MG/DL (ref 2.5–4.5)
PLATELET # BLD AUTO: 148 10*3/MM3 (ref 140–450)
PMV BLD AUTO: 11.6 FL (ref 6–12)
PO2 BLDA: 103 MM HG (ref 83–108)
PO2 BLDA: 89.2 MM HG (ref 83–108)
PO2 BLDA: 98.5 MM HG (ref 83–108)
PO2 TEMP ADJ BLD: 103 MM HG (ref 83–108)
PO2 TEMP ADJ BLD: 89.2 MM HG (ref 83–108)
PO2 TEMP ADJ BLD: 98.5 MM HG (ref 83–108)
POTASSIUM SERPL-SCNC: 4.8 MMOL/L (ref 3.5–5.2)
PREALB SERPL-MCNC: 9.7 MG/DL (ref 20–40)
PSV: 10 CMH2O
RBC # BLD AUTO: 2.24 10*6/MM3 (ref 4.14–5.8)
RH BLD: POSITIVE
SODIUM SERPL-SCNC: 134 MMOL/L (ref 136–145)
T&S EXPIRATION DATE: NORMAL
TIBC SERPL-MCNC: 206 MCG/DL (ref 298–536)
TOTAL RATE: 0 BREATHS/MINUTE
TOTAL RATE: 22 BREATHS/MINUTE
TOTAL RATE: 23 BREATHS/MINUTE
TRANSFERRIN SERPL-MCNC: 138 MG/DL (ref 200–360)
TRIGL SERPL-MCNC: 75 MG/DL (ref 0–150)
VENTILATOR MODE: ABNORMAL
VENTILATOR MODE: ABNORMAL
VT ON VENT VENT: 0.47 ML
VT ON VENT VENT: 0.53 ML
WBC NRBC COR # BLD AUTO: 21.85 10*3/MM3 (ref 3.4–10.8)

## 2025-02-24 PROCEDURE — 84466 ASSAY OF TRANSFERRIN: CPT | Performed by: INTERNAL MEDICINE

## 2025-02-24 PROCEDURE — 94640 AIRWAY INHALATION TREATMENT: CPT

## 2025-02-24 PROCEDURE — 94799 UNLISTED PULMONARY SVC/PX: CPT

## 2025-02-24 PROCEDURE — 84100 ASSAY OF PHOSPHORUS: CPT | Performed by: INTERNAL MEDICINE

## 2025-02-24 PROCEDURE — 82805 BLOOD GASES W/O2 SATURATION: CPT

## 2025-02-24 PROCEDURE — 82948 REAGENT STRIP/BLOOD GLUCOSE: CPT

## 2025-02-24 PROCEDURE — 84134 ASSAY OF PREALBUMIN: CPT | Performed by: INTERNAL MEDICINE

## 2025-02-24 PROCEDURE — 25010000002 ENOXAPARIN PER 10 MG: Performed by: INTERNAL MEDICINE

## 2025-02-24 PROCEDURE — 63710000001 PREDNISONE PER 1 MG

## 2025-02-24 PROCEDURE — 83540 ASSAY OF IRON: CPT | Performed by: INTERNAL MEDICINE

## 2025-02-24 PROCEDURE — 82272 OCCULT BLD FECES 1-3 TESTS: CPT | Performed by: INTERNAL MEDICINE

## 2025-02-24 PROCEDURE — 94664 DEMO&/EVAL PT USE INHALER: CPT

## 2025-02-24 PROCEDURE — 99291 CRITICAL CARE FIRST HOUR: CPT | Performed by: INTERNAL MEDICINE

## 2025-02-24 PROCEDURE — 25010000002 BUMETANIDE PER 0.5 MG: Performed by: NURSE PRACTITIONER

## 2025-02-24 PROCEDURE — 83735 ASSAY OF MAGNESIUM: CPT | Performed by: INTERNAL MEDICINE

## 2025-02-24 PROCEDURE — 36600 WITHDRAWAL OF ARTERIAL BLOOD: CPT

## 2025-02-24 PROCEDURE — 86850 RBC ANTIBODY SCREEN: CPT | Performed by: NURSE PRACTITIONER

## 2025-02-24 PROCEDURE — 84478 ASSAY OF TRIGLYCERIDES: CPT | Performed by: INTERNAL MEDICINE

## 2025-02-24 PROCEDURE — 85018 HEMOGLOBIN: CPT | Performed by: INTERNAL MEDICINE

## 2025-02-24 PROCEDURE — 86923 COMPATIBILITY TEST ELECTRIC: CPT

## 2025-02-24 PROCEDURE — 36430 TRANSFUSION BLD/BLD COMPNT: CPT

## 2025-02-24 PROCEDURE — 25010000002 FUROSEMIDE PER 20 MG: Performed by: INTERNAL MEDICINE

## 2025-02-24 PROCEDURE — 85018 HEMOGLOBIN: CPT | Performed by: NURSE PRACTITIONER

## 2025-02-24 PROCEDURE — 85014 HEMATOCRIT: CPT | Performed by: NURSE PRACTITIONER

## 2025-02-24 PROCEDURE — P9016 RBC LEUKOCYTES REDUCED: HCPCS

## 2025-02-24 PROCEDURE — 71045 X-RAY EXAM CHEST 1 VIEW: CPT

## 2025-02-24 PROCEDURE — 74018 RADEX ABDOMEN 1 VIEW: CPT

## 2025-02-24 PROCEDURE — 86900 BLOOD TYPING SEROLOGIC ABO: CPT

## 2025-02-24 PROCEDURE — 83050 HGB METHEMOGLOBIN QUAN: CPT

## 2025-02-24 PROCEDURE — 86900 BLOOD TYPING SEROLOGIC ABO: CPT | Performed by: NURSE PRACTITIONER

## 2025-02-24 PROCEDURE — 85014 HEMATOCRIT: CPT | Performed by: INTERNAL MEDICINE

## 2025-02-24 PROCEDURE — 80048 BASIC METABOLIC PNL TOTAL CA: CPT | Performed by: INTERNAL MEDICINE

## 2025-02-24 PROCEDURE — 82375 ASSAY CARBOXYHB QUANT: CPT

## 2025-02-24 PROCEDURE — 85025 COMPLETE CBC W/AUTO DIFF WBC: CPT | Performed by: INTERNAL MEDICINE

## 2025-02-24 PROCEDURE — 86140 C-REACTIVE PROTEIN: CPT | Performed by: INTERNAL MEDICINE

## 2025-02-24 PROCEDURE — 86901 BLOOD TYPING SEROLOGIC RH(D): CPT | Performed by: NURSE PRACTITIONER

## 2025-02-24 RX ORDER — FINASTERIDE 5 MG/1
5 TABLET, FILM COATED ORAL DAILY
Status: DISCONTINUED | OUTPATIENT
Start: 2025-02-24 | End: 2025-03-12 | Stop reason: HOSPADM

## 2025-02-24 RX ORDER — BUMETANIDE 0.25 MG/ML
2 INJECTION, SOLUTION INTRAMUSCULAR; INTRAVENOUS ONCE
Status: COMPLETED | OUTPATIENT
Start: 2025-02-24 | End: 2025-02-24

## 2025-02-24 RX ORDER — FENTANYL 25 UG/1
1 PATCH TRANSDERMAL
Status: DISCONTINUED | OUTPATIENT
Start: 2025-02-24 | End: 2025-03-04

## 2025-02-24 RX ORDER — SODIUM CHLORIDE FOR INHALATION 7 %
4 VIAL, NEBULIZER (ML) INHALATION ONCE
Status: COMPLETED | OUTPATIENT
Start: 2025-02-24 | End: 2025-02-24

## 2025-02-24 RX ORDER — IPRATROPIUM BROMIDE AND ALBUTEROL SULFATE 2.5; .5 MG/3ML; MG/3ML
3 SOLUTION RESPIRATORY (INHALATION) EVERY 4 HOURS PRN
Status: DISCONTINUED | OUTPATIENT
Start: 2025-02-24 | End: 2025-03-12 | Stop reason: HOSPADM

## 2025-02-24 RX ORDER — METOLAZONE 5 MG/1
20 TABLET ORAL ONCE
Status: COMPLETED | OUTPATIENT
Start: 2025-02-24 | End: 2025-02-24

## 2025-02-24 RX ORDER — OXYCODONE AND ACETAMINOPHEN 7.5; 325 MG/1; MG/1
1 TABLET ORAL EVERY 6 HOURS PRN
Status: DISCONTINUED | OUTPATIENT
Start: 2025-02-24 | End: 2025-02-26

## 2025-02-24 RX ORDER — IBUPROFEN 600 MG/1
1 TABLET ORAL
Status: DISCONTINUED | OUTPATIENT
Start: 2025-02-24 | End: 2025-03-12 | Stop reason: HOSPADM

## 2025-02-24 RX ORDER — NICOTINE POLACRILEX 4 MG
15 LOZENGE BUCCAL
Status: DISCONTINUED | OUTPATIENT
Start: 2025-02-24 | End: 2025-02-25

## 2025-02-24 RX ORDER — IPRATROPIUM BROMIDE AND ALBUTEROL SULFATE 2.5; .5 MG/3ML; MG/3ML
3 SOLUTION RESPIRATORY (INHALATION)
Status: DISCONTINUED | OUTPATIENT
Start: 2025-02-24 | End: 2025-03-12

## 2025-02-24 RX ORDER — DEXTROSE MONOHYDRATE 25 G/50ML
25 INJECTION, SOLUTION INTRAVENOUS
Status: DISCONTINUED | OUTPATIENT
Start: 2025-02-24 | End: 2025-03-12 | Stop reason: HOSPADM

## 2025-02-24 RX ORDER — CASTOR OIL AND BALSAM, PERU 788; 87 MG/G; MG/G
1 OINTMENT TOPICAL EVERY 12 HOURS SCHEDULED
Status: DISCONTINUED | OUTPATIENT
Start: 2025-02-24 | End: 2025-03-12 | Stop reason: HOSPADM

## 2025-02-24 RX ORDER — AMINO ACIDS/PROTEIN HYDROLYS 11G-40/45
30 LIQUID IN PACKET (ML) ORAL DAILY
Status: DISCONTINUED | OUTPATIENT
Start: 2025-02-24 | End: 2025-02-27

## 2025-02-24 RX ADMIN — BUMETANIDE 2 MG: 0.25 INJECTION INTRAMUSCULAR; INTRAVENOUS at 23:01

## 2025-02-24 RX ADMIN — FENTANYL 1 PATCH: 25 PATCH TRANSDERMAL at 14:32

## 2025-02-24 RX ADMIN — DEXMEDETOMIDINE HYDROCHLORIDE 0.6 MCG/KG/HR: 4 INJECTION, SOLUTION INTRAVENOUS at 08:05

## 2025-02-24 RX ADMIN — FUROSEMIDE 40 MG: 10 INJECTION, SOLUTION INTRAMUSCULAR; INTRAVENOUS at 00:55

## 2025-02-24 RX ADMIN — Medication 1 APPLICATION: at 21:22

## 2025-02-24 RX ADMIN — IPRATROPIUM BROMIDE AND ALBUTEROL SULFATE 3 ML: 2.5; .5 SOLUTION RESPIRATORY (INHALATION) at 23:52

## 2025-02-24 RX ADMIN — METOPROLOL TARTRATE 5 MG: 5 INJECTION INTRAVENOUS at 14:04

## 2025-02-24 RX ADMIN — NYSTATIN: 100000 POWDER TOPICAL at 21:22

## 2025-02-24 RX ADMIN — PREDNISONE 20 MG: 20 TABLET ORAL at 08:05

## 2025-02-24 RX ADMIN — OXYCODONE AND ACETAMINOPHEN 1 TABLET: 7.5; 325 TABLET ORAL at 14:28

## 2025-02-24 RX ADMIN — Medication 1 APPLICATION: at 11:43

## 2025-02-24 RX ADMIN — METOPROLOL TARTRATE 5 MG: 5 INJECTION INTRAVENOUS at 20:58

## 2025-02-24 RX ADMIN — GABAPENTIN 300 MG: 300 CAPSULE ORAL at 05:24

## 2025-02-24 RX ADMIN — CHLORHEXIDINE GLUCONATE 0.12% ORAL RINSE 15 ML: 1.2 LIQUID ORAL at 21:22

## 2025-02-24 RX ADMIN — GABAPENTIN 300 MG: 300 CAPSULE ORAL at 14:04

## 2025-02-24 RX ADMIN — METOLAZONE 20 MG: 5 TABLET ORAL at 23:01

## 2025-02-24 RX ADMIN — PANTOPRAZOLE SODIUM 40 MG: 40 INJECTION, POWDER, FOR SOLUTION INTRAVENOUS at 08:05

## 2025-02-24 RX ADMIN — PREDNISONE 20 MG: 20 TABLET ORAL at 21:22

## 2025-02-24 RX ADMIN — NYSTATIN: 100000 POWDER TOPICAL at 14:04

## 2025-02-24 RX ADMIN — PANTOPRAZOLE SODIUM 40 MG: 40 INJECTION, POWDER, FOR SOLUTION INTRAVENOUS at 20:58

## 2025-02-24 RX ADMIN — GABAPENTIN 300 MG: 300 CAPSULE ORAL at 21:21

## 2025-02-24 RX ADMIN — ESCITALOPRAM OXALATE 10 MG: 10 TABLET ORAL at 21:21

## 2025-02-24 RX ADMIN — ENOXAPARIN SODIUM 40 MG: 100 INJECTION SUBCUTANEOUS at 11:43

## 2025-02-24 RX ADMIN — DEXMEDETOMIDINE HYDROCHLORIDE 0.6 MCG/KG/HR: 4 INJECTION, SOLUTION INTRAVENOUS at 16:33

## 2025-02-24 RX ADMIN — NYSTATIN: 100000 POWDER TOPICAL at 05:29

## 2025-02-24 RX ADMIN — DEXMEDETOMIDINE HYDROCHLORIDE 0.5 MCG/KG/HR: 4 INJECTION, SOLUTION INTRAVENOUS at 22:37

## 2025-02-24 RX ADMIN — SODIUM CHLORIDE 4 ML: 7 NEBU SOLN,3 % NEBU at 23:52

## 2025-02-24 RX ADMIN — CHLORHEXIDINE GLUCONATE 0.12% ORAL RINSE 15 ML: 1.2 LIQUID ORAL at 08:05

## 2025-02-24 RX ADMIN — OXYCODONE AND ACETAMINOPHEN 1 TABLET: 7.5; 325 TABLET ORAL at 21:21

## 2025-02-24 RX ADMIN — FINASTERIDE 5 MG: 5 TABLET, FILM COATED ORAL at 08:05

## 2025-02-24 NOTE — NURSING NOTE
WOC consulted for blisters to pubic area And penis    Patient remains intubated and sedated.    There are 2,  what appears to be ruptured blisters near the left urethral meatus.  The wounds respectively measure approximately 0.2 x 0.2 x 0 cm.  Base of wound appears dry and scabbed at this time.  No signs of infection.  Will order topical Venelex to be applied to these 2 sites to help promote moisture balance and wound healing.    If additional blisters develop, cover with a small Optifoam dressing in case the blister ruptures.    Continue with current plan of care for ruptured blisters resulting in partial-thickness injuries to the posterior thighs.    Patient remains at high risk for pressure injury development despite all interventions.    Turn patient every 2 hours using a foam wedge.  Elevate heels off bed.  Utilize Allevyn dressings for additional pressure injury prevention support.    WOC will continue to follow.    Jere Boles RN, BSN, CCRN, CWOCN  Wound, Ostomy and Continence (WOC) Department  [facility]

## 2025-02-24 NOTE — PROGRESS NOTES
Clinical Nutrition     Patient Name: Val Nassar Jr.  YOB: 1960  MRN: [id number]  Date of Encounter: 02/24/25 17:03 EST  Admission date: 2/11/2025  Reason for Visit: MDR, Follow-up protocol, EN, Calorimetry    Assessment   Nutrition Assessment   Admission Diagnosis:  Septic shock [A41.9, R65.21]    Problem List:    Septic shock due to Pseudomonas species    Hyperlipidemia, unspecified    Tobacco use    Cirrhosis of liver    Squamous cell carcinoma of esophagus    GALILEO (acute kidney injury)    Metabolic acidosis    Pneumonia of both lower lobes due to Pseudomonas species    Bacteremia due to Pseudomonas      PMH:   He  has a past medical history of Anemia, Cancer, Cirrhosis, Duodenal ulcer, Gastric ulcer, GERD (gastroesophageal reflux disease), History of alcohol abuse, History of radiation therapy (05/08/2024), HLD (hyperlipidemia), and Mood disorder.    PSH:  He  has a past surgical history that includes Esophagogastroduodenoscopy (N/A, 12/26/2023) and Venous Access Device (Port) (Right, 04/25/2024).    Substance history: Etoh abuse, vaping    Applicable Nutrition History:     ARF/VENT 2-12-25    s/p Bronch 2-17-25    Extubated 2-24-25    WOC following for posterior thigh, gluteal skin tears, scrotal MASD, blisters     Labs    Labs Reviewed: Yes    Results from last 7 days   Lab Units 02/24/25  0434 02/23/25  1135 02/22/25  1545 02/22/25  0318 02/21/25  0327 02/20/25  0511   GLUCOSE mg/dL 220* 163*  --  152* 165* 123*   BUN mg/dL 73* 67*  --  51* 53* 65*   CREATININE mg/dL 1.29* 1.43*  --  0.95 0.80 0.86   SODIUM mmol/L 134* 133*  --  136 138 144   CHLORIDE mmol/L 104 103  --  104 103 110*   POTASSIUM mmol/L 4.8 4.5 4.3 3.4* 4.0 3.5   PHOSPHORUS mg/dL 3.4 3.8  --   --  2.7 2.4*   MAGNESIUM mg/dL 2.0 2.0  --  2.1 1.8 1.8   ALT (SGPT) U/L  --   --   --   --  80* 101*   LACTATE mmol/L  --   --   --   --  1.8  --        Results from last 7 days   Lab Units 02/24/25  8695  02/21/25  0327 02/20/25  0511   ALBUMIN g/dL  --  2.6* 2.6*   PREALBUMIN mg/dL 9.7*  --   --    CRP mg/dL 11.29*  --   --    TRIGLYCERIDES mg/dL 75  --   --        Results from last 7 days   Lab Units 02/24/25  1137 02/24/25  0532 02/23/25  2329 02/23/25  1749 02/23/25  1126 02/23/25  0505   GLUCOSE mg/dL 130 250* 162* 225* 163* 190*     Lab Results   Lab Value Date/Time    HGBA1C 4.6 (L) 12/25/2023 1925         Results from last 7 days   Lab Units 02/18/25  0303   PROBNP pg/mL 10,574.0*       Medications    Medications Reviewed: yes    Scheduled Meds:castor oil-balsam peru, 1 Application, Topical, Q12H  fentaNYL, 1 patch, Transdermal, Q72H   And  [START ON 2/25/2025] Check Fentanyl Patch Placement, 1 each, Not Applicable, Q12H  chlorhexidine, 15 mL, Mouth/Throat, Q12H  docusate sodium, 100 mg, Per G Tube, BID   And  lactulose, 20 g, Per G Tube, TID  enoxaparin, 40 mg, Subcutaneous, Q24H  escitalopram, 10 mg, Per G Tube, Nightly  finasteride, 5 mg, Per G Tube, Daily  gabapentin, 300 mg, Per G Tube, Q8H  insulin regular, 2-7 Units, Subcutaneous, Q6H  metoprolol tartrate, 5 mg, Intravenous, Q6H  nystatin, , Topical, Q8H  pantoprazole, 40 mg, Intravenous, Q12H  predniSONE, 20 mg, Nasogastric, Q12H  [Held by provider] tamsulosin, 0.4 mg, Oral, Daily      Continuous Infusions:dexmedetomidine, 0.2-1.5 mcg/kg/hr, Last Rate: 0.6 mcg/kg/hr (02/24/25 1633)      PRN Meds:.  Calcium Replacement - Follow Nurse / BPA Driven Protocol    dextrose    dextrose    glucagon (human recombinant)    Magnesium Cardiology Dose Replacement - Follow Nurse / BPA Driven Protocol    oxyCODONE-acetaminophen    Phosphorus Replacement - Follow Nurse / BPA Driven Protocol    polyvinyl alcohol    Potassium Replacement - Follow Nurse / BPA Driven Protocol    sodium chloride    Intake/Ouptut 24 hrs (0701 - 0700)   I&O's Reviewed: yes    Intake & Output (last day)         02/23 0701 02/24 0700 02/24 0701 02/25 0700    I.V. (mL/kg) 1180.6 (13.4) 151.4  "(1.7)    Blood  400    Other 570 31    NG/GT 1663 159    IV Piggyback 223.2     Total Intake(mL/kg) 3636.8 (41.1) 741.4 (8.4)    Urine (mL/kg/hr) 5615 (2.6) 1150 (1.3)    Emesis/NG output 0     Stool 25 0    Total Output 5640 1150    Net -2003.2 -408.6          Stool Unmeasured Occurrence 2 x 1 x    Emesis Unmeasured Occurrence 2 x            Anthropometrics     Height: Height: 175.3 cm (69.02\")  Last Filed Weight: Weight: 88.4 kg (194 lb 14.2 oz) (02/16/25 0418)  Method:  bedscale  BMI: BMI (Calculated): 28.8    UBW: ?  Weight change:  per EMR ~ 6lb wt gain since January     Weight       Weight (kg) Weight (lbs) Weight Method Visit Report   4/23/2024 75.841 kg  167 lb 3.2 oz   --     75.297 kg  166 lb   --    4/25/2024 75.751 kg  167 lb  Stated     4/29/2024 76.658 kg  169 lb      4/30/2024 75.932 kg  167 lb 6.4 oz   --     75.751 kg  167 lb   --    5/6/2024 74.889 kg  165 lb 1.6 oz   --     74.98 kg  165 lb 4.8 oz   --     74.844 kg  165 lb   --        --        --        --    5/7/2024 76.25 kg  168 lb 1.6 oz   --     76.204 kg  168 lb   --    5/14/2024 77.111 kg  170 lb   --    5/21/2024 75.297 kg  166 lb      6/3/2024 74.98 kg  165 lb 4.8 oz   --     75.297 kg  166 lb   --        --        --    6/18/2024 74.39 kg  164 lb   --    7/5/2024 74.4 kg  164 lb 0.4 oz  Estimated     7/16/2024 77.565 kg  171 lb   --    8/7/2024 77.565 kg  171 lb   --    8/19/2024 77.656 kg  171 lb 3.2 oz   --    9/19/2024 77.565 kg  171 lb      10/24/2024 79.833 kg  176 lb   --    11/14/2024 79.833 kg  176 lb   --    11/26/2024 78.926 kg  174 lb      12/18/2024 79.833 kg  176 lb   --    1/21/2025 79.833 kg  176 lb  Stated     1/23/2025 79.833 kg  176 lb   --    2/3/2025 83.008 kg  183 lb   --    2/11/2025 83.008 kg  183 lb       83 kg  182 lb 15.7 oz          Needs Assessment   Date: 2-24-25    Height used:Height: 175.3 cm (69.02\")  Weights used: 182lb/ 83kg- presumed dry weight      Estimated Calorie needs: ~2093kcal MREE  Method:  " 20Kcals/Kkcal  Method:  MSJ x 1.2: 1926kcal  Calorimetry 25: MREE= 2093kcal. RQ= 0.83 wnl    Estimated Protein needs: ~ 100g protein with current renal function  Method:1.2-1.5g protein: 100-125g proten      Nutrition Focused Physical Exam     Date:     Unable to perform due to Pt unable to participate at time of visit     Subjective   Reported/Observed/Food/Nutrition Related History:     : per RN pt extubated today, is tolerating TF, possible GIB, is having dark stools, follows commands    Pt resting in bed, on nasal cannula, very weak voice, difficulty talking  + precedex    25: pt intubated, sedated + fentanyl, versed, D5%@100ml    Per RN: pt tolerating TF, has not had a bm, is much more alert today, follows commands    Per MD: plan to maintain D5% for 1 more day,  decrease free water to 30ml/hr    : pt intubated, sedated, + fentanyl, versed, D5%@100ml    Per RN: pt has been more alert, oxygen demands decreased, tolerating TF, abdomen still distended, given lactulose (last bm )      Pt intubated, sedated, on fentanyl & VERSED. Spoke with nurse, reported tolerating TF well, has low residuals, and bowels are moving. LBM on 2/15. Noted that Na continues to increase, even after increasing free water on 2/15. Pt may benefit from IV fluid to correct hypernatremia.       Pt intubated, sedated, + fentanyl, propofol 7.56ml, levophed 0.32mcg    Per RN: pt's belly distended, more firm than before, bowel regimen started, marginal UOP, have been toni to wean off asiya and vasopressin    Current Nutrition Prescription     PO: NPO Diet NPO Type: Tube Feeding  Oral Nutrition Supplement:  Intake: N/A    EN: Peptamen 1.5  Goal Rate: 70ml/hr  Water Flushes:30ml/hr  Modular: None  Route: ND  Tube: Small bore    At goal over: 20Hrs/day     Rx will supply:   Goal Volume 1400  mL/day     Flush Volume 600 mL/day     Energy 2100 Kcal/day 100 % MREE   Protein 95 g/day 95 % Est Need   Fiber 0 g/day      Water in  EN 1078 mL     Total Water 1678 mL     Meet DRI Yes        --------------------------------------------------------------------------  Product/Rate verified at bedside: Yes  Infusing Rate at time of visit: 70 ml/hr    Average Delivery from Chartin Day:   Volume 1310 mL/day 94  % Goal Vol.   Flush Volume 1012 mL/day     Energy  Kcal/day  % Est Need   Protein  g/day  % Est Need   Fiber  g/day     Water in  EN  mL     Total Water  mL     Meet DRI Yes             Assessment & Plan   Nutrition Diagnosis   Date: 25 Updated: 25   Problem Inadequate energy intake    Etiology ARF/Extubated   Signs/Symptoms 84% goal volume EN   Status: Active    Goal:   Nutrition to support treatment and Adjust EN      Nutrition Intervention      Follow treatment progress, Care plan reviewed    Will add Prosource 1x/day to better meet est protein needs    TF at goal volume will provide 1400ml, 2160kcal, 103% kcal needs, 107g protein, 107% protein needs, 1678ml free water    Monitoring/Evaluation:   Per protocol, I&O, Pertinent labs, EN delivery/tolerance, Weight, Skin status, GI status, Symptoms, Hemodynamic stability    Monica Chan, RADHA  Time Spent: 45min

## 2025-02-24 NOTE — CASE MANAGEMENT/SOCIAL WORK
Continued Stay Note  [facility]     Patient Name: Val Nassar Jr.  MRN: [id number]  Today's Date: 2/24/2025    Admit Date: 2/11/2025    Plan: ongoing, d/c plan return to LTC bed @ [facility]   Discharge Plan       Row Name 02/24/25 1025       Plan    Plan ongoing, d/c plan return to LTC bed @ [facility]    Patient/Family in Agreement with Plan other (see comments)    Plan Comments DIscussed in MDR,  spontaneous breathing trial, hopeful to extubate today. Spoke w/nephewABELARDO, on Friday, d/c plan remains to return to LTC @ [facility] when medically ready. I will f/u w/patient tomorrow and update Awa crow w/[facility] tomorrow once extubated.    Final Discharge Disposition Code 04 - intermediate care facility                   Discharge Codes    No documentation.                 Expected Discharge Date and Time       Expected Discharge Date Expected Discharge Time    Feb 18, 2025               Nikolas Hernandez RN

## 2025-02-24 NOTE — PLAN OF CARE
Goal Outcome Evaluation:         - Pt remains on Vent with PEEP of 5 and FiO2 at 40%. SpO2 sat >90% Overnight. Overbreathing the vent.  - ABG this AM, See results.  - FC, BAUER, nods 'yes' and 'no' to questions, easily aroused, and at times restless.  - Pupils slightly unequal and L cornea partly cloudy upon initial assessment. Both pupils react to light and track equally.  - Fentanyl at 50, Precedex at 0.4.  - TF Pep 1.5 continued via Keofeed continuous at 70ml/hr with 30ml FW q1h. Pt tolerating.  - Loose large BM overnight. Dark green in character.   - Hmt critical this AM at 20.4. No signs of bleeding. Pt nods head 'no' when asked if his abd hurts with or without palpation. No BM since about 2200 2/23.  - Lubin total output: 3,090mL  Diuresis X1 overnight.  - Posterior thigh wounds redrressed with mepalex and Xeroform. Wound barrier spray Zguard used on buttocks. Barrier spray with nystatin used on perinium and abd folds.

## 2025-02-24 NOTE — PROGRESS NOTES
Pulmonary/Critical Care Follow-up     LOS: 13 days   Patient Care Team:  Wesly Minor MD as PCP - General (Family Medicine)  Katerina Ga MD as Referring Physician (Hematology and Oncology)  Jeremy Aguila MD as Consulting Physician (Radiation Oncology)  Shirlene Kauffman APRN as Radiation Oncologist (Nurse Practitioner)        Chief Complaint   Patient presents with    Altered Mental Status    Shortness of Breath     Subjective     Patient remains on ventilator.  He is tolerating 40% FiO2.    Interval History:     Patient remains on mechanical ventilator this morning.  Secretions are improved.  Tolerated spontaneous breathing trial this morning we will proceed with with trial extubation.  Afebrile.  Worsening anemia on labs today.    History taken from: Chart, staff    PMH/FH/Social History were reviewed and updated appropriately in the electronic medical record.     Review of Systems:    Review of 14 systems was completed with positives and pertinent negatives noted in the subjective section.  All other systems reviewed and are negative.         Objective     Vital Signs  Temp:  [97.9 °F (36.6 °C)-99 °F (37.2 °C)] 98.8 °F (37.1 °C)  Heart Rate:  [51-86] 86  Resp:  [19-27] 24  BP: (101-150)/(49-74) 150/72  FiO2 (%):  [40 %] 40 %  02/23 0701 - 02/24 0700  In: 3636.8 [I.V.:1180.6]  Out: 5640 [Urine:5615]  Body mass index is 28.77 kg/m².  Mode: PS  FiO2 (%):  [40 %] 40 %  S RR:  [15] 15  S VT:  [470 mL] 470 mL  PEEP/CPAP (cm H2O):  [5 cm H20] 5 cm H20  MD SUP:  [10 cm H20] 10 cm H20  MAP (cm H2O):  [7-13] 12  IV drips:  dexmedetomidine, Last Rate: 0.6 mcg/kg/hr (02/24/25 0805)  fentanyl 10 mcg/mL, Last Rate: Stopped (02/24/25 0708)       Physical Exam:     Constitutional:   Drowsy/sedated.  Mildly agitated on ventilator, in no acute distress   Head:   Normocephalic, atraumatic   Eyes:           Lids and lashes normal, conjunctivae and sclerae normal.  PER   ENMT:  Ears appear intact with no abnormalities  noted     Lips normal.     Neck:  Trachea midline, no JVD   Lungs/Resp:    On ventilator, symmetric chest rise, no crepitus, moderate rhonchi 2+ bilateral lower extremity bilaterally.               Heart/CV:   Regular rhythm and normal rate, no murmur   Abdomen/GI:    Soft, nontender, nondistended   :    Deferred   Extremities/MSK:  No clubbing or cyanosis.  No edema.     Pulses:  Pulses palpable and equal bilaterally   Skin:  No bleeding, bruising or rash   Heme/Lymph:  No cervical or supraclavicular adenopathy.   Neurologic:    Psychiatric:    Moves all extremities.      Not agitated     The above physical exam findings were reviewed and reflect my exam findings as of today's exam.   Electronically signed by:  Guerrero Regalado MD  02/24/25  13:45 EST      Results Review:     I reviewed the patient's new clinical results.   Results from last 7 days   Lab Units 02/24/25  0434 02/23/25  1135 02/22/25  1545 02/22/25  0318 02/21/25  0327 02/20/25  0511   SODIUM mmol/L 134* 133*  --  136 138 144   POTASSIUM mmol/L 4.8 4.5 4.3 3.4* 4.0 3.5   CHLORIDE mmol/L 104 103  --  104 103 110*   CO2 mmol/L 19.0* 23.0  --  21.0* 23.0 27.0   BUN mg/dL 73* 67*  --  51* 53* 65*   CREATININE mg/dL 1.29* 1.43*  --  0.95 0.80 0.86   CALCIUM mg/dL 8.9 8.9  --  8.5* 8.5* 8.4*   BILIRUBIN mg/dL  --   --   --   --  0.6 0.6   ALK PHOS U/L  --   --   --   --  138* 131*   ALT (SGPT) U/L  --   --   --   --  80* 101*   AST (SGOT) U/L  --   --   --   --  52* 62*   GLUCOSE mg/dL 220* 163*  --  152* 165* 123*     Results from last 7 days   Lab Units 02/24/25  1151 02/24/25  0434 02/23/25  1135 02/22/25  0318 02/21/25  0327 02/20/25  0511   WBC 10*3/mm3  --  21.85* 25.16* 22.13* 25.02* 19.37*   HEMOGLOBIN g/dL 6.8* 7.0* 8.1* 8.5* 10.5* 9.5*   HEMATOCRIT % 20.6* 20.4* 25.0* 26.9* 32.5* 30.1*   PLATELETS 10*3/mm3  --  148 128* 95* 79* 85*   MONOCYTES % %  --   --   --   --  5.0 7.0   EOSINOPHIL % %  --   --   --   --  0.0* 2.0     Results from last 7  days   Lab Units 02/24/25  1034   PH, ARTERIAL pH units 7.466*   PO2 ART mm Hg 103.0   PCO2, ARTERIAL mm Hg 30.9*   HCO3 ART mmol/L 22.3     Results from last 7 days   Lab Units 02/24/25  0434 02/23/25  1135 02/22/25  0318 02/21/25  0327   MAGNESIUM mg/dL 2.0 2.0 2.1 1.8   PHOSPHORUS mg/dL 3.4 3.8  --  2.7       I reviewed the patient's new imaging including images and reports.    Chest x-ray to/20 2/2025 shows bilateral pulmonary infiltrates concerning for pulmonary edema versus pneumonia.    Medication Review:   castor oil-balsam peru, 1 Application, Topical, Q12H  chlorhexidine, 15 mL, Mouth/Throat, Q12H  docusate sodium, 100 mg, Per G Tube, BID   And  lactulose, 20 g, Per G Tube, TID  enoxaparin, 40 mg, Subcutaneous, Q24H  escitalopram, 10 mg, Per G Tube, Nightly  finasteride, 5 mg, Per G Tube, Daily  gabapentin, 300 mg, Per G Tube, Q8H  insulin regular, 2-7 Units, Subcutaneous, Q6H  metoprolol tartrate, 5 mg, Intravenous, Q6H  nystatin, , Topical, Q8H  pantoprazole, 40 mg, Intravenous, Q12H  predniSONE, 20 mg, Nasogastric, Q12H  [Held by provider] tamsulosin, 0.4 mg, Oral, Daily      dexmedetomidine, 0.2-1.5 mcg/kg/hr, Last Rate: 0.6 mcg/kg/hr (02/24/25 0805)  fentanyl 10 mcg/mL,  mcg/hr, Last Rate: Stopped (02/24/25 0708)        Assessment & Plan         Septic shock due to Pseudomonas species    Hyperlipidemia, unspecified    Tobacco use    Cirrhosis of liver    Squamous cell carcinoma of esophagus    GALILEO (acute kidney injury)    Metabolic acidosis    Pneumonia of both lower lobes due to Pseudomonas species    Bacteremia due to Pseudomonas    64 y.o. 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  He was found to be hypotensive in the emergency department and was given sepsis fluid bolus and ultimately started on Levophed and vasopressin.  Labs were concerning for evidence  of GALILEO, UTI present on admission, metabolic acidosis, elevated proBNP to 13,000, troponin of 134, white blood cell of 27.58, and procalcitonin greater than 100.     CT scan showed no acute abnormality on CT scan of the head with the exception of some possible sinusitis.  CT of the chest/abdomen/pelvis concerning for basilar atelectasis versus less likely pneumonia in my opinion as well as evidence of cystitis, left-sided pyelonephritis, and sigmoid diverticulosis without diverticulitis.    Urine and blood cultures grew Pseudomonas and patient has been treated with cefepime.    Patient decompensated requiring intubation/mechanical ventilation on 2/12/2025.  Patient required bronchoscopy on 2/17 for mucous plugging.  Cultures from bronchoscopy growing usual respiratory kishore.    Secretions are improved today.  Patient tolerated a spontaneous breathing trial.  Will proceed with trial extubation.    I will give him some steroids.    Patient has worsening anemia and I will check fecal occult blood and also iron studies.     Plan:  1.  For septic shock due to urinary source growing Pseudomonas from blood and urine: Completed cefepime 2/23/2025.  Currently off pressors.  Monitor closely.  Normal LVEF  2.  For elevated proBNP/concern for heart failure: Without significant valvular disease on echocardiogram 2/11/2025.  3.  For GALILEO/bladder outlet obstruction: Continue Lubin catheter.  Continue tamsulosin/finasteride.  4.  For respiratory: Remains on mechanical ventilator.  Had some mucous plugging on 2/17.  Bilateral infiltrates concerning for pulmonary edema.  Scheduled diuretics.  Holding sedation.  Tolerated spontaneous breathing trial today and I will proceed with trial extubation.  Monitor patient closely as he has a tenuous respiratory status.  5.  DVT prophylaxis: Subcutaneous heparin.  6.  For nutrition: Continue tube feeding.  7.  For thrombocytopenia: Stable, likely secondary cirrhosis.    Patient is critically ill  secondary to tenuous respiratory and hemodynamic status and has high risk of life-threatening decompensation in condition.   As such, the patient requires continuous monitoring and frequent reassessment for consideration of adjustment in management to minimize this risk.  I personally reassessed the patient multiple times today.    Critical care time : 33 minutes spent by me personally and independently.(This excludes time spent performing separately reportable procedures and services).  Critical care time includes high complexity decision making to assess, manipulate, and support vital organ system failure in this individual who has impairment of one or more vital organ systems such that there is a high probability of imminent or life threatening deterioration in the patient’s condition.    Electronically signed by:    Guerrero Regalado MD  02/24/25  13:45 EST      *. Please note that portions of this note were completed with [employer] - a voice recognition program.

## 2025-02-24 NOTE — PLAN OF CARE
Problem: Adult Inpatient Plan of Care  Goal: Plan of Care Review  Outcome: Progressing  Flowsheets (Taken 2/24/2025 1716)  Outcome Evaluation: Patient extubated to 4LNC @ 1223. Tolerating well this afternoon. Precedex remains on for patient comfort. In and out of atrial fibrillation on telemetry. MD notified. EKG obtained showing NSR. Positive fecal occult, 1 U PRBCs given, recheck timed for 1800. Vital signs otherwise stable. UOP adequate. Restraints remain on for patient safety.       Problem: Restraint, Nonviolent  Goal: Absence of Harm or Injury  Outcome: Progressing  Intervention: Implement Least Restrictive Safety Strategies  Recent Flowsheet Documentation  Taken 2/24/2025 1600 by Patricia Elizabeth RN  Medical Device Protection: tubing secured  Taken 2/24/2025 1400 by Patricia Elizabeth RN  Medical Device Protection: tubing secured  Diversional Activities: television  Taken 2/24/2025 1200 by Patricia Elizabeth RN  Medical Device Protection: tubing secured  Diversional Activities: music  Taken 2/24/2025 1000 by Patricia Elizabeth RN  Medical Device Protection: tubing secured  Diversional Activities: music  Taken 2/24/2025 0800 by Patricia Elizabeth RN  Medical Device Protection: tubing secured  Diversional Activities: music  Intervention: Protect Dignity, Rights and Personal Wellbeing  Recent Flowsheet Documentation  Taken 2/24/2025 1400 by Patricia Elizabeth RN  Trust Relationship/Rapport:   care explained   reassurance provided   thoughts/feelings acknowledged   questions answered   choices provided  Taken 2/24/2025 1200 by Patricia Elizabeth RN  Trust Relationship/Rapport:   care explained   reassurance provided  Taken 2/24/2025 1000 by Patricia Elizabeth RN  Trust Relationship/Rapport:   care explained   reassurance provided  Taken 2/24/2025 0800 by Patricia Elizabeth RN  Trust Relationship/Rapport:   care explained   reassurance provided  Intervention: Protect Skin and Joint Integrity  Recent Flowsheet Documentation  Taken 2/24/2025 1600 by Glenn  SUE Gomez  Body Position:   weight shifting   tilted   right   lower extremity elevated   upper extremity elevated  Skin Protection:   incontinence pads utilized   silicone foam dressing in place   transparent dressing maintained  Taken 2/24/2025 1400 by Patricia Elizabeth RN  Body Position:   weight shifting   side-lying   left   lower extremity elevated   upper extremity elevated  Skin Protection:   incontinence pads utilized   transparent dressing maintained   silicone foam dressing in place  Taken 2/24/2025 1200 by Patricia Elizabeth RN  Body Position:   weight shifting   tilted   left   lower extremity elevated   upper extremity elevated  Skin Protection:   incontinence pads utilized   silicone foam dressing in place   transparent dressing maintained  Taken 2/24/2025 1000 by Patricia Elizabeth RN  Body Position:   weight shifting   supine   upper extremity elevated   lower extremity elevated  Skin Protection:   incontinence pads utilized   transparent dressing maintained  Taken 2/24/2025 0800 by Patricia Elizabeth RN  Body Position:   weight shifting   tilted   right   lower extremity elevated   upper extremity elevated  Skin Protection:   incontinence pads utilized   transparent dressing maintained

## 2025-02-25 ENCOUNTER — APPOINTMENT (OUTPATIENT)
Dept: GENERAL RADIOLOGY | Facility: HOSPITAL | Age: 65
DRG: 870 | End: 2025-02-25
Payer: MEDICARE

## 2025-02-25 LAB
ANION GAP SERPL CALCULATED.3IONS-SCNC: 11 MMOL/L (ref 5–15)
ANION GAP SERPL CALCULATED.3IONS-SCNC: 9 MMOL/L (ref 5–15)
BH BB BLOOD EXPIRATION DATE: NORMAL
BH BB BLOOD TYPE BARCODE: 6200
BH BB DISPENSE STATUS: NORMAL
BH BB PRODUCT CODE: NORMAL
BH BB UNIT NUMBER: NORMAL
BUN SERPL-MCNC: 86 MG/DL (ref 8–23)
BUN SERPL-MCNC: 88 MG/DL (ref 8–23)
BUN/CREAT SERPL: 65.6 (ref 7–25)
BUN/CREAT SERPL: 76.5 (ref 7–25)
CALCIUM SPEC-SCNC: 9.4 MG/DL (ref 8.6–10.5)
CALCIUM SPEC-SCNC: 9.4 MG/DL (ref 8.6–10.5)
CHLORIDE SERPL-SCNC: 106 MMOL/L (ref 98–107)
CHLORIDE SERPL-SCNC: 107 MMOL/L (ref 98–107)
CO2 SERPL-SCNC: 23 MMOL/L (ref 22–29)
CO2 SERPL-SCNC: 24 MMOL/L (ref 22–29)
CREAT SERPL-MCNC: 1.15 MG/DL (ref 0.76–1.27)
CREAT SERPL-MCNC: 1.31 MG/DL (ref 0.76–1.27)
CROSSMATCH INTERPRETATION: NORMAL
DEPRECATED RDW RBC AUTO: 55 FL (ref 37–54)
EGFRCR SERPLBLD CKD-EPI 2021: 60.4 ML/MIN/1.73
EGFRCR SERPLBLD CKD-EPI 2021: 70.6 ML/MIN/1.73
ERYTHROCYTE [DISTWIDTH] IN BLOOD BY AUTOMATED COUNT: 16.5 % (ref 12.3–15.4)
GLUCOSE BLDC GLUCOMTR-MCNC: 130 MG/DL (ref 70–130)
GLUCOSE BLDC GLUCOMTR-MCNC: 154 MG/DL (ref 70–130)
GLUCOSE BLDC GLUCOMTR-MCNC: 164 MG/DL (ref 70–130)
GLUCOSE BLDC GLUCOMTR-MCNC: 176 MG/DL (ref 70–130)
GLUCOSE SERPL-MCNC: 133 MG/DL (ref 65–99)
GLUCOSE SERPL-MCNC: 163 MG/DL (ref 65–99)
HCT VFR BLD AUTO: 23.6 % (ref 37.5–51)
HCT VFR BLD AUTO: 24.4 % (ref 37.5–51)
HGB BLD-MCNC: 7.8 G/DL (ref 13–17.7)
HGB BLD-MCNC: 8.2 G/DL (ref 13–17.7)
MAGNESIUM SERPL-MCNC: 1.8 MG/DL (ref 1.6–2.4)
MAGNESIUM SERPL-MCNC: 2 MG/DL (ref 1.6–2.4)
MCH RBC QN AUTO: 31.4 PG (ref 26.6–33)
MCHC RBC AUTO-ENTMCNC: 33.6 G/DL (ref 31.5–35.7)
MCV RBC AUTO: 93.5 FL (ref 79–97)
O+P SPEC MICRO: NORMAL
O+P STL TRI STN: NORMAL
PHOSPHATE SERPL-MCNC: 4 MG/DL (ref 2.5–4.5)
PLATELET # BLD AUTO: 199 10*3/MM3 (ref 140–450)
PMV BLD AUTO: 10.2 FL (ref 6–12)
POTASSIUM SERPL-SCNC: 4.1 MMOL/L (ref 3.5–5.2)
POTASSIUM SERPL-SCNC: 4.6 MMOL/L (ref 3.5–5.2)
RBC # BLD AUTO: 2.61 10*6/MM3 (ref 4.14–5.8)
SODIUM SERPL-SCNC: 138 MMOL/L (ref 136–145)
SODIUM SERPL-SCNC: 142 MMOL/L (ref 136–145)
UNIT  ABO: NORMAL
UNIT  RH: NORMAL
WBC NRBC COR # BLD AUTO: 23.17 10*3/MM3 (ref 3.4–10.8)

## 2025-02-25 PROCEDURE — 85014 HEMATOCRIT: CPT | Performed by: STUDENT IN AN ORGANIZED HEALTH CARE EDUCATION/TRAINING PROGRAM

## 2025-02-25 PROCEDURE — 83735 ASSAY OF MAGNESIUM: CPT | Performed by: STUDENT IN AN ORGANIZED HEALTH CARE EDUCATION/TRAINING PROGRAM

## 2025-02-25 PROCEDURE — 63710000001 PREDNISONE PER 1 MG

## 2025-02-25 PROCEDURE — 82948 REAGENT STRIP/BLOOD GLUCOSE: CPT

## 2025-02-25 PROCEDURE — 92610 EVALUATE SWALLOWING FUNCTION: CPT

## 2025-02-25 PROCEDURE — 83735 ASSAY OF MAGNESIUM: CPT | Performed by: NURSE PRACTITIONER

## 2025-02-25 PROCEDURE — 71045 X-RAY EXAM CHEST 1 VIEW: CPT

## 2025-02-25 PROCEDURE — 85018 HEMOGLOBIN: CPT | Performed by: STUDENT IN AN ORGANIZED HEALTH CARE EDUCATION/TRAINING PROGRAM

## 2025-02-25 PROCEDURE — 94761 N-INVAS EAR/PLS OXIMETRY MLT: CPT

## 2025-02-25 PROCEDURE — 94799 UNLISTED PULMONARY SVC/PX: CPT

## 2025-02-25 PROCEDURE — 25010000002 HYDRALAZINE PER 20 MG: Performed by: NURSE PRACTITIONER

## 2025-02-25 PROCEDURE — 94664 DEMO&/EVAL PT USE INHALER: CPT

## 2025-02-25 PROCEDURE — 63710000001 INSULIN REGULAR HUMAN PER 5 UNITS: Performed by: NURSE PRACTITIONER

## 2025-02-25 PROCEDURE — 25010000002 BUMETANIDE PER 0.5 MG: Performed by: INTERNAL MEDICINE

## 2025-02-25 PROCEDURE — 85027 COMPLETE CBC AUTOMATED: CPT | Performed by: INTERNAL MEDICINE

## 2025-02-25 PROCEDURE — 80048 BASIC METABOLIC PNL TOTAL CA: CPT | Performed by: STUDENT IN AN ORGANIZED HEALTH CARE EDUCATION/TRAINING PROGRAM

## 2025-02-25 PROCEDURE — 99291 CRITICAL CARE FIRST HOUR: CPT | Performed by: INTERNAL MEDICINE

## 2025-02-25 PROCEDURE — 80048 BASIC METABOLIC PNL TOTAL CA: CPT | Performed by: INTERNAL MEDICINE

## 2025-02-25 PROCEDURE — 84100 ASSAY OF PHOSPHORUS: CPT | Performed by: NURSE PRACTITIONER

## 2025-02-25 PROCEDURE — 25010000002 ENOXAPARIN PER 10 MG: Performed by: INTERNAL MEDICINE

## 2025-02-25 RX ORDER — BUMETANIDE 0.25 MG/ML
1 INJECTION, SOLUTION INTRAMUSCULAR; INTRAVENOUS EVERY 12 HOURS
Status: COMPLETED | OUTPATIENT
Start: 2025-02-25 | End: 2025-02-28

## 2025-02-25 RX ORDER — METOPROLOL TARTRATE 1 MG/ML
5 INJECTION, SOLUTION INTRAVENOUS ONCE
Status: COMPLETED | OUTPATIENT
Start: 2025-02-25 | End: 2025-02-25

## 2025-02-25 RX ORDER — HYDRALAZINE HYDROCHLORIDE 20 MG/ML
20 INJECTION INTRAMUSCULAR; INTRAVENOUS ONCE
Status: COMPLETED | OUTPATIENT
Start: 2025-02-25 | End: 2025-02-25

## 2025-02-25 RX ORDER — TERAZOSIN 1 MG/1
1 CAPSULE ORAL DAILY
Status: DISCONTINUED | OUTPATIENT
Start: 2025-02-25 | End: 2025-03-01

## 2025-02-25 RX ORDER — METOPROLOL TARTRATE 1 MG/ML
5 INJECTION, SOLUTION INTRAVENOUS 2 TIMES DAILY PRN
Status: DISCONTINUED | OUTPATIENT
Start: 2025-02-25 | End: 2025-03-12 | Stop reason: HOSPADM

## 2025-02-25 RX ADMIN — METOPROLOL TARTRATE 5 MG: 5 INJECTION INTRAVENOUS at 11:16

## 2025-02-25 RX ADMIN — NYSTATIN: 100000 POWDER TOPICAL at 21:20

## 2025-02-25 RX ADMIN — PREDNISONE 20 MG: 20 TABLET ORAL at 08:32

## 2025-02-25 RX ADMIN — PANTOPRAZOLE SODIUM 40 MG: 40 INJECTION, POWDER, FOR SOLUTION INTRAVENOUS at 08:32

## 2025-02-25 RX ADMIN — GABAPENTIN 300 MG: 300 CAPSULE ORAL at 21:19

## 2025-02-25 RX ADMIN — IPRATROPIUM BROMIDE AND ALBUTEROL SULFATE 3 ML: 2.5; .5 SOLUTION RESPIRATORY (INHALATION) at 07:05

## 2025-02-25 RX ADMIN — PANTOPRAZOLE SODIUM 40 MG: 40 INJECTION, POWDER, FOR SOLUTION INTRAVENOUS at 21:19

## 2025-02-25 RX ADMIN — OXYCODONE AND ACETAMINOPHEN 1 TABLET: 7.5; 325 TABLET ORAL at 03:04

## 2025-02-25 RX ADMIN — IPRATROPIUM BROMIDE AND ALBUTEROL SULFATE 3 ML: 2.5; .5 SOLUTION RESPIRATORY (INHALATION) at 15:09

## 2025-02-25 RX ADMIN — CHLORHEXIDINE GLUCONATE 0.12% ORAL RINSE 15 ML: 1.2 LIQUID ORAL at 08:32

## 2025-02-25 RX ADMIN — METOPROLOL TARTRATE 5 MG: 5 INJECTION INTRAVENOUS at 14:00

## 2025-02-25 RX ADMIN — METOPROLOL TARTRATE 5 MG: 5 INJECTION INTRAVENOUS at 01:26

## 2025-02-25 RX ADMIN — METOPROLOL TARTRATE 5 MG: 5 INJECTION INTRAVENOUS at 19:27

## 2025-02-25 RX ADMIN — OXYCODONE AND ACETAMINOPHEN 1 TABLET: 7.5; 325 TABLET ORAL at 19:34

## 2025-02-25 RX ADMIN — FINASTERIDE 5 MG: 5 TABLET, FILM COATED ORAL at 08:31

## 2025-02-25 RX ADMIN — GABAPENTIN 300 MG: 300 CAPSULE ORAL at 05:19

## 2025-02-25 RX ADMIN — CHLORHEXIDINE GLUCONATE 0.12% ORAL RINSE 15 ML: 1.2 LIQUID ORAL at 21:19

## 2025-02-25 RX ADMIN — HYDRALAZINE HYDROCHLORIDE 20 MG: 20 INJECTION INTRAMUSCULAR; INTRAVENOUS at 18:31

## 2025-02-25 RX ADMIN — Medication 1 APPLICATION: at 08:33

## 2025-02-25 RX ADMIN — OXYCODONE AND ACETAMINOPHEN 1 TABLET: 7.5; 325 TABLET ORAL at 14:01

## 2025-02-25 RX ADMIN — HUMAN INSULIN 2 UNITS: 100 INJECTION, SOLUTION SUBCUTANEOUS at 18:07

## 2025-02-25 RX ADMIN — NYSTATIN: 100000 POWDER TOPICAL at 14:01

## 2025-02-25 RX ADMIN — DEXMEDETOMIDINE HYDROCHLORIDE 0.6 MCG/KG/HR: 4 INJECTION, SOLUTION INTRAVENOUS at 08:32

## 2025-02-25 RX ADMIN — Medication 1 APPLICATION: at 21:20

## 2025-02-25 RX ADMIN — DEXMEDETOMIDINE HYDROCHLORIDE 0.8 MCG/KG/HR: 4 INJECTION, SOLUTION INTRAVENOUS at 05:18

## 2025-02-25 RX ADMIN — HUMAN INSULIN 2 UNITS: 100 INJECTION, SOLUTION SUBCUTANEOUS at 01:02

## 2025-02-25 RX ADMIN — PREDNISONE 20 MG: 20 TABLET ORAL at 21:19

## 2025-02-25 RX ADMIN — BUMETANIDE 1 MG: 0.25 INJECTION INTRAMUSCULAR; INTRAVENOUS at 12:15

## 2025-02-25 RX ADMIN — HUMAN INSULIN 2 UNITS: 100 INJECTION, SOLUTION SUBCUTANEOUS at 06:06

## 2025-02-25 RX ADMIN — ESCITALOPRAM OXALATE 10 MG: 10 TABLET ORAL at 21:19

## 2025-02-25 RX ADMIN — NYSTATIN: 100000 POWDER TOPICAL at 06:07

## 2025-02-25 RX ADMIN — GABAPENTIN 300 MG: 300 CAPSULE ORAL at 14:01

## 2025-02-25 RX ADMIN — ENOXAPARIN SODIUM 40 MG: 100 INJECTION SUBCUTANEOUS at 12:15

## 2025-02-25 RX ADMIN — IPRATROPIUM BROMIDE AND ALBUTEROL SULFATE 3 ML: 2.5; .5 SOLUTION RESPIRATORY (INHALATION) at 12:21

## 2025-02-25 RX ADMIN — METOPROLOL TARTRATE 5 MG: 5 INJECTION INTRAVENOUS at 08:34

## 2025-02-25 RX ADMIN — TERAZOSIN HYDROCHLORIDE 1 MG: 1 CAPSULE ORAL at 11:16

## 2025-02-25 NOTE — PLAN OF CARE
Goal Outcome Evaluation:         - Pt remains on 4LNC.  - Pt has frequent nonproductive weak cough. Lung sounds are coarse with rhonchi vs expiratory wheezes.   - Pt had period of sudden change in orientation, stopped responding verbally, and was only arousing to constant verbal stimulation. NP made aware and ABG ordered, unremarkable. Pt became more arousable shortly after.  - At times A&Oxself, mostly A&Ox0.  - FC at times, BAUER, nods 'yes' and 'no' to questions, often restless and unable to be reoriented.  - Precedex at 0.8  - TF Pep 1.5 continued via Keofeed continuous at 70ml/hr with 30ml FW q1h.   - Loose moderate BM x1 overnight.  - Lubin total output: 2,950mL  Diuresis X1 overnight.

## 2025-02-25 NOTE — THERAPY EVALUATION
Acute Care - Speech Language Pathology   Swallow Initial Evaluation [facility]  Clinical Swallow Evaluation     Patient Name: Val Nassar Jr.  : 1960  MRN: [id number]  Today's Date: 2025               Admit Date: 2025    Visit Dx:     ICD-10-CM ICD-9-CM   1. Septic shock  A41.9 038.9    R65.21 785.52     995.92   2. Acute UTI (urinary tract infection)  N39.0 599.0   3. Hypotension, unspecified hypotension type  I95.9 458.9   4. Severe malnutrition  E43 261   5. Squamous cell carcinoma of esophagus  C15.9 150.9   6. Atelectasis  J98.11 518.0   7. Dysphagia, unspecified type  R13.10 787.20     Patient Active Problem List   Diagnosis    Acute gastric ulcer with hemorrhage    Alcohol abuse, uncomplicated    Anxiety disorder, unspecified    Diverticulum of bladder    Duodenal ulcer, unspecified as acute or chronic, without hemorrhage or perforation    Elevation of level of transaminase and lactic acid dehydrogenase (LDH)    Gastro-esophageal reflux disease without esophagitis    Hyperglycemia, unspecified    Hyperlipidemia, unspecified    Melena    Nicotine dependence, cigarettes, uncomplicated    Tobacco use    Severe malnutrition    Duodenal ulcer    Iron deficiency anemia    Cirrhosis of liver    Acute blood loss anemia    Squamous cell carcinoma of esophagus    Duodenal stenosis    Generalized weakness    Orthostatic hypotension    UTI (urinary tract infection)    Malignant neoplasm of upper third of esophagus    Iron deficiency anemia    GALILEO (acute kidney injury)    Metabolic acidosis    Septic shock due to Pseudomonas species    Pneumonia of both lower lobes due to Pseudomonas species    Bacteremia due to Pseudomonas     Past Medical History:   Diagnosis Date    Anemia     Cancer     ESOPHAGEAL    Cirrhosis     Duodenal ulcer     Gastric ulcer     GERD (gastroesophageal reflux disease)     History of alcohol abuse     History of radiation therapy 2024    esophagus    HLD  (hyperlipidemia)     Mood disorder      Past Surgical History:   Procedure Laterality Date    ENDOSCOPY N/A 12/26/2023    Procedure: ESOPHAGOGASTRODUODENOSCOPY;  Surgeon: Wesly Mckeon MD;  Location: [facility] ENDOSCOPY;  Service: Gastroenterology;  Laterality: N/A;    VENOUS ACCESS DEVICE (PORT) INSERTION Right 04/25/2024       SLP Recommendation and Plan  SLP Swallowing Diagnosis: suspected pharyngeal dysphagia (02/25/25 1540)  SLP Diet Recommendation: NPO, temporary alternate methods of nutrition/hydration, other (see comments) (x 2-3 ice chips/hour as tolerated for thirst) (02/25/25 1540)     SLP Rec. for Method of Medication Administration: meds via alternate route (02/25/25 1540)     Monitor for Signs of Aspiration: notify SLP if any concerns (02/25/25 1540)  Recommended Diagnostics: FEES (02/25/25 1540)     Anticipated Discharge Disposition (SLP): inpatient rehabilitation facility (02/25/25 1540)           Oral Care Recommendations: Oral Care BID/PRN, Suction toothbrush (02/25/25 1540)                                               SWALLOW EVALUATION (Last 72 Hours)       SLP Adult Swallow Evaluation       Row Name 02/25/25 1540                   Rehab Evaluation    Document Type evaluation  -SM        Subjective Information no complaints  -SM        Patient Observations alert;cooperative  -SM        Patient Effort good  -SM           General Information    Patient Profile Reviewed yes  -SM        Pertinent History Of Current Problem Adm weakness, AMS, sepsis/UTI. Intubated 2/12-2/24. Alcohol abuse. Esophageal CA with mass upper 1/3 esophagus. Completed chemoradation tx 5/2024. EGD 2/2025 normal x duodenal stenosis. Pt denies any difficulties prior to admission.  -SM        Current Method of Nutrition NPO;nasogastric feedings;small-bore  -SM        Plans/Goals Discussed with patient and family;agreed upon  -SM        Barriers to Rehab none identified  -SM        Patient's Goals for Discharge return to PO diet   "-        Family Goals for Discharge patient able to return to PO diet  -           Pain    Additional Documentation Pain Scale: FACES Pre/Post-Treatment (Group)  -           Pain Scale: FACES Pre/Post-Treatment    Pain: FACES Scale, Pretreatment 4-->hurts little more  -SM        Posttreatment Pain Rating 4-->hurts little more  -SM        Pre/Posttreatment Pain Comment \"hurts all over\". RN aware and managing  -           Oral Musculature and Cranial Nerve Assessment    Oral Motor General Assessment generalized oral motor weakness;unable to assess;other (see comments)  easily distracted from tasks  -           Clinical Swallow Eval    Pharyngeal Phase suspected pharyngeal impairment  -           Pharyngeal Phase Concerns    Pharyngeal Phase Concerns cough  -        Cough thin  -        Pharyngeal Phase Concerns, Comment Given prolonged period intubation, will need FEES prior to clearing for PO intake.  -           SLP Evaluation Clinical Impression    SLP Swallowing Diagnosis suspected pharyngeal dysphagia  -        Functional Impact risk of aspiration/pneumonia  -           Recommendations    SLP Diet Recommendation NPO;temporary alternate methods of nutrition/hydration;other (see comments)  x 2-3 ice chips/hour as tolerated for thirst  -        Recommended Diagnostics FEES  -        Oral Care Recommendations Oral Care BID/PRN;Suction toothbrush  -        SLP Rec. for Method of Medication Administration meds via alternate route  -        Monitor for Signs of Aspiration notify SLP if any concerns  -        Anticipated Discharge Disposition (SLP) inpatient rehabilitation facility  -                  User Key  (r) = Recorded By, (t) = Taken By, (c) = Cosigned By      Initials Name Effective Dates    Corry Varela MS CCC-SLP 01/20/25 -                     EDUCATION  The patient has been educated in the following areas:   Dysphagia (Swallowing Impairment) Oral Care/Hydration " NPO rationale.                Time Calculation:    Time Calculation- SLP       Row Name 02/25/25 1609             Time Calculation- SLP    SLP Received On 02/25/25  -SM         Untimed Charges    68318-TC Eval Oral Pharyng Swallow Minutes 30  -SM         Total Minutes    Untimed Charges Total Minutes 30  -SM       Total Minutes 30  -SM                User Key  (r) = Recorded By, (t) = Taken By, (c) = Cosigned By      Initials Name Provider Type    Corry Varela MS CCC-SLP Speech and Language Pathologist                    Therapy Charges for Today       Code Description Service Date Service Provider Modifiers Qty    [id number]  ST EVAL ORAL PHARYNG SWALLOW 2 2/25/2025 Corry Waite MS CCC-SLP GN 1                 Corry Waite MS CCC-SLP  2/25/2025

## 2025-02-25 NOTE — CASE MANAGEMENT/SOCIAL WORK
Continued Stay Note   [place]     Patient Name: Val Nassar Jr.  MRN: [id number]  Today's Date: 2/25/2025    Admit Date: 2/11/2025    Plan: Return to LTC bed @ [facility]   Discharge Plan       Row Name 02/25/25 1150       Plan    Plan Return to LTC bed @ [facility]    Patient/Family in Agreement with Plan other (see comments)    Plan Comments DIscussed in MDR, patient was extubated yesterday, remains restrained and on Precedex, plan to turn off sedation today.  Currently on 4LNC, speech may eval today. I left FERNANDO w/Awa crow @ [facility] updating on above, will continue to update til he is medically ready to return to LTC bed @ [facility].    Final Discharge Disposition Code 04 - intermediate care facility                   Discharge Codes    No documentation.                 Expected Discharge Date and Time       Expected Discharge Date Expected Discharge Time    Feb 18, 2025               Nikolas Hernandez RN

## 2025-02-25 NOTE — PROGRESS NOTES
Pulmonary/Critical Care Follow-up     LOS: 15 days   Patient Care Team:  Wesly Minor MD as PCP - General (Family Medicine)  Katerina Ga MD as Referring Physician (Hematology and Oncology)  Jeremy Aguila MD as Consulting Physician (Radiation Oncology)  Shirlene Kauffman APRN as Radiation Oncologist (Nurse Practitioner)        Chief Complaint   Patient presents with    Altered Mental Status    Shortness of Breath     Subjective     Patient remains on ventilator.  He is tolerating 40% FiO2.    Interval History:     Patient is awake and alert.  Remains off Precedex.  No fevers or chills.  He has required NT suctioning.  He did have some stools that were reportedly dark.      History taken from: Chart, staff    PMH/FH/Social History were reviewed and updated appropriately in the electronic medical record.     Review of Systems:    Review of 14 systems was completed with positives and pertinent negatives noted in the subjective section.  All other systems reviewed and are negative.         Objective     Vital Signs  Temp:  [97.9 °F (36.6 °C)-99 °F (37.2 °C)] 98.6 °F (37 °C)  Heart Rate:  [] 76  Resp:  [16-28] 24  BP: (120-164)/(55-84) 128/59  02/25 0701 - 02/26 0700  In: 2822.1 [I.V.:192.1]  Out: 7300 [Urine:7300]  Body mass index is 28.77 kg/m².     IV drips:  dexmedetomidine, Last Rate: Stopped (02/25/25 0932)       Physical Exam:     Constitutional:   Awake and alert   Head:   Normocephalic, atraumatic   Eyes:           Lids and lashes normal, conjunctivae and sclerae normal.  PER   ENMT:  Ears appear intact with no abnormalities noted     Lips normal.     Neck:  Trachea midline, no JVD   Lungs/Resp:    Normal effort, symmetric chest rise, no crepitus, mild rhonchi 2+ bilateral lower extremity bilaterally.               Heart/CV:   Regular rhythm and normal rate, no murmur   Abdomen/GI:    Soft, nontender, nondistended   :    Deferred   Extremities/MSK:  No clubbing or cyanosis.  No edema.      Pulses:  Pulses palpable and equal bilaterally   Skin:  No bleeding, bruising or rash   Heme/Lymph:  No cervical or supraclavicular adenopathy.   Neurologic:    Psychiatric:    Moves all extremities.      Not agitated     The above physical exam findings were reviewed and reflect my exam findings as of today's exam.   Electronically signed by:  Guerrero Regalado MD  02/26/25  16:14 EST      Results Review:     I reviewed the patient's new clinical results.   Results from last 7 days   Lab Units 02/26/25 0319 02/25/25 2045 02/25/25 0328 02/22/25 0318 02/21/25 0327 02/20/25  0511   SODIUM mmol/L 143 142 138   < > 138 144   POTASSIUM mmol/L 4.6 4.1 4.6   < > 4.0 3.5   CHLORIDE mmol/L 106 107 106   < > 103 110*   CO2 mmol/L 27.0 24.0 23.0   < > 23.0 27.0   BUN mg/dL 89* 88* 86*   < > 53* 65*   CREATININE mg/dL 1.12 1.15 1.31*   < > 0.80 0.86   CALCIUM mg/dL 9.6 9.4 9.4   < > 8.5* 8.4*   BILIRUBIN mg/dL  --   --   --   --  0.6 0.6   ALK PHOS U/L  --   --   --   --  138* 131*   ALT (SGPT) U/L  --   --   --   --  80* 101*   AST (SGOT) U/L  --   --   --   --  52* 62*   GLUCOSE mg/dL 109* 133* 163*   < > 165* 123*    < > = values in this interval not displayed.     Results from last 7 days   Lab Units 02/26/25 0319 02/25/25 2044 02/25/25 0328 02/24/25  1151 02/24/25  0434 02/22/25 0318 02/21/25 0327 02/20/25  0511   WBC 10*3/mm3 20.97*  --  23.17*  --  21.85*   < > 25.02* 19.37*   HEMOGLOBIN g/dL 7.6* 7.8* 8.2*   < > 7.0*   < > 10.5* 9.5*   HEMATOCRIT % 23.1* 23.6* 24.4*   < > 20.4*   < > 32.5* 30.1*   PLATELETS 10*3/mm3 233  --  199  --  148   < > 79* 85*   MONOCYTES % %  --   --   --   --   --   --  5.0 7.0   EOSINOPHIL % %  --   --   --   --   --   --  0.0* 2.0    < > = values in this interval not displayed.     Results from last 7 days   Lab Units 02/24/25  2255   PH, ARTERIAL pH units 7.362   PO2 ART mm Hg 98.5   PCO2, ARTERIAL mm Hg 40.5   HCO3 ART mmol/L 23.0     Results from last 7 days   Lab Units  02/26/25  0319 02/25/25  2045 02/25/25  0328 02/24/25  0434 02/23/25  1135   MAGNESIUM mg/dL 2.2 1.8 2.0 2.0 2.0   PHOSPHORUS mg/dL  --   --  4.0 3.4 3.8       I reviewed the patient's new imaging including images and reports.    Chest x-ray 2/26/2025 shows persistent basilar airspace opacities, left effusion and patchy airspace opacities with some improvement in aeration.        Medication Review:   bumetanide, 1 mg, Intravenous, Q12H  castor oil-balsam peru, 1 Application, Topical, Q12H  fentaNYL, 1 patch, Transdermal, Q72H   And  Check Fentanyl Patch Placement, 1 each, Not Applicable, Q12H  chlorhexidine, 15 mL, Mouth/Throat, Q12H  docusate sodium, 100 mg, Per G Tube, BID  enoxaparin, 40 mg, Subcutaneous, Q24H  escitalopram, 10 mg, Per G Tube, Nightly  finasteride, 5 mg, Per G Tube, Daily  gabapentin, 300 mg, Per G Tube, Q8H  insulin regular, 2-7 Units, Subcutaneous, Q6H  ipratropium-albuterol, 3 mL, Nebulization, 4x Daily - RT  metoprolol tartrate, 5 mg, Intravenous, Q6H  nystatin, , Topical, Q8H  pantoprazole, 40 mg, Intravenous, Q12H  predniSONE, 20 mg, Nasogastric, Q12H  ProSource No Carb, 30 mL, Nasogastric, Daily  terazosin, 1 mg, Nasogastric, Daily      dexmedetomidine, 0.2-1.5 mcg/kg/hr, Last Rate: Stopped (02/25/25 0932)        Assessment & Plan         Septic shock due to Pseudomonas species    Hyperlipidemia, unspecified    Tobacco use    Cirrhosis of liver    Squamous cell carcinoma of esophagus    GALILEO (acute kidney injury)    Metabolic acidosis    Pneumonia of both lower lobes due to Pseudomonas species    Bacteremia due to Pseudomonas    64 y.o. 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  He was found to be hypotensive in the emergency department and was given sepsis fluid bolus and ultimately started on Levophed and vasopressin.  Labs were concerning for evidence  of GALILEO, UTI present on admission, metabolic acidosis, elevated proBNP to 13,000, troponin of 134, white blood cell of 27.58, and procalcitonin greater than 100.     CT scan showed no acute abnormality on CT scan of the head with the exception of some possible sinusitis.  CT of the chest/abdomen/pelvis concerning for basilar atelectasis versus less likely pneumonia in my opinion as well as evidence of cystitis, left-sided pyelonephritis, and sigmoid diverticulosis without diverticulitis.    Urine and blood cultures grew Pseudomonas and patient has been treated with cefepime.    Patient decompensated requiring intubation/mechanical ventilation on 2/12/2025.  Patient required bronchoscopy on 2/17 for mucous plugging.  Cultures from bronchoscopy growing usual respiratory kishore.    Patient extubated 2/24/2025.  Seems to be improving slowly.      Received transfusion on 2/24/2025.         Plan:  1.  For septic shock due to urinary source growing Pseudomonas from blood and urine: Completed 2/23/2025 cefepime (10 days).  Currently off pressors.  Monitor closely.  Normal LVEF  2.  For elevated proBNP/concern for heart failure: Without significant valvular disease on echocardiogram 2/11/2025.  3.  For GALILEO/bladder outlet obstruction: Continue Lubin catheter.  Continue tamsulosin/finasteride.  4.  For respiratory: Remains on mechanical ventilator.  Had some mucous plugging on 2/17.  Bilateral infiltrates concerning for pulmonary edema.  Scheduled diuretics.  Patient extubated 2/24/2025.  Monitor patient closely as he has a tenuous respiratory status.  5.  DVT prophylaxis: Subcutaneous heparin.  6.  For nutrition: Continue tube feeding.  7.  For thrombocytopenia: Overall improved, likely secondary cirrhosis.    Okay to telemetry/hospitalist.    Electronically signed by:    Guerrero Regalado MD  02/26/25  16:14 EST      *. Please note that portions of this note were completed with [employer] - a voice recognition program.

## 2025-02-25 NOTE — PLAN OF CARE
Goal Outcome Evaluation:  Plan of Care Reviewed With: patient, sibling                Anticipated Discharge Disposition (SLP): inpatient rehabilitation facility          SLP Swallowing Diagnosis: suspected pharyngeal dysphagia (02/25/25 1540)         FEES to follow tomorrow, 2/26.

## 2025-02-25 NOTE — PROGRESS NOTES
Pulmonary/Critical Care Follow-up     LOS: 14 days   Patient Care Team:  Wesly Minor MD as PCP - General (Family Medicine)  Katerina Ga MD as Referring Physician (Hematology and Oncology)  Jeremy Aguila MD as Consulting Physician (Radiation Oncology)  Shirlene Kauffman APRN as Radiation Oncologist (Nurse Practitioner)        Chief Complaint   Patient presents with    Altered Mental Status    Shortness of Breath     Subjective     Patient remains on ventilator.  He is tolerating 40% FiO2.    Interval History:     Patient tolerated extubation overnight.  He has remained on Precedex overnight secondary to agitation.  This was discontinued this morning and he has a little bit of agitation but that seems to be improving throughout the day so far.  He has required some NT suctioning.  He received a unit of PRBCs yesterday.  Good urine output with diuretics.  He has had some intermittent episodes of atrial fibrillation with RVR.    History taken from: Chart, staff    PMH/FH/Social History were reviewed and updated appropriately in the electronic medical record.     Review of Systems:    Review of 14 systems was completed with positives and pertinent negatives noted in the subjective section.  All other systems reviewed and are negative.         Objective     Vital Signs  Temp:  [97.2 °F (36.2 °C)-98.2 °F (36.8 °C)] 97.7 °F (36.5 °C)  Heart Rate:  [] 80  Resp:  [2-28] 20  BP: (108-168)/() 137/66  02/24 0701 - 02/25 0700  In: 2192.3 [I.V.:335.3]  Out: 4550 [Urine:4550]  Body mass index is 28.77 kg/m².     IV drips:  dexmedetomidine, Last Rate: Stopped (02/25/25 0932)       Physical Exam:     Constitutional:   Awake and alert.  Mildly agitated at times.     Head:   Normocephalic, atraumatic   Eyes:           Lids and lashes normal, conjunctivae and sclerae normal.  PER   ENMT:  Ears appear intact with no abnormalities noted     Lips normal.     Neck:  Trachea midline, no JVD   Lungs/Resp:     Normal effort, symmetric chest rise, no crepitus, moderate rhonchi 2+ bilateral lower extremity bilaterally.               Heart/CV:   Regular rhythm and normal rate, no murmur   Abdomen/GI:    Soft, nontender, nondistended   :    Deferred   Extremities/MSK:  No clubbing or cyanosis.  No edema.     Pulses:  Pulses palpable and equal bilaterally   Skin:  No bleeding, bruising or rash   Heme/Lymph:  No cervical or supraclavicular adenopathy.   Neurologic:    Psychiatric:    Moves all extremities.      Not agitated     The above physical exam findings were reviewed and reflect my exam findings as of today's exam.   Electronically signed by:  Guerrero Regalado MD  02/25/25  13:54 EST      Results Review:     I reviewed the patient's new clinical results.   Results from last 7 days   Lab Units 02/25/25  0328 02/24/25  0434 02/23/25  1135 02/22/25  0318 02/21/25  0327 02/20/25  0511   SODIUM mmol/L 138 134* 133*   < > 138 144   POTASSIUM mmol/L 4.6 4.8 4.5   < > 4.0 3.5   CHLORIDE mmol/L 106 104 103   < > 103 110*   CO2 mmol/L 23.0 19.0* 23.0   < > 23.0 27.0   BUN mg/dL 86* 73* 67*   < > 53* 65*   CREATININE mg/dL 1.31* 1.29* 1.43*   < > 0.80 0.86   CALCIUM mg/dL 9.4 8.9 8.9   < > 8.5* 8.4*   BILIRUBIN mg/dL  --   --   --   --  0.6 0.6   ALK PHOS U/L  --   --   --   --  138* 131*   ALT (SGPT) U/L  --   --   --   --  80* 101*   AST (SGOT) U/L  --   --   --   --  52* 62*   GLUCOSE mg/dL 163* 220* 163*   < > 165* 123*    < > = values in this interval not displayed.     Results from last 7 days   Lab Units 02/25/25  0328 02/24/25  1853 02/24/25  1151 02/24/25  0434 02/23/25  1135 02/22/25  0318 02/21/25  0327 02/20/25  0511   WBC 10*3/mm3 23.17*  --   --  21.85* 25.16*   < > 25.02* 19.37*   HEMOGLOBIN g/dL 8.2* 7.7* 6.8* 7.0* 8.1*   < > 10.5* 9.5*   HEMATOCRIT % 24.4* 23.4* 20.6* 20.4* 25.0*   < > 32.5* 30.1*   PLATELETS 10*3/mm3 199  --   --  148 128*   < > 79* 85*   MONOCYTES % %  --   --   --   --   --   --  5.0 7.0    EOSINOPHIL % %  --   --   --   --   --   --  0.0* 2.0    < > = values in this interval not displayed.     Results from last 7 days   Lab Units 02/24/25  2255   PH, ARTERIAL pH units 7.362   PO2 ART mm Hg 98.5   PCO2, ARTERIAL mm Hg 40.5   HCO3 ART mmol/L 23.0     Results from last 7 days   Lab Units 02/25/25  0328 02/24/25  0434 02/23/25  1135   MAGNESIUM mg/dL 2.0 2.0 2.0   PHOSPHORUS mg/dL 4.0 3.4 3.8       I reviewed the patient's new imaging including images and reports.    Chest x-ray 2/25/2025 shows interval extubation with patchy bilateral infiltrates, slightly improved.        Medication Review:   bumetanide, 1 mg, Intravenous, Q12H  castor oil-balsam peru, 1 Application, Topical, Q12H  fentaNYL, 1 patch, Transdermal, Q72H   And  Check Fentanyl Patch Placement, 1 each, Not Applicable, Q12H  chlorhexidine, 15 mL, Mouth/Throat, Q12H  docusate sodium, 100 mg, Per G Tube, BID   And  lactulose, 20 g, Per G Tube, TID  enoxaparin, 40 mg, Subcutaneous, Q24H  escitalopram, 10 mg, Per G Tube, Nightly  finasteride, 5 mg, Per G Tube, Daily  gabapentin, 300 mg, Per G Tube, Q8H  insulin regular, 2-7 Units, Subcutaneous, Q6H  ipratropium-albuterol, 3 mL, Nebulization, 4x Daily - RT  metoprolol tartrate, 5 mg, Intravenous, Q6H  nystatin, , Topical, Q8H  pantoprazole, 40 mg, Intravenous, Q12H  predniSONE, 20 mg, Nasogastric, Q12H  ProSource No Carb, 30 mL, Nasogastric, Daily  terazosin, 1 mg, Nasogastric, Daily      dexmedetomidine, 0.2-1.5 mcg/kg/hr, Last Rate: Stopped (02/25/25 0932)        Assessment & Plan         Septic shock due to Pseudomonas species    Hyperlipidemia, unspecified    Tobacco use    Cirrhosis of liver    Squamous cell carcinoma of esophagus    GALILEO (acute kidney injury)    Metabolic acidosis    Pneumonia of both lower lobes due to Pseudomonas species    Bacteremia due to Pseudomonas    64 y.o. 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  He was found to be hypotensive in the emergency department and was given sepsis fluid bolus and ultimately started on Levophed and vasopressin.  Labs were concerning for evidence of GALILEO, UTI present on admission, metabolic acidosis, elevated proBNP to 13,000, troponin of 134, white blood cell of 27.58, and procalcitonin greater than 100.     CT scan showed no acute abnormality on CT scan of the head with the exception of some possible sinusitis.  CT of the chest/abdomen/pelvis concerning for basilar atelectasis versus less likely pneumonia in my opinion as well as evidence of cystitis, left-sided pyelonephritis, and sigmoid diverticulosis without diverticulitis.    Urine and blood cultures grew Pseudomonas and patient has been treated with cefepime.    Patient decompensated requiring intubation/mechanical ventilation on 2/12/2025.  Patient required bronchoscopy on 2/17 for mucous plugging.  Cultures from bronchoscopy growing usual respiratory kishore.    Extubated yesterday.  Respiratory status remains tenuous.  I will give him some steroids.    Patient has worsening anemia which is acute on chronic.  Iron deficient with likely chronic disease component.  Status post 1 unit PRBCs on 2/24/2025.  Appropriate response.       Plan:  1.  For septic shock due to urinary source growing Pseudomonas from blood and urine: Completed cefepime 2/23/2025.  Currently off pressors.  Monitor closely.  Normal LVEF  2.  For elevated proBNP/concern for heart failure: Without significant valvular disease on echocardiogram 2/11/2025.  Diuresis as tolerated  3.  For GALILEO/bladder outlet obstruction: Continue Lubin catheter.  Continue tamsulosin/finasteride.  4.  For respiratory: Extubated 2/24/2025.  Had some mucous plugging on 2/17.  Bilateral infiltrates concerning for pulmonary edema.  Continue scheduled diuretics for another 3 days.  Extubated 2/24/2025.  Respiratory  status remains tenuous.  Keep in ICU for now.  5.  DVT prophylaxis: Subcutaneous heparin.  6.  For nutrition: Continue tube feeding.  7.  For thrombocytopenia: Improved, likely secondary cirrhosis.    Patient is critically ill secondary to tenuous respiratory and hemodynamic status and has high risk of life-threatening decompensation in condition.   As such, the patient requires continuous monitoring and frequent reassessment for consideration of adjustment in management to minimize this risk.  I personally reassessed the patient multiple times today.    Critical care time : 34 minutes spent by me personally and independently.(This excludes time spent performing separately reportable procedures and services).  Critical care time includes high complexity decision making to assess, manipulate, and support vital organ system failure in this individual who has impairment of one or more vital organ systems such that there is a high probability of imminent or life threatening deterioration in the patient’s condition.    Electronically signed by:    Guerrero Regalado MD  02/25/25  13:54 EST      *. Please note that portions of this note were completed with Melinta - a voice recognition program.

## 2025-02-25 NOTE — PLAN OF CARE
Patient with no significant events today. VSS on 4LNC. Patient alert to self only, place this afternoon. Requiring NT suction intermittently. Evaluated by speech, plans for a FEES tomorrow. UOP adequate. BM x2.

## 2025-02-26 ENCOUNTER — APPOINTMENT (OUTPATIENT)
Dept: GENERAL RADIOLOGY | Facility: HOSPITAL | Age: 65
DRG: 870 | End: 2025-02-26
Payer: MEDICARE

## 2025-02-26 ENCOUNTER — ANCILLARY PROCEDURE (OUTPATIENT)
Dept: SPEECH THERAPY | Facility: HOSPITAL | Age: 65
DRG: 870 | End: 2025-02-26
Payer: MEDICARE

## 2025-02-26 LAB
ANION GAP SERPL CALCULATED.3IONS-SCNC: 10 MMOL/L (ref 5–15)
BUN SERPL-MCNC: 89 MG/DL (ref 8–23)
BUN/CREAT SERPL: 79.5 (ref 7–25)
CALCIUM SPEC-SCNC: 9.6 MG/DL (ref 8.6–10.5)
CHLORIDE SERPL-SCNC: 106 MMOL/L (ref 98–107)
CO2 SERPL-SCNC: 27 MMOL/L (ref 22–29)
CREAT SERPL-MCNC: 1.12 MG/DL (ref 0.76–1.27)
DEPRECATED RDW RBC AUTO: 55.8 FL (ref 37–54)
EGFRCR SERPLBLD CKD-EPI 2021: 72.9 ML/MIN/1.73
ERYTHROCYTE [DISTWIDTH] IN BLOOD BY AUTOMATED COUNT: 16.7 % (ref 12.3–15.4)
GLUCOSE BLDC GLUCOMTR-MCNC: 143 MG/DL (ref 70–130)
GLUCOSE BLDC GLUCOMTR-MCNC: 158 MG/DL (ref 70–130)
GLUCOSE BLDC GLUCOMTR-MCNC: 178 MG/DL (ref 70–130)
GLUCOSE BLDC GLUCOMTR-MCNC: 191 MG/DL (ref 70–130)
GLUCOSE BLDC GLUCOMTR-MCNC: 208 MG/DL (ref 70–130)
GLUCOSE SERPL-MCNC: 109 MG/DL (ref 65–99)
HCT VFR BLD AUTO: 23.1 % (ref 37.5–51)
HGB BLD-MCNC: 7.6 G/DL (ref 13–17.7)
MAGNESIUM SERPL-MCNC: 2.2 MG/DL (ref 1.6–2.4)
MCH RBC QN AUTO: 31.3 PG (ref 26.6–33)
MCHC RBC AUTO-ENTMCNC: 32.9 G/DL (ref 31.5–35.7)
MCV RBC AUTO: 95.1 FL (ref 79–97)
PLATELET # BLD AUTO: 233 10*3/MM3 (ref 140–450)
PMV BLD AUTO: 10.4 FL (ref 6–12)
POTASSIUM SERPL-SCNC: 4.6 MMOL/L (ref 3.5–5.2)
RBC # BLD AUTO: 2.43 10*6/MM3 (ref 4.14–5.8)
SODIUM SERPL-SCNC: 143 MMOL/L (ref 136–145)
WBC NRBC COR # BLD AUTO: 20.97 10*3/MM3 (ref 3.4–10.8)

## 2025-02-26 PROCEDURE — 71045 X-RAY EXAM CHEST 1 VIEW: CPT

## 2025-02-26 PROCEDURE — 63710000001 PREDNISONE PER 1 MG

## 2025-02-26 PROCEDURE — 25010000002 MAGNESIUM SULFATE 4 GM/100ML SOLUTION: Performed by: INTERNAL MEDICINE

## 2025-02-26 PROCEDURE — 82948 REAGENT STRIP/BLOOD GLUCOSE: CPT

## 2025-02-26 PROCEDURE — 63710000001 INSULIN REGULAR HUMAN PER 5 UNITS: Performed by: NURSE PRACTITIONER

## 2025-02-26 PROCEDURE — 80048 BASIC METABOLIC PNL TOTAL CA: CPT | Performed by: INTERNAL MEDICINE

## 2025-02-26 PROCEDURE — 94799 UNLISTED PULMONARY SVC/PX: CPT

## 2025-02-26 PROCEDURE — 25010000002 ENOXAPARIN PER 10 MG: Performed by: INTERNAL MEDICINE

## 2025-02-26 PROCEDURE — 83735 ASSAY OF MAGNESIUM: CPT | Performed by: INTERNAL MEDICINE

## 2025-02-26 PROCEDURE — 25010000002 BUMETANIDE PER 0.5 MG: Performed by: INTERNAL MEDICINE

## 2025-02-26 PROCEDURE — 94761 N-INVAS EAR/PLS OXIMETRY MLT: CPT

## 2025-02-26 PROCEDURE — 85027 COMPLETE CBC AUTOMATED: CPT | Performed by: INTERNAL MEDICINE

## 2025-02-26 PROCEDURE — 63710000001 INSULIN REGULAR HUMAN PER 5 UNITS: Performed by: INTERNAL MEDICINE

## 2025-02-26 PROCEDURE — 92612 ENDOSCOPY SWALLOW (FEES) VID: CPT

## 2025-02-26 PROCEDURE — 99232 SBSQ HOSP IP/OBS MODERATE 35: CPT | Performed by: INTERNAL MEDICINE

## 2025-02-26 RX ORDER — MAGNESIUM SULFATE HEPTAHYDRATE 40 MG/ML
4 INJECTION, SOLUTION INTRAVENOUS ONCE
Status: COMPLETED | OUTPATIENT
Start: 2025-02-26 | End: 2025-02-26

## 2025-02-26 RX ORDER — OXYCODONE AND ACETAMINOPHEN 7.5; 325 MG/1; MG/1
1 TABLET ORAL EVERY 6 HOURS PRN
Status: DISPENSED | OUTPATIENT
Start: 2025-02-26 | End: 2025-03-01

## 2025-02-26 RX ADMIN — Medication 30 ML: at 08:50

## 2025-02-26 RX ADMIN — IPRATROPIUM BROMIDE AND ALBUTEROL SULFATE 3 ML: 2.5; .5 SOLUTION RESPIRATORY (INHALATION) at 12:21

## 2025-02-26 RX ADMIN — CHLORHEXIDINE GLUCONATE 0.12% ORAL RINSE 15 ML: 1.2 LIQUID ORAL at 20:26

## 2025-02-26 RX ADMIN — MAGNESIUM SULFATE IN WATER FOR 4 G: 40 INJECTION INTRAVENOUS at 02:09

## 2025-02-26 RX ADMIN — HUMAN INSULIN 2 UNITS: 100 INJECTION, SOLUTION SUBCUTANEOUS at 13:40

## 2025-02-26 RX ADMIN — GABAPENTIN 300 MG: 300 CAPSULE ORAL at 13:53

## 2025-02-26 RX ADMIN — PREDNISONE 20 MG: 20 TABLET ORAL at 08:49

## 2025-02-26 RX ADMIN — IPRATROPIUM BROMIDE AND ALBUTEROL SULFATE 3 ML: 2.5; .5 SOLUTION RESPIRATORY (INHALATION) at 16:29

## 2025-02-26 RX ADMIN — METOPROLOL TARTRATE 5 MG: 5 INJECTION INTRAVENOUS at 02:10

## 2025-02-26 RX ADMIN — HUMAN INSULIN 3 UNITS: 100 INJECTION, SOLUTION SUBCUTANEOUS at 23:51

## 2025-02-26 RX ADMIN — NYSTATIN: 100000 POWDER TOPICAL at 13:53

## 2025-02-26 RX ADMIN — PANTOPRAZOLE SODIUM 40 MG: 40 INJECTION, POWDER, FOR SOLUTION INTRAVENOUS at 20:26

## 2025-02-26 RX ADMIN — IPRATROPIUM BROMIDE AND ALBUTEROL SULFATE 3 ML: 2.5; .5 SOLUTION RESPIRATORY (INHALATION) at 07:06

## 2025-02-26 RX ADMIN — BUMETANIDE 1 MG: 0.25 INJECTION INTRAMUSCULAR; INTRAVENOUS at 13:40

## 2025-02-26 RX ADMIN — METOPROLOL TARTRATE 5 MG: 5 INJECTION INTRAVENOUS at 13:53

## 2025-02-26 RX ADMIN — NYSTATIN 1 APPLICATION: 100000 POWDER TOPICAL at 20:27

## 2025-02-26 RX ADMIN — GABAPENTIN 300 MG: 300 CAPSULE ORAL at 05:55

## 2025-02-26 RX ADMIN — PANTOPRAZOLE SODIUM 40 MG: 40 INJECTION, POWDER, FOR SOLUTION INTRAVENOUS at 08:50

## 2025-02-26 RX ADMIN — TERAZOSIN HYDROCHLORIDE 1 MG: 1 CAPSULE ORAL at 08:49

## 2025-02-26 RX ADMIN — HUMAN INSULIN 2 UNITS: 100 INJECTION, SOLUTION SUBCUTANEOUS at 00:15

## 2025-02-26 RX ADMIN — BUMETANIDE 1 MG: 0.25 INJECTION INTRAMUSCULAR; INTRAVENOUS at 23:50

## 2025-02-26 RX ADMIN — BUMETANIDE 1 MG: 0.25 INJECTION INTRAMUSCULAR; INTRAVENOUS at 00:15

## 2025-02-26 RX ADMIN — METOPROLOL TARTRATE 5 MG: 5 INJECTION INTRAVENOUS at 20:26

## 2025-02-26 RX ADMIN — DOCUSATE SODIUM 100 MG: 50 LIQUID ORAL at 20:27

## 2025-02-26 RX ADMIN — Medication 1 APPLICATION: at 08:49

## 2025-02-26 RX ADMIN — GABAPENTIN 300 MG: 300 CAPSULE ORAL at 20:26

## 2025-02-26 RX ADMIN — FINASTERIDE 5 MG: 5 TABLET, FILM COATED ORAL at 08:49

## 2025-02-26 RX ADMIN — DOCUSATE SODIUM 100 MG: 50 LIQUID ORAL at 08:48

## 2025-02-26 RX ADMIN — ESCITALOPRAM OXALATE 10 MG: 10 TABLET ORAL at 20:26

## 2025-02-26 RX ADMIN — ENOXAPARIN SODIUM 40 MG: 100 INJECTION SUBCUTANEOUS at 13:40

## 2025-02-26 RX ADMIN — HUMAN INSULIN 2 UNITS: 100 INJECTION, SOLUTION SUBCUTANEOUS at 06:20

## 2025-02-26 RX ADMIN — PREDNISONE 20 MG: 20 TABLET ORAL at 20:26

## 2025-02-26 RX ADMIN — CHLORHEXIDINE GLUCONATE 0.12% ORAL RINSE 15 ML: 1.2 LIQUID ORAL at 08:48

## 2025-02-26 RX ADMIN — Medication 1 APPLICATION: at 20:27

## 2025-02-26 RX ADMIN — METOPROLOL TARTRATE 5 MG: 5 INJECTION INTRAVENOUS at 08:48

## 2025-02-26 RX ADMIN — METOPROLOL TARTRATE 5 MG: 5 INJECTION INTRAVENOUS at 23:35

## 2025-02-26 RX ADMIN — OXYCODONE AND ACETAMINOPHEN 1 TABLET: 7.5; 325 TABLET ORAL at 04:43

## 2025-02-26 RX ADMIN — IPRATROPIUM BROMIDE AND ALBUTEROL SULFATE 3 ML: 2.5; .5 SOLUTION RESPIRATORY (INHALATION) at 19:56

## 2025-02-26 NOTE — PLAN OF CARE
Problem: Adult Inpatient Plan of Care  Goal: Plan of Care Review  2/26/2025 1324 by Carlos Elder MS CCC-SLP  Outcome: Progressing  Flowsheets (Taken 2/26/2025 1324)  Progress: no change  Plan of Care Reviewed With: patient  2/26/2025 1322 by Carlos Elder MS CCC-SLP  Outcome: Progressing  Flowsheets (Taken 2/26/2025 1322)  Progress: no change  Plan of Care Reviewed With: patient   Goal Outcome Evaluation:  Plan of Care Reviewed With: patient        Progress: no change       Anticipated Discharge Disposition (SLP): inpatient rehabilitation facility          SLP Swallowing Diagnosis: mild-moderate, oral dysphagia, severe, pharyngeal dysphagia (02/26/25 9577)

## 2025-02-26 NOTE — PROGRESS NOTES
Clinical Nutrition     Patient Name: Val Nassar Jr.  YOB: 1960  MRN: [id number]  Date of Encounter: 02/26/25 11:38 EST  Admission date: 2/11/2025  Reason for Visit: MDR, Follow-up protocol, EN    Assessment   Nutrition Assessment   Admission Diagnosis:  Septic shock [A41.9, R65.21]    Problem List:    Septic shock due to Pseudomonas species    Hyperlipidemia, unspecified    Tobacco use    Cirrhosis of liver    Squamous cell carcinoma of esophagus    GALILEO (acute kidney injury)    Metabolic acidosis    Pneumonia of both lower lobes due to Pseudomonas species    Bacteremia due to Pseudomonas      PMH:   He  has a past medical history of Anemia, Cancer, Cirrhosis, Duodenal ulcer, Gastric ulcer, GERD (gastroesophageal reflux disease), History of alcohol abuse, History of radiation therapy (05/08/2024), HLD (hyperlipidemia), and Mood disorder.    PSH:  He  has a past surgical history that includes Esophagogastroduodenoscopy (N/A, 12/26/2023) and Venous Access Device (Port) (Right, 04/25/2024).    Substance history: Etoh abuse, vaping    Applicable Nutrition History:     ARF/VENT 2-12-25    s/p Bronch 2-17-25    Extubated 2-24-25    WOC following for posterior thigh, gluteal skin tears, scrotal MASD, blisters     Labs    Labs Reviewed: Yes    Results from last 7 days   Lab Units 02/26/25  0319 02/25/25  2045 02/25/25  0328 02/24/25  0434 02/23/25  1135 02/22/25  0318 02/21/25  0327 02/20/25  0511   GLUCOSE mg/dL 109* 133* 163* 220* 163*   < > 165* 123*   BUN mg/dL 89* 88* 86* 73* 67*   < > 53* 65*   CREATININE mg/dL 1.12 1.15 1.31* 1.29* 1.43*   < > 0.80 0.86   SODIUM mmol/L 143 142 138 134* 133*   < > 138 144   CHLORIDE mmol/L 106 107 106 104 103   < > 103 110*   POTASSIUM mmol/L 4.6 4.1 4.6 4.8 4.5   < > 4.0 3.5   PHOSPHORUS mg/dL  --   --  4.0 3.4 3.8  --  2.7 2.4*   MAGNESIUM mg/dL 2.2 1.8 2.0 2.0 2.0   < > 1.8 1.8   ALT (SGPT) U/L  --   --   --   --   --   --  80* 101*    LACTATE mmol/L  --   --   --   --   --   --  1.8  --     < > = values in this interval not displayed.       Results from last 7 days   Lab Units 02/24/25  0434 02/21/25  0327 02/20/25  0511   ALBUMIN g/dL  --  2.6* 2.6*   PREALBUMIN mg/dL 9.7*  --   --    CRP mg/dL 11.29*  --   --    TRIGLYCERIDES mg/dL 75  --   --        Results from last 7 days   Lab Units 02/26/25  0601 02/25/25  2358 02/25/25  1742 02/25/25  1102 02/25/25  0535 02/24/25  2325   GLUCOSE mg/dL 158* 176* 154* 130 164* 172*     Lab Results   Lab Value Date/Time    HGBA1C 4.6 (L) 12/25/2023 1925                 Medications    Medications Reviewed: yes    Scheduled Meds:bumetanide, 1 mg, Intravenous, Q12H  castor oil-balsam peru, 1 Application, Topical, Q12H  fentaNYL, 1 patch, Transdermal, Q72H   And  Check Fentanyl Patch Placement, 1 each, Not Applicable, Q12H  chlorhexidine, 15 mL, Mouth/Throat, Q12H  docusate sodium, 100 mg, Per G Tube, BID  enoxaparin, 40 mg, Subcutaneous, Q24H  escitalopram, 10 mg, Per G Tube, Nightly  finasteride, 5 mg, Per G Tube, Daily  gabapentin, 300 mg, Per G Tube, Q8H  insulin regular, 2-7 Units, Subcutaneous, Q6H  ipratropium-albuterol, 3 mL, Nebulization, 4x Daily - RT  metoprolol tartrate, 5 mg, Intravenous, Q6H  nystatin, , Topical, Q8H  pantoprazole, 40 mg, Intravenous, Q12H  predniSONE, 20 mg, Nasogastric, Q12H  ProSource No Carb, 30 mL, Nasogastric, Daily  terazosin, 1 mg, Nasogastric, Daily      Continuous Infusions:dexmedetomidine, 0.2-1.5 mcg/kg/hr, Last Rate: Stopped (02/25/25 0932)      PRN Meds:.  Calcium Replacement - Follow Nurse / BPA Driven Protocol    dextrose    glucagon (human recombinant)    ipratropium-albuterol    Magnesium Cardiology Dose Replacement - Follow Nurse / BPA Driven Protocol    metoprolol tartrate    oxyCODONE-acetaminophen    Phosphorus Replacement - Follow Nurse / BPA Driven Protocol    polyvinyl alcohol    Potassium Replacement - Follow Nurse / BPA Driven Protocol    sodium  "chloride    Intake/Ouptut 24 hrs (0701 - 0700)   I&O's Reviewed: yes    Intake & Output (last day)         02/25 0701 02/26 0700 02/26 0701 02/27 0700    I.V. (mL/kg) 192.1 (2.2)     Blood      Other 837     NG/GT 1793 60    Total Intake(mL/kg) 2822.1 (31.9) 60 (0.7)    Urine (mL/kg/hr) 7300 (3.4)     Stool      Total Output 7300     Net -4477.9 +60          Urine Unmeasured Occurrence 2 x            Anthropometrics     Height: Height: 175.3 cm (69.02\")  Last Filed Weight: Weight: 88.4 kg (194 lb 14.2 oz) (02/16/25 0418)  Method:  bedscale  BMI: BMI (Calculated): 28.8    UBW: ?  Weight change:  per EMR ~ 6lb wt gain since January     Weight       Weight (kg) Weight (lbs) Weight Method Visit Report   4/23/2024 75.841 kg  167 lb 3.2 oz   --     75.297 kg  166 lb   --    4/25/2024 75.751 kg  167 lb  Stated     4/29/2024 76.658 kg  169 lb      4/30/2024 75.932 kg  167 lb 6.4 oz   --     75.751 kg  167 lb   --    5/6/2024 74.889 kg  165 lb 1.6 oz   --     74.98 kg  165 lb 4.8 oz   --     74.844 kg  165 lb   --        --        --        --    5/7/2024 76.25 kg  168 lb 1.6 oz   --     76.204 kg  168 lb   --    5/14/2024 77.111 kg  170 lb   --    5/21/2024 75.297 kg  166 lb      6/3/2024 74.98 kg  165 lb 4.8 oz   --     75.297 kg  166 lb   --        --        --    6/18/2024 74.39 kg  164 lb   --    7/5/2024 74.4 kg  164 lb 0.4 oz  Estimated     7/16/2024 77.565 kg  171 lb   --    8/7/2024 77.565 kg  171 lb   --    8/19/2024 77.656 kg  171 lb 3.2 oz   --    9/19/2024 77.565 kg  171 lb      10/24/2024 79.833 kg  176 lb   --    11/14/2024 79.833 kg  176 lb   --    11/26/2024 78.926 kg  174 lb      12/18/2024 79.833 kg  176 lb   --    1/21/2025 79.833 kg  176 lb  Stated     1/23/2025 79.833 kg  176 lb   --    2/3/2025 83.008 kg  183 lb   --    2/11/2025 83.008 kg  183 lb       83 kg  182 lb 15.7 oz          Needs Assessment   Date: 2-24-25    Height used:Height: 175.3 cm (69.02\")  Weights used: 182lb/ 83kg- presumed dry " weight      Estimated Calorie needs: ~2093kcal MREE  Method:  20Kcals/Kkcal  Method:  MSJ x 1.2: 1926kcal  Calorimetry 25: MREE= 2093kcal. RQ= 0.83 wnl    Estimated Protein needs: ~ 100g protein with current renal function  Method:1.2-1.5g protein: 100-125g proten      Nutrition Focused Physical Exam     Date: 25    Pt does not meet criteria for malnutrition diagnosis, at this time.      Subjective   Reported/Observed/Food/Nutrition Related History:       : pt resting in bed, on room air, pleasantly confused    Per RN: pt tolerating TF, had 1 dark bm, has been tachypneic, lots of secretions, failed FEES    : per RN pt extubated today, is tolerating TF, possible GIB, is having dark stools, follows commands    Pt resting in bed, on nasal cannula, very weak voice, difficulty talking  + precedex    25: pt intubated, sedated + fentanyl, versed, D5%@100ml    Per RN: pt tolerating TF, has not had a bm, is much more alert today, follows commands    Per MD: plan to maintain D5% for 1 more day,  decrease free water to 30ml/hr    : pt intubated, sedated, + fentanyl, versed, D5%@100ml    Per RN: pt has been more alert, oxygen demands decreased, tolerating TF, abdomen still distended, given lactulose (last bm )      Pt intubated, sedated, on fentanyl & VERSED. Spoke with nurse, reported tolerating TF well, has low residuals, and bowels are moving. LBM on 2/15. Noted that Na continues to increase, even after increasing free water on 2/15. Pt may benefit from IV fluid to correct hypernatremia.       Pt intubated, sedated, + fentanyl, propofol 7.56ml, levophed 0.32mcg    Per RN: pt's belly distended, more firm than before, bowel regimen started, marginal UOP, have been toni to wean off asiya and vasopressin    Current Nutrition Prescription     PO: NPO Diet NPO Type: Tube Feeding  Oral Nutrition Supplement:  Intake: N/A    EN: Peptamen 1.5  Goal Rate: 70ml/hr  Water  Flushes:30ml/hr  Modular: Prosource -no carb 1/day  Route: ND  Tube: Small bore    At goal over: 20Hrs/day     Rx will supply:   Goal Volume 1400  mL/day     Flush Volume 600 mL/day     Energy 2160 Kcal/day 103 % MREE   Protein 110 g/day 110 % Est Need   Fiber 0 g/day     Water in  EN 1078 mL     Total Water 1678 mL     Meet DRI Yes        --------------------------------------------------------------------------  Product/Rate verified at bedside: Yes  Infusing Rate at time of visit: 70 ml/hr    Average Delivery from Chartin Day:   Volume 1573 mL/day 112  % Goal Vol.   Flush Volume 1057 mL/day     Energy  Kcal/day  % Est Need   Protein  g/day  % Est Need   Fiber  g/day     Water in  EN  mL     Total Water  mL     Meet DRI Yes             Assessment & Plan   Nutrition Diagnosis   Date: 25 Updated: 25   Problem Inadequate energy intake    Etiology ARF/Extubated   Signs/Symptoms 112% goal volume EN   Status:  improved    Goal:   Nutrition to support treatment and Maintain EN      Nutrition Intervention      Follow treatment progress, Care plan reviewed    Monitoring/Evaluation:   Per protocol, I&O, Pertinent labs, EN delivery/tolerance, Weight, Skin status, GI status, Symptoms, Hemodynamic stability    Monica Chan, RADHA  Time Spent:30min

## 2025-02-26 NOTE — PLAN OF CARE
Goal Outcome Evaluation:              Outcome Evaluation: .       - Pt remains on 2LNC.  - Pt has frequent nonproductive weak cough. Lung sounds are coarse with rhonchi vs expiratory wheezes. Cough appears to be getting stronger this AM.  - A&Oxself.  - Pt converted in and out of Afib RVR multiple times during the beginning of the shift highest HR reaching 160's and sustaining. Scheduled Metoprolol 5mg IV given with no change. Pt was diaphoretic and had increase in WOB. Converted back to NSR in the 80's by the time PRN metoprolol IV was added. NSR since around 2200 last night.  - TF Pep 1.5 continued via Keofeed continuous at 70ml/hr with 30ml FW q1h.   - Loose moderate BM x1 overnight.  - Lubin total output: 3,750mL  Diuresis X1 overnight.  - Replaced Mg, 2.2 this AM.   - H&H trending downward. 7.6/23.1 this AM.

## 2025-02-26 NOTE — PLAN OF CARE
Goal Outcome Evaluation:  Plan of Care Reviewed With: patient        Progress: no change       Anticipated Discharge Disposition (SLP): inpatient rehabilitation facility          SLP Swallowing Diagnosis: mild-moderate, oral dysphagia, severe, pharyngeal dysphagia (02/26/25 7765)

## 2025-02-26 NOTE — CASE MANAGEMENT/SOCIAL WORK
Continued Stay Note  [facility]     Patient Name: Val Nassar Jr.  MRN: [id number]  Today's Date: 2/26/2025    Admit Date: 2/11/2025    Plan: REturn to LTC bed @ [facility]   Discharge Plan       Row Name 02/26/25 1319       Plan    Plan REturn to LT bed @ [facility]    Patient/Family in Agreement with Plan other (see comments)    Plan Comments Discussed in MDR, tolerating tube feeds, FEES today. Therapy ordered. Had dark BM overnight. DC plan is to return to [facility] as skilled. Will await therapy notes. Awa crow w/Beronica Mueller following in [facility].  Will reach out to [facility] tomorrow, when spoke w/Negro d/c plan is to return to [facility] when medically ready. CM will continue to follow.    Final Discharge Disposition Code 03 - skilled nursing facility (SNF)                   Discharge Codes    No documentation.                 Expected Discharge Date and Time       Expected Discharge Date Expected Discharge Time    Mar 5, 2025               Nikolas Hernandez RN

## 2025-02-26 NOTE — MBS/VFSS/FEES
Acute Care - Speech Language Pathology   Swallow Re-Evaluation [facility]  Fiberoptic Endoscopic Evaluation of Swallowing (FEES)       Patient Name: Val Nassar Jr.  : 1960  MRN: [id number]  Today's Date: 2025               Admit Date: 2025    Visit Dx:     ICD-10-CM ICD-9-CM   1. Septic shock  A41.9 038.9    R65.21 785.52     995.92   2. Acute UTI (urinary tract infection)  N39.0 599.0   3. Hypotension, unspecified hypotension type  I95.9 458.9   4. Severe malnutrition  E43 261   5. Squamous cell carcinoma of esophagus  C15.9 150.9   6. Atelectasis  J98.11 518.0   7. Oropharyngeal dysphagia  R13.12 787.22     Patient Active Problem List   Diagnosis    Acute gastric ulcer with hemorrhage    Alcohol abuse, uncomplicated    Anxiety disorder, unspecified    Diverticulum of bladder    Duodenal ulcer, unspecified as acute or chronic, without hemorrhage or perforation    Elevation of level of transaminase and lactic acid dehydrogenase (LDH)    Gastro-esophageal reflux disease without esophagitis    Hyperglycemia, unspecified    Hyperlipidemia, unspecified    Melena    Nicotine dependence, cigarettes, uncomplicated    Tobacco use    Severe malnutrition    Duodenal ulcer    Iron deficiency anemia    Cirrhosis of liver    Acute blood loss anemia    Squamous cell carcinoma of esophagus    Duodenal stenosis    Generalized weakness    Orthostatic hypotension    UTI (urinary tract infection)    Malignant neoplasm of upper third of esophagus    Iron deficiency anemia    GALILEO (acute kidney injury)    Metabolic acidosis    Septic shock due to Pseudomonas species    Pneumonia of both lower lobes due to Pseudomonas species    Bacteremia due to Pseudomonas     Past Medical History:   Diagnosis Date    Anemia     Cancer     ESOPHAGEAL    Cirrhosis     Duodenal ulcer     Gastric ulcer     GERD (gastroesophageal reflux disease)     History of alcohol abuse     History of radiation therapy 2024     esophagus    HLD (hyperlipidemia)     Mood disorder      Past Surgical History:   Procedure Laterality Date    ENDOSCOPY N/A 12/26/2023    Procedure: ESOPHAGOGASTRODUODENOSCOPY;  Surgeon: Wesly Mckeon MD;  Location: [facility] ENDOSCOPY;  Service: Gastroenterology;  Laterality: N/A;    VENOUS ACCESS DEVICE (PORT) INSERTION Right 04/25/2024       SLP Recommendation and Plan  SLP Swallowing Diagnosis: mild-moderate, oral dysphagia, severe, pharyngeal dysphagia (02/26/25 0930)  SLP Diet Recommendation: NPO, temporary alternate methods of nutrition/hydration (02/26/25 0930)  Recommended Precautions and Strategies: general aspiration precautions (02/26/25 0930)  SLP Rec. for Method of Medication Administration: meds via alternate route (02/26/25 0930)     Monitor for Signs of Aspiration: yes, notify SLP if any concerns (02/26/25 0930)     Swallow Criteria for Skilled Therapeutic Interventions Met: demonstrates skilled criteria (02/26/25 0930)  Anticipated Discharge Disposition (SLP): inpatient rehabilitation facility (02/26/25 0930)  Rehab Potential/Prognosis, Swallowing: adequate, monitor progress closely (02/26/25 0930)  Therapy Frequency (Swallow): 5 days per week (02/26/25 0930)  Predicted Duration Therapy Intervention (Days): 1 week (02/26/25 0930)  Oral Care Recommendations: Oral Care BID/PRN, Suction toothbrush (02/26/25 0930)                                        Progress: no change      SWALLOW EVALUATION (Last 72 Hours)       SLP Adult Swallow Evaluation       Row Name 02/26/25 0930 02/25/25 9103                Rehab Evaluation    Document Type evaluation  -RS evaluation  -SM       Subjective Information no complaints  -RS no complaints  -SM       Patient Observations alert  -RS alert;cooperative  -SM       Patient/Family/Caregiver Comments/Observations No family present, pt restrained bilaterally  -RS --       Patient Effort good  -RS good  -SM       Symptoms Noted During/After Treatment none  -RS --           "General Information    Patient Profile Reviewed -- yes  -       Pertinent History Of Current Problem -- Adm weakness, AMS, sepsis/UTI. Intubated 2/12-2/24. Alcohol abuse. Esophageal CA with mass upper 1/3 esophagus. Completed chemoradation tx 5/2024. EGD 2/2025 normal x duodenal stenosis. Pt denies any difficulties prior to admission.  -       Current Method of Nutrition -- NPO;nasogastric feedings;small-bore  -       Plans/Goals Discussed with -- patient and family;agreed upon  -       Barriers to Rehab -- none identified  -       Patient's Goals for Discharge -- return to PO diet  -       Family Goals for Discharge -- patient able to return to PO diet  -          Pain    Additional Documentation Pain Scale: FACES Pre/Post-Treatment (Group)  - Pain Scale: FACES Pre/Post-Treatment (Group)  -SM          Pain Scale: FACES Pre/Post-Treatment    Pain: FACES Scale, Pretreatment 0-->no hurt  -RS 4-->hurts little more  -       Posttreatment Pain Rating 0-->no hurt  -RS 4-->hurts little more  -       Pre/Posttreatment Pain Comment -- \"hurts all over\". RN aware and managing  -          Oral Musculature and Cranial Nerve Assessment    Oral Motor General Assessment -- generalized oral motor weakness;unable to assess;other (see comments)  easily distracted from tasks  -          General Eating/Swallowing Observations    Respiratory Support Currently in Use room air  -RS --          Clinical Swallow Eval    Pharyngeal Phase -- suspected pharyngeal impairment  -          Pharyngeal Phase Concerns    Pharyngeal Phase Concerns -- cough  -       Cough -- thin  -       Pharyngeal Phase Concerns, Comment -- Given prolonged period intubation, will need FEES prior to clearing for PO intake.  -          Fiberoptic Endoscopic Evaluation of Swallowing (FEES)    Risks/Benefits Reviewed risks/benefits explained;patient  -RS --       Nasal Entry left:  -RS --       Scope serial number/identification 338  -RS -- "          Anatomy and Physiology    Anatomic Considerations edema;arytenoids  -RS --       Velopharyngeal WFL  -RS --       Base of Tongue symmetrical  -RS --       Epiglottis WFL  -RS --       Laryngeal Function Breathing symmetrical  -RS --       Laryngeal Function Phonation CNA  -RS --       Laryngeal Function to Breath Hold incomplete closure;can't sustain closure  -RS --       Secretion Rating Scale (Urbano et al. 1996) 1- secretions present around the laryngeal vestibule  -RS --       Secretion Description thin;clear  -RS --       Ice Chips elicited swallow  -RS --       Spontaneous Swallow frequency reduced  -RS --       Sensory sensed scope  -RS --       Utensils Used Spoon;Straw  -RS --       Consistencies Trialed ice chips;thin liquids;pudding/puree  -RS --          FEES Interpretation    Oral Phase prespill of liquids into pharynx;solids not tested  -RS --          Initiation of Pharyngeal Swallow    Initiation of Pharyngeal Swallow bolus in valleculae  -RS --       Pharyngeal Phase impaired pharyngeal phase of swallowing  -RS --       Penetration Before the Swallow pudding/puree;secondary to reduced back of tongue control;secondary to delayed swallow initiation or mistiming  -RS --       Penetration During the Swallow thin liquids;pudding/puree;secondary to delayed swallow initiation or mistiming;secondary to reduced laryngeal elevation;secondary to reduced vestibular closure  -RS --       Penetration After the Swallow thin liquids;pudding/puree;in valleculae;in pyriform sinuses  -RS --       Depth of Penetration deep  -RS --       Response to Penetration No  -RS --       Rosenbek's Scale thin:;pudding/puree:;3-->Level 3;5-->Level 5  -RS --       Residue all consistencies tested;diffuse within pharynx;secondary to reduced base of tongue retraction;secondary to reduced posterior pharyngeal wall stripping;secondary to reduced laryngeal elevation;secondary to reduced hyolaryngeal excursion  -RS --        Response to Residue partial residue clearance;with cued swallow  -RS --       Attempted Compensatory Maneuvers other (see comments)  Pt u/a to follow directions to complete any strategy  -RS --          SLP Evaluation Clinical Impression    SLP Swallowing Diagnosis mild-moderate;oral dysphagia;severe;pharyngeal dysphagia  -RS suspected pharyngeal dysphagia  -       Functional Impact risk of aspiration/pneumonia  -RS risk of aspiration/pneumonia  -       Rehab Potential/Prognosis, Swallowing adequate, monitor progress closely  -RS --       Swallow Criteria for Skilled Therapeutic Interventions Met demonstrates skilled criteria  -RS --          Recommendations    Therapy Frequency (Swallow) 5 days per week  -RS --       Predicted Duration Therapy Intervention (Days) 1 week  -RS --       SLP Diet Recommendation NPO;temporary alternate methods of nutrition/hydration  -RS NPO;temporary alternate methods of nutrition/hydration;other (see comments)  x 2-3 ice chips/hour as tolerated for thirst  -       Recommended Diagnostics -- FEES  -SM       Recommended Precautions and Strategies general aspiration precautions  -RS --       Oral Care Recommendations Oral Care BID/PRN;Suction toothbrush  -RS Oral Care BID/PRN;Suction toothbrush  -       SLP Rec. for Method of Medication Administration meds via alternate route  -RS meds via alternate route  -       Monitor for Signs of Aspiration yes;notify SLP if any concerns  -RS notify SLP if any concerns  -       Anticipated Discharge Disposition (SLP) inpatient rehabilitation facility  -RS inpatient rehabilitation facility  -                 User Key  (r) = Recorded By, (t) = Taken By, (c) = Cosigned By      Initials Name Effective Dates    Corry Varela MS CCC-SLP 01/20/25 -     RS Carlos Elder MS CCC-SLP 09/14/23 -                     EDUCATION  The patient has been educated in the following areas:   Dysphagia (Swallowing Impairment) NPO rationale.         SLP GOALS       Row Name 02/26/25 0930             (LTG) Patient will demonstrate functional swallow for    Diet Texture (Demonstrate functional swallow) soft to chew (ground) textures  -RS      Liquid viscosity (Demonstrate functional swallow) thin liquids  -RS      [place] (Demonstrate functional swallow) with minimal cues (75-90% accuracy)  -RS      Time Frame (Demonstrate functional swallow) 1 week  -RS      Progress/Outcomes (Demonstrate functional swallow) new goal  -RS         (STG) Lingual Strengthening Goal 1 (SLP)    Activity (Lingual Strengthening Goal 1, SLP) increase tongue back strength  -RS      Increase Tongue Back Strength lingual resistance exercises  -RS      [place]/Accuracy (Lingual Strengthening Goal 1, SLP) with maximum cues (25-49% accuracy)  -RS      Time Frame (Lingual Strengthening Goal 1, SLP) 1 week  -RS      Progress/Outcomes (Lingual Strengthening Goal 1, SLP) new goal  -RS         (STG) Pharyngeal Strengthening Exercise Goal 1 (SLP)    Activity (Pharyngeal Strengthening Goal 1, SLP) increase superior movement of the hyolaryngeal complex;increase anterior movement of the hyolaryngeal complex;increase closure at entrance to airway/closure of airway at glottis;increase squeeze/positive pressure generation;increase tongue base retraction;increase timing  -RS      Increase Timing prepping - 3 second prep or suck swallow or 3-step swallow  -RS      Increase Superior Movement of the Hyolaryngeal Complex supraglottic swallow  -RS      Increase Anterior Movement of the Hyolaryngeal Complex chin tuck against resistance (CTAR)  -RS      Increase Closure at Entrance to Airway/Closure of Airway at Glottis super-supraglottic swallow  -RS      Increase Squeeze/Positive Pressure Generation hard effortful swallow  -RS      Increase Tongue Base Retraction vicky  -RS      [place]/Accuracy (Pharyngeal Strengthening Goal 1, SLP) with maximum cues (25-49% accuracy)  -RS      Time Frame  (Pharyngeal Strengthening Goal 1, SLP) 1 week  -RS      Progress/Outcomes (Pharyngeal Strengthening Goal 1, SLP) new goal  -RS                User Key  (r) = Recorded By, (t) = Taken By, (c) = Cosigned By      Initials Name Provider Type    Carlos Ríos MS CCC-SLP Speech and Language Pathologist                         Time Calculation:    Time Calculation- SLP       Row Name 02/26/25 1321             Time Calculation- SLP    SLP Start Time 0930  -RS      SLP Received On 02/26/25  -RS         Untimed Charges    73217-QA Fiberoptic Endo Eval Swallow Minutes 92  -RS         Total Minutes    Untimed Charges Total Minutes 92  -RS       Total Minutes 92  -RS                User Key  (r) = Recorded By, (t) = Taken By, (c) = Cosigned By      Initials Name Provider Type    Carlos Ríos MS CCC-SLP Speech and Language Pathologist                    Therapy Charges for Today       Code Description Service Date Service Provider Modifiers Qty    [id number]  ST FIBEROPTIC ENDO EVAL SWALL 6 2/26/2025 Carlos Elder MS CCC-ESPERANZA GN 1                 MS EFREN Mullins  2/26/2025

## 2025-02-27 ENCOUNTER — APPOINTMENT (OUTPATIENT)
Dept: GENERAL RADIOLOGY | Facility: HOSPITAL | Age: 65
DRG: 870 | End: 2025-02-27
Payer: MEDICARE

## 2025-02-27 LAB
ANION GAP SERPL CALCULATED.3IONS-SCNC: 14 MMOL/L (ref 5–15)
BASOPHILS # BLD AUTO: 0.02 10*3/MM3 (ref 0–0.2)
BASOPHILS NFR BLD AUTO: 0.1 % (ref 0–1.5)
BUN SERPL-MCNC: 93 MG/DL (ref 8–23)
BUN/CREAT SERPL: 80.9 (ref 7–25)
CA-I SERPL ISE-MCNC: 1.22 MMOL/L (ref 1.15–1.3)
CALCIUM SPEC-SCNC: 9.4 MG/DL (ref 8.6–10.5)
CHLORIDE SERPL-SCNC: 107 MMOL/L (ref 98–107)
CO2 SERPL-SCNC: 26 MMOL/L (ref 22–29)
CREAT SERPL-MCNC: 1.15 MG/DL (ref 0.76–1.27)
DEPRECATED RDW RBC AUTO: 57.2 FL (ref 37–54)
EGFRCR SERPLBLD CKD-EPI 2021: 70.6 ML/MIN/1.73
EOSINOPHIL # BLD AUTO: 0 10*3/MM3 (ref 0–0.4)
EOSINOPHIL NFR BLD AUTO: 0 % (ref 0.3–6.2)
ERYTHROCYTE [DISTWIDTH] IN BLOOD BY AUTOMATED COUNT: 18.3 % (ref 12.3–15.4)
GLUCOSE BLDC GLUCOMTR-MCNC: 155 MG/DL (ref 70–130)
GLUCOSE BLDC GLUCOMTR-MCNC: 158 MG/DL (ref 70–130)
GLUCOSE BLDC GLUCOMTR-MCNC: 201 MG/DL (ref 70–130)
GLUCOSE SERPL-MCNC: 156 MG/DL (ref 65–99)
HCT VFR BLD AUTO: 21.3 % (ref 37.5–51)
HCT VFR BLD AUTO: 22 % (ref 37.5–51)
HCT VFR BLD AUTO: 22.2 % (ref 37.5–51)
HGB BLD-MCNC: 6.6 G/DL (ref 13–17.7)
HGB BLD-MCNC: 6.9 G/DL (ref 13–17.7)
HGB BLD-MCNC: 7.1 G/DL (ref 13–17.7)
IMM GRANULOCYTES # BLD AUTO: 0.16 10*3/MM3 (ref 0–0.05)
IMM GRANULOCYTES NFR BLD AUTO: 1 % (ref 0–0.5)
LYMPHOCYTES # BLD AUTO: 0.51 10*3/MM3 (ref 0.7–3.1)
LYMPHOCYTES NFR BLD AUTO: 3.1 % (ref 19.6–45.3)
MAGNESIUM SERPL-MCNC: 2.4 MG/DL (ref 1.6–2.4)
MCH RBC QN AUTO: 31.1 PG (ref 26.6–33)
MCHC RBC AUTO-ENTMCNC: 32.3 G/DL (ref 31.5–35.7)
MCV RBC AUTO: 96.5 FL (ref 79–97)
MONOCYTES # BLD AUTO: 1.62 10*3/MM3 (ref 0.1–0.9)
MONOCYTES NFR BLD AUTO: 9.7 % (ref 5–12)
NEUTROPHILS NFR BLD AUTO: 14.33 10*3/MM3 (ref 1.7–7)
NEUTROPHILS NFR BLD AUTO: 86.1 % (ref 42.7–76)
NRBC BLD AUTO-RTO: 0.2 /100 WBC (ref 0–0.2)
PLATELET # BLD AUTO: 233 10*3/MM3 (ref 140–450)
PMV BLD AUTO: 9.6 FL (ref 6–12)
POTASSIUM SERPL-SCNC: 4.2 MMOL/L (ref 3.5–5.2)
RBC # BLD AUTO: 2.28 10*6/MM3 (ref 4.14–5.8)
SODIUM SERPL-SCNC: 147 MMOL/L (ref 136–145)
WBC NRBC COR # BLD AUTO: 16.64 10*3/MM3 (ref 3.4–10.8)

## 2025-02-27 PROCEDURE — 63710000001 PREDNISONE PER 1 MG

## 2025-02-27 PROCEDURE — 93005 ELECTROCARDIOGRAM TRACING: CPT | Performed by: PHYSICIAN ASSISTANT

## 2025-02-27 PROCEDURE — 94799 UNLISTED PULMONARY SVC/PX: CPT

## 2025-02-27 PROCEDURE — 25010000002 ENOXAPARIN PER 10 MG: Performed by: INTERNAL MEDICINE

## 2025-02-27 PROCEDURE — 94761 N-INVAS EAR/PLS OXIMETRY MLT: CPT

## 2025-02-27 PROCEDURE — 85018 HEMOGLOBIN: CPT

## 2025-02-27 PROCEDURE — 93005 ELECTROCARDIOGRAM TRACING: CPT | Performed by: INTERNAL MEDICINE

## 2025-02-27 PROCEDURE — 99222 1ST HOSP IP/OBS MODERATE 55: CPT | Performed by: INTERNAL MEDICINE

## 2025-02-27 PROCEDURE — 93010 ELECTROCARDIOGRAM REPORT: CPT | Performed by: INTERNAL MEDICINE

## 2025-02-27 PROCEDURE — 25010000002 BUMETANIDE PER 0.5 MG: Performed by: INTERNAL MEDICINE

## 2025-02-27 PROCEDURE — 71045 X-RAY EXAM CHEST 1 VIEW: CPT

## 2025-02-27 PROCEDURE — 94664 DEMO&/EVAL PT USE INHALER: CPT

## 2025-02-27 PROCEDURE — 86900 BLOOD TYPING SEROLOGIC ABO: CPT

## 2025-02-27 PROCEDURE — 63710000001 INSULIN REGULAR HUMAN PER 5 UNITS: Performed by: INTERNAL MEDICINE

## 2025-02-27 PROCEDURE — 99232 SBSQ HOSP IP/OBS MODERATE 35: CPT

## 2025-02-27 PROCEDURE — 25010000002 AMIODARONE IN DEXTROSE 5% 150-4.21 MG/100ML-% SOLUTION: Performed by: PHYSICIAN ASSISTANT

## 2025-02-27 PROCEDURE — 83735 ASSAY OF MAGNESIUM: CPT | Performed by: PHYSICIAN ASSISTANT

## 2025-02-27 PROCEDURE — 80048 BASIC METABOLIC PNL TOTAL CA: CPT | Performed by: PHYSICIAN ASSISTANT

## 2025-02-27 PROCEDURE — 97166 OT EVAL MOD COMPLEX 45 MIN: CPT

## 2025-02-27 PROCEDURE — P9016 RBC LEUKOCYTES REDUCED: HCPCS

## 2025-02-27 PROCEDURE — 25010000002 AMIODARONE IN DEXTROSE 5% 360-4.14 MG/200ML-% SOLUTION: Performed by: PHYSICIAN ASSISTANT

## 2025-02-27 PROCEDURE — 97162 PT EVAL MOD COMPLEX 30 MIN: CPT

## 2025-02-27 PROCEDURE — 85025 COMPLETE CBC W/AUTO DIFF WBC: CPT | Performed by: PHYSICIAN ASSISTANT

## 2025-02-27 PROCEDURE — 82330 ASSAY OF CALCIUM: CPT | Performed by: PHYSICIAN ASSISTANT

## 2025-02-27 PROCEDURE — 85014 HEMATOCRIT: CPT

## 2025-02-27 PROCEDURE — 82948 REAGENT STRIP/BLOOD GLUCOSE: CPT

## 2025-02-27 PROCEDURE — 86923 COMPATIBILITY TEST ELECTRIC: CPT

## 2025-02-27 PROCEDURE — 36430 TRANSFUSION BLD/BLD COMPNT: CPT

## 2025-02-27 RX ORDER — METOPROLOL TARTRATE 25 MG/1
25 TABLET, FILM COATED ORAL EVERY 12 HOURS SCHEDULED
Status: DISCONTINUED | OUTPATIENT
Start: 2025-02-27 | End: 2025-03-01

## 2025-02-27 RX ADMIN — HUMAN INSULIN 2 UNITS: 100 INJECTION, SOLUTION SUBCUTANEOUS at 06:07

## 2025-02-27 RX ADMIN — CHLORHEXIDINE GLUCONATE 0.12% ORAL RINSE 15 ML: 1.2 LIQUID ORAL at 22:47

## 2025-02-27 RX ADMIN — PANTOPRAZOLE SODIUM 40 MG: 40 INJECTION, POWDER, FOR SOLUTION INTRAVENOUS at 09:16

## 2025-02-27 RX ADMIN — FENTANYL 1 PATCH: 25 PATCH TRANSDERMAL at 16:29

## 2025-02-27 RX ADMIN — AMIODARONE HYDROCHLORIDE 150 MG: 1.5 INJECTION, SOLUTION INTRAVENOUS at 10:32

## 2025-02-27 RX ADMIN — DOCUSATE SODIUM 100 MG: 50 LIQUID ORAL at 09:26

## 2025-02-27 RX ADMIN — IPRATROPIUM BROMIDE AND ALBUTEROL SULFATE 3 ML: 2.5; .5 SOLUTION RESPIRATORY (INHALATION) at 17:36

## 2025-02-27 RX ADMIN — GABAPENTIN 300 MG: 300 CAPSULE ORAL at 20:40

## 2025-02-27 RX ADMIN — HUMAN INSULIN 2 UNITS: 100 INJECTION, SOLUTION SUBCUTANEOUS at 17:30

## 2025-02-27 RX ADMIN — Medication 1 APPLICATION: at 20:40

## 2025-02-27 RX ADMIN — Medication 1 APPLICATION: at 09:17

## 2025-02-27 RX ADMIN — IPRATROPIUM BROMIDE AND ALBUTEROL SULFATE 3 ML: 2.5; .5 SOLUTION RESPIRATORY (INHALATION) at 11:48

## 2025-02-27 RX ADMIN — ESCITALOPRAM OXALATE 10 MG: 10 TABLET ORAL at 20:40

## 2025-02-27 RX ADMIN — AMIODARONE HYDROCHLORIDE 0.5 MG/MIN: 1.8 INJECTION, SOLUTION INTRAVENOUS at 10:30

## 2025-02-27 RX ADMIN — METOPROLOL TARTRATE 5 MG: 5 INJECTION INTRAVENOUS at 13:10

## 2025-02-27 RX ADMIN — METOPROLOL TARTRATE 25 MG: 25 TABLET, FILM COATED ORAL at 20:40

## 2025-02-27 RX ADMIN — GABAPENTIN 300 MG: 300 CAPSULE ORAL at 06:07

## 2025-02-27 RX ADMIN — NYSTATIN: 100000 POWDER TOPICAL at 20:40

## 2025-02-27 RX ADMIN — NYSTATIN: 100000 POWDER TOPICAL at 14:11

## 2025-02-27 RX ADMIN — NYSTATIN: 100000 POWDER TOPICAL at 06:07

## 2025-02-27 RX ADMIN — BUMETANIDE 1 MG: 0.25 INJECTION INTRAMUSCULAR; INTRAVENOUS at 12:05

## 2025-02-27 RX ADMIN — CHLORHEXIDINE GLUCONATE 0.12% ORAL RINSE 15 ML: 1.2 LIQUID ORAL at 09:17

## 2025-02-27 RX ADMIN — GABAPENTIN 300 MG: 300 CAPSULE ORAL at 14:11

## 2025-02-27 RX ADMIN — PANTOPRAZOLE SODIUM 40 MG: 40 INJECTION, POWDER, FOR SOLUTION INTRAVENOUS at 20:40

## 2025-02-27 RX ADMIN — METOPROLOL TARTRATE 5 MG: 5 INJECTION INTRAVENOUS at 02:12

## 2025-02-27 RX ADMIN — PREDNISONE 20 MG: 20 TABLET ORAL at 20:40

## 2025-02-27 RX ADMIN — IPRATROPIUM BROMIDE AND ALBUTEROL SULFATE 3 ML: 2.5; .5 SOLUTION RESPIRATORY (INHALATION) at 08:17

## 2025-02-27 RX ADMIN — TERAZOSIN HYDROCHLORIDE 1 MG: 1 CAPSULE ORAL at 09:16

## 2025-02-27 RX ADMIN — FINASTERIDE 5 MG: 5 TABLET, FILM COATED ORAL at 09:17

## 2025-02-27 RX ADMIN — Medication 30 ML: at 09:17

## 2025-02-27 RX ADMIN — AMIODARONE HYDROCHLORIDE 0.5 MG/MIN: 1.8 INJECTION, SOLUTION INTRAVENOUS at 17:30

## 2025-02-27 RX ADMIN — ENOXAPARIN SODIUM 40 MG: 100 INJECTION SUBCUTANEOUS at 12:05

## 2025-02-27 RX ADMIN — HUMAN INSULIN 3 UNITS: 100 INJECTION, SOLUTION SUBCUTANEOUS at 12:20

## 2025-02-27 RX ADMIN — OXYCODONE AND ACETAMINOPHEN 1 TABLET: 7.5; 325 TABLET ORAL at 20:40

## 2025-02-27 RX ADMIN — METOPROLOL TARTRATE 5 MG: 5 INJECTION INTRAVENOUS at 09:16

## 2025-02-27 RX ADMIN — DOCUSATE SODIUM 100 MG: 50 LIQUID ORAL at 20:40

## 2025-02-27 RX ADMIN — IPRATROPIUM BROMIDE AND ALBUTEROL SULFATE 3 ML: 2.5; .5 SOLUTION RESPIRATORY (INHALATION) at 20:16

## 2025-02-27 RX ADMIN — PREDNISONE 20 MG: 20 TABLET ORAL at 09:16

## 2025-02-27 NOTE — PLAN OF CARE
Goal Outcome Evaluation:  Plan of Care Reviewed With: patient        Progress: no change  Outcome Evaluation: PT eval performed: Pt presenting below baseline.  max-A x2 for bed mobility.  extra time and effort needed with all aspects of bed mobility, but pt very motivated to help, once sitting EOB and after UE placement/balance cues pt able to maintain sitting CGA for 1 min. Then, HR escalated to 185, pt then needed to be placed back in supine and required 2-3 mins before HR decreased to 100. RN called to room.  Recommend return to SNF    Anticipated Discharge Disposition (PT): skilled nursing facility

## 2025-02-27 NOTE — PROGRESS NOTES
Clinical Nutrition     Patient Name: Val Nassar Jr.  YOB: 1960  MRN: [id number]  Date of Encounter: 02/27/25 16:18 EST  Admission date: 2/11/2025  Reason for Visit: Follow-up protocol, EN    Assessment   Nutrition Assessment   Admission Diagnosis:  Septic shock [A41.9, R65.21]    Problem List:    Septic shock due to Pseudomonas species    Hyperlipidemia, unspecified    Tobacco use    Cirrhosis of liver    Squamous cell carcinoma of esophagus    GALILEO (acute kidney injury)    Metabolic acidosis    Pneumonia of both lower lobes due to Pseudomonas species    Bacteremia due to Pseudomonas      PMH:   He  has a past medical history of Anemia, Cancer, Cirrhosis, Duodenal ulcer, Gastric ulcer, GERD (gastroesophageal reflux disease), History of alcohol abuse, History of radiation therapy (05/08/2024), HLD (hyperlipidemia), and Mood disorder.    PSH:  He  has a past surgical history that includes Esophagogastroduodenoscopy (N/A, 12/26/2023) and Venous Access Device (Port) (Right, 04/25/2024).    Substance history: Etoh abuse, vaping    Applicable Nutrition History:     ARF/VENT 2-12-25    s/p Bronch 2-17-25    Extubated 2-24-25    WOC following for posterior thigh, gluteal skin tears, scrotal MASD, blisters     Labs    Labs Reviewed: Yes    Results from last 7 days   Lab Units 02/27/25  0114 02/26/25  0319 02/25/25  2045 02/25/25  0328 02/24/25  0434 02/23/25  1135 02/22/25  0318 02/21/25  0327   GLUCOSE mg/dL 156* 109* 133* 163* 220* 163*   < > 165*   BUN mg/dL 93* 89* 88* 86* 73* 67*   < > 53*   CREATININE mg/dL 1.15 1.12 1.15 1.31* 1.29* 1.43*   < > 0.80   SODIUM mmol/L 147* 143 142 138 134* 133*   < > 138   CHLORIDE mmol/L 107 106 107 106 104 103   < > 103   POTASSIUM mmol/L 4.2 4.6 4.1 4.6 4.8 4.5   < > 4.0   PHOSPHORUS mg/dL  --   --   --  4.0 3.4 3.8  --  2.7   MAGNESIUM mg/dL 2.4 2.2 1.8 2.0 2.0 2.0   < > 1.8   ALT (SGPT) U/L  --   --   --   --   --   --   --  80*    LACTATE mmol/L  --   --   --   --   --   --   --  1.8    < > = values in this interval not displayed.       Results from last 7 days   Lab Units 02/27/25  0114 02/24/25  0434 02/21/25  0327   ALBUMIN g/dL  --   --  2.6*   PREALBUMIN mg/dL  --  9.7*  --    CRP mg/dL  --  11.29*  --    IONIZED CALCIUM mmol/L 1.22  --   --    TRIGLYCERIDES mg/dL  --  75  --        Results from last 7 days   Lab Units 02/27/25  1200 02/27/25  0552 02/26/25  2344 02/26/25  2308 02/26/25  1734 02/26/25  1204   GLUCOSE mg/dL 201* 155* 208* 191* 143* 178*     Lab Results   Lab Value Date/Time    HGBA1C 4.6 (L) 12/25/2023 1925                 Medications    Medications Reviewed: yes    Scheduled Meds:bumetanide, 1 mg, Intravenous, Q12H  castor oil-balsam peru, 1 Application, Topical, Q12H  fentaNYL, 1 patch, Transdermal, Q72H   And  Check Fentanyl Patch Placement, 1 each, Not Applicable, Q12H  chlorhexidine, 15 mL, Mouth/Throat, Q12H  docusate sodium, 100 mg, Per G Tube, BID  [Held by provider] enoxaparin, 40 mg, Subcutaneous, Q24H  escitalopram, 10 mg, Per G Tube, Nightly  finasteride, 5 mg, Per G Tube, Daily  gabapentin, 300 mg, Per G Tube, Q8H  insulin regular, 2-7 Units, Subcutaneous, Q6H  ipratropium-albuterol, 3 mL, Nebulization, 4x Daily - RT  metoprolol tartrate, 25 mg, Oral, Q12H  nystatin, , Topical, Q8H  pantoprazole, 40 mg, Intravenous, Q12H  predniSONE, 20 mg, Nasogastric, Q12H  ProSource No Carb, 30 mL, Nasogastric, Daily  terazosin, 1 mg, Nasogastric, Daily      Continuous Infusions:amiodarone, 0.5 mg/min, Last Rate: 0.5 mg/min (02/27/25 1030)      PRN Meds:.  Calcium Replacement - Follow Nurse / BPA Driven Protocol    dextrose    glucagon (human recombinant)    ipratropium-albuterol    Magnesium Cardiology Dose Replacement - Follow Nurse / BPA Driven Protocol    metoprolol tartrate    oxyCODONE-acetaminophen    Phosphorus Replacement - Follow Nurse / BPA Driven Protocol    polyvinyl alcohol    Potassium Replacement - Follow  "Nurse / BPA Driven Protocol    sodium chloride    Intake/Ouptut 24 hrs (0701 - 0700)   I&O's Reviewed: yes    Intake & Output (last day)         02/26 0701 02/27 0700 02/27 0701 02/28 0700    I.V. (mL/kg) 63 (0.9)     Other 560     NG/GT 1667     Total Intake(mL/kg) 2290 (31)     Urine (mL/kg/hr) 5225 (2.9) 400 (0.6)    Emesis/NG output 45     Stool 0     Total Output 5270 400    Net -2980 -400          Stool Unmeasured Occurrence 1 x            Anthropometrics     Height: Height: 175.3 cm (69.02\")  Last Filed Weight: Weight: 73.8 kg (162 lb 12.8 oz) (02/27/25 0641)  Method: Weight Method: Bed scalebedscale  BMI: BMI (Calculated): 24    UBW: ?  Weight change:  per EMR ~ 6lb wt gain since January     Weight       Weight (kg) Weight (lbs) Weight Method Visit Report   4/23/2024 75.841 kg  167 lb 3.2 oz   --     75.297 kg  166 lb   --    4/25/2024 75.751 kg  167 lb  Stated     4/29/2024 76.658 kg  169 lb      4/30/2024 75.932 kg  167 lb 6.4 oz   --     75.751 kg  167 lb   --    5/6/2024 74.889 kg  165 lb 1.6 oz   --     74.98 kg  165 lb 4.8 oz   --     74.844 kg  165 lb   --        --        --        --    5/7/2024 76.25 kg  168 lb 1.6 oz   --     76.204 kg  168 lb   --    5/14/2024 77.111 kg  170 lb   --    5/21/2024 75.297 kg  166 lb      6/3/2024 74.98 kg  165 lb 4.8 oz   --     75.297 kg  166 lb   --        --        --    6/18/2024 74.39 kg  164 lb   --    7/5/2024 74.4 kg  164 lb 0.4 oz  Estimated     7/16/2024 77.565 kg  171 lb   --    8/7/2024 77.565 kg  171 lb   --    8/19/2024 77.656 kg  171 lb 3.2 oz   --    9/19/2024 77.565 kg  171 lb      10/24/2024 79.833 kg  176 lb   --    11/14/2024 79.833 kg  176 lb   --    11/26/2024 78.926 kg  174 lb      12/18/2024 79.833 kg  176 lb   --    1/21/2025 79.833 kg  176 lb  Stated     1/23/2025 79.833 kg  176 lb   --    2/3/2025 83.008 kg  183 lb   --    2/11/2025 83.008 kg  183 lb       83 kg  182 lb 15.7 oz          Needs Assessment   Date: 2-24-25    Height " "used:Height: 175.3 cm (69.02\")  Weights used: 182lb/ 83kg- presumed dry weight      Estimated Calorie needs: ~2093kcal MREE  Method:  20Kcals/Kkcal  Method:  MSJ x 1.2: 1926kcal  Calorimetry 25: MREE= 2093kcal. RQ= 0.83 wnl    Estimated Protein needs: ~ 100g protein with current renal function  Method:1.2-1.5g protein: 100-125g proten      Nutrition Focused Physical Exam     Date: 25    Pt does not meet criteria for malnutrition diagnosis, at this time.      Subjective   Reported/Observed/Food/Nutrition Related History:        EN and water flush adjusted  hypernatremia and change in feeding window.     : pt resting in bed, on room air, pleasantly confused    Per RN: pt tolerating TF, had 1 dark bm, has been tachypneic, lots of secretions, failed FEES    : per RN pt extubated today, is tolerating TF, possible GIB, is having dark stools, follows commands    Pt resting in bed, on nasal cannula, very weak voice, difficulty talking  + precedex    25: pt intubated, sedated + fentanyl, versed, D5%@100ml    Per RN: pt tolerating TF, has not had a bm, is much more alert today, follows commands    Per MD: plan to maintain D5% for 1 more day,  decrease free water to 30ml/hr    : pt intubated, sedated, + fentanyl, versed, D5%@100ml    Per RN: pt has been more alert, oxygen demands decreased, tolerating TF, abdomen still distended, given lactulose (last bm )      Pt intubated, sedated, on fentanyl & VERSED. Spoke with nurse, reported tolerating TF well, has low residuals, and bowels are moving. LBM on 2/15. Noted that Na continues to increase, even after increasing free water on 2/15. Pt may benefit from IV fluid to correct hypernatremia.       Pt intubated, sedated, + fentanyl, propofol 7.56ml, levophed 0.32mcg    Per RN: pt's belly distended, more firm than before, bowel regimen started, marginal UOP, have been toni to wean off asiya and vasopressin    Current Nutrition " Prescription     PO: NPO Diet NPO Type: Tube Feeding  Oral Nutrition Supplement:  Intake: N/A    EN: Peptamen 1.5  Goal Rate: 70ml/hr  Water Flushes:30ml/hr  Modular: Prosource -no carb 1/day  Route: ND  Tube: Small bore    At goal over: 20Hrs/day     Rx will supply:   Goal Volume 1400  mL/day     Flush Volume 600 mL/day     Energy 2160 Kcal/day 103 % MREE   Protein 110 g/day 110 % Est Need   Fiber 0 g/day     Water in  EN 1078 mL     Total Water 1678 mL     Meet DRI Yes        --------------------------------------------------------------------------  Product/Rate verified at bedside: No  Infusing Rate at time of visit: 70 ml/hr per chart    Average Delivery from Chartin Day:   Volume 1667 mL/day 112  % Goal Vol.   Flush Volume 560 mL/day     Energy  Kcal/day  % Est Need   Protein  g/day  % Est Need   Fiber  g/day     Water in  EN  mL     Total Water  mL     Meet DRI Yes             Assessment & Plan   Nutrition Diagnosis   Date: 25 Updated: 25   Problem Inadequate energy intake    Etiology ARF/Extubated   Signs/Symptoms 112% goal volume EN   Status:  improved    Goal:   Nutrition to support treatment and Adjust EN      Nutrition Intervention      Follow treatment progress, Care plan reviewed    Adjust EN for hypernatremia and change in feeding window:  Peptamen 1.5 @ 65ml/hr. Water flush @ 45ml/hr.   =1430ml, 2145kcals (102% est needs), 97g pro (97% est needs), 0 fiber, 1100ml water from formula, +990ml water from flushes, 2090ml TFW.     Monitoring/Evaluation:   Per protocol, I&O, Pertinent labs, EN delivery/tolerance, Weight, Skin status, GI status, Symptoms, Hemodynamic stability    Joy John, MS,RD,LD  Time Spent:30min

## 2025-02-27 NOTE — PLAN OF CARE
Goal Outcome Evaluation:                                                Pt on room air most of day, shallow respirations, tachypneic at times. TF infusing, pt remains NPO per SLP. Tele orders.

## 2025-02-27 NOTE — THERAPY EVALUATION
Patient Name: Val Nassar Jr.  : 1960    MRN: [id number]                              Today's Date: 2025       Admit Date: 2025    Visit Dx:     ICD-10-CM ICD-9-CM   1. Septic shock  A41.9 038.9    R65.21 785.52     995.92   2. Acute UTI (urinary tract infection)  N39.0 599.0   3. Hypotension, unspecified hypotension type  I95.9 458.9   4. Severe malnutrition  E43 261   5. Squamous cell carcinoma of esophagus  C15.9 150.9   6. Atelectasis  J98.11 518.0   7. Oropharyngeal dysphagia  R13.12 787.22     Patient Active Problem List   Diagnosis    Acute gastric ulcer with hemorrhage    Alcohol abuse, uncomplicated    Anxiety disorder, unspecified    Diverticulum of bladder    Duodenal ulcer, unspecified as acute or chronic, without hemorrhage or perforation    Elevation of level of transaminase and lactic acid dehydrogenase (LDH)    Gastro-esophageal reflux disease without esophagitis    Hyperglycemia, unspecified    Hyperlipidemia, unspecified    Melena    Nicotine dependence, cigarettes, uncomplicated    Tobacco use    Severe malnutrition    Duodenal ulcer    Iron deficiency anemia    Cirrhosis of liver    Acute blood loss anemia    Squamous cell carcinoma of esophagus    Duodenal stenosis    Generalized weakness    Orthostatic hypotension    UTI (urinary tract infection)    Malignant neoplasm of upper third of esophagus    Iron deficiency anemia    GALILEO (acute kidney injury)    Metabolic acidosis    Septic shock due to Pseudomonas species    Pneumonia of both lower lobes due to Pseudomonas species    Bacteremia due to Pseudomonas     Past Medical History:   Diagnosis Date    Anemia     Cancer     ESOPHAGEAL    Cirrhosis     Duodenal ulcer     Gastric ulcer     GERD (gastroesophageal reflux disease)     History of alcohol abuse     History of radiation therapy 2024    esophagus    HLD (hyperlipidemia)     Mood disorder      Past Surgical History:   Procedure Laterality Date     ENDOSCOPY N/A 12/26/2023    Procedure: ESOPHAGOGASTRODUODENOSCOPY;  Surgeon: Wesly Mckeon MD;  Location: [facility] ENDOSCOPY;  Service: Gastroenterology;  Laterality: N/A;    VENOUS ACCESS DEVICE (PORT) INSERTION Right 04/25/2024      General Information       Row Name 02/27/25 1004          OT Time and Intention    Document Type evaluation  -MR     Mode of Treatment occupational therapy  -MR       Row Name 02/27/25 1004          General Information    Patient Profile Reviewed yes  -MR     Prior Level of Function --  per chart, was living indep at home until hospitalizations this year, recently admitted from a SNF. Per report pt was walking out to smoke at SNF. Pt reporting he uses a cane and RW  -MR     Existing Precautions/Restrictions oxygen therapy device and L/min;fall;other (see comments)  NG tube, Lubin Cath, difficulty with weak voice affecting communication abilities, check vitals  -MR     Barriers to Rehab medically complex;previous functional deficit  -MR       Row Name 02/27/25 1004          Living Environment    People in Home facility resident;other (see comments)  Cayce Mueller  -MR       Row Name 02/27/25 1004          Home Main Entrance    Number of Stairs, Main Entrance one;other (see comments)  per chart 1 step to enter - personal home  -MR       Row Name 02/27/25 1004          Stairs Within Home, Primary    Number of Stairs, Within Home, Primary none  -MR       Row Name 02/27/25 1004          Cognition    Orientation Status (Cognition) other (see comments)  difficulty understanding pt's verbal skills due to weak voice, appears to verbalize himself  -MR       Row Name 02/27/25 1004          Safety Issues/Impairments Affecting Functional Mobility    Safety Issues Affecting Function (Mobility) insight into deficits/self-awareness;safety precaution awareness;problem-solving;safety precautions follow-through/compliance;sequencing abilities  -MR     Impairments Affecting Function (Mobility)  balance;endurance/activity tolerance;strength;shortness of breath  -MR               User Key  (r) = Recorded By, (t) = Taken By, (c) = Cosigned By      Initials Name Provider Type    Monica Rose OT Occupational Therapist                     Mobility/ADL's       Row Name 02/27/25 1006          Bed Mobility    Bed Mobility supine-sit-supine  -MR     Supine-Sit-Supine [place] (Bed Mobility) maximum assist (25% patient effort);2 person assist;verbal cues  -MR     Assistive Device (Bed Mobility) head of bed elevated;repositioning sheet  -MR     Comment, (Bed Mobility) Pt requiring extended time and effort to advance to the EOB. Once EOB pt tolerated approx 2 to 3 mins befor becoming tachycardic HR to 185. Pt returned to supine and RN notified.  -MR       Row Name 02/27/25 1006          Transfers    Comment, (Transfers) Unable to attempt STS d/t HR, unable to lift to chair d/t pt not being in a lift room  -MR       Row Name 02/27/25 1006          Activities of Daily Living    BADL Assessment/Intervention lower body dressing;grooming;upper body dressing  -MR       Row Name 02/27/25 1006          Lower Body Dressing Assessment/Training    [place] Level (Lower Body Dressing) don;socks;dependent (less than 25% patient effort)  -MR     Position (Lower Body Dressing) supine  -MR       Row Name 02/27/25 1006          Grooming Assessment/Training    [place] Level (Grooming) wash face, hands;maximum assist (25% patient effort)  -MR     Position (Grooming) supine  -MR       Row Name 02/27/25 1006          Upper Body Dressing Assessment/Training    [place] Level (Upper Body Dressing) other (see comments);maximum assist (25% patient effort)  adjusting hospital gown  -MR     Position (Upper Body Dressing) edge of bed sitting  -MR               User Key  (r) = Recorded By, (t) = Taken By, (c) = Cosigned By      Initials Name Provider Type    Monica Rose, OT Occupational Therapist                    Obj/Interventions       Row Name 02/27/25 1008          Sensory Assessment (Somatosensory)    Sensory Assessment (Somatosensory) UE sensation intact  -MR       Row Name 02/27/25 1008          Range of Motion Comprehensive    General Range of Motion bilateral upper extremity ROM WFL  -MR       Row Name 02/27/25 1008          Strength Comprehensive (MMT)    Comment, General Manual Muscle Testing (MMT) Assessment LUE grossly 3/5; RUE grossly 2+/5  -MR       Row Name 02/27/25 1008          Balance    Balance Assessment sitting static balance  -MR     Static Sitting Balance maximum assist;2-person assist;verbal cues;non-verbal cues (demo/gesture);other (see comments)  Brief moments of CGA  -MR     Position, Sitting Balance supported;unsupported  -MR     Balance Interventions sitting  -MR               User Key  (r) = Recorded By, (t) = Taken By, (c) = Cosigned By      Initials Name Provider Type     Monica Mon, OT Occupational Therapist                   Goals/Plan       Row Name 02/27/25 1014          Bed Mobility Goal 1 (OT)    Activity/Assistive Device (Bed Mobility Goal 1, OT) sit to supine;supine to sit  -MR     [place] Level/Cues Needed (Bed Mobility Goal 1, OT) moderate assist (50-74% patient effort)  -MR     Time Frame (Bed Mobility Goal 1, OT) short term goal (STG);3 days  -MR     Progress/Outcomes (Bed Mobility Goal 1, OT) new goal  -MR       Row Name 02/27/25 1014          Transfer Goal 1 (OT)    Activity/Assistive Device (Transfer Goal 1, OT) bed-to-chair/chair-to-bed  -MR     [place] Level/Cues Needed (Transfer Goal 1, OT) maximum assist (25-49% patient effort)  -MR     Time Frame (Transfer Goal 1, OT) long term goal (LTG);5 days  -MR     Progress/Outcome (Transfer Goal 1, OT) new goal  -MR       Row Name 02/27/25 1014          Dressing Goal 1 (OT)    Activity/Device (Dressing Goal 1, OT) upper body dressing  -MR     [place]/Cues Needed (Dressing Goal 1, OT) moderate assist (50-74%  patient effort)  -MR     Time Frame (Dressing Goal 1, OT) long term goal (LTG);5 days  -MR     Progress/Outcome (Dressing Goal 1, OT) new goal  -MR [place] Name 02/27/25 1014          Therapy Assessment/Plan (OT)    Planned Therapy Interventions (OT) activity tolerance training;adaptive equipment training;BADL retraining;functional balance retraining;IADL retraining;passive ROM/stretching;transfer/mobility retraining;strengthening exercise;ROM/therapeutic exercise;patient/caregiver education/training;occupation/activity based interventions  -MR               User Key  (r) = Recorded By, (t) = Taken By, (c) = Cosigned By      Initials Name Provider Type    Monica Rose, OT Occupational Therapist                   Clinical Impression       Row Name 02/27/25 1009          Pain Assessment    Pain Side/Orientation generalized  -MR       Row Name 02/27/25 1009          Pain Scale: FACES Pre/Post-Treatment    Pain: FACES Scale, Pretreatment 0-->no hurt  -MR     Posttreatment Pain Rating 0-->no hurt  -MR       [place] Name 02/27/25 1009          Plan of Care Review    Plan of Care Reviewed With patient  -MR     Progress no change  -MR     Outcome Evaluation Patient presenting below functional his functional baseline w/ mobility, transfers and balance requiring increased need for assist w/ ADLs. Pt required significant assist w/ bed mobility, LB dressing and balance. Deficits warrant skilled OT services. Recommend SNF when medically appropriate for d/c.  -MR [place] Name 02/27/25 1009          Therapy Assessment/Plan (OT)    Patient/Family Therapy Goal Statement (OT) Return to PLOF  -MR     Rehab Potential (OT) good  -MR     Criteria for Skilled Therapeutic Interventions Met (OT) yes;meets criteria;skilled treatment is necessary  -MR     Therapy Frequency (OT) daily  -MR       [place] Name 02/27/25 1009          Therapy Plan Review/Discharge Plan (OT)    Anticipated Discharge Disposition (OT) skilled nursing facility  -MR        Row Name 02/27/25 1009          Vital Signs    Pre Systolic BP Rehab 116  -MR     Pre Treatment Diastolic BP 61  -MR     Pre SpO2 (%) 95  -MR     O2 Delivery Pre Treatment nasal cannula  -MR     O2 Delivery Intra Treatment nasal cannula  -MR     Post SpO2 (%) 93  -MR     O2 Delivery Post Treatment nasal cannula  -MR       Row Name 02/27/25 1009          Positioning and Restraints    Pre-Treatment Position in bed  -MR     Post Treatment Position bed  -MR     In Bed notified nsg;fowlers;call light within reach;encouraged to call for assist;exit alarm on  -MR               User Key  (r) = Recorded By, (t) = Taken By, (c) = Cosigned By      Initials Name Provider Type    MR Monica Mon, OT Occupational Therapist                   Outcome Measures       Row Name 02/27/25 1019          How much help from another is currently needed...    Putting on and taking off regular lower body clothing? 1  -MR     Bathing (including washing, rinsing, and drying) 1  -MR     Toileting (which includes using toilet bed pan or urinal) 1  -MR     Putting on and taking off regular upper body clothing 1  -MR     Taking care of personal grooming (such as brushing teeth) 1  -MR     Eating meals 1  -MR     AM-PAC 6 Clicks Score (OT) 6  -MR       Row Name 02/27/25 0937          How much help from another person do you currently need...    Turning from your back to your side while in flat bed without using bedrails? 2  -KG     Moving from lying on back to sitting on the side of a flat bed without bedrails? 2  -KG     Moving to and from a bed to a chair (including a wheelchair)? 1  -KG     Standing up from a chair using your arms (e.g., wheelchair, bedside chair)? 1  -KG     Climbing 3-5 steps with a railing? 1  -KG     To walk in hospital room? 1  -KG     AM-PAC 6 Clicks Score (PT) 8  -KG     Highest Level of Mobility Goal 3 --> Sit at edge of bed  -KG       Row Name 02/27/25 1019 02/27/25 0937       Functional Assessment    Outcome  Measure Options AM-PAC 6 Clicks Daily Activity (OT)  - AM-PAC 6 Clicks Basic Mobility (PT)  -YESSY              User Key  (r) = Recorded By, (t) = Taken By, (c) = Cosigned By      Initials Name Provider Type    KG Vanessa Nails Physical Therapist    Monica Rose, OT Occupational Therapist                    Occupational Therapy Education       Title: PT OT SLP Therapies (In Progress)       Topic: Occupational Therapy (In Progress)       Point: ADL training (In Progress)       Description:   Instruct learner(s) on proper safety adaptation and remediation techniques during self care or transfers.   Instruct in proper use of assistive devices.                  Learning Progress Summary            Patient Acceptance, E, NR,NL by MR at 2/27/2025 1020                      Point: Home exercise program (In Progress)       Description:   Instruct learner(s) on appropriate technique for monitoring, assisting and/or progressing therapeutic exercises/activities.                  Learning Progress Summary            Patient Acceptance, E, NR,NL by MR at 2/27/2025 1020                      Point: Precautions (In Progress)       Description:   Instruct learner(s) on prescribed precautions during self-care and functional transfers.                  Learning Progress Summary            Patient Acceptance, E, NR,NL by MR at 2/27/2025 1020                      Point: Body mechanics (In Progress)       Description:   Instruct learner(s) on proper positioning and spine alignment during self-care, functional mobility activities and/or exercises.                  Learning Progress Summary            Patient Acceptance, E, NR,NL by MR at 2/27/2025 1020                                      User Key       Initials Effective Dates Name Provider Type Discipline     09/22/22 -  Monica Mon, OT Occupational Therapist OT                  OT Recommendation and Plan  Planned Therapy Interventions (OT): activity tolerance training,  adaptive equipment training, BADL retraining, functional balance retraining, IADL retraining, passive ROM/stretching, transfer/mobility retraining, strengthening exercise, ROM/therapeutic exercise, patient/caregiver education/training, occupation/activity based interventions  Therapy Frequency (OT): daily  Plan of Care Review  Plan of Care Reviewed With: patient  Progress: no change  Outcome Evaluation: Patient presenting below functional his functional baseline w/ mobility, transfers and balance requiring increased need for assist w/ ADLs. Pt required significant assist w/ bed mobility, LB dressing and balance. Deficits warrant skilled OT services. Recommend SNF when medically appropriate for d/c.     Time Calculation:   Evaluation Complexity (OT)  Review Occupational Profile/Medical/Therapy History Complexity: expanded/moderate complexity  Assessment, Occupational Performance/Identification of Deficit Complexity: 3-5 performance deficits  Clinical Decision Making Complexity (OT): detailed assessment/moderate complexity  Overall Complexity of Evaluation (OT): moderate complexity     Time Calculation- OT       Row Name 02/27/25 1020             Time Calculation- OT    OT Start Time 0834  -MR      OT Received On 02/27/25  -MR      OT Goal Re-Cert Due Date 03/09/25  -MR         Untimed Charges    OT Eval/Re-eval Minutes 46  -MR         Total Minutes    Untimed Charges Total Minutes 46  -MR       Total Minutes 46  -MR                User Key  (r) = Recorded By, (t) = Taken By, (c) = Cosigned By      Initials Name Provider Type    Monica Rose OT Occupational Therapist                  Therapy Charges for Today       Code Description Service Date Service Provider Modifiers Qty    [id number]  OT EVAL MOD COMPLEXITY 4 2/27/2025 Monica Mon OT GO 1                 Monica Mon OT  2/27/2025

## 2025-02-27 NOTE — THERAPY EVALUATION
Patient Name: Val Nassar Jr.  : 1960    MRN: [id number]                              Today's Date: 2025       Admit Date: 2025    Visit Dx:     ICD-10-CM ICD-9-CM   1. Septic shock  A41.9 038.9    R65.21 785.52     995.92   2. Acute UTI (urinary tract infection)  N39.0 599.0   3. Hypotension, unspecified hypotension type  I95.9 458.9   4. Severe malnutrition  E43 261   5. Squamous cell carcinoma of esophagus  C15.9 150.9   6. Atelectasis  J98.11 518.0   7. Oropharyngeal dysphagia  R13.12 787.22     Patient Active Problem List   Diagnosis    Acute gastric ulcer with hemorrhage    Alcohol abuse, uncomplicated    Anxiety disorder, unspecified    Diverticulum of bladder    Duodenal ulcer, unspecified as acute or chronic, without hemorrhage or perforation    Elevation of level of transaminase and lactic acid dehydrogenase (LDH)    Gastro-esophageal reflux disease without esophagitis    Hyperglycemia, unspecified    Hyperlipidemia, unspecified    Melena    Nicotine dependence, cigarettes, uncomplicated    Tobacco use    Severe malnutrition    Duodenal ulcer    Iron deficiency anemia    Cirrhosis of liver    Acute blood loss anemia    Squamous cell carcinoma of esophagus    Duodenal stenosis    Generalized weakness    Orthostatic hypotension    UTI (urinary tract infection)    Malignant neoplasm of upper third of esophagus    Iron deficiency anemia    GALILEO (acute kidney injury)    Metabolic acidosis    Septic shock due to Pseudomonas species    Pneumonia of both lower lobes due to Pseudomonas species    Bacteremia due to Pseudomonas     Past Medical History:   Diagnosis Date    Anemia     Cancer     ESOPHAGEAL    Cirrhosis     Duodenal ulcer     Gastric ulcer     GERD (gastroesophageal reflux disease)     History of alcohol abuse     History of radiation therapy 2024    esophagus    HLD (hyperlipidemia)     Mood disorder      Past Surgical History:   Procedure Laterality Date     ENDOSCOPY N/A 12/26/2023    Procedure: ESOPHAGOGASTRODUODENOSCOPY;  Surgeon: Wesly Mckeon MD;  Location: [facility] ENDOSCOPY;  Service: Gastroenterology;  Laterality: N/A;    VENOUS ACCESS DEVICE (PORT) INSERTION Right 04/25/2024      General Information       Row Name 02/27/25 0920          Physical Therapy Time and Intention    Document Type evaluation  -KG     Mode of Treatment physical therapy  -KG       Row Name 02/27/25 0920          General Information    Patient Profile Reviewed yes  -KG     Prior Level of Function --  per chart, was living indep at home until hospitalizations this year, recently admitted from a SNF  -KG     Existing Precautions/Restrictions oxygen therapy device and L/min;fall;other (see comments)  NG tube, difficulty with weak voice affecting communication abilities, check vitals  -KG     Barriers to Rehab medically complex;previous functional deficit  -KG       Row Name 02/27/25 0920          Living Environment    People in Home other (see comments)  recently admitted from SNF  -KG       Row Name 02/27/25 0920          Home Main Entrance    Number of Stairs, Main Entrance one;other (see comments)  per chart 1 step to enter  -KG       Row Name 02/27/25 0920          Stairs Within Home, Primary    Number of Stairs, Within Home, Primary none  -KG       Row Name 02/27/25 0920          Cognition    Orientation Status (Cognition) other (see comments)  difficulty understanding pt's verbal skills due to weak voice, appears to verbalize himself  -KG       Row Name 02/27/25 0920          Safety Issues/Impairments Affecting Functional Mobility    Safety Issues Affecting Function (Mobility) insight into deficits/self-awareness;friction/shear risk;safety precaution awareness;problem-solving;judgment;safety precautions follow-through/compliance;sequencing abilities  -KG     Impairments Affecting Function (Mobility) balance;endurance/activity tolerance;strength;shortness of breath  -KG               User  Key  (r) = Recorded By, (t) = Taken By, (c) = Cosigned By      Initials Name Provider Type    YESSY Vanessa Nails Physical Therapist                   Mobility       Row Name 02/27/25 0929          Bed Mobility    Bed Mobility supine-sit-supine  -KG     Supine-Sit-Supine [place] (Bed Mobility) maximum assist (25% patient effort);2 person assist;verbal cues  -KG     Assistive Device (Bed Mobility) head of bed elevated;repositioning sheet  -KG     Comment, (Bed Mobility) extra time and effort, needed assist with all aspects of bed mobility, extra time and effort but pt very motivated to help, once sitting EOB and after UE placement, balance cues able to maintain sitting CGA for 1 min.  Then, HR escalated to 185, pt then needed to be placed back in supine and required 2-3 mins before HR decreased to 100.  RN called to room  -KG       Row Name 02/27/25 0929          Transfers    Comment, (Transfers) deferred, pt not in lift room  -KG               User Key  (r) = Recorded By, (t) = Taken By, (c) = Cosigned By      Initials Name Provider Type    YESSY Vanessa Nails Physical Therapist                   Obj/Interventions       Row Name 02/27/25 0931          Range of Motion Comprehensive    General Range of Motion no range of motion deficits identified  -KG       Row Name 02/27/25 0931          Strength Comprehensive (MMT)    General Manual Muscle Testing (MMT) Assessment lower extremity strength deficits identified  -KG     Comment, General Manual Muscle Testing (MMT) Assessment 2+/5 BLE  -KG       Row Name 02/27/25 0931          Motor Skills    Therapeutic Exercise other (see comments)  ankle pumps, heel slides, AAROM for SAQ  -KG       Row Name 02/27/25 0931          Balance    Balance Assessment sitting static balance  -KG     Static Sitting Balance maximum assist;contact guard;1-person assist;verbal cues;2-person assist  -KG     Position, Sitting Balance supported;unsupported  -KG     Balance Interventions  sitting  -KG               User Key  (r) = Recorded By, (t) = Taken By, (c) = Cosigned By      Initials Name Provider Type    YESSY Vanessa Nails Physical Therapist                   Goals/Plan       Row Name 02/27/25 0936          Bed Mobility Goal 1 (PT)    Activity/Assistive Device (Bed Mobility Goal 1, PT) sit to supine/supine to sit  -KG     [place] Level/Cues Needed (Bed Mobility Goal 1, PT) moderate assist (50-74% patient effort)  -KG     Time Frame (Bed Mobility Goal 1, PT) short term goal (STG);3 days  -KG     Progress/Outcomes (Bed Mobility Goal 1, PT) continuing progress toward goal  -KG       Row Name 02/27/25 0936          Transfer Goal 1 (PT)    Activity/Assistive Device (Transfer Goal 1, PT) sit-to-stand/stand-to-sit  -KG     [place] Level/Cues Needed (Transfer Goal 1, PT) maximum assist (25-49% patient effort)  -KG     Time Frame (Transfer Goal 1, PT) long term goal (LTG);5 days  -KG     Progress/Outcome (Transfer Goal 1, PT) continuing progress toward goal  -KG               User Key  (r) = Recorded By, (t) = Taken By, (c) = Cosigned By      Initials Name Provider Type    YESSY Vanessa Nails Physical Therapist                   Clinical Impression       Row Name 02/27/25 0934          Pain Scale: FACES Pre/Post-Treatment    Pain: FACES Scale, Pretreatment 0-->no hurt  -KG     Posttreatment Pain Rating 0-->no hurt  -KG       Row Name 02/27/25 0934          Plan of Care Review    Plan of Care Reviewed With patient  -KG     Progress no change  -KG     Outcome Evaluation PT eval performed: Pt presenting below baseline.  max-A x2 for bed mobility.  extra time and effort needed with all aspects of bed mobility, but pt very motivated to help, once sitting EOB and after UE placement/balance cues pt able to maintain sitting CGA for 1 min. Then, HR escalated to 185, pt then needed to be placed back in supine and required 2-3 mins before HR decreased to 100. RN called to room.  Recommend return to  SNF  -KG       Row Name 02/27/25 0934          Therapy Assessment/Plan (PT)    Patient/Family Therapy Goals Statement (PT) -  -KG     Rehab Potential (PT) fair  -KG     Criteria for Skilled Interventions Met (PT) yes;meets criteria;skilled treatment is necessary  -KG     Therapy Frequency (PT) daily  -KG     Predicted Duration of Therapy Intervention (PT) 5 days  -KG       Row Name 02/27/25 0934          Vital Signs    Pretreatment Heart Rate (beats/min) 95  -KG     Intratreatment Heart Rate (beats/min) 185  -KG     Posttreatment Heart Rate (beats/min) 103  -KG     Pre SpO2 (%) 95  -KG     O2 Delivery Pre Treatment nasal cannula  -KG     Intra SpO2 (%) 94  -KG     O2 Delivery Intra Treatment nasal cannula  -KG     Post SpO2 (%) 93  -KG     O2 Delivery Post Treatment nasal cannula  -KG       Row Name 02/27/25 0934          Positioning and Restraints    Pre-Treatment Position in bed  -KG     Post Treatment Position bed  -KG     In Bed notified nsg;fowlers;call light within reach;encouraged to call for assist;exit alarm on  -KG               User Key  (r) = Recorded By, (t) = Taken By, (c) = Cosigned By      Initials Name Provider Type    KG Vanessa Nails Physical Therapist                   Outcome Measures       Row Name 02/27/25 0937          How much help from another person do you currently need...    Turning from your back to your side while in flat bed without using bedrails? 2  -KG     Moving from lying on back to sitting on the side of a flat bed without bedrails? 2  -KG     Moving to and from a bed to a chair (including a wheelchair)? 1  -KG     Standing up from a chair using your arms (e.g., wheelchair, bedside chair)? 1  -KG     Climbing 3-5 steps with a railing? 1  -KG     To walk in hospital room? 1  -KG     AM-PAC 6 Clicks Score (PT) 8  -KG     Highest Level of Mobility Goal 3 --> Sit at edge of bed  -KG       Row Name 02/27/25 0937          Functional Assessment    Outcome Measure Options AM-PAC 6  Clicks Basic Mobility (PT)  -KG               User Key  (r) = Recorded By, (t) = Taken By, (c) = Cosigned By      Initials Name Provider Type    Vanessa Hanna Physical Therapist                                 Physical Therapy Education       Title: PT OT SLP Therapies (In Progress)       Topic: Physical Therapy (In Progress)       Point: Mobility training (In Progress)       Learning Progress Summary            Patient Acceptance, E, NR by KG at 2/27/2025 0938                      Point: Home exercise program (In Progress)       Learning Progress Summary            Patient Acceptance, E, NR by KG at 2/27/2025 0938                      Point: Body mechanics (In Progress)       Learning Progress Summary            Patient Acceptance, E, NR by KG at 2/27/2025 0938                      Point: Precautions (In Progress)       Learning Progress Summary            Patient Acceptance, E, NR by KG at 2/27/2025 0938                                      User Key       Initials Effective Dates Name Provider Type Discipline    KG 12/13/24 -  Vanessa Nails Physical Therapist PT                  PT Recommendation and Plan     Progress: no change  Outcome Evaluation: PT eval performed: Pt presenting below baseline.  max-A x2 for bed mobility.  extra time and effort needed with all aspects of bed mobility, but pt very motivated to help, once sitting EOB and after UE placement/balance cues pt able to maintain sitting CGA for 1 min. Then, HR escalated to 185, pt then needed to be placed back in supine and required 2-3 mins before HR decreased to 100. RN called to room.  Recommend return to SNF     Time Calculation:         PT Charges       Row Name 02/27/25 0938             Time Calculation    Start Time 0834  -KG      PT Received On 02/27/25  -KG      PT Goal Re-Cert Due Date 03/09/25  -KG                User Key  (r) = Recorded By, (t) = Taken By, (c) = Cosigned By      Initials Name Provider Type    Vanessa Hanna  Physical Therapist                  Therapy Charges for Today       Code Description Service Date Service Provider Modifiers Qty    96281427840 HC PT EVAL MOD COMPLEXITY 4 2/27/2025 Vanessa Nails GP 1            PT G-Codes  Outcome Measure Options: AM-PAC 6 Clicks Basic Mobility (PT)  AM-PAC 6 Clicks Score (PT): 8  PT Discharge Summary  Anticipated Discharge Disposition (PT): skilled nursing facility    Vanessa Nails  2/27/2025

## 2025-02-27 NOTE — PROGRESS NOTES
[facility]  PROGRESS NOTE    Patient Name: Val Nassar Jr.  : 1960  MRN: [id number]    Date of Admission: 2025  Primary Care Physician: Wesly Minor MD    Subjective   Subjective     CC:  AMS and SOA    HPI:  Patient was transferred to telemetry last evening from ICU. Patient is difficult to understand due to poor dentition as well as hx of metastatic esophageal cancer. Nursing reports he went into Afib RVR last evening and this morning and spontaneously converted to sinus tachycardia.     Objective   Objective     Vital Signs:   Temp:  [98 °F (36.7 °C)-100.4 °F (38 °C)] 98 °F (36.7 °C)  Heart Rate:  [] 160  Resp:  [18-26] 20  BP: ()/(56-90) 120/62  Flow (L/min) (Oxygen Therapy):  [2-3] 2     Physical Exam:  Constitutional: Chronically ill appearing male lying in bed in mild distress  HENT: NCAT, mucous membranes dry, poor dentition, NG tube in place in R nare  Respiratory: Clear to auscultation bilaterally, nonlabored respirations on 2L NC  Cardiovascular: RRR, no murmurs  Gastrointestinal: Soft, nontender, nondistended  : Lubin catheter draining clear yellow urine  Musculoskeletal: Muscle tone consistent throughout  Psychiatric: Flat affect, cooperative  Neurologic: Speech difficult to understand  Skin: No rashes on exposed skin    Results Reviewed:  LAB RESULTS:      Lab 25  1347 25  0114 25  0319 25  2044 25  0328 25  1151 25  0434 25  1135 25  0318 25  0327   WBC  --  16.64* 20.97*  --  23.17*  --  21.85* 25.16* 22.13* 25.02*   HEMOGLOBIN 6.9* 7.1* 7.6* 7.8* 8.2*   < > 7.0* 8.1* 8.5* 10.5*   HEMATOCRIT 22.2* 22.0* 23.1* 23.6* 24.4*   < > 20.4* 25.0* 26.9* 32.5*   PLATELETS  --  233 233  --  199  --  148 128* 95* 79*   NEUTROS ABS  --  14.33*  --   --   --   --  19.29*  --  17.80* 23.77*   IMMATURE GRANS (ABS)  --  0.16*  --   --   --   --  0.56*  --  1.24*  --     LYMPHS ABS  --  0.51*  --   --   --   --  0.51*  --  0.70  --    MONOS ABS  --  1.62*  --   --   --   --  1.46*  --  2.05*  --    EOS ABS  --  0.00  --   --   --   --  0.01  --  0.29 0.00   MCV  --  96.5 95.1  --  93.5  --  91.1 96.2 95.7 95.6   CRP  --   --   --   --   --   --  11.29*  --   --   --    PROCALCITONIN  --   --   --   --   --   --   --   --   --  5.87*   LACTATE  --   --   --   --   --   --   --   --   --  1.8    < > = values in this interval not displayed.         Lab 02/27/25  0114 02/26/25 0319 02/25/25  2045 02/25/25  0328 02/24/25  0434 02/23/25  1135 02/22/25  0318 02/21/25  0327   SODIUM 147* 143 142 138 134* 133*   < > 138   POTASSIUM 4.2 4.6 4.1 4.6 4.8 4.5   < > 4.0   CHLORIDE 107 106 107 106 104 103   < > 103   CO2 26.0 27.0 24.0 23.0 19.0* 23.0   < > 23.0   ANION GAP 14.0 10.0 11.0 9.0 11.0 7.0   < > 12.0   BUN 93* 89* 88* 86* 73* 67*   < > 53*   CREATININE 1.15 1.12 1.15 1.31* 1.29* 1.43*   < > 0.80   EGFR 70.6 72.9 70.6 60.4 61.5 54.4*   < > 98.2   GLUCOSE 156* 109* 133* 163* 220* 163*   < > 165*   CALCIUM 9.4 9.6 9.4 9.4 8.9 8.9   < > 8.5*   IONIZED CALCIUM 1.22  --   --   --   --   --   --   --    MAGNESIUM 2.4 2.2 1.8 2.0 2.0 2.0   < > 1.8   PHOSPHORUS  --   --   --  4.0 3.4 3.8  --  2.7    < > = values in this interval not displayed.         Lab 02/21/25  0327   TOTAL PROTEIN 6.2   ALBUMIN 2.6*   GLOBULIN 3.6   ALT (SGPT) 80*   AST (SGOT) 52*   BILIRUBIN 0.6   ALK PHOS 138*             Lab 02/24/25  0434   TRIGLYCERIDES 75         Lab 02/24/25  1151 02/24/25  0434   IRON  --  36*   IRON SATURATION (TSAT)  --  18*   TIBC  --  206*   TRANSFERRIN  --  138*   ABO TYPING A  --    RH TYPING Positive  --    ANTIBODY SCREEN Negative  --          Lab 02/24/25  2255 02/24/25  1034 02/24/25  0403   PH, ARTERIAL 7.362 7.466* 7.449   PCO2, ARTERIAL 40.5 30.9* 31.1*   PO2 ART 98.5 103.0 89.2   FIO2 36 40 40   HCO3 ART 23.0 22.3 21.6   BASE EXCESS ART -2.2* -1.2* -2.1*   CARBOXYHEMOGLOBIN 1.5 1.3  1.1     Brief Urine Lab Results  (Last result in the past 365 days)        Color   Clarity   Blood   Leuk Est   Nitrite   Protein   CREAT   Urine HCG        02/11/25 0942 Yellow   Turbid   Moderate (2+)   Large (3+)   Negative   100 mg/dL (2+)                   Microbiology Results Abnormal       Procedure Component Value - Date/Time    Fungus Culture - Wash, Bronchus [[id number]]  (Abnormal) Collected: 02/17/25 1633    Lab Status: Preliminary result Specimen: Wash from Bronchus Updated: 02/23/25 1910     Fungus Culture Candida albicans    Respiratory Culture - Wash, Bronchus [[id number]]  (Abnormal) Collected: 02/17/25 1633    Lab Status: Final result Specimen: Wash from Bronchus Updated: 02/19/25 1057     Respiratory Culture Light growth (2+) Candida albicans      No Normal Respiratory Lissett     Gram Stain Moderate (3+) WBCs seen      Few (2+) Epithelial cells seen      Occasional Budding yeast    Fungus Smear - Wash, Bronchus [[id number]]  (Abnormal) Collected: 02/17/25 1633    Lab Status: Final result Specimen: Wash from Bronchus Updated: 02/18/25 1435     Fungal Stain Fungal elements    Blood Culture - Blood, Arm, Left [[id number]]  (Abnormal)  (Susceptibility) Collected: 02/11/25 0900    Lab Status: Final result Specimen: Blood from Arm, Left Updated: 02/14/25 0635     Blood Culture Pseudomonas aeruginosa     Isolated from Aerobic Bottle     Gram Stain Aerobic Bottle Gram negative bacilli    Narrative:      Less than seven (7) mL's of blood was collected.  Insufficient quantity may yield false negative results.    Susceptibility        Pseudomonas aeruginosa      VIANEY      Cefepime Susceptible      Ceftazidime Susceptible      Ciprofloxacin Susceptible      Levofloxacin Susceptible      Piperacillin + Tazobactam Susceptible                       Susceptibility Comments       Pseudomonas aeruginosa    With the exception of urinary-sourced infections, aminoglycosides should not be used as monotherapy.                Blood Culture - Blood, Hand, Right [[id number]]  (Abnormal) Collected: 02/11/25 0926    Lab Status: Final result Specimen: Blood from Hand, Right Updated: 02/14/25 0635     Blood Culture Pseudomonas aeruginosa     Isolated from Aerobic and Anaerobic Bottles     Gram Stain Anaerobic Bottle Gram negative bacilli      Aerobic Bottle Gram negative bacilli    Narrative:      Less than seven (7) mL's of blood was collected.  Insufficient quantity may yield false negative results.    Refer to previous blood culture collected on 02/11/2025 0900 for MICs    Urine Culture - Urine, Urine, Catheter [[id number]]  (Abnormal)  (Susceptibility) Collected: 02/11/25 0942    Lab Status: Final result Specimen: Urine, Catheter Updated: 02/13/25 1054     Urine Culture >100,000 CFU/mL Pseudomonas aeruginosa    Narrative:      Colonization of the urinary tract without infection is common. Treatment is discouraged unless the patient is symptomatic, pregnant, or undergoing an invasive urologic procedure.    Susceptibility        Pseudomonas aeruginosa      VIANEY      Cefepime Susceptible      Ceftazidime Susceptible      Ciprofloxacin Susceptible      Levofloxacin Susceptible      Piperacillin + Tazobactam Susceptible      Tobramycin Susceptible                           Blood Culture ID, PCR - Blood, Arm, Left [[id number]]  (Abnormal) Collected: 02/11/25 0900    Lab Status: Final result Specimen: Blood from Arm, Left Updated: 02/12/25 0349     BCID, PCR Pseudomonas aeruginosa. Identification by BCID2 PCR     BOTTLE TYPE Aerobic Bottle            XR Chest 1 View    Result Date: 2/27/2025  XR CHEST 1 VW Date of Exam: 2/27/2025 2:18 AM EST Indication: Respiratory failrue Comparison: 2/26/2025 Findings: Right-sided Port-A-Cath and feeding tube are again noted. Right IJ catheter has been placed, tip in the most proximal portion of the SVC. There is no evidence of pneumothorax. Patient's bilateral pulmonary interstitial and airspace disease appears  significantly improved from yesterday's study. No pneumothorax or effusion is seen. Heart appears upper normal size.     Impression: Impression: 1. Right IJ catheter placement, tip in the proximal SVC. No evidence of pneumothorax. 2. Significant improved pulmonary disease, which suggests resolving pneumonia. Electronically Signed: Ibrahima Jett MD  2/27/2025 8:11 AM EST  Workstation ID: [id number]    SLP FEES - Fiberoptic Endo Eval Swallow    Result Date: 2/26/2025  This procedure was auto-finalized with no dictation required.    XR Chest 1 View    Result Date: 2/26/2025  XR CHEST 1 VW Date of Exam: 2/26/2025 3:28 AM EST Indication: Respiratory failrue Comparison: 2/25/2025 Findings:  Feeding tube and left IJ line remain in place. Persistent prominence of the central pulmonary vasculature. Mild improvement in bilateral airspace opacities. Persistent small left pleural effusion      Impression: Impression: There is mild improvement in bilateral airspace disease with persistent small left pleural effusion Electronically Signed: Eder Draper  2/26/2025 7:17 AM EST  Workstation ID: [id number]     Results for orders placed during the hospital encounter of 02/11/25    Adult Transthoracic Echo Complete w/ Color, Spectral and Contrast if Necessary Per Protocol    Interpretation Summary    Left ventricular systolic function is normal. Estimated left ventricular EF = 60%    Left ventricular wall thickness is consistent with mild concentric hypertrophy.    Mild aortic valve stenosis is present.    Aortic valve maximum pressure gradient is 23 mmHg. Aortic valve mean pressure gradient is 14 mmHg.      Current medications:  Scheduled Meds:bumetanide, 1 mg, Intravenous, Q12H  castor oil-balsam peru, 1 Application, Topical, Q12H  fentaNYL, 1 patch, Transdermal, Q72H   And  Check Fentanyl Patch Placement, 1 each, Not Applicable, Q12H  chlorhexidine, 15 mL, Mouth/Throat, Q12H  docusate sodium, 100 mg, Per G Tube, BID  [Held by provider]  enoxaparin, 40 mg, Subcutaneous, Q24H  escitalopram, 10 mg, Per G Tube, Nightly  finasteride, 5 mg, Per G Tube, Daily  gabapentin, 300 mg, Per G Tube, Q8H  insulin regular, 2-7 Units, Subcutaneous, Q6H  ipratropium-albuterol, 3 mL, Nebulization, 4x Daily - RT  metoprolol tartrate, 25 mg, Oral, Q12H  nystatin, , Topical, Q8H  pantoprazole, 40 mg, Intravenous, Q12H  predniSONE, 20 mg, Nasogastric, Q12H  terazosin, 1 mg, Nasogastric, Daily      Continuous Infusions:amiodarone, 0.5 mg/min, Last Rate: 0.5 mg/min (02/27/25 1030)      PRN Meds:.  Calcium Replacement - Follow Nurse / BPA Driven Protocol    dextrose    glucagon (human recombinant)    ipratropium-albuterol    Magnesium Cardiology Dose Replacement - Follow Nurse / BPA Driven Protocol    metoprolol tartrate    oxyCODONE-acetaminophen    Phosphorus Replacement - Follow Nurse / BPA Driven Protocol    polyvinyl alcohol    Potassium Replacement - Follow Nurse / BPA Driven Protocol    sodium chloride    Assessment & Plan   Assessment & Plan     Active Hospital Problems    Diagnosis  POA    **Septic shock due to Pseudomonas species [A41.52, R65.21]  Yes    Pneumonia of both lower lobes due to Pseudomonas species [J15.1]  Yes    Bacteremia due to Pseudomonas [R78.81, B96.5]  Yes    GALILEO (acute kidney injury) [N17.9]  Yes    Metabolic acidosis [E87.20]  Yes    Cirrhosis of liver [K74.60]  Yes    Squamous cell carcinoma of esophagus [C15.9]  Yes    Hyperlipidemia, unspecified [E78.5]  Yes    Tobacco use [Z72.0]  Yes      Resolved Hospital Problems    Diagnosis Date Resolved POA    Hypotension [I95.9] 02/11/2025 Unknown    Suspected UTI [R39.89] 02/12/2025 Yes        Brief Hospital Course to date:  Val Minorfrank Otto. is a 64 y.o. 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 He was found to be hypotensive in the ED and was  given sepsis fluid bolus and ultimately started on Levophed and vasopressin.  Labs were concerning for evidence of GALILEO, UTI present on admission, metabolic acidosis. CT of the chest/abdomen/pelvis concerning for basilar atelectasis versus pneumonia as well as evidence of cystitis, left-sided pyelonephritis, and sigmoid diverticulosis without diverticulitis. Urine and blood cultures grew Pseudomonas and patient has been treated with cefepime. Patient decompensated requiring intubation/mechanical ventilation on 2/12/2025.  Patient required bronchoscopy on 2/17/2025 for mucous plugging. Cultures from bronchoscopy growing usual respiratory kishore. Patient extubated 2/24/2025. Received transfusion on 2/24/2025. Patient was transferred to telemetry 2/27/2025.     Septic shock 2/2 Pseudomonas bacteremia (likely urinary source)  Bilateral pneumonia  Leukocytosis - improving  - S/p Cefepime x 10 days    Acute on chronic anemia  Thrombocytopenia - resolved  Alcoholic cirrhosis  GERD  Hx of metastatic SCC of esophagus  - Hx of upper GI bleed in 12/2023, diagnosed with SCC of esophagus after EGD, reportedly completed chemo and radiation from 4/2/24 - 5/8/24  - Required blood transfusion 2/24/25 in ICU  - Reportedly had black tarry stool per nursing evening of 2/26/25  - Upper endoscopy 2/10/25 ordered for monitoring of SCC esophageal cancer. Showed normal esophagus, no varices, suspected hypertensive gastropathy  - Hgb down to 6.9 on 2/27/25, ordered 2 units of PRBCs  - Holding anticoagulation  - H&H Q6H  - Ammonia in AM  - Continue PPI BID    Paroxysmal atrial fibrillation with RVR  CAD  HLD  - Patient with multiple episodes of Afib RVR with spontaneous conversion to NSR  - Cardiology following: recommend Eliquis 5 BID when appropriate, continue Amiodarone load however likely not ideal given liver dysfunction, started metoprolol 25 BID  - Cardiology recommending Crestor, will hold off initiation for now    Neurogenic bladder  -  Patient reportedly In-N-Out caths 4 times daily at baseline  - Continue Lubin catheter  - Continue tamsulosin and finasteride    Hypernatremia  - Currently receiving tube feeds, will ask Nutrition to adjust free water  - AM labs    Chronic debility  - Admitted to SNF in 2/2024 because patient was unable to care for himself    Anxiety  - Continue Lexapro    Expected Discharge Location and Transportation: [facility]  Expected Discharge   Expected Discharge Date: 3/5/2025; Expected Discharge Time:      VTE Prophylaxis:  Mechanical VTE ppx       AM-PAC 6 Clicks Score (PT): 8 (02/27/25 0937)    CODE STATUS:   Code Status and Medical Interventions: CPR (Attempt to Resuscitate); Full Support   Ordered at: 02/12/25 1029     Level Of Support Discussed With:    Patient     Code Status (Patient has no pulse and is not breathing):    CPR (Attempt to Resuscitate)     Medical Interventions (Patient has pulse or is breathing):    Full Support       Radha Mckeon PA-C  02/27/25

## 2025-02-27 NOTE — CONSULTS
[facility] Cardiology Consult Note    Val Nassar Jr.  1960  [id number]  25    Requesting Physician: Garret Gibson MD    Chief Complaint: New onset atrial fibrillation    History of Present Illness:   Val Nassar Jr. is a 64 y.o. 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  He was diagnosed with septic shock due to UTI (Pseudomonas).  Patient was intubated until around  and was recently transferred to the floor.  He was found to be in A-fib during his hospitalization.  Cardiology was consulted for further evaluation of this.  Upon my evaluation, patient remains quite encephalopathic and does not really respond to questions or commands.  He is currently in normal sinus rhythm.  Bedside nurse had questions about amiodarone therapy which was ordered overnight but has not yet been started.    Prior Cardiac History and Testin.  New onset atrial fibrillation,   2.  H/o CHF and elevated troponin at OSH  -- TTE : EF 60%, mild LVH, mild AS   3.  CAD  -- Cardiac CTA : Moderate LAD stenosis, moderate mid circumflex stenosis, CAD-RADS 3 (50-69% stenosis), normal myocardial morphology and function.  3.  Alcoholic cirrhosis with anemia and thrombocytopenia  4.  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   5.  Hyperlipidemia    Review of Systems   Unable to perform ROS: Mental status change       Past Medical History:   Diagnosis Date    Anemia     Cancer     ESOPHAGEAL    Cirrhosis     Duodenal ulcer     Gastric ulcer     GERD (gastroesophageal reflux disease)     History of alcohol abuse     History of radiation therapy 2024    esophagus    HLD (hyperlipidemia)     Mood disorder        Past Surgical History:   Procedure Laterality Date     ENDOSCOPY N/A 2023    Procedure: ESOPHAGOGASTRODUODENOSCOPY;  Surgeon: Wesly Mckeon MD;  [facility];  Service: Gastroenterology;  Laterality: N/A;    VENOUS ACCESS DEVICE (PORT) INSERTION Right 2024       Family History   Problem Relation Age of Onset    Cancer Mother         LUNG AND BREAST    Breast cancer Mother     Heart attack Father        Social History     Socioeconomic History    Marital status: Single   Tobacco Use    Smoking status: Every Day     Current packs/day: 1.00     Average packs/day: 1 pack/day for 15.0 years (15.0 ttl pk-yrs)     Types: Cigarettes     Passive exposure: Current    Smokeless tobacco: Current   Vaping Use    Vaping status: Every Day    Substances: Nicotine, Flavoring    Devices: Disposable   Substance and Sexual Activity    Alcohol use: Yes     Alcohol/week: 4.0 standard drinks of alcohol     Types: 4 Cans of beer per week     Comment: pt states he drinks 3-4 beers a day a couple times a week    Drug use: Yes     Types: Hydrocodone    Sexual activity: Not Currently     Partners: Female         Current Facility-Administered Medications:     [COMPLETED] amiodarone 150 mg in 100 mL D5W (loading dose), 150 mg, Intravenous, Once, 150 mg at 25 1032 **FOLLOWED BY** [] amiodarone 360 mg in 200 mL D5W infusion, 1 mg/min, Intravenous, Continuous, Held at 25 0237 **FOLLOWED BY** amiodarone 360 mg in 200 mL D5W infusion, 0.5 mg/min, Intravenous, Continuous, Abbe Tubbs PA-C, Last Rate: 16.67 mL/hr at 25 1030, 0.5 mg/min at 25 1030    bumetanide (BUMEX) injection 1 mg, 1 mg, Intravenous, Q12H, Guerrero Regalado MD, 1 mg at 25 1205    Calcium Replacement - Follow Nurse / BPA Driven Protocol, , Not Applicable, PRN, Guerrero Regalado MD    castor oil-balsam peru (VENELEX) ointment 1 Application, 1 Application, Topical, Q12H, Guerrero Regalado MD, 1 Application at 25 0917    fentaNYL (DURAGESIC) 25 MCG/HR  patch 1 patch, 1 patch, Transdermal, Q72H, 1 patch at 02/24/25 1432 **AND** Check Fentanyl Patch Placement, 1 each, Not Applicable, Q12H, Guerrero Regalado MD    chlorhexidine (PERIDEX) 0.12 % solution 15 mL, 15 mL, Mouth/Throat, Q12H, Guerrero Regalado MD, 15 mL at 02/27/25 0917    dextrose (D50W) (25 g/50 mL) IV injection 25 g, 25 g, Intravenous, Q15 Min PRN, Guerrero Regalado MD    docusate sodium (COLACE) liquid 100 mg, 100 mg, Per G Tube, BID, 100 mg at 02/27/25 0926 **AND** [DISCONTINUED] lactulose (CHRONULAC) 10 GM/15ML solution 20 g, 20 g, Per G Tube, TID, Aristeo Kowalski MD, 20 g at 02/23/25 1618    Enoxaparin Sodium (LOVENOX) syringe 40 mg, 40 mg, Subcutaneous, Q24H, Guerrero Regalado MD, 40 mg at 02/27/25 1205    escitalopram (LEXAPRO) tablet 10 mg, 10 mg, Per G Tube, Nightly, Guerrero Regalado MD, 10 mg at 02/26/25 2026    finasteride (PROSCAR) tablet 5 mg, 5 mg, Per G Tube, Daily, Guerrero Regalado MD, 5 mg at 02/27/25 0917    gabapentin (NEURONTIN) capsule 300 mg, 300 mg, Per G Tube, Q8H, Guerrero Regalado MD, 300 mg at 02/27/25 1411    glucagon (GLUCAGEN) injection 1 mg, 1 mg, Intramuscular, Q15 Min PRN, Guerrero Regalado MD    insulin regular (humuLIN R,novoLIN R) injection 2-7 Units, 2-7 Units, Subcutaneous, Q6H, Guerrero Regalado MD, 3 Units at 02/27/25 1220    ipratropium-albuterol (DUO-NEB) nebulizer solution 3 mL, 3 mL, Nebulization, Q4H PRN, Guerrero Regalado MD    ipratropium-albuterol (DUO-NEB) nebulizer solution 3 mL, 3 mL, Nebulization, 4x Daily - RT, Guerrero Regalado MD, 3 mL at 02/27/25 1148    Magnesium Cardiology Dose Replacement - Follow Nurse / BPA Driven Protocol, , Not Applicable, PRN, Guerrero Regalado MD    metoprolol tartrate (LOPRESSOR) injection 5 mg, 5 mg, Intravenous, Q6H, Guerrero Regalado MD, 5 mg at 02/27/25 0916    metoprolol tartrate (LOPRESSOR) injection 5 mg, 5 mg, Intravenous, BID PRN, Guerrero Regalado MD, 5 mg at 02/27/25 1310    nystatin (MYCOSTATIN)  powder, , Topical, Q8H, Guerrero Regalado MD, Given at 02/27/25 1411    oxyCODONE-acetaminophen (PERCOCET) 7.5-325 MG per tablet 1 tablet, 1 tablet, Nasogastric, Q6H PRN, Guerrero Regalado MD    pantoprazole (PROTONIX) injection 40 mg, 40 mg, Intravenous, Q12H, Guerrero Regalado MD, 40 mg at 02/27/25 0916    Phosphorus Replacement - Follow Nurse / BPA Driven Protocol, , Not Applicable, PRN, Guerrero Regalado MD    polyvinyl alcohol (LIQUIFILM) 1.4 % ophthalmic solution 2 drop, 2 drop, Both Eyes, Q1H PRN, Guerrero Regalado MD, 2 drop at 02/16/25 0846    Potassium Replacement - Follow Nurse / BPA Driven Protocol, , Not Applicable, PRN, Guerrero Regalado MD    predniSONE (DELTASONE) tablet 20 mg, 20 mg, Nasogastric, Q12H, Jaymie Sheikh, PharmD, 20 mg at 02/27/25 0916    ProSource No Carb oral solution 30 mL, 30 mL, Nasogastric, Daily, Guerrero Regalado MD, 30 mL at 02/27/25 0917    sodium chloride 0.9 % flush 10 mL, 10 mL, Intravenous, PRN, Guerrero Regalado MD    terazosin (HYTRIN) capsule 1 mg, 1 mg, Nasogastric, Daily, Guerrero Regalado MD, 1 mg at 02/27/25 0916    No Known Allergies    Objective:  Vitals:    02/27/25 1100 02/27/25 1148 02/27/25 1206 02/27/25 1310   BP: 124/62  118/73 120/62   BP Location: Left arm      Patient Position: Lying      Pulse: 89 120 96 (!) 160   Resp: 20 20     Temp:       TempSrc:       SpO2: 95% 96%     Weight:       Height:           Vitals reviewed.   Constitutional:       Appearance: Obese. Chronically ill-appearing.   Neck:      Vascular: No JVD.   Pulmonary:      Effort: Pulmonary effort is normal.      Breath sounds: Normal breath sounds.   Cardiovascular:      Normal rate. Regular rhythm. Normal S1. Normal S2.       Murmurs: There is no murmur.      No gallop.  No click. No rub.   Pulses:     Intact distal pulses.   Edema:     Peripheral edema absent.   Abdominal:      General: There is no distension.      Palpations: Abdomen is soft.      Tenderness: There is no  abdominal tenderness.   Skin:     General: Skin is warm and dry.   Psychiatric:         Behavior: Behavior is uncooperative.         Results Review:   Results from last 7 days   Lab Units 02/27/25  0114 02/22/25 0318 02/21/25  0327   SODIUM mmol/L 147*   < > 138   POTASSIUM mmol/L 4.2   < > 4.0   CHLORIDE mmol/L 107   < > 103   CO2 mmol/L 26.0   < > 23.0   BUN mg/dL 93*   < > 53*   CREATININE mg/dL 1.15   < > 0.80   CALCIUM mg/dL 9.4   < > 8.5*   BILIRUBIN mg/dL  --   --  0.6   ALK PHOS U/L  --   --  138*   ALT (SGPT) U/L  --   --  80*   AST (SGOT) U/L  --   --  52*   GLUCOSE mg/dL 156*   < > 165*    < > = values in this interval not displayed.         Results from last 7 days   Lab Units 02/27/25  0114 02/26/25  0319 02/25/25 2044 02/25/25 0328   WBC 10*3/mm3 16.64* 20.97*  --  23.17*   HEMOGLOBIN g/dL 7.1* 7.6* 7.8* 8.2*   HEMATOCRIT % 22.0* 23.1* 23.6* 24.4*   PLATELETS 10*3/mm3 233 233  --  199         Results from last 7 days   Lab Units 02/27/25  0114   MAGNESIUM mg/dL 2.4     Results from last 7 days   Lab Units 02/24/25  0434   TRIGLYCERIDES mg/dL 75       I personally viewed and interpreted the patient's EKG/Telemetry data     Assessment / Plan:     64-year-old male with alcoholic cirrhosis, known CAD, 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    Paroxysmal atrial fibrillation  Chronic anemia  [id number] at least 3.  No known history of stroke or MI.  Confirmed A-fib RVR on ECG earlier today. Spontaneously converted with baseline ECG now normal.  Patient needs anticoagulation for stroke prophylaxis considering his elevated stroke risk.  Would start Eliquis 5 twice daily as soon as deemed appropriate.    Will defer timing of anticoagulation to hospitalist depending on whether any further procedures planned during this hospitalization.  Patient is anemic with  unidentified source to date.  Okay to  complete initial Amio load but chronic Amio probably not best option for him with his liver dysfunction  Prefer oral metoprolol to be given per NG.  Would avoid aggressive lowering of heart rate with IV metoprolol unless there is hemodynamic instability.  Starting metoprolol 25 twice daily.    Chronic HFpEF  Echo this admission showed normal EF with mild LVH.  Mild AS.  No clinical signs of heart failure. Blood pressures are normal.  No indication for diuretics.    CAD  Elevated troponin  Elevated troponin during hospitalization likely a type II/demand ischemia event.  Patient has known CAD based on CT scan from years ago  I personally reviewed his CT chest from this admission and he does have coronary calcium correlating to his previous findings  Eliquis monotherapy should be sufficient.  Would avoid aspirin to prevent bleeding in a cirrhotic with thrombocytopenia  Would start rosuvastatin 20 and monitor liver function    Alcoholic cirrhosis  Hematologic abnormalities in the setting of cirrhosis will make anticoagulation difficult and potentially increase bleeding risk.  Anticoagulate, as above, with caution.  Patient is quite obtunded and lethargic.  Would consider checking ammonia level and treating for hepatic encephalopathy  Remainder of management per hospitalist    Patient will be seen by cardiology team tomorrow.  As long as stable from a cardiac standpoint, can be seen as needed thereafter    Thank you for allowing me to participate in the care of your patient. Please do not hesitate to contact me with additional questions or concerns.     Electronically signed by Aristeo Hoskins MD, 02/27/25, 2:59 PM EST.

## 2025-02-27 NOTE — NURSING NOTE
WOC consulted for specialty bed, transferred from ICU    Patient high risk for pressure injury development.    Will order Umano-low-air-loss mattress from [facility].    Specialty beds do not replace turning.    Pressure injury prevention protocol.    Continue with current plan of care regarding all wounds.  See orders.    Jere Boles RN, BSN,  CWOCN  Wound, Ostomy and Continence (WOC) Department  [facility]

## 2025-02-27 NOTE — PLAN OF CARE
Problem: Adult Inpatient Plan of Care  Goal: Plan of Care Review  Outcome: Progressing  Goal: Patient-Specific Goal (Individualized)  Outcome: Progressing  Goal: Absence of Hospital-Acquired Illness or Injury  Outcome: Progressing  Intervention: Identify and Manage Fall Risk  Description: Perform standard risk assessment on admission using a validated tool or comprehensive approach appropriate to the patient; reassess fall risk frequently, with change in status or transfer to another level of care.  Communicate risk to interprofessional healthcare team; ensure fall risk visible cue.  Determine need for increased observation, equipment and environmental modification, as well as use of supportive, nonskid footwear.  Adjust safety measures to individual needs and identified risk factors.  Reinforce the importance of active participation with fall risk prevention, safety, and physical activity with the patient and family.  Perform regular intentional rounding to assess need for position change, pain assessment and personal needs, including assistance with toileting.  Recent Flowsheet Documentation  Taken 2/27/2025 1600 by Laura Silva, RN  Safety Promotion/Fall Prevention:   activity supervised   assistive device/personal items within reach   clutter free environment maintained   fall prevention program maintained   nonskid shoes/slippers when out of bed   room organization consistent   safety round/check completed  Taken 2/27/2025 1200 by Laura Silva, RN  Safety Promotion/Fall Prevention:   activity supervised   assistive device/personal items within reach   clutter free environment maintained   fall prevention program maintained   nonskid shoes/slippers when out of bed   room organization consistent   safety round/check completed  Taken 2/27/2025 0913 by Laura Silva, RN  Safety Promotion/Fall Prevention:   activity supervised   assistive device/personal items within reach   clutter free environment  maintained   fall prevention program maintained   nonskid shoes/slippers when out of bed   room organization consistent   safety round/check completed  Intervention: Prevent Skin Injury  Description: Perform a screening for skin injury risk, such as pressure or moisture-associated skin damage on admission and at regular intervals throughout hospital stay.  Keep all areas of skin (especially folds) clean and dry.  Maintain adequate skin hydration.  Relieve and redistribute pressure and protect bony prominences and skin at risk for injury; implement measures based on patient-specific risk factors.  Match turning and repositioning schedule to clinical condition.  Encourage weight shift frequently; assist with reposition if unable to complete independently.  Float heels off bed; avoid pressure on the Achilles tendon.  Keep skin free from extended contact with medical devices.  Optimize nutrition and hydration.  Encourage functional activity and mobility, as early as tolerated.  Use aids (e.g., slide boards, mechanical lift) during transfer.  Recent Flowsheet Documentation  Taken 2/27/2025 1600 by Laura Silva RN  Skin Protection:   incontinence pads utilized   protective footwear used   silicone foam dressing in place  Taken 2/27/2025 1200 by Luara Silva RN  Body Position:   turned   right  Skin Protection:   incontinence pads utilized   protective footwear used   silicone foam dressing in place  Taken 2/27/2025 0913 by Laura Silva RN  Skin Protection:   incontinence pads utilized   protective footwear used   silicone foam dressing in place  Intervention: Prevent Infection  Description: Maintain skin and mucous membrane integrity; promote hand, oral and pulmonary hygiene.  Optimize fluid balance, nutrition, sleep and glycemic control to maximize infection resistance.  Identify potential sources of infection early to prevent or mitigate progression of infection (e.g., wound, lines, devices).  Evaluate  ongoing need for invasive devices; remove promptly when no longer indicated.  Review vaccination status.  Recent Flowsheet Documentation  Taken 2/27/2025 1600 by Laura Silva RN  Infection Prevention:   environmental surveillance performed   equipment surfaces disinfected   hand hygiene promoted   personal protective equipment utilized   rest/sleep promoted   single patient room provided  Taken 2/27/2025 1200 by Laura Silva RN  Infection Prevention:   environmental surveillance performed   equipment surfaces disinfected   hand hygiene promoted   personal protective equipment utilized   single patient room provided   rest/sleep promoted  Taken 2/27/2025 0913 by Laura Silva RN  Infection Prevention:   environmental surveillance performed   equipment surfaces disinfected   hand hygiene promoted   personal protective equipment utilized   rest/sleep promoted  Goal: Optimal Comfort and Wellbeing  Outcome: Progressing  Intervention: Monitor Pain and Promote Comfort  Description: Assess pain level, treatment efficacy and patient response at regular intervals using a consistent pain scale.  Consider the presence and impact of preexisting chronic pain.  Encourage patient and caregiver involvement in pain assessment, interventions and safety measures.  Promote activity; balance with sleep and rest to enhance healing.  Recent Flowsheet Documentation  Taken 2/27/2025 1200 by Laura Silva RN  Pain Management Interventions:   pillow support provided   position adjusted   quiet environment facilitated  Taken 2/27/2025 0913 by Laura Silva RN  Pain Management Interventions:   position adjusted   pillow support provided   quiet environment facilitated  Intervention: Provide Person-Centered Care  Description: Use a family-focused approach to care; encourage support system presence and participation.  Develop trust and rapport by proactively providing information, encouraging questions, addressing concerns and  offering reassurance.  Acknowledge emotional response to hospitalization.  Recognize and utilize personal coping strategies and strengths; develop goals via shared decision-making.  Honor spiritual and cultural preferences.  Recent Flowsheet Documentation  Taken 2/27/2025 0913 by Laura Silva RN  Trust Relationship/Rapport:   care explained   reassurance provided  Goal: Readiness for Transition of Care  Outcome: Progressing   Goal Outcome Evaluation:

## 2025-02-27 NOTE — PLAN OF CARE
Goal Outcome Evaluation:  Plan of Care Reviewed With: patient        Progress: no change  Outcome Evaluation: Patient presenting below functional his functional baseline w/ mobility, transfers and balance requiring increased need for assist w/ ADLs. Pt required significant assist w/ bed mobility, LB dressing and balance. Deficits warrant skilled OT services. Recommend SNF when medically appropriate for d/c.    Anticipated Discharge Disposition (OT): skilled nursing facility

## 2025-02-28 ENCOUNTER — APPOINTMENT (OUTPATIENT)
Dept: GENERAL RADIOLOGY | Facility: HOSPITAL | Age: 65
DRG: 870 | End: 2025-02-28
Payer: MEDICARE

## 2025-02-28 LAB
AMMONIA BLD-SCNC: 75 UMOL/L (ref 16–60)
ANION GAP SERPL CALCULATED.3IONS-SCNC: 12 MMOL/L (ref 5–15)
BH BB BLOOD EXPIRATION DATE: NORMAL
BH BB BLOOD EXPIRATION DATE: NORMAL
BH BB BLOOD TYPE BARCODE: 6200
BH BB BLOOD TYPE BARCODE: 6200
BH BB DISPENSE STATUS: NORMAL
BH BB DISPENSE STATUS: NORMAL
BH BB PRODUCT CODE: NORMAL
BH BB PRODUCT CODE: NORMAL
BH BB UNIT NUMBER: NORMAL
BH BB UNIT NUMBER: NORMAL
BUN SERPL-MCNC: 92 MG/DL (ref 8–23)
BUN/CREAT SERPL: 92 (ref 7–25)
CALCIUM SPEC-SCNC: 9.6 MG/DL (ref 8.6–10.5)
CHLORIDE SERPL-SCNC: 110 MMOL/L (ref 98–107)
CO2 SERPL-SCNC: 28 MMOL/L (ref 22–29)
CREAT SERPL-MCNC: 1 MG/DL (ref 0.76–1.27)
CROSSMATCH INTERPRETATION: NORMAL
CROSSMATCH INTERPRETATION: NORMAL
DEPRECATED RDW RBC AUTO: 63.8 FL (ref 37–54)
EGFRCR SERPLBLD CKD-EPI 2021: 83.5 ML/MIN/1.73
ERYTHROCYTE [DISTWIDTH] IN BLOOD BY AUTOMATED COUNT: 20.4 % (ref 12.3–15.4)
FOLATE SERPL-MCNC: 15.2 NG/ML (ref 4.78–24.2)
GLUCOSE BLDC GLUCOMTR-MCNC: 131 MG/DL (ref 70–130)
GLUCOSE BLDC GLUCOMTR-MCNC: 153 MG/DL (ref 70–130)
GLUCOSE BLDC GLUCOMTR-MCNC: 193 MG/DL (ref 70–130)
GLUCOSE SERPL-MCNC: 118 MG/DL (ref 65–99)
HCT VFR BLD AUTO: 26.7 % (ref 37.5–51)
HCT VFR BLD AUTO: 29.8 % (ref 37.5–51)
HCT VFR BLD AUTO: 30.2 % (ref 37.5–51)
HGB BLD-MCNC: 8.6 G/DL (ref 13–17.7)
HGB BLD-MCNC: 9.2 G/DL (ref 13–17.7)
HGB BLD-MCNC: 9.6 G/DL (ref 13–17.7)
MCH RBC QN AUTO: 29.8 PG (ref 26.6–33)
MCHC RBC AUTO-ENTMCNC: 31.8 G/DL (ref 31.5–35.7)
MCV RBC AUTO: 93.8 FL (ref 79–97)
PLATELET # BLD AUTO: 245 10*3/MM3 (ref 140–450)
PMV BLD AUTO: 9.8 FL (ref 6–12)
POTASSIUM SERPL-SCNC: 4.2 MMOL/L (ref 3.5–5.2)
QT INTERVAL: 316 MS
QT INTERVAL: 354 MS
QT INTERVAL: 370 MS
QT INTERVAL: 390 MS
QTC INTERVAL: 457 MS
QTC INTERVAL: 460 MS
QTC INTERVAL: 492 MS
QTC INTERVAL: 506 MS
RBC # BLD AUTO: 3.22 10*6/MM3 (ref 4.14–5.8)
SODIUM SERPL-SCNC: 150 MMOL/L (ref 136–145)
UNIT  ABO: NORMAL
UNIT  ABO: NORMAL
UNIT  RH: NORMAL
UNIT  RH: NORMAL
VIT B12 BLD-MCNC: >2000 PG/ML (ref 211–946)
WBC NRBC COR # BLD AUTO: 18.39 10*3/MM3 (ref 3.4–10.8)

## 2025-02-28 PROCEDURE — 82948 REAGENT STRIP/BLOOD GLUCOSE: CPT

## 2025-02-28 PROCEDURE — 85018 HEMOGLOBIN: CPT | Performed by: PHYSICIAN ASSISTANT

## 2025-02-28 PROCEDURE — 82140 ASSAY OF AMMONIA: CPT

## 2025-02-28 PROCEDURE — 82607 VITAMIN B-12: CPT | Performed by: INTERNAL MEDICINE

## 2025-02-28 PROCEDURE — 93010 ELECTROCARDIOGRAM REPORT: CPT | Performed by: STUDENT IN AN ORGANIZED HEALTH CARE EDUCATION/TRAINING PROGRAM

## 2025-02-28 PROCEDURE — 94690 O2 UPTK REST INDIRECT: CPT | Performed by: INTERNAL MEDICINE

## 2025-02-28 PROCEDURE — 94664 DEMO&/EVAL PT USE INHALER: CPT

## 2025-02-28 PROCEDURE — 94799 UNLISTED PULMONARY SVC/PX: CPT

## 2025-02-28 PROCEDURE — 99232 SBSQ HOSP IP/OBS MODERATE 35: CPT

## 2025-02-28 PROCEDURE — 85014 HEMATOCRIT: CPT | Performed by: PHYSICIAN ASSISTANT

## 2025-02-28 PROCEDURE — 80048 BASIC METABOLIC PNL TOTAL CA: CPT | Performed by: INTERNAL MEDICINE

## 2025-02-28 PROCEDURE — 71045 X-RAY EXAM CHEST 1 VIEW: CPT

## 2025-02-28 PROCEDURE — 63710000001 INSULIN REGULAR HUMAN PER 5 UNITS: Performed by: INTERNAL MEDICINE

## 2025-02-28 PROCEDURE — 85027 COMPLETE CBC AUTOMATED: CPT | Performed by: INTERNAL MEDICINE

## 2025-02-28 PROCEDURE — 25010000002 AMIODARONE IN DEXTROSE 5% 360-4.14 MG/200ML-% SOLUTION: Performed by: INTERNAL MEDICINE

## 2025-02-28 PROCEDURE — 93005 ELECTROCARDIOGRAM TRACING: CPT | Performed by: PHYSICIAN ASSISTANT

## 2025-02-28 PROCEDURE — 99222 1ST HOSP IP/OBS MODERATE 55: CPT | Performed by: PHYSICIAN ASSISTANT

## 2025-02-28 PROCEDURE — 25010000002 BUMETANIDE PER 0.5 MG: Performed by: INTERNAL MEDICINE

## 2025-02-28 PROCEDURE — 94761 N-INVAS EAR/PLS OXIMETRY MLT: CPT

## 2025-02-28 PROCEDURE — 82746 ASSAY OF FOLIC ACID SERUM: CPT | Performed by: INTERNAL MEDICINE

## 2025-02-28 PROCEDURE — 99232 SBSQ HOSP IP/OBS MODERATE 35: CPT | Performed by: INTERNAL MEDICINE

## 2025-02-28 RX ORDER — ROSUVASTATIN CALCIUM 20 MG/1
20 TABLET, COATED ORAL NIGHTLY
Status: DISCONTINUED | OUTPATIENT
Start: 2025-02-28 | End: 2025-03-01

## 2025-02-28 RX ADMIN — OXYCODONE AND ACETAMINOPHEN 1 TABLET: 7.5; 325 TABLET ORAL at 14:50

## 2025-02-28 RX ADMIN — METOPROLOL TARTRATE 25 MG: 25 TABLET, FILM COATED ORAL at 20:28

## 2025-02-28 RX ADMIN — NYSTATIN: 100000 POWDER TOPICAL at 20:28

## 2025-02-28 RX ADMIN — Medication 1 APPLICATION: at 20:28

## 2025-02-28 RX ADMIN — PANTOPRAZOLE SODIUM 40 MG: 40 INJECTION, POWDER, FOR SOLUTION INTRAVENOUS at 20:27

## 2025-02-28 RX ADMIN — Medication 10 ML: at 20:27

## 2025-02-28 RX ADMIN — DOCUSATE SODIUM 100 MG: 50 LIQUID ORAL at 08:37

## 2025-02-28 RX ADMIN — HUMAN INSULIN 2 UNITS: 100 INJECTION, SOLUTION SUBCUTANEOUS at 01:20

## 2025-02-28 RX ADMIN — DOCUSATE SODIUM 100 MG: 50 LIQUID ORAL at 20:27

## 2025-02-28 RX ADMIN — OXYCODONE AND ACETAMINOPHEN 1 TABLET: 7.5; 325 TABLET ORAL at 05:30

## 2025-02-28 RX ADMIN — OXYCODONE AND ACETAMINOPHEN 1 TABLET: 7.5; 325 TABLET ORAL at 20:28

## 2025-02-28 RX ADMIN — IPRATROPIUM BROMIDE AND ALBUTEROL SULFATE 3 ML: 2.5; .5 SOLUTION RESPIRATORY (INHALATION) at 19:32

## 2025-02-28 RX ADMIN — PANTOPRAZOLE SODIUM 40 MG: 40 INJECTION, POWDER, FOR SOLUTION INTRAVENOUS at 08:36

## 2025-02-28 RX ADMIN — AMIODARONE HYDROCHLORIDE 0.5 MG/MIN: 1.8 INJECTION, SOLUTION INTRAVENOUS at 06:44

## 2025-02-28 RX ADMIN — CHLORHEXIDINE GLUCONATE 0.12% ORAL RINSE 15 ML: 1.2 LIQUID ORAL at 08:37

## 2025-02-28 RX ADMIN — FINASTERIDE 5 MG: 5 TABLET, FILM COATED ORAL at 08:37

## 2025-02-28 RX ADMIN — Medication 1 APPLICATION: at 08:36

## 2025-02-28 RX ADMIN — HUMAN INSULIN 2 UNITS: 100 INJECTION, SOLUTION SUBCUTANEOUS at 12:23

## 2025-02-28 RX ADMIN — NYSTATIN: 100000 POWDER TOPICAL at 05:30

## 2025-02-28 RX ADMIN — NYSTATIN: 100000 POWDER TOPICAL at 14:50

## 2025-02-28 RX ADMIN — ROSUVASTATIN 20 MG: 20 TABLET, FILM COATED ORAL at 20:27

## 2025-02-28 RX ADMIN — GABAPENTIN 300 MG: 300 CAPSULE ORAL at 14:50

## 2025-02-28 RX ADMIN — GABAPENTIN 300 MG: 300 CAPSULE ORAL at 05:30

## 2025-02-28 RX ADMIN — BUMETANIDE 1 MG: 0.25 INJECTION INTRAMUSCULAR; INTRAVENOUS at 01:20

## 2025-02-28 RX ADMIN — IPRATROPIUM BROMIDE AND ALBUTEROL SULFATE 3 ML: 2.5; .5 SOLUTION RESPIRATORY (INHALATION) at 12:58

## 2025-02-28 RX ADMIN — RIFAXIMIN 550 MG: 550 TABLET ORAL at 20:27

## 2025-02-28 RX ADMIN — METOPROLOL TARTRATE 25 MG: 25 TABLET, FILM COATED ORAL at 08:37

## 2025-02-28 RX ADMIN — AMIODARONE HYDROCHLORIDE 0.5 MG/MIN: 1.8 INJECTION, SOLUTION INTRAVENOUS at 18:52

## 2025-02-28 RX ADMIN — GABAPENTIN 300 MG: 300 CAPSULE ORAL at 20:27

## 2025-02-28 RX ADMIN — ESCITALOPRAM OXALATE 10 MG: 10 TABLET ORAL at 20:28

## 2025-02-28 RX ADMIN — Medication 10 ML: at 20:28

## 2025-02-28 RX ADMIN — IPRATROPIUM BROMIDE AND ALBUTEROL SULFATE 3 ML: 2.5; .5 SOLUTION RESPIRATORY (INHALATION) at 07:29

## 2025-02-28 RX ADMIN — TERAZOSIN HYDROCHLORIDE 1 MG: 1 CAPSULE ORAL at 08:37

## 2025-02-28 RX ADMIN — IPRATROPIUM BROMIDE AND ALBUTEROL SULFATE 3 ML: 2.5; .5 SOLUTION RESPIRATORY (INHALATION) at 16:06

## 2025-02-28 NOTE — PROGRESS NOTES
"[facility] Progress Note     LOS: 17 days   Patient Care Team:  Wesly Minor MD as PCP - General (Family Medicine)  Katerina Ga MD as Referring Physician (Hematology and Oncology)  Jeremy Aguila MD as Consulting Physician (Radiation Oncology)  Shirlene Kauffman APRN as Radiation Oncologist (Nurse Practitioner)  PCP:  Wesly Minor MD    Chief Complaint: New A-fib RVR    Subjective: Patient seen resting in bed on nasal cannula and Dobbhoff tube in place.  Patient is confused to situation.  Patient is noted to be currently in sinus rhythm with adequate blood pressure control.      OBJECTIVE  REVIEW OF SYSTEMS:  @Cardiovascular ROS: no chest pain or dyspnea on exertion@        Vital Sign Min/Max for last 24 hours  Temp  Min: 97.1 °F (36.2 °C)  Max: 98 °F (36.7 °C)   BP  Min: 111/56  Max: 154/77   Pulse  Min: 73  Max: 160   Resp  Min: 16  Max: 20   SpO2  Min: 90 %  Max: 97 %   No data recorded   Weight  Min: 74.4 kg (164 lb 1.6 oz)  Max: 74.4 kg (164 lb 1.6 oz)     Telemetry: NSR      I&O/ Weights:     Intake/Output Summary (Last 24 hours) at 2/28/2025 0824  Last data filed at 2/28/2025 0531  Gross per 24 hour   Intake 3570 ml   Output 2550 ml   Net 1020 ml         02/27/25  0641 02/28/25  0640   Weight: 73.8 kg (162 lb 12.8 oz) 74.4 kg (164 lb 1.6 oz)     Flowsheet Rows      Flowsheet Row First Filed Value   Admission Height 175.3 cm (69\") Documented at 02/11/2025 0859   Admission Weight 83 kg (183 lb) Documented at 02/11/2025 0859               Physical Exam:  Vitals reviewed.   Constitutional:       Appearance: Normal weight. Ill-appearing and acutely ill-appearing.      Interventions: Nasal cannula in place.      Comments: DHT in place   Neck:      Vascular: JVD normal.   Pulmonary:      Effort: Increased respiratory effort.      Breath sounds: Normal breath sounds.   Cardiovascular:      PMI at left midclavicular line. Normal rate. Regular rhythm. "      Murmurs: There is a grade 2/6 systolic murmur.   Pulses:     Intact distal pulses.   Edema:     Peripheral edema absent.   Skin:     General: Skin is warm and dry.      Coloration: Skin is jaundiced.   Neurological:      Mental Status: Confused. Exhibits altered mental status.      GCS: GCS eye subscore is 4. GCS verbal subscore is 4. GCS motor subscore is 6.           Labs:   Results from last 7 days   Lab Units 25  0524 25  1347 25  0114 25  0319   WBC 10*3/mm3 18.39*  --   --  16.64* 20.97*   HEMOGLOBIN g/dL 9.6* 6.6* 6.9* 7.1* 7.6*   HEMATOCRIT % 30.2* 21.3* 22.2* 22.0* 23.1*   PLATELETS 10*3/mm3 245  --   --  233 233     Lab Results   Lab Value Date/Time    TROPONINT 120 (C) 2025 1042    TROPONINT 134 (C) 2025 0926    TROPONINT 11 2024 1625    TROPONINT 12 2024 1915    TROPONINT 17 2023 1925         Results from last 7 days   Lab Units 25  0319   SODIUM mmol/L 150* 147* 143   POTASSIUM mmol/L 4.2 4.2 4.6   CHLORIDE mmol/L 110* 107 106   CO2 mmol/L 28.0 26.0 27.0   BUN mg/dL 92* 93* 89*   CREATININE mg/dL 1.00 1.15 1.12   CALCIUM mg/dL 9.6 9.4 9.6   GLUCOSE mg/dL 118* 156* 109*         Results from last 7 days   Lab Units 25  0434   TRIGLYCERIDES mg/dL 75                 Imaging/Diagnostics:   CHEST X-RAY IMPRESSION: 25  Stable lung opacities compatible with pneumonia or edema   Resolved Left Pleural effusion     MOST RECENT ECHO IMPRESSION: 25  Left ventricular systolic function is normal. Estimated left ventricular EF = 60%  Left ventricular wall thickness is consistent with mild concentric hypertrophy.  Mild aortic valve stenosis is present.  Aortic valve maximum pressure gradient is 23 mmHg. Aortic valve mean pressure gradient is 14 mmHg.    EK25  Atrial fibrillation with rapid ventricular response  Abnormal ECG  When compared with ECG of 2025 06:08,  (Unconfirmed)  Atrial fibrillation has replaced Sinus rhythm    CTA:1/18/25   Moderate LAD stenosis, moderate mid circumflex stenosis, CAD-RADS 3 (50-69% stenosis), normal myocardial morphology and function.         Medication Review:   amiodarone, 150 mg, Intravenous, Once  castor oil-balsam peru, 1 Application, Topical, Q12H  fentaNYL, 1 patch, Transdermal, Q72H   And  Check Fentanyl Patch Placement, 1 each, Not Applicable, Q12H  chlorhexidine, 15 mL, Mouth/Throat, Q12H  docusate sodium, 100 mg, Per G Tube, BID  [Held by provider] enoxaparin, 40 mg, Subcutaneous, Q24H  escitalopram, 10 mg, Per G Tube, Nightly  finasteride, 5 mg, Per G Tube, Daily  gabapentin, 300 mg, Per G Tube, Q8H  insulin regular, 2-7 Units, Subcutaneous, Q6H  ipratropium-albuterol, 3 mL, Nebulization, 4x Daily - RT  metoprolol tartrate, 25 mg, Oral, Q12H  nystatin, , Topical, Q8H  pantoprazole, 40 mg, Intravenous, Q12H  terazosin, 1 mg, Nasogastric, Daily       amiodarone, 0.5 mg/min, Last Rate: 0.5 mg/min (02/28/25 0644)         Problem List:   HFpEF  CAD  Hyperlipidemia  Alcoholic cirrhosis with anemia and thrombocytopenia  Squamous cell carcinoma of the esophagus  Tobacco use      Assessment:  Shock with UTI and bacteremia due to Pseudomonas  New onset A-fib, 2/25  [id number] score of 3  Spontaneously converted  Start Eliquis 5 twice daily once okayed with GI  Amiodarone load, deferring continual p.o. part of protocol due to liver dysfunction   Rate control with metoprolol 25 mg twice daily  Chronic HFpEF   ECHO: Normal EF, mild LVH, Mild AS  X-ray with moderate left pleural effusion- IV bumex given  BP well controlled currently   CAD with elevated troponin   Elevation likely secondary to demand ischemia event  CAD seen on CT  Start statin, start Eliquis once okayed for GI ( no ASA due to thrombocytopenia)  Hyperlipidemia  Start rosuvastatin 20, monitor liver function  Alcoholic cirrhosis with anemia and  thrombocytopenia  Hypothyroidism  Likely secondary to liver cirrhosis  Requiring blood transfusions and albumin   Hospital Medicine team to manage     Plan:   Start rosuvastatin 20, monitor liver function with a.m labs   Will start Eliquis once okayed with GI and hospitalist, H&H improved         this morning after 2u PRBC.  Do not feel patient is a appropriate long-term candidate for anticoagulation due to debility  Defer any diuretic at this time due to contraction alkalosis noted on a.m labs along with need for blood transfusions   Continue to monitor H&H, BP, HR, Rhythm- currently all well controlled   Continue to monitor creatinine and electrolytes and replace per protocol. K+ >4, Mag >2       Pauly Sanchez, APRN

## 2025-02-28 NOTE — PLAN OF CARE
Problem: Adult Inpatient Plan of Care  Goal: Plan of Care Review  Outcome: Progressing  Goal: Patient-Specific Goal (Individualized)  Outcome: Progressing  Goal: Absence of Hospital-Acquired Illness or Injury  Outcome: Progressing  Intervention: Identify and Manage Fall Risk  Description: Perform standard risk assessment on admission using a validated tool or comprehensive approach appropriate to the patient; reassess fall risk frequently, with change in status or transfer to another level of care.  Communicate risk to interprofessional healthcare team; ensure fall risk visible cue.  Determine need for increased observation, equipment and environmental modification, as well as use of supportive, nonskid footwear.  Adjust safety measures to individual needs and identified risk factors.  Reinforce the importance of active participation with fall risk prevention, safety, and physical activity with the patient and family.  Perform regular intentional rounding to assess need for position change, pain assessment and personal needs, including assistance with toileting.  Recent Flowsheet Documentation  Taken 2/28/2025 1600 by Laura Silva, RN  Safety Promotion/Fall Prevention:   activity supervised   assistive device/personal items within reach   clutter free environment maintained   fall prevention program maintained   nonskid shoes/slippers when out of bed   room organization consistent   safety round/check completed  Taken 2/28/2025 1200 by Laura Silva, RN  Safety Promotion/Fall Prevention:   activity supervised   assistive device/personal items within reach   clutter free environment maintained   fall prevention program maintained   nonskid shoes/slippers when out of bed   room organization consistent   safety round/check completed  Taken 2/28/2025 0832 by Laura Silva, RN  Safety Promotion/Fall Prevention:   clutter free environment maintained   fall prevention program maintained   nonskid shoes/slippers  when out of bed   room organization consistent   safety round/check completed  Intervention: Prevent Skin Injury  Description: Perform a screening for skin injury risk, such as pressure or moisture-associated skin damage on admission and at regular intervals throughout hospital stay.  Keep all areas of skin (especially folds) clean and dry.  Maintain adequate skin hydration.  Relieve and redistribute pressure and protect bony prominences and skin at risk for injury; implement measures based on patient-specific risk factors.  Match turning and repositioning schedule to clinical condition.  Encourage weight shift frequently; assist with reposition if unable to complete independently.  Float heels off bed; avoid pressure on the Achilles tendon.  Keep skin free from extended contact with medical devices.  Optimize nutrition and hydration.  Encourage functional activity and mobility, as early as tolerated.  Use aids (e.g., slide boards, mechanical lift) during transfer.  Recent Flowsheet Documentation  Taken 2/28/2025 1600 by Laura Silva RN  Skin Protection:   incontinence pads utilized   protective footwear used   silicone foam dressing in place  Taken 2/28/2025 1200 by Laura Silva RN  Body Position:   neutral body alignment   lower extremity elevated   neutral head position  Skin Protection:   incontinence pads utilized   protective footwear used   silicone foam dressing in place  Taken 2/28/2025 0832 by Laura Silva RN  Body Position:   lower extremity elevated   neutral body alignment   neutral head position  Skin Protection:   incontinence pads utilized   protective footwear used   skin sealant/moisture barrier applied  Intervention: Prevent Infection  Description: Maintain skin and mucous membrane integrity; promote hand, oral and pulmonary hygiene.  Optimize fluid balance, nutrition, sleep and glycemic control to maximize infection resistance.  Identify potential sources of infection early to prevent  or mitigate progression of infection (e.g., wound, lines, devices).  Evaluate ongoing need for invasive devices; remove promptly when no longer indicated.  Review vaccination status.  Recent Flowsheet Documentation  Taken 2/28/2025 1600 by Laura Silva RN  Infection Prevention:   environmental surveillance performed   equipment surfaces disinfected   hand hygiene promoted   personal protective equipment utilized   rest/sleep promoted   single patient room provided  Taken 2/28/2025 1200 by Laura Silva RN  Infection Prevention:   environmental surveillance performed   equipment surfaces disinfected   hand hygiene promoted   personal protective equipment utilized   rest/sleep promoted   single patient room provided  Taken 2/28/2025 0832 by Laura Silva RN  Infection Prevention:   environmental surveillance performed   equipment surfaces disinfected   hand hygiene promoted   personal protective equipment utilized   rest/sleep promoted   single patient room provided  Goal: Optimal Comfort and Wellbeing  Outcome: Progressing  Intervention: Monitor Pain and Promote Comfort  Description: Assess pain level, treatment efficacy and patient response at regular intervals using a consistent pain scale.  Consider the presence and impact of preexisting chronic pain.  Encourage patient and caregiver involvement in pain assessment, interventions and safety measures.  Promote activity; balance with sleep and rest to enhance healing.  Recent Flowsheet Documentation  Taken 2/28/2025 0832 by Laura Silva RN  Pain Management Interventions:   care clustered   pillow support provided   position adjusted   quiet environment facilitated  Intervention: Provide Person-Centered Care  Description: Use a family-focused approach to care; encourage support system presence and participation.  Develop trust and rapport by proactively providing information, encouraging questions, addressing concerns and offering  reassurance.  Acknowledge emotional response to hospitalization.  Recognize and utilize personal coping strategies and strengths; develop goals via shared decision-making.  Honor spiritual and cultural preferences.  Recent Flowsheet Documentation  Taken 2/28/2025 8032 by Laura Silva RN  Trust Relationship/Rapport:   care explained   choices provided   questions answered   questions encouraged   thoughts/feelings acknowledged  Goal: Readiness for Transition of Care  Outcome: Progressing   Goal Outcome Evaluation:

## 2025-02-28 NOTE — PROGRESS NOTES
[facility] Medicine Services  PROGRESS NOTE    Patient Name: Val Nassar Jr.  : 1960  MRN: [id number]    Date of Admission: 2025  Primary Care Physician: Wesly Minor MD    Subjective   Subjective     CC:  bacteremia    HPI:  Denies cough.  No chest or abdominal pain.  No fevers      Objective   Objective     Vital Signs:   Temp:  [97 °F (36.1 °C)-97.6 °F (36.4 °C)] 97 °F (36.1 °C)  Heart Rate:  [] 71  Resp:  [16-18] 18  BP: (112-154)/(60-86) 117/66  Flow (L/min) (Oxygen Therapy):  [2] 2     Physical Exam:  Appears deconditioned, in bed  MM slightly dry, keofeed tube in place  RRR  Breath sounds grossly clear  Abdomen soft, NT  Trace edema  Awake, speech very soft, answers questions with one word answers  Flat affect  Wiggles feet on request    Results Reviewed:  LAB RESULTS:      Lab 25  0524 25  1347 25  0114 25  0319 25  2044 25  0328 25  1151 25  0434 25  1135 258   WBC 18.39*  --   --  16.64* 20.97*  --  23.17*  --  21.85*   < > 22.13*   HEMOGLOBIN 9.6* 6.6* 6.9* 7.1* 7.6*   < > 8.2*   < > 7.0*   < > 8.5*   HEMATOCRIT 30.2* 21.3* 22.2* 22.0* 23.1*   < > 24.4*   < > 20.4*   < > 26.9*   PLATELETS 245  --   --  233 233  --  199  --  148   < > 95*   NEUTROS ABS  --   --   --  14.33*  --   --   --   --  19.29*  --  17.80*   IMMATURE GRANS (ABS)  --   --   --  0.16*  --   --   --   --  0.56*  --  1.24*   LYMPHS ABS  --   --   --  0.51*  --   --   --   --  0.51*  --  0.70   MONOS ABS  --   --   --  1.62*  --   --   --   --  1.46*  --  2.05*   EOS ABS  --   --   --  0.00  --   --   --   --  0.01  --  0.29   MCV 93.8  --   --  96.5 95.1  --  93.5  --  91.1   < > 95.7   CRP  --   --   --   --   --   --   --   --  11.29*  --   --     < > = values in this interval not displayed.         Lab 25  0524 25  0114 25  0319 25  2045 25  0328 25  0434  02/23/25  1135   SODIUM 150* 147* 143 142 138 134* 133*   POTASSIUM 4.2 4.2 4.6 4.1 4.6 4.8 4.5   CHLORIDE 110* 107 106 107 106 104 103   CO2 28.0 26.0 27.0 24.0 23.0 19.0* 23.0   ANION GAP 12.0 14.0 10.0 11.0 9.0 11.0 7.0   BUN 92* 93* 89* 88* 86* 73* 67*   CREATININE 1.00 1.15 1.12 1.15 1.31* 1.29* 1.43*   EGFR 83.5 70.6 72.9 70.6 60.4 61.5 54.4*   GLUCOSE 118* 156* 109* 133* 163* 220* 163*   CALCIUM 9.6 9.4 9.6 9.4 9.4 8.9 8.9   IONIZED CALCIUM  --  1.22  --   --   --   --   --    MAGNESIUM  --  2.4 2.2 1.8 2.0 2.0 2.0   PHOSPHORUS  --   --   --   --  4.0 3.4 3.8                 Lab 02/24/25  0434   TRIGLYCERIDES 75         Lab 02/28/25  0524 02/24/25  1151 02/24/25  0434   IRON  --   --  36*   IRON SATURATION (TSAT)  --   --  18*   TIBC  --   --  206*   TRANSFERRIN  --   --  138*   FOLATE 15.20  --   --    VITAMIN B 12 >2,000*  --   --    ABO TYPING  --  A  --    RH TYPING  --  Positive  --    ANTIBODY SCREEN  --  Negative  --          Lab 02/24/25  2255 02/24/25  1034 02/24/25  0403   PH, ARTERIAL 7.362 7.466* 7.449   PCO2, ARTERIAL 40.5 30.9* 31.1*   PO2 ART 98.5 103.0 89.2   FIO2 36 40 40   HCO3 ART 23.0 22.3 21.6   BASE EXCESS ART -2.2* -1.2* -2.1*   CARBOXYHEMOGLOBIN 1.5 1.3 1.1     Brief Urine Lab Results  (Last result in the past 365 days)        Color   Clarity   Blood   Leuk Est   Nitrite   Protein   CREAT   Urine HCG        02/11/25 0942 Yellow   Turbid   Moderate (2+)   Large (3+)   Negative   100 mg/dL (2+)                   Microbiology Results Abnormal       Procedure Component Value - Date/Time    Fungus Culture - Wash, Bronchus [[id number]]  (Abnormal) Collected: 02/17/25 1633    Lab Status: Preliminary result Specimen: Wash from Bronchus Updated: 02/23/25 1910     Fungus Culture Candida albicans    Respiratory Culture - Wash, Bronchus [[id number]]  (Abnormal) Collected: 02/17/25 1633    Lab Status: Final result Specimen: Wash from Bronchus Updated: 02/19/25 1057     Respiratory Culture Light  growth (2+) Candida albicans      No Normal Respiratory Lissett     Gram Stain Moderate (3+) WBCs seen      Few (2+) Epithelial cells seen      Occasional Budding yeast    Fungus Smear - Wash, Bronchus [[id number]]  (Abnormal) Collected: 02/17/25 1633    Lab Status: Final result Specimen: Wash from Bronchus Updated: 02/18/25 1435     Fungal Stain Fungal elements    Blood Culture - Blood, Arm, Left [[id number]]  (Abnormal)  (Susceptibility) Collected: 02/11/25 0900    Lab Status: Final result Specimen: Blood from Arm, Left Updated: 02/14/25 0635     Blood Culture Pseudomonas aeruginosa     Isolated from Aerobic Bottle     Gram Stain Aerobic Bottle Gram negative bacilli    Narrative:      Less than seven (7) mL's of blood was collected.  Insufficient quantity may yield false negative results.    Susceptibility        Pseudomonas aeruginosa      VIANEY      Cefepime Susceptible      Ceftazidime Susceptible      Ciprofloxacin Susceptible      Levofloxacin Susceptible      Piperacillin + Tazobactam Susceptible                       Susceptibility Comments       Pseudomonas aeruginosa    With the exception of urinary-sourced infections, aminoglycosides should not be used as monotherapy.               Blood Culture - Blood, Hand, Right [[id number]]  (Abnormal) Collected: 02/11/25 0926    Lab Status: Final result Specimen: Blood from Hand, Right Updated: 02/14/25 0635     Blood Culture Pseudomonas aeruginosa     Isolated from Aerobic and Anaerobic Bottles     Gram Stain Anaerobic Bottle Gram negative bacilli      Aerobic Bottle Gram negative bacilli    Narrative:      Less than seven (7) mL's of blood was collected.  Insufficient quantity may yield false negative results.    Refer to previous blood culture collected on 02/11/2025 0900 for MICs    Urine Culture - Urine, Urine, Catheter [[id number]]  (Abnormal)  (Susceptibility) Collected: 02/11/25 0942    Lab Status: Final result Specimen: Urine, Catheter Updated: 02/13/25 1054      Urine Culture >100,000 CFU/mL Pseudomonas aeruginosa    Narrative:      Colonization of the urinary tract without infection is common. Treatment is discouraged unless the patient is symptomatic, pregnant, or undergoing an invasive urologic procedure.    Susceptibility        Pseudomonas aeruginosa      VIANEY      Cefepime Susceptible      Ceftazidime Susceptible      Ciprofloxacin Susceptible      Levofloxacin Susceptible      Piperacillin + Tazobactam Susceptible      Tobramycin Susceptible                           Blood Culture ID, PCR - Blood, Arm, Left [[id number]]  (Abnormal) Collected: 02/11/25 0900    Lab Status: Final result Specimen: Blood from Arm, Left Updated: 02/12/25 0349     BCID, PCR Pseudomonas aeruginosa. Identification by BCID2 PCR     BOTTLE TYPE Aerobic Bottle            XR Chest 1 View    Result Date: 2/28/2025  XR CHEST 1 VW Date of Exam: 2/28/2025 3:47 AM EST Indication: Respiratory failrue Comparison: 2/27/2025 Findings: Previously noted right IJ line no longer present. Gastric tube remains in place. Heart size and pulmonary vasculature are stable. Grossly stable predominantly perihilar opacities. No pneumothorax     Impression: Impression: Stable lung opacities compatible with pneumonia or edema Electronically Signed: Eder Draper  2/28/2025 7:16 AM EST  Workstation ID: [id number]    XR Chest 1 View    Result Date: 2/27/2025  XR CHEST 1 VW Date of Exam: 2/27/2025 2:18 AM EST Indication: Respiratory failrue Comparison: 2/26/2025 Findings: Right-sided Port-A-Cath and feeding tube are again noted. Right IJ catheter has been placed, tip in the most proximal portion of the SVC. There is no evidence of pneumothorax. Patient's bilateral pulmonary interstitial and airspace disease appears significantly improved from yesterday's study. No pneumothorax or effusion is seen. Heart appears upper normal size.     Impression: Impression: 1. Right IJ catheter placement, tip in the proximal SVC. No evidence of  pneumothorax. 2. Significant improved pulmonary disease, which suggests resolving pneumonia. Electronically Signed: Ibrahima Jett MD  2/27/2025 8:11 AM EST  Workstation ID: [id number]     Results for orders placed during the hospital encounter of 02/11/25    Adult Transthoracic Echo Complete w/ Color, Spectral and Contrast if Necessary Per Protocol    Interpretation Summary    Left ventricular systolic function is normal. Estimated left ventricular EF = 60%    Left ventricular wall thickness is consistent with mild concentric hypertrophy.    Mild aortic valve stenosis is present.    Aortic valve maximum pressure gradient is 23 mmHg. Aortic valve mean pressure gradient is 14 mmHg.      Current medications:  Scheduled Meds:amiodarone, 150 mg, Intravenous, Once  castor oil-balsam peru, 1 Application, Topical, Q12H  fentaNYL, 1 patch, Transdermal, Q72H   And  Check Fentanyl Patch Placement, 1 each, Not Applicable, Q12H  chlorhexidine, 15 mL, Mouth/Throat, Q12H  docusate sodium, 100 mg, Per G Tube, BID  [Held by provider] enoxaparin sodium, 40 mg, Subcutaneous, Q24H  escitalopram, 10 mg, Per G Tube, Nightly  finasteride, 5 mg, Per G Tube, Daily  gabapentin, 300 mg, Per G Tube, Q8H  insulin regular, 2-7 Units, Subcutaneous, Q6H  ipratropium-albuterol, 3 mL, Nebulization, 4x Daily - RT  metoprolol tartrate, 25 mg, Oral, Q12H  nystatin, , Topical, Q8H  pantoprazole, 40 mg, Intravenous, Q12H  rosuvastatin, 20 mg, Oral, Nightly  terazosin, 1 mg, Nasogastric, Daily      Continuous Infusions:amiodarone, 0.5 mg/min, Last Rate: 0.5 mg/min (02/28/25 0644)      PRN Meds:.  Calcium Replacement - Follow Nurse / BPA Driven Protocol    dextrose    glucagon (human recombinant)    ipratropium-albuterol    Magnesium Cardiology Dose Replacement - Follow Nurse / BPA Driven Protocol    metoprolol tartrate    oxyCODONE-acetaminophen    Phosphorus Replacement - Follow Nurse / BPA Driven Protocol    polyvinyl alcohol    Potassium Replacement -  Follow Nurse / BPA Driven Protocol    sodium chloride    Assessment & Plan   Assessment & Plan     Active Hospital Problems    Diagnosis  POA    **Septic shock due to Pseudomonas species [A41.52, R65.21]  Yes    Pneumonia of both lower lobes due to Pseudomonas species [J15.1]  Yes    Bacteremia due to Pseudomonas [R78.81, B96.5]  Yes    GALILEO (acute kidney injury) [N17.9]  Yes    Metabolic acidosis [E87.20]  Yes    Cirrhosis of liver [K74.60]  Yes    Squamous cell carcinoma of esophagus [C15.9]  Yes    Hyperlipidemia, unspecified [E78.5]  Yes    Tobacco use [Z72.0]  Yes      Resolved Hospital Problems    Diagnosis Date Resolved POA    Hypotension [I95.9] 02/11/2025 Unknown    Suspected UTI [R39.89] 02/12/2025 Yes        Brief Hospital Course to date:  Val Nassar Jr. is a 64 y.o. male with history of alcohol abuse, cirrhosis, CAD, metastatic squamous cell carcinoma of esophagus, hyperlipidemia who presented from rehab with hypotension, weakness, dizziness and altered mental status.  CT imaging showed a left-sided pyelonephrosis with urine and blood cultures growing Pseudomonas.  Mr. Nassar is completed a course of cefepime.    Pseudomonas Bacteremia  Septic shock  Pyelonephritis  Possible pneumonia  - s/p cefepime x 10 days  - continued leukocytosis and patchy infiltrates on CXR of unclear significance, Candida and normal kishore on respiratory culture from bronchoscopy -- check cbc, crp and procalcitonin in am    GALILEO/bladder outlet obstruction vs neurogenic bladder  -Lubin catheter -- voiding trial soon?  Typically self cathed prior to this hospitalization, follows outpatient with Urology Dr Garrison  -Continue finasteride and Flomax.    Dysphagia  - continue keofeeds  - Speech follows    New Atrial fibrillation with RVR  - currently NSR on amiodarone  - will likely need anticoagulation (eliquis)    Anemia  - hemoglobin 6.6 yesterday s/p 2 units PRBCs -- hemoglobin today 9.6  - melena reported by  nursing staff  - recent EGD prior to this hospitalization, no varices noted  - GI consultation today  - IV protonix BID  - cbc am    Respiratory Failure  - s/p intubation 2/12/25 with subsequent bronchoscopy for mucous plugging. Patient expected on 2/24/2025     Hypernatremia  - sodium 150, RD follows, free water with tube feeds increased today  - bmp am    EtOH Cirrhosis  - ammonia level pending    Metastatic Squamous Cell Carcinoma of the esophagus  - s/p chemoradiation May 2024    Chronic back pain.    - steroid injection at pain management 2/3/2024  - fentanyl patch    Tobacco abuse    Expected Discharge Location and Transportation: rehab  Expected Discharge   Expected Discharge Date: 3/5/2025; Expected Discharge Time:      VTE Prophylaxis:  Pharmacologic & mechanical VTE prophylaxis orders are present.         AM-PAC 6 Clicks Score (PT): 11 (02/28/25 5698)    CODE STATUS:   Code Status and Medical Interventions: CPR (Attempt to Resuscitate); Full Support   Ordered at: 02/12/25 1029     Level Of Support Discussed With:    Patient     Code Status (Patient has no pulse and is not breathing):    CPR (Attempt to Resuscitate)     Medical Interventions (Patient has pulse or is breathing):    Full Support       Garret Gibson MD  02/28/25

## 2025-02-28 NOTE — PLAN OF CARE
Goal Outcome Evaluation:        Problem: Adult Inpatient Plan of Care  Goal: Absence of Hospital-Acquired Illness or Injury  Intervention: Identify and Manage Fall Risk  Recent Flowsheet Documentation  Taken 2/28/2025 0400 by Aminata Jon RN  Safety Promotion/Fall Prevention: safety round/check completed  Taken 2/28/2025 0200 by Aminata Jon RN  Safety Promotion/Fall Prevention: safety round/check completed  Taken 2/28/2025 0006 by Aminata Jon RN  Safety Promotion/Fall Prevention: safety round/check completed  Taken 2/27/2025 2206 by Aminata Jon RN  Safety Promotion/Fall Prevention: safety round/check completed  Taken 2/27/2025 2030 by Aminata Jon RN  Safety Promotion/Fall Prevention:   safety round/check completed   room organization consistent   clutter free environment maintained   fall prevention program maintained  Intervention: Prevent Skin Injury  Recent Flowsheet Documentation  Taken 2/28/2025 0400 by Aminata Jon RN  Body Position: position maintained  Taken 2/28/2025 0200 by Aminata Jon RN  Body Position: position maintained  Taken 2/28/2025 0006 by Aminata Jon RN  Body Position: position maintained  Taken 2/27/2025 2206 by Aminata Jon RN  Body Position: position maintained  Taken 2/27/2025 2030 by Aminata Jon RN  Body Position: position maintained  Skin Protection:   incontinence pads utilized   silicone foam dressing in place  Intervention: Prevent and Manage VTE (Venous Thromboembolism) Risk  Recent Flowsheet Documentation  Taken 2/27/2025 2030 by Aminata Jon RN  VTE Prevention/Management:   bilateral   SCDs (sequential compression devices) on  Goal: Optimal Comfort and Wellbeing  Intervention: Monitor Pain and Promote Comfort  Recent Flowsheet Documentation  Taken 2/28/2025 0525 by Aminata Jon RN  Pain Management Interventions:   pain pump in use    position adjusted   pillow support provided  Taken 2/27/2025 2122 by Aminata Jon RN  Pain Management Interventions:   care clustered   pillow support provided   position adjusted   quiet environment facilitated  Taken 2/27/2025 2030 by Aminata Jon RN  Pain Management Interventions:   pain management plan reviewed with patient/caregiver   pain medication given   care clustered   diversional activity provided   position adjusted   pillow support provided   quiet environment facilitated  Intervention: Provide Person-Centered Care  Recent Flowsheet Documentation  Taken 2/27/2025 2030 by Aminata Jon RN  Trust Relationship/Rapport:   care explained   choices provided   empathic listening provided   questions answered   thoughts/feelings acknowledged   reassurance provided     Problem: Fall Injury Risk  Goal: Absence of Fall and Fall-Related Injury  Intervention: Promote Injury-Free Environment  Recent Flowsheet Documentation  Taken 2/28/2025 0400 by Aminata Jon RN  Safety Promotion/Fall Prevention: safety round/check completed  Taken 2/28/2025 0200 by Aminata Jon RN  Safety Promotion/Fall Prevention: safety round/check completed  Taken 2/28/2025 0006 by Aminata Jon RN  Safety Promotion/Fall Prevention: safety round/check completed  Taken 2/27/2025 2206 by Aminata Jon RN  Safety Promotion/Fall Prevention: safety round/check completed  Taken 2/27/2025 2030 by Aminata Jon RN  Safety Promotion/Fall Prevention:   safety round/check completed   room organization consistent   clutter free environment maintained   fall prevention program maintained     Problem: Skin Injury Risk Increased  Goal: Skin Health and Integrity  Intervention: Optimize Skin Protection  Recent Flowsheet Documentation  Taken 2/28/2025 0400 by Aminata Jon RN  Head of Bed (HOB) Positioning: HOB elevated  Taken 2/28/2025 0200 by Danay  SUE Coates  Head of Bed ([facility]) Positioning: HOB at 30 degrees  Taken 2/28/2025 0006 by Aminata Jon RN  Head of Bed (HOB) Positioning: HOB at 30 degrees  Taken 2/27/2025 2206 by Aminata Jon RN  Head of Bed (HOB) Positioning: HOB at 30 degrees  Taken 2/27/2025 2030 by Aminata Jon RN  Pressure Reduction Techniques: weight shift assistance provided  Head of Bed (HOB) Positioning: HOB at 30 degrees  Pressure Reduction Devices:   specialty bed utilized   pressure-redistributing mattress utilized  Skin Protection:   incontinence pads utilized   silicone foam dressing in place     Problem: Restraint, Nonviolent  Goal: Absence of Harm or Injury  Intervention: Protect Dignity, Rights and Personal Wellbeing  Recent Flowsheet Documentation  Taken 2/27/2025 2030 by Aminata Jon RN  Trust Relationship/Rapport:   care explained   choices provided   empathic listening provided   questions answered   thoughts/feelings acknowledged   reassurance provided  Intervention: Protect Skin and Joint Integrity  Recent Flowsheet Documentation  Taken 2/28/2025 0400 by Aminata Jon RN  Body Position: position maintained  Taken 2/28/2025 0200 by Aminata Jon RN  Body Position: position maintained  Taken 2/28/2025 0006 by Aminata Jon RN  Body Position: position maintained  Taken 2/27/2025 2206 by Aminata Jon RN  Body Position: position maintained  Taken 2/27/2025 2030 by Aminata Jon RN  Body Position: position maintained  Skin Protection:   incontinence pads utilized   silicone foam dressing in place     Problem: Mechanical Ventilation Invasive  Goal: Effective Communication  Intervention: Ensure Effective Communication  Recent Flowsheet Documentation  Taken 2/27/2025 2030 by Aminata Jon RN  Trust Relationship/Rapport:   care explained   choices provided   empathic listening provided   questions answered    thoughts/feelings acknowledged   reassurance provided  Goal: Absence of Ventilator-Induced Lung Injury  Intervention: Prevent Ventilator-Associated Pneumonia  Recent Flowsheet Documentation  Taken 2/28/2025 0400 by Aminata Jon RN  Head of Bed (HOB) Positioning: HOB elevated  Taken 2/28/2025 0200 by Aminata Jon RN  Head of Bed (HOB) Positioning: HOB at 30 degrees  Taken 2/28/2025 0006 by Aminata Jon RN  Head of Bed (HOB) Positioning: HOB at 30 degrees  Taken 2/27/2025 2206 by Aminata Jon RN  Head of Bed (HOB) Positioning: HOB at 30 degrees  Taken 2/27/2025 2030 by Aminata Jon RN  Head of Bed (HOB) Positioning: HOB at 30 degrees

## 2025-02-28 NOTE — PROGRESS NOTES
Clinical Nutrition     Patient Name: Val Nassar Jr.  YOB: 1960  MRN: [id number]  Date of Encounter: 02/28/25 09:04 EST  Admission date: 2/11/2025  Reason for Visit: Follow-up protocol, EN    Assessment   Nutrition Assessment   Admission Diagnosis:  Septic shock [A41.9, R65.21]    Problem List:    Septic shock due to Pseudomonas species    Hyperlipidemia, unspecified    Tobacco use    Cirrhosis of liver    Squamous cell carcinoma of esophagus    GALILEO (acute kidney injury)    Metabolic acidosis    Pneumonia of both lower lobes due to Pseudomonas species    Bacteremia due to Pseudomonas      PMH:   He  has a past medical history of Anemia, Cancer, Cirrhosis, Duodenal ulcer, Gastric ulcer, GERD (gastroesophageal reflux disease), History of alcohol abuse, History of radiation therapy (05/08/2024), HLD (hyperlipidemia), and Mood disorder.    PSH:  He  has a past surgical history that includes Esophagogastroduodenoscopy (N/A, 12/26/2023) and Venous Access Device (Port) (Right, 04/25/2024).    Substance history: Etoh abuse, vaping    Applicable Nutrition History:     ARF/VENT 2-12-25  s/p Bronch 2-17-25  Extubated 2-24-25    WOC following for posterior thigh, gluteal skin tears, scrotal MASD, blisters     2/26-SLP Diet Recommendation: NPO, temporary alternate methods of nutrition/hydration   Labs    Labs Reviewed: Yes    Results from last 7 days   Lab Units 02/28/25  0524 02/27/25  0114 02/26/25  0319 02/25/25  2045 02/25/25  0328 02/24/25  0434 02/23/25  1135   GLUCOSE mg/dL 118* 156* 109* 133* 163* 220* 163*   BUN mg/dL 92* 93* 89* 88* 86* 73* 67*   CREATININE mg/dL 1.00 1.15 1.12 1.15 1.31* 1.29* 1.43*   SODIUM mmol/L 150* 147* 143 142 138 134* 133*   CHLORIDE mmol/L 110* 107 106 107 106 104 103   POTASSIUM mmol/L 4.2 4.2 4.6 4.1 4.6 4.8 4.5   PHOSPHORUS mg/dL  --   --   --   --  4.0 3.4 3.8   MAGNESIUM mg/dL  --  2.4 2.2 1.8 2.0 2.0 2.0       Results from last 7 days    Lab Units 02/27/25  0114 02/24/25  0434   PREALBUMIN mg/dL  --  9.7*   CRP mg/dL  --  11.29*   IONIZED CALCIUM mmol/L 1.22  --    TRIGLYCERIDES mg/dL  --  75       Results from last 7 days   Lab Units 02/28/25  0608 02/28/25  0103 02/27/25  1714 02/27/25  1200 02/27/25  0552 02/26/25  2344   GLUCOSE mg/dL 131* 193* 158* 201* 155* 208*     Lab Results   Lab Value Date/Time    HGBA1C 4.6 (L) 12/25/2023 1925                 Medications    Medications Reviewed: yes    Scheduled Meds:amiodarone, 150 mg, Intravenous, Once  castor oil-balsam peru, 1 Application, Topical, Q12H  fentaNYL, 1 patch, Transdermal, Q72H   And  Check Fentanyl Patch Placement, 1 each, Not Applicable, Q12H  chlorhexidine, 15 mL, Mouth/Throat, Q12H  docusate sodium, 100 mg, Per G Tube, BID  [Held by provider] enoxaparin, 40 mg, Subcutaneous, Q24H  escitalopram, 10 mg, Per G Tube, Nightly  finasteride, 5 mg, Per G Tube, Daily  gabapentin, 300 mg, Per G Tube, Q8H  insulin regular, 2-7 Units, Subcutaneous, Q6H  ipratropium-albuterol, 3 mL, Nebulization, 4x Daily - RT  metoprolol tartrate, 25 mg, Oral, Q12H  nystatin, , Topical, Q8H  pantoprazole, 40 mg, Intravenous, Q12H  terazosin, 1 mg, Nasogastric, Daily      Continuous Infusions:amiodarone, 0.5 mg/min, Last Rate: 0.5 mg/min (02/28/25 0644)      PRN Meds:.  Calcium Replacement - Follow Nurse / BPA Driven Protocol    dextrose    glucagon (human recombinant)    ipratropium-albuterol    Magnesium Cardiology Dose Replacement - Follow Nurse / BPA Driven Protocol    metoprolol tartrate    oxyCODONE-acetaminophen    Phosphorus Replacement - Follow Nurse / BPA Driven Protocol    polyvinyl alcohol    Potassium Replacement - Follow Nurse / BPA Driven Protocol    sodium chloride    Intake/Ouptut 24 hrs (0701 - 0700)   I&O's Reviewed: yes    Intake & Output (last day)         02/27 0701 02/28 0700 02/28 0701 03/01 0700    I.V. (mL/kg) 304 (4.1)     Blood 635     Other 947     NG/GT 1684     Total  "Intake(mL/kg) 3570 (48)     Urine (mL/kg/hr) 2550 (1.4)     Emesis/NG output      Stool 0     Total Output 2550     Net +1020           Stool Unmeasured Occurrence 1 x 1 x           Anthropometrics     Height: Height: 175.3 cm (69.02\")  Last Filed Weight: Weight: 74.4 kg (164 lb 1.6 oz) (25 0640)  Method: Weight Method: Bed scalebedscale  BMI: BMI (Calculated): 24.2    UBW: ?  Weight change:  per EMR ~ 6lb wt gain since January     Weight       Weight (kg) Weight (lbs) Weight Method Visit Report   2024 75.841 kg  167 lb 3.2 oz   --     75.297 kg  166 lb   --    2024 75.751 kg  167 lb  Stated     2024 76.658 kg  169 lb      2024 75.932 kg  167 lb 6.4 oz   --     75.751 kg  167 lb   --    2024 74.889 kg  165 lb 1.6 oz   --     74.98 kg  165 lb 4.8 oz   --     74.844 kg  165 lb   --        --        --        --    2024 76.25 kg  168 lb 1.6 oz   --     76.204 kg  168 lb   --    2024 77.111 kg  170 lb   --    2024 75.297 kg  166 lb      6/3/2024 74.98 kg  165 lb 4.8 oz   --     75.297 kg  166 lb   --        --        --    2024 74.39 kg  164 lb   --    2024 74.4 kg  164 lb 0.4 oz  Estimated     2024 77.565 kg  171 lb   --    2024 77.565 kg  171 lb   --    2024 77.656 kg  171 lb 3.2 oz   --    2024 77.565 kg  171 lb      10/24/2024 79.833 kg  176 lb   --    2024 79.833 kg  176 lb   --    2024 78.926 kg  174 lb      2024 79.833 kg  176 lb   --    2025 79.833 kg  176 lb  Stated     2025 79.833 kg  176 lb   --    2/3/2025 83.008 kg  183 lb   --    2025 83.008 kg  183 lb       83 kg  182 lb 15.7 oz          Needs Assessment   Date:     Height used:Height: 175.3 cm (69.02\")  Weights used: 164lb/74kg    Estimated Calorie needs: ~1900kcal   Method:  25Kcals/Kkcal  Method:  MSJ (1514) x 1.2: 1817kcal    Estimated Protein needs: ~ 90g protein with current renal function  Method:1.2g/kg protein: 90g " inna    Nutrition Focused Physical Exam     Date: 2-26-25    Pt does not meet criteria for malnutrition diagnosis, at this time.      Subjective   Reported/Observed/Food/Nutrition Related History:     2/28  Pt continues w/hypernatremia. Increased water flush again. Pt does not participate in nutrition visit. Discussed changes w/FRANCINE and MD.     2/27   EN and water flush adjusted 2/2 hypernatremia and change in feeding window.     2-26: pt resting in bed, on room air, pleasantly confused    Per RN: pt tolerating TF, had 1 dark bm, has been tachypneic, lots of secretions, failed FEES    2-24: per RN pt extubated today, is tolerating TF, possible GIB, is having dark stools, follows commands    Pt resting in bed, on nasal cannula, very weak voice, difficulty talking  + precedex    2-21-25: pt intubated, sedated + fentanyl, versed, D5%@100ml    Per RN: pt tolerating TF, has not had a bm, is much more alert today, follows commands    Per MD: plan to maintain D5% for 1 more day,  decrease free water to 30ml/hr    2-19: pt intubated, sedated, + fentanyl, versed, D5%@100ml    Per RN: pt has been more alert, oxygen demands decreased, tolerating TF, abdomen still distended, given lactulose (last bm 2-17)    2/17  Pt intubated, sedated, on fentanyl & VERSED. Spoke with nurse, reported tolerating TF well, has low residuals, and bowels are moving. LBM on 2/15. Noted that Na continues to increase, even after increasing free water on 2/15. Pt may benefit from IV fluid to correct hypernatremia.     2/13  Pt intubated, sedated, + fentanyl, propofol 7.56ml, levophed 0.32mcg    Per RN: pt's belly distended, more firm than before, bowel regimen started, marginal UOP, have been toni to wean off asiya and vasopressin    Current Nutrition Prescription     PO: NPO Diet NPO Type: Tube Feeding  Oral Nutrition Supplement:  Intake: N/A    EN: Peptamen 1.5  Goal Rate: 65ml/hr  Water Flushes:45ml/hr  Modular: None  Route: ND  Tube: Small bore    At  goal over: 22Hrs/day     Rx will supply:   Goal Volume 1430  mL/day     Flush Volume 990 mL/day     Energy 2145 Kcal/day 102 % MREE   Protein 97 g/day 97 % Est Need   Fiber 0 g/day     Water in  EN 1100 mL     Total Water 2090 mL     Meet DRI Yes        --------------------------------------------------------------------------  Product/Rate verified at bedside: Yes  Infusing Rate at time of visit: 65ml/hr, 45ml/hr flush    Average Delivery from Chartin Day:   Volume 1684 mL/day   % Goal Vol.   Flush Volume 947 mL/day     Energy  Kcal/day  % Est Need   Protein  g/day  % Est Need   Fiber  g/day     Water in  EN  mL     Total Water  mL     Meet DRI Yes          Assessment & Plan   Nutrition Diagnosis   Date: 25 Updated: 25   Problem Inadequate energy intake    Etiology ARF/Extubated   Signs/Symptoms 112% goal volume EN   Status:  improved    Date:   Updated:  Problem Inadequate oral intake   Etiology dysphagia   Signs/Symptoms +EN   Status: New    Goal:   Nutrition to support treatment and Adjust EN      Nutrition Intervention      Follow treatment progress, Care plan reviewed    Adjust EN for hypernatremia and change in feeding window:  Peptamen 1.5 @ 60ml/hr. Water flush @ 80ml/hr.   =1320ml, 1980kcals (104% est needs), 90g pro (100% est needs), 0 fiber, 1016ml water from formula, +1760ml water from flushes, 2776ml TFW.     Monitoring/Evaluation:   Per protocol, I&O, Pertinent labs, EN delivery/tolerance, Weight, Skin status, GI status, Symptoms, Hemodynamic stability    Joy John, MS,RD,LD  Time Spent:30min

## 2025-02-28 NOTE — CONSULTS
[facility]    Referring Provider: Garret Gibson MD     PCP: Wesly Minor MD    Reason for Consultation: Melena, acute blood loss anemia     Chief complaint: Weakness     History of present illness:    Val Nassar Jr. is a 64 y.o. male who was admitted with septic shock secondary to pseudomonas from urinary source.   He had respiratory decompensation requiring intubation and mechanical ventilation on 2/12/25.   He was extubated on 2/24/25.    Hospital course complicated by atrial fibrillation with RVR.   He is currently on IV Amiodarone drip.    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      He had an outpatient EGD with Dr. Walter the day prior to admission with normal appearing esophagus, mild portal gastropathy and mild acquired duodenal stenosis.   No varices seen.        He received 1 unit of pRBCs on 2/24 as well as 2 units of PRBCs yesterday for severe anemia.   He began to have dark tarry stools yesterday.    The patient however denies this and states he does not think he is bleeding.       Allergies:  Patient has no known allergies.    Scheduled Meds:  amiodarone, 150 mg, Intravenous, Once  castor oil-balsam peru, 1 Application, Topical, Q12H  fentaNYL, 1 patch, Transdermal, Q72H   And  Check Fentanyl Patch Placement, 1 each, Not Applicable, Q12H  chlorhexidine, 15 mL, Mouth/Throat, Q12H  docusate sodium, 100 mg, Per G Tube, BID  [Held by provider] enoxaparin sodium, 40 mg, Subcutaneous, Q24H  escitalopram, 10 mg, Per G Tube, Nightly  finasteride, 5 mg, Per G Tube, Daily  gabapentin, 300 mg, Per G Tube, Q8H  insulin regular, 2-7 Units, Subcutaneous, Q6H  ipratropium-albuterol, 3 mL, Nebulization, 4x Daily - RT  metoprolol tartrate, 25 mg, Oral, Q12H  nystatin, , Topical, Q8H  pantoprazole, 40 mg, Intravenous, Q12H  rosuvastatin, 20 mg, Oral, Nightly  terazosin, 1 mg, Nasogastric, Daily          Infusions:  amiodarone, 0.5 mg/min, Last Rate: 0.5 mg/min (02/28/25 0644)        PRN Meds:    Calcium Replacement - Follow Nurse / BPA Driven Protocol    dextrose    glucagon (human recombinant)    ipratropium-albuterol    Magnesium Cardiology Dose Replacement - Follow Nurse / BPA Driven Protocol    metoprolol tartrate    oxyCODONE-acetaminophen    Phosphorus Replacement - Follow Nurse / BPA Driven Protocol    polyvinyl alcohol    Potassium Replacement - Follow Nurse / BPA Driven Protocol    sodium chloride    Home Meds:  Medications Prior to Admission   Medication Sig Dispense Refill Last Dose/Taking    Cholecalciferol (Vitamin D3) 1.25 MG (08793 UT) capsule Take 1 capsule by mouth Every 7 (Seven) Days.   Past Month    dutasteride (AVODART) 0.5 MG capsule Take 1 capsule by mouth Daily.   Past Month    fentaNYL (DURAGESIC) 25 MCG/HR patch Place 1 patch on the skin as directed by provider Every 72 (Seventy-Two) Hours.   2/11/2025    escitalopram (LEXAPRO) 10 MG tablet Take 1 tablet by mouth Every Night.   Unknown    ferrous gluconate (FERGON) 324 MG tablet Take 1 tablet by mouth Daily With Breakfast.       folic acid (FOLVITE) 1 MG tablet Take 1 tablet by mouth Daily.       gabapentin (NEURONTIN) 300 MG capsule Take 2 capsules by mouth 3 (Three) Times a Day.       hydrocortisone 2.5 % cream Apply 1 Application topically to the appropriate area as directed 2 (Two) Times a Day.       Lidocaine 4 % Place 1 patch on the skin as directed by provider Daily. Remove & Discard patch within 12 hours or as directed by MD   Unknown    Loratadine 10 MG capsule Take 1 capsule by mouth Daily.       melatonin 5 MG tablet tablet Take 1-2 tablets by mouth At Night As Needed.       omeprazole (priLOSEC) 40 MG capsule Take 1 capsule by mouth Daily.       oxyCODONE-acetaminophen (PERCOCET) 7.5-325 MG per tablet Take 1 tablet by mouth Every 6 (Six) Hours As Needed.       senna 8.6 MG tablet Take 1 tablet by mouth 2 (Two) Times a Day.        tamsulosin (FLOMAX) 0.4 MG capsule 24 hr capsule Take 1 capsule by mouth Daily.       vitamin B-12 (CYANOCOBALAMIN) 1000 MCG tablet Take 1 tablet by mouth Daily.       zolpidem (AMBIEN) 5 MG tablet Take 1 tablet by mouth At Night As Needed for Sleep.        ROS: Review of Systems   Constitutional:  Positive for fatigue.   Respiratory:  Positive for cough and shortness of breath.    Gastrointestinal:  Negative for abdominal pain, nausea and vomiting.   Neurological:  Positive for weakness.     PAST MED HX:  Past Medical History:   Diagnosis Date    Anemia     Cancer     ESOPHAGEAL    Cirrhosis     Duodenal ulcer     Gastric ulcer     GERD (gastroesophageal reflux disease)     History of alcohol abuse     History of radiation therapy 05/08/2024    esophagus    HLD (hyperlipidemia)     Mood disorder      PAST SURG HX:  Past Surgical History:   Procedure Laterality Date    ENDOSCOPY N/A 12/26/2023    Procedure: ESOPHAGOGASTRODUODENOSCOPY;  Surgeon: Wesly Mckeon MD;  Location: [facility] ENDOSCOPY;  Service: Gastroenterology;  Laterality: N/A;    VENOUS ACCESS DEVICE (PORT) INSERTION Right 04/25/2024     FAM HX:  Family History   Problem Relation Age of Onset    Cancer Mother         LUNG AND BREAST    Breast cancer Mother     Heart attack Father        SOC HX:  Social History     Socioeconomic History    Marital status: Single   Tobacco Use    Smoking status: Every Day     Current packs/day: 1.00     Average packs/day: 1 pack/day for 15.0 years (15.0 ttl pk-yrs)     Types: Cigarettes     Passive exposure: Current    Smokeless tobacco: Current   Vaping Use    Vaping status: Every Day    Substances: Nicotine, Flavoring    Devices: Disposable   Substance and Sexual Activity    Alcohol use: Yes     Alcohol/week: 4.0 standard drinks of alcohol     Types: 4 Cans of beer per week     Comment: pt states he drinks 3-4 beers a day a couple times a week    Drug use: Yes     Types: Hydrocodone    Sexual activity: Not Currently  "    Partners: Female       PHYSICAL EXAM  /66 (BP Location: Left arm, Patient Position: Lying)   Pulse 71   Temp 97 °F (36.1 °C) (Axillary)   Resp 18   Ht 175.3 cm (69.02\")   Wt 74.4 kg (164 lb 1.6 oz)   SpO2 95%   BMI 24.22 kg/m²   Wt Readings from Last 3 Encounters:   02/28/25 74.4 kg (164 lb 1.6 oz)   02/03/25 83 kg (183 lb)   01/23/25 79.8 kg (176 lb)   ,body mass index is 24.22 kg/m².  Physical Exam  Constitutional:       Appearance: He is ill-appearing.   HENT:      Nose:      Comments: Keofeed tube in nares   Cardiovascular:      Rate and Rhythm: Normal rate and regular rhythm.   Pulmonary:      Comments: Mild tachypnea in conversation  On 2L O2 via nasal cannula   Abdominal:      General: Bowel sounds are normal. There is no distension.      Palpations: Abdomen is soft.      Tenderness: There is no abdominal tenderness. There is no guarding.   Neurological:      Mental Status: He is alert.       Results Review:   I reviewed the patient's new clinical results.    Lab Results   Component Value Date    WBC 18.39 (H) 02/28/2025    HGB 9.6 (L) 02/28/2025    HGB 6.6 (C) 02/27/2025    HGB 6.9 (C) 02/27/2025    HCT 30.2 (L) 02/28/2025    MCV 93.8 02/28/2025     02/28/2025     Lab Results   Component Value Date    INR 1.13 (H) 02/17/2025    INR 1.27 (H) 02/14/2025    INR 1.21 (H) 02/11/2025     Lab Results   Component Value Date    GLUCOSE 118 (H) 02/28/2025    BUN 92 (H) 02/28/2025    CREATININE 1.00 02/28/2025    BCR 92.0 (H) 02/28/2025     (H) 02/28/2025    K 4.2 02/28/2025    CO2 28.0 02/28/2025    CALCIUM 9.6 02/28/2025    ALBUMIN 2.6 (L) 02/21/2025    ALKPHOS 138 (H) 02/21/2025    BILITOT 0.6 02/21/2025    BILIDIR 0.5 (H) 02/16/2025    ALT 80 (H) 02/21/2025    AST 52 (H) 02/21/2025     ASSESSMENTS/PLANS    Gastrointestinal bleeding with melena   Acute blood loss anemia s/p transfusion of 3 units of PRBCs  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      History of squamous cell carcinoma " of the esophagus, s/p chemoradiation in May 2024.   Most recent EGD with negative esophageal biopsies on 2/10/25.     Septic shock secondary to Pseudomonas bacteremia, urinary source   Atrial fibrillation   Severe pharyngeal dysphagia, currently receiving enteral nutrition via Keofeed tube     >> Recommend EGD for concern of upper GI source of bleeding.    He is at risk for peptic ulcer disease with his recent septic shock and intubation/mechanical ventilation.   The patient however declines at this time and does not think he is bleeding.     >> IV Pantoprazole 40 mg BID  >> Will check an INR   >> Patient is currently oriented but certainly has risks of hepatic encephalopathy in the setting of bleeding.   Will check an ammonia level and begin Xifaxan 550 mg BID     I discussed the patient's findings and my recommendations with patient    FERNANDA Banda  02/28/25  13:53 EST

## 2025-02-28 NOTE — CASE MANAGEMENT/SOCIAL WORK
Continued Stay Note  [facility]     Patient Name: Val Nassar Jr.  MRN: [id number]  Today's Date: 2/28/2025    Admit Date: 2/11/2025    Plan: SNF   Discharge Plan       Row Name 02/28/25 1459       Plan    Plan SNF    Patient/Family in Agreement with Plan yes    Plan Comments Met with Mr. Nassar at the bedside to discuss discharge plan. His plan is to return to [facility] for skilled rehab and then back to LTC bed.  will continue to follow plan of care and assist with discharge planning needs as indicated.    Final Discharge Disposition Code 03 - skilled nursing facility (SNF)                   Discharge Codes    No documentation.                 Expected Discharge Date and Time       Expected Discharge Date Expected Discharge Time    Mar 5, 2025               Areli Harding RN

## 2025-03-01 LAB
ALBUMIN SERPL-MCNC: 2.8 G/DL (ref 3.5–5.2)
ALP SERPL-CCNC: 126 U/L (ref 39–117)
ALT SERPL W P-5'-P-CCNC: 64 U/L (ref 1–41)
ANION GAP SERPL CALCULATED.3IONS-SCNC: 10 MMOL/L (ref 5–15)
AST SERPL-CCNC: 47 U/L (ref 1–40)
BILIRUB CONJ SERPL-MCNC: 0.2 MG/DL (ref 0–0.3)
BILIRUB INDIRECT SERPL-MCNC: 0.2 MG/DL
BILIRUB SERPL-MCNC: 0.4 MG/DL (ref 0–1.2)
BUN SERPL-MCNC: 74 MG/DL (ref 8–23)
BUN/CREAT SERPL: 81.3 (ref 7–25)
CALCIUM SPEC-SCNC: 9.2 MG/DL (ref 8.6–10.5)
CHLORIDE SERPL-SCNC: 111 MMOL/L (ref 98–107)
CO2 SERPL-SCNC: 28 MMOL/L (ref 22–29)
CREAT SERPL-MCNC: 0.91 MG/DL (ref 0.76–1.27)
CRP SERPL-MCNC: 1.95 MG/DL (ref 0–0.5)
DEPRECATED RDW RBC AUTO: 68.4 FL (ref 37–54)
EGFRCR SERPLBLD CKD-EPI 2021: 93.5 ML/MIN/1.73
ERYTHROCYTE [DISTWIDTH] IN BLOOD BY AUTOMATED COUNT: 20.8 % (ref 12.3–15.4)
GLUCOSE BLDC GLUCOMTR-MCNC: 135 MG/DL (ref 70–130)
GLUCOSE BLDC GLUCOMTR-MCNC: 147 MG/DL (ref 70–130)
GLUCOSE BLDC GLUCOMTR-MCNC: 170 MG/DL (ref 70–130)
GLUCOSE SERPL-MCNC: 129 MG/DL (ref 65–99)
HCT VFR BLD AUTO: 31.4 % (ref 37.5–51)
HGB BLD-MCNC: 9.9 G/DL (ref 13–17.7)
INR PPP: 1.13 (ref 0.89–1.12)
MAGNESIUM SERPL-MCNC: 1.7 MG/DL (ref 1.6–2.4)
MCH RBC QN AUTO: 30.2 PG (ref 26.6–33)
MCHC RBC AUTO-ENTMCNC: 31.5 G/DL (ref 31.5–35.7)
MCV RBC AUTO: 95.7 FL (ref 79–97)
PLATELET # BLD AUTO: 208 10*3/MM3 (ref 140–450)
PMV BLD AUTO: 9.7 FL (ref 6–12)
POTASSIUM SERPL-SCNC: 4 MMOL/L (ref 3.5–5.2)
PROCALCITONIN SERPL-MCNC: 0.98 NG/ML (ref 0–0.25)
PROT SERPL-MCNC: 7.2 G/DL (ref 6–8.5)
PROTHROMBIN TIME: 14.6 SECONDS (ref 12.2–14.5)
RBC # BLD AUTO: 3.28 10*6/MM3 (ref 4.14–5.8)
SODIUM SERPL-SCNC: 149 MMOL/L (ref 136–145)
WBC NRBC COR # BLD AUTO: 19.72 10*3/MM3 (ref 3.4–10.8)

## 2025-03-01 PROCEDURE — 86140 C-REACTIVE PROTEIN: CPT | Performed by: INTERNAL MEDICINE

## 2025-03-01 PROCEDURE — 83735 ASSAY OF MAGNESIUM: CPT

## 2025-03-01 PROCEDURE — 85027 COMPLETE CBC AUTOMATED: CPT | Performed by: INTERNAL MEDICINE

## 2025-03-01 PROCEDURE — 80048 BASIC METABOLIC PNL TOTAL CA: CPT | Performed by: INTERNAL MEDICINE

## 2025-03-01 PROCEDURE — 99232 SBSQ HOSP IP/OBS MODERATE 35: CPT | Performed by: NURSE PRACTITIONER

## 2025-03-01 PROCEDURE — 94799 UNLISTED PULMONARY SVC/PX: CPT

## 2025-03-01 PROCEDURE — 99232 SBSQ HOSP IP/OBS MODERATE 35: CPT

## 2025-03-01 PROCEDURE — 84145 PROCALCITONIN (PCT): CPT | Performed by: INTERNAL MEDICINE

## 2025-03-01 PROCEDURE — 25010000002 MAGNESIUM SULFATE 4 GM/100ML SOLUTION: Performed by: INTERNAL MEDICINE

## 2025-03-01 PROCEDURE — 82948 REAGENT STRIP/BLOOD GLUCOSE: CPT

## 2025-03-01 PROCEDURE — 85610 PROTHROMBIN TIME: CPT | Performed by: INTERNAL MEDICINE

## 2025-03-01 PROCEDURE — 94761 N-INVAS EAR/PLS OXIMETRY MLT: CPT

## 2025-03-01 PROCEDURE — 93010 ELECTROCARDIOGRAM REPORT: CPT | Performed by: STUDENT IN AN ORGANIZED HEALTH CARE EDUCATION/TRAINING PROGRAM

## 2025-03-01 PROCEDURE — 99232 SBSQ HOSP IP/OBS MODERATE 35: CPT | Performed by: INTERNAL MEDICINE

## 2025-03-01 PROCEDURE — 63710000001 INSULIN REGULAR HUMAN PER 5 UNITS: Performed by: INTERNAL MEDICINE

## 2025-03-01 PROCEDURE — 80076 HEPATIC FUNCTION PANEL: CPT

## 2025-03-01 PROCEDURE — 94664 DEMO&/EVAL PT USE INHALER: CPT

## 2025-03-01 PROCEDURE — 93005 ELECTROCARDIOGRAM TRACING: CPT | Performed by: PHYSICIAN ASSISTANT

## 2025-03-01 RX ORDER — METOPROLOL TARTRATE 25 MG/1
25 TABLET, FILM COATED ORAL EVERY 12 HOURS SCHEDULED
Status: DISCONTINUED | OUTPATIENT
Start: 2025-03-01 | End: 2025-03-02

## 2025-03-01 RX ORDER — LACTULOSE 10 G/15ML
20 SOLUTION ORAL 3 TIMES DAILY
Status: DISCONTINUED | OUTPATIENT
Start: 2025-03-01 | End: 2025-03-04

## 2025-03-01 RX ORDER — TERAZOSIN 1 MG/1
1 CAPSULE ORAL DAILY
Status: DISCONTINUED | OUTPATIENT
Start: 2025-03-02 | End: 2025-03-06

## 2025-03-01 RX ORDER — OXYCODONE AND ACETAMINOPHEN 7.5; 325 MG/1; MG/1
1 TABLET ORAL EVERY 6 HOURS PRN
Status: DISPENSED | OUTPATIENT
Start: 2025-03-01 | End: 2025-03-05

## 2025-03-01 RX ORDER — ROSUVASTATIN CALCIUM 20 MG/1
20 TABLET, COATED ORAL NIGHTLY
Status: DISCONTINUED | OUTPATIENT
Start: 2025-03-01 | End: 2025-03-12 | Stop reason: HOSPADM

## 2025-03-01 RX ORDER — MAGNESIUM SULFATE HEPTAHYDRATE 40 MG/ML
4 INJECTION, SOLUTION INTRAVENOUS ONCE
Status: COMPLETED | OUTPATIENT
Start: 2025-03-01 | End: 2025-03-01

## 2025-03-01 RX ADMIN — OXYCODONE AND ACETAMINOPHEN 1 TABLET: 7.5; 325 TABLET ORAL at 20:16

## 2025-03-01 RX ADMIN — TERAZOSIN HYDROCHLORIDE 1 MG: 1 CAPSULE ORAL at 09:06

## 2025-03-01 RX ADMIN — OXYCODONE AND ACETAMINOPHEN 1 TABLET: 7.5; 325 TABLET ORAL at 13:05

## 2025-03-01 RX ADMIN — Medication 1 APPLICATION: at 09:08

## 2025-03-01 RX ADMIN — IPRATROPIUM BROMIDE AND ALBUTEROL SULFATE 3 ML: 2.5; .5 SOLUTION RESPIRATORY (INHALATION) at 07:22

## 2025-03-01 RX ADMIN — PANTOPRAZOLE SODIUM 40 MG: 40 INJECTION, POWDER, FOR SOLUTION INTRAVENOUS at 09:06

## 2025-03-01 RX ADMIN — METOPROLOL TARTRATE 25 MG: 25 TABLET, FILM COATED ORAL at 20:16

## 2025-03-01 RX ADMIN — NYSTATIN: 100000 POWDER TOPICAL at 20:16

## 2025-03-01 RX ADMIN — RIFAXIMIN 550 MG: 550 TABLET ORAL at 09:06

## 2025-03-01 RX ADMIN — MAGNESIUM SULFATE IN WATER FOR 4 G: 40 INJECTION INTRAVENOUS at 11:58

## 2025-03-01 RX ADMIN — GABAPENTIN 300 MG: 300 CAPSULE ORAL at 13:05

## 2025-03-01 RX ADMIN — IPRATROPIUM BROMIDE AND ALBUTEROL SULFATE 3 ML: 2.5; .5 SOLUTION RESPIRATORY (INHALATION) at 11:53

## 2025-03-01 RX ADMIN — PANTOPRAZOLE SODIUM 40 MG: 40 INJECTION, POWDER, FOR SOLUTION INTRAVENOUS at 20:16

## 2025-03-01 RX ADMIN — NYSTATIN: 100000 POWDER TOPICAL at 13:13

## 2025-03-01 RX ADMIN — GABAPENTIN 300 MG: 300 CAPSULE ORAL at 05:53

## 2025-03-01 RX ADMIN — RIFAXIMIN 550 MG: 550 TABLET ORAL at 20:16

## 2025-03-01 RX ADMIN — METOPROLOL TARTRATE 25 MG: 25 TABLET, FILM COATED ORAL at 09:05

## 2025-03-01 RX ADMIN — Medication 1 APPLICATION: at 20:16

## 2025-03-01 RX ADMIN — LACTULOSE 20 G: 20 SOLUTION ORAL at 20:16

## 2025-03-01 RX ADMIN — IPRATROPIUM BROMIDE AND ALBUTEROL SULFATE 3 ML: 2.5; .5 SOLUTION RESPIRATORY (INHALATION) at 15:54

## 2025-03-01 RX ADMIN — IPRATROPIUM BROMIDE AND ALBUTEROL SULFATE 3 ML: 2.5; .5 SOLUTION RESPIRATORY (INHALATION) at 19:36

## 2025-03-01 RX ADMIN — FINASTERIDE 5 MG: 5 TABLET, FILM COATED ORAL at 09:06

## 2025-03-01 RX ADMIN — DOCUSATE SODIUM 100 MG: 50 LIQUID ORAL at 09:06

## 2025-03-01 RX ADMIN — ROSUVASTATIN 20 MG: 20 TABLET, FILM COATED ORAL at 20:16

## 2025-03-01 RX ADMIN — NYSTATIN: 100000 POWDER TOPICAL at 05:53

## 2025-03-01 RX ADMIN — OXYCODONE AND ACETAMINOPHEN 1 TABLET: 7.5; 325 TABLET ORAL at 05:53

## 2025-03-01 RX ADMIN — GABAPENTIN 300 MG: 300 CAPSULE ORAL at 20:16

## 2025-03-01 RX ADMIN — ESCITALOPRAM OXALATE 10 MG: 10 TABLET ORAL at 20:16

## 2025-03-01 RX ADMIN — HUMAN INSULIN 2 UNITS: 100 INJECTION, SOLUTION SUBCUTANEOUS at 12:58

## 2025-03-01 RX ADMIN — Medication 10 ML: at 20:16

## 2025-03-01 NOTE — PROGRESS NOTES
"  GI Daily Progress Note  Subjective:    Chief Complaint: Follow-up anemia, decompensated liver cirrhosis    Patient resting in bed; markedly encephalopathic at time of exam.  Able to answer orientation questions but quick to fall asleep-significant asterixis appreciated on exam.  Nursing reports 1 small stool earlier today.    Objective:    /56 (BP Location: Left arm, Patient Position: Lying)   Pulse 73   Temp 98 °F (36.7 °C) (Axillary)   Resp 16   Ht 175.3 cm (69.02\")   Wt 78.8 kg (173 lb 12.8 oz)   SpO2 92%   BMI 25.65 kg/m²     Physical Exam  Vitals and nursing note reviewed.   Constitutional:       General: He is not in acute distress.     Appearance: Normal appearance. He is normal weight. He is ill-appearing. He is not toxic-appearing.   HENT:      Head:      Comments: Nasojejunal feeding tube in place  Eyes:      General: No scleral icterus.     Pupils: Pupils are equal, round, and reactive to light.   Cardiovascular:      Rate and Rhythm: Normal rate and regular rhythm.      Pulses: Normal pulses.      Heart sounds: Normal heart sounds.   Pulmonary:      Effort: Pulmonary effort is normal. No respiratory distress.      Breath sounds: Normal breath sounds.   Abdominal:      General: Abdomen is flat. Bowel sounds are normal. There is no distension.      Palpations: Abdomen is soft. There is no mass.      Tenderness: There is no abdominal tenderness. There is no guarding or rebound.      Hernia: No hernia is present.   Neurological:      Mental Status: He is alert and oriented to person, place, and time.      Comments: Asterixis on exam.  Patient quick to fall asleep during exam.   Psychiatric:         Mood and Affect: Mood normal.         Behavior: Behavior normal.         Thought Content: Thought content normal.         Judgment: Judgment normal.         Lab  I have personally reviewed most recent cardiac tracings, lab results, and radiology images and interpretations and agree with " findings.    Lab Results   Component Value Date    WBC 19.72 (H) 03/01/2025    HGB 9.9 (L) 03/01/2025    HGB 8.6 (L) 02/28/2025    HGB 9.2 (L) 02/28/2025    MCV 95.7 03/01/2025     03/01/2025    INR 1.13 (H) 03/01/2025    INR 1.13 (H) 02/17/2025    INR 1.27 (H) 02/14/2025    INR 1.21 (H) 02/11/2025    INR 0.97 04/25/2024       Lab Results   Component Value Date    GLUCOSE 129 (H) 03/01/2025    BUN 74 (H) 03/01/2025    CREATININE 0.91 03/01/2025    BCR 81.3 (H) 03/01/2025     (H) 03/01/2025    K 4.0 03/01/2025    CO2 28.0 03/01/2025    CALCIUM 9.2 03/01/2025    ALBUMIN 2.8 (L) 03/01/2025    ALKPHOS 126 (H) 03/01/2025    BILITOT 0.4 03/01/2025    BILIDIR 0.2 03/01/2025    ALT 64 (H) 03/01/2025    AST 47 (H) 03/01/2025       Assessment:      Septic shock due to Pseudomonas species    Hyperlipidemia, unspecified    Tobacco use    Cirrhosis of liver    Squamous cell carcinoma of esophagus    GALILEO (acute kidney injury)    Metabolic acidosis    Pneumonia of both lower lobes due to Pseudomonas species    Bacteremia due to Pseudomonas    Gastrointestinal bleeding with melena   Acute blood loss anemia s/p transfusion of 3 units of PRBCs  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      History of squamous cell carcinoma of the esophagus, s/p chemoradiation in May 2024.   Most recent EGD with negative esophageal biopsies on 2/10/25.     Septic shock secondary to Pseudomonas bacteremia, urinary source   Atrial fibrillation   Severe pharyngeal dysphagia, currently receiving enteral nutrition via Keofeed tube   Hepatic encephalopathy, grade 2/4 (NEW)    Plan:  Patient was significant encephalopathy today.  Hemoglobin stable.    >> Lactulose 20 g p.o. 3 times daily  Goal is for 3-4 soft BMs daily  Hold if in excess of 4 BMs in a 24-hour period  >> Rifaximin 550 mg p.o. twice daily  >> IV PPI twice daily  >> Monitor H&H and transfuse per protocol  >> AM CMP and PT/INR    Esvin Gray, APRN  03/01/25  18:41 EST

## 2025-03-01 NOTE — PLAN OF CARE
Problem: Adult Inpatient Plan of Care  Goal: Plan of Care Review  Outcome: Progressing  Goal: Patient-Specific Goal (Individualized)  Outcome: Progressing  Goal: Absence of Hospital-Acquired Illness or Injury  Outcome: Progressing  Intervention: Identify and Manage Fall Risk  Description: Perform standard risk assessment on admission using a validated tool or comprehensive approach appropriate to the patient; reassess fall risk frequently, with change in status or transfer to another level of care.  Communicate risk to interprofessional healthcare team; ensure fall risk visible cue.  Determine need for increased observation, equipment and environmental modification, as well as use of supportive, nonskid footwear.  Adjust safety measures to individual needs and identified risk factors.  Reinforce the importance of active participation with fall risk prevention, safety, and physical activity with the patient and family.  Perform regular intentional rounding to assess need for position change, pain assessment and personal needs, including assistance with toileting.  Recent Flowsheet Documentation  Taken 3/1/2025 0903 by Laura Silva RN  Safety Promotion/Fall Prevention:   activity supervised   assistive device/personal items within reach   clutter free environment maintained   fall prevention program maintained   nonskid shoes/slippers when out of bed   room organization consistent   safety round/check completed  Intervention: Prevent Skin Injury  Description: Perform a screening for skin injury risk, such as pressure or moisture-associated skin damage on admission and at regular intervals throughout hospital stay.  Keep all areas of skin (especially folds) clean and dry.  Maintain adequate skin hydration.  Relieve and redistribute pressure and protect bony prominences and skin at risk for injury; implement measures based on patient-specific risk factors.  Match turning and repositioning schedule to clinical  condition.  Encourage weight shift frequently; assist with reposition if unable to complete independently.  Float heels off bed; avoid pressure on the Achilles tendon.  Keep skin free from extended contact with medical devices.  Optimize nutrition and hydration.  Encourage functional activity and mobility, as early as tolerated.  Use aids (e.g., slide boards, mechanical lift) during transfer.  Recent Flowsheet Documentation  Taken 3/1/2025 1200 by Laura Silva RN  Body Position:   position changed independently   lower extremity elevated   neutral body alignment   neutral head position  Skin Protection:   incontinence pads utilized   protective footwear used   silicone foam dressing in place  Taken 3/1/2025 0903 by Laura Silva RN  Body Position:   position changed independently   lower extremity elevated   neutral body alignment   neutral head position  Skin Protection:   incontinence pads utilized   protective footwear used   silicone foam dressing in place  Intervention: Prevent Infection  Description: Maintain skin and mucous membrane integrity; promote hand, oral and pulmonary hygiene.  Optimize fluid balance, nutrition, sleep and glycemic control to maximize infection resistance.  Identify potential sources of infection early to prevent or mitigate progression of infection (e.g., wound, lines, devices).  Evaluate ongoing need for invasive devices; remove promptly when no longer indicated.  Review vaccination status.  Recent Flowsheet Documentation  Taken 3/1/2025 1200 by Laura Silva RN  Infection Prevention:   environmental surveillance performed   equipment surfaces disinfected   hand hygiene promoted   personal protective equipment utilized   rest/sleep promoted   single patient room provided  Taken 3/1/2025 0903 by Laura Silva RN  Infection Prevention:   environmental surveillance performed   equipment surfaces disinfected   hand hygiene promoted   personal protective equipment utilized    rest/sleep promoted   single patient room provided  Goal: Optimal Comfort and Wellbeing  Outcome: Progressing  Intervention: Monitor Pain and Promote Comfort  Description: Assess pain level, treatment efficacy and patient response at regular intervals using a consistent pain scale.  Consider the presence and impact of preexisting chronic pain.  Encourage patient and caregiver involvement in pain assessment, interventions and safety measures.  Promote activity; balance with sleep and rest to enhance healing.  Recent Flowsheet Documentation  Taken 3/1/2025 1200 by Laura Silva RN  Pain Management Interventions:   pillow support provided   position adjusted   quiet environment facilitated   care clustered  Intervention: Provide Person-Centered Care  Description: Use a family-focused approach to care; encourage support system presence and participation.  Develop trust and rapport by proactively providing information, encouraging questions, addressing concerns and offering reassurance.  Acknowledge emotional response to hospitalization.  Recognize and utilize personal coping strategies and strengths; develop goals via shared decision-making.  Honor spiritual and cultural preferences.  Recent Flowsheet Documentation  Taken 3/1/2025 0903 by Laura Silva RN  Trust Relationship/Rapport:   care explained   choices provided   questions answered   questions encouraged   reassurance provided   thoughts/feelings acknowledged  Goal: Readiness for Transition of Care  Outcome: Progressing   Goal Outcome Evaluation:

## 2025-03-01 NOTE — PLAN OF CARE
Goal Outcome Evaluation:        Problem: Adult Inpatient Plan of Care  Goal: Absence of Hospital-Acquired Illness or Injury  Intervention: Identify and Manage Fall Risk  Recent Flowsheet Documentation  Taken 3/1/2025 0203 by Aminata Jon RN  Safety Promotion/Fall Prevention: safety round/check completed  Taken 3/1/2025 0001 by Aminata Jon RN  Safety Promotion/Fall Prevention: safety round/check completed  Taken 2/28/2025 2203 by Aminata Jon RN  Safety Promotion/Fall Prevention: safety round/check completed  Taken 2/28/2025 2015 by Aminata Jon RN  Safety Promotion/Fall Prevention: safety round/check completed  Intervention: Prevent Skin Injury  Recent Flowsheet Documentation  Taken 3/1/2025 0203 by Aminata Jon RN  Body Position:   position maintained   neutral body alignment  Taken 3/1/2025 0001 by Aminata Jon RN  Body Position: position maintained  Taken 2/28/2025 2203 by Aminata Jon RN  Body Position: position maintained  Taken 2/28/2025 2015 by Aminata Jon RN  Body Position: position maintained  Intervention: Prevent and Manage VTE (Venous Thromboembolism) Risk  Recent Flowsheet Documentation  Taken 2/28/2025 2015 by Aminata Jon RN  VTE Prevention/Management:   bilateral   SCDs (sequential compression devices) on  Goal: Optimal Comfort and Wellbeing  Intervention: Monitor Pain and Promote Comfort  Recent Flowsheet Documentation  Taken 2/28/2025 2100 by Aminata Jon RN  Pain Management Interventions:   care clustered   position adjusted   pillow support provided   quiet environment facilitated  Taken 2/28/2025 2015 by Aminata Jon RN  Pain Management Interventions:   pain management plan reviewed with patient/caregiver   pain medication given   care clustered   diversional activity provided   pillow support provided   position adjusted   quiet environment  facilitated  Intervention: Provide Person-Centered Care  Recent Flowsheet Documentation  Taken 2/28/2025 2015 by Aminata Jon RN  Trust Relationship/Rapport:   care explained   choices provided   empathic listening provided   questions answered   reassurance provided   thoughts/feelings acknowledged     Problem: Fall Injury Risk  Goal: Absence of Fall and Fall-Related Injury  Intervention: Identify and Manage Contributors  Recent Flowsheet Documentation  Taken 2/28/2025 2015 by Aminata Jon RN  Medication Review/Management: medications reviewed  Intervention: Promote Injury-Free Environment  Recent Flowsheet Documentation  Taken 3/1/2025 0203 by Aminata Jon RN  Safety Promotion/Fall Prevention: safety round/check completed  Taken 3/1/2025 0001 by Aminata Jon RN  Safety Promotion/Fall Prevention: safety round/check completed  Taken 2/28/2025 2203 by Aminata Jon RN  Safety Promotion/Fall Prevention: safety round/check completed  Taken 2/28/2025 2015 by Aminata Jon RN  Safety Promotion/Fall Prevention: safety round/check completed     Problem: Skin Injury Risk Increased  Goal: Skin Health and Integrity  Intervention: Optimize Skin Protection  Recent Flowsheet Documentation  Taken 3/1/2025 0203 by Aminata Jon RN  Head of Bed (HOB) Positioning: HOB at 30 degrees  Taken 3/1/2025 0001 by Aminata Jon RN  Head of Bed (HOB) Positioning: HOB at 30 degrees  Taken 2/28/2025 2203 by Aminata Jon RN  Head of Bed (HOB) Positioning: HOB at 30 degrees  Taken 2/28/2025 2015 by Aminata Jon RN  Head of Bed (HOB) Positioning: HOB at 30 degrees     Problem: Comorbidity Management  Goal: Maintenance of Behavioral Health Symptom Control  Intervention: Maintain Behavioral Health Symptom Control  Recent Flowsheet Documentation  Taken 2/28/2025 2015 by Aminata Jon RN  Medication Review/Management:  medications reviewed  Goal: Maintenance of Heart Failure Symptom Control  Intervention: Maintain Heart Failure Management  Recent Flowsheet Documentation  Taken 2/28/2025 2015 by Aminata Jon RN  Medication Review/Management: medications reviewed     Problem: Restraint, Nonviolent  Goal: Absence of Harm or Injury  Intervention: Protect Dignity, Rights and Personal Wellbeing  Recent Flowsheet Documentation  Taken 2/28/2025 2015 by Aminata Jon RN  Trust Relationship/Rapport:   care explained   choices provided   empathic listening provided   questions answered   reassurance provided   thoughts/feelings acknowledged  Intervention: Protect Skin and Joint Integrity  Recent Flowsheet Documentation  Taken 3/1/2025 0203 by Aminata Jon RN  Body Position:   position maintained   neutral body alignment  Taken 3/1/2025 0001 by Aminata Jon RN  Body Position: position maintained  Taken 2/28/2025 2203 by Aminata Jon RN  Body Position: position maintained  Taken 2/28/2025 2015 by Aminata Jon RN  Body Position: position maintained     Problem: Mechanical Ventilation Invasive  Goal: Effective Communication  Intervention: Ensure Effective Communication  Recent Flowsheet Documentation  Taken 2/28/2025 2015 by Aminata Jon RN  Trust Relationship/Rapport:   care explained   choices provided   empathic listening provided   questions answered   reassurance provided   thoughts/feelings acknowledged  Goal: Mechanical Ventilation Liberation  Intervention: Promote Extubation and Mechanical Ventilation Liberation  Recent Flowsheet Documentation  Taken 2/28/2025 2015 by Aminata Jon RN  Medication Review/Management: medications reviewed  Goal: Absence of Ventilator-Induced Lung Injury  Intervention: Prevent Ventilator-Associated Pneumonia  Recent Flowsheet Documentation  Taken 3/1/2025 0203 by Aminata Jon RN  Head of Bed (HOB)  Positioning: HOB at 30 degrees  Taken 3/1/2025 0001 by Aminata Jon RN  Head of Bed (HOB) Positioning: HOB at 30 degrees  Taken 2/28/2025 2203 by Aminata Jon RN  Head of Bed (HOB) Positioning: HOB at 30 degrees  Taken 2/28/2025 2015 by Aminata Jon RN  Head of Bed (HOB) Positioning: HOB at 30 degrees

## 2025-03-01 NOTE — PROGRESS NOTES
"  [facility]  PROGRESS NOTE    Date of Admission: 2/11/2025  Date of Service: 03/01/25    Primary Care Physician: Wesly Minor MD    Chief Complaint: new afib with RVR      Subjective      HPI: Pt lying in bed in no distress. He was in afib with rvr for about 3 hours last night. Seems to have resolved spontaneously. Pt does not recall any symptoms. He is not very interactive, only concern is PO intake      Objective   Vitals: /56   Pulse 81   Temp 98 °F (36.7 °C) (Axillary)   Resp 18   Ht 175.3 cm (69.02\")   Wt 78.8 kg (173 lb 12.8 oz)   SpO2 99%   BMI 25.65 kg/m²     Physical Exam:   GENERAL: Alert, cooperative, in no acute distress.   HEART: Regular rate and rhythm; no murmurs, rubs or gallops  LUNGS: Clear to auscultation bilaterally. No wheezing, rales or rhonchi. Nonlabored breathing  NEUROLOGIC: No gross focal abnormalities  EXTREMITIES: No obvious deformities, cyanosis, or edema noted.   PSYCH: normal mood, behavior    Results:  Results from last 7 days   Lab Units 03/01/25  0417 02/28/25  2344 02/28/25  1534 02/28/25  0524 02/27/25  1347 02/27/25  0114   WBC 10*3/mm3 19.72*  --   --  18.39*  --  16.64*   HEMOGLOBIN g/dL 9.9* 8.6* 9.2* 9.6*   < > 7.1*   HEMATOCRIT % 31.4* 26.7* 29.8* 30.2*   < > 22.0*   PLATELETS 10*3/mm3 208  --   --  245  --  233    < > = values in this interval not displayed.     Results from last 7 days   Lab Units 03/01/25  0417 02/28/25  0524 02/27/25  0114   SODIUM mmol/L 149* 150* 147*   POTASSIUM mmol/L 4.0 4.2 4.2   CHLORIDE mmol/L 111* 110* 107   CO2 mmol/L 28.0 28.0 26.0   BUN mg/dL 74* 92* 93*   CREATININE mg/dL 0.91 1.00 1.15   GLUCOSE mg/dL 129* 118* 156*      Lab Results   Component Value Date    TRIG 75 02/24/2025    AST 47 (H) 03/01/2025    ALT 64 (H) 03/01/2025         Results from last 7 days   Lab Units 02/24/25  0434   TRIGLYCERIDES mg/dL 75             Results from last 7 days   Lab Units 03/01/25  0417   PROTIME " Seconds 14.6*   INR  1.13*                 Intake/Output Summary (Last 24 hours) at 3/1/2025 1003  Last data filed at 3/1/2025 0556  Gross per 24 hour   Intake 3677 ml   Output 2550 ml   Net 1127 ml       I personally reviewed the patient's EKG/Telemetry data    Radiology Data:   XR Chest 1 View    Result Date: 2/28/2025  Impression: Stable lung opacities compatible with pneumonia or edema Electronically Signed: Eder Draper  2/28/2025 7:16 AM EST  Workstation ID: [id number]    XR Chest 1 View    Result Date: 2/27/2025  Impression: 1. Right IJ catheter placement, tip in the proximal SVC. No evidence of pneumothorax. 2. Significant improved pulmonary disease, which suggests resolving pneumonia. Electronically Signed: Ibrahima Jett MD  2/27/2025 8:11 AM EST  Workstation ID: [id number]    XR Chest 1 View    Result Date: 2/26/2025  Impression: There is mild improvement in bilateral airspace disease with persistent small left pleural effusion Electronically Signed: Eder Draper  2/26/2025 7:17 AM EST  Workstation ID: [id number]    XR Chest 1 View    Result Date: 2/25/2025  Impression: Interval extubation and removal of NG/OG tube. Redemonstration of bilateral parenchymal opacities compatible with pulmonary edema, slowly improved since 2/22/2025 exam. Decreased conspicuity of small left pleural effusion. Electronically Signed: Nikita Garrison MD  2/25/2025 7:08 AM EST  Workstation ID: [id number]    XR Abdomen KUB    Result Date: 2/24/2025  Impression: Feeding tube tip in the area of the duodenal bulb. Electronically Signed: Ibrahima Jett MD  2/24/2025 2:01 PM EST  Workstation ID: [id number]    XR Chest 1 View    Result Date: 2/24/2025  Impression: 1. Stable lines and support tubes. 2. Persistent perihilar airspace disease right greater than left which may relate to pulmonary edema, overall improved from the prior study. 3. Persistent bibasilar atelectasis with small bilateral pleural effusions with improved aeration at the right lung  base. 4. No pneumothorax. Electronically Signed: Richard Pinto MD  2/24/2025 7:41 AM EST  Workstation ID: [id number]    XR Abdomen KUB    Result Date: 2/23/2025  Impression: Weighted tip feeding catheter and nasogastric tube both have similar course to terminate over the distal stomach. Electronically Signed: Yoni Guadalupe MD  2/23/2025 3:01 PM EST  Workstation ID: [id number]    XR Chest 1 View    Result Date: 2/23/2025  Impression: Support hardware projects unchanged. There is evidence of persistent edema pattern with small to moderate right and trace left pleural effusions. Perihilar airspace disease otherwise appears somewhat less confluent and there is no new focal consolidation. There is no distinct pneumothorax. Unchanged heart and mediastinal contours. Electronically Signed: Yuri Medina MD  2/23/2025 7:20 AM EST  Workstation ID: [id number]    XR Chest 1 View    Result Date: 2/22/2025  Impression: Diffuse pulmonary infiltrate or edema, worsened in the right upper lobe. Small to moderate pleural effusions are unchanged. ET tube, NG tube, left jugular central venous catheter and right subclavian Port-A-Cath remain in place. Electronically Signed: Darryn Loaiza MD  2/22/2025 7:29 AM EST  Workstation ID: [id number]       Current Medications:  amiodarone, 150 mg, Intravenous, Once  castor oil-balsam peru, 1 Application, Topical, Q12H  fentaNYL, 1 patch, Transdermal, Q72H   And  Check Fentanyl Patch Placement, 1 each, Not Applicable, Q12H  chlorhexidine, 15 mL, Mouth/Throat, Q12H  docusate sodium, 100 mg, Per G Tube, BID  [Held by provider] enoxaparin sodium, 40 mg, Subcutaneous, Q24H  escitalopram, 10 mg, Per G Tube, Nightly  finasteride, 5 mg, Per G Tube, Daily  gabapentin, 300 mg, Per G Tube, Q8H  insulin regular, 2-7 Units, Subcutaneous, Q6H  ipratropium-albuterol, 3 mL, Nebulization, 4x Daily - RT  metoprolol tartrate, 25 mg, Oral, Q12H  nystatin, , Topical, Q8H  pantoprazole, 40 mg, Intravenous, Q12H  rifAXIMin,  550 mg, Oral, Q12H  rosuvastatin, 20 mg, Oral, Nightly  terazosin, 1 mg, Nasogastric, Daily         Assessment:  Shock with UTI and bacteremia due to Pseudomonas  New onset A-fib, 2/25  [id number] score of 3  Spontaneously converted  Start Eliquis 5 twice daily once okayed with GI  Amiodarone load, deferring continual p.o. part of protocol due to liver dysfunction   Rate control with metoprolol 25 mg twice daily  Chronic HFpEF   ECHO: Normal EF, mild LVH, Mild AS  X-ray with moderate left pleural effusion- IV bumex given  BP well controlled currently   CAD with elevated troponin   Elevation likely secondary to demand ischemia event  CAD seen on CT  Start statin, start Eliquis once okayed for GI ( no ASA due to thrombocytopenia)  Hyperlipidemia  Start rosuvastatin 20, monitor liver function  Alcoholic cirrhosis with anemia and thrombocytopenia  Hypothyroidism  Likely secondary to liver cirrhosis  Requiring blood transfusions and albumin   Hospital Medicine team to manage      Plan:   Started rosuvastatin 20 yesterday, monitor liver function  Will start Eliquis once okayed with GI and hospitalist, H&H improved this morning after 2u PRBC.  Do not feel patient is a appropriate long-term candidate for anticoagulation due to debility  Defer any diuretic at this time due to BUN 74 noted on a.m labs along with need for blood transfusions   Continue to monitor H&H, BP, HR, Rhythm- currently all well controlled   Continue to monitor creatinine and electrolytes and replace per protocol. K+ >4, Mag >2     Electronically signed by Patrick Berumen PA-C, 03/01/25, 11:29 AM EST.

## 2025-03-01 NOTE — PROGRESS NOTES
[facility] Medicine Services  PROGRESS NOTE    Patient Name: Val Nassar Jr.  : 1960  MRN: [id number]    Date of Admission: 2025  Primary Care Physician: Wesly Minor MD    Subjective   Subjective     CC:  bacteremia    HPI:  Feels a little stronger today.  Says he would like to get out of bed.  Denies abdominal pain      Objective   Objective     Vital Signs:   Temp:  [97.5 °F (36.4 °C)-98 °F (36.7 °C)] 98 °F (36.7 °C)  Heart Rate:  [65-84] 77  Resp:  [17-18] 17  BP: (110-140)/(55-69) 110/55  Flow (L/min) (Oxygen Therapy):  [2] 2     Physical Exam:  Appears deconditioned, in bed  Keofeed in place  Mucous membranes dry  RRR  Breath sounds grossly clear, poor effort  Abdomen soft, nontender  Awake, speech very soft  Flat affect    Results Reviewed:  LAB RESULTS:      Lab 25  0417 25  2344 25  1534 25  0524 25  1347 25  0114 25  0319 25  2044 25  0328 25  1151 25  0434   WBC 19.72*  --   --  18.39*  --   --  16.64* 20.97*  --  23.17*  --  21.85*   HEMOGLOBIN 9.9* 8.6* 9.2* 9.6* 6.6*   < > 7.1* 7.6*   < > 8.2*   < > 7.0*   HEMATOCRIT 31.4* 26.7* 29.8* 30.2* 21.3*   < > 22.0* 23.1*   < > 24.4*   < > 20.4*   PLATELETS 208  --   --  245  --   --  233 233  --  199  --  148   NEUTROS ABS  --   --   --   --   --   --  14.33*  --   --   --   --  19.29*   IMMATURE GRANS (ABS)  --   --   --   --   --   --  0.16*  --   --   --   --  0.56*   LYMPHS ABS  --   --   --   --   --   --  0.51*  --   --   --   --  0.51*   MONOS ABS  --   --   --   --   --   --  1.62*  --   --   --   --  1.46*   EOS ABS  --   --   --   --   --   --  0.00  --   --   --   --  0.01   MCV 95.7  --   --  93.8  --   --  96.5 95.1  --  93.5  --  91.1   CRP 1.95*  --   --   --   --   --   --   --   --   --   --  11.29*   PROCALCITONIN 0.98*  --   --   --   --   --   --   --   --   --   --   --    PROTIME 14.6*  --   --   --    --   --   --   --   --   --   --   --     < > = values in this interval not displayed.         Lab 03/01/25 0417 02/28/25 0524 02/27/25  0114 02/26/25 0319 02/25/25 2045 02/25/25 0328 02/24/25 0434 02/23/25  1135   SODIUM 149* 150* 147* 143 142 138 134* 133*   POTASSIUM 4.0 4.2 4.2 4.6 4.1 4.6 4.8 4.5   CHLORIDE 111* 110* 107 106 107 106 104 103   CO2 28.0 28.0 26.0 27.0 24.0 23.0 19.0* 23.0   ANION GAP 10.0 12.0 14.0 10.0 11.0 9.0 11.0 7.0   BUN 74* 92* 93* 89* 88* 86* 73* 67*   CREATININE 0.91 1.00 1.15 1.12 1.15 1.31* 1.29* 1.43*   EGFR 93.5 83.5 70.6 72.9 70.6 60.4 61.5 54.4*   GLUCOSE 129* 118* 156* 109* 133* 163* 220* 163*   CALCIUM 9.2 9.6 9.4 9.6 9.4 9.4 8.9 8.9   IONIZED CALCIUM  --   --  1.22  --   --   --   --   --    MAGNESIUM 1.7  --  2.4 2.2 1.8 2.0 2.0 2.0   PHOSPHORUS  --   --   --   --   --  4.0 3.4 3.8         Lab 03/01/25 0417   TOTAL PROTEIN 7.2   ALBUMIN 2.8*   ALT (SGPT) 64*   AST (SGOT) 47*   BILIRUBIN 0.4   INDIRECT BILIRUBIN 0.2   BILIRUBIN DIRECT 0.2   ALK PHOS 126*         Lab 03/01/25 0417   PROTIME 14.6*   INR 1.13*         Lab 02/24/25 0434   TRIGLYCERIDES 75         Lab 02/28/25 0524 02/24/25  1151 02/24/25 0434   IRON  --   --  36*   IRON SATURATION (TSAT)  --   --  18*   TIBC  --   --  206*   TRANSFERRIN  --   --  138*   FOLATE 15.20  --   --    VITAMIN B 12 >2,000*  --   --    ABO TYPING  --  A  --    RH TYPING  --  Positive  --    ANTIBODY SCREEN  --  Negative  --          Lab 02/24/25  2255 02/24/25  1034 02/24/25  0403   PH, ARTERIAL 7.362 7.466* 7.449   PCO2, ARTERIAL 40.5 30.9* 31.1*   PO2 ART 98.5 103.0 89.2   FIO2 36 40 40   HCO3 ART 23.0 22.3 21.6   BASE EXCESS ART -2.2* -1.2* -2.1*   CARBOXYHEMOGLOBIN 1.5 1.3 1.1     Brief Urine Lab Results  (Last result in the past 365 days)        Color   Clarity   Blood   Leuk Est   Nitrite   Protein   CREAT   Urine HCG        02/11/25 0942 Yellow   Turbid   Moderate (2+)   Large (3+)   Negative   100 mg/dL (2+)                    Microbiology Results Abnormal       Procedure Component Value - Date/Time    Fungus Culture - Wash, Bronchus [[id number]]  (Abnormal) Collected: 02/17/25 1633    Lab Status: Preliminary result Specimen: Wash from Bronchus Updated: 02/23/25 1910     Fungus Culture Candida albicans    Respiratory Culture - Wash, Bronchus [[id number]]  (Abnormal) Collected: 02/17/25 1633    Lab Status: Final result Specimen: Wash from Bronchus Updated: 02/19/25 1057     Respiratory Culture Light growth (2+) Candida albicans      No Normal Respiratory Lissett     Gram Stain Moderate (3+) WBCs seen      Few (2+) Epithelial cells seen      Occasional Budding yeast    Fungus Smear - Wash, Bronchus [[id number]]  (Abnormal) Collected: 02/17/25 1633    Lab Status: Final result Specimen: Wash from Bronchus Updated: 02/18/25 1435     Fungal Stain Fungal elements    Blood Culture - Blood, Arm, Left [[id number]]  (Abnormal)  (Susceptibility) Collected: 02/11/25 0900    Lab Status: Final result Specimen: Blood from Arm, Left Updated: 02/14/25 0635     Blood Culture Pseudomonas aeruginosa     Isolated from Aerobic Bottle     Gram Stain Aerobic Bottle Gram negative bacilli    Narrative:      Less than seven (7) mL's of blood was collected.  Insufficient quantity may yield false negative results.    Susceptibility        Pseudomonas aeruginosa      VIANEY      Cefepime Susceptible      Ceftazidime Susceptible      Ciprofloxacin Susceptible      Levofloxacin Susceptible      Piperacillin + Tazobactam Susceptible                       Susceptibility Comments       Pseudomonas aeruginosa    With the exception of urinary-sourced infections, aminoglycosides should not be used as monotherapy.               Blood Culture - Blood, Hand, Right [[id number]]  (Abnormal) Collected: 02/11/25 0926    Lab Status: Final result Specimen: Blood from Hand, Right Updated: 02/14/25 0635     Blood Culture Pseudomonas aeruginosa     Isolated from Aerobic and Anaerobic Bottles      Gram Stain Anaerobic Bottle Gram negative bacilli      Aerobic Bottle Gram negative bacilli    Narrative:      Less than seven (7) mL's of blood was collected.  Insufficient quantity may yield false negative results.    Refer to previous blood culture collected on 02/11/2025 0900 for MICs    Urine Culture - Urine, Urine, Catheter [[id number]]  (Abnormal)  (Susceptibility) Collected: 02/11/25 0942    Lab Status: Final result Specimen: Urine, Catheter Updated: 02/13/25 1054     Urine Culture >100,000 CFU/mL Pseudomonas aeruginosa    Narrative:      Colonization of the urinary tract without infection is common. Treatment is discouraged unless the patient is symptomatic, pregnant, or undergoing an invasive urologic procedure.    Susceptibility        Pseudomonas aeruginosa      VIANEY      Cefepime Susceptible      Ceftazidime Susceptible      Ciprofloxacin Susceptible      Levofloxacin Susceptible      Piperacillin + Tazobactam Susceptible      Tobramycin Susceptible                           Blood Culture ID, PCR - Blood, Arm, Left [[id number]]  (Abnormal) Collected: 02/11/25 0900    Lab Status: Final result Specimen: Blood from Arm, Left Updated: 02/12/25 0349     BCID, PCR Pseudomonas aeruginosa. Identification by BCID2 PCR     BOTTLE TYPE Aerobic Bottle            XR Chest 1 View    Result Date: 2/28/2025  XR CHEST 1 VW Date of Exam: 2/28/2025 3:47 AM EST Indication: Respiratory failrue Comparison: 2/27/2025 Findings: Previously noted right IJ line no longer present. Gastric tube remains in place. Heart size and pulmonary vasculature are stable. Grossly stable predominantly perihilar opacities. No pneumothorax     Impression: Impression: Stable lung opacities compatible with pneumonia or edema Electronically Signed: Eder Draper  2/28/2025 7:16 AM EST  Workstation ID: [id number]     Results for orders placed during the hospital encounter of 02/11/25    Adult Transthoracic Echo Complete w/ Color, Spectral and Contrast  if Necessary Per Protocol    Interpretation Summary    Left ventricular systolic function is normal. Estimated left ventricular EF = 60%    Left ventricular wall thickness is consistent with mild concentric hypertrophy.    Mild aortic valve stenosis is present.    Aortic valve maximum pressure gradient is 23 mmHg. Aortic valve mean pressure gradient is 14 mmHg.      Current medications:  Scheduled Meds:amiodarone, 150 mg, Intravenous, Once  castor oil-balsam peru, 1 Application, Topical, Q12H  fentaNYL, 1 patch, Transdermal, Q72H   And  Check Fentanyl Patch Placement, 1 each, Not Applicable, Q12H  chlorhexidine, 15 mL, Mouth/Throat, Q12H  docusate sodium, 100 mg, Per G Tube, BID  [Held by provider] enoxaparin sodium, 40 mg, Subcutaneous, Q24H  escitalopram, 10 mg, Per G Tube, Nightly  finasteride, 5 mg, Per G Tube, Daily  gabapentin, 300 mg, Per G Tube, Q8H  insulin regular, 2-7 Units, Subcutaneous, Q6H  ipratropium-albuterol, 3 mL, Nebulization, 4x Daily - RT  magnesium sulfate, 4 g, Intravenous, Once  metoprolol tartrate, 25 mg, Oral, Q12H  nystatin, , Topical, Q8H  pantoprazole, 40 mg, Intravenous, Q12H  rifAXIMin, 550 mg, Oral, Q12H  rosuvastatin, 20 mg, Oral, Nightly  terazosin, 1 mg, Nasogastric, Daily      Continuous Infusions:     PRN Meds:.  Calcium Replacement - Follow Nurse / BPA Driven Protocol    dextrose    glucagon (human recombinant)    ipratropium-albuterol    Magnesium Cardiology Dose Replacement - Follow Nurse / BPA Driven Protocol    metoprolol tartrate    oxyCODONE-acetaminophen    Phosphorus Replacement - Follow Nurse / BPA Driven Protocol    polyvinyl alcohol    Potassium Replacement - Follow Nurse / BPA Driven Protocol    sodium chloride    Assessment & Plan   Assessment & Plan     Active Hospital Problems    Diagnosis  POA    **Septic shock due to Pseudomonas species [A41.52, R65.21]  Yes    Pneumonia of both lower lobes due to Pseudomonas species [J15.1]  Yes    Bacteremia due to  Pseudomonas [R78.81, B96.5]  Yes    GALILEO (acute kidney injury) [N17.9]  Yes    Metabolic acidosis [E87.20]  Yes    Cirrhosis of liver [K74.60]  Yes    Squamous cell carcinoma of esophagus [C15.9]  Yes    Hyperlipidemia, unspecified [E78.5]  Yes    Tobacco use [Z72.0]  Yes      Resolved Hospital Problems    Diagnosis Date Resolved POA    Hypotension [I95.9] 02/11/2025 Unknown    Suspected UTI [R39.89] 02/12/2025 Yes        Brief Hospital Course to date:  Val Nassar Jr. is a 64 y.o. male with history of alcohol abuse, cirrhosis, CAD, metastatic squamous cell carcinoma of esophagus, hyperlipidemia who presented from rehab with hypotension, weakness, dizziness and altered mental status.  CT imaging showed a left-sided pyelonephrosis with urine and blood cultures growing Pseudomonas.  Mr. Nassar is completed a course of cefepime.    Pseudomonas Bacteremia  Septic shock  Pyelonephritis  Possible pneumonia  - s/p cefepime x 10 days  - continued leukocytosis and patchy infiltrates on CXR of unclear significance, Candida and normal kishore on respiratory culture from bronchoscopy -- Procalcitonin improved to 0.98 and CRP improved to 1.95, remains afebrile, will continue to monitor off antibiotics.  CBC and inflammatory markers a.m.    GALILEO/bladder outlet obstruction vs neurogenic bladder  -Lubin catheter -- voiding trial soon?  Typically self cathed prior to this hospitalization, follows outpatient with Urology Dr Garrison  -Continue finasteride and Flomax.    Dysphagia  - continue keofeeds  - Speech follows    New Atrial fibrillation with RVR  - currently NSR on amiodarone  - will likely need anticoagulation (eliquis)    Anemia  - hemoglobin 6.6 2/27 s/p 2 units PRBCs -- hemoglobin today 9.9  - melena reported by nursing staff  - recent EGD prior to this hospitalization, no varices noted  - GI follows, possible EGD today (stress ulcer?)  - IV protonix BID  - cbc am    Respiratory Failure  - s/p intubation  2/12/25 with subsequent bronchoscopy for mucous plugging. Patient expected on 2/24/2025     Hypernatremia  - sodium 149, RD follows, free water with tube feeds increased today  - cmp am    EtOH Cirrhosis  - ammonia level 75, xifaxamin started    Metastatic Squamous Cell Carcinoma of the esophagus  - s/p chemoradiation May 2024    Chronic back pain.    - steroid injection at pain management 2/3/2024  - fentanyl patch    Tobacco abuse    Deconditioning  - PT/OT    Expected Discharge Location and Transportation: rehab  Expected Discharge   Expected Discharge Date: 3/5/2025; Expected Discharge Time:      VTE Prophylaxis:  Pharmacologic & mechanical VTE prophylaxis orders are present.         AM-PAC 6 Clicks Score (PT): 11 (03/01/25 0903)    CODE STATUS:   Code Status and Medical Interventions: CPR (Attempt to Resuscitate); Full Support   Ordered at: 02/12/25 1029     Level Of Support Discussed With:    Patient     Code Status (Patient has no pulse and is not breathing):    CPR (Attempt to Resuscitate)     Medical Interventions (Patient has pulse or is breathing):    Full Support       Garret Gibson MD  03/01/25

## 2025-03-02 ENCOUNTER — APPOINTMENT (OUTPATIENT)
Dept: CT IMAGING | Facility: HOSPITAL | Age: 65
DRG: 870 | End: 2025-03-02
Payer: MEDICARE

## 2025-03-02 LAB
ALBUMIN SERPL-MCNC: 2.7 G/DL (ref 3.5–5.2)
ALBUMIN/GLOB SERPL: 0.7 G/DL
ALP SERPL-CCNC: 127 U/L (ref 39–117)
ALT SERPL W P-5'-P-CCNC: 60 U/L (ref 1–41)
AMMONIA BLD-SCNC: 33 UMOL/L (ref 16–60)
ANION GAP SERPL CALCULATED.3IONS-SCNC: 11 MMOL/L (ref 5–15)
AST SERPL-CCNC: 46 U/L (ref 1–40)
B PARAPERT DNA SPEC QL NAA+PROBE: NOT DETECTED
B PERT DNA SPEC QL NAA+PROBE: NOT DETECTED
BASOPHILS # BLD AUTO: 0.03 10*3/MM3 (ref 0–0.2)
BASOPHILS NFR BLD AUTO: 0.2 % (ref 0–1.5)
BILIRUB SERPL-MCNC: 0.5 MG/DL (ref 0–1.2)
BUN SERPL-MCNC: 61 MG/DL (ref 8–23)
BUN/CREAT SERPL: 70.9 (ref 7–25)
C PNEUM DNA NPH QL NAA+NON-PROBE: NOT DETECTED
CALCIUM SPEC-SCNC: 8.8 MG/DL (ref 8.6–10.5)
CHLORIDE SERPL-SCNC: 106 MMOL/L (ref 98–107)
CO2 SERPL-SCNC: 26 MMOL/L (ref 22–29)
CREAT SERPL-MCNC: 0.86 MG/DL (ref 0.76–1.27)
CRP SERPL-MCNC: 3.36 MG/DL (ref 0–0.5)
DEPRECATED RDW RBC AUTO: 65.1 FL (ref 37–54)
EGFRCR SERPLBLD CKD-EPI 2021: 96.1 ML/MIN/1.73
EOSINOPHIL # BLD AUTO: 0.38 10*3/MM3 (ref 0–0.4)
EOSINOPHIL NFR BLD AUTO: 2 % (ref 0.3–6.2)
ERYTHROCYTE [DISTWIDTH] IN BLOOD BY AUTOMATED COUNT: 19.2 % (ref 12.3–15.4)
FLUAV SUBTYP SPEC NAA+PROBE: NOT DETECTED
FLUBV RNA ISLT QL NAA+PROBE: NOT DETECTED
GLOBULIN UR ELPH-MCNC: 4.1 GM/DL
GLUCOSE BLDC GLUCOMTR-MCNC: 117 MG/DL (ref 70–130)
GLUCOSE BLDC GLUCOMTR-MCNC: 132 MG/DL (ref 70–130)
GLUCOSE BLDC GLUCOMTR-MCNC: 145 MG/DL (ref 70–130)
GLUCOSE BLDC GLUCOMTR-MCNC: 152 MG/DL (ref 70–130)
GLUCOSE SERPL-MCNC: 138 MG/DL (ref 65–99)
HADV DNA SPEC NAA+PROBE: NOT DETECTED
HCOV 229E RNA SPEC QL NAA+PROBE: NOT DETECTED
HCOV HKU1 RNA SPEC QL NAA+PROBE: NOT DETECTED
HCOV NL63 RNA SPEC QL NAA+PROBE: NOT DETECTED
HCOV OC43 RNA SPEC QL NAA+PROBE: NOT DETECTED
HCT VFR BLD AUTO: 27.1 % (ref 37.5–51)
HCT VFR BLD AUTO: 27.6 % (ref 37.5–51)
HGB BLD-MCNC: 8.7 G/DL (ref 13–17.7)
HGB BLD-MCNC: 8.7 G/DL (ref 13–17.7)
HMPV RNA NPH QL NAA+NON-PROBE: NOT DETECTED
HPIV1 RNA ISLT QL NAA+PROBE: NOT DETECTED
HPIV2 RNA SPEC QL NAA+PROBE: NOT DETECTED
HPIV3 RNA NPH QL NAA+PROBE: NOT DETECTED
HPIV4 P GENE NPH QL NAA+PROBE: NOT DETECTED
IMM GRANULOCYTES # BLD AUTO: 0.17 10*3/MM3 (ref 0–0.05)
IMM GRANULOCYTES NFR BLD AUTO: 0.9 % (ref 0–0.5)
LYMPHOCYTES # BLD AUTO: 0.69 10*3/MM3 (ref 0.7–3.1)
LYMPHOCYTES NFR BLD AUTO: 3.6 % (ref 19.6–45.3)
M PNEUMO IGG SER IA-ACNC: NOT DETECTED
MAGNESIUM SERPL-MCNC: 2.5 MG/DL (ref 1.6–2.4)
MCH RBC QN AUTO: 30.9 PG (ref 26.6–33)
MCHC RBC AUTO-ENTMCNC: 32.1 G/DL (ref 31.5–35.7)
MCV RBC AUTO: 96.1 FL (ref 79–97)
MONOCYTES # BLD AUTO: 1.32 10*3/MM3 (ref 0.1–0.9)
MONOCYTES NFR BLD AUTO: 6.9 % (ref 5–12)
NEUTROPHILS NFR BLD AUTO: 16.5 10*3/MM3 (ref 1.7–7)
NEUTROPHILS NFR BLD AUTO: 86.4 % (ref 42.7–76)
NRBC BLD AUTO-RTO: 0 /100 WBC (ref 0–0.2)
NT-PROBNP SERPL-MCNC: 631 PG/ML (ref 0–900)
PLATELET # BLD AUTO: 174 10*3/MM3 (ref 140–450)
PMV BLD AUTO: 9.4 FL (ref 6–12)
POTASSIUM SERPL-SCNC: 3.9 MMOL/L (ref 3.5–5.2)
PROCALCITONIN SERPL-MCNC: 0.85 NG/ML (ref 0–0.25)
PROT SERPL-MCNC: 6.8 G/DL (ref 6–8.5)
QT INTERVAL: 398 MS
QT INTERVAL: 400 MS
QTC INTERVAL: 429 MS
QTC INTERVAL: 450 MS
RBC # BLD AUTO: 2.82 10*6/MM3 (ref 4.14–5.8)
RHINOVIRUS RNA SPEC NAA+PROBE: NOT DETECTED
RSV RNA NPH QL NAA+NON-PROBE: NOT DETECTED
SARS-COV-2 RNA NPH QL NAA+NON-PROBE: NOT DETECTED
SODIUM SERPL-SCNC: 143 MMOL/L (ref 136–145)
WBC NRBC COR # BLD AUTO: 19.09 10*3/MM3 (ref 3.4–10.8)

## 2025-03-02 PROCEDURE — 94799 UNLISTED PULMONARY SVC/PX: CPT

## 2025-03-02 PROCEDURE — 25010000002 FUROSEMIDE PER 20 MG

## 2025-03-02 PROCEDURE — 82948 REAGENT STRIP/BLOOD GLUCOSE: CPT

## 2025-03-02 PROCEDURE — 83735 ASSAY OF MAGNESIUM: CPT | Performed by: INTERNAL MEDICINE

## 2025-03-02 PROCEDURE — 93005 ELECTROCARDIOGRAM TRACING: CPT | Performed by: PHYSICIAN ASSISTANT

## 2025-03-02 PROCEDURE — 82140 ASSAY OF AMMONIA: CPT | Performed by: INTERNAL MEDICINE

## 2025-03-02 PROCEDURE — 99232 SBSQ HOSP IP/OBS MODERATE 35: CPT | Performed by: NURSE PRACTITIONER

## 2025-03-02 PROCEDURE — 86140 C-REACTIVE PROTEIN: CPT | Performed by: INTERNAL MEDICINE

## 2025-03-02 PROCEDURE — 83880 ASSAY OF NATRIURETIC PEPTIDE: CPT | Performed by: INTERNAL MEDICINE

## 2025-03-02 PROCEDURE — 0202U NFCT DS 22 TRGT SARS-COV-2: CPT | Performed by: INTERNAL MEDICINE

## 2025-03-02 PROCEDURE — 84145 PROCALCITONIN (PCT): CPT | Performed by: INTERNAL MEDICINE

## 2025-03-02 PROCEDURE — 85014 HEMATOCRIT: CPT | Performed by: INTERNAL MEDICINE

## 2025-03-02 PROCEDURE — 80053 COMPREHEN METABOLIC PANEL: CPT | Performed by: INTERNAL MEDICINE

## 2025-03-02 PROCEDURE — 99232 SBSQ HOSP IP/OBS MODERATE 35: CPT

## 2025-03-02 PROCEDURE — 85025 COMPLETE CBC W/AUTO DIFF WBC: CPT | Performed by: INTERNAL MEDICINE

## 2025-03-02 PROCEDURE — 71250 CT THORAX DX C-: CPT

## 2025-03-02 PROCEDURE — 74176 CT ABD & PELVIS W/O CONTRAST: CPT

## 2025-03-02 PROCEDURE — 85018 HEMOGLOBIN: CPT | Performed by: INTERNAL MEDICINE

## 2025-03-02 PROCEDURE — 99232 SBSQ HOSP IP/OBS MODERATE 35: CPT | Performed by: INTERNAL MEDICINE

## 2025-03-02 PROCEDURE — 93010 ELECTROCARDIOGRAM REPORT: CPT | Performed by: STUDENT IN AN ORGANIZED HEALTH CARE EDUCATION/TRAINING PROGRAM

## 2025-03-02 RX ORDER — METOPROLOL TARTRATE 50 MG
50 TABLET ORAL EVERY 12 HOURS SCHEDULED
Status: DISCONTINUED | OUTPATIENT
Start: 2025-03-02 | End: 2025-03-06

## 2025-03-02 RX ORDER — FUROSEMIDE 10 MG/ML
40 INJECTION INTRAMUSCULAR; INTRAVENOUS ONCE
Status: COMPLETED | OUTPATIENT
Start: 2025-03-02 | End: 2025-03-02

## 2025-03-02 RX ORDER — POLYETHYLENE GLYCOL 3350 17 G/17G
17 POWDER, FOR SOLUTION ORAL DAILY
Status: DISCONTINUED | OUTPATIENT
Start: 2025-03-02 | End: 2025-03-07

## 2025-03-02 RX ADMIN — RIFAXIMIN 550 MG: 550 TABLET ORAL at 21:28

## 2025-03-02 RX ADMIN — LACTULOSE 20 G: 20 SOLUTION ORAL at 10:05

## 2025-03-02 RX ADMIN — CHLORHEXIDINE GLUCONATE 0.12% ORAL RINSE 15 ML: 1.2 LIQUID ORAL at 21:29

## 2025-03-02 RX ADMIN — PANTOPRAZOLE SODIUM 40 MG: 40 INJECTION, POWDER, FOR SOLUTION INTRAVENOUS at 10:06

## 2025-03-02 RX ADMIN — METOPROLOL TARTRATE 50 MG: 25 TABLET, FILM COATED ORAL at 21:28

## 2025-03-02 RX ADMIN — TERAZOSIN HYDROCHLORIDE 1 MG: 1 CAPSULE ORAL at 10:05

## 2025-03-02 RX ADMIN — Medication 1 APPLICATION: at 10:10

## 2025-03-02 RX ADMIN — Medication 1 APPLICATION: at 21:29

## 2025-03-02 RX ADMIN — LACTULOSE 20 G: 20 SOLUTION ORAL at 15:20

## 2025-03-02 RX ADMIN — OXYCODONE AND ACETAMINOPHEN 1 TABLET: 7.5; 325 TABLET ORAL at 01:59

## 2025-03-02 RX ADMIN — NYSTATIN: 100000 POWDER TOPICAL at 15:21

## 2025-03-02 RX ADMIN — IPRATROPIUM BROMIDE AND ALBUTEROL SULFATE 3 ML: 2.5; .5 SOLUTION RESPIRATORY (INHALATION) at 16:17

## 2025-03-02 RX ADMIN — OXYCODONE AND ACETAMINOPHEN 1 TABLET: 7.5; 325 TABLET ORAL at 21:28

## 2025-03-02 RX ADMIN — GABAPENTIN 300 MG: 300 CAPSULE ORAL at 15:20

## 2025-03-02 RX ADMIN — NYSTATIN: 100000 POWDER TOPICAL at 06:07

## 2025-03-02 RX ADMIN — NYSTATIN: 100000 POWDER TOPICAL at 21:30

## 2025-03-02 RX ADMIN — Medication 10 ML: at 10:06

## 2025-03-02 RX ADMIN — DOCUSATE SODIUM 100 MG: 50 LIQUID ORAL at 10:05

## 2025-03-02 RX ADMIN — DOCUSATE SODIUM 100 MG: 50 LIQUID ORAL at 21:28

## 2025-03-02 RX ADMIN — LACTULOSE 20 G: 20 SOLUTION ORAL at 21:28

## 2025-03-02 RX ADMIN — ESCITALOPRAM OXALATE 10 MG: 10 TABLET ORAL at 21:28

## 2025-03-02 RX ADMIN — GABAPENTIN 300 MG: 300 CAPSULE ORAL at 21:28

## 2025-03-02 RX ADMIN — IPRATROPIUM BROMIDE AND ALBUTEROL SULFATE 3 ML: 2.5; .5 SOLUTION RESPIRATORY (INHALATION) at 08:22

## 2025-03-02 RX ADMIN — FUROSEMIDE 40 MG: 10 INJECTION, SOLUTION INTRAMUSCULAR; INTRAVENOUS at 15:20

## 2025-03-02 RX ADMIN — METOPROLOL TARTRATE 50 MG: 25 TABLET, FILM COATED ORAL at 10:05

## 2025-03-02 RX ADMIN — GABAPENTIN 300 MG: 300 CAPSULE ORAL at 06:06

## 2025-03-02 RX ADMIN — RIFAXIMIN 550 MG: 550 TABLET ORAL at 10:05

## 2025-03-02 RX ADMIN — ROSUVASTATIN 20 MG: 20 TABLET, FILM COATED ORAL at 21:29

## 2025-03-02 RX ADMIN — IPRATROPIUM BROMIDE AND ALBUTEROL SULFATE 3 ML: 2.5; .5 SOLUTION RESPIRATORY (INHALATION) at 12:37

## 2025-03-02 RX ADMIN — FENTANYL 1 PATCH: 25 PATCH TRANSDERMAL at 15:20

## 2025-03-02 RX ADMIN — PANTOPRAZOLE SODIUM 40 MG: 40 INJECTION, POWDER, FOR SOLUTION INTRAVENOUS at 21:28

## 2025-03-02 RX ADMIN — FINASTERIDE 5 MG: 5 TABLET, FILM COATED ORAL at 10:05

## 2025-03-02 NOTE — PROGRESS NOTES
"  GI Daily Progress Note  Subjective:    Chief Complaint: Follow-up decompensated liver cirrhosis, encephalopathy, bleeding    Patient evaluated several times today.  On initial exam, was markedly encephalopathic and confused-alert to person only and unable to contribute to history otherwise.  Additionally, was found to be significantly tachypneic at that time.    Reevaluated a few hours later and encephalopathy had improved though he still has significant asterixis.    Later in shift, was contacted by nursing who notes patient is having dark tarry stools.  Additionally, has had decline in H&H throughout admission with concerns of upper GI bleed.  Patient also remains encephalopathic with asterixis though he is able to answer orientation questions this evening.    Objective:    /62 (BP Location: Right arm, Patient Position: Lying)   Pulse 85   Temp 98.9 °F (37.2 °C) (Oral)   Resp 16   Ht 175.3 cm (69.02\")   Wt 79.5 kg (175 lb 3.2 oz)   SpO2 96%   BMI 25.86 kg/m²     Physical Exam  Vitals and nursing note reviewed.   Constitutional:       General: He is not in acute distress.     Appearance: Normal appearance. He is normal weight. He is not ill-appearing or toxic-appearing.   HENT:      Head: Normocephalic and atraumatic.   Cardiovascular:      Rate and Rhythm: Normal rate and regular rhythm.      Pulses: Normal pulses.      Heart sounds: Normal heart sounds.   Pulmonary:      Effort: Pulmonary effort is normal. No respiratory distress.      Breath sounds: Normal breath sounds.   Abdominal:      General: Abdomen is flat. Bowel sounds are normal. There is no distension.      Palpations: Abdomen is soft. There is no mass.      Tenderness: There is abdominal tenderness (Epigastric tenderness to light palpation). There is no guarding or rebound.      Hernia: No hernia is present.   Neurological:      Mental Status: He is alert.      Comments: Alert to person; significant asterixis noted on exam. "   Psychiatric:         Mood and Affect: Mood normal.         Behavior: Behavior normal.         Thought Content: Thought content normal.         Judgment: Judgment normal.         Lab  I have personally reviewed most recent cardiac tracings, lab results, and radiology images and interpretations and agree with findings.    Lab Results   Component Value Date    WBC 19.09 (H) 03/02/2025    HGB 8.7 (L) 03/02/2025    HGB 9.9 (L) 03/01/2025    HGB 8.6 (L) 02/28/2025    MCV 96.1 03/02/2025     03/02/2025    INR 1.13 (H) 03/01/2025    INR 1.13 (H) 02/17/2025    INR 1.27 (H) 02/14/2025    INR 1.21 (H) 02/11/2025    INR 0.97 04/25/2024       Lab Results   Component Value Date    GLUCOSE 138 (H) 03/02/2025    BUN 61 (H) 03/02/2025    CREATININE 0.86 03/02/2025    BCR 70.9 (H) 03/02/2025     03/02/2025    K 3.9 03/02/2025    CO2 26.0 03/02/2025    CALCIUM 8.8 03/02/2025    ALBUMIN 2.7 (L) 03/02/2025    ALKPHOS 127 (H) 03/02/2025    BILITOT 0.5 03/02/2025    BILIDIR 0.2 03/01/2025    ALT 60 (H) 03/02/2025    AST 46 (H) 03/02/2025     Assessment:  Gastrointestinal bleeding with melena, ongoing  Acute blood loss anemia s/p transfusion of 3 units of PRBCs  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      History of squamous cell carcinoma of the esophagus, s/p chemoradiation in May 2024.   Most recent EGD with negative esophageal biopsies on 2/10/25.     Septic shock secondary to Pseudomonas bacteremia, urinary source   Atrial fibrillation   Severe pharyngeal dysphagia, currently receiving enteral nutrition via Keofeed tube   Hepatic encephalopathy, grade 2/4 (NEW)    Plan:  Patient with significant melena today.  Also found to have epigastric tenderness on exam.  Additionally, remain significantly encephalopathic.    >>> N.p.o. at midnight  >>> Recommend EGD in a.m.  >>> For encephalopathy management  Lactulose 20 g p.o. 3 times daily  Rifaximin 550 mg p.o. twice daily  >>> Patient's primary nurse discussed concerns  about rectal excoriation and skin breakdown in setting of significant stool; okay to place FMS to potentiate healing from GI standpoint.  >>> Continue IV PPI twice daily  >>> Monitor H&H and transfuse per protocol    Patient is medically complex and high risk for further decline.    Esvin Gray, APRN  03/02/25  16:12 EST

## 2025-03-02 NOTE — PROGRESS NOTES
[facility] Medicine Services  PROGRESS NOTE    Patient Name: Val Nassar Jr.  : 1960  MRN: [id number]    Date of Admission: 2025  Primary Care Physician: Wesly Minor MD    Subjective   Subjective     CC:  bacteremia    HPI:  Denies cough or fevers, says he feels OK.  No shortness of breath.        Objective   Objective     Vital Signs:   Temp:  [98 °F (36.7 °C)-98.2 °F (36.8 °C)] 98.2 °F (36.8 °C)  Heart Rate:  [] 85  Resp:  [16-20] 20  BP: (116-126)/(56-69) 124/65  Flow (L/min) (Oxygen Therapy):  [2] 2     Physical Exam:  Appears chronically deconditioned, in bed  Keofeed tube in place  Mucous membranes slightly dry  RRR  Poor effort, perhaps some scattered rhonchi  Abdomen soft and nontender  Trace edema  Awake, speech clear, will answer questions briefly  Flat affect      Results Reviewed:  LAB RESULTS:      Lab 25  0436 25  0417 25  2344 25  1534 25  0524 25  1347 25  0114 25  0319 25  1151 25  0434   WBC 19.09* 19.72*  --   --  18.39*  --  16.64* 20.97*   < > 21.85*   HEMOGLOBIN 8.7* 9.9* 8.6* 9.2* 9.6*   < > 7.1* 7.6*   < > 7.0*   HEMATOCRIT 27.1* 31.4* 26.7* 29.8* 30.2*   < > 22.0* 23.1*   < > 20.4*   PLATELETS 174 208  --   --  245  --  233 233   < > 148   NEUTROS ABS 16.50*  --   --   --   --   --  14.33*  --   --  19.29*   IMMATURE GRANS (ABS) 0.17*  --   --   --   --   --  0.16*  --   --  0.56*   LYMPHS ABS 0.69*  --   --   --   --   --  0.51*  --   --  0.51*   MONOS ABS 1.32*  --   --   --   --   --  1.62*  --   --  1.46*   EOS ABS 0.38  --   --   --   --   --  0.00  --   --  0.01   MCV 96.1 95.7  --   --  93.8  --  96.5 95.1   < > 91.1   CRP 3.36* 1.95*  --   --   --   --   --   --   --  11.29*   PROCALCITONIN 0.85* 0.98*  --   --   --   --   --   --   --   --    PROTIME  --  14.6*  --   --   --   --   --   --   --   --     < > = values in this interval not displayed.          Lab 03/02/25 0436 03/01/25 0417 02/28/25  0524 02/27/25  0114 02/26/25 0319 02/25/25 2045 02/25/25 0328 02/24/25 0434   SODIUM 143 149* 150* 147* 143 142 138 134*   POTASSIUM 3.9 4.0 4.2 4.2 4.6 4.1 4.6 4.8   CHLORIDE 106 111* 110* 107 106 107 106 104   CO2 26.0 28.0 28.0 26.0 27.0 24.0 23.0 19.0*   ANION GAP 11.0 10.0 12.0 14.0 10.0 11.0 9.0 11.0   BUN 61* 74* 92* 93* 89* 88* 86* 73*   CREATININE 0.86 0.91 1.00 1.15 1.12 1.15 1.31* 1.29*   EGFR 96.1 93.5 83.5 70.6 72.9 70.6 60.4 61.5   GLUCOSE 138* 129* 118* 156* 109* 133* 163* 220*   CALCIUM 8.8 9.2 9.6 9.4 9.6 9.4 9.4 8.9   IONIZED CALCIUM  --   --   --  1.22  --   --   --   --    MAGNESIUM 2.5* 1.7  --  2.4 2.2 1.8 2.0 2.0   PHOSPHORUS  --   --   --   --   --   --  4.0 3.4         Lab 03/02/25 0436 03/01/25 0417   TOTAL PROTEIN 6.8 7.2   ALBUMIN 2.7* 2.8*   GLOBULIN 4.1  --    ALT (SGPT) 60* 64*   AST (SGOT) 46* 47*   BILIRUBIN 0.5 0.4   INDIRECT BILIRUBIN  --  0.2   BILIRUBIN DIRECT  --  0.2   ALK PHOS 127* 126*         Lab 03/02/25 0436 03/01/25 0417   PROBNP 631.0  --    PROTIME  --  14.6*   INR  --  1.13*         Lab 02/24/25 0434   TRIGLYCERIDES 75         Lab 02/28/25  0524 02/24/25  1151 02/24/25 0434   IRON  --   --  36*   IRON SATURATION (TSAT)  --   --  18*   TIBC  --   --  206*   TRANSFERRIN  --   --  138*   FOLATE 15.20  --   --    VITAMIN B 12 >2,000*  --   --    ABO TYPING  --  A  --    RH TYPING  --  Positive  --    ANTIBODY SCREEN  --  Negative  --          Lab 02/24/25  2255 02/24/25  1034 02/24/25  0403   PH, ARTERIAL 7.362 7.466* 7.449   PCO2, ARTERIAL 40.5 30.9* 31.1*   PO2 ART 98.5 103.0 89.2   FIO2 36 40 40   HCO3 ART 23.0 22.3 21.6   BASE EXCESS ART -2.2* -1.2* -2.1*   CARBOXYHEMOGLOBIN 1.5 1.3 1.1     Brief Urine Lab Results  (Last result in the past 365 days)        Color   Clarity   Blood   Leuk Est   Nitrite   Protein   CREAT   Urine HCG        02/11/25 0942 Yellow   Turbid   Moderate (2+)   Large (3+)   Negative   100  mg/dL (2+)                   Microbiology Results Abnormal       Procedure Component Value - Date/Time    Fungus Culture - Wash, Bronchus [[id number]]  (Abnormal) Collected: 02/17/25 1633    Lab Status: Preliminary result Specimen: Wash from Bronchus Updated: 02/23/25 1910     Fungus Culture Candida albicans    Respiratory Culture - Wash, Bronchus [[id number]]  (Abnormal) Collected: 02/17/25 1633    Lab Status: Final result Specimen: Wash from Bronchus Updated: 02/19/25 1057     Respiratory Culture Light growth (2+) Candida albicans      No Normal Respiratory Lissett     Gram Stain Moderate (3+) WBCs seen      Few (2+) Epithelial cells seen      Occasional Budding yeast    Fungus Smear - Wash, Bronchus [[id number]]  (Abnormal) Collected: 02/17/25 1633    Lab Status: Final result Specimen: Wash from Bronchus Updated: 02/18/25 1435     Fungal Stain Fungal elements    Blood Culture - Blood, Arm, Left [[id number]]  (Abnormal)  (Susceptibility) Collected: 02/11/25 0900    Lab Status: Final result Specimen: Blood from Arm, Left Updated: 02/14/25 0635     Blood Culture Pseudomonas aeruginosa     Isolated from Aerobic Bottle     Gram Stain Aerobic Bottle Gram negative bacilli    Narrative:      Less than seven (7) mL's of blood was collected.  Insufficient quantity may yield false negative results.    Susceptibility        Pseudomonas aeruginosa      VIANEY      Cefepime Susceptible      Ceftazidime Susceptible      Ciprofloxacin Susceptible      Levofloxacin Susceptible      Piperacillin + Tazobactam Susceptible                       Susceptibility Comments       Pseudomonas aeruginosa    With the exception of urinary-sourced infections, aminoglycosides should not be used as monotherapy.               Blood Culture - Blood, Hand, Right [[id number]]  (Abnormal) Collected: 02/11/25 0926    Lab Status: Final result Specimen: Blood from Hand, Right Updated: 02/14/25 0635     Blood Culture Pseudomonas aeruginosa     Isolated from Aerobic  and Anaerobic Bottles     Gram Stain Anaerobic Bottle Gram negative bacilli      Aerobic Bottle Gram negative bacilli    Narrative:      Less than seven (7) mL's of blood was collected.  Insufficient quantity may yield false negative results.    Refer to previous blood culture collected on 02/11/2025 0900 for MICs    Urine Culture - Urine, Urine, Catheter [[id number]]  (Abnormal)  (Susceptibility) Collected: 02/11/25 0942    Lab Status: Final result Specimen: Urine, Catheter Updated: 02/13/25 1054     Urine Culture >100,000 CFU/mL Pseudomonas aeruginosa    Narrative:      Colonization of the urinary tract without infection is common. Treatment is discouraged unless the patient is symptomatic, pregnant, or undergoing an invasive urologic procedure.    Susceptibility        Pseudomonas aeruginosa      VIANEY      Cefepime Susceptible      Ceftazidime Susceptible      Ciprofloxacin Susceptible      Levofloxacin Susceptible      Piperacillin + Tazobactam Susceptible      Tobramycin Susceptible                           Blood Culture ID, PCR - Blood, Arm, Left [[id number]]  (Abnormal) Collected: 02/11/25 0900    Lab Status: Final result Specimen: Blood from Arm, Left Updated: 02/12/25 0349     BCID, PCR Pseudomonas aeruginosa. Identification by BCID2 PCR     BOTTLE TYPE Aerobic Bottle            No radiology results from the last 24 hrs    Results for orders placed during the hospital encounter of 02/11/25    Adult Transthoracic Echo Complete w/ Color, Spectral and Contrast if Necessary Per Protocol    Interpretation Summary    Left ventricular systolic function is normal. Estimated left ventricular EF = 60%    Left ventricular wall thickness is consistent with mild concentric hypertrophy.    Mild aortic valve stenosis is present.    Aortic valve maximum pressure gradient is 23 mmHg. Aortic valve mean pressure gradient is 14 mmHg.      Current medications:  Scheduled Meds:amiodarone, 150 mg, Intravenous, Once  castor  oil-balsam peru, 1 Application, Topical, Q12H  fentaNYL, 1 patch, Transdermal, Q72H   And  Check Fentanyl Patch Placement, 1 each, Not Applicable, Q12H  chlorhexidine, 15 mL, Mouth/Throat, Q12H  docusate sodium, 100 mg, Per G Tube, BID  [Held by provider] enoxaparin sodium, 40 mg, Subcutaneous, Q24H  escitalopram, 10 mg, Per G Tube, Nightly  finasteride, 5 mg, Per G Tube, Daily  gabapentin, 300 mg, Per G Tube, Q8H  insulin regular, 2-7 Units, Subcutaneous, Q6H  ipratropium-albuterol, 3 mL, Nebulization, 4x Daily - RT  lactulose, 20 g, Oral, TID  metoprolol tartrate, 50 mg, Per G Tube, Q12H  nystatin, , Topical, Q8H  pantoprazole, 40 mg, Intravenous, Q12H  rifAXIMin, 550 mg, Per G Tube, Q12H  rosuvastatin, 20 mg, Per G Tube, Nightly  terazosin, 1 mg, Per G Tube, Daily      Continuous Infusions:     PRN Meds:.  Calcium Replacement - Follow Nurse / BPA Driven Protocol    dextrose    glucagon (human recombinant)    ipratropium-albuterol    Magnesium Cardiology Dose Replacement - Follow Nurse / BPA Driven Protocol    metoprolol tartrate    oxyCODONE-acetaminophen    Phosphorus Replacement - Follow Nurse / BPA Driven Protocol    polyvinyl alcohol    Potassium Replacement - Follow Nurse / BPA Driven Protocol    sodium chloride    Assessment & Plan   Assessment & Plan     Active Hospital Problems    Diagnosis  POA    **Septic shock due to Pseudomonas species [A41.52, R65.21]  Yes    Pneumonia of both lower lobes due to Pseudomonas species [J15.1]  Yes    Bacteremia due to Pseudomonas [R78.81, B96.5]  Yes    GALILEO (acute kidney injury) [N17.9]  Yes    Metabolic acidosis [E87.20]  Yes    Cirrhosis of liver [K74.60]  Yes    Squamous cell carcinoma of esophagus [C15.9]  Yes    Hyperlipidemia, unspecified [E78.5]  Yes    Tobacco use [Z72.0]  Yes      Resolved Hospital Problems    Diagnosis Date Resolved POA    Hypotension [I95.9] 02/11/2025 Unknown    Suspected UTI [R39.89] 02/12/2025 Yes        Brief Hospital Course to  date:  Val Nassar Jr. is a 64 y.o. male with history of alcohol abuse, cirrhosis, CAD, metastatic squamous cell carcinoma of esophagus, hyperlipidemia who presented from rehab with hypotension, weakness, dizziness and altered mental status.  CT imaging showed a left-sided pyelonephrosis with urine and blood cultures growing Pseudomonas.  Mr. Nassar is completed a course of cefepime.    Pseudomonas Bacteremia  Septic shock  Pyelonephritis  Possible pneumonia  - s/p cefepime x 10 days  - continued leukocytosis and patchy infiltrates on CXR of unclear significance, Candida and normal kishore on respiratory culture from bronchoscopy -- afebrile but continued leukocytosis (WBC 19), procalcitonin 0.85 and CRP 3.3.  Will obtain CT chest/abdomen/pelvis today.    GALILEO/bladder outlet obstruction vs neurogenic bladder  -Lubin catheter -- voiding trial soon?  Typically self cathed prior to this hospitalization, follows outpatient with Urology Dr Garrison  -Continue finasteride and Flomax.    Dysphagia  - continue keofeeds  - Speech follows    New Atrial fibrillation with RVR  - currently NSR on amiodarone  - will likely need anticoagulation (eliquis)    Anemia  - hemoglobin 6.6 2/27 s/p 2 units PRBCs -- hemoglobin today 8.7  - melena reported by nursing staff  - recent EGD prior to this hospitalization, no varices noted  - stress ulcer?  - GI follows, however patient declined EGD  - IV protonix BID  - cbc am    Respiratory Failure  - s/p intubation 2/12/25 with subsequent bronchoscopy for mucous plugging. Patient extubated on 2/24/2025     Hypernatremia  - sodium 143, RD follows, continue free water with tube feeds  - cmp am    EtOH Cirrhosis  - ammonia level 75, xifaxamin started    Metastatic Squamous Cell Carcinoma of the esophagus  - s/p chemoradiation May 2024    Chronic back pain.    - steroid injection at pain management 2/3/2024  - fentanyl patch    Tobacco abuse    Deconditioning  - PT/OT    Expected  Discharge Location and Transportation: rehab  Expected Discharge   Expected Discharge Date: 3/5/2025; Expected Discharge Time:      VTE Prophylaxis:  Pharmacologic & mechanical VTE prophylaxis orders are present. (Prophylactic Lovenox held due to melena/anemia)         AM-PAC 6 Clicks Score (PT): 11 (03/01/25 2005)    CODE STATUS:   Code Status and Medical Interventions: CPR (Attempt to Resuscitate); Full Support   Ordered at: 02/12/25 1029     Level Of Support Discussed With:    Patient     Code Status (Patient has no pulse and is not breathing):    CPR (Attempt to Resuscitate)     Medical Interventions (Patient has pulse or is breathing):    Full Support       Garret Gibson MD  03/02/25

## 2025-03-02 NOTE — PLAN OF CARE
Goal Outcome Evaluation:      Problem: Adult Inpatient Plan of Care  Goal: Absence of Hospital-Acquired Illness or Injury  Intervention: Identify and Manage Fall Risk  Recent Flowsheet Documentation  Taken 3/2/2025 0430 by Aminata Jon RN  Safety Promotion/Fall Prevention: safety round/check completed  Taken 3/2/2025 0200 by Aminata Jon RN  Safety Promotion/Fall Prevention: safety round/check completed  Taken 3/2/2025 0000 by Aminata Jon RN  Safety Promotion/Fall Prevention: safety round/check completed  Taken 3/1/2025 2204 by Aminata Jon RN  Safety Promotion/Fall Prevention: safety round/check completed  Taken 3/1/2025 2005 by Aminata Jon RN  Safety Promotion/Fall Prevention: safety round/check completed  Intervention: Prevent Skin Injury  Recent Flowsheet Documentation  Taken 3/2/2025 0430 by Aminata Jon RN  Body Position: position maintained  Taken 3/2/2025 0200 by Aminata Jon RN  Body Position:   turned   left   tilted  Taken 3/2/2025 0000 by Aminata Jon RN  Body Position:   turned   supine   legs elevated  Taken 3/1/2025 2204 by Aminata Jon RN  Body Position:   turned   supine   legs elevated   heels elevated  Taken 3/1/2025 2005 by Aminata Jon RN  Body Position: position maintained  Intervention: Prevent and Manage VTE (Venous Thromboembolism) Risk  Recent Flowsheet Documentation  Taken 3/1/2025 2005 by Aminata Jon RN  VTE Prevention/Management:   bilateral   SCDs (sequential compression devices) on  Goal: Optimal Comfort and Wellbeing  Intervention: Monitor Pain and Promote Comfort  Recent Flowsheet Documentation  Taken 3/2/2025 0300 by Aminata Jon RN  Pain Management Interventions:   care clustered   diversional activity provided   pillow support provided   quiet environment facilitated  Taken 3/2/2025 0200 by Aminata Jon RN  Pain Management  Interventions:   pain medication given   pillow support provided   position adjusted   care clustered   quiet environment facilitated  Taken 3/1/2025 2100 by Aminata Jon RN  Pain Management Interventions:   care clustered   diversional activity provided   pillow support provided   quiet environment facilitated  Taken 3/1/2025 2005 by Aminata Jon RN  Pain Management Interventions:   pain management plan reviewed with patient/caregiver   pain medication given   care clustered   diversional activity provided   pillow support provided   position adjusted   quiet environment facilitated  Intervention: Provide Person-Centered Care  Recent Flowsheet Documentation  Taken 3/1/2025 2005 by Aminata Jon RN  Trust Relationship/Rapport:   care explained   choices provided   questions answered   reassurance provided   thoughts/feelings acknowledged   empathic listening provided     Problem: Fall Injury Risk  Goal: Absence of Fall and Fall-Related Injury  Intervention: Identify and Manage Contributors  Recent Flowsheet Documentation  Taken 3/1/2025 2005 by Aminata Jon RN  Medication Review/Management: medications reviewed  Intervention: Promote Injury-Free Environment  Recent Flowsheet Documentation  Taken 3/2/2025 0430 by Aminata Jon RN  Safety Promotion/Fall Prevention: safety round/check completed  Taken 3/2/2025 0200 by Aminata Jon RN  Safety Promotion/Fall Prevention: safety round/check completed  Taken 3/2/2025 0000 by Aminata Jon RN  Safety Promotion/Fall Prevention: safety round/check completed  Taken 3/1/2025 2204 by Aminata Jon RN  Safety Promotion/Fall Prevention: safety round/check completed  Taken 3/1/2025 2005 by Aminata Jon RN  Safety Promotion/Fall Prevention: safety round/check completed     Problem: Skin Injury Risk Increased  Goal: Skin Health and Integrity  Intervention: Optimize Skin  Protection  Recent Flowsheet Documentation  Taken 3/2/2025 0430 by Aminata Jon RN  Head of Bed (HOB) Positioning: HOB elevated  Taken 3/2/2025 0200 by Aminata Jon RN  Head of Bed (HOB) Positioning: HOB at 30 degrees  Taken 3/2/2025 0000 by Aminata Jon RN  Head of Bed (HOB) Positioning: HOB at 30 degrees  Taken 3/1/2025 2204 by Aminata Jon RN  Head of Bed (HOB) Positioning: HOB at 30 degrees  Taken 3/1/2025 2005 by Aminata Jon RN  Head of Bed (HOB) Positioning: HOB at 30 degrees     Problem: Comorbidity Management  Goal: Maintenance of Behavioral Health Symptom Control  Intervention: Maintain Behavioral Health Symptom Control  Recent Flowsheet Documentation  Taken 3/1/2025 2005 by Aminata Jon RN  Medication Review/Management: medications reviewed  Goal: Maintenance of Heart Failure Symptom Control  Intervention: Maintain Heart Failure Management  Recent Flowsheet Documentation  Taken 3/1/2025 2005 by Aminata Jon RN  Medication Review/Management: medications reviewed     Problem: Restraint, Nonviolent  Goal: Absence of Harm or Injury  Intervention: Protect Dignity, Rights and Personal Wellbeing  Recent Flowsheet Documentation  Taken 3/1/2025 2005 by Aminata Jon RN  Trust Relationship/Rapport:   care explained   choices provided   questions answered   reassurance provided   thoughts/feelings acknowledged   empathic listening provided  Intervention: Protect Skin and Joint Integrity  Recent Flowsheet Documentation  Taken 3/2/2025 0430 by Aminata Jon RN  Body Position: position maintained  Taken 3/2/2025 0200 by Aminata Jon RN  Body Position:   turned   left   tilted  Taken 3/2/2025 0000 by Aminata Jon RN  Body Position:   turned   supine   legs elevated  Taken 3/1/2025 2204 by Aminata Jon RN  Body Position:   turned   supine   legs elevated   heels elevated  Taken  3/1/2025 2005 by Aminata Jon RN  Body Position: position maintained     Problem: Mechanical Ventilation Invasive  Goal: Effective Communication  Intervention: Ensure Effective Communication  Recent Flowsheet Documentation  Taken 3/1/2025 2005 by Aminata Jon RN  Trust Relationship/Rapport:   care explained   choices provided   questions answered   reassurance provided   thoughts/feelings acknowledged   empathic listening provided  Goal: Mechanical Ventilation Liberation  Intervention: Promote Extubation and Mechanical Ventilation Liberation  Recent Flowsheet Documentation  Taken 3/1/2025 2005 by Aminata Jon RN  Medication Review/Management: medications reviewed  Goal: Absence of Ventilator-Induced Lung Injury  Intervention: Prevent Ventilator-Associated Pneumonia  Recent Flowsheet Documentation  Taken 3/2/2025 0430 by Aminata Jon RN  Head of Bed (HOB) Positioning: HOB elevated  Taken 3/2/2025 0200 by Aminata Jon RN  Head of Bed (HOB) Positioning: HOB at 30 degrees  Taken 3/2/2025 0000 by Aminata Jon RN  Head of Bed (HOB) Positioning: HOB at 30 degrees  Taken 3/1/2025 2204 by Aminata Jon RN  Head of Bed (HOB) Positioning: HOB at 30 degrees  Taken 3/1/2025 2005 by Aminata Jon RN  Head of Bed (HOB) Positioning: HOB at 30 degrees

## 2025-03-02 NOTE — PROGRESS NOTES
"  [facility] at [facility]  PROGRESS NOTE    Date of Admission: 2/11/2025  Date of Service: 03/02/25    Primary Care Physician: Wesly Minor MD    Chief Complaint: new afib with RVR      Subjective      HPI: 1 hour of afib last night. No symptoms but patient confused. Only discussion by patient is request for 7up and mt dew      Objective   Vitals: /69 (BP Location: Left arm, Patient Position: Lying)   Pulse 70   Temp 98 °F (36.7 °C) (Oral)   Resp 16   Ht 175.3 cm (69.02\")   Wt 79.5 kg (175 lb 3.2 oz)   SpO2 94%   BMI 25.86 kg/m²     Physical Exam:   GENERAL: Alert, cooperative, in no acute distress.   HEART: Regular rate and rhythm; systolic murmur noted  LUNGS: Fine rales at bases.  Nonlabored breathing on 2 L/min  NEUROLOGIC: No gross focal abnormalities  EXTREMITIES: No obvious deformities, cyanosis, or edema noted.   PSYCH: normal mood, behavior    Results:  Results from last 7 days   Lab Units 03/02/25  0436 03/01/25 0417 02/28/25  2344 02/28/25  1534 02/28/25  0524   WBC 10*3/mm3 19.09* 19.72*  --   --  18.39*   HEMOGLOBIN g/dL 8.7* 9.9* 8.6*   < > 9.6*   HEMATOCRIT % 27.1* 31.4* 26.7*   < > 30.2*   PLATELETS 10*3/mm3 174 208  --   --  245    < > = values in this interval not displayed.     Results from last 7 days   Lab Units 03/02/25  0436 03/01/25 0417 02/28/25  0524   SODIUM mmol/L 143 149* 150*   POTASSIUM mmol/L 3.9 4.0 4.2   CHLORIDE mmol/L 106 111* 110*   CO2 mmol/L 26.0 28.0 28.0   BUN mg/dL 61* 74* 92*   CREATININE mg/dL 0.86 0.91 1.00   GLUCOSE mg/dL 138* 129* 118*      Lab Results   Component Value Date    TRIG 75 02/24/2025    AST 46 (H) 03/02/2025    ALT 60 (H) 03/02/2025         Results from last 7 days   Lab Units 02/24/25  0434   TRIGLYCERIDES mg/dL 75             Results from last 7 days   Lab Units 03/01/25  0417   PROTIME Seconds 14.6*   INR  1.13*                 Intake/Output Summary (Last 24 hours) at 3/2/2025 0844  Last data filed at 3/2/2025 " 0500  Gross per 24 hour   Intake 4152 ml   Output 2150 ml   Net 2002 ml       I personally reviewed the patient's EKG/Telemetry data    Radiology Data:   XR Chest 1 View    Result Date: 2/28/2025  Impression: Stable lung opacities compatible with pneumonia or edema Electronically Signed: Eder Draper  2/28/2025 7:16 AM EST  Workstation ID: [id number]    XR Chest 1 View    Result Date: 2/27/2025  Impression: 1. Right IJ catheter placement, tip in the proximal SVC. No evidence of pneumothorax. 2. Significant improved pulmonary disease, which suggests resolving pneumonia. Electronically Signed: Ibrahima Jett MD  2/27/2025 8:11 AM EST  Workstation ID: [id number]    XR Chest 1 View    Result Date: 2/26/2025  Impression: There is mild improvement in bilateral airspace disease with persistent small left pleural effusion Electronically Signed: Eder Draper  2/26/2025 7:17 AM EST  Workstation ID: [id number]    XR Chest 1 View    Result Date: 2/25/2025  Impression: Interval extubation and removal of NG/OG tube. Redemonstration of bilateral parenchymal opacities compatible with pulmonary edema, slowly improved since 2/22/2025 exam. Decreased conspicuity of small left pleural effusion. Electronically Signed: Nikita Garrison MD  2/25/2025 7:08 AM EST  Workstation ID: [id number]    XR Abdomen KUB    Result Date: 2/24/2025  Impression: Feeding tube tip in the area of the duodenal bulb. Electronically Signed: Ibrahima Jett MD  2/24/2025 2:01 PM EST  Workstation ID: [id number]    XR Chest 1 View    Result Date: 2/24/2025  Impression: 1. Stable lines and support tubes. 2. Persistent perihilar airspace disease right greater than left which may relate to pulmonary edema, overall improved from the prior study. 3. Persistent bibasilar atelectasis with small bilateral pleural effusions with improved aeration at the right lung base. 4. No pneumothorax. Electronically Signed: Richard Pinto MD  2/24/2025 7:41 AM EST  Workstation ID: [id number]    XR Abdomen  KUB    Result Date: 2/23/2025  Impression: Weighted tip feeding catheter and nasogastric tube both have similar course to terminate over the distal stomach. Electronically Signed: Yoni Guadalupe MD  2/23/2025 3:01 PM EST  Workstation ID: [id number]    XR Chest 1 View    Result Date: 2/23/2025  Impression: Support hardware projects unchanged. There is evidence of persistent edema pattern with small to moderate right and trace left pleural effusions. Perihilar airspace disease otherwise appears somewhat less confluent and there is no new focal consolidation. There is no distinct pneumothorax. Unchanged heart and mediastinal contours. Electronically Signed: Yuri Medina MD  2/23/2025 7:20 AM EST  Workstation ID: [id number]       Current Medications:  amiodarone, 150 mg, Intravenous, Once  castor oil-balsam peru, 1 Application, Topical, Q12H  fentaNYL, 1 patch, Transdermal, Q72H   And  Check Fentanyl Patch Placement, 1 each, Not Applicable, Q12H  chlorhexidine, 15 mL, Mouth/Throat, Q12H  docusate sodium, 100 mg, Per G Tube, BID  [Held by provider] enoxaparin sodium, 40 mg, Subcutaneous, Q24H  escitalopram, 10 mg, Per G Tube, Nightly  finasteride, 5 mg, Per G Tube, Daily  gabapentin, 300 mg, Per G Tube, Q8H  insulin regular, 2-7 Units, Subcutaneous, Q6H  ipratropium-albuterol, 3 mL, Nebulization, 4x Daily - RT  lactulose, 20 g, Oral, TID  metoprolol tartrate, 25 mg, Per G Tube, Q12H  nystatin, , Topical, Q8H  pantoprazole, 40 mg, Intravenous, Q12H  rifAXIMin, 550 mg, Per G Tube, Q12H  rosuvastatin, 20 mg, Per G Tube, Nightly  terazosin, 1 mg, Per G Tube, Daily           Assessment:  Shock with UTI and bacteremia due to Pseudomonas  New onset A-fib, 2/25  [id number] score of 3  Spontaneously converted  Start Eliquis 5 twice daily once okayed with GI  Amiodarone load, deferring continual p.o. part of protocol due to liver dysfunction   Rate control with metoprolol 25 mg twice daily  Chronic HFpEF   ECHO: Normal EF, mild  LVH, Mild AS  X-ray with moderate left pleural effusion- IV bumex given  BP well controlled currently   CAD with elevated troponin   Elevation likely secondary to demand ischemia event  CAD seen on CT  Start statin, start Eliquis once okayed for GI ( no ASA due to thrombocytopenia)  Hyperlipidemia  Start rosuvastatin 20, monitor liver function  Alcoholic cirrhosis with anemia and thrombocytopenia  Likely secondary to liver cirrhosis  Requiring blood transfusions and albumin   Hospital Medicine team to manage      Plan:   Liver function stable, continue Crestor  Will start Eliquis once okayed with GI and hospitalist, H&H down to 8.7 from 9.9 yesterday  Holding on daily diuresis d/t BUN >60, but will give single doses of IV Lasix today  Atrial fibrillation 1 hour overnight with mostly controlled rates but some fast >130 bpm. Will increase metoprolol to 50 mg bid. Not a good candidate for longterm amio d/t hepatic dysfunction, but could use in short term if necessary  Continue to monitor H&H, BP, HR, Rhythm- currently all well controlled   Continue to monitor creatinine and electrolytes and replace per protocol. K+ >4, Mag >2     Electronically signed by Patrick Berumen PA-C, 03/02/25, 10:50 AM EST.

## 2025-03-03 ENCOUNTER — ANESTHESIA (OUTPATIENT)
Dept: GASTROENTEROLOGY | Facility: HOSPITAL | Age: 65
End: 2025-03-03
Payer: MEDICARE

## 2025-03-03 ENCOUNTER — APPOINTMENT (OUTPATIENT)
Dept: GENERAL RADIOLOGY | Facility: HOSPITAL | Age: 65
DRG: 870 | End: 2025-03-03
Payer: MEDICARE

## 2025-03-03 ENCOUNTER — ANESTHESIA EVENT (OUTPATIENT)
Dept: GASTROENTEROLOGY | Facility: HOSPITAL | Age: 65
End: 2025-03-03
Payer: MEDICARE

## 2025-03-03 LAB
AMMONIA BLD-SCNC: 49 UMOL/L (ref 16–60)
ARTERIAL PATENCY WRIST A: ABNORMAL
ATMOSPHERIC PRESS: ABNORMAL MM[HG]
BASE EXCESS BLDA CALC-SCNC: 5 MMOL/L (ref 0–2)
BDY SITE: ABNORMAL
BODY TEMPERATURE: 37
CO2 BLDA-SCNC: 29.8 MMOL/L (ref 22–33)
COHGB MFR BLD: 1.6 % (ref 0–2)
EPAP: 0
GLUCOSE BLDC GLUCOMTR-MCNC: 108 MG/DL (ref 70–130)
GLUCOSE BLDC GLUCOMTR-MCNC: 134 MG/DL (ref 70–130)
GLUCOSE BLDC GLUCOMTR-MCNC: 144 MG/DL (ref 70–130)
GLUCOSE BLDC GLUCOMTR-MCNC: 97 MG/DL (ref 70–130)
HCO3 BLDA-SCNC: 28.7 MMOL/L (ref 20–26)
HCT VFR BLD AUTO: 25.8 % (ref 37.5–51)
HCT VFR BLD AUTO: 26.7 % (ref 37.5–51)
HCT VFR BLD AUTO: 29.3 % (ref 37.5–51)
HCT VFR BLD CALC: 26.1 % (ref 38–51)
HGB BLD-MCNC: 8 G/DL (ref 13–17.7)
HGB BLD-MCNC: 8.3 G/DL (ref 13–17.7)
HGB BLD-MCNC: 9.1 G/DL (ref 13–17.7)
HGB BLDA-MCNC: 8.5 G/DL (ref 13.5–17.5)
INHALED O2 CONCENTRATION: 100 %
IPAP: 0
METHGB BLD QL: 0.1 % (ref 0–1.5)
MODALITY: ABNORMAL
OXYHGB MFR BLDV: 98.9 % (ref 94–99)
PAW @ PEAK INSP FLOW SETTING VENT: 0 CMH2O
PCO2 BLDA: 37.7 MM HG (ref 35–45)
PCO2 TEMP ADJ BLD: 37.7 MM HG (ref 35–48)
PEEP RESPIRATORY: 10 CM[H2O]
PH BLDA: 7.49 PH UNITS (ref 7.35–7.45)
PH, TEMP CORRECTED: 7.49 PH UNITS
PO2 BLDA: 220 MM HG (ref 83–108)
PO2 TEMP ADJ BLD: 220 MM HG (ref 83–108)
TOTAL RATE: 0 BREATHS/MINUTE
VENTILATOR MODE: ABNORMAL
VT ON VENT VENT: 0.49 ML

## 2025-03-03 PROCEDURE — 25810000003 SODIUM CHLORIDE 0.9 % SOLUTION: Performed by: INTERNAL MEDICINE

## 2025-03-03 PROCEDURE — 87070 CULTURE OTHR SPECIMN AEROBIC: CPT | Performed by: INTERNAL MEDICINE

## 2025-03-03 PROCEDURE — 25010000002 PHENYLEPHRINE 10 MG/ML SOLUTION 5 ML VIAL

## 2025-03-03 PROCEDURE — 36600 WITHDRAWAL OF ARTERIAL BLOOD: CPT

## 2025-03-03 PROCEDURE — 71045 X-RAY EXAM CHEST 1 VIEW: CPT

## 2025-03-03 PROCEDURE — 25810000003 SODIUM CHLORIDE 0.9 % SOLUTION 250 ML FLEX CONT

## 2025-03-03 PROCEDURE — 82805 BLOOD GASES W/O2 SATURATION: CPT

## 2025-03-03 PROCEDURE — 82948 REAGENT STRIP/BLOOD GLUCOSE: CPT

## 2025-03-03 PROCEDURE — 85014 HEMATOCRIT: CPT | Performed by: INTERNAL MEDICINE

## 2025-03-03 PROCEDURE — 99291 CRITICAL CARE FIRST HOUR: CPT | Performed by: INTERNAL MEDICINE

## 2025-03-03 PROCEDURE — 25010000002 PROPOFOL 10 MG/ML EMULSION: Performed by: INTERNAL MEDICINE

## 2025-03-03 PROCEDURE — 25010000002 VANCOMYCIN 10 G RECONSTITUTED SOLUTION: Performed by: INTERNAL MEDICINE

## 2025-03-03 PROCEDURE — 83050 HGB METHEMOGLOBIN QUAN: CPT

## 2025-03-03 PROCEDURE — 0W3P8ZZ CONTROL BLEEDING IN GASTROINTESTINAL TRACT, VIA NATURAL OR ARTIFICIAL OPENING ENDOSCOPIC: ICD-10-PCS | Performed by: INTERNAL MEDICINE

## 2025-03-03 PROCEDURE — 94668 MNPJ CHEST WALL SBSQ: CPT

## 2025-03-03 PROCEDURE — 82140 ASSAY OF AMMONIA: CPT | Performed by: INTERNAL MEDICINE

## 2025-03-03 PROCEDURE — 25010000002 SUCCINYLCHOLINE PER 20 MG: Performed by: ANESTHESIOLOGY

## 2025-03-03 PROCEDURE — 25010000002 LIDOCAINE PF 1% 1 % SOLUTION: Performed by: NURSE ANESTHETIST, CERTIFIED REGISTERED

## 2025-03-03 PROCEDURE — 99232 SBSQ HOSP IP/OBS MODERATE 35: CPT | Performed by: INTERNAL MEDICINE

## 2025-03-03 PROCEDURE — 94799 UNLISTED PULMONARY SVC/PX: CPT

## 2025-03-03 PROCEDURE — 25010000002 PIPERACILLIN SOD-TAZOBACTAM PER 1 G: Performed by: INTERNAL MEDICINE

## 2025-03-03 PROCEDURE — 43255 EGD CONTROL BLEEDING ANY: CPT | Performed by: INTERNAL MEDICINE

## 2025-03-03 PROCEDURE — 94002 VENT MGMT INPAT INIT DAY: CPT

## 2025-03-03 PROCEDURE — 25010000002 PROPOFOL 10 MG/ML EMULSION: Performed by: NURSE ANESTHETIST, CERTIFIED REGISTERED

## 2025-03-03 PROCEDURE — 25010000002 FENTANYL CITRATE (PF) 2500 MCG/50ML SOLUTION: Performed by: INTERNAL MEDICINE

## 2025-03-03 PROCEDURE — 94664 DEMO&/EVAL PT USE INHALER: CPT

## 2025-03-03 PROCEDURE — 94761 N-INVAS EAR/PLS OXIMETRY MLT: CPT

## 2025-03-03 PROCEDURE — 87205 SMEAR GRAM STAIN: CPT | Performed by: INTERNAL MEDICINE

## 2025-03-03 PROCEDURE — 85018 HEMOGLOBIN: CPT | Performed by: INTERNAL MEDICINE

## 2025-03-03 PROCEDURE — 25010000002 FUROSEMIDE PER 20 MG: Performed by: INTERNAL MEDICINE

## 2025-03-03 PROCEDURE — 82375 ASSAY CARBOXYHB QUANT: CPT

## 2025-03-03 PROCEDURE — 25810000003 LACTATED RINGERS PER 1000 ML: Performed by: NURSE ANESTHETIST, CERTIFIED REGISTERED

## 2025-03-03 RX ORDER — ACETAMINOPHEN 325 MG/1
650 TABLET ORAL EVERY 6 HOURS PRN
Status: DISCONTINUED | OUTPATIENT
Start: 2025-03-03 | End: 2025-03-05

## 2025-03-03 RX ORDER — FAMOTIDINE 10 MG/ML
20 INJECTION, SOLUTION INTRAVENOUS ONCE
Status: CANCELLED | OUTPATIENT
Start: 2025-03-03 | End: 2025-03-03

## 2025-03-03 RX ORDER — SODIUM CHLORIDE 0.9 % (FLUSH) 0.9 %
10 SYRINGE (ML) INJECTION AS NEEDED
Status: CANCELLED | OUTPATIENT
Start: 2025-03-03

## 2025-03-03 RX ORDER — SODIUM CHLORIDE, SODIUM LACTATE, POTASSIUM CHLORIDE, CALCIUM CHLORIDE 600; 310; 30; 20 MG/100ML; MG/100ML; MG/100ML; MG/100ML
9 INJECTION, SOLUTION INTRAVENOUS CONTINUOUS
Status: CANCELLED | OUTPATIENT
Start: 2025-03-04 | End: 2025-03-04

## 2025-03-03 RX ORDER — LIDOCAINE HYDROCHLORIDE 10 MG/ML
0.5 INJECTION, SOLUTION EPIDURAL; INFILTRATION; INTRACAUDAL; PERINEURAL ONCE AS NEEDED
Status: CANCELLED | OUTPATIENT
Start: 2025-03-03

## 2025-03-03 RX ORDER — CHLORHEXIDINE GLUCONATE ORAL RINSE 1.2 MG/ML
15 SOLUTION DENTAL EVERY 12 HOURS SCHEDULED
Status: DISCONTINUED | OUTPATIENT
Start: 2025-03-03 | End: 2025-03-08

## 2025-03-03 RX ORDER — SODIUM CHLORIDE 0.9 % (FLUSH) 0.9 %
10 SYRINGE (ML) INJECTION AS NEEDED
Status: DISCONTINUED | OUTPATIENT
Start: 2025-03-03 | End: 2025-03-12 | Stop reason: HOSPADM

## 2025-03-03 RX ORDER — LIDOCAINE HYDROCHLORIDE 10 MG/ML
INJECTION, SOLUTION EPIDURAL; INFILTRATION; INTRACAUDAL; PERINEURAL AS NEEDED
Status: DISCONTINUED | OUTPATIENT
Start: 2025-03-03 | End: 2025-03-03 | Stop reason: SURG

## 2025-03-03 RX ORDER — FAMOTIDINE 20 MG/1
20 TABLET, FILM COATED ORAL ONCE
Status: CANCELLED | OUTPATIENT
Start: 2025-03-03 | End: 2025-03-03

## 2025-03-03 RX ORDER — SODIUM CHLORIDE, SODIUM LACTATE, POTASSIUM CHLORIDE, CALCIUM CHLORIDE 600; 310; 30; 20 MG/100ML; MG/100ML; MG/100ML; MG/100ML
INJECTION, SOLUTION INTRAVENOUS CONTINUOUS PRN
Status: DISCONTINUED | OUTPATIENT
Start: 2025-03-03 | End: 2025-03-03 | Stop reason: SURG

## 2025-03-03 RX ORDER — LIDOCAINE HYDROCHLORIDE 40 MG/ML
4 INJECTION, SOLUTION RETROBULBAR ONCE
Status: DISCONTINUED | OUTPATIENT
Start: 2025-03-03 | End: 2025-03-03 | Stop reason: HOSPADM

## 2025-03-03 RX ORDER — ONDANSETRON 2 MG/ML
4 INJECTION INTRAMUSCULAR; INTRAVENOUS ONCE AS NEEDED
Status: DISCONTINUED | OUTPATIENT
Start: 2025-03-03 | End: 2025-03-03 | Stop reason: HOSPADM

## 2025-03-03 RX ORDER — IPRATROPIUM BROMIDE AND ALBUTEROL SULFATE 2.5; .5 MG/3ML; MG/3ML
3 SOLUTION RESPIRATORY (INHALATION) ONCE AS NEEDED
Status: DISCONTINUED | OUTPATIENT
Start: 2025-03-03 | End: 2025-03-03 | Stop reason: HOSPADM

## 2025-03-03 RX ORDER — CHLORHEXIDINE GLUCONATE ORAL RINSE 1.2 MG/ML
15 SOLUTION DENTAL EVERY 12 HOURS SCHEDULED
Status: CANCELLED | OUTPATIENT
Start: 2025-03-03

## 2025-03-03 RX ORDER — SODIUM CHLORIDE 0.9 % (FLUSH) 0.9 %
10 SYRINGE (ML) INJECTION EVERY 12 HOURS SCHEDULED
Status: DISCONTINUED | OUTPATIENT
Start: 2025-03-03 | End: 2025-03-08

## 2025-03-03 RX ORDER — FUROSEMIDE 10 MG/ML
40 INJECTION INTRAMUSCULAR; INTRAVENOUS ONCE
Status: COMPLETED | OUTPATIENT
Start: 2025-03-03 | End: 2025-03-03

## 2025-03-03 RX ORDER — SODIUM CHLORIDE 9 MG/ML
40 INJECTION, SOLUTION INTRAVENOUS AS NEEDED
Status: DISCONTINUED | OUTPATIENT
Start: 2025-03-03 | End: 2025-03-12 | Stop reason: HOSPADM

## 2025-03-03 RX ORDER — VANCOMYCIN 2 GRAM/500 ML IN 0.9 % SODIUM CHLORIDE INTRAVENOUS
2000 ONCE
Status: COMPLETED | OUTPATIENT
Start: 2025-03-03 | End: 2025-03-03

## 2025-03-03 RX ORDER — MIDAZOLAM HYDROCHLORIDE 1 MG/ML
0.5 INJECTION, SOLUTION INTRAMUSCULAR; INTRAVENOUS
Status: CANCELLED | OUTPATIENT
Start: 2025-03-03

## 2025-03-03 RX ORDER — PROPOFOL 10 MG/ML
VIAL (ML) INTRAVENOUS AS NEEDED
Status: DISCONTINUED | OUTPATIENT
Start: 2025-03-03 | End: 2025-03-03 | Stop reason: SURG

## 2025-03-03 RX ORDER — ETOMIDATE 2 MG/ML
INJECTION INTRAVENOUS AS NEEDED
Status: DISCONTINUED | OUTPATIENT
Start: 2025-03-03 | End: 2025-03-03 | Stop reason: SURG

## 2025-03-03 RX ORDER — EPHEDRINE SULFATE 50 MG/ML
INJECTION, SOLUTION INTRAVENOUS AS NEEDED
Status: DISCONTINUED | OUTPATIENT
Start: 2025-03-03 | End: 2025-03-03 | Stop reason: SURG

## 2025-03-03 RX ORDER — SUCCINYLCHOLINE CHLORIDE 20 MG/ML
INJECTION INTRAMUSCULAR; INTRAVENOUS AS NEEDED
Status: DISCONTINUED | OUTPATIENT
Start: 2025-03-03 | End: 2025-03-03 | Stop reason: SURG

## 2025-03-03 RX ORDER — MIDAZOLAM HYDROCHLORIDE 1 MG/ML
1 INJECTION, SOLUTION INTRAMUSCULAR; INTRAVENOUS
Status: CANCELLED | OUTPATIENT
Start: 2025-03-03

## 2025-03-03 RX ORDER — SODIUM CHLORIDE 0.9 % (FLUSH) 0.9 %
10 SYRINGE (ML) INJECTION EVERY 12 HOURS SCHEDULED
Status: CANCELLED | OUTPATIENT
Start: 2025-03-03

## 2025-03-03 RX ADMIN — LIDOCAINE HYDROCHLORIDE 100 MG: 10 INJECTION, SOLUTION EPIDURAL; INFILTRATION; INTRACAUDAL; PERINEURAL at 11:08

## 2025-03-03 RX ADMIN — PROPOFOL 20 MG: 10 INJECTION, EMULSION INTRAVENOUS at 11:08

## 2025-03-03 RX ADMIN — GABAPENTIN 300 MG: 300 CAPSULE ORAL at 21:51

## 2025-03-03 RX ADMIN — OXYCODONE AND ACETAMINOPHEN 1 TABLET: 7.5; 325 TABLET ORAL at 03:27

## 2025-03-03 RX ADMIN — NYSTATIN: 100000 POWDER TOPICAL at 16:00

## 2025-03-03 RX ADMIN — PANTOPRAZOLE SODIUM 40 MG: 40 INJECTION, POWDER, FOR SOLUTION INTRAVENOUS at 08:49

## 2025-03-03 RX ADMIN — IPRATROPIUM BROMIDE AND ALBUTEROL SULFATE 3 ML: 2.5; .5 SOLUTION RESPIRATORY (INHALATION) at 16:19

## 2025-03-03 RX ADMIN — PROPOFOL 20 MG: 10 INJECTION, EMULSION INTRAVENOUS at 11:21

## 2025-03-03 RX ADMIN — ESCITALOPRAM OXALATE 10 MG: 10 TABLET ORAL at 21:53

## 2025-03-03 RX ADMIN — PROPOFOL 20 MG: 10 INJECTION, EMULSION INTRAVENOUS at 11:12

## 2025-03-03 RX ADMIN — PHENYLEPHRINE HYDROCHLORIDE 3 MCG/KG/MIN: 10 INJECTION INTRAVENOUS at 13:38

## 2025-03-03 RX ADMIN — CHLORHEXIDINE GLUCONATE 15 ML: 1.2 SOLUTION ORAL at 23:06

## 2025-03-03 RX ADMIN — PROPOFOL 20 MG: 10 INJECTION, EMULSION INTRAVENOUS at 11:10

## 2025-03-03 RX ADMIN — Medication 1 APPLICATION: at 21:52

## 2025-03-03 RX ADMIN — Medication 50 MCG/HR: at 13:40

## 2025-03-03 RX ADMIN — Medication 10 ML: at 21:55

## 2025-03-03 RX ADMIN — IPRATROPIUM BROMIDE AND ALBUTEROL SULFATE 3 ML: 2.5; .5 SOLUTION RESPIRATORY (INHALATION) at 19:06

## 2025-03-03 RX ADMIN — CHLORHEXIDINE GLUCONATE 15 ML: 1.2 SOLUTION ORAL at 15:56

## 2025-03-03 RX ADMIN — ACETAMINOPHEN 650 MG: 325 TABLET ORAL at 21:51

## 2025-03-03 RX ADMIN — ETOMIDATE 20 MG: 20 INJECTION, SOLUTION INTRAVENOUS at 12:15

## 2025-03-03 RX ADMIN — IPRATROPIUM BROMIDE AND ALBUTEROL SULFATE 3 ML: 2.5; .5 SOLUTION RESPIRATORY (INHALATION) at 08:17

## 2025-03-03 RX ADMIN — PHENYLEPHRINE HYDROCHLORIDE 1.5 MCG/KG/MIN: 10 INJECTION INTRAVENOUS at 18:23

## 2025-03-03 RX ADMIN — SUCCINYLCHOLINE CHLORIDE 100 MG: 20 INJECTION, SOLUTION INTRAMUSCULAR; INTRAVENOUS at 12:15

## 2025-03-03 RX ADMIN — NYSTATIN: 100000 POWDER TOPICAL at 06:42

## 2025-03-03 RX ADMIN — MUPIROCIN 1 APPLICATION: 20 OINTMENT TOPICAL at 23:06

## 2025-03-03 RX ADMIN — PROPOFOL 50 MCG/KG/MIN: 10 INJECTION, EMULSION INTRAVENOUS at 17:13

## 2025-03-03 RX ADMIN — NYSTATIN: 100000 POWDER TOPICAL at 23:06

## 2025-03-03 RX ADMIN — MUPIROCIN 1 APPLICATION: 20 OINTMENT TOPICAL at 16:00

## 2025-03-03 RX ADMIN — LACTULOSE 20 G: 20 SOLUTION ORAL at 21:53

## 2025-03-03 RX ADMIN — LACTULOSE 20 G: 20 SOLUTION ORAL at 16:00

## 2025-03-03 RX ADMIN — ROSUVASTATIN 20 MG: 20 TABLET, FILM COATED ORAL at 21:51

## 2025-03-03 RX ADMIN — PIPERACILLIN AND TAZOBACTAM 3.38 G: 3; .375 INJECTION, POWDER, LYOPHILIZED, FOR SOLUTION INTRAVENOUS at 21:30

## 2025-03-03 RX ADMIN — FUROSEMIDE 40 MG: 10 INJECTION, SOLUTION INTRAMUSCULAR; INTRAVENOUS at 15:56

## 2025-03-03 RX ADMIN — PROPOFOL 20 MG: 10 INJECTION, EMULSION INTRAVENOUS at 11:16

## 2025-03-03 RX ADMIN — Medication 1 APPLICATION: at 08:30

## 2025-03-03 RX ADMIN — VANCOMYCIN HYDROCHLORIDE 2000 MG: 10 INJECTION, POWDER, LYOPHILIZED, FOR SOLUTION INTRAVENOUS at 16:46

## 2025-03-03 RX ADMIN — PROPOFOL 20 MG: 10 INJECTION, EMULSION INTRAVENOUS at 11:14

## 2025-03-03 RX ADMIN — RIFAXIMIN 550 MG: 550 TABLET ORAL at 21:51

## 2025-03-03 RX ADMIN — CHLORHEXIDINE GLUCONATE 0.12% ORAL RINSE 15 ML: 1.2 LIQUID ORAL at 08:49

## 2025-03-03 RX ADMIN — PROPOFOL 30 MCG/KG/MIN: 10 INJECTION, EMULSION INTRAVENOUS at 13:39

## 2025-03-03 RX ADMIN — SODIUM CHLORIDE, POTASSIUM CHLORIDE, SODIUM LACTATE AND CALCIUM CHLORIDE: 600; 310; 30; 20 INJECTION, SOLUTION INTRAVENOUS at 11:00

## 2025-03-03 RX ADMIN — PANTOPRAZOLE SODIUM 40 MG: 40 INJECTION, POWDER, FOR SOLUTION INTRAVENOUS at 21:54

## 2025-03-03 RX ADMIN — GABAPENTIN 300 MG: 300 CAPSULE ORAL at 15:59

## 2025-03-03 RX ADMIN — PROPOFOL 50 MCG/KG/MIN: 10 INJECTION, EMULSION INTRAVENOUS at 21:31

## 2025-03-03 RX ADMIN — EPHEDRINE SULFATE 10 MG: 50 INJECTION INTRAVENOUS at 11:17

## 2025-03-03 RX ADMIN — PIPERACILLIN AND TAZOBACTAM 3.38 G: 3; .375 INJECTION, POWDER, LYOPHILIZED, FOR SOLUTION INTRAVENOUS at 15:57

## 2025-03-03 NOTE — ANESTHESIA PREPROCEDURE EVALUATION
Anesthesia Evaluation     Nursing notes reviewed   NPO Solid Status: > 8 hours  NPO Liquid Status: > 8 hours           Airway   Mallampati: I  TM distance: >3 FB  Neck ROM: full  No difficulty expected  Dental    (+) edentulous    Pulmonary    (+) pneumonia worsening , a smoker Current,shortness of breath, decreased breath sounds, wheezes  Cardiovascular   Exercise tolerance: poor (<4 METS)    NYHA Classification: IV  Rhythm: regular  Rate: normal        Neuro/Psych  GI/Hepatic/Renal/Endo      Musculoskeletal     Abdominal    Substance History      OB/GYN          Other        ROS/Med Hx Other: Poor historian   orientated in person only  Was intubated/ventilated for respiratory failure   Extubated 2/24  Sao2 91% on supplemental O2                Anesthesia Plan    ASA 3     MAC     intravenous induction     Anesthetic plan, risks, benefits, and alternatives have been provided, discussed and informed consent has been obtained with: patient.      CODE STATUS:    Level Of Support Discussed With: Patient  Code Status (Patient has no pulse and is not breathing): CPR (Attempt to Resuscitate)  Medical Interventions (Patient has pulse or is breathing): Full Support

## 2025-03-03 NOTE — ANESTHESIA PROCEDURE NOTES
Airway  Urgency: emergent    Date/Time: 3/3/2025 12:05 PM  End Time:3/3/2025 12:15 PM  Airway not difficult    General Information and Staff    Patient location during procedure: OR    Indications and Patient Condition  Indications for airway management: airway protection    Preoxygenated: yes  MILS not maintained throughout  Mask difficulty assessment: 1 - vent by mask    Final Airway Details  Final airway type: endotracheal airway      Successful airway: ETT  Cuffed: yes   Successful intubation technique: video laryngoscopy  Endotracheal tube insertion site: oral  Blade: [place]  Blade size: 3  ETT size (mm): 7.5  Cormack-Lehane Classification: grade I - full view of glottis  Placement verified by: chest auscultation and capnometry   Cuff volume (mL): 5  Measured from: lips  ETT/EBT  to lips (cm): 21  Number of attempts at approach: 1  Assessment: lips, teeth, and gum same as pre-op and atraumatic intubation    Additional Comments  Negative epigastric sounds, Breath sound equal bilaterally with symmetric chest rise and fall

## 2025-03-03 NOTE — PROGRESS NOTES
INTENSIVIST   PROGRESS NOTE     Hospital:  LOS: 20 days     Mr. Val Nassar Jr., 64 y.o. male is followed for a Chief Complaint of: Respiratory Failure      Subjective   S     Interval History:  Mr. 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 He was found to be septic due to UTI. He was admitted to the ICU. CT H was notable for possible sinusitis. CT chest with possible PNA. CT Abd/pelvis showed left-sided pyelonephritis. Urine and blood cultures grew pseudomonas for which he was treated with cefepime for 10 days. Patient decompensated and required intubation on 2/12/25. He required bronch on 2/17 for mucous plugging. Respiratory cultures grew normal kishore and candida. He was able to be extubated on 2/26.     Patient developed Afib with RVR. He was started on amiodarone and metoprolol for this. Cardiology was consulted during this admission with plans to follow up as outpatient in 4-6 weeks.    CT chest/abd/pelvis obtained on 3/2 and was notable for multifocal patchy and alveolar airspace opacities suspicious for pneumonia and/or other forms of infectious/inflammatory pneumonitis which may include atypical/viral etiologies, small right pleural effusion. Procal 0.85. Respiratory panel negative.    He developed worsening anemia, requiring 2 units of PRBCs. GI was consulted. On 3/3, patient was taken for EGD. In recovery, he was noted to be hypoxic with SpO2 in the 80's. Patient was intubated by anesthesia and subsequently transferred to the ICU for higher level of care.    EGD showed two small telangiectasias in the lower third of the esophagus suspected to be secondary to prior radiation.    Time spent: 6 minutes. Electronically signed by NUVIA Falcon, 03/03/25, 12:41 PM EST.           The patient's relevant  past medical, surgical and social history were reviewed and updated in Epic as appropriate.      ROS:   Constitutional: Negative for fever.   Respiratory: Negative for dyspnea.   Cardiovascular: Negative for chest pain.   Gastrointestinal: Negative for  nausea, vomiting and diarrhea.     Objective   O     Vitals:  Temp  Min: 97.8 °F (36.6 °C)  Max: 98.9 °F (37.2 °C)  BP  Min: 108/91  Max: 138/68  Pulse  Min: 73  Max: 142  Resp  Min: 16  Max: 20  SpO2  Min: 81 %  Max: 96 % Flow (L/min) (Oxygen Therapy)  Min: 2  Max: 2    Intake/Ouptut 24 hrs (7:00AM - 6:59 AM)  Intake & Output (last 3 days)         02/28 0701 03/01 0700 03/01 0701 03/02 0700 03/02 0701 03/03 0700 03/03 0701  03/04 0700    P.O.   0     I.V. (mL/kg) 387 (4.9)       Blood        Other 1787 2401 881     NG/GT 1503 1751 1484     Total Intake(mL/kg) 3677 (46.7) 4152 (52.2) 2365 (29.2)     Urine (mL/kg/hr) 2550 (1.3) 2150 (1.1) 1900 (1)     Stool 0 0 0     Total Output 2550 2150 1900     Net +1127 +2002 +465             Stool Unmeasured Occurrence 4 x 1 x 6 x             Medications (drips):       Mechanical Ventilator Settings:          Resp Rate (Set): 15  Pressure Support (cm H2O): 10 cm H20  FiO2 (%): 40 %  PEEP/CPAP (cm H2O): 5 cm H20    Minute Ventilation (L/min) (Obs): 4.51 L/min  Resp Rate (Observed) Vent: 23  I:E Ratio (Set): 1:2.64  I:E Ratio (Obs): 16.67:1    PIP Observed (cm H2O): 18 cm H2O  Plateau Pressure (cm H2O): (S) 18 cm H2O    Physical Examination  Telemetry:  Normal sinus rhythm.    Constitutional:  No acute distress.  ETT in place on mechanical ventilation.    Eyes: No scleral icterus.   PERRL, EOM intact.    Neck:  Supple, FROM   Cardiovascular: Normal rate, regular and rhythm. Normal heart sounds.  No murmurs, gallop or rub.   Respiratory: No respiratory distress. Normal respiratory effort.  Diminished. No wheezing.    Abdominal:  Soft. No masses. Nontender. No distension. No HSM.   Extremities: No digital cyanosis. No  clubbing.  No peripheral edema.   Skin: No rashes, lesions or ulcers   Neurological:   Sedated.              Results from last 7 days   Lab Units 03/03/25  0424 03/02/25  2306 03/02/25  1656 03/02/25 0436 03/01/25  0417 02/28/25  1534 02/28/25  0524   WBC 10*3/mm3  --   --   --  19.09* 19.72*  --  18.39*   HEMOGLOBIN g/dL 8.0* 8.3* 8.7* 8.7* 9.9*   < > 9.6*   MCV fL  --   --   --  96.1 95.7  --  93.8   PLATELETS 10*3/mm3  --   --   --  174 208  --  245    < > = values in this interval not displayed.     Results from last 7 days   Lab Units 03/02/25 0436 03/01/25 0417 02/28/25  0524 02/27/25  0114 02/25/25  2045 02/25/25  0328   SODIUM mmol/L 143 149* 150* 147*   < > 138   POTASSIUM mmol/L 3.9 4.0 4.2 4.2   < > 4.6   CO2 mmol/L 26.0 28.0 28.0 26.0   < > 23.0   CREATININE mg/dL 0.86 0.91 1.00 1.15   < > 1.31*   GLUCOSE mg/dL 138* 129* 118* 156*   < > 163*   MAGNESIUM mg/dL 2.5* 1.7  --  2.4   < > 2.0   PHOSPHORUS mg/dL  --   --   --   --   --  4.0    < > = values in this interval not displayed.     Estimated Creatinine Clearance: 98.1 mL/min (by C-G formula based on SCr of 0.86 mg/dL).  Results from last 7 days   Lab Units 03/02/25 0436 03/01/25 0417   ALK PHOS U/L 127* 126*   BILIRUBIN mg/dL 0.5 0.4   BILIRUBIN DIRECT mg/dL  --  0.2   ALT (SGPT) U/L 60* 64*   AST (SGOT) U/L 46* 47*       Results from last 7 days   Lab Units 02/24/25  2255   PH, ARTERIAL pH units 7.362   PCO2, ARTERIAL mm Hg 40.5   PO2 ART mm Hg 98.5   FIO2 % 36       Images:  Imaging Results (Last 24 Hours)       Procedure Component Value Units Date/Time    CT Chest Without Contrast Diagnostic [[id number]] Collected: 03/02/25 1406     Updated: 03/02/25 1421    Narrative:      CT CHEST WO CONTRAST DIAGNOSTIC, CT ABDOMEN PELVIS WO CONTRAST    Date of Exam: 3/2/2025 10:49 AM EST    Indication: shortness of breath, leukocytosis.    Comparison: CT chest abdomen pelvis 2/11/2025    Technique: Axial CT images were obtained of the chest, abdomen and  pelvis without contrast administration.  Reconstructed coronal and sagittal images were also obtained. Automated exposure control and iterative construction methods were used.      Findings:  Limited exam without IV contrast, specifically assessing the vascular and solid organ structures.      CT CHEST:    Thyroid gross unremarkable. Right chest port catheter tip terminates within the right atrium. Aortic atherosclerotic disease without aneurysm. Aortic valve leaflet calcifications. Normal caliber main pulmonary artery. Calcified coronary atherosclerotic   disease. Mitral annular calcifications. Mild stable cardiomegaly. Trace low-density pericardial fluid similar to less conspicuous to prior. Esophagus unremarkable. Hypodense cardiac blood pooling which can be seen with anemia.    No suspicious mediastinal or hilar adenopathy. Debris and/or mucous plugging right bronchus intermedius extending into the right lower lobe. Complete collapse of right middle lobe with air bronchograms. Multifocal patchy and alveolar groundglass   opacities new from prior exam. There is a small low-density right pleural effusion new from prior exam. No overt edema. No pneumothorax. Mild underlying centrilobular emphysema.    Gynecomastia. No acute chest wall findings. Degenerative changes throughout the spine without acute displaced fracture or aggressive lesion. Superimposed findings suggesting diffuse idiopathic skeletal hyperostosis.    CT abdomen pelvis    Solid irregular nodular peripheral hepatic contour suggesting morphologic changes of chronic liver disease. Gallbladder and biliary tree unremarkable. Moderate to severely atrophic pancreas. No active inflammation. Spleen within normal limits in size.   Unremarkable adrenal glands.    Asymmetric enlargement of the left kidney with subtle hazy appearance of the renal parenchyma and asymmetric trace perinephric fluid. Allowing for technical differences these findings have improved  since 2/11/2025. No discrete drainable collection. No   hydronephrosis. Mild bladder wall thickening. Large left posterolateral bladder diverticula which appears slightly inflamed. Presumed instrumentation related intraluminal gas. Prostate within normal limits in size.    Antegrade oriented feeding tube terminates within the transverse duodenum. Rectal tube in place. Colonic diverticulosis. No evidence of bowel obstruction. Normal appendix. Aortic atherosclerotic disease without aneurysm. No suspicious adenopathy.    Minimal perirectal/presacral edema. No significant ascites. No free air. Medication related injection change anterior abdominal wall. Tiny fat-containing umbilical hernia. Tiny fat-containing left inguinal hernia. Degenerative changes of the lumbar spine   with related grade 1 anterolisthesis L4 and L5. No acute displaced fracture or aggressive lesion.      Impression:      Impression:  Chest  1. Multifocal patchy and alveolar airspace opacities suspicious for pneumonia and/or other forms of infectious/inflammatory pneumonitis which may include atypical/viral etiologies. Small right pleural effusion.  2. Recent aspiration versus mucous plugging in the right bronchus intermedius with complete right middle lobe collapse.  3. Aortic and coronary atherosclerotic disease.  4. Cardiomegaly.  5. CT findings suggesting anemia.    Abdomen/pelvis  1. Asymmetric left renal findings suspicious for persistent pyelonephritis, severity appears improved from 2/11/2025. No hydronephrosis or drainable fluid collection.  2. Possible cystitis by CT, correlate with urinalysis.  3. Morphologic changes of the liver suspicious for chronic liver disease.  4. Atrophic pancreas.  5. Colonic diverticulosis.  6. Other ancillary findings as above.        Electronically Signed: Guerrreo Rubio MD    3/2/2025 2:18 PM EST    Workstation ID: [id number]    CT Abdomen Pelvis Without Contrast [[id number]] Collected: 03/02/25 1405      Updated: 03/02/25 1421    Narrative:      CT CHEST WO CONTRAST DIAGNOSTIC, CT ABDOMEN PELVIS WO CONTRAST    Date of Exam: 3/2/2025 10:49 AM EST    Indication: shortness of breath, leukocytosis.    Comparison: CT chest abdomen pelvis 2/11/2025    Technique: Axial CT images were obtained of the chest, abdomen and pelvis without contrast administration.  Reconstructed coronal and sagittal images were also obtained. Automated exposure control and iterative construction methods were used.      Findings:  Limited exam without IV contrast, specifically assessing the vascular and solid organ structures.      CT CHEST:    Thyroid gross unremarkable. Right chest port catheter tip terminates within the right atrium. Aortic atherosclerotic disease without aneurysm. Aortic valve leaflet calcifications. Normal caliber main pulmonary artery. Calcified coronary atherosclerotic   disease. Mitral annular calcifications. Mild stable cardiomegaly. Trace low-density pericardial fluid similar to less conspicuous to prior. Esophagus unremarkable. Hypodense cardiac blood pooling which can be seen with anemia.    No suspicious mediastinal or hilar adenopathy. Debris and/or mucous plugging right bronchus intermedius extending into the right lower lobe. Complete collapse of right middle lobe with air bronchograms. Multifocal patchy and alveolar groundglass   opacities new from prior exam. There is a small low-density right pleural effusion new from prior exam. No overt edema. No pneumothorax. Mild underlying centrilobular emphysema.    Gynecomastia. No acute chest wall findings. Degenerative changes throughout the spine without acute displaced fracture or aggressive lesion. Superimposed findings suggesting diffuse idiopathic skeletal hyperostosis.    CT abdomen pelvis    Solid irregular nodular peripheral hepatic contour suggesting morphologic changes of chronic liver disease. Gallbladder and biliary tree unremarkable. Moderate to severely  atrophic pancreas. No active inflammation. Spleen within normal limits in size.   Unremarkable adrenal glands.    Asymmetric enlargement of the left kidney with subtle hazy appearance of the renal parenchyma and asymmetric trace perinephric fluid. Allowing for technical differences these findings have improved since 2/11/2025. No discrete drainable collection. No   hydronephrosis. Mild bladder wall thickening. Large left posterolateral bladder diverticula which appears slightly inflamed. Presumed instrumentation related intraluminal gas. Prostate within normal limits in size.    Antegrade oriented feeding tube terminates within the transverse duodenum. Rectal tube in place. Colonic diverticulosis. No evidence of bowel obstruction. Normal appendix. Aortic atherosclerotic disease without aneurysm. No suspicious adenopathy.    Minimal perirectal/presacral edema. No significant ascites. No free air. Medication related injection change anterior abdominal wall. Tiny fat-containing umbilical hernia. Tiny fat-containing left inguinal hernia. Degenerative changes of the lumbar spine   with related grade 1 anterolisthesis L4 and L5. No acute displaced fracture or aggressive lesion.      Impression:      Impression:  Chest  1. Multifocal patchy and alveolar airspace opacities suspicious for pneumonia and/or other forms of infectious/inflammatory pneumonitis which may include atypical/viral etiologies. Small right pleural effusion.  2. Recent aspiration versus mucous plugging in the right bronchus intermedius with complete right middle lobe collapse.  3. Aortic and coronary atherosclerotic disease.  4. Cardiomegaly.  5. CT findings suggesting anemia.    Abdomen/pelvis  1. Asymmetric left renal findings suspicious for persistent pyelonephritis, severity appears improved from 2/11/2025. No hydronephrosis or drainable fluid collection.  2. Possible cystitis by CT, correlate with urinalysis.  3. Morphologic changes of the liver  suspicious for chronic liver disease.  4. Atrophic pancreas.  5. Colonic diverticulosis.  6. Other ancillary findings as above.        Electronically Signed: Guerrero Rubio MD    3/2/2025 2:18 PM EST    Workstation ID: [id number]               Results: Reviewed.  I reviewed the patient's new laboratory and imaging results.  I independently reviewed the patient's new images.    Medications: Reviewed.    Assessment & Plan   A / P     Mr Nassar is a 65yo M with a history of alcohol abuse, alcoholic cirrhosis, metastatic squamous cell cancer of the esophagus, and HLD who presented to [facility] on 2/11/25 from a rehab facility with weakness, dizziness and AMS. He was noted to be septic secondary to a UTI and was admitted to the ICU. Urine and blood cultures grew Pseudomonas and he completed treatment with Cefepime x 10 days. He decompensated and required intubation on 2/12/25. Bronchoscopy was performed on 2/17/25 for mucous plugging. He was able to be extubated on 2/26/25.     He was transferred to telemetry. He developed worsening anemia and required transfusion of 2 units of pRBCs. GI was consulted and he was taken for EGD on 3/3/25 by Dr. Mckeon. EGD showed 2 small telangiectasias in the lower third of the esophagus suspected to be secondary to prior radiation. Argon was used for coagulation for bleeding prevention.     After the EGD, he was transferred to the recovery area. He was noted to be hypoxic on a nonrebreather and he was intubated by anesthesia. After intubation, he remained hypoxic requiring high FiO2 and PEEP. He was transferred back to the ICU.     Nutrition:   NPO Diet NPO Type: Strict NPO  Advance Directives:   Code Status and Medical Interventions: CPR (Attempt to Resuscitate); Full Support   Ordered at: 02/12/25 1029     Level Of Support Discussed With:    Patient     Code Status (Patient has no pulse and is not breathing):    CPR (Attempt to Resuscitate)     Medical Interventions (Patient has pulse  or is breathing):    Full Support       Active Hospital Problems    Diagnosis     **Septic shock due to Pseudomonas species     Pneumonia of both lower lobes due to Pseudomonas species     Bacteremia due to Pseudomonas     GALILEO (acute kidney injury)     Metabolic acidosis     Cirrhosis of liver     Squamous cell carcinoma of esophagus     Hyperlipidemia, unspecified     Tobacco use        Assessment / Plan:    Continue mechanical ventilation. Titrate FiO2 and PEEP.   Sedation with Propofol and Fentanyl.   Lasix x 1.   Titrate Neosynephrine.   Monitor H/H with transfusion as needed.   Restart empiric antibiotics for possible pneumonia. Check a respiratory culture.   Continue oswald catheter. Typically self-cath prior to this admission.   Speech following for dysphagia and had a Keofeed in place.   Lovenox on hold secondary to concern for bleeding. Will need anticoagulation for Afib in the future.   Protonix for GI ppx.   Rifaximin and Lactulose for HE.   AM labs and CXR    Critically ill secondary to acute hypoxic respiratory failure.     High level of risk due to:  severe exacerbation of chronic illness and illness with threat to life or bodily function.    Plan of care and goals reviewed during interdisciplinary rounds.  I discussed the patient's findings and my recommendations with nursing staff    Critical Care Time: was greater than 45 minutes.(This excludes time spent performing separately reportable procedures and services). including high complexity decision making to assess, manipulate, and support vital organ system failure in this individual who has impairment of one or more vital organ systems such that there is a high probability of imminent or life threatening deterioration in the patient’s condition.      Ludmila Mina, DO    Intensive Care Medicine and Pulmonary Medicine

## 2025-03-03 NOTE — SIGNIFICANT NOTE
03/03/25 0951   SLP Deferred Reason   SLP Deferred Reason Patient unavailable for treatment  (Patient is NPO for EGD this am. SLP to f/u as able for dysphagia tx.)

## 2025-03-03 NOTE — NURSING NOTE
Pt intubated in endo recovery per anesthesia (Dr Kaur). See anesthesia note. Pt transported to ICU per ICU RNs.

## 2025-03-03 NOTE — CASE MANAGEMENT/SOCIAL WORK
Continued Stay Note   [place]     Patient Name: Val Nassar Jr.  MRN: [id number]  Today's Date: 3/3/2025    Admit Date: 2/11/2025    Plan: SNF   Discharge Plan       Row Name 03/03/25 1321       Plan    Plan SNF    Patient/Family in Agreement with Plan yes    Plan Comments Attempted bedside visit with patient, but he was gone to [facility]. He has been anticipating SNF at discharge.  will follow up with patient tomorrow.    Final Discharge Disposition Code 03 - skilled nursing facility (SNF)                   Discharge Codes    No documentation.                 Expected Discharge Date and Time       Expected Discharge Date Expected Discharge Time    Mar 5, 2025               Areli Harding RN

## 2025-03-03 NOTE — ANESTHESIA POSTPROCEDURE EVALUATION
Patient: Val Nassar Jr.    Procedure Summary       Date: 03/03/25 Room / Location:  [facility] /  [facility]    Anesthesia Start: 1102 Anesthesia Stop: 1220    Procedure: ESOPHAGOGASTRODUODENOSCOPY Diagnosis:     Surgeons: Wesly Mckeon MD Provider: Jeremy Kaur MD    Anesthesia Type: MAC ASA Status: 3            Anesthesia Type: MAC    Vitals  Vitals Value Taken Time   /97 03/03/25 1215   Temp 97.8 °F (36.6 °C) 03/03/25 1137   Pulse 112 03/03/25 1221   Resp 18 03/03/25 1137   SpO2 93 % 03/03/25 1221   Vitals shown include unfiled device data.        Post Anesthesia Care and Evaluation    Patient location during evaluation: PACU  Patient participation: complete - patient cannot participate  Level of consciousness: agitated  Pain score: 0  Pain management: adequate    Airway patency: patent  PONV Status: noneRespiratory status: intubated, unstable and ventilator  Hydration status: acceptable    Comments: Unstable resp status  Sa02 81% on hi flow nasal o2  Decision made to intubate and transfer to ICU

## 2025-03-03 NOTE — NURSING NOTE
Wo follow up for posterior thigh assessment - ruptured blisters    Patient off the floor at procedure in ENDO. Will attempt to follow up later today if time allows. Otherwise will see patient in AM.     Jere Boles RN, BSN, CWOCN  Wound, Ostomy and Continence (WOC) Department  [facility]

## 2025-03-03 NOTE — PROGRESS NOTES
[facility] Cardiology Inpatient Progress Note    Val Nassar Jr.  1960  [id number]  03/03/25    Subjective:   Val Nassar Jr. no acute events overnight.  His mental status has improved slightly on my evaluation compared to a few days ago.  He is able to answer some yes or no questions but his speech is overall unintelligible.  Still remains quite obtunded.  Plan is for EGD later today.    Review of Systems:   Review of Systems   Unable to perform ROS: Mental status change       I have reviewed and/or updated the patient's past medical, past surgical, family history, social history, problem list and allergies as appropriate in the chart.     Physical Exam:  Vital Signs:   Vitals:    03/03/25 0540 03/03/25 0804 03/03/25 0817 03/03/25 0944   BP:  122/67  125/71   BP Location:  Left arm  Left arm   Patient Position:  Lying  Lying   Pulse:   79 78   Resp:  18 20 20   Temp:  98.2 °F (36.8 °C)  98.3 °F (36.8 °C)   TempSrc:  Oral  Temporal   SpO2:   92% 91%   Weight: 81 kg (178 lb 8 oz)      Height:           Vitals reviewed.   Constitutional:       Appearance: Not in distress. Chronically ill-appearing.   Pulmonary:      Effort: Pulmonary effort is normal.      Breath sounds: Normal breath sounds.   Cardiovascular:      Normal rate. Regular rhythm. Normal S1. Normal S2.       Murmurs: There is no murmur.      No gallop.  No click. No rub.   Pulses:     Intact distal pulses.   Edema:     Peripheral edema absent.   Skin:     General: Skin is warm and dry.         Results Review:   Results from last 7 days   Lab Units 03/02/25  0436   SODIUM mmol/L 143   POTASSIUM mmol/L 3.9   CHLORIDE mmol/L 106   CO2 mmol/L 26.0   BUN mg/dL 61*   CREATININE mg/dL 0.86   CALCIUM mg/dL 8.8   BILIRUBIN mg/dL 0.5   ALK PHOS U/L 127*   ALT (SGPT) U/L 60*   AST (SGOT) U/L 46*   GLUCOSE mg/dL 138*         Results from last 7 days   Lab Units 03/03/25  0424 03/02/25  2306 03/02/25  1656  03/02/25  0436 03/01/25  0417 02/28/25  1534 02/28/25  0524   WBC 10*3/mm3  --   --   --  19.09* 19.72*  --  18.39*   HEMOGLOBIN g/dL 8.0* 8.3* 8.7* 8.7* 9.9*   < > 9.6*   HEMATOCRIT % 25.8* 26.7* 27.6* 27.1* 31.4*   < > 30.2*   PLATELETS 10*3/mm3  --   --   --  174 208  --  245    < > = values in this interval not displayed.     Results from last 7 days   Lab Units 03/01/25 0417   INR  1.13*     Results from last 7 days   Lab Units 03/02/25  0436   MAGNESIUM mg/dL 2.5*           I personally viewed and interpreted the patient's EKG/Telemetry data     Medications:     Current Facility-Administered Medications:     Calcium Replacement - Follow Nurse / BPA Driven Protocol, , Not Applicable, PRN, Guerrero Regalado MD    castor oil-balsam peru (VENELEX) ointment 1 Application, 1 Application, Topical, Q12H, Guerrero Regalado MD, 1 Application at 03/02/25 2129    fentaNYL (DURAGESIC) 25 MCG/HR patch 1 patch, 1 patch, Transdermal, Q72H, 1 patch at 03/02/25 1520 **AND** Check Fentanyl Patch Placement, 1 each, Not Applicable, Q12H, Garrte Gibson MD    chlorhexidine (PERIDEX) 0.12 % solution 15 mL, 15 mL, Mouth/Throat, Q12H, Guerrero Regalado MD, 15 mL at 03/03/25 0849    dextrose (D50W) (25 g/50 mL) IV injection 25 g, 25 g, Intravenous, Q15 Min PRN, Guerrero Regalado MD    docusate sodium (COLACE) liquid 100 mg, 100 mg, Per G Tube, BID, 100 mg at 03/02/25 2128 **AND** [DISCONTINUED] lactulose (CHRONULAC) 10 GM/15ML solution 20 g, 20 g, Per G Tube, TID, Aristeo Kowalski MD, 20 g at 02/23/25 1618    [Held by provider] Enoxaparin Sodium (LOVENOX) syringe 40 mg, 40 mg, Subcutaneous, Q24H, Guerrero Regalado MD, 40 mg at 02/27/25 1205    escitalopram (LEXAPRO) tablet 10 mg, 10 mg, Per G Tube, Nightly, Guerrero Regalado MD, 10 mg at 03/02/25 2128    finasteride (PROSCAR) tablet 5 mg, 5 mg, Per G Tube, Daily, Guerrero Regalado MD, 5 mg at 03/02/25 1005    gabapentin (NEURONTIN) capsule 300 mg, 300 mg, Per G Tube, Q8H,  Guerrero Regalado MD, 300 mg at 03/02/25 2128    glucagon (GLUCAGEN) injection 1 mg, 1 mg, Intramuscular, Q15 Min PRN, Guerrero Regalado MD    insulin regular (humuLIN R,novoLIN R) injection 2-7 Units, 2-7 Units, Subcutaneous, Q6H, Guerrero Regalado MD, 2 Units at 03/01/25 1258    ipratropium-albuterol (DUO-NEB) nebulizer solution 3 mL, 3 mL, Nebulization, Q4H PRN, Guerrero Regalado MD    ipratropium-albuterol (DUO-NEB) nebulizer solution 3 mL, 3 mL, Nebulization, 4x Daily - RT, Guerrero Regalado MD, 3 mL at 03/03/25 0817    lactulose (CHRONULAC) 10 GM/15ML solution 20 g, 20 g, Oral, TID, Esvin Gray APRN, 20 g at 03/02/25 2128    Lidocaine HCl (PF) (XYLOCAINE) 4 % injection 4 mL, 4 mL, Nebulization, Once, Jeremy Kaur MD    Magnesium Cardiology Dose Replacement - Follow Nurse / BPA Driven Protocol, , Not Applicable, PRN, Guerrero Regalado MD    metoprolol tartrate (LOPRESSOR) injection 5 mg, 5 mg, Intravenous, BID PRN, Guerrero Regalado MD, 5 mg at 02/27/25 1310    metoprolol tartrate (LOPRESSOR) tablet 50 mg, 50 mg, Per G Tube, Q12H, Patrick Berumen PA-C, 50 mg at 03/02/25 2128    nystatin (MYCOSTATIN) powder, , Topical, Q8H, Guerrero Regalado MD, Given at 03/03/25 0642    oxyCODONE-acetaminophen (PERCOCET) 7.5-325 MG per tablet 1 tablet, 1 tablet, Nasogastric, Q6H PRN, Garret Gibson MD, 1 tablet at 03/03/25 0327    pantoprazole (PROTONIX) injection 40 mg, 40 mg, Intravenous, Q12H, Guerrero Regalado MD, 40 mg at 03/03/25 0849    Phosphorus Replacement - Follow Nurse / BPA Driven Protocol, , Not Applicable, PRN, Guerrero Regalado MD    polyethylene glycol (MIRALAX) packet 17 g, 17 g, Per G Tube, Daily, Garret Gibson MD    polyvinyl alcohol (LIQUIFILM) 1.4 % ophthalmic solution 2 drop, 2 drop, Both Eyes, Q1H PRN, Guerrero Regalado MD, 2 drop at 02/16/25 0846    Potassium Replacement - Follow Nurse / BPA Driven Protocol, , Not Applicable, PRN, Guerrero Regalado MD    riFAXIMin (XIFAXAN)  tablet 550 mg, 550 mg, Per G Tube, Q12H, Prebble, Macho, RPH, 550 mg at 03/02/25 2128    rosuvastatin (CRESTOR) tablet 20 mg, 20 mg, Per G Tube, Nightly, Prebble, Macho, RPH, 20 mg at 03/02/25 2129    sodium chloride 0.9 % flush 10 mL, 10 mL, Intravenous, PRN, Guerrero Regalado MD, 10 mL at 03/02/25 1006    terazosin (HYTRIN) capsule 1 mg, 1 mg, Per G Tube, Daily, Prebble, Macho, RPH, 1 mg at 03/02/25 1005    Assessment / Plan:   64-year-old male with alcoholic cirrhosis, known CAD, 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     Paroxysmal atrial fibrillation  Chronic anemia  [id number].  No known history of stroke or MI.  In and out of A-fib during hospitalization.  Was given IV amnio last week.  Currently in sinus rhythm on telemetry  EGD today showed grade 1 esophageal varices and portal hypertensive gastropathy.  Will maintain rate control strategy with metoprolol 50 twice daily.  Can titrate up as heart rate and blood pressure allow  Start Eliquis 5 twice daily when deemed safe/appropriate per GI   Chronic amiodarone should be avoided with his chronic liver disease, but can be used in the acute setting  Arrange for outpatient cardiology follow-up within 4-6 weeks of discharge     Chronic HFpEF  Echo: normal EF with mild LVH.  Mild AS.  Remains euvolemic on exam  No indication for daily diuretics.  Can be given PRN doses.  Spironolactone and oral Lasix can be considered in the setting of alcoholic cirrhosis.  Defer this to GI/hospitalist     CAD  Elevated troponin  Elevated troponin likely a type II/demand ischemia event.  Known CAD based on CT scan from years ago  Asymptomatic.  Eliquis monotherapy sufficient.  Avoid aspirin with history of anemia and thrombocytopenia  Continue statin.   Monitor liver function.     Alcoholic cirrhosis  Hematologic abnormalities  in the setting of cirrhosis makes anticoagulation challenging.    Anticoagulate, as above, with caution.  Management of this and other medical comorbidities per hospitalist    Cardiology will see patient as needed    Thank you for allowing me to participate in the care of your patient. Please to not hesitate to contact me with additional questions or concerns.     Electronically signed by Aristeo Hoskins MD, 03/03/25, 12:19 PM EST.

## 2025-03-03 NOTE — ANESTHESIA POSTPROCEDURE EVALUATION
Patient: Val Nassar Jr.    Procedure Summary       Date: 03/03/25 Room / Location:  [facility] /  [facility]    Anesthesia Start: 1102 Anesthesia Stop: 1220    Procedure: ESOPHAGOGASTRODUODENOSCOPY Diagnosis:     Surgeons: Wesly Mckeon MD Provider: Jeremy Kaur MD    Anesthesia Type: MAC ASA Status: 3            Anesthesia Type: MAC    Vitals  Vitals Value Taken Time   /72 03/03/25 1220   Temp 97.8 °F (36.6 °C) 03/03/25 1137   Pulse 111 03/03/25 1223   Resp 18 03/03/25 1137   SpO2 94 % 03/03/25 1223   Vitals shown include unfiled device data.        Post Anesthesia Care and Evaluation    Level of consciousness: agitated  Pain score: 0  Pain management: adequate  Reason for not using neuraxial anesthesia or a peripheral nerve block: failed block:  Airway patency: patent    Cardiovascular status: acceptable  Respiratory status: unstable, intubated and ventilator  Hydration status: acceptable

## 2025-03-03 NOTE — PROGRESS NOTES
Pharmacy Consult - Vancomycin Dosing and Monitoring    Val Nassar Jr. is a 64 y.o. male receiving vancomycin therapy.     Indication: PNA  Consulting Provider: Case  ID Consult: N/A    Goal AUC: 400-600 mg/L*hr    Current Antimicrobial Therapy  Anti-Infectives (From admission, onward)      Ordered     Dose/Rate Route Frequency Start Stop    03/03/25 1419  !Vancomycin Level Draw Needed Prior to 3/5 0600 dose - please wait for pharmacist consult prior to giving dose        Ordering Provider: Case, Ludmila V., DO     Not Applicable Once 03/05/25 0500      03/03/25 1419  vancomycin (VANCOCIN) 1,000 mg in sodium chloride 0.9 % 250 mL IVPB-VTB        Ordering Provider: Case, Ludmila V., DO    1,000 mg  250 mL/hr over 60 Minutes Intravenous Every 12 Hours Scheduled 03/04/25 0500 03/11/25 0559    03/03/25 1419  piperacillin-tazobactam (ZOSYN) 3.375 g IVPB in 100 mL NS MBP (CD)        Ordering Provider: Case, Ludmila V., DO    3.375 g  over 4 Hours Intravenous Every 8 Hours 03/03/25 2100 03/10/25 2059 03/03/25 1419  piperacillin-tazobactam (ZOSYN) 3.375 g IVPB in 100 mL NS MBP (CD)        Ordering Provider: Leopoldo, Ludmila V., DO    3.375 g  over 30 Minutes Intravenous Once 03/03/25 1515      03/03/25 1419  vancomycin IVPB 2000 mg in 0.9% Sodium Chloride 500 mL        Ordering Provider: Case, Ludmila V., DO    2,000 mg  250 mL/hr over 120 Minutes Intravenous Once 03/03/25 1500      03/03/25 1419  Pharmacy to dose vancomycin        Ordering Provider: Leopoldo, Ludmila V., DO     Not Applicable Continuous PRN 03/03/25 1407 03/10/25 1506    03/01/25 1431  riFAXIMin (XIFAXAN) tablet 550 mg        Ordering Provider: Macho Bautista RPH    550 mg Per G Tube Every 12 Hours Scheduled 03/01/25 2100 02/28/26 2059    02/28/25 1440  riFAXIMin (XIFAXAN) tablet 550 mg  Status:  Discontinued        Ordering Provider: Pauly Webster PA    550 mg Oral Every 12 Hours Scheduled 02/28/25 2100 03/01/25 1431    02/16/25 0934   cefepime 2000 mg IVPB in 100 mL NS (MBP)        Ordering Provider: Aristeo Kowalski MD    2,000 mg  over 4 Hours Intravenous Every 8 Hours 02/16/25 1600 02/23/25 2017    02/14/25 1029  cefepime 2000 mg IVPB in 100 mL NS (MBP)  Status:  Discontinued        Ordering Provider: Arpita Ruiz DNP, APRN    2,000 mg  over 4 Hours Intravenous Every 12 Hours 02/14/25 2000 02/16/25 0934    02/14/25 1029  cefepime 2000 mg IVPB in 100 mL NS (MBP)        Ordering Provider: Arpita Ruiz DNP, APRN    2,000 mg  over 30 Minutes Intravenous Once 02/14/25 1115 02/14/25 1209    02/12/25 1109  tobramycin (NEBCIN) 500 mg in sodium chloride 0.9 % 100 mL IVPB        Ordering Provider: Leonel Luz DO    6 mg/kg × 83 kg  over 30 Minutes Intravenous Once 02/12/25 1300 02/12/25 1325    02/12/25 0933  meropenem (MERREM) 1,000 mg in sodium chloride 0.9 % 100 mL MBP        Ordering Provider: Leonel Luz DO    1,000 mg  over 30 Minutes Intravenous Once 02/12/25 1030 02/12/25 1042    02/11/25 1159  vancomycin (dosing per levels)  Status:  Discontinued        Ordering Provider: Saul Morales, PharmD     Not Applicable Daily 02/12/25 0900 02/12/25 1028    02/11/25 1136  piperacillin-tazobactam (ZOSYN) 4.5 g IVPB in 100 mL NS MBP (CD)  Status:  Discontinued        Ordering Provider: Renée Lozano APRN    4.5 g  over 4 Hours Intravenous Every 8 Hours 02/11/25 1800 02/14/25 1027    02/11/25 1136  Pharmacy to dose vancomycin  Status:  Discontinued        Ordering Provider: Renée Lozano APRN     Not Applicable Continuous PRN 02/11/25 1134 02/12/25 1028    02/11/25 0906  vancomycin IVPB 1750 mg in 0.9% Sodium Chloride (premix) 500 mL        Ordering Provider: Leonel Briggs MD    20 mg/kg × 83 kg  285.7 mL/hr over 105 Minutes Intravenous Once 02/11/25 0922 02/11/25 1216    02/11/25 0906  piperacillin-tazobactam (ZOSYN) 3.375 g IVPB in 100 mL NS MBP (CD)        Ordering Provider: Chester  "Leonel CAMPOS MD    3.375 g  over 30 Minutes Intravenous Once 02/11/25 0922 02/11/25 1014          Allergies  Allergies as of 02/11/2025    (No Known Allergies)     Labs  Results from last 7 days   Lab Units 03/02/25  0436 03/01/25  0417 02/28/25  0524   BUN mg/dL 61* 74* 92*   CREATININE mg/dL 0.86 0.91 1.00     Results from last 7 days   Lab Units 03/02/25  0436 03/01/25  0417 02/28/25  0524   WBC 10*3/mm3 19.09* 19.72* 18.39*     Evaluation of Dosing     Last Dose Received in the ED/Outside Facility: N  Is Patient on Dialysis or Renal Replacement: N    Height - 175.3 cm (69.02\")  Weight - 81 kg (178 lb 8 oz)    Estimated Creatinine Clearance: 98.1 mL/min (by C-G formula based on SCr of 0.86 mg/dL).    I/O last 3 completed shifts:  In: 6517 [Other:3282; NG/GT:3235]  Out: 2650 [Urine:2650]    Microbiology and Radiology  Microbiology Results (last 10 days)       Procedure Component Value - Date/Time    Respiratory Panel PCR w/COVID-19(SARS-CoV-2) [facility]-House, NP Swab in UTM/VTM, 2 HR TAT - Swab, Nasopharynx [[id number]]  (Normal) Collected: 03/02/25 1717    Lab Status: Final result Specimen: Swab from Nasopharynx Updated: 03/02/25 1825     ADENOVIRUS, PCR Not Detected     Coronavirus 229E Not Detected     Coronavirus HKU1 Not Detected     Coronavirus NL63 Not Detected     Coronavirus OC43 Not Detected     COVID19 Not Detected     Human Metapneumovirus Not Detected     Human Rhinovirus/Enterovirus Not Detected     Influenza A PCR Not Detected     Influenza B PCR Not Detected     Parainfluenza Virus 1 Not Detected     Parainfluenza Virus 2 Not Detected     Parainfluenza Virus 3 Not Detected     Parainfluenza Virus 4 Not Detected     RSV, PCR Not Detected     Bordetella pertussis pcr Not Detected     Bordetella parapertussis PCR Not Detected     Chlamydophila pneumoniae PCR Not Detected     Mycoplasma pneumo by PCR Not Detected    Narrative:      In the setting of a positive respiratory panel with " a viral infection PLUS a negative procalcitonin without other underlying concern for bacterial infection, consider observing off antibiotics or discontinuation of antibiotics and continue supportive care. If the respiratory panel is positive for atypical bacterial infection (Bordetella pertussis, Chlamydophila pneumoniae, or Mycoplasma pneumoniae), consider antibiotic de-escalation to target atypical bacterial infection.          Reported Vancomycin Levels              [employer]ight[employer] AUC Calculation:    Current AUC: -- mg/L*hr    Predicted Steady State AUC on Current Dose: -- mg/L*hr  _________________________________    Predicted Steady State AUC on New Dose: 518 mg/L*hr    Assessment/Plan:   PTD vancomycin 2/2 PNA. Goal AUC: 400-600 mg/L*hr.  LD: vancomycin 2000 mg (~24 mg/kg) IV x 1 on 3/3/25.  Maintenance: vancomycin 1000 mg (~12.3 mg/kg) IV q12h.  Obtain vancomycin random level w/ AM labs on 3/5/25 to assess clearance.  Aforementioned regimen based on age, weight, renal function, & PK calculations.  MRSA PCR ordered. Monitor cultures & sensitivities, renal function, & clinical status.  Patient also receiving Zosyn. De-escalate as clinically indicated.  Pharmacy will continue to follow & adjust regimen as necessary.    Thank you,    Lisa London, PharmD  3/3/2025  14:22 EST

## 2025-03-03 NOTE — PLAN OF CARE
In post-procedure recovery, patient noted to have SpO2 in mid-80s with minimal improvement with increased O2.  Anesthesiologist, Dr. Kaur planning to intubate.  Patient will go to ICU following intubation.  I called admitting intensivist and current hospitalist to update them on patient's status.

## 2025-03-03 NOTE — PROGRESS NOTES
Clinical Nutrition     Patient Name: Val Nassar Jr.  YOB: 1960  MRN: [id number]  Date of Encounter: 03/03/25 11:04 EST  Admission date: 2/11/2025  Reason for Visit: Follow-up protocol, EN    Assessment   Nutrition Assessment   Admission Diagnosis:  Septic shock [A41.9, R65.21]    Problem List:    Septic shock due to Pseudomonas species    Hyperlipidemia, unspecified    Tobacco use    Cirrhosis of liver    Squamous cell carcinoma of esophagus    GALILEO (acute kidney injury)    Metabolic acidosis    Pneumonia of both lower lobes due to Pseudomonas species    Bacteremia due to Pseudomonas      PMH:   He  has a past medical history of Anemia, Cancer, Cirrhosis, Duodenal ulcer, Gastric ulcer, GERD (gastroesophageal reflux disease), History of alcohol abuse, History of radiation therapy (05/08/2024), HLD (hyperlipidemia), and Mood disorder.    PSH:  He  has a past surgical history that includes Esophagogastroduodenoscopy (N/A, 12/26/2023) and Venous Access Device (Port) (Right, 04/25/2024).    Substance history: Etoh abuse, vaping    Applicable Nutrition History:     ARF/VENT 2-12-25  s/p Bronch 2-17-25  Extubated 2-24-25    WOC following for posterior thigh, gluteal skin tears, scrotal MASD, blisters     2/26-SLP Diet Recommendation: NPO, temporary alternate methods of nutrition/hydration   Labs    Labs Reviewed: Yes    Results from last 7 days   Lab Units 03/02/25  0436 03/01/25  0417 02/28/25  0524 02/27/25  0114 02/25/25  2045 02/25/25  0328   GLUCOSE mg/dL 138* 129* 118* 156*   < > 163*   BUN mg/dL 61* 74* 92* 93*   < > 86*   CREATININE mg/dL 0.86 0.91 1.00 1.15   < > 1.31*   SODIUM mmol/L 143 149* 150* 147*   < > 138   CHLORIDE mmol/L 106 111* 110* 107   < > 106   POTASSIUM mmol/L 3.9 4.0 4.2 4.2   < > 4.6   PHOSPHORUS mg/dL  --   --   --   --   --  4.0   MAGNESIUM mg/dL 2.5* 1.7  --  2.4   < > 2.0   ALT (SGPT) U/L 60* 64*  --   --   --   --     < > = values in this  interval not displayed.       Results from last 7 days   Lab Units 03/02/25  0436 03/01/25  0417 02/27/25  0114   ALBUMIN g/dL 2.7* 2.8*  --    CRP mg/dL 3.36* 1.95*  --    IONIZED CALCIUM mmol/L  --   --  1.22       Results from last 7 days   Lab Units 03/03/25  0539 03/03/25  0001 03/02/25  1622 03/02/25  1153 03/02/25  0446 03/02/25  0000   GLUCOSE mg/dL 134* 144* 145* 117 152* 132*     Lab Results   Lab Value Date/Time    HGBA1C 4.6 (L) 12/25/2023 1925         Results from last 7 days   Lab Units 03/02/25 0436   PROBNP pg/mL 631.0         Medications    Medications Reviewed: yes    Scheduled Meds:castor oil-balsam peru, 1 Application, Topical, Q12H  fentaNYL, 1 patch, Transdermal, Q72H   And  Check Fentanyl Patch Placement, 1 each, Not Applicable, Q12H  chlorhexidine, 15 mL, Mouth/Throat, Q12H  docusate sodium, 100 mg, Per G Tube, BID  [Held by provider] enoxaparin sodium, 40 mg, Subcutaneous, Q24H  escitalopram, 10 mg, Per G Tube, Nightly  finasteride, 5 mg, Per G Tube, Daily  gabapentin, 300 mg, Per G Tube, Q8H  insulin regular, 2-7 Units, Subcutaneous, Q6H  ipratropium-albuterol, 3 mL, Nebulization, 4x Daily - RT  lactulose, 20 g, Oral, TID  Lidocaine HCl (PF), 4 mL, Nebulization, Once  metoprolol tartrate, 50 mg, Per G Tube, Q12H  nystatin, , Topical, Q8H  pantoprazole, 40 mg, Intravenous, Q12H  polyethylene glycol, 17 g, Per G Tube, Daily  rifAXIMin, 550 mg, Per G Tube, Q12H  rosuvastatin, 20 mg, Per G Tube, Nightly  terazosin, 1 mg, Per G Tube, Daily      Continuous Infusions:     PRN Meds:.  Calcium Replacement - Follow Nurse / BPA Driven Protocol    dextrose    glucagon (human recombinant)    ipratropium-albuterol    Magnesium Cardiology Dose Replacement - Follow Nurse / BPA Driven Protocol    metoprolol tartrate    oxyCODONE-acetaminophen    Phosphorus Replacement - Follow Nurse / BPA Driven Protocol    polyvinyl alcohol    Potassium Replacement - Follow Nurse / BPA Driven Protocol    sodium  "chloride    Intake/Ouptut 24 hrs (0701 - 0700)   I&O's Reviewed: yes    Intake & Output (last day)         03/02 0701 03/03 0700 03/03 0701 03/04 0700    P.O. 0     Other 881     NG/GT 1484     Total Intake(mL/kg) 2365 (29.2)     Urine (mL/kg/hr) 1900 (1)     Stool 0     Total Output 1900     Net +465           Stool Unmeasured Occurrence 6 x            Anthropometrics     Height: Height: 175.3 cm (69.02\")  Last Filed Weight: Weight: 81 kg (178 lb 8 oz) (03/03/25 0540)  Method: Weight Method: Bed scalebedscale  BMI: BMI (Calculated): 26.3    UBW: ?  Weight change:  per EMR ~ 6lb wt gain since January     Weight       Weight (kg) Weight (lbs) Weight Method Visit Report   4/23/2024 75.841 kg  167 lb 3.2 oz   --     75.297 kg  166 lb   --    4/25/2024 75.751 kg  167 lb  Stated     4/29/2024 76.658 kg  169 lb      4/30/2024 75.932 kg  167 lb 6.4 oz   --     75.751 kg  167 lb   --    5/6/2024 74.889 kg  165 lb 1.6 oz   --     74.98 kg  165 lb 4.8 oz   --     74.844 kg  165 lb   --        --        --        --    5/7/2024 76.25 kg  168 lb 1.6 oz   --     76.204 kg  168 lb   --    5/14/2024 77.111 kg  170 lb   --    5/21/2024 75.297 kg  166 lb      6/3/2024 74.98 kg  165 lb 4.8 oz   --     75.297 kg  166 lb   --        --        --    6/18/2024 74.39 kg  164 lb   --    7/5/2024 74.4 kg  164 lb 0.4 oz  Estimated     7/16/2024 77.565 kg  171 lb   --    8/7/2024 77.565 kg  171 lb   --    8/19/2024 77.656 kg  171 lb 3.2 oz   --    9/19/2024 77.565 kg  171 lb      10/24/2024 79.833 kg  176 lb   --    11/14/2024 79.833 kg  176 lb   --    11/26/2024 78.926 kg  174 lb      12/18/2024 79.833 kg  176 lb   --    1/21/2025 79.833 kg  176 lb  Stated     1/23/2025 79.833 kg  176 lb   --    2/3/2025 83.008 kg  183 lb   --    2/11/2025 83.008 kg  183 lb       83 kg  182 lb 15.7 oz          Needs Assessment   Date: 2/28    Height used:Height: 175.3 cm (69.02\")  Weights used: 164lb/74kg    Estimated Calorie needs: ~1900kcal   Method:  " 25Kcals/Kkcal  Method:  MSJ (8674) x 1.2: 1817kcal    Estimated Protein needs: ~ 90g protein with current renal function  Method:1.2g/kg protein: 90g proten    Nutrition Focused Physical Exam     Date: 25    Pt does not meet criteria for malnutrition diagnosis, at this time.      Subjective   Reported/Observed/Food/Nutrition Related History:     3/3  Pt OOR for EGD this am. Spoke w/RN who reports pt tolerating EN. Hypernatremia resolved.       Pt continues w/hypernatremia. Increased water flush again. Pt does not participate in nutrition visit. Discussed changes w/NSG and MD.        EN and water flush adjusted  hypernatremia and change in feeding window.     : pt resting in bed, on room air, pleasantly confused    Per RN: pt tolerating TF, had 1 dark bm, has been tachypneic, lots of secretions, failed FEES    : per RN pt extubated today, is tolerating TF, possible GIB, is having dark stools, follows commands    Pt resting in bed, on nasal cannula, very weak voice, difficulty talking  + precedex    25: pt intubated, sedated + fentanyl, versed, D5%@100ml    Per RN: pt tolerating TF, has not had a bm, is much more alert today, follows commands    Per MD: plan to maintain D5% for 1 more day,  decrease free water to 30ml/hr    : pt intubated, sedated, + fentanyl, versed, D5%@100ml    Per RN: pt has been more alert, oxygen demands decreased, tolerating TF, abdomen still distended, given lactulose (last bm )      Pt intubated, sedated, on fentanyl & VERSED. Spoke with nurse, reported tolerating TF well, has low residuals, and bowels are moving. LBM on 2/15. Noted that Na continues to increase, even after increasing free water on 2/15. Pt may benefit from IV fluid to correct hypernatremia.       Pt intubated, sedated, + fentanyl, propofol 7.56ml, levophed 0.32mcg    Per RN: pt's belly distended, more firm than before, bowel regimen started, marginal UOP, have been toni to  wean off asiya and vasopressin    Current Nutrition Prescription     PO: NPO Diet NPO Type: Strict NPO  Oral Nutrition Supplement:  Intake: N/A    EN: Peptamen 1.5  Goal Rate: 65ml/hr  Water Flushes:80ml/hr  Modular: None  Route: ND  Tube: Small bore    At goal over: 22Hrs/day     Rx will supply:   Goal Volume 1430  mL/day     Flush Volume 1760 mL/day     Energy 2145 Kcal/day 102 % MREE   Protein 97 g/day 97 % Est Need   Fiber 0 g/day     Water in  EN 1100 mL     Total Water 2776 mL     Meet DRI Yes        --------------------------------------------------------------------------  Product/Rate verified at bedside: No  Infusing Rate at time of visit: OOR    Average Delivery from Chartin Day:   Volume 1484 mL/day   % Goal Vol.   Flush Volume 881 mL/day     Energy  Kcal/day  % Est Need   Protein  g/day  % Est Need   Fiber  g/day     Water in  EN  mL     Total Water  mL     Meet DRI Yes          Assessment & Plan   Nutrition Diagnosis   Date: 25 Updated: 25   Problem Inadequate energy intake    Etiology ARF/Extubated   Signs/Symptoms 112% goal volume EN   Status:  improved    Date:   Updated:  Problem Inadequate oral intake   Etiology dysphagia   Signs/Symptoms +EN   Status: New    Goal:   Nutrition to support treatment and Maintain EN      Nutrition Intervention      Follow treatment progress, Care plan reviewed    Continue current EN regimen:  Peptamen 1.5 @ 60ml/hr. Water flush @ 80ml/hr.   =1320ml, 1980kcals (104% est needs), 90g pro (100% est needs), 0 fiber, 1016ml water from formula, +1760ml water from flushes, 2776ml TFW.     Monitoring/Evaluation:   Per protocol, I&O, Pertinent labs, EN delivery/tolerance, Weight, Skin status, GI status, Symptoms, Hemodynamic stability    Joy John, MS,RD,LD  Time Spent:15min

## 2025-03-04 ENCOUNTER — APPOINTMENT (OUTPATIENT)
Dept: GENERAL RADIOLOGY | Facility: HOSPITAL | Age: 65
DRG: 870 | End: 2025-03-04
Payer: MEDICARE

## 2025-03-04 LAB
ALBUMIN SERPL-MCNC: 2.4 G/DL (ref 3.5–5.2)
ALBUMIN/GLOB SERPL: 0.5 G/DL
ALP SERPL-CCNC: 147 U/L (ref 39–117)
ALT SERPL W P-5'-P-CCNC: 49 U/L (ref 1–41)
ANION GAP SERPL CALCULATED.3IONS-SCNC: 15 MMOL/L (ref 5–15)
ARTERIAL PATENCY WRIST A: ABNORMAL
AST SERPL-CCNC: 43 U/L (ref 1–40)
ATMOSPHERIC PRESS: ABNORMAL MM[HG]
BASE EXCESS BLDA CALC-SCNC: 0 MMOL/L (ref 0–2)
BASOPHILS # BLD AUTO: 0.07 10*3/MM3 (ref 0–0.2)
BASOPHILS NFR BLD AUTO: 0.3 % (ref 0–1.5)
BDY SITE: ABNORMAL
BILIRUB SERPL-MCNC: 0.6 MG/DL (ref 0–1.2)
BODY TEMPERATURE: 37
BUN SERPL-MCNC: 54 MG/DL (ref 8–23)
BUN/CREAT SERPL: 42.5 (ref 7–25)
CALCIUM SPEC-SCNC: 8.7 MG/DL (ref 8.6–10.5)
CHLORIDE SERPL-SCNC: 106 MMOL/L (ref 98–107)
CO2 BLDA-SCNC: 25.5 MMOL/L (ref 22–33)
CO2 SERPL-SCNC: 19 MMOL/L (ref 22–29)
COHGB MFR BLD: 1.6 % (ref 0–2)
CREAT SERPL-MCNC: 1.27 MG/DL (ref 0.76–1.27)
DEPRECATED RDW RBC AUTO: 68 FL (ref 37–54)
EGFRCR SERPLBLD CKD-EPI 2021: 62.7 ML/MIN/1.73
EOSINOPHIL # BLD AUTO: 1.04 10*3/MM3 (ref 0–0.4)
EOSINOPHIL NFR BLD AUTO: 3.8 % (ref 0.3–6.2)
EPAP: 0
ERYTHROCYTE [DISTWIDTH] IN BLOOD BY AUTOMATED COUNT: 18.6 % (ref 12.3–15.4)
GLOBULIN UR ELPH-MCNC: 4.9 GM/DL
GLUCOSE BLDC GLUCOMTR-MCNC: 120 MG/DL (ref 70–130)
GLUCOSE BLDC GLUCOMTR-MCNC: 125 MG/DL (ref 70–130)
GLUCOSE BLDC GLUCOMTR-MCNC: 126 MG/DL (ref 70–130)
GLUCOSE BLDC GLUCOMTR-MCNC: 139 MG/DL (ref 70–130)
GLUCOSE SERPL-MCNC: 113 MG/DL (ref 65–99)
HCO3 BLDA-SCNC: 24.4 MMOL/L (ref 20–26)
HCT VFR BLD AUTO: 25.4 % (ref 37.5–51)
HCT VFR BLD AUTO: 25.8 % (ref 37.5–51)
HCT VFR BLD AUTO: 28.4 % (ref 37.5–51)
HCT VFR BLD AUTO: 28.6 % (ref 37.5–51)
HCT VFR BLD AUTO: 30.4 % (ref 37.5–51)
HCT VFR BLD CALC: 27.9 % (ref 38–51)
HGB BLD-MCNC: 7.9 G/DL (ref 13–17.7)
HGB BLD-MCNC: 8.1 G/DL (ref 13–17.7)
HGB BLD-MCNC: 8.9 G/DL (ref 13–17.7)
HGB BLD-MCNC: 9.2 G/DL (ref 13–17.7)
HGB BLD-MCNC: 9.3 G/DL (ref 13–17.7)
HGB BLDA-MCNC: 9.1 G/DL (ref 13.5–17.5)
IMM GRANULOCYTES # BLD AUTO: 0.25 10*3/MM3 (ref 0–0.05)
IMM GRANULOCYTES NFR BLD AUTO: 0.9 % (ref 0–0.5)
INHALED O2 CONCENTRATION: 60 %
IPAP: 0
LYMPHOCYTES # BLD AUTO: 1.05 10*3/MM3 (ref 0.7–3.1)
LYMPHOCYTES NFR BLD AUTO: 3.8 % (ref 19.6–45.3)
MAGNESIUM SERPL-MCNC: 2 MG/DL (ref 1.6–2.4)
MCH RBC QN AUTO: 30.8 PG (ref 26.6–33)
MCHC RBC AUTO-ENTMCNC: 30.6 G/DL (ref 31.5–35.7)
MCV RBC AUTO: 100.7 FL (ref 79–97)
METHGB BLD QL: 0.2 % (ref 0–1.5)
MODALITY: ABNORMAL
MONOCYTES # BLD AUTO: 1.98 10*3/MM3 (ref 0.1–0.9)
MONOCYTES NFR BLD AUTO: 7.2 % (ref 5–12)
MRSA DNA SPEC QL NAA+PROBE: NEGATIVE
NEUTROPHILS NFR BLD AUTO: 23.2 10*3/MM3 (ref 1.7–7)
NEUTROPHILS NFR BLD AUTO: 84 % (ref 42.7–76)
NRBC BLD AUTO-RTO: 0.1 /100 WBC (ref 0–0.2)
OXYHGB MFR BLDV: 95.9 % (ref 94–99)
PAW @ PEAK INSP FLOW SETTING VENT: 0 CMH2O
PCO2 BLDA: 37.4 MM HG (ref 35–45)
PCO2 TEMP ADJ BLD: 37.4 MM HG (ref 35–48)
PEEP RESPIRATORY: 10 CM[H2O]
PH BLDA: 7.42 PH UNITS (ref 7.35–7.45)
PH, TEMP CORRECTED: 7.42 PH UNITS
PHOSPHATE SERPL-MCNC: 5.1 MG/DL (ref 2.5–4.5)
PLATELET # BLD AUTO: 256 10*3/MM3 (ref 140–450)
PMV BLD AUTO: 9.6 FL (ref 6–12)
PO2 BLDA: 91.7 MM HG (ref 83–108)
PO2 TEMP ADJ BLD: 91.7 MM HG (ref 83–108)
POTASSIUM SERPL-SCNC: 3.5 MMOL/L (ref 3.5–5.2)
POTASSIUM SERPL-SCNC: 4.8 MMOL/L (ref 3.5–5.2)
PROT SERPL-MCNC: 7.3 G/DL (ref 6–8.5)
RBC # BLD AUTO: 3.02 10*6/MM3 (ref 4.14–5.8)
SODIUM SERPL-SCNC: 140 MMOL/L (ref 136–145)
TOTAL RATE: 19 BREATHS/MINUTE
VENTILATOR MODE: ABNORMAL
VT ON VENT VENT: 0.49 ML
WBC NRBC COR # BLD AUTO: 27.59 10*3/MM3 (ref 3.4–10.8)

## 2025-03-04 PROCEDURE — 71045 X-RAY EXAM CHEST 1 VIEW: CPT

## 2025-03-04 PROCEDURE — 85018 HEMOGLOBIN: CPT | Performed by: INTERNAL MEDICINE

## 2025-03-04 PROCEDURE — 82805 BLOOD GASES W/O2 SATURATION: CPT

## 2025-03-04 PROCEDURE — 94003 VENT MGMT INPAT SUBQ DAY: CPT

## 2025-03-04 PROCEDURE — 83050 HGB METHEMOGLOBIN QUAN: CPT

## 2025-03-04 PROCEDURE — 84132 ASSAY OF SERUM POTASSIUM: CPT

## 2025-03-04 PROCEDURE — 25810000003 SODIUM CHLORIDE 0.9 % SOLUTION 250 ML FLEX CONT

## 2025-03-04 PROCEDURE — 25010000002 PROPOFOL 10 MG/ML EMULSION: Performed by: INTERNAL MEDICINE

## 2025-03-04 PROCEDURE — 85025 COMPLETE CBC W/AUTO DIFF WBC: CPT

## 2025-03-04 PROCEDURE — 94761 N-INVAS EAR/PLS OXIMETRY MLT: CPT

## 2025-03-04 PROCEDURE — 87641 MR-STAPH DNA AMP PROBE: CPT

## 2025-03-04 PROCEDURE — 83735 ASSAY OF MAGNESIUM: CPT

## 2025-03-04 PROCEDURE — 25010000002 VANCOMYCIN 1 G RECONSTITUTED SOLUTION 1 EACH VIAL: Performed by: INTERNAL MEDICINE

## 2025-03-04 PROCEDURE — 36600 WITHDRAWAL OF ARTERIAL BLOOD: CPT

## 2025-03-04 PROCEDURE — 25010000002 VANCOMYCIN 750 MG RECONSTITUTED SOLUTION 1 EACH VIAL

## 2025-03-04 PROCEDURE — 99232 SBSQ HOSP IP/OBS MODERATE 35: CPT | Performed by: PHYSICIAN ASSISTANT

## 2025-03-04 PROCEDURE — 99291 CRITICAL CARE FIRST HOUR: CPT | Performed by: INTERNAL MEDICINE

## 2025-03-04 PROCEDURE — 94799 UNLISTED PULMONARY SVC/PX: CPT

## 2025-03-04 PROCEDURE — 25010000002 PHENYLEPHRINE 10 MG/ML SOLUTION 5 ML VIAL

## 2025-03-04 PROCEDURE — 25010000002 PIPERACILLIN SOD-TAZOBACTAM PER 1 G: Performed by: INTERNAL MEDICINE

## 2025-03-04 PROCEDURE — 82948 REAGENT STRIP/BLOOD GLUCOSE: CPT

## 2025-03-04 PROCEDURE — 84100 ASSAY OF PHOSPHORUS: CPT

## 2025-03-04 PROCEDURE — 80053 COMPREHEN METABOLIC PANEL: CPT

## 2025-03-04 PROCEDURE — 82375 ASSAY CARBOXYHB QUANT: CPT

## 2025-03-04 PROCEDURE — 85014 HEMATOCRIT: CPT | Performed by: INTERNAL MEDICINE

## 2025-03-04 PROCEDURE — 25810000003 SODIUM CHLORIDE 0.9 % SOLUTION 250 ML FLEX CONT: Performed by: INTERNAL MEDICINE

## 2025-03-04 RX ORDER — POTASSIUM CHLORIDE 1.5 G/1.58G
40 POWDER, FOR SOLUTION ORAL EVERY 4 HOURS
Status: COMPLETED | OUTPATIENT
Start: 2025-03-04 | End: 2025-03-04

## 2025-03-04 RX ORDER — LACTULOSE 10 G/15ML
20 SOLUTION ORAL 3 TIMES DAILY
Status: DISCONTINUED | OUTPATIENT
Start: 2025-03-04 | End: 2025-03-05

## 2025-03-04 RX ADMIN — Medication 10 ML: at 10:31

## 2025-03-04 RX ADMIN — PHENYLEPHRINE HYDROCHLORIDE 2 MCG/KG/MIN: 10 INJECTION INTRAVENOUS at 17:58

## 2025-03-04 RX ADMIN — TERAZOSIN HYDROCHLORIDE 1 MG: 1 CAPSULE ORAL at 10:27

## 2025-03-04 RX ADMIN — PROPOFOL 25 MCG/KG/MIN: 10 INJECTION, EMULSION INTRAVENOUS at 02:01

## 2025-03-04 RX ADMIN — PANTOPRAZOLE SODIUM 40 MG: 40 INJECTION, POWDER, FOR SOLUTION INTRAVENOUS at 10:23

## 2025-03-04 RX ADMIN — GABAPENTIN 300 MG: 300 CAPSULE ORAL at 05:49

## 2025-03-04 RX ADMIN — POLYETHYLENE GLYCOL 3350 17 G: 17 POWDER, FOR SOLUTION ORAL at 10:23

## 2025-03-04 RX ADMIN — PIPERACILLIN AND TAZOBACTAM 3.38 G: 3; .375 INJECTION, POWDER, LYOPHILIZED, FOR SOLUTION INTRAVENOUS at 14:50

## 2025-03-04 RX ADMIN — PHENYLEPHRINE HYDROCHLORIDE 2.4 MCG/KG/MIN: 10 INJECTION INTRAVENOUS at 01:49

## 2025-03-04 RX ADMIN — IPRATROPIUM BROMIDE AND ALBUTEROL SULFATE 3 ML: 2.5; .5 SOLUTION RESPIRATORY (INHALATION) at 19:34

## 2025-03-04 RX ADMIN — Medication 1 APPLICATION: at 09:20

## 2025-03-04 RX ADMIN — MUPIROCIN 1 APPLICATION: 20 OINTMENT TOPICAL at 21:50

## 2025-03-04 RX ADMIN — GABAPENTIN 300 MG: 300 CAPSULE ORAL at 21:50

## 2025-03-04 RX ADMIN — SODIUM CHLORIDE 750 MG: 9 INJECTION, SOLUTION INTRAVENOUS at 21:41

## 2025-03-04 RX ADMIN — PHENYLEPHRINE HYDROCHLORIDE 2 MCG/KG/MIN: 10 INJECTION INTRAVENOUS at 11:55

## 2025-03-04 RX ADMIN — ACETAMINOPHEN 650 MG: 325 TABLET ORAL at 16:53

## 2025-03-04 RX ADMIN — LACTULOSE 20 G: 20 SOLUTION ORAL at 21:50

## 2025-03-04 RX ADMIN — VANCOMYCIN HYDROCHLORIDE 1000 MG: 1 INJECTION, POWDER, LYOPHILIZED, FOR SOLUTION INTRAVENOUS at 05:43

## 2025-03-04 RX ADMIN — RIFAXIMIN 550 MG: 550 TABLET ORAL at 10:24

## 2025-03-04 RX ADMIN — CHLORHEXIDINE GLUCONATE 15 ML: 1.2 SOLUTION ORAL at 21:50

## 2025-03-04 RX ADMIN — RIFAXIMIN 550 MG: 550 TABLET ORAL at 21:49

## 2025-03-04 RX ADMIN — IPRATROPIUM BROMIDE AND ALBUTEROL SULFATE 3 ML: 2.5; .5 SOLUTION RESPIRATORY (INHALATION) at 15:50

## 2025-03-04 RX ADMIN — NYSTATIN: 100000 POWDER TOPICAL at 05:46

## 2025-03-04 RX ADMIN — LACTULOSE 20 G: 20 SOLUTION ORAL at 16:53

## 2025-03-04 RX ADMIN — CHLORHEXIDINE GLUCONATE 15 ML: 1.2 SOLUTION ORAL at 10:23

## 2025-03-04 RX ADMIN — PHENYLEPHRINE HYDROCHLORIDE 2 MCG/KG/MIN: 10 INJECTION INTRAVENOUS at 05:49

## 2025-03-04 RX ADMIN — POTASSIUM CHLORIDE 40 MEQ: 1.5 FOR SOLUTION ORAL at 05:49

## 2025-03-04 RX ADMIN — NYSTATIN: 100000 POWDER TOPICAL at 21:31

## 2025-03-04 RX ADMIN — ROSUVASTATIN 20 MG: 20 TABLET, FILM COATED ORAL at 21:49

## 2025-03-04 RX ADMIN — PANTOPRAZOLE SODIUM 40 MG: 40 INJECTION, POWDER, FOR SOLUTION INTRAVENOUS at 21:49

## 2025-03-04 RX ADMIN — PROPOFOL 10 MCG/KG/MIN: 10 INJECTION, EMULSION INTRAVENOUS at 18:01

## 2025-03-04 RX ADMIN — FINASTERIDE 5 MG: 5 TABLET, FILM COATED ORAL at 10:24

## 2025-03-04 RX ADMIN — Medication 10 ML: at 21:50

## 2025-03-04 RX ADMIN — PIPERACILLIN AND TAZOBACTAM 3.38 G: 3; .375 INJECTION, POWDER, LYOPHILIZED, FOR SOLUTION INTRAVENOUS at 05:42

## 2025-03-04 RX ADMIN — MUPIROCIN 1 APPLICATION: 20 OINTMENT TOPICAL at 14:56

## 2025-03-04 RX ADMIN — IPRATROPIUM BROMIDE AND ALBUTEROL SULFATE 3 ML: 2.5; .5 SOLUTION RESPIRATORY (INHALATION) at 07:31

## 2025-03-04 RX ADMIN — PIPERACILLIN AND TAZOBACTAM 3.38 G: 3; .375 INJECTION, POWDER, LYOPHILIZED, FOR SOLUTION INTRAVENOUS at 21:32

## 2025-03-04 RX ADMIN — ESCITALOPRAM OXALATE 10 MG: 10 TABLET ORAL at 21:49

## 2025-03-04 RX ADMIN — Medication 1 APPLICATION: at 21:31

## 2025-03-04 RX ADMIN — POTASSIUM CHLORIDE 40 MEQ: 1.5 FOR SOLUTION ORAL at 10:24

## 2025-03-04 RX ADMIN — IPRATROPIUM BROMIDE AND ALBUTEROL SULFATE 3 ML: 2.5; .5 SOLUTION RESPIRATORY (INHALATION) at 11:35

## 2025-03-04 RX ADMIN — GABAPENTIN 300 MG: 300 CAPSULE ORAL at 14:50

## 2025-03-04 NOTE — PROGRESS NOTES
"GI Daily Progress Note  Subjective:    Chief Complaint:  Follow up GI bleed with melena     Patient intubated and sedated, on mechanical ventilator.       Febrile.   Tmax 101.8.       Objective:    /79   Pulse 96   Temp (!) 100.9 °F (38.3 °C) (Oral)   Resp 20   Ht 175.3 cm (69.02\")   Wt 81 kg (178 lb 8 oz)   SpO2 95%   BMI 26.35 kg/m²     Physical Exam  Constitutional:       Appearance: He is ill-appearing.      Interventions: He is sedated and intubated.   Cardiovascular:      Rate and Rhythm: Regular rhythm. Tachycardia present.   Pulmonary:      Effort: He is intubated.   Abdominal:      General: Bowel sounds are normal.      Palpations: Abdomen is soft.      Tenderness: There is no abdominal tenderness.       Lab  Lab Results   Component Value Date    WBC 27.59 (H) 03/04/2025    HGB 9.2 (L) 03/04/2025    HGB 9.3 (L) 03/04/2025    HGB 8.9 (L) 03/03/2025    .7 (H) 03/04/2025     03/04/2025    INR 1.13 (H) 03/01/2025    INR 1.13 (H) 02/17/2025    INR 1.27 (H) 02/14/2025    INR 1.21 (H) 02/11/2025    INR 0.97 04/25/2024       Lab Results   Component Value Date    GLUCOSE 113 (H) 03/04/2025    BUN 54 (H) 03/04/2025    CREATININE 1.27 03/04/2025    BCR 42.5 (H) 03/04/2025     03/04/2025    K 3.5 03/04/2025    CO2 19.0 (L) 03/04/2025    CALCIUM 8.7 03/04/2025    ALBUMIN 2.4 (L) 03/04/2025    ALKPHOS 147 (H) 03/04/2025    BILITOT 0.6 03/04/2025    BILIDIR 0.2 03/01/2025    ALT 49 (H) 03/04/2025    AST 43 (H) 03/04/2025       Assessment:    Gastrointestinal bleeding with melena.  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   Most recent EGD with " negative esophageal biopsies on 2/10/25.     Septic shock secondary to Pseudomonas bacteremia, urinary source   Atrial fibrillation   Severe pharyngeal dysphagia, currently receiving enteral nutrition via Keofeed tube   Hepatic encephalopathy    Plan:    >> H&H stable, last transfusion on 2/27.     >> Continue Lactulose and Xifaxan  >> Continue antibiotics .   Respiratory culture pending    FERNANDA Banda  03/04/25  14:29 EST

## 2025-03-04 NOTE — PROGRESS NOTES
INTENSIVIST   PROGRESS NOTE     Hospital:  LOS: 21 days     Mr. Val Nassar Jr., 64 y.o. male is followed for a Chief Complaint of: Respiratory Failure      Subjective   S     Interval History:  Remains on mechanical ventilation. FiO2 at 40% and PEEP at 10 this AM.        The patient's relevant past medical, surgical and social history were reviewed and updated in Epic as appropriate.      ROS: Unable to obtain secondary to sedation and mechanical ventilation.     Objective   O     Vitals:  Temp  Min: 99.1 °F (37.3 °C)  Max: 101.8 °F (38.8 °C)  BP  Min: 77/56  Max: 170/85  Pulse  Min: 65  Max: 147  Resp  Min: 18  Max: 23  SpO2  Min: 74 %  Max: 100 % No data recorded    Intake/Ouptut 24 hrs (7:00AM - 6:59 AM)  Intake & Output (last 3 days)         02/28 0701  03/01 0700 03/01 0701  03/02 0700 03/02 0701  03/03 0700 03/03 0701  03/04 0700    P.O.   0     I.V. (mL/kg) 387 (4.9)       Blood        Other 1787 2401 881     NG/GT 1503 1751 1484     Total Intake(mL/kg) 3677 (46.7) 4152 (52.2) 2365 (29.2)     Urine (mL/kg/hr) 2550 (1.3) 2150 (1.1) 1900 (1)     Stool 0 0 0     Total Output 2550 2150 1900     Net +1127 +2002 +465             Stool Unmeasured Occurrence 4 x 1 x 6 x             Medications (drips):  fentanyl 10 mcg/mL, Last Rate: 100 mcg/hr (03/04/25 1220)  Pharmacy to dose vancomycin  phenylephrine, Last Rate: 2 mcg/kg/min (03/04/25 1155)  propofol, Last Rate: 10 mcg/kg/min (03/04/25 0653)        Mechanical Ventilator Settings:          Resp Rate (Set): 18  Pressure Support (cm H2O): 10 cm H20  FiO2 (%): (S) 60 %  PEEP/CPAP (cm H2O): 10 cm H20    Minute Ventilation (L/min) (Obs): 12.3 L/min  Resp Rate (Observed) Vent: 23  I:E Ratio (Set): 1:2.00  I:E Ratio (Obs): 1:2.6    PIP Observed (cm H2O): 32 cm H2O  Plateau Pressure (cm H2O): 25 cm H2O    Physical Examination  Telemetry:  Normal sinus rhythm.    Constitutional:  No acute distress.  ETT in place on mechanical ventilation.    Eyes: No  scleral icterus.   PERRL, EOM intact.    Neck:  Supple, FROM   Cardiovascular: Normal rate, regular and rhythm. Normal heart sounds.  No murmurs, gallop or rub.   Respiratory: No respiratory distress. Normal respiratory effort.  Diminished. No wheezing.    Abdominal:  Soft. No masses. Nontender. No distension. No HSM.   Extremities: No digital cyanosis. No clubbing.  No peripheral edema.   Skin: No rashes, lesions or ulcers   Neurological:   Sedated. Opens eyes to stimulation.              Results from last 7 days   Lab Units 03/04/25  0832 03/04/25  0332 03/03/25  2344 03/02/25  1656 03/02/25  0436 03/01/25  0417   WBC 10*3/mm3  --  27.59*  --   --  19.09* 19.72*   HEMOGLOBIN g/dL 9.2* 9.3* 8.9*   < > 8.7* 9.9*   MCV fL  --  100.7*  --   --  96.1 95.7   PLATELETS 10*3/mm3  --  256  --   --  174 208    < > = values in this interval not displayed.     Results from last 7 days   Lab Units 03/04/25  0332 03/02/25  0436 03/01/25  0417   SODIUM mmol/L 140 143 149*   POTASSIUM mmol/L 3.5 3.9 4.0   CO2 mmol/L 19.0* 26.0 28.0   CREATININE mg/dL 1.27 0.86 0.91   GLUCOSE mg/dL 113* 138* 129*   MAGNESIUM mg/dL 2.0 2.5* 1.7   PHOSPHORUS mg/dL 5.1*  --   --      Estimated Creatinine Clearance: 66.4 mL/min (by C-G formula based on SCr of 1.27 mg/dL).  Results from last 7 days   Lab Units 03/04/25  0332 03/02/25  0436 03/01/25  0417   ALK PHOS U/L 147* 127* 126*   BILIRUBIN mg/dL 0.6 0.5 0.4   BILIRUBIN DIRECT mg/dL  --   --  0.2   ALT (SGPT) U/L 49* 60* 64*   AST (SGOT) U/L 43* 46* 47*       Results from last 7 days   Lab Units 03/04/25  0320 03/03/25  1431   PH, ARTERIAL pH units 7.422 7.489*   PCO2, ARTERIAL mm Hg 37.4 37.7   PO2 ART mm Hg 91.7 220.0*   FIO2 % 60 100       Images:  Imaging Results (Last 24 Hours)       Procedure Component Value Units Date/Time    XR Chest 1 View [[id number]] Collected: 03/04/25 0739     Updated: 03/04/25 0744    Narrative:      XR CHEST 1 VW    Date of Exam: 3/4/2025 1:32 AM EST    Indication:  Pneumonia, intubated    Comparison: Chest CT without contrast 3/2/2025, portable chest radiograph 3/3/2025    Findings:  ETT tip above the doron. Esophagogastric tube tip below the diaphragm. Right-sided port catheter noted with tip at the mid SVC. Persistent mild left basilar airspace disease which may relate to atelectasis, pneumonia not excluded. The right lung is   clear. Negative for pneumothorax. No significant effusion.      Impression:      Impression:  1. Stable lines and support tubes.  2. Persistent mild left basilar airspace disease which may relate to atelectasis, pneumonia not excluded.  3. Clear right lung.  4. No pneumothorax.          Electronically Signed: Richard Pinto MD    3/4/2025 7:40 AM EST    Workstation ID: [id number]    XR Chest 1 View [[id number]] Collected: 03/03/25 1353     Updated: 03/03/25 1359    Narrative:      XR CHEST 1 VW    Date of Exam: 3/3/2025 1:09 PM EST    Indication: Confirm ET Tube Placement    Comparison: Chest radiograph 3/2/2025, chest CT 3/2/2025    Findings:  ET tube 3 cm above doron. Feeding tube below diaphragm. Right chest port catheter tip terminates over low SVC. Similar mixed pattern interstitial and alveolar airspace consolidation most significantly involving right middle and left lower lobes. Right   middle and left lower lobe opacities appear increased from 2/28/2025. Small left greater than right pleural effusions, radiographically increased from prior chest radiograph. No visualized pneumothorax. Degenerative related osseous change.      Impression:      Impression:  1. ET tube 3 cm above doron. No pneumothorax.  2. Mixed pattern interstitial and alveolar airspace consolidation with pleural effusions, overall increasing from prior 2/28/2025 chest radiograph although may be similar to recent CT comparison. Mucous plugging in the right middle lobe remains possible.      Electronically Signed: Guerrero Rubio MD    3/3/2025 1:56 PM EST    Workstation ID:  [id number]               Results: Reviewed.  I reviewed the patient's new laboratory and imaging results.  I independently reviewed the patient's new images.    Medications: Reviewed.    Assessment & Plan   A / P     Mr Nassar is a 63yo M with a history of alcohol abuse, alcoholic cirrhosis, metastatic squamous cell cancer of the esophagus, and HLD who presented to the [facility] on 2/11/25 from a rehab facility with weakness, dizziness and AMS. He was noted to be septic secondary to a UTI and was admitted to the ICU. Urine and blood cultures grew Pseudomonas and he completed treatment with Cefepime x 10 days. He decompensated and required intubation on 2/12/25. Bronchoscopy was performed on 2/17/25 for mucous plugging. He was able to be extubated on 2/26/25.     He was transferred to telemetry. He developed worsening anemia and required transfusion of 2 units of pRBCs. GI was consulted and he was taken for EGD on 3/3/25 by Dr. Mckeon. EGD showed 2 small telangiectasias in the lower third of the esophagus suspected to be secondary to prior radiation. Argon was used for coagulation for bleeding prevention.     After the EGD, he was transferred to the recovery area. He was noted to be hypoxic on a nonrebreather and he was intubated by anesthesia. After intubation, he remained hypoxic requiring high FiO2 and PEEP. He was transferred back to the ICU.     This AM, FiO2 is at 40% and PEEP at 10.     Nutrition:   NPO Diet NPO Type: Strict NPO  Advance Directives:   Code Status and Medical Interventions: CPR (Attempt to Resuscitate); Full Support   Ordered at: 02/12/25 1029     Level Of Support Discussed With:    Patient     Code Status (Patient has no pulse and is not breathing):    CPR (Attempt to Resuscitate)     Medical Interventions (Patient has pulse or is breathing):    Full Support       Active Hospital Problems    Diagnosis     **Septic shock due to Pseudomonas species     Pneumonia of both lower lobes due to Pseudomonas  species     Bacteremia due to Pseudomonas     GALILEO (acute kidney injury)     Metabolic acidosis     Cirrhosis of liver     Squamous cell carcinoma of esophagus     Hyperlipidemia, unspecified     Tobacco use        Assessment / Plan:    Continue mechanical ventilation. Titrate FiO2 and PEEP.   Sedation with Propofol and Fentanyl.   Titrate Neosynephrine.   Monitor H/H with transfusion as needed.   Continue empiric antibiotics for possible pneumonia. F/u respiratory culture.   Continue oswald catheter. Typically self-cath prior to this admission.   Speech following for dysphagia and has an NGT in place.  Start trophic tube feeds.   Lovenox on hold secondary to concern for bleeding. Plan to restart tomorrow if H/H stable. Will need anticoagulation for Afib in the future.   Protonix for GI ppx.   Rifaximin and Lactulose for HE.   AM labs and CXR    Critically ill secondary to acute hypoxic respiratory failure.     High level of risk due to:  severe exacerbation of chronic illness and illness with threat to life or bodily function.    Plan of care and goals reviewed during interdisciplinary rounds.  I discussed the patient's findings and my recommendations with nursing staff    Critical Care Time: was greater than 32 minutes.(This excludes time spent performing separately reportable procedures and services). including high complexity decision making to assess, manipulate, and support vital organ system failure in this individual who has impairment of one or more vital organ systems such that there is a high probability of imminent or life threatening deterioration in the patient’s condition.      Ludmila Mina, DO    Intensive Care Medicine and Pulmonary Medicine

## 2025-03-04 NOTE — CASE MANAGEMENT/SOCIAL WORK
Continued Stay Note   [place]     Patient Name: Val Nassar Jr.  MRN: [id number]  Today's Date: 3/4/2025    Admit Date: 2/11/2025    Plan: Return to SNF/LTC bed @ [facility].   Discharge Plan       Row Name 03/04/25 1042       Plan    Plan Return to SNF/LTC bed @ [facility].    Patient/Family in Agreement with Plan other (see comments)    Plan Comments Patient transferred back to ICU post EGD, currently intubated/sedated on pressors, plan to wean sedation. DC plan is to return to [facility] SNF/LTC bed. CM will continue to follow.    Final Discharge Disposition Code 03 - skilled nursing facility (SNF)                   Discharge Codes    No documentation.                 Expected Discharge Date and Time       Expected Discharge Date Expected Discharge Time    Mar 5, 2025               Nikolas Hernandez RN

## 2025-03-04 NOTE — PROGRESS NOTES
Pharmacy Consult - Vancomycin Dosing and Monitoring    Val Nassar Jr. is a 64 y.o. male receiving vancomycin therapy.     Indication: PNA  Consulting Provider: Case  ID Consult: N/A    Goal AUC: 400-600 mg/L*hr    Current Antimicrobial Therapy  Anti-Infectives (From admission, onward)      Ordered     Dose/Rate Route Frequency Start Stop    03/03/25 1419  !Vancomycin Level Draw Needed Prior to 3/5 0600 dose - please wait for pharmacist consult prior to giving dose        Ordering Provider: Leopoldo, Ludmila V., DO     Not Applicable Once 03/05/25 0500      03/03/25 1419  vancomycin (VANCOCIN) 1,000 mg in sodium chloride 0.9 % 250 mL IVPB-VTB        Ordering Provider: Leopoldo, Ludmila V., DO    1,000 mg  250 mL/hr over 60 Minutes Intravenous Every 12 Hours Scheduled 03/04/25 0500 03/11/25 0559    03/03/25 1419  piperacillin-tazobactam (ZOSYN) 3.375 g IVPB in 100 mL NS MBP (CD)        Ordering Provider: Leopoldo, Ludmila V., DO    3.375 g  over 4 Hours Intravenous Every 8 Hours 03/03/25 2100 03/10/25 2059 03/03/25 1419  piperacillin-tazobactam (ZOSYN) 3.375 g IVPB in 100 mL NS MBP (CD)        Ordering Provider: Leopoldo Ludmila V., DO    3.375 g  over 30 Minutes Intravenous Once 03/03/25 1515 03/03/25 1627    03/03/25 1419  vancomycin IVPB 2000 mg in 0.9% Sodium Chloride 500 mL        Ordering Provider: Leopoldo, Ludmila V., DO    2,000 mg  250 mL/hr over 120 Minutes Intravenous Once 03/03/25 1500 03/03/25 1846    03/03/25 1419  Pharmacy to dose vancomycin        Ordering Provider: Leopoldo, Ludmila V., DO     Not Applicable Continuous PRN 03/03/25 1407 03/10/25 1506    03/01/25 1431  riFAXIMin (XIFAXAN) tablet 550 mg        Ordering Provider: Macho Bautista RPH    550 mg Per G Tube Every 12 Hours Scheduled 03/01/25 2100 02/28/26 2059    02/28/25 1440  riFAXIMin (XIFAXAN) tablet 550 mg  Status:  Discontinued        Ordering Provider: Pauly Webster PA    550 mg Oral Every 12 Hours Scheduled 02/28/25 2100 03/01/25  1431    02/16/25 0934  cefepime 2000 mg IVPB in 100 mL NS (MBP)        Ordering Provider: Aristeo Kowalski MD    2,000 mg  over 4 Hours Intravenous Every 8 Hours 02/16/25 1600 02/23/25 2017    02/14/25 1029  cefepime 2000 mg IVPB in 100 mL NS (MBP)  Status:  Discontinued        Ordering Provider: Arpita Ruiz DNP, APRN    2,000 mg  over 4 Hours Intravenous Every 12 Hours 02/14/25 2000 02/16/25 0934    02/14/25 1029  cefepime 2000 mg IVPB in 100 mL NS (MBP)        Ordering Provider: Arpita Ruiz DNP, APRN    2,000 mg  over 30 Minutes Intravenous Once 02/14/25 1115 02/14/25 1209    02/12/25 1109  tobramycin (NEBCIN) 500 mg in sodium chloride 0.9 % 100 mL IVPB        Ordering Provider: Leonel Luz DO    6 mg/kg × 83 kg  over 30 Minutes Intravenous Once 02/12/25 1300 02/12/25 1325    02/12/25 0933  meropenem (MERREM) 1,000 mg in sodium chloride 0.9 % 100 mL MBP        Ordering Provider: Leonel Luz DO    1,000 mg  over 30 Minutes Intravenous Once 02/12/25 1030 02/12/25 1042    02/11/25 1159  vancomycin (dosing per levels)  Status:  Discontinued        Ordering Provider: Saul Morales, PharmD     Not Applicable Daily 02/12/25 0900 02/12/25 1028    02/11/25 1136  piperacillin-tazobactam (ZOSYN) 4.5 g IVPB in 100 mL NS MBP (CD)  Status:  Discontinued        Ordering Provider: Renée Lozano APRN    4.5 g  over 4 Hours Intravenous Every 8 Hours 02/11/25 1800 02/14/25 1027    02/11/25 1136  Pharmacy to dose vancomycin  Status:  Discontinued        Ordering Provider: Renée Lozano APRN     Not Applicable Continuous PRN 02/11/25 1134 02/12/25 1028    02/11/25 0906  vancomycin IVPB 1750 mg in 0.9% Sodium Chloride (premix) 500 mL        Ordering Provider: Leonel Briggs MD    20 mg/kg × 83 kg  285.7 mL/hr over 105 Minutes Intravenous Once 02/11/25 0922 02/11/25 1216    02/11/25 0906  piperacillin-tazobactam (ZOSYN) 3.375 g IVPB in 100 mL NS MBP (CD)        Ordering  "Provider: Leonel Briggs MD    3.375 g  over 30 Minutes Intravenous Once 02/11/25 0922 02/11/25 1014          Allergies  Allergies as of 02/11/2025    (No Known Allergies)     Labs  Results from last 7 days   Lab Units 03/04/25  0332 03/02/25  0436 03/01/25  0417   BUN mg/dL 54* 61* 74*   CREATININE mg/dL 1.27 0.86 0.91     Results from last 7 days   Lab Units 03/04/25  0332 03/02/25  0436 03/01/25  0417   WBC 10*3/mm3 27.59* 19.09* 19.72*     Evaluation of Dosing     Last Dose Received in the ED/Outside Facility: N  Is Patient on Dialysis or Renal Replacement: N    Height - 175.3 cm (69.02\")  Weight - 81 kg (178 lb 8 oz)    Estimated Creatinine Clearance: 66.4 mL/min (by C-G formula based on SCr of 1.27 mg/dL).    I/O last 3 completed shifts:  In: 2953.6 [I.V.:1500.6; Other:380; NG/GT:373; IV Piggyback:700]  Out: 2328 [Urine:2318; Blood:10]    Microbiology and Radiology  Microbiology Results (last 10 days)       Procedure Component Value - Date/Time    Respiratory Culture - Aspirate, ET Suction [[id number]] Collected: 03/03/25 1645    Lab Status: Preliminary result Specimen: Aspirate from ET Suction Updated: 03/04/25 0931     Respiratory Culture Culture in progress     Gram Stain Few (2+) WBCs per low power field      Few (2+) Epithelial cells per low power field      Moderate (3+) Yeast with hyphae seen    Respiratory Panel PCR w/COVID-19(SARS-CoV-2) ABDIAZIZ/TAVARES/HANH/PAD/COR/OTIS In-House, NP Swab in UTM/VTM, 2 HR TAT - Swab, Nasopharynx [[id number]]  (Normal) Collected: 03/02/25 1717    Lab Status: Final result Specimen: Swab from Nasopharynx Updated: 03/02/25 1825     ADENOVIRUS, PCR Not Detected     Coronavirus 229E Not Detected     Coronavirus HKU1 Not Detected     Coronavirus NL63 Not Detected     Coronavirus OC43 Not Detected     COVID19 Not Detected     Human Metapneumovirus Not Detected     Human Rhinovirus/Enterovirus Not Detected     Influenza A PCR Not Detected     Influenza B PCR Not Detected     " Parainfluenza Virus 1 Not Detected     Parainfluenza Virus 2 Not Detected     Parainfluenza Virus 3 Not Detected     Parainfluenza Virus 4 Not Detected     RSV, PCR Not Detected     Bordetella pertussis pcr Not Detected     Bordetella parapertussis PCR Not Detected     Chlamydophila pneumoniae PCR Not Detected     Mycoplasma pneumo by PCR Not Detected    Narrative:      In the setting of a positive respiratory panel with a viral infection PLUS a negative procalcitonin without other underlying concern for bacterial infection, consider observing off antibiotics or discontinuation of antibiotics and continue supportive care. If the respiratory panel is positive for atypical bacterial infection (Bordetella pertussis, Chlamydophila pneumoniae, or Mycoplasma pneumoniae), consider antibiotic de-escalation to target atypical bacterial infection.          Reported Vancomycin Levels              Insight[employer] AUC Calculation:    Current AUC: 468 mg/L*hr    Predicted Steady State AUC on Current Dose: 694 mg/L*hr  _________________________________    Predicted Steady State AUC on New Dose: 530 mg/L*hr    Assessment/Plan:   PTD vancomycin 2/2 PNA. Goal AUC: 400-600 mg/L*hr.  LD: vancomycin 2000 mg (~24 mg/kg) IV x 1 on 3/3/25.  Patient was started on  vancomycin 1000 mg (~12.3 mg/kg) IV q12h. Slight bump in creatinine today. Will empirically reduce dose to vancomycin 750 mg (~9.4 mg/kg) IV q12h starting tonight at 2100. Anticipated AUC will be 530 mg/L*hr.  Proceed with vancomycin random level w/ AM labs on 3/5/25 to assess clearance.  Aforementioned regimen based on age, weight, renal function, & PK calculations.  MRSA PCR ordered. Monitor cultures & sensitivities, renal function, & clinical status.  Patient also receiving Zosyn. De-escalate as clinically indicated.  Pharmacy will continue to follow & adjust regimen as necessary.    Thank you,    Lisa London, PharmD  3/4/2025  09:59 EST

## 2025-03-04 NOTE — NURSING NOTE
WO follow-up for posterior thigh blisters      Posterior thigh blisters which ruptured over a week ago have almost completely healed at this time.  Epithelialization noted.  Hyper reactive when palpated. There is a small area open on the right posterior thigh.  Topical Venelex applied.  Covered with an Optifoam dressing.    For breakdown at inner thigh and scrotum, apply thin layer of topical barrier cream after cleansing.    High risk for pressure injury development.    Turn every 2 hours, use foam wedge to offload bony prominences, apply Allevyn dressings and elevate heels off bed.    Patient is on a Isolibrium mattress at this time.     Jere Boles RN, BSN, CWOCN  Wound, Ostomy and Continence (WOC) Department  [facility]

## 2025-03-04 NOTE — PLAN OF CARE
Goal Outcome Evaluation:            Unable to decrease ventilator support at this time.

## 2025-03-04 NOTE — PROGRESS NOTES
Clinical Nutrition     Patient Name: Val Nassar Jr.  YOB: 1960  MRN: [id number]  Date of Encounter: 03/04/25 09:45 EST  Admission date: 2/11/2025  Reason for Visit: Follow-up protocol, EN    Assessment   Nutrition Assessment   Admission Diagnosis:  Septic shock [A41.9, R65.21]    Problem List:    Septic shock due to Pseudomonas species    Hyperlipidemia, unspecified    Tobacco use    Cirrhosis of liver    Squamous cell carcinoma of esophagus    GALILEO (acute kidney injury)    Metabolic acidosis    Pneumonia of both lower lobes due to Pseudomonas species    Bacteremia due to Pseudomonas      PMH:   He  has a past medical history of Anemia, Cancer, Cirrhosis, Duodenal ulcer, Gastric ulcer, GERD (gastroesophageal reflux disease), History of alcohol abuse, History of radiation therapy (05/08/2024), HLD (hyperlipidemia), and Mood disorder.    PSH:  He  has a past surgical history that includes Esophagogastroduodenoscopy (N/A, 12/26/2023); Venous Access Device (Port) (Right, 04/25/2024); and Esophagogastroduodenoscopy (N/A, 3/3/2025).    Substance history: Etoh abuse, vaping    Applicable Nutrition History:   Skin:  posterior thigh, gluteal skin tears, scrotal MASD, blisters   WOC following    (2/12) ARF/VENT  (2/17) s/p Bronch  (2/24) Extubated   (2/26) SLP Diet Recommendation: NPO, temporary alternate methods of nutrition/hydration   (3/3) s/p EGD  two small telangiectasias in the lower third of the esophagus   Intubated  Transferred to ICU    Labs    Labs Reviewed: Yes    Results from last 7 days   Lab Units 03/04/25  0332 03/02/25  0436 03/01/25  0417   GLUCOSE mg/dL 113* 138* 129*   BUN mg/dL 54* 61* 74*   CREATININE mg/dL 1.27 0.86 0.91   SODIUM mmol/L 140 143 149*   CHLORIDE mmol/L 106 106 111*   POTASSIUM mmol/L 3.5 3.9 4.0   PHOSPHORUS mg/dL 5.1*  --   --    MAGNESIUM mg/dL 2.0 2.5* 1.7   ALT (SGPT) U/L 49* 60* 64*       Results from last 7 days   Lab Units  03/04/25  0332 03/02/25  0436 03/01/25  0417 02/27/25  0114   ALBUMIN g/dL 2.4* 2.7* 2.8*  --    CRP mg/dL  --  3.36* 1.95*  --    IONIZED CALCIUM mmol/L  --   --   --  1.22       Results from last 7 days   Lab Units 03/04/25  0515 03/03/25  2353 03/03/25  1746 03/03/25  0539 03/03/25  0001 03/02/25  1622   GLUCOSE mg/dL 120 108 97 134* 144* 145*     Lab Results   Lab Value Date/Time    HGBA1C 4.6 (L) 12/25/2023 1925         Results from last 7 days   Lab Units 03/02/25 0436   PROBNP pg/mL 631.0         Medications    Medications Reviewed: yes    Scheduled Meds:[START ON 3/5/2025] !Vancomycin Level Draw Needed, , Not Applicable, Once  castor oil-balsam peru, 1 Application, Topical, Q12H  fentaNYL, 1 patch, Transdermal, Q72H   And  Check Fentanyl Patch Placement, 1 each, Not Applicable, Q12H  chlorhexidine, 15 mL, Mouth/Throat, Q12H  docusate sodium, 100 mg, Per G Tube, BID  [Held by provider] enoxaparin sodium, 40 mg, Subcutaneous, Q24H  escitalopram, 10 mg, Per G Tube, Nightly  finasteride, 5 mg, Per G Tube, Daily  gabapentin, 300 mg, Per G Tube, Q8H  insulin regular, 2-7 Units, Subcutaneous, Q6H  ipratropium-albuterol, 3 mL, Nebulization, 4x Daily - RT  lactulose, 20 g, Oral, TID  [Held by provider] metoprolol tartrate, 50 mg, Per G Tube, Q12H  mupirocin, 1 Application, Each Nare, BID  nystatin, , Topical, Q8H  pantoprazole, 40 mg, Intravenous, Q12H  piperacillin-tazobactam, 3.375 g, Intravenous, Q8H  polyethylene glycol, 17 g, Per G Tube, Daily  potassium chloride, 40 mEq, Oral, Q4H  rifAXIMin, 550 mg, Per G Tube, Q12H  rosuvastatin, 20 mg, Per G Tube, Nightly  sodium chloride, 10 mL, Intravenous, Q12H  terazosin, 1 mg, Per G Tube, Daily  vancomycin, 1,000 mg, Intravenous, Q12H      Continuous Infusions:fentanyl 10 mcg/mL,  mcg/hr, Last Rate: 75 mcg/hr (03/04/25 0610)  Pharmacy to dose vancomycin,   phenylephrine, 0.5-3 mcg/kg/min, Last Rate: 2 mcg/kg/min (03/04/25 3272)  propofol, 5-50 mcg/kg/min, Last  "Rate: 10 mcg/kg/min (03/04/25 0653)        PRN Meds:.  acetaminophen    Calcium Replacement - Follow Nurse / BPA Driven Protocol    dextrose    fentaNYL    glucagon (human recombinant)    ipratropium-albuterol    Magnesium Cardiology Dose Replacement - Follow Nurse / BPA Driven Protocol    metoprolol tartrate    oxyCODONE-acetaminophen    Pharmacy to dose vancomycin    polyvinyl alcohol    Potassium Replacement - Follow Nurse / BPA Driven Protocol    sodium chloride    sodium chloride    sodium chloride    Intake/Ouptut 24 hrs (0701 - 0700)   I&O's Reviewed: yes    Intake & Output (last day)         03/03 0701 03/04 0700 03/04 0701 03/05 0700    P.O.      I.V. (mL/kg) 1500.6 (18.5)     Other 60     NG/GT 85     IV Piggyback 700     Total Intake(mL/kg) 2345.6 (29)     Urine (mL/kg/hr) 1418 (0.7) 225 (1)    Stool      Blood 10     Total Output 1428 225    Net +917.6 -225                 Anthropometrics     Height: Height: 175.3 cm (69.02\")  Last Filed Weight: Weight: 81 kg (178 lb 8 oz) (03/03/25 0540)  Method: Weight Method: Bed scalebedscale  BMI: BMI (Calculated): 26.3    UBW: ?  Weight change:  per EMR ~ 6lb wt gain since January     Weight       Weight (kg) Weight (lbs) Weight Method Visit Report   4/23/2024 75.841 kg  167 lb 3.2 oz   --     75.297 kg  166 lb   --    4/25/2024 75.751 kg  167 lb  Stated     4/29/2024 76.658 kg  169 lb      4/30/2024 75.932 kg  167 lb 6.4 oz   --     75.751 kg  167 lb   --    5/6/2024 74.889 kg  165 lb 1.6 oz   --     74.98 kg  165 lb 4.8 oz   --     74.844 kg  165 lb   --        --        --        --    5/7/2024 76.25 kg  168 lb 1.6 oz   --     76.204 kg  168 lb   --    5/14/2024 77.111 kg  170 lb   --    5/21/2024 75.297 kg  166 lb      6/3/2024 74.98 kg  165 lb 4.8 oz   --     75.297 kg  166 lb   --        --        --    6/18/2024 74.39 kg  164 lb   --    7/5/2024 74.4 kg  164 lb 0.4 oz  Estimated     7/16/2024 77.565 kg  171 lb   --    8/7/2024 77.565 kg  171 lb   --  " "  8/19/2024 77.656 kg  171 lb 3.2 oz   --    9/19/2024 77.565 kg  171 lb      10/24/2024 79.833 kg  176 lb   --    11/14/2024 79.833 kg  176 lb   --    11/26/2024 78.926 kg  174 lb      12/18/2024 79.833 kg  176 lb   --    1/21/2025 79.833 kg  176 lb  Stated     1/23/2025 79.833 kg  176 lb   --    2/3/2025 83.008 kg  183 lb   --    2/11/2025 83.008 kg  183 lb       83 kg  182 lb 15.7 oz          Needs Assessment   Date: 2/28; Reassessed: 3/4    Height used:Height: 175.3 cm (69.02\")  Weights used: 81kg CBW    Estimated Calorie needs: ~2175kcal   Method:  21-24 Kcals/KG = 6379-2634  Method:  PSU = 2174  Ve: 12.3; Tmax: 38.8; MSJ: 1588    Estimated Protein needs: ~ 105g protein   Method: 1.2-1.5g/kg protein:     Nutrition Focused Physical Exam     Date: 2-26-25    Pt does not meet criteria for malnutrition diagnosis, at this time.      Subjective   Reported/Observed/Food/Nutrition Related History:   3/4  Pt intubated following EGD yesterday. Sedated - propofoL 4.86mL/hr). TF held overnight - ok to restart nutrition support. Continues on bowel regimen - no BM.   Per RN: blisters improving, WOC following.     3/3  Pt OOR for EGD this am. Spoke w/RN who reports pt tolerating EN. Hypernatremia resolved.     2/28  Pt continues w/hypernatremia. Increased water flush again. Pt does not participate in nutrition visit. Discussed changes w/FRANCINE and MD.     2/27   EN and water flush adjusted 2/2 hypernatremia and change in feeding window.     2-26: pt resting in bed, on room air, pleasantly confused  Per RN: pt tolerating TF, had 1 dark bm, has been tachypneic, lots of secretions, failed FEES    2-24: per RN pt extubated today, is tolerating TF, possible GIB, is having dark stools, follows commands  Pt resting in bed, on nasal cannula, very weak voice, difficulty talking  + precedex    2-21-25: pt intubated, sedated + fentanyl, versed, D5%@100ml  Per RN: pt tolerating TF, has not had a bm, is much more alert today, follows " commands  Per MD: plan to maintain D5% for 1 more day,  decrease free water to 30ml/hr    2-19: pt intubated, sedated, + fentanyl, versed, D5%@100ml  Per RN: pt has been more alert, oxygen demands decreased, tolerating TF, abdomen still distended, given lactulose (last bm 2-17)    2/17  Pt intubated, sedated, on fentanyl & VERSED. Spoke with nurse, reported tolerating TF well, has low residuals, and bowels are moving. LBM on 2/15. Noted that Na continues to increase, even after increasing free water on 2/15. Pt may benefit from IV fluid to correct hypernatremia.     2/13  Pt intubated, sedated, + fentanyl, propofol 7.56ml, levophed 0.32mcg  Per RN: pt's belly distended, more firm than before, bowel regimen started, marginal UOP, have been toni to wean off asiya and vasopressin    Current Nutrition Prescription     PO: NPO Diet NPO Type: Strict NPO  Oral Nutrition Supplement:  Intake: N/A    EN: Peptamen 1.5  Goal Rate: 65ml/hr  Water Flushes:80ml/hr  Modular: None  Route: ND  Tube: Small bore    At goal over: 22Hrs/day     Rx will supply:   Goal Volume 1430  mL/day     Flush Volume 1760 mL/day     Energy 2145 Kcal/day 102 % MREE   Protein 97 g/day 97 % Est Need   Fiber 0 g/day     Water in  EN 1100 mL     Total Water 2776 mL     Meet DRI Yes        --------------------------------------------------------------------------  Product/Rate verified at bedside: No  Infusing Rate at time of visit: held    Average Delivery from Charting: N/A   Volume  mL/day   % Goal Vol.   Flush Volume  mL/day     Energy  Kcal/day  % Est Need   Protein  g/day  % Est Need   Fiber  g/day     Water in  EN  mL     Total Water  mL     Meet DRI Yes      +340kcal propofoL    Assessment & Plan   Nutrition Diagnosis   Date: 2-13-25 Updated: 3/4  Problem Inadequate energy intake    Etiology ARF/Extubated   Signs/Symptoms NPO   Status: Active    Date:  2/28 Updated: 3/4  Problem Inadequate oral intake   Etiology dysphagia   Signs/Symptoms +EN   Status:  Active    Goal:   Nutrition to support treatment and Initiate EN      Nutrition Intervention      Follow treatment progress, Care plan reviewed, Nutrition support order placed    Restart trophic feeds  Peptamen 1.5 @ 20mL/hr + 30mL/hr FWF  Infused at goal over 20 hrs this regimen provides: 400mL TGV, 600kcal (28% est needs), 27g protein (26% est needs), 0g fiber, 308mL TF FW (+ 600mL flush = 908mL total).   May benefit from MVI with min supplementation to meet RDI    Will advance toward goal rate as appropriate   Adjust flush per clinical status    Consider adding additional supplementation for skin as appropriate    Monitoring/Evaluation:   Per protocol, I&O, Pertinent labs, EN delivery/tolerance, Weight, Skin status, GI status, Symptoms, Hemodynamic stability    Nicky Ceballos MS,RD,LD  Time Spent: 30min

## 2025-03-04 NOTE — PLAN OF CARE
Goal Outcome Evaluation:  Plan of Care Reviewed With: patient        Progress:   Outcome Evaluation:   -Pt remains intubated w PEEP 10, FiO2 40%  -Sedated w Fent @ 75, Prop @ 10  -Pt opens eyes spontaneously / to voice, grimaces to stimulation, does not follow commands, BAUER, Pupils equal  -Tmax 38.6 celsius, PRN tylenol added and given x1, Ice packs applied  -Parviz gtt on @ 2  -AM K 3.5, replacing per BPA  -H&H stable  - mL (193mL since 2230), No BM

## 2025-03-04 NOTE — SIGNIFICANT NOTE
03/04/25 0844   SLP Deferred Reason   SLP Deferred Reason Unable to treat, medical status change  (Pt intubated following EGD yesterday. Will place on hold for reconsult.)

## 2025-03-04 NOTE — PLAN OF CARE
Goal Outcome Evaluation:  Plan of Care Reviewed With: family        Progress: declining  Outcome Evaluation: Patient had an EGD today, see Dr. Mckeon note for interventions done. Patient became extremely agitated in recovery, pulling everything including oxygen off, dropping sats and BP, patient was intubated and was put on 10 peep and 100% FIO2. Sent to 2A ICU. Vanc and zosyn given. Fever climbed to 100.1. Tried to wean FIO2 to 60% but kept dropping sats to 88% so back on 80% at this time. On propofol, fentanyl, asiya at 1.5. 40mg lasix given, only 400 mls urine in the 4 hours after. NPO right now. H/H stable, refer to labs. VSS at this time.

## 2025-03-05 ENCOUNTER — APPOINTMENT (OUTPATIENT)
Dept: GENERAL RADIOLOGY | Facility: HOSPITAL | Age: 65
DRG: 870 | End: 2025-03-05
Payer: MEDICARE

## 2025-03-05 LAB
ANION GAP SERPL CALCULATED.3IONS-SCNC: 13 MMOL/L (ref 5–15)
ARTERIAL PATENCY WRIST A: ABNORMAL
ATMOSPHERIC PRESS: ABNORMAL MM[HG]
BACTERIA SPEC RESP CULT: NORMAL
BASE EXCESS BLDA CALC-SCNC: -2.6 MMOL/L (ref 0–2)
BASOPHILS # BLD AUTO: 0.05 10*3/MM3 (ref 0–0.2)
BASOPHILS NFR BLD AUTO: 0.2 % (ref 0–1.5)
BDY SITE: ABNORMAL
BODY TEMPERATURE: 37
BUN SERPL-MCNC: 50 MG/DL (ref 8–23)
BUN/CREAT SERPL: 40.3 (ref 7–25)
CALCIUM SPEC-SCNC: 8.5 MG/DL (ref 8.6–10.5)
CHLORIDE SERPL-SCNC: 116 MMOL/L (ref 98–107)
CO2 BLDA-SCNC: 22.4 MMOL/L (ref 22–33)
CO2 SERPL-SCNC: 19 MMOL/L (ref 22–29)
COHGB MFR BLD: 1.7 % (ref 0–2)
CREAT SERPL-MCNC: 1.24 MG/DL (ref 0.76–1.27)
DEPRECATED RDW RBC AUTO: 67.7 FL (ref 37–54)
EGFRCR SERPLBLD CKD-EPI 2021: 64.5 ML/MIN/1.73
EOSINOPHIL # BLD AUTO: 0.91 10*3/MM3 (ref 0–0.4)
EOSINOPHIL NFR BLD AUTO: 3.5 % (ref 0.3–6.2)
EPAP: 0
ERYTHROCYTE [DISTWIDTH] IN BLOOD BY AUTOMATED COUNT: 18.7 % (ref 12.3–15.4)
GLUCOSE BLDC GLUCOMTR-MCNC: 103 MG/DL (ref 70–130)
GLUCOSE BLDC GLUCOMTR-MCNC: 126 MG/DL (ref 70–130)
GLUCOSE BLDC GLUCOMTR-MCNC: 84 MG/DL (ref 70–130)
GLUCOSE BLDC GLUCOMTR-MCNC: 85 MG/DL (ref 70–130)
GLUCOSE SERPL-MCNC: 116 MG/DL (ref 65–99)
GRAM STN SPEC: NORMAL
HCO3 BLDA-SCNC: 21.4 MMOL/L (ref 20–26)
HCT VFR BLD AUTO: 24.2 % (ref 37.5–51)
HCT VFR BLD AUTO: 26.6 % (ref 37.5–51)
HCT VFR BLD CALC: 23.2 % (ref 38–51)
HGB BLD-MCNC: 7.4 G/DL (ref 13–17.7)
HGB BLD-MCNC: 8.2 G/DL (ref 13–17.7)
HGB BLDA-MCNC: 7.6 G/DL (ref 13.5–17.5)
IMM GRANULOCYTES # BLD AUTO: 0.26 10*3/MM3 (ref 0–0.05)
IMM GRANULOCYTES NFR BLD AUTO: 1 % (ref 0–0.5)
INHALED O2 CONCENTRATION: 40 %
IPAP: 0
LYMPHOCYTES # BLD AUTO: 0.94 10*3/MM3 (ref 0.7–3.1)
LYMPHOCYTES NFR BLD AUTO: 3.6 % (ref 19.6–45.3)
MAGNESIUM SERPL-MCNC: 1.9 MG/DL (ref 1.6–2.4)
MCH RBC QN AUTO: 30.7 PG (ref 26.6–33)
MCHC RBC AUTO-ENTMCNC: 30.6 G/DL (ref 31.5–35.7)
MCV RBC AUTO: 100.4 FL (ref 79–97)
METHGB BLD QL: 0.3 % (ref 0–1.5)
MODALITY: ABNORMAL
MONOCYTES # BLD AUTO: 1.77 10*3/MM3 (ref 0.1–0.9)
MONOCYTES NFR BLD AUTO: 6.7 % (ref 5–12)
NEUTROPHILS NFR BLD AUTO: 22.33 10*3/MM3 (ref 1.7–7)
NEUTROPHILS NFR BLD AUTO: 85 % (ref 42.7–76)
NRBC BLD AUTO-RTO: 0 /100 WBC (ref 0–0.2)
OXYHGB MFR BLDV: 96.3 % (ref 94–99)
PAW @ PEAK INSP FLOW SETTING VENT: 0 CMH2O
PCO2 BLDA: 32.8 MM HG (ref 35–45)
PCO2 TEMP ADJ BLD: 32.8 MM HG (ref 35–48)
PEEP RESPIRATORY: 10 CM[H2O]
PH BLDA: 7.42 PH UNITS (ref 7.35–7.45)
PH, TEMP CORRECTED: 7.42 PH UNITS
PHOSPHATE SERPL-MCNC: 4.1 MG/DL (ref 2.5–4.5)
PLATELET # BLD AUTO: 240 10*3/MM3 (ref 140–450)
PMV BLD AUTO: 10 FL (ref 6–12)
PO2 BLDA: 95.4 MM HG (ref 83–108)
PO2 TEMP ADJ BLD: 95.4 MM HG (ref 83–108)
POTASSIUM SERPL-SCNC: 3.6 MMOL/L (ref 3.5–5.2)
POTASSIUM SERPL-SCNC: 4.4 MMOL/L (ref 3.5–5.2)
RBC # BLD AUTO: 2.41 10*6/MM3 (ref 4.14–5.8)
SODIUM SERPL-SCNC: 148 MMOL/L (ref 136–145)
TOTAL RATE: 18 BREATHS/MINUTE
VANCOMYCIN SERPL-MCNC: 26 MCG/ML (ref 5–40)
VENTILATOR MODE: ABNORMAL
VT ON VENT VENT: 0.49 ML
WBC NRBC COR # BLD AUTO: 26.26 10*3/MM3 (ref 3.4–10.8)

## 2025-03-05 PROCEDURE — 83735 ASSAY OF MAGNESIUM: CPT | Performed by: INTERNAL MEDICINE

## 2025-03-05 PROCEDURE — 94761 N-INVAS EAR/PLS OXIMETRY MLT: CPT

## 2025-03-05 PROCEDURE — 99291 CRITICAL CARE FIRST HOUR: CPT | Performed by: INTERNAL MEDICINE

## 2025-03-05 PROCEDURE — 74018 RADEX ABDOMEN 1 VIEW: CPT

## 2025-03-05 PROCEDURE — 94799 UNLISTED PULMONARY SVC/PX: CPT

## 2025-03-05 PROCEDURE — 82948 REAGENT STRIP/BLOOD GLUCOSE: CPT

## 2025-03-05 PROCEDURE — 25010000002 POTASSIUM CHLORIDE PER 2 MEQ: Performed by: INTERNAL MEDICINE

## 2025-03-05 PROCEDURE — 85018 HEMOGLOBIN: CPT | Performed by: INTERNAL MEDICINE

## 2025-03-05 PROCEDURE — 25010000002 PHENYLEPHRINE 10 MG/ML SOLUTION 5 ML VIAL

## 2025-03-05 PROCEDURE — 25010000002 MAGNESIUM SULFATE 4 GM/100ML SOLUTION: Performed by: INTERNAL MEDICINE

## 2025-03-05 PROCEDURE — 25010000002 VANCOMYCIN 1 G RECONSTITUTED SOLUTION 1 EACH VIAL

## 2025-03-05 PROCEDURE — 83050 HGB METHEMOGLOBIN QUAN: CPT

## 2025-03-05 PROCEDURE — 84132 ASSAY OF SERUM POTASSIUM: CPT | Performed by: INTERNAL MEDICINE

## 2025-03-05 PROCEDURE — 80048 BASIC METABOLIC PNL TOTAL CA: CPT | Performed by: INTERNAL MEDICINE

## 2025-03-05 PROCEDURE — 71045 X-RAY EXAM CHEST 1 VIEW: CPT

## 2025-03-05 PROCEDURE — 84100 ASSAY OF PHOSPHORUS: CPT | Performed by: INTERNAL MEDICINE

## 2025-03-05 PROCEDURE — 85025 COMPLETE CBC W/AUTO DIFF WBC: CPT | Performed by: INTERNAL MEDICINE

## 2025-03-05 PROCEDURE — 94003 VENT MGMT INPAT SUBQ DAY: CPT

## 2025-03-05 PROCEDURE — 36600 WITHDRAWAL OF ARTERIAL BLOOD: CPT

## 2025-03-05 PROCEDURE — 82805 BLOOD GASES W/O2 SATURATION: CPT

## 2025-03-05 PROCEDURE — 94668 MNPJ CHEST WALL SBSQ: CPT

## 2025-03-05 PROCEDURE — 25010000002 FENTANYL 10 MCG/1 ML NS: Performed by: INTERNAL MEDICINE

## 2025-03-05 PROCEDURE — 85014 HEMATOCRIT: CPT | Performed by: INTERNAL MEDICINE

## 2025-03-05 PROCEDURE — 82375 ASSAY CARBOXYHB QUANT: CPT

## 2025-03-05 PROCEDURE — 25810000003 SODIUM CHLORIDE 0.9 % SOLUTION 250 ML FLEX CONT

## 2025-03-05 PROCEDURE — 25010000002 PIPERACILLIN SOD-TAZOBACTAM PER 1 G: Performed by: INTERNAL MEDICINE

## 2025-03-05 PROCEDURE — 25010000002 PROPOFOL 10 MG/ML EMULSION: Performed by: INTERNAL MEDICINE

## 2025-03-05 PROCEDURE — 80202 ASSAY OF VANCOMYCIN: CPT | Performed by: INTERNAL MEDICINE

## 2025-03-05 RX ORDER — QUETIAPINE FUMARATE 25 MG/1
25 TABLET, FILM COATED ORAL EVERY 12 HOURS SCHEDULED
Status: DISCONTINUED | OUTPATIENT
Start: 2025-03-05 | End: 2025-03-07

## 2025-03-05 RX ORDER — QUETIAPINE FUMARATE 25 MG/1
25 TABLET, FILM COATED ORAL EVERY 12 HOURS SCHEDULED
Status: DISCONTINUED | OUTPATIENT
Start: 2025-03-05 | End: 2025-03-05

## 2025-03-05 RX ORDER — ACETAMINOPHEN 325 MG/1
650 TABLET ORAL EVERY 6 HOURS PRN
Status: DISCONTINUED | OUTPATIENT
Start: 2025-03-05 | End: 2025-03-12 | Stop reason: HOSPADM

## 2025-03-05 RX ORDER — POTASSIUM CHLORIDE 29.8 MG/ML
20 INJECTION INTRAVENOUS
Status: COMPLETED | OUTPATIENT
Start: 2025-03-05 | End: 2025-03-05

## 2025-03-05 RX ORDER — MAGNESIUM SULFATE HEPTAHYDRATE 40 MG/ML
4 INJECTION, SOLUTION INTRAVENOUS ONCE
Status: COMPLETED | OUTPATIENT
Start: 2025-03-05 | End: 2025-03-05

## 2025-03-05 RX ADMIN — PROPOFOL 15 MCG/KG/MIN: 10 INJECTION, EMULSION INTRAVENOUS at 21:36

## 2025-03-05 RX ADMIN — Medication 10 ML: at 21:14

## 2025-03-05 RX ADMIN — IPRATROPIUM BROMIDE AND ALBUTEROL SULFATE 3 ML: 2.5; .5 SOLUTION RESPIRATORY (INHALATION) at 15:27

## 2025-03-05 RX ADMIN — Medication 10 ML: at 09:57

## 2025-03-05 RX ADMIN — IPRATROPIUM BROMIDE AND ALBUTEROL SULFATE 3 ML: 2.5; .5 SOLUTION RESPIRATORY (INHALATION) at 07:24

## 2025-03-05 RX ADMIN — NALOXEGOL OXALATE 25 MG: 25 TABLET, FILM COATED ORAL at 12:16

## 2025-03-05 RX ADMIN — IPRATROPIUM BROMIDE AND ALBUTEROL SULFATE 3 ML: 2.5; .5 SOLUTION RESPIRATORY (INHALATION) at 19:37

## 2025-03-05 RX ADMIN — PANTOPRAZOLE SODIUM 40 MG: 40 INJECTION, POWDER, FOR SOLUTION INTRAVENOUS at 21:14

## 2025-03-05 RX ADMIN — PROPOFOL 15 MCG/KG/MIN: 10 INJECTION, EMULSION INTRAVENOUS at 12:15

## 2025-03-05 RX ADMIN — PIPERACILLIN AND TAZOBACTAM 3.38 G: 3; .375 INJECTION, POWDER, LYOPHILIZED, FOR SOLUTION INTRAVENOUS at 05:19

## 2025-03-05 RX ADMIN — IPRATROPIUM BROMIDE AND ALBUTEROL SULFATE 3 ML: 2.5; .5 SOLUTION RESPIRATORY (INHALATION) at 11:25

## 2025-03-05 RX ADMIN — CHLORHEXIDINE GLUCONATE 15 ML: 1.2 SOLUTION ORAL at 21:13

## 2025-03-05 RX ADMIN — GABAPENTIN 300 MG: 300 CAPSULE ORAL at 05:22

## 2025-03-05 RX ADMIN — QUETIAPINE FUMARATE 25 MG: 25 TABLET ORAL at 12:15

## 2025-03-05 RX ADMIN — QUETIAPINE FUMARATE 25 MG: 25 TABLET ORAL at 21:14

## 2025-03-05 RX ADMIN — MUPIROCIN 1 APPLICATION: 20 OINTMENT TOPICAL at 09:57

## 2025-03-05 RX ADMIN — NYSTATIN: 100000 POWDER TOPICAL at 05:20

## 2025-03-05 RX ADMIN — GABAPENTIN 300 MG: 300 CAPSULE ORAL at 15:00

## 2025-03-05 RX ADMIN — PIPERACILLIN AND TAZOBACTAM 3.38 G: 3; .375 INJECTION, POWDER, LYOPHILIZED, FOR SOLUTION INTRAVENOUS at 21:27

## 2025-03-05 RX ADMIN — FINASTERIDE 5 MG: 5 TABLET, FILM COATED ORAL at 09:17

## 2025-03-05 RX ADMIN — NYSTATIN: 100000 POWDER TOPICAL at 21:14

## 2025-03-05 RX ADMIN — TERAZOSIN HYDROCHLORIDE 1 MG: 1 CAPSULE ORAL at 09:17

## 2025-03-05 RX ADMIN — POTASSIUM CHLORIDE 20 MEQ: 29.8 INJECTION, SOLUTION INTRAVENOUS at 09:57

## 2025-03-05 RX ADMIN — PANTOPRAZOLE SODIUM 40 MG: 40 INJECTION, POWDER, FOR SOLUTION INTRAVENOUS at 09:17

## 2025-03-05 RX ADMIN — Medication 1 APPLICATION: at 09:17

## 2025-03-05 RX ADMIN — ESCITALOPRAM OXALATE 10 MG: 10 TABLET ORAL at 21:14

## 2025-03-05 RX ADMIN — GABAPENTIN 300 MG: 300 CAPSULE ORAL at 21:14

## 2025-03-05 RX ADMIN — Medication 1 APPLICATION: at 21:14

## 2025-03-05 RX ADMIN — CHLORHEXIDINE GLUCONATE 15 ML: 1.2 SOLUTION ORAL at 09:16

## 2025-03-05 RX ADMIN — MAGNESIUM SULFATE IN WATER FOR 4 G: 40 INJECTION INTRAVENOUS at 09:57

## 2025-03-05 RX ADMIN — POLYETHYLENE GLYCOL 3350 17 G: 17 POWDER, FOR SOLUTION ORAL at 09:17

## 2025-03-05 RX ADMIN — MUPIROCIN 1 APPLICATION: 20 OINTMENT TOPICAL at 21:14

## 2025-03-05 RX ADMIN — RIFAXIMIN 550 MG: 550 TABLET ORAL at 09:17

## 2025-03-05 RX ADMIN — PHENYLEPHRINE HYDROCHLORIDE 1 MCG/KG/MIN: 10 INJECTION INTRAVENOUS at 07:03

## 2025-03-05 RX ADMIN — VANCOMYCIN HYDROCHLORIDE 1000 MG: 1 INJECTION, POWDER, LYOPHILIZED, FOR SOLUTION INTRAVENOUS at 21:04

## 2025-03-05 RX ADMIN — RIFAXIMIN 550 MG: 550 TABLET ORAL at 21:13

## 2025-03-05 RX ADMIN — PHENYLEPHRINE HYDROCHLORIDE 1 MCG/KG/MIN: 10 INJECTION INTRAVENOUS at 17:04

## 2025-03-05 RX ADMIN — DOCUSATE SODIUM 100 MG: 50 LIQUID ORAL at 09:16

## 2025-03-05 RX ADMIN — DOCUSATE SODIUM 100 MG: 50 LIQUID ORAL at 21:13

## 2025-03-05 RX ADMIN — Medication 100 MCG/HR: at 23:37

## 2025-03-05 RX ADMIN — POTASSIUM CHLORIDE 20 MEQ: 29.8 INJECTION, SOLUTION INTRAVENOUS at 09:17

## 2025-03-05 RX ADMIN — ROSUVASTATIN 20 MG: 20 TABLET, FILM COATED ORAL at 21:13

## 2025-03-05 RX ADMIN — PROPOFOL 10 MCG/KG/MIN: 10 INJECTION, EMULSION INTRAVENOUS at 00:29

## 2025-03-05 RX ADMIN — NYSTATIN: 100000 POWDER TOPICAL at 15:00

## 2025-03-05 RX ADMIN — Medication 100 MCG/HR: at 00:27

## 2025-03-05 RX ADMIN — PHENYLEPHRINE HYDROCHLORIDE 1.8 MCG/KG/MIN: 10 INJECTION INTRAVENOUS at 00:01

## 2025-03-05 RX ADMIN — PIPERACILLIN AND TAZOBACTAM 3.38 G: 3; .375 INJECTION, POWDER, LYOPHILIZED, FOR SOLUTION INTRAVENOUS at 12:16

## 2025-03-05 NOTE — PROGRESS NOTES
Clinical Nutrition     Patient Name: Val Nassar Jr.  YOB: 1960  MRN: [id number]  Date of Encounter: 03/05/25 09:42 EST  Admission date: 2/11/2025  Reason for Visit: Follow-up protocol, EN    Assessment   Nutrition Assessment   Admission Diagnosis:  Septic shock [A41.9, R65.21]    Problem List:    Septic shock due to Pseudomonas species    Hyperlipidemia, unspecified    Tobacco use    Cirrhosis of liver    Squamous cell carcinoma of esophagus    GALILEO (acute kidney injury)    Metabolic acidosis    Pneumonia of both lower lobes due to Pseudomonas species    Bacteremia due to Pseudomonas      PMH:   He  has a past medical history of Anemia, Cancer, Cirrhosis, Duodenal ulcer, Gastric ulcer, GERD (gastroesophageal reflux disease), History of alcohol abuse, History of radiation therapy (05/08/2024), HLD (hyperlipidemia), and Mood disorder.    PSH:  He  has a past surgical history that includes Esophagogastroduodenoscopy (N/A, 12/26/2023); Venous Access Device (Port) (Right, 04/25/2024); and Esophagogastroduodenoscopy (N/A, 3/3/2025).    Substance history: Etoh abuse, vaping    Applicable Nutrition History:   Skin:  posterior thigh, gluteal skin tears, scrotal MASD, blisters   WOC following    (2/12) ARF/VENT  (2/17) s/p Bronch  (2/24) Extubated   (2/26) SLP Diet Recommendation: NPO, temporary alternate methods of nutrition/hydration   (3/3) s/p EGD  two small telangiectasias in the lower third of the esophagus   Intubated  Transferred to ICU    Labs    Labs Reviewed: Yes    Results from last 7 days   Lab Units 03/05/25  0611 03/04/25  1513 03/04/25  0332 03/02/25  0436 03/01/25  0417   GLUCOSE mg/dL 116*  --  113* 138* 129*   BUN mg/dL 50*  --  54* 61* 74*   CREATININE mg/dL 1.24  --  1.27 0.86 0.91   SODIUM mmol/L 148*  --  140 143 149*   CHLORIDE mmol/L 116*  --  106 106 111*   POTASSIUM mmol/L 3.6 4.8 3.5 3.9 4.0   PHOSPHORUS mg/dL 4.1  --  5.1*  --   --    MAGNESIUM  mg/dL 1.9  --  2.0 2.5* 1.7   ALT (SGPT) U/L  --   --  49* 60* 64*       Results from last 7 days   Lab Units 03/04/25  0332 03/02/25  0436 03/01/25  0417 02/27/25  0114   ALBUMIN g/dL 2.4* 2.7* 2.8*  --    CRP mg/dL  --  3.36* 1.95*  --    IONIZED CALCIUM mmol/L  --   --   --  1.22       Results from last 7 days   Lab Units 03/05/25  0518 03/04/25  2356 03/04/25  1826 03/04/25  1226 03/04/25  0515 03/03/25  2353   GLUCOSE mg/dL 126 125 139* 126 120 108     Lab Results   Lab Value Date/Time    HGBA1C 4.6 (L) 12/25/2023 1925         Results from last 7 days   Lab Units 03/02/25  0436   PROBNP pg/mL 631.0         Medications    Medications Reviewed: yes    Scheduled Meds:castor oil-balsam peru, 1 Application, Topical, Q12H  chlorhexidine, 15 mL, Mouth/Throat, Q12H  docusate sodium, 100 mg, Per G Tube, BID  [Held by provider] enoxaparin sodium, 40 mg, Subcutaneous, Q24H  escitalopram, 10 mg, Per G Tube, Nightly  finasteride, 5 mg, Per G Tube, Daily  gabapentin, 300 mg, Per G Tube, Q8H  insulin regular, 2-7 Units, Subcutaneous, Q6H  ipratropium-albuterol, 3 mL, Nebulization, 4x Daily - RT  lactulose, 20 g, Nasogastric, TID  magnesium sulfate, 4 g, Intravenous, Once  [Held by provider] metoprolol tartrate, 50 mg, Per G Tube, Q12H  mupirocin, 1 Application, Each Nare, BID  nystatin, , Topical, Q8H  pantoprazole, 40 mg, Intravenous, Q12H  piperacillin-tazobactam, 3.375 g, Intravenous, Q8H  polyethylene glycol, 17 g, Per G Tube, Daily  potassium chloride, 20 mEq, Intravenous, Q1H  rifAXIMin, 550 mg, Per G Tube, Q12H  rosuvastatin, 20 mg, Per G Tube, Nightly  sodium chloride, 10 mL, Intravenous, Q12H  terazosin, 1 mg, Per G Tube, Daily  vancomycin, 1,000 mg, Intravenous, Q24H      Continuous Infusions:fentanyl 10 mcg/mL,  mcg/hr, Last Rate: 100 mcg/hr (03/05/25 0027)  Pharmacy to dose vancomycin,   phenylephrine, 0.5-3 mcg/kg/min, Last Rate: 1 mcg/kg/min (03/05/25 0703)  propofol, 5-50 mcg/kg/min, Last Rate: 25  "mcg/kg/min (03/05/25 0919)        PRN Meds:.  acetaminophen    Calcium Replacement - Follow Nurse / BPA Driven Protocol    dextrose    fentaNYL    glucagon (human recombinant)    ipratropium-albuterol    Magnesium Cardiology Dose Replacement - Follow Nurse / BPA Driven Protocol    metoprolol tartrate    Pharmacy to dose vancomycin    polyvinyl alcohol    Potassium Replacement - Follow Nurse / BPA Driven Protocol    sodium chloride    sodium chloride    sodium chloride    Intake/Ouptut 24 hrs (0701 - 0700)   I&O's Reviewed: yes    Intake & Output (last day)         03/04 0701 03/05 0700 03/05 0701 03/06 0700    I.V. (mL/kg) 1407.8 (17.4)     Other 181     NG/     IV Piggyback 685     Total Intake(mL/kg) 2592.8 (32)     Urine (mL/kg/hr) 1120 (0.6)     Stool 0     Blood      Total Output 1120     Net +1472.8           Stool Unmeasured Occurrence 1 x            Anthropometrics     Height: Height: 175.3 cm (69.02\")  Last Filed Weight: Weight: 81 kg (178 lb 8 oz) (03/03/25 0540)  Method: Weight Method: Bed scalebedscale  BMI: BMI (Calculated): 26.3    UBW: ?  Weight change:  per EMR ~ 6lb wt gain since January     Weight       Weight (kg) Weight (lbs) Weight Method Visit Report   4/23/2024 75.841 kg  167 lb 3.2 oz   --    4/25/2024 75.751 kg  167 lb  Stated     4/29/2024 76.658 kg  169 lb      4/30/2024 75.932 kg  167 lb 6.4 oz   --    5/6/2024 74.889 kg  165 lb 1.6 oz   --     74.98 kg  165 lb 4.8 oz   --    5/7/2024 76.25 kg  168 lb 1.6 oz   --    5/14/2024 77.111 kg  170 lb   --    5/21/2024 75.297 kg  166 lb      6/3/2024 74.98 kg  165 lb 4.8 oz   --    6/18/2024 74.39 kg  164 lb   --    7/5/2024 74.4 kg  164 lb 0.4 oz  Estimated     7/16/2024 77.565 kg  171 lb   --    8/7/2024 77.565 kg  171 lb   --    8/19/2024 77.656 kg  171 lb 3.2 oz   --    9/19/2024 77.565 kg  171 lb      10/24/2024 79.833 kg  176 lb   --    11/14/2024 79.833 kg  176 lb   --    11/26/2024 78.926 kg  174 lb      12/18/2024 79.833 kg  176 " "lb   --    1/21/2025 79.833 kg  176 lb  Stated     1/23/2025 79.833 kg  176 lb   --    2/3/2025 83.008 kg  183 lb   --    2/11/2025 83.008 kg  183 lb       83 kg  182 lb 15.7 oz          Needs Assessment   Date: 2/28; Reassessed: 3/4    Height used:Height: 175.3 cm (69.02\")  Weights used: 81kg CBW    Estimated Calorie needs: ~2175kcal   Method:  21-24 Kcals/KG = 2390-2616  Method:  PSU = 2174  Ve: 12.3; Tmax: 38.8; MSJ: 1588    Estimated Protein needs: ~ 105g protein   Method: 1.2-1.5g/kg protein:     Nutrition Focused Physical Exam     Date: 2-26-25    Pt does not meet criteria for malnutrition diagnosis, at this time.      Subjective   Reported/Observed/Food/Nutrition Related History:   3/5  Pt remains intubated/sedated. TF held overnight following concern for aspiration. Keofeed duodenum- unlikely aspiration. +constipation - initiate aggressive bowel regimen; hold lactulose 2/2 gaseous colonic distention; start movantik.    3/4  Pt intubated following EGD yesterday. Sedated - propofoL 4.86mL/hr). TF held overnight - ok to restart nutrition support. Continues on bowel regimen - no BM.   Per RN: blisters improving, WOC following.     3/3  Pt OOR for EGD this am. Spoke w/RN who reports pt tolerating EN. Hypernatremia resolved.     2/28  Pt continues w/hypernatremia. Increased water flush again. Pt does not participate in nutrition visit. Discussed changes w/FRANCINE and MD.     2/27   EN and water flush adjusted 2/2 hypernatremia and change in feeding window.     2-26: pt resting in bed, on room air, pleasantly confused  Per RN: pt tolerating TF, had 1 dark bm, has been tachypneic, lots of secretions, failed FEES    2-24: per RN pt extubated today, is tolerating TF, possible GIB, is having dark stools, follows commands  Pt resting in bed, on nasal cannula, very weak voice, difficulty talking  + precedex    2-21-25: pt intubated, sedated + fentanyl, versed, D5%@100ml  Per RN: pt tolerating TF, has not had a bm, is " much more alert today, follows commands  Per MD: plan to maintain D5% for 1 more day,  decrease free water to 30ml/hr    : pt intubated, sedated, + fentanyl, versed, D5%@100ml  Per RN: pt has been more alert, oxygen demands decreased, tolerating TF, abdomen still distended, given lactulose (last bm )      Pt intubated, sedated, on fentanyl & VERSED. Spoke with nurse, reported tolerating TF well, has low residuals, and bowels are moving. LBM on 2/15. Noted that Na continues to increase, even after increasing free water on 2/15. Pt may benefit from IV fluid to correct hypernatremia.       Pt intubated, sedated, + fentanyl, propofol 7.56ml, levophed 0.32mcg  Per RN: pt's belly distended, more firm than before, bowel regimen started, marginal UOP, have been toni to wean off asiya and vasopressin    Current Nutrition Prescription     PO: NPO Diet NPO Type: Strict NPO  Oral Nutrition Supplement:  Intake: N/A    EN: Peptamen 1.5  Goal Rate: 20ml/hr  Water Flushes: 30ml/hr  Modular: None  Route: ND  Tube: Small bore    At goal over: 20Hrs/day     Rx will supply:   Goal Volume 400  mL/day     Flush Volume 600 mL/day     Energy 600 Kcal/day 28 % est need   Protein 27 g/day 26 % Est Need   Fiber 0 g/day     Water in   mL     Total Water 908 mL     Meet DRI Yes        --------------------------------------------------------------------------  Product/Rate verified at bedside: No  Infusing Rate at time of visit: held    Average Delivery from [facility] Day:   Volume 319 mL/day   % Goal Vol.   Flush Volume 181 mL/day     Energy  Kcal/day  % Est Need   Protein  g/day  % Est Need   Fiber  g/day     Water in  EN  mL     Total Water  mL     Meet DRI Yes      +129kcal propofoL    Assessment & Plan   Nutrition Diagnosis   Date: 25 Updated: 3/4  Problem Inadequate energy intake    Etiology ARF/Extubated   Signs/Symptoms NPO   Status: Active    Date:   Updated: 3/4  Problem Inadequate oral intake   Etiology  dysphagia   Signs/Symptoms +EN   Status: Active    Goal:   Nutrition to support treatment and Initiate EN, Tolerate EN at goal      Nutrition Intervention      Follow treatment progress, Care plan reviewed, Nutrition support order placed    Restart trophic feeds once pt has BM  Peptamen 1.5 @ 20mL/hr + 30mL/hr FWF  Infused at goal over 20 hrs this regimen provides: 400mL TGV, 600kcal (28% est needs), 27g protein (26% est needs), 0g fiber, 308mL TF FW (+ 600mL flush = 908mL total).   May benefit from MVI with min supplementation to meet RDI    Will advance toward goal rate as appropriate   Adjust flush per clinical status    Consider adding additional supplementation for skin as appropriate    Monitoring/Evaluation:   Per protocol, I&O, Pertinent labs, EN delivery/tolerance, Weight, Skin status, GI status, Symptoms, Hemodynamic stability    Nicky Ceballos MS,RD,LD  Time Spent: 30min

## 2025-03-05 NOTE — PLAN OF CARE
Goal Outcome Evaluation:  Plan of Care Reviewed With: patient        Progress: improving     -Remains intubated on 30% 490/18/8. (40%, PEEP-10 this am). Small thick tan secretions. Sats %.  Sedated-Fentanyl 100mcg. Propofol at 10mcg-titrated for agitation/restlessness. Follows commands. Weak.   -SR with PACs. 55-70s. On Parviz 1-1.5mcg for MAPgoal >65.   -TF on hold for abdominal distension. No BM today. Movantik started. Lactulose dc'd.Active bowel sounds.  -Lubin-570ml Yellow urine with sediments.    HGB-7.4 this am, 8.2 at 1530. K-3.6. Replaced with 40meq IV. Recheck-4.4.    No family present.

## 2025-03-05 NOTE — PROGRESS NOTES
Pharmacy Consult - Vancomycin Dosing and Monitoring    Val Nassar Jr. is a 64 y.o. male receiving vancomycin therapy.     Indication: PNA  Consulting Provider: Case  ID Consult: N/A    Goal AUC: 400-600 mg/L*hr    Current Antimicrobial Therapy  Anti-Infectives (From admission, onward)      Ordered     Dose/Rate Route Frequency Start Stop    03/05/25 0755  vancomycin (VANCOCIN) 1,000 mg in sodium chloride 0.9 % 250 mL IVPB-VTB        Ordering Provider: Lisa London PharmD    1,000 mg  250 mL/hr over 60 Minutes Intravenous Every 24 Hours 03/05/25 2100 03/11/25 2059    03/03/25 1419  !Vancomycin Level Draw Needed Prior to 3/5 0600 dose - please wait for pharmacist consult prior to giving dose  Status:  Discontinued        Ordering Provider: Ludmila Mina, DO     Not Applicable Once 03/05/25 0500 03/04/25 1004    03/04/25 1004  vancomycin 750 mg in sodium chloride 0.9 % 250 mL IVPB-VTB  Status:  Discontinued        Ordering Provider: Lisa London PharmD    750 mg  333.3 mL/hr over 45 Minutes Intravenous Every 12 Hours Scheduled 03/04/25 2100 03/05/25 0755    03/03/25 1419  vancomycin (VANCOCIN) 1,000 mg in sodium chloride 0.9 % 250 mL IVPB-VTB  Status:  Discontinued        Ordering Provider: Ludmila Mina V., DO    1,000 mg  250 mL/hr over 60 Minutes Intravenous Every 12 Hours Scheduled 03/04/25 0500 03/04/25 1004    03/03/25 1419  piperacillin-tazobactam (ZOSYN) 3.375 g IVPB in 100 mL NS MBP (CD)        Ordering Provider: Ludmila Mina V., DO    3.375 g  over 4 Hours Intravenous Every 8 Hours 03/03/25 2100 03/10/25 2059    03/03/25 1419  piperacillin-tazobactam (ZOSYN) 3.375 g IVPB in 100 mL NS MBP (CD)        Ordering Provider: Ludmila Mina V., DO    3.375 g  over 30 Minutes Intravenous Once 03/03/25 1515 03/03/25 1627    03/03/25 1419  vancomycin IVPB 2000 mg in 0.9% Sodium Chloride 500 mL        Ordering Provider: Ludmila Mina., DO    2,000 mg  250 mL/hr over 120 Minutes Intravenous  Once 03/03/25 1500 03/03/25 1846    03/03/25 1419  Pharmacy to dose vancomycin        Ordering Provider: Ludmila Mina, DO     Not Applicable Continuous PRN 03/03/25 1407 03/10/25 1506    03/01/25 1431  riFAXIMin (XIFAXAN) tablet 550 mg        Ordering Provider: Macho Bautista RPH    550 mg Per G Tube Every 12 Hours Scheduled 03/01/25 2100 02/28/26 2059    02/28/25 1440  riFAXIMin (XIFAXAN) tablet 550 mg  Status:  Discontinued        Ordering Provider: Pauly Webster PA    550 mg Oral Every 12 Hours Scheduled 02/28/25 2100 03/01/25 1431    02/16/25 0934  cefepime 2000 mg IVPB in 100 mL NS (MBP)        Ordering Provider: Aristeo Kowalski MD    2,000 mg  over 4 Hours Intravenous Every 8 Hours 02/16/25 1600 02/23/25 2017    02/14/25 1029  cefepime 2000 mg IVPB in 100 mL NS (MBP)  Status:  Discontinued        Ordering Provider: Arpita Ruiz DNP, APRN    2,000 mg  over 4 Hours Intravenous Every 12 Hours 02/14/25 2000 02/16/25 0934    02/14/25 1029  cefepime 2000 mg IVPB in 100 mL NS (MBP)        Ordering Provider: Arpita Ruiz DNP, APRN    2,000 mg  over 30 Minutes Intravenous Once 02/14/25 1115 02/14/25 1209    02/12/25 1109  tobramycin (NEBCIN) 500 mg in sodium chloride 0.9 % 100 mL IVPB        Ordering Provider: Leonel Luz DO    6 mg/kg × 83 kg  over 30 Minutes Intravenous Once 02/12/25 1300 02/12/25 1325    02/12/25 0933  meropenem (MERREM) 1,000 mg in sodium chloride 0.9 % 100 mL MBP        Ordering Provider: Leonel Luz DO    1,000 mg  over 30 Minutes Intravenous Once 02/12/25 1030 02/12/25 1042    02/11/25 1159  vancomycin (dosing per levels)  Status:  Discontinued        Ordering Provider: Saul Morales, PharmD     Not Applicable Daily 02/12/25 0900 02/12/25 1028    02/11/25 1136  piperacillin-tazobactam (ZOSYN) 4.5 g IVPB in 100 mL NS MBP (CD)  Status:  Discontinued        Ordering Provider: Renée Lozano APRN    4.5 g  over 4 Hours Intravenous Every 8 Hours  "02/11/25 1800 02/14/25 1027    02/11/25 1136  Pharmacy to dose vancomycin  Status:  Discontinued        Ordering Provider: Renée Lozano APRN     Not Applicable Continuous PRN 02/11/25 1134 02/12/25 1028    02/11/25 0906  vancomycin IVPB 1750 mg in 0.9% Sodium Chloride (premix) 500 mL        Ordering Provider: Leonel Briggs MD    20 mg/kg × 83 kg  285.7 mL/hr over 105 Minutes Intravenous Once 02/11/25 0922 02/11/25 1216    02/11/25 0906  piperacillin-tazobactam (ZOSYN) 3.375 g IVPB in 100 mL NS MBP (CD)        Ordering Provider: Leonel Briggs MD    3.375 g  over 30 Minutes Intravenous Once 02/11/25 0922 02/11/25 1014          Allergies  Allergies as of 02/11/2025    (No Known Allergies)     Labs  Results from last 7 days   Lab Units 03/05/25  0611 03/04/25  0332 03/02/25  0436   BUN mg/dL 50* 54* 61*   CREATININE mg/dL 1.24 1.27 0.86     Results from last 7 days   Lab Units 03/05/25  0611 03/04/25  0332 03/02/25  0436   WBC 10*3/mm3 26.26* 27.59* 19.09*     Evaluation of Dosing     Last Dose Received in the ED/Outside Facility: N  Is Patient on Dialysis or Renal Replacement: N    Height - 175.3 cm (69.02\")  Weight - 81 kg (178 lb 8 oz)    Estimated Creatinine Clearance: 68 mL/min (by C-G formula based on SCr of 1.24 mg/dL).    I/O last 3 completed shifts:  In: 3763.4 [I.V.:2393.4; Other:181; NG/GT:404; IV Piggyback:785]  Out: 1713 [Urine:1713]    Microbiology and Radiology  Microbiology Results (last 10 days)       Procedure Component Value - Date/Time    MRSA Screen, PCR (Inpatient) - Swab, Nares [[id number]]  (Normal) Collected: 03/04/25 1051    Lab Status: Final result Specimen: Swab from Nares Updated: 03/04/25 1216     MRSA PCR Negative    Narrative:      The negative predictive value of this diagnostic test is high and should only be used to consider de-escalating anti-MRSA therapy. A positive result may indicate colonization with MRSA and must be correlated clinically.  MRSA " Negative    Respiratory Culture - Aspirate, ET Suction [[id number]] Collected: 03/03/25 1645    Lab Status: Preliminary result Specimen: Aspirate from ET Suction Updated: 03/04/25 0931     Respiratory Culture Culture in progress     Gram Stain Few (2+) WBCs per low power field      Few (2+) Epithelial cells per low power field      Moderate (3+) Yeast with hyphae seen    Respiratory Panel PCR w/COVID-19(SARS-CoV-2) [facility]/T[facility]ES/HANH/PAD/COR/OTIS In-House, NP Swab in UTM/VTM, 2 HR TAT - Swab, Nasopharynx [[id number]]  (Normal) Collected: 03/02/25 1717    Lab Status: Final result Specimen: Swab from Nasopharynx Updated: 03/02/25 1825     ADENOVIRUS, PCR Not Detected     Coronavirus 229E Not Detected     Coronavirus HKU1 Not Detected     Coronavirus NL63 Not Detected     Coronavirus OC43 Not Detected     COVID19 Not Detected     Human Metapneumovirus Not Detected     Human Rhinovirus/Enterovirus Not Detected     Influenza A PCR Not Detected     Influenza B PCR Not Detected     Parainfluenza Virus 1 Not Detected     Parainfluenza Virus 2 Not Detected     Parainfluenza Virus 3 Not Detected     Parainfluenza Virus 4 Not Detected     RSV, PCR Not Detected     Bordetella pertussis pcr Not Detected     Bordetella parapertussis PCR Not Detected     Chlamydophila pneumoniae PCR Not Detected     Mycoplasma pneumo by PCR Not Detected    Narrative:      In the setting of a positive respiratory panel with a viral infection PLUS a negative procalcitonin without other underlying concern for bacterial infection, consider observing off antibiotics or discontinuation of antibiotics and continue supportive care. If the respiratory panel is positive for atypical bacterial infection (Bordetella pertussis, Chlamydophila pneumoniae, or Mycoplasma pneumoniae), consider antibiotic de-escalation to target atypical bacterial infection.          Reported Vancomycin Levels  Results from last 7 days   Lab Units 03/05/25  0611   VANCOMYCIN RM mcg/mL 26.00              [facility] AUC Calculation:    Current AUC: 554 mg/L*hr    Predicted Steady State AUC on Current Dose: 703 mg/L*hr  _________________________________    Predicted Steady State AUC on New Dose: 492 mg/L*hr    Assessment/Plan:   PTD vancomycin 2/2 PNA. Goal AUC: 400-600 mg/L*hr.  LD: vancomycin 2000 mg (~24 mg/kg) IV x 1 on 3/3/25.  Patient was receiving vancomycin 1000 mg IV Q12H and was reduced to 750 mg IV Q12H due to change in renal function.  Vanc level 3/5 AM was supratherapeutic at 26.0 mcg/mL. Decreased dose to vancomycin 1000 mg IV Q24H starting tonight at 2100.   Proceed with vancomycin random level w/ AM labs on 3/7/25.  Aforementioned regimen based on age, weight, renal function, & PK calculations.  Patient also receiving Zosyn. De-escalate as clinically indicated.  Pharmacy will continue to follow & adjust regimen as necessary.    Thank you,    Lisa London, PharmD  3/5/2025  10:25 EST

## 2025-03-05 NOTE — PROGRESS NOTES
INTENSIVIST   PROGRESS NOTE     Hospital:  LOS: 22 days     Mr. Val Nassar Jr., 64 y.o. male is followed for a Chief Complaint of: Respiratory Failure      Subjective   S     Interval History:  Remains on mechanical ventilation. FiO2 at 40% and PEEP at 8 this AM. Remains on neosynephrine.        The patient's relevant past medical, surgical and social history were reviewed and updated in Epic as appropriate.      ROS: Unable to obtain secondary to sedation and mechanical ventilation.     Objective   O     Vitals:  Temp  Min: 98.1 °F (36.7 °C)  Max: 100.8 °F (38.2 °C)  BP  Min: 80/49  Max: 137/67  Pulse  Min: 56  Max: 92  Resp  Min: 18  Max: 19  SpO2  Min: 94 %  Max: 100 % No data recorded    Intake/Ouptut 24 hrs (7:00AM - 6:59 AM)  Intake & Output (last 3 days)         02/28 0701  03/01 0700 03/01 0701  03/02 0700 03/02 0701  03/03 0700 03/03 0701  03/04 0700    P.O.   0     I.V. (mL/kg) 387 (4.9)       Blood        Other 1787 2401 881     NG/GT 1503 1751 1484     Total Intake(mL/kg) 3677 (46.7) 4152 (52.2) 2365 (29.2)     Urine (mL/kg/hr) 2550 (1.3) 2150 (1.1) 1900 (1)     Stool 0 0 0     Total Output 2550 2150 1900     Net +1127 +2002 +465             Stool Unmeasured Occurrence 4 x 1 x 6 x             Medications (drips):  fentanyl 10 mcg/mL, Last Rate: 100 mcg/hr (03/05/25 0027)  Pharmacy to dose vancomycin  phenylephrine, Last Rate: 1.5 mcg/kg/min (03/05/25 1109)  propofol, Last Rate: 15 mcg/kg/min (03/05/25 1215)        Mechanical Ventilator Settings:          Resp Rate (Set): 18  Pressure Support (cm H2O): 10 cm H20  FiO2 (%): 40 %  PEEP/CPAP (cm H2O): 8 cm H20    Minute Ventilation (L/min) (Obs): 8.58 L/min  Resp Rate (Observed) Vent: 18  I:E Ratio (Set): 1:2.00  I:E Ratio (Obs): 1:2    PIP Observed (cm H2O): 24 cm H2O  Plateau Pressure (cm H2O): 23 cm H2O    Physical Examination  Telemetry:  Normal sinus rhythm.    Constitutional:  No acute distress.  ETT in place on mechanical ventilation.     Eyes: No scleral icterus.   PERRL, EOM intact.    Neck:  Supple, FROM   Cardiovascular: Normal rate, regular and rhythm. Normal heart sounds.  No murmurs, gallop or rub.   Respiratory: No respiratory distress. Normal respiratory effort.  Diminished. No wheezing.    Abdominal:  Soft. No masses. Nontender. No distension. No HSM.   Extremities: No digital cyanosis. No clubbing.  No peripheral edema.   Skin: No rashes, lesions or ulcers   Neurological:   Sedated. Opens eyes to stimulation. Does not follow commands.              Results from last 7 days   Lab Units 03/05/25  0611 03/04/25  2340 03/04/25  1513 03/04/25  0832 03/04/25  0332 03/02/25  1656 03/02/25  0436   WBC 10*3/mm3 26.26*  --   --   --  27.59*  --  19.09*   HEMOGLOBIN g/dL 7.4* 7.9* 8.1*   < > 9.3*   < > 8.7*   MCV fL 100.4*  --   --   --  100.7*  --  96.1   PLATELETS 10*3/mm3 240  --   --   --  256  --  174    < > = values in this interval not displayed.     Results from last 7 days   Lab Units 03/05/25  0611 03/04/25  1513 03/04/25  0332 03/02/25  0436   SODIUM mmol/L 148*  --  140 143   POTASSIUM mmol/L 3.6 4.8 3.5 3.9   CO2 mmol/L 19.0*  --  19.0* 26.0   CREATININE mg/dL 1.24  --  1.27 0.86   GLUCOSE mg/dL 116*  --  113* 138*   MAGNESIUM mg/dL 1.9  --  2.0 2.5*   PHOSPHORUS mg/dL 4.1  --  5.1*  --      Estimated Creatinine Clearance: 68 mL/min (by C-G formula based on SCr of 1.24 mg/dL).  Results from last 7 days   Lab Units 03/04/25 0332 03/02/25  0436 03/01/25  0417   ALK PHOS U/L 147* 127* 126*   BILIRUBIN mg/dL 0.6 0.5 0.4   BILIRUBIN DIRECT mg/dL  --   --  0.2   ALT (SGPT) U/L 49* 60* 64*   AST (SGOT) U/L 43* 46* 47*       Results from last 7 days   Lab Units 03/05/25  0346 03/04/25  0320 03/03/25  1431   PH, ARTERIAL pH units 7.424 7.422 7.489*   PCO2, ARTERIAL mm Hg 32.8* 37.4 37.7   PO2 ART mm Hg 95.4 91.7 220.0*   FIO2 % 40 60 100       Images:  Imaging Results (Last 24 Hours)       Procedure Component Value Units Date/Time    XR Chest  1 View [[id number]] Collected: 03/05/25 0134     Updated: 03/05/25 0138    Narrative:      XR ABDOMEN KUB, XR CHEST 1 VW    Date of Exam: 3/5/2025 1:22 AM EST    Indication: Keofeed placement    Comparison: 3/2/2025.    Findings:  Right internal jugular Mediport in place. There are no airspace consolidations. Small left-sided pleural effusion present.. No pneumothorax. The pulmonary vasculature appears within normal limits. The cardiac and mediastinal silhouette appear   unremarkable. No acute osseous abnormality identified.    Enteric tube tip within the distal portion of the third portion of the duodenum. Nonobstructive bowel gas pattern. Gassy colon. No evidence of pneumatosis or free air.      Impression:      Impression:  Enteric tube tip within the distal portion of the third portion of the duodenum. Small left-sided pleural effusion present.          Electronically Signed: Natalie Abdi MD    3/5/2025 1:35 AM EST    Workstation ID: [id number]    XR Abdomen KUB [[id number]] Collected: 03/05/25 0134     Updated: 03/05/25 0138    Narrative:      XR ABDOMEN KUB, XR CHEST 1 VW    Date of Exam: 3/5/2025 1:22 AM EST    Indication: Keofeed placement    Comparison: 3/2/2025.    Findings:  Right internal jugular Mediport in place. There are no airspace consolidations. Small left-sided pleural effusion present.. No pneumothorax. The pulmonary vasculature appears within normal limits. The cardiac and mediastinal silhouette appear   unremarkable. No acute osseous abnormality identified.    Enteric tube tip within the distal portion of the third portion of the duodenum. Nonobstructive bowel gas pattern. Gassy colon. No evidence of pneumatosis or free air.      Impression:      Impression:  Enteric tube tip within the distal portion of the third portion of the duodenum. Small left-sided pleural effusion present.          Electronically Signed: Natalie Abdi MD    3/5/2025 1:35 AM EST    Workstation ID: [id number]                Results: Reviewed.  I reviewed the patient's new laboratory and imaging results.  I independently reviewed the patient's new images.    Medications: Reviewed.    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 He was noted to be septic secondary to a UTI and was admitted to the ICU. Urine and blood cultures grew Pseudomonas and he completed treatment with Cefepime x 10 days. He decompensated and required intubation on 2/12/25. Bronchoscopy was performed on 2/17/25 for mucous plugging. He was able to be extubated on 2/26/25.     He was transferred to telemetry. He developed worsening anemia and required transfusion of 2 units of pRBCs. GI was consulted and he was taken for EGD on 3/3/25 by Dr. Mckeon. EGD showed 2 small telangiectasias in the lower third of the esophagus suspected to be secondary to prior radiation. Argon was used for coagulation for bleeding prevention.     After the EGD, he was transferred to the recovery area. He was noted to be hypoxic on a nonrebreather and he was intubated by anesthesia. After intubation, he remained hypoxic requiring high FiO2 and PEEP. He was transferred back to the ICU.     This AM, FiO2 is at 40% and PEEP at 8. He remains on Neosynephrine.     Nutrition:   NPO Diet NPO Type: Strict NPO  Advance Directives:   Code Status and Medical Interventions: CPR (Attempt to Resuscitate); Full Support   Ordered at: 02/12/25 1029     Level Of Support Discussed With:    Patient     Code Status (Patient has no pulse and is not breathing):    CPR (Attempt to Resuscitate)     Medical Interventions (Patient has pulse or is breathing):    Full Support       Active Hospital Problems    Diagnosis     **Septic shock due to Pseudomonas species     Pneumonia of both lower lobes due to  Pseudomonas species     Bacteremia due to Pseudomonas     GALILEO (acute kidney injury)     Metabolic acidosis     Cirrhosis of liver     Squamous cell carcinoma of esophagus     Hyperlipidemia, unspecified     Tobacco use        Assessment / Plan:    Continue mechanical ventilation. Titrate FiO2 and PEEP.   Sedation with Propofol and Fentanyl. Wean as tolerated. May need to add Precedex.   Titrate Neosynephrine.   Monitor H/H with transfusion as needed.   Continue empiric antibiotics for possible pneumonia. F/u respiratory culture.   Continue oswald catheter. Typically self-cath prior to this admission.   Speech followed for dysphagia prior to intubation and has an NGT in place.  Trophic tube feeds on hold secondary to gastric distention.   Lovenox on hold secondary to concern for bleeding. Continue to hold as H/H decreased this AM. Will need anticoagulation for Afib in the future.   Protonix for GI ppx.   Rifaximin for HE. Stop Lactulose secondary to increased bowel gas. Repeat ammonia level in the AM.   Add Movantik to bowel regimen.   AM labs and CXR    Critically ill secondary to acute hypoxic respiratory failure.     High level of risk due to:  severe exacerbation of chronic illness and illness with threat to life or bodily function.    Plan of care and goals reviewed during interdisciplinary rounds.  I discussed the patient's findings and my recommendations with nursing staff    Critical Care Time: was greater than 31 minutes.(This excludes time spent performing separately reportable procedures and services). including high complexity decision making to assess, manipulate, and support vital organ system failure in this individual who has impairment of one or more vital organ systems such that there is a high probability of imminent or life threatening deterioration in the patient’s condition.      Ludmila Mina, DO    Intensive Care Medicine and Pulmonary Medicine

## 2025-03-05 NOTE — PLAN OF CARE
Goal Outcome Evaluation:  Plan of Care Reviewed With: patient        Progress:   Outcome Evaluation:   -Pt remains intubated w/ PEEP 10, FiO2 40%  -Sedated w/ Fent @ 100, Prop @ 10  -Parviz weaned to 1  -Following commands, pupils equal, BAUER  -ET secretions from [id number] large, tan/creamy, suspicious for TF.  APRN notified, KUB obtained (keofeed in duodenum per XR).  TF has been on hold since 0000.  - mL, No BM

## 2025-03-05 NOTE — CASE MANAGEMENT/SOCIAL WORK
Continued Stay Note   Neal     Patient Name: Val Nassar Jr.  MRN: [id number]  Today's Date: 3/5/2025    Admit Date: 2/11/2025    Plan: DC TBd, goal is to return to LTC bed @ [facility]   Discharge Plan       Row Name 03/05/25 1121       Plan    Plan DC TBd, goal is to return to LTC bed @ [facility]    Patient/Family in Agreement with Plan other (see comments)    Plan Comments Discussed in MDR, remains intubated/sedated on pressors/MV, weaning pressors, po meds to assist w/weaning sedation. DC TBD, goal is to return to LTC bed @ [facility]. CM will continue to follow.    Final Discharge Disposition Code 04 - intermediate care facility                   Discharge Codes    No documentation.                 Expected Discharge Date and Time       Expected Discharge Date Expected Discharge Time    Mar 11, 2025               Nikolas Hernandez RN

## 2025-03-06 ENCOUNTER — APPOINTMENT (OUTPATIENT)
Dept: GENERAL RADIOLOGY | Facility: HOSPITAL | Age: 65
DRG: 870 | End: 2025-03-06
Payer: MEDICARE

## 2025-03-06 LAB
AMMONIA BLD-SCNC: 51 UMOL/L (ref 16–60)
ANION GAP SERPL CALCULATED.3IONS-SCNC: 12 MMOL/L (ref 5–15)
ARTERIAL PATENCY WRIST A: ABNORMAL
ATMOSPHERIC PRESS: ABNORMAL MM[HG]
BASE EXCESS BLDA CALC-SCNC: -1.3 MMOL/L (ref 0–2)
BDY SITE: ABNORMAL
BODY TEMPERATURE: 37
BUN SERPL-MCNC: 34 MG/DL (ref 8–23)
BUN/CREAT SERPL: 34.3 (ref 7–25)
CALCIUM SPEC-SCNC: 8.3 MG/DL (ref 8.6–10.5)
CHLORIDE SERPL-SCNC: 119 MMOL/L (ref 98–107)
CO2 BLDA-SCNC: 22.7 MMOL/L (ref 22–33)
CO2 SERPL-SCNC: 20 MMOL/L (ref 22–29)
COHGB MFR BLD: 1.6 % (ref 0–2)
CREAT SERPL-MCNC: 0.99 MG/DL (ref 0.76–1.27)
DEPRECATED RDW RBC AUTO: 70.3 FL (ref 37–54)
EGFRCR SERPLBLD CKD-EPI 2021: 84.5 ML/MIN/1.73
EPAP: 0
ERYTHROCYTE [DISTWIDTH] IN BLOOD BY AUTOMATED COUNT: 18.8 % (ref 12.3–15.4)
GLUCOSE BLDC GLUCOMTR-MCNC: 84 MG/DL (ref 70–130)
GLUCOSE BLDC GLUCOMTR-MCNC: 90 MG/DL (ref 70–130)
GLUCOSE BLDC GLUCOMTR-MCNC: 92 MG/DL (ref 70–130)
GLUCOSE SERPL-MCNC: 88 MG/DL (ref 65–99)
HCO3 BLDA-SCNC: 21.8 MMOL/L (ref 20–26)
HCT VFR BLD AUTO: 25.7 % (ref 37.5–51)
HCT VFR BLD AUTO: 26 % (ref 37.5–51)
HCT VFR BLD AUTO: 26.1 % (ref 37.5–51)
HCT VFR BLD CALC: 21.9 % (ref 38–51)
HGB BLD-MCNC: 7.5 G/DL (ref 13–17.7)
HGB BLD-MCNC: 7.7 G/DL (ref 13–17.7)
HGB BLD-MCNC: 7.8 G/DL (ref 13–17.7)
HGB BLDA-MCNC: 7.1 G/DL (ref 13.5–17.5)
INHALED O2 CONCENTRATION: 30 %
IPAP: 0
MAGNESIUM SERPL-MCNC: 2.4 MG/DL (ref 1.6–2.4)
MCH RBC QN AUTO: 30.7 PG (ref 26.6–33)
MCHC RBC AUTO-ENTMCNC: 29.6 G/DL (ref 31.5–35.7)
MCV RBC AUTO: 103.6 FL (ref 79–97)
METHGB BLD QL: 0.4 % (ref 0–1.5)
MODALITY: ABNORMAL
OXYHGB MFR BLDV: 97.3 % (ref 94–99)
PAW @ PEAK INSP FLOW SETTING VENT: 0 CMH2O
PCO2 BLDA: 29 MM HG (ref 35–45)
PCO2 TEMP ADJ BLD: 29 MM HG (ref 35–48)
PEEP RESPIRATORY: 8 CM[H2O]
PH BLDA: 7.48 PH UNITS (ref 7.35–7.45)
PH, TEMP CORRECTED: 7.48 PH UNITS
PHOSPHATE SERPL-MCNC: 3 MG/DL (ref 2.5–4.5)
PLATELET # BLD AUTO: 220 10*3/MM3 (ref 140–450)
PMV BLD AUTO: 9.9 FL (ref 6–12)
PO2 BLDA: 110 MM HG (ref 83–108)
PO2 TEMP ADJ BLD: 110 MM HG (ref 83–108)
POTASSIUM SERPL-SCNC: 3.9 MMOL/L (ref 3.5–5.2)
PROCALCITONIN SERPL-MCNC: 1.02 NG/ML (ref 0–0.25)
RBC # BLD AUTO: 2.51 10*6/MM3 (ref 4.14–5.8)
SODIUM SERPL-SCNC: 151 MMOL/L (ref 136–145)
TOTAL RATE: 18 BREATHS/MINUTE
VENTILATOR MODE: ABNORMAL
VT ON VENT VENT: 0.49 ML
WBC NRBC COR # BLD AUTO: 16.71 10*3/MM3 (ref 3.4–10.8)

## 2025-03-06 PROCEDURE — 25010000002 PHENYLEPHRINE 10 MG/ML SOLUTION 5 ML VIAL

## 2025-03-06 PROCEDURE — 85027 COMPLETE CBC AUTOMATED: CPT | Performed by: INTERNAL MEDICINE

## 2025-03-06 PROCEDURE — 84145 PROCALCITONIN (PCT): CPT

## 2025-03-06 PROCEDURE — 82805 BLOOD GASES W/O2 SATURATION: CPT

## 2025-03-06 PROCEDURE — 94799 UNLISTED PULMONARY SVC/PX: CPT

## 2025-03-06 PROCEDURE — 25810000003 SODIUM CHLORIDE 0.9 % SOLUTION 250 ML FLEX CONT

## 2025-03-06 PROCEDURE — 83735 ASSAY OF MAGNESIUM: CPT | Performed by: INTERNAL MEDICINE

## 2025-03-06 PROCEDURE — 84100 ASSAY OF PHOSPHORUS: CPT | Performed by: INTERNAL MEDICINE

## 2025-03-06 PROCEDURE — 82375 ASSAY CARBOXYHB QUANT: CPT

## 2025-03-06 PROCEDURE — 83050 HGB METHEMOGLOBIN QUAN: CPT

## 2025-03-06 PROCEDURE — 94761 N-INVAS EAR/PLS OXIMETRY MLT: CPT

## 2025-03-06 PROCEDURE — 85018 HEMOGLOBIN: CPT | Performed by: INTERNAL MEDICINE

## 2025-03-06 PROCEDURE — 36600 WITHDRAWAL OF ARTERIAL BLOOD: CPT

## 2025-03-06 PROCEDURE — 25010000003 DEXTROSE 5 % SOLUTION: Performed by: INTERNAL MEDICINE

## 2025-03-06 PROCEDURE — 85014 HEMATOCRIT: CPT | Performed by: INTERNAL MEDICINE

## 2025-03-06 PROCEDURE — 71045 X-RAY EXAM CHEST 1 VIEW: CPT

## 2025-03-06 PROCEDURE — 94003 VENT MGMT INPAT SUBQ DAY: CPT

## 2025-03-06 PROCEDURE — 82948 REAGENT STRIP/BLOOD GLUCOSE: CPT

## 2025-03-06 PROCEDURE — 94668 MNPJ CHEST WALL SBSQ: CPT

## 2025-03-06 PROCEDURE — 25010000002 PIPERACILLIN SOD-TAZOBACTAM PER 1 G: Performed by: INTERNAL MEDICINE

## 2025-03-06 PROCEDURE — 99291 CRITICAL CARE FIRST HOUR: CPT | Performed by: INTERNAL MEDICINE

## 2025-03-06 PROCEDURE — 82140 ASSAY OF AMMONIA: CPT | Performed by: INTERNAL MEDICINE

## 2025-03-06 PROCEDURE — 80048 BASIC METABOLIC PNL TOTAL CA: CPT | Performed by: INTERNAL MEDICINE

## 2025-03-06 RX ORDER — DEXMEDETOMIDINE HYDROCHLORIDE 4 UG/ML
.2-1.5 INJECTION, SOLUTION INTRAVENOUS
Status: DISCONTINUED | OUTPATIENT
Start: 2025-03-06 | End: 2025-03-08

## 2025-03-06 RX ORDER — DEXTROSE MONOHYDRATE 50 MG/ML
50 INJECTION, SOLUTION INTRAVENOUS CONTINUOUS
Status: DISCONTINUED | OUTPATIENT
Start: 2025-03-06 | End: 2025-03-07

## 2025-03-06 RX ORDER — OXYCODONE AND ACETAMINOPHEN 7.5; 325 MG/1; MG/1
1 TABLET ORAL EVERY 6 HOURS PRN
Status: DISCONTINUED | OUTPATIENT
Start: 2025-03-06 | End: 2025-03-07

## 2025-03-06 RX ORDER — FENTANYL 25 UG/1
1 PATCH TRANSDERMAL
Status: DISCONTINUED | OUTPATIENT
Start: 2025-03-06 | End: 2025-03-12 | Stop reason: HOSPADM

## 2025-03-06 RX ADMIN — GABAPENTIN 300 MG: 300 CAPSULE ORAL at 15:14

## 2025-03-06 RX ADMIN — NYSTATIN: 100000 POWDER TOPICAL at 21:45

## 2025-03-06 RX ADMIN — CHLORHEXIDINE GLUCONATE 15 ML: 1.2 SOLUTION ORAL at 21:36

## 2025-03-06 RX ADMIN — FENTANYL 1 PATCH: 25 PATCH TRANSDERMAL at 11:39

## 2025-03-06 RX ADMIN — DEXMEDETOMIDINE HYDROCHLORIDE 0.2 MCG/KG/HR: 400 INJECTION INTRAVENOUS at 10:12

## 2025-03-06 RX ADMIN — GABAPENTIN 300 MG: 300 CAPSULE ORAL at 05:23

## 2025-03-06 RX ADMIN — Medication 1 APPLICATION: at 18:30

## 2025-03-06 RX ADMIN — GABAPENTIN 300 MG: 300 CAPSULE ORAL at 21:36

## 2025-03-06 RX ADMIN — TERAZOSIN HYDROCHLORIDE 1 MG: 1 CAPSULE ORAL at 09:22

## 2025-03-06 RX ADMIN — QUETIAPINE FUMARATE 25 MG: 25 TABLET ORAL at 09:22

## 2025-03-06 RX ADMIN — PHENYLEPHRINE HYDROCHLORIDE 1 MCG/KG/MIN: 10 INJECTION INTRAVENOUS at 04:06

## 2025-03-06 RX ADMIN — PIPERACILLIN AND TAZOBACTAM 3.38 G: 3; .375 INJECTION, POWDER, LYOPHILIZED, FOR SOLUTION INTRAVENOUS at 21:33

## 2025-03-06 RX ADMIN — PIPERACILLIN AND TAZOBACTAM 3.38 G: 3; .375 INJECTION, POWDER, LYOPHILIZED, FOR SOLUTION INTRAVENOUS at 15:14

## 2025-03-06 RX ADMIN — OXYCODONE AND ACETAMINOPHEN 1 TABLET: 7.5; 325 TABLET ORAL at 11:40

## 2025-03-06 RX ADMIN — PANTOPRAZOLE SODIUM 40 MG: 40 INJECTION, POWDER, FOR SOLUTION INTRAVENOUS at 09:23

## 2025-03-06 RX ADMIN — Medication 10 ML: at 21:37

## 2025-03-06 RX ADMIN — QUETIAPINE FUMARATE 25 MG: 25 TABLET ORAL at 21:36

## 2025-03-06 RX ADMIN — ROSUVASTATIN 20 MG: 20 TABLET, FILM COATED ORAL at 21:36

## 2025-03-06 RX ADMIN — MUPIROCIN 1 APPLICATION: 20 OINTMENT TOPICAL at 21:36

## 2025-03-06 RX ADMIN — DOCUSATE SODIUM 100 MG: 50 LIQUID ORAL at 21:36

## 2025-03-06 RX ADMIN — CHLORHEXIDINE GLUCONATE 15 ML: 1.2 SOLUTION ORAL at 09:23

## 2025-03-06 RX ADMIN — PANTOPRAZOLE SODIUM 40 MG: 40 INJECTION, POWDER, FOR SOLUTION INTRAVENOUS at 21:35

## 2025-03-06 RX ADMIN — Medication 10 ML: at 09:23

## 2025-03-06 RX ADMIN — RIFAXIMIN 550 MG: 550 TABLET ORAL at 21:36

## 2025-03-06 RX ADMIN — DEXTROSE MONOHYDRATE 50 ML/HR: 5 INJECTION, SOLUTION INTRAVENOUS at 15:09

## 2025-03-06 RX ADMIN — POLYETHYLENE GLYCOL 3350 17 G: 17 POWDER, FOR SOLUTION ORAL at 09:22

## 2025-03-06 RX ADMIN — IPRATROPIUM BROMIDE AND ALBUTEROL SULFATE 3 ML: 2.5; .5 SOLUTION RESPIRATORY (INHALATION) at 11:17

## 2025-03-06 RX ADMIN — NALOXEGOL OXALATE 25 MG: 25 TABLET, FILM COATED ORAL at 06:40

## 2025-03-06 RX ADMIN — MUPIROCIN 1 APPLICATION: 20 OINTMENT TOPICAL at 09:21

## 2025-03-06 RX ADMIN — ESCITALOPRAM OXALATE 10 MG: 10 TABLET ORAL at 21:36

## 2025-03-06 RX ADMIN — FINASTERIDE 5 MG: 5 TABLET, FILM COATED ORAL at 09:22

## 2025-03-06 RX ADMIN — IPRATROPIUM BROMIDE AND ALBUTEROL SULFATE 3 ML: 2.5; .5 SOLUTION RESPIRATORY (INHALATION) at 19:11

## 2025-03-06 RX ADMIN — PIPERACILLIN AND TAZOBACTAM 3.38 G: 3; .375 INJECTION, POWDER, LYOPHILIZED, FOR SOLUTION INTRAVENOUS at 05:23

## 2025-03-06 RX ADMIN — IPRATROPIUM BROMIDE AND ALBUTEROL SULFATE 3 ML: 2.5; .5 SOLUTION RESPIRATORY (INHALATION) at 07:14

## 2025-03-06 RX ADMIN — Medication 1 APPLICATION: at 21:36

## 2025-03-06 RX ADMIN — RIFAXIMIN 550 MG: 550 TABLET ORAL at 09:22

## 2025-03-06 RX ADMIN — NYSTATIN: 100000 POWDER TOPICAL at 05:23

## 2025-03-06 RX ADMIN — DOCUSATE SODIUM 100 MG: 50 LIQUID ORAL at 09:22

## 2025-03-06 RX ADMIN — IPRATROPIUM BROMIDE AND ALBUTEROL SULFATE 3 ML: 2.5; .5 SOLUTION RESPIRATORY (INHALATION) at 15:20

## 2025-03-06 NOTE — PROGRESS NOTES
Clinical Nutrition     Patient Name: Val Nassar Jr.  YOB: 1960  MRN: [id number]  Date of Encounter: 03/06/25 09:49 EST  Admission date: 2/11/2025  Reason for Visit: Follow-up protocol, EN    Assessment   Nutrition Assessment   Admission Diagnosis:  Septic shock [A41.9, R65.21]    Problem List:    Septic shock due to Pseudomonas species    Hyperlipidemia, unspecified    Tobacco use    Cirrhosis of liver    Squamous cell carcinoma of esophagus    GALILEO (acute kidney injury)    Metabolic acidosis    Pneumonia of both lower lobes due to Pseudomonas species    Bacteremia due to Pseudomonas      PMH:   He  has a past medical history of Anemia, Cancer, Cirrhosis, Duodenal ulcer, Gastric ulcer, GERD (gastroesophageal reflux disease), History of alcohol abuse, History of radiation therapy (05/08/2024), HLD (hyperlipidemia), and Mood disorder.    PSH:  He  has a past surgical history that includes Esophagogastroduodenoscopy (N/A, 12/26/2023); Venous Access Device (Port) (Right, 04/25/2024); and Esophagogastroduodenoscopy (N/A, 3/3/2025).    Substance history: Etoh abuse, vaping    Applicable Nutrition History:   Skin:  posterior thigh, gluteal skin tears, scrotal MASD, blisters   WOC following    (2/12) ARF/VENT  (2/17) s/p Bronch  (2/24) Extubated   (2/26) SLP Diet Recommendation: NPO, temporary alternate methods of nutrition/hydration   (3/3) s/p EGD  two small telangiectasias in the lower third of the esophagus   Intubated  Transferred to ICU    Labs    Labs Reviewed: Yes    Results from last 7 days   Lab Units 03/06/25  1042 03/05/25  1522 03/05/25  0611 03/04/25  1513 03/04/25  0332 03/02/25  0436 03/01/25  0417   GLUCOSE mg/dL 88  --  116*  --  113* 138* 129*   BUN mg/dL 34*  --  50*  --  54* 61* 74*   CREATININE mg/dL 0.99  --  1.24  --  1.27 0.86 0.91   SODIUM mmol/L 151*  --  148*  --  140 143 149*   CHLORIDE mmol/L 119*  --  116*  --  106 106 111*   POTASSIUM mmol/L  3.9 4.4 3.6   < > 3.5 3.9 4.0   PHOSPHORUS mg/dL 3.0  --  4.1  --  5.1*  --   --    MAGNESIUM mg/dL 2.4  --  1.9  --  2.0 2.5* 1.7   ALT (SGPT) U/L  --   --   --   --  49* 60* 64*    < > = values in this interval not displayed.       Results from last 7 days   Lab Units 03/04/25  0332 03/02/25  0436 03/01/25  0417   ALBUMIN g/dL 2.4* 2.7* 2.8*   CRP mg/dL  --  3.36* 1.95*       Results from last 7 days   Lab Units 03/06/25  0530 03/05/25  2331 03/05/25  1733 03/05/25  1218 03/05/25  0518 03/04/25  2356   GLUCOSE mg/dL 90 85 84 103 126 125     Lab Results   Lab Value Date/Time    HGBA1C 4.6 (L) 12/25/2023 1925         Results from last 7 days   Lab Units 03/02/25  0436   PROBNP pg/mL 631.0         Medications    Medications Reviewed: yes    Scheduled Meds:castor oil-balsam peru, 1 Application, Topical, Q12H  chlorhexidine, 15 mL, Mouth/Throat, Q12H  docusate sodium, 100 mg, Per G Tube, BID  escitalopram, 10 mg, Per G Tube, Nightly  finasteride, 5 mg, Per G Tube, Daily  gabapentin, 300 mg, Per G Tube, Q8H  insulin regular, 2-7 Units, Subcutaneous, Q6H  ipratropium-albuterol, 3 mL, Nebulization, 4x Daily - RT  [Held by provider] metoprolol tartrate, 50 mg, Per G Tube, Q12H  mupirocin, 1 Application, Each Nare, BID  Naloxegol Oxalate, 25 mg, Per G Tube, QAM  nystatin, , Topical, Q8H  pantoprazole, 40 mg, Intravenous, Q12H  piperacillin-tazobactam, 3.375 g, Intravenous, Q8H  polyethylene glycol, 17 g, Per G Tube, Daily  QUEtiapine, 25 mg, Per G Tube, Q12H  rifAXIMin, 550 mg, Per G Tube, Q12H  rosuvastatin, 20 mg, Per G Tube, Nightly  sodium chloride, 10 mL, Intravenous, Q12H  terazosin, 1 mg, Per G Tube, Daily  vancomycin, 1,000 mg, Intravenous, Q24H      Continuous Infusions:fentanyl 10 mcg/mL,  mcg/hr, Last Rate: 100 mcg/hr (03/06/25 0440)  Pharmacy to dose vancomycin,   phenylephrine, 0.5-3 mcg/kg/min, Last Rate: 0.5 mcg/kg/min (03/06/25 0907)  propofol, 5-50 mcg/kg/min, Last Rate: Stopped (03/06/25  "8505)        PRN Meds:.  acetaminophen    Calcium Replacement - Follow Nurse / BPA Driven Protocol    dextrose    fentaNYL    glucagon (human recombinant)    ipratropium-albuterol    Magnesium Cardiology Dose Replacement - Follow Nurse / BPA Driven Protocol    metoprolol tartrate    Pharmacy to dose vancomycin    polyvinyl alcohol    Potassium Replacement - Follow Nurse / BPA Driven Protocol    sodium chloride    sodium chloride    sodium chloride    Intake/Ouptut 24 hrs (0701 - 0700)   I&O's Reviewed: yes    Intake & Output (last day)         03/05 0701 03/06 0700 03/06 0701 03/07 0700    I.V. (mL/kg) 1540 (19)     Other      NG/     IV Piggyback 450     Total Intake(mL/kg) 2305 (28.5)     Urine (mL/kg/hr) 1355 (0.7) 250 (1.1)    Stool      Total Output 1355 250    Net +950 -250                 Anthropometrics     Height: Height: 175.3 cm (69.02\")  Last Filed Weight: Weight: 81 kg (178 lb 8 oz) (03/03/25 0540)  Method: Weight Method: Bed scalebedscale  BMI: BMI (Calculated): 26.3    UBW: ?  Weight change:  per EMR ~ 6lb wt gain since January     Weight       Weight (kg) Weight (lbs) Weight Method Visit Report   4/23/2024 75.841 kg  167 lb 3.2 oz   --    4/25/2024 75.751 kg  167 lb  Stated     4/29/2024 76.658 kg  169 lb      4/30/2024 75.932 kg  167 lb 6.4 oz   --    5/6/2024 74.889 kg  165 lb 1.6 oz   --     74.98 kg  165 lb 4.8 oz   --    5/7/2024 76.25 kg  168 lb 1.6 oz   --    5/14/2024 77.111 kg  170 lb   --    5/21/2024 75.297 kg  166 lb      6/3/2024 74.98 kg  165 lb 4.8 oz   --    6/18/2024 74.39 kg  164 lb   --    7/5/2024 74.4 kg  164 lb 0.4 oz  Estimated     7/16/2024 77.565 kg  171 lb   --    8/7/2024 77.565 kg  171 lb   --    8/19/2024 77.656 kg  171 lb 3.2 oz   --    9/19/2024 77.565 kg  171 lb      10/24/2024 79.833 kg  176 lb   --    11/14/2024 79.833 kg  176 lb   --    11/26/2024 78.926 kg  174 lb      12/18/2024 79.833 kg  176 lb   --    1/21/2025 79.833 kg  176 lb  Stated     1/23/2025 " "79.833 kg  176 lb   --    2/3/2025 83.008 kg  183 lb   --    2/11/2025 83.008 kg  183 lb       83 kg  182 lb 15.7 oz          Needs Assessment   Date: 2/28; Reassessed: 3/4    Height used:Height: 175.3 cm (69.02\")  Weights used: 81kg CBW    Estimated Calorie needs: ~2175kcal   Method:  21-24 Kcals/KG = 2237-1017  Method:  PSU = 2174  Ve: 12.3; Tmax: 38.8; MSJ: 1588    Estimated Protein needs: ~ 105g protein   Method: 1.2-1.5g/kg protein:     Nutrition Focused Physical Exam     Date: 2-26-25    Pt does not meet criteria for malnutrition diagnosis, at this time.      Subjective   Reported/Observed/Food/Nutrition Related History:   3/6  Intubated/sedated + propofoL. Sm BM noted following addition of movantik. TF held for >24hrs. Hypernatremia noted. OK to restart feeds today.     3/5  Pt remains intubated/sedated. TF held overnight following concern for aspiration. Keofeed duodenum- unlikely aspiration. +constipation - initiate aggressive bowel regimen; hold lactulose 2/2 gaseous colonic distention; start movantik.    3/4  Pt intubated following EGD yesterday. Sedated - propofoL 4.86mL/hr). TF held overnight - ok to restart nutrition support. Continues on bowel regimen - no BM.   Per RN: blisters improving, WOC following.     3/3  Pt OOR for EGD this am. Spoke w/RN who reports pt tolerating EN. Hypernatremia resolved.     2/28  Pt continues w/hypernatremia. Increased water flush again. Pt does not participate in nutrition visit. Discussed changes w/FRANCINE and MD.     2/27   EN and water flush adjusted 2/2 hypernatremia and change in feeding window.     2-26: pt resting in bed, on room air, pleasantly confused  Per RN: pt tolerating TF, had 1 dark bm, has been tachypneic, lots of secretions, failed FEES    2-24: per RN pt extubated today, is tolerating TF, possible GIB, is having dark stools, follows commands  Pt resting in bed, on nasal cannula, very weak voice, difficulty talking  + precedex    2-21-25: pt " intubated, sedated + fentanyl, versed, D5%@100ml  Per RN: pt tolerating TF, has not had a bm, is much more alert today, follows commands  Per MD: plan to maintain D5% for 1 more day,  decrease free water to 30ml/hr    2-19: pt intubated, sedated, + fentanyl, versed, D5%@100ml  Per RN: pt has been more alert, oxygen demands decreased, tolerating TF, abdomen still distended, given lactulose (last bm 2-17)    2/17  Pt intubated, sedated, on fentanyl & VERSED. Spoke with nurse, reported tolerating TF well, has low residuals, and bowels are moving. LBM on 2/15. Noted that Na continues to increase, even after increasing free water on 2/15. Pt may benefit from IV fluid to correct hypernatremia.     2/13  Pt intubated, sedated, + fentanyl, propofol 7.56ml, levophed 0.32mcg  Per RN: pt's belly distended, more firm than before, bowel regimen started, marginal UOP, have been toni to wean off asiya and vasopressin    Current Nutrition Prescription     PO: NPO Diet NPO Type: Strict NPO  Oral Nutrition Supplement:  Intake: N/A    EN: Peptamen 1.5  Goal Rate: 20ml/hr  Water Flushes: 30ml/hr  Modular: None  Route: ND  Tube: Small bore    At goal over: 20Hrs/day     Rx will supply:   Goal Volume 400  mL/day     Flush Volume 600 mL/day     Energy 600 Kcal/day 28 % est need   Protein 27 g/day 26 % Est Need   Fiber 0 g/day     Water in   mL     Total Water 908 mL     Meet DRI Yes        --------------------------------------------------------------------------  Product/Rate verified at bedside: No  Infusing Rate at time of visit: held    Average Delivery from Charting: Insufficient data - TF held   Volume  mL/day   % Goal Vol.   Flush Volume  mL/day     Energy  Kcal/day  % Est Need   Protein  g/day  % Est Need   Fiber  g/day     Water in  EN  mL     Total Water  mL     Meet DRI Yes      +178kcal propofoL    Assessment & Plan   Nutrition Diagnosis   Date: 2-13-25 Updated: 3/4  Problem Inadequate energy intake    Etiology  ARF/Extubated   Signs/Symptoms NPO   Status: Active    Date:  2/28 Updated: 3/4, 3/6  Problem Inadequate oral intake   Etiology dysphagia   Signs/Symptoms +EN   Status: Active restarted EN    Goal:   Nutrition to support treatment and Adjust EN      Nutrition Intervention      Follow treatment progress, Care plan reviewed, Nutrition support order placed    Restart trophic feeds  Peptamen 1.5 @ 20mL/hr + 30mL/hr FWF  Infused at goal over 20 hrs this regimen provides: 400mL TGV, 600kcal (28% est needs), 27g protein (26% est needs), 0g fiber, 308mL TF FW (+ 600mL flush = 908mL total).   May benefit from MVI with min supplementation to meet RDI    Will advance toward goal rate as appropriate   Adjust flush per clinical status  May need to increase FWF to 50mL/hr if Na doesn't rebound    Consider adding additional supplementation for skin as appropriate    Monitoring/Evaluation:   Per protocol, I&O, Pertinent labs, EN delivery/tolerance, Weight, Skin status, GI status, Symptoms, Hemodynamic stability    Nicky Ceballos, MS,RD,LD  Time Spent: 30min

## 2025-03-06 NOTE — PLAN OF CARE
Goal Outcome Evaluation:                                            Vent settings unchanged overnight.  O2 sats >95%.   HR 40s-60s per tele monitor.   Maintaining MAPs >65 on 1 of Neosynephrine  Fentanyl gtt infusing at 100, propofol off.   Restraints remain in place for safety.

## 2025-03-06 NOTE — PLAN OF CARE
Goal Outcome Evaluation:      Still confused but extubated. Keofeed remains in place for tube feeding

## 2025-03-06 NOTE — CASE MANAGEMENT/SOCIAL WORK
Continued Stay Note   Neal     Patient Name: Val Nassar Jr.  MRN: [id number]  Today's Date: 3/6/2025    Admit Date: 2/11/2025    Plan: DC TBD, goal is  to return to LTC bed @ [facility].   Discharge Plan       Row Name 03/06/25 1255       Plan    Plan DC TBD, goal is  to return to LTC bed @ [facility].    Patient/Family in Agreement with Plan other (see comments)    Plan Comments Discussed in MDR, weaning sedation, pressure support trials. I left FERNANDO w/ABELARDO, sudheer Tom for update. When patient was in ICU prior to transferring to floor, d/c plan remained for him to return to LTC bed @ [facility]. CM will continue to follow.    Final Discharge Disposition Code 04 - intermediate care facility                   Discharge Codes    No documentation.                 Expected Discharge Date and Time       Expected Discharge Date Expected Discharge Time    Mar 11, 2025               Nikolas Hernandez RN

## 2025-03-06 NOTE — PROGRESS NOTES
INTENSIVIST   PROGRESS NOTE     Hospital:  LOS: 23 days     Mr. Val Nassar Jr., 64 y.o. male is followed for a Chief Complaint of: Respiratory Failure      Subjective   S     Interval History:  Remains on mechanical ventilation. FiO2 at 30% and PEEP at 5 this AM. Remains on neosynephrine.        The patient's relevant past medical, surgical and social history were reviewed and updated in Epic as appropriate.      ROS: Unable to obtain secondary to sedation and mechanical ventilation.     Objective   O     Vitals:  Temp  Min: 98.2 °F (36.8 °C)  Max: 99.5 °F (37.5 °C)  BP  Min: 84/49  Max: 134/68  Pulse  Min: 44  Max: 82  Resp  Min: 13  Max: 24  SpO2  Min: 93 %  Max: 100 % No data recorded    Intake/Ouptut 24 hrs (7:00AM - 6:59 AM)  Intake & Output (last 3 days)         02/28 0701  03/01 0700 03/01 0701  03/02 0700 03/02 0701  03/03 0700 03/03 0701  03/04 0700    P.O.   0     I.V. (mL/kg) 387 (4.9)       Blood        Other 1787 2401 881     NG/GT 1503 1751 1484     Total Intake(mL/kg) 3677 (46.7) 4152 (52.2) 2365 (29.2)     Urine (mL/kg/hr) 2550 (1.3) 2150 (1.1) 1900 (1)     Stool 0 0 0     Total Output 2550 2150 1900     Net +1127 +2002 +465             Stool Unmeasured Occurrence 4 x 1 x 6 x             Medications (drips):  dexmedetomidine, Last Rate: 0.2 mcg/kg/hr (03/06/25 1015)  fentanyl 10 mcg/mL, Last Rate: 50 mcg/hr (03/06/25 1015)  phenylephrine, Last Rate: 0.5 mcg/kg/min (03/06/25 0907)  propofol, Last Rate: Stopped (03/06/25 0643)        Mechanical Ventilator Settings:          Resp Rate (Set): 18  Pressure Support (cm H2O): 10 cm H20  FiO2 (%): 30 %  PEEP/CPAP (cm H2O): 5 cm H20    Minute Ventilation (L/min) (Obs): 6.99 L/min  Resp Rate (Observed) Vent: 41  I:E Ratio (Set): 1:2.00  I:E Ratio (Obs): 1:2.5    PIP Observed (cm H2O): 15 cm H2O  Plateau Pressure (cm H2O): 24 cm H2O    Physical Examination  Telemetry:  Normal sinus rhythm.    Constitutional:  No acute distress.  ETT in place on  mechanical ventilation.    Eyes: No scleral icterus.   PERRL, EOM intact.    Neck:  Supple, FROM   Cardiovascular: Normal rate, regular and rhythm. Normal heart sounds.  No murmurs, gallop or rub.   Respiratory: No respiratory distress. Normal respiratory effort.  Diminished. No wheezing.    Abdominal:  Soft. No masses. Nontender. No distension. No HSM.   Extremities: No digital cyanosis. No clubbing.  No peripheral edema.   Skin: No rashes, lesions or ulcers   Neurological:   Sedated. Arouses to stimulation and moves all extremities.               Results from last 7 days   Lab Units 03/06/25  1042 03/05/25  2335 03/05/25  1522 03/05/25  0611 03/04/25  0832 03/04/25  0332   WBC 10*3/mm3 16.71*  --   --  26.26*  --  27.59*   HEMOGLOBIN g/dL 7.7* 7.8* 8.2* 7.4*   < > 9.3*   MCV fL 103.6*  --   --  100.4*  --  100.7*   PLATELETS 10*3/mm3 220  --   --  240  --  256    < > = values in this interval not displayed.     Results from last 7 days   Lab Units 03/06/25  1042 03/05/25  1522 03/05/25  0611 03/04/25  1513 03/04/25  0332   SODIUM mmol/L 151*  --  148*  --  140   POTASSIUM mmol/L 3.9 4.4 3.6   < > 3.5   CO2 mmol/L 20.0*  --  19.0*  --  19.0*   CREATININE mg/dL 0.99  --  1.24  --  1.27   GLUCOSE mg/dL 88  --  116*  --  113*   MAGNESIUM mg/dL 2.4  --  1.9  --  2.0   PHOSPHORUS mg/dL 3.0  --  4.1  --  5.1*    < > = values in this interval not displayed.     Estimated Creatinine Clearance: 85.2 mL/min (by C-G formula based on SCr of 0.99 mg/dL).  Results from last 7 days   Lab Units 03/04/25  0332 03/02/25  0436 03/01/25  0417   ALK PHOS U/L 147* 127* 126*   BILIRUBIN mg/dL 0.6 0.5 0.4   BILIRUBIN DIRECT mg/dL  --   --  0.2   ALT (SGPT) U/L 49* 60* 64*   AST (SGOT) U/L 43* 46* 47*       Results from last 7 days   Lab Units 03/06/25  0357 03/05/25  0346 03/04/25  0320 03/03/25  1431   PH, ARTERIAL pH units 7.484* 7.424 7.422 7.489*   PCO2, ARTERIAL mm Hg 29.0* 32.8* 37.4 37.7   PO2 ART mm Hg 110.0* 95.4 91.7 220.0*    FIO2 % 30 40 60 100       Images:  Imaging Results (Last 24 Hours)       Procedure Component Value Units Date/Time    XR Chest 1 View [[id number]] Collected: 03/06/25 0740     Updated: 03/06/25 0745    Narrative:      XR CHEST 1 VW    Date of Exam: 3/6/2025 4:54 AM EST    Indication: Intubated, pneumonia    Comparison: 3/5/2025    Findings:  Endotracheal tube overlies the upper/mid trachea at the level of the clavicles. Enteric tube descends below the diaphragm and beyond the field-of-view. Right IJ latosha cath overlies the SVC. Small left pleural effusion with left basilar atelectasis or   infiltrate. Right lung appears adequately aerated. No pneumothorax is seen. Cardiac silhouette is magnified. Osseous structures are unchanged.      Impression:      Impression:  1.Small left pleural effusion with left basilar atelectasis versus infiltrate.      Electronically Signed: Urbano Hernandez MD    3/6/2025 7:42 AM EST    Workstation ID: [id number]               Results: Reviewed.  I reviewed the patient's new laboratory and imaging results.  I independently reviewed the patient's new images.    Medications: Reviewed.    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 on 2/11/25 from a rehab facility with weakness, dizziness and AMS. He was noted to be septic secondary to a UTI and was admitted to the ICU. Urine and blood cultures grew Pseudomonas and he completed treatment with Cefepime x 10 days. He decompensated and required intubation on 2/12/25. Bronchoscopy was performed on 2/17/25 for mucous plugging. He was able to be extubated on 2/26/25.     He was transferred to telemetry. He developed worsening anemia and required transfusion of 2 units of pRBCs. GI was consulted and he was taken for EGD on 3/3/25 by Dr. Mckeon. EGD showed 2 small telangiectasias in the lower third of the  esophagus suspected to be secondary to prior radiation. Argon was used for coagulation for bleeding prevention.     After the EGD, he was transferred to the recovery area. He was noted to be hypoxic on a nonrebreather and he was intubated by anesthesia. After intubation, he remained hypoxic requiring high FiO2 and PEEP. He was transferred back to the ICU.     This AM, FiO2 is at 30% and PEEP at 5. He remains on Neosynephrine.     Nutrition:   NPO Diet NPO Type: Strict NPO  Advance Directives:   Code Status and Medical Interventions: CPR (Attempt to Resuscitate); Full Support   Ordered at: 02/12/25 1029     Level Of Support Discussed With:    Patient     Code Status (Patient has no pulse and is not breathing):    CPR (Attempt to Resuscitate)     Medical Interventions (Patient has pulse or is breathing):    Full Support       Active Hospital Problems    Diagnosis     **Septic shock due to Pseudomonas species     Pneumonia of both lower lobes due to Pseudomonas species     Bacteremia due to Pseudomonas     GALILEO (acute kidney injury)     Metabolic acidosis     Cirrhosis of liver     Squamous cell carcinoma of esophagus     Hyperlipidemia, unspecified     Tobacco use        Assessment / Plan:    Continue mechanical ventilation. Placed on pressure support this AM. Wean to extubation as tolerated.   Start Precedex. Restart Fentanyl patch and scheduled percocet and stop IV Fentanyl.   Titrate Neosynephrine.   Monitor H/H with transfusion as needed.   Continue empiric antibiotics for possible pneumonia. D/c Vanc as all cultures remain negative.   Continue oswald catheter. Typically self-cath prior to this admission.   Speech followed for dysphagia prior to intubation and has an NGT in place.  Restart trophic tube feeds as he is now having bowel movements.    Lovenox on hold secondary to concern for bleeding. May be able to resume in AM if H/H stable. Will need anticoagulation for Afib in the future.   Protonix for GI ppx.    Rifaximin for HE. Lactulose stopped secondary to increased bowel gas. Repeat ammonia level pending.  Continue Movantik.   AM labs and CXR    Critically ill secondary to acute hypoxic respiratory failure.     High level of risk due to:  severe exacerbation of chronic illness and illness with threat to life or bodily function.    Plan of care and goals reviewed during interdisciplinary rounds.  I discussed the patient's findings and my recommendations with nursing staff    Critical Care Time: was greater than 32 minutes.(This excludes time spent performing separately reportable procedures and services). including high complexity decision making to assess, manipulate, and support vital organ system failure in this individual who has impairment of one or more vital organ systems such that there is a high probability of imminent or life threatening deterioration in the patient’s condition.      Ludmila Mina, DO    Intensive Care Medicine and Pulmonary Medicine

## 2025-03-07 LAB
ABO GROUP BLD: NORMAL
ANION GAP SERPL CALCULATED.3IONS-SCNC: 10 MMOL/L (ref 5–15)
BLD GP AB SCN SERPL QL: NEGATIVE
BUN SERPL-MCNC: 27 MG/DL (ref 8–23)
BUN/CREAT SERPL: 30 (ref 7–25)
CALCIUM SPEC-SCNC: 8.6 MG/DL (ref 8.6–10.5)
CHLORIDE SERPL-SCNC: 118 MMOL/L (ref 98–107)
CO2 SERPL-SCNC: 20 MMOL/L (ref 22–29)
CREAT SERPL-MCNC: 0.9 MG/DL (ref 0.76–1.27)
DEPRECATED RDW RBC AUTO: 69.4 FL (ref 37–54)
EGFRCR SERPLBLD CKD-EPI 2021: 94.8 ML/MIN/1.73
ERYTHROCYTE [DISTWIDTH] IN BLOOD BY AUTOMATED COUNT: 18.2 % (ref 12.3–15.4)
GLUCOSE BLDC GLUCOMTR-MCNC: 101 MG/DL (ref 70–130)
GLUCOSE BLDC GLUCOMTR-MCNC: 108 MG/DL (ref 70–130)
GLUCOSE BLDC GLUCOMTR-MCNC: 111 MG/DL (ref 70–130)
GLUCOSE BLDC GLUCOMTR-MCNC: 116 MG/DL (ref 70–130)
GLUCOSE SERPL-MCNC: 103 MG/DL (ref 65–99)
HCT VFR BLD AUTO: 23 % (ref 37.5–51)
HCT VFR BLD AUTO: 24.3 % (ref 37.5–51)
HCT VFR BLD AUTO: 25.9 % (ref 37.5–51)
HCT VFR BLD AUTO: 26.7 % (ref 37.5–51)
HCT VFR BLD AUTO: 30.8 % (ref 37.5–51)
HGB BLD-MCNC: 6.9 G/DL (ref 13–17.7)
HGB BLD-MCNC: 7.1 G/DL (ref 13–17.7)
HGB BLD-MCNC: 8 G/DL (ref 13–17.7)
HGB BLD-MCNC: 8.3 G/DL (ref 13–17.7)
HGB BLD-MCNC: 9.3 G/DL (ref 13–17.7)
MAGNESIUM SERPL-MCNC: 2 MG/DL (ref 1.6–2.4)
MCH RBC QN AUTO: 30.5 PG (ref 26.6–33)
MCHC RBC AUTO-ENTMCNC: 29.2 G/DL (ref 31.5–35.7)
MCV RBC AUTO: 104.3 FL (ref 79–97)
PHOSPHATE SERPL-MCNC: 3 MG/DL (ref 2.5–4.5)
PLATELET # BLD AUTO: 202 10*3/MM3 (ref 140–450)
PMV BLD AUTO: 10 FL (ref 6–12)
POTASSIUM SERPL-SCNC: 3.9 MMOL/L (ref 3.5–5.2)
RBC # BLD AUTO: 2.33 10*6/MM3 (ref 4.14–5.8)
RH BLD: POSITIVE
SODIUM SERPL-SCNC: 146 MMOL/L (ref 136–145)
SODIUM SERPL-SCNC: 148 MMOL/L (ref 136–145)
T&S EXPIRATION DATE: NORMAL
WBC NRBC COR # BLD AUTO: 12.68 10*3/MM3 (ref 3.4–10.8)

## 2025-03-07 PROCEDURE — 97168 OT RE-EVAL EST PLAN CARE: CPT

## 2025-03-07 PROCEDURE — 86850 RBC ANTIBODY SCREEN: CPT | Performed by: NURSE PRACTITIONER

## 2025-03-07 PROCEDURE — 82948 REAGENT STRIP/BLOOD GLUCOSE: CPT

## 2025-03-07 PROCEDURE — 83735 ASSAY OF MAGNESIUM: CPT | Performed by: INTERNAL MEDICINE

## 2025-03-07 PROCEDURE — 36430 TRANSFUSION BLD/BLD COMPNT: CPT

## 2025-03-07 PROCEDURE — 85014 HEMATOCRIT: CPT | Performed by: INTERNAL MEDICINE

## 2025-03-07 PROCEDURE — P9016 RBC LEUKOCYTES REDUCED: HCPCS

## 2025-03-07 PROCEDURE — 94761 N-INVAS EAR/PLS OXIMETRY MLT: CPT

## 2025-03-07 PROCEDURE — 94799 UNLISTED PULMONARY SVC/PX: CPT

## 2025-03-07 PROCEDURE — 84100 ASSAY OF PHOSPHORUS: CPT | Performed by: INTERNAL MEDICINE

## 2025-03-07 PROCEDURE — 84295 ASSAY OF SERUM SODIUM: CPT

## 2025-03-07 PROCEDURE — 97530 THERAPEUTIC ACTIVITIES: CPT

## 2025-03-07 PROCEDURE — 85027 COMPLETE CBC AUTOMATED: CPT | Performed by: INTERNAL MEDICINE

## 2025-03-07 PROCEDURE — 99232 SBSQ HOSP IP/OBS MODERATE 35: CPT | Performed by: INTERNAL MEDICINE

## 2025-03-07 PROCEDURE — 97535 SELF CARE MNGMENT TRAINING: CPT

## 2025-03-07 PROCEDURE — 80048 BASIC METABOLIC PNL TOTAL CA: CPT | Performed by: INTERNAL MEDICINE

## 2025-03-07 PROCEDURE — 86900 BLOOD TYPING SEROLOGIC ABO: CPT

## 2025-03-07 PROCEDURE — 25010000002 PIPERACILLIN SOD-TAZOBACTAM PER 1 G: Performed by: INTERNAL MEDICINE

## 2025-03-07 PROCEDURE — 85018 HEMOGLOBIN: CPT | Performed by: INTERNAL MEDICINE

## 2025-03-07 PROCEDURE — 86900 BLOOD TYPING SEROLOGIC ABO: CPT | Performed by: NURSE PRACTITIONER

## 2025-03-07 PROCEDURE — 86901 BLOOD TYPING SEROLOGIC RH(D): CPT | Performed by: NURSE PRACTITIONER

## 2025-03-07 PROCEDURE — 94668 MNPJ CHEST WALL SBSQ: CPT

## 2025-03-07 PROCEDURE — 97164 PT RE-EVAL EST PLAN CARE: CPT

## 2025-03-07 PROCEDURE — 92610 EVALUATE SWALLOWING FUNCTION: CPT

## 2025-03-07 PROCEDURE — 86923 COMPATIBILITY TEST ELECTRIC: CPT

## 2025-03-07 RX ORDER — OXYCODONE AND ACETAMINOPHEN 7.5; 325 MG/1; MG/1
1 TABLET ORAL EVERY 6 HOURS PRN
Status: DISCONTINUED | OUTPATIENT
Start: 2025-03-07 | End: 2025-03-12 | Stop reason: HOSPADM

## 2025-03-07 RX ORDER — QUETIAPINE FUMARATE 25 MG/1
50 TABLET, FILM COATED ORAL NIGHTLY
Status: DISCONTINUED | OUTPATIENT
Start: 2025-03-07 | End: 2025-03-12 | Stop reason: HOSPADM

## 2025-03-07 RX ORDER — QUETIAPINE FUMARATE 25 MG/1
25 TABLET, FILM COATED ORAL DAILY
Status: DISCONTINUED | OUTPATIENT
Start: 2025-03-08 | End: 2025-03-07

## 2025-03-07 RX ORDER — POLYETHYLENE GLYCOL 3350 17 G/17G
17 POWDER, FOR SOLUTION ORAL DAILY PRN
Status: DISCONTINUED | OUTPATIENT
Start: 2025-03-07 | End: 2025-03-12 | Stop reason: HOSPADM

## 2025-03-07 RX ORDER — QUETIAPINE FUMARATE 25 MG/1
50 TABLET, FILM COATED ORAL NIGHTLY
Status: DISCONTINUED | OUTPATIENT
Start: 2025-03-07 | End: 2025-03-07

## 2025-03-07 RX ORDER — QUETIAPINE FUMARATE 25 MG/1
25 TABLET, FILM COATED ORAL DAILY
Status: DISCONTINUED | OUTPATIENT
Start: 2025-03-08 | End: 2025-03-12 | Stop reason: HOSPADM

## 2025-03-07 RX ADMIN — NYSTATIN: 100000 POWDER TOPICAL at 05:59

## 2025-03-07 RX ADMIN — ESCITALOPRAM OXALATE 10 MG: 10 TABLET ORAL at 21:25

## 2025-03-07 RX ADMIN — GABAPENTIN 300 MG: 300 CAPSULE ORAL at 15:07

## 2025-03-07 RX ADMIN — PIPERACILLIN AND TAZOBACTAM 3.38 G: 3; .375 INJECTION, POWDER, LYOPHILIZED, FOR SOLUTION INTRAVENOUS at 05:34

## 2025-03-07 RX ADMIN — PANTOPRAZOLE SODIUM 40 MG: 40 INJECTION, POWDER, FOR SOLUTION INTRAVENOUS at 11:11

## 2025-03-07 RX ADMIN — ROSUVASTATIN 20 MG: 20 TABLET, FILM COATED ORAL at 21:26

## 2025-03-07 RX ADMIN — GABAPENTIN 300 MG: 300 CAPSULE ORAL at 05:08

## 2025-03-07 RX ADMIN — CHLORHEXIDINE GLUCONATE 15 ML: 1.2 SOLUTION ORAL at 21:26

## 2025-03-07 RX ADMIN — PANTOPRAZOLE SODIUM 40 MG: 40 INJECTION, POWDER, FOR SOLUTION INTRAVENOUS at 21:25

## 2025-03-07 RX ADMIN — Medication 1 APPLICATION: at 10:00

## 2025-03-07 RX ADMIN — IPRATROPIUM BROMIDE AND ALBUTEROL SULFATE 3 ML: 2.5; .5 SOLUTION RESPIRATORY (INHALATION) at 15:52

## 2025-03-07 RX ADMIN — IPRATROPIUM BROMIDE AND ALBUTEROL SULFATE 3 ML: 2.5; .5 SOLUTION RESPIRATORY (INHALATION) at 07:26

## 2025-03-07 RX ADMIN — MUPIROCIN 1 APPLICATION: 20 OINTMENT TOPICAL at 21:25

## 2025-03-07 RX ADMIN — IPRATROPIUM BROMIDE AND ALBUTEROL SULFATE 3 ML: 2.5; .5 SOLUTION RESPIRATORY (INHALATION) at 11:22

## 2025-03-07 RX ADMIN — QUETIAPINE FUMARATE 50 MG: 25 TABLET ORAL at 21:26

## 2025-03-07 RX ADMIN — FINASTERIDE 5 MG: 5 TABLET, FILM COATED ORAL at 11:11

## 2025-03-07 RX ADMIN — PIPERACILLIN AND TAZOBACTAM 3.38 G: 3; .375 INJECTION, POWDER, LYOPHILIZED, FOR SOLUTION INTRAVENOUS at 12:49

## 2025-03-07 RX ADMIN — IPRATROPIUM BROMIDE AND ALBUTEROL SULFATE 3 ML: 2.5; .5 SOLUTION RESPIRATORY (INHALATION) at 19:53

## 2025-03-07 RX ADMIN — MUPIROCIN 1 APPLICATION: 20 OINTMENT TOPICAL at 11:11

## 2025-03-07 RX ADMIN — PIPERACILLIN AND TAZOBACTAM 3.38 G: 3; .375 INJECTION, POWDER, LYOPHILIZED, FOR SOLUTION INTRAVENOUS at 21:27

## 2025-03-07 RX ADMIN — Medication 10 ML: at 21:27

## 2025-03-07 RX ADMIN — GABAPENTIN 300 MG: 300 CAPSULE ORAL at 21:26

## 2025-03-07 RX ADMIN — Medication 1 APPLICATION: at 21:25

## 2025-03-07 RX ADMIN — QUETIAPINE FUMARATE 25 MG: 25 TABLET ORAL at 11:10

## 2025-03-07 RX ADMIN — NYSTATIN: 100000 POWDER TOPICAL at 21:25

## 2025-03-07 RX ADMIN — DEXMEDETOMIDINE HYDROCHLORIDE 0.2 MCG/KG/HR: 400 INJECTION INTRAVENOUS at 11:07

## 2025-03-07 RX ADMIN — NALOXEGOL OXALATE 25 MG: 25 TABLET, FILM COATED ORAL at 05:59

## 2025-03-07 RX ADMIN — RIFAXIMIN 550 MG: 550 TABLET ORAL at 11:11

## 2025-03-07 RX ADMIN — RIFAXIMIN 550 MG: 550 TABLET ORAL at 21:25

## 2025-03-07 NOTE — PLAN OF CARE
Goal Outcome Evaluation:  Plan of Care Reviewed With: patient           Outcome Evaluation: PT re-evaluation completed for pt with generalized weakness, impaired balance and coordination, increased confusion, and decreased functional mobility. Pt transferred from bed to chair with mechanical lift and performed STS x2 with maxA x2 and B UE support. Pt's goals have been revised and pt would continue to benefit from PT skilled care. Recommend D/C to SNF.    Anticipated Discharge Disposition (PT): skilled nursing facility

## 2025-03-07 NOTE — PLAN OF CARE
Goal Outcome Evaluation:                                          Oriented to self, place. Confused on time/situation.  Precedex on at 0.2.   1u PRBCs overnight for Hgb 6.9.   D5 continues.   TF continues, tolerating. No Bm.  UOP adequate.   Restraints removed, tolerating well.

## 2025-03-07 NOTE — PROGRESS NOTES
Clinical Nutrition     Patient Name: Val Nassar Jr.  YOB: 1960  MRN: [id number]  Date of Encounter: 03/07/25 09:52 EST  Admission date: 2/11/2025  Reason for Visit: MDR, Follow-up protocol, EN    Assessment   Nutrition Assessment   Admission Diagnosis:  Septic shock [A41.9, R65.21]    Problem List:    Septic shock due to Pseudomonas species    Hyperlipidemia, unspecified    Tobacco use    Cirrhosis of liver    Squamous cell carcinoma of esophagus    GALILEO (acute kidney injury)    Metabolic acidosis    Pneumonia of both lower lobes due to Pseudomonas species    Bacteremia due to Pseudomonas      PMH:   He  has a past medical history of Anemia, Cancer, Cirrhosis, Duodenal ulcer, Gastric ulcer, GERD (gastroesophageal reflux disease), History of alcohol abuse, History of radiation therapy (05/08/2024), HLD (hyperlipidemia), and Mood disorder.    PSH:  He  has a past surgical history that includes Esophagogastroduodenoscopy (N/A, 12/26/2023); Venous Access Device (Port) (Right, 04/25/2024); and Esophagogastroduodenoscopy (N/A, 3/3/2025).    Substance history: Etoh abuse, vaping    Applicable Nutrition History:   Skin:  posterior thigh, gluteal skin tears, scrotal MASD, blisters   WOC following    (2/12) ARF/VENT  (2/17) s/p Bronch  (2/24) Extubated   (2/26) SLP Diet Recommendation: NPO, temporary alternate methods of nutrition/hydration   (3/3) s/p EGD  two small telangiectasias in the lower third of the esophagus   Intubated  Transferred to ICU  (3/6) extubated    Labs    Labs Reviewed: Yes    Results from last 7 days   Lab Units 03/07/25  0116 03/06/25  1042 03/05/25  1522 03/05/25  0611 03/04/25  1513 03/04/25  0332 03/02/25  0436 03/02/25  0436 03/01/25  0417   GLUCOSE mg/dL 103* 88  --  116*  --  113*  --  138* 129*   BUN mg/dL 27* 34*  --  50*  --  54*  --  61* 74*   CREATININE mg/dL 0.90 0.99  --  1.24  --  1.27  --  0.86 0.91   SODIUM mmol/L 148* 151*  --  148*  --   140  --  143 149*   CHLORIDE mmol/L 118* 119*  --  116*  --  106  --  106 111*   POTASSIUM mmol/L 3.9 3.9 4.4 3.6   < > 3.5  --  3.9 4.0   PHOSPHORUS mg/dL 3.0 3.0  --  4.1  --  5.1*   < >  --   --    MAGNESIUM mg/dL 2.0 2.4  --  1.9  --  2.0  --  2.5* 1.7   ALT (SGPT) U/L  --   --   --   --   --  49*  --  60* 64*    < > = values in this interval not displayed.       Results from last 7 days   Lab Units 03/04/25  0332 03/02/25  0436 03/01/25  0417   ALBUMIN g/dL 2.4* 2.7* 2.8*   CRP mg/dL  --  3.36* 1.95*       Results from last 7 days   Lab Units 03/07/25  0530 03/07/25  0007 03/06/25  1800 03/06/25  1125 03/06/25  0530 03/05/25  2331   GLUCOSE mg/dL 108 116 92 84 90 85     Lab Results   Lab Value Date/Time    HGBA1C 4.6 (L) 12/25/2023 1925         Results from last 7 days   Lab Units 03/02/25  0436   PROBNP pg/mL 631.0         Medications    Medications Reviewed: yes    Scheduled Meds:castor oil-balsam peru, 1 Application, Topical, Q12H  fentaNYL, 1 patch, Transdermal, Q72H   And  Check Fentanyl Patch Placement, 1 each, Not Applicable, Q12H  chlorhexidine, 15 mL, Mouth/Throat, Q12H  docusate sodium, 100 mg, Per G Tube, BID  escitalopram, 10 mg, Per G Tube, Nightly  finasteride, 5 mg, Per G Tube, Daily  gabapentin, 300 mg, Per G Tube, Q8H  insulin regular, 2-7 Units, Subcutaneous, Q6H  ipratropium-albuterol, 3 mL, Nebulization, 4x Daily - RT  mupirocin, 1 Application, Each Nare, BID  Naloxegol Oxalate, 25 mg, Per G Tube, QAM  nystatin, , Topical, Q8H  pantoprazole, 40 mg, Intravenous, Q12H  piperacillin-tazobactam, 3.375 g, Intravenous, Q8H  polyethylene glycol, 17 g, Per G Tube, Daily  QUEtiapine, 25 mg, Per G Tube, Q12H  rifAXIMin, 550 mg, Per G Tube, Q12H  rosuvastatin, 20 mg, Per G Tube, Nightly  sodium chloride, 10 mL, Intravenous, Q12H      Continuous Infusions:dexmedetomidine, 0.2-1.5 mcg/kg/hr, Last Rate: 0.2 mcg/kg/hr (03/07/25 4460)  dextrose, 50 mL/hr, Last Rate: 50 mL/hr (03/06/25 1272)  fentanyl 10  "mcg/mL,  mcg/hr, Last Rate: Stopped (03/06/25 1310)  phenylephrine, 0.5-3 mcg/kg/min, Last Rate: Stopped (03/06/25 1534)  propofol, 5-50 mcg/kg/min, Last Rate: Stopped (03/06/25 0643)        PRN Meds:.  acetaminophen    Calcium Replacement - Follow Nurse / BPA Driven Protocol    dextrose    fentaNYL    glucagon (human recombinant)    ipratropium-albuterol    Magnesium Cardiology Dose Replacement - Follow Nurse / BPA Driven Protocol    metoprolol tartrate    oxyCODONE-acetaminophen    polyvinyl alcohol    Potassium Replacement - Follow Nurse / BPA Driven Protocol    sodium chloride    sodium chloride    Intake/Ouptut 24 hrs (0701 - 0700)   I&O's Reviewed: yes    Intake & Output (last day)         03/06 0701 03/07 0700 03/07 0701 03/08 0700    I.V. (mL/kg) 1087.5 (13.4)     Blood 392     Other 284     NG/     IV Piggyback 100     Total Intake(mL/kg) 2242.5 (27.7)     Urine (mL/kg/hr) 1450 (0.7)     Stool 0     Total Output 1450     Net +792.5           Stool Unmeasured Occurrence 1 x            Anthropometrics     Height: Height: 175.3 cm (69.02\")  Last Filed Weight: Weight: 81 kg (178 lb 8 oz) (03/03/25 0540)  Method: Weight Method: Bed scalebedscale  BMI: BMI (Calculated): 26.3    UBW: ?  Weight change:  per EMR ~ 6lb wt gain since January     Weight       Weight (kg) Weight (lbs) Weight Method Visit Report   4/23/2024 75.841 kg  167 lb 3.2 oz   --    4/25/2024 75.751 kg  167 lb  Stated     4/29/2024 76.658 kg  169 lb      4/30/2024 75.932 kg  167 lb 6.4 oz   --    5/6/2024 74.889 kg  165 lb 1.6 oz   --     74.98 kg  165 lb 4.8 oz   --    5/7/2024 76.25 kg  168 lb 1.6 oz   --    5/14/2024 77.111 kg  170 lb   --    5/21/2024 75.297 kg  166 lb      6/3/2024 74.98 kg  165 lb 4.8 oz   --    6/18/2024 74.39 kg  164 lb   --    7/5/2024 74.4 kg  164 lb 0.4 oz  Estimated     7/16/2024 77.565 kg  171 lb   --    8/7/2024 77.565 kg  171 lb   --    8/19/2024 77.656 kg  171 lb 3.2 oz   --    9/19/2024 77.565 kg  " "171 lb      10/24/2024 79.833 kg  176 lb   --    11/14/2024 79.833 kg  176 lb   --    11/26/2024 78.926 kg  174 lb      12/18/2024 79.833 kg  176 lb   --    1/21/2025 79.833 kg  176 lb  Stated     1/23/2025 79.833 kg  176 lb   --    2/3/2025 83.008 kg  183 lb   --    2/11/2025 83.008 kg  183 lb       83 kg  182 lb 15.7 oz          Needs Assessment   Date: 2/28; Reassessed: 3/4, 3/7    Height used:Height: 175.3 cm (69.02\")  Weights used: 81kg CBW    Estimated Calorie needs: ~1900kcal   Method:  21-24 Kcals/KG = 5017-8533  Method:  MSJ (1588) x 1.2 = 1905    Estimated Protein needs: ~ 105g protein   Method: 1.2-1.5g/kg protein:     Nutrition Focused Physical Exam     Date: 2-26-25    Pt does not meet criteria for malnutrition diagnosis, at this time.      Subjective   Reported/Observed/Food/Nutrition Related History:   3/7  Extubated yesterday. Up in chair, confused. TF had been at trophic rate overnight 2/2 concern for bowel fxn - movantik effective. SLP to re-eval today. Hypernatremia improving with IVF.    3/6  Intubated/sedated + propofoL. Sm BM noted following addition of movantik. TF held for >24hrs. Hypernatremia noted. OK to restart feeds today.     3/5  Pt remains intubated/sedated. TF held overnight following concern for aspiration. Keofeed duodenum- unlikely aspiration. +constipation - initiate aggressive bowel regimen; hold lactulose 2/2 gaseous colonic distention; start movantik.    3/4  Pt intubated following EGD yesterday. Sedated - propofoL 4.86mL/hr). TF held overnight - ok to restart nutrition support. Continues on bowel regimen - no BM.   Per RN: blisters improving, WOC following.     3/3  Pt OOR for EGD this am. Spoke w/RN who reports pt tolerating EN. Hypernatremia resolved.     2/28  Pt continues w/hypernatremia. Increased water flush again. Pt does not participate in nutrition visit. Discussed changes w/FRANCINE and MD.     2/27   EN and water flush adjusted 2/2 hypernatremia and change in " feeding window.     2-26: pt resting in bed, on room air, pleasantly confused  Per RN: pt tolerating TF, had 1 dark bm, has been tachypneic, lots of secretions, failed FEES    2-24: per RN pt extubated today, is tolerating TF, possible GIB, is having dark stools, follows commands  Pt resting in bed, on nasal cannula, very weak voice, difficulty talking  + precedex    2-21-25: pt intubated, sedated + fentanyl, versed, D5%@100ml  Per RN: pt tolerating TF, has not had a bm, is much more alert today, follows commands  Per MD: plan to maintain D5% for 1 more day,  decrease free water to 30ml/hr    2-19: pt intubated, sedated, + fentanyl, versed, D5%@100ml  Per RN: pt has been more alert, oxygen demands decreased, tolerating TF, abdomen still distended, given lactulose (last bm 2-17)    2/17  Pt intubated, sedated, on fentanyl & VERSED. Spoke with nurse, reported tolerating TF well, has low residuals, and bowels are moving. LBM on 2/15. Noted that Na continues to increase, even after increasing free water on 2/15. Pt may benefit from IV fluid to correct hypernatremia.     2/13  Pt intubated, sedated, + fentanyl, propofol 7.56ml, levophed 0.32mcg  Per RN: pt's belly distended, more firm than before, bowel regimen started, marginal UOP, have been toni to wean off asiya and vasopressin    Current Nutrition Prescription     PO: NPO Diet NPO Type: Strict NPO  Oral Nutrition Supplement:  Intake: N/A    EN: Peptamen 1.5  Goal Rate: 20ml/hr  Water Flushes: 30ml/hr  Modular: None  Route: ND  Tube: Small bore    At goal over: 20Hrs/day     Rx will supply:   Goal Volume 400  mL/day     Flush Volume 600 mL/day     Energy 600 Kcal/day 28 % est need   Protein 27 g/day 26 % Est Need   Fiber 0 g/day     Water in   mL     Total Water 908 mL     Meet DRI Yes        --------------------------------------------------------------------------  Product/Rate verified at bedside: Yes  Infusing Rate at time of visit: held    Average Delivery  from Charting: Insufficient data <24hr trophic feeds  Volume 379 mL/day   % Goal Vol.   Flush Volume 384 mL/day     Energy  Kcal/day  % Est Need   Protein  g/day  % Est Need   Fiber  g/day     Water in  EN  mL     Total Water  mL     Meet DRI Yes        Assessment & Plan   Nutrition Diagnosis   Date: 2-13-25 Updated: 3/4  Problem Inadequate energy intake    Etiology ARF/Extubated   Signs/Symptoms NPO   Status: Active    Date:  2/28 Updated: 3/4, 3/6  Problem Inadequate oral intake   Etiology dysphagia   Signs/Symptoms +EN   Status: Active restarted EN    Goal:   Nutrition to support treatment and Adjust EN      Nutrition Intervention      Follow treatment progress, Care plan reviewed, Nutrition support order placed    Increase EN back toward goal.   Peptamen 1.5 @ 30mL/hr and adv by 20mL q8hrs as tolerated to goal rate of 60mL/hr. Flush with 30mL/hr. Provide Prosource TF 2x daily  Infused at goal over 20 hrs this regimen provides: 1200mL TGV, 1880kcal (99% est needs), 104g protein (99% est needs), 0g fiber, 924mL TF FW (+ 600mL flush = 1824mL total).      Adjust flush per clinical status    Consider adding additional supplementation for skin as appropriate    Monitoring/Evaluation:   Per protocol, I&O, Pertinent labs, EN delivery/tolerance, Weight, Skin status, GI status, Symptoms, Hemodynamic stability    Nicky Ceballos, MS,RD,LD  Time Spent: 30min

## 2025-03-07 NOTE — SIGNIFICANT NOTE
03/07/25 1540   SLP Deferred Reason   SLP Deferred Reason   (Unable to complete FEES as no scopes available due to processor issues in endoscopy. Spoke with RN.  Plan is for FEES tomorrow.)

## 2025-03-07 NOTE — SIGNIFICANT NOTE
03/07/25 1005   SLP Deferred Reason   SLP Deferred Reason   (Pt lying in stool and needs to be cleaned up.  Repositioned pt as pt with legs out of bed.  Waited for RN to arrive and offered assistance.  SLP to return later for swallowing assessment.)

## 2025-03-07 NOTE — THERAPY RE-EVALUATION
Patient Name: Val Nassar Jr.  : 1960    MRN: [id number]                              Today's Date: 3/7/2025       Admit Date: 2025    Visit Dx:     ICD-10-CM ICD-9-CM   1. Septic shock  A41.9 038.9    R65.21 785.52     995.92   2. Acute UTI (urinary tract infection)  N39.0 599.0   3. Hypotension, unspecified hypotension type  I95.9 458.9   4. Severe malnutrition  E43 261   5. Squamous cell carcinoma of esophagus  C15.9 150.9   6. Atelectasis  J98.11 518.0   7. Oropharyngeal dysphagia  R13.12 787.22   8. Melena  K92.1 578.1   9. Acute blood loss anemia  D62 285.1   10. Alcoholic cirrhosis of liver without ascites  K70.30 571.2     Patient Active Problem List   Diagnosis    Acute gastric ulcer with hemorrhage    Alcohol abuse, uncomplicated    Anxiety disorder, unspecified    Diverticulum of bladder    Duodenal ulcer, unspecified as acute or chronic, without hemorrhage or perforation    Elevation of level of transaminase and lactic acid dehydrogenase (LDH)    Gastro-esophageal reflux disease without esophagitis    Hyperglycemia, unspecified    Hyperlipidemia, unspecified    Melena    Nicotine dependence, cigarettes, uncomplicated    Tobacco use    Severe malnutrition    Duodenal ulcer    Iron deficiency anemia    Cirrhosis of liver    Acute blood loss anemia    Squamous cell carcinoma of esophagus    Duodenal stenosis    Generalized weakness    Orthostatic hypotension    UTI (urinary tract infection)    Malignant neoplasm of upper third of esophagus    Iron deficiency anemia    GALILEO (acute kidney injury)    Metabolic acidosis    Septic shock due to Pseudomonas species    Pneumonia of both lower lobes due to Pseudomonas species    Bacteremia due to Pseudomonas     Past Medical History:   Diagnosis Date    Anemia     Cancer     ESOPHAGEAL    Cirrhosis     Duodenal ulcer     Gastric ulcer     GERD (gastroesophageal reflux disease)     History of alcohol abuse     History of radiation therapy  05/08/2024    esophagus    HLD (hyperlipidemia)     Mood disorder      Past Surgical History:   Procedure Laterality Date    ENDOSCOPY N/A 12/26/2023    Procedure: ESOPHAGOGASTRODUODENOSCOPY;  Surgeon: Wesly Mckeon MD;  Location:  [facility];  Service: Gastroenterology;  Laterality: N/A;    ENDOSCOPY N/A 3/3/2025    Procedure: ESOPHAGOGASTRODUODENOSCOPY;  Surgeon: Wesly Mckeon MD;  Location:  [facility];  Service: Gastroenterology;  Laterality: N/A;  APC USED IN ESOPHAGUS.    VENOUS ACCESS DEVICE (PORT) INSERTION Right 04/25/2024      General Information       Row Name 03/07/25 1448          OT Time and Intention    Document Type re-evaluation  -KF     Mode of Treatment occupational therapy;co-treatment  -KF       Row Name 03/07/25 1448          General Information    Patient Profile Reviewed yes  -KF     Prior Level of Function --  Please refer to IE  -KF     Existing Precautions/Restrictions fall  NG tube, oswald  -KF     Barriers to Rehab medically complex;previous functional deficit;cognitive status  -KF       Row Name 03/07/25 1448          Living Environment    People in Home other (see comments)  Please refer to IE  -KF       Row Name 03/07/25 1448          Cognition    Orientation Status (Cognition) oriented to;person;place;disoriented to;time;other (see comments)  [facility] but not [facility].  -       Row Name 03/07/25 1448          Safety Issues/Impairments Affecting Functional Mobility    Safety Issues Affecting Function (Mobility) ability to follow commands;awareness of need for assistance;friction/shear risk;insight into deficits/self-awareness;judgment;safety precaution awareness;safety precautions follow-through/compliance;sequencing abilities  -KF     Impairments Affecting Function (Mobility) balance;cognition;coordination;endurance/activity tolerance;postural/trunk control;range of motion (ROM);shortness of breath;strength  -KF     Cognitive Impairments, Mobility  Safety/Performance attention;awareness, need for assistance;insight into deficits/self-awareness;judgment;problem-solving/reasoning;safety precaution awareness;safety precaution follow-through;sequencing abilities  -               User Key  (r) = Recorded By, (t) = Taken By, (c) = Cosigned By      Initials Name Provider Type    KF Chioma Nava OT Occupational Therapist                     Mobility/ADL's       Row Name 03/07/25 1452          Bed Mobility    Bed Mobility rolling left;rolling right  -     Rolling Left [place] (Bed Mobility) maximum assist (25% patient effort);2 person assist;verbal cues  -KF     Rolling Right [place] (Bed Mobility) maximum assist (25% patient effort);2 person assist;verbal cues  -     Assistive Device (Bed Mobility) bed rails;head of bed elevated;repositioning sheet  -     Comment, (Bed Mobility) Left/right rolling performed for extensive hygiene and lift sling placement  -       Row Name 03/07/25 1452          Transfers    Transfers bed-chair transfer;sit-stand transfer;stand-sit transfer  -       Row Name 03/07/25 1452          Bed-Chair Transfer    Bed-Chair [place] (Transfers) dependent (less than 25% patient effort);2 person assist;verbal cues  -     Assistive Device (Bed-Chair Transfers) lift device  -     Comment, (Bed-Chair Transfer) Pt tolerated well.  -       Row Name 03/07/25 1452          Sit-Stand Transfer    Sit-Stand [place] (Transfers) maximum assist (25% patient effort);2 person assist;verbal cues  -     Assistive Device (Sit-Stand Transfers) other (see comments)  BUE support  -     Comment, (Sit-Stand Transfer) STS x2 from the chair with maxA X2, BUE support and B knees blocked. Pt able to achieve ~80% full posture.  -       Row Name 03/07/25 1452          Stand-Sit Transfer    Stand-Sit [place] (Transfers) maximum assist (25% patient effort);2 person assist;verbal cues;nonverbal cues (demo/gesture)  -      Assistive Device (Stand-Sit Transfers) other (see comments)  BUE support  -KF       Row Name 03/07/25 1452          Activities of Daily Living    BADL Assessment/Intervention bathing;upper body dressing;lower body dressing;toileting  -KF       Row Name 03/07/25 1452          Lower Body Dressing Assessment/Training    [place] Level (Lower Body Dressing) don;socks;dependent (less than 25% patient effort)  -KF     Position (Lower Body Dressing) supine  -KF       Row Name 03/07/25 1452          Upper Body Dressing Assessment/Training    [place] Level (Upper Body Dressing) doff;don;pajama/robe;maximum assist (25% patient effort)  -KF     Position (Upper Body Dressing) supine  -KF       Row Name 03/07/25 1452          Bathing Assessment/Intervention    [place] Level (Bathing) lower body;perineal area;dependent (less than 25% patient effort)  -KF     Position (Bathing) supine  -KF       Row Name 03/07/25 1452          Toileting Assessment/Training    [place] Level (Toileting) adjust/manage clothing;perform perineal hygiene;dependent (less than 25% patient effort)  -KF     Position (Toileting) supine  -KF               User Key  (r) = Recorded By, (t) = Taken By, (c) = Cosigned By      Initials Name Provider Type    Chioma Zuniga OT Occupational Therapist                   Obj/Interventions       Row Name 03/07/25 1454          Sensory Assessment (Somatosensory)    Sensory Assessment (Somatosensory) UE sensation intact  -[facility] Name 03/07/25 1454          Range of Motion Comprehensive    General Range of Motion bilateral upper extremity ROM WFL  -       Row Name 03/07/25 1454          Strength Comprehensive (MMT)    General Manual Muscle Testing (MMT) Assessment upper extremity strength deficits identified  -KF     Comment, General Manual Muscle Testing (MMT) Assessment BUE grossly 3+/5  -KF       Row Name 03/07/25 1454          Balance    Balance Assessment sitting static balance;sitting  dynamic balance;sit to stand dynamic balance;standing static balance  -KF     Static Sitting Balance contact guard  -KF     Dynamic Sitting Balance minimal assist  -KF     Position, Sitting Balance unsupported;sitting in chair  -KF     Sit to Stand Dynamic Balance maximum assist;2-person assist;verbal cues;non-verbal cues (demo/gesture)  -KF     Static Standing Balance maximum assist;2-person assist  -KF     Position/Device Used, Standing Balance supported  -KF     Balance Interventions sitting;standing;sit to stand;supported;static;dynamic;occupation based/functional task  -KF               User Key  (r) = Recorded By, (t) = Taken By, (c) = Cosigned By      Initials Name Provider Type    KF Chioma Nava OT Occupational Therapist                   Goals/Plan       Row Name 03/07/25 1501          Bed Mobility Goal 1 (OT)    Activity/Assistive Device (Bed Mobility Goal 1, OT) sit to supine;supine to sit  -KF     [place] Level/Cues Needed (Bed Mobility Goal 1, OT) moderate assist (50-74% patient effort)  -KF     Time Frame (Bed Mobility Goal 1, OT) short term goal (STG);5 days  -KF     Progress/Outcomes (Bed Mobility Goal 1, OT) goal ongoing;goal revised this date  -KF       Row Name 03/07/25 1501          Transfer Goal 1 (OT)    Activity/Assistive Device (Transfer Goal 1, OT) bed-to-chair/chair-to-bed  -KF     [place] Level/Cues Needed (Transfer Goal 1, OT) maximum assist (25-49% patient effort)  -KF     Time Frame (Transfer Goal 1, OT) long term goal (LTG);10 days  -KF     Progress/Outcome (Transfer Goal 1, OT) goal ongoing;goal revised this date  -KF       Row Name 03/07/25 1501          Dressing Goal 1 (OT)    Activity/Device (Dressing Goal 1, OT) upper body dressing  -KF     [place]/Cues Needed (Dressing Goal 1, OT) minimum assist (75% or more patient effort)  -KF     Time Frame (Dressing Goal 1, OT) long term goal (LTG);5 days  -KF     Strategies/Barriers (Dressing Goal 1, OT) unsupported  sitting  -KF     Progress/Outcome (Dressing Goal 1, OT) goal revised this date;goal ongoing  -KF               User Key  (r) = Recorded By, (t) = Taken By, (c) = Cosigned By      Initials Name Provider Type    Chioma Zuniga OT Occupational Therapist                   Clinical Impression       Row Name 03/07/25 1452          Pain Assessment    Pretreatment Pain Rating 0/10 - no pain  -KF     Posttreatment Pain Rating 0/10 - no pain  -KF       Row Name 03/07/25 1453          Plan of Care Review    Plan of Care Reviewed With patient  -KF     Progress no change  -KF     Outcome Evaluation OT re-evaluation completed. The pt presents below baseline with generalized weakness, decreased activity tolerance, balance deficits, and decreased safety awareness warranting continued IP OT services. Pt assisted in extensive hygiene and LB bathing while supine. Pt tolerated dependent transfer to chair well using mechanical lift. Pt performed STS x2 from chair with maxA x2, BUE support and B knees blocked. Pt able to achieve ~80% full upright posture. Recommend a d/c to SNF when medically ready.  -       Row Name 03/07/25 3004          Therapy Assessment/Plan (OT)    Patient/Family Therapy Goal Statement (OT) Return home  -KF     Rehab Potential (OT) good  -KF     Criteria for Skilled Therapeutic Interventions Met (OT) yes;meets criteria;skilled treatment is necessary  -KF     Therapy Frequency (OT) daily  -KF     Predicted Duration of Therapy Intervention (OT) 10 days  -KF       Row Name 03/07/25 1458          Therapy Plan Review/Discharge Plan (OT)    Anticipated Discharge Disposition (OT) skilled nursing facility  -KF       Row Name 03/07/25 1459          Vital Signs    Pre Systolic BP Rehab 143  -KF     Pre Treatment Diastolic BP 74  -KF     Post Systolic BP Rehab 147  -KF     Post Treatment Diastolic BP 67  -KF     Pretreatment Heart Rate (beats/min) 72  -KF     Posttreatment Heart Rate (beats/min) 71  -KF     Pre SpO2  (%) 94  -KF     O2 Delivery Pre Treatment room air  -KF     Post SpO2 (%) 95  -KF     O2 Delivery Post Treatment room air  -KF     Pre Patient Position Supine  -KF     Intra Patient Position Standing  -KF     Post Patient Position Sitting  -KF       Row Name 03/07/25 1455          Positioning and Restraints    Pre-Treatment Position in bed  -KF     Post Treatment Position chair  -KF     In Chair notified nsg;reclined;call light within reach;encouraged to call for assist;exit alarm on;waffle cushion;on mechanical lift sling;legs elevated;RUE elevated;LUE elevated;with nsg  -KF               User Key  (r) = Recorded By, (t) = Taken By, (c) = Cosigned By      Initials Name Provider Type    KF Chioma Nava OT Occupational Therapist                   Outcome Measures       Row Name 03/07/25 1502          How much help from another is currently needed...    Putting on and taking off regular lower body clothing? 1  -KF     Bathing (including washing, rinsing, and drying) 2  -KF     Toileting (which includes using toilet bed pan or urinal) 1  -KF     Putting on and taking off regular upper body clothing 2  -KF     Taking care of personal grooming (such as brushing teeth) 2  -KF     Eating meals 2  -KF     AM-PAC 6 Clicks Score (OT) 10  -KF       Row Name 03/07/25 2611          How much help from another person do you currently need...    Turning from your back to your side while in flat bed without using bedrails? 2  -KG     Moving from lying on back to sitting on the side of a flat bed without bedrails? 2  -KG     Moving to and from a bed to a chair (including a wheelchair)? 1  -KG     Standing up from a chair using your arms (e.g., wheelchair, bedside chair)? 2  -KG     Climbing 3-5 steps with a railing? 1  -KG     To walk in hospital room? 1  -KG     AM-PAC 6 Clicks Score (PT) 9  -KG     Highest Level of Mobility Goal 3 --> Sit at edge of bed  -KG       Row Name 03/07/25 1502 03/07/25 1451       Functional Assessment     Outcome Measure Options AM-PAC 6 Clicks Daily Activity (OT)  -KF AM-PAC 6 Clicks Basic Mobility (PT)  -KG              User Key  (r) = Recorded By, (t) = Taken By, (c) = Cosigned By      Initials Name Provider Type    KG Geovanna Burton, PT Physical Therapist    KF Chioma Nava, OT Occupational Therapist                    Occupational Therapy Education       Title: PT OT SLP Therapies (In Progress)       Topic: Occupational Therapy (In Progress)       Point: ADL training (In Progress)       Description:   Instruct learner(s) on proper safety adaptation and remediation techniques during self care or transfers.   Instruct in proper use of assistive devices.                  Learning Progress Summary            Patient Acceptance, E,TB, NR by KF at 3/7/2025 0933    Acceptance, E, NR,NL by MR at 2/27/2025 1020                      Point: Home exercise program (In Progress)       Description:   Instruct learner(s) on appropriate technique for monitoring, assisting and/or progressing therapeutic exercises/activities.                  Learning Progress Summary            Patient Acceptance, E, NR,NL by MR at 2/27/2025 1020                      Point: Precautions (In Progress)       Description:   Instruct learner(s) on prescribed precautions during self-care and functional transfers.                  Learning Progress Summary            Patient Acceptance, E,TB, NR by KF at 3/7/2025 0933    Acceptance, E, NR,NL by MR at 2/27/2025 1020                      Point: Body mechanics (In Progress)       Description:   Instruct learner(s) on proper positioning and spine alignment during self-care, functional mobility activities and/or exercises.                  Learning Progress Summary            Patient Acceptance, E,TB, NR by KF at 3/7/2025 0933    Acceptance, E, NR,NL by MR at 2/27/2025 1020                                      User Key       Initials Effective Dates Name Provider Type Discipline     09/22/22 -   Monica Mon OT Occupational Therapist OT    KF 08/09/23 -  Chioma Nava OT Occupational Therapist OT                  OT Recommendation and Plan  Therapy Frequency (OT): daily  Plan of Care Review  Plan of Care Reviewed With: patient  Progress: no change  Outcome Evaluation: OT re-evaluation completed. The pt presents below baseline with generalized weakness, decreased activity tolerance, balance deficits, and decreased safety awareness warranting continued IP OT services. Pt assisted in extensive hygiene and LB bathing while supine. Pt tolerated dependent transfer to chair well using mechanical lift. Pt performed STS x2 from chair with maxA x2, BUE support and B knees blocked. Pt able to achieve ~80% full upright posture. Recommend a d/c to SNF when medically ready.     Time Calculation:         Time Calculation- OT       Row Name 03/07/25 1503             Time Calculation- OT    OT Start Time 0933  -KF      OT Received On 03/07/25  -KF      OT Goal Re-Cert Due Date 03/17/25  -KF         Timed Charges    63098 - OT Therapeutic Activity Minutes 8  -KF      45489 - OT Self Care/Mgmt Minutes 15  -KF         Untimed Charges    OT Eval/Re-eval Minutes 25  -KF         Total Minutes    Timed Charges Total Minutes 23  -KF      Untimed Charges Total Minutes 25  -KF       Total Minutes 48  -KF                User Key  (r) = Recorded By, (t) = Taken By, (c) = Cosigned By      Initials Name Provider Type    KF Chioma Nava OT Occupational Therapist                  Therapy Charges for Today       Code Description Service Date Service Provider Modifiers Qty    29900400521 HC OT RE-EVAL 2 3/7/2025 Chioma Nava OT GO 1    44416602992 HC OT THERAPEUTIC ACT EA 15 MIN 3/7/2025 Chioma Nava OT GO 1    17091474466 HC OT SELF CARE/MGMT/TRAIN EA 15 MIN 3/7/2025 Chioma Nava OT GO 1                 Chioma Nava OT  3/7/2025

## 2025-03-07 NOTE — PLAN OF CARE
Goal Outcome Evaluation:              Outcome Evaluation: Completed bedside swallowing reevaluation.  Oropharyngeal signs.  Repeat FEES indicated prior to resuming PO diet.  Further recommendations pending FEES.    Anticipated Discharge Disposition (SLP): anticipate therapy at next level of care, inpatient rehabilitation facility, skilled nursing facility          SLP Swallowing Diagnosis: oral dysphagia, R/O pharyngeal dysphagia (03/07/25 1100)

## 2025-03-07 NOTE — THERAPY RE-EVALUATION
Acute Care - Speech Language Pathology   Swallow Re-Evaluation [facility]  Clinical Swallow Evaluation       Patient Name: Val Nassar Jr.  : 1960  MRN: [id number]  Today's Date: 3/7/2025               Admit Date: 2025    Visit Dx:     ICD-10-CM ICD-9-CM   1. Septic shock  A41.9 038.9    R65.21 785.52     995.92   2. Acute UTI (urinary tract infection)  N39.0 599.0   3. Hypotension, unspecified hypotension type  I95.9 458.9   4. Severe malnutrition  E43 261   5. Squamous cell carcinoma of esophagus  C15.9 150.9   6. Atelectasis  J98.11 518.0   7. Oropharyngeal dysphagia  R13.12 787.22   8. Melena  K92.1 578.1   9. Acute blood loss anemia  D62 285.1   10. Alcoholic cirrhosis of liver without ascites  K70.30 571.2     Patient Active Problem List   Diagnosis    Acute gastric ulcer with hemorrhage    Alcohol abuse, uncomplicated    Anxiety disorder, unspecified    Diverticulum of bladder    Duodenal ulcer, unspecified as acute or chronic, without hemorrhage or perforation    Elevation of level of transaminase and lactic acid dehydrogenase (LDH)    Gastro-esophageal reflux disease without esophagitis    Hyperglycemia, unspecified    Hyperlipidemia, unspecified    Melena    Nicotine dependence, cigarettes, uncomplicated    Tobacco use    Severe malnutrition    Duodenal ulcer    Iron deficiency anemia    Cirrhosis of liver    Acute blood loss anemia    Squamous cell carcinoma of esophagus    Duodenal stenosis    Generalized weakness    Orthostatic hypotension    UTI (urinary tract infection)    Malignant neoplasm of upper third of esophagus    Iron deficiency anemia    GALILEO (acute kidney injury)    Metabolic acidosis    Septic shock due to Pseudomonas species    Pneumonia of both lower lobes due to Pseudomonas species    Bacteremia due to Pseudomonas     Past Medical History:   Diagnosis Date    Anemia     Cancer     ESOPHAGEAL    Cirrhosis     Duodenal ulcer     Gastric ulcer     GERD  (gastroesophageal reflux disease)     History of alcohol abuse     History of radiation therapy 05/08/2024    esophagus    HLD (hyperlipidemia)     Mood disorder      Past Surgical History:   Procedure Laterality Date    ENDOSCOPY N/A 12/26/2023    Procedure: ESOPHAGOGASTRODUODENOSCOPY;  Surgeon: Wesly Mckeon MD;  Location:  [facility];  Service: Gastroenterology;  Laterality: N/A;    ENDOSCOPY N/A 3/3/2025    Procedure: ESOPHAGOGASTRODUODENOSCOPY;  Surgeon: Wesly Mckeon MD;  Location:  [facility];  Service: Gastroenterology;  Laterality: N/A;  APC USED IN ESOPHAGUS.    VENOUS ACCESS DEVICE (PORT) INSERTION Right 04/25/2024       SLP Recommendation and Plan  SLP Swallowing Diagnosis: oral dysphagia, R/O pharyngeal dysphagia (03/07/25 1100)  SLP Diet Recommendation: (P) NPO, temporary alternate methods of nutrition/hydration (Pending FEES- ok for ice chips in moderation after oral care.) (03/07/25 1100)  Recommended Precautions and Strategies: general aspiration precautions (03/07/25 1100)  SLP Rec. for Method of Medication Administration: meds via alternate route (03/07/25 1100)        Recommended Diagnostics: reassess via FEES (03/07/25 1100)  Swallow Criteria for Skilled Therapeutic Interventions Met: demonstrates skilled criteria (03/07/25 1100)  Anticipated Discharge Disposition (SLP): anticipate therapy at next level of care, inpatient rehabilitation facility, skilled nursing facility (03/07/25 1100)     Therapy Frequency (Swallow): other (see comments) (Pending FEES) (03/07/25 1100)     Oral Care Recommendations: Oral Care BID/PRN, Suction toothbrush (03/07/25 1100)                                        Outcome Evaluation: Completed bedside swallowing reevaluation.  Oropharyngeal signs.  Repeat FEES indicated prior to resuming PO diet.  Further recommendations pending FEES.      SWALLOW EVALUATION (Last 72 Hours)       SLP Adult Swallow Evaluation       Row Name 03/07/25 1100                    Rehab Evaluation    Document Type evaluation  -ML        Subjective Information complains of;pain  -ML        Patient Observations alert;agree to therapy  -ML        Patient/Family/Caregiver Comments/Observations No family present  -ML        Patient Effort adequate  -ML        Comment Pt remains confused- states he has been up in chair since 1am- not true as I was here earlier today and pt was in bed.  -ML        Symptoms Noted During/After Treatment none  -ML        Oral Care suction provided;teeth brushed - suction toothbrush;tongue brushed  -ML           General Information    Patient Profile Reviewed yes  -ML        Pertinent History Of Current Problem See ST hx- intubated following EGD due to agitation (per RN)- intubated 2  days  -ML        Current Method of Nutrition nasogastric feedings;small-bore  -ML        Precautions/Limitations, Hearing WFL;for purposes of eval  -ML        Prior Level of Function-Swallowing --  Impaired swallowing earlier this admission per FEES  -ML        Plans/Goals Discussed with patient  -ML        Barriers to Rehab cognitive status  -ML        Patient's Goals for Discharge patient could not state  -ML           Pain    Pain, Additional Detail Pt reports legs hurt a little bit- he is UIC and wants to be able to put his legs down, however this is not appropriate for safety reasons.  -ML        Additional Documentation Pain, Additional Documentation (Row)  -ML           Oral Motor Structure and Function    Dentition Assessment missing teeth;poor oral hygiene;other (see comments)  endorses he has dentures but none found in room  -ML        Secretion Management WNL/WFL  -ML        Mucosal Quality moist, healthy  -ML        Volitional Swallow delayed  -ML        Volitional Cough weak  -ML           Oral Musculature and Cranial Nerve Assessment    Oral Motor General Assessment unable to assess  -ML           General Eating/Swallowing Observations    Respiratory Support Currently in Use  room air  -ML        Pre SpO2 (%) 96  -ML        Post SpO2 (%) 97  -ML           Clinical Swallow Eval    Oral Prep Phase impaired  -ML        Oral Residue impaired  -ML        Pharyngeal Phase suspected pharyngeal impairment  -ML        Clinical Swallow Evaluation Summary Clinical swallowing reevaluation completed. Oral dysphagia- slow to prep and clear oral cavity.  Pharyngeal signs include multiple swallows with all presentations and cough following repeat thin trials.  No overt signs of pharyngeal impairment with nectar via straw or puree trials. given FEES hx with deep laryngeal penetration without clearance, reintubation, and weak cough, a repeat FEES is indicated prior to PO.  -ML           Oral Prep Concerns    Oral Prep Concerns increased prep time  -ML        Increased Prep Time pudding  -ML           Oral Residue Concerns    Oral Residue Concerns diffuse residue throughout oral cavity  -ML        Diffuse Residue Throughout Oral Cavity pudding  -ML        Oral Residue Concerns, Comment Mild but present- clears with 2nd swallow  -ML           Pharyngeal Phase Concerns    Pharyngeal Phase Concerns cough  -ML        Cough thin  -ML           SLP Evaluation Clinical Impression    SLP Swallowing Diagnosis oral dysphagia;R/O pharyngeal dysphagia  -ML        Functional Impact risk of aspiration/pneumonia  -ML        Swallow Criteria for Skilled Therapeutic Interventions Met demonstrates skilled criteria  -ML           Recommendations    Therapy Frequency (Swallow) other (see comments)  Pending FEES  -ML        SLP Diet Recommendation NPO;temporary alternate methods of nutrition/hydration (P)   Pending FEES- ok for ice chips in moderation after oral care.  -ML        Recommended Diagnostics reassess via FEES  -ML        Recommended Precautions and Strategies general aspiration precautions  -ML        Oral Care Recommendations Oral Care BID/PRN;Suction toothbrush  -ML        SLP Rec. for Method of Medication  Administration meds via alternate route  -ML        Anticipated Discharge Disposition (SLP) anticipate therapy at next level of care;inpatient rehabilitation facility;skilled nursing facility  -ML                  User Key  (r) = Recorded By, (t) = Taken By, (c) = Cosigned By      Initials Name Effective Dates    ML Monica Ramey MS CCC-SLP 08/30/24 -                     EDUCATION  The patient has been educated in the following areas:   Dysphagia (Swallowing Impairment) Oral Care/Hydration FEES .        SLP GOALS       Row Name 03/07/25 1100             (LTG) Patient will demonstrate functional swallow for    Diet Texture (Demonstrate functional swallow) soft to chew (ground) textures  -ML      Liquid viscosity (Demonstrate functional swallow) thin liquids  -ML      [place] (Demonstrate functional swallow) with minimal cues (75-90% accuracy)  -ML      Time Frame (Demonstrate functional swallow) 1 week  -ML      Progress/Outcomes (Demonstrate functional swallow) goal ongoing  -ML         (STG) Lingual Strengthening Goal 1 (SLP)    Activity (Lingual Strengthening Goal 1, SLP) increase tongue back strength  -ML      Increase Tongue Back Strength lingual resistance exercises  -ML      [place]/Accuracy (Lingual Strengthening Goal 1, SLP) with maximum cues (25-49% accuracy)  -ML      Time Frame (Lingual Strengthening Goal 1, SLP) 1 week  -ML      Progress/Outcomes (Lingual Strengthening Goal 1, SLP) goal ongoing  -ML      Comment (Lingual Strengthening Goal 1, SLP) Pending FEES  -ML         (STG) Pharyngeal Strengthening Exercise Goal 1 (SLP)    Activity (Pharyngeal Strengthening Goal 1, SLP) increase superior movement of the hyolaryngeal complex;increase anterior movement of the hyolaryngeal complex;increase closure at entrance to airway/closure of airway at glottis;increase squeeze/positive pressure generation;increase tongue base retraction;increase timing  -ML      Increase Timing prepping - 3 second  prep or suck swallow or 3-step swallow  -ML      Increase Superior Movement of the Hyolaryngeal Complex supraglottic swallow  -ML      Increase Anterior Movement of the Hyolaryngeal Complex chin tuck against resistance (CTAR)  -ML      Increase Closure at Entrance to Airway/Closure of Airway at Glottis super-supraglottic swallow  -ML      Increase Squeeze/Positive Pressure Generation hard effortful swallow  -ML      Increase Tongue Base Retraction vicky  -ML      [place]/Accuracy (Pharyngeal Strengthening Goal 1, SLP) with maximum cues (25-49% accuracy)  -ML      Time Frame (Pharyngeal Strengthening Goal 1, SLP) short term goal (STG);1 week  -ML      Progress/Outcomes (Pharyngeal Strengthening Goal 1, SLP) goal ongoing  -ML      Comment (Pharyngeal Strengthening Goal 1, SLP) Pending FEES  -ML                User Key  (r) = Recorded By, (t) = Taken By, (c) = Cosigned By      Initials Name Provider Type    Monica Hensley MS CCC-SLP Speech and Language Pathologist                         Time Calculation:    Time Calculation- SLP       Row Name 03/07/25 1211             Time Calculation- SLP    SLP Start Time 1100  -ML      SLP Received On 03/07/25  -ML                User Key  (r) = Recorded By, (t) = Taken By, (c) = Cosigned By      Initials Name Provider Type    Monica Hensley MS CCC-SLP Speech and Language Pathologist                    Therapy Charges for Today       Code Description Service Date Service Provider Modifiers Qty    [id number]  ST EVAL ORAL PHARYNG SWALLOW 4 3/7/2025 Monica Ramey MS CCC-SLP GN 1                 Monica Ramey MS CCC-SLP  3/7/2025

## 2025-03-07 NOTE — THERAPY RE-EVALUATION
Patient Name: Val Nassar Jr.  : 1960    MRN: [id number]                              Today's Date: 3/7/2025       Admit Date: 2025    Visit Dx:     ICD-10-CM ICD-9-CM   1. Septic shock  A41.9 038.9    R65.21 785.52     995.92   2. Acute UTI (urinary tract infection)  N39.0 599.0   3. Hypotension, unspecified hypotension type  I95.9 458.9   4. Severe malnutrition  E43 261   5. Squamous cell carcinoma of esophagus  C15.9 150.9   6. Atelectasis  J98.11 518.0   7. Oropharyngeal dysphagia  R13.12 787.22   8. Melena  K92.1 578.1   9. Acute blood loss anemia  D62 285.1   10. Alcoholic cirrhosis of liver without ascites  K70.30 571.2     Patient Active Problem List   Diagnosis    Acute gastric ulcer with hemorrhage    Alcohol abuse, uncomplicated    Anxiety disorder, unspecified    Diverticulum of bladder    Duodenal ulcer, unspecified as acute or chronic, without hemorrhage or perforation    Elevation of level of transaminase and lactic acid dehydrogenase (LDH)    Gastro-esophageal reflux disease without esophagitis    Hyperglycemia, unspecified    Hyperlipidemia, unspecified    Melena    Nicotine dependence, cigarettes, uncomplicated    Tobacco use    Severe malnutrition    Duodenal ulcer    Iron deficiency anemia    Cirrhosis of liver    Acute blood loss anemia    Squamous cell carcinoma of esophagus    Duodenal stenosis    Generalized weakness    Orthostatic hypotension    UTI (urinary tract infection)    Malignant neoplasm of upper third of esophagus    Iron deficiency anemia    GALILEO (acute kidney injury)    Metabolic acidosis    Septic shock due to Pseudomonas species    Pneumonia of both lower lobes due to Pseudomonas species    Bacteremia due to Pseudomonas     Past Medical History:   Diagnosis Date    Anemia     Cancer     ESOPHAGEAL    Cirrhosis     Duodenal ulcer     Gastric ulcer     GERD (gastroesophageal reflux disease)     History of alcohol abuse     History of radiation therapy  05/08/2024    esophagus    HLD (hyperlipidemia)     Mood disorder      Past Surgical History:   Procedure Laterality Date    ENDOSCOPY N/A 12/26/2023    Procedure: ESOPHAGOGASTRODUODENOSCOPY;  Surgeon: Wesly Mckeon MD;  Location:  [facility];  Service: Gastroenterology;  Laterality: N/A;    ENDOSCOPY N/A 3/3/2025    Procedure: ESOPHAGOGASTRODUODENOSCOPY;  Surgeon: Wesly Mckeon MD;  Location:  [facility];  Service: Gastroenterology;  Laterality: N/A;  APC USED IN ESOPHAGUS.    VENOUS ACCESS DEVICE (PORT) INSERTION Right 04/25/2024      General Information       Row Name 03/07/25 1300          Physical Therapy Time and Intention    Document Type re-evaluation  -KG     Mode of Treatment physical therapy  -KG       Row Name 03/07/25 1300          General Information    Patient Profile Reviewed yes  -KG     Prior Level of Function --  please see IE  -KG     Existing Precautions/Restrictions fall;other (see comments)  NG; oswald; significant confusion; decreased command following  -KG     Barriers to Rehab medically complex;previous functional deficit;cognitive status  -KG       Row Name 03/07/25 1300          Living Environment    People in Home facility resident  -KG       Row Name 03/07/25 1300          Home Main Entrance    Number of Stairs, Main Entrance none  -KG       Row Name 03/07/25 1300          Stairs Within Home, Primary    Number of Stairs, Within Home, Primary none  -KG       Row Name 03/07/25 1300          Cognition    Orientation Status (Cognition) oriented to;person  -KG       Row Name 03/07/25 1300          Safety Issues/Impairments Affecting Functional Mobility    Safety Issues Affecting Function (Mobility) ability to follow commands;awareness of need for assistance;insight into deficits/self-awareness;judgment;safety precaution awareness;safety precautions follow-through/compliance;sequencing abilities  -KG     Impairments Affecting Function (Mobility)  balance;cognition;coordination;endurance/activity tolerance;postural/trunk control;range of motion (ROM);shortness of breath;strength  -KG     Cognitive Impairments, Mobility Safety/Performance attention;awareness, need for assistance;insight into deficits/self-awareness;safety precaution awareness;safety precaution follow-through;sequencing abilities  -KG               User Key  (r) = Recorded By, (t) = Taken By, (c) = Cosigned By      Initials Name Provider Type    KG Geovanna Burton, PT Physical Therapist                   Mobility       Row Name 03/07/25 1317          Bed Mobility    Bed Mobility rolling left;rolling right  -KG     Rolling Left [place] (Bed Mobility) maximum assist (25% patient effort);2 person assist;verbal cues  -KG     Rolling Right [place] (Bed Mobility) maximum assist (25% patient effort);2 person assist;verbal cues  -KG     Assistive Device (Bed Mobility) bed rails;repositioning sheet  -KG     Comment, (Bed Mobility) Pt rolled L and R multiple times for hygiene and placement of sling.  -KG       Row Name 03/07/25 1317          Transfers    Comment, (Transfers) Pt transferred from bed to chair with mechanical lift. Performed STS x2 from chair with blocking of tayler knees and B UE support. Pt unable to achieve full upright posture.  -KG       Row Name 03/07/25 1317          Bed-Chair Transfer    Bed-Chair [place] (Transfers) dependent (less than 25% patient effort);2 person assist;verbal cues  -KG     Assistive Device (Bed-Chair Transfers) lift device  -KG       Row Name 03/07/25 1317          Sit-Stand Transfer    Sit-Stand [place] (Transfers) maximum assist (25% patient effort);2 person assist;verbal cues  -KG     Assistive Device (Sit-Stand Transfers) other (see comments)  B UE support  -KG       Row Name 03/07/25 1317          Gait/Stairs (Locomotion)    [place] Level (Gait) unable to assess  -KG     Comment, (Gait/Stairs) Ambulation deferred. Not appropriate  to assess.  -KG               User Key  (r) = Recorded By, (t) = Taken By, (c) = Cosigned By      Initials Name Provider Type    KG Geovanna Burton PT Physical Therapist                   Obj/Interventions       Row Name 03/07/25 1322          Range of Motion Comprehensive    General Range of Motion no range of motion deficits identified  -KG     Comment, General Range of Motion B LE WFL  -KG       Row Name 03/07/25 1322          Strength Comprehensive (MMT)    Comment, General Manual Muscle Testing (MMT) Assessment B LE grossly 3+/5; limited formal assessment  -KG       Row Name 03/07/25 1322          Balance    Balance Assessment sitting static balance;standing static balance  -KG     Static Sitting Balance contact guard;verbal cues  -KG     Position, Sitting Balance supported;sitting in chair  -KG     Static Standing Balance maximum assist;2-person assist  -KG     Position/Device Used, Standing Balance supported  -KG               User Key  (r) = Recorded By, (t) = Taken By, (c) = Cosigned By      Initials Name Provider Type    Geovanna Valente PT Physical Therapist                   Goals/Plan       Row Name 03/07/25 1450          Bed Mobility Goal 1 (PT)    Activity/Assistive Device (Bed Mobility Goal 1, PT) sit to supine;supine to sit  -KG     [place] Level/Cues Needed (Bed Mobility Goal 1, PT) moderate assist (50-74% patient effort)  -KG     Time Frame (Bed Mobility Goal 1, PT) short term goal (STG);3 days  -KG     Progress/Outcomes (Bed Mobility Goal 1, PT) goal ongoing  -KG       Row Name 03/07/25 1450          Transfer Goal 1 (PT)    Activity/Assistive Device (Transfer Goal 1, PT) sit-to-stand/stand-to-sit;bed-to-chair/chair-to-bed  -KG     [place] Level/Cues Needed (Transfer Goal 1, PT) moderate assist (50-74% patient effort)  -KG     Time Frame (Transfer Goal 1, PT) long term goal (LTG);10 days  -KG     Progress/Outcome (Transfer Goal 1, PT) goal ongoing;goal revised this date   -KG       Row Name 03/07/25 1450          Gait Training Goal 1 (PT)    Activity/Assistive Device (Gait Training Goal 1, PT) gait (walking locomotion);assistive device use;walker, rolling  -KG     [place] Level (Gait Training Goal 1, PT) maximum assist (25-49% patient effort)  -KG     Distance (Gait Training Goal 1, PT) 10 feet  -KG     Time Frame (Gait Training Goal 1, PT) long term goal (LTG);10 days  -KG     Progress/Outcome (Gait Training Goal 1, PT) goal ongoing  -KG       Row Name 03/07/25 1450          Therapy Assessment/Plan (PT)    Planned Therapy Interventions (PT) balance training;bed mobility training;gait training;strengthening;transfer training  -KG               User Key  (r) = Recorded By, (t) = Taken By, (c) = Cosigned By      Initials Name Provider Type    YESSY Geovanna Burton, PT Physical Therapist                   Clinical Impression       Row Name 03/07/25 1323          Pain    Additional Documentation Pain Scale: FACES Pre/Post-Treatment (Group)  -KG       Row Name 03/07/25 1323          Pain Scale: FACES Pre/Post-Treatment    Pain: FACES Scale, Pretreatment 0-->no hurt  -KG     Posttreatment Pain Rating 0-->no hurt  -KG       Row Name 03/07/25 1323          Plan of Care Review    Plan of Care Reviewed With patient  -KG     Outcome Evaluation PT re-evaluation completed for pt with generalized weakness, impaired balance and coordination, increased confusion, and decreased functional mobility. Pt transferred from bed to chair with mechanical lift and performed STS x2 with maxA x2 and B UE support. Pt's goals have been revised and pt would continue to benefit from PT skilled care. Recommend D/C to SNF.  -KG       Row Name 03/07/25 1323          Therapy Assessment/Plan (PT)    Patient/Family Therapy Goals Statement (PT) 10 days  -KG     Predicted Duration of Therapy Intervention (PT) 10 days  -KG       [facility] Name 03/07/25 1323          Vital Signs    Pre Systolic BP Rehab 143  -KG     Pre  Treatment Diastolic BP 74  -KG     Post Systolic BP Rehab 147  -KG     Post Treatment Diastolic BP 67  -KG     Pretreatment Heart Rate (beats/min) 73  -KG     Posttreatment Heart Rate (beats/min) 75  -KG     Pre SpO2 (%) 95  -KG     O2 Delivery Pre Treatment supplemental O2  -KG     Post SpO2 (%) 95  -KG     O2 Delivery Post Treatment supplemental O2  -KG     Pre Patient Position Supine  -KG     Intra Patient Position Standing  -KG     Post Patient Position Sitting  -KG       Row Name 03/07/25 1323          Positioning and Restraints    Pre-Treatment Position in bed  -KG     Post Treatment Position chair  -KG     In Chair notified nsg;reclined;call light within reach;encouraged to call for assist;exit alarm on;RUE elevated;LUE elevated;waffle cushion;on mechanical lift sling;legs elevated  -KG               User Key  (r) = Recorded By, (t) = Taken By, (c) = Cosigned By      Initials Name Provider Type    Geovanna Valente, PT Physical Therapist                   Outcome Measures       Row Name 03/07/25 1451          How much help from another person do you currently need...    Turning from your back to your side while in flat bed without using bedrails? 2  -KG     Moving from lying on back to sitting on the side of a flat bed without bedrails? 2  -KG     Moving to and from a bed to a chair (including a wheelchair)? 1  -KG     Standing up from a chair using your arms (e.g., wheelchair, bedside chair)? 2  -KG     Climbing 3-5 steps with a railing? 1  -KG     To walk in hospital room? 1  -KG     AM-PAC 6 Clicks Score (PT) 9  -KG     Highest Level of Mobility Goal 3 --> Sit at edge of bed  -KG       Row Name 03/07/25 1451          Functional Assessment    Outcome Measure Options AM-PAC 6 Clicks Basic Mobility (PT)  -KG               User Key  (r) = Recorded By, (t) = Taken By, (c) = Cosigned By      Initials Name Provider Type    Geovanna Valente, PT Physical Therapist                                  Physical Therapy Education       Title: PT OT SLP Therapies (In Progress)       Topic: Physical Therapy (In Progress)       Point: Mobility training (In Progress)       Learning Progress Summary            Patient Acceptance, E, NR by KG at 3/7/2025 0957    Acceptance, E, NR by KG1 at 2/27/2025 0938                      Point: Home exercise program (In Progress)       Learning Progress Summary            Patient Acceptance, E, NR by KG at 3/7/2025 0957    Acceptance, E, NR by KG1 at 2/27/2025 0938                      Point: Body mechanics (In Progress)       Learning Progress Summary            Patient Acceptance, E, NR by KG at 3/7/2025 0957    Acceptance, E, NR by KG1 at 2/27/2025 0938                      Point: Precautions (In Progress)       Learning Progress Summary            Patient Acceptance, E, NR by KG at 3/7/2025 0957    Acceptance, E, NR by KG1 at 2/27/2025 0938                                      User Key       Initials Effective Dates Name Provider Type Discipline    KG 05/22/20 -  Geovanna Burton, PT Physical Therapist PT    KG1 12/13/24 -  Vanessa Nails Physical Therapist PT                  PT Recommendation and Plan  Planned Therapy Interventions (PT): balance training, bed mobility training, gait training, strengthening, transfer training  Outcome Evaluation: PT re-evaluation completed for pt with generalized weakness, impaired balance and coordination, increased confusion, and decreased functional mobility. Pt transferred from bed to chair with mechanical lift and performed STS x2 with maxA x2 and B UE support. Pt's goals have been revised and pt would continue to benefit from PT skilled care. Recommend D/C to SNF.     Time Calculation:         PT Charges       Row Name 03/07/25 0957             Time Calculation    Start Time 0957  -KG      PT Received On 03/07/25 -KG      PT Goal Re-Cert Due Date 03/17/25  -KG         Time Calculation- PT    Total Timed Code Minutes- PT 23  minute(s)  -KG         Timed Charges    91753 - PT Therapeutic Activity Minutes 23  -KG         Untimed Charges    PT Eval/Re-eval Minutes 26  -KG         Total Minutes    Timed Charges Total Minutes 23  -KG      Untimed Charges Total Minutes 26  -KG       Total Minutes 49  -KG                User Key  (r) = Recorded By, (t) = Taken By, (c) = Cosigned By      Initials Name Provider Type    KG Geovanna Burton, PT Physical Therapist                  Therapy Charges for Today       Code Description Service Date Service Provider Modifiers Qty    69710798898 HC PT THERAPEUTIC ACT EA 15 MIN 3/7/2025 Geovanna Burton, PT GP 2    58103255572 HC PT RE-EVAL ESTABLISHED PLAN 2 3/7/2025 Geovanna Burton, PT GP 1            PT G-Codes  Outcome Measure Options: AM-PAC 6 Clicks Basic Mobility (PT)  AM-PAC 6 Clicks Score (PT): 9  AM-PAC 6 Clicks Score (OT): 6  PT Discharge Summary  Anticipated Discharge Disposition (PT): skilled nursing facility    Sandra Burton PT  3/7/2025

## 2025-03-07 NOTE — PLAN OF CARE
Goal Outcome Evaluation:  Plan of Care Reviewed With: patient        Progress: no change  Outcome Evaluation: OT re-evaluation completed. The pt presents below baseline with generalized weakness, decreased activity tolerance, balance deficits, and decreased safety awareness warranting continued IP OT services. Pt assisted in extensive hygiene and LB bathing while supine. Pt tolerated dependent transfer to chair well using mechanical lift. Pt performed STS x2 from chair with maxA x2, BUE support and B knees blocked. Pt able to achieve ~80% full upright posture. Recommend a d/c to SNF when medically ready.    Anticipated Discharge Disposition (OT): skilled nursing facility

## 2025-03-07 NOTE — CASE MANAGEMENT/SOCIAL WORK
Continued Stay Note  [facility]     Patient Name: Val Nassar Jr.  MRN: [id number]  Today's Date: 3/7/2025    Admit Date: 2/11/2025    Plan: REturn to LTC bed @ [facility].   Discharge Plan       Row Name 03/07/25 1513       Plan    Plan REturn to LTC bed @ [facility].    Patient/Family in Agreement with Plan yes    Plan Comments Discussed in MDR, advancing tube feeds speech following planning reeval FEES today.  I met w/ in room, he was sitting in chair. RN was shaving his face. He said yes when I asked if he lived @ [facility] and said yes he wants to go back. He also asked if he could get a beer that he had $100 and would give it to whomever would get him one. Await FEES results, d/c plan is to return to LTC bed @ [facility]. PT/OT working w/him too. CM will continue to follow.    Final Discharge Disposition Code 04 - intermediate care facility                   Discharge Codes    No documentation.                 Expected Discharge Date and Time       Expected Discharge Date Expected Discharge Time    Mar 11, 2025               Nikolas Hernandez RN

## 2025-03-08 ENCOUNTER — ANCILLARY PROCEDURE (OUTPATIENT)
Dept: SPEECH THERAPY | Facility: HOSPITAL | Age: 65
DRG: 870 | End: 2025-03-08
Payer: MEDICARE

## 2025-03-08 LAB
ANION GAP SERPL CALCULATED.3IONS-SCNC: 10 MMOL/L (ref 5–15)
ANISOCYTOSIS BLD QL: NORMAL
BASOPHILS # BLD AUTO: 0.04 10*3/MM3 (ref 0–0.2)
BASOPHILS NFR BLD AUTO: 0.3 % (ref 0–1.5)
BH BB BLOOD EXPIRATION DATE: NORMAL
BH BB BLOOD TYPE BARCODE: 6200
BH BB DISPENSE STATUS: NORMAL
BH BB PRODUCT CODE: NORMAL
BH BB UNIT NUMBER: NORMAL
BUN SERPL-MCNC: 17 MG/DL (ref 8–23)
BUN/CREAT SERPL: 25 (ref 7–25)
CALCIUM SPEC-SCNC: 9.1 MG/DL (ref 8.6–10.5)
CHLORIDE SERPL-SCNC: 116 MMOL/L (ref 98–107)
CO2 SERPL-SCNC: 21 MMOL/L (ref 22–29)
CREAT SERPL-MCNC: 0.68 MG/DL (ref 0.76–1.27)
CROSSMATCH INTERPRETATION: NORMAL
DEPRECATED RDW RBC AUTO: 67.1 FL (ref 37–54)
EGFRCR SERPLBLD CKD-EPI 2021: 103.2 ML/MIN/1.73
EOSINOPHIL # BLD AUTO: 0.54 10*3/MM3 (ref 0–0.4)
EOSINOPHIL NFR BLD AUTO: 4.6 % (ref 0.3–6.2)
ERYTHROCYTE [DISTWIDTH] IN BLOOD BY AUTOMATED COUNT: 18.8 % (ref 12.3–15.4)
GLUCOSE BLDC GLUCOMTR-MCNC: 100 MG/DL (ref 70–130)
GLUCOSE BLDC GLUCOMTR-MCNC: 114 MG/DL (ref 70–130)
GLUCOSE BLDC GLUCOMTR-MCNC: 121 MG/DL (ref 70–130)
GLUCOSE BLDC GLUCOMTR-MCNC: 157 MG/DL (ref 70–130)
GLUCOSE SERPL-MCNC: 120 MG/DL (ref 65–99)
HCT VFR BLD AUTO: 30.8 % (ref 37.5–51)
HCT VFR BLD AUTO: 30.9 % (ref 37.5–51)
HGB BLD-MCNC: 9.3 G/DL (ref 13–17.7)
HGB BLD-MCNC: 9.5 G/DL (ref 13–17.7)
IMM GRANULOCYTES # BLD AUTO: 0.13 10*3/MM3 (ref 0–0.05)
IMM GRANULOCYTES NFR BLD AUTO: 1.1 % (ref 0–0.5)
LYMPHOCYTES # BLD AUTO: 0.86 10*3/MM3 (ref 0.7–3.1)
LYMPHOCYTES NFR BLD AUTO: 7.4 % (ref 19.6–45.3)
MAGNESIUM SERPL-MCNC: 1.6 MG/DL (ref 1.6–2.4)
MCH RBC QN AUTO: 29.3 PG (ref 26.6–33)
MCHC RBC AUTO-ENTMCNC: 30.1 G/DL (ref 31.5–35.7)
MCV RBC AUTO: 97.5 FL (ref 79–97)
MONOCYTES # BLD AUTO: 0.97 10*3/MM3 (ref 0.1–0.9)
MONOCYTES NFR BLD AUTO: 8.3 % (ref 5–12)
NEUTROPHILS NFR BLD AUTO: 78.3 % (ref 42.7–76)
NEUTROPHILS NFR BLD AUTO: 9.08 10*3/MM3 (ref 1.7–7)
NRBC BLD AUTO-RTO: 0 /100 WBC (ref 0–0.2)
PHOSPHATE SERPL-MCNC: 2.7 MG/DL (ref 2.5–4.5)
PLAT MORPH BLD: NORMAL
PLATELET # BLD AUTO: 239 10*3/MM3 (ref 140–450)
PMV BLD AUTO: 9.3 FL (ref 6–12)
POTASSIUM SERPL-SCNC: 3.5 MMOL/L (ref 3.5–5.2)
POTASSIUM SERPL-SCNC: 4.8 MMOL/L (ref 3.5–5.2)
RBC # BLD AUTO: 3.17 10*6/MM3 (ref 4.14–5.8)
SODIUM SERPL-SCNC: 147 MMOL/L (ref 136–145)
UNIT  ABO: NORMAL
UNIT  RH: NORMAL
WBC MORPH BLD: NORMAL
WBC NRBC COR # BLD AUTO: 11.62 10*3/MM3 (ref 3.4–10.8)

## 2025-03-08 PROCEDURE — 99233 SBSQ HOSP IP/OBS HIGH 50: CPT | Performed by: INTERNAL MEDICINE

## 2025-03-08 PROCEDURE — 94761 N-INVAS EAR/PLS OXIMETRY MLT: CPT

## 2025-03-08 PROCEDURE — 84132 ASSAY OF SERUM POTASSIUM: CPT | Performed by: INTERNAL MEDICINE

## 2025-03-08 PROCEDURE — 80048 BASIC METABOLIC PNL TOTAL CA: CPT | Performed by: INTERNAL MEDICINE

## 2025-03-08 PROCEDURE — 94799 UNLISTED PULMONARY SVC/PX: CPT

## 2025-03-08 PROCEDURE — 25010000002 PIPERACILLIN SOD-TAZOBACTAM PER 1 G: Performed by: INTERNAL MEDICINE

## 2025-03-08 PROCEDURE — 94664 DEMO&/EVAL PT USE INHALER: CPT

## 2025-03-08 PROCEDURE — 83735 ASSAY OF MAGNESIUM: CPT | Performed by: INTERNAL MEDICINE

## 2025-03-08 PROCEDURE — 85014 HEMATOCRIT: CPT | Performed by: INTERNAL MEDICINE

## 2025-03-08 PROCEDURE — 85018 HEMOGLOBIN: CPT | Performed by: INTERNAL MEDICINE

## 2025-03-08 PROCEDURE — 84100 ASSAY OF PHOSPHORUS: CPT | Performed by: INTERNAL MEDICINE

## 2025-03-08 PROCEDURE — 25010000002 MAGNESIUM SULFATE 4 GM/100ML SOLUTION: Performed by: INTERNAL MEDICINE

## 2025-03-08 PROCEDURE — 85025 COMPLETE CBC W/AUTO DIFF WBC: CPT | Performed by: INTERNAL MEDICINE

## 2025-03-08 PROCEDURE — 82948 REAGENT STRIP/BLOOD GLUCOSE: CPT

## 2025-03-08 PROCEDURE — 63710000001 INSULIN REGULAR HUMAN PER 5 UNITS: Performed by: INTERNAL MEDICINE

## 2025-03-08 PROCEDURE — 85007 BL SMEAR W/DIFF WBC COUNT: CPT | Performed by: INTERNAL MEDICINE

## 2025-03-08 PROCEDURE — 92612 ENDOSCOPY SWALLOW (FEES) VID: CPT

## 2025-03-08 RX ORDER — MAGNESIUM SULFATE HEPTAHYDRATE 40 MG/ML
4 INJECTION, SOLUTION INTRAVENOUS ONCE
Status: COMPLETED | OUTPATIENT
Start: 2025-03-08 | End: 2025-03-08

## 2025-03-08 RX ORDER — POTASSIUM CHLORIDE 1.5 G/1.58G
40 POWDER, FOR SOLUTION ORAL EVERY 4 HOURS
Status: COMPLETED | OUTPATIENT
Start: 2025-03-08 | End: 2025-03-08

## 2025-03-08 RX ADMIN — QUETIAPINE FUMARATE 25 MG: 25 TABLET ORAL at 09:08

## 2025-03-08 RX ADMIN — GABAPENTIN 300 MG: 300 CAPSULE ORAL at 06:28

## 2025-03-08 RX ADMIN — PANTOPRAZOLE SODIUM 40 MG: 40 INJECTION, POWDER, FOR SOLUTION INTRAVENOUS at 20:42

## 2025-03-08 RX ADMIN — IPRATROPIUM BROMIDE AND ALBUTEROL SULFATE 3 ML: 2.5; .5 SOLUTION RESPIRATORY (INHALATION) at 13:34

## 2025-03-08 RX ADMIN — PIPERACILLIN AND TAZOBACTAM 3.38 G: 3; .375 INJECTION, POWDER, LYOPHILIZED, FOR SOLUTION INTRAVENOUS at 20:42

## 2025-03-08 RX ADMIN — RIFAXIMIN 550 MG: 550 TABLET ORAL at 09:09

## 2025-03-08 RX ADMIN — PIPERACILLIN AND TAZOBACTAM 3.38 G: 3; .375 INJECTION, POWDER, LYOPHILIZED, FOR SOLUTION INTRAVENOUS at 14:32

## 2025-03-08 RX ADMIN — GABAPENTIN 300 MG: 300 CAPSULE ORAL at 20:43

## 2025-03-08 RX ADMIN — RIFAXIMIN 550 MG: 550 TABLET ORAL at 20:43

## 2025-03-08 RX ADMIN — PIPERACILLIN AND TAZOBACTAM 3.38 G: 3; .375 INJECTION, POWDER, LYOPHILIZED, FOR SOLUTION INTRAVENOUS at 05:29

## 2025-03-08 RX ADMIN — MAGNESIUM SULFATE IN WATER FOR 4 G: 40 INJECTION INTRAVENOUS at 11:10

## 2025-03-08 RX ADMIN — GABAPENTIN 300 MG: 300 CAPSULE ORAL at 14:32

## 2025-03-08 RX ADMIN — ESCITALOPRAM OXALATE 10 MG: 10 TABLET ORAL at 20:43

## 2025-03-08 RX ADMIN — POTASSIUM CHLORIDE 40 MEQ: 1.5 FOR SOLUTION ORAL at 11:10

## 2025-03-08 RX ADMIN — NYSTATIN: 100000 POWDER TOPICAL at 15:34

## 2025-03-08 RX ADMIN — HUMAN INSULIN 2 UNITS: 100 INJECTION, SOLUTION SUBCUTANEOUS at 06:26

## 2025-03-08 RX ADMIN — FINASTERIDE 5 MG: 5 TABLET, FILM COATED ORAL at 09:08

## 2025-03-08 RX ADMIN — PANTOPRAZOLE SODIUM 40 MG: 40 INJECTION, POWDER, FOR SOLUTION INTRAVENOUS at 09:08

## 2025-03-08 RX ADMIN — Medication 10 ML: at 09:09

## 2025-03-08 RX ADMIN — NYSTATIN: 100000 POWDER TOPICAL at 06:28

## 2025-03-08 RX ADMIN — Medication 1 APPLICATION: at 09:09

## 2025-03-08 RX ADMIN — ROSUVASTATIN 20 MG: 20 TABLET, FILM COATED ORAL at 20:42

## 2025-03-08 RX ADMIN — Medication 1 APPLICATION: at 20:42

## 2025-03-08 RX ADMIN — POTASSIUM CHLORIDE 40 MEQ: 1.5 FOR SOLUTION ORAL at 14:32

## 2025-03-08 RX ADMIN — OXYCODONE AND ACETAMINOPHEN 1 TABLET: 7.5; 325 TABLET ORAL at 20:54

## 2025-03-08 RX ADMIN — NYSTATIN: 100000 POWDER TOPICAL at 20:45

## 2025-03-08 RX ADMIN — QUETIAPINE FUMARATE 50 MG: 25 TABLET ORAL at 20:43

## 2025-03-08 RX ADMIN — IPRATROPIUM BROMIDE AND ALBUTEROL SULFATE 3 ML: 2.5; .5 SOLUTION RESPIRATORY (INHALATION) at 08:46

## 2025-03-08 RX ADMIN — DEXMEDETOMIDINE HYDROCHLORIDE 1 MCG/KG/HR: 400 INJECTION INTRAVENOUS at 01:13

## 2025-03-08 NOTE — PROGRESS NOTES
"      [facility]         Dr Islas Sayied   LOS: 25 days   Patient Care Team:  Wesly Minor MD as PCP - General (Family Medicine)  Katerina Ga MD as Referring Physician (Hematology and Oncology)  Jeremy Aguila MD as Consulting Physician (Radiation Oncology)  Shirlene Kauffman APRN as Radiation Oncologist (Nurse Practitioner)    Chief Complaint:septic shock with Pseudomonas pneumonia bacteremia other issues as listed below    Interval History: Opens eyes currently on Precedex drip.  No overnight issues reported.  Not following commands    REVIEW OF SYSTEMS:   Unable to get the patient's current condition    Ventilator/Non-Invasive Ventilation Settings (From admission, onward)     Nasal cannula oxygen    Vital Signs  Temp:  [98 °F (36.7 °C)-99.6 °F (37.6 °C)] 98.1 °F (36.7 °C)  Heart Rate:  [46-96] 47  Resp:  [14-24] 14  BP: (117-142)/(61-93) 117/62    Intake/Output Summary (Last 24 hours) at 3/8/2025 1019  Last data filed at 3/8/2025 0900  Gross per 24 hour   Intake 2346.9 ml   Output 1530 ml   Net 816.9 ml     Flowsheet Rows      Flowsheet Row First Filed Value   Admission Height 175.3 cm (69\") Documented at 02/11/2025 0859   Admission Weight 83 kg (183 lb) Documented at 02/11/2025 0859                  Physical Exam:  Patient is examined using the personal protective equipment as per guidelines from infection control for this particular patient as enacted.  Hand hygiene was performed before and after patient interaction.   General Appearance:     eyes open not following command  ENT normocephalic atraumatic  Neck midline trachea, no thyromegaly   Lungs:      demonstrate sounds rhonchi in the bases    Heart:    Regular rhythm and normal rate, normal S1 and S2, no            murmur, no gallop, no rub, no click   Chest Wall:    No abnormalities observed   Abdomen:     Normal bowel sounds, no masses, no organomegaly, soft        nontender, nondistended, no guarding, no rebound             "    tenderness   Extremities:   Moves all extremities well, no edema, no cyanosis, no             redness  CNS moving all extremities not following commands  Skin no rashes no nodules  Musculoskeletal no cyanosis no clubbing normal range of motion     Results Review:        Results from last 7 days   Lab Units 03/08/25  0853 03/07/25 1817 03/07/25  0116 03/06/25  1042   SODIUM mmol/L 147* 146* 148* 151*   POTASSIUM mmol/L 3.5  --  3.9 3.9   CHLORIDE mmol/L 116*  --  118* 119*   CO2 mmol/L 21.0*  --  20.0* 20.0*   BUN mg/dL 17  --  27* 34*   CREATININE mg/dL 0.68*  --  0.90 0.99   GLUCOSE mg/dL 120*  --  103* 88   CALCIUM mg/dL 9.1  --  8.6 8.3*         Results from last 7 days   Lab Units 03/08/25  0853 03/07/25  2320 03/07/25 1817 03/07/25  1108 03/07/25  0116 03/06/25 1817 03/06/25  1042   WBC 10*3/mm3 11.62*  --   --   --  12.68*  --  16.71*   HEMOGLOBIN g/dL 9.3* 8.0* 8.3*   < > 7.1*   < > 7.7*   HEMATOCRIT % 30.9* 25.9* 26.7*   < > 24.3*   < > 26.0*   PLATELETS 10*3/mm3 239  --   --   --  202  --  220    < > = values in this interval not displayed.             Results from last 7 days   Lab Units 03/08/25  0853   MAGNESIUM mg/dL 1.6         Results from last 7 days   Lab Units 03/06/25  0357   PH, ARTERIAL pH units 7.484*   PO2 ART mm Hg 110.0*   PCO2, ARTERIAL mm Hg 29.0*   HCO3 ART mmol/L 21.8       I reviewed the patient's new clinical results.  I personally viewed and interpreted the patient's chest x-ray.        Medication Review:   castor oil-balsam peru, 1 Application, Topical, Q12H  fentaNYL, 1 patch, Transdermal, Q72H   And  Check Fentanyl Patch Placement, 1 each, Not Applicable, Q12H  docusate sodium, 100 mg, Per G Tube, BID  escitalopram, 10 mg, Per G Tube, Nightly  finasteride, 5 mg, Per G Tube, Daily  gabapentin, 300 mg, Per G Tube, Q8H  insulin regular, 2-7 Units, Subcutaneous, Q6H  ipratropium-albuterol, 3 mL, Nebulization, 4x Daily - RT  nystatin, , Topical, Q8H  pantoprazole, 40 mg,  Intravenous, Q12H  piperacillin-tazobactam, 3.375 g, Intravenous, Q8H  QUEtiapine, 25 mg, Per G Tube, Daily   And  QUEtiapine, 50 mg, Per G Tube, Nightly  rifAXIMin, 550 mg, Per G Tube, Q12H  rosuvastatin, 20 mg, Per G Tube, Nightly  sodium chloride, 10 mL, Intravenous, Q12H             ASSESSMENT:   Septic shock resolved  Pneumonia Pseudomonas pneumonia  Pseudomonas bacteremia  Hyponatremia  Cirrhosis of the liver  Squamous cell carcinoma of the esophagus  Tobacco abuse      PLAN:  Events noted chart reviewed.  Reviewed recommendations previous intensivist.  Shock is resolved.  Patient currently off Levophed  Continue current antibiotics de-escalate based on culture.  Mobilize ambulate  Diet per speech  ICU core measures  Transfer patient out of the ICU  Hospitalist to assume care floor       Janle Doty MD  03/08/25  10:19 EST

## 2025-03-08 NOTE — PLAN OF CARE
Goal Outcome Evaluation:  Plan of Care Reviewed With: patient        Progress: no change  Outcome Evaluation:   -Pt alert and oriented to person  -VSS on RA  -Agitated with staff around 0404-2334, precedex gtt rate increased to 1, pt rested well afterwards, rate currently back down to 0.2  -Self cath done x2, total uop 1180mL  -x2 liquid Mod-Lg BMs

## 2025-03-08 NOTE — MBS/VFSS/FEES
Acute Care - Speech Language Pathology   Swallow Re-Evaluation [facility]  Fiberoptic Endoscopic Evaluation of Swallowing (FEES)       Patient Name: Val Nassar Jr.  : 1960  MRN: [id number]  Today's Date: 3/8/2025               Admit Date: 2025    Visit Dx:     ICD-10-CM ICD-9-CM   1. Septic shock  A41.9 038.9    R65.21 785.52     995.92   2. Acute UTI (urinary tract infection)  N39.0 599.0   3. Hypotension, unspecified hypotension type  I95.9 458.9   4. Severe malnutrition  E43 261   5. Squamous cell carcinoma of esophagus  C15.9 150.9   6. Atelectasis  J98.11 518.0   7. Oropharyngeal dysphagia  R13.12 787.22   8. Melena  K92.1 578.1   9. Acute blood loss anemia  D62 285.1   10. Alcoholic cirrhosis of liver without ascites  K70.30 571.2     Patient Active Problem List   Diagnosis    Acute gastric ulcer with hemorrhage    Alcohol abuse, uncomplicated    Anxiety disorder, unspecified    Diverticulum of bladder    Duodenal ulcer, unspecified as acute or chronic, without hemorrhage or perforation    Elevation of level of transaminase and lactic acid dehydrogenase (LDH)    Gastro-esophageal reflux disease without esophagitis    Hyperglycemia, unspecified    Hyperlipidemia, unspecified    Melena    Nicotine dependence, cigarettes, uncomplicated    Tobacco use    Severe malnutrition    Duodenal ulcer    Iron deficiency anemia    Cirrhosis of liver    Acute blood loss anemia    Squamous cell carcinoma of esophagus    Duodenal stenosis    Generalized weakness    Orthostatic hypotension    UTI (urinary tract infection)    Malignant neoplasm of upper third of esophagus    Iron deficiency anemia    GALILEO (acute kidney injury)    Metabolic acidosis    Septic shock due to Pseudomonas species    Pneumonia of both lower lobes due to Pseudomonas species    Bacteremia due to Pseudomonas     Past Medical History:   Diagnosis Date    Anemia     Cancer     ESOPHAGEAL    Cirrhosis     Duodenal ulcer      Gastric ulcer     GERD (gastroesophageal reflux disease)     History of alcohol abuse     History of radiation therapy 05/08/2024    esophagus    HLD (hyperlipidemia)     Mood disorder      Past Surgical History:   Procedure Laterality Date    ENDOSCOPY N/A 12/26/2023    Procedure: ESOPHAGOGASTRODUODENOSCOPY;  Surgeon: Wesly Mckeon MD;  Location:  [facility];  Service: Gastroenterology;  Laterality: N/A;    ENDOSCOPY N/A 3/3/2025    Procedure: ESOPHAGOGASTRODUODENOSCOPY;  Surgeon: Wesly Mckeon MD;  Location:  [facility];  Service: Gastroenterology;  Laterality: N/A;  APC USED IN ESOPHAGUS.    VENOUS ACCESS DEVICE (PORT) INSERTION Right 04/25/2024       SLP Recommendation and Plan  SLP Swallowing Diagnosis: mild-moderate, oral dysphagia, mod-severe, pharyngeal dysphagia (03/08/25 0900)  SLP Diet Recommendation: puree, honey thick liquids, no mixed consistencies, other (see comments) (supplements and enteral feeds at discretion of RD/MD) (03/08/25 0900)  Recommended Precautions and Strategies: upright posture during/after eating, no straw, general aspiration precautions (03/08/25 0900)  SLP Rec. for Method of Medication Administration: meds crushed, with puree, with thick liquids, meds via alternate route (03/08/25 0900)     Monitor for Signs of Aspiration: yes, notify SLP if any concerns (03/08/25 0900)     Swallow Criteria for Skilled Therapeutic Interventions Met: demonstrates skilled criteria (03/08/25 0900)  Anticipated Discharge Disposition (SLP): inpatient rehabilitation facility (03/08/25 0900)  Rehab Potential/Prognosis, Swallowing: adequate, monitor progress closely (03/08/25 0900)  Therapy Frequency (Swallow): 5 days per week (03/08/25 0900)  Predicted Duration Therapy Intervention (Days): 1 week (03/08/25 0900)  Oral Care Recommendations: Oral Care BID/PRN, Suction toothbrush (03/08/25 0900)                                        Progress: improving      SWALLOW EVALUATION (Last 72 Hours)        SLP Adult Swallow Evaluation       Row Name 03/08/25 0900 03/07/25 1100                Rehab Evaluation    Document Type -- evaluation  -ML       Subjective Information -- complains of;pain  -ML       Patient Observations -- alert;agree to therapy  -ML       Patient/Family/Caregiver Comments/Observations -- No family present  -ML       Patient Effort -- adequate  -ML       Comment -- Pt remains confused- states he has been up in chair since 1am- not true as I was here earlier today and pt was in bed.  -ML       Symptoms Noted During/After Treatment -- none  -ML       Oral Care -- suction provided;teeth brushed - suction toothbrush;tongue brushed  -ML          General Information    Patient Profile Reviewed -- yes  -ML       Pertinent History Of Current Problem -- See ST hx- intubated following EGD due to agitation (per RN)- intubated 2  days  -ML       Current Method of Nutrition -- nasogastric feedings;small-bore  -ML       Precautions/Limitations, Hearing -- WFL;for purposes of eval  -ML       Prior Level of Function-Swallowing -- --  Impaired swallowing earlier this admission per FEES  -ML       Plans/Goals Discussed with -- patient  -ML       Barriers to Rehab -- cognitive status  -ML       Patient's Goals for Discharge -- patient could not state  -ML          Pain    Pain, Additional Detail -- Pt reports legs hurt a little bit- he is UIC and wants to be able to put his legs down, however this is not appropriate for safety reasons.  -ML       Additional Documentation -- Pain, Additional Documentation (Row)  -ML          Oral Motor Structure and Function    Dentition Assessment -- missing teeth;poor oral hygiene;other (see comments)  endorses he has dentures but none found in room  -ML       Secretion Management -- WNL/WFL  -ML       Mucosal Quality -- moist, healthy  -ML       Volitional Swallow -- delayed  -ML       Volitional Cough -- weak  -ML          Oral Musculature and Cranial Nerve Assessment     Oral Motor General Assessment -- unable to assess  -ML          General Eating/Swallowing Observations    Respiratory Support Currently in Use -- room air  -ML       Pre SpO2 (%) -- 96  -ML       Post SpO2 (%) -- 97  -ML          Clinical Swallow Eval    Oral Prep Phase -- impaired  -ML       Oral Residue -- impaired  -ML       Pharyngeal Phase -- suspected pharyngeal impairment  -ML       Clinical Swallow Evaluation Summary -- Clinical swallowing reevaluation completed. Oral dysphagia- slow to prep and clear oral cavity.  Pharyngeal signs include multiple swallows with all presentations and cough following repeat thin trials.  No overt signs of pharyngeal impairment with nectar via straw or puree trials. given FEES hx with deep laryngeal penetration without clearance, reintubation, and weak cough, a repeat FEES is indicated prior to PO.  -ML          Oral Prep Concerns    Oral Prep Concerns -- increased prep time  -ML       Increased Prep Time -- pudding  -ML          Oral Residue Concerns    Oral Residue Concerns -- diffuse residue throughout oral cavity  -ML       Diffuse Residue Throughout Oral Cavity -- pudding  -ML       Oral Residue Concerns, Comment -- Mild but present- clears with 2nd swallow  -ML          Pharyngeal Phase Concerns    Pharyngeal Phase Concerns -- cough  -ML       Cough -- thin  -ML          Fiberoptic Endoscopic Evaluation of Swallowing (FEES)    Risks/Benefits Reviewed risks/benefits explained;patient;agreed to eval  -RS --       Nasal Entry right:  -RS --       Scope serial number/identification [id number]  -RS --          Anatomy and Physiology    Anatomic Considerations edema;arytenoids  -RS --       Velopharyngeal WFL  -RS --       Base of Tongue symmetrical  -RS --       Epiglottis WFL  -RS --       Laryngeal Function Breathing symmetrical  -RS --       Laryngeal Function Phonation symmetrical  -RS --       Laryngeal Function to Breath Hold CNA  -RS --       Secretion Rating Scale (Urbano et  al. 1996) 1- secretions present around the laryngeal vestibule  -RS --       Secretion Description thick;discolored  -RS --       Ice Chips elicited swallow  -RS --       Spontaneous Swallow frequency reduced  -RS --       Sensory sensed scope  -RS --       Utensils Used Spoon;Cup;Straw  -RS --       Consistencies Trialed ice chips;thin liquids;nectar-thick liquids;honey-thick liquids;pudding/puree;mixed consistency  -RS --          FEES Interpretation    Oral Phase prespill of liquids into pharynx;solids not tested  -RS --          Initiation of Pharyngeal Swallow    Initiation of Pharyngeal Swallow bolus in valleculae;other (see comments)  and lateral channels  -RS --       Pharyngeal Phase impaired pharyngeal phase of swallowing  -RS --       Penetration Before the Swallow thin liquids;nectar-thick liquids;mixed consistency;secondary to reduced back of tongue control;secondary to delayed swallow initiation or mistiming  -RS --       Penetration During the Swallow nectar-thick liquids;secondary to delayed swallow initiation or mistiming;secondary to reduced laryngeal elevation  -RS --       Aspiration During the Swallow thin liquids;secondary to delayed swallow initiation or mistiming;secondary to reduced laryngeal elevation;secondary to reduced vestibular closure  -RS --       Depth of Penetration shallow  -RS --       Response to Penetration No  -RS --       No spontaneous response to penetration and non-effective laryngeal clearance with cue (see comments)  -RS --       Response to Aspiration No  -RS --       No spontaneous response to aspiration with non-effective subglottic clearance with cue (see comments)  -RS --       Rosenbek's Scale thin:;8-->Level 8;nectar:;3-->Level 3;honey:;pudding/puree:;1-->Level 1  -RS --       Residue all consistencies tested;diffuse within pharynx;secondary to reduced base of tongue retraction;secondary to reduced posterior pharyngeal wall stripping;secondary to reduced laryngeal  elevation;secondary to reduced hyolaryngeal excursion;other (see comments)  mild  -RS --       Response to Residue cleared residue;with spontaneous subsequent swallow  -RS --       Attempted Compensatory Maneuvers bolus size;bolus presentation style  -RS --       FEES Summary Given modified diet, would be concerned that pt will not consume adequate PO calories for nutritional support. May want to consider supplements v nocturnal feedings per RD.  -RS --          Swallowing Quality of Life Assessment    Education and counseling provided Signs of aspiration;Silent aspiration;Comfort diet options;Alternate nutrition/hydration options, risks, and benefits  -RS --          SLP Evaluation Clinical Impression    SLP Swallowing Diagnosis mild-moderate;oral dysphagia;mod-severe;pharyngeal dysphagia  -RS oral dysphagia;R/O pharyngeal dysphagia  -ML       Functional Impact risk of aspiration/pneumonia  -RS risk of aspiration/pneumonia  -ML       Rehab Potential/Prognosis, Swallowing adequate, monitor progress closely  -RS --       Swallow Criteria for Skilled Therapeutic Interventions Met demonstrates skilled criteria  -RS demonstrates skilled criteria  -ML          Recommendations    Therapy Frequency (Swallow) 5 days per week  -RS other (see comments)  Pending FEES  -ML       Predicted Duration Therapy Intervention (Days) 1 week  -RS --       SLP Diet Recommendation puree;honey thick liquids;no mixed consistencies;other (see comments)  supplements and enteral feeds at discretion of RD/MD  -RS NPO;temporary alternate methods of nutrition/hydration  Pending FEES- ok for ice chips in moderation after oral care.  -ML       Recommended Diagnostics -- reassess via FEES  -ML       Recommended Precautions and Strategies upright posture during/after eating;no straw;general aspiration precautions  -RS general aspiration precautions  -ML       Oral Care Recommendations Oral Care BID/PRN;Suction toothbrush  -RS Oral Care BID/PRN;Suction  toothbrush  -ML       SLP Rec. for Method of Medication Administration meds crushed;with puree;with thick liquids;meds via alternate route  -RS meds via alternate route  -ML       Monitor for Signs of Aspiration yes;notify SLP if any concerns  -RS --       Anticipated Discharge Disposition (SLP) inpatient rehabilitation facility  -RS anticipate therapy at next level of care;inpatient rehabilitation facility;skilled nursing facility  -ML                 User Key  (r) = Recorded By, (t) = Taken By, (c) = Cosigned By      Initials Name Effective Dates    ML Monica Ramey MS CCC-SLP 08/30/24 -     RS Carlos Elder MS CCC-SLP 09/14/23 -                     EDUCATION  The patient has been educated in the following areas:   Dysphagia (Swallowing Impairment) Modified Diet Instruction.        SLP GOALS       Row Name 03/08/25 0900 03/07/25 1100          (LTG) Patient will demonstrate functional swallow for    Diet Texture (Demonstrate functional swallow) soft to chew (ground) textures  -RS soft to chew (ground) textures  -ML     Liquid viscosity (Demonstrate functional swallow) thin liquids  -RS thin liquids  -ML     [place] (Demonstrate functional swallow) with minimal cues (75-90% accuracy)  -RS with minimal cues (75-90% accuracy)  -ML     Time Frame (Demonstrate functional swallow) 1 week  -RS 1 week  -ML     Progress/Outcomes (Demonstrate functional swallow) goal revised this date  -RS goal ongoing  -ML        (STG) Patient will tolerate trials of    Consistencies Trialed (Tolerate trials) pureed textures;honey/ moderately thick liquids  -RS --     Desired Outcome (Tolerate trials) without signs/symptoms of aspiration;without signs of distress  -RS --     [place] (Tolerate trials) with moderate cues (50-74% accuracy)  -RS --     Time Frame (Tolerate trials) 1 week  -RS --     Progress/Outcomes (Tolerate trials) new goal  -RS --        (STG) Lingual Strengthening Goal 1 (SLP)    Activity (Lingual  Strengthening Goal 1, SLP) increase tongue back strength  -RS increase tongue back strength  -ML     Increase Tongue Back Strength lingual resistance exercises  -RS lingual resistance exercises  -ML     [place]/Accuracy (Lingual Strengthening Goal 1, SLP) with moderate cues (50-74% accuracy)  -RS with maximum cues (25-49% accuracy)  -ML     Time Frame (Lingual Strengthening Goal 1, SLP) 1 week  -RS 1 week  -ML     Progress/Outcomes (Lingual Strengthening Goal 1, SLP) goal revised this date  -RS goal ongoing  -ML     Comment (Lingual Strengthening Goal 1, SLP) -- Pending FEES  -ML        (STG) Pharyngeal Strengthening Exercise Goal 1 (SLP)    Activity (Pharyngeal Strengthening Goal 1, SLP) increase timing;increase superior movement of the hyolaryngeal complex;increase anterior movement of the hyolaryngeal complex;increase closure at entrance to airway/closure of airway at glottis;increase squeeze/positive pressure generation;increase tongue base retraction  -RS increase superior movement of the hyolaryngeal complex;increase anterior movement of the hyolaryngeal complex;increase closure at entrance to airway/closure of airway at glottis;increase squeeze/positive pressure generation;increase tongue base retraction;increase timing  -ML     Increase Timing prepping - 3 second prep or suck swallow or 3-step swallow  -RS prepping - 3 second prep or suck swallow or 3-step swallow  -ML     Increase Superior Movement of the Hyolaryngeal Complex supraglottic swallow  -RS supraglottic swallow  -ML     Increase Anterior Movement of the Hyolaryngeal Complex chin tuck against resistance (CTAR)  -RS chin tuck against resistance (CTAR)  -ML     Increase Closure at Entrance to Airway/Closure of Airway at Glottis super-supraglottic swallow  -RS super-supraglottic swallow  -ML     Increase Squeeze/Positive Pressure Generation hard effortful swallow  -RS hard effortful swallow  -ML     Increase Tongue Base Retraction vicky  -RS  vicky  -ML     [place]/Accuracy (Pharyngeal Strengthening Goal 1, SLP) with moderate cues (50-74% accuracy)  -RS with maximum cues (25-49% accuracy)  -ML     Time Frame (Pharyngeal Strengthening Goal 1, SLP) 1 week  -RS short term goal (STG);1 week  -ML     Progress/Outcomes (Pharyngeal Strengthening Goal 1, SLP) goal revised this date  -RS goal ongoing  -ML     Comment (Pharyngeal Strengthening Goal 1, SLP) -- Pending FEES  -ML               User Key  (r) = Recorded By, (t) = Taken By, (c) = Cosigned By      Initials Name Provider Type    ML Monica Ramey MS CCC-SLP Speech and Language Pathologist    Carlos Ríos MS CCC-SLP Speech and Language Pathologist                         Time Calculation:    Time Calculation- SLP       Row Name 03/08/25 1004             Time Calculation- SLP    SLP Start Time 0830  -RS      SLP Received On 03/08/25  -RS         Untimed Charges    24910-BE Fiberoptic Endo Eval Swallow Minutes 96  -RS         Total Minutes    Untimed Charges Total Minutes 96  -RS       Total Minutes 96  -RS                User Key  (r) = Recorded By, (t) = Taken By, (c) = Cosigned By      Initials Name Provider Type    RS Carlos Elder MS CCC-SLP Speech and Language Pathologist                    Therapy Charges for Today       Code Description Service Date Service Provider Modifiers Qty    [id number] HC ST FIBEROPTIC ENDO EVAL SWALL 6 3/8/2025 Carlos Elder MS CCC-SLP GN 1                 Carlos Elder MS CCC-ESPERANZA  3/8/2025

## 2025-03-08 NOTE — PLAN OF CARE
Goal Outcome Evaluation:  Plan of Care Reviewed With: patient        Progress: improving       Anticipated Discharge Disposition (SLP): inpatient rehabilitation facility          SLP Swallowing Diagnosis: mild-moderate, oral dysphagia, mod-severe, pharyngeal dysphagia (03/08/25 0900)

## 2025-03-09 LAB
GLUCOSE BLDC GLUCOMTR-MCNC: 114 MG/DL (ref 70–130)
GLUCOSE BLDC GLUCOMTR-MCNC: 88 MG/DL (ref 70–130)
GLUCOSE BLDC GLUCOMTR-MCNC: 88 MG/DL (ref 70–130)
GLUCOSE BLDC GLUCOMTR-MCNC: 90 MG/DL (ref 70–130)
HCT VFR BLD AUTO: 25 % (ref 37.5–51)
HCT VFR BLD AUTO: 25.1 % (ref 37.5–51)
HGB BLD-MCNC: 7.6 G/DL (ref 13–17.7)
HGB BLD-MCNC: 7.6 G/DL (ref 13–17.7)
MAGNESIUM SERPL-MCNC: 1.6 MG/DL (ref 1.6–2.4)

## 2025-03-09 PROCEDURE — 85018 HEMOGLOBIN: CPT | Performed by: INTERNAL MEDICINE

## 2025-03-09 PROCEDURE — 82948 REAGENT STRIP/BLOOD GLUCOSE: CPT

## 2025-03-09 PROCEDURE — 94664 DEMO&/EVAL PT USE INHALER: CPT

## 2025-03-09 PROCEDURE — 85014 HEMATOCRIT: CPT | Performed by: INTERNAL MEDICINE

## 2025-03-09 PROCEDURE — 94761 N-INVAS EAR/PLS OXIMETRY MLT: CPT

## 2025-03-09 PROCEDURE — 83735 ASSAY OF MAGNESIUM: CPT | Performed by: INTERNAL MEDICINE

## 2025-03-09 PROCEDURE — 25010000002 MAGNESIUM SULFATE 4 GM/100ML SOLUTION: Performed by: INTERNAL MEDICINE

## 2025-03-09 PROCEDURE — 25010000002 PIPERACILLIN SOD-TAZOBACTAM PER 1 G: Performed by: INTERNAL MEDICINE

## 2025-03-09 PROCEDURE — 99232 SBSQ HOSP IP/OBS MODERATE 35: CPT | Performed by: INTERNAL MEDICINE

## 2025-03-09 PROCEDURE — 94799 UNLISTED PULMONARY SVC/PX: CPT

## 2025-03-09 RX ORDER — MAGNESIUM SULFATE HEPTAHYDRATE 40 MG/ML
4 INJECTION, SOLUTION INTRAVENOUS ONCE
Status: COMPLETED | OUTPATIENT
Start: 2025-03-09 | End: 2025-03-09

## 2025-03-09 RX ADMIN — IPRATROPIUM BROMIDE AND ALBUTEROL SULFATE 3 ML: 2.5; .5 SOLUTION RESPIRATORY (INHALATION) at 12:41

## 2025-03-09 RX ADMIN — FINASTERIDE 5 MG: 5 TABLET, FILM COATED ORAL at 09:05

## 2025-03-09 RX ADMIN — PANTOPRAZOLE SODIUM 40 MG: 40 INJECTION, POWDER, FOR SOLUTION INTRAVENOUS at 20:28

## 2025-03-09 RX ADMIN — ESCITALOPRAM OXALATE 10 MG: 10 TABLET ORAL at 20:28

## 2025-03-09 RX ADMIN — DOCUSATE SODIUM 100 MG: 50 LIQUID ORAL at 09:05

## 2025-03-09 RX ADMIN — PANTOPRAZOLE SODIUM 40 MG: 40 INJECTION, POWDER, FOR SOLUTION INTRAVENOUS at 09:05

## 2025-03-09 RX ADMIN — Medication 1 APPLICATION: at 20:29

## 2025-03-09 RX ADMIN — NYSTATIN: 100000 POWDER TOPICAL at 20:28

## 2025-03-09 RX ADMIN — QUETIAPINE FUMARATE 25 MG: 25 TABLET ORAL at 09:05

## 2025-03-09 RX ADMIN — RIFAXIMIN 550 MG: 550 TABLET ORAL at 09:05

## 2025-03-09 RX ADMIN — RIFAXIMIN 550 MG: 550 TABLET ORAL at 20:27

## 2025-03-09 RX ADMIN — ROSUVASTATIN 20 MG: 20 TABLET, FILM COATED ORAL at 20:27

## 2025-03-09 RX ADMIN — IPRATROPIUM BROMIDE AND ALBUTEROL SULFATE 3 ML: 2.5; .5 SOLUTION RESPIRATORY (INHALATION) at 15:08

## 2025-03-09 RX ADMIN — PIPERACILLIN AND TAZOBACTAM 3.38 G: 3; .375 INJECTION, POWDER, LYOPHILIZED, FOR SOLUTION INTRAVENOUS at 16:53

## 2025-03-09 RX ADMIN — OXYCODONE AND ACETAMINOPHEN 1 TABLET: 7.5; 325 TABLET ORAL at 20:35

## 2025-03-09 RX ADMIN — GABAPENTIN 300 MG: 300 CAPSULE ORAL at 20:28

## 2025-03-09 RX ADMIN — Medication 10 ML: at 20:28

## 2025-03-09 RX ADMIN — IPRATROPIUM BROMIDE AND ALBUTEROL SULFATE 3 ML: 2.5; .5 SOLUTION RESPIRATORY (INHALATION) at 19:43

## 2025-03-09 RX ADMIN — OXYCODONE AND ACETAMINOPHEN 1 TABLET: 7.5; 325 TABLET ORAL at 09:05

## 2025-03-09 RX ADMIN — PIPERACILLIN AND TAZOBACTAM 3.38 G: 3; .375 INJECTION, POWDER, LYOPHILIZED, FOR SOLUTION INTRAVENOUS at 05:27

## 2025-03-09 RX ADMIN — NYSTATIN: 100000 POWDER TOPICAL at 16:53

## 2025-03-09 RX ADMIN — Medication 1 APPLICATION: at 09:05

## 2025-03-09 RX ADMIN — DOCUSATE SODIUM 100 MG: 50 LIQUID ORAL at 20:28

## 2025-03-09 RX ADMIN — IPRATROPIUM BROMIDE AND ALBUTEROL SULFATE 3 ML: 2.5; .5 SOLUTION RESPIRATORY (INHALATION) at 07:12

## 2025-03-09 RX ADMIN — FENTANYL 1 PATCH: 25 PATCH TRANSDERMAL at 09:06

## 2025-03-09 RX ADMIN — Medication 10 ML: at 09:05

## 2025-03-09 RX ADMIN — GABAPENTIN 300 MG: 300 CAPSULE ORAL at 05:27

## 2025-03-09 RX ADMIN — NYSTATIN: 100000 POWDER TOPICAL at 05:28

## 2025-03-09 RX ADMIN — QUETIAPINE FUMARATE 50 MG: 25 TABLET ORAL at 20:27

## 2025-03-09 RX ADMIN — MAGNESIUM SULFATE IN WATER FOR 4 G: 40 INJECTION INTRAVENOUS at 11:42

## 2025-03-09 RX ADMIN — PIPERACILLIN AND TAZOBACTAM 3.38 G: 3; .375 INJECTION, POWDER, LYOPHILIZED, FOR SOLUTION INTRAVENOUS at 20:29

## 2025-03-09 NOTE — PLAN OF CARE
Problem: Adult Inpatient Plan of Care  Goal: Plan of Care Review  Outcome: Progressing

## 2025-03-09 NOTE — PROGRESS NOTES
[facility] Medicine Services  PROGRESS NOTE    Patient Name: Val Nassar Jr.  : 1960  MRN: [id number]    Date of Admission: 2025  Primary Care Physician: Wesly Minor MD    Subjective   Subjective     CC:  pneumonia    HPI:  Says he feels okay.  No current cough.  States he had some breakfast.  No nausea.      Objective   Objective     Vital Signs:   Temp:  [98.4 °F (36.9 °C)-98.8 °F (37.1 °C)] 98.5 °F (36.9 °C)  Heart Rate:  [66-95] 76  Resp:  [16-18] 16  BP: (118-155)/(63-91) 150/68     Physical Exam:  Appears chronically deconditioned, in bed  Mucous membranes moist  RRR  Breath sounds diminished bilaterally  Abdomen soft  Awake, speech soft but clear  Flat affect        Results Reviewed:  LAB RESULTS:      Lab 25  0842 25  0049 25  20225  0853 25  2320 25  1108 25  0116 25  1817 25  1042 25  1522 25  0611 25  0832 25  0332   WBC  --   --   --  11.62*  --   --  12.68*  --  16.71*  --  26.26*  --  27.59*   HEMOGLOBIN 7.6* 7.6* 9.5* 9.3* 8.0*   < > 7.1*   < > 7.7*   < > 7.4*   < > 9.3*   HEMATOCRIT 25.1* 25.0* 30.8* 30.9* 25.9*   < > 24.3*   < > 26.0*   < > 24.2*   < > 30.4*   PLATELETS  --   --   --  239  --   --  202  --  220  --  240  --  256   NEUTROS ABS  --   --   --  9.08*  --   --   --   --   --   --  22.33*  --  23.20*   IMMATURE GRANS (ABS)  --   --   --  0.13*  --   --   --   --   --   --  0.26*  --  0.25*   LYMPHS ABS  --   --   --  0.86  --   --   --   --   --   --  0.94  --  1.05   MONOS ABS  --   --   --  0.97*  --   --   --   --   --   --  1.77*  --  1.98*   EOS ABS  --   --   --  0.54*  --   --   --   --   --   --  0.91*  --  1.04*   MCV  --   --   --  97.5*  --   --  104.3*  --  103.6*  --  100.4*  --  100.7*   PROCALCITONIN  --   --   --   --   --   --   --   --  1.02*  --   --   --   --     < > = values in this interval not displayed.         Lab  03/09/25  0842 03/08/25 2022 03/08/25  0853 03/07/25  1817 03/07/25  0116 03/06/25  1042 03/05/25  1522 03/05/25  0611 03/04/25  1513 03/04/25  0332   SODIUM  --   --  147* 146* 148* 151*  --  148*  --  140   POTASSIUM  --  4.8 3.5  --  3.9 3.9 4.4 3.6   < > 3.5   CHLORIDE  --   --  116*  --  118* 119*  --  116*  --  106   CO2  --   --  21.0*  --  20.0* 20.0*  --  19.0*  --  19.0*   ANION GAP  --   --  10.0  --  10.0 12.0  --  13.0  --  15.0   BUN  --   --  17  --  27* 34*  --  50*  --  54*   CREATININE  --   --  0.68*  --  0.90 0.99  --  1.24  --  1.27   EGFR  --   --  103.2  --  94.8 84.5  --  64.5  --  62.7   GLUCOSE  --   --  120*  --  103* 88  --  116*  --  113*   CALCIUM  --   --  9.1  --  8.6 8.3*  --  8.5*  --  8.7   MAGNESIUM 1.6  --  1.6  --  2.0 2.4  --  1.9  --  2.0   PHOSPHORUS  --   --  2.7  --  3.0 3.0  --  4.1  --  5.1*    < > = values in this interval not displayed.         Lab 03/04/25  0332   TOTAL PROTEIN 7.3   ALBUMIN 2.4*   GLOBULIN 4.9   ALT (SGPT) 49*   AST (SGOT) 43*   BILIRUBIN 0.6   ALK PHOS 147*                 Lab 03/07/25  0116   ABO TYPING A   RH TYPING Positive   ANTIBODY SCREEN Negative         Lab 03/06/25  0357 03/05/25  0346 03/04/25  0320   PH, ARTERIAL 7.484* 7.424 7.422   PCO2, ARTERIAL 29.0* 32.8* 37.4   PO2 .0* 95.4 91.7   FIO2 30 40 60   HCO3 ART 21.8 21.4 24.4   BASE EXCESS ART -1.3* -2.6* 0.0   CARBOXYHEMOGLOBIN 1.6 1.7 1.6     Brief Urine Lab Results  (Last result in the past 365 days)        Color   Clarity   Blood   Leuk Est   Nitrite   Protein   CREAT   Urine HCG        02/11/25 0942 Yellow   Turbid   Moderate (2+)   Large (3+)   Negative   100 mg/dL (2+)                   Microbiology Results Abnormal       Procedure Component Value - Date/Time    Fungus Culture - Wash, Bronchus [[id number]]  (Abnormal) Collected: 02/17/25 1633    Lab Status: Preliminary result Specimen: Wash from Bronchus Updated: 02/23/25 1910     Fungus Culture Candida albicans     Respiratory Culture - Wash, Bronchus [[id number]]  (Abnormal) Collected: 02/17/25 1633    Lab Status: Final result Specimen: Wash from Bronchus Updated: 02/19/25 1057     Respiratory Culture Light growth (2+) Candida albicans      No Normal Respiratory Lissett     Gram Stain Moderate (3+) WBCs seen      Few (2+) Epithelial cells seen      Occasional Budding yeast    Fungus Smear - Wash, Bronchus [[id number]]  (Abnormal) Collected: 02/17/25 1633    Lab Status: Final result Specimen: Wash from Bronchus Updated: 02/18/25 1435     Fungal Stain Fungal elements    Blood Culture - Blood, Arm, Left [[id number]]  (Abnormal)  (Susceptibility) Collected: 02/11/25 0900    Lab Status: Final result Specimen: Blood from Arm, Left Updated: 02/14/25 0635     Blood Culture Pseudomonas aeruginosa     Isolated from Aerobic Bottle     Gram Stain Aerobic Bottle Gram negative bacilli    Narrative:      Less than seven (7) mL's of blood was collected.  Insufficient quantity may yield false negative results.    Susceptibility        Pseudomonas aeruginosa      VIANEY      Cefepime Susceptible      Ceftazidime Susceptible      Ciprofloxacin Susceptible      Levofloxacin Susceptible      Piperacillin + Tazobactam Susceptible                       Susceptibility Comments       Pseudomonas aeruginosa    With the exception of urinary-sourced infections, aminoglycosides should not be used as monotherapy.               Blood Culture - Blood, Hand, Right [[id number]]  (Abnormal) Collected: 02/11/25 0926    Lab Status: Final result Specimen: Blood from Hand, Right Updated: 02/14/25 0635     Blood Culture Pseudomonas aeruginosa     Isolated from Aerobic and Anaerobic Bottles     Gram Stain Anaerobic Bottle Gram negative bacilli      Aerobic Bottle Gram negative bacilli    Narrative:      Less than seven (7) mL's of blood was collected.  Insufficient quantity may yield false negative results.    Refer to previous blood culture collected on 02/11/2025 0900 for  MICs    Urine Culture - Urine, Urine, Catheter [[id number]]  (Abnormal)  (Susceptibility) Collected: 02/11/25 0942    Lab Status: Final result Specimen: Urine, Catheter Updated: 02/13/25 1054     Urine Culture >100,000 CFU/mL Pseudomonas aeruginosa    Narrative:      Colonization of the urinary tract without infection is common. Treatment is discouraged unless the patient is symptomatic, pregnant, or undergoing an invasive urologic procedure.    Susceptibility        Pseudomonas aeruginosa      VIANEY      Cefepime Susceptible      Ceftazidime Susceptible      Ciprofloxacin Susceptible      Levofloxacin Susceptible      Piperacillin + Tazobactam Susceptible      Tobramycin Susceptible                           Blood Culture ID, PCR - Blood, Arm, Left [[id number]]  (Abnormal) Collected: 02/11/25 0900    Lab Status: Final result Specimen: Blood from Arm, Left Updated: 02/12/25 0349     BCID, PCR Pseudomonas aeruginosa. Identification by BCID2 PCR     BOTTLE TYPE Aerobic Bottle            SLP FEES - Fiberoptic Endo Eval Swallow  Result Date: 3/8/2025  This procedure was auto-finalized with no dictation required.      Results for orders placed during the hospital encounter of 02/11/25    Adult Transthoracic Echo Complete w/ Color, Spectral and Contrast if Necessary Per Protocol    Interpretation Summary    Left ventricular systolic function is normal. Estimated left ventricular EF = 60%    Left ventricular wall thickness is consistent with mild concentric hypertrophy.    Mild aortic valve stenosis is present.    Aortic valve maximum pressure gradient is 23 mmHg. Aortic valve mean pressure gradient is 14 mmHg.      Current medications:  Scheduled Meds:castor oil-balsam peru, 1 Application, Topical, Q12H  fentaNYL, 1 patch, Transdermal, Q72H   And  Check Fentanyl Patch Placement, 1 each, Not Applicable, Q12H  docusate sodium, 100 mg, Per G Tube, BID  escitalopram, 10 mg, Per G Tube, Nightly  finasteride, 5 mg, Per G Tube,  Daily  gabapentin, 300 mg, Per G Tube, Q8H  ipratropium-albuterol, 3 mL, Nebulization, 4x Daily - RT  magnesium sulfate, 4 g, Intravenous, Once  nystatin, , Topical, Q8H  pantoprazole, 40 mg, Intravenous, Q12H  piperacillin-tazobactam, 3.375 g, Intravenous, Q8H  QUEtiapine, 25 mg, Per G Tube, Daily   And  QUEtiapine, 50 mg, Per G Tube, Nightly  rifAXIMin, 550 mg, Per G Tube, Q12H  rosuvastatin, 20 mg, Per G Tube, Nightly      Continuous Infusions:   PRN Meds:.  acetaminophen    Calcium Replacement - Follow Nurse / BPA Driven Protocol    dextrose    glucagon (human recombinant)    ipratropium-albuterol    Magnesium Cardiology Dose Replacement - Follow Nurse / BPA Driven Protocol    metoprolol tartrate    oxyCODONE-acetaminophen    polyethylene glycol    polyvinyl alcohol    Potassium Replacement - Follow Nurse / BPA Driven Protocol    sodium chloride    sodium chloride    Assessment & Plan   Assessment & Plan     Active Hospital Problems    Diagnosis  POA    **Septic shock due to Pseudomonas species [A41.52, R65.21]  Yes    Pneumonia of both lower lobes due to Pseudomonas species [J15.1]  Yes    Bacteremia due to Pseudomonas [R78.81, B96.5]  Yes    GALILEO (acute kidney injury) [N17.9]  Yes    Metabolic acidosis [E87.20]  Yes    Cirrhosis of liver [K74.60]  Yes    Squamous cell carcinoma of esophagus [C15.9]  Yes    Hyperlipidemia, unspecified [E78.5]  Yes    Tobacco use [Z72.0]  Yes      Resolved Hospital Problems    Diagnosis Date Resolved POA    Hypotension [I95.9] 02/11/2025 Unknown    Suspected UTI [R39.89] 02/12/2025 Yes        Brief Hospital Course to date:  Mr Nassar is a 63yo M with a history of alcohol abuse, alcoholic cirrhosis, 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 He was noted to be septic secondary to a UTI and was admitted to the ICU. Urine and blood cultures grew Pseudomonas and he completed  treatment with Cefepime x 10 days. He decompensated and required intubation on 2/12/25. Bronchoscopy was performed on 2/17/25 for mucous plugging. He was able to be extubated on 2/26/25.      He was transferred to telemetry. He developed worsening anemia and required transfusion of 2 units of pRBCs. GI was consulted and he was taken for EGD on 3/3/25 by Dr. Mckeon. EGD showed 2 small telangiectasias in the lower third of the esophagus suspected to be secondary to prior radiation. Argon was used for coagulation for bleeding prevention.      After the EGD, he was transferred to the recovery area. He was noted to be hypoxic on a nonrebreather and he was intubated by anesthesia. After intubation, he remained hypoxic requiring high FiO2 and PEEP. He was transferred back to the ICU.  Mr. Nassar was placed on empiric antibiotics for possible pneumonia.    Pseudomonas Bacteremia  Septic shock  Pyelonephritis  Possible pneumonia  - s/p cefepime course on admission x 10 days   -Recurrent pneumonia, now on Zosyn, day 6, leukocytosis improved     GALILEO/bladder outlet obstruction vs neurogenic bladder  -Lubin catheter removed.  Continue In-N-Out catheterization-typically self cathed prior to this hospitalization, follows outpatient with Urology Dr Garrison  -Continue finasteride and Flomax.     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  There were also grade 1 small esophageal varices and portal hypertensive gastropathy  -Continue PPI twice daily  - cbc am     Respiratory Failure  - s/p intubation 2/12/25 with subsequent bronchoscopy for mucous plugging. Patient extubated on 2/24/2025      EtOH Cirrhosis  -Continue  rifaximin  -Lactulose stopped due to increased bowel gas     Metastatic Squamous Cell Carcinoma of the esophagus  - s/p chemoradiation May 2024     Chronic back pain.    - steroid injection at pain management 2/3/2024  - fentanyl patch     Tobacco abuse     Deconditioning  - PT/OT    Hypernatremia  -Sodium 147, encourage oral fluid intake        Expected Discharge Location and Transportation:   Expected Discharge   Expected Discharge Date: 3/11/2025; Expected Discharge Time:      VTE Prophylaxis:  Mechanical VTE prophylaxis orders are present.         AM-PAC 6 Clicks Score (PT): 9 (03/07/25 2000)    CODE STATUS:   Code Status and Medical Interventions: CPR (Attempt to Resuscitate); Full Support   Ordered at: 02/12/25 1029     Code Status (Patient has no pulse and is not breathing):    CPR (Attempt to Resuscitate)     Medical Interventions (Patient has pulse or is breathing):    Full Support     Level Of Support Discussed With:    Patient       Garret Gibson MD  03/09/25

## 2025-03-09 NOTE — PROGRESS NOTES
Patient on room air with no acute pulmonary critical care issues.  Nothing further to add we will sign off

## 2025-03-10 LAB
ALBUMIN SERPL-MCNC: 2.3 G/DL (ref 3.5–5.2)
ALBUMIN/GLOB SERPL: 0.5 G/DL
ALP SERPL-CCNC: 117 U/L (ref 39–117)
ALT SERPL W P-5'-P-CCNC: 25 U/L (ref 1–41)
ANION GAP SERPL CALCULATED.3IONS-SCNC: 11 MMOL/L (ref 5–15)
AST SERPL-CCNC: 29 U/L (ref 1–40)
BILIRUB SERPL-MCNC: 0.4 MG/DL (ref 0–1.2)
BUN SERPL-MCNC: 10 MG/DL (ref 8–23)
BUN/CREAT SERPL: 12.3 (ref 7–25)
CALCIUM SPEC-SCNC: 8.3 MG/DL (ref 8.6–10.5)
CHLORIDE SERPL-SCNC: 111 MMOL/L (ref 98–107)
CO2 SERPL-SCNC: 20 MMOL/L (ref 22–29)
CREAT SERPL-MCNC: 0.81 MG/DL (ref 0.76–1.27)
DEPRECATED RDW RBC AUTO: 64 FL (ref 37–54)
EGFRCR SERPLBLD CKD-EPI 2021: 97.8 ML/MIN/1.73
ERYTHROCYTE [DISTWIDTH] IN BLOOD BY AUTOMATED COUNT: 18 % (ref 12.3–15.4)
GLOBULIN UR ELPH-MCNC: 4.2 GM/DL
GLUCOSE SERPL-MCNC: 93 MG/DL (ref 65–99)
HCT VFR BLD AUTO: 26.5 % (ref 37.5–51)
HGB BLD-MCNC: 7.9 G/DL (ref 13–17.7)
MAGNESIUM SERPL-MCNC: 1.9 MG/DL (ref 1.6–2.4)
MCH RBC QN AUTO: 29 PG (ref 26.6–33)
MCHC RBC AUTO-ENTMCNC: 29.8 G/DL (ref 31.5–35.7)
MCV RBC AUTO: 97.4 FL (ref 79–97)
PLATELET # BLD AUTO: 272 10*3/MM3 (ref 140–450)
PMV BLD AUTO: 9.1 FL (ref 6–12)
POTASSIUM SERPL-SCNC: 3.9 MMOL/L (ref 3.5–5.2)
PROT SERPL-MCNC: 6.5 G/DL (ref 6–8.5)
RBC # BLD AUTO: 2.72 10*6/MM3 (ref 4.14–5.8)
SODIUM SERPL-SCNC: 142 MMOL/L (ref 136–145)
WBC NRBC COR # BLD AUTO: 10.12 10*3/MM3 (ref 3.4–10.8)

## 2025-03-10 PROCEDURE — 99232 SBSQ HOSP IP/OBS MODERATE 35: CPT | Performed by: INTERNAL MEDICINE

## 2025-03-10 PROCEDURE — 94664 DEMO&/EVAL PT USE INHALER: CPT

## 2025-03-10 PROCEDURE — 94761 N-INVAS EAR/PLS OXIMETRY MLT: CPT

## 2025-03-10 PROCEDURE — 25010000002 PIPERACILLIN SOD-TAZOBACTAM PER 1 G: Performed by: INTERNAL MEDICINE

## 2025-03-10 PROCEDURE — 80053 COMPREHEN METABOLIC PANEL: CPT | Performed by: INTERNAL MEDICINE

## 2025-03-10 PROCEDURE — 94799 UNLISTED PULMONARY SVC/PX: CPT

## 2025-03-10 PROCEDURE — 97530 THERAPEUTIC ACTIVITIES: CPT

## 2025-03-10 PROCEDURE — 25010000002 MAGNESIUM SULFATE 4 GM/100ML SOLUTION: Performed by: INTERNAL MEDICINE

## 2025-03-10 PROCEDURE — 83735 ASSAY OF MAGNESIUM: CPT | Performed by: INTERNAL MEDICINE

## 2025-03-10 PROCEDURE — 85027 COMPLETE CBC AUTOMATED: CPT | Performed by: INTERNAL MEDICINE

## 2025-03-10 RX ORDER — MAGNESIUM SULFATE HEPTAHYDRATE 40 MG/ML
4 INJECTION, SOLUTION INTRAVENOUS ONCE
Status: COMPLETED | OUTPATIENT
Start: 2025-03-10 | End: 2025-03-10

## 2025-03-10 RX ADMIN — GABAPENTIN 300 MG: 300 CAPSULE ORAL at 05:32

## 2025-03-10 RX ADMIN — ESCITALOPRAM OXALATE 10 MG: 10 TABLET ORAL at 20:18

## 2025-03-10 RX ADMIN — RIFAXIMIN 550 MG: 550 TABLET ORAL at 20:18

## 2025-03-10 RX ADMIN — GABAPENTIN 300 MG: 300 CAPSULE ORAL at 20:18

## 2025-03-10 RX ADMIN — Medication 1 APPLICATION: at 20:18

## 2025-03-10 RX ADMIN — Medication 1 APPLICATION: at 07:53

## 2025-03-10 RX ADMIN — IPRATROPIUM BROMIDE AND ALBUTEROL SULFATE 3 ML: 2.5; .5 SOLUTION RESPIRATORY (INHALATION) at 20:23

## 2025-03-10 RX ADMIN — NYSTATIN: 100000 POWDER TOPICAL at 20:19

## 2025-03-10 RX ADMIN — Medication 10 ML: at 20:18

## 2025-03-10 RX ADMIN — MAGNESIUM SULFATE IN WATER FOR 4 G: 40 INJECTION INTRAVENOUS at 07:55

## 2025-03-10 RX ADMIN — FINASTERIDE 5 MG: 5 TABLET, FILM COATED ORAL at 07:54

## 2025-03-10 RX ADMIN — GABAPENTIN 300 MG: 300 CAPSULE ORAL at 13:58

## 2025-03-10 RX ADMIN — DOCUSATE SODIUM 100 MG: 50 LIQUID ORAL at 07:54

## 2025-03-10 RX ADMIN — OXYCODONE AND ACETAMINOPHEN 1 TABLET: 7.5; 325 TABLET ORAL at 21:30

## 2025-03-10 RX ADMIN — IPRATROPIUM BROMIDE AND ALBUTEROL SULFATE 3 ML: 2.5; .5 SOLUTION RESPIRATORY (INHALATION) at 07:16

## 2025-03-10 RX ADMIN — RIFAXIMIN 550 MG: 550 TABLET ORAL at 07:54

## 2025-03-10 RX ADMIN — QUETIAPINE FUMARATE 25 MG: 25 TABLET ORAL at 07:54

## 2025-03-10 RX ADMIN — ROSUVASTATIN 20 MG: 20 TABLET, FILM COATED ORAL at 20:18

## 2025-03-10 RX ADMIN — DOCUSATE SODIUM 100 MG: 50 LIQUID ORAL at 20:18

## 2025-03-10 RX ADMIN — IPRATROPIUM BROMIDE AND ALBUTEROL SULFATE 3 ML: 2.5; .5 SOLUTION RESPIRATORY (INHALATION) at 11:20

## 2025-03-10 RX ADMIN — PIPERACILLIN AND TAZOBACTAM 3.38 G: 3; .375 INJECTION, POWDER, LYOPHILIZED, FOR SOLUTION INTRAVENOUS at 05:32

## 2025-03-10 RX ADMIN — NYSTATIN: 100000 POWDER TOPICAL at 07:53

## 2025-03-10 RX ADMIN — NYSTATIN: 100000 POWDER TOPICAL at 06:00

## 2025-03-10 RX ADMIN — NYSTATIN: 100000 POWDER TOPICAL at 13:59

## 2025-03-10 RX ADMIN — PANTOPRAZOLE SODIUM 40 MG: 40 INJECTION, POWDER, FOR SOLUTION INTRAVENOUS at 07:54

## 2025-03-10 RX ADMIN — QUETIAPINE FUMARATE 50 MG: 25 TABLET ORAL at 20:17

## 2025-03-10 RX ADMIN — PIPERACILLIN AND TAZOBACTAM 3.38 G: 3; .375 INJECTION, POWDER, LYOPHILIZED, FOR SOLUTION INTRAVENOUS at 13:58

## 2025-03-10 RX ADMIN — IPRATROPIUM BROMIDE AND ALBUTEROL SULFATE 3 ML: 2.5; .5 SOLUTION RESPIRATORY (INHALATION) at 15:41

## 2025-03-10 RX ADMIN — PANTOPRAZOLE SODIUM 40 MG: 40 INJECTION, POWDER, FOR SOLUTION INTRAVENOUS at 20:18

## 2025-03-10 NOTE — THERAPY TREATMENT NOTE
Patient Name: Val Nassar Jr.  : 1960    MRN: [id number]                              Today's Date: 3/10/2025       Admit Date: 2025    Visit Dx:     ICD-10-CM ICD-9-CM   1. Septic shock  A41.9 038.9    R65.21 785.52     995.92   2. Acute UTI (urinary tract infection)  N39.0 599.0   3. Hypotension, unspecified hypotension type  I95.9 458.9   4. Severe malnutrition  E43 261   5. Squamous cell carcinoma of esophagus  C15.9 150.9   6. Atelectasis  J98.11 518.0   7. Oropharyngeal dysphagia  R13.12 787.22   8. Melena  K92.1 578.1   9. Acute blood loss anemia  D62 285.1   10. Alcoholic cirrhosis of liver without ascites  K70.30 571.2     Patient Active Problem List   Diagnosis    Acute gastric ulcer with hemorrhage    Alcohol abuse, uncomplicated    Anxiety disorder, unspecified    Diverticulum of bladder    Duodenal ulcer, unspecified as acute or chronic, without hemorrhage or perforation    Elevation of level of transaminase and lactic acid dehydrogenase (LDH)    Gastro-esophageal reflux disease without esophagitis    Hyperglycemia, unspecified    Hyperlipidemia, unspecified    Melena    Nicotine dependence, cigarettes, uncomplicated    Tobacco use    Severe malnutrition    Duodenal ulcer    Iron deficiency anemia    Cirrhosis of liver    Acute blood loss anemia    Squamous cell carcinoma of esophagus    Duodenal stenosis    Generalized weakness    Orthostatic hypotension    UTI (urinary tract infection)    Malignant neoplasm of upper third of esophagus    Iron deficiency anemia    GALILEO (acute kidney injury)    Metabolic acidosis    Septic shock due to Pseudomonas species    Pneumonia of both lower lobes due to Pseudomonas species    Bacteremia due to Pseudomonas     Past Medical History:   Diagnosis Date    Anemia     Cancer     ESOPHAGEAL    Cirrhosis     Duodenal ulcer     Gastric ulcer     GERD (gastroesophageal reflux disease)     History of alcohol abuse     History of radiation therapy  05/08/2024    esophagus    HLD (hyperlipidemia)     Mood disorder      Past Surgical History:   Procedure Laterality Date    ENDOSCOPY N/A 12/26/2023    Procedure: ESOPHAGOGASTRODUODENOSCOPY;  Surgeon: Wesly Mckeon MD;  Location:  [facility];  Service: Gastroenterology;  Laterality: N/A;    ENDOSCOPY N/A 3/3/2025    Procedure: ESOPHAGOGASTRODUODENOSCOPY;  Surgeon: Wesly Mckoen MD;  Location:  [facility];  Service: Gastroenterology;  Laterality: N/A;  APC USED IN ESOPHAGUS.    VENOUS ACCESS DEVICE (PORT) INSERTION Right 04/25/2024      General Information       Row Name 03/10/25 1129          Physical Therapy Time and Intention    Document Type therapy note (daily note)  -ND     Mode of Treatment physical therapy  -ND       Row Name 03/10/25 1129          General Information    Patient Profile Reviewed yes  -ND     Existing Precautions/Restrictions fall;other (see comments)  Fearful of falling  -ND     Barriers to Rehab medically complex;previous functional deficit;cognitive status  -ND       Row Name 03/10/25 1129          Cognition    Orientation Status (Cognition) oriented to;person;place;disoriented to;time  -ND       Row Name 03/10/25 1129          Safety Issues/Impairments Affecting Functional Mobility    Safety Issues Affecting Function (Mobility) awareness of need for assistance;insight into deficits/self-awareness;judgment;safety precaution awareness;sequencing abilities  -ND     Impairments Affecting Function (Mobility) balance;cognition;endurance/activity tolerance;motor planning;strength  -ND               User Key  (r) = Recorded By, (t) = Taken By, (c) = Cosigned By      Initials Name Provider Type    ND Alise Fatima PT Physical Therapist                   Mobility       Row Name 03/10/25 1131          Bed Mobility    Bed Mobility supine-sit;sit-supine  -ND     Supine-Sit [place] (Bed Mobility) 1 person assist;verbal cues;nonverbal cues (demo/gesture);minimum assist (75%  "patient effort)  -ND     Sit-Supine [place] (Bed Mobility) moderate assist (50% patient effort);1 person assist;verbal cues;nonverbal cues (demo/gesture)  -ND     Assistive Device (Bed Mobility) bed rails;head of bed elevated;repositioning sheet  -ND     Comment, (Bed Mobility) Increased time and effort for task. Pt brings BLE off EOB and is able to bring trunk ~50% of way upright, requires assist at trunk and for scooting hips. Denies dizziness with position change.  -ND       Row Name 03/10/25 1131          Bed-Chair Transfer    Bed-Chair [place] (Transfers) minimum assist (75% patient effort);1 person assist;verbal cues;nonverbal cues (demo/gesture)  -ND     Assistive Device (Bed-Chair Transfers) walker, front-wheeled  -ND     Comment, (Bed-Chair Transfer) Pt defers sitting UIC. SPT from EOB>BSC.  -ND       Row Name 03/10/25 1131          Sit-Stand Transfer    Sit-Stand [place] (Transfers) minimum assist (75% patient effort);verbal cues;nonverbal cues (demo/gesture)  -ND     Assistive Device (Sit-Stand Transfers) walker, front-wheeled  -ND     Comment, (Sit-Stand Transfer) x2 from EOB, x1 from BSC. Cues for hand placement.  -ND       Row Name 03/10/25 1131          Gait/Stairs (Locomotion)    [place] Level (Gait) minimum assist (75% patient effort);1 person assist;verbal cues;nonverbal cues (demo/gesture)  -ND     Assistive Device (Gait) walker, front-wheeled  -ND     Patient was able to Ambulate yes  -ND     Distance in Feet (Gait) 2  + 4'  -ND     Deviations/Abnormal Patterns (Gait) bilateral deviations;base of support, narrow;mandy decreased;gait speed decreased  -ND     Bilateral Gait Deviations forward flexed posture;heel strike decreased  -ND     Comment, (Gait/Stairs) Pt stands and ambulates 2' before reporting \"I cannot do this\" and returns to sitting EOB. Pt then stands and pivots to BSC with min-A, after dependent gale-hygiene pt ambulates 4' toward HOB with min-A and RWx. Further " ambulation deferred by pt.  -ND               User Key  (r) = Recorded By, (t) = Taken By, (c) = Cosigned By      Initials Name Provider Type    Alise Rowland PT Physical Therapist                   Obj/Interventions       Row Name 03/10/25 1134          Motor Skills    Therapeutic Exercise hip  -ND       Row Name 03/10/25 1134          Hip (Therapeutic Exercise)    Hip (Therapeutic Exercise) strengthening exercise  -ND     Hip Strengthening (Therapeutic Exercise) bilateral;marching while seated;10 repetitions  -ND       Row Name 03/10/25 1134          Balance    Balance Assessment sitting static balance;sitting dynamic balance;standing static balance;standing dynamic balance  -ND     Static Sitting Balance contact guard  -ND     Dynamic Sitting Balance minimal assist  -ND     Position, Sitting Balance unsupported;sitting edge of bed  -ND     Static Standing Balance minimal assist;verbal cues;non-verbal cues (demo/gesture)  -ND     Dynamic Standing Balance minimal assist;verbal cues;non-verbal cues (demo/gesture)  -ND     Position/Device Used, Standing Balance supported;walker, front-wheeled  -ND     Balance Interventions sitting;standing;sit to stand;supported;static;dynamic  -ND     Comment, Balance Balance deficits secondary to fear of falling and poor safety awareness.  -ND               User Key  (r) = Recorded By, (t) = Taken By, (c) = Cosigned By      Initials Name Provider Type    Alise Rowland PT Physical Therapist                   Goals/Plan    No documentation.                  Clinical Impression       Row Name 03/10/25 1135          Pain    Pretreatment Pain Rating 0/10 - no pain  -ND     Posttreatment Pain Rating 0/10 - no pain  -ND       Row Name 03/10/25 1135          Plan of Care Review    Plan of Care Reviewed With patient  -ND     Progress improving  -ND     Outcome Evaluation Pt improves to standing and taking a few steps with min-A x1 and RWx this date. Pt continues to benefit  from IP PT services to further progress functional mobility and promote increased functional strength and endurance. Recommend SNF following d/c for best functional outcome.  -ND       Row Name 03/10/25 1135          Vital Signs    Pre Systolic BP Rehab 140  -ND     Pre Treatment Diastolic BP 61  -ND     Pretreatment Heart Rate (beats/min) 69  -ND     Posttreatment Heart Rate (beats/min) 85  -ND     Pre SpO2 (%) 92  -ND     O2 Delivery Pre Treatment room air  -ND     O2 Delivery Intra Treatment room air  -ND     Post SpO2 (%) 93  -ND     O2 Delivery Post Treatment room air  -ND     Pre Patient Position Supine  -ND     Intra Patient Position Standing  -ND     Post Patient Position Supine  -ND       Row Name 03/10/25 1135          Positioning and Restraints    Pre-Treatment Position in bed  -ND     Post Treatment Position bed  -ND     In Bed notified nsg;supine;call light within reach;encouraged to call for assist;exit alarm on  -ND               User Key  (r) = Recorded By, (t) = Taken By, (c) = Cosigned By      Initials Name Provider Type    Alise Rowland, PT Physical Therapist                   Outcome Measures       Row Name 03/10/25 1137 03/10/25 0804       How much help from another person do you currently need...    Turning from your back to your side while in flat bed without using bedrails? 3  -ND 2  -BS    Moving from lying on back to sitting on the side of a flat bed without bedrails? 3  -ND 2  -BS    Moving to and from a bed to a chair (including a wheelchair)? 3  -ND 1  -BS    Standing up from a chair using your arms (e.g., wheelchair, bedside chair)? 3  -ND 2  -BS    Climbing 3-5 steps with a railing? 2  -ND 1  -BS    To walk in hospital room? 2  -ND 1  -BS    AM-PAC 6 Clicks Score (PT) 16  -ND 9  -BS    Highest Level of Mobility Goal 5 --> Static standing  -ND 3 --> Sit at edge of bed  -BS      Row Name 03/10/25 1137          Functional Assessment    Outcome Measure Options AM-PAC 6 Clicks Basic  Mobility (PT)  -ND               User Key  (r) = Recorded By, (t) = Taken By, (c) = Cosigned By      Initials Name Provider Type    Janet Reyes, RN Registered Nurse    Alise Rowland, APRIL Physical Therapist                                 Physical Therapy Education       Title: PT OT SLP Therapies (In Progress)       Topic: Physical Therapy (In Progress)       Point: Mobility training (In Progress)       Learning Progress Summary            Patient Acceptance, E, NR by ND at 3/10/2025 1137    Acceptance, E, NR by KG at 3/7/2025 0957    Acceptance, E, NR by KG1 at 2/27/2025 0938                      Point: Home exercise program (In Progress)       Learning Progress Summary            Patient Acceptance, E, NR by ND at 3/10/2025 1137    Acceptance, E, NR by KG at 3/7/2025 0957    Acceptance, E, NR by KG1 at 2/27/2025 0938                      Point: Body mechanics (In Progress)       Learning Progress Summary            Patient Acceptance, E, NR by ND at 3/10/2025 1137    Acceptance, E, NR by KG at 3/7/2025 0957    Acceptance, E, NR by KG1 at 2/27/2025 0938                      Point: Precautions (In Progress)       Learning Progress Summary            Patient Acceptance, E, NR by ND at 3/10/2025 1137    Acceptance, E, NR by KG at 3/7/2025 0957    Acceptance, E, NR by KG1 at 2/27/2025 0938                                      User Key       Initials Effective Dates Name Provider Type Discipline    KG 05/22/20 -  Geovanna Burton, PT Physical Therapist PT    KG1 12/13/24 -  Vanessa Nails Physical Therapist PT    ND 11/16/23 -  Alise Fatima PT Physical Therapist PT                  PT Recommendation and Plan     Progress: improving  Outcome Evaluation: Pt improves to standing and taking a few steps with min-A x1 and RWx this date. Pt continues to benefit from IP PT services to further progress functional mobility and promote increased functional strength and endurance. Recommend SNF  following d/c for best functional outcome.     Time Calculation:         PT Charges       Row Name 03/10/25 1137             Time Calculation    Start Time 0924  -ND      PT Received On 03/10/25  -ND         Timed Charges    27358 - PT Therapeutic Activity Minutes 24  -ND         Total Minutes    Timed Charges Total Minutes 24  -ND       Total Minutes 24  -ND                User Key  (r) = Recorded By, (t) = Taken By, (c) = Cosigned By      Initials Name Provider Type    ND Alise Fatima, PT Physical Therapist                  Therapy Charges for Today       Code Description Service Date Service Provider Modifiers Qty    08154215869 HC PT THERAPEUTIC ACT EA 15 MIN 3/10/2025 Alise Fatima, PT GP 2    94874722743 HC PT THER SUPP EA 15 MIN 3/10/2025 Alise Fatima, PT GP 2            PT G-Codes  Outcome Measure Options: AM-PAC 6 Clicks Basic Mobility (PT)  AM-PAC 6 Clicks Score (PT): 16  AM-PAC 6 Clicks Score (OT): 10  PT Discharge Summary  Anticipated Discharge Disposition (PT): skilled nursing facility    Alise Fatima PT  3/10/2025

## 2025-03-10 NOTE — PROGRESS NOTES
"                  Clinical Nutrition     Patient Name: Val Nassar Jr.  YOB: 1960  MRN: [id number]  Date of Encounter: 03/10/25 11:59 EDT  Admission date: 2/11/2025  Reason for Visit: Follow-up protocol    Assessment   Nutrition Assessment   Admission Diagnosis:  Septic shock [A41.9, R65.21]    Problem List:    Septic shock due to Pseudomonas species    Hyperlipidemia, unspecified    Tobacco use    Cirrhosis of liver    Squamous cell carcinoma of esophagus    GALILEO (acute kidney injury)    Metabolic acidosis    Pneumonia of both lower lobes due to Pseudomonas species    Bacteremia due to Pseudomonas      PMH:   He  has a past medical history of Anemia, Cancer, Cirrhosis, Duodenal ulcer, Gastric ulcer, GERD (gastroesophageal reflux disease), History of alcohol abuse, History of radiation therapy (05/08/2024), HLD (hyperlipidemia), and Mood disorder.    PSH:  He  has a past surgical history that includes Esophagogastroduodenoscopy (N/A, 12/26/2023); Venous Access Device (Port) (Right, 04/25/2024); and Esophagogastroduodenoscopy (N/A, 3/3/2025).    Substance history: Etoh abuse, vaping    Applicable Nutrition History:   Skin:  posterior thigh, gluteal skin tears, scrotal MASD, blisters   WOC following    (2/12) ARF/VENT  (2/17) s/p Bronch  (2/24) Extubated   (2/26) SLP Diet Recommendation: NPO, temporary alternate methods of nutrition/hydration   (3/3) s/p EGD  two small telangiectasias in the lower third of the esophagus   Intubated  Transferred to ICU  (3/6) extubated    Anthropometrics     Height: Height: 175.3 cm (69.02\")  Last Filed Weight: Weight: 81 kg (178 lb 8.8 oz) (03/10/25 0600)  Method: Weight Method: Bed scalebedscale  BMI: BMI (Calculated): 26.4    UBW: ?  Weight change:  per EMR ~ 6lb wt gain since January     Weight       Weight (kg) Weight (lbs) Weight Method Visit Report   4/23/2024 75.841 kg  167 lb 3.2 oz   --    4/25/2024 75.751 kg  167 lb  Stated     4/29/2024 76.658 " kg  169 lb      4/30/2024 75.932 kg  167 lb 6.4 oz   --    5/6/2024 74.889 kg  165 lb 1.6 oz   --     74.98 kg  165 lb 4.8 oz   --    5/7/2024 76.25 kg  168 lb 1.6 oz   --    5/14/2024 77.111 kg  170 lb   --    5/21/2024 75.297 kg  166 lb      6/3/2024 74.98 kg  165 lb 4.8 oz   --    6/18/2024 74.39 kg  164 lb   --    7/5/2024 74.4 kg  164 lb 0.4 oz  Estimated     7/16/2024 77.565 kg  171 lb   --    8/7/2024 77.565 kg  171 lb   --    8/19/2024 77.656 kg  171 lb 3.2 oz   --    9/19/2024 77.565 kg  171 lb      10/24/2024 79.833 kg  176 lb   --    11/14/2024 79.833 kg  176 lb   --    11/26/2024 78.926 kg  174 lb      12/18/2024 79.833 kg  176 lb   --    1/21/2025 79.833 kg  176 lb  Stated     1/23/2025 79.833 kg  176 lb   --    2/3/2025 83.008 kg  183 lb   --    2/11/2025 83.008 kg  183 lb       83 kg  182 lb 15.7 oz        Nutrition Focused Physical Exam     Date: 2-26-25    Pt does not meet criteria for malnutrition diagnosis, at this time.      Subjective   Reported/Observed/Food/Nutrition Related History:     3/10  Pt started on po diet 3/8, unclear when NGT was dc'd. Pt resting in bed, able to answer some questions regarding nutrition-pt didn't like breakfast but states he is trying to drink his fluids. Pt is agreeable to Magic Cups. RD observed pt ate applesauce and drank OJ from breakfast tray.     3/7  Extubated yesterday. Up in chair, confused. TF had been at trophic rate overnight 2/2 concern for bowel fxn - movantik effective. SLP to re-eval today. Hypernatremia improving with IVF.    3/6  Intubated/sedated + propofoL. Sm BM noted following addition of movantik. TF held for >24hrs. Hypernatremia noted. OK to restart feeds today.     3/5  Pt remains intubated/sedated. TF held overnight following concern for aspiration. Keofeed duodenum- unlikely aspiration. +constipation - initiate aggressive bowel regimen; hold lactulose 2/2 gaseous colonic distention; start movantik.    3/4  Pt intubated following EGD  yesterday. Sedated - propofoL 4.86mL/hr). TF held overnight - ok to restart nutrition support. Continues on bowel regimen - no BM.   Per RN: blisters improving, WOC following.     3/3  Pt OOR for EGD this am. Spoke w/RN who reports pt tolerating EN. Hypernatremia resolved.     2/28  Pt continues w/hypernatremia. Increased water flush again. Pt does not participate in nutrition visit. Discussed changes w/NSG and MD.     2/27   EN and water flush adjusted 2/2 hypernatremia and change in feeding window.     2-26: pt resting in bed, on room air, pleasantly confused  Per RN: pt tolerating TF, had 1 dark bm, has been tachypneic, lots of secretions, failed FEES    2-24: per RN pt extubated today, is tolerating TF, possible GIB, is having dark stools, follows commands  Pt resting in bed, on nasal cannula, very weak voice, difficulty talking  + precedex    2-21-25: pt intubated, sedated + fentanyl, versed, D5%@100ml  Per RN: pt tolerating TF, has not had a bm, is much more alert today, follows commands  Per MD: plan to maintain D5% for 1 more day,  decrease free water to 30ml/hr    2-19: pt intubated, sedated, + fentanyl, versed, D5%@100ml  Per RN: pt has been more alert, oxygen demands decreased, tolerating TF, abdomen still distended, given lactulose (last bm 2-17)    2/17  Pt intubated, sedated, on fentanyl & VERSED. Spoke with nurse, reported tolerating TF well, has low residuals, and bowels are moving. LBM on 2/15. Noted that Na continues to increase, even after increasing free water on 2/15. Pt may benefit from IV fluid to correct hypernatremia.     2/13  Pt intubated, sedated, + fentanyl, propofol 7.56ml, levophed 0.32mcg  Per RN: pt's belly distended, more firm than before, bowel regimen started, marginal UOP, have been toni to wean off asiya and vasopressin    Current Nutrition Prescription     PO: Diet: Cardiac; Healthy Heart (2-3 Na+); No Straw, No Mixed Consistencies; Texture: Pureed (NDD 1); Fluid Consistency:  Honey Thick  Oral Nutrition Supplement:  Intake: 20% x 1 meal documented    Assessment & Plan   Nutrition Diagnosis   Date: 2-13-25 Updated: 3/4  Problem Inadequate energy intake    Etiology ARF/Extubated   Signs/Symptoms NPO   Status: Active    Date:  2/28 Updated: 3/4, 3/6  Problem Inadequate oral intake   Etiology dysphagia   Signs/Symptoms <25% intake x 2 meals (1 meal observed by RD)   Status: Active     Goal:   Nutrition to support treatment and Tolerate PO, Increase intake      Nutrition Intervention      Follow treatment progress, Care plan reviewed, Encourage intake, Supplement provided    If pt not accepting of magic cups-will discuss thickening Boost at bedside w/RN.   RD encouraged fluid intake     Monitoring/Evaluation:   Per protocol, PO intake, Supplement intake    Joy John, MS,RD,LD  Time Spent: 15min

## 2025-03-10 NOTE — CASE MANAGEMENT/SOCIAL WORK
Continued Stay Note  [facility]     Patient Name: Val Nassar Jr.  MRN: [id number]  Today's Date: 3/10/2025    Admit Date: 2/11/2025    Plan: LTC   Discharge Plan       Row Name 03/10/25 1225       Plan    Plan LTC    Patient/Family in Agreement with Plan yes    Plan Comments Discussed in MDR. Pt not medically ready for discharge at this time. Currently on bed hold at [facility] for discharge purposes. CM will cont to follow.                   Discharge Codes    No documentation.                 Expected Discharge Date and Time       Expected Discharge Date Expected Discharge Time    Mar 11, 2025               Laura Jackson RN

## 2025-03-10 NOTE — PLAN OF CARE
Goal Outcome Evaluation:  Plan of Care Reviewed With: patient        Progress: improving  Outcome Evaluation: Pt improves to standing and taking a few steps with min-A x1 and RWx this date. Pt continues to benefit from IP PT services to further progress functional mobility and promote increased functional strength and endurance. Recommend SNF following d/c for best functional outcome.    Anticipated Discharge Disposition (PT): skilled nursing facility

## 2025-03-10 NOTE — PLAN OF CARE
Problem: Adult Inpatient Plan of Care  Goal: Plan of Care Review  Outcome: Progressing  Flowsheets  Taken 3/8/2025 1005 by Carlos Elder MS CCC-SLP  Progress: improving  Plan of Care Reviewed With: patient  Taken 3/8/2025 0758 by Liam Lombardi RN  Outcome Evaluation: -Pt alert and oriented to person  Goal: Patient-Specific Goal (Individualized)  Outcome: Progressing  Goal: Absence of Hospital-Acquired Illness or Injury  Outcome: Progressing  Intervention: Identify and Manage Fall Risk  Recent Flowsheet Documentation  Taken 3/10/2025 0605 by Yadiel Flood RN  Safety Promotion/Fall Prevention:   activity supervised   clutter free environment maintained   fall prevention program maintained   lighting adjusted   room organization consistent   safety round/check completed   toileting scheduled  Taken 3/10/2025 0402 by Yadiel Flood RN  Safety Promotion/Fall Prevention:   activity supervised   clutter free environment maintained   fall prevention program maintained   lighting adjusted   room organization consistent   safety round/check completed   toileting scheduled  Taken 3/10/2025 0145 by Yadiel Flood RN  Safety Promotion/Fall Prevention:   activity supervised   clutter free environment maintained   fall prevention program maintained   lighting adjusted   room organization consistent   safety round/check completed   toileting scheduled  Taken 3/9/2025 2353 by Yadiel Flood, RN  Safety Promotion/Fall Prevention:   activity supervised   clutter free environment maintained   fall prevention program maintained   lighting adjusted   room organization consistent   safety round/check completed   toileting scheduled  Taken 3/9/2025 2143 by Yadiel Flood, RN  Safety Promotion/Fall Prevention:   activity supervised   clutter free environment maintained   fall prevention program maintained   lighting adjusted   room organization consistent   safety round/check completed   toileting  scheduled  Taken 3/9/2025 2006 by Yadiel Flood RN  Safety Promotion/Fall Prevention:   activity supervised   clutter free environment maintained   fall prevention program maintained   lighting adjusted   room organization consistent   safety round/check completed   toileting scheduled  Intervention: Prevent Skin Injury  Recent Flowsheet Documentation  Taken 3/10/2025 0605 by Yadiel Flood RN  Body Position: position maintained  Taken 3/10/2025 0402 by Yadiel Flood RN  Body Position: position maintained  Skin Protection:   incontinence pads utilized   drying agents applied  Taken 3/10/2025 0145 by Yadiel Flood RN  Body Position: position maintained  Taken 3/9/2025 2353 by Yadiel Flood RN  Body Position: position maintained  Skin Protection: incontinence pads utilized  Taken 3/9/2025 2143 by Yadiel Flood RN  Body Position: position maintained  Taken 3/9/2025 2006 by Yadiel Flood RN  Body Position: position maintained  Intervention: Prevent Infection  Recent Flowsheet Documentation  Taken 3/10/2025 0605 by Yadiel Flood RN  Infection Prevention:   environmental surveillance performed   hand hygiene promoted   rest/sleep promoted  Taken 3/10/2025 0402 by Yadiel Flood RN  Infection Prevention:   environmental surveillance performed   hand hygiene promoted   rest/sleep promoted  Taken 3/10/2025 0145 by Yadiel Flood RN  Infection Prevention:   environmental surveillance performed   hand hygiene promoted   rest/sleep promoted  Taken 3/9/2025 2353 by Yadiel Flood RN  Infection Prevention:   environmental surveillance performed   hand hygiene promoted   rest/sleep promoted  Taken 3/9/2025 2143 by Yadiel Flood RN  Infection Prevention:   environmental surveillance performed   hand hygiene promoted   rest/sleep promoted  Taken 3/9/2025 2006 by Yadiel Flood RN  Infection Prevention:   environmental surveillance performed   hand  hygiene promoted   rest/sleep promoted  Goal: Optimal Comfort and Wellbeing  Outcome: Progressing  Intervention: Provide Person-Centered Care  Recent Flowsheet Documentation  Taken 3/10/2025 0605 by Yadiel Flood RN  Trust Relationship/Rapport:   care explained   choices provided  Taken 3/10/2025 0402 by Yadiel Flood RN  Trust Relationship/Rapport:   care explained   choices provided  Taken 3/10/2025 0145 by Yadiel Flood RN  Trust Relationship/Rapport:   care explained   choices provided  Taken 3/9/2025 2353 by Yadiel Flood RN  Trust Relationship/Rapport:   care explained   choices provided  Taken 3/9/2025 2143 by Yadiel Flood, RN  Trust Relationship/Rapport:   care explained   choices provided  Taken 3/9/2025 2006 by Yadiel Flood RN  Trust Relationship/Rapport:   care explained   choices provided  Goal: Readiness for Transition of Care  Outcome: Progressing     Problem: Fall Injury Risk  Goal: Absence of Fall and Fall-Related Injury  Outcome: Progressing  Intervention: Identify and Manage Contributors  Recent Flowsheet Documentation  Taken 3/10/2025 0605 by Yadiel Flood RN  Medication Review/Management: medications reviewed  Taken 3/10/2025 0402 by Yadiel Flood, RN  Medication Review/Management: medications reviewed  Taken 3/10/2025 0145 by Yadiel Flood RN  Medication Review/Management: medications reviewed  Taken 3/9/2025 2353 by Yadiel Flood RN  Medication Review/Management: medications reviewed  Taken 3/9/2025 2143 by Yadiel Flood, RN  Medication Review/Management: medications reviewed  Taken 3/9/2025 2006 by Yadiel Flood RN  Medication Review/Management: medications reviewed  Intervention: Promote Injury-Free Environment  Recent Flowsheet Documentation  Taken 3/10/2025 0605 by Yadiel Flood, RN  Safety Promotion/Fall Prevention:   activity supervised   clutter free environment maintained   fall prevention program  maintained   lighting adjusted   room organization consistent   safety round/check completed   toileting scheduled  Taken 3/10/2025 0402 by Yadiel Flood RN  Safety Promotion/Fall Prevention:   activity supervised   clutter free environment maintained   fall prevention program maintained   lighting adjusted   room organization consistent   safety round/check completed   toileting scheduled  Taken 3/10/2025 0145 by Yadiel Flood RN  Safety Promotion/Fall Prevention:   activity supervised   clutter free environment maintained   fall prevention program maintained   lighting adjusted   room organization consistent   safety round/check completed   toileting scheduled  Taken 3/9/2025 2353 by Yadiel Flood RN  Safety Promotion/Fall Prevention:   activity supervised   clutter free environment maintained   fall prevention program maintained   lighting adjusted   room organization consistent   safety round/check completed   toileting scheduled  Taken 3/9/2025 2143 by Yadiel Flood RN  Safety Promotion/Fall Prevention:   activity supervised   clutter free environment maintained   fall prevention program maintained   lighting adjusted   room organization consistent   safety round/check completed   toileting scheduled  Taken 3/9/2025 2006 by Yadiel Flood RN  Safety Promotion/Fall Prevention:   activity supervised   clutter free environment maintained   fall prevention program maintained   lighting adjusted   room organization consistent   safety round/check completed   toileting scheduled     Problem: Skin Injury Risk Increased  Goal: Skin Health and Integrity  Outcome: Progressing  Intervention: Optimize Skin Protection  Recent Flowsheet Documentation  Taken 3/10/2025 0605 by Yadiel Flood, RN  Activity Management: activity encouraged  Head of Bed (HOB) Positioning: HOB elevated  Taken 3/10/2025 0402 by Yadiel Flood RN  Activity Management: activity encouraged  Pressure  Reduction Techniques: frequent weight shift encouraged  Head of Bed (HOB) Positioning: HOB elevated  Pressure Reduction Devices: pressure-redistributing mattress utilized  Skin Protection:   incontinence pads utilized   drying agents applied  Taken 3/10/2025 0145 by Yadiel lFood RN  Activity Management: activity encouraged  Head of Bed (HOB) Positioning: HOB elevated  Taken 3/9/2025 2353 by Yadiel Flood RN  Activity Management: activity encouraged  Pressure Reduction Techniques: frequent weight shift encouraged  Head of Bed (HOB) Positioning: HOB elevated  Pressure Reduction Devices: pressure-redistributing mattress utilized  Skin Protection: incontinence pads utilized  Taken 3/9/2025 2143 by Yadiel Flood, RN  Activity Management: activity encouraged  Head of Bed (HOB) Positioning: HOB elevated  Taken 3/9/2025 2006 by Yadiel Flood RN  Activity Management: activity encouraged  Head of Bed (HOB) Positioning: HOB elevated     Problem: Comorbidity Management  Goal: Maintenance of Behavioral Health Symptom Control  Outcome: Progressing  Intervention: Maintain Behavioral Health Symptom Control  Recent Flowsheet Documentation  Taken 3/10/2025 0605 by Yadiel Flood RN  Medication Review/Management: medications reviewed  Taken 3/10/2025 0402 by Yadiel Flood RN  Medication Review/Management: medications reviewed  Taken 3/10/2025 0145 by Yadiel Flood RN  Medication Review/Management: medications reviewed  Taken 3/9/2025 2353 by Yadiel Flood RN  Medication Review/Management: medications reviewed  Taken 3/9/2025 2143 by Yadiel Flood RN  Medication Review/Management: medications reviewed  Taken 3/9/2025 2006 by Yadiel Flood RN  Medication Review/Management: medications reviewed  Goal: Maintenance of Heart Failure Symptom Control  Outcome: Progressing  Intervention: Maintain Heart Failure Management  Recent Flowsheet Documentation  Taken 3/10/2025 0605  by Yadiel Flood RN  Medication Review/Management: medications reviewed  Taken 3/10/2025 0402 by Yadiel Flood RN  Medication Review/Management: medications reviewed  Taken 3/10/2025 0145 by Yadiel Flood RN  Medication Review/Management: medications reviewed  Taken 3/9/2025 2353 by Yadiel Flood RN  Medication Review/Management: medications reviewed  Taken 3/9/2025 2143 by Yadiel Flood RN  Medication Review/Management: medications reviewed  Taken 3/9/2025 2006 by Yadiel Flood RN  Medication Review/Management: medications reviewed     Problem: Sepsis/Septic Shock  Goal: Optimal Coping  Outcome: Progressing  Intervention: Support Patient and Family Response  Recent Flowsheet Documentation  Taken 3/10/2025 0605 by Yadiel Flood RN  Family/Support System Care: self-care encouraged  Taken 3/10/2025 0402 by Yadiel Flood RN  Family/Support System Care: self-care encouraged  Taken 3/10/2025 0145 by Yadiel Flood RN  Family/Support System Care: self-care encouraged  Taken 3/9/2025 2353 by Yadiel Flood RN  Family/Support System Care: self-care encouraged  Taken 3/9/2025 2143 by Yadiel Flood RN  Family/Support System Care: self-care encouraged  Taken 3/9/2025 2006 by Yadiel Flood RN  Family/Support System Care: self-care encouraged  Goal: Absence of Bleeding  Outcome: Progressing  Goal: Blood Glucose Level Within Target Range  Outcome: Progressing  Goal: Absence of Infection Signs and Symptoms  Outcome: Progressing  Intervention: Initiate Sepsis Management  Recent Flowsheet Documentation  Taken 3/10/2025 0605 by Yadiel Flood RN  Infection Prevention:   environmental surveillance performed   hand hygiene promoted   rest/sleep promoted  Taken 3/10/2025 0402 by Yadiel Flood RN  Infection Prevention:   environmental surveillance performed   hand hygiene promoted   rest/sleep promoted  Taken 3/10/2025 0145 by Yadiel Flood  MAREK RN  Infection Prevention:   environmental surveillance performed   hand hygiene promoted   rest/sleep promoted  Taken 3/9/2025 2353 by Yadiel Flood RN  Infection Prevention:   environmental surveillance performed   hand hygiene promoted   rest/sleep promoted  Taken 3/9/2025 2143 by Yadiel Flood RN  Infection Prevention:   environmental surveillance performed   hand hygiene promoted   rest/sleep promoted  Taken 3/9/2025 2006 by Yadiel Flood RN  Infection Prevention:   environmental surveillance performed   hand hygiene promoted   rest/sleep promoted  Intervention: Promote Recovery  Recent Flowsheet Documentation  Taken 3/10/2025 0605 by Yadiel Flood, RN  Activity Management: activity encouraged  Sleep/Rest Enhancement: consistent schedule promoted  Taken 3/10/2025 0402 by Yadiel Flood RN  Activity Management: activity encouraged  Sleep/Rest Enhancement: consistent schedule promoted  Taken 3/10/2025 0145 by Yadiel Flood RN  Activity Management: activity encouraged  Sleep/Rest Enhancement: consistent schedule promoted  Taken 3/9/2025 2353 by Yadiel Flood RN  Activity Management: activity encouraged  Sleep/Rest Enhancement: consistent schedule promoted  Taken 3/9/2025 2143 by Yadiel Flood RN  Activity Management: activity encouraged  Sleep/Rest Enhancement: consistent schedule promoted  Taken 3/9/2025 2006 by Yadiel Flood RN  Activity Management: activity encouraged  Sleep/Rest Enhancement: consistent schedule promoted  Goal: Optimal Nutrition Delivery  Outcome: Progressing     Problem: Restraint, Nonviolent  Goal: Absence of Harm or Injury  Outcome: Progressing  Intervention: Implement Least Restrictive Safety Strategies  Recent Flowsheet Documentation  Taken 3/10/2025 0605 by Yadiel Flood RN  Diversional Activities: television  Taken 3/10/2025 0402 by Yadiel Flood RN  Diversional Activities: television  Taken 3/10/2025 0145 by  Yadiel Flood RN  Diversional Activities: television  Taken 3/9/2025 2353 by Yadiel Flood RN  Diversional Activities: television  Taken 3/9/2025 2143 by Yadiel Flood RN  Diversional Activities: television  Taken 3/9/2025 2006 by Yadiel Flood RN  Diversional Activities: television  Intervention: Protect Dignity, Rights and Personal Wellbeing  Recent Flowsheet Documentation  Taken 3/10/2025 0605 by Yadiel Flood RN  Trust Relationship/Rapport:   care explained   choices provided  Taken 3/10/2025 0402 by Yadiel Flood RN  Trust Relationship/Rapport:   care explained   choices provided  Taken 3/10/2025 0145 by Yadiel Flood RN  Trust Relationship/Rapport:   care explained   choices provided  Taken 3/9/2025 2353 by Yadiel Flood RN  Trust Relationship/Rapport:   care explained   choices provided  Taken 3/9/2025 2143 by Yadiel Flood RN  Trust Relationship/Rapport:   care explained   choices provided  Taken 3/9/2025 2006 by Yadiel Flood RN  Trust Relationship/Rapport:   care explained   choices provided  Intervention: Protect Skin and Joint Integrity  Recent Flowsheet Documentation  Taken 3/10/2025 0605 by Yadiel Flood RN  Body Position: position maintained  Taken 3/10/2025 0402 by Yadiel Flood RN  Body Position: position maintained  Skin Protection:   incontinence pads utilized   drying agents applied  Taken 3/10/2025 0145 by Yadiel Flood RN  Body Position: position maintained  Taken 3/9/2025 2353 by Yadiel Flood RN  Body Position: position maintained  Skin Protection: incontinence pads utilized  Taken 3/9/2025 2143 by Yadiel Flood RN  Body Position: position maintained  Taken 3/9/2025 2006 by Yadiel Flood RN  Body Position: position maintained     Problem: Mechanical Ventilation Invasive  Goal: Effective Communication  Outcome: Progressing  Intervention: Ensure Effective Communication  Recent  Flowsheet Documentation  Taken 3/10/2025 0605 by Yadiel Flood, RN  Trust Relationship/Rapport:   care explained   choices provided  Diversional Activities: television  Family/Support System Care: self-care encouraged  Taken 3/10/2025 0402 by Yadiel Flood, RN  Trust Relationship/Rapport:   care explained   choices provided  Diversional Activities: television  Family/Support System Care: self-care encouraged  Taken 3/10/2025 0145 by Yadiel Flood, RN  Trust Relationship/Rapport:   care explained   choices provided  Diversional Activities: television  Family/Support System Care: self-care encouraged  Taken 3/9/2025 2353 by Yadiel Flood, RN  Trust Relationship/Rapport:   care explained   choices provided  Diversional Activities: television  Family/Support System Care: self-care encouraged  Taken 3/9/2025 2143 by Yadiel Flood, RN  Trust Relationship/Rapport:   care explained   choices provided  Diversional Activities: television  Family/Support System Care: self-care encouraged  Taken 3/9/2025 2006 by Yadiel Flood RN  Trust Relationship/Rapport:   care explained   choices provided  Diversional Activities: television  Family/Support System Care: self-care encouraged  Goal: Optimal Device Function  Outcome: Progressing  Intervention: Optimize Device Care and Function  Recent Flowsheet Documentation  Taken 3/10/2025 0605 by Yadiel Flood RN  Airway Safety Measures:   oxygen flowmeter at bedside   suction at bedside  Taken 3/10/2025 0402 by Yadiel Flood RN  Airway Safety Measures:   oxygen flowmeter at bedside   suction at bedside  Taken 3/10/2025 0145 by Yadiel Flood, RN  Airway Safety Measures:   oxygen flowmeter at bedside   suction at bedside  Taken 3/9/2025 2353 by Yadiel Flood, RN  Airway Safety Measures:   oxygen flowmeter at bedside   suction at bedside  Taken 3/9/2025 2143 by Yadiel Flood RN  Airway Safety Measures:   oxygen flowmeter at  bedside   suction at bedside  Taken 3/9/2025 2006 by Yadiel Flood, RN  Airway Safety Measures:   oxygen flowmeter at bedside   suction at bedside  Taken 3/9/2025 2000 by Yadiel Flood, RN  Oral Care: teeth brushed - suction toothbrush  Goal: Mechanical Ventilation Liberation  Outcome: Progressing  Intervention: Promote Extubation and Mechanical Ventilation Liberation  Recent Flowsheet Documentation  Taken 3/10/2025 0605 by Yadiel Flood, RN  Sleep/Rest Enhancement: consistent schedule promoted  Medication Review/Management: medications reviewed  Taken 3/10/2025 0402 by Yadiel Flood RN  Sleep/Rest Enhancement: consistent schedule promoted  Medication Review/Management: medications reviewed  Taken 3/10/2025 0145 by Yadiel Flood RN  Sleep/Rest Enhancement: consistent schedule promoted  Medication Review/Management: medications reviewed  Taken 3/9/2025 2353 by Yadiel Flood RN  Sleep/Rest Enhancement: consistent schedule promoted  Medication Review/Management: medications reviewed  Taken 3/9/2025 2143 by Yadiel Flood, RN  Sleep/Rest Enhancement: consistent schedule promoted  Medication Review/Management: medications reviewed  Taken 3/9/2025 2006 by Yadiel Flood RN  Sleep/Rest Enhancement: consistent schedule promoted  Medication Review/Management: medications reviewed  Goal: Optimal Nutrition Delivery  Outcome: Progressing  Goal: Absence of Device-Related Skin and Tissue Injury  Outcome: Progressing  Goal: Absence of Ventilator-Induced Lung Injury  Outcome: Progressing  Intervention: Prevent Ventilator-Associated Pneumonia  Recent Flowsheet Documentation  Taken 3/10/2025 0605 by Yadiel Flood RN  Head of Bed (HOB) Positioning: HOB elevated  Taken 3/10/2025 0402 by Yadiel Flood RN  Head of Bed (HOB) Positioning: HOB elevated  Taken 3/10/2025 0145 by Yadiel Flood, RN  Head of Bed (HOB) Positioning: HOB elevated  Taken 3/9/2025 2353 by Jensen  Yadiel Garcia, RN  Head of Bed (HOB) Positioning: HOB elevated  Taken 3/9/2025 2143 by Yadiel Flood, RN  Head of Bed (HOB) Positioning: HOB elevated  Taken 3/9/2025 2006 by Yadiel Flood, RN  Head of Bed ([facility]) Positioning: HOB elevated  Taken 3/9/2025 2000 by Yadiel Flood, RN  Oral Care: teeth brushed - suction toothbrush   Goal Outcome Evaluation:

## 2025-03-10 NOTE — PROGRESS NOTES
[facility] Medicine Services  PROGRESS NOTE    Patient Name: Val Nassar Jr.  : 1960  MRN: [id number]    Date of Admission: 2025  Primary Care Physician: Wesly Minor MD    Subjective   Subjective     CC:  pneumonia    HPI:  Cough improved but still productive.  Doesn't like thickened liquids    Staff reports some ongoing dark stool      Objective   Objective     Vital Signs:   Temp:  [98.1 °F (36.7 °C)-98.4 °F (36.9 °C)] 98.3 °F (36.8 °C)  Heart Rate:  [61-79] 73  Resp:  [16-19] 19  BP: (148-157)/(70-78) 148/70  Flow (L/min) (Oxygen Therapy):  [2] 2     Physical Exam:  Appears chronically deconditioned, in bed  MM moist  RRR  Diminished breath sounds bilaterally  Abdomen soft, NT  Awake, speech clear  Normal affect      Results Reviewed:  LAB RESULTS:      Lab 03/10/25  0406 25  0842 25  0049 25  2022 25  0853 25  1108 25  0116 25  1817 25  1042 25  1522 25  0611 25  0832 25  0332   WBC 10.12  --   --   --  11.62*  --  12.68*  --  16.71*  --  26.26*  --  27.59*   HEMOGLOBIN 7.9* 7.6* 7.6* 9.5* 9.3*   < > 7.1*   < > 7.7*   < > 7.4*   < > 9.3*   HEMATOCRIT 26.5* 25.1* 25.0* 30.8* 30.9*   < > 24.3*   < > 26.0*   < > 24.2*   < > 30.4*   PLATELETS 272  --   --   --  239  --  202  --  220  --  240  --  256   NEUTROS ABS  --   --   --   --  9.08*  --   --   --   --   --  22.33*  --  23.20*   IMMATURE GRANS (ABS)  --   --   --   --  0.13*  --   --   --   --   --  0.26*  --  0.25*   LYMPHS ABS  --   --   --   --  0.86  --   --   --   --   --  0.94  --  1.05   MONOS ABS  --   --   --   --  0.97*  --   --   --   --   --  1.77*  --  1.98*   EOS ABS  --   --   --   --  0.54*  --   --   --   --   --  0.91*  --  1.04*   MCV 97.4*  --   --   --  97.5*  --  104.3*  --  103.6*  --  100.4*  --  100.7*   PROCALCITONIN  --   --   --   --   --   --   --   --  1.02*  --   --   --   --     < > = values in  this interval not displayed.         Lab 03/10/25  0406 03/09/25  0842 03/08/25 2022 03/08/25  0853 03/07/25  1817 03/07/25  0116 03/06/25  1042 03/05/25  1522 03/05/25  0611 03/04/25  1513 03/04/25  0332   SODIUM 142  --   --  147* 146* 148* 151*  --  148*  --  140   POTASSIUM 3.9  --  4.8 3.5  --  3.9 3.9   < > 3.6   < > 3.5   CHLORIDE 111*  --   --  116*  --  118* 119*  --  116*  --  106   CO2 20.0*  --   --  21.0*  --  20.0* 20.0*  --  19.0*  --  19.0*   ANION GAP 11.0  --   --  10.0  --  10.0 12.0  --  13.0  --  15.0   BUN 10  --   --  17  --  27* 34*  --  50*  --  54*   CREATININE 0.81  --   --  0.68*  --  0.90 0.99  --  1.24  --  1.27   EGFR 97.8  --   --  103.2  --  94.8 84.5  --  64.5  --  62.7   GLUCOSE 93  --   --  120*  --  103* 88  --  116*  --  113*   CALCIUM 8.3*  --   --  9.1  --  8.6 8.3*  --  8.5*  --  8.7   MAGNESIUM 1.9 1.6  --  1.6  --  2.0 2.4  --  1.9  --  2.0   PHOSPHORUS  --   --   --  2.7  --  3.0 3.0  --  4.1  --  5.1*    < > = values in this interval not displayed.         Lab 03/10/25  0406 03/04/25  0332   TOTAL PROTEIN 6.5 7.3   ALBUMIN 2.3* 2.4*   GLOBULIN 4.2 4.9   ALT (SGPT) 25 49*   AST (SGOT) 29 43*   BILIRUBIN 0.4 0.6   ALK PHOS 117 147*                 Lab 03/07/25  0116   ABO TYPING A   RH TYPING Positive   ANTIBODY SCREEN Negative         Lab 03/06/25  0357 03/05/25  0346 03/04/25  0320   PH, ARTERIAL 7.484* 7.424 7.422   PCO2, ARTERIAL 29.0* 32.8* 37.4   PO2 .0* 95.4 91.7   FIO2 30 40 60   HCO3 ART 21.8 21.4 24.4   BASE EXCESS ART -1.3* -2.6* 0.0   CARBOXYHEMOGLOBIN 1.6 1.7 1.6     Brief Urine Lab Results  (Last result in the past 365 days)        Color   Clarity   Blood   Leuk Est   Nitrite   Protein   CREAT   Urine HCG        02/11/25 0942 Yellow   Turbid   Moderate (2+)   Large (3+)   Negative   100 mg/dL (2+)                   Microbiology Results Abnormal       Procedure Component Value - Date/Time    Fungus Culture - Wash, Bronchus [[id number]]  (Abnormal)  Collected: 02/17/25 1633    Lab Status: Preliminary result Specimen: Wash from Bronchus Updated: 02/23/25 1910     Fungus Culture Candida albicans    Respiratory Culture - Wash, Bronchus [[id number]]  (Abnormal) Collected: 02/17/25 1633    Lab Status: Final result Specimen: Wash from Bronchus Updated: 02/19/25 1057     Respiratory Culture Light growth (2+) Candida albicans      No Normal Respiratory Lissett     Gram Stain Moderate (3+) WBCs seen      Few (2+) Epithelial cells seen      Occasional Budding yeast    Fungus Smear - Wash, Bronchus [[id number]]  (Abnormal) Collected: 02/17/25 1633    Lab Status: Final result Specimen: Wash from Bronchus Updated: 02/18/25 1435     Fungal Stain Fungal elements    Blood Culture - Blood, Arm, Left [[id number]]  (Abnormal)  (Susceptibility) Collected: 02/11/25 0900    Lab Status: Final result Specimen: Blood from Arm, Left Updated: 02/14/25 0635     Blood Culture Pseudomonas aeruginosa     Isolated from Aerobic Bottle     Gram Stain Aerobic Bottle Gram negative bacilli    Narrative:      Less than seven (7) mL's of blood was collected.  Insufficient quantity may yield false negative results.    Susceptibility        Pseudomonas aeruginosa      VIANEY      Cefepime Susceptible      Ceftazidime Susceptible      Ciprofloxacin Susceptible      Levofloxacin Susceptible      Piperacillin + Tazobactam Susceptible                       Susceptibility Comments       Pseudomonas aeruginosa    With the exception of urinary-sourced infections, aminoglycosides should not be used as monotherapy.               Blood Culture - Blood, Hand, Right [[id number]]  (Abnormal) Collected: 02/11/25 0926    Lab Status: Final result Specimen: Blood from Hand, Right Updated: 02/14/25 0635     Blood Culture Pseudomonas aeruginosa     Isolated from Aerobic and Anaerobic Bottles     Gram Stain Anaerobic Bottle Gram negative bacilli      Aerobic Bottle Gram negative bacilli    Narrative:      Less than seven (7) mL's  of blood was collected.  Insufficient quantity may yield false negative results.    Refer to previous blood culture collected on 02/11/2025 0900 for MICs    Urine Culture - Urine, Urine, Catheter [[id number]]  (Abnormal)  (Susceptibility) Collected: 02/11/25 0942    Lab Status: Final result Specimen: Urine, Catheter Updated: 02/13/25 1054     Urine Culture >100,000 CFU/mL Pseudomonas aeruginosa    Narrative:      Colonization of the urinary tract without infection is common. Treatment is discouraged unless the patient is symptomatic, pregnant, or undergoing an invasive urologic procedure.    Susceptibility        Pseudomonas aeruginosa      VIANEY      Cefepime Susceptible      Ceftazidime Susceptible      Ciprofloxacin Susceptible      Levofloxacin Susceptible      Piperacillin + Tazobactam Susceptible      Tobramycin Susceptible                           Blood Culture ID, PCR - Blood, Arm, Left [[id number]]  (Abnormal) Collected: 02/11/25 0900    Lab Status: Final result Specimen: Blood from Arm, Left Updated: 02/12/25 0349     BCID, PCR Pseudomonas aeruginosa. Identification by BCID2 PCR     BOTTLE TYPE Aerobic Bottle            No radiology results from the last 24 hrs      Results for orders placed during the hospital encounter of 02/11/25    Adult Transthoracic Echo Complete w/ Color, Spectral and Contrast if Necessary Per Protocol    Interpretation Summary    Left ventricular systolic function is normal. Estimated left ventricular EF = 60%    Left ventricular wall thickness is consistent with mild concentric hypertrophy.    Mild aortic valve stenosis is present.    Aortic valve maximum pressure gradient is 23 mmHg. Aortic valve mean pressure gradient is 14 mmHg.      Current medications:  Scheduled Meds:castor oil-balsam peru, 1 Application, Topical, Q12H  fentaNYL, 1 patch, Transdermal, Q72H   And  Check Fentanyl Patch Placement, 1 each, Not Applicable, Q12H  docusate sodium, 100 mg, Per G Tube,  BID  escitalopram, 10 mg, Per G Tube, Nightly  finasteride, 5 mg, Per G Tube, Daily  gabapentin, 300 mg, Per G Tube, Q8H  ipratropium-albuterol, 3 mL, Nebulization, 4x Daily - RT  nystatin, , Topical, Q8H  pantoprazole, 40 mg, Intravenous, Q12H  piperacillin-tazobactam, 3.375 g, Intravenous, Q8H  QUEtiapine, 25 mg, Per G Tube, Daily   And  QUEtiapine, 50 mg, Per G Tube, Nightly  rifAXIMin, 550 mg, Per G Tube, Q12H  rosuvastatin, 20 mg, Per G Tube, Nightly      Continuous Infusions:   PRN Meds:.  acetaminophen    Calcium Replacement - Follow Nurse / BPA Driven Protocol    dextrose    glucagon (human recombinant)    ipratropium-albuterol    Magnesium Cardiology Dose Replacement - Follow Nurse / BPA Driven Protocol    metoprolol tartrate    oxyCODONE-acetaminophen    polyethylene glycol    polyvinyl alcohol    Potassium Replacement - Follow Nurse / BPA Driven Protocol    sodium chloride    sodium chloride    Assessment & Plan   Assessment & Plan     Active Hospital Problems    Diagnosis  POA    **Septic shock due to Pseudomonas species [A41.52, R65.21]  Yes    Pneumonia of both lower lobes due to Pseudomonas species [J15.1]  Yes    Bacteremia due to Pseudomonas [R78.81, B96.5]  Yes    GALILEO (acute kidney injury) [N17.9]  Yes    Metabolic acidosis [E87.20]  Yes    Cirrhosis of liver [K74.60]  Yes    Squamous cell carcinoma of esophagus [C15.9]  Yes    Hyperlipidemia, unspecified [E78.5]  Yes    Tobacco use [Z72.0]  Yes      Resolved Hospital Problems    Diagnosis Date Resolved POA    Hypotension [I95.9] 02/11/2025 Unknown    Suspected UTI [R39.89] 02/12/2025 Yes        Brief Hospital Course to date:  Mr Nassar is a 63yo M with a history of alcohol abuse, alcoholic cirrhosis, 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 He was noted to be septic secondary to a UTI and was admitted to the ICU. Urine and blood  cultures grew Pseudomonas and he completed treatment with Cefepime x 10 days. He decompensated and required intubation on 2/12/25. Bronchoscopy was performed on 2/17/25 for mucous plugging. He was able to be extubated on 2/26/25.      He was transferred to telemetry. He developed worsening anemia and required transfusion of 2 units of pRBCs. GI was consulted and he was taken for EGD on 3/3/25 by Dr. Mckeon. EGD showed 2 small telangiectasias in the lower third of the esophagus suspected to be secondary to prior radiation. Argon was used for coagulation for bleeding prevention.      After the EGD, he was transferred to the recovery area. He was noted to be hypoxic on a nonrebreather and he was intubated by anesthesia. After intubation, he remained hypoxic requiring high FiO2 and PEEP. He was transferred back to the ICU.  Mr. Nassar was placed on empiric antibiotics for possible pneumonia.    Pseudomonas Bacteremia  Septic shock  Pyelonephritis  Possible pneumonia  - s/p cefepime course on admission x 10 days   -Recurrent pneumonia, now on Zosyn, day 7/7, leukocytosis improved     GALILEO/bladder outlet obstruction vs neurogenic bladder  -Lubin catheter removed.  Continue In-N-Out catheterization-typically self cathed prior to this hospitalization, follows outpatient with Urology Dr Garrison  -Continue finasteride and Flomax.     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  There were also grade 1 small esophageal varices and portal hypertensive gastropathy  -Continue PPI twice daily  - cbc am     Respiratory Failure  - s/p intubation 2/12/25 with subsequent bronchoscopy for mucous plugging.  Patient extubated on 2/24/2025   - reintubated 3/3 post endoscopy     EtOH Cirrhosis  -Continue rifaximin  -Lactulose stopped due to increased bowel gas     Metastatic Squamous Cell Carcinoma of the esophagus  - s/p chemoradiation May 2024     Chronic back pain.    - steroid injection at pain management 2/3/2024  - fentanyl patch     Tobacco abuse     Deconditioning  - PT/OT    Hypernatremia  -resolved        Expected Discharge Location and Transportation:   Expected Discharge   Expected Discharge Date: 3/11/2025; Expected Discharge Time:      VTE Prophylaxis:  Mechanical VTE prophylaxis orders are present.         AM-PAC 6 Clicks Score (PT): 16 (03/10/25 9077)    CODE STATUS:   Code Status and Medical Interventions: CPR (Attempt to Resuscitate); Full Support   Ordered at: 02/12/25 1029     Code Status (Patient has no pulse and is not breathing):    CPR (Attempt to Resuscitate)     Medical Interventions (Patient has pulse or is breathing):    Full Support     Level Of Support Discussed With:    Patient       Garret Gibson MD  03/10/25

## 2025-03-11 ENCOUNTER — ANCILLARY PROCEDURE (OUTPATIENT)
Dept: SPEECH THERAPY | Facility: HOSPITAL | Age: 65
DRG: 870 | End: 2025-03-11
Payer: MEDICARE

## 2025-03-11 LAB
ABO GROUP BLD: NORMAL
ANION GAP SERPL CALCULATED.3IONS-SCNC: 12 MMOL/L (ref 5–15)
BASOPHILS # BLD AUTO: 0.02 10*3/MM3 (ref 0–0.2)
BASOPHILS NFR BLD AUTO: 0.2 % (ref 0–1.5)
BLD GP AB SCN SERPL QL: NEGATIVE
BUN SERPL-MCNC: 6 MG/DL (ref 8–23)
BUN/CREAT SERPL: 9.5 (ref 7–25)
CALCIUM SPEC-SCNC: 8.6 MG/DL (ref 8.6–10.5)
CHLORIDE SERPL-SCNC: 108 MMOL/L (ref 98–107)
CO2 SERPL-SCNC: 22 MMOL/L (ref 22–29)
CREAT SERPL-MCNC: 0.63 MG/DL (ref 0.76–1.27)
DEPRECATED RDW RBC AUTO: 60.9 FL (ref 37–54)
EGFRCR SERPLBLD CKD-EPI 2021: 105.6 ML/MIN/1.73
EOSINOPHIL # BLD AUTO: 0.4 10*3/MM3 (ref 0–0.4)
EOSINOPHIL NFR BLD AUTO: 4.8 % (ref 0.3–6.2)
ERYTHROCYTE [DISTWIDTH] IN BLOOD BY AUTOMATED COUNT: 17.3 % (ref 12.3–15.4)
FERRITIN SERPL-MCNC: 171.2 NG/ML (ref 30–400)
GLUCOSE SERPL-MCNC: 83 MG/DL (ref 65–99)
HCT VFR BLD AUTO: 24 % (ref 37.5–51)
HCT VFR BLD AUTO: 26.7 % (ref 37.5–51)
HGB BLD-MCNC: 7.4 G/DL (ref 13–17.7)
HGB BLD-MCNC: 8.1 G/DL (ref 13–17.7)
IMM GRANULOCYTES # BLD AUTO: 0.12 10*3/MM3 (ref 0–0.05)
IMM GRANULOCYTES NFR BLD AUTO: 1.4 % (ref 0–0.5)
IRON 24H UR-MRATE: 26 MCG/DL (ref 59–158)
IRON SATN MFR SERPL: 10 % (ref 20–50)
LYMPHOCYTES # BLD AUTO: 0.95 10*3/MM3 (ref 0.7–3.1)
LYMPHOCYTES NFR BLD AUTO: 11.4 % (ref 19.6–45.3)
MAGNESIUM SERPL-MCNC: 1.8 MG/DL (ref 1.6–2.4)
MCH RBC QN AUTO: 29.2 PG (ref 26.6–33)
MCHC RBC AUTO-ENTMCNC: 30.3 G/DL (ref 31.5–35.7)
MCV RBC AUTO: 96.4 FL (ref 79–97)
MONOCYTES # BLD AUTO: 0.84 10*3/MM3 (ref 0.1–0.9)
MONOCYTES NFR BLD AUTO: 10.1 % (ref 5–12)
NEUTROPHILS NFR BLD AUTO: 5.97 10*3/MM3 (ref 1.7–7)
NEUTROPHILS NFR BLD AUTO: 72.1 % (ref 42.7–76)
NRBC BLD AUTO-RTO: 0 /100 WBC (ref 0–0.2)
PLATELET # BLD AUTO: 253 10*3/MM3 (ref 140–450)
PMV BLD AUTO: 8.9 FL (ref 6–12)
POTASSIUM SERPL-SCNC: 3.6 MMOL/L (ref 3.5–5.2)
POTASSIUM SERPL-SCNC: 4.1 MMOL/L (ref 3.5–5.2)
RBC # BLD AUTO: 2.77 10*6/MM3 (ref 4.14–5.8)
RH BLD: POSITIVE
SODIUM SERPL-SCNC: 142 MMOL/L (ref 136–145)
T&S EXPIRATION DATE: NORMAL
TIBC SERPL-MCNC: 262 MCG/DL (ref 298–536)
TRANSFERRIN SERPL-MCNC: 176 MG/DL (ref 200–360)
WBC NRBC COR # BLD AUTO: 8.3 10*3/MM3 (ref 3.4–10.8)

## 2025-03-11 PROCEDURE — 94799 UNLISTED PULMONARY SVC/PX: CPT

## 2025-03-11 PROCEDURE — 92610 EVALUATE SWALLOWING FUNCTION: CPT

## 2025-03-11 PROCEDURE — 25010000002 MAGNESIUM SULFATE 4 GM/100ML SOLUTION: Performed by: INTERNAL MEDICINE

## 2025-03-11 PROCEDURE — 84466 ASSAY OF TRANSFERRIN: CPT | Performed by: INTERNAL MEDICINE

## 2025-03-11 PROCEDURE — 86850 RBC ANTIBODY SCREEN: CPT | Performed by: INTERNAL MEDICINE

## 2025-03-11 PROCEDURE — 86901 BLOOD TYPING SEROLOGIC RH(D): CPT | Performed by: INTERNAL MEDICINE

## 2025-03-11 PROCEDURE — 85014 HEMATOCRIT: CPT | Performed by: INTERNAL MEDICINE

## 2025-03-11 PROCEDURE — 83540 ASSAY OF IRON: CPT | Performed by: INTERNAL MEDICINE

## 2025-03-11 PROCEDURE — 82728 ASSAY OF FERRITIN: CPT | Performed by: INTERNAL MEDICINE

## 2025-03-11 PROCEDURE — 99232 SBSQ HOSP IP/OBS MODERATE 35: CPT | Performed by: INTERNAL MEDICINE

## 2025-03-11 PROCEDURE — 80048 BASIC METABOLIC PNL TOTAL CA: CPT | Performed by: INTERNAL MEDICINE

## 2025-03-11 PROCEDURE — 94761 N-INVAS EAR/PLS OXIMETRY MLT: CPT

## 2025-03-11 PROCEDURE — 84132 ASSAY OF SERUM POTASSIUM: CPT | Performed by: INTERNAL MEDICINE

## 2025-03-11 PROCEDURE — 85018 HEMOGLOBIN: CPT | Performed by: INTERNAL MEDICINE

## 2025-03-11 PROCEDURE — 85025 COMPLETE CBC W/AUTO DIFF WBC: CPT | Performed by: INTERNAL MEDICINE

## 2025-03-11 PROCEDURE — 86900 BLOOD TYPING SEROLOGIC ABO: CPT | Performed by: INTERNAL MEDICINE

## 2025-03-11 PROCEDURE — 92612 ENDOSCOPY SWALLOW (FEES) VID: CPT

## 2025-03-11 PROCEDURE — 94664 DEMO&/EVAL PT USE INHALER: CPT

## 2025-03-11 PROCEDURE — 83735 ASSAY OF MAGNESIUM: CPT | Performed by: INTERNAL MEDICINE

## 2025-03-11 RX ORDER — MAGNESIUM SULFATE HEPTAHYDRATE 40 MG/ML
4 INJECTION, SOLUTION INTRAVENOUS ONCE
Status: COMPLETED | OUTPATIENT
Start: 2025-03-11 | End: 2025-03-11

## 2025-03-11 RX ORDER — POTASSIUM CHLORIDE 1500 MG/1
40 TABLET, EXTENDED RELEASE ORAL EVERY 4 HOURS
Status: COMPLETED | OUTPATIENT
Start: 2025-03-11 | End: 2025-03-11

## 2025-03-11 RX ADMIN — IPRATROPIUM BROMIDE AND ALBUTEROL SULFATE 3 ML: 2.5; .5 SOLUTION RESPIRATORY (INHALATION) at 11:30

## 2025-03-11 RX ADMIN — GABAPENTIN 300 MG: 300 CAPSULE ORAL at 13:45

## 2025-03-11 RX ADMIN — GABAPENTIN 300 MG: 300 CAPSULE ORAL at 05:04

## 2025-03-11 RX ADMIN — APIXABAN 5 MG: 5 TABLET, FILM COATED ORAL at 13:44

## 2025-03-11 RX ADMIN — OXYCODONE AND ACETAMINOPHEN 1 TABLET: 7.5; 325 TABLET ORAL at 21:29

## 2025-03-11 RX ADMIN — POTASSIUM CHLORIDE 40 MEQ: 1500 TABLET, EXTENDED RELEASE ORAL at 08:58

## 2025-03-11 RX ADMIN — MAGNESIUM SULFATE IN WATER FOR 4 G: 40 INJECTION INTRAVENOUS at 08:58

## 2025-03-11 RX ADMIN — APIXABAN 5 MG: 5 TABLET, FILM COATED ORAL at 21:30

## 2025-03-11 RX ADMIN — NYSTATIN: 100000 POWDER TOPICAL at 05:04

## 2025-03-11 RX ADMIN — NYSTATIN: 100000 POWDER TOPICAL at 13:45

## 2025-03-11 RX ADMIN — IPRATROPIUM BROMIDE AND ALBUTEROL SULFATE 3 ML: 2.5; .5 SOLUTION RESPIRATORY (INHALATION) at 07:26

## 2025-03-11 RX ADMIN — Medication 1 APPLICATION: at 08:58

## 2025-03-11 RX ADMIN — GABAPENTIN 300 MG: 300 CAPSULE ORAL at 21:30

## 2025-03-11 RX ADMIN — RIFAXIMIN 550 MG: 550 TABLET ORAL at 08:57

## 2025-03-11 RX ADMIN — IPRATROPIUM BROMIDE AND ALBUTEROL SULFATE 3 ML: 2.5; .5 SOLUTION RESPIRATORY (INHALATION) at 20:22

## 2025-03-11 RX ADMIN — POTASSIUM CHLORIDE 40 MEQ: 1500 TABLET, EXTENDED RELEASE ORAL at 13:45

## 2025-03-11 RX ADMIN — RIFAXIMIN 550 MG: 550 TABLET ORAL at 21:30

## 2025-03-11 RX ADMIN — QUETIAPINE FUMARATE 25 MG: 25 TABLET ORAL at 08:58

## 2025-03-11 RX ADMIN — QUETIAPINE FUMARATE 50 MG: 25 TABLET ORAL at 21:30

## 2025-03-11 RX ADMIN — IPRATROPIUM BROMIDE AND ALBUTEROL SULFATE 3 ML: 2.5; .5 SOLUTION RESPIRATORY (INHALATION) at 15:05

## 2025-03-11 RX ADMIN — PANTOPRAZOLE SODIUM 40 MG: 40 INJECTION, POWDER, FOR SOLUTION INTRAVENOUS at 08:57

## 2025-03-11 RX ADMIN — ESCITALOPRAM OXALATE 10 MG: 10 TABLET ORAL at 21:30

## 2025-03-11 RX ADMIN — FINASTERIDE 5 MG: 5 TABLET, FILM COATED ORAL at 08:58

## 2025-03-11 RX ADMIN — ROSUVASTATIN 20 MG: 20 TABLET, FILM COATED ORAL at 21:30

## 2025-03-11 RX ADMIN — NYSTATIN: 100000 POWDER TOPICAL at 21:31

## 2025-03-11 NOTE — CASE MANAGEMENT/SOCIAL WORK
Case Management Discharge Note      Final Note: Pt to discharge back to [facility] on 3/12/2025 if medically ready. Nurse to call report to [phone]. CM to fax discharge to [phone]. [facility] van to transport. Nurse to have pt at 1720 blding at 1300.         Selected Continued Care - Admitted Since 2/11/2025       Destination Coordination complete.      Service Provider Services Address Phone Fax Patient Preferred    [facility] [address] , [facility] [address] [phone] --              Durable Medical Equipment    No services have been selected for the patient.                Dialysis/Infusion    No services have been selected for the patient.                Home Medical Care    No services have been selected for the patient.                Therapy    No services have been selected for the patient.                Community Resources    No services have been selected for the patient.                Community & DME    No services have been selected for the patient.                    Transportation Services  W/C Van: [facility] Nursing [facility]    Final Discharge Disposition Code: 04 - intermediate care facility

## 2025-03-11 NOTE — PLAN OF CARE
Goal Outcome Evaluation:  Plan of Care Reviewed With: patient        Progress: improving       Anticipated Discharge Disposition (SLP): inpatient rehabilitation facility          SLP Swallowing Diagnosis: functional oral phase, functional pharyngeal phase (03/11/25 0900)         FEES completed. Safe to return to regular diet with thin liquids. No further SLP dysphagia needs.

## 2025-03-11 NOTE — PROGRESS NOTES
[facility] Medicine Services  PROGRESS NOTE    Patient Name: Val Nassar Jr.  : 1960  MRN: [id number]    Date of Admission: 2025  Primary Care Physician: Wesly Minor MD    Subjective   Subjective     CC:  pneumonia    HPI:  Doesn't like thickened liquids very much, would like a regular diet.    Hopeful to go back to [facility] soon      Objective   Objective     Vital Signs:   Temp:  [98 °F (36.7 °C)-98.6 °F (37 °C)] 98.3 °F (36.8 °C)  Heart Rate:  [55-79] 59  Resp:  [15-19] 18  BP: (130-153)/(72-83) 153/72  Flow (L/min) (Oxygen Therapy):  [2] 2     Physical Exam:  Non toxic but appears chronically deconditioned, in bed  MM moist  RRR, blowing systolic murmur  Breath sounds grossly clear  Abdomen soft  Awake, speech clear, some mild confusion (thinks his room is downstairs)  Slightly flat affect    Results Reviewed:  LAB RESULTS:      Lab 25  0512 03/10/25  0406 25  0842 25  0049 25  20225  0853 25  1108 25  0116 25  1817 25  1042 25  1522 25  0611   WBC 8.30 10.12  --   --   --  11.62*  --  12.68*  --  16.71*  --  26.26*   HEMOGLOBIN 8.1* 7.9* 7.6* 7.6* 9.5* 9.3*   < > 7.1*   < > 7.7*   < > 7.4*   HEMATOCRIT 26.7* 26.5* 25.1* 25.0* 30.8* 30.9*   < > 24.3*   < > 26.0*   < > 24.2*   PLATELETS 253 272  --   --   --  239  --  202  --  220  --  240   NEUTROS ABS 5.97  --   --   --   --  9.08*  --   --   --   --   --  22.33*   IMMATURE GRANS (ABS) 0.12*  --   --   --   --  0.13*  --   --   --   --   --  0.26*   LYMPHS ABS 0.95  --   --   --   --  0.86  --   --   --   --   --  0.94   MONOS ABS 0.84  --   --   --   --  0.97*  --   --   --   --   --  1.77*   EOS ABS 0.40  --   --   --   --  0.54*  --   --   --   --   --  0.91*   MCV 96.4 97.4*  --   --   --  97.5*  --  104.3*  --  103.6*  --  100.4*   PROCALCITONIN  --   --   --   --   --   --   --   --   --  1.02*  --   --     < > = values in  this interval not displayed.         Lab 03/11/25  0512 03/10/25  0406 03/09/25  0842 03/08/25  2022 03/08/25  0853 03/07/25  1817 03/07/25  0116 03/06/25  1042 03/05/25  1522 03/05/25  0611   SODIUM 142 142  --   --  147* 146* 148* 151*  --  148*   POTASSIUM 3.6 3.9  --  4.8 3.5  --  3.9 3.9   < > 3.6   CHLORIDE 108* 111*  --   --  116*  --  118* 119*  --  116*   CO2 22.0 20.0*  --   --  21.0*  --  20.0* 20.0*  --  19.0*   ANION GAP 12.0 11.0  --   --  10.0  --  10.0 12.0  --  13.0   BUN 6* 10  --   --  17  --  27* 34*  --  50*   CREATININE 0.63* 0.81  --   --  0.68*  --  0.90 0.99  --  1.24   EGFR 105.6 97.8  --   --  103.2  --  94.8 84.5  --  64.5   GLUCOSE 83 93  --   --  120*  --  103* 88  --  116*   CALCIUM 8.6 8.3*  --   --  9.1  --  8.6 8.3*  --  8.5*   MAGNESIUM 1.8 1.9 1.6  --  1.6  --  2.0 2.4  --  1.9   PHOSPHORUS  --   --   --   --  2.7  --  3.0 3.0  --  4.1    < > = values in this interval not displayed.         Lab 03/10/25  0406   TOTAL PROTEIN 6.5   ALBUMIN 2.3*   GLOBULIN 4.2   ALT (SGPT) 25   AST (SGOT) 29   BILIRUBIN 0.4   ALK PHOS 117                 Lab 03/07/25  0116   ABO TYPING A   RH TYPING Positive   ANTIBODY SCREEN Negative         Lab 03/06/25  0357 03/05/25  0346   PH, ARTERIAL 7.484* 7.424   PCO2, ARTERIAL 29.0* 32.8*   PO2 .0* 95.4   FIO2 30 40   HCO3 ART 21.8 21.4   BASE EXCESS ART -1.3* -2.6*   CARBOXYHEMOGLOBIN 1.6 1.7     Brief Urine Lab Results  (Last result in the past 365 days)        Color   Clarity   Blood   Leuk Est   Nitrite   Protein   CREAT   Urine HCG        02/11/25 0942 Yellow   Turbid   Moderate (2+)   Large (3+)   Negative   100 mg/dL (2+)                   Microbiology Results Abnormal       Procedure Component Value - Date/Time    Fungus Culture - Wash, Bronchus [[id number]]  (Abnormal) Collected: 02/17/25 1633    Lab Status: Preliminary result Specimen: Wash from Bronchus Updated: 02/23/25 1910     Fungus Culture Candida albicans    Respiratory Culture -  Wash, Bronchus [[id number]]  (Abnormal) Collected: 02/17/25 1633    Lab Status: Final result Specimen: Wash from Bronchus Updated: 02/19/25 1057     Respiratory Culture Light growth (2+) Candida albicans      No Normal Respiratory Lissett     Gram Stain Moderate (3+) WBCs seen      Few (2+) Epithelial cells seen      Occasional Budding yeast    Fungus Smear - Wash, Bronchus [[id number]]  (Abnormal) Collected: 02/17/25 1633    Lab Status: Final result Specimen: Wash from Bronchus Updated: 02/18/25 1435     Fungal Stain Fungal elements    Blood Culture - Blood, Arm, Left [[id number]]  (Abnormal)  (Susceptibility) Collected: 02/11/25 0900    Lab Status: Final result Specimen: Blood from Arm, Left Updated: 02/14/25 0635     Blood Culture Pseudomonas aeruginosa     Isolated from Aerobic Bottle     Gram Stain Aerobic Bottle Gram negative bacilli    Narrative:      Less than seven (7) mL's of blood was collected.  Insufficient quantity may yield false negative results.    Susceptibility        Pseudomonas aeruginosa      VIANEY      Cefepime Susceptible      Ceftazidime Susceptible      Ciprofloxacin Susceptible      Levofloxacin Susceptible      Piperacillin + Tazobactam Susceptible                       Susceptibility Comments       Pseudomonas aeruginosa    With the exception of urinary-sourced infections, aminoglycosides should not be used as monotherapy.               Blood Culture - Blood, Hand, Right [[id number]]  (Abnormal) Collected: 02/11/25 0926    Lab Status: Final result Specimen: Blood from Hand, Right Updated: 02/14/25 0635     Blood Culture Pseudomonas aeruginosa     Isolated from Aerobic and Anaerobic Bottles     Gram Stain Anaerobic Bottle Gram negative bacilli      Aerobic Bottle Gram negative bacilli    Narrative:      Less than seven (7) mL's of blood was collected.  Insufficient quantity may yield false negative results.    Refer to previous blood culture collected on 02/11/2025 0900 for MICs    Urine Culture  - Urine, Urine, Catheter [[id number]]  (Abnormal)  (Susceptibility) Collected: 02/11/25 0942    Lab Status: Final result Specimen: Urine, Catheter Updated: 02/13/25 1054     Urine Culture >100,000 CFU/mL Pseudomonas aeruginosa    Narrative:      Colonization of the urinary tract without infection is common. Treatment is discouraged unless the patient is symptomatic, pregnant, or undergoing an invasive urologic procedure.    Susceptibility        Pseudomonas aeruginosa      VIANEY      Cefepime Susceptible      Ceftazidime Susceptible      Ciprofloxacin Susceptible      Levofloxacin Susceptible      Piperacillin + Tazobactam Susceptible      Tobramycin Susceptible                           Blood Culture ID, PCR - Blood, Arm, Left [[id number]]  (Abnormal) Collected: 02/11/25 0900    Lab Status: Final result Specimen: Blood from Arm, Left Updated: 02/12/25 0349     BCID, PCR Pseudomonas aeruginosa. Identification by BCID2 PCR     BOTTLE TYPE Aerobic Bottle            SLP FEES - Fiberoptic Endo Eval Swallow  Result Date: 3/11/2025  This procedure was auto-finalized with no dictation required.        Results for orders placed during the hospital encounter of 02/11/25    Adult Transthoracic Echo Complete w/ Color, Spectral and Contrast if Necessary Per Protocol    Interpretation Summary    Left ventricular systolic function is normal. Estimated left ventricular EF = 60%    Left ventricular wall thickness is consistent with mild concentric hypertrophy.    Mild aortic valve stenosis is present.    Aortic valve maximum pressure gradient is 23 mmHg. Aortic valve mean pressure gradient is 14 mmHg.      Current medications:  Scheduled Meds:apixaban, 5 mg, Oral, Q12H  castor oil-balsam peru, 1 Application, Topical, Q12H  fentaNYL, 1 patch, Transdermal, Q72H   And  Check Fentanyl Patch Placement, 1 each, Not Applicable, Q12H  docusate sodium, 100 mg, Per G Tube, BID  escitalopram, 10 mg, Per G Tube, Nightly  finasteride, 5 mg, Per G  Tube, Daily  gabapentin, 300 mg, Per G Tube, Q8H  ipratropium-albuterol, 3 mL, Nebulization, 4x Daily - RT  magnesium sulfate, 4 g, Intravenous, Once  nystatin, , Topical, Q8H  pantoprazole, 40 mg, Intravenous, Q12H  potassium chloride ER, 40 mEq, Oral, Q4H  QUEtiapine, 25 mg, Per G Tube, Daily   And  QUEtiapine, 50 mg, Per G Tube, Nightly  rifAXIMin, 550 mg, Per G Tube, Q12H  rosuvastatin, 20 mg, Per G Tube, Nightly      Continuous Infusions:   PRN Meds:.  acetaminophen    Calcium Replacement - Follow Nurse / BPA Driven Protocol    dextrose    glucagon (human recombinant)    ipratropium-albuterol    Magnesium Cardiology Dose Replacement - Follow Nurse / BPA Driven Protocol    metoprolol tartrate    oxyCODONE-acetaminophen    polyethylene glycol    polyvinyl alcohol    Potassium Replacement - Follow Nurse / BPA Driven Protocol    sodium chloride    sodium chloride    Assessment & Plan   Assessment & Plan     Active Hospital Problems    Diagnosis  POA    **Septic shock due to Pseudomonas species [A41.52, R65.21]  Yes    Pneumonia of both lower lobes due to Pseudomonas species [J15.1]  Yes    Bacteremia due to Pseudomonas [R78.81, B96.5]  Yes    GALILEO (acute kidney injury) [N17.9]  Yes    Metabolic acidosis [E87.20]  Yes    Cirrhosis of liver [K74.60]  Yes    Squamous cell carcinoma of esophagus [C15.9]  Yes    Hyperlipidemia, unspecified [E78.5]  Yes    Tobacco use [Z72.0]  Yes      Resolved Hospital Problems    Diagnosis Date Resolved POA    Hypotension [I95.9] 02/11/2025 Unknown    Suspected UTI [R39.89] 02/12/2025 Yes        Brief Hospital Course to date:  Mr Nassar is a 63yo M with a history of alcohol abuse, alcoholic cirrhosis, 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 He was noted to be septic secondary to a UTI and was admitted to the ICU. Urine and blood cultures grew Pseudomonas and he completed  treatment with Cefepime x 10 days. He decompensated and required intubation on 2/12/25. Bronchoscopy was performed on 2/17/25 for mucous plugging. He was able to be extubated on 2/26/25.      He was transferred to telemetry. He developed worsening anemia and required transfusion of 2 units of pRBCs. GI was consulted and he was taken for EGD on 3/3/25 by Dr. Mckeon. EGD showed 2 small telangiectasias in the lower third of the esophagus suspected to be secondary to prior radiation. Argon was used for coagulation for bleeding prevention.      After the EGD, he was transferred to the recovery area. He was noted to be hypoxic on a nonrebreather and he was intubated by anesthesia. After intubation, he remained hypoxic requiring high FiO2 and PEEP. He was transferred back to the ICU.  Mr. Nassar was placed on empiric antibiotics for possible pneumonia.    Pseudomonas Bacteremia  Septic shock  Pyelonephritis  Possible pneumonia  - s/p cefepime course on admission x 10 days   -Recurrent pneumonia, now status post 7 days of Zosyn, leukocytosis resolved     GALILEO/bladder outlet obstruction vs neurogenic bladder  -Lubin catheter removed.  Continue In-N-Out catheterization-typically self cathed prior to this hospitalization, follows outpatient with Urology Dr Garrison  -Continue finasteride and Flomax.     Dysphagia  - Speech follows  -Thin liquids, regular textures after MBS today     New Atrial fibrillation with RVR  - currently NSR   -Hemoglobin stable, will start Eliquis 5 mg p.o. twice daily for stroke prophylaxis, discussed with Mr. Nassar and with cardiology.     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  There were also grade 1 small esophageal varices and portal hypertensive gastropathy  -Continue PPI twice daily  -Serial H&H with resumption of Eliquis and check CBC a.m.     Respiratory Failure  -  s/p intubation 2/12/25 with subsequent bronchoscopy for mucous plugging. Patient extubated on 2/24/2025   - reintubated 3/3 post endoscopy     EtOH Cirrhosis  -Continue rifaximin  -Lactulose stopped due to increased bowel gas     Metastatic Squamous Cell Carcinoma of the esophagus  - s/p chemoradiation May 2024     Chronic back pain.    - steroid injection at pain management 2/3/2024  - fentanyl patch     Tobacco abuse     Deconditioning  - PT/OT    Hypernatremia  -resolved        Expected Discharge Location and Transportation: [facility], discussed at [facility]  Expected Discharge   Expected Discharge Date: 3/12/2025; Expected Discharge Time:      VTE Prophylaxis:  Pharmacologic & mechanical VTE prophylaxis orders are present.         AM-PAC 6 Clicks Score (PT): 16 (03/10/25 1137)    CODE STATUS:   Code Status and Medical Interventions: CPR (Attempt to Resuscitate); Full Support   Ordered at: 02/12/25 1029     Code Status (Patient has no pulse and is not breathing):    CPR (Attempt to Resuscitate)     Medical Interventions (Patient has pulse or is breathing):    Full Support     Level Of Support Discussed With:    Patient       Garret Gibson MD  03/11/25

## 2025-03-11 NOTE — MBS/VFSS/FEES
Acute Care - Speech Language Pathology   Swallow Re-Evaluation [facility]  Clinical Swallow Evaluation +  Fiberoptic Endoscopic Evaluation of Swallowing (FEES)     Patient Name: Val Nassar Jr.  : 1960  MRN: [id number]  Today's Date: 3/11/2025               Admit Date: 2025    Visit Dx:     ICD-10-CM ICD-9-CM   1. Septic shock  A41.9 038.9    R65.21 785.52     995.92   2. Acute UTI (urinary tract infection)  N39.0 599.0   3. Hypotension, unspecified hypotension type  I95.9 458.9   4. Severe malnutrition  E43 261   5. Squamous cell carcinoma of esophagus  C15.9 150.9   6. Atelectasis  J98.11 518.0   7. Oropharyngeal dysphagia  R13.12 787.22   8. Melena  K92.1 578.1   9. Acute blood loss anemia  D62 285.1   10. Alcoholic cirrhosis of liver without ascites  K70.30 571.2     Patient Active Problem List   Diagnosis    Acute gastric ulcer with hemorrhage    Alcohol abuse, uncomplicated    Anxiety disorder, unspecified    Diverticulum of bladder    Duodenal ulcer, unspecified as acute or chronic, without hemorrhage or perforation    Elevation of level of transaminase and lactic acid dehydrogenase (LDH)    Gastro-esophageal reflux disease without esophagitis    Hyperglycemia, unspecified    Hyperlipidemia, unspecified    Melena    Nicotine dependence, cigarettes, uncomplicated    Tobacco use    Severe malnutrition    Duodenal ulcer    Iron deficiency anemia    Cirrhosis of liver    Acute blood loss anemia    Squamous cell carcinoma of esophagus    Duodenal stenosis    Generalized weakness    Orthostatic hypotension    UTI (urinary tract infection)    Malignant neoplasm of upper third of esophagus    Iron deficiency anemia    GALILEO (acute kidney injury)    Metabolic acidosis    Septic shock due to Pseudomonas species    Pneumonia of both lower lobes due to Pseudomonas species    Bacteremia due to Pseudomonas     Past Medical History:   Diagnosis Date    Anemia     Cancer     ESOPHAGEAL     Cirrhosis     Duodenal ulcer     Gastric ulcer     GERD (gastroesophageal reflux disease)     History of alcohol abuse     History of radiation therapy 05/08/2024    esophagus    HLD (hyperlipidemia)     Mood disorder      Past Surgical History:   Procedure Laterality Date    ENDOSCOPY N/A 12/26/2023    Procedure: ESOPHAGOGASTRODUODENOSCOPY;  Surgeon: Wesly Mckeon MD;  Location:  [facility];  Service: Gastroenterology;  Laterality: N/A;    ENDOSCOPY N/A 3/3/2025    Procedure: ESOPHAGOGASTRODUODENOSCOPY;  Surgeon: Wesly Mckeon MD;  Location:  [facility];  Service: Gastroenterology;  Laterality: N/A;  APC USED IN ESOPHAGUS.    VENOUS ACCESS DEVICE (PORT) INSERTION Right 04/25/2024       SLP Recommendation and Plan  SLP Swallowing Diagnosis: functional oral phase, functional pharyngeal phase (03/11/25 0900)  SLP Diet Recommendation: regular textures, thin liquids (03/11/25 0900)  Recommended Precautions and Strategies: upright posture during/after eating (03/11/25 0900)  SLP Rec. for Method of Medication Administration: as tolerated (03/11/25 0900)     Monitor for Signs of Aspiration: notify SLP if any concerns (03/11/25 0900)  Recommended Diagnostics: No further SLP services recommended (03/11/25 0900)  Swallow Criteria for Skilled Therapeutic Interventions Met: no problems identified which require skilled intervention (03/11/25 0900)        Therapy Frequency (Swallow): evaluation only (03/11/25 0900)                                              Progress: improving      SWALLOW EVALUATION (Last 72 Hours)       SLP Adult Swallow Evaluation       Row Name 03/11/25 0900                   Rehab Evaluation    Document Type re-evaluation  -SM        Subjective Information no complaints  -SM        Patient Observations alert;cooperative  -SM        Patient Effort good  -SM           General Information    Pertinent History Of Current Problem New consult as pt has not been eating puree/honey trays since  yesterday.  -SM        Current Method of Nutrition pureed;honey-thick liquids  -        Plans/Goals Discussed with patient;agreed upon  -        Barriers to Rehab none identified  -        Patient's Goals for Discharge return to regular diet  -SM           Pain    Pretreatment Pain Rating 8/10  -SM        Posttreatment Pain Rating 8/10  -SM        Pain Location hip  -SM        Pain Side/Orientation right  -SM        Pain, Additional Detail Pt reports no needs for hip  -SM           Clinical Swallow Eval    Oral Prep Phase WFL  -SM        Oral Transit WFL  -SM        Oral Residue WFL  -SM        Pharyngeal Phase no overt signs/symptoms of pharyngeal impairment  -        Clinical Swallow Evaluation Summary Silent component to aspiration per last FEES. Pt agreable to repeat FEES  -SM           Fiberoptic Endoscopic Evaluation of Swallowing (FEES)    Risks/Benefits Reviewed risks/benefits explained;patient;agreed to eval  -        Nasal Entry right:  -        Scope serial number/identification [id number]  -           Anatomy and Physiology    Velopharyngeal WFL  -SM        Base of Tongue symmetrical;range adequate  -SM        Epiglottis WFL  -SM        Laryngeal Function Breathing symmetrical  -SM        Laryngeal Function Phonation symmetrical  -SM        Laryngeal Function to Breath Hold TVF/FVF/Arytenoid;sustained closure  -        Secretion Rating Scale (Urbano et al. 1996) 0- normal, no visible secretions  -        Secretion Description thin;clear  -SM        Ice Chips DNA  -        Spontaneous Swallow frequency adequate  -        Sensory sensed scope  -        Utensils Used Spoon;Cup;Straw  -        Consistencies Trialed thin liquids;pudding/puree;mixed consistency;regular textures  -           FEES Interpretation    Oral Phase WFL  -SM           Initiation of Pharyngeal Swallow    Pharyngeal Phase functional pharyngeal phase of swallow  -        Pharyngeal Phase, Comment Coughing  intermittently through, unrealted to swallow function  -           SLP Evaluation Clinical Impression    SLP Swallowing Diagnosis functional oral phase;functional pharyngeal phase  -        Swallow Criteria for Skilled Therapeutic Interventions Met no problems identified which require skilled intervention  -           Recommendations    Therapy Frequency (Swallow) evaluation only  -        SLP Diet Recommendation regular textures;thin liquids  -        Recommended Diagnostics No further SLP services recommended  -        Recommended Precautions and Strategies upright posture during/after eating  -        SLP Rec. for Method of Medication Administration as tolerated  -        Monitor for Signs of Aspiration notify SLP if any concerns  -                  User Key  (r) = Recorded By, (t) = Taken By, (c) = Cosigned By      Initials Name Effective Dates    Corry Varela MS CCC-SLP 01/20/25 -                     EDUCATION  The patient has been educated in the following areas:   Dysphagia (Swallowing Impairment).                Time Calculation:    Time Calculation- SLP       Row Name 03/11/25 1053             Time Calculation- [facility]    SLP Start Time 0900  -      SLP Received On 03/11/25  -         Untimed Charges    98918-SK Eval Oral Pharyng Swallow Minutes 39  -SM      04499-PP Fiberoptic Endo Eval Swallow Minutes 60  -SM         Total Minutes    Untimed Charges Total Minutes 99  -SM       Total Minutes 99  -SM                User Key  (r) = Recorded By, (t) = Taken By, (c) = Cosigned By      Initials Name Provider Type    Corry Varela MS CCC-SLP Speech and Language Pathologist                    Therapy Charges for Today       Code Description Service Date Service Provider Modifiers Qty    36555282418 HC ST EVAL ORAL PHARYNG SWALLOW 3 3/11/2025 Corry Waite MS CCC-SLP GN 1    [id number] HC ST FIBEROPTIC ENDO EVAL SWALL 4 3/11/2025 Corry Wiate MS CCC-SLP GN  1                 Corry Waite, MS CCC-SLP  3/11/2025

## 2025-03-11 NOTE — PLAN OF CARE
Problem: Adult Inpatient Plan of Care  Goal: Plan of Care Review  Outcome: Progressing  Flowsheets (Taken 3/10/2025 1135 by Alise Fatima, PT)  Progress: improving  Outcome Evaluation: Pt improves to standing and taking a few steps with min-A x1 and RWx this date. Pt continues to benefit from IP PT services to further progress functional mobility and promote increased functional strength and endurance. Recommend SNF following d/c for best functional outcome.  Plan of Care Reviewed With: patient  Goal: Patient-Specific Goal (Individualized)  Outcome: Progressing  Goal: Absence of Hospital-Acquired Illness or Injury  Outcome: Progressing  Intervention: Identify and Manage Fall Risk  Recent Flowsheet Documentation  Taken 3/11/2025 0543 by Yadiel Flood, RN  Safety Promotion/Fall Prevention:   activity supervised   clutter free environment maintained   fall prevention program maintained   lighting adjusted   room organization consistent   safety round/check completed   toileting scheduled  Taken 3/11/2025 0410 by Yadiel Flood, RN  Safety Promotion/Fall Prevention:   activity supervised   clutter free environment maintained   fall prevention program maintained   lighting adjusted   room organization consistent   safety round/check completed   toileting scheduled  Taken 3/11/2025 0159 by Yadiel Flood, RN  Safety Promotion/Fall Prevention:   activity supervised   clutter free environment maintained   fall prevention program maintained   lighting adjusted   room organization consistent   safety round/check completed   toileting scheduled  Taken 3/10/2025 2356 by Yadiel Flood, RN  Safety Promotion/Fall Prevention:   activity supervised   clutter free environment maintained   fall prevention program maintained   lighting adjusted   room organization consistent   safety round/check completed   toileting scheduled  Taken 3/10/2025 2151 by Yadiel Flood, RN  Safety Promotion/Fall  Prevention:   activity supervised   clutter free environment maintained   fall prevention program maintained   lighting adjusted   room organization consistent   safety round/check completed   toileting scheduled  Taken 3/10/2025 1957 by Yadiel Flood RN  Safety Promotion/Fall Prevention:   activity supervised   clutter free environment maintained   fall prevention program maintained   lighting adjusted   room organization consistent   safety round/check completed   toileting scheduled  Intervention: Prevent Skin Injury  Recent Flowsheet Documentation  Taken 3/11/2025 0543 by Yadiel Flood RN  Body Position: position maintained  Taken 3/11/2025 0410 by Yadiel Flood RN  Body Position: position maintained  Skin Protection: incontinence pads utilized  Taken 3/11/2025 0159 by Yadiel Flood RN  Body Position: position maintained  Skin Protection: incontinence pads utilized  Taken 3/10/2025 2356 by Yadiel Flood RN  Body Position: position maintained  Taken 3/10/2025 2151 by Yadiel Flood RN  Body Position: position maintained  Taken 3/10/2025 1957 by Yadiel Flood RN  Body Position: position maintained  Intervention: Prevent Infection  Recent Flowsheet Documentation  Taken 3/11/2025 0543 by Yadiel Flood RN  Infection Prevention:   environmental surveillance performed   hand hygiene promoted   rest/sleep promoted  Taken 3/11/2025 0410 by Yadiel Flood RN  Infection Prevention:   environmental surveillance performed   hand hygiene promoted   rest/sleep promoted  Taken 3/11/2025 0159 by Yadiel Flood RN  Infection Prevention:   environmental surveillance performed   hand hygiene promoted   rest/sleep promoted  Taken 3/10/2025 2356 by Yadiel Flood RN  Infection Prevention:   environmental surveillance performed   hand hygiene promoted   rest/sleep promoted  Taken 3/10/2025 2151 by Yadiel Flood RN  Infection Prevention:   environmental  surveillance performed   hand hygiene promoted   rest/sleep promoted  Taken 3/10/2025 1957 by Yadiel Flood, RN  Infection Prevention:   environmental surveillance performed   hand hygiene promoted   rest/sleep promoted  Goal: Optimal Comfort and Wellbeing  Outcome: Progressing  Intervention: Provide Person-Centered Care  Recent Flowsheet Documentation  Taken 3/11/2025 0543 by Yadiel Flood RN  Trust Relationship/Rapport:   care explained   choices provided  Taken 3/11/2025 0410 by Yadiel Flood RN  Trust Relationship/Rapport:   care explained   choices provided  Taken 3/11/2025 0159 by Yadiel Flood, RN  Trust Relationship/Rapport:   care explained   choices provided  Taken 3/10/2025 2356 by Yadiel Flood RN  Trust Relationship/Rapport:   care explained   choices provided  Taken 3/10/2025 2151 by Yadiel Flood, RN  Trust Relationship/Rapport:   care explained   choices provided  Taken 3/10/2025 1957 by Yadiel Flood RN  Trust Relationship/Rapport:   care explained   choices provided  Goal: Readiness for Transition of Care  Outcome: Progressing     Problem: Fall Injury Risk  Goal: Absence of Fall and Fall-Related Injury  Outcome: Progressing  Intervention: Identify and Manage Contributors  Recent Flowsheet Documentation  Taken 3/11/2025 0543 by Yadiel Flood RN  Medication Review/Management: medications reviewed  Taken 3/11/2025 0410 by Yadiel Flood, RN  Medication Review/Management: medications reviewed  Taken 3/11/2025 0159 by Yadiel Flood, RN  Medication Review/Management: medications reviewed  Taken 3/10/2025 2356 by Yadiel Flood, RN  Medication Review/Management: medications reviewed  Taken 3/10/2025 2151 by Yadiel Flood, RN  Medication Review/Management: medications reviewed  Taken 3/10/2025 1957 by Yadiel Flood, RN  Medication Review/Management: medications reviewed  Intervention: Promote Injury-Free Environment  Recent  Flowsheet Documentation  Taken 3/11/2025 0543 by Yadiel Flood RN  Safety Promotion/Fall Prevention:   activity supervised   clutter free environment maintained   fall prevention program maintained   lighting adjusted   room organization consistent   safety round/check completed   toileting scheduled  Taken 3/11/2025 0410 by Yadiel Flood RN  Safety Promotion/Fall Prevention:   activity supervised   clutter free environment maintained   fall prevention program maintained   lighting adjusted   room organization consistent   safety round/check completed   toileting scheduled  Taken 3/11/2025 0159 by Yadiel Flood, RN  Safety Promotion/Fall Prevention:   activity supervised   clutter free environment maintained   fall prevention program maintained   lighting adjusted   room organization consistent   safety round/check completed   toileting scheduled  Taken 3/10/2025 2356 by Yadiel Flood RN  Safety Promotion/Fall Prevention:   activity supervised   clutter free environment maintained   fall prevention program maintained   lighting adjusted   room organization consistent   safety round/check completed   toileting scheduled  Taken 3/10/2025 2151 by Yadiel Flood, RN  Safety Promotion/Fall Prevention:   activity supervised   clutter free environment maintained   fall prevention program maintained   lighting adjusted   room organization consistent   safety round/check completed   toileting scheduled  Taken 3/10/2025 1957 by Yadiel Flood, RN  Safety Promotion/Fall Prevention:   activity supervised   clutter free environment maintained   fall prevention program maintained   lighting adjusted   room organization consistent   safety round/check completed   toileting scheduled     Problem: Skin Injury Risk Increased  Goal: Skin Health and Integrity  Outcome: Progressing  Intervention: Optimize Skin Protection  Recent Flowsheet Documentation  Taken 3/11/2025 0543 by Yadiel Flood,  RN  Activity Management: activity encouraged  Head of Bed (HOB) Positioning: HOB elevated  Taken 3/11/2025 0410 by Yadiel Flood RN  Activity Management: activity encouraged  Pressure Reduction Techniques: frequent weight shift encouraged  Head of Bed (HOB) Positioning: HOB elevated  Pressure Reduction Devices: pressure-redistributing mattress utilized  Skin Protection: incontinence pads utilized  Taken 3/11/2025 0159 by Yadiel Flood, RN  Activity Management: activity encouraged  Pressure Reduction Techniques: frequent weight shift encouraged  Head of Bed (HOB) Positioning: HOB elevated  Pressure Reduction Devices: pressure-redistributing mattress utilized  Skin Protection: incontinence pads utilized  Taken 3/10/2025 2356 by Yadiel Flood, RN  Activity Management: activity encouraged  Head of Bed (HOB) Positioning: HOB elevated  Taken 3/10/2025 2151 by Yadiel Flood RN  Activity Management: activity encouraged  Head of Bed (HOB) Positioning: HOB elevated  Taken 3/10/2025 1957 by Yadiel Flood, RN  Activity Management: activity encouraged  Head of Bed (HOB) Positioning: HOB elevated     Problem: Comorbidity Management  Goal: Maintenance of Behavioral Health Symptom Control  Outcome: Progressing  Intervention: Maintain Behavioral Health Symptom Control  Recent Flowsheet Documentation  Taken 3/11/2025 0543 by Yadiel Flood, RN  Medication Review/Management: medications reviewed  Taken 3/11/2025 0410 by Yadiel Flood, RN  Medication Review/Management: medications reviewed  Taken 3/11/2025 0159 by Yadiel Flood, RN  Medication Review/Management: medications reviewed  Taken 3/10/2025 2356 by Yadiel Flood, RN  Medication Review/Management: medications reviewed  Taken 3/10/2025 2151 by Yadiel Flood, RN  Medication Review/Management: medications reviewed  Taken 3/10/2025 1957 by Yadiel Flood, RN  Medication Review/Management: medications reviewed  Goal:  Maintenance of Heart Failure Symptom Control  Outcome: Progressing  Intervention: Maintain Heart Failure Management  Recent Flowsheet Documentation  Taken 3/11/2025 0543 by Yadiel Flood, RN  Medication Review/Management: medications reviewed  Taken 3/11/2025 0410 by Yadiel Flood RN  Medication Review/Management: medications reviewed  Taken 3/11/2025 0159 by Yadiel Flood, RN  Medication Review/Management: medications reviewed  Taken 3/10/2025 2356 by Yadiel Flood, RN  Medication Review/Management: medications reviewed  Taken 3/10/2025 2151 by Yadiel Flood, RN  Medication Review/Management: medications reviewed  Taken 3/10/2025 1957 by Ydaiel Flood, RN  Medication Review/Management: medications reviewed     Problem: Sepsis/Septic Shock  Goal: Optimal Coping  Outcome: Progressing  Intervention: Support Patient and Family Response  Recent Flowsheet Documentation  Taken 3/11/2025 0543 by Yadiel Flood RN  Family/Support System Care: self-care encouraged  Taken 3/11/2025 0410 by Yadiel Flood RN  Family/Support System Care: self-care encouraged  Taken 3/11/2025 0159 by Yadiel Flood, RN  Family/Support System Care: self-care encouraged  Taken 3/10/2025 2356 by Yadiel Flood, RN  Family/Support System Care: self-care encouraged  Taken 3/10/2025 2151 by Yadiel Flood, RN  Family/Support System Care: self-care encouraged  Taken 3/10/2025 1957 by Yadiel Flood, RN  Family/Support System Care: self-care encouraged  Goal: Absence of Bleeding  Outcome: Progressing  Goal: Blood Glucose Level Within Target Range  Outcome: Progressing  Goal: Absence of Infection Signs and Symptoms  Outcome: Progressing  Intervention: Initiate Sepsis Management  Recent Flowsheet Documentation  Taken 3/11/2025 0543 by Yadiel Flood, RN  Infection Prevention:   environmental surveillance performed   hand hygiene promoted   rest/sleep promoted  Taken 3/11/2025 0410  by Yadiel Flood, RN  Infection Prevention:   environmental surveillance performed   hand hygiene promoted   rest/sleep promoted  Taken 3/11/2025 0159 by Yadiel Flood, RN  Infection Prevention:   environmental surveillance performed   hand hygiene promoted   rest/sleep promoted  Taken 3/10/2025 2356 by Yadiel Flood, RN  Infection Prevention:   environmental surveillance performed   hand hygiene promoted   rest/sleep promoted  Taken 3/10/2025 2151 by Yadiel Flood, RN  Infection Prevention:   environmental surveillance performed   hand hygiene promoted   rest/sleep promoted  Taken 3/10/2025 1957 by Yadiel Flood, RN  Infection Prevention:   environmental surveillance performed   hand hygiene promoted   rest/sleep promoted  Intervention: Promote Recovery  Recent Flowsheet Documentation  Taken 3/11/2025 0543 by Yadiel Flood, RN  Activity Management: activity encouraged  Sleep/Rest Enhancement: consistent schedule promoted  Taken 3/11/2025 0410 by Yadiel Flood RN  Activity Management: activity encouraged  Sleep/Rest Enhancement: consistent schedule promoted  Taken 3/11/2025 0159 by Yadiel Flood, RN  Activity Management: activity encouraged  Sleep/Rest Enhancement: consistent schedule promoted  Taken 3/10/2025 2356 by Yadiel Flood, RN  Activity Management: activity encouraged  Sleep/Rest Enhancement: consistent schedule promoted  Taken 3/10/2025 2151 by Yadiel Flood, RN  Activity Management: activity encouraged  Sleep/Rest Enhancement: consistent schedule promoted  Taken 3/10/2025 1957 by Yadiel Flood, RN  Activity Management: activity encouraged  Sleep/Rest Enhancement: consistent schedule promoted  Goal: Optimal Nutrition Delivery  Outcome: Progressing     Problem: Restraint, Nonviolent  Goal: Absence of Harm or Injury  Outcome: Progressing  Intervention: Implement Least Restrictive Safety Strategies  Recent Flowsheet Documentation  Taken  3/11/2025 0543 by Yadiel Flood RN  Diversional Activities: television  Taken 3/11/2025 0410 by Yadiel Flood RN  Diversional Activities: television  Taken 3/11/2025 0159 by Yadiel Flood RN  Diversional Activities: television  Taken 3/10/2025 2356 by Yadiel Flood RN  Diversional Activities: television  Taken 3/10/2025 2151 by Yadiel Flood RN  Diversional Activities: television  Taken 3/10/2025 1957 by Yadiel Flood RN  Diversional Activities: television  Intervention: Protect Dignity, Rights and Personal Wellbeing  Recent Flowsheet Documentation  Taken 3/11/2025 0543 by Yadiel Flood RN  Trust Relationship/Rapport:   care explained   choices provided  Taken 3/11/2025 0410 by Yadiel Flood RN  Trust Relationship/Rapport:   care explained   choices provided  Taken 3/11/2025 0159 by Yadiel Flood RN  Trust Relationship/Rapport:   care explained   choices provided  Taken 3/10/2025 2356 by Yadiel Flood RN  Trust Relationship/Rapport:   care explained   choices provided  Taken 3/10/2025 2151 by Yadiel Flood RN  Trust Relationship/Rapport:   care explained   choices provided  Taken 3/10/2025 1957 by Yadiel Flood RN  Trust Relationship/Rapport:   care explained   choices provided  Intervention: Protect Skin and Joint Integrity  Recent Flowsheet Documentation  Taken 3/11/2025 0543 by Yadiel Flood RN  Body Position: position maintained  Taken 3/11/2025 0410 by Yadiel Flood RN  Body Position: position maintained  Skin Protection: incontinence pads utilized  Taken 3/11/2025 0159 by Yadiel Flood RN  Body Position: position maintained  Skin Protection: incontinence pads utilized  Taken 3/10/2025 2356 by Yadiel Flood RN  Body Position: position maintained  Taken 3/10/2025 2151 by Yadiel Flood RN  Body Position: position maintained  Taken 3/10/2025 1957 by Yadiel Flood RN  Body Position: position  maintained     Problem: Mechanical Ventilation Invasive  Goal: Effective Communication  Outcome: Progressing  Intervention: Ensure Effective Communication  Recent Flowsheet Documentation  Taken 3/11/2025 0543 by Yadiel Flood, RN  Trust Relationship/Rapport:   care explained   choices provided  Diversional Activities: television  Family/Support System Care: self-care encouraged  Taken 3/11/2025 0410 by Yadiel Flood, RN  Trust Relationship/Rapport:   care explained   choices provided  Diversional Activities: television  Family/Support System Care: self-care encouraged  Taken 3/11/2025 0159 by Yadiel Flood, RN  Trust Relationship/Rapport:   care explained   choices provided  Diversional Activities: television  Family/Support System Care: self-care encouraged  Taken 3/10/2025 2356 by Yadiel Flood, RN  Trust Relationship/Rapport:   care explained   choices provided  Diversional Activities: television  Family/Support System Care: self-care encouraged  Taken 3/10/2025 2151 by Yadiel Flood, RN  Trust Relationship/Rapport:   care explained   choices provided  Diversional Activities: television  Family/Support System Care: self-care encouraged  Taken 3/10/2025 1957 by Yadiel Flood, RN  Trust Relationship/Rapport:   care explained   choices provided  Diversional Activities: television  Family/Support System Care: self-care encouraged  Goal: Optimal Device Function  Outcome: Progressing  Intervention: Optimize Device Care and Function  Recent Flowsheet Documentation  Taken 3/11/2025 0543 by Yadiel Flood, RN  Airway Safety Measures:   oxygen flowmeter at bedside   suction at bedside  Taken 3/11/2025 0410 by Yadiel Flood, RN  Airway Safety Measures:   oxygen flowmeter at bedside   suction at bedside  Taken 3/11/2025 0159 by Yadiel Flood, RN  Airway Safety Measures:   oxygen flowmeter at bedside   suction at bedside  Taken 3/10/2025 2356 by Yadiel Flood,  RN  Airway Safety Measures:   oxygen flowmeter at bedside   suction at bedside  Taken 3/10/2025 2151 by Yadiel Flood RN  Airway Safety Measures:   oxygen flowmeter at bedside   suction at bedside  Taken 3/10/2025 2000 by Yadiel Flood, RN  Oral Care:   teeth brushed - regular toothbrush   suction provided  Taken 3/10/2025 1957 by Yadiel Flood, RN  Airway Safety Measures:   oxygen flowmeter at bedside   suction at bedside  Goal: Mechanical Ventilation Liberation  Outcome: Progressing  Intervention: Promote Extubation and Mechanical Ventilation Liberation  Recent Flowsheet Documentation  Taken 3/11/2025 0543 by Yadiel Flood, RN  Sleep/Rest Enhancement: consistent schedule promoted  Medication Review/Management: medications reviewed  Taken 3/11/2025 0410 by Yadiel Flood RN  Sleep/Rest Enhancement: consistent schedule promoted  Medication Review/Management: medications reviewed  Taken 3/11/2025 0159 by Yadiel Flood RN  Sleep/Rest Enhancement: consistent schedule promoted  Medication Review/Management: medications reviewed  Taken 3/10/2025 2356 by Yadiel Flood RN  Sleep/Rest Enhancement: consistent schedule promoted  Medication Review/Management: medications reviewed  Taken 3/10/2025 2151 by Yadiel Flood RN  Sleep/Rest Enhancement: consistent schedule promoted  Medication Review/Management: medications reviewed  Taken 3/10/2025 1957 by Yadiel Flood RN  Sleep/Rest Enhancement: consistent schedule promoted  Medication Review/Management: medications reviewed  Goal: Optimal Nutrition Delivery  Outcome: Progressing  Goal: Absence of Device-Related Skin and Tissue Injury  Outcome: Progressing  Goal: Absence of Ventilator-Induced Lung Injury  Outcome: Progressing  Intervention: Prevent Ventilator-Associated Pneumonia  Recent Flowsheet Documentation  Taken 3/11/2025 0543 by Yadiel Flood, RN  Head of Bed (HOB) Positioning: HOB elevated  Taken 3/11/2025  0410 by Yadiel Flood, RN  Head of Bed ([facility]) Positioning: HOB elevated  Taken 3/11/2025 0159 by Yadiel Flood, RN  Head of Bed ([facility]) Positioning: HOB elevated  Taken 3/10/2025 2356 by Yadiel Flood, RN  Head of Bed ([facility]) Positioning: HOB elevated  Taken 3/10/2025 2151 by Yadiel Flood, RN  Head of Bed ([facility]) Positioning: HOB elevated  Taken 3/10/2025 2000 by Yadiel Flood, RN  Oral Care:   teeth brushed - regular toothbrush   suction provided  Taken 3/10/2025 1957 by Yadiel Flood, RN  Head of Bed ([facility]) Positioning: HOB elevated   Goal Outcome Evaluation:

## 2025-03-12 VITALS
OXYGEN SATURATION: 97 % | BODY MASS INDEX: 23.83 KG/M2 | RESPIRATION RATE: 18 BRPM | HEART RATE: 69 BPM | SYSTOLIC BLOOD PRESSURE: 156 MMHG | TEMPERATURE: 97.8 F | HEIGHT: 69 IN | DIASTOLIC BLOOD PRESSURE: 74 MMHG | WEIGHT: 160.9 LBS

## 2025-03-12 LAB
ALBUMIN SERPL-MCNC: 2.5 G/DL (ref 3.5–5.2)
ALBUMIN/GLOB SERPL: 0.6 G/DL
ALP SERPL-CCNC: 133 U/L (ref 39–117)
ALT SERPL W P-5'-P-CCNC: 30 U/L (ref 1–41)
ANION GAP SERPL CALCULATED.3IONS-SCNC: 9 MMOL/L (ref 5–15)
AST SERPL-CCNC: 38 U/L (ref 1–40)
BILIRUB SERPL-MCNC: 0.3 MG/DL (ref 0–1.2)
BUN SERPL-MCNC: 6 MG/DL (ref 8–23)
BUN/CREAT SERPL: 9.2 (ref 7–25)
CALCIUM SPEC-SCNC: 8.3 MG/DL (ref 8.6–10.5)
CHLORIDE SERPL-SCNC: 105 MMOL/L (ref 98–107)
CO2 SERPL-SCNC: 22 MMOL/L (ref 22–29)
CREAT SERPL-MCNC: 0.65 MG/DL (ref 0.76–1.27)
DEPRECATED RDW RBC AUTO: 58.6 FL (ref 37–54)
EGFRCR SERPLBLD CKD-EPI 2021: 104.6 ML/MIN/1.73
ERYTHROCYTE [DISTWIDTH] IN BLOOD BY AUTOMATED COUNT: 16.8 % (ref 12.3–15.4)
GLOBULIN UR ELPH-MCNC: 4.4 GM/DL
GLUCOSE SERPL-MCNC: 97 MG/DL (ref 65–99)
HCT VFR BLD AUTO: 25.1 % (ref 37.5–51)
HCT VFR BLD AUTO: 25.2 % (ref 37.5–51)
HCT VFR BLD AUTO: 25.2 % (ref 37.5–51)
HGB BLD-MCNC: 7.6 G/DL (ref 13–17.7)
HGB BLD-MCNC: 7.7 G/DL (ref 13–17.7)
HGB BLD-MCNC: 7.7 G/DL (ref 13–17.7)
MAGNESIUM SERPL-MCNC: 1.6 MG/DL (ref 1.6–2.4)
MAGNESIUM SERPL-MCNC: 2.8 MG/DL (ref 1.6–2.4)
MCH RBC QN AUTO: 29.4 PG (ref 26.6–33)
MCHC RBC AUTO-ENTMCNC: 30.6 G/DL (ref 31.5–35.7)
MCV RBC AUTO: 96.2 FL (ref 79–97)
PLATELET # BLD AUTO: 249 10*3/MM3 (ref 140–450)
PMV BLD AUTO: 9.2 FL (ref 6–12)
POTASSIUM SERPL-SCNC: 4.3 MMOL/L (ref 3.5–5.2)
PROT SERPL-MCNC: 6.9 G/DL (ref 6–8.5)
RBC # BLD AUTO: 2.62 10*6/MM3 (ref 4.14–5.8)
SODIUM SERPL-SCNC: 136 MMOL/L (ref 136–145)
WBC NRBC COR # BLD AUTO: 10.3 10*3/MM3 (ref 3.4–10.8)

## 2025-03-12 PROCEDURE — 99239 HOSP IP/OBS DSCHRG MGMT >30: CPT | Performed by: NURSE PRACTITIONER

## 2025-03-12 PROCEDURE — 85027 COMPLETE CBC AUTOMATED: CPT | Performed by: INTERNAL MEDICINE

## 2025-03-12 PROCEDURE — 85014 HEMATOCRIT: CPT | Performed by: INTERNAL MEDICINE

## 2025-03-12 PROCEDURE — 83735 ASSAY OF MAGNESIUM: CPT | Performed by: INTERNAL MEDICINE

## 2025-03-12 PROCEDURE — 94799 UNLISTED PULMONARY SVC/PX: CPT

## 2025-03-12 PROCEDURE — 85018 HEMOGLOBIN: CPT | Performed by: INTERNAL MEDICINE

## 2025-03-12 PROCEDURE — 80053 COMPREHEN METABOLIC PANEL: CPT | Performed by: INTERNAL MEDICINE

## 2025-03-12 PROCEDURE — 25010000002 HEPARIN LOCK FLUSH PER 10 UNITS

## 2025-03-12 PROCEDURE — 94664 DEMO&/EVAL PT USE INHALER: CPT

## 2025-03-12 PROCEDURE — 94761 N-INVAS EAR/PLS OXIMETRY MLT: CPT

## 2025-03-12 PROCEDURE — 25010000002 MAGNESIUM SULFATE 4 GM/100ML SOLUTION: Performed by: INTERNAL MEDICINE

## 2025-03-12 RX ORDER — PANTOPRAZOLE SODIUM 40 MG/1
40 TABLET, DELAYED RELEASE ORAL 2 TIMES DAILY
Start: 2025-03-12

## 2025-03-12 RX ORDER — HEPARIN SODIUM (PORCINE) LOCK FLUSH IV SOLN 100 UNIT/ML 100 UNIT/ML
5 SOLUTION INTRAVENOUS AS NEEDED
Status: DISCONTINUED | OUTPATIENT
Start: 2025-03-12 | End: 2025-03-12 | Stop reason: HOSPADM

## 2025-03-12 RX ORDER — ROSUVASTATIN CALCIUM 20 MG/1
20 TABLET, COATED ORAL NIGHTLY
Start: 2025-03-12

## 2025-03-12 RX ORDER — IPRATROPIUM BROMIDE AND ALBUTEROL SULFATE 2.5; .5 MG/3ML; MG/3ML
3 SOLUTION RESPIRATORY (INHALATION) EVERY 4 HOURS PRN
Status: DISCONTINUED | OUTPATIENT
Start: 2025-03-12 | End: 2025-03-12

## 2025-03-12 RX ORDER — MAGNESIUM SULFATE HEPTAHYDRATE 40 MG/ML
4 INJECTION, SOLUTION INTRAVENOUS ONCE
Status: COMPLETED | OUTPATIENT
Start: 2025-03-12 | End: 2025-03-12

## 2025-03-12 RX ORDER — FENTANYL 25 UG/1
1 PATCH TRANSDERMAL
Qty: 1 PATCH | Refills: 0 | Status: SHIPPED | OUTPATIENT
Start: 2025-03-12 | End: 2025-03-14

## 2025-03-12 RX ORDER — GABAPENTIN 300 MG/1
300 CAPSULE ORAL EVERY 8 HOURS
Qty: 9 CAPSULE | Refills: 0 | Status: SHIPPED | OUTPATIENT
Start: 2025-03-12 | End: 2025-03-17 | Stop reason: HOSPADM

## 2025-03-12 RX ORDER — DOCUSATE SODIUM 50 MG/5 ML
100 LIQUID (ML) ORAL 2 TIMES DAILY
Start: 2025-03-12

## 2025-03-12 RX ORDER — QUETIAPINE FUMARATE 25 MG/1
TABLET, FILM COATED ORAL
Start: 2025-03-12

## 2025-03-12 RX ORDER — NYSTATIN 100000 [USP'U]/G
POWDER TOPICAL EVERY 8 HOURS SCHEDULED
Start: 2025-03-12 | End: 2025-03-14

## 2025-03-12 RX ORDER — CASTOR OIL AND BALSAM, PERU 788; 87 MG/G; MG/G
1 OINTMENT TOPICAL EVERY 12 HOURS SCHEDULED
Start: 2025-03-12 | End: 2025-03-14

## 2025-03-12 RX ADMIN — Medication 10 ML: at 09:43

## 2025-03-12 RX ADMIN — FENTANYL 1 PATCH: 25 PATCH TRANSDERMAL at 11:36

## 2025-03-12 RX ADMIN — OXYCODONE AND ACETAMINOPHEN 1 TABLET: 7.5; 325 TABLET ORAL at 09:46

## 2025-03-12 RX ADMIN — RIFAXIMIN 550 MG: 550 TABLET ORAL at 09:42

## 2025-03-12 RX ADMIN — DOCUSATE SODIUM 100 MG: 50 LIQUID ORAL at 09:42

## 2025-03-12 RX ADMIN — APIXABAN 5 MG: 5 TABLET, FILM COATED ORAL at 09:42

## 2025-03-12 RX ADMIN — Medication 1 APPLICATION: at 09:43

## 2025-03-12 RX ADMIN — MAGNESIUM SULFATE IN WATER FOR 4 G: 40 INJECTION INTRAVENOUS at 03:20

## 2025-03-12 RX ADMIN — PANTOPRAZOLE SODIUM 40 MG: 40 INJECTION, POWDER, FOR SOLUTION INTRAVENOUS at 09:42

## 2025-03-12 RX ADMIN — QUETIAPINE FUMARATE 25 MG: 25 TABLET ORAL at 09:42

## 2025-03-12 RX ADMIN — IPRATROPIUM BROMIDE AND ALBUTEROL SULFATE 3 ML: 2.5; .5 SOLUTION RESPIRATORY (INHALATION) at 07:12

## 2025-03-12 RX ADMIN — HEPARIN 500 UNITS: 100 SYRINGE at 12:36

## 2025-03-12 RX ADMIN — GABAPENTIN 300 MG: 300 CAPSULE ORAL at 05:54

## 2025-03-12 RX ADMIN — NYSTATIN: 100000 POWDER TOPICAL at 05:54

## 2025-03-12 RX ADMIN — FINASTERIDE 5 MG: 5 TABLET, FILM COATED ORAL at 09:42

## 2025-03-12 NOTE — DISCHARGE PLACEMENT REQUEST
"Yannick Nassar JrLizet \"Jerrod\" (64 y.o. Male)      Laura [phone]     Date of Birth   1960    Social Security Number       Address   [address] [place] [address]    Home Phone   [phone]    MRN   [id number]       Religious   Nazarene    Marital Status   Single                            Admission Date   2/11/2025    Admission Type   Emergency    Admitting Provider   Garret Gibson MD    Attending Provider   Garret Gibson MD    Department, Room/Bed   [facility] 3F, S301/1       Discharge Date       Discharge Disposition   Rehab Facility or Unit (DC - External)    Discharge Destination                                 Attending Provider: Garret Gibson MD    Allergies: Green Beans    Isolation: None   Infection: None   Code Status: CPR    Ht: 175.3 cm (69.02\")   Wt: 73 kg (160 lb 14.4 oz)    Admission Cmt: None   Principal Problem: Septic shock due to Pseudomonas species [A41.52,R65.21]                   Active Insurance as of 2/11/2025       Primary Coverage       Payor Plan Insurance Group Employer/Plan Group    MEDICARE MEDICARE A & B        Payor Plan Address Payor Plan Phone Number Payor Plan Fax Number Effective Dates    [address] [phone]  9/1/2024 - None Entered    [facility] [id number]         Subscriber Name Subscriber Birth Date Member ID       YANNICK NASSAR JR. 1960 [id number]               Secondary Coverage       Payor Plan Insurance Group Employer/Plan Group    [place]        Payor Plan Address Payor Plan Phone Number Payor Plan Fax Number Effective Dates    [address] [phone]  4/1/2024 - None Entered    [place] [address]         Subscriber Name Subscriber Birth Date Member ID       YANNICK NASSAR JR. 1960 [id number]                     Emergency Contacts        (Rel.) Home Phone Work Phone Mobile Phone    HARRISON WOODY (Other) [phone] -- --    " Negro Nassar (nephew) (Relative) [phone] -- --    YANI TAM-only in case of serious medical situation-otherwise do not contact per patient wishes 24 (Sister) [phone] -- [phone]                 Discharge Summary        Liliam Ray PEREZ APRN at 25 [phone]              [facility] Medicine Services  DISCHARGE SUMMARY    Patient Name: Val Nassar Jr.  : 1960  MRN: [id number]    Date of Admission: 2025  8:50 AM  Date of Discharge:  3/12/2025  Primary Care Physician: Wesly Minor MD    Consults       Date and Time Order Name Status Description    2025  9:40 AM Inpatient Gastroenterology Consult Completed     2025  1:29 AM Inpatient Cardiology Consult Completed             Hospital Course     Presenting Problem: Pneumonia     Active Hospital Problems    Diagnosis  POA    **Septic shock due to Pseudomonas species [A41.52, R65.21]  Yes    Pneumonia of both lower lobes due to Pseudomonas species [J15.1]  Yes    Bacteremia due to Pseudomonas [R78.81, B96.5]  Yes    GALILEO (acute kidney injury) [N17.9]  Yes    Metabolic acidosis [E87.20]  Yes    Cirrhosis of liver [K74.60]  Yes    Squamous cell carcinoma of esophagus [C15.9]  Yes    Hyperlipidemia, unspecified [E78.5]  Yes    Tobacco use [Z72.0]  Yes      Resolved Hospital Problems    Diagnosis Date Resolved POA    Hypotension [I95.9] 2025 Unknown    Suspected UTI [R39.89] 2025 Yes          Hospital Course:  Val Nassar Jr. is a 64 y.o. 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 He was noted to be septic secondary to a UTI and was admitted to the ICU. Urine and blood cultures grew Pseudomonas and he completed treatment with Cefepime x 10 days. He decompensated and required intubation on  2/12/25. Bronchoscopy was performed on 2/17/25 for mucous plugging. He was able to be extubated on 2/26/25.      He was transferred to telemetry. He developed worsening anemia and required transfusion of 2 units of pRBCs. GI was consulted and he was taken for EGD on 3/3/25 by Dr. Mckeon. EGD showed 2 small telangiectasias in the lower third of the esophagus suspected to be secondary to prior radiation. Argon was used for coagulation for bleeding prevention.      After the EGD, he was transferred to the recovery area. He was noted to be hypoxic on a nonrebreather and he was intubated by anesthesia. After intubation, he remained hypoxic requiring high FiO2 and PEEP. He was transferred back to the ICU.  Mr. Nassar was placed on empiric antibiotics for possible pneumonia.     Pseudomonas Bacteremia  Septic shock  Pyelonephritis  Possible pneumonia  - s/p cefepime course on admission x 10 days   -Recurrent pneumonia, now status post 7 days of Zosyn, leukocytosis resolved     GALILEO/bladder outlet obstruction vs neurogenic bladder  -Lubin catheter removed.  Continue In-N-Out catheterization-typically self cathed prior to this hospitalization, follows outpatient with Urology Dr Garrison  - still requiring in and out cath   -Continue finasteride and Flomax.     Dysphagia  - Speech follows  -advanced to Thin liquids, regular textures      New Atrial fibrillation with RVR  - currently NSR   -Hemoglobin stable, Eliquis 5 mg p.o. twice daily restarted on 3/11  for stroke prophylaxis, my partner discussed with Mr. Nassar and with cardiology.     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  There were also grade 1 small esophageal varices and portal hypertensive gastropathy  -Continue PPI twice daily  -Serial H&H with resumption of Eliquis hgb stable but will need HH check in couple days and one week. If he has  clinical bleeding or drops in his hemoglobin eliquis should be discontinued and cardiology will explore left artrial occluder options at that time      Respiratory Failure  - s/p intubation 2/12/25 with subsequent bronchoscopy for mucous plugging. Patient extubated on 2/24/2025   - reintubated 3/3 post endoscopy     EtOH Cirrhosis  -Continue rifaximin  -Lactulose stopped due to increased bowel gas     Metastatic Squamous Cell Carcinoma of the esophagus  - s/p chemoradiation May 2024     Chronic back pain.    - steroid injection at pain management 2/3/2024  - fentanyl patch     Tobacco abuse     Deconditioning  - PT/OT     Hypernatremia  -resolved    Patient has remained clinically stable and will be discharged to rehab today.       Discharge Follow Up Recommendations for outpatient labs/diagnostics:   Follow up with pcp after dc from rehab  Follow up with Dr. Hoskins in 2 months     Day of Discharge     HPI:   Patient is sitting up in bed eating breakfast. He slept well. No acute events overnight per nursing. Plan for rehab today;     Review of Systems  Gen- No fevers, chills  CV- No chest pain, palpitations  Resp- No cough, dyspnea  GI- No N/V/D, abd pain      Vital Signs:   Temp:  [98 °F (36.7 °C)-99 °F (37.2 °C)] 98 °F (36.7 °C)  Heart Rate:  [58-71] 71  Resp:  [16-18] 18  BP: (128-151)/(59-86) 146/79      Physical Exam:  Constitutional: No acute distress, awake, alert  HENT: NCAT, mucous membranes moist  Respiratory: Clear to auscultation bilaterally, respiratory effort normal room air   Cardiovascular: RRR, + murmurs, rubs, or gallops  Gastrointestinal: Positive bowel sounds, soft, nontender, nondistended  Musculoskeletal: No bilateral ankle edema  Psychiatric: Appropriate affect, cooperative  Neurologic: Oriented x2- 3, strength symmetric in all extremities, , speech clear  Skin: No rashes      Pertinent  and/or Most Recent Results     LAB RESULTS:      Lab 03/12/25  0729 03/12/25  0046 03/11/25  1738  03/11/25  0512 03/10/25  0406 03/08/25 2022 03/08/25  0853 03/07/25  1108 03/07/25  0116 03/06/25 1817 03/06/25  1042   WBC  --  10.30  --  8.30 10.12  --  11.62*  --  12.68*  --  16.71*   HEMOGLOBIN 7.6* 7.7*  7.7* 7.4* 8.1* 7.9*   < > 9.3*   < > 7.1*   < > 7.7*   HEMATOCRIT 25.1* 25.2*  25.2* 24.0* 26.7* 26.5*   < > 30.9*   < > 24.3*   < > 26.0*   PLATELETS  --  249  --  253 272  --  239  --  202  --  220   NEUTROS ABS  --   --   --  5.97  --   --  9.08*  --   --   --   --    IMMATURE GRANS (ABS)  --   --   --  0.12*  --   --  0.13*  --   --   --   --    LYMPHS ABS  --   --   --  0.95  --   --  0.86  --   --   --   --    MONOS ABS  --   --   --  0.84  --   --  0.97*  --   --   --   --    EOS ABS  --   --   --  0.40  --   --  0.54*  --   --   --   --    MCV  --  96.2  --  96.4 97.4*  --  97.5*  --  104.3*  --  103.6*   PROCALCITONIN  --   --   --   --   --   --   --   --   --   --  1.02*    < > = values in this interval not displayed.         Lab 03/12/25  0046 03/11/25  1738 03/11/25  0512 03/10/25  0406 03/09/25  0842 03/08/25 2022 03/08/25  0853 03/07/25 1817 03/07/25  0116 03/06/25  1042   SODIUM 136  --  142 142  --   --  147* 146* 148* 151*   POTASSIUM 4.3 4.1 3.6 3.9  --  4.8 3.5  --  3.9 3.9   CHLORIDE 105  --  108* 111*  --   --  116*  --  118* 119*   CO2 22.0  --  22.0 20.0*  --   --  21.0*  --  20.0* 20.0*   ANION GAP 9.0  --  12.0 11.0  --   --  10.0  --  10.0 12.0   BUN 6*  --  6* 10  --   --  17  --  27* 34*   CREATININE 0.65*  --  0.63* 0.81  --   --  0.68*  --  0.90 0.99   EGFR 104.6  --  105.6 97.8  --   --  103.2  --  94.8 84.5   GLUCOSE 97  --  83 93  --   --  120*  --  103* 88   CALCIUM 8.3*  --  8.6 8.3*  --   --  9.1  --  8.6 8.3*   MAGNESIUM 1.6  --  1.8 1.9 1.6  --  1.6  --  2.0 2.4   PHOSPHORUS  --   --   --   --   --   --  2.7  --  3.0 3.0         Lab 03/12/25  0046 03/10/25  0406   TOTAL PROTEIN 6.9 6.5   ALBUMIN 2.5* 2.3*   GLOBULIN 4.4 4.2   ALT (SGPT) 30 25   AST (SGOT) 38 29    BILIRUBIN 0.3 0.4   ALK PHOS 133* 117                 Lab 03/11/25  1738 03/11/25  0512 03/07/25  0116   IRON  --  26*  --    IRON SATURATION (TSAT)  --  10*  --    TIBC  --  262*  --    TRANSFERRIN  --  176*  --    FERRITIN  --  171.20  --    ABO TYPING A  --  A   RH TYPING Positive  --  Positive   ANTIBODY SCREEN Negative  --  Negative         Lab 03/06/25  0357   PH, ARTERIAL 7.484*   PCO2, ARTERIAL 29.0*   PO2 .0*   FIO2 30   HCO3 ART 21.8   BASE EXCESS ART -1.3*   CARBOXYHEMOGLOBIN 1.6     Brief Urine Lab Results  (Last result in the past 365 days)        Color   Clarity   Blood   Leuk Est   Nitrite   Protein   CREAT   Urine HCG        02/11/25 0942 Yellow   Turbid   Moderate (2+)   Large (3+)   Negative   100 mg/dL (2+)                 Microbiology Results (last 10 days)       Procedure Component Value - Date/Time    MRSA Screen, PCR (Inpatient) - Swab, Nares [[id number]]  (Normal) Collected: 03/04/25 1051    Lab Status: Final result Specimen: Swab from Nares Updated: 03/04/25 1216     MRSA PCR Negative    Narrative:      The negative predictive value of this diagnostic test is high and should only be used to consider de-escalating anti-MRSA therapy. A positive result may indicate colonization with MRSA and must be correlated clinically.  MRSA Negative    Respiratory Culture - Aspirate, ET Suction [[id number]] Collected: 03/03/25 1645    Lab Status: Final result Specimen: Aspirate from ET Suction Updated: 03/05/25 1041     Respiratory Culture Scant growth (1+) Normal respiratory kishore. No S. aureus or Pseudomonas aeruginosa detected. Final report.     Gram Stain Few (2+) WBCs per low power field      Few (2+) Epithelial cells per low power field      Moderate (3+) Yeast with hyphae seen    Respiratory Panel PCR w/COVID-19(SARS-CoV-2) ABDIAZIZ/TAVARES/HANH/PAD/COR/OTIS In-House, NP Swab in UTM/VTM, 2 HR TAT - Swab, Nasopharynx [[id number]]  (Normal) Collected: 03/02/25 1717    Lab Status: Final result Specimen: Swab  from Nasopharynx Updated: 03/02/25 1825     ADENOVIRUS, PCR Not Detected     Coronavirus 229E Not Detected     Coronavirus HKU1 Not Detected     Coronavirus NL63 Not Detected     Coronavirus OC43 Not Detected     COVID19 Not Detected     Human Metapneumovirus Not Detected     Human Rhinovirus/Enterovirus Not Detected     Influenza A PCR Not Detected     Influenza B PCR Not Detected     Parainfluenza Virus 1 Not Detected     Parainfluenza Virus 2 Not Detected     Parainfluenza Virus 3 Not Detected     Parainfluenza Virus 4 Not Detected     RSV, PCR Not Detected     Bordetella pertussis pcr Not Detected     Bordetella parapertussis PCR Not Detected     Chlamydophila pneumoniae PCR Not Detected     Mycoplasma pneumo by PCR Not Detected    Narrative:      In the setting of a positive respiratory panel with a viral infection PLUS a negative procalcitonin without other underlying concern for bacterial infection, consider observing off antibiotics or discontinuation of antibiotics and continue supportive care. If the respiratory panel is positive for atypical bacterial infection (Bordetella pertussis, Chlamydophila pneumoniae, or Mycoplasma pneumoniae), consider antibiotic de-escalation to target atypical bacterial infection.            SLP FEES - Fiberoptic Endo Eval Swallow  Result Date: 3/11/2025  This procedure was auto-finalized with no dictation required.    SLP FEES - Fiberoptic Endo Eval Swallow  Result Date: 3/8/2025  This procedure was auto-finalized with no dictation required.    XR Chest 1 View  Result Date: 3/6/2025  XR CHEST 1 VW Date of Exam: 3/6/2025 4:54 AM EST Indication: Intubated, pneumonia Comparison: 3/5/2025 Findings: Endotracheal tube overlies the upper/mid trachea at the level of the clavicles. Enteric tube descends below the diaphragm and beyond the field-of-view. Right IJ latosha cath overlies the SVC. Small left pleural effusion with left basilar atelectasis or infiltrate. Right lung appears  adequately aerated. No pneumothorax is seen. Cardiac silhouette is magnified. Osseous structures are unchanged.     Impression: 1.Small left pleural effusion with left basilar atelectasis versus infiltrate. Electronically Signed: Urbano Hernandez MD  3/6/2025 7:42 AM EST  Workstation ID: [id number]    XR Chest 1 View  Result Date: 3/5/2025  XR ABDOMEN KUB, XR CHEST 1 VW Date of Exam: 3/5/2025 1:22 AM EST Indication: Keofeed placement Comparison: 3/2/2025. Findings: Right internal jugular Mediport in place. There are no airspace consolidations. Small left-sided pleural effusion present.. No pneumothorax. The pulmonary vasculature appears within normal limits. The cardiac and mediastinal silhouette appear unremarkable. No acute osseous abnormality identified. Enteric tube tip within the distal portion of the third portion of the duodenum. Nonobstructive bowel gas pattern. Gassy colon. No evidence of pneumatosis or free air.     Impression: Enteric tube tip within the distal portion of the third portion of the duodenum. Small left-sided pleural effusion present. Electronically Signed: Natalie Abdi MD  3/5/2025 1:35 AM EST  Workstation ID: [id number]    XR Abdomen KUB  Result Date: 3/5/2025  XR ABDOMEN KUB, XR CHEST 1 VW Date of Exam: 3/5/2025 1:22 AM EST Indication: Keofeed placement Comparison: 3/2/2025. Findings: Right internal jugular Mediport in place. There are no airspace consolidations. Small left-sided pleural effusion present.. No pneumothorax. The pulmonary vasculature appears within normal limits. The cardiac and mediastinal silhouette appear unremarkable. No acute osseous abnormality identified. Enteric tube tip within the distal portion of the third portion of the duodenum. Nonobstructive bowel gas pattern. Gassy colon. No evidence of pneumatosis or free air.     Impression: Enteric tube tip within the distal portion of the third portion of the duodenum. Small left-sided pleural effusion present. Electronically  Signed: Natalie Abdi MD  3/5/2025 1:35 AM EST  Workstation ID: [id number]    XR Chest 1 View  Result Date: 3/4/2025  XR CHEST 1 VW Date of Exam: 3/4/2025 1:32 AM EST Indication: Pneumonia, intubated Comparison: Chest CT without contrast 3/2/2025, portable chest radiograph 3/3/2025 Findings: ETT tip above the doron. Esophagogastric tube tip below the diaphragm. Right-sided port catheter noted with tip at the mid SVC. Persistent mild left basilar airspace disease which may relate to atelectasis, pneumonia not excluded. The right lung is clear. Negative for pneumothorax. No significant effusion.     Impression: 1. Stable lines and support tubes. 2. Persistent mild left basilar airspace disease which may relate to atelectasis, pneumonia not excluded. 3. Clear right lung. 4. No pneumothorax. Electronically Signed: Richard Pinto MD  3/4/2025 7:40 AM EST  Workstation ID: [id number]    XR Chest 1 View  Result Date: 3/3/2025  XR CHEST 1 VW Date of Exam: 3/3/2025 1:09 PM EST Indication: Confirm ET Tube Placement Comparison: Chest radiograph 3/2/2025, chest CT 3/2/2025 Findings: ET tube 3 cm above doron. Feeding tube below diaphragm. Right chest port catheter tip terminates over low SVC. Similar mixed pattern interstitial and alveolar airspace consolidation most significantly involving right middle and left lower lobes. Right middle and left lower lobe opacities appear increased from 2/28/2025. Small left greater than right pleural effusions, radiographically increased from prior chest radiograph. No visualized pneumothorax. Degenerative related osseous change.     Impression: 1. ET tube 3 cm above doron. No pneumothorax. 2. Mixed pattern interstitial and alveolar airspace consolidation with pleural effusions, overall increasing from prior 2/28/2025 chest radiograph although may be similar to recent CT comparison. Mucous plugging in the right middle lobe remains possible. Electronically Signed: Guerrero Rubio MD  3/3/2025 1:56  PM EST  Workstation ID: [id number]    CT Chest Without Contrast Diagnostic  Result Date: 3/2/2025  CT CHEST WO CONTRAST DIAGNOSTIC, CT ABDOMEN PELVIS WO CONTRAST Date of Exam: 3/2/2025 10:49 AM EST Indication: shortness of breath, leukocytosis. Comparison: CT chest abdomen pelvis 2/11/2025 Technique: Axial CT images were obtained of the chest, abdomen and pelvis without contrast administration.  Reconstructed coronal and sagittal images were also obtained. Automated exposure control and iterative construction methods were used. Findings: Limited exam without IV contrast, specifically assessing the vascular and solid organ structures. CT CHEST: Thyroid gross unremarkable. Right chest port catheter tip terminates within the right atrium. Aortic atherosclerotic disease without aneurysm. Aortic valve leaflet calcifications. Normal caliber main pulmonary artery. Calcified coronary atherosclerotic disease. Mitral annular calcifications. Mild stable cardiomegaly. Trace low-density pericardial fluid similar to less conspicuous to prior. Esophagus unremarkable. Hypodense cardiac blood pooling which can be seen with anemia. No suspicious mediastinal or hilar adenopathy. Debris and/or mucous plugging right bronchus intermedius extending into the right lower lobe. Complete collapse of right middle lobe with air bronchograms. Multifocal patchy and alveolar groundglass opacities new from prior exam. There is a small low-density right pleural effusion new from prior exam. No overt edema. No pneumothorax. Mild underlying centrilobular emphysema. Gynecomastia. No acute chest wall findings. Degenerative changes throughout the spine without acute displaced fracture or aggressive lesion. Superimposed findings suggesting diffuse idiopathic skeletal hyperostosis. CT abdomen pelvis Solid irregular nodular peripheral hepatic contour suggesting morphologic changes of chronic liver disease. Gallbladder and biliary tree unremarkable. Moderate  to severely atrophic pancreas. No active inflammation. Spleen within normal limits in size. Unremarkable adrenal glands. Asymmetric enlargement of the left kidney with subtle hazy appearance of the renal parenchyma and asymmetric trace perinephric fluid. Allowing for technical differences these findings have improved since 2/11/2025. No discrete drainable collection. No hydronephrosis. Mild bladder wall thickening. Large left posterolateral bladder diverticula which appears slightly inflamed. Presumed instrumentation related intraluminal gas. Prostate within normal limits in size. Antegrade oriented feeding tube terminates within the transverse duodenum. Rectal tube in place. Colonic diverticulosis. No evidence of bowel obstruction. Normal appendix. Aortic atherosclerotic disease without aneurysm. No suspicious adenopathy. Minimal perirectal/presacral edema. No significant ascites. No free air. Medication related injection change anterior abdominal wall. Tiny fat-containing umbilical hernia. Tiny fat-containing left inguinal hernia. Degenerative changes of the lumbar spine  with related grade 1 anterolisthesis L4 and L5. No acute displaced fracture or aggressive lesion.     Impression: Chest 1. Multifocal patchy and alveolar airspace opacities suspicious for pneumonia and/or other forms of infectious/inflammatory pneumonitis which may include atypical/viral etiologies. Small right pleural effusion. 2. Recent aspiration versus mucous plugging in the right bronchus intermedius with complete right middle lobe collapse. 3. Aortic and coronary atherosclerotic disease. 4. Cardiomegaly. 5. CT findings suggesting anemia. Abdomen/pelvis 1. Asymmetric left renal findings suspicious for persistent pyelonephritis, severity appears improved from 2/11/2025. No hydronephrosis or drainable fluid collection. 2. Possible cystitis by CT, correlate with urinalysis. 3. Morphologic changes of the liver suspicious for chronic liver  disease. 4. Atrophic pancreas. 5. Colonic diverticulosis. 6. Other ancillary findings as above. Electronically Signed: Guerrero Rubio MD  3/2/2025 2:18 PM EST  Workstation ID: [id number]    CT Abdomen Pelvis Without Contrast  Result Date: 3/2/2025  CT CHEST WO CONTRAST DIAGNOSTIC, CT ABDOMEN PELVIS WO CONTRAST Date of Exam: 3/2/2025 10:49 AM EST Indication: shortness of breath, leukocytosis. Comparison: CT chest abdomen pelvis 2/11/2025 Technique: Axial CT images were obtained of the chest, abdomen and pelvis without contrast administration.  Reconstructed coronal and sagittal images were also obtained. Automated exposure control and iterative construction methods were used. Findings: Limited exam without IV contrast, specifically assessing the vascular and solid organ structures. CT CHEST: Thyroid gross unremarkable. Right chest port catheter tip terminates within the right atrium. Aortic atherosclerotic disease without aneurysm. Aortic valve leaflet calcifications. Normal caliber main pulmonary artery. Calcified coronary atherosclerotic disease. Mitral annular calcifications. Mild stable cardiomegaly. Trace low-density pericardial fluid similar to less conspicuous to prior. Esophagus unremarkable. Hypodense cardiac blood pooling which can be seen with anemia. No suspicious mediastinal or hilar adenopathy. Debris and/or mucous plugging right bronchus intermedius extending into the right lower lobe. Complete collapse of right middle lobe with air bronchograms. Multifocal patchy and alveolar groundglass opacities new from prior exam. There is a small low-density right pleural effusion new from prior exam. No overt edema. No pneumothorax. Mild underlying centrilobular emphysema. Gynecomastia. No acute chest wall findings. Degenerative changes throughout the spine without acute displaced fracture or aggressive lesion. Superimposed findings suggesting diffuse idiopathic skeletal hyperostosis. CT abdomen pelvis Solid  irregular nodular peripheral hepatic contour suggesting morphologic changes of chronic liver disease. Gallbladder and biliary tree unremarkable. Moderate to severely atrophic pancreas. No active inflammation. Spleen within normal limits in size. Unremarkable adrenal glands. Asymmetric enlargement of the left kidney with subtle hazy appearance of the renal parenchyma and asymmetric trace perinephric fluid. Allowing for technical differences these findings have improved since 2/11/2025. No discrete drainable collection. No hydronephrosis. Mild bladder wall thickening. Large left posterolateral bladder diverticula which appears slightly inflamed. Presumed instrumentation related intraluminal gas. Prostate within normal limits in size. Antegrade oriented feeding tube terminates within the transverse duodenum. Rectal tube in place. Colonic diverticulosis. No evidence of bowel obstruction. Normal appendix. Aortic atherosclerotic disease without aneurysm. No suspicious adenopathy. Minimal perirectal/presacral edema. No significant ascites. No free air. Medication related injection change anterior abdominal wall. Tiny fat-containing umbilical hernia. Tiny fat-containing left inguinal hernia. Degenerative changes of the lumbar spine  with related grade 1 anterolisthesis L4 and L5. No acute displaced fracture or aggressive lesion.     Impression: Chest 1. Multifocal patchy and alveolar airspace opacities suspicious for pneumonia and/or other forms of infectious/inflammatory pneumonitis which may include atypical/viral etiologies. Small right pleural effusion. 2. Recent aspiration versus mucous plugging in the right bronchus intermedius with complete right middle lobe collapse. 3. Aortic and coronary atherosclerotic disease. 4. Cardiomegaly. 5. CT findings suggesting anemia. Abdomen/pelvis 1. Asymmetric left renal findings suspicious for persistent pyelonephritis, severity appears improved from 2/11/2025. No hydronephrosis or  drainable fluid collection. 2. Possible cystitis by CT, correlate with urinalysis. 3. Morphologic changes of the liver suspicious for chronic liver disease. 4. Atrophic pancreas. 5. Colonic diverticulosis. 6. Other ancillary findings as above. Electronically Signed: Guerrero Rubio MD  3/2/2025 2:18 PM EST  Workstation ID: [id number]              Results for orders placed during the hospital encounter of 02/11/25    Adult Transthoracic Echo Complete w/ Color, Spectral and Contrast if Necessary Per Protocol    Interpretation Summary    Left ventricular systolic function is normal. Estimated left ventricular EF = 60%    Left ventricular wall thickness is consistent with mild concentric hypertrophy.    Mild aortic valve stenosis is present.    Aortic valve maximum pressure gradient is 23 mmHg. Aortic valve mean pressure gradient is 14 mmHg.      Plan for Follow-up of Pending Labs/Results: H/H in two days then repeat in one week   Pending Labs       Order Current Status    Magnesium In process    AFB Culture - Wash, Bronchus Preliminary result    Fungus Culture - Wash, Bronchus Preliminary result          Discharge Details        Discharge Medications        New Medications        Instructions Start Date   apixaban 5 MG tablet tablet  Commonly known as: ELIQUIS   5 mg, Oral, Every 12 Hours Scheduled      castor oil-balsam peru ointment   1 Application, Topical, Every 12 Hours Scheduled      docusate sodium 50 mg/5 mL liquid  Commonly known as: COLACE   100 mg, Oral, 2 Times Daily      nystatin [id number] UNIT/GM powder  Commonly known as: MYCOSTATIN   Topical, Every 8 Hours Scheduled      pantoprazole 40 MG EC tablet  Commonly known as: Protonix   40 mg, Oral, 2 Times Daily      QUEtiapine 25 MG tablet  Commonly known as: SEROquel   Take 1 tablet by mouth Daily AND 2 tablets Every Night.      riFAXIMin 550 MG tablet  Commonly known as: XIFAXAN   550 mg, Per G Tube, Every 12 Hours Scheduled      rosuvastatin 20 MG  tablet  Commonly known as: CRESTOR   20 mg, Oral, Nightly             Changes to Medications        Instructions Start Date   gabapentin 300 MG capsule  Commonly known as: NEURONTIN  What changed:   how much to take  when to take this   300 mg, Oral, Every 8 Hours             Continue These Medications        Instructions Start Date   dutasteride 0.5 MG capsule  Commonly known as: AVODART   0.5 mg, Daily      escitalopram 10 MG tablet  Commonly known as: LEXAPRO   10 mg, Nightly      fentaNYL 25 MCG/HR patch  Commonly known as: DURAGESIC   1 patch, Transdermal, Every 72 Hours      ferrous gluconate 324 MG tablet  Commonly known as: FERGON   324 mg, Daily With Breakfast      folic acid 1 MG tablet  Commonly known as: FOLVITE   1 mg, Daily      hydrocortisone 2.5 % cream   Apply 1 Application topically to the appropriate area as directed 2 (Two) Times a Day.      Lidocaine 4 %   1 patch, Transdermal, Every 24 Hours Scheduled, Remove & Discard patch within 12 hours or as directed by MD      Loratadine 10 MG capsule   10 mg, Oral, Daily      melatonin 5 MG tablet tablet   1-2 tablets, Oral, Nightly PRN      tamsulosin 0.4 MG capsule 24 hr capsule  Commonly known as: FLOMAX   1 capsule, Daily      vitamin B-12 1000 MCG tablet  Commonly known as: CYANOCOBALAMIN   1,000 mcg, Daily      Vitamin D3 1.25 MG (16708 UT) capsule   50,000 Units, Every 7 Days             Stop These Medications      omeprazole 40 MG capsule  Commonly known as: priLOSEC     oxyCODONE-acetaminophen 7.5-325 MG per tablet  Commonly known as: PERCOCET     senna 8.6 MG tablet  Commonly known as: SENOKOT     zolpidem 5 MG tablet  Commonly known as: AMBIEN              Allergies   Allergen Reactions    Green Beans Hives         Discharge Disposition:  Rehab Facility or Unit (DC - External)    Diet:  Hospital:  Diet Order   Procedures    Diet: Cardiac; Healthy Heart (2-3 Na+); Texture: Regular (IDDSI 7); Fluid Consistency: Thin (IDDSI 0)       Diet  Instructions       Diet: Cardiac Diets; Healthy Heart (2-3 Na+); Regular (IDDSI 7); Thin (IDDSI 0)      Discharge Diet: Cardiac Diets    Cardiac Diet: Healthy Heart (2-3 Na+)    Texture: Regular (IDDSI 7)    Fluid Consistency: Thin (IDDSI 0)             Activity:  Activity Instructions       Activity as Tolerated      Measure Blood Pressure              Restrictions or Other Recommendations:         CODE STATUS:    Code Status and Medical Interventions: CPR (Attempt to Resuscitate); Full Support   Ordered at: 02/12/25 1029     Code Status (Patient has no pulse and is not breathing):    CPR (Attempt to Resuscitate)     Medical Interventions (Patient has pulse or is breathing):    Full Support     Level Of Support Discussed With:    Patient       Future Appointments   Date Time Provider Department Center   3/27/2025  1:15 PM Giovanni Marcum PA-C [facility]   5/14/2025 12:00 PM [facility] CT [facility]   5/15/2025 10:00 AM Иван Garrison MD [facility]   5/16/2025  2:30 PM Aristeo Hoskins MD [facility]   5/21/2025  1:45 PM Katerina Ga MD [facility]       Additional Instructions for the Follow-ups that You Need to Schedule       Discharge Follow-up with PCP   As directed       Currently Documented PCP:    Wesly Minor MD    PCP Phone Number:    [phone]     Follow Up Details: follow up with pcp after dc from rehab        Discharge Follow-up with Specified Provider: Follow up with Dr. Hoskins in 2 months   As directed      To: Follow up with Dr. Hoskins in 2 months                      NUVIA Mcgee  03/12/25      Time Spent on Discharge:  I spent  45  minutes on this discharge activity which included: face-to-face encounter with the patient, reviewing the data in the system, coordination of the care with the nursing staff as well as consultants, documentation, and entering orders.            Electronically signed by Liliam Ray APRN at 03/12/25 0853

## 2025-03-12 NOTE — DISCHARGE SUMMARY
[facility]  DISCHARGE SUMMARY    Patient Name: Val Nassar Jr.  : 1960  MRN: [id number]    Date of Admission: 2025  8:50 AM  Date of Discharge:  3/12/2025  Primary Care Physician: Wesly Minor MD    Consults       Date and Time Order Name Status Description    2025  9:40 AM Inpatient Gastroenterology Consult Completed     2025  1:29 AM Inpatient Cardiology Consult Completed             Hospital Course     Presenting Problem: Pneumonia     Active Hospital Problems    Diagnosis  POA    **Septic shock due to Pseudomonas species [A41.52, R65.21]  Yes    Pneumonia of both lower lobes due to Pseudomonas species [J15.1]  Yes    Bacteremia due to Pseudomonas [R78.81, B96.5]  Yes    GALILEO (acute kidney injury) [N17.9]  Yes    Metabolic acidosis [E87.20]  Yes    Cirrhosis of liver [K74.60]  Yes    Squamous cell carcinoma of esophagus [C15.9]  Yes    Hyperlipidemia, unspecified [E78.5]  Yes    Tobacco use [Z72.0]  Yes      Resolved Hospital Problems    Diagnosis Date Resolved POA    Hypotension [I95.9] 2025 Unknown    Suspected UTI [R39.89] 2025 Yes          Hospital Course:  Val Nassar Jr. is a 64 y.o. 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 He was noted to be septic secondary to a UTI and was admitted to the ICU. Urine and blood cultures grew Pseudomonas and he completed treatment with Cefepime x 10 days. He decompensated and required intubation on 25. Bronchoscopy was performed on 25 for mucous plugging. He was able to be extubated on 25.      He was transferred to telemetry. He developed worsening anemia and required transfusion of 2 units of pRBCs. GI was consulted and he was taken for EGD on 3/3/25 by Dr. Mckeon. EGD showed 2  small telangiectasias in the lower third of the esophagus suspected to be secondary to prior radiation. Argon was used for coagulation for bleeding prevention.      After the EGD, he was transferred to the recovery area. He was noted to be hypoxic on a nonrebreather and he was intubated by anesthesia. After intubation, he remained hypoxic requiring high FiO2 and PEEP. He was transferred back to the ICU.  Mr. Nassar was placed on empiric antibiotics for possible pneumonia.     Pseudomonas Bacteremia  Septic shock  Pyelonephritis  Possible pneumonia  - s/p cefepime course on admission x 10 days   -Recurrent pneumonia, now status post 7 days of Zosyn, leukocytosis resolved     GALILEO/bladder outlet obstruction vs neurogenic bladder  -Lubin catheter removed.  Continue In-N-Out catheterization-typically self cathed prior to this hospitalization, follows outpatient with Urology Dr Garrison  - still requiring in and out cath   -Continue finasteride and Flomax.     Dysphagia  - Speech follows  -advanced to Thin liquids, regular textures      New Atrial fibrillation with RVR  - currently NSR   -Hemoglobin stable, Eliquis 5 mg p.o. twice daily restarted on 3/11  for stroke prophylaxis, my partner discussed with Mr. Nassar and with cardiology.     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  There were also grade 1 small esophageal varices and portal hypertensive gastropathy  -Continue PPI twice daily  -Serial H&H with resumption of Eliquis hgb stable but will need HH check in couple days and one week. If he has clinical bleeding or drops in his hemoglobin eliquis should be discontinued and cardiology will explore left artrial occluder options at that time      Respiratory Failure  - s/p intubation 2/12/25 with subsequent bronchoscopy for mucous plugging. Patient extubated on 2/24/2025   - reintubated 3/3  post endoscopy     EtOH Cirrhosis  -Continue rifaximin  -Lactulose stopped due to increased bowel gas     Metastatic Squamous Cell Carcinoma of the esophagus  - s/p chemoradiation May 2024     Chronic back pain.    - steroid injection at pain management 2/3/2024  - fentanyl patch     Tobacco abuse     Deconditioning  - PT/OT     Hypernatremia  -resolved    Patient has remained clinically stable and will be discharged to rehab today.       Discharge Follow Up Recommendations for outpatient labs/diagnostics:   Follow up with pcp after dc from rehab  Follow up with Dr. Hoskins in 2 months     Day of Discharge     HPI:   Patient is sitting up in bed eating breakfast. He slept well. No acute events overnight per nursing. Plan for rehab today;     Review of Systems  Gen- No fevers, chills  CV- No chest pain, palpitations  Resp- No cough, dyspnea  GI- No N/V/D, abd pain      Vital Signs:   Temp:  [98 °F (36.7 °C)-99 °F (37.2 °C)] 98 °F (36.7 °C)  Heart Rate:  [58-71] 71  Resp:  [16-18] 18  BP: (128-151)/(59-86) 146/79      Physical Exam:  Constitutional: No acute distress, awake, alert  HENT: NCAT, mucous membranes moist  Respiratory: Clear to auscultation bilaterally, respiratory effort normal room air   Cardiovascular: RRR, + murmurs, rubs, or gallops  Gastrointestinal: Positive bowel sounds, soft, nontender, nondistended  Musculoskeletal: No bilateral ankle edema  Psychiatric: Appropriate affect, cooperative  Neurologic: Oriented x2- 3, strength symmetric in all extremities, , speech clear  Skin: No rashes      Pertinent  and/or Most Recent Results     LAB RESULTS:      Lab 03/12/25  0729 03/12/25  0046 03/11/25  1738 03/11/25  0512 03/10/25  0406 03/08/25  2022 03/08/25  0853 03/07/25  1108 03/07/25  0116 03/06/25  1817 03/06/25  1042   WBC  --  10.30  --  8.30 10.12  --  11.62*  --  12.68*  --  16.71*   HEMOGLOBIN 7.6* 7.7*  7.7* 7.4* 8.1* 7.9*   < > 9.3*   < > 7.1*   < > 7.7*   HEMATOCRIT 25.1* 25.2*  25.2* 24.0*  26.7* 26.5*   < > 30.9*   < > 24.3*   < > 26.0*   PLATELETS  --  249  --  253 272  --  239  --  202  --  220   NEUTROS ABS  --   --   --  5.97  --   --  9.08*  --   --   --   --    IMMATURE GRANS (ABS)  --   --   --  0.12*  --   --  0.13*  --   --   --   --    LYMPHS ABS  --   --   --  0.95  --   --  0.86  --   --   --   --    MONOS ABS  --   --   --  0.84  --   --  0.97*  --   --   --   --    EOS ABS  --   --   --  0.40  --   --  0.54*  --   --   --   --    MCV  --  96.2  --  96.4 97.4*  --  97.5*  --  104.3*  --  103.6*   PROCALCITONIN  --   --   --   --   --   --   --   --   --   --  1.02*    < > = values in this interval not displayed.         Lab 03/12/25  0046 03/11/25  1738 03/11/25  0512 03/10/25  0406 03/09/25  0842 03/08/25  2022 03/08/25  0853 03/07/25  1817 03/07/25  0116 03/06/25  1042   SODIUM 136  --  142 142  --   --  147* 146* 148* 151*   POTASSIUM 4.3 4.1 3.6 3.9  --  4.8 3.5  --  3.9 3.9   CHLORIDE 105  --  108* 111*  --   --  116*  --  118* 119*   CO2 22.0  --  22.0 20.0*  --   --  21.0*  --  20.0* 20.0*   ANION GAP 9.0  --  12.0 11.0  --   --  10.0  --  10.0 12.0   BUN 6*  --  6* 10  --   --  17  --  27* 34*   CREATININE 0.65*  --  0.63* 0.81  --   --  0.68*  --  0.90 0.99   EGFR 104.6  --  105.6 97.8  --   --  103.2  --  94.8 84.5   GLUCOSE 97  --  83 93  --   --  120*  --  103* 88   CALCIUM 8.3*  --  8.6 8.3*  --   --  9.1  --  8.6 8.3*   MAGNESIUM 1.6  --  1.8 1.9 1.6  --  1.6  --  2.0 2.4   PHOSPHORUS  --   --   --   --   --   --  2.7  --  3.0 3.0         Lab 03/12/25  0046 03/10/25  0406   TOTAL PROTEIN 6.9 6.5   ALBUMIN 2.5* 2.3*   GLOBULIN 4.4 4.2   ALT (SGPT) 30 25   AST (SGOT) 38 29   BILIRUBIN 0.3 0.4   ALK PHOS 133* 117                 Lab 03/11/25  1738 03/11/25  0512 03/07/25  0116   IRON  --  26*  --    IRON SATURATION (TSAT)  --  10*  --    TIBC  --  262*  --    TRANSFERRIN  --  176*  --    FERRITIN  --  171.20  --    ABO TYPING A  --  A   RH TYPING Positive  --  Positive    ANTIBODY SCREEN Negative  --  Negative         Lab 03/06/25  0357   PH, ARTERIAL 7.484*   PCO2, ARTERIAL 29.0*   PO2 .0*   FIO2 30   HCO3 ART 21.8   BASE EXCESS ART -1.3*   CARBOXYHEMOGLOBIN 1.6     Brief Urine Lab Results  (Last result in the past 365 days)        Color   Clarity   Blood   Leuk Est   Nitrite   Protein   CREAT   Urine HCG        02/11/25 0942 Yellow   Turbid   Moderate (2+)   Large (3+)   Negative   100 mg/dL (2+)                 Microbiology Results (last 10 days)       Procedure Component Value - Date/Time    MRSA Screen, PCR (Inpatient) - Swab, Nares [[id number]]  (Normal) Collected: 03/04/25 1051    Lab Status: Final result Specimen: Swab from Nares Updated: 03/04/25 1216     MRSA PCR Negative    Narrative:      The negative predictive value of this diagnostic test is high and should only be used to consider de-escalating anti-MRSA therapy. A positive result may indicate colonization with MRSA and must be correlated clinically.  MRSA Negative    Respiratory Culture - Aspirate, ET Suction [[id number]] Collected: 03/03/25 1645    Lab Status: Final result Specimen: Aspirate from ET Suction Updated: 03/05/25 1041     Respiratory Culture Scant growth (1+) Normal respiratory kishore. No S. aureus or Pseudomonas aeruginosa detected. Final report.     Gram Stain Few (2+) WBCs per low power field      Few (2+) Epithelial cells per low power field      Moderate (3+) Yeast with hyphae seen    Respiratory Panel PCR w/COVID-19(SARS-CoV-2) ABDIAZIZ/TAVARES/HANH/PAD/COR/OTIS In-House, NP Swab in UTM/VTM, 2 HR TAT - Swab, Nasopharynx [[id number]]  (Normal) Collected: 03/02/25 1717    Lab Status: Final result Specimen: Swab from Nasopharynx Updated: 03/02/25 1825     ADENOVIRUS, PCR Not Detected     Coronavirus 229E Not Detected     Coronavirus HKU1 Not Detected     Coronavirus NL63 Not Detected     Coronavirus OC43 Not Detected     COVID19 Not Detected     Human Metapneumovirus Not Detected     Human  Rhinovirus/Enterovirus Not Detected     Influenza A PCR Not Detected     Influenza B PCR Not Detected     Parainfluenza Virus 1 Not Detected     Parainfluenza Virus 2 Not Detected     Parainfluenza Virus 3 Not Detected     Parainfluenza Virus 4 Not Detected     RSV, PCR Not Detected     Bordetella pertussis pcr Not Detected     Bordetella parapertussis PCR Not Detected     Chlamydophila pneumoniae PCR Not Detected     Mycoplasma pneumo by PCR Not Detected    Narrative:      In the setting of a positive respiratory panel with a viral infection PLUS a negative procalcitonin without other underlying concern for bacterial infection, consider observing off antibiotics or discontinuation of antibiotics and continue supportive care. If the respiratory panel is positive for atypical bacterial infection (Bordetella pertussis, Chlamydophila pneumoniae, or Mycoplasma pneumoniae), consider antibiotic de-escalation to target atypical bacterial infection.            SLP FEES - Fiberoptic Endo Eval Swallow  Result Date: 3/11/2025  This procedure was auto-finalized with no dictation required.    SLP FEES - Fiberoptic Endo Eval Swallow  Result Date: 3/8/2025  This procedure was auto-finalized with no dictation required.    XR Chest 1 View  Result Date: 3/6/2025  XR CHEST 1 VW Date of Exam: 3/6/2025 4:54 AM EST Indication: Intubated, pneumonia Comparison: 3/5/2025 Findings: Endotracheal tube overlies the upper/mid trachea at the level of the clavicles. Enteric tube descends below the diaphragm and beyond the field-of-view. Right IJ latosha cath overlies the SVC. Small left pleural effusion with left basilar atelectasis or infiltrate. Right lung appears adequately aerated. No pneumothorax is seen. Cardiac silhouette is magnified. Osseous structures are unchanged.     Impression: 1.Small left pleural effusion with left basilar atelectasis versus infiltrate. Electronically Signed: Urbano Hernandez MD  3/6/2025 7:42 AM EST  Workstation ID:  [id number]    XR Chest 1 View  Result Date: 3/5/2025  XR ABDOMEN KUB, XR CHEST 1 VW Date of Exam: 3/5/2025 1:22 AM EST Indication: Keofeed placement Comparison: 3/2/2025. Findings: Right internal jugular Mediport in place. There are no airspace consolidations. Small left-sided pleural effusion present.. No pneumothorax. The pulmonary vasculature appears within normal limits. The cardiac and mediastinal silhouette appear unremarkable. No acute osseous abnormality identified. Enteric tube tip within the distal portion of the third portion of the duodenum. Nonobstructive bowel gas pattern. Gassy colon. No evidence of pneumatosis or free air.     Impression: Enteric tube tip within the distal portion of the third portion of the duodenum. Small left-sided pleural effusion present. Electronically Signed: Natalie Abdi MD  3/5/2025 1:35 AM EST  Workstation ID: [id number]    XR Abdomen KUB  Result Date: 3/5/2025  XR ABDOMEN KUB, XR CHEST 1 VW Date of Exam: 3/5/2025 1:22 AM EST Indication: Keofeed placement Comparison: 3/2/2025. Findings: Right internal jugular Mediport in place. There are no airspace consolidations. Small left-sided pleural effusion present.. No pneumothorax. The pulmonary vasculature appears within normal limits. The cardiac and mediastinal silhouette appear unremarkable. No acute osseous abnormality identified. Enteric tube tip within the distal portion of the third portion of the duodenum. Nonobstructive bowel gas pattern. Gassy colon. No evidence of pneumatosis or free air.     Impression: Enteric tube tip within the distal portion of the third portion of the duodenum. Small left-sided pleural effusion present. Electronically Signed: Natalie Abdi MD  3/5/2025 1:35 AM EST  Workstation ID: [id number]    XR Chest 1 View  Result Date: 3/4/2025  XR CHEST 1 VW Date of Exam: 3/4/2025 1:32 AM EST Indication: Pneumonia, intubated Comparison: Chest CT without contrast 3/2/2025, portable chest radiograph 3/3/2025  Findings: ETT tip above the doron. Esophagogastric tube tip below the diaphragm. Right-sided port catheter noted with tip at the mid SVC. Persistent mild left basilar airspace disease which may relate to atelectasis, pneumonia not excluded. The right lung is clear. Negative for pneumothorax. No significant effusion.     Impression: 1. Stable lines and support tubes. 2. Persistent mild left basilar airspace disease which may relate to atelectasis, pneumonia not excluded. 3. Clear right lung. 4. No pneumothorax. Electronically Signed: Richard Pinto MD  3/4/2025 7:40 AM EST  Workstation ID: [id number]    XR Chest 1 View  Result Date: 3/3/2025  XR CHEST 1 VW Date of Exam: 3/3/2025 1:09 PM EST Indication: Confirm ET Tube Placement Comparison: Chest radiograph 3/2/2025, chest CT 3/2/2025 Findings: ET tube 3 cm above doron. Feeding tube below diaphragm. Right chest port catheter tip terminates over low SVC. Similar mixed pattern interstitial and alveolar airspace consolidation most significantly involving right middle and left lower lobes. Right middle and left lower lobe opacities appear increased from 2/28/2025. Small left greater than right pleural effusions, radiographically increased from prior chest radiograph. No visualized pneumothorax. Degenerative related osseous change.     Impression: 1. ET tube 3 cm above doron. No pneumothorax. 2. Mixed pattern interstitial and alveolar airspace consolidation with pleural effusions, overall increasing from prior 2/28/2025 chest radiograph although may be similar to recent CT comparison. Mucous plugging in the right middle lobe remains possible. Electronically Signed: Guerrero Rubio MD  3/3/2025 1:56 PM EST  Workstation ID: [id number]    CT Chest Without Contrast Diagnostic  Result Date: 3/2/2025  CT CHEST WO CONTRAST DIAGNOSTIC, CT ABDOMEN PELVIS WO CONTRAST Date of Exam: 3/2/2025 10:49 AM EST Indication: shortness of breath, leukocytosis. Comparison: CT chest abdomen pelvis  2/11/2025 Technique: Axial CT images were obtained of the chest, abdomen and pelvis without contrast administration.  Reconstructed coronal and sagittal images were also obtained. Automated exposure control and iterative construction methods were used. Findings: Limited exam without IV contrast, specifically assessing the vascular and solid organ structures. CT CHEST: Thyroid gross unremarkable. Right chest port catheter tip terminates within the right atrium. Aortic atherosclerotic disease without aneurysm. Aortic valve leaflet calcifications. Normal caliber main pulmonary artery. Calcified coronary atherosclerotic disease. Mitral annular calcifications. Mild stable cardiomegaly. Trace low-density pericardial fluid similar to less conspicuous to prior. Esophagus unremarkable. Hypodense cardiac blood pooling which can be seen with anemia. No suspicious mediastinal or hilar adenopathy. Debris and/or mucous plugging right bronchus intermedius extending into the right lower lobe. Complete collapse of right middle lobe with air bronchograms. Multifocal patchy and alveolar groundglass opacities new from prior exam. There is a small low-density right pleural effusion new from prior exam. No overt edema. No pneumothorax. Mild underlying centrilobular emphysema. Gynecomastia. No acute chest wall findings. Degenerative changes throughout the spine without acute displaced fracture or aggressive lesion. Superimposed findings suggesting diffuse idiopathic skeletal hyperostosis. CT abdomen pelvis Solid irregular nodular peripheral hepatic contour suggesting morphologic changes of chronic liver disease. Gallbladder and biliary tree unremarkable. Moderate to severely atrophic pancreas. No active inflammation. Spleen within normal limits in size. Unremarkable adrenal glands. Asymmetric enlargement of the left kidney with subtle hazy appearance of the renal parenchyma and asymmetric trace perinephric fluid. Allowing for technical  differences these findings have improved since 2/11/2025. No discrete drainable collection. No hydronephrosis. Mild bladder wall thickening. Large left posterolateral bladder diverticula which appears slightly inflamed. Presumed instrumentation related intraluminal gas. Prostate within normal limits in size. Antegrade oriented feeding tube terminates within the transverse duodenum. Rectal tube in place. Colonic diverticulosis. No evidence of bowel obstruction. Normal appendix. Aortic atherosclerotic disease without aneurysm. No suspicious adenopathy. Minimal perirectal/presacral edema. No significant ascites. No free air. Medication related injection change anterior abdominal wall. Tiny fat-containing umbilical hernia. Tiny fat-containing left inguinal hernia. Degenerative changes of the lumbar spine  with related grade 1 anterolisthesis L4 and L5. No acute displaced fracture or aggressive lesion.     Impression: Chest 1. Multifocal patchy and alveolar airspace opacities suspicious for pneumonia and/or other forms of infectious/inflammatory pneumonitis which may include atypical/viral etiologies. Small right pleural effusion. 2. Recent aspiration versus mucous plugging in the right bronchus intermedius with complete right middle lobe collapse. 3. Aortic and coronary atherosclerotic disease. 4. Cardiomegaly. 5. CT findings suggesting anemia. Abdomen/pelvis 1. Asymmetric left renal findings suspicious for persistent pyelonephritis, severity appears improved from 2/11/2025. No hydronephrosis or drainable fluid collection. 2. Possible cystitis by CT, correlate with urinalysis. 3. Morphologic changes of the liver suspicious for chronic liver disease. 4. Atrophic pancreas. 5. Colonic diverticulosis. 6. Other ancillary findings as above. Electronically Signed: Guerrero Rubio MD  3/2/2025 2:18 PM EST  Workstation ID: [id number]    CT Abdomen Pelvis Without Contrast  Result Date: 3/2/2025  CT CHEST WO CONTRAST DIAGNOSTIC, CT  ABDOMEN PELVIS WO CONTRAST Date of Exam: 3/2/2025 10:49 AM EST Indication: shortness of breath, leukocytosis. Comparison: CT chest abdomen pelvis 2/11/2025 Technique: Axial CT images were obtained of the chest, abdomen and pelvis without contrast administration.  Reconstructed coronal and sagittal images were also obtained. Automated exposure control and iterative construction methods were used. Findings: Limited exam without IV contrast, specifically assessing the vascular and solid organ structures. CT CHEST: Thyroid gross unremarkable. Right chest port catheter tip terminates within the right atrium. Aortic atherosclerotic disease without aneurysm. Aortic valve leaflet calcifications. Normal caliber main pulmonary artery. Calcified coronary atherosclerotic disease. Mitral annular calcifications. Mild stable cardiomegaly. Trace low-density pericardial fluid similar to less conspicuous to prior. Esophagus unremarkable. Hypodense cardiac blood pooling which can be seen with anemia. No suspicious mediastinal or hilar adenopathy. Debris and/or mucous plugging right bronchus intermedius extending into the right lower lobe. Complete collapse of right middle lobe with air bronchograms. Multifocal patchy and alveolar groundglass opacities new from prior exam. There is a small low-density right pleural effusion new from prior exam. No overt edema. No pneumothorax. Mild underlying centrilobular emphysema. Gynecomastia. No acute chest wall findings. Degenerative changes throughout the spine without acute displaced fracture or aggressive lesion. Superimposed findings suggesting diffuse idiopathic skeletal hyperostosis. CT abdomen pelvis Solid irregular nodular peripheral hepatic contour suggesting morphologic changes of chronic liver disease. Gallbladder and biliary tree unremarkable. Moderate to severely atrophic pancreas. No active inflammation. Spleen within normal limits in size. Unremarkable adrenal glands. Asymmetric  enlargement of the left kidney with subtle hazy appearance of the renal parenchyma and asymmetric trace perinephric fluid. Allowing for technical differences these findings have improved since 2/11/2025. No discrete drainable collection. No hydronephrosis. Mild bladder wall thickening. Large left posterolateral bladder diverticula which appears slightly inflamed. Presumed instrumentation related intraluminal gas. Prostate within normal limits in size. Antegrade oriented feeding tube terminates within the transverse duodenum. Rectal tube in place. Colonic diverticulosis. No evidence of bowel obstruction. Normal appendix. Aortic atherosclerotic disease without aneurysm. No suspicious adenopathy. Minimal perirectal/presacral edema. No significant ascites. No free air. Medication related injection change anterior abdominal wall. Tiny fat-containing umbilical hernia. Tiny fat-containing left inguinal hernia. Degenerative changes of the lumbar spine  with related grade 1 anterolisthesis L4 and L5. No acute displaced fracture or aggressive lesion.     Impression: Chest 1. Multifocal patchy and alveolar airspace opacities suspicious for pneumonia and/or other forms of infectious/inflammatory pneumonitis which may include atypical/viral etiologies. Small right pleural effusion. 2. Recent aspiration versus mucous plugging in the right bronchus intermedius with complete right middle lobe collapse. 3. Aortic and coronary atherosclerotic disease. 4. Cardiomegaly. 5. CT findings suggesting anemia. Abdomen/pelvis 1. Asymmetric left renal findings suspicious for persistent pyelonephritis, severity appears improved from 2/11/2025. No hydronephrosis or drainable fluid collection. 2. Possible cystitis by CT, correlate with urinalysis. 3. Morphologic changes of the liver suspicious for chronic liver disease. 4. Atrophic pancreas. 5. Colonic diverticulosis. 6. Other ancillary findings as above. Electronically Signed: Guerrero Rubio MD   3/2/2025 2:18 PM EST  Workstation ID: [id number]              Results for orders placed during the hospital encounter of 02/11/25    Adult Transthoracic Echo Complete w/ Color, Spectral and Contrast if Necessary Per Protocol    Interpretation Summary    Left ventricular systolic function is normal. Estimated left ventricular EF = 60%    Left ventricular wall thickness is consistent with mild concentric hypertrophy.    Mild aortic valve stenosis is present.    Aortic valve maximum pressure gradient is 23 mmHg. Aortic valve mean pressure gradient is 14 mmHg.      Plan for Follow-up of Pending Labs/Results: H/H in two days then repeat in one week   Pending Labs       Order Current Status    Magnesium In process    AFB Culture - Wash, Bronchus Preliminary result    Fungus Culture - Wash, Bronchus Preliminary result          Discharge Details        Discharge Medications        New Medications        Instructions Start Date   apixaban 5 MG tablet tablet  Commonly known as: ELIQUIS   5 mg, Oral, Every 12 Hours Scheduled      castor oil-balsam peru ointment   1 Application, Topical, Every 12 Hours Scheduled      docusate sodium 50 mg/5 mL liquid  Commonly known as: COLACE   100 mg, Oral, 2 Times Daily      nystatin 335257 UNIT/GM powder  Commonly known as: MYCOSTATIN   Topical, Every 8 Hours Scheduled      pantoprazole 40 MG EC tablet  Commonly known as: Protonix   40 mg, Oral, 2 Times Daily      QUEtiapine 25 MG tablet  Commonly known as: SEROquel   Take 1 tablet by mouth Daily AND 2 tablets Every Night.      riFAXIMin 550 MG tablet  Commonly known as: XIFAXAN   550 mg, Per G Tube, Every 12 Hours Scheduled      rosuvastatin 20 MG tablet  Commonly known as: CRESTOR   20 mg, Oral, Nightly             Changes to Medications        Instructions Start Date   gabapentin 300 MG capsule  Commonly known as: NEURONTIN  What changed:   how much to take  when to take this   300 mg, Oral, Every 8 Hours             Continue These  Medications        Instructions Start Date   dutasteride 0.5 MG capsule  Commonly known as: AVODART   0.5 mg, Daily      escitalopram 10 MG tablet  Commonly known as: LEXAPRO   10 mg, Nightly      fentaNYL 25 MCG/HR patch  Commonly known as: DURAGESIC   1 patch, Transdermal, Every 72 Hours      ferrous gluconate 324 MG tablet  Commonly known as: FERGON   324 mg, Daily With Breakfast      folic acid 1 MG tablet  Commonly known as: FOLVITE   1 mg, Daily      hydrocortisone 2.5 % cream   Apply 1 Application topically to the appropriate area as directed 2 (Two) Times a Day.      Lidocaine 4 %   1 patch, Transdermal, Every 24 Hours Scheduled, Remove & Discard patch within 12 hours or as directed by MD      Loratadine 10 MG capsule   10 mg, Oral, Daily      melatonin 5 MG tablet tablet   1-2 tablets, Oral, Nightly PRN      tamsulosin 0.4 MG capsule 24 hr capsule  Commonly known as: FLOMAX   1 capsule, Daily      vitamin B-12 1000 MCG tablet  Commonly known as: CYANOCOBALAMIN   1,000 mcg, Daily      Vitamin D3 1.25 MG (92187 UT) capsule   50,000 Units, Every 7 Days             Stop These Medications      omeprazole 40 MG capsule  Commonly known as: priLOSEC     oxyCODONE-acetaminophen 7.5-325 MG per tablet  Commonly known as: PERCOCET     senna 8.6 MG tablet  Commonly known as: SENOKOT     zolpidem 5 MG tablet  Commonly known as: AMBIEN              Allergies   Allergen Reactions    Green Beans Hives         Discharge Disposition:  Rehab Facility or Unit (DC - External)    Diet:  Hospital:  Diet Order   Procedures    Diet: Cardiac; Healthy Heart (2-3 Na+); Texture: Regular (IDDSI 7); Fluid Consistency: Thin (IDDSI 0)       Diet Instructions       Diet: Cardiac Diets; Healthy Heart (2-3 Na+); Regular (IDDSI 7); Thin (IDDSI 0)      Discharge Diet: Cardiac Diets    Cardiac Diet: Healthy Heart (2-3 Na+)    Texture: Regular (IDDSI 7)    Fluid Consistency: Thin (IDDSI 0)             Activity:  Activity Instructions        Activity as Tolerated      Measure Blood Pressure              Restrictions or Other Recommendations:         CODE STATUS:    Code Status and Medical Interventions: CPR (Attempt to Resuscitate); Full Support   Ordered at: 02/12/25 1029     Code Status (Patient has no pulse and is not breathing):    CPR (Attempt to Resuscitate)     Medical Interventions (Patient has pulse or is breathing):    Full Support     Level Of Support Discussed With:    Patient       Future Appointments   Date Time Provider Department Center   3/27/2025  1:15 PM Giovanni Marcum PA-C [facility]   5/14/2025 12:00 PM [facility]   5/15/2025 10:00 AM Иван Garrison MD [facility]   5/16/2025  2:30 PM Aristeo Hoskins MD [facility]   5/21/2025  1:45 PM Katerina Ga MD [facility]       Additional Instructions for the Follow-ups that You Need to Schedule       Discharge Follow-up with PCP   As directed       Currently Documented PCP:    Wesly Minor MD    PCP Phone Number:    [phone]     Follow Up Details: follow up with pcp after dc from rehab        Discharge Follow-up with Specified Provider: Follow up with Dr. Hoskins in 2 months   As directed      To: Follow up with Dr. Hoskins in 2 months                      Liliam Ray, NUVIA  03/12/25      Time Spent on Discharge:  I spent  45  minutes on this discharge activity which included: face-to-face encounter with the patient, reviewing the data in the system, coordination of the care with the nursing staff as well as consultants, documentation, and entering orders.

## 2025-03-12 NOTE — PROGRESS NOTES
Patient has been initiated on Apixaban (Eliquis) during admission. Education provided on 3/12/2025 and in writing. Information provided includes effects of medication, drug-drug and drug-food interactions, and signs/symptoms of bleeding and clotting. Patient declined verbal education.  He did not have any questions but accepted Eliquis education pamphlet.    Adriane Sloan, PharmD  3/12/2025  11:12 EDT'

## 2025-03-14 ENCOUNTER — HOSPITAL ENCOUNTER (OUTPATIENT)
Facility: HOSPITAL | Age: 65
Setting detail: OBSERVATION
Discharge: NURSING, REHAB OR LONG-TERM CARE | End: 2025-03-17
Attending: EMERGENCY MEDICINE | Admitting: INTERNAL MEDICINE
Payer: MEDICARE

## 2025-03-14 DIAGNOSIS — K92.2 GASTROINTESTINAL HEMORRHAGE, UNSPECIFIED GASTROINTESTINAL HEMORRHAGE TYPE: ICD-10-CM

## 2025-03-14 DIAGNOSIS — Z79.01 ANTICOAGULATED: ICD-10-CM

## 2025-03-14 DIAGNOSIS — D62 ACUTE BLOOD LOSS ANEMIA: Primary | ICD-10-CM

## 2025-03-14 DIAGNOSIS — N30.00 ACUTE CYSTITIS WITHOUT HEMATURIA: ICD-10-CM

## 2025-03-14 PROBLEM — D63.8 ANEMIA, CHRONIC DISEASE: Status: ACTIVE | Noted: 2023-12-25

## 2025-03-14 PROBLEM — F41.8 ANXIETY ASSOCIATED WITH DEPRESSION: Status: ACTIVE | Noted: 2023-12-25

## 2025-03-14 PROBLEM — N40.0 BPH WITHOUT OBSTRUCTION/LOWER URINARY TRACT SYMPTOMS: Status: ACTIVE | Noted: 2025-03-14

## 2025-03-14 PROBLEM — K74.60 CIRRHOSIS OF LIVER: Status: ACTIVE | Noted: 2025-03-14

## 2025-03-14 PROBLEM — I48.91 A-FIB: Status: ACTIVE | Noted: 2025-03-14

## 2025-03-14 LAB
ABO GROUP BLD: NORMAL
ALBUMIN SERPL-MCNC: 2.6 G/DL (ref 3.5–5.2)
ALBUMIN/GLOB SERPL: 0.6 G/DL
ALP SERPL-CCNC: 107 U/L (ref 39–117)
ALT SERPL W P-5'-P-CCNC: 22 U/L (ref 1–41)
ANION GAP SERPL CALCULATED.3IONS-SCNC: 11 MMOL/L (ref 5–15)
AST SERPL-CCNC: 25 U/L (ref 1–40)
BASOPHILS # BLD AUTO: 0.02 10*3/MM3 (ref 0–0.2)
BASOPHILS NFR BLD AUTO: 0.2 % (ref 0–1.5)
BILIRUB SERPL-MCNC: 0.3 MG/DL (ref 0–1.2)
BLD GP AB SCN SERPL QL: NEGATIVE
BUN SERPL-MCNC: 16 MG/DL (ref 8–23)
BUN/CREAT SERPL: 27.6 (ref 7–25)
CALCIUM SPEC-SCNC: 8.6 MG/DL (ref 8.6–10.5)
CHLORIDE SERPL-SCNC: 102 MMOL/L (ref 98–107)
CO2 SERPL-SCNC: 23 MMOL/L (ref 22–29)
CREAT SERPL-MCNC: 0.58 MG/DL (ref 0.76–1.27)
DEPRECATED RDW RBC AUTO: 57.7 FL (ref 37–54)
DEVELOPER EXPIRATION DATE: ABNORMAL
DEVELOPER LOT NUMBER: ABNORMAL
EGFRCR SERPLBLD CKD-EPI 2021: 108.2 ML/MIN/1.73
EOSINOPHIL # BLD AUTO: 0.25 10*3/MM3 (ref 0–0.4)
EOSINOPHIL NFR BLD AUTO: 3 % (ref 0.3–6.2)
ERYTHROCYTE [DISTWIDTH] IN BLOOD BY AUTOMATED COUNT: 16.9 % (ref 12.3–15.4)
EXPIRATION DATE: ABNORMAL
FECAL OCCULT BLOOD SCREEN, POC: POSITIVE
GLOBULIN UR ELPH-MCNC: 4.1 GM/DL
GLUCOSE SERPL-MCNC: 96 MG/DL (ref 65–99)
HCT VFR BLD AUTO: 20 % (ref 37.5–51)
HGB BLD-MCNC: 6.1 G/DL (ref 13–17.7)
HOLD SPECIMEN: NORMAL
IMM GRANULOCYTES # BLD AUTO: 0.32 10*3/MM3 (ref 0–0.05)
IMM GRANULOCYTES NFR BLD AUTO: 3.8 % (ref 0–0.5)
INR PPP: 1.84 (ref 0.89–1.12)
LYMPHOCYTES # BLD AUTO: 0.97 10*3/MM3 (ref 0.7–3.1)
LYMPHOCYTES NFR BLD AUTO: 11.6 % (ref 19.6–45.3)
Lab: ABNORMAL
MCH RBC QN AUTO: 29 PG (ref 26.6–33)
MCHC RBC AUTO-ENTMCNC: 30.5 G/DL (ref 31.5–35.7)
MCV RBC AUTO: 95.2 FL (ref 79–97)
MONOCYTES # BLD AUTO: 0.97 10*3/MM3 (ref 0.1–0.9)
MONOCYTES NFR BLD AUTO: 11.6 % (ref 5–12)
NEGATIVE CONTROL: NEGATIVE
NEUTROPHILS NFR BLD AUTO: 5.8 10*3/MM3 (ref 1.7–7)
NEUTROPHILS NFR BLD AUTO: 69.8 % (ref 42.7–76)
NRBC BLD AUTO-RTO: 0.2 /100 WBC (ref 0–0.2)
PLATELET # BLD AUTO: 239 10*3/MM3 (ref 140–450)
PMV BLD AUTO: 9 FL (ref 6–12)
POSITIVE CONTROL: POSITIVE
POTASSIUM SERPL-SCNC: 3.9 MMOL/L (ref 3.5–5.2)
PROT SERPL-MCNC: 6.7 G/DL (ref 6–8.5)
PROTHROMBIN TIME: 21.3 SECONDS (ref 12.2–14.5)
RBC # BLD AUTO: 2.1 10*6/MM3 (ref 4.14–5.8)
RH BLD: POSITIVE
SODIUM SERPL-SCNC: 136 MMOL/L (ref 136–145)
T&S EXPIRATION DATE: NORMAL
WBC NRBC COR # BLD AUTO: 8.33 10*3/MM3 (ref 3.4–10.8)
WHOLE BLOOD HOLD COAG: NORMAL
WHOLE BLOOD HOLD SPECIMEN: NORMAL

## 2025-03-14 PROCEDURE — G0316 PR PROLONG INPT EVAL ADD15 M: HCPCS | Performed by: NURSE PRACTITIONER

## 2025-03-14 PROCEDURE — 99285 EMERGENCY DEPT VISIT HI MDM: CPT

## 2025-03-14 PROCEDURE — 86900 BLOOD TYPING SEROLOGIC ABO: CPT

## 2025-03-14 PROCEDURE — G0378 HOSPITAL OBSERVATION PER HR: HCPCS

## 2025-03-14 PROCEDURE — 96374 THER/PROPH/DIAG INJ IV PUSH: CPT

## 2025-03-14 PROCEDURE — P9016 RBC LEUKOCYTES REDUCED: HCPCS

## 2025-03-14 PROCEDURE — 86900 BLOOD TYPING SEROLOGIC ABO: CPT | Performed by: EMERGENCY MEDICINE

## 2025-03-14 PROCEDURE — 36415 COLL VENOUS BLD VENIPUNCTURE: CPT

## 2025-03-14 PROCEDURE — 99291 CRITICAL CARE FIRST HOUR: CPT

## 2025-03-14 PROCEDURE — 93010 ELECTROCARDIOGRAM REPORT: CPT | Performed by: INTERNAL MEDICINE

## 2025-03-14 PROCEDURE — 36430 TRANSFUSION BLD/BLD COMPNT: CPT

## 2025-03-14 PROCEDURE — 93005 ELECTROCARDIOGRAM TRACING: CPT | Performed by: NURSE PRACTITIONER

## 2025-03-14 PROCEDURE — 85018 HEMOGLOBIN: CPT | Performed by: NURSE PRACTITIONER

## 2025-03-14 PROCEDURE — 82270 OCCULT BLOOD FECES: CPT | Performed by: EMERGENCY MEDICINE

## 2025-03-14 PROCEDURE — 85014 HEMATOCRIT: CPT | Performed by: NURSE PRACTITIONER

## 2025-03-14 PROCEDURE — 85610 PROTHROMBIN TIME: CPT | Performed by: EMERGENCY MEDICINE

## 2025-03-14 PROCEDURE — 99223 1ST HOSP IP/OBS HIGH 75: CPT | Performed by: NURSE PRACTITIONER

## 2025-03-14 PROCEDURE — 86901 BLOOD TYPING SEROLOGIC RH(D): CPT | Performed by: EMERGENCY MEDICINE

## 2025-03-14 PROCEDURE — 86923 COMPATIBILITY TEST ELECTRIC: CPT

## 2025-03-14 PROCEDURE — 85025 COMPLETE CBC W/AUTO DIFF WBC: CPT | Performed by: EMERGENCY MEDICINE

## 2025-03-14 PROCEDURE — 86850 RBC ANTIBODY SCREEN: CPT | Performed by: EMERGENCY MEDICINE

## 2025-03-14 PROCEDURE — 80053 COMPREHEN METABOLIC PANEL: CPT | Performed by: EMERGENCY MEDICINE

## 2025-03-14 RX ORDER — GABAPENTIN 300 MG/1
300 CAPSULE ORAL EVERY 8 HOURS SCHEDULED
Status: DISCONTINUED | OUTPATIENT
Start: 2025-03-14 | End: 2025-03-17 | Stop reason: HOSPADM

## 2025-03-14 RX ORDER — HEPARIN SODIUM (PORCINE) LOCK FLUSH IV SOLN 100 UNIT/ML 100 UNIT/ML
5 SOLUTION INTRAVENOUS AS NEEDED
Status: DISCONTINUED | OUTPATIENT
Start: 2025-03-14 | End: 2025-03-17 | Stop reason: HOSPADM

## 2025-03-14 RX ORDER — PANTOPRAZOLE SODIUM 40 MG/10ML
40 INJECTION, POWDER, LYOPHILIZED, FOR SOLUTION INTRAVENOUS EVERY 12 HOURS SCHEDULED
Status: DISCONTINUED | OUTPATIENT
Start: 2025-03-14 | End: 2025-03-17

## 2025-03-14 RX ORDER — ACETAMINOPHEN 325 MG/1
650 TABLET ORAL EVERY 4 HOURS PRN
Status: DISCONTINUED | OUTPATIENT
Start: 2025-03-14 | End: 2025-03-17 | Stop reason: HOSPADM

## 2025-03-14 RX ORDER — FERROUS SULFATE 325(65) MG
325 TABLET ORAL
Status: DISCONTINUED | OUTPATIENT
Start: 2025-03-15 | End: 2025-03-17 | Stop reason: HOSPADM

## 2025-03-14 RX ORDER — CETIRIZINE HYDROCHLORIDE 10 MG/1
10 TABLET ORAL DAILY
Status: DISCONTINUED | OUTPATIENT
Start: 2025-03-14 | End: 2025-03-17 | Stop reason: HOSPADM

## 2025-03-14 RX ORDER — ESCITALOPRAM OXALATE 10 MG/1
10 TABLET ORAL NIGHTLY
Status: DISCONTINUED | OUTPATIENT
Start: 2025-03-14 | End: 2025-03-17 | Stop reason: HOSPADM

## 2025-03-14 RX ORDER — PANTOPRAZOLE SODIUM 40 MG/1
40 TABLET, DELAYED RELEASE ORAL 2 TIMES DAILY
Status: CANCELLED | OUTPATIENT
Start: 2025-03-14

## 2025-03-14 RX ORDER — QUETIAPINE FUMARATE 25 MG/1
25 TABLET, FILM COATED ORAL DAILY
Status: DISCONTINUED | OUTPATIENT
Start: 2025-03-14 | End: 2025-03-17 | Stop reason: HOSPADM

## 2025-03-14 RX ORDER — SODIUM CHLORIDE 0.9 % (FLUSH) 0.9 %
10 SYRINGE (ML) INJECTION AS NEEDED
Status: DISCONTINUED | OUTPATIENT
Start: 2025-03-14 | End: 2025-03-17 | Stop reason: HOSPADM

## 2025-03-14 RX ORDER — TAMSULOSIN HYDROCHLORIDE 0.4 MG/1
0.4 CAPSULE ORAL DAILY
Status: DISCONTINUED | OUTPATIENT
Start: 2025-03-14 | End: 2025-03-17 | Stop reason: HOSPADM

## 2025-03-14 RX ORDER — GABAPENTIN 300 MG/1
300 CAPSULE ORAL 3 TIMES DAILY
Status: DISCONTINUED | OUTPATIENT
Start: 2025-03-14 | End: 2025-03-14 | Stop reason: SDUPTHER

## 2025-03-14 RX ORDER — NITROGLYCERIN 0.4 MG/1
0.4 TABLET SUBLINGUAL
Status: DISCONTINUED | OUTPATIENT
Start: 2025-03-14 | End: 2025-03-17 | Stop reason: HOSPADM

## 2025-03-14 RX ORDER — SODIUM CHLORIDE 9 MG/ML
40 INJECTION, SOLUTION INTRAVENOUS AS NEEDED
Status: DISCONTINUED | OUTPATIENT
Start: 2025-03-14 | End: 2025-03-17 | Stop reason: HOSPADM

## 2025-03-14 RX ORDER — FOLIC ACID 1 MG/1
1 TABLET ORAL DAILY
Status: DISCONTINUED | OUTPATIENT
Start: 2025-03-14 | End: 2025-03-17 | Stop reason: HOSPADM

## 2025-03-14 RX ORDER — OXYCODONE AND ACETAMINOPHEN 7.5; 325 MG/1; MG/1
1 TABLET ORAL EVERY 4 HOURS PRN
Status: DISCONTINUED | OUTPATIENT
Start: 2025-03-14 | End: 2025-03-17 | Stop reason: HOSPADM

## 2025-03-14 RX ORDER — BISACODYL 5 MG/1
5 TABLET, DELAYED RELEASE ORAL DAILY PRN
Status: DISCONTINUED | OUTPATIENT
Start: 2025-03-14 | End: 2025-03-17 | Stop reason: HOSPADM

## 2025-03-14 RX ORDER — AMOXICILLIN 250 MG
2 CAPSULE ORAL 2 TIMES DAILY PRN
Status: DISCONTINUED | OUTPATIENT
Start: 2025-03-14 | End: 2025-03-17 | Stop reason: HOSPADM

## 2025-03-14 RX ORDER — ACETAMINOPHEN 160 MG/5ML
650 SOLUTION ORAL EVERY 4 HOURS PRN
Status: DISCONTINUED | OUTPATIENT
Start: 2025-03-14 | End: 2025-03-17 | Stop reason: HOSPADM

## 2025-03-14 RX ORDER — LIDOCAINE 4 G/G
1 PATCH TOPICAL
Status: DISCONTINUED | OUTPATIENT
Start: 2025-03-14 | End: 2025-03-17 | Stop reason: HOSPADM

## 2025-03-14 RX ORDER — OXYCODONE AND ACETAMINOPHEN 7.5; 325 MG/1; MG/1
1 TABLET ORAL EVERY 4 HOURS PRN
COMMUNITY
End: 2025-03-17 | Stop reason: HOSPADM

## 2025-03-14 RX ORDER — ROSUVASTATIN CALCIUM 20 MG/1
20 TABLET, COATED ORAL NIGHTLY
Status: DISCONTINUED | OUTPATIENT
Start: 2025-03-14 | End: 2025-03-17 | Stop reason: HOSPADM

## 2025-03-14 RX ORDER — QUETIAPINE FUMARATE 25 MG/1
50 TABLET, FILM COATED ORAL NIGHTLY
Status: DISCONTINUED | OUTPATIENT
Start: 2025-03-14 | End: 2025-03-17 | Stop reason: HOSPADM

## 2025-03-14 RX ORDER — FINASTERIDE 5 MG/1
5 TABLET, FILM COATED ORAL DAILY
Status: DISCONTINUED | OUTPATIENT
Start: 2025-03-15 | End: 2025-03-17 | Stop reason: HOSPADM

## 2025-03-14 RX ORDER — BISACODYL 10 MG
10 SUPPOSITORY, RECTAL RECTAL DAILY PRN
Status: DISCONTINUED | OUTPATIENT
Start: 2025-03-14 | End: 2025-03-17 | Stop reason: HOSPADM

## 2025-03-14 RX ORDER — SODIUM CHLORIDE 0.9 % (FLUSH) 0.9 %
10 SYRINGE (ML) INJECTION EVERY 12 HOURS SCHEDULED
Status: DISCONTINUED | OUTPATIENT
Start: 2025-03-14 | End: 2025-03-17 | Stop reason: HOSPADM

## 2025-03-14 RX ADMIN — Medication 10 ML: at 22:40

## 2025-03-14 RX ADMIN — QUETIAPINE FUMARATE 25 MG: 25 TABLET ORAL at 19:24

## 2025-03-14 RX ADMIN — LIDOCAINE 1 PATCH: 4 PATCH TOPICAL at 19:23

## 2025-03-14 RX ADMIN — QUETIAPINE FUMARATE 50 MG: 25 TABLET ORAL at 22:37

## 2025-03-14 RX ADMIN — RIFAXIMIN 550 MG: 550 TABLET ORAL at 22:37

## 2025-03-14 RX ADMIN — FOLIC ACID 1 MG: 1 TABLET ORAL at 19:23

## 2025-03-14 RX ADMIN — PANTOPRAZOLE SODIUM 40 MG: 40 INJECTION, POWDER, FOR SOLUTION INTRAVENOUS at 22:40

## 2025-03-14 RX ADMIN — TAMSULOSIN HYDROCHLORIDE 0.4 MG: 0.4 CAPSULE ORAL at 19:23

## 2025-03-14 RX ADMIN — ESCITALOPRAM OXALATE 10 MG: 10 TABLET ORAL at 22:37

## 2025-03-14 RX ADMIN — CETIRIZINE HYDROCHLORIDE 10 MG: 10 TABLET, FILM COATED ORAL at 19:23

## 2025-03-14 RX ADMIN — ROSUVASTATIN 20 MG: 20 TABLET, FILM COATED ORAL at 22:37

## 2025-03-14 NOTE — PLAN OF CARE
Problem: Adult Inpatient Plan of Care  Goal: Plan of Care Review  Outcome: Progressing  Goal: Patient-Specific Goal (Individualized)  Outcome: Progressing  Goal: Absence of Hospital-Acquired Illness or Injury  Outcome: Progressing  Intervention: Identify and Manage Fall Risk  Recent Flowsheet Documentation  Taken 3/14/2025 1843 by Christina Oconnor RN  Safety Promotion/Fall Prevention:   activity supervised   room organization consistent   safety round/check completed  Taken 3/14/2025 1700 by Christina Oconnor RN  Safety Promotion/Fall Prevention:   activity supervised   room organization consistent   safety round/check completed  Intervention: Prevent Skin Injury  Recent Flowsheet Documentation  Taken 3/14/2025 1843 by Christina Oconnor RN  Body Position: position changed independently  Taken 3/14/2025 1700 by Christina Oconnor RN  Body Position: position changed independently  Skin Protection: incontinence pads utilized  Goal: Optimal Comfort and Wellbeing  Outcome: Progressing  Goal: Readiness for Transition of Care  Outcome: Progressing     Problem: Skin Injury Risk Increased  Goal: Skin Health and Integrity  Outcome: Progressing  Intervention: Optimize Skin Protection  Recent Flowsheet Documentation  Taken 3/14/2025 1843 by Christina Oconnor RN  Activity Management: activity encouraged  Head of Bed (HOB) Positioning: HOB elevated  Taken 3/14/2025 1700 by Christina Oconnor RN  Activity Management: activity encouraged  Pressure Reduction Techniques:   weight shift assistance provided   frequent weight shift encouraged  Head of Bed (HOB) Positioning: HOB elevated  Pressure Reduction Devices: pressure-redistributing mattress utilized  Skin Protection: incontinence pads utilized   Goal Outcome Evaluation:      Pt resting in bed, 1 unit of blood running. RA, NSR on tele. Safety precautions utilized and maintained.

## 2025-03-14 NOTE — ED PROVIDER NOTES
Subjective   History of Present Illness    Pt presents for anemia.  EMS reports they were called out for hemoglobin 7.1.  Patient denies dyspnea, lightheadedness, abd pain, black/bloody stool.  He was recently admitted for infection.  He says he does not feel worse than usual today and did not think he needed to come.    EMS also says they were told he had brief confusion last night.  Patient denies feeling confused.    I reviewed outside records. Discharge summary 3/12/25 admission for septic shock secondary to Pseudomonas, notes alcoholic cirrhosis, SCC of esophagus.  He had anemia in the hospital and was taken for EGD, some telangiectasias were treated with argon laser.  Eliquis for AF was restarted.  Hemoglobin was 7.6 at discharge, and last several checks in the hospital ranged from 7.4 to 8.1.    History provided by:  Patient, EMS personnel and medical records      Review of Systems    Past Medical History:   Diagnosis Date    Anemia     Cancer     ESOPHAGEAL    Cirrhosis     Duodenal ulcer     Gastric ulcer     GERD (gastroesophageal reflux disease)     History of alcohol abuse     History of radiation therapy 05/08/2024    esophagus    HLD (hyperlipidemia)     Mood disorder        Allergies   Allergen Reactions    Green Beans Hives       Past Surgical History:   Procedure Laterality Date    ENDOSCOPY N/A 12/26/2023    Procedure: ESOPHAGOGASTRODUODENOSCOPY;  Surgeon: Wesly Mckeon MD;  Location:  [facility];  Service: Gastroenterology;  Laterality: N/A;    ENDOSCOPY N/A 3/3/2025    Procedure: ESOPHAGOGASTRODUODENOSCOPY;  Surgeon: Wesly Mckeon MD;  Location:  [facility];  Service: Gastroenterology;  Laterality: N/A;  APC USED IN ESOPHAGUS.    VENOUS ACCESS DEVICE (PORT) INSERTION Right 04/25/2024       Family History   Problem Relation Age of Onset    Cancer Mother         LUNG AND BREAST    Breast cancer Mother     Heart attack Father        Social History     Socioeconomic History    Marital  status: Single   Tobacco Use    Smoking status: Every Day     Current packs/day: 1.00     Average packs/day: 1 pack/day for 15.0 years (15.0 ttl pk-yrs)     Types: Cigarettes     Passive exposure: Current    Smokeless tobacco: Current   Vaping Use    Vaping status: Every Day    Substances: Nicotine, Flavoring    Devices: Disposable   Substance and Sexual Activity    Alcohol use: Yes     Alcohol/week: 4.0 standard drinks of alcohol     Types: 4 Cans of beer per week     Comment: pt states he drinks 3-4 beers a day a couple times a week    Drug use: Yes     Types: Hydrocodone    Sexual activity: Not Currently     Partners: Female           Objective   Physical Exam  Vitals and nursing note reviewed.   Constitutional:       General: He is not in acute distress.     Appearance: Normal appearance. He is not ill-appearing.   HENT:      Head: Normocephalic and atraumatic.      Mouth/Throat:      Mouth: Mucous membranes are moist.   Eyes:      General: No scleral icterus.        Right eye: No discharge.         Left eye: No discharge.      Conjunctiva/sclera: Conjunctivae normal.   Cardiovascular:      Rate and Rhythm: Normal rate and regular rhythm.      Heart sounds: No murmur heard.  Pulmonary:      Effort: Pulmonary effort is normal. No respiratory distress.      Breath sounds: Normal breath sounds. No wheezing.   Abdominal:      General: Bowel sounds are normal. There is no distension.      Palpations: Abdomen is soft.      Tenderness: There is no abdominal tenderness. There is no guarding or rebound.   Musculoskeletal:         General: No swelling. Normal range of motion.      Cervical back: Normal range of motion and neck supple.   Skin:     General: Skin is warm and dry.      Findings: No rash.   Neurological:      General: No focal deficit present.      Mental Status: He is alert and oriented to person, place, and time. Mental status is at baseline.   Psychiatric:         Mood and Affect: Mood normal.          "Behavior: Behavior normal.         Thought Content: Thought content normal.         Critical Care    Performed by: Mehul Ferreira MD  Authorized by: Mehul Ferreira MD    Critical care provider statement:     Critical care time (minutes):  35    Critical care time was exclusive of:  Separately billable procedures and treating other patients    Critical care was necessary to treat or prevent imminent or life-threatening deterioration of the following conditions: GI bleeding, anticoagulated.    Critical care was time spent personally by me on the following activities:  Evaluation of patient's response to treatment, examination of patient, ordering and review of laboratory studies and review of old charts    I assumed direction of critical care for this patient from another provider in my specialty: no      Care discussed with: admitting provider               ED Course   Per discharge summary 3/12/25: \"Serial H&H with resumption of Eliquis hgb stable but will need HH check in couple days and one week. If he has clinical bleeding or drops in his hemoglobin eliquis should be discontinued and cardiology will explore left artrial occluder options at that time \".  Cardiology note 3/11/25 \"If he does start to have clinical bleeding or drops in his hemoglobin, Eliquis should be discontinued and would explore left atrial occluder options at that time \".  Chads-vasc 3 per cardiology.    Labs notable for Hb 6.1, a drop from a few days ago.  Otherwise benign.  FOBT positive.    Blood transfusion ordered for acute blood loss anemia in anticoagulated patient.    Patient monitored on serial rechecks.  Discussed exam findings, test results so far and concerns in detail at the bedside.  Discussed need for admission for further evaluation and treatment.  I discussed the patient's presentation, test results and need for admission with the hospitalist.                                                             Medical " Decision Making  Problems Addressed:  Acute blood loss anemia: complicated acute illness or injury with systemic symptoms that poses a threat to life or bodily functions  Anticoagulated: chronic illness or injury with exacerbation, progression, or side effects of treatment  Gastrointestinal hemorrhage, unspecified gastrointestinal hemorrhage type: complicated acute illness or injury    Amount and/or Complexity of Data Reviewed  External Data Reviewed: notes.  Labs: ordered. Decision-making details documented in ED Course.  Discussion of management or test interpretation with external provider(s): Hospitalist     Risk  Prescription drug management.  Decision regarding hospitalization.    Critical Care  Total time providing critical care: 35 minutes        Final diagnoses:   Acute blood loss anemia   Gastrointestinal hemorrhage, unspecified gastrointestinal hemorrhage type   Anticoagulated       ED Disposition  ED Disposition       ED Disposition   Decision to Admit    Condition   --    Comment   Level of Care: Telemetry [5]   Diagnosis: GI bleed [049448]   Admitting Physician: KALLIE DING [[id number]]   Attending Physician: KALLIE DING [[id number]]   Is patient appropriate for Inpatient Observation Unit?: Yes [1]                 No follow-up provider specified.       Medication List      No changes were made to your prescriptions during this visit.            Mehul Ferreira MD  03/14/25 3770

## 2025-03-14 NOTE — ED NOTES
Val Nassar Jr.    Nursing Report ED to Floor:  Mental status:GCS 15  Ambulatory status: Pt states up independently; up with 1 assist per RN  Oxygen Therapy:  RA  Cardiac Rhythm: Normal sinus  Admitted from: [place] [facility]  Safety Concerns:  Fall risk  Precautions: N/A  Social Issues: N/A  ED Room #:  08    ED Nurse Phone Extension - [phone] or may call [phone].      HPI:   Chief Complaint   Patient presents with    Abnormal Lab       Past Medical History:  Past Medical History:   Diagnosis Date    Anemia     Cancer     ESOPHAGEAL    Cirrhosis     Duodenal ulcer     Gastric ulcer     GERD (gastroesophageal reflux disease)     History of alcohol abuse     History of radiation therapy 05/08/2024    esophagus    HLD (hyperlipidemia)     Mood disorder         Past Surgical History:  Past Surgical History:   Procedure Laterality Date    ENDOSCOPY N/A 12/26/2023    Procedure: ESOPHAGOGASTRODUODENOSCOPY;  Surgeon: Wesly Mckeon MD;  Location:  [facility];  Service: Gastroenterology;  Laterality: N/A;    ENDOSCOPY N/A 3/3/2025    Procedure: ESOPHAGOGASTRODUODENOSCOPY;  Surgeon: Wesly Mckeon MD;  Location: [facility];  Service: Gastroenterology;  Laterality: N/A;  APC USED IN ESOPHAGUS.    VENOUS ACCESS DEVICE (PORT) INSERTION Right 04/25/2024        Admitting Doctor:   Pauly Watkins MD    Consulting Provider(s):  Consults       Date and Time Order Name Status Description    2/28/2025  9:40 AM Inpatient Gastroenterology Consult Completed     2/27/2025  1:29 AM Inpatient Cardiology Consult Completed              Admitting Diagnosis:   The primary encounter diagnosis was Acute blood loss anemia. A diagnosis of Gastrointestinal hemorrhage, unspecified gastrointestinal hemorrhage type was also pertinent to this visit.    Most Recent Vitals:   Vitals:    03/14/25 1429 03/14/25 1430 03/14/25 1445 03/14/25 1500   BP: (!) 89/58  103/60 95/52   BP Location:       Patient Position:       Pulse:  74 79 69    Resp:       Temp:       TempSrc:       SpO2:  94% 94% 94%   Weight:       Height:           Active LDAs/IV Access:   Lines, Drains & Airways       Active LDAs       Name Placement date Placement time Site Days    Single Lumen Implantable Port 03/14/25 Right Subclavian 03/14/25  unknown  1450  unknown  Subclavian  less than 1                    Labs (abnormal labs have a star):   Labs Reviewed   COMPREHENSIVE METABOLIC PANEL - Abnormal; Notable for the following components:       Result Value    Creatinine 0.58 (*)     Albumin 2.6 (*)     BUN/Creatinine Ratio 27.6 (*)     All other components within normal limits    Narrative:     GFR Categories in Chronic Kidney Disease (CKD)      GFR Category          GFR (mL/min/1.73)    Interpretation  G1                     90 or greater         Normal or high (1)  G2                      60-89                Mild decrease (1)  G3a                   45-59                Mild to moderate decrease  G3b                   30-44                Moderate to severe decrease  G4                    15-29                Severe decrease  G5                    14 or less           Kidney failure          (1)In the absence of evidence of kidney disease, neither GFR category G1 or G2 fulfill the criteria for CKD.    eGFR calculation 2021 CKD-EPI creatinine equation, which does not include race as a factor   PROTIME-INR - Abnormal; Notable for the following components:    Protime 21.3 (*)     INR 1.84 (*)     All other components within normal limits   CBC WITH AUTO DIFFERENTIAL - Abnormal; Notable for the following components:    RBC 2.10 (*)     Hemoglobin 6.1 (*)     Hematocrit 20.0 (*)     MCHC 30.5 (*)     RDW 16.9 (*)     RDW-SD 57.7 (*)     Lymphocyte % 11.6 (*)     Immature Grans % 3.8 (*)     Monocytes, Absolute 0.97 (*)     Immature Grans, Absolute 0.32 (*)     All other components within normal limits   POCT OCCULT BLOOD STOOL - Abnormal; Notable for the following components:     Fecal Occult Blood Positive (*)     All other components within normal limits   RAINBOW DRAW    Narrative:     The following orders were created for panel order [facility] Draw.  Procedure                               Abnormality         Status                     ---------                               -----------         ------                     Green Top (Gel)[[id number]]                                  Final result               Lavender Top[[id number]]                                     Final result               Gold Top - SST[[id number]]                                   Final result               Laws Top[[id number]]                                         Final result               Light Blue Top[[id number]]                                   Final result                 Please view results for these tests on the individual orders.   TYPE AND SCREEN   PREPARE RBC   CBC AND DIFFERENTIAL    Narrative:     The following orders were created for panel order CBC & Differential.  Procedure                               Abnormality         Status                     ---------                               -----------         ------                     CBC Auto Differential[[id number]]        Abnormal            Final result                 Please view results for these tests on the individual orders.   GREEN TOP   LAVENDER TOP   GOLD TOP - SST   GRAY TOP   LIGHT BLUE TOP       Meds Given in ED:   Medications   sodium chloride 0.9 % flush 10 mL (has no administration in time range)           Last NIH score:                                                          Dysphagia screening results:  Patient Factors Component (Dysphagia:Stroke or Rule-out)  Best Eye Response: 4-->(E4) spontaneous (03/14/25 1431)  Best Motor Response: 6-->(M6) obeys commands (03/14/25 1431)  Best Verbal Response: 5-->(V5) oriented (03/14/25 1431)  Candi Coma Scale Score: 15 (03/14/25 1431)     Candi Coma Scale:  No data recorded     CIWA:         Restraint Type:            Isolation Status:  No active isolations

## 2025-03-14 NOTE — H&P
[facility] Medicine Services  HISTORY AND PHYSICAL    Patient Name: Val Nassar Jr.  : 1960  MRN: [id number]  Primary Care Physician: Wesly Minor MD  Date of admission: 3/14/2025    Subjective   Subjective     Chief Complaint:  Generalized weakness, dizziness with standing, black stools    HPI:  Val Nassar Jr. is a 64 y.o. male PMH alcohol use, tobacco smoking, BPH (I/O cath QUD), anxiety, depression, Afib (eliquis), anemia, cirrhosis of liver presenting with generalized weakness, dizziness with standing and black stools that started yesterday. Hgb found at 6.1. Plan to type and screen and transfuse 2 units of PRBCs.    Review of Systems   Constitutional:  Positive for activity change, appetite change and fatigue. Negative for chills, diaphoresis and fever.   HENT: Negative.     Eyes: Negative.    Respiratory: Negative.     Cardiovascular: Negative.    Gastrointestinal: Negative.    Endocrine: Negative.    Genitourinary:  Positive for difficulty urinating.        Pt I/O cath QID   Musculoskeletal: Negative.    Skin:  Positive for pallor.   Allergic/Immunologic: Negative.    Neurological:  Positive for dizziness and light-headedness.   Hematological: Negative.    Psychiatric/Behavioral: Negative.          Personal History     Past Medical History:   Diagnosis Date    Anemia     Cancer     ESOPHAGEAL    Cirrhosis     Duodenal ulcer     Gastric ulcer     GERD (gastroesophageal reflux disease)     History of alcohol abuse     History of radiation therapy 2024    esophagus    HLD (hyperlipidemia)     Mood disorder          Oncology Problem List:  Malignant neoplasm of upper third of esophagus (2024; Status:   Active)  Squamous cell carcinoma of esophagus (2024; Status: Active)    Oncology/Hematology History   Squamous cell carcinoma of esophagus   1/3/2024 Initial Diagnosis    Squamous cell carcinoma of esophagus     3/27/2024 -  5/8/2024 Radiation    Radiation OncologyTreatment Course:  Val Nassar Jr. received 5400 cGy in 30 fractions to esophagus via External Beam Radiation - EBRT.     4/29/2024 -  Chemotherapy    OP CENTRAL VENOUS ACCESS DEVICE Access, Care, and Maintenance (CVAD)     Malignant neoplasm of upper third of esophagus   3/14/2024 Initial Diagnosis    Malignant neoplasm of upper third of esophagus     3/28/2024 -  Chemotherapy    OP SUPPORTIVE Iron Sucrose (Venofer) 200 MG     4/2/2024 -  Chemotherapy    OP LUNG PACLitaxel / CARBOplatin AUC=2 (Weekly) + XRT      4/29/2024 -  Chemotherapy    OP CENTRAL VENOUS ACCESS DEVICE Access, Care, and Maintenance (CVAD)         Past Surgical History:   Procedure Laterality Date    ENDOSCOPY N/A 12/26/2023    Procedure: ESOPHAGOGASTRODUODENOSCOPY;  Surgeon: Wesly Mckeon MD;  Location:  [facility];  Service: Gastroenterology;  Laterality: N/A;    ENDOSCOPY N/A 3/3/2025    Procedure: ESOPHAGOGASTRODUODENOSCOPY;  Surgeon: Wesly Mckeon MD;  Location:  [facility];  Service: Gastroenterology;  Laterality: N/A;  APC USED IN ESOPHAGUS.    VENOUS ACCESS DEVICE (PORT) INSERTION Right 04/25/2024       Family History:  family history includes Breast cancer in his mother; Cancer in his mother; Heart attack in his father.     Social History:  reports that he has been smoking cigarettes. He has a 15 pack-year smoking history. He has been exposed to tobacco smoke. He uses smokeless tobacco. He reports current alcohol use of about 4.0 standard drinks of alcohol per week. He reports current drug use. Drug: Hydrocodone.  Social History     Social History Narrative    Not on file       Medications:  Benzalkonium Chloride, Dimethicone, Lidocaine, Loratadine, QUEtiapine, Vitamin D3, Zinc Oxide, apixaban, dimethicone, docusate sodium, dutasteride, escitalopram, ferrous gluconate, folic acid, gabapentin, melatonin, oxyCODONE-acetaminophen, pantoprazole, riFAXIMin, rosuvastatin,  tamsulosin, and vitamin B-12    Allergies   Allergen Reactions    Green Beans Hives       Objective   Objective     Vital Signs:   Temp:  [98.4 °F (36.9 °C)] 98.4 °F (36.9 °C)  Heart Rate:  [68-79] 68  Resp:  [18] 18  BP: ()/(50-61) 90/50    Physical Exam  Constitutional:       General: He is not in acute distress.  HENT:      Head: Normocephalic and atraumatic.      Mouth/Throat:      Mouth: Mucous membranes are moist.   Eyes:      Conjunctiva/sclera: Conjunctivae normal.   Cardiovascular:      Rate and Rhythm: Normal rate and regular rhythm.      Heart sounds: Murmur heard.      No friction rub. No gallop.      Comments: Port to right chest wall.   Pulmonary:      Effort: Pulmonary effort is normal.      Breath sounds: Normal breath sounds.   Abdominal:      General: Bowel sounds are normal. There is no distension.      Tenderness: There is no abdominal tenderness.   Musculoskeletal:      Right lower leg: No edema.      Left lower leg: No edema.   Skin:     General: Skin is warm and dry.      Coloration: Skin is pale.   Neurological:      General: No focal deficit present.      Mental Status: He is alert and oriented to person, place, and time.   Psychiatric:         Mood and Affect: Mood normal.         Behavior: Behavior normal.          Result Review:  I have personally reviewed the results from the time of this admission to 3/14/2025 16:03 EDT and agree with these findings:  [x]  Laboratory list / accordion  []  Microbiology  [x]  Radiology  [x]  EKG/Telemetry   [x]  Cardiology/Vascular   []  Pathology  [x]  Old records  []  Other:      LAB RESULTS:      Lab 03/14/25  1431 03/12/25  0729 03/12/25  0046 03/11/25  1738 03/11/25  0512 03/10/25  0406 03/08/25 2022 03/08/25  0853   WBC 8.33  --  10.30  --  8.30 10.12  --  11.62*   HEMOGLOBIN 6.1* 7.6* 7.7*  7.7* 7.4* 8.1* 7.9*   < > 9.3*   HEMATOCRIT 20.0* 25.1* 25.2*  25.2* 24.0* 26.7* 26.5*   < > 30.9*   PLATELETS 239  --  249  --  253 272  --  239    NEUTROS ABS 5.80  --   --   --  5.97  --   --  9.08*   IMMATURE GRANS (ABS) 0.32*  --   --   --  0.12*  --   --  0.13*   LYMPHS ABS 0.97  --   --   --  0.95  --   --  0.86   MONOS ABS 0.97*  --   --   --  0.84  --   --  0.97*   EOS ABS 0.25  --   --   --  0.40  --   --  0.54*   MCV 95.2  --  96.2  --  96.4 97.4*  --  97.5*   PROTIME 21.3*  --   --   --   --   --   --   --     < > = values in this interval not displayed.         Lab 03/14/25  1431 03/12/25  0729 03/12/25  0046 03/11/25  1738 03/11/25  0512 03/10/25  0406 03/09/25  0842 03/08/25 2022 03/08/25  0853   SODIUM 136  --  136  --  142 142  --   --  147*   POTASSIUM 3.9  --  4.3 4.1 3.6 3.9  --    < > 3.5   CHLORIDE 102  --  105  --  108* 111*  --   --  116*   CO2 23.0  --  22.0  --  22.0 20.0*  --   --  21.0*   ANION GAP 11.0  --  9.0  --  12.0 11.0  --   --  10.0   BUN 16  --  6*  --  6* 10  --   --  17   CREATININE 0.58*  --  0.65*  --  0.63* 0.81  --   --  0.68*   EGFR 108.2  --  104.6  --  105.6 97.8  --   --  103.2   GLUCOSE 96  --  97  --  83 93  --   --  120*   CALCIUM 8.6  --  8.3*  --  8.6 8.3*  --   --  9.1   MAGNESIUM  --  2.8* 1.6  --  1.8 1.9 1.6  --  1.6   PHOSPHORUS  --   --   --   --   --   --   --   --  2.7    < > = values in this interval not displayed.         Lab 03/14/25  1431 03/12/25  0046 03/10/25  0406   TOTAL PROTEIN 6.7 6.9 6.5   ALBUMIN 2.6* 2.5* 2.3*   GLOBULIN 4.1 4.4 4.2   ALT (SGPT) 22 30 25   AST (SGOT) 25 38 29   BILIRUBIN 0.3 0.3 0.4   ALK PHOS 107 133* 117         Lab 03/14/25  1431   PROTIME 21.3*   INR 1.84*             Lab 03/14/25  1450 03/11/25  1738 03/11/25  1738 03/11/25  0512   IRON  --   --   --  26*   IRON SATURATION (TSAT)  --   --   --  10*   TIBC  --   --   --  262*   TRANSFERRIN  --   --   --  176*   FERRITIN  --   --   --  171.20   ABO TYPING A   < > A  --    RH TYPING Positive   < > Positive  --    ANTIBODY SCREEN Negative  --  Negative  --     < > = values in this interval not displayed.              Microbiology Results (last 10 days)       ** No results found for the last 240 hours. **            No radiology results from the last 24 hrs    Results for orders placed during the hospital encounter of 02/11/25    Adult Transthoracic Echo Complete w/ Color, Spectral and Contrast if Necessary Per Protocol    Interpretation Summary    Left ventricular systolic function is normal. Estimated left ventricular EF = 60%    Left ventricular wall thickness is consistent with mild concentric hypertrophy.    Mild aortic valve stenosis is present.    Aortic valve maximum pressure gradient is 23 mmHg. Aortic valve mean pressure gradient is 14 mmHg.      Assessment & Plan   Assessment & Plan       GI bleed    Anemia, chronic disease    Anxiety associated with depression    GERD without esophagitis    A-fib    Cirrhosis of liver    BPH without obstruction/lower urinary tract symptoms    Hospital Course to date:   Val Nassar Jr. is a 64 y.o. male PMH alcohol use, tobacco smoking, BPH (I/O cath QUD), anxiety, depression, Afib (eliquis), anemia, cirrhosis of liver presenting with generalized weakness, dizziness with standing and black stools that started yesterday. Hgb found at 6.1. Plan to type and screen and transfuse 2 units of PRBCs.    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  - Consider GI consult if felt appropriate.     Afib  - Holding eliquis  - consult cards    Etoh Cirrhosis of liver  - INR 1.84  - rifaximin BID  - albumin 2.6  - pt no longer drinks alcohol   - lactulose refused  by patient    BPH  - tamlusion   - avodart  - I/O cath QID    Tobacco use- quit 3 months ago.   - Has not smoked in 3 months and states he doesn't need a patch.     Depression/anxiety  - lexapro  - seroquel  - EKG to assess Qt/Qtc    Chronic low back pain  - gabapentin      Total time spent: 90 min  Time spent includes time reviewing chart, face-to-face time, counseling patient/family/caregiver, ordering medications/tests/procedures, communicating with other health care professionals, documenting clinical information in the electronic health record, and coordination of care.       DVT prophylaxis:  SCDs    CODE STATUS:    Code Status (Patient has no pulse and is not breathing): CPR (Attempt to Resuscitate)  Medical Interventions (Patient has pulse or is breathing): Full Support  Level Of Support Discussed With: Patient      Expected Discharge 03/16/2025.     Signature: Electronically signed by NUVIA Morgan, 03/14/25, 3:47 PM EDT.    Patient seen briefly.  He sleeping, comfortably, hemodynamically stable, chronically ill, agree with above.  Pauly Watkins MD 03/14/25 23:54 EDT

## 2025-03-15 LAB
ANION GAP SERPL CALCULATED.3IONS-SCNC: 11 MMOL/L (ref 5–15)
BASOPHILS # BLD AUTO: 0.04 10*3/MM3 (ref 0–0.2)
BASOPHILS NFR BLD AUTO: 0.4 % (ref 0–1.5)
BH BB BLOOD EXPIRATION DATE: NORMAL
BH BB BLOOD EXPIRATION DATE: NORMAL
BH BB BLOOD TYPE BARCODE: 6200
BH BB BLOOD TYPE BARCODE: 6200
BH BB DISPENSE STATUS: NORMAL
BH BB DISPENSE STATUS: NORMAL
BH BB PRODUCT CODE: NORMAL
BH BB PRODUCT CODE: NORMAL
BH BB UNIT NUMBER: NORMAL
BH BB UNIT NUMBER: NORMAL
BUN SERPL-MCNC: 14 MG/DL (ref 8–23)
BUN/CREAT SERPL: 20.3 (ref 7–25)
CALCIUM SPEC-SCNC: 8.4 MG/DL (ref 8.6–10.5)
CHLORIDE SERPL-SCNC: 104 MMOL/L (ref 98–107)
CO2 SERPL-SCNC: 23 MMOL/L (ref 22–29)
CREAT SERPL-MCNC: 0.69 MG/DL (ref 0.76–1.27)
CROSSMATCH INTERPRETATION: NORMAL
CROSSMATCH INTERPRETATION: NORMAL
DEPRECATED RDW RBC AUTO: 59.8 FL (ref 37–54)
EGFRCR SERPLBLD CKD-EPI 2021: 102.7 ML/MIN/1.73
EOSINOPHIL # BLD AUTO: 0.24 10*3/MM3 (ref 0–0.4)
EOSINOPHIL NFR BLD AUTO: 2.7 % (ref 0.3–6.2)
ERYTHROCYTE [DISTWIDTH] IN BLOOD BY AUTOMATED COUNT: 18.4 % (ref 12.3–15.4)
FUNGUS WND CULT: ABNORMAL
GLUCOSE SERPL-MCNC: 84 MG/DL (ref 65–99)
HCT VFR BLD AUTO: 25.1 % (ref 37.5–51)
HCT VFR BLD AUTO: 25.3 % (ref 37.5–51)
HCT VFR BLD AUTO: 25.6 % (ref 37.5–51)
HGB BLD-MCNC: 7.7 G/DL (ref 13–17.7)
HGB BLD-MCNC: 7.9 G/DL (ref 13–17.7)
HGB BLD-MCNC: 8.1 G/DL (ref 13–17.7)
IMM GRANULOCYTES # BLD AUTO: 0.33 10*3/MM3 (ref 0–0.05)
IMM GRANULOCYTES NFR BLD AUTO: 3.7 % (ref 0–0.5)
LYMPHOCYTES # BLD AUTO: 1.05 10*3/MM3 (ref 0.7–3.1)
LYMPHOCYTES NFR BLD AUTO: 11.6 % (ref 19.6–45.3)
MCH RBC QN AUTO: 28.8 PG (ref 26.6–33)
MCHC RBC AUTO-ENTMCNC: 31.6 G/DL (ref 31.5–35.7)
MCV RBC AUTO: 91.1 FL (ref 79–97)
MONOCYTES # BLD AUTO: 1.1 10*3/MM3 (ref 0.1–0.9)
MONOCYTES NFR BLD AUTO: 12.2 % (ref 5–12)
NEUTROPHILS NFR BLD AUTO: 6.26 10*3/MM3 (ref 1.7–7)
NEUTROPHILS NFR BLD AUTO: 69.4 % (ref 42.7–76)
NRBC BLD AUTO-RTO: 0 /100 WBC (ref 0–0.2)
PLATELET # BLD AUTO: 229 10*3/MM3 (ref 140–450)
PMV BLD AUTO: 9 FL (ref 6–12)
POTASSIUM SERPL-SCNC: 4.1 MMOL/L (ref 3.5–5.2)
RBC # BLD AUTO: 2.81 10*6/MM3 (ref 4.14–5.8)
SODIUM SERPL-SCNC: 138 MMOL/L (ref 136–145)
UNIT  ABO: NORMAL
UNIT  ABO: NORMAL
UNIT  RH: NORMAL
UNIT  RH: NORMAL
WBC NRBC COR # BLD AUTO: 9.02 10*3/MM3 (ref 3.4–10.8)

## 2025-03-15 PROCEDURE — 80048 BASIC METABOLIC PNL TOTAL CA: CPT | Performed by: NURSE PRACTITIONER

## 2025-03-15 PROCEDURE — 99214 OFFICE O/P EST MOD 30 MIN: CPT

## 2025-03-15 PROCEDURE — 96376 TX/PRO/DX INJ SAME DRUG ADON: CPT

## 2025-03-15 PROCEDURE — G0378 HOSPITAL OBSERVATION PER HR: HCPCS

## 2025-03-15 PROCEDURE — P9612 CATHETERIZE FOR URINE SPEC: HCPCS

## 2025-03-15 PROCEDURE — 85025 COMPLETE CBC W/AUTO DIFF WBC: CPT | Performed by: NURSE PRACTITIONER

## 2025-03-15 PROCEDURE — 99214 OFFICE O/P EST MOD 30 MIN: CPT | Performed by: NURSE PRACTITIONER

## 2025-03-15 PROCEDURE — 97161 PT EVAL LOW COMPLEX 20 MIN: CPT | Performed by: PHYSICAL THERAPIST

## 2025-03-15 PROCEDURE — 97165 OT EVAL LOW COMPLEX 30 MIN: CPT

## 2025-03-15 PROCEDURE — 99232 SBSQ HOSP IP/OBS MODERATE 35: CPT | Performed by: INTERNAL MEDICINE

## 2025-03-15 PROCEDURE — 99204 OFFICE O/P NEW MOD 45 MIN: CPT

## 2025-03-15 PROCEDURE — 85014 HEMATOCRIT: CPT | Performed by: NURSE PRACTITIONER

## 2025-03-15 PROCEDURE — 85018 HEMOGLOBIN: CPT | Performed by: NURSE PRACTITIONER

## 2025-03-15 RX ORDER — CARVEDILOL 3.12 MG/1
3.12 TABLET ORAL 2 TIMES DAILY WITH MEALS
Status: DISCONTINUED | OUTPATIENT
Start: 2025-03-15 | End: 2025-03-17 | Stop reason: HOSPADM

## 2025-03-15 RX ADMIN — QUETIAPINE FUMARATE 50 MG: 25 TABLET ORAL at 20:11

## 2025-03-15 RX ADMIN — CARVEDILOL 3.12 MG: 3.12 TABLET, FILM COATED ORAL at 14:05

## 2025-03-15 RX ADMIN — TAMSULOSIN HYDROCHLORIDE 0.4 MG: 0.4 CAPSULE ORAL at 08:55

## 2025-03-15 RX ADMIN — OXYCODONE HYDROCHLORIDE AND ACETAMINOPHEN 1 TABLET: 7.5; 325 TABLET ORAL at 20:17

## 2025-03-15 RX ADMIN — FERROUS SULFATE TAB 325 MG (65 MG ELEMENTAL FE) 325 MG: 325 (65 FE) TAB at 08:56

## 2025-03-15 RX ADMIN — LIDOCAINE 1 PATCH: 4 PATCH TOPICAL at 08:57

## 2025-03-15 RX ADMIN — QUETIAPINE FUMARATE 25 MG: 25 TABLET ORAL at 08:56

## 2025-03-15 RX ADMIN — CETIRIZINE HYDROCHLORIDE 10 MG: 10 TABLET, FILM COATED ORAL at 08:56

## 2025-03-15 RX ADMIN — GABAPENTIN 300 MG: 300 CAPSULE ORAL at 20:12

## 2025-03-15 RX ADMIN — Medication 5 MG: at 20:11

## 2025-03-15 RX ADMIN — FOLIC ACID 1 MG: 1 TABLET ORAL at 08:56

## 2025-03-15 RX ADMIN — Medication 10 ML: at 08:56

## 2025-03-15 RX ADMIN — FINASTERIDE 5 MG: 5 TABLET, FILM COATED ORAL at 08:56

## 2025-03-15 RX ADMIN — CARVEDILOL 3.12 MG: 3.12 TABLET, FILM COATED ORAL at 18:50

## 2025-03-15 RX ADMIN — RIFAXIMIN 550 MG: 550 TABLET ORAL at 20:12

## 2025-03-15 RX ADMIN — GABAPENTIN 300 MG: 300 CAPSULE ORAL at 13:18

## 2025-03-15 RX ADMIN — PANTOPRAZOLE SODIUM 40 MG: 40 INJECTION, POWDER, FOR SOLUTION INTRAVENOUS at 20:11

## 2025-03-15 RX ADMIN — GABAPENTIN 300 MG: 300 CAPSULE ORAL at 05:29

## 2025-03-15 RX ADMIN — ESCITALOPRAM OXALATE 10 MG: 10 TABLET ORAL at 20:11

## 2025-03-15 RX ADMIN — RIFAXIMIN 550 MG: 550 TABLET ORAL at 08:56

## 2025-03-15 RX ADMIN — PANTOPRAZOLE SODIUM 40 MG: 40 INJECTION, POWDER, FOR SOLUTION INTRAVENOUS at 08:56

## 2025-03-15 RX ADMIN — ROSUVASTATIN 20 MG: 20 TABLET, FILM COATED ORAL at 20:12

## 2025-03-15 NOTE — PLAN OF CARE
Goal Outcome Evaluation:  Plan of Care Reviewed With: patient           Outcome Evaluation: PT evaluation completed.  Pt stood and ambulated 10 feet using rw with CGAx1.  Pt presents below baseline function d/t weakness, decreased activity tolerance, and mild gait instability.  Recommend SNF for further therapy at d/c.    Anticipated Discharge Disposition (PT): skilled nursing facility

## 2025-03-15 NOTE — PLAN OF CARE
Problem: Adult Inpatient Plan of Care  Goal: Plan of Care Review  3/15/2025 0317 by Danelle Shrestha RN  Outcome: Progressing  3/15/2025 0317 by Danelle Shrestha RN  Outcome: Progressing  Goal: Patient-Specific Goal (Individualized)  3/15/2025 0317 by Danelle Shrestha RN  Outcome: Progressing  3/15/2025 0317 by Danelle Shrestha RN  Outcome: Progressing  Goal: Absence of Hospital-Acquired Illness or Injury  3/15/2025 0317 by Danelle Shrestha RN  Outcome: Progressing  3/15/2025 0317 by Danelle Shrestha RN  Outcome: Progressing  Intervention: Identify and Manage Fall Risk  Recent Flowsheet Documentation  Taken 3/14/2025 2200 by Danelle Shrestha RN  Safety Promotion/Fall Prevention: safety round/check completed  Taken 3/14/2025 2000 by Danelle Shrestha RN  Safety Promotion/Fall Prevention: safety round/check completed  Intervention: Prevent Skin Injury  Recent Flowsheet Documentation  Taken 3/14/2025 2000 by Danelle Shrestha RN  Body Position: position changed independently  Intervention: Prevent Infection  Recent Flowsheet Documentation  Taken 3/14/2025 2200 by Danelle Shrestha RN  Infection Prevention: environmental surveillance performed  Taken 3/14/2025 2000 by Danelle Shrestha RN  Infection Prevention: environmental surveillance performed  Goal: Optimal Comfort and Wellbeing  3/15/2025 0317 by Danelle Shrestha RN  Outcome: Progressing  3/15/2025 0317 by Dnaelle Shrestha RN  Outcome: Progressing  Goal: Readiness for Transition of Care  3/15/2025 0317 by Danelle Shrestha RN  Outcome: Progressing  3/15/2025 0317 by Danelle Shrestha RN  Outcome: Progressing     Problem: Skin Injury Risk Increased  Goal: Skin Health and Integrity  3/15/2025 0317 by Danelle Shrestha RN  Outcome: Progressing  3/15/2025 0317 by Danelle Shrestha RN  Outcome:  Progressing  Intervention: Optimize Skin Protection  Recent Flowsheet Documentation  Taken 3/14/2025 2200 by Danelle Shrestha, RN  Activity Management: activity encouraged  Taken 3/14/2025 2000 by Danelle Shrestha RN  Activity Management: activity encouraged  Head of Bed (HOB) Positioning: HOB elevated     Problem: Fall Injury Risk  Goal: Absence of Fall and Fall-Related Injury  3/15/2025 0317 by Danelle Shrestha RN  Outcome: Progressing  3/15/2025 0317 by Danelle Shrestha RN  Outcome: Progressing  Intervention: Promote Injury-Free Environment  Recent Flowsheet Documentation  Taken 3/14/2025 2200 by Danelle Shrestha, RN  Safety Promotion/Fall Prevention: safety round/check completed  Taken 3/14/2025 2000 by Danelle Shrestha RN  Safety Promotion/Fall Prevention: safety round/check completed     Problem: Gastrointestinal Bleeding  Goal: Optimal Coping with Acute Illness  Outcome: Progressing  Goal: Hemostasis  Outcome: Progressing   Goal Outcome Evaluation:         Pt received two units of RPBCs. No complications. BP is soft. Gabapentin and melatonin not administered due to low BP. BP ate dinner and has slept since. No complaints of pain. Pt did not tolerate the 15 fr catheters due to not being long. 16 fr obtained, and pt tolerated well. 800 ml output obtained during shift. CD called to obtain more. Pt was sleepy throughout shift, but is more alert at 5am. Pt wanted pain medicine but informed pt that bp still remains low. Call light within reach.

## 2025-03-15 NOTE — PROGRESS NOTES
[facility] Medicine Services  PROGRESS NOTE    Patient Name: Val Nassar Jr.  : 1960  MRN: [id number]    Date of Admission: 3/14/2025  Primary Care Physician: Wesly Minor MD    Subjective   Subjective     CC: Follow-up GI bleed    HPI: No acute events overnight, patient states that he rested well, has not had a BM since yesterday    Objective   Objective     Vital Signs:   Temp:  [97.6 °F (36.4 °C)-98.5 °F (36.9 °C)] 98.5 °F (36.9 °C)  Heart Rate:  [62-79] 62  Resp:  [16-18] 18  BP: ()/(44-66) 104/56  Flow (L/min) (Oxygen Therapy):  [2] 2     Physical Exam:  Constitutional: No acute distress, awake, alert  HENT: NCAT, mucous membranes moist  Respiratory: Clear to auscultation bilaterally, respiratory effort normal   Cardiovascular: RRR, no murmurs, rubs, or gallops  Gastrointestinal: Positive bowel sounds, soft, nontender, nondistended  Musculoskeletal: No bilateral ankle edema  Psychiatric: Appropriate affect, cooperative  Neurologic: Oriented x 3, nonfocal    Results Reviewed:  LAB RESULTS:      Lab 03/15/25  0531 25  2353 25  1431 25  0729 25  0046 25  1738 25  0512 03/10/25  0406 252 25  0853   WBC 9.02  --  8.33  --  10.30  --  8.30 10.12  --  11.62*   HEMOGLOBIN 8.1* 7.9* 6.1* 7.6* 7.7*  7.7*   < > 8.1* 7.9*   < > 9.3*   HEMATOCRIT 25.6* 25.3* 20.0* 25.1* 25.2*  25.2*   < > 26.7* 26.5*   < > 30.9*   PLATELETS 229  --  239  --  249  --  253 272  --  239   NEUTROS ABS 6.26  --  5.80  --   --   --  5.97  --   --  9.08*   IMMATURE GRANS (ABS) 0.33*  --  0.32*  --   --   --  0.12*  --   --  0.13*   LYMPHS ABS 1.05  --  0.97  --   --   --  0.95  --   --  0.86   MONOS ABS 1.10*  --  0.97*  --   --   --  0.84  --   --  0.97*   EOS ABS 0.24  --  0.25  --   --   --  0.40  --   --  0.54*   MCV 91.1  --  95.2  --  96.2  --  96.4 97.4*  --  97.5*   PROTIME  --   --  21.3*  --   --   --   --   --   --   --      < > = values in this interval not displayed.         Lab 03/15/25  0531 03/14/25  1431 03/12/25  0729 03/12/25  0046 03/11/25 1738 03/11/25  0512 03/10/25  0406 03/09/25  0842 03/08/25 2022 03/08/25  0853   SODIUM 138 136  --  136  --  142 142  --   --  147*   POTASSIUM 4.1 3.9  --  4.3 4.1 3.6 3.9  --    < > 3.5   CHLORIDE 104 102  --  105  --  108* 111*  --   --  116*   CO2 23.0 23.0  --  22.0  --  22.0 20.0*  --   --  21.0*   ANION GAP 11.0 11.0  --  9.0  --  12.0 11.0  --   --  10.0   BUN 14 16  --  6*  --  6* 10  --   --  17   CREATININE 0.69* 0.58*  --  0.65*  --  0.63* 0.81  --   --  0.68*   EGFR 102.7 108.2  --  104.6  --  105.6 97.8  --   --  103.2   GLUCOSE 84 96  --  97  --  83 93  --   --  120*   CALCIUM 8.4* 8.6  --  8.3*  --  8.6 8.3*  --   --  9.1   MAGNESIUM  --   --  2.8* 1.6  --  1.8 1.9 1.6  --  1.6   PHOSPHORUS  --   --   --   --   --   --   --   --   --  2.7    < > = values in this interval not displayed.         Lab 03/14/25  1431 03/12/25  0046 03/10/25  0406   TOTAL PROTEIN 6.7 6.9 6.5   ALBUMIN 2.6* 2.5* 2.3*   GLOBULIN 4.1 4.4 4.2   ALT (SGPT) 22 30 25   AST (SGOT) 25 38 29   BILIRUBIN 0.3 0.3 0.4   ALK PHOS 107 133* 117         Lab 03/14/25  1431   PROTIME 21.3*   INR 1.84*             Lab 03/14/25  1450 03/11/25  1738 03/11/25 1738 03/11/25  0512   IRON  --   --   --  26*   IRON SATURATION (TSAT)  --   --   --  10*   TIBC  --   --   --  262*   TRANSFERRIN  --   --   --  176*   FERRITIN  --   --   --  171.20   ABO TYPING A   < > A  --    RH TYPING Positive   < > Positive  --    ANTIBODY SCREEN Negative  --  Negative  --     < > = values in this interval not displayed.         Brief Urine Lab Results  (Last result in the past 365 days)        Color   Clarity   Blood   Leuk Est   Nitrite   Protein   CREAT   Urine HCG        02/11/25 0942 Yellow   Turbid   Moderate (2+)   Large (3+)   Negative   100 mg/dL (2+)                   Microbiology Results Abnormal       None            No  radiology results from the last 24 hrs    Results for orders placed during the hospital encounter of 02/11/25    Adult Transthoracic Echo Complete w/ Color, Spectral and Contrast if Necessary Per Protocol    Interpretation Summary    Left ventricular systolic function is normal. Estimated left ventricular EF = 60%    Left ventricular wall thickness is consistent with mild concentric hypertrophy.    Mild aortic valve stenosis is present.    Aortic valve maximum pressure gradient is 23 mmHg. Aortic valve mean pressure gradient is 14 mmHg.      Current medications:  Scheduled Meds:[Held by provider] apixaban, 5 mg, Oral, Q12H  cetirizine, 10 mg, Oral, Daily  escitalopram, 10 mg, Oral, Nightly  ferrous sulfate, 325 mg, Oral, Daily With Breakfast  finasteride, 5 mg, Oral, Daily  folic acid, 1 mg, Oral, Daily  gabapentin, 300 mg, Oral, Q8H  Lidocaine, 1 patch, Transdermal, Q24H  melatonin, 5 mg, Oral, Nightly  pantoprazole, 40 mg, Intravenous, Q12H  QUEtiapine, 25 mg, Oral, Daily   And  QUEtiapine, 50 mg, Oral, Nightly  riFAXIMin, 550 mg, Oral, BID  rosuvastatin, 20 mg, Oral, Nightly  sodium chloride, 10 mL, Intravenous, Q12H  tamsulosin, 0.4 mg, Oral, Daily      Continuous Infusions:   PRN Meds:.  acetaminophen **OR** acetaminophen **OR** acetaminophen    senna-docusate sodium **AND** bisacodyl **AND** bisacodyl    heparin    nitroglycerin    oxyCODONE-acetaminophen    [COMPLETED] Insert Peripheral IV **AND** sodium chloride    sodium chloride    sodium chloride    Assessment & Plan   Assessment & Plan     Active Hospital Problems    Diagnosis  POA    GI bleed [K92.2]  Yes    A-fib [I48.91]  Unknown    Cirrhosis of liver [K74.60]  Unknown    BPH without obstruction/lower urinary tract symptoms [N40.0]  Unknown    Anemia, chronic disease [D63.8]  Yes    Anxiety associated with depression [F41.8]  Yes    GERD without esophagitis [K21.9]  Yes      Resolved Hospital Problems   No resolved problems to display.      Brief  Hospital Course to date:  Val Nassar Jr. is a 64 y.o. male PMH alcohol use, tobacco smoking, BPH (I/O cath QUD), anxiety, depression, Afib (eliquis), anemia, cirrhosis of liver presenting with generalized weakness, dizziness with standing and black stools admitted with likely GI bleed     This patient's problems and plans were partially entered by my partner and updated as appropriate by me 03/15/25.      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  GI consulted     Afib  - Holding eliquis secondary to above  -Cardiology consulted: Continue routine evaluation     Etoh Cirrhosis of liver  - INR 1.84  - rifaximin BID  - - pt no longer drinks alcohol   - lactulose refused by patient     BPH  - tamlusion      Tobacco use- quit 3 months ago.   - Has not smoked in 3 months and states he doesn't need a patch.      Depression/anxiety  -Continue Lexapro, Seroquel lexapro  -QTc is appropriate, continue to monitor     Chronic low back pain  - gabapentin    All problems listed above are new to me today    Expected Discharge Location and Transportation: TBD  Expected Discharge   Expected discharge date/ time has not been documented.     VTE Prophylaxis:  Pharmacologic & mechanical VTE prophylaxis orders are present.         AM-PAC 6 Clicks Score (PT): 14 (03/14/25 2300)    CODE STATUS:   Code Status and Medical Interventions: CPR (Attempt to Resuscitate); Full Support   Ordered at: 03/14/25 2911     Code Status (Patient has no pulse and is not  breathing):    CPR (Attempt to Resuscitate)     Medical Interventions (Patient has pulse or is breathing):    Full Support     Level Of Support Discussed With:    Patient       Elizabeth Ragsdale MD  03/15/25

## 2025-03-15 NOTE — PLAN OF CARE
Goal Outcome Evaluation:  Plan of Care Reviewed With: patient           Outcome Evaluation: Pt presents with weakness, decr balance and activity tolerance limiting independence with self care.  Pt min a LBD, setup to brush hair,  CGA to ambulate 10 ft with RW.  OT will follow to advance pt toward PLOF.  Recommend SNF upon d/c.    Anticipated Discharge Disposition (OT): skilled nursing facility (return to [facility] with OT svcs)

## 2025-03-15 NOTE — THERAPY EVALUATION
Patient Name: Val Nassar Jr.  : 1960    MRN: [id number]                              Today's Date: 3/15/2025       Admit Date: 3/14/2025    Visit Dx:     ICD-10-CM ICD-9-CM   1. Acute blood loss anemia  D62 285.1   2. Gastrointestinal hemorrhage, unspecified gastrointestinal hemorrhage type  K92.2 578.9   3. Anticoagulated  Z79.01 V58.61     Patient Active Problem List   Diagnosis    Anemia, chronic disease    Acute gastric ulcer with hemorrhage    Alcohol abuse, uncomplicated    Anxiety associated with depression    Diverticulum of bladder    Duodenal ulcer, unspecified as acute or chronic, without hemorrhage or perforation    Elevation of level of transaminase and lactic acid dehydrogenase (LDH)    GERD without esophagitis    Hyperglycemia, unspecified    Hyperlipidemia, unspecified    Melena    Nicotine dependence, cigarettes, uncomplicated    Tobacco use    Severe malnutrition    Duodenal ulcer    Iron deficiency anemia    Cirrhosis of liver    Acute blood loss anemia    Squamous cell carcinoma of esophagus    Duodenal stenosis    Generalized weakness    Orthostatic hypotension    UTI (urinary tract infection)    Malignant neoplasm of upper third of esophagus    Iron deficiency anemia    GALILEO (acute kidney injury)    Metabolic acidosis    Septic shock due to Pseudomonas species    Pneumonia of both lower lobes due to Pseudomonas species    Bacteremia due to Pseudomonas    GI bleed    A-fib    Cirrhosis of liver    BPH without obstruction/lower urinary tract symptoms     Past Medical History:   Diagnosis Date    Anemia     Cancer     ESOPHAGEAL    Cirrhosis     Duodenal ulcer     Gastric ulcer     GERD (gastroesophageal reflux disease)     History of alcohol abuse     History of radiation therapy 2024    esophagus    HLD (hyperlipidemia)     Mood disorder      Past Surgical History:   Procedure Laterality Date    ENDOSCOPY N/A 2023    Procedure: ESOPHAGOGASTRODUODENOSCOPY;   Surgeon: Wesly Mckeon MD;  Location:  [facility];  Service: Gastroenterology;  Laterality: N/A;    ENDOSCOPY N/A 3/3/2025    Procedure: ESOPHAGOGASTRODUODENOSCOPY;  Surgeon: Wesly Mckeon MD;  Location:  [facility];  Service: Gastroenterology;  Laterality: N/A;  APC USED IN ESOPHAGUS.    VENOUS ACCESS DEVICE (PORT) INSERTION Right 04/25/2024      General Information       Row Name 03/15/25 0942          OT Time and Intention    Document Type evaluation  -AC     Mode of Treatment occupational therapy  -AC     Patient Effort good  -AC       Row Name 03/15/25 0942          General Information    Patient Profile Reviewed yes  -AC     Prior Level of Function independent:;all household mobility;ADL's  uses a rollator to ambulate, has AE for LBD - difficulty with LLE  -AC     Existing Precautions/Restrictions fall  dizziness, monitor vitals  -AC     Barriers to Rehab medically complex  -AC       Row Name 03/15/25 0942          Occupational Profile    Environmental Supports and Barriers (Occupational Profile) walk in shower with shower seat  -AC       Row Name 03/15/25 0942          Living Environment    Current Living Arrangements other (see comments)  [place]  -AC       Row Name 03/15/25 0942          Home Main Entrance    Number of Stairs, Main Entrance none  -AC       Row Name 03/15/25 0942          Stairs Within Home, Primary    Number of Stairs, Within Home, Primary none  -AC       Row Name 03/15/25 0942          Cognition    Orientation Status (Cognition) oriented x 3  -AC       Row Name 03/15/25 0942          Safety Issues/Impairments Affecting Functional Mobility    Safety Issues Affecting Function (Mobility) awareness of need for assistance;insight into deficits/self-awareness;safety precaution awareness  -AC     Impairments Affecting Function (Mobility) endurance/activity tolerance;balance;sensation/sensory awareness;strength  neuropathy  -AC               User Key  (r) = Recorded By, (t) =  Taken By, (c) = Cosigned By      Initials Name Provider Type    Jaymie Tipton, OT Occupational Therapist                     Mobility/ADL's       Row Name 03/15/25 0942          Bed Mobility    Bed Mobility supine-sit;sit-supine  -     Supine-Sit [place] (Bed Mobility) verbal cues;minimum assist (75% patient effort)  -     Sit-Supine [place] (Bed Mobility) nonverbal cues (demo/gesture);standby assist  -     Assistive Device (Bed Mobility) head of bed elevated  -       Row Name 03/15/25 0942          Transfers    Transfers sit-stand transfer  -       Row Name 03/15/25 0942          Sit-Stand Transfer    Sit-Stand [place] (Transfers) verbal cues;contact guard  -     Assistive Device (Sit-Stand Transfers) walker, front-wheeled  -       Row Name 03/15/25 0942          Functional Mobility    Functional Mobility- Ind. Level verbal cues required;contact guard assist  -     Functional Mobility- Device walker, front-wheeled  -AC     Functional Mobility-Distance (Feet) 10  -     Functional Mobility- Safety Issues step length decreased  -       Row Name 03/15/25 0942          Activities of Daily Living    BADL Assessment/Intervention lower body dressing  -       Row Name 03/15/25 0942          Lower Body Dressing Assessment/Training    [place] Level (Lower Body Dressing) don;doff;socks;minimum assist (75% patient effort)  -     Position (Lower Body Dressing) edge of bed sitting  -     Comment, (Lower Body Dressing) difficulty with L , has AE for LBD  -               User Key  (r) = Recorded By, (t) = Taken By, (c) = Cosigned By      Initials Name Provider Type    Jaymie Tipton, OT Occupational Therapist                   Obj/Interventions       Row Name 03/15/25 0942          Sensory Assessment (Somatosensory)    Sensory Subjective Reports numbness;tingling  B hands  -       Row Name 03/15/25 0942          Vision Assessment/Intervention    Visual Impairment/Limitations  corrective lenses full-time  -AC       Row Name 03/15/25 0942          Range of Motion Comprehensive    General Range of Motion bilateral upper extremity ROM WNL  -AC       Row Name 03/15/25 0942          Strength Comprehensive (MMT)    Comment, General Manual Muscle Testing (MMT) Assessment BUE grossly 3+/5  -AC               User Key  (r) = Recorded By, (t) = Taken By, (c) = Cosigned By      Initials Name Provider Type    AC Jaymie Chinchilla, OT Occupational Therapist                   Goals/Plan       Row Name 03/15/25 1018          Transfer Goal 1 (OT)    Activity/Assistive Device (Transfer Goal 1, OT) bed-to-chair/chair-to-bed;toilet  -AC     [place] Level/Cues Needed (Transfer Goal 1, OT) verbal cues required;standby assist  -AC     Time Frame (Transfer Goal 1, OT) long term goal (LTG);10 days  -AC     Progress/Outcome (Transfer Goal 1, OT) new goal;goal ongoing  -AC       Row Name 03/15/25 1018          Toileting Goal 1 (OT)    Activity/Device (Toileting Goal 1, OT) adjust/manage clothing;perform perineal hygiene  -AC     [place] Level/Cues Needed (Toileting Goal 1, OT) standby assist  -AC     Time Frame (Toileting Goal 1, OT) long term goal (LTG);10 days  -AC     Progress/Outcome (Toileting Goal 1, OT) new goal;goal ongoing  -AC       Row Name 03/15/25 1018          Grooming Goal 1 (OT)    Activity/Device (Grooming Goal 1, OT) oral care  -AC     [place] (Grooming Goal 1, OT) standby assist  -AC     Time Frame (Grooming Goal 1, OT) short term goal (STG);5 days  -AC     Strategies/Barriers (Grooming Goal 1, OT) standing sink side  -AC       Row Name 03/15/25 1018          Therapy Assessment/Plan (OT)    Planned Therapy Interventions (OT) activity tolerance training;BADL retraining;adaptive equipment training;functional balance retraining;occupation/activity based interventions;patient/caregiver education/training;transfer/mobility retraining  -AC               User Key  (r) = Recorded By, (t) =  Taken By, (c) = Cosigned By      Initials Name Provider Type    AC Jaymie Chinchilla, OT Occupational Therapist                   Clinical Impression       Row Name 03/15/25 0942          Pain Assessment    Pretreatment Pain Rating 7/10  -AC     Posttreatment Pain Rating 9/10  -AC     Pain Location hip  -AC     Pain Side/Orientation left  -AC     Pain Management Interventions exercise or physical activity utilized  -AC     Response to Pain Interventions activity participation with increased pain  -AC       Row Name 03/15/25 0942          Plan of Care Review    Plan of Care Reviewed With patient  -AC     Outcome Evaluation Pt presents with weakness, decr balance and activity tolerance limiting independence with self care.  Pt min a LBD, setup to brush hair,  CGA to ambulate 10 ft with RW.  OT will follow to advance pt toward PLOF.  Recommend SNF upon d/c.  -AC       Row Name 03/15/25 0942          Therapy Assessment/Plan (OT)    Rehab Potential (OT) good  -     Criteria for Skilled Therapeutic Interventions Met (OT) yes;skilled treatment is necessary  -AC     Therapy Frequency (OT) daily  -AC       Row Name 03/15/25 0942          Therapy Plan Review/Discharge Plan (OT)    Anticipated Discharge Disposition (OT) skilled nursing facility  return to [facility] with OT svcs  -AC       Row Name 03/15/25 0942          Vital Signs    Pre Systolic BP Rehab 116  -AC     Pre Treatment Diastolic BP 63  -AC     Intra Systolic BP Rehab 147  -AC     Intra Treatment Diastolic BP 69  -AC     Post Systolic BP Rehab 114  -AC     Post Treatment Diastolic BP 64  -AC     Pre SpO2 (%) 91  -AC     O2 Delivery Pre Treatment room air  -AC     O2 Delivery Intra Treatment room air  -AC     Post SpO2 (%) 94  -AC     O2 Delivery Post Treatment room air  -AC     Pre Patient Position Supine  -AC     Post Patient Position Supine  -AC       Row Name 03/15/25 0942          Positioning and Restraints    Pre-Treatment Position in bed  -AC     Post  Treatment Position bed  -AC     In Bed notified nsg;supine;call light within reach;encouraged to call for assist;exit alarm on  -AC               User Key  (r) = Recorded By, (t) = Taken By, (c) = Cosigned By      Initials Name Provider Type    Jaymie Tipton, OT Occupational Therapist                   Outcome Measures       Row Name 03/15/25 1020          How much help from another is currently needed...    Putting on and taking off regular lower body clothing? 3  -AC     Bathing (including washing, rinsing, and drying) 3  -AC     Toileting (which includes using toilet bed pan or urinal) 3  -AC     Putting on and taking off regular upper body clothing 3  -AC     Taking care of personal grooming (such as brushing teeth) 3  -AC     Eating meals 4  -AC     AM-PAC 6 Clicks Score (OT) 19  -AC       Row Name 03/15/25 1021 03/14/25 2300       How much help from another person do you currently need...    Turning from your back to your side while in flat bed without using bedrails? 3  -LM 3  -AH    Moving from lying on back to sitting on the side of a flat bed without bedrails? 3  -LM 3  -AH    Moving to and from a bed to a chair (including a wheelchair)? 3  -LM 2  -AH    Standing up from a chair using your arms (e.g., wheelchair, bedside chair)? 3  -LM 2  -AH    Climbing 3-5 steps with a railing? 2  -LM 2  -AH    To walk in hospital room? 3  -LM 2  -AH    AM-PAC 6 Clicks Score (PT) 17  -LM 14  -AH    Highest Level of Mobility Goal 5 --> Static standing  -LM 4 --> Transfer to chair/commode  -AH      Row Name 03/15/25 1021 03/15/25 1020       Functional Assessment    Outcome Measure Options AM-PAC 6 Clicks Basic Mobility (PT)  -LM AM-PAC 6 Clicks Daily Activity (OT)  -AC              User Key  (r) = Recorded By, (t) = Taken By, (c) = Cosigned By      Initials Name Provider Type    Jaymie Tipton, OT Occupational Therapist    Awa Arriola, PT Physical Therapist     Danelle Shrestha, RN Registered Nurse                     Occupational Therapy Education       Title: PT OT SLP Therapies (In Progress)       Topic: Occupational Therapy (In Progress)       Point: ADL training (In Progress)       Learning Progress Summary            Patient Acceptance, E, NR by  at 3/15/2025 1020                                      User Key       Initials Effective Dates Name Provider Type Discipline     02/03/23 -  Jaymie Chinchilla OT Occupational Therapist OT                  OT Recommendation and Plan  Planned Therapy Interventions (OT): activity tolerance training, BADL retraining, adaptive equipment training, functional balance retraining, occupation/activity based interventions, patient/caregiver education/training, transfer/mobility retraining  Therapy Frequency (OT): daily  Plan of Care Review  Plan of Care Reviewed With: patient  Outcome Evaluation: Pt presents with weakness, decr balance and activity tolerance limiting independence with self care.  Pt min a LBD, setup to brush hair,  CGA to ambulate 10 ft with RW.  OT will follow to advance pt toward PLOF.  Recommend SNF upon d/c.     Time Calculation:   Evaluation Complexity (OT)  Review Occupational Profile/Medical/Therapy History Complexity: brief/low complexity  Assessment, Occupational Performance/Identification of Deficit Complexity: 1-3 performance deficits  Clinical Decision Making Complexity (OT): problem focused assessment/low complexity  Overall Complexity of Evaluation (OT): low complexity     Time Calculation- OT       Row Name 03/15/25 0942             Time Calculation- OT    OT Start Time 0942  -AC      OT Received On 03/15/25  -AC      OT Goal Re-Cert Due Date 03/25/25  -AC         Untimed Charges    OT Eval/Re-eval Minutes 50  -AC         Total Minutes    Untimed Charges Total Minutes 50  -AC       Total Minutes 50  -AC                User Key  (r) = Recorded By, (t) = Taken By, (c) = Cosigned By      Initials Name Provider Type    AC Jaymie Chinchilla OT  Occupational Therapist                  Therapy Charges for Today       Code Description Service Date Service Provider Modifiers Qty    [id number] HC OT EVAL LOW COMPLEXITY 4 3/15/2025 Jaymie Chinchilla, OT GO 1                 Jaymie Chinchilla OT  3/15/2025

## 2025-03-15 NOTE — CONSULTS
[facility]:  Inpatient Gastroenterology Consult      Inpatient Gastroenterology Consult  Consult performed by: Esvin Gray APRN  Consult ordered by: Elizabeth Ragsdale MD  Reason for consult: GIB      Referring Provider: Pauly Watkins MD    PCP: Wesly Minor MD    Chief Complaint: Weakness    History of present illness:    Val Nassar Jr. is a 64 y.o. male who is admitted with weakness and dark stools.  GI consult received for concerns of ongoing GI bleed.  Patient known to the service having seen him within the last 14 days for similar presentation at which time he underwent an EGD which found concerns of esophageal angioectasia as well as grade 1 esophageal varices and portal hypertensive gastropathy.  Patient notes he has been doing okay with no new or worsening complaints since that time.  Is unsure of how long he has been having dark stools.  Does not report any issues with nausea, vomiting, diarrhea, abdominal pain, shortness of breath, chest pain, or fever/chills.  No NSAID exposure since recent hospitalization.  Has been on Eliquis since discharge.  Initial hemoglobin was 6.1; was 7.6 at recent discharge. Past medical, surgical, social, and family histories are reviewed for accuracy.  No documented alleviating or exacerbating factors.  Does not endorse pain at time of exam.    Upper GI Endoscopy (03/03/2025 10:53)     Allergies:  Green beans    Scheduled Meds:  [Held by provider] apixaban, 5 mg, Oral, Q12H  carvedilol, 3.125 mg, Oral, BID With Meals  cetirizine, 10 mg, Oral, Daily  escitalopram, 10 mg, Oral, Nightly  ferrous sulfate, 325 mg, Oral, Daily With Breakfast  finasteride, 5 mg, Oral, Daily  folic acid, 1 mg, Oral, Daily  gabapentin, 300 mg, Oral, Q8H  Lidocaine, 1 patch, Transdermal, Q24H  melatonin, 5 mg, Oral, Nightly  pantoprazole, 40 mg, Intravenous, Q12H  QUEtiapine, 25 mg, Oral, Daily   And  QUEtiapine, 50 mg, Oral, Nightly  riFAXIMin, 550  mg, Oral, BID  rosuvastatin, 20 mg, Oral, Nightly  sodium chloride, 10 mL, Intravenous, Q12H  tamsulosin, 0.4 mg, Oral, Daily         Infusions:       PRN Meds:    acetaminophen **OR** acetaminophen **OR** acetaminophen    senna-docusate sodium **AND** bisacodyl **AND** bisacodyl    heparin    nitroglycerin    oxyCODONE-acetaminophen    [COMPLETED] Insert Peripheral IV **AND** sodium chloride    sodium chloride    sodium chloride    Home Meds:  Medications Prior to Admission   Medication Sig Dispense Refill Last Dose/Taking    apixaban (ELIQUIS) 5 MG tablet tablet Take 1 tablet by mouth Every 12 (Twelve) Hours. Indications: Atrial Fibrillation   3/13/2025    Benzalkonium Chloride 0.12 % liquid Apply to gale area topically as needed for preventative use for Gale care after each incontinent episode and as needed.   Past Week    Cholecalciferol (Vitamin D3) 1.25 MG (98217 UT) capsule Take 1 capsule by mouth Every 7 (Seven) Days. Patient takes on Fridays   Past Week    dimethicone 1 % cream Apply to buttocks/Gale area topically as needed for preventative apply topically to buttocks/gale area.   Past Week    Dimethicone 1.5 % cream Apply to body topically as needed for dry skin apply topically to dry skin on body every shift as needed.   Past Week    docusate sodium (COLACE) 50 mg/5 mL liquid Take 10 mL by mouth 2 (Two) Times a Day.   3/13/2025    dutasteride (AVODART) 0.5 MG capsule Take 1 capsule by mouth Daily.   3/13/2025    escitalopram (LEXAPRO) 10 MG tablet Take 1 tablet by mouth Every Night.   3/13/2025    ferrous gluconate (FERGON) 324 MG tablet Take 1 tablet by mouth Daily With Breakfast.   3/13/2025    folic acid (FOLVITE) 1 MG tablet Take 1 tablet by mouth Daily.   3/13/2025    gabapentin (NEURONTIN) 300 MG capsule Take 1 capsule by mouth Every 8 (Eight) Hours for 3 days. (Patient taking differently: Take 1 capsule by mouth 3 (Three) Times a Day.) 9 capsule 0 3/13/2025    Lidocaine 4 % Place 1 patch on the  skin as directed by provider Daily. Remove & Discard patch within 12 hours or as directed by MD   3/13/2025    Loratadine 10 MG capsule Take 1 capsule by mouth Daily.   3/13/2025    melatonin 5 MG tablet tablet Take 1 tablet by mouth Every Night.   3/13/2025    oxyCODONE-acetaminophen (PERCOCET) 7.5-325 MG per tablet Take 1 tablet by mouth Every 4 (Four) Hours As Needed for Moderate Pain or Severe Pain.   Past Week    pantoprazole (Protonix) 40 MG EC tablet Take 1 tablet by mouth 2 (Two) Times a Day.   3/13/2025    QUEtiapine (SEROquel) 25 MG tablet Take 1 tablet by mouth Daily AND 2 tablets Every Night. (Patient taking differently: Take 1 tablet by mouth Daily and 2 tablets every night.)   3/13/2025    riFAXIMin (XIFAXAN) 550 MG tablet Administer 1 tablet per G tube Every 12 (Twelve) Hours for 708 doses. 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   3/13/2025    tamsulosin (FLOMAX) 0.4 MG capsule 24 hr capsule Take 1 capsule by mouth Daily.   3/13/2025    vitamin B-12 (CYANOCOBALAMIN) 1000 MCG tablet Take 1 tablet by mouth Daily.   3/13/2025    Zinc Oxide 10 % cream Apply to bilat buttocks and groin topically every shift for redness.   3/13/2025       ROS: Review of Systems   Constitutional:  Positive for activity change. Negative for appetite change, chills, diaphoresis, fatigue, fever and unexpected weight change.   HENT:  Negative for sore throat, trouble swallowing and voice change.    Eyes: Negative.    Respiratory: Negative.  Negative for apnea, cough, choking, chest tightness, shortness of breath, wheezing and stridor.    Cardiovascular: Negative.  Negative for chest pain, palpitations and leg swelling.   Gastrointestinal:  Positive for blood in stool. Negative for abdominal  distention, abdominal pain, anal bleeding, constipation, diarrhea, nausea, rectal pain and vomiting.   Endocrine: Negative.    Genitourinary: Negative.    Musculoskeletal: Negative.    Skin: Negative.  Negative for color change and pallor.   Allergic/Immunologic: Negative.    Neurological: Negative.    Hematological:  Negative for adenopathy. Does not bruise/bleed easily.   Psychiatric/Behavioral: Negative.     All other systems reviewed and are negative.      PAST MED HX:  Past Medical History:   Diagnosis Date    Anemia     Cancer     ESOPHAGEAL    Cirrhosis     Duodenal ulcer     Gastric ulcer     GERD (gastroesophageal reflux disease)     History of alcohol abuse     History of radiation therapy 05/08/2024    esophagus    HLD (hyperlipidemia)     Mood disorder        PAST SURG HX:  Past Surgical History:   Procedure Laterality Date    ENDOSCOPY N/A 12/26/2023    Procedure: ESOPHAGOGASTRODUODENOSCOPY;  Surgeon: Wesly Mckeon MD;  Location:  [facility];  Service: Gastroenterology;  Laterality: N/A;    ENDOSCOPY N/A 3/3/2025    Procedure: ESOPHAGOGASTRODUODENOSCOPY;  Surgeon: Wesly Mckeon MD;  Location: [facility];  Service: Gastroenterology;  Laterality: N/A;  APC USED IN ESOPHAGUS.    VENOUS ACCESS DEVICE (PORT) INSERTION Right 04/25/2024       FAM HX:  Family History   Problem Relation Age of Onset    Cancer Mother         LUNG AND BREAST    Breast cancer Mother     Heart attack Father        SOC HX:  Social History     Socioeconomic History    Marital status: Single   Tobacco Use    Smoking status: Every Day     Current packs/day: 1.00     Average packs/day: 1 pack/day for 15.0 years (15.0 ttl pk-yrs)     Types: Cigarettes     Passive exposure: Current    Smokeless tobacco: Current   Vaping Use    Vaping status: Every Day    Substances: Nicotine, Flavoring    Devices: Disposable   Substance and Sexual Activity    Alcohol use: Yes     Alcohol/week: 4.0 standard drinks of alcohol     Types: 4 Cans  "of beer per week     Comment: pt states he drinks 3-4 beers a day a couple times a week    Drug use: Yes     Types: Hydrocodone    Sexual activity: Not Currently     Partners: Female       PHYSICAL EXAM  /68 (BP Location: Left arm, Patient Position: Lying)   Pulse 75   Temp 98.3 °F (36.8 °C) (Oral)   Resp 18   Ht 176.5 cm (69.5\")   Wt 72.6 kg (160 lb)   SpO2 94%   BMI 23.29 kg/m²   Wt Readings from Last 3 Encounters:   03/14/25 72.6 kg (160 lb)   03/11/25 73 kg (160 lb 14.4 oz)   02/03/25 83 kg (183 lb)   ,body mass index is 23.29 kg/m².  Physical Exam  Vitals and nursing note reviewed.   Constitutional:       General: He is not in acute distress.     Appearance: Normal appearance. He is normal weight. He is not ill-appearing or toxic-appearing.   HENT:      Head: Normocephalic and atraumatic.   Eyes:      General: No scleral icterus.     Extraocular Movements: Extraocular movements intact.      Conjunctiva/sclera: Conjunctivae normal.      Pupils: Pupils are equal, round, and reactive to light.   Cardiovascular:      Rate and Rhythm: Normal rate and regular rhythm.      Pulses: Normal pulses.      Heart sounds: Normal heart sounds.   Pulmonary:      Effort: Pulmonary effort is normal. No respiratory distress.      Breath sounds: Normal breath sounds.   Abdominal:      General: Abdomen is flat. Bowel sounds are normal. There is no distension.      Palpations: Abdomen is soft. There is no mass.      Tenderness: There is no abdominal tenderness. There is no guarding or rebound.      Hernia: No hernia is present.   Genitourinary:     Rectum: Guaiac result positive.   Musculoskeletal:      Right lower leg: No edema.      Left lower leg: No edema.   Skin:     General: Skin is warm and dry.      Capillary Refill: Capillary refill takes less than 2 seconds.      Coloration: Skin is not jaundiced or pale.   Neurological:      General: No focal deficit present.      Mental Status: He is alert and oriented to " person, place, and time.      Comments: No asterixis on exam   Psychiatric:         Mood and Affect: Mood normal.         Behavior: Behavior normal.         Thought Content: Thought content normal.         Judgment: Judgment normal.         Results Review:   I reviewed the patient's new clinical results.  I reviewed the patient's new imaging results and agree with the interpretation.  I reviewed the patient's other test results and agree with the interpretation  I personally viewed and interpreted the patient's EKG/Telemetry data    Lab Results   Component Value Date    WBC 9.02 03/15/2025    HGB 8.1 (L) 03/15/2025    HGB 7.9 (L) 03/14/2025    HGB 6.1 (C) 03/14/2025    HCT 25.6 (L) 03/15/2025    MCV 91.1 03/15/2025     03/15/2025       Lab Results   Component Value Date    INR 1.84 (H) 03/14/2025    INR 1.13 (H) 03/01/2025    INR 1.13 (H) 02/17/2025       Lab Results   Component Value Date    GLUCOSE 84 03/15/2025    BUN 14 03/15/2025    CREATININE 0.69 (L) 03/15/2025    BCR 20.3 03/15/2025     03/15/2025    K 4.1 03/15/2025    CO2 23.0 03/15/2025    CALCIUM 8.4 (L) 03/15/2025    ALBUMIN 2.6 (L) 03/14/2025    ALKPHOS 107 03/14/2025    BILITOT 0.3 03/14/2025    BILIDIR 0.2 03/01/2025    ALT 22 03/14/2025    AST 25 03/14/2025       SLP FEES - Fiberoptic Endo Eval Swallow  Result Date: 3/11/2025  This procedure was auto-finalized with no dictation required.    SLP FEES - Fiberoptic Endo Eval Swallow  Result Date: 3/8/2025  This procedure was auto-finalized with no dictation required.    XR Chest 1 View  Result Date: 3/6/2025  XR CHEST 1 VW Date of Exam: 3/6/2025 4:54 AM EST Indication: Intubated, pneumonia Comparison: 3/5/2025 Findings: Endotracheal tube overlies the upper/mid trachea at the level of the clavicles. Enteric tube descends below the diaphragm and beyond the field-of-view. Right IJ latosha cath overlies the SVC. Small left pleural effusion with left basilar atelectasis or infiltrate. Right lung  appears adequately aerated. No pneumothorax is seen. Cardiac silhouette is magnified. Osseous structures are unchanged.     Impression: 1.Small left pleural effusion with left basilar atelectasis versus infiltrate. Electronically Signed: Urbano Hernandez MD  3/6/2025 7:42 AM EST  Workstation ID: [id number]    XR Chest 1 View  Result Date: 3/5/2025  XR ABDOMEN KUB, XR CHEST 1 VW Date of Exam: 3/5/2025 1:22 AM EST Indication: Keofeed placement Comparison: 3/2/2025. Findings: Right internal jugular Mediport in place. There are no airspace consolidations. Small left-sided pleural effusion present.. No pneumothorax. The pulmonary vasculature appears within normal limits. The cardiac and mediastinal silhouette appear unremarkable. No acute osseous abnormality identified. Enteric tube tip within the distal portion of the third portion of the duodenum. Nonobstructive bowel gas pattern. Gassy colon. No evidence of pneumatosis or free air.     Impression: Enteric tube tip within the distal portion of the third portion of the duodenum. Small left-sided pleural effusion present. Electronically Signed: Natalie Abdi MD  3/5/2025 1:35 AM EST  Workstation ID: [id number]    XR Abdomen KUB  Result Date: 3/5/2025  XR ABDOMEN KUB, XR CHEST 1 VW Date of Exam: 3/5/2025 1:22 AM EST Indication: Keofeed placement Comparison: 3/2/2025. Findings: Right internal jugular Mediport in place. There are no airspace consolidations. Small left-sided pleural effusion present.. No pneumothorax. The pulmonary vasculature appears within normal limits. The cardiac and mediastinal silhouette appear unremarkable. No acute osseous abnormality identified. Enteric tube tip within the distal portion of the third portion of the duodenum. Nonobstructive bowel gas pattern. Gassy colon. No evidence of pneumatosis or free air.     Impression: Enteric tube tip within the distal portion of the third portion of the duodenum. Small left-sided pleural effusion present.  Electronically Signed: Natalie Abdi MD  3/5/2025 1:35 AM EST  Workstation ID: [id number]    XR Chest 1 View  Result Date: 3/4/2025  XR CHEST 1 VW Date of Exam: 3/4/2025 1:32 AM EST Indication: Pneumonia, intubated Comparison: Chest CT without contrast 3/2/2025, portable chest radiograph 3/3/2025 Findings: ETT tip above the doron. Esophagogastric tube tip below the diaphragm. Right-sided port catheter noted with tip at the mid SVC. Persistent mild left basilar airspace disease which may relate to atelectasis, pneumonia not excluded. The right lung is clear. Negative for pneumothorax. No significant effusion.     Impression: 1. Stable lines and support tubes. 2. Persistent mild left basilar airspace disease which may relate to atelectasis, pneumonia not excluded. 3. Clear right lung. 4. No pneumothorax. Electronically Signed: Richard Pinto MD  3/4/2025 7:40 AM EST  Workstation ID: [id number]    XR Chest 1 View  Result Date: 3/3/2025  XR CHEST 1 VW Date of Exam: 3/3/2025 1:09 PM EST Indication: Confirm ET Tube Placement Comparison: Chest radiograph 3/2/2025, chest CT 3/2/2025 Findings: ET tube 3 cm above doron. Feeding tube below diaphragm. Right chest port catheter tip terminates over low SVC. Similar mixed pattern interstitial and alveolar airspace consolidation most significantly involving right middle and left lower lobes. Right middle and left lower lobe opacities appear increased from 2/28/2025. Small left greater than right pleural effusions, radiographically increased from prior chest radiograph. No visualized pneumothorax. Degenerative related osseous change.     Impression: 1. ET tube 3 cm above doron. No pneumothorax. 2. Mixed pattern interstitial and alveolar airspace consolidation with pleural effusions, overall increasing from prior 2/28/2025 chest radiograph although may be similar to recent CT comparison. Mucous plugging in the right middle lobe remains possible. Electronically Signed: Guerrero Rubio MD   3/3/2025 1:56 PM EST  Workstation ID: [id number]    CT Chest Without Contrast Diagnostic  Result Date: 3/2/2025  CT CHEST WO CONTRAST DIAGNOSTIC, CT ABDOMEN PELVIS WO CONTRAST Date of Exam: 3/2/2025 10:49 AM EST Indication: shortness of breath, leukocytosis. Comparison: CT chest abdomen pelvis 2/11/2025 Technique: Axial CT images were obtained of the chest, abdomen and pelvis without contrast administration.  Reconstructed coronal and sagittal images were also obtained. Automated exposure control and iterative construction methods were used. Findings: Limited exam without IV contrast, specifically assessing the vascular and solid organ structures. CT CHEST: Thyroid gross unremarkable. Right chest port catheter tip terminates within the right atrium. Aortic atherosclerotic disease without aneurysm. Aortic valve leaflet calcifications. Normal caliber main pulmonary artery. Calcified coronary atherosclerotic disease. Mitral annular calcifications. Mild stable cardiomegaly. Trace low-density pericardial fluid similar to less conspicuous to prior. Esophagus unremarkable. Hypodense cardiac blood pooling which can be seen with anemia. No suspicious mediastinal or hilar adenopathy. Debris and/or mucous plugging right bronchus intermedius extending into the right lower lobe. Complete collapse of right middle lobe with air bronchograms. Multifocal patchy and alveolar groundglass opacities new from prior exam. There is a small low-density right pleural effusion new from prior exam. No overt edema. No pneumothorax. Mild underlying centrilobular emphysema. Gynecomastia. No acute chest wall findings. Degenerative changes throughout the spine without acute displaced fracture or aggressive lesion. Superimposed findings suggesting diffuse idiopathic skeletal hyperostosis. CT abdomen pelvis Solid irregular nodular peripheral hepatic contour suggesting morphologic changes of chronic liver disease. Gallbladder and biliary tree  unremarkable. Moderate to severely atrophic pancreas. No active inflammation. Spleen within normal limits in size. Unremarkable adrenal glands. Asymmetric enlargement of the left kidney with subtle hazy appearance of the renal parenchyma and asymmetric trace perinephric fluid. Allowing for technical differences these findings have improved since 2/11/2025. No discrete drainable collection. No hydronephrosis. Mild bladder wall thickening. Large left posterolateral bladder diverticula which appears slightly inflamed. Presumed instrumentation related intraluminal gas. Prostate within normal limits in size. Antegrade oriented feeding tube terminates within the transverse duodenum. Rectal tube in place. Colonic diverticulosis. No evidence of bowel obstruction. Normal appendix. Aortic atherosclerotic disease without aneurysm. No suspicious adenopathy. Minimal perirectal/presacral edema. No significant ascites. No free air. Medication related injection change anterior abdominal wall. Tiny fat-containing umbilical hernia. Tiny fat-containing left inguinal hernia. Degenerative changes of the lumbar spine  with related grade 1 anterolisthesis L4 and L5. No acute displaced fracture or aggressive lesion.     Impression: Chest 1. Multifocal patchy and alveolar airspace opacities suspicious for pneumonia and/or other forms of infectious/inflammatory pneumonitis which may include atypical/viral etiologies. Small right pleural effusion. 2. Recent aspiration versus mucous plugging in the right bronchus intermedius with complete right middle lobe collapse. 3. Aortic and coronary atherosclerotic disease. 4. Cardiomegaly. 5. CT findings suggesting anemia. Abdomen/pelvis 1. Asymmetric left renal findings suspicious for persistent pyelonephritis, severity appears improved from 2/11/2025. No hydronephrosis or drainable fluid collection. 2. Possible cystitis by CT, correlate with urinalysis. 3. Morphologic changes of the liver suspicious  for chronic liver disease. 4. Atrophic pancreas. 5. Colonic diverticulosis. 6. Other ancillary findings as above. Electronically Signed: Guerrero Rubio MD  3/2/2025 2:18 PM EST  Workstation ID: [id number]    CT Abdomen Pelvis Without Contrast  Result Date: 3/2/2025  CT CHEST WO CONTRAST DIAGNOSTIC, CT ABDOMEN PELVIS WO CONTRAST Date of Exam: 3/2/2025 10:49 AM EST Indication: shortness of breath, leukocytosis. Comparison: CT chest abdomen pelvis 2/11/2025 Technique: Axial CT images were obtained of the chest, abdomen and pelvis without contrast administration.  Reconstructed coronal and sagittal images were also obtained. Automated exposure control and iterative construction methods were used. Findings: Limited exam without IV contrast, specifically assessing the vascular and solid organ structures. CT CHEST: Thyroid gross unremarkable. Right chest port catheter tip terminates within the right atrium. Aortic atherosclerotic disease without aneurysm. Aortic valve leaflet calcifications. Normal caliber main pulmonary artery. Calcified coronary atherosclerotic disease. Mitral annular calcifications. Mild stable cardiomegaly. Trace low-density pericardial fluid similar to less conspicuous to prior. Esophagus unremarkable. Hypodense cardiac blood pooling which can be seen with anemia. No suspicious mediastinal or hilar adenopathy. Debris and/or mucous plugging right bronchus intermedius extending into the right lower lobe. Complete collapse of right middle lobe with air bronchograms. Multifocal patchy and alveolar groundglass opacities new from prior exam. There is a small low-density right pleural effusion new from prior exam. No overt edema. No pneumothorax. Mild underlying centrilobular emphysema. Gynecomastia. No acute chest wall findings. Degenerative changes throughout the spine without acute displaced fracture or aggressive lesion. Superimposed findings suggesting diffuse idiopathic skeletal hyperostosis. CT abdomen  pelvis Solid irregular nodular peripheral hepatic contour suggesting morphologic changes of chronic liver disease. Gallbladder and biliary tree unremarkable. Moderate to severely atrophic pancreas. No active inflammation. Spleen within normal limits in size. Unremarkable adrenal glands. Asymmetric enlargement of the left kidney with subtle hazy appearance of the renal parenchyma and asymmetric trace perinephric fluid. Allowing for technical differences these findings have improved since 2/11/2025. No discrete drainable collection. No hydronephrosis. Mild bladder wall thickening. Large left posterolateral bladder diverticula which appears slightly inflamed. Presumed instrumentation related intraluminal gas. Prostate within normal limits in size. Antegrade oriented feeding tube terminates within the transverse duodenum. Rectal tube in place. Colonic diverticulosis. No evidence of bowel obstruction. Normal appendix. Aortic atherosclerotic disease without aneurysm. No suspicious adenopathy. Minimal perirectal/presacral edema. No significant ascites. No free air. Medication related injection change anterior abdominal wall. Tiny fat-containing umbilical hernia. Tiny fat-containing left inguinal hernia. Degenerative changes of the lumbar spine  with related grade 1 anterolisthesis L4 and L5. No acute displaced fracture or aggressive lesion.     Impression: Chest 1. Multifocal patchy and alveolar airspace opacities suspicious for pneumonia and/or other forms of infectious/inflammatory pneumonitis which may include atypical/viral etiologies. Small right pleural effusion. 2. Recent aspiration versus mucous plugging in the right bronchus intermedius with complete right middle lobe collapse. 3. Aortic and coronary atherosclerotic disease. 4. Cardiomegaly. 5. CT findings suggesting anemia. Abdomen/pelvis 1. Asymmetric left renal findings suspicious for persistent pyelonephritis, severity appears improved from 2/11/2025. No  hydronephrosis or drainable fluid collection. 2. Possible cystitis by CT, correlate with urinalysis. 3. Morphologic changes of the liver suspicious for chronic liver disease. 4. Atrophic pancreas. 5. Colonic diverticulosis. 6. Other ancillary findings as above. Electronically Signed: Guerrero Rubio MD  3/2/2025 2:18 PM EST  Workstation ID: [id number]    Calorimetry  Result Date: 2/28/2025  Images from the original result were not included. QNR+ CALORIMETRY STUDY Interpretation: An indirect calorimetry study was performed with the Q-NRG+ device, for about 12 minutes, while the patient was on Invasive Mechanical Ventilation. Steady state was achieved. The Patient's REE was 2093 kcal/day (95% of predicted, based on ESPEN predicted equation). The RQ was 0.83, which is in the optimal range. (RQ: 0.80-0.90)     XR Chest 1 View  Result Date: 2/28/2025  XR CHEST 1 VW Date of Exam: 2/28/2025 3:47 AM EST Indication: Respiratory failrue Comparison: 2/27/2025 Findings: Previously noted right IJ line no longer present. Gastric tube remains in place. Heart size and pulmonary vasculature are stable. Grossly stable predominantly perihilar opacities. No pneumothorax     Impression: Stable lung opacities compatible with pneumonia or edema Electronically Signed: Eder Draper  2/28/2025 7:16 AM EST  Workstation ID: [id number]    XR Chest 1 View  Result Date: 2/27/2025  XR CHEST 1 VW Date of Exam: 2/27/2025 2:18 AM EST Indication: Respiratory failrue Comparison: 2/26/2025 Findings: Right-sided Port-A-Cath and feeding tube are again noted. Right IJ catheter has been placed, tip in the most proximal portion of the SVC. There is no evidence of pneumothorax. Patient's bilateral pulmonary interstitial and airspace disease appears significantly improved from yesterday's study. No pneumothorax or effusion is seen. Heart appears upper normal size.     Impression: 1. Right IJ catheter placement, tip in the proximal SVC. No evidence of  pneumothorax. 2. Significant improved pulmonary disease, which suggests resolving pneumonia. Electronically Signed: Ibrahima Jett MD  2/27/2025 8:11 AM EST  Workstation ID: [id number]    [facility] FEES - Fiberoptic Endo Eval Swallow  Result Date: 2/26/2025  This procedure was auto-finalized with no dictation required.    XR Chest 1 View  Result Date: 2/26/2025  XR CHEST 1 VW Date of Exam: 2/26/2025 3:28 AM EST Indication: Respiratory failrue Comparison: 2/25/2025 Findings:  Feeding tube and left IJ line remain in place. Persistent prominence of the central pulmonary vasculature. Mild improvement in bilateral airspace opacities. Persistent small left pleural effusion      Impression: There is mild improvement in bilateral airspace disease with persistent small left pleural effusion Electronically Signed: Eder Draper  2/26/2025 7:17 AM EST  Workstation ID: [id number]    XR Chest 1 View  Result Date: 2/25/2025  XR CHEST 1 VW Date of Exam: 2/25/2025 1:58 AM EST Indication: Respiratory failrue Comparison: Chest x-ray 2/24/2025 , 2/22/2025 Findings: Interval extubation and removal of NG/OG tube. Otherwise stable medical support devices. Stable cardiomediastinal silhouette. Similar patchy bilateral parenchymal and interstitial opacities. No pneumothorax. Suspected small left pleural effusion is unchanged.     Impression: Interval extubation and removal of NG/OG tube. Redemonstration of bilateral parenchymal opacities compatible with pulmonary edema, slowly improved since 2/22/2025 exam. Decreased conspicuity of small left pleural effusion. Electronically Signed: Nikita Garrison MD  2/25/2025 7:08 AM EST  Workstation ID: [id number]    XR Abdomen KUB  Result Date: 2/24/2025  XR ABDOMEN KUB Date of Exam: 2/24/2025 1:23 PM EST Indication: new enteral tube placed in gastric Comparison: 2/23/2025 Findings: Feeding tube is seen with its tip in the duodenal bulb. NG tube is been removed. There is increased upper abdominal bowel gas, but no  abnormally dilated bowel loops are seen.     Impression: Feeding tube tip in the area of the duodenal bulb. Electronically Signed: Ibrahima Jett MD  2/24/2025 2:01 PM EST  Workstation ID: [id number]    XR Chest 1 View  Result Date: 2/24/2025  XR CHEST 1 VW Date of Exam: 2/24/2025 5:00 AM EST Indication: Respiratory failrue Comparison: 2/23/2025 Findings: ET tube stable above the doron. Esophagogastric tube tip below the diaphragm. Right sided port catheter tips at the cavoatrial junction. Heart size within normal limits. Persistent perihilar airspace disease right greater than left which may relate to pulmonary edema, overall pulmonary edema appears improved in the prior study. There is persistent bibasilar atelectasis with small bilateral pleural effusions with improved aeration at the right lung base. Negative for pneumothorax. Left IJ central venous catheter tip is at the mid SVC.     Impression: 1. Stable lines and support tubes. 2. Persistent perihilar airspace disease right greater than left which may relate to pulmonary edema, overall improved from the prior study. 3. Persistent bibasilar atelectasis with small bilateral pleural effusions with improved aeration at the right lung base. 4. No pneumothorax. Electronically Signed: Richard Pinto MD  2/24/2025 7:41 AM EST  Workstation ID: [id number]    XR Abdomen KUB  Result Date: 2/23/2025  XR ABDOMEN KUB Date of Exam: 2/23/2025 2:33 PM EST Indication: enteral feeding tube placement Comparison: Abdominal radiograph 2/14/2025. Findings: Weighted tip feeding catheter and nasogastric tube both have similar course to terminate over the distal stomach.     Impression: Weighted tip feeding catheter and nasogastric tube both have similar course to terminate over the distal stomach. Electronically Signed: Yoni Guadalupe MD  2/23/2025 3:01 PM EST  Workstation ID: [id number]    XR Chest 1 View  Result Date: 2/23/2025  XR CHEST 1 VW Date of Exam: 2/23/2025 1:48 AM EST Indication:  Respiratory failrue Comparison: 1 day prior. Findings: Support hardware projects unchanged. There is evidence of persistent edema pattern with small to moderate right and trace left pleural effusions. Perihilar airspace disease otherwise appears somewhat less confluent and there is no new focal consolidation. There is no distinct pneumothorax. Unchanged heart and mediastinal contours.     Impression: Support hardware projects unchanged. There is evidence of persistent edema pattern with small to moderate right and trace left pleural effusions. Perihilar airspace disease otherwise appears somewhat less confluent and there is no new focal consolidation. There is no distinct pneumothorax. Unchanged heart and mediastinal contours. Electronically Signed: Yuri Medina MD  2/23/2025 7:20 AM EST  Workstation ID: [id number]    XR Chest 1 View  Result Date: 2/22/2025  XR CHEST 1 VW Date of Exam: 2/22/2025 3:08 AM EST Indication: Respiratory failrue Comparison: CXR 2/21/2025 Findings: The heart remains enlarged. The pulmonary vascularity is prominent centrally. Diffuse interstitial opacities may represent pulmonary edema or pneumonia. Worsening alveolar opacities are seen in the right upper lobe. Hazy density at the lung bases suggest pleural fluid. Endotracheal tube remains in place. The tip is 2 cm above the doron. NG tube remains in place, and can be followed 14 cm beyond the GE junction. Left jugular central venous catheter tip is in the mid SVC, in unchanged position. Right subclavian Port-A-Cath device is in unchanged position. Bony structures appear intact.     Impression: Diffuse pulmonary infiltrate or edema, worsened in the right upper lobe. Small to moderate pleural effusions are unchanged. ET tube, NG tube, left jugular central venous catheter and right subclavian Port-A-Cath remain in place. Electronically Signed: Darryn Loaiza MD  2/22/2025 7:29 AM EST  Workstation ID: [id number]    XR Chest 1 View  Result Date:  2/21/2025  XR CHEST 1 VW Date of Exam: 2/21/2025 3:20 AM EST Indication: Respiratory failrue Comparison: Chest x-ray 2/20/2025 Findings: Stable medical support devices. Stable cardiomegaly. Mild worsening of dense and hazy opacities in the mid and lower lungs. Similar diffuse interstitial opacities. No pneumothorax.     Impression: Mild worsening of dense and hazy opacities in the mid and lower lungs on a background of diffuse interstitial opacities, likely reflecting small to moderate right and small left pleural effusions and bibasilar atelectasis or pneumonia. Diffuse interstitial opacities may reflect interstitial edema or multifocal pneumonia. Electronically Signed: Nikita Garrison MD  2/21/2025 7:46 AM EST  Workstation ID: [id number]    XR Chest 1 View  Result Date: 2/20/2025  XR CHEST 1 VW Date of Exam: 2/20/2025 1:01 AM EST Indication: Respiratory failrue Comparison: 1 day prior. Findings: Support hardware projects unchanged. There is persistent edema pattern present including perihilar opacities and small to moderate right and trace left pleural effusions. There is no distinct pneumothorax. No new focal airspace consolidation. Unchanged heart and mediastinal contours.     Impression: Support hardware projects unchanged. There is persistent edema pattern present including perihilar opacities and small to moderate right and trace left pleural effusions. There is no distinct pneumothorax. No new focal airspace consolidation. Unchanged heart and mediastinal contours. Electronically Signed: Yuri Medina MD  2/20/2025 6:46 AM EST  Workstation ID: [id number]    XR Chest 1 View  Result Date: 2/19/2025  XR CHEST 1 VW Date of Exam: 2/19/2025 2:11 AM EST Indication: Respiratory failrue Comparison: 2/18/2025 Findings:  Endotracheal tube unchanged. Gastric tube and left IJ line remain in place. Heart size and pulmonary vasculature are stable. There is stable hazy bibasilar opacities obscuring the diaphragms. No      Impression: Stable exam demonstrating bibasilar airspace disease and suspected small bilateral pleural effusions Electronically Signed: Eder Draper  2/19/2025 7:49 AM EST  Workstation ID: [id number]    XR Chest 1 View  Result Date: 2/18/2025  XR CHEST 1 VW Date of Exam: 2/18/2025 2:19 AM EST Indication: Respiratory failrue Comparison: 2/17/2025 Findings: Nasogastric tube, endotracheal tube, right chest wall port and left internal jugular central venous catheter are again seen. Cardiac size is similar to prior exam. Similar small bilateral pleural effusions with bibasilar opacities. No pneumothorax. No evidence of acute osseous abnormalities. Visualized upper abdomen is unremarkable.     Impression: Similar small bilateral pleural effusions with bibasilar opacities. Electronically Signed: Rashad Chau MD  2/18/2025 8:41 AM EST  Workstation ID: [id number]    XR Chest 1 View  Result Date: 2/17/2025  XR CHEST 1 VW Date of Exam: 2/17/2025 3:49 PM EST Indication: Increased O2 demand Comparison: Chest radiograph 2/17/2025 Findings: Endotracheal tube projects 3.2 cm above the doron. An enteric tube courses inferiorly out of field-of-view. Right chest port and the left IJ approach central venous catheter in stable position. Mediastinum: Cardiac silhouette appears unchanged and enlarged Lungs: Mild bibasal opacities. Mild central pulmonary vascular congestion, similar to prior. Pleura: Small bilateral pleural effusions, which appear similar to prior. No pneumothorax. Bones and soft tissues: No acute, displaced fracture seen.     Impression: Similar-appearing cardiomegaly with mild pulmonary edema and small bilateral pleural effusions. Mild bibasal opacities, likely atelectasis. Support lines and tubes as above. Electronically Signed: Dashawn Tineo  2/17/2025 4:32 PM EST  Workstation ID: [id number]    XR Chest 1 View  Result Date: 2/17/2025  XR CHEST 1 VW Date of Exam: 2/17/2025 1:39 AM EST Indication: Respiratory  failure, pulmonary edema Comparison: 2/16/2025 Findings: ET tube, NG tube, in left IJ catheter remain in satisfactory position. Port-A-Cath is again noted tip in the distal SVC. Heart is mildly enlarged. Vasculature in cephalized, although interstitial edema appears slightly improved. Bibasilar effusion and atelectasis is again noted, not significantly changed on the right, but increased in the left base. No pneumothorax is seen.     Impression: 1. Mildly improved pulmonary vascular congestion. 2. Persistent bibasilar atelectasis and effusion, which appears mildly increased on the left. Electronically Signed: Ibrahima Jett MD  2/17/2025 7:18 AM EST  Workstation ID: [id number]    XR Chest 1 View  Result Date: 2/16/2025  XR CHEST 1 VW Date of Exam: 2/16/2025 5:18 PM EST Indication: worsening oxygenation Comparison: Chest x-ray 2/16/2025 Findings: Endotracheal tube tip terminates 2.1 cm from the doron. Esophagogastric tube is past GE junction. Internal jugular central venous catheter tip terminates at the superior vena cava. Right-sided port terminates at the cavoatrial junction. There are moderate bilateral pleural effusions. There is bibasilar airspace opacity atelectasis versus pneumonia. No pneumothorax.     Impression: Support devices have appropriate position. Pulmonary edema with moderate pleural effusions and bibasilar airspace opacity with slight progression of pulmonary edema since previous study. Electronically Signed: Jessi Lutz MD  2/16/2025 5:35 PM EST  Workstation ID: [id number]    XR Chest 1 View  Result Date: 2/16/2025  XR CHEST 1 VW Date of Exam: 2/16/2025 4:15 AM EST Indication: Respiratory failure, intubated Comparison: 2/15/2025 Findings: ET tube NG tube and left IJ catheter remain in satisfactory position. Right-sided Port-A-Cath is again noted. Heart and vasculature appear upper limits of normal size. Hazy opacity of the right lung base is mildly improved. There is persistent dense opacity of the left  lung base also mildly improved. CT scan from 2/11/2025 showed dense bilateral lower lobe airspace disease, rather than effusion. Lung apices appear practically clear. No pneumothorax is seen.     Impression: Mildly improved bibasilar pulmonary disease compared to 2/15/2025 exam. No pneumothorax or other new chest pathology is seen. Electronically Signed: Ibrahima Jett MD  2/16/2025 9:16 AM EST  Workstation ID: [id number]    XR Chest 1 View  Result Date: 2/15/2025  XR CHEST 1 VW Date of Exam: 2/15/2025 3:20 AM EST Indication: Intubated Patient Comparison: Chest x-ray 2/14/2025 Findings: Stable medical support devices. Stable cardiomediastinal silhouette. Redemonstration of hazy bibasilar opacities compatible with layering pleural effusions. Increased retrocardiac opacities likely reflect atelectasis. No pneumothorax.     Impression: Stable medical support devices. Stable cardiomediastinal silhouette. Redemonstration of hazy bibasilar opacities compatible with layering pleural effusions. Increased retrocardiac opacities likely reflect atelectasis. No pneumothorax. Electronically Signed: Nikita Garrison MD  2/15/2025 8:02 AM EST  Workstation ID: [id number]    XR Chest 1 View  Result Date: 2/14/2025  XR CHEST 1 VW Date of Exam: 2/14/2025 2:25 AM EST Indication: Intubated Patient Comparison: 2/13/2025 Findings: ET tube stable above the doron. Esophagogastric tube tip below the diaphragm. Right-sided port catheter tip is at the cavoatrial junction. Heart size within normal limits for portable technique. Improving bibasilar airspace disease likely resolving atelectasis. Suspect small right pleural effusion, stable. Negative for pneumothorax.     Impression: 1. Stable lines and support tubes. 2. Improving bibasilar airspace disease likely resolving atelectasis with small right pleural effusion. 3. No pneumothorax. Electronically Signed: Richard Pinto MD  2/14/2025 7:33 AM EST  Workstation ID: [id number]    XR Abdomen KUB  Result  Date: 2/14/2025  XR ABDOMEN KUB Date of Exam: 2/14/2025 6:30 AM EST Indication: ascites Comparison: 2/12/2025. Findings: Enteric tube within the stomach. Nonobstructive bowel gas pattern. No significant stool burden.     Impression: Nonobstructive bowel gas pattern. Enteric tube within the stomach. Electronically Signed: Natalie Abdi MD  2/14/2025 6:58 AM EST  Workstation ID: [id number]     ASSESSMENTS/PLANS  1.  Gastrointestinal bleeding with melena, known portal hypertensive gastropathy and grade 1 esophageal varices  2.  Acute on chronic blood loss anemia  3.  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  Hepatic encephalopathy  5.  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  Most recent EGD with biopsies unremarkable.  6.  Atrial fibrillation, anticoagulated on Eliquis    Val Jose C Nassar Jr. is a 64 y.o. male with abnormal labs and ongoing melena.  Patient with recent EGD during last hospitalization at which time esophageal angioectasias were treated and patient was found to have grade 1 esophageal varices without high risk stigmata as well as oozing portal hypertensive gastropathy.  In setting of ongoing Eliquis exposure, suspect patient has chronic blood losses secondary to PHG.  At present, would not pursue repeat endoscopy.  Recommend ongoing supportive care with PPI therapy and iron supplementation.  Ideally, will be able to proceed with possible Watchman device to come off of anticoagulation in the future.    >>> Okay for diet as tolerated  >>> IV PPI twice daily  >>> Monitor H&H and transfuse per protocol  >>> Continue prescribed hepatic encephalopathy management  >>> Recommend supplemental iron at discharge  >>> Okay to resume Eliquis in a.m.    Patient will need to follow-up with his primary gastroenterologist at discharge.    I discussed the patient's findings and my recommendations with patient and consulting provider.    Esvin Gray,  APRN  03/15/25  17:53 EDT

## 2025-03-15 NOTE — CONSULTS
Date of Hospital Visit: 03/15/25  Encounter Provider: Marga Carlisle PA-C  Place of Service: [facility]  Patient Name: Val Nassar Jr.  :1960  Referral Provider: Pauly Watkins MD  Primary Care Provider: Wesly Minor MD    Chief complaint/Reason for Consultation: Eliquis recommendations, GI bleed, AF    Prior Cardiac History and Testin.  New onset atrial fibrillation,   2.  H/o CHF and elevated troponin at OSH  -- TTE : EF 60%, mild LVH, mild AS   3.  Coronary artery disease  -- Cardiac CTA : Moderate LAD stenosis, moderate mid circumflex stenosis, CAD-RADS 3 (50-69% stenosis), normal myocardial morphology and function.  3.  Alcoholic cirrhosis with anemia and thrombocytopenia  4.  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  Hyperlipidemia  6.  Tobacco use  7.  GI bleed after Eliquis initiation, Hgb 6.1      History of Present Illness:  Val Nassar Jr. is a 64 y.o. male with alcoholic cirrhosis, moderate CAD, history of metastatic squamous cell carcinoma of the esophagus and hyperlipidemia who was recently admitted to the hospital (2025) with altered mental status, weakness, and dizziness from his rehab facility.  He was diagnosed with septic shock due to UTI (Pseudomonas) requiring intubation as well as new onset atrial fibrillation. Patient was intubated until around  and was recently transferred to the floor.  He was found to be in A-fib during his hospitalization and was started on Eliquis and amio load but protocol was deferred due to liver dysfunction. He was discharged back to [facility] in stable condition.     He represented to the emergency department yesterday with complaints of weakness, dizziness, and black stools.  Hemoglobin was found to be 6.1.  He received 2 units of PRBCs and  cardiology is asked to see the patient in consultation for recommendations on continuing Eliquis in the setting of atrial fibrillation and GI bleed.    Since transfusion, patient's weakness and dizziness have improved but he does continue to have mild episodes with changes in position.    Past Medical History:   Diagnosis Date    Anemia     Cancer     ESOPHAGEAL    Cirrhosis     Duodenal ulcer     Gastric ulcer     GERD (gastroesophageal reflux disease)     History of alcohol abuse     History of radiation therapy 05/08/2024    esophagus    HLD (hyperlipidemia)     Mood disorder        Past Surgical History:   Procedure Laterality Date    ENDOSCOPY N/A 12/26/2023    Procedure: ESOPHAGOGASTRODUODENOSCOPY;  Surgeon: Wesly Mckeon MD;  Location:  [facility];  Service: Gastroenterology;  Laterality: N/A;    ENDOSCOPY N/A 3/3/2025    Procedure: ESOPHAGOGASTRODUODENOSCOPY;  Surgeon: Wesly Mckeon MD;  Location:  [facility];  Service: Gastroenterology;  Laterality: N/A;  APC USED IN ESOPHAGUS.    VENOUS ACCESS DEVICE (PORT) INSERTION Right 04/25/2024       Medications Prior to Admission   Medication Sig Dispense Refill Last Dose/Taking    apixaban (ELIQUIS) 5 MG tablet tablet Take 1 tablet by mouth Every 12 (Twelve) Hours. Indications: Atrial Fibrillation   3/13/2025    Benzalkonium Chloride 0.12 % liquid Apply to bianca area topically as needed for preventative use for Bianca care after each incontinent episode and as needed.   Past Week    Cholecalciferol (Vitamin D3) 1.25 MG (88145 UT) capsule Take 1 capsule by mouth Every 7 (Seven) Days. Patient takes on Fridays   Past Week    dimethicone 1 % cream Apply to buttocks/Bianca area topically as needed for preventative apply topically to buttocks/bianca area.   Past Week    Dimethicone 1.5 % cream Apply to body topically as needed for dry skin apply topically to dry skin on body every shift as needed.   Past Week    docusate sodium (COLACE) 50 mg/5 mL liquid Take 10 mL  by mouth 2 (Two) Times a Day.   3/13/2025    dutasteride (AVODART) 0.5 MG capsule Take 1 capsule by mouth Daily.   3/13/2025    escitalopram (LEXAPRO) 10 MG tablet Take 1 tablet by mouth Every Night.   3/13/2025    ferrous gluconate (FERGON) 324 MG tablet Take 1 tablet by mouth Daily With Breakfast.   3/13/2025    folic acid (FOLVITE) 1 MG tablet Take 1 tablet by mouth Daily.   3/13/2025    gabapentin (NEURONTIN) 300 MG capsule Take 1 capsule by mouth Every 8 (Eight) Hours for 3 days. (Patient taking differently: Take 1 capsule by mouth 3 (Three) Times a Day.) 9 capsule 0 3/13/2025    Lidocaine 4 % Place 1 patch on the skin as directed by provider Daily. Remove & Discard patch within 12 hours or as directed by MD   3/13/2025    Loratadine 10 MG capsule Take 1 capsule by mouth Daily.   3/13/2025    melatonin 5 MG tablet tablet Take 1 tablet by mouth Every Night.   3/13/2025    oxyCODONE-acetaminophen (PERCOCET) 7.5-325 MG per tablet Take 1 tablet by mouth Every 4 (Four) Hours As Needed for Moderate Pain or Severe Pain.   Past Week    pantoprazole (Protonix) 40 MG EC tablet Take 1 tablet by mouth 2 (Two) Times a Day.   3/13/2025    QUEtiapine (SEROquel) 25 MG tablet Take 1 tablet by mouth Daily AND 2 tablets Every Night. (Patient taking differently: Take 1 tablet by mouth Daily and 2 tablets every night.)   3/13/2025    riFAXIMin (XIFAXAN) 550 MG tablet Administer 1 tablet per G tube Every 12 (Twelve) Hours for 708 doses. 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   3/13/2025    tamsulosin (FLOMAX) 0.4 MG capsule 24 hr capsule Take 1 capsule by mouth Daily.   3/13/2025    vitamin B-12 (CYANOCOBALAMIN) 1000 MCG tablet Take 1 tablet by mouth Daily.   3/13/2025    Zinc Oxide 10 % cream Apply  "to bilat buttocks and groin topically every shift for redness.   3/13/2025       Social History     Socioeconomic History    Marital status: Single   Tobacco Use    Smoking status: Every Day     Current packs/day: 1.00     Average packs/day: 1 pack/day for 15.0 years (15.0 ttl pk-yrs)     Types: Cigarettes     Passive exposure: Current    Smokeless tobacco: Current   Vaping Use    Vaping status: Every Day    Substances: Nicotine, Flavoring    Devices: Disposable   Substance and Sexual Activity    Alcohol use: Yes     Alcohol/week: 4.0 standard drinks of alcohol     Types: 4 Cans of beer per week     Comment: pt states he drinks 3-4 beers a day a couple times a week    Drug use: Yes     Types: Hydrocodone    Sexual activity: Not Currently     Partners: Female       Family History   Problem Relation Age of Onset    Cancer Mother         LUNG AND BREAST    Breast cancer Mother     Heart attack Father        REVIEW OF SYSTEMS:     12 point ROS was performed and is Negative except as outlined in HPI     Objective:     Vitals:    03/15/25 0400 03/15/25 0500 03/15/25 0600 03/15/25 0738   BP: 96/58 114/63 104/56 115/63   BP Location: Left arm   Left arm   Patient Position: Lying   Lying   Pulse: 77 63 62 83   Resp: 18   20   Temp: 98.5 °F (36.9 °C)   98.3 °F (36.8 °C)   TempSrc: Oral   Oral   SpO2:  96% 93% 91%   Weight:       Height:         Body mass index is 23.29 kg/m².  Flowsheet Rows      Flowsheet Row First Filed Value   Admission Height 176.5 cm (69.5\") Documented at 03/14/2025 1348   Admission Weight 72.6 kg (160 lb) Documented at 03/14/2025 1348            Physical Exam   General: No acute distress, well developed and well nourished.    Neck: Supple, no JVD. Normal carotid upstrokes  Chest:No respiratory distress. No chest wall tenderness. Breath sounds are normal. No wheezes, rhonchi or rales.  Cardiovascular: Normal S1 and S2, no murmur, gallop or rub. PMI is not displaced.    Musculoskeletal/Extremities:  No " clubbing, cyanosis or edema. No gross deformity.     Lab Review:                Results from last 7 days   Lab Units 03/15/25  0531   SODIUM mmol/L 138   POTASSIUM mmol/L 4.1   CHLORIDE mmol/L 104   CO2 mmol/L 23.0   BUN mg/dL 14   CREATININE mg/dL 0.69*   GLUCOSE mg/dL 84   CALCIUM mg/dL 8.4*         Results from last 7 days   Lab Units 03/15/25  0531   WBC 10*3/mm3 9.02   HEMOGLOBIN g/dL 8.1*   HEMATOCRIT % 25.6*   PLATELETS 10*3/mm3 229     Results from last 7 days   Lab Units 03/14/25  1431   INR  1.84*     Results from last 7 days   Lab Units 03/12/25  0729   MAGNESIUM mg/dL 2.8*         Imaging Results (Last 24 Hours)       ** No results found for the last 24 hours. 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  GI consulted    Plan:   Patient is maintaining sinus rhythm.  Add carvedilol 3.125 mg twice daily for better rate control as well as to help reduce portal pressures in the setting of cirrhosis.  Agree with holding Eliquis due to acute GI bleed.  Will need to resume when okay with GI.  Can do lower dose of 2.5 mg twice daily until patient can have watchman placed.  Continue Crestor 20 mg daily.  Management of multiple medical conditions per hospital medicine and GI team.      Marga Carlisle PA-C

## 2025-03-15 NOTE — PLAN OF CARE
Problem: Adult Inpatient Plan of Care  Goal: Plan of Care Review  Outcome: Progressing  Goal: Patient-Specific Goal (Individualized)  Outcome: Progressing  Goal: Absence of Hospital-Acquired Illness or Injury  Outcome: Progressing  Intervention: Identify and Manage Fall Risk  Recent Flowsheet Documentation  Taken 3/15/2025 1600 by Christina Oconnor RN  Safety Promotion/Fall Prevention:   assistive device/personal items within reach   clutter free environment maintained   fall prevention program maintained   nonskid shoes/slippers when out of bed   room organization consistent   safety round/check completed   toileting scheduled  Taken 3/15/2025 1400 by Christina Oconnor RN  Safety Promotion/Fall Prevention:   assistive device/personal items within reach   clutter free environment maintained   fall prevention program maintained   nonskid shoes/slippers when out of bed   room organization consistent   safety round/check completed   toileting scheduled  Taken 3/15/2025 1200 by Christina Oconnor RN  Safety Promotion/Fall Prevention:   assistive device/personal items within reach   clutter free environment maintained   fall prevention program maintained   nonskid shoes/slippers when out of bed   room organization consistent   safety round/check completed   toileting scheduled  Taken 3/15/2025 1000 by Christina Oconnor RN  Safety Promotion/Fall Prevention:   assistive device/personal items within reach   clutter free environment maintained   fall prevention program maintained   nonskid shoes/slippers when out of bed   room organization consistent   safety round/check completed   toileting scheduled  Taken 3/15/2025 0800 by Christina Oconnor RN  Safety Promotion/Fall Prevention:   activity supervised   room organization consistent   safety round/check completed  Intervention: Prevent Skin Injury  Recent Flowsheet Documentation  Taken 3/15/2025 1600 by Christina Oconnor RN  Body Position: position changed independently  Taken  3/15/2025 1400 by Christina Oconnor RN  Body Position: position changed independently  Taken 3/15/2025 1200 by Christina Oconnor RN  Body Position: position changed independently  Taken 3/15/2025 1000 by Christina Oconnor RN  Body Position: position changed independently  Taken 3/15/2025 0800 by Christina Oconnor RN  Body Position: position changed independently  Skin Protection: incontinence pads utilized  Intervention: Prevent Infection  Recent Flowsheet Documentation  Taken 3/15/2025 1600 by Christina Oconnor RN  Infection Prevention:   cohorting utilized   environmental surveillance performed   rest/sleep promoted   single patient room provided  Taken 3/15/2025 1400 by Christina Oconnor RN  Infection Prevention:   cohorting utilized   environmental surveillance performed   rest/sleep promoted   single patient room provided  Taken 3/15/2025 1200 by Christina Oconnor RN  Infection Prevention:   cohorting utilized   environmental surveillance performed   rest/sleep promoted   single patient room provided  Taken 3/15/2025 1000 by Christina Oconnor RN  Infection Prevention:   cohorting utilized   environmental surveillance performed   rest/sleep promoted   single patient room provided  Goal: Optimal Comfort and Wellbeing  Outcome: Progressing  Goal: Readiness for Transition of Care  Outcome: Progressing     Problem: Skin Injury Risk Increased  Goal: Skin Health and Integrity  Outcome: Progressing  Intervention: Optimize Skin Protection  Recent Flowsheet Documentation  Taken 3/15/2025 1600 by Christina Oconnor RN  Activity Management: activity encouraged  Pressure Reduction Techniques:   frequent weight shift encouraged   pressure points protected  Head of Bed (HOB) Positioning: HOB elevated  Pressure Reduction Devices:   pressure-redistributing mattress utilized   positioning supports utilized  Taken 3/15/2025 1400 by Christina Oconnor RN  Activity Management: activity encouraged  Pressure Reduction Techniques:   frequent  weight shift encouraged   pressure points protected  Head of Bed (HOB) Positioning: [facility] elevated  Pressure Reduction Devices:   pressure-redistributing mattress utilized   positioning supports utilized  Taken 3/15/2025 1200 by Christina Oconnor RN  Activity Management: activity encouraged  Pressure Reduction Techniques:   frequent weight shift encouraged   pressure points protected  Head of Bed (HOB) Positioning: [facility] elevated  Pressure Reduction Devices:   pressure-redistributing mattress utilized   positioning supports utilized  Taken 3/15/2025 1000 by Christina Oconnor RN  Activity Management: activity encouraged  Pressure Reduction Techniques:   frequent weight shift encouraged   pressure points protected  Head of Bed (HOB) Positioning: [facility] elevated  Pressure Reduction Devices:   pressure-redistributing mattress utilized   positioning supports utilized  Taken 3/15/2025 0800 by Christina Oconnor RN  Activity Management: activity encouraged  Pressure Reduction Techniques: weight shift assistance provided  Head of Bed (HOB) Positioning: [facility] elevated  Pressure Reduction Devices: pressure-redistributing mattress utilized  Skin Protection: incontinence pads utilized     Problem: Fall Injury Risk  Goal: Absence of Fall and Fall-Related Injury  Outcome: Progressing  Intervention: Identify and Manage Contributors  Recent Flowsheet Documentation  Taken 3/15/2025 1600 by Christina Oconnor RN  Medication Review/Management: medications reviewed  Self-Care Promotion: independence encouraged  Taken 3/15/2025 1400 by Christina Oconnor RN  Medication Review/Management: medications reviewed  Self-Care Promotion: independence encouraged  Taken 3/15/2025 1200 by Christina Oconnor RN  Medication Review/Management: medications reviewed  Self-Care Promotion: independence encouraged  Taken 3/15/2025 1000 by Christina Oconnor RN  Medication Review/Management: medications reviewed  Self-Care Promotion: independence encouraged  Intervention:  Promote Injury-Free Environment  Recent Flowsheet Documentation  Taken 3/15/2025 1600 by Christina Oconnor RN  Safety Promotion/Fall Prevention:   assistive device/personal items within reach   clutter free environment maintained   fall prevention program maintained   nonskid shoes/slippers when out of bed   room organization consistent   safety round/check completed   toileting scheduled  Taken 3/15/2025 1400 by Christina Oconnor RN  Safety Promotion/Fall Prevention:   assistive device/personal items within reach   clutter free environment maintained   fall prevention program maintained   nonskid shoes/slippers when out of bed   room organization consistent   safety round/check completed   toileting scheduled  Taken 3/15/2025 1200 by Christina Oconnor RN  Safety Promotion/Fall Prevention:   assistive device/personal items within reach   clutter free environment maintained   fall prevention program maintained   nonskid shoes/slippers when out of bed   room organization consistent   safety round/check completed   toileting scheduled  Taken 3/15/2025 1000 by Christina Oconnor RN  Safety Promotion/Fall Prevention:   assistive device/personal items within reach   clutter free environment maintained   fall prevention program maintained   nonskid shoes/slippers when out of bed   room organization consistent   safety round/check completed   toileting scheduled  Taken 3/15/2025 0800 by Christina Oconnor RN  Safety Promotion/Fall Prevention:   activity supervised   room organization consistent   safety round/check completed     Problem: Gastrointestinal Bleeding  Goal: Optimal Coping with Acute Illness  Outcome: Progressing  Goal: Hemostasis  Outcome: Progressing   Goal Outcome Evaluation:   Pt up in bed, pt has been sleeping thru out shift, no complaints of pain, NSR on tele, RA. Safety precautions utilized and maintained.

## 2025-03-15 NOTE — THERAPY EVALUATION
Patient Name: Val Nassar Jr.  : 1960    MRN: [id number]                              Today's Date: 3/15/2025       Admit Date: 3/14/2025    Visit Dx:     ICD-10-CM ICD-9-CM   1. Acute blood loss anemia  D62 285.1   2. Gastrointestinal hemorrhage, unspecified gastrointestinal hemorrhage type  K92.2 578.9   3. Anticoagulated  Z79.01 V58.61     Patient Active Problem List   Diagnosis    Anemia, chronic disease    Acute gastric ulcer with hemorrhage    Alcohol abuse, uncomplicated    Anxiety associated with depression    Diverticulum of bladder    Duodenal ulcer, unspecified as acute or chronic, without hemorrhage or perforation    Elevation of level of transaminase and lactic acid dehydrogenase (LDH)    GERD without esophagitis    Hyperglycemia, unspecified    Hyperlipidemia, unspecified    Melena    Nicotine dependence, cigarettes, uncomplicated    Tobacco use    Severe malnutrition    Duodenal ulcer    Iron deficiency anemia    Cirrhosis of liver    Acute blood loss anemia    Squamous cell carcinoma of esophagus    Duodenal stenosis    Generalized weakness    Orthostatic hypotension    UTI (urinary tract infection)    Malignant neoplasm of upper third of esophagus    Iron deficiency anemia    GALILEO (acute kidney injury)    Metabolic acidosis    Septic shock due to Pseudomonas species    Pneumonia of both lower lobes due to Pseudomonas species    Bacteremia due to Pseudomonas    GI bleed    A-fib    Cirrhosis of liver    BPH without obstruction/lower urinary tract symptoms     Past Medical History:   Diagnosis Date    Anemia     Cancer     ESOPHAGEAL    Cirrhosis     Duodenal ulcer     Gastric ulcer     GERD (gastroesophageal reflux disease)     History of alcohol abuse     History of radiation therapy 2024    esophagus    HLD (hyperlipidemia)     Mood disorder      Past Surgical History:   Procedure Laterality Date    ENDOSCOPY N/A 2023    Procedure: ESOPHAGOGASTRODUODENOSCOPY;   Surgeon: Wesly Mckeon MD;  Location:  [facility];  Service: Gastroenterology;  Laterality: N/A;    ENDOSCOPY N/A 3/3/2025    Procedure: ESOPHAGOGASTRODUODENOSCOPY;  Surgeon: Wesly Mckeon MD;  Location:  [facility] ENDOSCOPY;  Service: Gastroenterology;  Laterality: N/A;  APC USED IN ESOPHAGUS.    VENOUS ACCESS DEVICE (PORT) INSERTION Right 04/25/2024      General Information       Row Name 03/15/25 1010          Physical Therapy Time and Intention    Document Type evaluation  -LM     Mode of Treatment individual therapy;physical therapy  -LM       Row Name 03/15/25 1010          General Information    Patient Profile Reviewed yes  -LM     Prior Level of Function independent:;all household mobility;gait;ADL's  Uses rollator with ambulation  -LM     Existing Precautions/Restrictions fall  -LM     Barriers to Rehab previous functional deficit  Pt states he hasn't been out of bed in 6 weeks so he is very weak  -LM       Row Name 03/15/25 1010          Living Environment    Current Living Arrangements extended care facility  [facility]  -LM     People in Home facility resident  -LM       Row Name 03/15/25 1010          Home Main Entrance    Number of Stairs, Main Entrance none  -LM       Row Name 03/15/25 1010          Stairs Within Home, Primary    Number of Stairs, Within Home, Primary none  -LM       Row Name 03/15/25 1010          Cognition    Orientation Status (Cognition) oriented x 3  -LM       Row Name 03/15/25 1010          Safety Issues/Impairments Affecting Functional Mobility    Safety Issues Affecting Function (Mobility) awareness of need for assistance;insight into deficits/self-awareness;judgment;safety precaution awareness;safety precautions follow-through/compliance  -LM     Impairments Affecting Function (Mobility) balance;endurance/activity tolerance;strength  -LM               User Key  (r) = Recorded By, (t) = Taken By, (c) = Cosigned By      Initials Name Provider Type    LM Awa Coyne, PT  Physical Therapist                   Mobility       Row Name 03/15/25 1013          Bed Mobility    Bed Mobility supine-sit;sit-supine  -LM     Supine-Sit [place] (Bed Mobility) minimum assist (75% patient effort);1 person assist;verbal cues  -LM     Sit-Supine [place] (Bed Mobility) standby assist  HOB flat; No BR  -LM     Assistive Device (Bed Mobility) head of bed elevated;bed rails  -LM     Comment, (Bed Mobility) Pt states he has a hospital bed at [facility].  Pt initially reported feeling dizzy upon sitting EOB.  BP taken and noted to be 147/69.  Pt reported dizziness improved the longer he sat there.  -LM       Row Name 03/15/25 1013          Sit-Stand Transfer    Sit-Stand [place] (Transfers) contact guard;1 person assist;verbal cues  -LM     Assistive Device (Sit-Stand Transfers) walker, front-wheeled  -LM       Row Name 03/15/25 1013          Gait/Stairs (Locomotion)    [place] Level (Gait) contact guard;1 person assist;verbal cues  -LM     Assistive Device (Gait) walker, front-wheeled  -LM     Distance in Feet (Gait) 10  -LM     Deviations/Abnormal Patterns (Gait) mandy decreased;gait speed decreased  -LM     Bilateral Gait Deviations forward flexed posture  -LM     Comment, (Gait/Stairs) Pt ambulates at a slow pace.  Vc's for upright posture.  Pt reports feeling weak and needing to sit after about 10'.  Very happy that he was able to walk - states he hasn't done that in 6 weeks.  -LM               User Key  (r) = Recorded By, (t) = Taken By, (c) = Cosigned By      Initials Name Provider Type    LM Awa Coyne, APRIL Physical Therapist                   Obj/Interventions       Row Name 03/15/25 1017          Range of Motion Comprehensive    General Range of Motion bilateral lower extremity ROM WFL  -LM       Row Name 03/15/25 1017          Strength Comprehensive (MMT)    General Manual Muscle Testing (MMT) Assessment lower extremity strength deficits identified  -LM     Comment,  General Manual Muscle Testing (MMT) Assessment BLEs grossly 4-/5 throughout  -LM       Row Name 03/15/25 1017          Balance    Balance Assessment sitting static balance;standing static balance;standing dynamic balance  -LM     Static Sitting Balance standby assist  -LM     Position, Sitting Balance unsupported;sitting edge of bed  -LM     Static Standing Balance contact guard  -LM     Dynamic Standing Balance contact guard  -LM     Position/Device Used, Standing Balance supported;walker, front-wheeled  -LM       Row Name 03/15/25 1017          Sensory Assessment (Somatosensory)    Sensory Assessment (Somatosensory) LE sensation intact  -LM               User Key  (r) = Recorded By, (t) = Taken By, (c) = Cosigned By      Initials Name Provider Type    LM Awa Coyne, PT Physical Therapist                   Goals/Plan       Row Name 03/15/25 1019          Bed Mobility Goal 1 (PT)    Activity/Assistive Device (Bed Mobility Goal 1, PT) sit to supine/supine to sit  -LM     [place] Level/Cues Needed (Bed Mobility Goal 1, PT) independent  -LM     Time Frame (Bed Mobility Goal 1, PT) short term goal (STG);1 week  -LM       Row Name 03/15/25 1019          Transfer Goal 1 (PT)    Activity/Assistive Device (Transfer Goal 1, PT) bed-to-chair/chair-to-bed  -LM     [place] Level/Cues Needed (Transfer Goal 1, PT) modified independence  -LM     Time Frame (Transfer Goal 1, PT) long term goal (LTG);2 weeks  -LM       Row Name 03/15/25 1019          Gait Training Goal 1 (PT)    Activity/Assistive Device (Gait Training Goal 1, PT) gait (walking locomotion);assistive device use  -LM     [place] Level (Gait Training Goal 1, PT) modified independence  -LM     Time Frame (Gait Training Goal 1, PT) long term goal (LTG);2 weeks  -LM       Row Name 03/15/25 1019          Therapy Assessment/Plan (PT)    Planned Therapy Interventions (PT) balance training;bed mobility training;gait training;home exercise program;motor  coordination training;neuromuscular re-education;patient/family education;postural re-education;ROM (range of motion);strengthening;stretching;transfer training  -LM               User Key  (r) = Recorded By, (t) = Taken By, (c) = Cosigned By      Initials Name Provider Type    LM Awa Coyne, PT Physical Therapist                   Clinical Impression       Row Name 03/15/25 1017          Pain    Pretreatment Pain Rating 7/10  -LM     Posttreatment Pain Rating 9/10  -LM     Pain Location hip  -LM     Pain Side/Orientation left  -LM     Pain Management Interventions exercise or physical activity utilized  -LM     Response to Pain Interventions activity participation with increased pain  -LM       Row Name 03/15/25 1017          Plan of Care Review    Plan of Care Reviewed With patient  -LM     Outcome Evaluation PT evaluation completed.  Pt stood and ambulated 10 feet using rw with CGAx1.  Pt presents below baseline function d/t weakness, decreased activity tolerance, and mild gait instability.  Recommend SNF for further therapy at d/c.  -LM       Row Name 03/15/25 1017          Therapy Assessment/Plan (PT)    Patient/Family Therapy Goals Statement (PT) to return to [facility]  -LM     Rehab Potential (PT) good  -LM     Criteria for Skilled Interventions Met (PT) yes;meets criteria;skilled treatment is necessary  -LM     Therapy Frequency (PT) daily  -LM     Predicted Duration of Therapy Intervention (PT) 2 weeks  -LM       Row Name 03/15/25 1017          Vital Signs    Pre Systolic BP Rehab 115  -LM     Pre Treatment Diastolic BP 63  -LM     Intra Systolic BP Rehab 147  -LM     Intra Treatment Diastolic BP 69  -LM     Post Systolic BP Rehab 114  -LM     Post Treatment Diastolic BP 64  -LM     Pretreatment Heart Rate (beats/min) 73  -LM     Posttreatment Heart Rate (beats/min) 86  -LM     Pre SpO2 (%) 94  -LM     O2 Delivery Pre Treatment room air  -LM     Post SpO2 (%) 93  -LM     O2 Delivery Post Treatment room air   -LM     Pre Patient Position Supine  -LM     Intra Patient Position Sitting  -LM     Post Patient Position Supine  -LM       Row Name 03/15/25 1017          Positioning and Restraints    Pre-Treatment Position in bed  -LM     Post Treatment Position bed  -LM     In Bed supine;call light within reach;encouraged to call for assist;exit alarm on;notified nsg  -LM               User Key  (r) = Recorded By, (t) = Taken By, (c) = Cosigned By      Initials Name Provider Type    LM Awa Coyne, PT Physical Therapist                   Outcome Measures       Row Name 03/15/25 1021 03/14/25 2300       How much help from another person do you currently need...    Turning from your back to your side while in flat bed without using bedrails? 3  -LM 3  -AH    Moving from lying on back to sitting on the side of a flat bed without bedrails? 3  -LM 3  -AH    Moving to and from a bed to a chair (including a wheelchair)? 3  -LM 2  -AH    Standing up from a chair using your arms (e.g., wheelchair, bedside chair)? 3  -LM 2  -AH    Climbing 3-5 steps with a railing? 2  -LM 2  -AH    To walk in hospital room? 3  -LM 2  -AH    AM-PAC 6 Clicks Score (PT) 17  -LM 14  -AH    Highest Level of Mobility Goal 5 --> Static standing  -LM 4 --> Transfer to chair/commode  -AH      Row Name 03/15/25 1021 03/15/25 1020       Functional Assessment    Outcome Measure Options AM-PAC 6 Clicks Basic Mobility (PT)  -LM AM-PAC 6 Clicks Daily Activity (OT)  -AC              User Key  (r) = Recorded By, (t) = Taken By, (c) = Cosigned By      Initials Name Provider Type    Jaymie Tipton OT Occupational Therapist    LM Awa Coyne, PT Physical Therapist    Danelle Muse, RN Registered Nurse                                 Physical Therapy Education       Title: PT OT SLP Therapies (In Progress)       Topic: Physical Therapy (In Progress)       Point: Mobility training (In Progress)       Learning Progress Summary            Patient  Acceptance, E, NR by  at 3/15/2025 1021                      Point: Home exercise program (Not Started)       Learner Progress:  Not documented in this visit.              Point: Body mechanics (Not Started)       Learner Progress:  Not documented in this visit.              Point: Precautions (In Progress)       Learning Progress Summary            Patient Acceptance, E, NR by  at 3/15/2025 1021                                      User Key       Initials Effective Dates Name Provider Type Discipline     12/13/24 -  Awa Coyne, PT Physical Therapist PT                  PT Recommendation and Plan  Planned Therapy Interventions (PT): balance training, bed mobility training, gait training, home exercise program, motor coordination training, neuromuscular re-education, patient/family education, postural re-education, ROM (range of motion), strengthening, stretching, transfer training  Outcome Evaluation: PT evaluation completed.  Pt stood and ambulated 10 feet using rw with CGAx1.  Pt presents below baseline function d/t weakness, decreased activity tolerance, and mild gait instability.  Recommend SNF for further therapy at d/c.     Time Calculation:   PT Evaluation Complexity  History, PT Evaluation Complexity: 3 or more personal factors and/or comorbidities  Examination of Body Systems (PT Eval Complexity): total of 3 or more elements  Clinical Presentation (PT Evaluation Complexity): evolving  Clinical Decision Making (PT Evaluation Complexity): low complexity  Overall Complexity (PT Evaluation Complexity): low complexity     PT Charges       Row Name 03/15/25 1021             Time Calculation    Start Time 0940  -LM      PT Received On 03/15/25  -LM      PT Goal Re-Cert Due Date 03/25/25  -LM         Untimed Charges    PT Eval/Re-eval Minutes 46  -LM         Total Minutes    Untimed Charges Total Minutes 46  -LM       Total Minutes 46  -LM                User Key  (r) = Recorded By, (t) = Taken By, (c) =  Cosigned By      Initials Name Provider Type    Awa Arriola, PT Physical Therapist                  Therapy Charges for Today       Code Description Service Date Service Provider Modifiers Qty    11231931549 HC PT EVAL LOW COMPLEXITY 4 3/15/2025 Awa Coyne, PT GP 1            PT G-Codes  Outcome Measure Options: AM-PAC 6 Clicks Basic Mobility (PT)  AM-PAC 6 Clicks Score (PT): 17  AM-PAC 6 Clicks Score (OT): 19  PT Discharge Summary  Anticipated Discharge Disposition (PT): skilled nursing facility    Awa Coyne PT  3/15/2025

## 2025-03-16 LAB
DEPRECATED RDW RBC AUTO: 60.5 FL (ref 37–54)
ERYTHROCYTE [DISTWIDTH] IN BLOOD BY AUTOMATED COUNT: 18.6 % (ref 12.3–15.4)
HCT VFR BLD AUTO: 24.1 % (ref 37.5–51)
HCT VFR BLD AUTO: 24.2 % (ref 37.5–51)
HCT VFR BLD AUTO: 25 % (ref 37.5–51)
HGB BLD-MCNC: 7.4 G/DL (ref 13–17.7)
HGB BLD-MCNC: 7.5 G/DL (ref 13–17.7)
HGB BLD-MCNC: 7.8 G/DL (ref 13–17.7)
MCH RBC QN AUTO: 29.1 PG (ref 26.6–33)
MCHC RBC AUTO-ENTMCNC: 31.2 G/DL (ref 31.5–35.7)
MCV RBC AUTO: 93.3 FL (ref 79–97)
PLATELET # BLD AUTO: 233 10*3/MM3 (ref 140–450)
PMV BLD AUTO: 9.2 FL (ref 6–12)
RBC # BLD AUTO: 2.68 10*6/MM3 (ref 4.14–5.8)
WBC NRBC COR # BLD AUTO: 10.42 10*3/MM3 (ref 3.4–10.8)

## 2025-03-16 PROCEDURE — 99214 OFFICE O/P EST MOD 30 MIN: CPT

## 2025-03-16 PROCEDURE — 85014 HEMATOCRIT: CPT | Performed by: NURSE PRACTITIONER

## 2025-03-16 PROCEDURE — 99232 SBSQ HOSP IP/OBS MODERATE 35: CPT | Performed by: INTERNAL MEDICINE

## 2025-03-16 PROCEDURE — 85018 HEMOGLOBIN: CPT | Performed by: NURSE PRACTITIONER

## 2025-03-16 PROCEDURE — G0378 HOSPITAL OBSERVATION PER HR: HCPCS

## 2025-03-16 PROCEDURE — 96376 TX/PRO/DX INJ SAME DRUG ADON: CPT

## 2025-03-16 PROCEDURE — 85027 COMPLETE CBC AUTOMATED: CPT | Performed by: INTERNAL MEDICINE

## 2025-03-16 RX ADMIN — PANTOPRAZOLE SODIUM 40 MG: 40 INJECTION, POWDER, FOR SOLUTION INTRAVENOUS at 21:04

## 2025-03-16 RX ADMIN — GABAPENTIN 300 MG: 300 CAPSULE ORAL at 15:42

## 2025-03-16 RX ADMIN — ESCITALOPRAM OXALATE 10 MG: 10 TABLET ORAL at 21:04

## 2025-03-16 RX ADMIN — LIDOCAINE 1 PATCH: 4 PATCH TOPICAL at 10:12

## 2025-03-16 RX ADMIN — ROSUVASTATIN 20 MG: 20 TABLET, FILM COATED ORAL at 21:03

## 2025-03-16 RX ADMIN — CARVEDILOL 3.12 MG: 3.12 TABLET, FILM COATED ORAL at 10:11

## 2025-03-16 RX ADMIN — CARVEDILOL 3.12 MG: 3.12 TABLET, FILM COATED ORAL at 18:41

## 2025-03-16 RX ADMIN — APIXABAN 2.5 MG: 2.5 TABLET, FILM COATED ORAL at 10:24

## 2025-03-16 RX ADMIN — OXYCODONE HYDROCHLORIDE AND ACETAMINOPHEN 1 TABLET: 7.5; 325 TABLET ORAL at 04:13

## 2025-03-16 RX ADMIN — RIFAXIMIN 550 MG: 550 TABLET ORAL at 21:04

## 2025-03-16 RX ADMIN — TAMSULOSIN HYDROCHLORIDE 0.4 MG: 0.4 CAPSULE ORAL at 10:11

## 2025-03-16 RX ADMIN — ACETAMINOPHEN 650 MG: 325 TABLET, FILM COATED ORAL at 15:51

## 2025-03-16 RX ADMIN — OXYCODONE HYDROCHLORIDE AND ACETAMINOPHEN 1 TABLET: 7.5; 325 TABLET ORAL at 21:03

## 2025-03-16 RX ADMIN — Medication 5 MG: at 21:04

## 2025-03-16 RX ADMIN — Medication 10 ML: at 21:04

## 2025-03-16 RX ADMIN — Medication 10 ML: at 10:13

## 2025-03-16 RX ADMIN — GABAPENTIN 300 MG: 300 CAPSULE ORAL at 21:03

## 2025-03-16 RX ADMIN — QUETIAPINE FUMARATE 25 MG: 25 TABLET ORAL at 10:12

## 2025-03-16 RX ADMIN — PANTOPRAZOLE SODIUM 40 MG: 40 INJECTION, POWDER, FOR SOLUTION INTRAVENOUS at 10:11

## 2025-03-16 RX ADMIN — FINASTERIDE 5 MG: 5 TABLET, FILM COATED ORAL at 10:12

## 2025-03-16 RX ADMIN — FOLIC ACID 1 MG: 1 TABLET ORAL at 10:12

## 2025-03-16 RX ADMIN — APIXABAN 2.5 MG: 2.5 TABLET, FILM COATED ORAL at 21:03

## 2025-03-16 RX ADMIN — RIFAXIMIN 550 MG: 550 TABLET ORAL at 10:13

## 2025-03-16 RX ADMIN — CETIRIZINE HYDROCHLORIDE 10 MG: 10 TABLET, FILM COATED ORAL at 10:11

## 2025-03-16 RX ADMIN — GABAPENTIN 300 MG: 300 CAPSULE ORAL at 06:28

## 2025-03-16 RX ADMIN — FERROUS SULFATE TAB 325 MG (65 MG ELEMENTAL FE) 325 MG: 325 (65 FE) TAB at 10:11

## 2025-03-16 RX ADMIN — QUETIAPINE FUMARATE 50 MG: 25 TABLET ORAL at 21:03

## 2025-03-16 NOTE — PROGRESS NOTES
"  [facility]  PROGRESS NOTE    Date of Admission: 3/14/2025  Date of Service: 03/16/25    Primary Care Physician: Wesly Minor MD    Chief Complaint: Follow-up anemia on anticoagulation for A-fib  Problem List:   Anemia, chronic disease    Anxiety associated with depression    GERD without esophagitis    GI bleed    A-fib    Cirrhosis of liver    BPH without obstruction/lower urinary tract symptoms      Subjective       No acute events overnight.  No chest pain or shortness of breath.  No bloody stools.      Objective   Vitals: /60   Pulse 82   Temp 98 °F (36.7 °C) (Oral)   Resp 18   Ht 176.5 cm (69.5\")   Wt 72.6 kg (160 lb)   SpO2 93%   BMI 23.29 kg/m²     Physical Exam:  GENERAL: Chronically ill, in no acute distress.   HEART: Regular rhythm, normal rate, and no murmurs, gallops, or rubs.   LUNGS: Clear to auscultation bilaterally. No wheezing, rales or rhonchi.   EXTREMITIES: No clubbing, cyanosis, or edema noted.     Results:  Results from last 7 days   Lab Units 03/16/25  0647 03/16/25  0035 03/15/25  1809 03/15/25  0531 03/14/25  2353 03/14/25  1431   WBC 10*3/mm3 10.42  --   --  9.02  --  8.33   HEMOGLOBIN g/dL 7.8* 7.5* 7.7* 8.1*   < > 6.1*   HEMATOCRIT % 25.0* 24.1* 25.1* 25.6*   < > 20.0*   PLATELETS 10*3/mm3 233  --   --  229  --  239    < > = values in this interval not displayed.     Results from last 7 days   Lab Units 03/15/25  0531 03/14/25  1431 03/12/25  0046   SODIUM mmol/L 138 136 136   POTASSIUM mmol/L 4.1 3.9 4.3   CHLORIDE mmol/L 104 102 105   CO2 mmol/L 23.0 23.0 22.0   BUN mg/dL 14 16 6*   CREATININE mg/dL 0.69* 0.58* 0.65*   GLUCOSE mg/dL 84 96 97      Lab Results   Component Value Date    TRIG 75 02/24/2025    AST 25 03/14/2025    ALT 22 03/14/2025                     Results from last 7 days   Lab Units 03/14/25  1431   PROTIME Seconds 21.3*   INR  1.84*                 Intake/Output Summary (Last 24 hours) at 3/16/2025 1058  Last " data filed at 3/16/2025 0835  Gross per 24 hour   Intake 1080 ml   Output 1900 ml   Net -820 ml       I personally reviewed the patient's EKG/Telemetry data    Current Medications:  apixaban, 2.5 mg, Oral, Q12H  carvedilol, 3.125 mg, Oral, BID With Meals  cetirizine, 10 mg, Oral, Daily  escitalopram, 10 mg, Oral, Nightly  ferrous sulfate, 325 mg, Oral, Daily With Breakfast  finasteride, 5 mg, Oral, Daily  folic acid, 1 mg, Oral, Daily  gabapentin, 300 mg, Oral, Q8H  Lidocaine, 1 patch, Transdermal, Q24H  melatonin, 5 mg, Oral, Nightly  pantoprazole, 40 mg, Intravenous, Q12H  QUEtiapine, 25 mg, Oral, Daily   And  QUEtiapine, 50 mg, Oral, Nightly  riFAXIMin, 550 mg, Oral, BID  rosuvastatin, 20 mg, Oral, Nightly  sodium chloride, 10 mL, Intravenous, Q12H  tamsulosin, 0.4 mg, Oral, Daily           Assessment:   Atrial fibrillation, new diagnosis 2/27/2025  Given amiodarone load but long-term amiodarone deferred due to cirrhosis  Initiated on Eliquis 5 mg twice daily for stroke prophylaxis  Coronary artery disease  Cardiac CTA 1/18: Moderate LAD stenosis, moderate mid circumflex stenosis, CAD-RADS 3 (50-69% stenosis),  Alcoholic cirrhosis of the liver  EGD 3/3/24 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  GI consulted    Plan:   GI agreeable to reinitiate Eliquis at lower dose of 2.5 mg twice daily  Patient EP consult has been placed for consideration of watchman.  Continue Crestor 20 mg daily for CAD.  Continue Coreg 3.125 mg twice daily.  Will uptitrate if blood pressure allows.      Marga Carlisle PA-C

## 2025-03-16 NOTE — PROGRESS NOTES
[facility] Medicine Services  PROGRESS NOTE    Patient Name: Val Nassar Jr.  : 1960  MRN: [id number]    Date of Admission: 3/14/2025  Primary Care Physician: Wesly Minor MD    Subjective   Subjective     CC: Follow-up GI bleed    HPI: No acute events overnight, patient doing well, denies any abdominal bloody bowel movement    Objective   Objective     Vital Signs:   Temp:  [97.2 °F (36.2 °C)-98.7 °F (37.1 °C)] 98 °F (36.7 °C)  Heart Rate:  [64-82] 82  Resp:  [16-18] 18  BP: ()/(53-73) 125/60     Physical Exam:  Constitutional: Chronically ill male, in no acute distress, awake, alert  HENT: NCAT, mucous membranes moist  Respiratory: Clear to auscultation bilaterally, respiratory effort normal   Cardiovascular: RRR, no murmurs, rubs, or gallops  Gastrointestinal: Positive bowel sounds, soft, nontender, nondistended  Musculoskeletal: No bilateral ankle edema  Psychiatric: Appropriate affect, cooperative  Neurologic: Oriented x 3, nonfocal    Results Reviewed:  LAB RESULTS:      Lab 25  0647 25  0035 03/15/25  1809 03/15/25  0531 25  2353 25  1431 25  0729 25  0046 25  1738 25  0512   WBC 10.42  --   --  9.02  --  8.33  --  10.30  --  8.30   HEMOGLOBIN 7.8* 7.5* 7.7* 8.1* 7.9* 6.1*   < > 7.7*  7.7*   < > 8.1*   HEMATOCRIT 25.0* 24.1* 25.1* 25.6* 25.3* 20.0*   < > 25.2*  25.2*   < > 26.7*   PLATELETS 233  --   --  229  --  239  --  249  --  253   NEUTROS ABS  --   --   --  6.26  --  5.80  --   --   --  5.97   IMMATURE GRANS (ABS)  --   --   --  0.33*  --  0.32*  --   --   --  0.12*   LYMPHS ABS  --   --   --  1.05  --  0.97  --   --   --  0.95   MONOS ABS  --   --   --  1.10*  --  0.97*  --   --   --  0.84   EOS ABS  --   --   --  0.24  --  0.25  --   --   --  0.40   MCV 93.3  --   --  91.1  --  95.2  --  96.2  --  96.4   PROTIME  --   --   --   --   --  21.3*  --   --   --   --     < > = values in this  interval not displayed.         Lab 03/15/25  0531 03/14/25  1431 03/12/25  0729 03/12/25  0046 03/11/25  1738 03/11/25  0512 03/10/25  0406   SODIUM 138 136  --  136  --  142 142   POTASSIUM 4.1 3.9  --  4.3 4.1 3.6 3.9   CHLORIDE 104 102  --  105  --  108* 111*   CO2 23.0 23.0  --  22.0  --  22.0 20.0*   ANION GAP 11.0 11.0  --  9.0  --  12.0 11.0   BUN 14 16  --  6*  --  6* 10   CREATININE 0.69* 0.58*  --  0.65*  --  0.63* 0.81   EGFR 102.7 108.2  --  104.6  --  105.6 97.8   GLUCOSE 84 96  --  97  --  83 93   CALCIUM 8.4* 8.6  --  8.3*  --  8.6 8.3*   MAGNESIUM  --   --  2.8* 1.6  --  1.8 1.9         Lab 03/14/25  1431 03/12/25 0046 03/10/25  0406   TOTAL PROTEIN 6.7 6.9 6.5   ALBUMIN 2.6* 2.5* 2.3*   GLOBULIN 4.1 4.4 4.2   ALT (SGPT) 22 30 25   AST (SGOT) 25 38 29   BILIRUBIN 0.3 0.3 0.4   ALK PHOS 107 133* 117         Lab 03/14/25  1431   PROTIME 21.3*   INR 1.84*             Lab 03/14/25  1450 03/11/25 1738 03/11/25 1738 03/11/25  0512   IRON  --   --   --  26*   IRON SATURATION (TSAT)  --   --   --  10*   TIBC  --   --   --  262*   TRANSFERRIN  --   --   --  176*   FERRITIN  --   --   --  171.20   ABO TYPING A   < > A  --    RH TYPING Positive   < > Positive  --    ANTIBODY SCREEN Negative  --  Negative  --     < > = values in this interval not displayed.         Brief Urine Lab Results  (Last result in the past 365 days)        Color   Clarity   Blood   Leuk Est   Nitrite   Protein   CREAT   Urine HCG        02/11/25 0942 Yellow   Turbid   Moderate (2+)   Large (3+)   Negative   100 mg/dL (2+)                   Microbiology Results Abnormal       None            No radiology results from the last 24 hrs    Results for orders placed during the hospital encounter of 02/11/25    Adult Transthoracic Echo Complete w/ Color, Spectral and Contrast if Necessary Per Protocol    Interpretation Summary    Left ventricular systolic function is normal. Estimated left ventricular EF = 60%    Left ventricular wall  thickness is consistent with mild concentric hypertrophy.    Mild aortic valve stenosis is present.    Aortic valve maximum pressure gradient is 23 mmHg. Aortic valve mean pressure gradient is 14 mmHg.      Current medications:  Scheduled Meds:apixaban, 2.5 mg, Oral, Q12H  carvedilol, 3.125 mg, Oral, BID With Meals  cetirizine, 10 mg, Oral, Daily  escitalopram, 10 mg, Oral, Nightly  ferrous sulfate, 325 mg, Oral, Daily With Breakfast  finasteride, 5 mg, Oral, Daily  folic acid, 1 mg, Oral, Daily  gabapentin, 300 mg, Oral, Q8H  Lidocaine, 1 patch, Transdermal, Q24H  melatonin, 5 mg, Oral, Nightly  pantoprazole, 40 mg, Intravenous, Q12H  QUEtiapine, 25 mg, Oral, Daily   And  QUEtiapine, 50 mg, Oral, Nightly  riFAXIMin, 550 mg, Oral, BID  rosuvastatin, 20 mg, Oral, Nightly  sodium chloride, 10 mL, Intravenous, Q12H  tamsulosin, 0.4 mg, Oral, Daily      Continuous Infusions:   PRN Meds:.  acetaminophen **OR** acetaminophen **OR** acetaminophen    senna-docusate sodium **AND** bisacodyl **AND** bisacodyl    heparin    nitroglycerin    oxyCODONE-acetaminophen    [COMPLETED] Insert Peripheral IV **AND** sodium chloride    sodium chloride    sodium chloride    Assessment & Plan   Assessment & Plan     Active Hospital Problems    Diagnosis  POA    GI bleed [K92.2]  Yes    A-fib [I48.91]  Unknown    Cirrhosis of liver [K74.60]  Unknown    BPH without obstruction/lower urinary tract symptoms [N40.0]  Unknown    Anemia, chronic disease [D63.8]  Yes    Anxiety associated with depression [F41.8]  Yes    GERD without esophagitis [K21.9]  Yes      Resolved Hospital Problems   No resolved problems to display.        Brief Hospital Course to date:  Val Minorfrank Otto. is a 64 y.o. male PMH alcohol use, tobacco smoking, BPH (I/O cath QUD), anxiety, depression, Afib (eliquis), anemia, cirrhosis of liver presenting with generalized weakness, dizziness with standing and black stools admitted with likely GI bleed      This  patient's problems and plans were partially entered by my partner and updated as appropriate by me 03/15/25.      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    -GI has evaluated, recommend to continue PPI, no plans for intervention at this time     Afib  -Okay to resume Eliquis this a.m. per GI, currently recommends at decreased dose of 2.5 mg twice daily  -Cardiology following, added Coreg 3.125 mg twice daily     Etoh Cirrhosis of liver  - INR 1.84  - rifaximin BID  - pt no longer drinks alcohol   - lactulose refused by patient     BPH  - tamlusion      Tobacco use- quit 3 months ago.   - Has not smoked in 3 months and states he doesn't need a patch.      Depression/anxiety  -Continue Lexapro, Seroquel lexapro  -QTc is appropriate, continue to monitor     Chronic low back pain  - gabapentin     Expected Discharge Location and Transportation: [facility], medically ready  Expected Discharge   Expected discharge date/ time has not been documented.     VTE Prophylaxis:  Pharmacologic & mechanical VTE prophylaxis orders are present.       AM-PAC 6 Clicks Score (PT): 17 (03/15/25 1021)    CODE STATUS:   Code Status and Medical Interventions: CPR (Attempt to Resuscitate); Full Support   Ordered at: 03/14/25 1490     Code Status (Patient has no pulse and is not breathing):    CPR (Attempt to Resuscitate)     Medical Interventions (Patient has pulse or is breathing):    Full Support     Level Of Support Discussed With:     Patient       Elizabeth Ragsdale MD  03/16/25

## 2025-03-16 NOTE — CASE MANAGEMENT/SOCIAL WORK
Discharge Planning Assessment  [facility]     Patient Name: Val Nassar Jr.  MRN: [id number]  Today's Date: 3/16/2025    Admit Date: 3/14/2025    Plan: Back to LTC bed @ [facility]   Discharge Needs Assessment       Row Name 03/16/25 1228       Living Environment    People in Home facility resident    Current Living Arrangements extended care facility    Potentially Unsafe Housing Conditions none    In the past 12 months has the electric, gas, oil, or water company threatened to shut off services in your home? No    Primary Care Provided by other (see comments)    Provides Primary Care For no one    Family Caregiver if Needed other relative(s)    Family Caregiver Names Cristiane ZHOU    Quality of Family Relationships unable to assess    Living Arrangement Comments Currently in LTC Medicaid bed @ [facility]       Transportation Needs    In the past 12 months, has lack of transportation kept you from medical appointments or from getting medications? no    In the past 12 months, has lack of transportation kept you from meetings, work, or from getting things needed for daily living? No       Food Insecurity    Within the past 12 months, you worried that your food would run out before you got the money to buy more. Never true    Within the past 12 months, the food you bought just didn't last and you didn't have money to get more. Never true       Transition Planning    Patient/Family Anticipates Transition to long-term care facility    Patient/Family Anticipated Services at Transition     Transportation Anticipated health plan transportation       Discharge Needs Assessment    Equipment Currently Used at Home walker, rolling    Do you want help finding or keeping work or a job? I do not need or want help    Do you want help with school or training? For example, starting or completing job training or getting a high school diploma, GED or equivalent No                   Discharge Plan        Row Name 03/16/25 1229       Plan    Plan Back to LT bed @ [facility]    Patient/Family in Agreement with Plan yes    Plan Comments I met w/Mr. Nassar in room to obtain IDP, some retrieved from previous admission, confirmed w/him. LTC bed @ [facility], able to ambulate w/RW, denies O2 @ facility. Pharmacy [facility] via facility, current in Epic. Follows w/PCP @ facility. DC plan is to return to LT bed, he is agreeable. He will need wheelchair transport @ d/c. Was not able to confirm w/Awa Gutierrez @ [facility] today.    Final Discharge Disposition Code 04 - intermediate care facility                  Selected Continued Care - Prior Encounters Includes continued care and service providers with selected services from prior encounters from 12/14/2024 to 3/16/2025      Discharged on 3/12/2025 Admission date: 2/11/2025 - Discharge disposition: Rehab Facility or Unit (DC - External)      Destination       Service Provider Services Address Phone Fax Patient Preferred    [facility] [address] [facility] [address] [phone] --                             Demographic Summary       Row Name 03/16/25 1226       General Information    Admission Type observation    Arrived From long-term care    Referral Source emergency department    Preferred Language English    General Information Comments [facility] facility                   Functional Status       Row Name 03/16/25 1227       Functional Status    Usual Activity Tolerance moderate    Current Activity Tolerance fair       Physical Activity    On average, how many days per week do you engage in moderate to strenuous exercise (like a brisk walk)? 0 days    On average, how many minutes do you engage in exercise at this level? 0 min    Number of minutes of exercise per week 0       Functional Status, IADL    Medications completely dependent    Meal Preparation completely dependent     Housekeeping completely dependent    Laundry completely dependent    Shopping completely dependent    If for any reason you need help with day-to-day activities such as bathing, preparing meals, shopping, managing finances, etc., do you get the help you need? I don't need any help                   Psychosocial    No documentation.                  Abuse/Neglect    No documentation.                  Legal    No documentation.                  Substance Abuse    No documentation.                  Patient Forms    No documentation.                     Nikolas Hernandez RN

## 2025-03-16 NOTE — PLAN OF CARE
Problem: Adult Inpatient Plan of Care  Goal: Plan of Care Review  Outcome: Progressing  Flowsheets  Taken 3/16/2025 0449 by Jacob Pina RN  Progress: no change  Taken 3/15/2025 1017 by Awa Coyne PT  Plan of Care Reviewed With: patient  Goal: Patient-Specific Goal (Individualized)  Outcome: Progressing  Goal: Absence of Hospital-Acquired Illness or Injury  Outcome: Progressing  Intervention: Identify and Manage Fall Risk  Recent Flowsheet Documentation  Taken 3/16/2025 0400 by Jacob Pina RN  Safety Promotion/Fall Prevention: safety round/check completed  Taken 3/16/2025 0200 by Jacob Pina RN  Safety Promotion/Fall Prevention: safety round/check completed  Taken 3/16/2025 0000 by Jacob Pina RN  Safety Promotion/Fall Prevention: safety round/check completed  Taken 3/15/2025 2200 by Jacob Pina RN  Safety Promotion/Fall Prevention: safety round/check completed  Taken 3/15/2025 2015 by Jacob Pina RN  Safety Promotion/Fall Prevention: safety round/check completed  Intervention: Prevent Skin Injury  Recent Flowsheet Documentation  Taken 3/15/2025 2015 by Jacob Pina RN  Body Position: position changed independently  Goal: Optimal Comfort and Wellbeing  Outcome: Progressing  Goal: Readiness for Transition of Care  Outcome: Progressing     Problem: Skin Injury Risk Increased  Goal: Skin Health and Integrity  Outcome: Progressing  Intervention: Optimize Skin Protection  Recent Flowsheet Documentation  Taken 3/15/2025 2015 by Jacob Pina RN  Activity Management: activity encouraged  Pressure Reduction Techniques: frequent weight shift encouraged  Head of Bed (HOB) Positioning: HOB elevated  Pressure Reduction Devices: pressure-redistributing mattress utilized     Problem: Fall Injury Risk  Goal: Absence of Fall and Fall-Related Injury  Outcome: Progressing  Intervention: Promote Injury-Free Environment  Recent Flowsheet Documentation  Taken 3/16/2025 0400 by Jacob Pina RN  Safety Promotion/Fall Prevention: safety  round/check completed  Taken 3/16/2025 0200 by Jacob Pina RN  Safety Promotion/Fall Prevention: safety round/check completed  Taken 3/16/2025 0000 by Jacob Pina RN  Safety Promotion/Fall Prevention: safety round/check completed  Taken 3/15/2025 2200 by Jacob Pina RN  Safety Promotion/Fall Prevention: safety round/check completed  Taken 3/15/2025 2015 by Jacob Pina RN  Safety Promotion/Fall Prevention: safety round/check completed     Problem: Gastrointestinal Bleeding  Goal: Optimal Coping with Acute Illness  Outcome: Progressing  Goal: Hemostasis  Outcome: Progressing   Goal Outcome Evaluation:           Progress: no change

## 2025-03-16 NOTE — DISCHARGE SUMMARY
[facility] Medicine Services  DISCHARGE SUMMARY    Patient Name: Val Nassar Jr.  : 1960  MRN: [id number]    Date of Admission: 3/14/2025  1:44 PM  Date of Discharge:  3/17/2025  Primary Care Physician: Wesly Minor MD    Consults       Date and Time Order Name Status Description    3/15/2025  6:48 AM Inpatient Gastroenterology Consult Completed     3/14/2025  5:07 PM Inpatient Cardiology Consult Completed     2025  9:40 AM Inpatient Gastroenterology Consult Completed     2025  1:29 AM Inpatient Cardiology Consult Completed             Hospital Course       Active Hospital Problems    Diagnosis  POA    GI bleed [K92.2]  Yes    A-fib [I48.91]  Unknown    Cirrhosis of liver [K74.60]  Unknown    BPH without obstruction/lower urinary tract symptoms [N40.0]  Unknown    Anemia, chronic disease [D63.8]  Yes    Anxiety associated with depression [F41.8]  Yes    GERD without esophagitis [K21.9]  Yes      Resolved Hospital Problems   No resolved problems to display.      Hospital Course:  Val Nassar Jr. is a 64 y.o. 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    -GI has evaluated, recommend to continue PPI, no plans for intervention at this time     Afib  -Okay to resume Eliquis per GI,  currently recommends at decreased dose of 2.5 mg twice daily  -Cardiology followed, added Coreg 3.125 mg twice daily  -EP consult placed for consideration of Watchman     Etoh Cirrhosis of liver  - rifaximin BID  - pt no longer drinks alcohol   - lactulose refused by patient     BPH  - tamlusion      Tobacco use- quit 3 months ago.   - Has not smoked in 3 months and states he doesn't need a patch.      Depression/anxiety  -Continue Lexapro, Seroquel lexapro  -QTc was appropriate,      Chronic low back pain  - gabapentin    Discharge Follow Up Recommendations for outpatient labs/diagnostics:  Follow-up PCP in 1 week  Follow-up with cardiology as scheduled    Day of Discharge     HPI: No acute events overnight, patient rested well    Review of Systems  Gen- No fevers, chills  CV- No chest pain, palpitations  Resp- No cough, dyspnea  GI- No N/V/D, abd pain     Vital Signs:   Temp:  [98.3 °F (36.8 °C)-98.5 °F (36.9 °C)] 98.3 °F (36.8 °C)  Heart Rate:  [65-71] 70  Resp:  [16-18] 18  BP: ()/(61-79) 103/63      Physical Exam:  Constitutional: No acute distress, awake, alert  HENT: NCAT, mucous membranes moist  Respiratory: Clear to auscultation bilaterally, respiratory effort normal   Cardiovascular: RRR, no murmurs, rubs, or gallops  Gastrointestinal: Positive bowel sounds, soft, nontender, nondistended  Musculoskeletal: No bilateral ankle edema  Psychiatric: Appropriate affect, cooperative  Neurologic: Oriented x 3, nonfocal    Pertinent  and/or Most Recent Results     LAB RESULTS:      Lab 03/17/25  0543 03/16/25  1630 03/16/25  0647 03/16/25  0035 03/15/25  1809 03/15/25  0531 03/14/25  2353 03/14/25  1431 03/12/25  0729 03/12/25  0046 03/11/25  1738 03/11/25  0512   WBC  --   --  10.42  --   --  9.02  --  8.33  --  10.30  --  8.30   HEMOGLOBIN 7.4* 7.4* 7.8* 7.5* 7.7* 8.1*   < > 6.1*   < > 7.7*  7.7*   < > 8.1*   HEMATOCRIT 24.2* 24.2* 25.0* 24.1* 25.1* 25.6*   < > 20.0*   < > 25.2*  25.2*   < > 26.7*    PLATELETS  --   --  233  --   --  229  --  239  --  249  --  253   NEUTROS ABS  --   --   --   --   --  6.26  --  5.80  --   --   --  5.97   IMMATURE GRANS (ABS)  --   --   --   --   --  0.33*  --  0.32*  --   --   --  0.12*   LYMPHS ABS  --   --   --   --   --  1.05  --  0.97  --   --   --  0.95   MONOS ABS  --   --   --   --   --  1.10*  --  0.97*  --   --   --  0.84   EOS ABS  --   --   --   --   --  0.24  --  0.25  --   --   --  0.40   MCV  --   --  93.3  --   --  91.1  --  95.2  --  96.2  --  96.4   PROTIME  --   --   --   --   --   --   --  21.3*  --   --   --   --     < > = values in this interval not displayed.         Lab 03/15/25  0531 03/14/25  1431 03/12/25  0729 03/12/25  0046 03/11/25  1738 03/11/25  0512   SODIUM 138 136  --  136  --  142   POTASSIUM 4.1 3.9  --  4.3 4.1 3.6   CHLORIDE 104 102  --  105  --  108*   CO2 23.0 23.0  --  22.0  --  22.0   ANION GAP 11.0 11.0  --  9.0  --  12.0   BUN 14 16  --  6*  --  6*   CREATININE 0.69* 0.58*  --  0.65*  --  0.63*   EGFR 102.7 108.2  --  104.6  --  105.6   GLUCOSE 84 96  --  97  --  83   CALCIUM 8.4* 8.6  --  8.3*  --  8.6   MAGNESIUM  --   --  2.8* 1.6  --  1.8         Lab 03/14/25  1431 03/12/25  0046   TOTAL PROTEIN 6.7 6.9   ALBUMIN 2.6* 2.5*   GLOBULIN 4.1 4.4   ALT (SGPT) 22 30   AST (SGOT) 25 38   BILIRUBIN 0.3 0.3   ALK PHOS 107 133*         Lab 03/14/25  1431   PROTIME 21.3*   INR 1.84*             Lab 03/14/25  1450 03/11/25  1738 03/11/25  1738 03/11/25  0512   IRON  --   --   --  26*   IRON SATURATION (TSAT)  --   --   --  10*   TIBC  --   --   --  262*   TRANSFERRIN  --   --   --  176*   FERRITIN  --   --   --  171.20   ABO TYPING A   < > A  --    RH TYPING Positive   < > Positive  --    ANTIBODY SCREEN Negative  --  Negative  --     < > = values in this interval not displayed.         Brief Urine Lab Results  (Last result in the past 365 days)        Color   Clarity   Blood   Leuk Est   Nitrite   Protein   CREAT   Urine HCG         02/11/25 0942 Yellow   Turbid   Moderate (2+)   Large (3+)   Negative   100 mg/dL (2+)                 Microbiology Results (last 10 days)       ** No results found for the last 240 hours. **            SLP FEES - Fiberoptic Endo Eval Swallow  Result Date: 3/11/2025  This procedure was auto-finalized with no dictation required.    SLP FEES - Fiberoptic Endo Eval Swallow  Result Date: 3/8/2025  This procedure was auto-finalized with no dictation required.              Results for orders placed during the hospital encounter of 02/11/25    Adult Transthoracic Echo Complete w/ Color, Spectral and Contrast if Necessary Per Protocol    Interpretation Summary    Left ventricular systolic function is normal. Estimated left ventricular EF = 60%    Left ventricular wall thickness is consistent with mild concentric hypertrophy.    Mild aortic valve stenosis is present.    Aortic valve maximum pressure gradient is 23 mmHg. Aortic valve mean pressure gradient is 14 mmHg.      Plan for Follow-up of Pending Labs/Results:     Discharge Details        Discharge Medications        New Medications        Instructions Start Date   carvedilol 3.125 MG tablet  Commonly known as: COREG   3.125 mg, Oral, 2 Times Daily With Meals             Changes to Medications        Instructions Start Date   apixaban 2.5 MG tablet tablet  Commonly known as: ELIQUIS  What changed:   medication strength  how much to take   2.5 mg, Oral, Every 12 Hours Scheduled      gabapentin 300 MG capsule  Commonly known as: NEURONTIN  What changed: when to take this   300 mg, Oral, Every 8 Hours Scheduled      riFAXIMin 550 MG tablet  Commonly known as: XIFAXAN  What changed:   how to take this  when to take this   550 mg, Per G Tube, Every 12 Hours Scheduled             Continue These Medications        Instructions Start Date   Benzalkonium Chloride 0.12 % liquid   Apply to gale area topically as needed for preventative use for Gale care after each incontinent  episode and as needed.      dimethicone 1 % cream   Apply to buttocks/Gale area topically as needed for preventative apply topically to buttocks/gale area.      Dimethicone 1.5 % cream   Apply to body topically as needed for dry skin apply topically to dry skin on body every shift as needed.      docusate sodium 50 mg/5 mL liquid  Commonly known as: COLACE   100 mg, Oral, 2 Times Daily      dutasteride 0.5 MG capsule  Commonly known as: AVODART   0.5 mg, Daily      escitalopram 10 MG tablet  Commonly known as: LEXAPRO   10 mg, Nightly      ferrous gluconate 324 MG tablet  Commonly known as: FERGON   324 mg, Daily With Breakfast      folic acid 1 MG tablet  Commonly known as: FOLVITE   1 mg, Daily      Lidocaine 4 %   1 patch, Transdermal, Every 24 Hours Scheduled, Remove & Discard patch within 12 hours or as directed by MD      Loratadine 10 MG capsule   10 mg, Daily      melatonin 5 MG tablet tablet   5 mg, Nightly      oxyCODONE-acetaminophen 7.5-325 MG per tablet  Commonly known as: PERCOCET   1 tablet, Oral, Every 4 Hours PRN      pantoprazole 40 MG EC tablet  Commonly known as: Protonix   40 mg, Oral, 2 Times Daily      QUEtiapine 25 MG tablet  Commonly known as: SEROquel   Take 1 tablet by mouth Daily AND 2 tablets Every Night.      rosuvastatin 20 MG tablet  Commonly known as: CRESTOR   20 mg, Oral, Nightly      tamsulosin 0.4 MG capsule 24 hr capsule  Commonly known as: FLOMAX   1 capsule, Daily      vitamin B-12 1000 MCG tablet  Commonly known as: CYANOCOBALAMIN   1,000 mcg, Daily      Vitamin D3 1.25 MG (44962 UT) capsule   50,000 Units, Every 7 Days      Zinc Oxide 10 % cream   Apply to bilat buttocks and groin topically every shift for redness.               Allergies   Allergen Reactions    Green Beans Hives     Discharge Disposition: [facility] [facility]  Diet:  Hospital:  Diet Order   Procedures    Diet: Gastrointestinal; Fiber-Restricted; Texture: Soft to Chew (NDD  3); Soft to Chew: Whole Meat; Fluid Consistency: Thin (IDDSI 0)        Activity: As tolerated      Restrictions or Other Recommendations:         CODE STATUS:    Code Status and Medical Interventions: CPR (Attempt to Resuscitate); Full Support   Ordered at: 03/14/25 1545     Code Status (Patient has no pulse and is not breathing):    CPR (Attempt to Resuscitate)     Medical Interventions (Patient has pulse or is breathing):    Full Support     Level Of Support Discussed With:    Patient       Future Appointments   Date Time Provider Department Center   3/27/2025  1:15 PM Giovanni Marcum PA-C [facility]   5/14/2025 12:00 PM [facility] CT 1 [facility]   5/15/2025 10:00 AM Иван Garrison MD [facility]   5/16/2025  2:30 PM Aristeo Hoskins MD [facility]   5/21/2025  1:45 PM Katerina Ga MD [facility]       Elizabeth Ragsdale MD  03/17/25      Time Spent on Discharge:  I spent  45  minutes on this discharge activity which included: face-to-face encounter with the patient, reviewing the data in the system, coordination of the care with the nursing staff as well as consultants, documentation, and entering orders.

## 2025-03-16 NOTE — PLAN OF CARE
Problem: Adult Inpatient Plan of Care  Goal: Plan of Care Review  Outcome: Progressing  Goal: Patient-Specific Goal (Individualized)  Outcome: Progressing  Goal: Absence of Hospital-Acquired Illness or Injury  Outcome: Progressing  Intervention: Identify and Manage Fall Risk  Recent Flowsheet Documentation  Taken 3/16/2025 1838 by Christina Oconnor RN  Safety Promotion/Fall Prevention:   assistive device/personal items within reach   clutter free environment maintained   fall prevention program maintained   nonskid shoes/slippers when out of bed   room organization consistent   safety round/check completed   toileting scheduled  Taken 3/16/2025 1621 by Christina Oconnor RN  Safety Promotion/Fall Prevention:   activity supervised   room organization consistent   safety round/check completed  Taken 3/16/2025 1400 by Christina Oconnor RN  Safety Promotion/Fall Prevention:   assistive device/personal items within reach   clutter free environment maintained   fall prevention program maintained   nonskid shoes/slippers when out of bed   room organization consistent   safety round/check completed   toileting scheduled  Taken 3/16/2025 1200 by Christina Oconnor RN  Safety Promotion/Fall Prevention:   assistive device/personal items within reach   clutter free environment maintained   fall prevention program maintained   nonskid shoes/slippers when out of bed   room organization consistent   safety round/check completed   toileting scheduled  Taken 3/16/2025 1000 by Christina Oconnor RN  Safety Promotion/Fall Prevention:   assistive device/personal items within reach   clutter free environment maintained   fall prevention program maintained   nonskid shoes/slippers when out of bed   room organization consistent   safety round/check completed   toileting scheduled  Taken 3/16/2025 0800 by Christina Oconnor, RN  Safety Promotion/Fall Prevention:   activity supervised   room organization consistent   safety round/check  completed  Intervention: Prevent Skin Injury  Recent Flowsheet Documentation  Taken 3/16/2025 1838 by Christina Oconnor RN  Body Position: position changed independently  Taken 3/16/2025 1621 by Christina Oconnor RN  Body Position: position changed independently  Skin Protection: incontinence pads utilized  Taken 3/16/2025 1400 by Christina Oconnor RN  Body Position: position changed independently  Taken 3/16/2025 1200 by Christina Oconnor RN  Body Position: position changed independently  Taken 3/16/2025 1000 by Christina Oconnor RN  Body Position: position changed independently  Taken 3/16/2025 0800 by Christina Oconnor RN  Body Position: position changed independently  Skin Protection:   incontinence pads utilized   transparent dressing maintained  Intervention: Prevent Infection  Recent Flowsheet Documentation  Taken 3/16/2025 1838 by Christina Oconnor RN  Infection Prevention:   cohorting utilized   environmental surveillance performed   rest/sleep promoted   single patient room provided  Taken 3/16/2025 1400 by Christina Oconnor RN  Infection Prevention:   cohorting utilized   environmental surveillance performed   rest/sleep promoted   single patient room provided  Taken 3/16/2025 1200 by Christina Oconnor RN  Infection Prevention:   cohorting utilized   environmental surveillance performed   rest/sleep promoted   single patient room provided  Taken 3/16/2025 1000 by Christina Oconnor RN  Infection Prevention:   cohorting utilized   environmental surveillance performed   rest/sleep promoted   single patient room provided  Goal: Optimal Comfort and Wellbeing  Outcome: Progressing  Goal: Readiness for Transition of Care  Outcome: Progressing   Goal Outcome Evaluation:   Pt resting in bed, Nsr on tele, RA, Safety precautions utilized and maintained.

## 2025-03-17 VITALS
RESPIRATION RATE: 18 BRPM | WEIGHT: 160 LBS | TEMPERATURE: 98 F | HEIGHT: 70 IN | BODY MASS INDEX: 22.9 KG/M2 | OXYGEN SATURATION: 96 % | DIASTOLIC BLOOD PRESSURE: 46 MMHG | SYSTOLIC BLOOD PRESSURE: 96 MMHG | HEART RATE: 68 BPM

## 2025-03-17 DIAGNOSIS — K92.2 GASTROINTESTINAL HEMORRHAGE, UNSPECIFIED GASTROINTESTINAL HEMORRHAGE TYPE: ICD-10-CM

## 2025-03-17 DIAGNOSIS — I48.0 PAROXYSMAL ATRIAL FIBRILLATION: Primary | ICD-10-CM

## 2025-03-17 LAB
HCT VFR BLD AUTO: 24.2 % (ref 37.5–51)
HGB BLD-MCNC: 7.4 G/DL (ref 13–17.7)
QT INTERVAL: 406 MS
QTC INTERVAL: 431 MS

## 2025-03-17 PROCEDURE — 99239 HOSP IP/OBS DSCHRG MGMT >30: CPT | Performed by: INTERNAL MEDICINE

## 2025-03-17 PROCEDURE — 25010000002 HEPARIN LOCK FLUSH PER 10 UNITS: Performed by: INTERNAL MEDICINE

## 2025-03-17 PROCEDURE — 85018 HEMOGLOBIN: CPT | Performed by: NURSE PRACTITIONER

## 2025-03-17 PROCEDURE — G0378 HOSPITAL OBSERVATION PER HR: HCPCS

## 2025-03-17 PROCEDURE — 99214 OFFICE O/P EST MOD 30 MIN: CPT | Performed by: INTERNAL MEDICINE

## 2025-03-17 PROCEDURE — 85014 HEMATOCRIT: CPT | Performed by: NURSE PRACTITIONER

## 2025-03-17 RX ORDER — GABAPENTIN 300 MG/1
300 CAPSULE ORAL EVERY 8 HOURS SCHEDULED
Qty: 9 CAPSULE | Refills: 0 | Status: SHIPPED | OUTPATIENT
Start: 2025-03-17 | End: 2025-03-20

## 2025-03-17 RX ORDER — HEPARIN SODIUM (PORCINE) LOCK FLUSH IV SOLN 100 UNIT/ML 100 UNIT/ML
500 SOLUTION INTRAVENOUS ONCE
Status: COMPLETED | OUTPATIENT
Start: 2025-03-17 | End: 2025-03-17

## 2025-03-17 RX ORDER — PANTOPRAZOLE SODIUM 40 MG/1
40 TABLET, DELAYED RELEASE ORAL
Status: DISCONTINUED | OUTPATIENT
Start: 2025-03-17 | End: 2025-03-17 | Stop reason: HOSPADM

## 2025-03-17 RX ORDER — OXYCODONE AND ACETAMINOPHEN 7.5; 325 MG/1; MG/1
1 TABLET ORAL EVERY 4 HOURS PRN
Qty: 12 TABLET | Refills: 0 | Status: SHIPPED | OUTPATIENT
Start: 2025-03-17 | End: 2025-03-19

## 2025-03-17 RX ORDER — CARVEDILOL 3.12 MG/1
3.12 TABLET ORAL 2 TIMES DAILY WITH MEALS
Qty: 60 TABLET | Refills: 0 | Status: SHIPPED | OUTPATIENT
Start: 2025-03-17

## 2025-03-17 RX ADMIN — FERROUS SULFATE TAB 325 MG (65 MG ELEMENTAL FE) 325 MG: 325 (65 FE) TAB at 09:00

## 2025-03-17 RX ADMIN — CETIRIZINE HYDROCHLORIDE 10 MG: 10 TABLET, FILM COATED ORAL at 09:00

## 2025-03-17 RX ADMIN — FOLIC ACID 1 MG: 1 TABLET ORAL at 08:59

## 2025-03-17 RX ADMIN — TAMSULOSIN HYDROCHLORIDE 0.4 MG: 0.4 CAPSULE ORAL at 08:59

## 2025-03-17 RX ADMIN — LIDOCAINE 1 PATCH: 4 PATCH TOPICAL at 09:00

## 2025-03-17 RX ADMIN — FINASTERIDE 5 MG: 5 TABLET, FILM COATED ORAL at 09:00

## 2025-03-17 RX ADMIN — CARVEDILOL 3.12 MG: 3.12 TABLET, FILM COATED ORAL at 09:00

## 2025-03-17 RX ADMIN — PANTOPRAZOLE SODIUM 40 MG: 40 TABLET, DELAYED RELEASE ORAL at 09:00

## 2025-03-17 RX ADMIN — APIXABAN 2.5 MG: 2.5 TABLET, FILM COATED ORAL at 09:00

## 2025-03-17 RX ADMIN — GABAPENTIN 300 MG: 300 CAPSULE ORAL at 05:27

## 2025-03-17 RX ADMIN — RIFAXIMIN 550 MG: 550 TABLET ORAL at 09:00

## 2025-03-17 RX ADMIN — Medication 10 ML: at 09:03

## 2025-03-17 RX ADMIN — HEPARIN 500 UNITS: 100 SYRINGE at 14:43

## 2025-03-17 RX ADMIN — OXYCODONE HYDROCHLORIDE AND ACETAMINOPHEN 1 TABLET: 7.5; 325 TABLET ORAL at 05:27

## 2025-03-17 RX ADMIN — QUETIAPINE FUMARATE 25 MG: 25 TABLET ORAL at 09:00

## 2025-03-17 NOTE — PLAN OF CARE
Problem: Adult Inpatient Plan of Care  Goal: Absence of Hospital-Acquired Illness or Injury  Intervention: Identify and Manage Fall Risk  Recent Flowsheet Documentation  Taken 3/17/2025 0403 by Tiana Nielsen, RN  Safety Promotion/Fall Prevention:   activity supervised   assistive device/personal items within reach   clutter free environment maintained   fall prevention program maintained   lighting adjusted   nonskid shoes/slippers when out of bed   room organization consistent   safety round/check completed  Taken 3/17/2025 0203 by Tiana Nielsen, RN  Safety Promotion/Fall Prevention:   activity supervised   assistive device/personal items within reach   clutter free environment maintained   fall prevention program maintained   lighting adjusted   nonskid shoes/slippers when out of bed   room organization consistent   safety round/check completed  Taken 3/17/2025 0003 by Tiana Nielsen, RN  Safety Promotion/Fall Prevention:   activity supervised   assistive device/personal items within reach   clutter free environment maintained   fall prevention program maintained   lighting adjusted   nonskid shoes/slippers when out of bed   room organization consistent   safety round/check completed  Taken 3/16/2025 2203 by Tiana Nielsen, RN  Safety Promotion/Fall Prevention:   activity supervised   assistive device/personal items within reach   clutter free environment maintained   fall prevention program maintained   lighting adjusted   nonskid shoes/slippers when out of bed   room organization consistent   safety round/check completed  Taken 3/16/2025 2103 by Tiana Nielsen, RN  Safety Promotion/Fall Prevention:   activity supervised   assistive device/personal items within reach   clutter free environment maintained   fall prevention program maintained   lighting adjusted   nonskid shoes/slippers when out of bed   room organization consistent   safety round/check completed  Intervention: Prevent Skin  Injury  Recent Flowsheet Documentation  Taken 3/17/2025 0003 by Tiana Nielsen RN  Body Position: position changed independently  Taken 3/16/2025 2203 by Tiana Nielsen RN  Body Position: position changed independently  Taken 3/16/2025 2103 by Tiana Nielsen RN  Body Position: position changed independently  Intervention: Prevent Infection  Recent Flowsheet Documentation  Taken 3/17/2025 0403 by Tiana Nielsen RN  Infection Prevention:   environmental surveillance performed   hand hygiene promoted   rest/sleep promoted   single patient room provided  Taken 3/17/2025 0203 by Tiana Nielsen RN  Infection Prevention:   environmental surveillance performed   hand hygiene promoted   rest/sleep promoted   single patient room provided  Taken 3/17/2025 0003 by Tiana Nielsen RN  Infection Prevention:   environmental surveillance performed   hand hygiene promoted   rest/sleep promoted   single patient room provided  Taken 3/16/2025 2203 by Tiana Nielsen RN  Infection Prevention:   environmental surveillance performed   hand hygiene promoted   rest/sleep promoted   single patient room provided  Taken 3/16/2025 2103 by Tiana Nielsen RN  Infection Prevention:   environmental surveillance performed   hand hygiene promoted   rest/sleep promoted   single patient room provided  Goal: Optimal Comfort and Wellbeing  Intervention: Provide Person-Centered Care  Recent Flowsheet Documentation  Taken 3/16/2025 2103 by Tiana Nielsen RN  Trust Relationship/Rapport:   care explained   choices provided   questions answered   questions encouraged   thoughts/feelings acknowledged     Problem: Skin Injury Risk Increased  Goal: Skin Health and Integrity  Intervention: Optimize Skin Protection  Recent Flowsheet Documentation  Taken 3/17/2025 0403 by Tiana Nielsen RN  Head of Bed (HOB) Positioning: HOB elevated  Taken 3/17/2025 0203 by Tiana Nielsen RN  Head of Bed (HOB) Positioning: HOB elevated  Taken  3/17/2025 0003 by Tiana Nielsen RN  Head of Bed (HOB) Positioning: HOB elevated  Taken 3/16/2025 2203 by Tiana Nielsen RN  Head of Bed (HOB) Positioning: HOB elevated  Taken 3/16/2025 2103 by Tiana Nielsen, RN  Activity Management: activity encouraged  Head of Bed (HOB) Positioning: HOB elevated     Problem: Fall Injury Risk  Goal: Absence of Fall and Fall-Related Injury  Intervention: Promote Injury-Free Environment  Recent Flowsheet Documentation  Taken 3/17/2025 0403 by Tiana Nielsen, RN  Safety Promotion/Fall Prevention:   activity supervised   assistive device/personal items within reach   clutter free environment maintained   fall prevention program maintained   lighting adjusted   nonskid shoes/slippers when out of bed   room organization consistent   safety round/check completed  Taken 3/17/2025 0203 by Tiana Nielsen, RN  Safety Promotion/Fall Prevention:   activity supervised   assistive device/personal items within reach   clutter free environment maintained   fall prevention program maintained   lighting adjusted   nonskid shoes/slippers when out of bed   room organization consistent   safety round/check completed  Taken 3/17/2025 0003 by Tiana Nielsen, RN  Safety Promotion/Fall Prevention:   activity supervised   assistive device/personal items within reach   clutter free environment maintained   fall prevention program maintained   lighting adjusted   nonskid shoes/slippers when out of bed   room organization consistent   safety round/check completed  Taken 3/16/2025 2203 by Tiana Nielsen, RN  Safety Promotion/Fall Prevention:   activity supervised   assistive device/personal items within reach   clutter free environment maintained   fall prevention program maintained   lighting adjusted   nonskid shoes/slippers when out of bed   room organization consistent   safety round/check completed  Taken 3/16/2025 2103 by Tiana Nielsen, RN  Safety Promotion/Fall Prevention:   activity  supervised   assistive device/personal items within reach   clutter free environment maintained   fall prevention program maintained   lighting adjusted   nonskid shoes/slippers when out of bed   room organization consistent   safety round/check completed   Goal Outcome Evaluation:

## 2025-03-17 NOTE — NURSING NOTE
"Pt is being discharged this afternoon so \"Power Port \" was deaccessed per protocol.  Normal Saline 20cc flush followed by Heparin 500units /5ml's flush was placed in the catheter prior to removal. Gauzed was placed followed by a mepalex dressing. Pt is on Eliquis but had no signs of bleeding. .  "

## 2025-03-17 NOTE — PROGRESS NOTES
[facility] Cardiology Inpatient Progress Note    Val Nassar Jr.  1960  [id number]  03/17/25    Subjective:   Val Nassar Jr. no acute events overnight.  Patient says he feels fine and wants to go back to his facility.  Denies any chest pain or dyspnea.  Says he does feel a bit weak when he stands up to walk somewhere but usually has to give himself a few minutes to stabilize. Denies any clinical signs of bleeding or blood in his stool.    Review of Systems:   Review of Systems   Constitutional:  Positive for fatigue. Negative for diaphoresis, unexpected weight gain and unexpected weight loss.   Respiratory: Negative.     Cardiovascular: Negative.    Gastrointestinal: Negative.    Musculoskeletal: Negative.    Neurological: Negative.        I have reviewed and/or updated the patient's past medical, past surgical, family history, social history, problem list and allergies as appropriate in the chart.     Physical Exam:  Vital Signs:   Vitals:    03/16/25 2323 03/17/25 0516 03/17/25 0700 03/17/25 0900   BP: 136/67 119/68 95/61 103/63   BP Location: Left arm Left arm Right arm    Patient Position: Lying Lying Lying    Pulse: 65 71  70   Resp: 18 16 18    Temp: 98.4 °F (36.9 °C) 98.4 °F (36.9 °C) 98.3 °F (36.8 °C)    TempSrc: Oral Oral Oral    SpO2: 96% 96%     Weight:       Height:           Vitals reviewed.   Constitutional:       Appearance: Healthy appearance. Not in distress.   Neck:      Vascular: No JVD.   Pulmonary:      Effort: Pulmonary effort is normal.      Breath sounds: Normal breath sounds.   Cardiovascular:      Normal rate. Regular rhythm. Normal S1. Normal S2.       Murmurs: There is no murmur.      No gallop.  No click. No rub.   Pulses:     Intact distal pulses.   Edema:     Peripheral edema absent.   Abdominal:      General: There is no distension.      Palpations: Abdomen is soft.      Tenderness: There is no abdominal tenderness.   Skin:      General: Skin is warm and dry.         Results Review:   Results from last 7 days   Lab Units 03/15/25  0531 03/14/25  1431   SODIUM mmol/L 138 136   POTASSIUM mmol/L 4.1 3.9   CHLORIDE mmol/L 104 102   CO2 mmol/L 23.0 23.0   BUN mg/dL 14 16   CREATININE mg/dL 0.69* 0.58*   CALCIUM mg/dL 8.4* 8.6   BILIRUBIN mg/dL  --  0.3   ALK PHOS U/L  --  107   ALT (SGPT) U/L  --  22   AST (SGOT) U/L  --  25   GLUCOSE mg/dL 84 96         Results from last 7 days   Lab Units 03/17/25  0543 03/16/25  1630 03/16/25  0647 03/15/25  1809 03/15/25  0531 03/14/25  2353 03/14/25  1431   WBC 10*3/mm3  --   --  10.42  --  9.02  --  8.33   HEMOGLOBIN g/dL 7.4* 7.4* 7.8*   < > 8.1*   < > 6.1*   HEMATOCRIT % 24.2* 24.2* 25.0*   < > 25.6*   < > 20.0*   PLATELETS 10*3/mm3  --   --  233  --  229  --  239    < > = values in this interval not displayed.     Results from last 7 days   Lab Units 03/14/25  1431   INR  1.84*     Results from last 7 days   Lab Units 03/12/25  0729   MAGNESIUM mg/dL 2.8*           I personally viewed and interpreted the patient's EKG/Telemetry data     Medications:     Current Facility-Administered Medications:     acetaminophen (TYLENOL) tablet 650 mg, 650 mg, Oral, Q4H PRN, 650 mg at 03/16/25 1551 **OR** acetaminophen (TYLENOL) 160 MG/5ML oral solution 650 mg, 650 mg, Oral, Q4H PRN **OR** acetaminophen (TYLENOL) suppository 500 mg, 500 mg, Rectal, Q6H PRN, Poonam Allen APRN    apixaban (ELIQUIS) tablet 2.5 mg, 2.5 mg, Oral, Q12H, Elizabeth Ragsdale MD, 2.5 mg at 03/17/25 0900    sennosides-docusate (PERICOLACE) 8.6-50 MG per tablet 2 tablet, 2 tablet, Oral, BID PRN **AND** bisacodyl (DULCOLAX) EC tablet 5 mg, 5 mg, Oral, Daily PRN **AND** bisacodyl (DULCOLAX) suppository 10 mg, 10 mg, Rectal, Daily PRN, Poonam Allen APRN    carvedilol (COREG) tablet 3.125 mg, 3.125 mg, Oral, BID With Meals, Marga Carlisle PA-C, 3.125 mg at 03/17/25 0900    cetirizine (zyrTEC) tablet 10 mg, 10 mg, Oral, Daily, Alejandro  Poonam, APRN, 10 mg at 03/17/25 0900    escitalopram (LEXAPRO) tablet 10 mg, 10 mg, Oral, Nightly, Poonam Allen, APRN, 10 mg at 03/16/25 2104    ferrous sulfate tablet 325 mg, 325 mg, Oral, Daily With Breakfast, Yi Allenrie, APRN, 325 mg at 03/17/25 0900    finasteride (PROSCAR) tablet 5 mg, 5 mg, Oral, Daily, Marielle Allene, APRN, 5 mg at 03/17/25 0900    folic acid (FOLVITE) tablet 1 mg, 1 mg, Oral, Daily, Alejandro Poonam, APRN, 1 mg at 03/17/25 0859    gabapentin (NEURONTIN) capsule 300 mg, 300 mg, Oral, Q8H, Alejandro Poonam, APRN, 300 mg at 03/17/25 0527    heparin injection 500 Units, 5 mL, Intravenous, PRN, Pauly Watkins MD    Lidocaine 4 % 1 patch, 1 patch, Transdermal, Q24H, Poonam Allen APRN, 1 patch at 03/17/25 0900    melatonin tablet 5 mg, 5 mg, Oral, Nightly, Poonam Aleln, APRN, 5 mg at 03/16/25 2104    nitroglycerin (NITROSTAT) SL tablet 0.4 mg, 0.4 mg, Sublingual, Q5 Min PRN, Poonam Allen, APRN    oxyCODONE-acetaminophen (PERCOCET) 7.5-325 MG per tablet 1 tablet, 1 tablet, Oral, Q4H PRN, Poonam Allen APRN, 1 tablet at 03/17/25 0527    pantoprazole (PROTONIX) EC tablet 40 mg, 40 mg, Oral, Q AM, Elizabeth Ragsdale MD, 40 mg at 03/17/25 0900    QUEtiapine (SEROquel) tablet 25 mg, 25 mg, Oral, Daily, 25 mg at 03/17/25 0900 **AND** QUEtiapine (SEROquel) tablet 50 mg, 50 mg, Oral, Nightly, Poonam Allen APRN, 50 mg at 03/16/25 2103    riFAXIMin (XIFAXAN) tablet 550 mg, 550 mg, Oral, BID, Poonam Allen APRN, 550 mg at 03/17/25 0900    rosuvastatin (CRESTOR) tablet 20 mg, 20 mg, Oral, Nightly, Poonam Allen APRN, 20 mg at 03/16/25 2103    [COMPLETED] Insert Peripheral IV, , , Once **AND** sodium chloride 0.9 % flush 10 mL, 10 mL, Intravenous, PRN, Mehul Ferreira MD    sodium chloride 0.9 % flush 10 mL, 10 mL, Intravenous, Q12H, Poonam Allen APRN, 10 mL at 03/17/25 0903    sodium chloride 0.9 % flush 10 mL, 10 mL, Intravenous, PRN, Poonam Allen,  APRN    sodium chloride 0.9 % infusion 40 mL, 40 mL, Intravenous, PRN, Poonam Allen APRN    tamsulosin (FLOMAX) 24 hr capsule 0.4 mg, 0.4 mg, Oral, Daily, Poonam Allen APRN, 0.4 mg at 03/17/25 0859    Assessment / Plan:     64-year-old male with alcoholic cirrhosis, known CAD, 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     Acute on chronic anemia  Recent EGD with grade 1 esophageal varices and portal hypertension gastropathy.  Telangiectasias in the esophagus were treated with ablation  Status post 2 units of packed red blood cells this admission  No clinical signs of bleed  Per GI, no repeat EGD.  Okay to resume Eliquis at lower dose  Okay to continue Eliquis 2.5 twice daily for now but this is technically inadequate for stroke prevention in the setting of his paroxysmal atrial fibrillation  He will need outpatient EP evaluation for left atrial appendage occluder.  Patient has been screened for research studies and is not a candidate.     Paroxysmal atrial fibrillation  [id number] 3.  No known history of stroke or MI.  In sinus rhythm.  Continue Eliquis as above.  Will need watchman evaluation as above.  No barriers to discharge from a cardiac standpoint.  He already has a cardiology appointment scheduled in May.  We will work on arranging EP evaluation in the outpatient setting.    Chronic HFpEF  Echo: normal EF with mild LVH.  Mild AS.  Status and euvolemic  No indication for diuretics, only as needed, especially in the setting of borderline low blood pressures     CAD  Has CAD on previous CTs.  Currently asymptomatic from this standpoint.  Does have history of elevated troponins.  Continue Eliquis and statin      Alcoholic cirrhosis  Management per GI and hospitalist     Cardiology will see patient as needed    Thank you for allowing me to participate in the care of your patient. Please do not hesitate to contact me with additional questions or  concerns.     Electronically signed by Aristeo Hoskins MD, 03/17/25, 10:22 AM EDT.

## 2025-03-17 NOTE — CASE MANAGEMENT/SOCIAL WORK
Case Management Discharge Note      Final Note: Pt is being discharged to [facility] bed. [facility] wheelchair van will transport pt at 1530. Pt will need to be that the [id number] (Maternity Entrance) at 1520. RN call report to [phone]. [facility] will pull discharge summary. Pharmacy updated to [place], [place].         Selected Continued Care - Admitted Since 3/14/2025       Destination Coordination complete.      Service Provider Services Address Phone Fax Patient Preferred    [facility] [address] [facility] [id number] [phone] --              Durable Medical Equipment    No services have been selected for the patient.                Dialysis/Infusion    No services have been selected for the patient.                Home Medical Care    No services have been selected for the patient.                Therapy    No services have been selected for the patient.                Community Resources    No services have been selected for the patient.                Community & DME    No services have been selected for the patient.                    Selected Continued Care - Prior Encounters Includes continued care and service providers with selected services from prior encounters from 12/14/2024 to 3/17/2025      Discharged on 3/12/2025 Admission date: 2/11/2025 - Discharge disposition: Rehab Facility or Unit (DC - External)      Destination       Service Provider Services Address Phone Fax Patient Preferred    [facility] [address] [facility] [address] [phone] --                          Transportation Services  W/C Van: Other ([facility] wheelchair van at 1530 on 3/17/25)    Final Discharge Disposition Code: 04 - intermediate care facility

## 2025-03-24 ENCOUNTER — HOSPITAL ENCOUNTER (INPATIENT)
Facility: HOSPITAL | Age: 65
LOS: 2 days | Discharge: NURSING, REHAB OR LONG-TERM CARE | End: 2025-03-27
Attending: EMERGENCY MEDICINE | Admitting: STUDENT IN AN ORGANIZED HEALTH CARE EDUCATION/TRAINING PROGRAM
Payer: MEDICARE

## 2025-03-24 ENCOUNTER — APPOINTMENT (OUTPATIENT)
Dept: CT IMAGING | Facility: HOSPITAL | Age: 65
End: 2025-03-24
Payer: MEDICARE

## 2025-03-24 ENCOUNTER — APPOINTMENT (OUTPATIENT)
Dept: GENERAL RADIOLOGY | Facility: HOSPITAL | Age: 65
End: 2025-03-24
Payer: MEDICARE

## 2025-03-24 DIAGNOSIS — D64.9 SEVERE ANEMIA: ICD-10-CM

## 2025-03-24 DIAGNOSIS — K92.2 GASTROINTESTINAL HEMORRHAGE, UNSPECIFIED GASTROINTESTINAL HEMORRHAGE TYPE: Primary | ICD-10-CM

## 2025-03-24 DIAGNOSIS — K92.1 GASTROINTESTINAL HEMORRHAGE WITH MELENA: ICD-10-CM

## 2025-03-24 DIAGNOSIS — R79.89 ELEVATED TROPONIN: ICD-10-CM

## 2025-03-24 LAB
ABO GROUP BLD: NORMAL
ALBUMIN SERPL-MCNC: 3 G/DL (ref 3.5–5.2)
ALBUMIN/GLOB SERPL: 0.8 G/DL
ALP SERPL-CCNC: 95 U/L (ref 39–117)
ALT SERPL W P-5'-P-CCNC: 14 U/L (ref 1–41)
ANION GAP SERPL CALCULATED.3IONS-SCNC: 11 MMOL/L (ref 5–15)
AST SERPL-CCNC: 31 U/L (ref 1–40)
BACTERIA UR QL AUTO: ABNORMAL /HPF
BASOPHILS # BLD AUTO: 0.03 10*3/MM3 (ref 0–0.2)
BASOPHILS NFR BLD AUTO: 0.3 % (ref 0–1.5)
BILIRUB SERPL-MCNC: 0.2 MG/DL (ref 0–1.2)
BILIRUB UR QL STRIP: NEGATIVE
BLD GP AB SCN SERPL QL: NEGATIVE
BUN SERPL-MCNC: 11 MG/DL (ref 8–23)
BUN/CREAT SERPL: 13.1 (ref 7–25)
CALCIUM SPEC-SCNC: 8.7 MG/DL (ref 8.6–10.5)
CHLORIDE SERPL-SCNC: 103 MMOL/L (ref 98–107)
CLARITY UR: ABNORMAL
CO2 SERPL-SCNC: 24 MMOL/L (ref 22–29)
COLOR UR: YELLOW
CREAT SERPL-MCNC: 0.84 MG/DL (ref 0.76–1.27)
D-LACTATE SERPL-SCNC: 1.6 MMOL/L (ref 0.5–2)
DEPRECATED RDW RBC AUTO: 68.6 FL (ref 37–54)
DEVELOPER EXPIRATION DATE: ABNORMAL
DEVELOPER LOT NUMBER: ABNORMAL
EGFRCR SERPLBLD CKD-EPI 2021: 96.8 ML/MIN/1.73
EOSINOPHIL # BLD AUTO: 0.17 10*3/MM3 (ref 0–0.4)
EOSINOPHIL NFR BLD AUTO: 1.8 % (ref 0.3–6.2)
ERYTHROCYTE [DISTWIDTH] IN BLOOD BY AUTOMATED COUNT: 19.2 % (ref 12.3–15.4)
EXPIRATION DATE: ABNORMAL
FECAL OCCULT BLOOD SCREEN, POC: POSITIVE
GEN 5 1HR TROPONIN T REFLEX: 60 NG/L
GLOBULIN UR ELPH-MCNC: 3.7 GM/DL
GLUCOSE SERPL-MCNC: 97 MG/DL (ref 65–99)
GLUCOSE UR STRIP-MCNC: NEGATIVE MG/DL
HCT VFR BLD AUTO: 17.9 % (ref 37.5–51)
HGB BLD-MCNC: 5.2 G/DL (ref 13–17.7)
HGB UR QL STRIP.AUTO: ABNORMAL
HOLD SPECIMEN: NORMAL
HYALINE CASTS UR QL AUTO: ABNORMAL /LPF
IMM GRANULOCYTES # BLD AUTO: 0.14 10*3/MM3 (ref 0–0.05)
IMM GRANULOCYTES NFR BLD AUTO: 1.5 % (ref 0–0.5)
KETONES UR QL STRIP: NEGATIVE
LEUKOCYTE ESTERASE UR QL STRIP.AUTO: ABNORMAL
LIPASE SERPL-CCNC: 57 U/L (ref 13–60)
LYMPHOCYTES # BLD AUTO: 1.34 10*3/MM3 (ref 0.7–3.1)
LYMPHOCYTES NFR BLD AUTO: 14.1 % (ref 19.6–45.3)
Lab: ABNORMAL
MCH RBC QN AUTO: 29.1 PG (ref 26.6–33)
MCHC RBC AUTO-ENTMCNC: 29.1 G/DL (ref 31.5–35.7)
MCV RBC AUTO: 100 FL (ref 79–97)
MONOCYTES # BLD AUTO: 1.39 10*3/MM3 (ref 0.1–0.9)
MONOCYTES NFR BLD AUTO: 14.6 % (ref 5–12)
NEGATIVE CONTROL: NEGATIVE
NEUTROPHILS NFR BLD AUTO: 6.42 10*3/MM3 (ref 1.7–7)
NEUTROPHILS NFR BLD AUTO: 67.7 % (ref 42.7–76)
NITRITE UR QL STRIP: NEGATIVE
NRBC BLD AUTO-RTO: 0.5 /100 WBC (ref 0–0.2)
NT-PROBNP SERPL-MCNC: 349.9 PG/ML (ref 0–900)
PH UR STRIP.AUTO: 6.5 [PH] (ref 5–8)
PLATELET # BLD AUTO: 263 10*3/MM3 (ref 140–450)
PMV BLD AUTO: 9.1 FL (ref 6–12)
POSITIVE CONTROL: POSITIVE
POTASSIUM SERPL-SCNC: 4.3 MMOL/L (ref 3.5–5.2)
PROT SERPL-MCNC: 6.7 G/DL (ref 6–8.5)
PROT UR QL STRIP: ABNORMAL
RBC # BLD AUTO: 1.79 10*6/MM3 (ref 4.14–5.8)
RBC # UR STRIP: ABNORMAL /HPF
REF LAB TEST METHOD: ABNORMAL
RH BLD: POSITIVE
SODIUM SERPL-SCNC: 138 MMOL/L (ref 136–145)
SP GR UR STRIP: 1.01 (ref 1–1.03)
SQUAMOUS #/AREA URNS HPF: ABNORMAL /HPF
T&S EXPIRATION DATE: NORMAL
TROPONIN T % DELTA: -14
TROPONIN T NUMERIC DELTA: -10 NG/L
TROPONIN T SERPL HS-MCNC: 70 NG/L
UROBILINOGEN UR QL STRIP: ABNORMAL
WBC # UR STRIP: ABNORMAL /HPF
WBC NRBC COR # BLD AUTO: 9.49 10*3/MM3 (ref 3.4–10.8)
WHOLE BLOOD HOLD COAG: NORMAL
WHOLE BLOOD HOLD SPECIMEN: NORMAL

## 2025-03-24 PROCEDURE — 86900 BLOOD TYPING SEROLOGIC ABO: CPT

## 2025-03-24 PROCEDURE — 86923 COMPATIBILITY TEST ELECTRIC: CPT

## 2025-03-24 PROCEDURE — P9016 RBC LEUKOCYTES REDUCED: HCPCS

## 2025-03-24 PROCEDURE — 80053 COMPREHEN METABOLIC PANEL: CPT | Performed by: NURSE PRACTITIONER

## 2025-03-24 PROCEDURE — 83690 ASSAY OF LIPASE: CPT | Performed by: NURSE PRACTITIONER

## 2025-03-24 PROCEDURE — 99223 1ST HOSP IP/OBS HIGH 75: CPT | Performed by: FAMILY MEDICINE

## 2025-03-24 PROCEDURE — 82270 OCCULT BLOOD FECES: CPT | Performed by: NURSE PRACTITIONER

## 2025-03-24 PROCEDURE — 74178 CT ABD&PLV WO CNTR FLWD CNTR: CPT

## 2025-03-24 PROCEDURE — G0378 HOSPITAL OBSERVATION PER HR: HCPCS

## 2025-03-24 PROCEDURE — 82105 ALPHA-FETOPROTEIN SERUM: CPT | Performed by: PHYSICIAN ASSISTANT

## 2025-03-24 PROCEDURE — 36430 TRANSFUSION BLD/BLD COMPNT: CPT

## 2025-03-24 PROCEDURE — 81001 URINALYSIS AUTO W/SCOPE: CPT | Performed by: NURSE PRACTITIONER

## 2025-03-24 PROCEDURE — 93005 ELECTROCARDIOGRAM TRACING: CPT | Performed by: NURSE PRACTITIONER

## 2025-03-24 PROCEDURE — 86901 BLOOD TYPING SEROLOGIC RH(D): CPT | Performed by: EMERGENCY MEDICINE

## 2025-03-24 PROCEDURE — 51702 INSERT TEMP BLADDER CATH: CPT

## 2025-03-24 PROCEDURE — 86850 RBC ANTIBODY SCREEN: CPT | Performed by: EMERGENCY MEDICINE

## 2025-03-24 PROCEDURE — 87077 CULTURE AEROBIC IDENTIFY: CPT

## 2025-03-24 PROCEDURE — 84484 ASSAY OF TROPONIN QUANT: CPT | Performed by: NURSE PRACTITIONER

## 2025-03-24 PROCEDURE — 87186 SC STD MICRODIL/AGAR DIL: CPT

## 2025-03-24 PROCEDURE — 99291 CRITICAL CARE FIRST HOUR: CPT

## 2025-03-24 PROCEDURE — 85025 COMPLETE CBC W/AUTO DIFF WBC: CPT | Performed by: NURSE PRACTITIONER

## 2025-03-24 PROCEDURE — 71045 X-RAY EXAM CHEST 1 VIEW: CPT

## 2025-03-24 PROCEDURE — 36415 COLL VENOUS BLD VENIPUNCTURE: CPT

## 2025-03-24 PROCEDURE — 87086 URINE CULTURE/COLONY COUNT: CPT

## 2025-03-24 PROCEDURE — 83605 ASSAY OF LACTIC ACID: CPT | Performed by: NURSE PRACTITIONER

## 2025-03-24 PROCEDURE — 86900 BLOOD TYPING SEROLOGIC ABO: CPT | Performed by: EMERGENCY MEDICINE

## 2025-03-24 PROCEDURE — 25510000001 IOPAMIDOL PER 1 ML: Performed by: FAMILY MEDICINE

## 2025-03-24 PROCEDURE — 83880 ASSAY OF NATRIURETIC PEPTIDE: CPT | Performed by: NURSE PRACTITIONER

## 2025-03-24 PROCEDURE — 25010000002 MORPHINE PER 10 MG: Performed by: EMERGENCY MEDICINE

## 2025-03-24 PROCEDURE — 80307 DRUG TEST PRSMV CHEM ANLYZR: CPT

## 2025-03-24 RX ORDER — BISACODYL 10 MG
10 SUPPOSITORY, RECTAL RECTAL DAILY PRN
Status: DISCONTINUED | OUTPATIENT
Start: 2025-03-24 | End: 2025-03-27 | Stop reason: HOSPADM

## 2025-03-24 RX ORDER — PANTOPRAZOLE SODIUM 40 MG/10ML
40 INJECTION, POWDER, LYOPHILIZED, FOR SOLUTION INTRAVENOUS
Status: DISCONTINUED | OUTPATIENT
Start: 2025-03-25 | End: 2025-03-27

## 2025-03-24 RX ORDER — ONDANSETRON 4 MG/1
4 TABLET, ORALLY DISINTEGRATING ORAL EVERY 6 HOURS PRN
Status: DISCONTINUED | OUTPATIENT
Start: 2025-03-24 | End: 2025-03-27 | Stop reason: HOSPADM

## 2025-03-24 RX ORDER — POLYETHYLENE GLYCOL 3350 17 G/17G
17 POWDER, FOR SOLUTION ORAL DAILY PRN
Status: DISCONTINUED | OUTPATIENT
Start: 2025-03-24 | End: 2025-03-27 | Stop reason: HOSPADM

## 2025-03-24 RX ORDER — ACETAMINOPHEN 160 MG/5ML
650 SOLUTION ORAL EVERY 4 HOURS PRN
Status: DISCONTINUED | OUTPATIENT
Start: 2025-03-24 | End: 2025-03-27 | Stop reason: HOSPADM

## 2025-03-24 RX ORDER — IOPAMIDOL 755 MG/ML
66 INJECTION, SOLUTION INTRAVASCULAR
Status: COMPLETED | OUTPATIENT
Start: 2025-03-24 | End: 2025-03-24

## 2025-03-24 RX ORDER — BISACODYL 5 MG/1
5 TABLET, DELAYED RELEASE ORAL DAILY PRN
Status: DISCONTINUED | OUTPATIENT
Start: 2025-03-24 | End: 2025-03-27 | Stop reason: HOSPADM

## 2025-03-24 RX ORDER — ONDANSETRON 2 MG/ML
4 INJECTION INTRAMUSCULAR; INTRAVENOUS EVERY 6 HOURS PRN
Status: DISCONTINUED | OUTPATIENT
Start: 2025-03-24 | End: 2025-03-27 | Stop reason: HOSPADM

## 2025-03-24 RX ORDER — AMOXICILLIN 250 MG
2 CAPSULE ORAL 2 TIMES DAILY PRN
Status: DISCONTINUED | OUTPATIENT
Start: 2025-03-24 | End: 2025-03-27 | Stop reason: HOSPADM

## 2025-03-24 RX ORDER — MORPHINE SULFATE 2 MG/ML
2 INJECTION, SOLUTION INTRAMUSCULAR; INTRAVENOUS EVERY 4 HOURS PRN
Status: DISCONTINUED | OUTPATIENT
Start: 2025-03-24 | End: 2025-03-27 | Stop reason: HOSPADM

## 2025-03-24 RX ORDER — SODIUM CHLORIDE 0.9 % (FLUSH) 0.9 %
10 SYRINGE (ML) INJECTION AS NEEDED
Status: DISCONTINUED | OUTPATIENT
Start: 2025-03-24 | End: 2025-03-27 | Stop reason: HOSPADM

## 2025-03-24 RX ORDER — PANTOPRAZOLE SODIUM 40 MG/10ML
40 INJECTION, POWDER, LYOPHILIZED, FOR SOLUTION INTRAVENOUS ONCE
Status: COMPLETED | OUTPATIENT
Start: 2025-03-24 | End: 2025-03-24

## 2025-03-24 RX ORDER — MORPHINE SULFATE 2 MG/ML
2 INJECTION, SOLUTION INTRAMUSCULAR; INTRAVENOUS
Status: DISCONTINUED | OUTPATIENT
Start: 2025-03-24 | End: 2025-03-24 | Stop reason: SDUPTHER

## 2025-03-24 RX ORDER — HYDROCODONE BITARTRATE AND ACETAMINOPHEN 5; 325 MG/1; MG/1
1 TABLET ORAL EVERY 6 HOURS PRN
Refills: 0 | Status: DISCONTINUED | OUTPATIENT
Start: 2025-03-24 | End: 2025-03-27 | Stop reason: HOSPADM

## 2025-03-24 RX ORDER — SODIUM CHLORIDE 9 MG/ML
40 INJECTION, SOLUTION INTRAVENOUS AS NEEDED
Status: DISCONTINUED | OUTPATIENT
Start: 2025-03-24 | End: 2025-03-27 | Stop reason: HOSPADM

## 2025-03-24 RX ORDER — SODIUM CHLORIDE 0.9 % (FLUSH) 0.9 %
10 SYRINGE (ML) INJECTION EVERY 12 HOURS SCHEDULED
Status: DISCONTINUED | OUTPATIENT
Start: 2025-03-24 | End: 2025-03-27 | Stop reason: HOSPADM

## 2025-03-24 RX ORDER — ACETAMINOPHEN 325 MG/1
650 TABLET ORAL EVERY 4 HOURS PRN
Status: DISCONTINUED | OUTPATIENT
Start: 2025-03-24 | End: 2025-03-27 | Stop reason: HOSPADM

## 2025-03-24 RX ORDER — NITROGLYCERIN 0.4 MG/1
0.4 TABLET SUBLINGUAL
Status: DISCONTINUED | OUTPATIENT
Start: 2025-03-24 | End: 2025-03-27 | Stop reason: HOSPADM

## 2025-03-24 RX ORDER — ACETAMINOPHEN 650 MG/1
650 SUPPOSITORY RECTAL EVERY 4 HOURS PRN
Status: DISCONTINUED | OUTPATIENT
Start: 2025-03-24 | End: 2025-03-27 | Stop reason: HOSPADM

## 2025-03-24 RX ADMIN — MORPHINE SULFATE 2 MG: 2 INJECTION, SOLUTION INTRAMUSCULAR; INTRAVENOUS at 22:59

## 2025-03-24 RX ADMIN — MORPHINE SULFATE 2 MG: 2 INJECTION, SOLUTION INTRAMUSCULAR; INTRAVENOUS at 17:34

## 2025-03-24 RX ADMIN — PANTOPRAZOLE SODIUM 40 MG: 40 INJECTION, POWDER, FOR SOLUTION INTRAVENOUS at 22:59

## 2025-03-24 RX ADMIN — IOPAMIDOL 66 ML: 755 INJECTION, SOLUTION INTRAVENOUS at 23:35

## 2025-03-24 NOTE — ED PROVIDER NOTES
Subjective   History of Present Illness  Patient is a pleasant 64-year-old male, lives at skilled nursing facility, has a history of cirrhosis secondary to alcohol abuse, esophageal cancer, mood disorder, chronic anemia both iron deficiency and at times blood loss in the past.  Presents today due to a hemoglobin of 5.5 at the facility [facility].  Denies acute symptoms.  He was recently admitted to the hospital diagnosed with pneumonia.  He says since that time he has been less functional and was at baseline.  He normally was able to perform self caths but ever since he was discharged following the pneumonia admission he has been unable to do this on his own.  Denies acute fever.  Denies acute pain.  Denies vomiting, diarrhea, or other acute complaints.        Review of Systems   All other systems reviewed and are negative.      Past Medical History:   Diagnosis Date    Anemia     Cancer     ESOPHAGEAL    Cirrhosis     Duodenal ulcer     Gastric ulcer     GERD (gastroesophageal reflux disease)     History of alcohol abuse     History of radiation therapy 05/08/2024    esophagus    HLD (hyperlipidemia)     Mood disorder        Allergies   Allergen Reactions    Green Beans Hives       Past Surgical History:   Procedure Laterality Date    COLONOSCOPY N/A 3/26/2025    Procedure: COLONOSCOPY;  Surgeon: Brunner, Mark I, MD;  Location:  [facility];  Service: Gastroenterology;  Laterality: N/A;    ENDOSCOPY N/A 12/26/2023    Procedure: ESOPHAGOGASTRODUODENOSCOPY;  Surgeon: Wesly Mckeon MD;  Location:  [facility];  Service: Gastroenterology;  Laterality: N/A;    ENDOSCOPY N/A 3/3/2025    Procedure: ESOPHAGOGASTRODUODENOSCOPY;  Surgeon: Wesly Mckeon MD;  Location:  [facility];  Service: Gastroenterology;  Laterality: N/A;  APC USED IN ESOPHAGUS.    ENDOSCOPY N/A 3/26/2025    Procedure: ESOPHAGOGASTRODUODENOSCOPY;  Surgeon: Brunner, Mark I, MD;  Location:  [facility];  Service: Gastroenterology;   Laterality: N/A;    VENOUS ACCESS DEVICE (PORT) INSERTION Right 04/25/2024       Family History   Problem Relation Age of Onset    Cancer Mother         LUNG AND BREAST    Breast cancer Mother     Heart attack Father        Social History     Socioeconomic History    Marital status: Single   Tobacco Use    Smoking status: Every Day     Current packs/day: 1.00     Average packs/day: 1 pack/day for 15.0 years (15.0 ttl pk-yrs)     Types: Cigarettes     Passive exposure: Current    Smokeless tobacco: Current   Vaping Use    Vaping status: Every Day    Substances: Nicotine, Flavoring    Devices: Disposable   Substance and Sexual Activity    Alcohol use: Yes     Alcohol/week: 4.0 standard drinks of alcohol     Types: 4 Cans of beer per week     Comment: pt states he drinks 3-4 beers a day a couple times a week    Drug use: Yes     Types: Hydrocodone    Sexual activity: Not Currently     Partners: Female           Objective   Physical Exam  Vitals and nursing note reviewed.   Constitutional:       Appearance: He is ill-appearing.   HENT:      Head: Normocephalic and atraumatic.      Mouth/Throat:      Mouth: Mucous membranes are moist.   Eyes:      Pupils: Pupils are equal, round, and reactive to light.   Pulmonary:      Effort: Pulmonary effort is normal.      Breath sounds: Normal breath sounds.   Abdominal:      General: Bowel sounds are normal.      Palpations: Abdomen is soft.   Musculoskeletal:      Cervical back: Normal range of motion.   Skin:     Coloration: Skin is pale.   Neurological:      General: No focal deficit present.      Mental Status: He is alert.      Comments: Neurolyse weakness.  No focal deficit.   Psychiatric:         Behavior: Behavior normal.         Critical Care    Performed by: Farhan Mccoy DO  Authorized by: Farhan Mccoy DO    Critical care provider statement:     Critical care time (minutes):  35    Critical care time was exclusive of:  Separately billable procedures and treating other  patients    Critical care was necessary to treat or prevent imminent or life-threatening deterioration of the following conditions:  Shock and circulatory failure    Critical care was time spent personally by me on the following activities:  Ordering and performing treatments and interventions, ordering and review of laboratory studies, ordering and review of radiographic studies, pulse oximetry, re-evaluation of patient's condition, review of old charts, obtaining history from patient or surrogate, examination of patient, evaluation of patient's response to treatment, discussions with consultants and development of treatment plan with patient or surrogate    I assumed direction of critical care for this patient from another provider in my specialty: no      Care discussed with: admitting provider               ED Course       Latest Reference Range & Units 03/24/25 14:58 03/24/25 16:36 03/24/25 16:57   ABO Type    A   RH type    Positive   Antibody Screen    Negative   T&S Expiration Date    3/27/2025 11:59:59 PM   HS Troponin T <22 ng/L 70 (C)  60 (C)   Troponin T % Delta Abnormal if >/= 20%    -14   Troponin T Numeric Delta ng/L   -10   proBNP 0.0 - 900.0 pg/mL 349.9     Sodium 136 - 145 mmol/L 138     Potassium 3.5 - 5.2 mmol/L 4.3     Chloride 98 - 107 mmol/L 103     CO2 22.0 - 29.0 mmol/L 24.0     Anion Gap 5.0 - 15.0 mmol/L 11.0     BUN 8 - 23 mg/dL 11     Creatinine 0.76 - 1.27 mg/dL 0.84     BUN/Creatinine Ratio 7.0 - 25.0  13.1     eGFR >60.0 mL/min/1.73 96.8     Glucose 65 - 99 mg/dL 97     Calcium 8.6 - 10.5 mg/dL 8.7     Alkaline Phosphatase 39 - 117 U/L 95     Total Protein 6.0 - 8.5 g/dL 6.7     Albumin 3.5 - 5.2 g/dL 3.0 (L)     Globulin gm/dL 3.7     A/G Ratio g/dL 0.8     AST (SGOT) 1 - 40 U/L 31     ALT (SGPT) 1 - 41 U/L 14     Total Bilirubin 0.0 - 1.2 mg/dL 0.2     Lactate 0.5 - 2.0 mmol/L 1.6     Lipase 13 - 60 U/L 57     WBC 3.40 - 10.80 10*3/mm3 9.49     RBC 4.14 - 5.80 10*6/mm3 1.79 (L)      Hemoglobin 13.0 - 17.7 g/dL 5.2 (C)     Hematocrit 37.5 - 51.0 % 17.9 (C)     Platelets 140 - 450 10*3/mm3 263     RDW 12.3 - 15.4 % 19.2 (H)     MCV 79.0 - 97.0 fL 100.0 (H)     MCH 26.6 - 33.0 pg 29.1     MCHC 31.5 - 35.7 g/dL 29.1 (L)     MPV 6.0 - 12.0 fL 9.1     RDW-SD 37.0 - 54.0 fl 68.6 (H)     Neutrophil Rel % 42.7 - 76.0 % 67.7     Lymphocyte Rel % 19.6 - 45.3 % 14.1 (L)     Monocyte Rel % 5.0 - 12.0 % 14.6 (H)     Eosinophil Rel % 0.3 - 6.2 % 1.8     Basophil Rel % 0.0 - 1.5 % 0.3     Immature Granulocyte Rel % 0.0 - 0.5 % 1.5 (H)     Neutrophils Absolute 1.70 - 7.00 10*3/mm3 6.42     Lymphocytes Absolute 0.70 - 3.10 10*3/mm3 1.34     Monocytes Absolute 0.10 - 0.90 10*3/mm3 1.39 (H)     Eosinophils Absolute 0.00 - 0.40 10*3/mm3 0.17     Basophils Absolute 0.00 - 0.20 10*3/mm3 0.03     Immature Grans, Absolute 0.00 - 0.05 10*3/mm3 0.14 (H)     nRBC 0.0 - 0.2 /100 WBC 0.5 (H)     Color, UA Yellow, Straw   Yellow    Appearance, UA Clear   Turbid !    Specific Gravity, UA 1.001 - 1.030   1.010    pH, UA 5.0 - 8.0   6.5    Glucose Negative   Negative    Ketones, UA Negative   Negative    Blood, UA Negative   Small (1+) !    Nitrite, UA Negative   Negative    Leukocytes, UA Negative   Large (3+) !    Protein, UA Negative   30 mg/dL (1+) !    Bilirubin, UA Negative   Negative    Urobilinogen, UA 0.2 - 1.0 E.U./dL   0.2 E.U./dL    RBC, UA None Seen, 0-2 /HPF  3-5 !    WBC, UA None Seen, 0-2 /HPF  Too Numerous to Count !    Bacteria, UA None Seen, Trace /HPF  4+ !    Squamous Epithelial Cells, UA None Seen, 0-2 /HPF  0-2    Hyaline Casts, UA 0 - 6 /LPF  None Seen    Methodology:   Manual Light Microscopy    (C): Data is critically high  (L): Data is abnormally low  (H): Data is abnormally high  !: Data is abnormal    CT Abdomen Pelvis With & Without Contrast   Final Result   Impression:   1.No acute intra-abdominal or intrapelvic process. No evidence of active contrast extravasation. No evidence of  hemodynamically significant stenosis of the mesenteric vasculature.   2.Circumferential bladder wall thickening which appears to have progressed as compared to the previous study which may be related to cystitis or the sequela of chronic outlet obstruction. Bladder diverticulum present extending left of midline which has    increased in size as compared to the previous study likely related to increased urine within the bladder.   3.Indeterminate partially cystic appearing hypodense lesion seen along the superior aspect of the left kidney measuring up to 2.9 x 3.2 cm x 1.9 cm in craniocaudal dimension. This may represent a true lesion or sequela of previous pyelonephritis.    Scarring suspected. No drainable collection identified. Contrasted CT follow-up is recommended to evaluate for stability or resolution.   4.Ancillary findings as described above.               Electronically Signed: Natalie Abdi MD     3/24/2025 11:47 PM EDT     Workstation ID: [id number]      XR Chest 1 View   Final Result   Impression:   Redemonstration of right chest port. Endotracheal tube and enteric feeding tube have been removed. Stable cardiomediastinal silhouette. Similar diffuse interstitial prominence likely reflecting interstitial edema. No pleural effusion or pneumothorax.         Electronically Signed: Nikita Garrison MD     3/24/2025 4:40 PM EDT     Workstation ID: [id number]        Vitals:    03/27/25 0257 03/27/25 0734 03/27/25 0949 03/27/25 1107   BP: 108/54 119/64 127/60 136/79   BP Location: Right arm Left arm  Left arm   Patient Position: Lying Lying  Lying   Pulse: 67 66 63 62   Resp: 16 16  16   Temp: 97.4 °F (36.3 °C) 97.7 °F (36.5 °C)  99.2 °F (37.3 °C)   TempSrc: Axillary Oral  Oral   SpO2: 94% 93%  95%   Weight:       Height:         Medications   pantoprazole (PROTONIX) injection 40 mg (40 mg Intravenous Given 3/24/25 2079)   iopamidol (ISOVUE-370) 76 % injection 66 mL (66 mL Intravenous Given 3/24/25 0159)    aluminum-magnesium hydroxide-simethicone (MAALOX MAX) 400-400-40 MG/5ML suspension 30 mL (30 mL Oral Given 3/25/25 0048)   magnesium sulfate 2g/50 mL (PREMIX) infusion (2 g Intravenous New Bag 3/25/25 1447)   PEG-KCl-NaCl-NaSulf-Na Asc-C (MOVIPREP) powder 1,000 mL (1,000 mL Oral Given 3/25/25 1802)     Followed by   PEG-KCl-NaCl-NaSulf-Na Asc-C (MOVIPREP) powder 1,000 mL (1,000 mL Oral Given 3/26/25 0341)   cefepime 1000 mg IVPB in 100 mL NS (MBP) (1,000 mg Intravenous New Bag 3/25/25 1715)   famotidine (PEPCID) injection 20 mg (20 mg Intravenous Given 3/26/25 1537)   magnesium citrate solution 296 mL (296 mL Oral Given 3/26/25 1145)   fluconazole (DIFLUCAN) tablet 400 mg (400 mg Oral Given 3/27/25 0949)     ECG/EMG Results (last 24 hours)       ** No results found for the last 24 hours. **          ECG 12 Lead Chest Pain   Preliminary Result   Test Reason : Chest Pain   Blood Pressure :   */*   mmHG   Vent. Rate :  75 BPM     Atrial Rate :  75 BPM      P-R Int : 156 ms          QRS Dur :  82 ms       QT Int : 388 ms       P-R-T Axes :  56  -1  92 degrees     QTcB Int : 433 ms      Normal sinus rhythm   Nonspecific T wave abnormality   Abnormal ECG   When compared with ECG of 14-Mar-2025 18:04,   No significant change was found      Referred By: EDMD           Confirmed By:                                                            Medical Decision Making  Pt presents with severe anemia and GI bleed.  His anemia is likely multifactorial.  2 units PRBC ordered. Case discussed with internal medicine attending.  Pt will be admitted for further evaluation and management.    Problems Addressed:  Elevated troponin: complicated acute illness or injury  Gastrointestinal hemorrhage, unspecified gastrointestinal hemorrhage type: complicated acute illness or injury  Severe anemia: complicated acute illness or injury    Amount and/or Complexity of Data Reviewed  External Data Reviewed: notes.  Labs: ordered. Decision-making  details documented in ED Course.    Risk  Prescription drug management.  Decision regarding hospitalization.        Final diagnoses:   Gastrointestinal hemorrhage, unspecified gastrointestinal hemorrhage type   Severe anemia   Elevated troponin       ED Disposition  ED Disposition       ED Disposition   Decision to Admit    Condition   --    Comment   Level of Care: Telemetry [5]   Diagnosis: GI bleed [175710]   Admitting Physician: KE VELASQUEZ [[id number]]   Attending Physician: KE VELASQUEZ [[id number]]   Is patient appropriate for Inpatient Observation Unit?: No [0]                  Farhan Mccoy DO  03/30/25 1255

## 2025-03-24 NOTE — Clinical Note
Level of Care: Telemetry [5]   Diagnosis: GI bleed [582242]   Admitting Physician: KE VELASQUEZ [[id number]]   Attending Physician: KE VELASQUEZ [[id number]]

## 2025-03-24 NOTE — LETTER
EMS Transport Request  For use at [facility], [place], [place], [place], and [place] only   Patient Name: Val Nassar Jr. : 1960   Weight:73.6 kg (162 lb 4.1 oz) Pick-up Location: [facility] BLS/ALS:    Insurance: MEDICARE Auth End Date:    Pre-Cert #: D/C Summary complete:    Destination: [facility]   Contact Precautions:    Equipment (O2, Fluids, etc.):    Arrive By Date/Time: 3/27/25 late afternoon. Waiting for labs.  Stretcher/WC: Stretcher   CM Requesting: Magnus Fernandes RN Ext: [phone]   Notes/Medical Necessity:      ______________________________________________________________________    *Only 2 patient bags OR 1 carry-on size bag are permitted.  Wheelchairs and walkers CANNOT transported with the patient. Acknowledge: Yes

## 2025-03-25 LAB
ALBUMIN SERPL-MCNC: 3 G/DL (ref 3.5–5.2)
ALBUMIN/GLOB SERPL: 1.2 G/DL
ALP SERPL-CCNC: 88 U/L (ref 39–117)
ALPHA-FETOPROTEIN: 6.43 NG/ML (ref 0–8.3)
ALT SERPL W P-5'-P-CCNC: 11 U/L (ref 1–41)
AMMONIA BLD-SCNC: 48 UMOL/L (ref 16–60)
AMPHET+METHAMPHET UR QL: NEGATIVE
AMPHETAMINES UR QL: NEGATIVE
ANION GAP SERPL CALCULATED.3IONS-SCNC: 8 MMOL/L (ref 5–15)
AST SERPL-CCNC: 15 U/L (ref 1–40)
BARBITURATES UR QL SCN: NEGATIVE
BASOPHILS # BLD AUTO: 0.03 10*3/MM3 (ref 0–0.2)
BASOPHILS NFR BLD AUTO: 0.3 % (ref 0–1.5)
BENZODIAZ UR QL SCN: NEGATIVE
BH BB BLOOD EXPIRATION DATE: NORMAL
BH BB BLOOD EXPIRATION DATE: NORMAL
BH BB BLOOD TYPE BARCODE: 6200
BH BB BLOOD TYPE BARCODE: 6200
BH BB DISPENSE STATUS: NORMAL
BH BB DISPENSE STATUS: NORMAL
BH BB PRODUCT CODE: NORMAL
BH BB PRODUCT CODE: NORMAL
BH BB UNIT NUMBER: NORMAL
BH BB UNIT NUMBER: NORMAL
BILIRUB SERPL-MCNC: 0.7 MG/DL (ref 0–1.2)
BUN SERPL-MCNC: 9 MG/DL (ref 8–23)
BUN/CREAT SERPL: 13.2 (ref 7–25)
BUPRENORPHINE SERPL-MCNC: NEGATIVE NG/ML
CALCIUM SPEC-SCNC: 8.2 MG/DL (ref 8.6–10.5)
CANNABINOIDS SERPL QL: POSITIVE
CHLORIDE SERPL-SCNC: 105 MMOL/L (ref 98–107)
CHOLEST SERPL-MCNC: 103 MG/DL (ref 0–200)
CK SERPL-CCNC: 16 U/L (ref 20–200)
CO2 SERPL-SCNC: 25 MMOL/L (ref 22–29)
COCAINE UR QL: NEGATIVE
CREAT SERPL-MCNC: 0.68 MG/DL (ref 0.76–1.27)
CROSSMATCH INTERPRETATION: NORMAL
CROSSMATCH INTERPRETATION: NORMAL
CRP SERPL-MCNC: 0.95 MG/DL (ref 0–0.5)
D-LACTATE SERPL-SCNC: 1.1 MMOL/L (ref 0.5–2)
DEPRECATED RDW RBC AUTO: 60.9 FL (ref 37–54)
EGFRCR SERPLBLD CKD-EPI 2021: 103.2 ML/MIN/1.73
EOSINOPHIL # BLD AUTO: 0.13 10*3/MM3 (ref 0–0.4)
EOSINOPHIL NFR BLD AUTO: 1.5 % (ref 0.3–6.2)
ERYTHROCYTE [DISTWIDTH] IN BLOOD BY AUTOMATED COUNT: 19.3 % (ref 12.3–15.4)
ERYTHROCYTE [SEDIMENTATION RATE] IN BLOOD: 40 MM/HR (ref 0–20)
FENTANYL UR-MCNC: NEGATIVE NG/ML
FERRITIN SERPL-MCNC: 45.72 NG/ML (ref 30–400)
GLOBULIN UR ELPH-MCNC: 2.6 GM/DL
GLUCOSE SERPL-MCNC: 82 MG/DL (ref 65–99)
HBA1C MFR BLD: 4.7 % (ref 4.8–5.6)
HCT VFR BLD AUTO: 25.5 % (ref 37.5–51)
HDLC SERPL-MCNC: 56 MG/DL (ref 40–60)
HGB BLD-MCNC: 7.9 G/DL (ref 13–17.7)
IMM GRANULOCYTES # BLD AUTO: 0.07 10*3/MM3 (ref 0–0.05)
IMM GRANULOCYTES NFR BLD AUTO: 0.8 % (ref 0–0.5)
INR PPP: 1.14 (ref 0.89–1.12)
IRON 24H UR-MRATE: 93 MCG/DL (ref 59–158)
IRON SATN MFR SERPL: 24 % (ref 20–50)
LDLC SERPL CALC-MCNC: 33 MG/DL (ref 0–100)
LDLC/HDLC SERPL: 0.62 {RATIO}
LYMPHOCYTES # BLD AUTO: 1.11 10*3/MM3 (ref 0.7–3.1)
LYMPHOCYTES NFR BLD AUTO: 12.9 % (ref 19.6–45.3)
MAGNESIUM SERPL-MCNC: 1.5 MG/DL (ref 1.6–2.4)
MCH RBC QN AUTO: 28.6 PG (ref 26.6–33)
MCHC RBC AUTO-ENTMCNC: 31 G/DL (ref 31.5–35.7)
MCV RBC AUTO: 92.4 FL (ref 79–97)
METHADONE UR QL SCN: NEGATIVE
MONOCYTES # BLD AUTO: 1.24 10*3/MM3 (ref 0.1–0.9)
MONOCYTES NFR BLD AUTO: 14.4 % (ref 5–12)
NEUTROPHILS NFR BLD AUTO: 6.02 10*3/MM3 (ref 1.7–7)
NEUTROPHILS NFR BLD AUTO: 70.1 % (ref 42.7–76)
NRBC BLD AUTO-RTO: 0.5 /100 WBC (ref 0–0.2)
OPIATES UR QL: NEGATIVE
OXYCODONE UR QL SCN: POSITIVE
PCP UR QL SCN: NEGATIVE
PLATELET # BLD AUTO: 203 10*3/MM3 (ref 140–450)
PMV BLD AUTO: 8.5 FL (ref 6–12)
POTASSIUM SERPL-SCNC: 3.7 MMOL/L (ref 3.5–5.2)
PROCALCITONIN SERPL-MCNC: 0.24 NG/ML (ref 0–0.25)
PROT SERPL-MCNC: 5.6 G/DL (ref 6–8.5)
PROTHROMBIN TIME: 14.7 SECONDS (ref 12.2–14.5)
QT INTERVAL: 388 MS
QTC INTERVAL: 433 MS
RBC # BLD AUTO: 2.76 10*6/MM3 (ref 4.14–5.8)
SODIUM SERPL-SCNC: 138 MMOL/L (ref 136–145)
T4 FREE SERPL-MCNC: 0.45 NG/DL (ref 0.92–1.68)
TIBC SERPL-MCNC: 383 MCG/DL (ref 298–536)
TRANSFERRIN SERPL-MCNC: 257 MG/DL (ref 200–360)
TRICYCLICS UR QL SCN: NEGATIVE
TRIGL SERPL-MCNC: 62 MG/DL (ref 0–150)
TSH SERPL DL<=0.05 MIU/L-ACNC: 59.7 UIU/ML (ref 0.27–4.2)
UNIT  ABO: NORMAL
UNIT  ABO: NORMAL
UNIT  RH: NORMAL
UNIT  RH: NORMAL
VLDLC SERPL-MCNC: 14 MG/DL (ref 5–40)
WBC NRBC COR # BLD AUTO: 8.6 10*3/MM3 (ref 3.4–10.8)

## 2025-03-25 PROCEDURE — 84466 ASSAY OF TRANSFERRIN: CPT | Performed by: FAMILY MEDICINE

## 2025-03-25 PROCEDURE — 25010000002 MORPHINE PER 10 MG: Performed by: FAMILY MEDICINE

## 2025-03-25 PROCEDURE — 83540 ASSAY OF IRON: CPT | Performed by: FAMILY MEDICINE

## 2025-03-25 PROCEDURE — 82728 ASSAY OF FERRITIN: CPT | Performed by: FAMILY MEDICINE

## 2025-03-25 PROCEDURE — 85025 COMPLETE CBC W/AUTO DIFF WBC: CPT | Performed by: FAMILY MEDICINE

## 2025-03-25 PROCEDURE — 85652 RBC SED RATE AUTOMATED: CPT

## 2025-03-25 PROCEDURE — 80061 LIPID PANEL: CPT

## 2025-03-25 PROCEDURE — 86140 C-REACTIVE PROTEIN: CPT

## 2025-03-25 PROCEDURE — 80053 COMPREHEN METABOLIC PANEL: CPT | Performed by: FAMILY MEDICINE

## 2025-03-25 PROCEDURE — 25010000002 MORPHINE PER 10 MG

## 2025-03-25 PROCEDURE — 84443 ASSAY THYROID STIM HORMONE: CPT

## 2025-03-25 PROCEDURE — 99233 SBSQ HOSP IP/OBS HIGH 50: CPT

## 2025-03-25 PROCEDURE — 25010000002 CEFEPIME PER 500 MG

## 2025-03-25 PROCEDURE — 83605 ASSAY OF LACTIC ACID: CPT

## 2025-03-25 PROCEDURE — 83036 HEMOGLOBIN GLYCOSYLATED A1C: CPT

## 2025-03-25 PROCEDURE — 84145 PROCALCITONIN (PCT): CPT

## 2025-03-25 PROCEDURE — 99222 1ST HOSP IP/OBS MODERATE 55: CPT | Performed by: PHYSICIAN ASSISTANT

## 2025-03-25 PROCEDURE — 25010000002 MAGNESIUM SULFATE 2 GM/50ML SOLUTION

## 2025-03-25 PROCEDURE — 85610 PROTHROMBIN TIME: CPT

## 2025-03-25 PROCEDURE — 82550 ASSAY OF CK (CPK): CPT

## 2025-03-25 PROCEDURE — 84439 ASSAY OF FREE THYROXINE: CPT

## 2025-03-25 PROCEDURE — 82140 ASSAY OF AMMONIA: CPT

## 2025-03-25 PROCEDURE — 83735 ASSAY OF MAGNESIUM: CPT | Performed by: FAMILY MEDICINE

## 2025-03-25 RX ORDER — QUETIAPINE FUMARATE 25 MG/1
50 TABLET, FILM COATED ORAL NIGHTLY
Status: DISCONTINUED | OUTPATIENT
Start: 2025-03-25 | End: 2025-03-27 | Stop reason: HOSPADM

## 2025-03-25 RX ORDER — TAMSULOSIN HYDROCHLORIDE 0.4 MG/1
0.4 CAPSULE ORAL DAILY
Status: DISCONTINUED | OUTPATIENT
Start: 2025-03-25 | End: 2025-03-27 | Stop reason: HOSPADM

## 2025-03-25 RX ORDER — PEG-3350, SODIUM SULFATE, SODIUM CHLORIDE, POTASSIUM CHLORIDE, SODIUM ASCORBATE AND ASCORBIC ACID 7.5-2.691G
1000 KIT ORAL
Status: COMPLETED | OUTPATIENT
Start: 2025-03-25 | End: 2025-03-25

## 2025-03-25 RX ORDER — LEVOTHYROXINE SODIUM 50 UG/1
50 TABLET ORAL
Status: DISCONTINUED | OUTPATIENT
Start: 2025-03-26 | End: 2025-03-27 | Stop reason: HOSPADM

## 2025-03-25 RX ORDER — MAGNESIUM SULFATE HEPTAHYDRATE 40 MG/ML
2 INJECTION, SOLUTION INTRAVENOUS
Status: COMPLETED | OUTPATIENT
Start: 2025-03-25 | End: 2025-03-25

## 2025-03-25 RX ORDER — QUETIAPINE FUMARATE 25 MG/1
25 TABLET, FILM COATED ORAL DAILY
Status: DISCONTINUED | OUTPATIENT
Start: 2025-03-25 | End: 2025-03-27 | Stop reason: HOSPADM

## 2025-03-25 RX ORDER — ROSUVASTATIN CALCIUM 20 MG/1
20 TABLET, COATED ORAL NIGHTLY
Status: DISCONTINUED | OUTPATIENT
Start: 2025-03-25 | End: 2025-03-27 | Stop reason: HOSPADM

## 2025-03-25 RX ORDER — PANTOPRAZOLE SODIUM 40 MG/1
40 TABLET, DELAYED RELEASE ORAL 2 TIMES DAILY
Status: DISCONTINUED | OUTPATIENT
Start: 2025-03-25 | End: 2025-03-27 | Stop reason: HOSPADM

## 2025-03-25 RX ORDER — FOLIC ACID 1 MG/1
1 TABLET ORAL DAILY
Status: DISCONTINUED | OUTPATIENT
Start: 2025-03-25 | End: 2025-03-27 | Stop reason: HOSPADM

## 2025-03-25 RX ORDER — PEG-3350, SODIUM SULFATE, SODIUM CHLORIDE, POTASSIUM CHLORIDE, SODIUM ASCORBATE AND ASCORBIC ACID 7.5-2.691G
1000 KIT ORAL
Status: COMPLETED | OUTPATIENT
Start: 2025-03-26 | End: 2025-03-26

## 2025-03-25 RX ORDER — FINASTERIDE 5 MG/1
5 TABLET, FILM COATED ORAL DAILY
Status: DISCONTINUED | OUTPATIENT
Start: 2025-03-25 | End: 2025-03-27 | Stop reason: HOSPADM

## 2025-03-25 RX ORDER — CARVEDILOL 3.12 MG/1
3.12 TABLET ORAL 2 TIMES DAILY WITH MEALS
Status: DISCONTINUED | OUTPATIENT
Start: 2025-03-25 | End: 2025-03-27 | Stop reason: HOSPADM

## 2025-03-25 RX ORDER — GABAPENTIN 300 MG/1
300 CAPSULE ORAL EVERY 8 HOURS SCHEDULED
Status: DISCONTINUED | OUTPATIENT
Start: 2025-03-25 | End: 2025-03-27 | Stop reason: HOSPADM

## 2025-03-25 RX ORDER — ESCITALOPRAM OXALATE 10 MG/1
10 TABLET ORAL NIGHTLY
Status: DISCONTINUED | OUTPATIENT
Start: 2025-03-25 | End: 2025-03-27 | Stop reason: HOSPADM

## 2025-03-25 RX ORDER — ALUMINA, MAGNESIA, AND SIMETHICONE 2400; 2400; 240 MG/30ML; MG/30ML; MG/30ML
30 SUSPENSION ORAL ONCE
Status: COMPLETED | OUTPATIENT
Start: 2025-03-25 | End: 2025-03-25

## 2025-03-25 RX ADMIN — RIFAXIMIN 550 MG: 550 TABLET ORAL at 09:42

## 2025-03-25 RX ADMIN — ESCITALOPRAM OXALATE 10 MG: 10 TABLET ORAL at 20:45

## 2025-03-25 RX ADMIN — CARVEDILOL 3.12 MG: 3.12 TABLET, FILM COATED ORAL at 09:38

## 2025-03-25 RX ADMIN — PANTOPRAZOLE SODIUM 40 MG: 40 INJECTION, POWDER, FOR SOLUTION INTRAVENOUS at 17:15

## 2025-03-25 RX ADMIN — ALUMINUM HYDROXIDE, MAGNESIUM HYDROXIDE, AND DIMETHICONE 30 ML: 400; 400; 40 SUSPENSION ORAL at 00:48

## 2025-03-25 RX ADMIN — FINASTERIDE 5 MG: 5 TABLET, FILM COATED ORAL at 12:37

## 2025-03-25 RX ADMIN — MORPHINE SULFATE 2 MG: 2 INJECTION, SOLUTION INTRAMUSCULAR; INTRAVENOUS at 04:06

## 2025-03-25 RX ADMIN — Medication 10 ML: at 09:43

## 2025-03-25 RX ADMIN — Medication 10 ML: at 04:06

## 2025-03-25 RX ADMIN — PEG-3350, SODIUM SULFATE, SODIUM CHLORIDE, POTASSIUM CHLORIDE, SODIUM ASCORBATE AND ASCORBIC ACID 1000 ML: KIT at 18:02

## 2025-03-25 RX ADMIN — HYDROCODONE BITARTRATE AND ACETAMINOPHEN 1 TABLET: 5; 325 TABLET ORAL at 02:20

## 2025-03-25 RX ADMIN — MAGNESIUM SULFATE HEPTAHYDRATE 2 G: 40 INJECTION, SOLUTION INTRAVENOUS at 09:43

## 2025-03-25 RX ADMIN — PANTOPRAZOLE SODIUM 40 MG: 40 INJECTION, POWDER, FOR SOLUTION INTRAVENOUS at 06:00

## 2025-03-25 RX ADMIN — GABAPENTIN 300 MG: 300 CAPSULE ORAL at 06:00

## 2025-03-25 RX ADMIN — MORPHINE SULFATE 2 MG: 2 INJECTION, SOLUTION INTRAMUSCULAR; INTRAVENOUS at 21:02

## 2025-03-25 RX ADMIN — MAGNESIUM SULFATE HEPTAHYDRATE 2 G: 40 INJECTION, SOLUTION INTRAVENOUS at 14:47

## 2025-03-25 RX ADMIN — ROSUVASTATIN CALCIUM 20 MG: 20 TABLET, FILM COATED ORAL at 20:44

## 2025-03-25 RX ADMIN — CEFEPIME HYDROCHLORIDE 1000 MG: 1 INJECTION, POWDER, FOR SOLUTION INTRAMUSCULAR; INTRAVENOUS at 17:15

## 2025-03-25 RX ADMIN — MAGNESIUM SULFATE HEPTAHYDRATE 2 G: 40 INJECTION, SOLUTION INTRAVENOUS at 12:36

## 2025-03-25 RX ADMIN — Medication 10 ML: at 20:44

## 2025-03-25 RX ADMIN — GABAPENTIN 300 MG: 300 CAPSULE ORAL at 20:44

## 2025-03-25 RX ADMIN — CARVEDILOL 3.12 MG: 3.12 TABLET, FILM COATED ORAL at 18:02

## 2025-03-25 RX ADMIN — QUETIAPINE FUMARATE 50 MG: 25 TABLET ORAL at 20:45

## 2025-03-25 RX ADMIN — Medication 5 MG: at 02:20

## 2025-03-25 RX ADMIN — QUETIAPINE FUMARATE 25 MG: 25 TABLET ORAL at 09:37

## 2025-03-25 RX ADMIN — GABAPENTIN 300 MG: 300 CAPSULE ORAL at 14:47

## 2025-03-25 RX ADMIN — TAMSULOSIN HYDROCHLORIDE 0.4 MG: 0.4 CAPSULE ORAL at 09:38

## 2025-03-25 RX ADMIN — CEFEPIME HYDROCHLORIDE 1000 MG: 1 INJECTION, POWDER, FOR SOLUTION INTRAMUSCULAR; INTRAVENOUS at 20:42

## 2025-03-25 RX ADMIN — RIFAXIMIN 550 MG: 550 TABLET ORAL at 20:45

## 2025-03-25 RX ADMIN — HYDROCODONE BITARTRATE AND ACETAMINOPHEN 1 TABLET: 5; 325 TABLET ORAL at 18:17

## 2025-03-25 RX ADMIN — FOLIC ACID 1 MG: 1 TABLET ORAL at 12:37

## 2025-03-25 NOTE — PROGRESS NOTES
[facility] Medicine Services  PROGRESS NOTE    Patient Name: Val Nassar Jr.  : 1960  MRN: [id number]    Date of Admission: 3/24/2025  Primary Care Physician: Wesly Minor MD    Subjective   Subjective     CC:  Symptomatic anemia    HPI:  No significant overnight events, no acute complaints, not endorsing abdominal pain however does endorse some dysuria.  Very somnolent.      Objective   Objective     Vital Signs:   Temp:  [97.9 °F (36.6 °C)-98.5 °F (36.9 °C)] 97.9 °F (36.6 °C)  Heart Rate:  [60-98] 62  Resp:  [16-18] 16  BP: ()/(50-75) 105/58     Physical Exam:  Constitutional: No acute distress, awake, alert  HENT: NCAT, mucous membranes moist  Respiratory: Clear to auscultation bilaterally, respiratory effort normal   Cardiovascular: RRR, no murmurs, rubs, or gallops  Gastrointestinal: Positive bowel sounds, soft, nontender, nondistended  Musculoskeletal: No bilateral ankle edema  Psychiatric: Appropriate affect, cooperative  Neurologic: Oriented x 3, strength symmetric in all extremities, Cranial Nerves grossly intact to confrontation, speech clear  Skin: No rashes     Results Reviewed:  LAB RESULTS:      Lab 25  1030 25  0720 25  1657 25  1458   WBC  --  8.60  --  9.49   HEMOGLOBIN  --  7.9*  --  5.2*   HEMATOCRIT  --  25.5*  --  17.9*   PLATELETS  --  203  --  263   NEUTROS ABS  --  6.02  --  6.42   IMMATURE GRANS (ABS)  --  0.07*  --  0.14*   LYMPHS ABS  --  1.11  --  1.34   MONOS ABS  --  1.24*  --  1.39*   EOS ABS  --  0.13  --  0.17   MCV  --  92.4  --  100.0*   SED RATE  --  40*  --   --    CRP  --  0.95*  --   --    PROCALCITONIN  --  0.24  --   --    LACTATE  --   --   --  1.6   PROTIME 14.7*  --   --   --    HSTROP T  --   --  60* 70*         Lab 03/25/25  0720 25  1458   SODIUM 138 138   POTASSIUM 3.7 4.3   CHLORIDE 105 103   CO2 25.0 24.0   ANION GAP 8.0 11.0   BUN 9 11   CREATININE 0.68* 0.84   EGFR 103.2 96.8    GLUCOSE 82 97   CALCIUM 8.2* 8.7   MAGNESIUM 1.5*  --    HEMOGLOBIN A1C 4.70*  --    TSH 59.700*  --          Lab 03/25/25  0720 03/24/25  1458   TOTAL PROTEIN 5.6* 6.7   ALBUMIN 3.0* 3.0*   GLOBULIN 2.6 3.7   ALT (SGPT) 11 14   AST (SGOT) 15 31   BILIRUBIN 0.7 0.2   ALK PHOS 88 95   LIPASE  --  57         Lab 03/25/25  1030 03/24/25  1657 03/24/25  1458   PROBNP  --   --  349.9   HSTROP T  --  60* 70*   PROTIME 14.7*  --   --    INR 1.14*  --   --          Lab 03/25/25  0720   CHOLESTEROL 103   LDL CHOL 33   HDL CHOL 56   TRIGLYCERIDES 62         Lab 03/25/25  0720 03/24/25  1657   IRON 93  --    IRON SATURATION (TSAT) 24  --    TIBC 383  --    TRANSFERRIN 257  --    FERRITIN 45.72  --    ABO TYPING  --  A   RH TYPING  --  Positive   ANTIBODY SCREEN  --  Negative         Brief Urine Lab Results  (Last result in the past 365 days)        Color   Clarity   Blood   Leuk Est   Nitrite   Protein   CREAT   Urine HCG        03/24/25 1636 Yellow   Turbid   Small (1+)   Large (3+)   Negative   30 mg/dL (1+)                   Microbiology Results Abnormal       None            CT Abdomen Pelvis With & Without Contrast  Result Date: 3/24/2025  CT ABDOMEN PELVIS W WO CONTRAST Date of Exam: 3/24/2025 11:11 PM EDT Indication: Abdominal pain, hx multiple GI bleed, hx previous esophageal carcinoma, cirrhosis. Comparison: 3/5/2025, 2/11/2025. Technique: Axial CT images were obtained of the abdomen and pelvis before and after the uneventful intravenous administration intravenous contrast. Sagittal and coronal reconstructions were performed.  Automated exposure control and iterative reconstruction methods were used. Findings: Lung Bases:   The visualized lung bases and lower mediastinal structures are unremarkable. Previously identified airspace disease has resolved. Liver: Surface nodularity of the liver is present consistent with cirrhosis. No significant ascites identified.. No focal lesions. Biliary/Gallbladder:  The  gallbladder is normal without evidence of radiopaque stones. The biliary tree is nondilated. Spleen: Spleen is normal in size and CT density. Pancreas:  Pancreas is normal. There is no evidence of pancreatic mass or peripancreatic fluid. Kidneys:  Kidneys are normal in size. There are no stones or hydronephrosis. There is an indeterminate partially cystic appearing hypodense lesion seen along the superior aspect of the left kidney measuring up to 2.9 x 3.2 cm x 1.9 cm in craniocaudal dimension (series 5 image 38). This may represent a true lesion or sequela of previous pyelonephritis. No drainable collection identified. Adrenals:  Adrenal glands are unremarkable. Retroperitoneal/Lymph Nodes/Vasculature:  No retroperitoneal adenopathy is identified. No evidence of hemodynamically significant stenosis of the mesenteric vasculature. No evidence of thrombus. No evidence of active contrast extravasation. No definite contrast blush identified. Gastrointestinal/Mesentery:  The bowel loops are non-dilated without wall thickening or mass. The appendix appears within normal limits. No evidence of obstruction. No free air. No significant stool burden. No evidence of hernia. Bladder:  Circumferential bladder wall thickening is present which appears to have progressed as compared to the previous study. Lubin catheter has been removed.. Bladder diverticulum present extending left of midline which has increased in size as compared to the  previous study likely related to increased urine within the bladder. No evidence of stones. Genital:   Unremarkable       Bony Structures:   Visualized bony structures are consistent with the patient's age.     Impression: Impression: 1.No acute intra-abdominal or intrapelvic process. No evidence of active contrast extravasation. No evidence of hemodynamically significant stenosis of the mesenteric vasculature. 2.Circumferential bladder wall thickening which appears to have progressed as compared  to the previous study which may be related to cystitis or the sequela of chronic outlet obstruction. Bladder diverticulum present extending left of midline which has increased in size as compared to the previous study likely related to increased urine within the bladder. 3.Indeterminate partially cystic appearing hypodense lesion seen along the superior aspect of the left kidney measuring up to 2.9 x 3.2 cm x 1.9 cm in craniocaudal dimension. This may represent a true lesion or sequela of previous pyelonephritis. Scarring suspected. No drainable collection identified. Contrasted CT follow-up is recommended to evaluate for stability or resolution. 4.Ancillary findings as described above. Electronically Signed: Natalie Abdi MD  3/24/2025 11:47 PM EDT  Workstation ID: [id number]    XR Chest 1 View  Result Date: 3/24/2025  XR CHEST 1 VW Date of Exam: 3/24/2025 3:43 PM EDT Indication: gi bleed Comparison: Chest x-ray 3/6/2025 Findings: Redemonstration of right chest port. Endotracheal tube and enteric feeding tube have been removed. Stable cardiomediastinal silhouette. Similar diffuse interstitial prominence likely reflecting interstitial edema. No pleural effusion or pneumothorax.     Impression: Impression: Redemonstration of right chest port. Endotracheal tube and enteric feeding tube have been removed. Stable cardiomediastinal silhouette. Similar diffuse interstitial prominence likely reflecting interstitial edema. No pleural effusion or pneumothorax. Electronically Signed: Nikita Garrison MD  3/24/2025 4:40 PM EDT  Workstation ID: [id number]      Results for orders placed during the hospital encounter of 02/11/25    Adult Transthoracic Echo Complete w/ Color, Spectral and Contrast if Necessary Per Protocol    Interpretation Summary    Left ventricular systolic function is normal. Estimated left ventricular EF = 60%    Left ventricular wall thickness is consistent with mild concentric hypertrophy.    Mild aortic valve  stenosis is present.    Aortic valve maximum pressure gradient is 23 mmHg. Aortic valve mean pressure gradient is 14 mmHg.      Current medications:  Scheduled Meds:[Held by provider] apixaban, 2.5 mg, Oral, Q12H  carvedilol, 3.125 mg, Oral, BID With Meals  cefepime, 1,000 mg, Intravenous, Once  cefepime, 1,000 mg, Intravenous, Q8H  escitalopram, 10 mg, Oral, Nightly  finasteride, 5 mg, Oral, Daily  folic acid, 1 mg, Oral, Daily  gabapentin, 300 mg, Oral, Q8H  magnesium sulfate, 2 g, Intravenous, Q2H  [START ON 3/26/2025] melatonin, 5 mg, Oral, Nightly  [Held by provider] pantoprazole, 40 mg, Oral, BID  pantoprazole, 40 mg, Intravenous, BID AC  PEG-KCl-NaCl-NaSulf-Na Asc-C, 1,000 mL, Oral, Once When Specified   Followed by  [START ON 3/26/2025] PEG-KCl-NaCl-NaSulf-Na Asc-C, 1,000 mL, Oral, Once When Specified  QUEtiapine, 25 mg, Oral, Daily   And  QUEtiapine, 50 mg, Oral, Nightly  riFAXIMin, 550 mg, Oral, Q12H  rosuvastatin, 20 mg, Oral, Nightly  sodium chloride, 10 mL, Intravenous, Q12H  tamsulosin, 0.4 mg, Oral, Daily      Continuous Infusions:   PRN Meds:.  acetaminophen **OR** acetaminophen **OR** acetaminophen    senna-docusate sodium **AND** polyethylene glycol **AND** bisacodyl **AND** bisacodyl    Calcium Replacement - Follow Nurse / BPA Driven Protocol    HYDROcodone-acetaminophen    Magnesium Standard Dose Replacement - Follow Nurse / BPA Driven Protocol    Morphine    nitroglycerin    ondansetron ODT **OR** ondansetron    Phosphorus Replacement - Follow Nurse / BPA Driven Protocol    Potassium Replacement - Follow Nurse / BPA Driven Protocol    [COMPLETED] Insert Peripheral IV **AND** sodium chloride    sodium chloride    sodium chloride    Assessment & Plan   Assessment & Plan     Active Hospital Problems    Diagnosis  POA    GI bleed [K92.2]  Yes    BPH without obstruction/lower urinary tract symptoms [N40.0]  Yes    Cirrhosis of liver [K74.60]  Yes    A-fib [I48.91]  Yes    Squamous cell carcinoma of  esophagus [C15.9]  Yes    Anxiety associated with depression [F41.8]  Yes    Hyperlipidemia, unspecified [E78.5]  Yes    GERD without esophagitis [K21.9]  Yes      Resolved Hospital Problems   No resolved problems to display.        Brief Hospital Course to date:  Val Nassar Jr. is a 64 y.o. male that presented from local nursing home with abnormal labs, patient found to be anemic and history of multiple previous GI bleeds. Patient with complaints of upper abdominal pain during my exam, plans are for follow up CT abd/pelvis. Plans for GI evaluation in the AM. Patient reports having some nausea but no vomiting. He has issues frequently with dark stools and previous history of SCC esophageal cancer. Patient does report slightly more SOA than normal.  GI consulted.  Patient currently on IV PPI twice daily, also endorsing dysuria, chronic cath, will initiate antibiotics.    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    GI on board, plan for colonoscopy tomorrow and possible repeat EGD  N.p.o. 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  Continue home iron supplement, repeat iron studies    Acute UTI  Patient has a history of chronic In-N-Out cathing  Is endorsing dysuria, UA suggestive of UTI with dysuria symptoms  Previous urine cultures April 11, 2025 positive for Pseudomonas  Will check urine cultures, pending Lubin placed  Initiate cefepime for total 7 days    Hypothyroidism  TSH 59.7, free T40.45, not on any medication at home  Will initiate levothyroxine 50 mcg (ideally according to body weight needs to be on 116 mcg daily), will opt for lower dose as patient  has a history of CAD per cath in 2018 which showed moderate disease  Not particularly symptomatic, not in myxedema coma at this time  Will need to recheck this in 6 to 8 weeks    Afib  Elevated troponin   Hold patient's home Eliquis, likely will need to stop, seen by cardiology previously, may consider consult again while admitted   Continue home Coreg   Was being considered for possible watchman outpatient   Troponin elevated at 70 on admission, repeat 60, likely stress induced from the above      Etoh Cirrhosis of liver  MELD sodium 13  rifaximin BID  lactulose previously refused by patient  Check ammonia level     BPH  tamlusion   I&O cath as needed  Lubin placed during admission      Depression/anxiety  Continue Lexapro, Seroquel from home   Qtc reviewed       Chronic low back pain  Gabapentin      Expected Discharge Location and Transportation: TBD  Expected Discharge TBD  Expected discharge date/ time has not been documented.     VTE Prophylaxis:  Pharmacologic & mechanical VTE prophylaxis orders are present.     Total time spent: Time Spent: Time Spent: 55 minutes  Time spent includes time reviewing chart, face-to-face time, counseling patient/family/caregiver, ordering medications/tests/procedures, communicating with other health care professionals, documenting clinical information in the electronic health record, and coordination of care.       AM-PAC 6 Clicks Score (PT): 14 (03/25/25 1659)    CODE STATUS:   Code Status and Medical Interventions: CPR (Attempt to Resuscitate); Full Support   Ordered at: 03/24/25 0844     Code Status (Patient has no pulse and is not breathing):    CPR (Attempt to Resuscitate)     Medical Interventions (Patient has pulse or is breathing):    Full Support     Level Of Support Discussed With:    Patient       Meredith Matias MD  03/25/25

## 2025-03-25 NOTE — H&P
[facility] Medicine Services  HISTORY AND PHYSICAL    Patient Name: Val Nassar Jr.  : 1960  MRN: [id number]  Primary Care Physician: Wesly Minor MD  Date of admission: 3/24/2025      Subjective   Subjective     Chief Complaint:  Acute Anemia, abdominal pain     HPI:  Val Nassar Jr. is a 64 y.o. male that presented from local nursing home with abnormal labs, patient found to be anemic and history of multiple previous GI bleeds. Patient with complaints of upper abdominal pain during my exam, plans are for follow up CT abd/pelvis. Plans for GI evaluation in the AM. Patient reports having some nausea but no vomiting. He has issues frequently with dark stools and previous history of SCC esophageal cancer. Patient does report slightly more SOA than normal. No recent fever, chills, chest pain at this time.           Personal History     Past Medical History:   Diagnosis Date    Anemia     Cancer     ESOPHAGEAL    Cirrhosis     Duodenal ulcer     Gastric ulcer     GERD (gastroesophageal reflux disease)     History of alcohol abuse     History of radiation therapy 2024    esophagus    HLD (hyperlipidemia)     Mood disorder          Oncology Problem List:  Malignant neoplasm of upper third of esophagus (2024; Status:   Active)  Squamous cell carcinoma of esophagus (2024; Status: Active)    Oncology/Hematology History   Squamous cell carcinoma of esophagus   1/3/2024 Initial Diagnosis    Squamous cell carcinoma of esophagus     3/27/2024 - 2024 Radiation    Radiation OncologyTreatment Course:  Val Nassar Jr. received 5400 cGy in 30 fractions to esophagus via External Beam Radiation - EBRT.     2024 -  Chemotherapy    OP CENTRAL VENOUS ACCESS DEVICE Access, Care, and Maintenance (CVAD)     Malignant neoplasm of upper third of esophagus   3/14/2024 Initial Diagnosis    Malignant neoplasm of upper third of  esophagus     3/28/2024 -  Chemotherapy    OP SUPPORTIVE Iron Sucrose (Venofer) 200 MG     4/2/2024 -  Chemotherapy    OP LUNG PACLitaxel / CARBOplatin AUC=2 (Weekly) + XRT      4/29/2024 -  Chemotherapy    OP CENTRAL VENOUS ACCESS DEVICE Access, Care, and Maintenance (CVAD)       Past Surgical History:   Procedure Laterality Date    ENDOSCOPY N/A 12/26/2023    Procedure: ESOPHAGOGASTRODUODENOSCOPY;  Surgeon: Wesly Mckeon MD;  Location:  [facility];  Service: Gastroenterology;  Laterality: N/A;    ENDOSCOPY N/A 3/3/2025    Procedure: ESOPHAGOGASTRODUODENOSCOPY;  Surgeon: Wesly Mckeon MD;  Location:  [facility];  Service: Gastroenterology;  Laterality: N/A;  APC USED IN ESOPHAGUS.    VENOUS ACCESS DEVICE (PORT) INSERTION Right 04/25/2024       Family History: family history includes Breast cancer in his mother; Cancer in his mother; Heart attack in his father.     Social History:  reports that he has been smoking cigarettes. He has a 15 pack-year smoking history. He has been exposed to tobacco smoke. He uses smokeless tobacco. He reports current alcohol use of about 4.0 standard drinks of alcohol per week. He reports current drug use. Drug: Hydrocodone.  Social History     Social History Narrative    Not on file       Medications:  Available home medication information reviewed.  Benzalkonium Chloride, Dimethicone, Lidocaine, Loratadine, QUEtiapine, Vitamin D3, Zinc Oxide, apixaban, carvedilol, dimethicone, docusate sodium, dutasteride, escitalopram, ferrous gluconate, folic acid, gabapentin, melatonin, pantoprazole, riFAXIMin, rosuvastatin, tamsulosin, and vitamin B-12    Allergies   Allergen Reactions    Green Beans Hives       Objective   Objective     Vital Signs:   Temp:  [98.3 °F (36.8 °C)-98.4 °F (36.9 °C)] 98.3 °F (36.8 °C)  Heart Rate:  [67-98] 75  Resp:  [16] 16  BP: (107-135)/(50-68) 134/66       Physical Exam   Constitutional: No acute distress, awake, alert, frail and pale appearing,  chronically ill appearing, on RA   HENT: NCAT, nares patent, mucous membranes moist  Respiratory: Decreased BS bilaterally, no rhonchi or wheezing, respiratory effort normal   Cardiovascular: RRR  Gastrointestinal: Positive bowel sounds, soft, epigastric tenderness to palpation, nondistended  Musculoskeletal: No bilateral ankle edema  Psychiatric: Appropriate affect, cooperative  Neurologic: Oriented x 3, strength symmetric in all extremities, Cranial Nerves grossly intact to confrontation, speech clear  Skin: No rashes      Result Review:  I have personally reviewed the results from the time of this admission to 3/25/2025 00:04 EDT and agree with these findings:  [x]  Laboratory list / accordion  [x]  Microbiology  [x]  Radiology  [x]  EKG/Telemetry   []  Cardiology/Vascular   []  Pathology  [x]  Old records  []  Other:  Most notable findings include:   Trop 70-60   Hgb 5.2   CT abd/pelvis- negative for acute findings, chronic outlet obstruction and likely secondary to patient need for in and out cath, patient also with partially cystic lesion of L kidney 2.9 x 3.2 x 1.9 cm, felt likely to be scarring, recommend follow up outpatient CT     LAB RESULTS:      Lab 03/24/25  1458   WBC 9.49   HEMOGLOBIN 5.2*   HEMATOCRIT 17.9*   PLATELETS 263   NEUTROS ABS 6.42   IMMATURE GRANS (ABS) 0.14*   LYMPHS ABS 1.34   MONOS ABS 1.39*   EOS ABS 0.17   .0*   LACTATE 1.6         Lab 03/24/25  1458   SODIUM 138   POTASSIUM 4.3   CHLORIDE 103   CO2 24.0   ANION GAP 11.0   BUN 11   CREATININE 0.84   EGFR 96.8   GLUCOSE 97   CALCIUM 8.7         Lab 03/24/25  1458   TOTAL PROTEIN 6.7   ALBUMIN 3.0*   GLOBULIN 3.7   ALT (SGPT) 14   AST (SGOT) 31   BILIRUBIN 0.2   ALK PHOS 95   LIPASE 57         Lab 03/24/25  1657 03/24/25  1458   PROBNP  --  349.9   HSTROP T 60* 70*             Lab 03/24/25  1657   ABO TYPING A   RH TYPING Positive   ANTIBODY SCREEN Negative         UA          11/14/2024    11:41 2/11/2025    09:42 3/24/2025     16:36   Urinalysis   Squamous Epithelial Cells, UA  0-2  0-2    Specific Gravity, UA  1.008  1.010    Ketones, UA Negative  Negative  Negative    Blood, UA  Moderate (2+)  Small (1+)    Leukocytes, UA Large (3+)  Large (3+)  Large (3+)    Nitrite, UA  Negative  Negative    RBC, UA  11-20  3-5    WBC, UA  Too Numerous to Count  Too Numerous to Count    Bacteria, UA  2+  4+        Microbiology Results (last 10 days)       ** No results found for the last 240 hours. **            CT Abdomen Pelvis With & Without Contrast  Result Date: 3/24/2025  CT ABDOMEN PELVIS W WO CONTRAST Date of Exam: 3/24/2025 11:11 PM EDT Indication: Abdominal pain, hx multiple GI bleed, hx previous esophageal carcinoma, cirrhosis. Comparison: 3/5/2025, 2/11/2025. Technique: Axial CT images were obtained of the abdomen and pelvis before and after the uneventful intravenous administration intravenous contrast. Sagittal and coronal reconstructions were performed.  Automated exposure control and iterative reconstruction methods were used. Findings: Lung Bases:   The visualized lung bases and lower mediastinal structures are unremarkable. Previously identified airspace disease has resolved. Liver: Surface nodularity of the liver is present consistent with cirrhosis. No significant ascites identified.. No focal lesions. Biliary/Gallbladder:  The gallbladder is normal without evidence of radiopaque stones. The biliary tree is nondilated. Spleen: Spleen is normal in size and CT density. Pancreas:  Pancreas is normal. There is no evidence of pancreatic mass or peripancreatic fluid. Kidneys:  Kidneys are normal in size. There are no stones or hydronephrosis. There is an indeterminate partially cystic appearing hypodense lesion seen along the superior aspect of the left kidney measuring up to 2.9 x 3.2 cm x 1.9 cm in craniocaudal dimension (series 5 image 38). This may represent a true lesion or sequela of previous pyelonephritis. No drainable collection  identified. Adrenals:  Adrenal glands are unremarkable. Retroperitoneal/Lymph Nodes/Vasculature:  No retroperitoneal adenopathy is identified. No evidence of hemodynamically significant stenosis of the mesenteric vasculature. No evidence of thrombus. No evidence of active contrast extravasation. No definite contrast blush identified. Gastrointestinal/Mesentery:  The bowel loops are non-dilated without wall thickening or mass. The appendix appears within normal limits. No evidence of obstruction. No free air. No significant stool burden. No evidence of hernia. Bladder:  Circumferential bladder wall thickening is present which appears to have progressed as compared to the previous study. Lubin catheter has been removed.. Bladder diverticulum present extending left of midline which has increased in size as compared to the  previous study likely related to increased urine within the bladder. No evidence of stones. Genital:   Unremarkable       Bony Structures:   Visualized bony structures are consistent with the patient's age.     Impression: Impression: 1.No acute intra-abdominal or intrapelvic process. No evidence of active contrast extravasation. No evidence of hemodynamically significant stenosis of the mesenteric vasculature. 2.Circumferential bladder wall thickening which appears to have progressed as compared to the previous study which may be related to cystitis or the sequela of chronic outlet obstruction. Bladder diverticulum present extending left of midline which has increased in size as compared to the previous study likely related to increased urine within the bladder. 3.Indeterminate partially cystic appearing hypodense lesion seen along the superior aspect of the left kidney measuring up to 2.9 x 3.2 cm x 1.9 cm in craniocaudal dimension. This may represent a true lesion or sequela of previous pyelonephritis. Scarring suspected. No drainable collection identified. Contrasted CT follow-up is recommended  to evaluate for stability or resolution. 4.Ancillary findings as described above. Electronically Signed: Natalie Abdi MD  3/24/2025 11:47 PM EDT  Workstation ID: [id number]    XR Chest 1 View  Result Date: 3/24/2025  XR CHEST 1 VW Date of Exam: 3/24/2025 3:43 PM EDT Indication: gi bleed Comparison: Chest x-ray 3/6/2025 Findings: Redemonstration of right chest port. Endotracheal tube and enteric feeding tube have been removed. Stable cardiomediastinal silhouette. Similar diffuse interstitial prominence likely reflecting interstitial edema. No pleural effusion or pneumothorax.     Impression: Impression: Redemonstration of right chest port. Endotracheal tube and enteric feeding tube have been removed. Stable cardiomediastinal silhouette. Similar diffuse interstitial prominence likely reflecting interstitial edema. No pleural effusion or pneumothorax. Electronically Signed: Nikita Garrison MD  3/24/2025 4:40 PM EDT  Workstation ID: [id number]      Results for orders placed during the hospital encounter of 02/11/25    Adult Transthoracic Echo Complete w/ Color, Spectral and Contrast if Necessary Per Protocol    Interpretation Summary    Left ventricular systolic function is normal. Estimated left ventricular EF = 60%    Left ventricular wall thickness is consistent with mild concentric hypertrophy.    Mild aortic valve stenosis is present.    Aortic valve maximum pressure gradient is 23 mmHg. Aortic valve mean pressure gradient is 14 mmHg.      Assessment & Plan   Assessment & Plan       Anxiety associated with depression    GERD without esophagitis    Hyperlipidemia, unspecified    Squamous cell carcinoma of esophagus    GI bleed    A-fib    Cirrhosis of liver    BPH without obstruction/lower urinary tract symptoms      Val Nassar is a 65 yo M that presented to [facility] with increased fatigue, SOA, and abnormal labs from local NH. Patient found to have initial Hgb 5.2 on arrival and plans are for  transfusion 2U PRBCs this evening and GI evaluation in the AM. Patient's PMHx includes previous alcohol abuse, cirrhosis, BPH (with I/O cath multiple times daily), anxeity, depression, afib on Eliquis, and previous SCC of the esophagus s/p chemo with Dr. Ga    Acute symptomatic anemia   GI bleed  Abdominal pain   LACIE   - Hgb on arrival 5.2, plans for transfusion 2U PRBC and repeat levels overnight   - Consult to GI for evaluation in the AM, NPO after MN for possible endoscopy if needed   - Recent similar presentation earlier this month   - IV PPI BID   - Metastatic squamous cell carcinoma of the esophagus s/p chemo 5/2024.  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    - Continue home iron supplement, repeat iron studies in the AM      Afib  Elevated troponin   -Hold patient's home Eliquis, likely will need to stop, seen by cardiology previously, may consider consult again while admitted   - Continue home Coreg   - Was being considered for possible watchman outpatient   - Troponin elevated at 70 on admission, repeat 60, likely stress induced from the above      Etoh Cirrhosis of liver  - rifaximin BID  - pt no longer drinks alcohol   - lactulose previously refused by patient      BPH  - tamlusion   - I&O cath as needed       Depression/anxiety  -Continue Lexapro, Seroquel from home   -Qtc reviewed       Chronic low back pain  - gabapentin       VTE Prophylaxis:  Mechanical VTE prophylaxis orders are present.          CODE STATUS:    Code Status and Medical Interventions: CPR (Attempt to Resuscitate); Full Support   Ordered at: 03/24/25 0495     Code Status (Patient has no pulse and is not breathing):    CPR (Attempt to Resuscitate)     Medical Interventions (Patient has pulse or is breathing):    Full Support     Level Of Support Discussed With:    Patient       Expected Discharge    Expected discharge date/ time has not been documented.     JOSE G Gage,   03/25/25

## 2025-03-25 NOTE — ED NOTES
Val Nassar Jr.    Nursing Report ED to Floor:  Mental status: alert and oriented x4  Ambulatory status: x1-x2  Oxygen Therapy:  room air  Cardiac Rhythm: normal sinus  Admitted from: ed/[facility]  Safety Concerns:  fall risk  Precautions: na  Social Issues: pt in and out caths  ED Room #:  18    ED Nurse Phone Extension - [phone] or may call [phone].      HPI:   Chief Complaint   Patient presents with    low hemoglobin        Past Medical History:  Past Medical History:   Diagnosis Date    Anemia     Cancer     ESOPHAGEAL    Cirrhosis     Duodenal ulcer     Gastric ulcer     GERD (gastroesophageal reflux disease)     History of alcohol abuse     History of radiation therapy 05/08/2024    esophagus    HLD (hyperlipidemia)     Mood disorder         Past Surgical History:  Past Surgical History:   Procedure Laterality Date    ENDOSCOPY N/A 12/26/2023    Procedure: ESOPHAGOGASTRODUODENOSCOPY;  Surgeon: Wesly Mckeon MD;  Location:  [facility];  Service: Gastroenterology;  Laterality: N/A;    ENDOSCOPY N/A 3/3/2025    Procedure: ESOPHAGOGASTRODUODENOSCOPY;  Surgeon: Wesly Mckeon MD;  Location:  [facility];  Service: Gastroenterology;  Laterality: N/A;  APC USED IN ESOPHAGUS.    VENOUS ACCESS DEVICE (PORT) INSERTION Right 04/25/2024        Admitting Doctor:   KE Gage DO    Consulting Provider(s):  Consults       Date and Time Order Name Status Description    3/24/2025 11:18 PM Inpatient Gastroenterology Consult      3/15/2025  6:48 AM Inpatient Gastroenterology Consult Completed     3/14/2025  5:07 PM Inpatient Cardiology Consult Completed     2/28/2025  9:40 AM Inpatient Gastroenterology Consult Completed     2/27/2025  1:29 AM Inpatient Cardiology Consult Completed              Admitting Diagnosis:   The primary encounter diagnosis was Gastrointestinal hemorrhage, unspecified gastrointestinal hemorrhage type. Diagnoses of Severe anemia and Elevated troponin were also pertinent to this  visit.    Most Recent Vitals:   Vitals:    03/24/25 2124 03/24/25 2303 03/24/25 2341 03/25/25 0006   BP: 135/67 129/65 134/66 124/65   BP Location:   Right arm    Patient Position:   Lying    Pulse: 77 82 75 78   Resp: 16 16 16 16   Temp: 98.4 °F (36.9 °C) 98.4 °F (36.9 °C) 98.3 °F (36.8 °C) 98.3 °F (36.8 °C)   TempSrc:  Oral Oral    SpO2: 99%  99% 99%   Weight:       Height:           Active LDAs/IV Access:   Lines, Drains & Airways       Active LDAs       Name Placement date Placement time Site Days    Peripheral IV 03/24/25 1700 Anterior;Right Forearm 03/24/25  1700  Forearm  less than 1                    Labs (abnormal labs have a star):   Labs Reviewed   COMPREHENSIVE METABOLIC PANEL - Abnormal; Notable for the following components:       Result Value    Albumin 3.0 (*)     All other components within normal limits    Narrative:     GFR Categories in Chronic Kidney Disease (CKD)      GFR Category          GFR (mL/min/1.73)    Interpretation  G1                     90 or greater         Normal or high (1)  G2                      60-89                Mild decrease (1)  G3a                   45-59                Mild to moderate decrease  G3b                   30-44                Moderate to severe decrease  G4                    15-29                Severe decrease  G5                    14 or less           Kidney failure          (1)In the absence of evidence of kidney disease, neither GFR category G1 or G2 fulfill the criteria for CKD.    eGFR calculation 2021 CKD-EPI creatinine equation, which does not include race as a factor   URINALYSIS W/ MICROSCOPIC IF INDICATED (NO CULTURE) - Abnormal; Notable for the following components:    Appearance, UA Turbid (*)     Blood, UA Small (1+) (*)     Protein, UA 30 mg/dL (1+) (*)     Leuk Esterase, UA Large (3+) (*)     All other components within normal limits   TROPONIN - Abnormal; Notable for the following components:    HS Troponin T 70 (*)     All other  components within normal limits    Narrative:     High Sensitive Troponin T Reference Range:  <14.0 ng/L- Negative Female for AMI  <22.0 ng/L- Negative Male for AMI  >=14 - Abnormal Female indicating possible myocardial injury.  >=22 - Abnormal Male indicating possible myocardial injury.   Clinicians would have to utilize clinical acumen, EKG, Troponin, and serial changes to determine if it is an Acute Myocardial Infarction or myocardial injury due to an underlying chronic condition.        CBC WITH AUTO DIFFERENTIAL - Abnormal; Notable for the following components:    RBC 1.79 (*)     Hemoglobin 5.2 (*)     Hematocrit 17.9 (*)     .0 (*)     MCHC 29.1 (*)     RDW 19.2 (*)     RDW-SD 68.6 (*)     Lymphocyte % 14.1 (*)     Monocyte % 14.6 (*)     Immature Grans % 1.5 (*)     Monocytes, Absolute 1.39 (*)     Immature Grans, Absolute 0.14 (*)     nRBC 0.5 (*)     All other components within normal limits   HIGH SENSITIVITIY TROPONIN T 1HR - Abnormal; Notable for the following components:    HS Troponin T 60 (*)     All other components within normal limits    Narrative:     High Sensitive Troponin T Reference Range:  <14.0 ng/L- Negative Female for AMI  <22.0 ng/L- Negative Male for AMI  >=14 - Abnormal Female indicating possible myocardial injury.  >=22 - Abnormal Male indicating possible myocardial injury.   Clinicians would have to utilize clinical acumen, EKG, Troponin, and serial changes to determine if it is an Acute Myocardial Infarction or myocardial injury due to an underlying chronic condition.        URINALYSIS, MICROSCOPIC ONLY - Abnormal; Notable for the following components:    RBC, UA 3-5 (*)     WBC, UA Too Numerous to Count (*)     Bacteria, UA 4+ (*)     All other components within normal limits   POCT OCCULT BLOOD STOOL (ED ONLY) - Abnormal; Notable for the following components:    Fecal Occult Blood Positive (*)     All other components within normal limits   LIPASE - Normal   BNP (IN-HOUSE)  - Normal    Narrative:     This assay is used as an aid in the diagnosis of individuals suspected of having heart failure. It can be used as an aid in the diagnosis of acute decompensated heart failure (ADHF) in patients presenting with signs and symptoms of ADHF to the emergency department (ED). In addition, NT-proBNP of <300 pg/mL indicates ADHF is not likely.    Age Range Result Interpretation  NT-proBNP Concentration (pg/mL:      <50             Positive            >450                   Gray                 300-450                    Negative             <300    50-75           Positive            >900                  Gray                300-900                  Negative            <300      >75             Positive            >1800                  Gray                300-1800                  Negative            <300   LACTIC ACID, PLASMA - Normal   RAINBOW DRAW    Narrative:     The following orders were created for panel order [place] Draw.  Procedure                               Abnormality         Status                     ---------                               -----------         ------                     Green Top (Gel)[[id number]]                                  Final result               Lavender Top[[id number]]                                     Final result               Gold Top - SST[[id number]]                                   Final result               Gray Top[[id number]]                                         Final result               Light Blue Top[[id number]]                                   Final result                 Please view results for these tests on the individual orders.   CBC WITH AUTO DIFFERENTIAL   COMPREHENSIVE METABOLIC PANEL   MAGNESIUM   PREPARE RBC   TYPE AND SCREEN   GREEN TOP   LAVENDER TOP   GOLD TOP - SST   GRAY TOP   LIGHT BLUE TOP   CBC AND DIFFERENTIAL    Narrative:     The following orders were created for panel order CBC & Differential.  Procedure                                Abnormality         Status                     ---------                               -----------         ------                     CBC Auto Differential[[id number]]        Abnormal            Final result                 Please view results for these tests on the individual orders.       Meds Given in ED:   Medications   sodium chloride 0.9 % flush 10 mL (has no administration in time range)   morphine injection 2 mg (has no administration in time range)   melatonin tablet 5 mg (has no administration in time range)   pantoprazole (PROTONIX) injection 40 mg (has no administration in time range)   HYDROcodone-acetaminophen (NORCO) 5-325 MG per tablet 1 tablet (has no administration in time range)   sodium chloride 0.9 % flush 10 mL (has no administration in time range)   sodium chloride 0.9 % flush 10 mL (has no administration in time range)   sodium chloride 0.9 % infusion 40 mL (has no administration in time range)   nitroglycerin (NITROSTAT) SL tablet 0.4 mg (has no administration in time range)   Potassium Replacement - Follow Nurse / BPA Driven Protocol (has no administration in time range)   Magnesium Standard Dose Replacement - Follow Nurse / BPA Driven Protocol (has no administration in time range)   Phosphorus Replacement - Follow Nurse / BPA Driven Protocol (has no administration in time range)   Calcium Replacement - Follow Nurse / BPA Driven Protocol (has no administration in time range)   acetaminophen (TYLENOL) tablet 650 mg (has no administration in time range)     Or   acetaminophen (TYLENOL) 160 MG/5ML oral solution 650 mg (has no administration in time range)     Or   acetaminophen (TYLENOL) suppository 650 mg (has no administration in time range)   sennosides-docusate (PERICOLACE) 8.6-50 MG per tablet 2 tablet (has no administration in time range)     And   polyethylene glycol (MIRALAX) packet 17 g (has no administration in time range)     And   bisacodyl (DULCOLAX) EC tablet 5  mg (has no administration in time range)     And   bisacodyl (DULCOLAX) suppository 10 mg (has no administration in time range)   ondansetron ODT (ZOFRAN-ODT) disintegrating tablet 4 mg (has no administration in time range)     Or   ondansetron (ZOFRAN) injection 4 mg (has no administration in time range)   aluminum-magnesium hydroxide-simethicone (MAALOX MAX) 400-400-40 MG/5ML suspension 30 mL (has no administration in time range)   pantoprazole (PROTONIX) injection 40 mg (40 mg Intravenous Given 3/24/25 0364)   iopamidol (ISOVUE-370) 76 % injection 66 mL (66 mL Intravenous Given 3/24/25 3605)           Last NIH score:                                                          Dysphagia screening results:        [place] Coma Scale:  No data recorded     CIWA:        Restraint Type:            Isolation Status:  No active isolations

## 2025-03-25 NOTE — CONSULTS
"          Clinical Nutrition Assessment   Patient Name: Val Nassar Jr.  YOB: 1960  MRN: [id number]  Date of Encounter: 03/25/25 11:24 EDT  Admission date: 3/24/2025  Reason for Visit: MST score 2+, Malnutrition Severity Assessment, Consult, Unintentional weight loss, Reduced oral intake    Assessment   Nutrition Assessment   Admission Diagnosis:  GI bleed [K92.2]    Problem List:    Anxiety associated with depression    GERD without esophagitis    Hyperlipidemia, unspecified    Squamous cell carcinoma of esophagus    GI bleed    A-fib    Cirrhosis of liver    BPH without obstruction/lower urinary tract symptoms      PMH:   He  has a past medical history of Anemia, Cancer, Cirrhosis, Duodenal ulcer, Gastric ulcer, GERD (gastroesophageal reflux disease), History of alcohol abuse, History of radiation therapy (05/08/2024), HLD (hyperlipidemia), and Mood disorder.    PSH:  He  has a past surgical history that includes Esophagogastroduodenoscopy (N/A, 12/26/2023); Venous Access Device (Port) (Right, 04/25/2024); and Esophagogastroduodenoscopy (N/A, 3/3/2025).    Applicable Nutrition History:   H/o alcohol abuse  Anxiety/depression  GERD  HLD  Afib  Cirrhosis  BPH  Squamous cell carcinoma of esophagus s/p chemo    Anthropometrics   Height: Height: 175.3 cm (69\")  Last Filed Weight: Weight: 73.6 kg (162 lb 4.1 oz) (03/25/25 0320)  Method: Weight Method: Bed scale  BMI: BMI (Calculated): 24    UBW:    Weight      Weight (kg) Weight (lbs) Weight Method   3/21/2024 73.936 kg  163 lb     3/28/2024 74.39 kg  164 lb     9/19/2024 77.565 kg  171 lb     12/18/2024 79.833 kg  176 lb     2/3/2025 83.008 kg  183 lb     2/11/2025 83 kg  182 lb 15.7 oz     2/14/2025 87.3 kg  192 lb 7.4 oz     2/15/2025 88.3 kg  194 lb 10.7 oz     2/16/2025 88.4 kg  194 lb 14.2 oz     2/27/2025 73.846 kg  162 lb 12.8 oz  Bed scale    2/28/2025 74.435 kg  164 lb 1.6 oz  Bed scale    3/1/2025 78.835 kg  173 lb 12.8 oz  Bed " "scale    3/2/2025 79.47 kg  175 lb 3.2 oz  Bed scale    3/3/2025 80.967 kg  178 lb 8 oz  Bed scale    3/10/2025 80.99 kg  178 lb 8.8 oz     3/11/2025 72.984 kg  160 lb 14.4 oz  Bed scale    3/14/2025 72.576 kg  160 lb  Stated    3/24/2025 72.576 kg  160 lb  Stated    3/25/2025 73.6 kg  162 lb 4.1 oz  Bed scale      Weight change:  UTD    Nutrition Focused Physical Exam    Date: 03/25/25     Unable to perform due to Pt unable to participate at time of visit     Subjective   Reported/Observed/Food/Nutrition Related History:   03/25/25  Patient screened per nutrition protocol for consult received for malnutrition assessment and MST2 or greater. Presented for increased fatigue, SOA, and abnormal labs. Noted to have anemia and GIB. Patient well known to nutrition services from previous extensive hospital stay on 2/11-3/12. Patient resting soundly at time of visit. Did not awaken to name being spoken. No chewing or swallowing difficulties noted.  Noted \"green bean\" allergy in EMR.    Current Nutrition Prescription   PO: Diet: Liquid; Clear Liquid; Fluid Consistency: Thin (IDDSI 0)  NPO Diet NPO Type: Sips with Meds  Oral Nutrition Supplement: n/a  Intake: B 100%    Assessment & Plan   Nutrition Diagnosis   Date: 03/25/25           Updated:    Problem No nutrition diagnosis at this time   Etiology    Signs/Symptoms    Status: New    Goal:   Nutrition to support treatment and Establish PO, Advance diet as medically feasible/appropriate    Nutrition Intervention      Follow treatment progress, Care plan reviewed, Interview for preferences, Encourage intake    Nutrition POC  Recommend diet advancement as able  Encourage adequate PO intake as able  Patient does not meet malnutrition criteria at this time  NFPE recommended, if appropriate, when able    Monitoring/Evaluation:   Per protocol, PO intake, Weight, GI status, Symptoms, POC/GOC    Mayi Omsan, MS,RD,LD  Time Spent: 25min  "

## 2025-03-25 NOTE — CONSULTS
[facility] [place] [facility]ology Consult    Referring Provider:  Jerrod Gage DO     PCP: Wesly Minor MD    Reason for Consultation: Dark stools     Chief complaint: Dark stools     History of present illness:    Val Nassar Jr. is a 64 y.o. male who is admitted with melena and normocytic anemia due to blood loss.   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  He had an extensive hospitalization last month for septic shock secondary to pseudomonas from a urinary source, respiratory decompensation requiring mechanical ventilation from 2/12-2/24, atrial fibrillation with RVR.   He underwent a recent EGD with Dr. Mckeon on 3/3/25 that showed esophageal angioectasia suspected due to previous radiation treated with argon beam coagulation, grade 1 small esophageal varices and portal hypertensive gastropathy.       H&H was 5.2 and 17.9 on arrival (previously 7.4 and 24 last week).       He is on anticoagulation with Eliquis 2.5 mg BID.  He has been referred for an EP consult for consideration of a Watchman device.        He states he continues to drink alcohol, currently 1 beer nightly at [facility].         He complains of suprapubic abdominal pain today, suspect related to worsening cystitis on CT abdomen.       Allergies:  Green beans    Scheduled Meds:  [Held by provider] apixaban, 2.5 mg, Oral, Q12H  carvedilol, 3.125 mg, Oral, BID With Meals  escitalopram, 10 mg, Oral, Nightly  gabapentin, 300 mg, Oral, Q8H  magnesium sulfate, 2 g, Intravenous, Q2H  melatonin, 5 mg, Oral, Nightly  [Held by provider] pantoprazole, 40 mg, Oral, BID  pantoprazole, 40 mg, Intravenous, BID AC  QUEtiapine, 25 mg, Oral, Daily   And  QUEtiapine, 50 mg, Oral, Nightly  riFAXIMin, 550 mg, Per G Tube, Q12H  rosuvastatin, 20 mg, Oral, Nightly  sodium chloride, 10 mL, Intravenous, Q12H  tamsulosin, 0.4 mg, Oral, Daily         Infusions:       PRN  Meds:    acetaminophen **OR** acetaminophen **OR** acetaminophen    senna-docusate sodium **AND** polyethylene glycol **AND** bisacodyl **AND** bisacodyl    Calcium Replacement - Follow Nurse / BPA Driven Protocol    HYDROcodone-acetaminophen    Magnesium Standard Dose Replacement - Follow Nurse / BPA Driven Protocol    Morphine    nitroglycerin    ondansetron ODT **OR** ondansetron    Phosphorus Replacement - Follow Nurse / BPA Driven Protocol    Potassium Replacement - Follow Nurse / BPA Driven Protocol    [COMPLETED] Insert Peripheral IV **AND** sodium chloride    sodium chloride    sodium chloride    Home Meds:  Medications Prior to Admission   Medication Sig Dispense Refill Last Dose/Taking    apixaban (ELIQUIS) 2.5 MG tablet tablet Take 1 tablet by mouth Every 12 (Twelve) Hours for 30 days. Indications: Atrial Fibrillation 60 tablet 0     Benzalkonium Chloride 0.12 % liquid Apply to gale area topically as needed for preventative use for Gale care after each incontinent episode and as needed.       carvedilol (COREG) 3.125 MG tablet Take 1 tablet by mouth 2 (Two) Times a Day With Meals. 60 tablet 0     Cholecalciferol (Vitamin D3) 1.25 MG (56317 UT) capsule Take 1 capsule by mouth Every 7 (Seven) Days. Patient takes on Fridays       dimethicone 1 % cream Apply to buttocks/Gale area topically as needed for preventative apply topically to buttocks/gale area.       Dimethicone 1.5 % cream Apply to body topically as needed for dry skin apply topically to dry skin on body every shift as needed.       docusate sodium (COLACE) 50 mg/5 mL liquid Take 10 mL by mouth 2 (Two) Times a Day.       dutasteride (AVODART) 0.5 MG capsule Take 1 capsule by mouth Daily.       escitalopram (LEXAPRO) 10 MG tablet Take 1 tablet by mouth Every Night.       ferrous gluconate (FERGON) 324 MG tablet Take 1 tablet by mouth Daily With Breakfast.       folic acid (FOLVITE) 1 MG tablet Take 1 tablet by mouth Daily.       gabapentin  (NEURONTIN) 300 MG capsule Take 1 capsule by mouth Every 8 (Eight) Hours for 3 days. 9 capsule 0     Lidocaine 4 % Place 1 patch on the skin as directed by provider Daily. Remove & Discard patch within 12 hours or as directed by MD       Loratadine 10 MG capsule Take 1 capsule by mouth Daily.       melatonin 5 MG tablet tablet Take 1 tablet by mouth Every Night.       pantoprazole (Protonix) 40 MG EC tablet Take 1 tablet by mouth 2 (Two) Times a Day.       QUEtiapine (SEROquel) 25 MG tablet Take 1 tablet by mouth Daily AND 2 tablets Every Night. (Patient taking differently: Take 1 tablet by mouth Daily and 2 tablets every night.)       riFAXIMin (XIFAXAN) 550 MG tablet Administer 1 tablet per G tube Every 12 (Twelve) Hours for 708 doses. Indications: Impaired Brain Function due to Liver Disease       rosuvastatin (CRESTOR) 20 MG tablet Take 1 tablet by mouth Every Night.       tamsulosin (FLOMAX) 0.4 MG capsule 24 hr capsule Take 1 capsule by mouth Daily.       vitamin B-12 (CYANOCOBALAMIN) 1000 MCG tablet Take 1 tablet by mouth Daily.       Zinc Oxide 10 % cream Apply to bilat buttocks and groin topically every shift for redness.          ROS: Review of Systems   Constitutional:  Positive for fatigue.   Respiratory: Negative.     Cardiovascular: Negative.    Gastrointestinal:  Positive for abdominal pain and blood in stool.   Genitourinary:  Positive for difficulty urinating.   Musculoskeletal: Negative.    Skin: Negative.    Neurological:  Positive for weakness.       PAST MED HX:  Past Medical History:   Diagnosis Date    Anemia     Cancer     ESOPHAGEAL    Cirrhosis     Duodenal ulcer     Gastric ulcer     GERD (gastroesophageal reflux disease)     History of alcohol abuse     History of radiation therapy 05/08/2024    esophagus    HLD (hyperlipidemia)     Mood disorder        PAST SURG HX:  Past Surgical History:   Procedure Laterality Date    ENDOSCOPY N/A 12/26/2023    Procedure: ESOPHAGOGASTRODUODENOSCOPY;   "Surgeon: Wesly Mckeon MD;  Location:  [facility];  Service: Gastroenterology;  Laterality: N/A;    ENDOSCOPY N/A 3/3/2025    Procedure: ESOPHAGOGASTRODUODENOSCOPY;  Surgeon: Wesly Mckeon MD;  Location:  [facility];  Service: Gastroenterology;  Laterality: N/A;  APC USED IN ESOPHAGUS.    VENOUS ACCESS DEVICE (PORT) INSERTION Right 04/25/2024       FAM HX:  Family History   Problem Relation Age of Onset    Cancer Mother         LUNG AND BREAST    Breast cancer Mother     Heart attack Father        SOC HX:  Social History     Socioeconomic History    Marital status: Single   Tobacco Use    Smoking status: Every Day     Current packs/day: 1.00     Average packs/day: 1 pack/day for 15.0 years (15.0 ttl pk-yrs)     Types: Cigarettes     Passive exposure: Current    Smokeless tobacco: Current   Vaping Use    Vaping status: Every Day    Substances: Nicotine, Flavoring    Devices: Disposable   Substance and Sexual Activity    Alcohol use: Yes     Alcohol/week: 4.0 standard drinks of alcohol     Types: 4 Cans of beer per week     Comment: pt states he drinks 3-4 beers a day a couple times a week    Drug use: Yes     Types: Hydrocodone    Sexual activity: Not Currently     Partners: Female       PHYSICAL EXAM  /72 (BP Location: Right arm, Patient Position: Lying)   Pulse 61   Temp 98 °F (36.7 °C) (Oral)   Resp 16   Ht 175.3 cm (69\")   Wt 73.6 kg (162 lb 4.1 oz)   SpO2 96%   BMI 23.96 kg/m²   Wt Readings from Last 3 Encounters:   03/25/25 73.6 kg (162 lb 4.1 oz)   03/14/25 72.6 kg (160 lb)   03/11/25 73 kg (160 lb 14.4 oz)   ,body mass index is 23.96 kg/m².  Physical Exam  Constitutional:       General: He is not in acute distress.     Appearance: He is not toxic-appearing.   Cardiovascular:      Rate and Rhythm: Normal rate and regular rhythm.   Pulmonary:      Effort: Pulmonary effort is normal.   Abdominal:      General: Bowel sounds are normal.      Palpations: Abdomen is soft.      Tenderness:  " in the suprapubic area There is no guarding or rebound.   Skin:     General: Skin is warm and dry.   Neurological:      Mental Status: He is alert and oriented to person, place, and time.   Psychiatric:         Behavior: Behavior normal.       Results Review:   I reviewed the patient's new clinical results.    Lab Results   Component Value Date    WBC 8.60 03/25/2025    HGB 7.9 (L) 03/25/2025    HGB 5.2 (C) 03/24/2025    HGB 7.4 (L) 03/17/2025    HCT 25.5 (L) 03/25/2025    MCV 92.4 03/25/2025     03/25/2025       Lab Results   Component Value Date    INR 1.84 (H) 03/14/2025    INR 1.13 (H) 03/01/2025    INR 1.13 (H) 02/17/2025       Lab Results   Component Value Date    GLUCOSE 82 03/25/2025    BUN 9 03/25/2025    CREATININE 0.68 (L) 03/25/2025    BCR 13.2 03/25/2025     03/25/2025    K 3.7 03/25/2025    CO2 25.0 03/25/2025    CALCIUM 8.2 (L) 03/25/2025    ALBUMIN 3.0 (L) 03/25/2025    ALKPHOS 88 03/25/2025    BILITOT 0.7 03/25/2025    BILIDIR 0.2 03/01/2025    ALT 11 03/25/2025    AST 15 03/25/2025     CT Abdomen/Pelvis with and without contrast:  Findings:  Lung Bases:     The visualized lung bases and lower mediastinal structures are unremarkable. Previously identified airspace disease has resolved.   Liver:  Surface nodularity of the liver is present consistent with cirrhosis. No significant ascites identified.. No focal lesions.   Biliary/Gallbladder:    The gallbladder is normal without evidence of radiopaque stones. The biliary tree is nondilated.   Spleen:  Spleen is normal in size and CT density.   Pancreas:    Pancreas is normal. There is no evidence of pancreatic mass or peripancreatic fluid.   Kidneys:    Kidneys are normal in size. There are no stones or hydronephrosis. There is an indeterminate partially cystic appearing hypodense lesion seen along the superior aspect of the left kidney measuring up to 2.9 x 3.2 cm x 1.9 cm in craniocaudal dimension (series 5 image 38). This may represent  a true lesion or sequela of previous pyelonephritis. No drainable collection identified.   Adrenals:    Adrenal glands are unremarkable.   Retroperitoneal/Lymph Nodes/Vasculature:    No retroperitoneal adenopathy is identified. No evidence of hemodynamically significant stenosis of the mesenteric vasculature. No evidence of thrombus. No evidence of active contrast extravasation. No definite contrast blush identified.   Gastrointestinal/Mesentery:    The bowel loops are non-dilated without wall thickening or mass. The appendix appears within normal limits. No evidence of obstruction. No free air. No significant stool burden. No evidence of hernia.   Bladder:    Circumferential bladder wall thickening is present which appears to have progressed as compared to the previous study. Lubin catheter has been removed.. Bladder diverticulum present extending left of midline which has increased in size as compared to the previous study likely related to increased urine within the bladder. No evidence of stones.   Genital:     Unremarkable  Bony Structures:     Visualized bony structures are consistent with the patient's age.   IMPRESSION:  Impression:  1.No acute intra-abdominal or intrapelvic process. No evidence of active contrast extravasation. No evidence of hemodynamically significant stenosis of the mesenteric vasculature.  2.Circumferential bladder wall thickening which appears to have progressed as compared to the previous study which may be related to cystitis or the sequela of chronic outlet obstruction. Bladder diverticulum present extending left of midline which has   increased in size as compared to the previous study likely related to increased urine within the bladder.  3.Indeterminate partially cystic appearing hypodense lesion seen along the superior aspect of the left kidney measuring up to 2.9 x 3.2 cm x 1.9 cm in craniocaudal dimension. This may represent a true lesion or sequela of previous pyelonephritis.    Scarring suspected. No drainable collection identified. Contrasted CT follow-up is recommended to evaluate for stability or resolution.  4.Ancillary findings as described above.    ASSESSMENTS/PLANS    Acute on chronic normocytic anemia   GI bleed with melena   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   Currently consumes 1 beer daily.   MELD-Na is 13.    Portal hypertensive gastropathy  Esophageal varices, small and grade 1   History of squamous cell carcinoma of the esophagus, s/p chemoradiation in May 2024.   Anticoagulation use, on Eliquis for Afib     Patient with recurrent melena and associated blood loss anemia.   He was transfused 2 units of PRBCs overnight.   He underwent a recent EGD three weeks ago that showed two esophageal angioectasias likely related to previous radiation, treated with argon beam coagulation,   small (< 5mm) esophageal varices and portal gastropathy.        He may have ongoing blood loss from portal gastropathy in the setting of Eliquis use.    However given his degree of anemia cannot exclude lower GI source including AVMs.      >> Recommend colonoscopy tomorrow, possible repeat EGD if unremarkable.  >> Split dose Moviprep to begin this afternoon  >> NPO at midnight  >> Eliquis has been held   >> Discontinue all alcohol use     I discussed the patient's findings and my recommendations with patient    FERNANDA Banda  03/25/25  08:51 EDT

## 2025-03-25 NOTE — CASE MANAGEMENT/SOCIAL WORK
Discharge Planning Assessment  [facility]     Patient Name: Val Nassar Jr.  MRN: [id number]  Today's Date: 3/25/2025    Admit Date: 3/24/2025    Plan: [facility]   Discharge Needs Assessment       Row Name 03/25/25 1128       Living Environment    People in Home alone    Current Living Arrangements extended [facility] facility  [facility]    Duration at Residence 15 months    Potentially Unsafe Housing Conditions none    In the past 12 months has the electric, gas, oil, or water company threatened to shut off services in your home? No    Primary Care Provided by homecare agency    Provides Primary Care For no one, unable/limited ability to care for self    Family Caregiver if Needed none    Able to Return to Prior Arrangements yes       Resource/Environmental Concerns    Resource/Environmental Concerns none    Transportation Concerns none       Transportation Needs    In the past 12 months, has lack of transportation kept you from medical appointments or from getting medications? no    In the past 12 months, has lack of transportation kept you from meetings, work, or from getting things needed for daily living? No       Food Insecurity    Within the past 12 months, you worried that your food would run out before you got the money to buy more. Never true    Within the past 12 months, the food you bought just didn't last and you didn't have money to get more. Never true       Transition Planning    Patient/Family Anticipates Transition to long-term care facility    Patient/Family Anticipated Services at Transition     Transportation Anticipated health plan transportation       Discharge Needs Assessment    Equipment Currently Used at Home wheelchair    Concerns to be Addressed discharge planning                   Discharge Plan       Row Name 03/25/25 1128       Plan    Plan [facility]    Patient/Family in Agreement with Plan yes    Plan Comments Met with patient at the bedside  to initiate discharge planning. Patient lives in long term care at [facility]. Patient is wheelchair bound at baseline and dependent with ADLs. Patient denies any baseline oxygen use. Patient has Medicare and [place] Medicaid with prescription benefits and prefers to fill scripts at [facility]. Patient's plan is to return to long term care bed at discharge. Patient will need stretcher transportation arranged. CM will continue to follow.    Final Discharge Disposition Code 04 - intermediate care facility                  Selected Continued Care - Prior Encounters Includes continued care and service providers with selected services from prior encounters from 12/24/2024 to 3/25/2025      Discharged on 3/17/2025 Admission date: 3/14/2025 - Discharge disposition: Intermediate Care       Destination       Service Provider Services Address Phone Fax Patient Preferred    [facility] [address] [facility] [address] [phone] --                      Discharged on 3/12/2025 Admission date: 2/11/2025 - Discharge disposition: Intermediate Care       Destination       Service Provider Services Address Phone Fax Patient Preferred    [facility] [address] [facility] [address] [phone] --                             Demographic Summary       Row Name 03/25/25 1127       General Information    Admission Type inpatient    Arrived From home    Referral Source physician    Reason for Consult discharge planning    Preferred Language English    General Information Comments PCP Wesly Albert       Contact Information    Permission Granted to Share Info With family/designee    Contact Information Comments [facility] (Other)  [phone] (Home Phone)                   Functional Status       Row Name 03/25/25 1127       Functional Status    Usual Activity Tolerance good    Current Activity Tolerance moderate       Functional  Status, IADL    Medications completely dependent    Meal Preparation completely dependent    Housekeeping completely dependent    Laundry completely dependent    Shopping completely dependent       Mental Status    General Appearance WDL WDL       Mental Status Summary    Recent Changes in Mental Status/Cognitive Functioning no changes       Employment/    Employment Status retired                   Psychosocial    No documentation.                  Abuse/Neglect    No documentation.                  Legal    No documentation.                  Substance Abuse    No documentation.                  Patient Forms    No documentation.                     Irene Fernandes RN

## 2025-03-26 ENCOUNTER — READMISSION MANAGEMENT (OUTPATIENT)
Dept: CALL CENTER | Facility: HOSPITAL | Age: 65
End: 2025-03-26
Payer: MEDICARE

## 2025-03-26 ENCOUNTER — ANESTHESIA EVENT (OUTPATIENT)
Dept: GASTROENTEROLOGY | Facility: HOSPITAL | Age: 65
End: 2025-03-26
Payer: MEDICARE

## 2025-03-26 ENCOUNTER — ANESTHESIA (OUTPATIENT)
Dept: GASTROENTEROLOGY | Facility: HOSPITAL | Age: 65
End: 2025-03-26
Payer: MEDICARE

## 2025-03-26 LAB
ANION GAP SERPL CALCULATED.3IONS-SCNC: 12 MMOL/L (ref 5–15)
BUN SERPL-MCNC: 5 MG/DL (ref 8–23)
BUN/CREAT SERPL: 9.8 (ref 7–25)
CALCIUM SPEC-SCNC: 8.4 MG/DL (ref 8.6–10.5)
CHLORIDE SERPL-SCNC: 107 MMOL/L (ref 98–107)
CO2 SERPL-SCNC: 23 MMOL/L (ref 22–29)
CREAT SERPL-MCNC: 0.51 MG/DL (ref 0.76–1.27)
DEPRECATED RDW RBC AUTO: 65.6 FL (ref 37–54)
EGFRCR SERPLBLD CKD-EPI 2021: 112.5 ML/MIN/1.73
ERYTHROCYTE [DISTWIDTH] IN BLOOD BY AUTOMATED COUNT: 20.2 % (ref 12.3–15.4)
GLUCOSE SERPL-MCNC: 85 MG/DL (ref 65–99)
HCT VFR BLD AUTO: 25.6 % (ref 37.5–51)
HGB BLD-MCNC: 7.8 G/DL (ref 13–17.7)
MAGNESIUM SERPL-MCNC: 2.2 MG/DL (ref 1.6–2.4)
MCH RBC QN AUTO: 28.5 PG (ref 26.6–33)
MCHC RBC AUTO-ENTMCNC: 30.5 G/DL (ref 31.5–35.7)
MCV RBC AUTO: 93.4 FL (ref 79–97)
PLATELET # BLD AUTO: 197 10*3/MM3 (ref 140–450)
PMV BLD AUTO: 8.6 FL (ref 6–12)
POTASSIUM SERPL-SCNC: 3.8 MMOL/L (ref 3.5–5.2)
RBC # BLD AUTO: 2.74 10*6/MM3 (ref 4.14–5.8)
SODIUM SERPL-SCNC: 142 MMOL/L (ref 136–145)
WBC NRBC COR # BLD AUTO: 7.74 10*3/MM3 (ref 3.4–10.8)

## 2025-03-26 PROCEDURE — 43270 EGD LESION ABLATION: CPT | Performed by: INTERNAL MEDICINE

## 2025-03-26 PROCEDURE — 25010000002 CEFEPIME PER 500 MG: Performed by: INTERNAL MEDICINE

## 2025-03-26 PROCEDURE — 0W3P8ZZ CONTROL BLEEDING IN GASTROINTESTINAL TRACT, VIA NATURAL OR ARTIFICIAL OPENING ENDOSCOPIC: ICD-10-PCS | Performed by: INTERNAL MEDICINE

## 2025-03-26 PROCEDURE — 25010000002 PROPOFOL 10 MG/ML EMULSION: Performed by: NURSE ANESTHETIST, CERTIFIED REGISTERED

## 2025-03-26 PROCEDURE — 80048 BASIC METABOLIC PNL TOTAL CA: CPT

## 2025-03-26 PROCEDURE — 25810000003 LACTATED RINGERS PER 1000 ML: Performed by: NURSE ANESTHETIST, CERTIFIED REGISTERED

## 2025-03-26 PROCEDURE — 25810000003 SODIUM CHLORIDE 0.9 % SOLUTION 1,000 ML FLEX CONT

## 2025-03-26 PROCEDURE — 25010000002 CEFEPIME PER 500 MG

## 2025-03-26 PROCEDURE — 83735 ASSAY OF MAGNESIUM: CPT

## 2025-03-26 PROCEDURE — 0DBK8ZZ EXCISION OF ASCENDING COLON, VIA NATURAL OR ARTIFICIAL OPENING ENDOSCOPIC: ICD-10-PCS | Performed by: INTERNAL MEDICINE

## 2025-03-26 PROCEDURE — 0D598ZZ DESTRUCTION OF DUODENUM, VIA NATURAL OR ARTIFICIAL OPENING ENDOSCOPIC: ICD-10-PCS | Performed by: INTERNAL MEDICINE

## 2025-03-26 PROCEDURE — 88305 TISSUE EXAM BY PATHOLOGIST: CPT | Performed by: INTERNAL MEDICINE

## 2025-03-26 PROCEDURE — 25010000002 MORPHINE PER 10 MG: Performed by: INTERNAL MEDICINE

## 2025-03-26 PROCEDURE — 0D568ZZ DESTRUCTION OF STOMACH, VIA NATURAL OR ARTIFICIAL OPENING ENDOSCOPIC: ICD-10-PCS | Performed by: INTERNAL MEDICINE

## 2025-03-26 PROCEDURE — 85027 COMPLETE CBC AUTOMATED: CPT

## 2025-03-26 PROCEDURE — 25010000002 MORPHINE PER 10 MG

## 2025-03-26 PROCEDURE — 45385 COLONOSCOPY W/LESION REMOVAL: CPT | Performed by: INTERNAL MEDICINE

## 2025-03-26 PROCEDURE — 99232 SBSQ HOSP IP/OBS MODERATE 35: CPT

## 2025-03-26 DEVICE — DEV CLIP ENDO RESOLUTION360 CONTRL ROT 235CM: Type: IMPLANTABLE DEVICE | Site: DUODENUM | Status: FUNCTIONAL

## 2025-03-26 RX ORDER — FENTANYL CITRATE 50 UG/ML
50 INJECTION, SOLUTION INTRAMUSCULAR; INTRAVENOUS
Status: DISCONTINUED | OUTPATIENT
Start: 2025-03-26 | End: 2025-03-26 | Stop reason: SDUPTHER

## 2025-03-26 RX ORDER — SODIUM CHLORIDE 0.9 % (FLUSH) 0.9 %
10 SYRINGE (ML) INJECTION EVERY 12 HOURS SCHEDULED
Status: DISCONTINUED | OUTPATIENT
Start: 2025-03-26 | End: 2025-03-26

## 2025-03-26 RX ORDER — BUPIVACAINE HCL/0.9 % NACL/PF 0.125 %
PLASTIC BAG, INJECTION (ML) EPIDURAL AS NEEDED
Status: DISCONTINUED | OUTPATIENT
Start: 2025-03-26 | End: 2025-03-26 | Stop reason: SURG

## 2025-03-26 RX ORDER — MIDAZOLAM HYDROCHLORIDE 1 MG/ML
1 INJECTION, SOLUTION INTRAMUSCULAR; INTRAVENOUS
Status: DISCONTINUED | OUTPATIENT
Start: 2025-03-26 | End: 2025-03-26

## 2025-03-26 RX ORDER — MIDAZOLAM HYDROCHLORIDE 1 MG/ML
0.5 INJECTION, SOLUTION INTRAMUSCULAR; INTRAVENOUS
Status: DISCONTINUED | OUTPATIENT
Start: 2025-03-26 | End: 2025-03-26

## 2025-03-26 RX ORDER — FAMOTIDINE 10 MG/ML
20 INJECTION, SOLUTION INTRAVENOUS ONCE
Status: COMPLETED | OUTPATIENT
Start: 2025-03-26 | End: 2025-03-26

## 2025-03-26 RX ORDER — LIDOCAINE HYDROCHLORIDE 10 MG/ML
0.5 INJECTION, SOLUTION EPIDURAL; INFILTRATION; INTRACAUDAL; PERINEURAL ONCE AS NEEDED
Status: DISCONTINUED | OUTPATIENT
Start: 2025-03-26 | End: 2025-03-26

## 2025-03-26 RX ORDER — FAMOTIDINE 20 MG/1
20 TABLET, FILM COATED ORAL ONCE
Status: DISCONTINUED | OUTPATIENT
Start: 2025-03-26 | End: 2025-03-26

## 2025-03-26 RX ORDER — ONDANSETRON 2 MG/ML
4 INJECTION INTRAMUSCULAR; INTRAVENOUS ONCE AS NEEDED
Status: DISCONTINUED | OUTPATIENT
Start: 2025-03-26 | End: 2025-03-26

## 2025-03-26 RX ORDER — SODIUM CHLORIDE 0.9 % (FLUSH) 0.9 %
10 SYRINGE (ML) INJECTION AS NEEDED
Status: DISCONTINUED | OUTPATIENT
Start: 2025-03-26 | End: 2025-03-26

## 2025-03-26 RX ORDER — HYDROMORPHONE HYDROCHLORIDE 1 MG/ML
0.5 INJECTION, SOLUTION INTRAMUSCULAR; INTRAVENOUS; SUBCUTANEOUS
Status: DISCONTINUED | OUTPATIENT
Start: 2025-03-26 | End: 2025-03-26 | Stop reason: SDUPTHER

## 2025-03-26 RX ORDER — SODIUM CHLORIDE, SODIUM LACTATE, POTASSIUM CHLORIDE, CALCIUM CHLORIDE 600; 310; 30; 20 MG/100ML; MG/100ML; MG/100ML; MG/100ML
INJECTION, SOLUTION INTRAVENOUS CONTINUOUS PRN
Status: DISCONTINUED | OUTPATIENT
Start: 2025-03-26 | End: 2025-03-26 | Stop reason: SURG

## 2025-03-26 RX ORDER — PROPOFOL 10 MG/ML
VIAL (ML) INTRAVENOUS AS NEEDED
Status: DISCONTINUED | OUTPATIENT
Start: 2025-03-26 | End: 2025-03-26 | Stop reason: SURG

## 2025-03-26 RX ORDER — HYDROMORPHONE HYDROCHLORIDE 1 MG/ML
0.5 INJECTION, SOLUTION INTRAMUSCULAR; INTRAVENOUS; SUBCUTANEOUS
Refills: 0 | Status: DISCONTINUED | OUTPATIENT
Start: 2025-03-26 | End: 2025-03-26

## 2025-03-26 RX ORDER — FENTANYL CITRATE 50 UG/ML
50 INJECTION, SOLUTION INTRAMUSCULAR; INTRAVENOUS
Status: DISCONTINUED | OUTPATIENT
Start: 2025-03-26 | End: 2025-03-26

## 2025-03-26 RX ORDER — MAGNESIUM CARB/ALUMINUM HYDROX 105-160MG
296 TABLET,CHEWABLE ORAL ONCE
Status: COMPLETED | OUTPATIENT
Start: 2025-03-26 | End: 2025-03-26

## 2025-03-26 RX ORDER — SODIUM CHLORIDE, SODIUM LACTATE, POTASSIUM CHLORIDE, CALCIUM CHLORIDE 600; 310; 30; 20 MG/100ML; MG/100ML; MG/100ML; MG/100ML
9 INJECTION, SOLUTION INTRAVENOUS CONTINUOUS
Status: DISCONTINUED | OUTPATIENT
Start: 2025-03-27 | End: 2025-03-27 | Stop reason: HOSPADM

## 2025-03-26 RX ADMIN — PROPOFOL 50 MG: 10 INJECTION, EMULSION INTRAVENOUS at 16:42

## 2025-03-26 RX ADMIN — HYDROCODONE BITARTRATE AND ACETAMINOPHEN 1 TABLET: 5; 325 TABLET ORAL at 21:29

## 2025-03-26 RX ADMIN — MORPHINE SULFATE 2 MG: 2 INJECTION, SOLUTION INTRAMUSCULAR; INTRAVENOUS at 03:41

## 2025-03-26 RX ADMIN — CARVEDILOL 3.12 MG: 3.12 TABLET, FILM COATED ORAL at 18:20

## 2025-03-26 RX ADMIN — MAGNESIUM CITRATE 296 ML: 1.75 LIQUID ORAL at 11:45

## 2025-03-26 RX ADMIN — FAMOTIDINE 20 MG: 10 INJECTION, SOLUTION INTRAVENOUS at 15:37

## 2025-03-26 RX ADMIN — Medication 10 ML: at 09:29

## 2025-03-26 RX ADMIN — SODIUM CHLORIDE 40 ML: 9 INJECTION, SOLUTION INTRAVENOUS at 15:38

## 2025-03-26 RX ADMIN — PROPOFOL 66 MCG/KG/MIN: 10 INJECTION, EMULSION INTRAVENOUS at 16:33

## 2025-03-26 RX ADMIN — ROSUVASTATIN CALCIUM 20 MG: 20 TABLET, FILM COATED ORAL at 21:23

## 2025-03-26 RX ADMIN — CEFEPIME HYDROCHLORIDE 1000 MG: 1 INJECTION, POWDER, FOR SOLUTION INTRAMUSCULAR; INTRAVENOUS at 11:16

## 2025-03-26 RX ADMIN — RIFAXIMIN 550 MG: 550 TABLET ORAL at 21:23

## 2025-03-26 RX ADMIN — GABAPENTIN 300 MG: 300 CAPSULE ORAL at 05:02

## 2025-03-26 RX ADMIN — LEVOTHYROXINE SODIUM 50 MCG: 0.05 TABLET ORAL at 05:02

## 2025-03-26 RX ADMIN — PEG-3350, SODIUM SULFATE, SODIUM CHLORIDE, POTASSIUM CHLORIDE, SODIUM ASCORBATE AND ASCORBIC ACID 1000 ML: KIT at 03:41

## 2025-03-26 RX ADMIN — PROPOFOL 50 MG: 10 INJECTION, EMULSION INTRAVENOUS at 16:35

## 2025-03-26 RX ADMIN — PROPOFOL 50 MG: 10 INJECTION, EMULSION INTRAVENOUS at 16:34

## 2025-03-26 RX ADMIN — SODIUM CHLORIDE, POTASSIUM CHLORIDE, SODIUM LACTATE AND CALCIUM CHLORIDE: 600; 310; 30; 20 INJECTION, SOLUTION INTRAVENOUS at 16:33

## 2025-03-26 RX ADMIN — PANTOPRAZOLE SODIUM 40 MG: 40 INJECTION, POWDER, FOR SOLUTION INTRAVENOUS at 05:02

## 2025-03-26 RX ADMIN — ESCITALOPRAM OXALATE 10 MG: 10 TABLET ORAL at 21:22

## 2025-03-26 RX ADMIN — Medication 5 MG: at 21:23

## 2025-03-26 RX ADMIN — MORPHINE SULFATE 2 MG: 2 INJECTION, SOLUTION INTRAMUSCULAR; INTRAVENOUS at 23:21

## 2025-03-26 RX ADMIN — GABAPENTIN 300 MG: 300 CAPSULE ORAL at 21:23

## 2025-03-26 RX ADMIN — Medication 100 MCG: at 17:07

## 2025-03-26 RX ADMIN — Medication 100 MCG: at 17:02

## 2025-03-26 RX ADMIN — CEFEPIME HYDROCHLORIDE 1000 MG: 1 INJECTION, POWDER, FOR SOLUTION INTRAMUSCULAR; INTRAVENOUS at 04:09

## 2025-03-26 RX ADMIN — ACETAMINOPHEN 650 MG: 325 TABLET, FILM COATED ORAL at 11:16

## 2025-03-26 RX ADMIN — CEFEPIME HYDROCHLORIDE 1000 MG: 1 INJECTION, POWDER, FOR SOLUTION INTRAMUSCULAR; INTRAVENOUS at 21:23

## 2025-03-26 RX ADMIN — QUETIAPINE FUMARATE 50 MG: 25 TABLET ORAL at 21:22

## 2025-03-26 RX ADMIN — MORPHINE SULFATE 2 MG: 2 INJECTION, SOLUTION INTRAMUSCULAR; INTRAVENOUS at 09:29

## 2025-03-26 RX ADMIN — PROPOFOL 50 MG: 10 INJECTION, EMULSION INTRAVENOUS at 16:33

## 2025-03-26 NOTE — PROGRESS NOTES
[facility]  PROGRESS NOTE    Patient Name: Val Nassar Jr.  : 1960  MRN: [id number]    Date of Admission: 3/24/2025  Primary Care Physician: Wesly Minor MD    Subjective   Subjective     CC:  Symptomatic anemia    HPI:  No significant overnight events, patient doing well this a.m., no active complaints otherwise.  Did have a bowel prep overnight, plan for EGD and colonoscopy today.  Remains NPO.  Hemoglobin has been stable      Objective   Objective     Vital Signs:   Temp:  [97 °F (36.1 °C)-98.2 °F (36.8 °C)] 97.9 °F (36.6 °C)  Heart Rate:  [64-84] 67  Resp:  [16-18] 18  BP: ()/(53-71) 129/71     Physical Exam:  Constitutional: No acute distress, awake, alert  HENT: NCAT, mucous membranes moist  Respiratory: Clear to auscultation bilaterally, respiratory effort normal   Cardiovascular: RRR, no murmurs, rubs, or gallops  Gastrointestinal: Positive bowel sounds, soft, nontender, nondistended  Musculoskeletal: No bilateral ankle edema  Psychiatric: Appropriate affect, cooperative  Neurologic: Oriented x 3, strength symmetric in all extremities, Cranial Nerves grossly intact to confrontation, speech clear  Skin: No rashes     Results Reviewed:  LAB RESULTS:      Lab 25  0616 25  1428 25  1030 25  0720 25  1657 25  1458   WBC 7.74  --   --  8.60  --  9.49   HEMOGLOBIN 7.8*  --   --  7.9*  --  5.2*   HEMATOCRIT 25.6*  --   --  25.5*  --  17.9*   PLATELETS 197  --   --  203  --  263   NEUTROS ABS  --   --   --  6.02  --  6.42   IMMATURE GRANS (ABS)  --   --   --  0.07*  --  0.14*   LYMPHS ABS  --   --   --  1.11  --  1.34   MONOS ABS  --   --   --  1.24*  --  1.39*   EOS ABS  --   --   --  0.13  --  0.17   MCV 93.4  --   --  92.4  --  100.0*   SED RATE  --   --   --  40*  --   --    CRP  --   --   --  0.95*  --   --    PROCALCITONIN  --   --   --  0.24  --   --    LACTATE  --  1.1  --   --   --  1.6    PROTIME  --   --  14.7*  --   --   --    HSTROP T  --   --   --   --  60* 70*         Lab 03/26/25  0616 03/25/25  0720 03/24/25  1458   SODIUM 142 138 138   POTASSIUM 3.8 3.7 4.3   CHLORIDE 107 105 103   CO2 23.0 25.0 24.0   ANION GAP 12.0 8.0 11.0   BUN 5* 9 11   CREATININE 0.51* 0.68* 0.84   EGFR 112.5 103.2 96.8   GLUCOSE 85 82 97   CALCIUM 8.4* 8.2* 8.7   MAGNESIUM 2.2 1.5*  --    HEMOGLOBIN A1C  --  4.70*  --    TSH  --  59.700*  --          Lab 03/25/25  0720 03/24/25  1458   TOTAL PROTEIN 5.6* 6.7   ALBUMIN 3.0* 3.0*   GLOBULIN 2.6 3.7   ALT (SGPT) 11 14   AST (SGOT) 15 31   BILIRUBIN 0.7 0.2   ALK PHOS 88 95   LIPASE  --  57         Lab 03/25/25  1030 03/24/25  1657 03/24/25  1458   PROBNP  --   --  349.9   HSTROP T  --  60* 70*   PROTIME 14.7*  --   --    INR 1.14*  --   --          Lab 03/25/25  0720   CHOLESTEROL 103   LDL CHOL 33   HDL CHOL 56   TRIGLYCERIDES 62         Lab 03/25/25  0720 03/24/25  1657   IRON 93  --    IRON SATURATION (TSAT) 24  --    TIBC 383  --    TRANSFERRIN 257  --    FERRITIN 45.72  --    ABO TYPING  --  A   RH TYPING  --  Positive   ANTIBODY SCREEN  --  Negative         Brief Urine Lab Results  (Last result in the past 365 days)        Color   Clarity   Blood   Leuk Est   Nitrite   Protein   CREAT   Urine HCG        03/24/25 1636 Yellow   Turbid   Small (1+)   Large (3+)   Negative   30 mg/dL (1+)                   Microbiology Results Abnormal       None            CT Abdomen Pelvis With & Without Contrast  Result Date: 3/24/2025  CT ABDOMEN PELVIS W WO CONTRAST Date of Exam: 3/24/2025 11:11 PM EDT Indication: Abdominal pain, hx multiple GI bleed, hx previous esophageal carcinoma, cirrhosis. Comparison: 3/5/2025, 2/11/2025. Technique: Axial CT images were obtained of the abdomen and pelvis before and after the uneventful intravenous administration intravenous contrast. Sagittal and coronal reconstructions were performed.  Automated exposure control and iterative  reconstruction methods were used. Findings: Lung Bases:   The visualized lung bases and lower mediastinal structures are unremarkable. Previously identified airspace disease has resolved. Liver: Surface nodularity of the liver is present consistent with cirrhosis. No significant ascites identified.. No focal lesions. Biliary/Gallbladder:  The gallbladder is normal without evidence of radiopaque stones. The biliary tree is nondilated. Spleen: Spleen is normal in size and CT density. Pancreas:  Pancreas is normal. There is no evidence of pancreatic mass or peripancreatic fluid. Kidneys:  Kidneys are normal in size. There are no stones or hydronephrosis. There is an indeterminate partially cystic appearing hypodense lesion seen along the superior aspect of the left kidney measuring up to 2.9 x 3.2 cm x 1.9 cm in craniocaudal dimension (series 5 image 38). This may represent a true lesion or sequela of previous pyelonephritis. No drainable collection identified. Adrenals:  Adrenal glands are unremarkable. Retroperitoneal/Lymph Nodes/Vasculature:  No retroperitoneal adenopathy is identified. No evidence of hemodynamically significant stenosis of the mesenteric vasculature. No evidence of thrombus. No evidence of active contrast extravasation. No definite contrast blush identified. Gastrointestinal/Mesentery:  The bowel loops are non-dilated without wall thickening or mass. The appendix appears within normal limits. No evidence of obstruction. No free air. No significant stool burden. No evidence of hernia. Bladder:  Circumferential bladder wall thickening is present which appears to have progressed as compared to the previous study. Lubin catheter has been removed.. Bladder diverticulum present extending left of midline which has increased in size as compared to the  previous study likely related to increased urine within the bladder. No evidence of stones. Genital:   Unremarkable       Bony Structures:   Visualized bony  structures are consistent with the patient's age.     Impression: Impression: 1.No acute intra-abdominal or intrapelvic process. No evidence of active contrast extravasation. No evidence of hemodynamically significant stenosis of the mesenteric vasculature. 2.Circumferential bladder wall thickening which appears to have progressed as compared to the previous study which may be related to cystitis or the sequela of chronic outlet obstruction. Bladder diverticulum present extending left of midline which has increased in size as compared to the previous study likely related to increased urine within the bladder. 3.Indeterminate partially cystic appearing hypodense lesion seen along the superior aspect of the left kidney measuring up to 2.9 x 3.2 cm x 1.9 cm in craniocaudal dimension. This may represent a true lesion or sequela of previous pyelonephritis. Scarring suspected. No drainable collection identified. Contrasted CT follow-up is recommended to evaluate for stability or resolution. 4.Ancillary findings as described above. Electronically Signed: Natalie Abdi MD  3/24/2025 11:47 PM EDT  Workstation ID: [id number]    XR Chest 1 View  Result Date: 3/24/2025  XR CHEST 1 VW Date of Exam: 3/24/2025 3:43 PM EDT Indication: gi bleed Comparison: Chest x-ray 3/6/2025 Findings: Redemonstration of right chest port. Endotracheal tube and enteric feeding tube have been removed. Stable cardiomediastinal silhouette. Similar diffuse interstitial prominence likely reflecting interstitial edema. No pleural effusion or pneumothorax.     Impression: Impression: Redemonstration of right chest port. Endotracheal tube and enteric feeding tube have been removed. Stable cardiomediastinal silhouette. Similar diffuse interstitial prominence likely reflecting interstitial edema. No pleural effusion or pneumothorax. Electronically Signed: Nikita Garrison MD  3/24/2025 4:40 PM EDT  Workstation ID: [id number]      Results for orders placed during  the hospital encounter of 02/11/25    Adult Transthoracic Echo Complete w/ Color, Spectral and Contrast if Necessary Per Protocol    Interpretation Summary    Left ventricular systolic function is normal. Estimated left ventricular EF = 60%    Left ventricular wall thickness is consistent with mild concentric hypertrophy.    Mild aortic valve stenosis is present.    Aortic valve maximum pressure gradient is 23 mmHg. Aortic valve mean pressure gradient is 14 mmHg.      Current medications:  Scheduled Meds:[Held by provider] apixaban, 2.5 mg, Oral, Q12H  carvedilol, 3.125 mg, Oral, BID With Meals  cefepime, 1,000 mg, Intravenous, Q8H  escitalopram, 10 mg, Oral, Nightly  finasteride, 5 mg, Oral, Daily  folic acid, 1 mg, Oral, Daily  gabapentin, 300 mg, Oral, Q8H  levothyroxine, 50 mcg, Oral, Q AM  melatonin, 5 mg, Oral, Nightly  [Held by provider] pantoprazole, 40 mg, Oral, BID  pantoprazole, 40 mg, Intravenous, BID AC  QUEtiapine, 25 mg, Oral, Daily   And  QUEtiapine, 50 mg, Oral, Nightly  riFAXIMin, 550 mg, Oral, Q12H  rosuvastatin, 20 mg, Oral, Nightly  sodium chloride, 10 mL, Intravenous, Q12H  tamsulosin, 0.4 mg, Oral, Daily      Continuous Infusions:   PRN Meds:.  acetaminophen **OR** acetaminophen **OR** acetaminophen    senna-docusate sodium **AND** polyethylene glycol **AND** bisacodyl **AND** bisacodyl    Calcium Replacement - Follow Nurse / BPA Driven Protocol    fentanyl    HYDROcodone-acetaminophen    HYDROmorphone    lactated ringers    Magnesium Standard Dose Replacement - Follow Nurse / BPA Driven Protocol    Morphine    nitroglycerin    ondansetron ODT **OR** ondansetron    ondansetron    Phosphorus Replacement - Follow Nurse / BPA Driven Protocol    Potassium Replacement - Follow Nurse / BPA Driven Protocol    [COMPLETED] Insert Peripheral IV **AND** sodium chloride    sodium chloride    sodium chloride    Assessment & Plan   Assessment & Plan     Active Hospital Problems    Diagnosis  POA    GI bleed  [K92.2]  Yes    BPH without obstruction/lower urinary tract symptoms [N40.0]  Yes    Cirrhosis of liver [K74.60]  Yes    A-fib [I48.91]  Yes    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        Brief Hospital Course to date:  Val Nassar Jr. is a 64 y.o. male that presented from local nursing home with abnormal labs, patient found to be anemic and history of multiple previous GI bleeds. Patient with complaints of upper abdominal pain during my exam, plans are for follow up CT abd/pelvis. Plans for GI evaluation in the AM. Patient reports having some nausea but no vomiting. He has issues frequently with dark stools and previous history of SCC esophageal cancer. Patient does report slightly more SOA than normal.  GI consulted.  Patient currently on IV PPI twice daily, also endorsing dysuria, chronic cath, will initiate antibiotics.    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    GI on board, plan for colonoscopy tomorrow and possible repeat EGD  N.p.o. 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  Continue home iron supplement, LACIE on iron studies    Acute UTI  Patient has a history of chronic In-N-Out cathing  Is endorsing dysuria, UA suggestive of UTI with dysuria symptoms  Previous urine cultures April 11, 2025 positive for Pseudomonas  Will  check urine cultures, pending, Lubin placed  Initiate cefepime for total 7 days    Hypothyroidism  TSH 59.7, free T40.45, not on any medication at home  Will initiate levothyroxine 50 mcg (ideally according to body weight needs to be on 116 mcg daily), will opt for lower dose as patient has a history of CAD per cath in 2018 which showed moderate disease  Not particularly symptomatic, not in myxedema coma at this time  Will need to recheck this in 6 to 8 weeks    Afib  Elevated troponin   Hold patient's home Eliquis, likely will need to stop, seen by cardiology previously, may consider consult again while admitted   Continue home Coreg   Was being considered for possible watchman outpatient   Troponin elevated at 70 on admission, repeat 60, likely stress induced from the above      Etoh Cirrhosis of liver  MELD sodium 13  rifaximin BID  lactulose previously refused by patient  Ammonia WNL     BPH  tamlusion   I&O cath as needed at home  Lubin placed during admission      Depression/anxiety  Continue Lexapro, Seroquel from home   Qtc reviewed       Chronic low back pain  Gabapentin      Expected Discharge Location and Transportation: TBD  Expected Discharge TBD  Expected discharge date/ time has not been documented.     VTE Prophylaxis:  Pharmacologic & mechanical VTE prophylaxis orders are present.    Total time spent: Time Spent: Time Spent: 40 minutes  Time spent includes time reviewing chart, face-to-face time, counseling patient/family/caregiver, ordering medications/tests/procedures, communicating with other health care professionals, documenting clinical information in the electronic health record, and coordination of care.        AM-PAC 6 Clicks Score (PT): 14 (03/26/25 4664)    CODE STATUS:   Code Status and Medical Interventions: CPR (Attempt to Resuscitate); Full Support   Ordered at: 03/24/25 5022     Code Status (Patient has no pulse and is not breathing):    CPR (Attempt to Resuscitate)     Medical  Interventions (Patient has pulse or is breathing):    Full Support     Level Of Support Discussed With:    Patient       Meredith Matias MD  03/26/25

## 2025-03-26 NOTE — ANESTHESIA PREPROCEDURE EVALUATION
Anesthesia Evaluation     Patient summary reviewed and Nursing notes reviewed   NPO Solid Status: > 8 hours  NPO Liquid Status: > 8 hours           Airway   Mallampati: I  TM distance: >3 FB  Neck ROM: full  No difficulty expected  Dental      Pulmonary     breath sounds clear to auscultation  Cardiovascular     ECG reviewed  Rhythm: regular  Rate: normal    (+) valvular problems/murmurs AS, dysrhythmias Atrial Fib, hyperlipidemia    ROS comment: Mild AS    Neuro/Psych  (+) psychiatric history Anxiety and Depression  GI/Hepatic/Renal/Endo    (+) GERD, PUD, GI bleeding , liver disease, renal disease-    Musculoskeletal     Abdominal    Substance History   (+) alcohol use     OB/GYN          Other      history of cancer                Anesthesia Plan    ASA 3     general     intravenous induction     Anesthetic plan, risks, benefits, and alternatives have been provided, discussed and informed consent has been obtained with: patient.    Plan discussed with CRNA.    CODE STATUS:    Code Status (Patient has no pulse and is not breathing): CPR (Attempt to Resuscitate)  Medical Interventions (Patient has pulse or is breathing): Full Support  Level Of Support Discussed With: Patient

## 2025-03-26 NOTE — BRIEF OP NOTE
COLONOSCOPY, ESOPHAGOGASTRODUODENOSCOPY  Progress Note    Val Nassar Jr.  3/26/2025    Colonoscopy shows left-sided diverticulosis.  In the proximal ascending colon there is a 2 mm actively-bleeding lesion, suspected diminutive polyp versus AVM.  The lesion was resected with cold snare, and 1 Endo Clip applied for hemostasis.    EGD shows Candida esophagitis.  There are a few AVMs in the stomach, thermally ablated with bipolar coagulation for tissue destruction.  Multiple AVMs are seen in the duodenal bulb, second, and third portions of the duodenum.  AVMs were thermally ablated with bipolar coagulation.  1 AVM in the third portion duodenum required 2 endoclips for hemostasis.    >> Diflucan.    Mark I. Brunner, MD     Date: 3/26/2025  Time: 17:29 EDT

## 2025-03-26 NOTE — PROGRESS NOTES
Malnutrition Severity Assessment    Patient Name:  Val Nassar Jr.  YOB: 1960  MRN: [id number]  Admit Date:  3/24/2025    Patient meets criteria for : Severe Malnutrition    Malnutrition Severity Assessment  Malnutrition Type: Acute Disease or Injury - Related Malnutrition  Malnutrition Type (Last 8 Hours)       Malnutrition Severity Assessment       Row Name 03/26/25 1405       Malnutrition Severity Assessment    Malnutrition Type Acute Disease or Injury - Related Malnutrition      Row Name 03/26/25 1405       Insufficient Energy Intake     Insufficient Energy Intake Findings Severe    Insufficient Energy Intake  <75% of est. energy requirement for >7d)      Row Name 03/26/25 1405       Muscle Loss    Loss of Muscle Mass Findings Moderate    [place] Region Moderate - slight depression    Clavicle Bone Region Moderate - some protrusion in females, visible in males    Acromion Bone Region Moderate - acromion may slightly protrude    Dorsal Hand Region Moderate - slight depression      Row Name 03/26/25 1405       Fat Loss    Subcutaneous Fat Loss Findings Moderate    Orbital Region  Moderate -  somewhat hollowness, slightly dark circles    Upper Arm Region Moderate - some fat tissue, not ample      Row Name 03/26/25 1405       Declining Functional Status    Declining Functional Status Findings N/A      Row Name 03/26/25 1405       Criteria Met (Must meet criteria for severity in at least 2 of these categories: M Wasting, Fat Loss, Fluid, Secondary Signs, Wt. Status, Intake)    Patient meets criteria for  Severe Malnutrition                    Electronically signed by:  Mayi Osman MS,RD,LD  03/26/25 14:05 EDT

## 2025-03-26 NOTE — PROGRESS NOTES
"          Clinical Nutrition Assessment   Patient Name: Val Nassar Jr.  YOB: 1960  MRN: [id number]  Date of Encounter: 03/26/25 13:46 EDT  Admission date: 3/24/2025  Reason for Visit: Follow-up protocol    Assessment   Nutrition Assessment   Admission Diagnosis:  GI bleed [K92.2]    Problem List:    Anxiety associated with depression    GERD without esophagitis    Hyperlipidemia, unspecified    Squamous cell carcinoma of esophagus    GI bleed    A-fib    Cirrhosis of liver    BPH without obstruction/lower urinary tract symptoms      PMH:   He  has a past medical history of Anemia, Cancer, Cirrhosis, Duodenal ulcer, Gastric ulcer, GERD (gastroesophageal reflux disease), History of alcohol abuse, History of radiation therapy (05/08/2024), HLD (hyperlipidemia), and Mood disorder.    PSH:  He  has a past surgical history that includes Esophagogastroduodenoscopy (N/A, 12/26/2023); Venous Access Device (Port) (Right, 04/25/2024); and Esophagogastroduodenoscopy (N/A, 3/3/2025).    Applicable Nutrition History:   H/o alcohol abuse  Anxiety/depression  GERD  HLD  Afib  Cirrhosis  BPH  Squamous cell carcinoma of esophagus s/p chemo    Anthropometrics   Height: Height: 175.3 cm (69\")  Last Filed Weight: Weight: 73.6 kg (162 lb 4.1 oz) (03/25/25 0320)  Method: Weight Method: Bed scale  BMI: BMI (Calculated): 24    UBW:    Weight      Weight (kg) Weight (lbs) Weight Method   3/21/2024 73.936 kg  163 lb     3/28/2024 74.39 kg  164 lb     9/19/2024 77.565 kg  171 lb     12/18/2024 79.833 kg  176 lb     2/3/2025 83.008 kg  183 lb     2/11/2025 83 kg  182 lb 15.7 oz     2/14/2025 87.3 kg  192 lb 7.4 oz     2/15/2025 88.3 kg  194 lb 10.7 oz     2/16/2025 88.4 kg  194 lb 14.2 oz     2/27/2025 73.846 kg  162 lb 12.8 oz  Bed scale    2/28/2025 74.435 kg  164 lb 1.6 oz  Bed scale    3/1/2025 78.835 kg  173 lb 12.8 oz  Bed scale    3/2/2025 79.47 kg  175 lb 3.2 oz  Bed scale    3/3/2025 80.967 kg  178 lb 8 " "oz  Bed scale    3/10/2025 80.99 kg  178 lb 8.8 oz     3/11/2025 72.984 kg  160 lb 14.4 oz  Bed scale    3/14/2025 72.576 kg  160 lb  Stated    3/24/2025 72.576 kg  160 lb  Stated    3/25/2025 73.6 kg  162 lb 4.1 oz  Bed scale      Weight change:  UTD    Nutrition Focused Physical Exam    Date: 03/26/25     Patient meets criteria for malnutrition diagnosis, see MSA note.     Subjective   Reported/Observed/Food/Nutrition Related History:   03/26/25  Patient reported eating well PTA. Stated he was receiving three meals/day at facility and was eating at least 50% or more each tray. Believes he has lost around 30# (unsure of time frame). Reported not eating at all when he had PNA last week. Declined chewing or swallowing difficulties. Confirmed green bean allergy.    03/25/25  Patient screened per nutrition protocol for consult received for malnutrition assessment and MST2 or greater. Presented for increased fatigue, SOA, and abnormal labs. Noted to have anemia and GIB. Patient well known to nutrition services from previous extensive hospital stay on 2/11-3/12. Patient resting soundly at time of visit. Did not awaken to name being spoken. No chewing or swallowing difficulties noted.  Noted \"green bean\" allergy in EMR.    Current Nutrition Prescription   PO: NPO Diet NPO Type: Sips with Meds  Oral Nutrition Supplement: n/a  Intake: NPO for colonoscopy    Assessment & Plan   Nutrition Diagnosis   Date:  03/26/25 Updated:  Problem Malnutrition, acute severe   Etiology Inability to consume adequate energy 2/2 recent illness   Signs/Symptoms <75% of EEN x>7d, moderate muscle wasting, and moderate subcutaneous fat loss   Status: New    Goal:   Nutrition to support treatment and Establish PO, Advance diet as medically feasible/appropriate    Nutrition Intervention      Follow treatment progress, Care plan reviewed, Interview for preferences, Encourage intake    Nutrition POC  Recommend diet advancement as able  Encourage " adequate PO intake as able  Patient meets malnutrition criteria, see MSA for full assessment    Monitoring/Evaluation:   Per protocol, PO intake, Weight, GI status, Symptoms, POC/GOC    Mayi Osman MS,RD,LD  Time Spent: 25min

## 2025-03-26 NOTE — ANESTHESIA POSTPROCEDURE EVALUATION
Patient: Val Nassar Jr.    Procedure Summary       Date: 03/26/25 Room / Location:  [facility] /  [facility]    Anesthesia Start: 1630 Anesthesia Stop: 1715    Procedures:       COLONOSCOPY      ESOPHAGOGASTRODUODENOSCOPY Diagnosis:       Gastrointestinal hemorrhage with melena      (Gastrointestinal hemorrhage with melena [K92.1])    Surgeons: Brunner, Mark I, MD Provider: Arnold Hankins MD    Anesthesia Type: general ASA Status: 3            Anesthesia Type: general    Vitals  Vitals Value Taken Time   BP     Temp     Pulse     Resp     SpO2 99 % 03/26/25 17:15           Post Anesthesia Care and Evaluation    Patient location during evaluation: PACU  Patient participation: complete - patient participated  Level of consciousness: awake and alert  Pain management: adequate    Airway patency: patent  Anesthetic complications: No anesthetic complications  PONV Status: none  Cardiovascular status: hemodynamically stable and acceptable  Respiratory status: nonlabored ventilation, acceptable and nasal cannula  Hydration status: acceptable

## 2025-03-26 NOTE — CASE MANAGEMENT/SOCIAL WORK
Continued Stay Note  [facility]     Patient Name: Val Nassar Jr.  MRN: [id number]  Today's Date: 3/26/2025    Admit Date: 3/24/2025    Plan: LTC at [facility]   Discharge Plan       Row Name 03/26/25 1109       Plan    Plan LTC at [facility]    Patient/Family in Agreement with Plan yes    Plan Comments Met with patient at the bedside to discuss discharge plan. Patient's plan is to return to [facility] at discharge. Patient will need stretcher transportation arranged. Per MDR, patient is not medically ready for discharge today. Endoscopy planned. CM will continue to follow.    Final Discharge Disposition Code 04 - intermediate care facility                   Discharge Codes    No documentation.                       Magnus Fernandes RN

## 2025-03-26 NOTE — PLAN OF CARE
Goal Outcome Evaluation:  Plan of Care Reviewed With: patient        Progress: no change     Pt is alert and oriented x4. VSS on room air. NSR on tele. Pt has slept off and on throughout the shift. PRN morphine given as ordered for pain. Isotour bed in place and turned q2hrs. SCDs in place. NPO since midnight. 2nd dose of Movi-prep administered this am as ordered. Pt up to BSC with multiple liquid Bms. Anticipating colonoscopy/EGD today.

## 2025-03-27 ENCOUNTER — APPOINTMENT (OUTPATIENT)
Facility: HOSPITAL | Age: 65
End: 2025-03-27
Payer: MEDICARE

## 2025-03-27 VITALS
BODY MASS INDEX: 24.03 KG/M2 | DIASTOLIC BLOOD PRESSURE: 79 MMHG | HEIGHT: 69 IN | OXYGEN SATURATION: 95 % | TEMPERATURE: 99.2 F | RESPIRATION RATE: 16 BRPM | HEART RATE: 62 BPM | WEIGHT: 162.26 LBS | SYSTOLIC BLOOD PRESSURE: 136 MMHG

## 2025-03-27 LAB
ANION GAP SERPL CALCULATED.3IONS-SCNC: 8 MMOL/L (ref 5–15)
BACTERIA SPEC AEROBE CULT: ABNORMAL
BUN SERPL-MCNC: 7 MG/DL (ref 8–23)
BUN/CREAT SERPL: 10.3 (ref 7–25)
CALCIUM SPEC-SCNC: 8.8 MG/DL (ref 8.6–10.5)
CHLORIDE SERPL-SCNC: 106 MMOL/L (ref 98–107)
CO2 SERPL-SCNC: 22 MMOL/L (ref 22–29)
CREAT SERPL-MCNC: 0.68 MG/DL (ref 0.76–1.27)
DEPRECATED RDW RBC AUTO: 66.8 FL (ref 37–54)
EGFRCR SERPLBLD CKD-EPI 2021: 103.2 ML/MIN/1.73
ERYTHROCYTE [DISTWIDTH] IN BLOOD BY AUTOMATED COUNT: 19.1 % (ref 12.3–15.4)
GLUCOSE SERPL-MCNC: 115 MG/DL (ref 65–99)
HCT VFR BLD AUTO: 28.5 % (ref 37.5–51)
HGB BLD-MCNC: 8.6 G/DL (ref 13–17.7)
MAGNESIUM SERPL-MCNC: 1.7 MG/DL (ref 1.6–2.4)
MCH RBC QN AUTO: 28.8 PG (ref 26.6–33)
MCHC RBC AUTO-ENTMCNC: 30.2 G/DL (ref 31.5–35.7)
MCV RBC AUTO: 95.3 FL (ref 79–97)
PLATELET # BLD AUTO: 202 10*3/MM3 (ref 140–450)
PMV BLD AUTO: 8.7 FL (ref 6–12)
POTASSIUM SERPL-SCNC: 3.8 MMOL/L (ref 3.5–5.2)
RBC # BLD AUTO: 2.99 10*6/MM3 (ref 4.14–5.8)
SODIUM SERPL-SCNC: 136 MMOL/L (ref 136–145)
WBC NRBC COR # BLD AUTO: 8.91 10*3/MM3 (ref 3.4–10.8)

## 2025-03-27 PROCEDURE — 25010000002 CEFEPIME PER 500 MG: Performed by: INTERNAL MEDICINE

## 2025-03-27 PROCEDURE — 99239 HOSP IP/OBS DSCHRG MGMT >30: CPT | Performed by: STUDENT IN AN ORGANIZED HEALTH CARE EDUCATION/TRAINING PROGRAM

## 2025-03-27 PROCEDURE — 85027 COMPLETE CBC AUTOMATED: CPT | Performed by: INTERNAL MEDICINE

## 2025-03-27 PROCEDURE — 83735 ASSAY OF MAGNESIUM: CPT | Performed by: INTERNAL MEDICINE

## 2025-03-27 PROCEDURE — 80048 BASIC METABOLIC PNL TOTAL CA: CPT | Performed by: INTERNAL MEDICINE

## 2025-03-27 RX ORDER — LEVOFLOXACIN 500 MG/1
500 TABLET, FILM COATED ORAL EVERY 24 HOURS
Status: DISCONTINUED | OUTPATIENT
Start: 2025-03-27 | End: 2025-03-27

## 2025-03-27 RX ORDER — LEVOFLOXACIN 250 MG/1
250 TABLET, FILM COATED ORAL EVERY 24 HOURS
Qty: 2 TABLET | Refills: 0 | Status: SHIPPED | OUTPATIENT
Start: 2025-03-28 | End: 2025-03-30

## 2025-03-27 RX ORDER — FLUCONAZOLE 200 MG/1
200 TABLET ORAL DAILY
Qty: 14 TABLET | Refills: 0 | Status: SHIPPED | OUTPATIENT
Start: 2025-03-28 | End: 2025-04-11

## 2025-03-27 RX ORDER — FLUCONAZOLE 100 MG/1
200 TABLET ORAL DAILY
Status: DISCONTINUED | OUTPATIENT
Start: 2025-03-28 | End: 2025-03-27 | Stop reason: HOSPADM

## 2025-03-27 RX ORDER — LEVOTHYROXINE SODIUM 50 UG/1
50 TABLET ORAL
Qty: 30 TABLET | Refills: 0 | Status: SHIPPED | OUTPATIENT
Start: 2025-03-28 | End: 2025-04-27

## 2025-03-27 RX ORDER — LEVOFLOXACIN 250 MG/1
250 TABLET, FILM COATED ORAL EVERY 24 HOURS
Status: DISCONTINUED | OUTPATIENT
Start: 2025-03-27 | End: 2025-03-27 | Stop reason: HOSPADM

## 2025-03-27 RX ORDER — FLUCONAZOLE 100 MG/1
400 TABLET ORAL ONCE
Status: COMPLETED | OUTPATIENT
Start: 2025-03-27 | End: 2025-03-27

## 2025-03-27 RX ADMIN — RIFAXIMIN 550 MG: 550 TABLET ORAL at 09:49

## 2025-03-27 RX ADMIN — HYDROCODONE BITARTRATE AND ACETAMINOPHEN 1 TABLET: 5; 325 TABLET ORAL at 09:59

## 2025-03-27 RX ADMIN — FINASTERIDE 5 MG: 5 TABLET, FILM COATED ORAL at 09:49

## 2025-03-27 RX ADMIN — PANTOPRAZOLE SODIUM 40 MG: 40 INJECTION, POWDER, FOR SOLUTION INTRAVENOUS at 09:51

## 2025-03-27 RX ADMIN — Medication 10 ML: at 09:51

## 2025-03-27 RX ADMIN — GABAPENTIN 300 MG: 300 CAPSULE ORAL at 13:16

## 2025-03-27 RX ADMIN — LEVOFLOXACIN 250 MG: 250 TABLET, FILM COATED ORAL at 12:04

## 2025-03-27 RX ADMIN — LEVOTHYROXINE SODIUM 50 MCG: 0.05 TABLET ORAL at 05:37

## 2025-03-27 RX ADMIN — CARVEDILOL 3.12 MG: 3.12 TABLET, FILM COATED ORAL at 09:49

## 2025-03-27 RX ADMIN — TAMSULOSIN HYDROCHLORIDE 0.4 MG: 0.4 CAPSULE ORAL at 09:49

## 2025-03-27 RX ADMIN — CEFEPIME HYDROCHLORIDE 1000 MG: 1 INJECTION, POWDER, FOR SOLUTION INTRAMUSCULAR; INTRAVENOUS at 05:38

## 2025-03-27 RX ADMIN — GABAPENTIN 300 MG: 300 CAPSULE ORAL at 05:37

## 2025-03-27 RX ADMIN — FLUCONAZOLE 400 MG: 100 TABLET ORAL at 09:49

## 2025-03-27 RX ADMIN — FOLIC ACID 1 MG: 1 TABLET ORAL at 09:49

## 2025-03-27 NOTE — CASE MANAGEMENT/SOCIAL WORK
Case Management Discharge Note      Final Note: Patient's plan is to return to intermediate care at [facility] today 3/27.  ambulance will transport patient at 1530. PCS filled out and faxed to [employer]. Nurse to call report to [phone]. Facility liaison will pull discharge summary form Epic. Patient is agreeable to discharge plan. No further discharge needs identified.         Selected Continued Care - Admitted Since 3/24/2025       Destination Coordination complete.      Service Provider Services Address Phone Fax Patient Preferred    [facility] [address], [facility] [address] [phone] --              Durable Medical Equipment    No services have been selected for the patient.                Dialysis/Infusion    No services have been selected for the patient.                Home Medical Care    No services have been selected for the patient.                Therapy    No services have been selected for the patient.                Community Resources    No services have been selected for the patient.                Community & DME    No services have been selected for the patient.                    Selected Continued Care - Prior Encounters Includes continued care and service providers with selected services from prior encounters from 12/24/2024 to 3/27/2025      Discharged on 3/17/2025 Admission date: 3/14/2025 - Discharge disposition: Intermediate Care       Destination       Service Provider Services Address Phone Fax Patient Preferred    [facility] [address], [facility] [address] [phone] --                      Discharged on 3/12/2025 Admission date: 2/11/2025 - Discharge disposition: Intermediate Care       Destination       Service Provider Services Address Phone Fax Patient Preferred    [facility] [address] [facility] [address] [phone] --                           Transportation Services  Ambulance: [facility]    Final Discharge Disposition Code: 04 - intermediate care facility

## 2025-03-27 NOTE — DISCHARGE SUMMARY
[facility] Medicine Services  DISCHARGE SUMMARY    Patient Name: Val Nassar Jr.  : 1960  MRN: [id number]    Date of Admission: 3/24/2025  2:49 PM  Date of Discharge:  3/27/25  Primary Care Physician: Wesly Minor MD    Consults       Date and Time Order Name Status Description    3/24/2025 11:18 PM Inpatient Gastroenterology Consult Completed     3/15/2025  6:48 AM Inpatient Gastroenterology Consult Completed     3/14/2025  5:07 PM Inpatient Cardiology Consult Completed     2025  9:40 AM Inpatient Gastroenterology Consult Completed     2025  1:29 AM Inpatient Cardiology Consult Completed             Hospital Course     Presenting Problem: Symptomatic anemia    Active Hospital Problems    Diagnosis  POA    GI bleed [K92.2]  Yes    BPH without obstruction/lower urinary tract symptoms [N40.0]  Yes    Cirrhosis of liver [K74.60]  Yes    A-fib [I48.91]  Yes    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          Hospital Course:  Val Nassar Jr. is a 64 y.o. male  that presented from local nursing home with abnormal labs, patient found to be anemic and history of multiple previous GI bleeds.  GI consulted upon admission, status post EGD and colonoscopy revealing proximal ascending colon with actively bleeding lesion that was resected with cold snare and 1 Endo Clip applied as well as EGD revealing Candida esophagitis with a few AVMs in the stomach/duodenum that were thermally ablated.  Patient also found to have UTI.  Patient will continue treatment for UTI with oral antibiotics, and Candida esophagitis with oral Diflucan.  Further details documented below. Patient to follow up with cardiology, gastroenterology, hematology/oncology,  PCP, and urology outpatient.     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    GI consulted, status post colonoscopy revealing proximal ascending colon with actively bleeding lesion that was resected with cold snare and 1 Endo Clip applied as well as EGD revealing Candida esophagitis with a few AVMs in the stomach/duodenum that were thermally ablated.  GI recommended holding Eliquis for 7 days. Discussed risks of holding AC with patient including increased risks of clots/strokes. Patient voiced understanding and elected to hold eliquis for 7 days.   Will continue PPI twice daily for 30 days  Continue Diflucan for 14 days for Candida esophagitis  Continue home iron supplement, LACIE on iron studies     Acute UTI  Patient has a history of chronic In-N-Out cathing  Is endorsing dysuria, UA suggestive of UTI with dysuria symptoms  Previous urine cultures April 11, 2025 positive for Pseudomonas  Urine culture on admission growing Pseudomonas.  Will continue oral levofloxacin upon discharge  Follow-up with urology outpatient  Continue In-N-Out catheters at facility     Hypothyroidism  TSH 59.7, free T40.45, not on any medication at home  Will initiate levothyroxine 50 mcg (ideally according to body weight needs to be on 116 mcg daily), will opt for lower dose as patient has a history of CAD per cath in 2018 which showed moderate disease  Not particularly symptomatic, not in myxedema coma at this time  Will need to recheck of thyroid levels in 6 to 8 weeks     Afib  Elevated troponin   Eliquis per above  Follow-up with cardiology outpatient  Continue home Coreg   Was being considered for possible watchman outpatient   Troponin elevated at 70 on admission, repeat 60, likely stress induced from the above       Etoh Cirrhosis of liver  MELD sodium 13  rifaximin BID  lactulose previously refused by patient  Ammonia WNL     BPH  tamlusion   I&O cath as needed at home      Depression/anxiety  Continue Lexapro, hold Seroquel while on Diflucan     Chronic low back pain  Gabapentin      Discharge Follow Up Recommendations for outpatient labs/diagnostics:   Continue to hold Eliquis for 7 days.  Continue Diflucan for 14 days.  Continue levofloxacin for 2 more days.  Continue to hold Seroquel while on Diflucan, okay to resume cerebral after completion of Diflucan.  Follow-up with PCP in 1 week.  Follow-up with cardiology, gastroenterology, hematology/oncology, urology as scheduled.     Day of Discharge     HPI:   Patient seen resting comfortably in bed no acute distress.  Denies any bloody bowel movements recently.  Eager for discharge today.  No acute complaints or concerns today.    Vital Signs:   Temp:  [97 °F (36.1 °C)-99.2 °F (37.3 °C)] 99.2 °F (37.3 °C)  Heart Rate:  [61-79] 62  Resp:  [16-18] 16  BP: (102-144)/(54-86) 136/79      Physical Exam  Constitutional:       General: He is not in acute distress.  Cardiovascular:      Rate and Rhythm: Normal rate.      Pulses: Normal pulses.   Pulmonary:      Effort: Pulmonary effort is normal. No respiratory distress.   Abdominal:      General: There is no distension.      Palpations: Abdomen is soft.      Tenderness: There is no guarding or rebound.      Comments: Mild abdominal pain   Musculoskeletal:      Right lower leg: No edema.      Left lower leg: No edema.   Skin:     General: Skin is warm.   Neurological:      Mental Status: He is alert and oriented to person, place, and time.   Psychiatric:         Mood and Affect: Mood normal.         Behavior: Behavior normal.           Pertinent  and/or Most Recent Results     LAB RESULTS:      Lab 03/27/25  1243 03/26/25  0616 03/25/25  1428 03/25/25  1030 03/25/25  0720 03/24/25  1458   WBC 8.91 7.74  --   --  8.60 9.49    HEMOGLOBIN 8.6* 7.8*  --   --  7.9* 5.2*   HEMATOCRIT 28.5* 25.6*  --   --  25.5* 17.9*   PLATELETS 202 197  --   --  203 263   NEUTROS ABS  --   --   --   --  6.02 6.42   IMMATURE GRANS (ABS)  --   --   --   --  0.07* 0.14*   LYMPHS ABS  --   --   --   --  1.11 1.34   MONOS ABS  --   --   --   --  1.24* 1.39*   EOS ABS  --   --   --   --  0.13 0.17   MCV 95.3 93.4  --   --  92.4 100.0*   SED RATE  --   --   --   --  40*  --    CRP  --   --   --   --  0.95*  --    PROCALCITONIN  --   --   --   --  0.24  --    LACTATE  --   --  1.1  --   --  1.6   PROTIME  --   --   --  14.7*  --   --          Lab 03/27/25  1243 03/26/25  0616 03/25/25  0720 03/24/25  1458   SODIUM 136 142 138 138   POTASSIUM 3.8 3.8 3.7 4.3   CHLORIDE 106 107 105 103   CO2 22.0 23.0 25.0 24.0   ANION GAP 8.0 12.0 8.0 11.0   BUN 7* 5* 9 11   CREATININE 0.68* 0.51* 0.68* 0.84   EGFR 103.2 112.5 103.2 96.8   GLUCOSE 115* 85 82 97   CALCIUM 8.8 8.4* 8.2* 8.7   MAGNESIUM 1.7 2.2 1.5*  --    HEMOGLOBIN A1C  --   --  4.70*  --    TSH  --   --  59.700*  --          Lab 03/25/25  0720 03/24/25  1458   TOTAL PROTEIN 5.6* 6.7   ALBUMIN 3.0* 3.0*   GLOBULIN 2.6 3.7   ALT (SGPT) 11 14   AST (SGOT) 15 31   BILIRUBIN 0.7 0.2   ALK PHOS 88 95   LIPASE  --  57         Lab 03/25/25  1030 03/24/25  1657 03/24/25  1458   PROBNP  --   --  349.9   HSTROP T  --  60* 70*   PROTIME 14.7*  --   --    INR 1.14*  --   --          Lab 03/25/25  0720   CHOLESTEROL 103   LDL CHOL 33   HDL CHOL 56   TRIGLYCERIDES 62         Lab 03/25/25  0720 03/24/25  1657   IRON 93  --    IRON SATURATION (TSAT) 24  --    TIBC 383  --    TRANSFERRIN 257  --    FERRITIN 45.72  --    ABO TYPING  --  A   RH TYPING  --  Positive   ANTIBODY SCREEN  --  Negative         Brief Urine Lab Results  (Last result in the past 365 days)        Color   Clarity   Blood   Leuk Est   Nitrite   Protein   CREAT   Urine HCG        03/24/25 1636 Yellow   Turbid   Small (1+)   Large (3+)   Negative   30 mg/dL (1+)                  Microbiology Results (last 10 days)       Procedure Component Value - Date/Time    Urine Culture - Urine, Urine, Clean Catch [[id number]]  (Abnormal)  (Susceptibility) Collected: 03/24/25 1636    Lab Status: Final result Specimen: Urine, Clean Catch Updated: 03/27/25 1003     Urine Culture >100,000 CFU/mL Pseudomonas aeruginosa    Narrative:      Colonization of the urinary tract without infection is common. Treatment is discouraged unless the patient is symptomatic, pregnant, or undergoing an invasive urologic procedure.    Susceptibility        Pseudomonas aeruginosa      VIANEY      Cefepime Susceptible      Ceftazidime Susceptible      Ciprofloxacin Susceptible      Levofloxacin Susceptible      Piperacillin + Tazobactam Susceptible      Tobramycin Susceptible                                   CT Abdomen Pelvis With & Without Contrast  Result Date: 3/24/2025  CT ABDOMEN PELVIS W WO CONTRAST Date of Exam: 3/24/2025 11:11 PM EDT Indication: Abdominal pain, hx multiple GI bleed, hx previous esophageal carcinoma, cirrhosis. Comparison: 3/5/2025, 2/11/2025. Technique: Axial CT images were obtained of the abdomen and pelvis before and after the uneventful intravenous administration intravenous contrast. Sagittal and coronal reconstructions were performed.  Automated exposure control and iterative reconstruction methods were used. Findings: Lung Bases:   The visualized lung bases and lower mediastinal structures are unremarkable. Previously identified airspace disease has resolved. Liver: Surface nodularity of the liver is present consistent with cirrhosis. No significant ascites identified.. No focal lesions. Biliary/Gallbladder:  The gallbladder is normal without evidence of radiopaque stones. The biliary tree is nondilated. Spleen: Spleen is normal in size and CT density. Pancreas:  Pancreas is normal. There is no evidence of pancreatic mass or peripancreatic fluid. Kidneys:  Kidneys are normal in size.  There are no stones or hydronephrosis. There is an indeterminate partially cystic appearing hypodense lesion seen along the superior aspect of the left kidney measuring up to 2.9 x 3.2 cm x 1.9 cm in craniocaudal dimension (series 5 image 38). This may represent a true lesion or sequela of previous pyelonephritis. No drainable collection identified. Adrenals:  Adrenal glands are unremarkable. Retroperitoneal/Lymph Nodes/Vasculature:  No retroperitoneal adenopathy is identified. No evidence of hemodynamically significant stenosis of the mesenteric vasculature. No evidence of thrombus. No evidence of active contrast extravasation. No definite contrast blush identified. Gastrointestinal/Mesentery:  The bowel loops are non-dilated without wall thickening or mass. The appendix appears within normal limits. No evidence of obstruction. No free air. No significant stool burden. No evidence of hernia. Bladder:  Circumferential bladder wall thickening is present which appears to have progressed as compared to the previous study. Lubin catheter has been removed.. Bladder diverticulum present extending left of midline which has increased in size as compared to the  previous study likely related to increased urine within the bladder. No evidence of stones. Genital:   Unremarkable       Bony Structures:   Visualized bony structures are consistent with the patient's age.     Impression: 1.No acute intra-abdominal or intrapelvic process. No evidence of active contrast extravasation. No evidence of hemodynamically significant stenosis of the mesenteric vasculature. 2.Circumferential bladder wall thickening which appears to have progressed as compared to the previous study which may be related to cystitis or the sequela of chronic outlet obstruction. Bladder diverticulum present extending left of midline which has increased in size as compared to the previous study likely related to increased urine within the bladder. 3.Indeterminate  partially cystic appearing hypodense lesion seen along the superior aspect of the left kidney measuring up to 2.9 x 3.2 cm x 1.9 cm in craniocaudal dimension. This may represent a true lesion or sequela of previous pyelonephritis. Scarring suspected. No drainable collection identified. Contrasted CT follow-up is recommended to evaluate for stability or resolution. 4.Ancillary findings as described above. Electronically Signed: Natalie Abdi MD  3/24/2025 11:47 PM EDT  Workstation ID: [id number]    XR Chest 1 View  Result Date: 3/24/2025  XR CHEST 1 VW Date of Exam: 3/24/2025 3:43 PM EDT Indication: gi bleed Comparison: Chest x-ray 3/6/2025 Findings: Redemonstration of right chest port. Endotracheal tube and enteric feeding tube have been removed. Stable cardiomediastinal silhouette. Similar diffuse interstitial prominence likely reflecting interstitial edema. No pleural effusion or pneumothorax.     Impression: Redemonstration of right chest port. Endotracheal tube and enteric feeding tube have been removed. Stable cardiomediastinal silhouette. Similar diffuse interstitial prominence likely reflecting interstitial edema. No pleural effusion or pneumothorax. Electronically Signed: Nikita Garrison MD  3/24/2025 4:40 PM EDT  Workstation ID: [id number]              Results for orders placed during the hospital encounter of 02/11/25    Adult Transthoracic Echo Complete w/ Color, Spectral and Contrast if Necessary Per Protocol    Interpretation Summary    Left ventricular systolic function is normal. Estimated left ventricular EF = 60%    Left ventricular wall thickness is consistent with mild concentric hypertrophy.    Mild aortic valve stenosis is present.    Aortic valve maximum pressure gradient is 23 mmHg. Aortic valve mean pressure gradient is 14 mmHg.      Plan for Follow-up of Pending Labs/Results: Follow up with GI  Pending Labs       Order Current Status    Tissue Pathology Exam In process          Discharge Details         Discharge Medications        PAUSE taking these medications        Instructions Start Date   apixaban 2.5 MG tablet tablet  Wait to take this until: April 1, 2025 Morning  Commonly known as: ELIQUIS   2.5 mg, Oral, Every 12 Hours Scheduled      QUEtiapine 25 MG tablet  Wait to take this until your doctor or other care provider tells you to start again.  Hold until completion of Diflucan.   Commonly known as: SEROquel   Take 1 tablet by mouth Daily AND 2 tablets Every Night.             New Medications        Instructions Start Date   fluconazole 200 MG tablet  Commonly known as: DIFLUCAN   200 mg, Oral, Daily   Start Date: March 28, 2025     levoFLOXacin 250 MG tablet  Commonly known as: LEVAQUIN   250 mg, Oral, Every 24 Hours   Start Date: March 28, 2025     levothyroxine 50 MCG tablet  Commonly known as: SYNTHROID, LEVOTHROID   50 mcg, Oral, Every Early Morning   Start Date: March 28, 2025            Continue These Medications        Instructions Start Date   Benzalkonium Chloride 0.12 % liquid   Apply to gale area topically as needed for preventative use for Gale care after each incontinent episode and as needed.      carvedilol 3.125 MG tablet  Commonly known as: COREG   3.125 mg, Oral, 2 Times Daily With Meals      dimethicone 1 % cream   Apply to buttocks/Gale area topically as needed for preventative apply topically to buttocks/gale area.      Dimethicone 1.5 % cream   Apply to body topically as needed for dry skin apply topically to dry skin on body every shift as needed.      docusate sodium 50 mg/5 mL liquid  Commonly known as: COLACE   100 mg, Oral, 2 Times Daily      dutasteride 0.5 MG capsule  Commonly known as: AVODART   0.5 mg, Daily      escitalopram 10 MG tablet  Commonly known as: LEXAPRO   10 mg, Nightly      ferrous gluconate 324 MG tablet  Commonly known as: FERGON   324 mg, Daily With Breakfast      folic acid 1 MG tablet  Commonly known as: FOLVITE   1 mg, Daily      gabapentin 300 MG  capsule  Commonly known as: NEURONTIN   300 mg, Oral, Every 8 Hours Scheduled      Lidocaine 4 %   1 patch, Transdermal, Every 24 Hours Scheduled, Remove & Discard patch within 12 hours or as directed by MD      Loratadine 10 MG capsule   10 mg, Daily      melatonin 5 MG tablet tablet   5 mg, Nightly      pantoprazole 40 MG EC tablet  Commonly known as: Protonix   40 mg, Oral, 2 Times Daily      riFAXIMin 550 MG tablet  Commonly known as: XIFAXAN   550 mg, Per G Tube, Every 12 Hours Scheduled      rosuvastatin 20 MG tablet  Commonly known as: CRESTOR   20 mg, Oral, Nightly      tamsulosin 0.4 MG capsule 24 hr capsule  Commonly known as: FLOMAX   1 capsule, Daily      vitamin B-12 1000 MCG tablet  Commonly known as: CYANOCOBALAMIN   1,000 mcg, Daily      Vitamin D3 1.25 MG (76940 UT) capsule   50,000 Units, Every 7 Days      Zinc Oxide 10 % cream   Apply to bilat buttocks and groin topically every shift for redness.               Allergies   Allergen Reactions    Green Beans Hives         Discharge Disposition:  Skilled Nursing Facility (DC - External)    Diet:  Hospital:  Diet Order   Procedures    Diet: Cardiac; Low Sodium (2g); Texture: Regular (IDDSI 7); Fluid Consistency: Thin (IDDSI 0)            Activity: As tolerated      Restrictions or Other Recommendations:  As tolerated       CODE STATUS:    Code Status and Medical Interventions: CPR (Attempt to Resuscitate); Full Support   Ordered at: 03/24/25 9657     Code Status (Patient has no pulse and is not breathing):    CPR (Attempt to Resuscitate)     Medical Interventions (Patient has pulse or is breathing):    Full Support     Level Of Support Discussed With:    Patient       Future Appointments   Date Time Provider Department Center   3/27/2025  3:30 PM MED 11 [facility] None   3/31/2025  1:00 PM David Arora DO [facility]   5/14/2025 12:00 PM T[facility] CT 1  [facility]   5/15/2025 10:00 AM Иван Garrison MD [facility]   5/16/2025  2:30 PM  Aristeo Hoskins MD [facility]   5/21/2025  1:45 PM Katerina Ga MD [facility]       Additional Instructions for the Follow-ups that You Need to Schedule       Discharge Follow-up with PCP   As directed       Currently Documented PCP:    Wesly Minor MD    PCP Phone Number:    [phone]     Follow Up Details: Follow up with PCP in 1 week        Discharge Follow-up with Specialty: Follow-up with cardiology, gastroenterology, hematology/oncology, and urology as scheduled.   As directed      Specialty: Follow-up with cardiology, gastroenterology, hematology/oncology, and urology as scheduled.                      Donis Zhang DO  03/27/25      Time Spent on Discharge:  I spent  35  minutes on this discharge activity which included: face-to-face encounter with the patient, reviewing the data in the system, coordination of the care with the nursing staff as well as consultants, documentation, and entering orders.

## 2025-03-27 NOTE — OUTREACH NOTE
Prep Survey      Flowsheet Row Responses   [facility] facility patient discharged from? [place]   Is LACE score < 7 ? No   Eligibility Not Eligible   What are the reasons patient is not eligible? [facility]   Does the patient have one of the following disease processes/diagnoses(primary or secondary)? Other   Prep survey completed? Yes            GENE LEONG - Registered Nurse

## 2025-03-31 ENCOUNTER — OFFICE VISIT (OUTPATIENT)
Dept: CARDIOLOGY | Facility: CLINIC | Age: 65
End: 2025-03-31
Payer: MEDICARE

## 2025-03-31 VITALS
SYSTOLIC BLOOD PRESSURE: 100 MMHG | DIASTOLIC BLOOD PRESSURE: 52 MMHG | HEIGHT: 69 IN | HEART RATE: 66 BPM | OXYGEN SATURATION: 95 % | WEIGHT: 169 LBS | BODY MASS INDEX: 25.03 KG/M2

## 2025-03-31 DIAGNOSIS — I48.0 PAROXYSMAL ATRIAL FIBRILLATION: Primary | ICD-10-CM

## 2025-03-31 LAB
MYCOBACTERIUM SPEC CULT: NORMAL
NIGHT BLUE STAIN TISS: NORMAL

## 2025-03-31 PROCEDURE — 99204 OFFICE O/P NEW MOD 45 MIN: CPT | Performed by: INTERNAL MEDICINE

## 2025-03-31 RX ORDER — OXYCODONE AND ACETAMINOPHEN 7.5; 325 MG/1; MG/1
1 TABLET ORAL EVERY 4 HOURS PRN
COMMUNITY

## 2025-03-31 RX ORDER — LORATADINE 10 MG/1
10 TABLET ORAL DAILY
COMMUNITY
Start: 2025-01-10 | End: 2025-03-31

## 2025-03-31 NOTE — PROGRESS NOTES
Cardiac Electrophysiology Outpatient Consult Note            [facility] at [facility]    Consult Note     Val Nassar Jr.  [id number]  03/31/2025  [phone]     Primary Care Physician: Wesly Minor MD    Referred By: No ref. provider found    Subjective     Chief Complaint:   Diagnoses and all orders for this visit:    1. Paroxysmal atrial fibrillation (Primary)      Chief Complaint   Patient presents with    Paroxysmal atrial fibrillation        History of Present Illness:   Val Nassar Jr. is a 64 y.o. male who presents to my electrophysiology clinic for evaluation of recent GI bleeding due to gastric telangiectasia in the context of alcoholism esophageal cancer liver cirrhosis.    He is not interested in a procedure.  He is a permanent nursing home resident.      Past Medical History:   Past Medical History:   Diagnosis Date    Anemia     Cancer     ESOPHAGEAL    Cirrhosis     Duodenal ulcer     Gastric ulcer     GERD (gastroesophageal reflux disease)     History of alcohol abuse     History of radiation therapy 05/08/2024    esophagus    HLD (hyperlipidemia)     Mood disorder        Past Surgical History:   Past Surgical History:   Procedure Laterality Date    COLONOSCOPY N/A 3/26/2025    Procedure: COLONOSCOPY;  Surgeon: Brunner, Mark I, MD;  Location:  [facility];  Service: Gastroenterology;  Laterality: N/A;    ENDOSCOPY N/A 12/26/2023    Procedure: ESOPHAGOGASTRODUODENOSCOPY;  Surgeon: Wesly Mckeon MD;  Location:  [facility];  Service: Gastroenterology;  Laterality: N/A;    ENDOSCOPY N/A 3/3/2025    Procedure: ESOPHAGOGASTRODUODENOSCOPY;  Surgeon: Wesly Mckeon MD;  Location:  [facility];  Service: Gastroenterology;  Laterality: N/A;  APC USED IN ESOPHAGUS.    ENDOSCOPY N/A 3/26/2025    Procedure: ESOPHAGOGASTRODUODENOSCOPY;  Surgeon: Brunner, Mark I, MD;  Location:  [facility];  Service: Gastroenterology;   Laterality: N/A;    VENOUS ACCESS DEVICE (PORT) INSERTION Right 04/25/2024       Family History:   Family History   Problem Relation Age of Onset    Cancer Mother         LUNG AND BREAST    Breast cancer Mother     Heart attack Father        Social History:   Social History     Socioeconomic History    Marital status: Single   Tobacco Use    Smoking status: Every Day     Current packs/day: 1.00     Average packs/day: 1 pack/day for 15.0 years (15.0 ttl pk-yrs)     Types: Cigarettes     Passive exposure: Current    Smokeless tobacco: Never    Tobacco comments:     Rolls his on cigs     Vapes daily   Vaping Use    Vaping status: Every Day    Substances: Nicotine, Flavoring    Devices: Disposable   Substance and Sexual Activity    Alcohol use: Yes     Alcohol/week: 1.0 standard drink of alcohol     Types: 1 Cans of beer per week     Comment: 1 beer a day after supper    Drug use: Yes     Types: Hydrocodone    Sexual activity: Not Currently     Partners: Female       Medications:     Current Outpatient Medications:     [Paused] apixaban (ELIQUIS) 2.5 MG tablet tablet, Take 1 tablet by mouth Every 12 (Twelve) Hours for 30 days. Indications: Atrial Fibrillation, Disp: 60 tablet, Rfl: 0    Benzalkonium Chloride 0.12 % liquid, Apply to gale area topically as needed for preventative use for Gale care after each incontinent episode and as needed., Disp: , Rfl:     carvedilol (COREG) 3.125 MG tablet, Take 1 tablet by mouth 2 (Two) Times a Day With Meals., Disp: 60 tablet, Rfl: 0    Cholecalciferol (Vitamin D3) 1.25 MG (44171 UT) capsule, Take 1 capsule by mouth Every 7 (Seven) Days. Patient takes on Fridays, Disp: , Rfl:     dimethicone 1 % cream, Apply to buttocks/Gale area topically as needed for preventative apply topically to buttocks/gale area., Disp: , Rfl:     Dimethicone 1.5 % cream, Apply to body topically as needed for dry skin apply topically to dry skin on body every shift as needed., Disp: , Rfl:     docusate  sodium (COLACE) 50 mg/5 mL liquid, Take 10 mL by mouth 2 (Two) Times a Day., Disp: , Rfl:     dutasteride (AVODART) 0.5 MG capsule, Take 1 capsule by mouth Daily., Disp: , Rfl:     escitalopram (LEXAPRO) 10 MG tablet, Take 1 tablet by mouth Every Night., Disp: , Rfl:     ferrous gluconate (FERGON) 324 MG tablet, Take 1 tablet by mouth Daily With Breakfast., Disp: , Rfl:     fluconazole (DIFLUCAN) 200 MG tablet, Take 1 tablet by mouth Daily for 14 doses. 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 Remove & Discard patch within 12 hours or as directed by MD, Disp: , Rfl:     Loratadine 10 MG capsule, Take 1 capsule by mouth Daily., Disp: , Rfl:     melatonin 5 MG tablet tablet, Take 1 tablet by mouth Every Night., Disp: , Rfl:     oxyCODONE-acetaminophen (PERCOCET) 7.5-325 MG per tablet, Take 1 tablet by mouth Every 4 (Four) Hours As Needed., Disp: , Rfl:     pantoprazole (Protonix) 40 MG EC tablet, Take 1 tablet by mouth 2 (Two) Times a Day., Disp: , Rfl:     riFAXIMin (XIFAXAN) 550 MG tablet, Administer 1 tablet per G tube Every 12 (Twelve) Hours for 708 doses. Indications: Impaired Brain Function due to Liver Disease, Disp: , Rfl:     rosuvastatin (CRESTOR) 20 MG tablet, Take 1 tablet by mouth Every Night., Disp: , Rfl:     tamsulosin (FLOMAX) 0.4 MG capsule 24 hr capsule, Take 1 capsule by mouth Daily., Disp: , Rfl:     vitamin B-12 (CYANOCOBALAMIN) 1000 MCG tablet, Take 1 tablet by mouth Daily., Disp: , Rfl:     Zinc Oxide 10 % cream, Apply to bilat buttocks and groin topically every shift for redness., Disp: , Rfl:     gabapentin (NEURONTIN) 300 MG capsule, Take 1 capsule by mouth Every  "8 (Eight) Hours for 3 days., Disp: 9 capsule, Rfl: 0    [Paused] QUEtiapine (SEROquel) 25 MG tablet, Take 1 tablet by mouth Daily AND 2 tablets Every Night. (Patient not taking: Reported on 3/31/2025), Disp: , Rfl:     Allergies:   Allergies   Allergen Reactions    Green Beans Hives     Fresh cut       Objective   Vital Signs:   Vitals:    03/31/25 1313   BP: 100/52   BP Location: Left arm   Patient Position: Sitting   Pulse: 66   SpO2: 95%   Weight: 76.7 kg (169 lb)   Height: 175.3 cm (69\")       PHYSICAL EXAM  General appearance: Awake, alert, cooperative  Head: Normocephalic, without obvious abnormality, atraumatic  Eyes: Conjunctivae/corneas clear, EOMs intact  Neck: no adenopathy, no carotid bruit, no JVD, and thyroid: not enlarged  Lungs: clear to auscultation bilaterally and no rhonchi or crackles\", ' symmetric  Heart: regular rate and rhythm, S1, S2 normal, no murmur, click, rub or gallop  Abdomen: Soft, non-tender, bowel sounds normal,  no organomegaly  Extremities: extremities normal, atraumatic, no cyanosis or edema  Skin: Skin color, turgor normal, no rashes or lesions  Neurologic: Grossly normal     Lab Results   Component Value Date    GLUCOSE 115 (H) 03/27/2025    CALCIUM 8.8 03/27/2025     03/27/2025    K 3.8 03/27/2025    CO2 22.0 03/27/2025     03/27/2025    BUN 7 (L) 03/27/2025    CREATININE 0.68 (L) 03/27/2025    BCR 10.3 03/27/2025    ANIONGAP 8.0 03/27/2025     Lab Results   Component Value Date    WBC 8.91 03/27/2025    HGB 8.6 (L) 03/27/2025    HCT 28.5 (L) 03/27/2025    MCV 95.3 03/27/2025     03/27/2025     Lab Results   Component Value Date    INR 1.14 (H) 03/25/2025    INR 1.84 (H) 03/14/2025    INR 1.13 (H) 03/01/2025    PROTIME 14.7 (H) 03/25/2025    PROTIME 21.3 (H) 03/14/2025    PROTIME 14.6 (H) 03/01/2025     Lab Results   Component Value Date    TSH 59.700 (H) 03/25/2025       Cardiac Testing:      I personally viewed and interpreted the patient's " EKG/Telemetry/lab data    Procedures    Tobacco Cessation: N/A  Obstructive Sleep Apnea Screening: Completed    Advance Care Planning   ACP discussion was declined by the patient. Patient does not have an advance directive, declines further assistance.       Assessment & Plan    Diagnoses and all orders for this visit:    1. Paroxysmal atrial fibrillation (Primary)         Diagnosis Plan   1. Paroxysmal atrial fibrillation  64-year-old alcoholic nursing home resident with a history of liver cirrhosis and esophageal cancer presents for evaluation for watchman.    [id number] = 1 due to his age    At this time I do not think this patient is a good candidate for a Watchman procedure.    I do not think that he actually requires anticoagulation at this stage either, but rather feel that the risk-benefit profile for anticoagulation is unacceptably high.    Patient agrees with me.  He is not interested in the procedure at this time anyway.    Can follow-up with his primary care physician.    No need to follow-up in EP.            Body mass index is 24.96 kg/m².    I spent 45 minutes in consultation with this patient which included more than 65% of this time in direct face-to-face counseling, physical examination and discussion of my assessment and findings and this shared decision making with the patient.  The remainder of the time not spent face-to-face was performing one, some or all of the following actions: preparing to see the patient (e.g. reviewing tests, prior clinicians' notes, etc), ordering medications, tests or procedures, coordination of care, discussion of the plan with other healthcare providers, documenting clinical information in epic as well as independently interpreting results and communication of these results to the patient family and/or caregiver(s).  Please note that this explicitly excludes time spent on other separate billable services such as performing procedures or test interpretation, when  applicable.    Follow Up:       Thank you for allowing me to participate in the care of your patient. Please to not hesitate to contact me with additional questions or concerns.      David Arora DO, FACC, RS  Cardiac Electrophysiologist  [facility] / [facility]

## 2025-04-02 LAB
QT INTERVAL: 388 MS
QTC INTERVAL: 433 MS

## 2025-04-09 ENCOUNTER — TELEPHONE (OUTPATIENT)
Dept: CARDIOLOGY | Facility: CLINIC | Age: 65
End: 2025-04-09
Payer: MEDICARE

## 2025-04-26 ENCOUNTER — RESULTS FOLLOW-UP (OUTPATIENT)
Dept: GASTROENTEROLOGY | Facility: HOSPITAL | Age: 65
End: 2025-04-26
Payer: MEDICARE

## 2025-05-09 ENCOUNTER — HOSPITAL ENCOUNTER (EMERGENCY)
Facility: HOSPITAL | Age: 65
Discharge: HOME | End: 2025-05-09
Attending: EMERGENCY MEDICINE
Payer: MEDICARE

## 2025-05-09 ENCOUNTER — APPOINTMENT (OUTPATIENT)
Dept: CT IMAGING | Facility: HOSPITAL | Age: 65
End: 2025-05-09
Payer: MEDICARE

## 2025-05-09 VITALS
WEIGHT: 160 LBS | HEART RATE: 64 BPM | SYSTOLIC BLOOD PRESSURE: 131 MMHG | HEIGHT: 69 IN | OXYGEN SATURATION: 95 % | TEMPERATURE: 98.2 F | BODY MASS INDEX: 23.7 KG/M2 | RESPIRATION RATE: 18 BRPM | DIASTOLIC BLOOD PRESSURE: 77 MMHG

## 2025-05-09 DIAGNOSIS — F17.200 TOBACCO DEPENDENCE: ICD-10-CM

## 2025-05-09 DIAGNOSIS — R26.2 IMPAIRED AMBULATION: ICD-10-CM

## 2025-05-09 DIAGNOSIS — M54.50 ACUTE EXACERBATION OF CHRONIC LOW BACK PAIN: Primary | ICD-10-CM

## 2025-05-09 DIAGNOSIS — Z86.39 HISTORY OF HYPERLIPIDEMIA: ICD-10-CM

## 2025-05-09 DIAGNOSIS — R53.81 PHYSICAL DEBILITY: ICD-10-CM

## 2025-05-09 DIAGNOSIS — M54.10 RADICULAR LEG PAIN: ICD-10-CM

## 2025-05-09 DIAGNOSIS — Z87.19 HISTORY OF GASTROESOPHAGEAL REFLUX (GERD): ICD-10-CM

## 2025-05-09 DIAGNOSIS — M54.32 LEFT SIDED SCIATICA: ICD-10-CM

## 2025-05-09 DIAGNOSIS — G89.29 ACUTE EXACERBATION OF CHRONIC LOW BACK PAIN: Primary | ICD-10-CM

## 2025-05-09 DIAGNOSIS — N31.9 NEUROGENIC BLADDER: ICD-10-CM

## 2025-05-09 DIAGNOSIS — M51.362 DEGENERATION OF INTERVERTEBRAL DISC OF LUMBAR REGION WITH DISCOGENIC BACK PAIN AND LOWER EXTREMITY PAIN: ICD-10-CM

## 2025-05-09 PROCEDURE — 96372 THER/PROPH/DIAG INJ SC/IM: CPT

## 2025-05-09 PROCEDURE — 99284 EMERGENCY DEPT VISIT MOD MDM: CPT

## 2025-05-09 PROCEDURE — 25010000002 MORPHINE PER 10 MG: Performed by: EMERGENCY MEDICINE

## 2025-05-09 PROCEDURE — 72131 CT LUMBAR SPINE W/O DYE: CPT

## 2025-05-09 RX ORDER — METHOCARBAMOL 500 MG/1
500 TABLET, FILM COATED ORAL 3 TIMES DAILY PRN
Qty: 21 TABLET | Refills: 0 | Status: SHIPPED | OUTPATIENT
Start: 2025-05-09

## 2025-05-09 RX ORDER — METHOCARBAMOL 500 MG/1
500 TABLET, FILM COATED ORAL ONCE
Status: COMPLETED | OUTPATIENT
Start: 2025-05-09 | End: 2025-05-09

## 2025-05-09 RX ORDER — LIDOCAINE 4 G/G
1 PATCH TOPICAL ONCE
Status: DISCONTINUED | OUTPATIENT
Start: 2025-05-09 | End: 2025-05-09 | Stop reason: HOSPADM

## 2025-05-09 RX ORDER — MORPHINE SULFATE 4 MG/ML
4 INJECTION, SOLUTION INTRAMUSCULAR; INTRAVENOUS ONCE
Status: COMPLETED | OUTPATIENT
Start: 2025-05-09 | End: 2025-05-09

## 2025-05-09 RX ADMIN — LIDOCAINE 1 PATCH: 4 PATCH TOPICAL at 00:45

## 2025-05-09 RX ADMIN — METHOCARBAMOL 500 MG: 500 TABLET ORAL at 01:42

## 2025-05-09 RX ADMIN — MORPHINE SULFATE 4 MG: 4 INJECTION, SOLUTION INTRAMUSCULAR; INTRAVENOUS at 01:42

## 2025-05-09 NOTE — ED PROVIDER NOTES
Subjective   History of Present Illness  This is a 64-year-old male that presents to the ER from [facility] facility where he is a long-term resident for acute exacerbation of chronic back pain.  Patient has history of recurrent left-sided sciatica and describes intense constant throbbing to the left lower back that radiates to the left buttocks, left hip, and left posterior/lateral lower extremity to the knee.  Patient denies any numbness or tingling to the left leg.  He said he used to walk with a cane, but now he is essentially wheelchair-bound.  He denies any recent near fall or traumatic injury.  He has chronic indwelling Lubin catheter.  He had a normal bowel movement yesterday.  He denies previous back surgery.  He did report having some steroid shots for pain/inflammation to the back a few months ago.  Patient is prescribed routine narcotic of Percocet 5 mg by mouth scheduled every 4 hours, and he says that this is not touching his pain at present.  He has had increased pain related to left-sided sciatica for 2 days.  Past medical history is significant for esophageal cancer, BPH, anemia, GERD, hyperlipidemia, depression, nicotine dependence, dysphagia, anxiety, history of alcoholic cirrhosis, peptic ulcer disease, neurogenic bladder with chronic indwelling Lubin catheter, and chronic physical debility.    History provided by:  Patient and medical records  Back Pain  Location:  Lumbar spine (Left lower back)  Quality: Intense, constant throbbing.  Duration:  2 days  Timing:  Constant  Chronicity:  Chronic  Context: not falling and not lifting heavy objects    Context comment:  Pt is nursing home resident at [facility]. No recent near fall or fall. Pt sometimes walks with a walker, but is wheel chair bound. 2-day h/o exacerbation of chronic low back pain, left with radiation to left buttocks/left leg.  Relieved by:  Nothing  Worsened by:  Movement  Ineffective treatments: Percocet 5 mg every  4 hours for chronic back pain.  Associated symptoms: leg pain (left posterio-lateral leg to the knee)    Associated symptoms: no abdominal pain, no abdominal swelling, no bowel incontinence (Normal BM yesterday), no fever, no paresthesias and no tingling    Risk factors comment:  No previous back surgeries. History of chronic low back pain with episodic left sided sciatica.      Review of Systems   Constitutional: Negative.  Negative for appetite change, chills and fever.   Respiratory:          Positive for tobacco dependence   Gastrointestinal: Negative.  Negative for abdominal pain, bowel incontinence (Normal BM yesterday), constipation, nausea and vomiting.        No bowel incontinence.  Normal bowel movement yesterday   Genitourinary:         Neurogenic bladder with chronic indwelling Lubin catheter   Musculoskeletal:  Positive for back pain (Left lower back pain with radiation to left buttocks, left hip, and left posterior/lateral lower extremity) and gait problem (Patient is essentially wheelchair-bound).        History of chronic back pain with left-sided sciatica.  Acute exacerbation of chronic back pain x 2 days without any recent fall or injury   Neurological:  Negative for tingling and paresthesias.   All other systems reviewed and are negative.      Past Medical History:   Diagnosis Date    Anemia     Cancer     ESOPHAGEAL    Cirrhosis     Duodenal ulcer     Gastric ulcer     GERD (gastroesophageal reflux disease)     History of alcohol abuse     History of radiation therapy 05/08/2024    esophagus    HLD (hyperlipidemia)     Mood disorder        Allergies   Allergen Reactions    Green Beans Hives     Fresh cut       Past Surgical History:   Procedure Laterality Date    COLONOSCOPY N/A 3/26/2025    Procedure: COLONOSCOPY;  Surgeon: Brunner, Mark I, MD;  [facility]  Service: Gastroenterology;  Laterality: N/A;    ENDOSCOPY N/A 12/26/2023    Procedure: ESOPHAGOGASTRODUODENOSCOPY;   Surgeon: Wesly Mckeon MD;  Location:  [facility];  Service: Gastroenterology;  Laterality: N/A;    ENDOSCOPY N/A 3/3/2025    Procedure: ESOPHAGOGASTRODUODENOSCOPY;  Surgeon: Wesly Mckeon MD;  Location:  [facility];  Service: Gastroenterology;  Laterality: N/A;  APC USED IN ESOPHAGUS.    ENDOSCOPY N/A 3/26/2025    Procedure: ESOPHAGOGASTRODUODENOSCOPY;  Surgeon: Brunner, Mark I, MD;  Location:  [facility];  Service: Gastroenterology;  Laterality: N/A;    VENOUS ACCESS DEVICE (PORT) INSERTION Right 04/25/2024       Family History   Problem Relation Age of Onset    Cancer Mother         LUNG AND BREAST    Breast cancer Mother     Heart attack Father        Social History     Socioeconomic History    Marital status: Single   Tobacco Use    Smoking status: Every Day     Current packs/day: 1.00     Average packs/day: 1 pack/day for 15.0 years (15.0 ttl pk-yrs)     Types: Cigarettes     Passive exposure: Current    Smokeless tobacco: Never    Tobacco comments:     Rolls his on cigs     Vapes daily   Vaping Use    Vaping status: Every Day    Substances: Nicotine, Flavoring    Devices: Disposable   Substance and Sexual Activity    Alcohol use: Yes     Alcohol/week: 1.0 standard drink of alcohol     Types: 1 Cans of beer per week     Comment: 1 beer a day after supper    Drug use: Yes     Types: Hydrocodone    Sexual activity: Not Currently     Partners: Female           Objective   Physical Exam  Vitals and nursing note reviewed.   Constitutional:       General: He is not in acute distress.     Appearance: Normal appearance. He is not ill-appearing, toxic-appearing or diaphoretic.      Comments: Debilitated elderly male.  No acute distress.  Nontoxic.  Patient appears uncomfortable secondary to acute exacerbation of chronic back pain   HENT:      Head: Normocephalic and atraumatic.      Nose: Nose normal.      Mouth/Throat:      Mouth: Mucous membranes are moist.      Comments: Oral mucous membranes are  moist  Eyes:      Extraocular Movements: Extraocular movements intact.      Conjunctiva/sclera: Conjunctivae normal.      Pupils: Pupils are equal, round, and reactive to light.   Cardiovascular:      Rate and Rhythm: Normal rate and regular rhythm. No extrasystoles are present.     Pulses: Normal pulses.      Heart sounds: Normal heart sounds.      Comments: Regular rate and rhythm.  No tachycardia or ectopy.  No pedal edema to lower extremities  Pulmonary:      Effort: Pulmonary effort is normal.      Breath sounds: Normal breath sounds.      Comments: Lungs are clear to auscultation bilaterally  Abdominal:      General: Bowel sounds are normal. There is no distension.      Palpations: Abdomen is soft.      Tenderness: There is no abdominal tenderness. There is no right CVA tenderness, left CVA tenderness, guarding or rebound.      Comments: Abdomen soft and nontender.  Active bowel sounds in all 4 quadrants   Musculoskeletal:      Cervical back: Normal range of motion and neck supple.      Lumbar back: Tenderness present. No swelling, edema, deformity, signs of trauma, lacerations or spasms. Decreased range of motion.        Back:       Right lower leg: No edema.      Left lower leg: No edema.      Comments: Tenderness to left lumbar myofascia, left buttocks, and left hip.  No bruising or sign of injury.  No external rotation of the left lower extremity.  No particular LS spinal tenderness.  No bruising, soft tissue edema, or sign of injury.  Painful range of motion and patient essentially nonambulatory.   Skin:     General: Skin is warm and dry.   Neurological:      General: No focal deficit present.      Mental Status: He is alert and oriented to person, place, and time.      Cranial Nerves: Cranial nerves 2-12 are intact.      Sensory: Sensation is intact.      Motor: Weakness present.      Comments: Alert and oriented x 3.  Generalized weakness with overall physical debility.  No focal neurologic deficits.    Psychiatric:         Mood and Affect: Affect normal. Mood is anxious.         Speech: Speech normal.         Behavior: Behavior is cooperative.         Thought Content: Thought content normal.         Cognition and Memory: Cognition and memory normal.         Judgment: Judgment normal.      Comments: Normal mood and affect.  Patient anxious at times secondary to low back pain         Procedures           ED Course  ED Course as of 05/09/25 0155   Fri May 09, 2025   0150 Reviewed MAR from [facility].  Patient has history of chronic back pain with left-sided sciatica and takes routine Percocet 5 mg by mouth every 4 hours scheduled.  Patient denies any recent fall, near fall, or injury.  He reports acute exacerbation of left-sided sciatica x 2 days that is unrelieved with Percocet.  I ordered CT of the LS spine which revealed no acute osseous abnormality or compression fracture.  Patient has mild to moderate degenerative changes present most pronounced at L4-L5 and L5-S1 similar as compared to previous study from 12/17/2024.  There is mild canal stenosis present at L4-L5 due to a disc osteophyte complex and mild spondylolisthesis, which is stable.  Mild to moderate neuroforaminal narrowing bilaterally at L4-L5 and L5-S1 which is similar to previous study, as well.  Discussed the case with Dr. Nur, ER attending physician.  We will give a shot of morphine 4 mg and oral Robaxin 500 mg and also ordered a Lidoderm 4% patch.  Patient is stable for discharge.  Continue with Percocet as routinely prescribed and follow-up closely with Dr. Wesly Minor, PCP. [FC]      ED Course User Index  [FC] Summer Ewing, ZARIA                                         No results found for this or any previous visit (from the past 24 hours).  Note: In addition to lab results from this visit, the labs listed above may include labs taken at another facility or during a different encounter within the last 24 hours. Please  "correlate lab times with ED admission and discharge times for further clarification of the services performed during this visit.    CT Lumbar Spine Without Contrast   Final Result   Impression:   No acute osseous abnormality. Mild to moderate degenerative changes are present, most pronounced at L4-L5 and L5-S1, similar as compared to the previous study. Mild canal stenosis present at L4-L5 due to a disc osteophyte complex and mild    spondylolisthesis, stable. Mild to moderate neuroforaminal narrowing present bilaterally at L4-L5 and L5-S1, similar as compared to the previous study.                  Electronically Signed: Natalie Abdi MD     5/9/2025 12:45 AM EDT     Workstation ID: [id number]        Vitals:    05/09/25 0009 05/09/25 0030 05/09/25 0100 05/09/25 0130   BP: 157/96 158/97 158/82 150/82   BP Location: Right arm      Patient Position: Lying      Pulse: 73 70 72 75   Resp: 18      Temp: 98.2 °F (36.8 °C)      TempSrc: Oral      SpO2: 98% 98% 97% 99%   Weight: 72.6 kg (160 lb)      Height: 175.3 cm (69\")        Medications   Lidocaine 4 % 1 patch (1 patch Transdermal Medication Applied 5/9/25 0045)   Morphine sulfate (PF) injection 4 mg (4 mg Intramuscular Given 5/9/25 0142)   methocarbamol (ROBAXIN) tablet 500 mg (500 mg Oral Given 5/9/25 0142)     ECG/EMG Results (last 24 hours)       ** No results found for the last 24 hours. **          No orders to display                     Medical Decision Making  Amount and/or Complexity of Data Reviewed  Radiology: ordered.    Risk  OTC drugs.  Prescription drug management.        Final diagnoses:   Acute exacerbation of chronic low back pain   Left sided sciatica   Radicular leg pain   Impaired ambulation   Neurogenic bladder   Tobacco dependence   History of hyperlipidemia   History of gastroesophageal reflux (GERD)   Physical debility   Degeneration of intervertebral disc of lumbar region with discogenic back pain and lower extremity pain       ED " Disposition  ED Disposition       ED Disposition   Discharge    Condition   Stable    Comment   --               Wesly Minor MD  [address]  [address]  [place] [address]  [phone]    Schedule an appointment as soon as possible for a visit in 2 days  Close PCP follow-up for recheck within a couple of days    [facility] EMERGENCY DEPARTMENT  [address]  [facility] [id number] [phone]    If symptoms worsen         Medication List        PAUSE taking these medications      QUEtiapine 25 MG tablet  Wait to take this until your doctor or other care provider tells you to start again.  Hold until completion of Diflucan.   Commonly known as: SEROquel  Take 1 tablet by mouth Daily AND 2 tablets Every Night.            New Prescriptions      methocarbamol 500 MG tablet  Commonly known as: ROBAXIN  Take 1 tablet by mouth 3 (Three) Times a Day As Needed for Muscle Spasms.               Where to Get Your Medications        These medications were sent to [facility] - [place], [place] - [facility] [address] - [phone]  - [phone]   [address], [facility] [address]      Phone: [phone]   methocarbamol 500 MG tablet            Summer Ewing PA-C  05/09/25 0155

## 2025-05-09 NOTE — DISCHARGE INSTRUCTIONS
ED evaluation reveals CT findings that show no acute compression fracture or bony abnormality.  Patient has moderate degenerative or arthritic changes, most pronounced at L4-L5 and L5-S1.  There are no new changes as compared to previous CT imaging from 12/17/2024.  Continue with routinely scheduled Percocet 5 mg by mouth every 4 hours and we will add some Robaxin 500 mg by mouth 3 times daily as needed muscle spasm.  Patient given morphine injection, Robaxin, and Lidoderm patch during the ER course.  Follow-up closely with Dr. Wesly Minor, PCP.  Return to the ER if any worsening symptoms.

## 2025-05-14 ENCOUNTER — HOSPITAL ENCOUNTER (OUTPATIENT)
Dept: CT IMAGING | Facility: HOSPITAL | Age: 65
Discharge: HOME | End: 2025-05-14
Admitting: INTERNAL MEDICINE
Payer: MEDICARE

## 2025-05-14 DIAGNOSIS — C15.9 SQUAMOUS CELL CARCINOMA OF ESOPHAGUS: ICD-10-CM

## 2025-05-14 PROCEDURE — 74177 CT ABD & PELVIS W/CONTRAST: CPT

## 2025-05-14 PROCEDURE — 71260 CT THORAX DX C+: CPT

## 2025-05-14 PROCEDURE — 70491 CT SOFT TISSUE NECK W/DYE: CPT

## 2025-05-14 PROCEDURE — 25510000001 IOPAMIDOL 61 % SOLUTION: Performed by: INTERNAL MEDICINE

## 2025-05-14 RX ORDER — IOPAMIDOL 612 MG/ML
90 INJECTION, SOLUTION INTRAVASCULAR
Status: COMPLETED | OUTPATIENT
Start: 2025-05-14 | End: 2025-05-14

## 2025-05-14 RX ORDER — HEPARIN SODIUM (PORCINE) LOCK FLUSH IV SOLN 100 UNIT/ML 100 UNIT/ML
SOLUTION INTRAVENOUS
Status: DISCONTINUED
Start: 2025-05-14 | End: 2025-05-15 | Stop reason: HOSPADM

## 2025-05-14 RX ORDER — HEPARIN SODIUM (PORCINE) LOCK FLUSH IV SOLN 100 UNIT/ML 100 UNIT/ML
500 SOLUTION INTRAVENOUS ONCE
Status: DISCONTINUED | OUTPATIENT
Start: 2025-05-14 | End: 2025-05-15 | Stop reason: HOSPADM

## 2025-05-14 RX ADMIN — IOPAMIDOL 90 ML: 612 INJECTION, SOLUTION INTRAVENOUS at 13:54

## 2025-05-15 ENCOUNTER — OFFICE VISIT (OUTPATIENT)
Dept: UROLOGY | Facility: CLINIC | Age: 65
End: 2025-05-15
Payer: MEDICARE

## 2025-05-15 VITALS — BODY MASS INDEX: 23.7 KG/M2 | HEIGHT: 69 IN | WEIGHT: 160 LBS

## 2025-05-15 DIAGNOSIS — N31.9 NEUROGENIC BLADDER: Primary | ICD-10-CM

## 2025-05-15 DIAGNOSIS — R33.9 URINARY RETENTION: ICD-10-CM

## 2025-05-15 RX ORDER — APIXABAN 2.5 MG/1
TABLET, FILM COATED ORAL
COMMUNITY
Start: 2025-05-12

## 2025-05-15 RX ORDER — GABAPENTIN 400 MG/1
400 CAPSULE ORAL 3 TIMES DAILY
COMMUNITY

## 2025-05-15 RX ORDER — TRAZODONE HYDROCHLORIDE 50 MG/1
TABLET ORAL
COMMUNITY
Start: 2025-05-11

## 2025-05-15 RX ORDER — SENNOSIDES 8.6 MG
650 CAPSULE ORAL EVERY 6 HOURS PRN
COMMUNITY

## 2025-05-15 NOTE — PROGRESS NOTES
Follow Up Office Visit      Patient Name: Val Nassar Jr.  : 1960   MRN: [id number]     Chief Complaint:    Chief Complaint   Patient presents with    Acquired bladder diverticulum       Referring Provider: No ref. provider found    History of Present Illness: Val Nassar Jr. is a 64 y.o. 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  He was last seen in our office in 2024.  Patient was previously performing CIC at least 3-4 times daily getting 500 to 1000 cc of urine each time.  He has been admitted to the hospital multiple times over the last few months for numerous medical issues, is now wheelchair-bound, living in a skilled nursing facility [facility].  He has been unable to perform self-catheterization apparently and a catheter was placed by his facility, presumably secondary to severe urinary leakage and urgency incontinence despite CIC.      Was diagnosed with Pseudomonas UTI in March.  Also had a Pseudomonas urinary tract infection in 2025.  Patient presents to our office today with catheter in place.  States this has not been changed since 6 weeks prior.  Patient appears to have severe chronic anemia, most recent CBC showed improvement in hemoglobin 8.6, hematocrit 28.5.  Renal function is preserved, creatinine 0.6.  .    Patient was initiated on intermittent catheterization in 2024.  Previous workup indicated a large anterior bladder diverticulum in the setting of a small to average size prostate.  He has not undergone cystoscopy however this is unlikely to  given high likelihood of neurogenic bladder and high risk surgical candidate.    Subjective      Review of System: Review of Systems   Genitourinary:  Positive for difficulty urinating.      I have reviewed the ROS documented by my clinical staff, I have updated appropriately and I agree. Иван STEVENS  MD Meli    I have reviewed and the following portions of the patient's history were updated as appropriate: past family history, past medical history, past social history, past surgical history and problem list.    Medications:     Current Outpatient Medications:     acetaminophen (TYLENOL) 650 MG 8 hr tablet, Take 1 tablet by mouth Every 6 (Six) Hours As Needed for Mild Pain., Disp: , Rfl:     Benzalkonium Chloride 0.12 % liquid, Apply to gale area topically as needed for preventative use for Gale care after each incontinent episode and as needed., Disp: , Rfl:     Cholecalciferol (Vitamin D3) 1.25 MG (78863 UT) capsule, Take 1 capsule by mouth Every 7 (Seven) Days. Patient takes on Fridays, Disp: , Rfl:     docusate sodium (COLACE) 50 mg/5 mL liquid, Take 10 mL by mouth 2 (Two) Times a Day., Disp: , Rfl:     dutasteride (AVODART) 0.5 MG capsule, Take 1 capsule by mouth Daily., Disp: , Rfl:     Eliquis 2.5 MG tablet tablet, , Disp: , Rfl:     escitalopram (LEXAPRO) 10 MG tablet, Take 1 tablet by mouth Every Night., Disp: , Rfl:     ferrous gluconate (FERGON) 324 MG tablet, Take 1 tablet by mouth Daily With Breakfast., Disp: , Rfl:     folic acid (FOLVITE) 1 MG tablet, Take 1 tablet by mouth Daily., Disp: , Rfl:     levothyroxine (SYNTHROID, LEVOTHROID) 50 MCG tablet, Take 1 tablet by mouth Every Morning for 30 days., Disp: 30 tablet, Rfl: 0    Lidocaine 4 %, Place 1 patch on the skin as directed by provider Daily. Remove & Discard patch within 12 hours or as directed by MD, Disp: , Rfl:     Loratadine 10 MG capsule, Take 1 capsule by mouth Daily., Disp: , Rfl:     melatonin 5 MG tablet tablet, Take 1 tablet by mouth Every Night., Disp: , Rfl:     methocarbamol (ROBAXIN) 500 MG tablet, Take 1 tablet by mouth 3 (Three) Times a Day As Needed for Muscle Spasms., Disp: 21 tablet, Rfl: 0    oxyCODONE-acetaminophen (PERCOCET) 7.5-325 MG per tablet, Take 1 tablet by mouth Every 4 (Four) Hours As Needed., Disp: , Rfl:  "    pantoprazole (Protonix) 40 MG EC tablet, Take 1 tablet by mouth 2 (Two) Times a Day., Disp: , Rfl:     riFAXIMin (XIFAXAN) 550 MG tablet, Administer 1 tablet per G tube Every 12 (Twelve) Hours for 708 doses. Indications: Impaired Brain Function due to Liver Disease, Disp: , Rfl:     rosuvastatin (CRESTOR) 20 MG tablet, Take 1 tablet by mouth Every Night., Disp: , Rfl:     tamsulosin (FLOMAX) 0.4 MG capsule 24 hr capsule, Take 1 capsule by mouth Daily., Disp: , Rfl:     traZODone (DESYREL) 50 MG tablet, , Disp: , Rfl:     vitamin B-12 (CYANOCOBALAMIN) 1000 MCG tablet, Take 1 tablet by mouth Daily., Disp: , Rfl:     Zinc Oxide 10 % cream, Apply to bilat buttocks and groin topically every shift for redness., Disp: , Rfl:     carvedilol (COREG) 3.125 MG tablet, Take 1 tablet by mouth 2 (Two) Times a Day With Meals. (Patient not taking: Reported on 5/15/2025), Disp: 60 tablet, Rfl: 0    dimethicone 1 % cream, Apply to buttocks/Gale area topically as needed for preventative apply topically to buttocks/gale area. (Patient not taking: Reported on 5/15/2025), Disp: , Rfl:     gabapentin (NEURONTIN) 300 MG capsule, Take 1 capsule by mouth Every 8 (Eight) Hours for 3 days., Disp: 9 capsule, Rfl: 0    gabapentin (NEURONTIN) 400 MG capsule, Take 1 capsule by mouth 3 (Three) Times a Day., Disp: , Rfl:     [Paused] QUEtiapine (SEROquel) 25 MG tablet, Take 1 tablet by mouth Daily AND 2 tablets Every Night. (Patient not taking: Reported on 3/31/2025), Disp: , Rfl:   No current facility-administered medications for this visit.    Allergies:   Allergies   Allergen Reactions    Green Beans Hives     Fresh cut       Objective     Physical Exam:   Vital Signs:   Vitals:    05/15/25 0958   Weight: 72.6 kg (160 lb)   Height: 175.3 cm (69\")  Comment: pt stated height     Body mass index is 23.63 kg/m².     Physical Exam  Constitutional:       Appearance: Normal appearance.   HENT:      Head: Normocephalic and atraumatic.      Nose: Nose " normal.   Eyes:      Extraocular Movements: Extraocular movements intact.      Conjunctiva/sclera: Conjunctivae normal.      Pupils: Pupils are equal, round, and reactive to light.   Genitourinary:     Comments: Lubin in place draining clear yellow urine  Musculoskeletal:         General: Normal range of motion.      Cervical back: Normal range of motion and neck supple.   Skin:     General: Skin is warm and dry.      Findings: No lesion or rash.   Neurological:      General: No focal deficit present.      Mental Status: He is alert and oriented to person, place, and time. Mental status is at baseline.   Psychiatric:         Mood and Affect: Mood normal.         Behavior: Behavior normal.         Labs:   Brief Urine Lab Results  (Last result in the past 365 days)        Color   Clarity   Blood   Leuk Est   Nitrite   Protein   CREAT   Urine HCG        03/24/25 1636 Yellow   Turbid   Small (1+)   Large (3+)   Negative   30 mg/dL (1+)                   Urine Culture          11/14/2024    13:09 2/11/2025    09:42 3/24/2025    16:36   Urine Culture   Urine Culture >100,000 CFU/mL Citrobacter koseri  >100,000 CFU/mL Pseudomonas aeruginosa  >100,000 CFU/mL Pseudomonas aeruginosa         Lab Results   Component Value Date    GLUCOSE 115 (H) 03/27/2025    CALCIUM 8.8 03/27/2025     03/27/2025    K 3.8 03/27/2025    CO2 22.0 03/27/2025     03/27/2025    BUN 7 (L) 03/27/2025    CREATININE 0.68 (L) 03/27/2025    BCR 10.3 03/27/2025    ANIONGAP 8.0 03/27/2025       Lab Results   Component Value Date    WBC 8.91 03/27/2025    HGB 8.6 (L) 03/27/2025    HCT 28.5 (L) 03/27/2025    MCV 95.3 03/27/2025     03/27/2025       Images:   CT Lumbar Spine Without Contrast  Result Date: 5/9/2025  Impression: No acute osseous abnormality. Mild to moderate degenerative changes are present, most pronounced at L4-L5 and L5-S1, similar as compared to the previous study. Mild canal stenosis present at L4-L5 due to a disc  osteophyte complex and mild spondylolisthesis, stable. Mild to moderate neuroforaminal narrowing present bilaterally at L4-L5 and L5-S1, similar as compared to the previous study. Electronically Signed: Natalie Abdi MD  5/9/2025 12:45 AM EDT  Workstation ID: [id number]    CT Abdomen Pelvis With & Without Contrast  Result Date: 3/24/2025  Impression: 1.No acute intra-abdominal or intrapelvic process. No evidence of active contrast extravasation. No evidence of hemodynamically significant stenosis of the mesenteric vasculature. 2.Circumferential bladder wall thickening which appears to have progressed as compared to the previous study which may be related to cystitis or the sequela of chronic outlet obstruction. Bladder diverticulum present extending left of midline which has increased in size as compared to the previous study likely related to increased urine within the bladder. 3.Indeterminate partially cystic appearing hypodense lesion seen along the superior aspect of the left kidney measuring up to 2.9 x 3.2 cm x 1.9 cm in craniocaudal dimension. This may represent a true lesion or sequela of previous pyelonephritis. Scarring suspected. No drainable collection identified. Contrasted CT follow-up is recommended to evaluate for stability or resolution. 4.Ancillary findings as described above. Electronically Signed: Natalie Abdi MD  3/24/2025 11:47 PM EDT  Workstation ID: [id number]    XR Chest 1 View  Result Date: 3/24/2025  Impression: Redemonstration of right chest port. Endotracheal tube and enteric feeding tube have been removed. Stable cardiomediastinal silhouette. Similar diffuse interstitial prominence likely reflecting interstitial edema. No pleural effusion or pneumothorax. Electronically Signed: Nikita Garrison MD  3/24/2025 4:40 PM EDT  Workstation ID: [id number]    XR Chest 1 View  Result Date: 3/6/2025  Impression: 1.Small left pleural effusion with left basilar atelectasis versus infiltrate. Electronically  Signed: Urbano Hernandez MD  3/6/2025 7:42 AM EST  Workstation ID: [id number]    XR Chest 1 View  Result Date: 3/5/2025  Impression: Enteric tube tip within the distal portion of the third portion of the duodenum. Small left-sided pleural effusion present. Electronically Signed: Natalie Abdi MD  3/5/2025 1:35 AM EST  Workstation ID: [id number]    XR Abdomen KUB  Result Date: 3/5/2025  Impression: Enteric tube tip within the distal portion of the third portion of the duodenum. Small left-sided pleural effusion present. Electronically Signed: Natalie Abdi MD  3/5/2025 1:35 AM EST  Workstation ID: [id number]    XR Chest 1 View  Result Date: 3/4/2025  Impression: 1. Stable lines and support tubes. 2. Persistent mild left basilar airspace disease which may relate to atelectasis, pneumonia not excluded. 3. Clear right lung. 4. No pneumothorax. Electronically Signed: Richard Pinto MD  3/4/2025 7:40 AM EST  Workstation ID: [id number]    XR Chest 1 View  Result Date: 3/3/2025  Impression: 1. ET tube 3 cm above doron. No pneumothorax. 2. Mixed pattern interstitial and alveolar airspace consolidation with pleural effusions, overall increasing from prior 2/28/2025 chest radiograph although may be similar to recent CT comparison. Mucous plugging in the right middle lobe remains possible. Electronically Signed: Guerrero Rubio MD  3/3/2025 1:56 PM EST  Workstation ID: [id number]    CT Chest Without Contrast Diagnostic  Result Date: 3/2/2025  Impression: Chest 1. Multifocal patchy and alveolar airspace opacities suspicious for pneumonia and/or other forms of infectious/inflammatory pneumonitis which may include atypical/viral etiologies. Small right pleural effusion. 2. Recent aspiration versus mucous plugging in the right bronchus intermedius with complete right middle lobe collapse. 3. Aortic and coronary atherosclerotic disease. 4. Cardiomegaly. 5. CT findings suggesting anemia. Abdomen/pelvis 1. Asymmetric left renal findings suspicious  for persistent pyelonephritis, severity appears improved from 2/11/2025. No hydronephrosis or drainable fluid collection. 2. Possible cystitis by CT, correlate with urinalysis. 3. Morphologic changes of the liver suspicious for chronic liver disease. 4. Atrophic pancreas. 5. Colonic diverticulosis. 6. Other ancillary findings as above. Electronically Signed: Guerrero Rubio MD  3/2/2025 2:18 PM EST  Workstation ID: [id number]    CT Abdomen Pelvis Without Contrast  Result Date: 3/2/2025  Impression: Chest 1. Multifocal patchy and alveolar airspace opacities suspicious for pneumonia and/or other forms of infectious/inflammatory pneumonitis which may include atypical/viral etiologies. Small right pleural effusion. 2. Recent aspiration versus mucous plugging in the right bronchus intermedius with complete right middle lobe collapse. 3. Aortic and coronary atherosclerotic disease. 4. Cardiomegaly. 5. CT findings suggesting anemia. Abdomen/pelvis 1. Asymmetric left renal findings suspicious for persistent pyelonephritis, severity appears improved from 2/11/2025. No hydronephrosis or drainable fluid collection. 2. Possible cystitis by CT, correlate with urinalysis. 3. Morphologic changes of the liver suspicious for chronic liver disease. 4. Atrophic pancreas. 5. Colonic diverticulosis. 6. Other ancillary findings as above. Electronically Signed: Guerrero Rubio MD  3/2/2025 2:18 PM EST  Workstation ID: [id number]    XR Chest 1 View  Result Date: 2/28/2025  Impression: Stable lung opacities compatible with pneumonia or edema Electronically Signed: Eder Draper  2/28/2025 7:16 AM EST  Workstation ID: [id number]    XR Chest 1 View  Result Date: 2/27/2025  Impression: 1. Right IJ catheter placement, tip in the proximal SVC. No evidence of pneumothorax. 2. Significant improved pulmonary disease, which suggests resolving pneumonia. Electronically Signed: Ibrahima Jett MD  2/27/2025 8:11 AM EST  Workstation ID: [id number]    XR Chest 1  View  Result Date: 2/26/2025  Impression: There is mild improvement in bilateral airspace disease with persistent small left pleural effusion Electronically Signed: Eder Bonny  2/26/2025 7:17 AM EST  Workstation ID: [id number]    XR Chest 1 View  Result Date: 2/25/2025  Impression: Interval extubation and removal of NG/OG tube. Redemonstration of bilateral parenchymal opacities compatible with pulmonary edema, slowly improved since 2/22/2025 exam. Decreased conspicuity of small left pleural effusion. Electronically Signed: Nikita Garrison MD  2/25/2025 7:08 AM EST  Workstation ID: [id number]    XR Abdomen KUB  Result Date: 2/24/2025  Impression: Feeding tube tip in the area of the duodenal bulb. Electronically Signed: Ibrahima Jett MD  2/24/2025 2:01 PM EST  Workstation ID: [id number]    XR Chest 1 View  Result Date: 2/24/2025  Impression: 1. Stable lines and support tubes. 2. Persistent perihilar airspace disease right greater than left which may relate to pulmonary edema, overall improved from the prior study. 3. Persistent bibasilar atelectasis with small bilateral pleural effusions with improved aeration at the right lung base. 4. No pneumothorax. Electronically Signed: Richard Pinto MD  2/24/2025 7:41 AM EST  Workstation ID: [id number]    XR Abdomen KUB  Result Date: 2/23/2025  Impression: Weighted tip feeding catheter and nasogastric tube both have similar course to terminate over the distal stomach. Electronically Signed: Yoni Guadalupe MD  2/23/2025 3:01 PM EST  Workstation ID: [id number]    XR Chest 1 View  Result Date: 2/23/2025  Impression: Support hardware projects unchanged. There is evidence of persistent edema pattern with small to moderate right and trace left pleural effusions. Perihilar airspace disease otherwise appears somewhat less confluent and there is no new focal consolidation. There is no distinct pneumothorax. Unchanged heart and mediastinal contours. Electronically Signed: Yuri Medina MD   2/23/2025 7:20 AM EST  Workstation ID: [id number]    XR Chest 1 View  Result Date: 2/22/2025  Impression: Diffuse pulmonary infiltrate or edema, worsened in the right upper lobe. Small to moderate pleural effusions are unchanged. ET tube, NG tube, left jugular central venous catheter and right subclavian Port-A-Cath remain in place. Electronically Signed: Darryn Loaiza MD  2/22/2025 7:29 AM EST  Workstation ID: [id number]    XR Chest 1 View  Result Date: 2/21/2025  Impression: Mild worsening of dense and hazy opacities in the mid and lower lungs on a background of diffuse interstitial opacities, likely reflecting small to moderate right and small left pleural effusions and bibasilar atelectasis or pneumonia. Diffuse interstitial opacities may reflect interstitial edema or multifocal pneumonia. Electronically Signed: Nikita Garrison MD  2/21/2025 7:46 AM EST  Workstation ID: [id number]    XR Chest 1 View  Result Date: 2/20/2025  Impression: Support hardware projects unchanged. There is persistent edema pattern present including perihilar opacities and small to moderate right and trace left pleural effusions. There is no distinct pneumothorax. No new focal airspace consolidation. Unchanged heart and mediastinal contours. Electronically Signed: Yuri Medina MD  2/20/2025 6:46 AM EST  Workstation ID: [id number]    XR Chest 1 View  Result Date: 2/19/2025  Impression: Stable exam demonstrating bibasilar airspace disease and suspected small bilateral pleural effusions Electronically Signed: Eder Draper  2/19/2025 7:49 AM EST  Workstation ID: [id number]    XR Chest 1 View  Result Date: 2/18/2025  Impression: Similar small bilateral pleural effusions with bibasilar opacities. Electronically Signed: Rashad Chau MD  2/18/2025 8:41 AM EST  Workstation ID: [id number]    XR Chest 1 View  Result Date: 2/17/2025  Impression: Similar-appearing cardiomegaly with mild pulmonary edema and small bilateral pleural effusions. Mild bibasal  opacities, likely atelectasis. Support lines and tubes as above. Electronically Signed: Dashawn PEREZ Tineo  2/17/2025 4:32 PM EST  Workstation ID: [id number]    XR Chest 1 View  Result Date: 2/17/2025  Impression: 1. Mildly improved pulmonary vascular congestion. 2. Persistent bibasilar atelectasis and effusion, which appears mildly increased on the left. Electronically Signed: Ibrahima Jett MD  2/17/2025 7:18 AM EST  Workstation ID: [id number]    XR Chest 1 View  Result Date: 2/16/2025  Impression: Support devices have appropriate position. Pulmonary edema with moderate pleural effusions and bibasilar airspace opacity with slight progression of pulmonary edema since previous study. Electronically Signed: Jessi Lutz MD  2/16/2025 5:35 PM EST  Workstation ID: [id number]    XR Chest 1 View  Result Date: 2/16/2025  Impression: Mildly improved bibasilar pulmonary disease compared to 2/15/2025 exam. No pneumothorax or other new chest pathology is seen. Electronically Signed: Ibrahima Jett MD  2/16/2025 9:16 AM EST  Workstation ID: [id number]    XR Chest 1 View  Result Date: 2/15/2025  Impression: Stable medical support devices. Stable cardiomediastinal silhouette. Redemonstration of hazy bibasilar opacities compatible with layering pleural effusions. Increased retrocardiac opacities likely reflect atelectasis. No pneumothorax. Electronically Signed: Nikita Garrison MD  2/15/2025 8:02 AM EST  Workstation ID: [id number]    XR Chest 1 View  Result Date: 2/14/2025  Impression: 1. Stable lines and support tubes. 2. Improving bibasilar airspace disease likely resolving atelectasis with small right pleural effusion. 3. No pneumothorax. Electronically Signed: Richard Pinto MD  2/14/2025 7:33 AM EST  Workstation ID: [id number]    XR Abdomen KUB  Result Date: 2/14/2025  Impression: Nonobstructive bowel gas pattern. Enteric tube within the stomach. Electronically Signed: Natalie Abdi MD  2/14/2025 6:58 AM EST  Workstation ID: [id number]    XR Chest 1  View  Result Date: 2/13/2025  Impression: 1. Worsening bibasilar airspace disease with probable small right pleural effusion. Electronically Signed: Jerrod Olsen MD  2/13/2025 7:17 AM EST  Workstation ID: [id number]    XR Chest 1 View  Result Date: 2/12/2025  Impression: 1.Left internal jugular CVC catheter with the tip in the distal SVC. No pneumothorax. Enteric tube within the stomach. Endotracheal tube tip in the mid trachea. 2.Bibasilar airspace disease, improved on the left as compared to the previous study. Resolution of left-sided pleural effusion. Electronically Signed: Natalie Abdi MD  2/12/2025 2:50 AM EST  Workstation ID: [id number]    XR Abdomen KUB  Result Date: 2/12/2025  Impression: 1.Left internal jugular CVC catheter with the tip in the distal SVC. No pneumothorax. Enteric tube within the stomach. Endotracheal tube tip in the mid trachea. 2.Bibasilar airspace disease, improved on the left as compared to the previous study. Resolution of left-sided pleural effusion. Electronically Signed: Natalie Abdi MD  2/12/2025 2:50 AM EST  Workstation ID: [id number]    XR Chest 1 View  Result Date: 2/11/2025  Impression: 1. Left IJ central line tip extends to the upper SVC level. No visible pneumothorax. 2. Developing left lower lobe airspace disease worrisome for pneumonia, compared to earlier today. 3. Small left pleural effusion Electronically Signed: Awa Lilly MD  2/11/2025 1:49 PM EST  Workstation ID: [id number]    CT Chest With Contrast Diagnostic  Result Date: 2/11/2025  Impression: 1.Interval development of bilateral lower lobe consolidations compatible with pneumonia, more accentuated on the left. 2.Interval development of multiple patchy areas of hypodensity involving the left renal cortex with associated extensive perinephric fat stranding compatible with acute pyelonephritis. There is also diffuse left periureteric fat stranding. 3.Diffuse bladder wall thickening and large bladder diverticulum.  Correlate clinically for cystitis. 4.Sigmoid diverticulosis with no diverticulitis. 5.Mild nodularity of the liver surface. Correlate with liver function tests. 6.Focal area of heterogeneous density in the right paratracheal region at the anatomical level of the azygos not present on the previous exam. This may represent an engorged azygos or developing lymphadenopathy adjacent to the azygos Electronically Signed: Rich Ruiz MD  2/11/2025 11:19 AM EST  Workstation ID: [id number]    CT Abdomen Pelvis With Contrast  Result Date: 2/11/2025  Impression: 1.Interval development of bilateral lower lobe consolidations compatible with pneumonia, more accentuated on the left. 2.Interval development of multiple patchy areas of hypodensity involving the left renal cortex with associated extensive perinephric fat stranding compatible with acute pyelonephritis. There is also diffuse left periureteric fat stranding. 3.Diffuse bladder wall thickening and large bladder diverticulum. Correlate clinically for cystitis. 4.Sigmoid diverticulosis with no diverticulitis. 5.Mild nodularity of the liver surface. Correlate with liver function tests. 6.Focal area of heterogeneous density in the right paratracheal region at the anatomical level of the azygos not present on the previous exam. This may represent an engorged azygos or developing lymphadenopathy adjacent to the azygos Electronically Signed: Rich Ruiz MD  2/11/2025 11:19 AM EST  Workstation ID: [id number]    CT Head Without Contrast  Result Date: 2/11/2025  Impression: 1.No acute intracranial process identified. 2.Findings suggestive of mild chronic small vessel ischemic disease. 3.Moderate frothy paranasal sinus mucosal disease. Correlate for acute sinusitis. Electronically Signed: Arnold Abrams MD  2/11/2025 10:39 AM EST  Workstation ID: [id number]    XR Chest 1 View  Result Date: 2/11/2025  Impression: 1.Pulmonary vascular congestion, which could reflect mild pulmonary  edema. 2.No focal pneumonia identified. Electronically Signed: Arnold Abrams MD  2/11/2025 9:16 AM EST  Workstation ID: [id number]    CT Abdomen Pelvis Without Contrast  Result Date: 1/21/2025  Impression: 1. No evidence of renal or ureteral stone or hydronephrosis. No acute process seen within the abdomen or pelvis. 2. Colonic diverticulosis. 3. Large bladder diverticulum unchanged from prior study. Electronically Signed: Jerrod Olsen MD  1/21/2025 10:49 PM EST  Workstation ID: [id number]    CT Chest With Contrast Diagnostic  Result Date: 1/16/2025  Impression: No specific evidence of recurrent or metastatic disease in the neck, chest, abdomen and pelvis. No unexpected or acute findings otherwise. Chronic findings are noted as above, including morphologic changes of cirrhosis. Electronically Signed: Yuri Medina MD  1/16/2025 11:35 PM EST  Workstation ID: [id number]    CT Abdomen Pelvis With Contrast  Result Date: 1/16/2025  Impression: No specific evidence of recurrent or metastatic disease in the neck, chest, abdomen and pelvis. No unexpected or acute findings otherwise. Chronic findings are noted as above, including morphologic changes of cirrhosis. Electronically Signed: Yuri Medina MD  1/16/2025 11:35 PM EST  Workstation ID: [id number]    CT Soft Tissue Neck With Contrast  Result Date: 1/16/2025  Impression: No specific evidence of recurrent or metastatic disease in the neck, chest, abdomen and pelvis. No unexpected or acute findings otherwise. Chronic findings are noted as above, including morphologic changes of cirrhosis. Electronically Signed: Yuri Medina MD  1/16/2025 11:35 PM EST  Workstation ID: [id number]      Measures:   Tobacco:   Val Woodallfabian Nassar Jr.  reports that he has been smoking cigarettes. He has a 15 pack-year smoking history. He has been exposed to tobacco smoke. He has never used smokeless tobacco. I have educated him on the risk of diseases from using tobacco products such as  cancer, COPD, and heart disease.     I advised him to quit and he is not willing to quit.    I spent 3  minutes counseling the patient.     Oswald catheter exchange  Patient was placed supine on the exam table.  The existing catheter was removed.  The urethra was divided.  A new 16 Romanian coudé catheter was placed without resistance into the bladder.  10 cc of sterile water used to inflate the balloon.  Catheter was attached to a StatLock and drainage bag.    Assessment / Plan      Assessment/Plan:   64 y.o. male who presented today for follow up of neurogenic bladder secondary to alcohol abuse managed with intermittent catheterization previously, multiple recent hospitalizations for other medical concerns and now has a Oswald catheter in place, patient states this was placed by his facility for progressive urinary incontinence and severe urgency incontinence despite self-catheterization.  He prefers the catheter to reduce his risk of soiling himself, he prefers to hygiene benefit as well.  He elects to continue Oswald catheterization rather than CIC.  Oswald catheter was changed today.  He has recurrent urinary tract infection and 2 separate Pseudomonas urinary tract infections in February and March.  There is a high likelihood of colonization and given the absence of urinary tract infection symptoms we will not be checking a urine culture today.  Follow-up with PA/NP moving forward for symptom check, nursing staff at his facility to exchange his Oswald catheter every 3 weeks to prevent urinary tract infection, 16 Romanian Oswald is fine.    Diagnoses and all orders for this visit:    1. Neurogenic bladder (Primary)    2. Urinary retention  - only check urine culture if UTI symptoms present  - high likelihood of bladder and catheter bacterial colonization  - continue oswald, exchange 16 Fr every 3 wks at [facility]  - f/u 6 months with PA/NP     3. Recurrent UTI  - if progressive UTI frequency, will recommend nightly  antibiotic prophylaxis      Follow Up:   Return in about 6 months (around 11/15/2025) for 6 months Thuan .    I spent approximately 30 minutes providing clinical care for this patient; including review of patient's chart and provider documentation, face to face time spent with patient in examination room (obtaining history, performing physical exam, discussing diagnosis and management options), placing orders, and completing patient documentation.     Иван Garrison MD  [facility] [place]

## 2025-05-21 ENCOUNTER — TELEPHONE (OUTPATIENT)
Dept: ONCOLOGY | Facility: CLINIC | Age: 65
End: 2025-05-21

## 2025-05-21 NOTE — TELEPHONE ENCOUNTER
Spoke with staff at [facility] and patient and advised of missed appointment today.  Patient stated he will reschedule as he didn't know about appointment.  Call transferred to scheduling.

## 2025-05-23 ENCOUNTER — TELEPHONE (OUTPATIENT)
Dept: ONCOLOGY | Facility: CLINIC | Age: 65
End: 2025-05-23
Payer: MEDICARE

## 2025-05-23 NOTE — TELEPHONE ENCOUNTER
Caller: BERNIE RICHARD/HARRISON WOODY    Relationship to patient:     Best call back number: [phone]    Chief complaint: NEEDS TO RESCHEDULE     Type of visit: FOLLOW UP 1      If rescheduling, when is the original appointment: 5-27-25

## 2025-07-04 ENCOUNTER — HOSPITAL ENCOUNTER (EMERGENCY)
Facility: HOSPITAL | Age: 65
Discharge: HOME | End: 2025-07-04
Attending: EMERGENCY MEDICINE
Payer: MEDICARE

## 2025-07-04 ENCOUNTER — APPOINTMENT (OUTPATIENT)
Dept: CT IMAGING | Facility: HOSPITAL | Age: 65
End: 2025-07-04
Payer: MEDICARE

## 2025-07-04 VITALS
DIASTOLIC BLOOD PRESSURE: 67 MMHG | SYSTOLIC BLOOD PRESSURE: 139 MMHG | RESPIRATION RATE: 20 BRPM | TEMPERATURE: 98.4 F | OXYGEN SATURATION: 99 % | HEART RATE: 52 BPM

## 2025-07-04 DIAGNOSIS — Z97.8 CHRONIC INDWELLING FOLEY CATHETER: ICD-10-CM

## 2025-07-04 DIAGNOSIS — N39.0 BACTERIAL UTI: ICD-10-CM

## 2025-07-04 DIAGNOSIS — R10.9 ACUTE ABDOMINAL PAIN: Primary | ICD-10-CM

## 2025-07-04 DIAGNOSIS — A49.9 BACTERIAL UTI: ICD-10-CM

## 2025-07-04 LAB
ALBUMIN SERPL-MCNC: 3.9 G/DL (ref 3.5–5.2)
ALBUMIN/GLOB SERPL: 1 G/DL
ALP SERPL-CCNC: 248 U/L (ref 39–117)
ALT SERPL W P-5'-P-CCNC: 21 U/L (ref 1–41)
AMMONIA BLD-SCNC: 34 UMOL/L (ref 16–60)
ANION GAP SERPL CALCULATED.3IONS-SCNC: 16 MMOL/L (ref 5–15)
AST SERPL-CCNC: 30 U/L (ref 1–40)
BACTERIA UR QL AUTO: ABNORMAL /HPF
BASOPHILS # BLD AUTO: 0.08 10*3/MM3 (ref 0–0.2)
BASOPHILS NFR BLD AUTO: 0.6 % (ref 0–1.5)
BILIRUB SERPL-MCNC: 0.2 MG/DL (ref 0–1.2)
BILIRUB UR QL STRIP: NEGATIVE
BUN BLDA-MCNC: 17 MG/DL (ref 8–26)
BUN SERPL-MCNC: 14.1 MG/DL (ref 8–23)
BUN/CREAT SERPL: 17.2 (ref 7–25)
CA-I BLDA-SCNC: 1.19 MMOL/L (ref 1.2–1.32)
CALCIUM SPEC-SCNC: 9.6 MG/DL (ref 8.6–10.5)
CHLORIDE BLDA-SCNC: 100 MMOL/L (ref 98–109)
CHLORIDE SERPL-SCNC: 97 MMOL/L (ref 98–107)
CK SERPL-CCNC: 23 U/L (ref 20–200)
CLARITY UR: ABNORMAL
CO2 BLDA-SCNC: 24 MMOL/L (ref 24–29)
CO2 SERPL-SCNC: 22 MMOL/L (ref 22–29)
COLOR UR: YELLOW
CREAT BLDA-MCNC: 1.1 MG/DL (ref 0.6–1.3)
CREAT SERPL-MCNC: 0.82 MG/DL (ref 0.76–1.27)
D-LACTATE SERPL-SCNC: 2.3 MMOL/L (ref 0.5–2)
DEPRECATED RDW RBC AUTO: 52.5 FL (ref 37–54)
EGFRCR SERPLBLD CKD-EPI 2021: 74.5 ML/MIN/1.73
EGFRCR SERPLBLD CKD-EPI 2021: 97.5 ML/MIN/1.73
EOSINOPHIL # BLD AUTO: 0.38 10*3/MM3 (ref 0–0.4)
EOSINOPHIL NFR BLD AUTO: 2.8 % (ref 0.3–6.2)
ERYTHROCYTE [DISTWIDTH] IN BLOOD BY AUTOMATED COUNT: 16.8 % (ref 12.3–15.4)
GLOBULIN UR ELPH-MCNC: 3.8 GM/DL
GLUCOSE BLDC GLUCOMTR-MCNC: 104 MG/DL (ref 70–130)
GLUCOSE SERPL-MCNC: 95 MG/DL (ref 65–99)
GLUCOSE UR STRIP-MCNC: NEGATIVE MG/DL
HCT VFR BLD AUTO: 33.4 % (ref 37.5–51)
HCT VFR BLDA CALC: 36 % (ref 38–51)
HGB BLD-MCNC: 10.8 G/DL (ref 13–17.7)
HGB BLDA-MCNC: 12.2 G/DL (ref 12–17)
HGB UR QL STRIP.AUTO: ABNORMAL
HOLD SPECIMEN: NORMAL
HYALINE CASTS UR QL AUTO: ABNORMAL /LPF
IMM GRANULOCYTES # BLD AUTO: 0.13 10*3/MM3 (ref 0–0.05)
IMM GRANULOCYTES NFR BLD AUTO: 1 % (ref 0–0.5)
KETONES UR QL STRIP: NEGATIVE
LEUKOCYTE ESTERASE UR QL STRIP.AUTO: ABNORMAL
LYMPHOCYTES # BLD AUTO: 1 10*3/MM3 (ref 0.7–3.1)
LYMPHOCYTES NFR BLD AUTO: 7.3 % (ref 19.6–45.3)
MCH RBC QN AUTO: 27.6 PG (ref 26.6–33)
MCHC RBC AUTO-ENTMCNC: 32.3 G/DL (ref 31.5–35.7)
MCV RBC AUTO: 85.2 FL (ref 79–97)
MONOCYTES # BLD AUTO: 1.18 10*3/MM3 (ref 0.1–0.9)
MONOCYTES NFR BLD AUTO: 8.7 % (ref 5–12)
NEUTROPHILS NFR BLD AUTO: 10.86 10*3/MM3 (ref 1.7–7)
NEUTROPHILS NFR BLD AUTO: 79.6 % (ref 42.7–76)
NITRITE UR QL STRIP: POSITIVE
NRBC BLD AUTO-RTO: 0 /100 WBC (ref 0–0.2)
PH UR STRIP.AUTO: 5.5 [PH] (ref 5–8)
PLATELET # BLD AUTO: 425 10*3/MM3 (ref 140–450)
PMV BLD AUTO: 8.7 FL (ref 6–12)
POTASSIUM BLDA-SCNC: 4.8 MMOL/L (ref 3.5–4.9)
POTASSIUM SERPL-SCNC: 4.6 MMOL/L (ref 3.5–5.2)
PROT SERPL-MCNC: 7.7 G/DL (ref 6–8.5)
PROT UR QL STRIP: ABNORMAL
RBC # BLD AUTO: 3.92 10*6/MM3 (ref 4.14–5.8)
RBC # UR STRIP: ABNORMAL /HPF
REF LAB TEST METHOD: ABNORMAL
SODIUM BLD-SCNC: 134 MMOL/L (ref 138–146)
SODIUM SERPL-SCNC: 135 MMOL/L (ref 136–145)
SP GR UR STRIP: 1.04 (ref 1–1.03)
SQUAMOUS #/AREA URNS HPF: ABNORMAL /HPF
UROBILINOGEN UR QL STRIP: ABNORMAL
WBC # UR STRIP: ABNORMAL /HPF
WBC NRBC COR # BLD AUTO: 13.63 10*3/MM3 (ref 3.4–10.8)
WHOLE BLOOD HOLD COAG: NORMAL
WHOLE BLOOD HOLD SPECIMEN: NORMAL

## 2025-07-04 PROCEDURE — 96375 TX/PRO/DX INJ NEW DRUG ADDON: CPT

## 2025-07-04 PROCEDURE — 51702 INSERT TEMP BLADDER CATH: CPT

## 2025-07-04 PROCEDURE — 82550 ASSAY OF CK (CPK): CPT | Performed by: EMERGENCY MEDICINE

## 2025-07-04 PROCEDURE — 80053 COMPREHEN METABOLIC PANEL: CPT | Performed by: EMERGENCY MEDICINE

## 2025-07-04 PROCEDURE — 81001 URINALYSIS AUTO W/SCOPE: CPT | Performed by: EMERGENCY MEDICINE

## 2025-07-04 PROCEDURE — 25010000002 DIAZEPAM PER 5 MG: Performed by: EMERGENCY MEDICINE

## 2025-07-04 PROCEDURE — 25010000002 ONDANSETRON PER 1 MG: Performed by: EMERGENCY MEDICINE

## 2025-07-04 PROCEDURE — 82140 ASSAY OF AMMONIA: CPT | Performed by: EMERGENCY MEDICINE

## 2025-07-04 PROCEDURE — 25510000001 IOPAMIDOL 61 % SOLUTION: Performed by: EMERGENCY MEDICINE

## 2025-07-04 PROCEDURE — 83605 ASSAY OF LACTIC ACID: CPT | Performed by: EMERGENCY MEDICINE

## 2025-07-04 PROCEDURE — 85025 COMPLETE CBC W/AUTO DIFF WBC: CPT | Performed by: EMERGENCY MEDICINE

## 2025-07-04 PROCEDURE — 25010000002 HYDROMORPHONE 1 MG/ML SOLUTION: Performed by: EMERGENCY MEDICINE

## 2025-07-04 PROCEDURE — 80047 BASIC METABLC PNL IONIZED CA: CPT

## 2025-07-04 PROCEDURE — 74177 CT ABD & PELVIS W/CONTRAST: CPT

## 2025-07-04 PROCEDURE — 96374 THER/PROPH/DIAG INJ IV PUSH: CPT

## 2025-07-04 PROCEDURE — 87086 URINE CULTURE/COLONY COUNT: CPT | Performed by: EMERGENCY MEDICINE

## 2025-07-04 PROCEDURE — 85014 HEMATOCRIT: CPT

## 2025-07-04 PROCEDURE — 99285 EMERGENCY DEPT VISIT HI MDM: CPT

## 2025-07-04 RX ORDER — OXYCODONE AND ACETAMINOPHEN 7.5; 325 MG/1; MG/1
1 TABLET ORAL ONCE
Refills: 0 | Status: COMPLETED | OUTPATIENT
Start: 2025-07-04 | End: 2025-07-04

## 2025-07-04 RX ORDER — OXYCODONE AND ACETAMINOPHEN 10; 325 MG/1; MG/1
1 TABLET ORAL ONCE
Refills: 0 | Status: COMPLETED | OUTPATIENT
Start: 2025-07-04 | End: 2025-07-04

## 2025-07-04 RX ORDER — SODIUM CHLORIDE 0.9 % (FLUSH) 0.9 %
10 SYRINGE (ML) INJECTION AS NEEDED
Status: DISCONTINUED | OUTPATIENT
Start: 2025-07-04 | End: 2025-07-04 | Stop reason: HOSPADM

## 2025-07-04 RX ORDER — ONDANSETRON 2 MG/ML
4 INJECTION INTRAMUSCULAR; INTRAVENOUS ONCE
Status: COMPLETED | OUTPATIENT
Start: 2025-07-04 | End: 2025-07-04

## 2025-07-04 RX ORDER — IOPAMIDOL 612 MG/ML
100 INJECTION, SOLUTION INTRAVASCULAR
Status: COMPLETED | OUTPATIENT
Start: 2025-07-04 | End: 2025-07-04

## 2025-07-04 RX ORDER — LEVOFLOXACIN 500 MG/1
500 TABLET, FILM COATED ORAL DAILY
Qty: 6 TABLET | Refills: 0 | Status: SHIPPED | OUTPATIENT
Start: 2025-07-04

## 2025-07-04 RX ORDER — DIAZEPAM 10 MG/2ML
5 INJECTION, SOLUTION INTRAMUSCULAR; INTRAVENOUS ONCE
Status: COMPLETED | OUTPATIENT
Start: 2025-07-04 | End: 2025-07-04

## 2025-07-04 RX ORDER — LEVOFLOXACIN 500 MG/1
500 TABLET, FILM COATED ORAL ONCE
Status: COMPLETED | OUTPATIENT
Start: 2025-07-04 | End: 2025-07-04

## 2025-07-04 RX ADMIN — HYDROMORPHONE HYDROCHLORIDE 1 MG: 1 INJECTION, SOLUTION INTRAMUSCULAR; INTRAVENOUS; SUBCUTANEOUS at 07:47

## 2025-07-04 RX ADMIN — DIAZEPAM 5 MG: 5 INJECTION, SOLUTION INTRAMUSCULAR; INTRAVENOUS at 08:41

## 2025-07-04 RX ADMIN — LEVOFLOXACIN 500 MG: 500 TABLET, FILM COATED ORAL at 10:49

## 2025-07-04 RX ADMIN — IOPAMIDOL 80 ML: 612 INJECTION, SOLUTION INTRAVENOUS at 08:21

## 2025-07-04 RX ADMIN — OXYCODONE HYDROCHLORIDE AND ACETAMINOPHEN 1 TABLET: 10; 325 TABLET ORAL at 14:12

## 2025-07-04 RX ADMIN — ONDANSETRON 4 MG: 2 INJECTION, SOLUTION INTRAMUSCULAR; INTRAVENOUS at 07:48

## 2025-07-04 RX ADMIN — OXYCODONE HYDROCHLORIDE AND ACETAMINOPHEN 1 TABLET: 7.5; 325 TABLET ORAL at 10:49

## 2025-07-04 NOTE — ED PROVIDER NOTES
Subjective   History of Present Illness  Mr. Nassar arrives via EMS from [facility] with right lower quadrant and right inguinal pain.  Tells me it has been present for several days.  Does not change much with movement.  Denies nausea or vomiting.  Tells me is somewhat like pain he has had in the past although worse.       Mr. Nassar is wheelchair-bound.  Has history of esophageal cancer, cirrhosis, neurogenic bladder for which he has Lubin catheter, chronic back pain for which he is on oxycodone and gabapentin.  Primary care provider is Wesly Minor.      Review of Systems    Past Medical History:   Diagnosis Date    Anemia     Cancer     ESOPHAGEAL    Cirrhosis     Duodenal ulcer     Gastric ulcer     GERD (gastroesophageal reflux disease)     History of alcohol abuse     History of radiation therapy 05/08/2024    esophagus    HLD (hyperlipidemia)     Mood disorder        Allergies   Allergen Reactions    Green Beans Hives     Fresh cut       Past Surgical History:   Procedure Laterality Date    COLONOSCOPY N/A 3/26/2025    Procedure: COLONOSCOPY;  Surgeon: Brunner, Mark I, MD;  Location:  [facility];  Service: Gastroenterology;  Laterality: N/A;    ENDOSCOPY N/A 12/26/2023    Procedure: ESOPHAGOGASTRODUODENOSCOPY;  Surgeon: Wesly Mckeon MD;  Location:  [facility];  Service: Gastroenterology;  Laterality: N/A;    ENDOSCOPY N/A 3/3/2025    Procedure: ESOPHAGOGASTRODUODENOSCOPY;  Surgeon: Wesly Mckeon MD;  Location:  [facility];  Service: Gastroenterology;  Laterality: N/A;  APC USED IN ESOPHAGUS.    ENDOSCOPY N/A 3/26/2025    Procedure: ESOPHAGOGASTRODUODENOSCOPY;  Surgeon: Brunner, Mark I, MD;  Location:  [facility];  Service: Gastroenterology;  Laterality: N/A;    VENOUS ACCESS DEVICE (PORT) INSERTION Right 04/25/2024       Family History   Problem Relation Age of Onset    Cancer Mother         LUNG AND BREAST    Breast cancer Mother     Heart attack Father         Social History     Socioeconomic History    Marital status: Single   Tobacco Use    Smoking status: Every Day     Current packs/day: 1.00     Average packs/day: 1 pack/day for 15.0 years (15.0 ttl pk-yrs)     Types: Cigarettes     Passive exposure: Current    Smokeless tobacco: Never    Tobacco comments:     Rolls his on cigs     Vapes daily   Vaping Use    Vaping status: Every Day    Substances: Nicotine, Flavoring    Devices: Disposable   Substance and Sexual Activity    Alcohol use: Yes     Alcohol/week: 1.0 standard drink of alcohol     Types: 1 Cans of beer per week     Comment: 1 beer a day after supper    Drug use: Yes     Types: Hydrocodone    Sexual activity: Not Currently     Partners: Female           Objective   Physical Exam  Vitals and nursing note reviewed.   Constitutional:       General: He is in acute distress.      Comments: He is lying supine on his bed.  On initial exam he is crying out loudly in pain.   HENT:      Head: Normocephalic and atraumatic.      Nose: Nose normal. No congestion or rhinorrhea.   Eyes:      General: No scleral icterus.     Conjunctiva/sclera: Conjunctivae normal.   Neck:      Comments: No JVD   Cardiovascular:      Rate and Rhythm: Normal rate and regular rhythm.      Heart sounds: No murmur heard.     No friction rub.   Pulmonary:      Effort: Pulmonary effort is normal.      Breath sounds: Normal breath sounds. No wheezing or rales.   Abdominal:      General: Bowel sounds are normal.      Palpations: Abdomen is soft.      Tenderness: There is abdominal tenderness. There is no guarding or rebound.      Comments: Tender to palpate deep in the right lower quadrant.  Rebound tenderness absent.   Genitourinary:     Penis: Normal.       Comments: He is tender in the right inguinal area.  Do not palpate a hernia or adenopathy.  No erythema or swelling.  Tender over the inguinal ligament and just above it  Musculoskeletal:         General: No tenderness.      Cervical  back: Normal range of motion and neck supple.      Right lower leg: No edema.      Left lower leg: No edema.      Comments: He allows me to flex his right hip and does not have any pain with doing so.   Skin:     General: Skin is warm and dry.      Coloration: Skin is not pale.      Findings: No erythema.   Neurological:      General: No focal deficit present.      Mental Status: He is alert and oriented to person, place, and time.      Motor: No weakness.      Coordination: Coordination normal.   Psychiatric:         Mood and Affect: Mood normal.         Behavior: Behavior normal.         Thought Content: Thought content normal.         Procedures           ED Course  ED Course as of 07/04/25 1405   Fri Jul 04, 2025   0916 CT shows some thickening of the right ureter and bladder.  He tells us his catheter has not been changed for over a month.  We have placed a fresh sterile catheter and bag and will obtain urinalysis from that. [DT]   1044 Requesting pain meds.  Urine shows too numerous to count white cells and 4+ bacteria.  Most recent urine culture was from January and showed Pseudomonas.  Given the CT findings and white blood cell count and lack of finding anything else to explain his right lower quadrant abdominal pain I think it is most likely from urinary tract infection.  Will start Levaquin.  He is due for his oxycodone which I have ordered. [DT]   1054 Spoke with him about findings.  Advised him to hold the Protonix while he is on antibiotics. [DT]   1404 Awaiting transport.  Calls me into the room and requesting pain medicine.  He is due for his scheduled dose [DT]      ED Course User Index  [DT] Emanuel Addison MD KASPER reviewed by Emanuel Addison MD       Medical Decision Making  Ordered and interpreted multiple labs, CAT scan, urinalysis.  Had reevaluation.  Gave multiple medications    Problems Addressed:  Acute abdominal pain: complicated acute  illness or injury that poses a threat to life or bodily functions  Bacterial UTI: complicated acute illness or injury that poses a threat to life or bodily functions  Chronic indwelling Lubin catheter: chronic illness or injury with exacerbation, progression, or side effects of treatment    Amount and/or Complexity of Data Reviewed  External Data Reviewed: labs and notes.  Labs: ordered. Decision-making details documented in ED Course.  Radiology: ordered. Decision-making details documented in ED Course.    Risk  Prescription drug management.        Final diagnoses:   Acute abdominal pain   Bacterial UTI   Chronic indwelling Lubin catheter       ED Disposition  ED Disposition       ED Disposition   Discharge    Condition   Stable    Comment   --               Wesly Minor MD  [address]  [address]  [place] [address]  [phone]               Medication List        PAUSE taking these medications      QUEtiapine 25 MG tablet  Wait to take this until your doctor or other care provider tells you to start again.  Hold until completion of Diflucan.   Commonly known as: SEROquel  Take 1 tablet by mouth Daily AND 2 tablets Every Night.            New Prescriptions      levoFLOXacin 500 MG tablet  Commonly known as: LEVAQUIN  Take 1 tablet by mouth Daily.               Where to Get Your Medications        These medications were sent to [facility] - [place], [place] - [address] - [phone]  - [phone]   [address], [facility] [address]      Phone: [phone]   levoFLOXacin 500 MG tablet            Emanuel Addison MD  07/04/25 9786       Emanuel Addison MD  07/04/25 6722

## 2025-07-05 LAB — BACTERIA SPEC AEROBE CULT: NORMAL

## 2025-07-10 ENCOUNTER — OFFICE VISIT (OUTPATIENT)
Dept: ORTHOPEDIC SURGERY | Facility: CLINIC | Age: 65
End: 2025-07-10
Payer: MEDICARE

## 2025-07-10 VITALS
HEIGHT: 69 IN | SYSTOLIC BLOOD PRESSURE: 148 MMHG | BODY MASS INDEX: 23.25 KG/M2 | WEIGHT: 157 LBS | DIASTOLIC BLOOD PRESSURE: 64 MMHG

## 2025-07-10 DIAGNOSIS — M48.062 SPINAL STENOSIS OF LUMBAR REGION WITH NEUROGENIC CLAUDICATION: ICD-10-CM

## 2025-07-10 DIAGNOSIS — G89.29 CHRONIC RIGHT-SIDED LOW BACK PAIN, UNSPECIFIED WHETHER SCIATICA PRESENT: ICD-10-CM

## 2025-07-10 DIAGNOSIS — M54.50 CHRONIC RIGHT-SIDED LOW BACK PAIN, UNSPECIFIED WHETHER SCIATICA PRESENT: ICD-10-CM

## 2025-07-10 DIAGNOSIS — Z72.0 TOBACCO ABUSE: ICD-10-CM

## 2025-07-10 DIAGNOSIS — M16.0 PRIMARY OSTEOARTHRITIS OF BOTH HIPS: ICD-10-CM

## 2025-07-10 DIAGNOSIS — M25.551 RIGHT HIP PAIN: Primary | ICD-10-CM

## 2025-07-10 PROCEDURE — 1160F RVW MEDS BY RX/DR IN RCRD: CPT | Performed by: PHYSICIAN ASSISTANT

## 2025-07-10 PROCEDURE — 99203 OFFICE O/P NEW LOW 30 MIN: CPT | Performed by: PHYSICIAN ASSISTANT

## 2025-07-10 PROCEDURE — 1159F MED LIST DOCD IN RCRD: CPT | Performed by: PHYSICIAN ASSISTANT

## 2025-07-10 RX ORDER — DOCUSATE SODIUM 100 MG/1
CAPSULE, LIQUID FILLED ORAL
COMMUNITY
Start: 2025-07-08

## 2025-07-10 RX ORDER — AMMONIUM LACTATE 12 G/100G
CREAM TOPICAL
COMMUNITY
Start: 2025-06-03

## 2025-07-10 RX ORDER — ONDANSETRON 4 MG/1
TABLET, FILM COATED ORAL
COMMUNITY
Start: 2025-06-23

## 2025-07-10 RX ORDER — LEVOTHYROXINE SODIUM 100 UG/1
TABLET ORAL
COMMUNITY
Start: 2025-07-09

## 2025-07-10 RX ORDER — BACLOFEN 5 MG/1
TABLET ORAL
COMMUNITY
Start: 2025-06-30

## 2025-07-10 NOTE — PROGRESS NOTES
[facility] Note    Subjective     Chief Complaint   Patient presents with    Right Hip - Pain        HPI  Val Nassar Jr. is a 64 y.o. male.  New patient presents for evaluation of right hip pain.  In describing the location of the pain he places his hand and goes throughout the lower abdomen, low back into the right hip.  He reports the pain has been there for over a year.  He notes the pain does radiate down his entire leg.  Upon review of his records he has a known history of spinal stenosis with neurogenic claudication.  He is wheelchair-bound.  He is being treated for neurogenic bladder for which he has a Lubin catheter.  He reports receiving epidural steroid injections in his back approximately 3 months ago which helped his symptoms significantly.    Positive tobacco use.  Positive narcotic use.  Wheelchair-bound.    Pain scale: 8/10.  Severity of the pain moderate to severe.  Quality of the pain throbbing, shooting.  Associated symptoms pain, grinding.  Activity related to pain any attempted weightbearing, lying on the affected side, rising from a seated position.      Denies fever, chills, night sweats or other constitutional symptoms.    Past Medical History:   Diagnosis Date    Anemia     Cancer     ESOPHAGEAL    Cirrhosis     Duodenal ulcer     Gastric ulcer     GERD (gastroesophageal reflux disease)     History of alcohol abuse     History of radiation therapy 05/08/2024    esophagus    HLD (hyperlipidemia)     Mood disorder       Past Surgical History:   Procedure Laterality Date    COLONOSCOPY N/A 3/26/2025    Procedure: COLONOSCOPY;  Surgeon: Brunner, Mark I, MD;  Location:  [facility];  Service: Gastroenterology;  Laterality: N/A;    ENDOSCOPY N/A 12/26/2023    Procedure: ESOPHAGOGASTRODUODENOSCOPY;  Surgeon: Wesly Mckeon MD;  Location:  [facility];  Service: Gastroenterology;  Laterality: N/A;    ENDOSCOPY N/A 3/3/2025    Procedure:  ESOPHAGOGASTRODUODENOSCOPY;  Surgeon: Wesly Mckeon MD;  Location:  [facility];  Service: Gastroenterology;  Laterality: N/A;  APC USED IN ESOPHAGUS.    ENDOSCOPY N/A 3/26/2025    Procedure: ESOPHAGOGASTRODUODENOSCOPY;  Surgeon: Brunner, Mark I, MD;  Location:  [facility];  Service: Gastroenterology;  Laterality: N/A;    VENOUS ACCESS DEVICE (PORT) INSERTION Right 04/25/2024      Family History   Problem Relation Age of Onset    Cancer Mother         LUNG AND BREAST    Breast cancer Mother     Heart attack Father      Social History     Socioeconomic History    Marital status: Single   Tobacco Use    Smoking status: Every Day     Current packs/day: 1.00     Average packs/day: 1 pack/day for 15.0 years (15.0 ttl pk-yrs)     Types: Cigarettes     Passive exposure: Current    Smokeless tobacco: Never    Tobacco comments:     Rolls his on cigs     Vapes daily   Vaping Use    Vaping status: Every Day    Substances: Nicotine, Flavoring    Devices: Disposable   Substance and Sexual Activity    Alcohol use: Yes     Alcohol/week: 1.0 standard drink of alcohol     Types: 1 Cans of beer per week     Comment: 1 beer a day after supper    Drug use: Yes     Types: Hydrocodone    Sexual activity: Not Currently     Partners: Female      Current Outpatient Medications on File Prior to Visit   Medication Sig Dispense Refill    acetaminophen (TYLENOL) 650 MG 8 hr tablet Take 1 tablet by mouth Every 6 (Six) Hours As Needed for Mild Pain.      ammonium lactate (AMLACTIN) 12 % cream       Baclofen (LIORESAL) 5 MG tablet       Benzalkonium Chloride 0.12 % liquid Apply to bianca area topically as needed for preventative use for Bianca care after each incontinent episode and as needed.      Cholecalciferol (Vitamin D3) 1.25 MG (40044 UT) capsule Take 1 capsule by mouth Every 7 (Seven) Days. Patient takes on Fridays      docusate sodium (COLACE) 100 MG capsule       dutasteride (AVODART) 0.5 MG capsule Take 1 capsule by mouth Daily.       Eliquis 2.5 MG tablet tablet       escitalopram (LEXAPRO) 10 MG tablet Take 1 tablet by mouth Every Night.      ferrous gluconate (FERGON) 324 MG tablet Take 1 tablet by mouth Daily With Breakfast.      folic acid (FOLVITE) 1 MG tablet Take 1 tablet by mouth Daily.      gabapentin (NEURONTIN) 400 MG capsule Take 1 capsule by mouth 3 (Three) Times a Day.      levoFLOXacin (LEVAQUIN) 500 MG tablet Take 1 tablet by mouth Daily. 6 tablet 0    levothyroxine (SYNTHROID, LEVOTHROID) 100 MCG tablet       Lidocaine 4 % Place 1 patch on the skin as directed by provider Daily. Remove & Discard patch within 12 hours or as directed by MD      Loratadine 10 MG capsule Take 1 capsule by mouth Daily.      melatonin 5 MG tablet tablet Take 1 tablet by mouth Every Night.      methocarbamol (ROBAXIN) 500 MG tablet Take 1 tablet by mouth 3 (Three) Times a Day As Needed for Muscle Spasms. 21 tablet 0    ondansetron (ZOFRAN) 4 MG tablet       oxyCODONE-acetaminophen (PERCOCET) 7.5-325 MG per tablet Take 1 tablet by mouth Every 4 (Four) Hours As Needed.      pantoprazole (Protonix) 40 MG EC tablet Take 1 tablet by mouth 2 (Two) Times a Day.      riFAXIMin (XIFAXAN) 550 MG tablet Administer 1 tablet per G tube Every 12 (Twelve) Hours for 708 doses. Indications: Impaired Brain Function due to Liver Disease      rosuvastatin (CRESTOR) 20 MG tablet Take 1 tablet by mouth Every Night.      tamsulosin (FLOMAX) 0.4 MG capsule 24 hr capsule Take 1 capsule by mouth Daily.      traZODone (DESYREL) 50 MG tablet       vitamin B-12 (CYANOCOBALAMIN) 1000 MCG tablet Take 1 tablet by mouth Daily.      Zinc Oxide 10 % cream Apply to bilat buttocks and groin topically every shift for redness.      carvedilol (COREG) 3.125 MG tablet Take 1 tablet by mouth 2 (Two) Times a Day With Meals. (Patient not taking: Reported on 7/10/2025) 60 tablet 0    dimethicone 1 % cream Apply to buttocks/Bianca area topically as needed for preventative apply topically to  "buttocks/gale area. (Patient not taking: Reported on 7/10/2025)      gabapentin (NEURONTIN) 300 MG capsule Take 1 capsule by mouth Every 8 (Eight) Hours for 3 days. 9 capsule 0    [Paused] QUEtiapine (SEROquel) 25 MG tablet Take 1 tablet by mouth Daily AND 2 tablets Every Night. (Patient not taking: Reported on 3/31/2025)       No current facility-administered medications on file prior to visit.      Allergies   Allergen Reactions    Green Beans Hives     Fresh cut        The following portions of the patient's history were reviewed and updated as appropriate: allergies, current medications, past family history, past medical history, past social history, past surgical history, and problem list.    Review of Systems   Constitutional: Negative.    HENT: Negative.     Eyes: Negative.    Respiratory: Negative.     Cardiovascular: Negative.    Gastrointestinal: Negative.    Endocrine: Negative.    Genitourinary: Negative.    Musculoskeletal:  Positive for arthralgias.   Skin: Negative.    Allergic/Immunologic: Negative.    Neurological: Negative.    Hematological: Negative.    Psychiatric/Behavioral: Negative.          Objective      Physical Exam  /64   Ht 175.3 cm (69\")   Wt 71.2 kg (157 lb)   BMI 23.18 kg/m²     Body mass index is 23.18 kg/m².    GENERAL APPEARANCE: awake, alert & oriented x 3, in no acute distress and well developed, well nourished  PSYCH: normal mood and affect  LUNGS:  breathing nonlabored, no wheezing  EYES: sclera anicteric, pupils equal  CARDIOVASCULAR: palpable pulses. Capillary refill less than 2 seconds  INTEGUMENTARY: skin intact, no clubbing, cyanosis  NEUROLOGIC:  Normal Sensation         Ortho Exam  Right hip  Tenderness: Positive tenderness noted throughout low back into right hip/thigh and radiates down his leg.  Motion: Flx 100°, internal rotation 20°, external rotation 20°.   Testing: Stinchfield pain throughout low back into right lower extremity, Passive hip flx to 90° with " IR/ER pain throughout low back into right lower extremity  Motor: Grossly intact Q/HS/TA/GS/EHL/P.  Sensory: Grossly intact to LT L2-S1.      Imaging/Studies  Ordered right hip plain films.  Imaging read/interpreted by Dr. Mayfield.    Imaging Results (Last 7 Days)       Procedure Component Value Units Date/Time    XR Hip With or Without Pelvis 2 - 3 View Right [[id number]] Resulted: 07/10/25 0950     Updated: 07/10/25 0950    Narrative:      Indication: Right hip pain    Comparison: No prior xrays are available for comparison      Impression:           AP pelvis, right hip: mild joint space narrowing, minimal osteophyte   formation  AP pelvis, left hip: mild joint space narrowing, minimal osteophyte   formation              Assessment/Plan        ICD-10-CM ICD-9-CM   1. Right hip pain  M25.551 719.45   2. Chronic right-sided low back pain, unspecified whether sciatica present  M54.50 724.2    G89.29 338.29   3. Spinal stenosis of lumbar region with neurogenic claudication  M48.062 724.03   4. Primary osteoarthritis of both hips  M16.0 715.15   5. Tobacco abuse  Z72.0 305.1       Orders Placed This Encounter   Procedures    XR Hip With or Without Pelvis 2 - 3 View Right        -Right hip, chronic right-sided low back pain, spinal stenosis lumbar region with neurogenic claudication, mild osteoarthritis hip.  -Reviewed imaging.  -Exam is not consistent with hip arthritis as cause of patient's pain.  Believe it is related to his chronic low back pain and history of spinal stenosis.  -Patient reports he has responded well to injections in his back in the past.  -Recommend he follow-up with PCP or pain management versus back specialist for repeat injections to his lumbar spine.  -Patient is already on narcotic pain meds for his back pain.  Recommend transition to OTC pain meds when able.  -Tobacco abuse--spent approximately 3-10 minutes counseling the patient regarding the adverse effects of tobacco use.  Included in this  counseling session were treatment options for cessation including quitting cold turkey, medication, nicotine step-down programs, and smoking cessation programs.  Furthermore, I explained to the patient the importance of abstaining from nicotine usage as nicotine is known to increase the risk of complications associated with surgery including but not limited to infection, decreased wound healing, slowed soft tissue healing, and increased surgery associated pain.  If in the future he is recommended for surgery he would need to be nicotine free.  Patient verbalized understanding.  -Follow up as needed.  At this time he is not a surgical candidate for his right hip pain, as it is not the source of his symptoms.  -Questions and concerns answered.      Medical Decision Making  Management Options : over-the-counter medicine and prescription/IM medicine  Data/Risk: radiology tests      Cristy Jackson PA-C  07/11/25  07:30 EDT               EMR Dragon/Transcription disclaimer:  Much of this encounter note is an electronic transcription of spoken language to printed text. Electronic transcription of spoken language may permit erroneous, or at times, nonsensical words or phrases to be inadvertently transcribed. Although I have reviewed the note for such errors, some may still exist.

## 2025-07-22 LAB — CREAT BLDA-MCNC: 0.9 MG/DL (ref 0.6–1.3)

## 2025-07-29 ENCOUNTER — TELEPHONE (OUTPATIENT)
Dept: GASTROENTEROLOGY | Facility: CLINIC | Age: 65
End: 2025-07-29
Payer: MEDICARE

## 2025-07-29 NOTE — TELEPHONE ENCOUNTER
July 29, 2025 [address] [place] [address]  Phone: [phone]  Fax: [phone]                 Val Woodall Nassar Jr.  [address] [place] [address]  1960        Patient will be having a EGD by Dr. Jaymie Walter  on August 11, 2025. The patient is needing cardiac clearance due to being on blood thinners. Please complete the following and fax back to the office.     Patient's was last seen in the office on:_____________________    Procedure/Test Performed:    _____ Stress Test       _____ Echocardiogram    _____ EKG     _____ Heart Cath    Based on the above test results and/or clinical evaluation it is my recommendation:    ____ Patient may proceed with the scheduled surgical procedure with an acceptable cardiac risk.    ____ Patient CAN NOT stop Plavix, Effient, Ticlid, Aspirin, Coumadin, Pradaxa, Xarelto, Eliquis, Savaysa, or Brilinta prior to procedure.     ____ Patient CAN stop Plavix, Effient, Ticlid, Aspirin, Coumadin, Pradaxa, Xarelto, Eliquis, Savaysa, or Brilinta  ______ days prior to procedure.    Please sign and date below.    Signature________________________________________   Date:_________________     Thank You      Jaymie Walter M.D.

## 2025-08-01 NOTE — TELEPHONE ENCOUNTER
CALLED [facility] UNABLE TO REACH LEFT DETAILED MESSAGE ABOUT PATIENTS CLEARANCE ADVISED TO CALL BACK OFFICE.

## 2025-08-04 ENCOUNTER — TELEPHONE (OUTPATIENT)
Dept: GASTROENTEROLOGY | Facility: CLINIC | Age: 65
End: 2025-08-04
Payer: MEDICARE

## 2025-08-05 ENCOUNTER — TELEPHONE (OUTPATIENT)
Dept: CARDIOLOGY | Facility: CLINIC | Age: 65
End: 2025-08-05
Payer: MEDICARE

## 2025-08-06 ENCOUNTER — OFFICE VISIT (OUTPATIENT)
Dept: CARDIOLOGY | Facility: CLINIC | Age: 65
End: 2025-08-06
Payer: MEDICARE

## 2025-08-06 VITALS
OXYGEN SATURATION: 99 % | WEIGHT: 137.1 LBS | HEIGHT: 69 IN | DIASTOLIC BLOOD PRESSURE: 68 MMHG | BODY MASS INDEX: 20.31 KG/M2 | HEART RATE: 62 BPM | SYSTOLIC BLOOD PRESSURE: 134 MMHG

## 2025-08-06 DIAGNOSIS — I48.0 PAROXYSMAL ATRIAL FIBRILLATION: Primary | Chronic | ICD-10-CM

## 2025-08-06 DIAGNOSIS — I50.32 CHRONIC HEART FAILURE WITH PRESERVED EJECTION FRACTION (HFPEF): Chronic | ICD-10-CM

## 2025-08-06 DIAGNOSIS — I25.708 CORONARY ARTERY DISEASE OF BYPASS GRAFT OF NATIVE HEART WITH STABLE ANGINA PECTORIS: Chronic | ICD-10-CM

## 2025-08-06 DIAGNOSIS — Z01.810 PREOPERATIVE CARDIOVASCULAR EXAMINATION: ICD-10-CM

## 2025-08-06 RX ORDER — CETIRIZINE HYDROCHLORIDE 10 MG/1
TABLET ORAL
COMMUNITY
Start: 2025-07-31

## 2025-08-07 RX ORDER — SODIUM, POTASSIUM,MAG SULFATES 17.5-3.13G
1 SOLUTION, RECONSTITUTED, ORAL ORAL TAKE AS DIRECTED
Qty: 354 ML | Refills: 0 | Status: SHIPPED | OUTPATIENT
Start: 2025-08-07

## 2025-08-13 ENCOUNTER — OFFICE VISIT (OUTPATIENT)
Dept: ONCOLOGY | Facility: CLINIC | Age: 65
End: 2025-08-13
Payer: MEDICARE

## 2025-08-13 ENCOUNTER — INFUSION (OUTPATIENT)
Facility: HOSPITAL | Age: 65
End: 2025-08-13
Payer: MEDICARE

## 2025-08-13 VITALS
TEMPERATURE: 97.6 F | WEIGHT: 153 LBS | HEIGHT: 69 IN | OXYGEN SATURATION: 94 % | BODY MASS INDEX: 22.66 KG/M2 | SYSTOLIC BLOOD PRESSURE: 117 MMHG | DIASTOLIC BLOOD PRESSURE: 69 MMHG | HEART RATE: 70 BPM

## 2025-08-13 DIAGNOSIS — C15.3 MALIGNANT NEOPLASM OF UPPER THIRD OF ESOPHAGUS: Primary | ICD-10-CM

## 2025-08-13 DIAGNOSIS — C15.9 SQUAMOUS CELL CARCINOMA OF ESOPHAGUS: ICD-10-CM

## 2025-08-13 DIAGNOSIS — C15.9 SQUAMOUS CELL CARCINOMA OF ESOPHAGUS: Primary | ICD-10-CM

## 2025-08-13 LAB
ALBUMIN SERPL-MCNC: 4 G/DL (ref 3.5–5.2)
ALBUMIN/GLOB SERPL: 1.2 G/DL
ALP SERPL-CCNC: 127 U/L (ref 39–117)
ALT SERPL W P-5'-P-CCNC: 18 U/L (ref 1–41)
ANION GAP SERPL CALCULATED.3IONS-SCNC: 9.3 MMOL/L (ref 5–15)
AST SERPL-CCNC: 28 U/L (ref 1–40)
BASOPHILS # BLD AUTO: 0.04 10*3/MM3 (ref 0–0.2)
BASOPHILS NFR BLD AUTO: 0.7 % (ref 0–1.5)
BILIRUB SERPL-MCNC: 0.2 MG/DL (ref 0–1.2)
BUN SERPL-MCNC: 15.2 MG/DL (ref 8–23)
BUN/CREAT SERPL: 16.5 (ref 7–25)
CALCIUM SPEC-SCNC: 9.6 MG/DL (ref 8.6–10.5)
CHLORIDE SERPL-SCNC: 102 MMOL/L (ref 98–107)
CO2 SERPL-SCNC: 25.7 MMOL/L (ref 22–29)
CREAT SERPL-MCNC: 0.92 MG/DL (ref 0.76–1.27)
DEPRECATED RDW RBC AUTO: 50.6 FL (ref 37–54)
EGFRCR SERPLBLD CKD-EPI 2021: 92.3 ML/MIN/1.73
EOSINOPHIL # BLD AUTO: 0.25 10*3/MM3 (ref 0–0.4)
EOSINOPHIL NFR BLD AUTO: 4.5 % (ref 0.3–6.2)
ERYTHROCYTE [DISTWIDTH] IN BLOOD BY AUTOMATED COUNT: 15.6 % (ref 12.3–15.4)
FERRITIN SERPL-MCNC: 62.9 NG/ML (ref 30–400)
GLOBULIN UR ELPH-MCNC: 3.3 GM/DL
GLUCOSE SERPL-MCNC: 99 MG/DL (ref 65–99)
HCT VFR BLD AUTO: 34.8 % (ref 37.5–51)
HGB BLD-MCNC: 10.5 G/DL (ref 13–17.7)
IMM GRANULOCYTES # BLD AUTO: 0.02 10*3/MM3 (ref 0–0.05)
IMM GRANULOCYTES NFR BLD AUTO: 0.4 % (ref 0–0.5)
LYMPHOCYTES # BLD AUTO: 0.62 10*3/MM3 (ref 0.7–3.1)
LYMPHOCYTES NFR BLD AUTO: 11.3 % (ref 19.6–45.3)
MCH RBC QN AUTO: 27.1 PG (ref 26.6–33)
MCHC RBC AUTO-ENTMCNC: 30.2 G/DL (ref 31.5–35.7)
MCV RBC AUTO: 89.9 FL (ref 79–97)
MONOCYTES # BLD AUTO: 0.8 10*3/MM3 (ref 0.1–0.9)
MONOCYTES NFR BLD AUTO: 14.5 % (ref 5–12)
NEUTROPHILS NFR BLD AUTO: 3.77 10*3/MM3 (ref 1.7–7)
NEUTROPHILS NFR BLD AUTO: 68.6 % (ref 42.7–76)
NRBC BLD AUTO-RTO: 0 /100 WBC (ref 0–0.2)
PLATELET # BLD AUTO: 170 10*3/MM3 (ref 140–450)
PMV BLD AUTO: 8.8 FL (ref 6–12)
POTASSIUM SERPL-SCNC: 4.5 MMOL/L (ref 3.5–5.2)
PROT SERPL-MCNC: 7.3 G/DL (ref 6–8.5)
RBC # BLD AUTO: 3.87 10*6/MM3 (ref 4.14–5.8)
SODIUM SERPL-SCNC: 137 MMOL/L (ref 136–145)
WBC NRBC COR # BLD AUTO: 5.5 10*3/MM3 (ref 3.4–10.8)

## 2025-08-13 PROCEDURE — 82728 ASSAY OF FERRITIN: CPT

## 2025-08-13 PROCEDURE — 84165 PROTEIN E-PHORESIS SERUM: CPT

## 2025-08-13 PROCEDURE — 25010000002 HEPARIN LOCK FLUSH PER 10 UNITS: Performed by: INTERNAL MEDICINE

## 2025-08-13 PROCEDURE — 85025 COMPLETE CBC W/AUTO DIFF WBC: CPT

## 2025-08-13 PROCEDURE — 80053 COMPREHEN METABOLIC PANEL: CPT

## 2025-08-13 PROCEDURE — 36591 DRAW BLOOD OFF VENOUS DEVICE: CPT

## 2025-08-13 PROCEDURE — 82607 VITAMIN B-12: CPT

## 2025-08-13 RX ORDER — SODIUM CHLORIDE 0.9 % (FLUSH) 0.9 %
10 SYRINGE (ML) INJECTION AS NEEDED
Status: CANCELLED | OUTPATIENT
Start: 2025-08-13

## 2025-08-13 RX ORDER — HEPARIN SODIUM (PORCINE) LOCK FLUSH IV SOLN 100 UNIT/ML 100 UNIT/ML
500 SOLUTION INTRAVENOUS AS NEEDED
Status: DISCONTINUED | OUTPATIENT
Start: 2025-08-13 | End: 2025-08-13 | Stop reason: HOSPADM

## 2025-08-13 RX ORDER — HEPARIN SODIUM (PORCINE) LOCK FLUSH IV SOLN 100 UNIT/ML 100 UNIT/ML
500 SOLUTION INTRAVENOUS AS NEEDED
Status: CANCELLED | OUTPATIENT
Start: 2025-08-13

## 2025-08-13 RX ORDER — SODIUM CHLORIDE 0.9 % (FLUSH) 0.9 %
10 SYRINGE (ML) INJECTION AS NEEDED
OUTPATIENT
Start: 2025-08-13

## 2025-08-13 RX ORDER — HEPARIN SODIUM (PORCINE) LOCK FLUSH IV SOLN 100 UNIT/ML 100 UNIT/ML
500 SOLUTION INTRAVENOUS AS NEEDED
OUTPATIENT
Start: 2025-08-13

## 2025-08-13 RX ADMIN — HEPARIN 500 UNITS: 100 SYRINGE at 16:24

## 2025-08-14 LAB — VIT B12 BLD-MCNC: 1782 PG/ML (ref 211–946)

## 2025-08-15 LAB
ALBUMIN SERPL ELPH-MCNC: 3.3 G/DL (ref 2.9–4.4)
ALBUMIN/GLOB SERPL: 0.9 {RATIO} (ref 0.7–1.7)
ALPHA1 GLOB SERPL ELPH-MCNC: 0.4 G/DL (ref 0–0.4)
ALPHA2 GLOB SERPL ELPH-MCNC: 0.9 G/DL (ref 0.4–1)
B-GLOBULIN SERPL ELPH-MCNC: 1.2 G/DL (ref 0.7–1.3)
GAMMA GLOB SERPL ELPH-MCNC: 1.4 G/DL (ref 0.4–1.8)
GLOBULIN SER CALC-MCNC: 3.8 G/DL (ref 2.2–3.9)
LABORATORY COMMENT REPORT: NORMAL
M PROTEIN SERPL ELPH-MCNC: NORMAL G/DL
PROT PATTERN SERPL ELPH-IMP: NORMAL
PROT SERPL-MCNC: 7.1 G/DL (ref 6–8.5)

## 2025-08-18 ENCOUNTER — OFFICE VISIT (OUTPATIENT)
Dept: PAIN MEDICINE | Facility: CLINIC | Age: 65
End: 2025-08-18
Payer: MEDICARE

## 2025-08-18 VITALS — WEIGHT: 153 LBS | HEIGHT: 69 IN | BODY MASS INDEX: 22.66 KG/M2

## 2025-08-18 DIAGNOSIS — G89.4 CHRONIC PAIN SYNDROME: ICD-10-CM

## 2025-08-18 DIAGNOSIS — M48.062 SPINAL STENOSIS OF LUMBAR REGION WITH NEUROGENIC CLAUDICATION: Primary | ICD-10-CM

## 2025-08-18 PROCEDURE — 1159F MED LIST DOCD IN RCRD: CPT

## 2025-08-18 PROCEDURE — 1160F RVW MEDS BY RX/DR IN RCRD: CPT

## 2025-08-18 PROCEDURE — 99214 OFFICE O/P EST MOD 30 MIN: CPT

## 2025-08-18 PROCEDURE — G2211 COMPLEX E/M VISIT ADD ON: HCPCS

## 2025-08-18 PROCEDURE — 1125F AMNT PAIN NOTED PAIN PRSNT: CPT

## 2025-08-18 RX ORDER — ASCORBIC ACID 500 MG
TABLET ORAL
COMMUNITY
Start: 2025-08-06

## 2025-08-20 ENCOUNTER — TELEPHONE (OUTPATIENT)
Dept: GASTROENTEROLOGY | Facility: CLINIC | Age: 65
End: 2025-08-20
Payer: MEDICARE

## (undated) DEVICE — KT ORCA ORCAPOD DISP STRL

## (undated) DEVICE — SAFELINER SUCTION CANISTER 1000CC: Brand: DEROYAL

## (undated) DEVICE — THE BITE BLOCK MAXI, LATEX FREE STRAP IS USED TO PROTECT THE ENDOSCOPE INSERTION TUBE FROM BEING BITTEN BY THE PATIENT.

## (undated) DEVICE — ADAPT CLN LUM OLYMP AIR/H20

## (undated) DEVICE — SYR LUERLOK 50ML

## (undated) DEVICE — SOLIDIFIER LIQ PREMISORB 1500CC

## (undated) DEVICE — TUBING, SUCTION, 1/4" X 10', STRAIGHT: Brand: MEDLINE

## (undated) DEVICE — FIRST STEP BEDSIDE ADD WATER KIT - RESEALABLE STAND-UP POUCH, ENDOSCOPIC CLEANING PAD - 1 POUCH: Brand: FIRST STEP BEDSIDE ADD WATER KIT - RESEALABLE STAND-UP POUCH, ENDOSCOPIC CLEANIN

## (undated) DEVICE — LUBE JELLY FOIL PACK 1.4 OZ: Brand: MEDLINE INDUSTRIES, INC.

## (undated) DEVICE — AIR/WATER CLEANING VALVES: Brand: AIR/WATER CLEANING VALVES

## (undated) DEVICE — CONTN GRAD MEAS TRIANG 32OZ BLK

## (undated) DEVICE — SOL IRR H2O BTL 1000ML STRL

## (undated) DEVICE — INTRO ACCSR BLNT TP

## (undated) DEVICE — FIAPC® PROBE W/ FILTER 3000 A OD 2.3MM/6.9FR; L 3M/9.8FT: Brand: ERBE

## (undated) DEVICE — SOL IRR H2O BO 1000ML STRL

## (undated) DEVICE — HYBRID CO2 TUBING/CAP SET FOR OLYMPUS® SCOPES & CO2 SOURCE: Brand: ERBE

## (undated) DEVICE — BIPOLAR ELECTROHEMOSTASIS CATHETER: Brand: GOLD PROBE 350CM

## (undated) DEVICE — FIAPC® PROBE W/ FILTER 2200 C OD 2.3MM/6.9FR; L 2.2M/7.2FT: Brand: ERBE

## (undated) DEVICE — SNAR POLYP CAPTIVATOR/COLD STFF RND 10MM 240CM

## (undated) DEVICE — ST LINER SAFECAP GRN RED CP STRL

## (undated) DEVICE — GRADUATE CONTN 1000ML